# Patient Record
Sex: MALE | Race: BLACK OR AFRICAN AMERICAN | NOT HISPANIC OR LATINO | Employment: OTHER | URBAN - METROPOLITAN AREA
[De-identification: names, ages, dates, MRNs, and addresses within clinical notes are randomized per-mention and may not be internally consistent; named-entity substitution may affect disease eponyms.]

---

## 2017-01-31 ENCOUNTER — CLINICAL SUPPORT (OUTPATIENT)
Dept: REHABILITATION | Facility: HOSPITAL | Age: 37
End: 2017-01-31
Attending: PSYCHIATRY & NEUROLOGY
Payer: MEDICARE

## 2017-01-31 DIAGNOSIS — R26.0 ATAXIC GAIT: ICD-10-CM

## 2017-01-31 DIAGNOSIS — R29.898 WEAKNESS OF BOTH LOWER EXTREMITIES: Primary | ICD-10-CM

## 2017-01-31 DIAGNOSIS — R26.89 BALANCE PROBLEMS: ICD-10-CM

## 2017-01-31 PROCEDURE — G8978 MOBILITY CURRENT STATUS: HCPCS | Mod: CJ

## 2017-01-31 PROCEDURE — G8979 MOBILITY GOAL STATUS: HCPCS | Mod: CJ

## 2017-01-31 PROCEDURE — 97163 PT EVAL HIGH COMPLEX 45 MIN: CPT

## 2017-01-31 PROCEDURE — 97110 THERAPEUTIC EXERCISES: CPT

## 2017-01-31 NOTE — PLAN OF CARE
TIME RECORD    Date: 02/02/2017    Start Time:  12:08  Stop Time:  1:00    PROCEDURES:    TIMED  Procedure Min.   TE 10                     UNTIMED  Procedure Min.   PT eval          Total Timed Minutes:  10  Total Timed Units:  1  Total Untimed Units:  1  Charges Billed/# of units:  2    OUTPATIENT PHYSICAL THERAPY   PATIENT EVALUATION  Onset Date: Oct 2016  Primary Diagnosis:   1. Weakness of both lower extremities     2. Ataxic gait     3. Balance problems       Treatment Diagnosis: severe weakness, abnormality of gait, balance issues  Past Medical History   Diagnosis Date    Degenerative motor system disease     Depression     Seizure      Precautions: Standard, fall  Prior Therapy: Inpatient rehab previously, unsure if he has had PT in the past  Medications: Lisa Moreno has a current medication list which includes the following prescription(s): baclofen, cyanocobalamin (vitamin b-12), folic acid-vit b6-vit b12 2.5-25-2 mg, gabapentin, megestrol, polyethylene glycol, senna-docusate 8.6-50 mg, and sertraline.  Nutrition:  Thin  History of Present Illness: Patient and caregiver report that he progressively stopped walking around Oct of 2016  Prior Level of Function: Independent  Social History: Lives with family  Place of Residence (Steps/Adaptations): single story home, 3 steps in front of the home, denies having a trouble getting up the steps by himself  Functional Deficits Leading to Referral/Nature of Injury: difficulty walking, ascending/descending steps  Patient Therapy Goals: To be able to walk again so he can go out and meet his future wife to be    Subjective     Lisa Moreno states he has not been walking for several months and he just gradually stopped. He thinks he may have had pain and that is why he stopped walking. He reports having a RW at home but is not sure how to use it and has been relying on his wheelchair for mobility since Oct 2016. He denies any difficulty with shower  transfers, bed mobility, and bed to/from wheelchair transfers. He does not recall having had any therapy in the past and has never had a home program to follow. PT spoke to his mother via phone and she reports he recently had home health therapy and they referred him to outpatient therapy to increase his strength and ability to walk. He has been walking with the RW with his HH therapist and the patient is able to perform transfers independently. He has had inpatient rehab in the past and he gets stronger when he goes but then comes home and does nothing.     Pain:  Denies any pain at this time.    Objective     Posture: sacral sitting, B knee in slight extension in sitting, in standing requires UE support to maintain balance, feet touching, L heel off the ground  Palpation: No TTP.   Sensation: light touch intact  DTRs:1+ R LE and L ankle; 0 L knee  Range of Motion/Strength: Patient displays limited AROM of B LEs but has full PROM.     L/E MMT Right Left Pain/Dysfunction with Movement   Hip Flexion 3/5 3/5    Hip Extension NT NT    Hip Abduction 3-/5 3-/5    Knee Flexion 4/5 4/5    Knee Extension 2+/5 2+/5    Ankle DF 4-/5 3/5    Ankle PF 5/5 5/5      Flexibility: unable to assess due to spasticity  Gait: With AD.  Device Used -  Rolling walker  Analysis: CGA - narrow CJ, ataxic gait pattern, decreased heel strike L LE  Bed Mobility:Independent  Transfers: Assistance - [facility]  Special Tests: Static sitting balance - fair, requires UE leverage; Dynamic sitting balance poor, Static standing balance poor without UE support  Other: FOTO CNS dysfunction 62, G code CJ, 20%<40%  Treatment: Patient was educated on the plan of care and treatment options. He is in agreement with the plan of care. He performed therapeutic exercises 1:1 with PT x 10 minutes of seated knee ext, knee flexion, and hip flex. He was given handouts of today's exercises for HEP.    Assessment       Initial Assessment (Pertinent finding, problem list and  factors affecting outcome): Mr. Moreno is a 36 year old male, who presents to the clinic with complaints of weakness and inability to walk. He demonstrates limited AROM of B LE due to spastic diplegia, but has PROM WFL with no complaints of pain. His B LE weakness limits his ability to perform transfers without UE leverage or ambulate without an AD. He has poor to fair sitting balance and poor unsupported standing balance. When perturbations are applied with balance he falls backwards with no protective reactions. When ambulating with a RW and CGA for safety he has a narrow CJ, decreased heel strike on L LE, ataxic gait pattern. His requires SBA with transfers due to balance issues and poor safety awareness. His score on FOTO CNS dysfunction places him in the 20%<40% impaired, limited, or restricted category. He would benefit from physical therapy to improve his strength, balance, and gait to improve his functional mobility.   History  Co-morbidities and personal factors that may impact the plan of care Examination  Body Structures and Functions, activity limitations and participation restrictions that may impact the plan of care Clinical Presentation   Decision Making/ Complexity Score   Co-morbidities:   Spastic diplegia, upper motor neuron disease, spinal ataxia               Personal Factors:   Poor safety awareness, fall risk Body Regions:  B LE  Body Systems: Musculoskeletal- B LE weakness, decreased AROM B LE; Neuromuscular - ataxic gait, poor standing and sitting balance, SBA with transfers for safety; Cardiovascular - N/A; Integumentary - N/A          Activity limitations/Participation Restrictions: poor endurance, poor safety awareness, poor compliance with HEP during previous episodes of therapy         Evolving clinical presentation with changing clinical characteristics High complexity    FOTO CNS Dysfunction - 20%<40%       Rehab Potiential: fair    Short Term Goals (4 Weeks):   1. This patient and  his caregiver will be independent with a basic HEP.  2. This patient will have increase B LE strength by 1 grade in order to perform transfers independently.  3. This patient will be able to ambulate household level with RW independently with no LOB.  4. Patient will be able to achieve greater than or equal to 70 on the FOTO CNS dysfunction placing patient in 20%<40% impaired, limited, or restricted category demonstrating overall improved functional ability with lower extremity.   Long Term Goals (8 Weeks):   1. This patient will be independent with an updated HEP.  2. This patient will have B LE strength of 5/5 in order to ascend/descend 3 steps with supervision.  3. This patient will be able to ambulate community level with RW independently with no LOB.  4. Patient will be able to achieve greater than or equal to 75 on the FOTO CNS dysfunction placing patient in 20%<40% impaired, limited, or restricted category demonstrating overall improved functional ability with lower extremity.     Plan     Certification Period: 01/30/17 to 03/30/17  Recommended Treatment Plan: 2 times per week for 8 weeks: Gait Training, Group Therapy, Manual Therapy, Moist Heat/ Ice, Neuromuscular Re-ed, Patient Education, Therapeutic Exercise and Other modalities prn.  Other Recommendations: None      Therapist: Mary Marshall, PT    I CERTIFY THE NEED FOR THESE SERVICES FURNISHED UNDER THIS PLAN OF TREATMENT AND WHILE UNDER MY CARE    Physician's comments: ________________________________________________________________________________________________________________________________________________      Physician's Name: ___________________________________

## 2017-02-02 ENCOUNTER — CLINICAL SUPPORT (OUTPATIENT)
Dept: REHABILITATION | Facility: HOSPITAL | Age: 37
End: 2017-02-02
Attending: PSYCHIATRY & NEUROLOGY
Payer: MEDICARE

## 2017-02-02 DIAGNOSIS — R26.0 ATAXIC GAIT: ICD-10-CM

## 2017-02-02 DIAGNOSIS — R26.89 BALANCE PROBLEMS: ICD-10-CM

## 2017-02-02 PROCEDURE — 97110 THERAPEUTIC EXERCISES: CPT

## 2017-02-02 NOTE — PROGRESS NOTES
"TIME RECORD    Date:  02/02/2017    Start Time:  11:15  Stop Time:  12:00    PROCEDURES:    TIMED  Procedure Min.   TE 45                     UNTIMED  Procedure Min.             Total Timed Minutes:  45  Total Timed Units:  3  Total Untimed Units:  0  Charges Billed/# of units:  3 (TE-3)      Progress/Current Status    Subjective:     Patient ID: Lisa Moreno is a 36 y.o. male.  Diagnosis:   1. Ataxic gait     2. Balance problems       Pain: 0 /10  Patient reports having no pain.    Objective:     Patient arrived to therapy appointment 15 minutes late due to transportation.  Patient was educated and performed therapeutic exercises as per log below 1:1 with PTA x 45 minutes to improve ROM, flexibility, strength, gait and balance.  Gait/balance exercises consisted of ambulating with RW, CGA x 25 ft with 1 assist with WC.  Patient declined cold pack at the end of treatment.     Date  02/02/17   VISIT  INS AUTH EXP 2/20 12/31/17   G CODE VISIT 2/10   POC EXP. DATE 03/30/17   VISIT AMOUNT    MEDICARE TOTAL 95.94    203.52   FACE-TO-FACE 03/01/17       Bike  --   SEATED:    HS/GSS w/ strap 10 x 10"   Quad sets 10 x 3"   LAQs 1 x 10   Seated Hip Flex 1 x 10   HS curls --   Hip abduction 1 x 10 YTB   Hip adduction 10 x 3" w/ ball   Ankle pumps 1 x 5   STANDING:    Hip flex 1 x 5   Hip abd 1 x 5   March  1 x 5   Gait/balance See above   CP declined       Initials GWA 1/6       Assessment:     Patient ambulates with very NBOS, toe drag and very little weight shift as he puts most of his weight through his hands onto the walker.    Patient Education/Response:     Patient will be issued written copy of today's exercises for his HEP on next visit.    Plans and Goals:     Continue with Plan Of Care and progress as patient tolerates toward PT goals.    Short Term Goals (4 Weeks):   1. This patient and his caregiver will be independent with a basic HEP.  2. This patient will have increase B LE strength by 1 grade in order " to perform transfers independently.  3. This patient will be able to ambulate household level with RW independently with no LOB.  4. Patient will be able to achieve greater than or equal to 70 on the FOTO CNS dysfunction placing patient in 20%<40% impaired, limited, or restricted category demonstrating overall improved functional ability with lower extremity.     Long Term Goals (8 Weeks):   1. This patient will be independent with an updated HEP.  2. This patient will have B LE strength of 5/5 in order to ascend/descend 3 steps with supervision.  3. This patient will be able to ambulate community level with RW independently with no LOB.  4. Patient will be able to achieve greater than or equal to 75 on the FOTO CNS dysfunction placing patient in 20%<40% impaired, limited, or restricted category demonstrating overall improved functional ability with lower extremity.

## 2017-02-14 ENCOUNTER — CLINICAL SUPPORT (OUTPATIENT)
Dept: REHABILITATION | Facility: HOSPITAL | Age: 37
End: 2017-02-14
Attending: PSYCHIATRY & NEUROLOGY
Payer: MEDICARE

## 2017-02-14 DIAGNOSIS — R26.0 ATAXIC GAIT: ICD-10-CM

## 2017-02-14 DIAGNOSIS — R26.89 BALANCE PROBLEMS: ICD-10-CM

## 2017-02-14 PROCEDURE — 97110 THERAPEUTIC EXERCISES: CPT

## 2017-02-14 NOTE — PROGRESS NOTES
"TIME RECORD    Date:  02/14/2017    Start Time:  11:00  Stop Time:  12:00    PROCEDURES:    TIMED  Procedure Min.   TE 55                     UNTIMED  Procedure Min.             Total Timed Minutes:  55  Total Timed Units:  4  Total Untimed Units:  0  Charges Billed/# of units:  4 (TE-4)      Progress/Current Status     Subjective:     Patient ID: Lisa Moreno is a 36 y.o. male.  Diagnosis:   1. Ataxic gait     2. Balance problems       Pain: 0 /10  He reports having no pain today and he has been doing his exercises some.    Objective:     Patient arrived to therapy appointment in his manual wheelchair. Patient was educated and performed therapeutic exercises as per log below 1:1 with PT x 55 minutes to improve ROM, flexibility, strength, gait and balance. Gait/balance exercises consisted of ambulating with RW, CGA x 20 ft with 1 assist with WC and 28 ft with RW, CGA, and 1 assist with WC. Patient declined cold pack at the end of treatment.     Date  02/14/17 02/02/17   VISIT  INS AUTH EXP 3/20  12/31/17 2/20  12/31/17   G CODE VISIT 3/10 2/10   POC EXP. DATE 03/30/17 03/30/17   VISIT AMOUNT    MEDICARE TOTAL 127.92    331.44 95.94    203.52   FACE-TO-FACE 03/01/17 03/01/17        Bike  -- --   SEATED:     HS/GSS w/ strap 10 x 10" 10 x 10"   Quad sets 15 x 3" 10 x 3"   LAQs 1 x 15 1 x 10   Seated Hip Flex 2 x 10 1 x 10   HS curls 1 x 10 YTB --   Hip abduction 1 x 15 YTB 1 x 10 YTB   Hip adduction 15 x 3" w/ ball 10 x 3" w/ ball   Ankle pumps 1 x 10 1 x 5   STANDING:     Hip flex oot 1 x 5   Hip abd oot 1 x 5   March  1 x 5 1 x 5   Gait/balance See above See above   CP declined declined        Initials DG GWA 1/6       Assessment:     Patient required frequent cues with sit to stand to not scoot to the very edge of his wheelchair as this causes him to have a posterior lean when attempting sit to stand transfer. He also required frequent cues when ambulating to increase his CJ as he continues to have a very " narrow CJ with toe drag bilaterally. He places the majority of his weight through his UEs on the walker when performing transfers and ambulating. He did require frequent rest breaks with ambulation and standing exercises.    Patient Education/Response:     Patient was instructed to continue with HEP on a regular basis to maintain his strength. He verbalized understanding instructions.     Plans and Goals:     Continue with Plan Of Care and progress as patient tolerates toward PT goals.    Short Term Goals (4 Weeks):   1. This patient and his caregiver will be independent with a basic HEP.  2. This patient will have increase B LE strength by 1 grade in order to perform transfers independently.  3. This patient will be able to ambulate household level with RW independently with no LOB.  4. Patient will be able to achieve greater than or equal to 70 on the FOTO CNS dysfunction placing patient in 20%<40% impaired, limited, or restricted category demonstrating overall improved functional ability with lower extremity.     Long Term Goals (8 Weeks):   1. This patient will be independent with an updated HEP.  2. This patient will have B LE strength of 5/5 in order to ascend/descend 3 steps with supervision.  3. This patient will be able to ambulate community level with RW independently with no LOB.  4. Patient will be able to achieve greater than or equal to 75 on the FOTO CNS dysfunction placing patient in 20%<40% impaired, limited, or restricted category demonstrating overall improved functional ability with lower extremity.

## 2017-02-21 ENCOUNTER — CLINICAL SUPPORT (OUTPATIENT)
Dept: REHABILITATION | Facility: HOSPITAL | Age: 37
End: 2017-02-21
Attending: PSYCHIATRY & NEUROLOGY
Payer: MEDICARE

## 2017-02-21 DIAGNOSIS — R26.0 ATAXIC GAIT: ICD-10-CM

## 2017-02-21 DIAGNOSIS — R26.89 BALANCE PROBLEMS: ICD-10-CM

## 2017-02-21 PROCEDURE — 97110 THERAPEUTIC EXERCISES: CPT

## 2017-02-21 NOTE — PROGRESS NOTES
"TIME RECORD    Date:  02/21/2017    Start Time:  10:55  Stop Time:  11:50    PROCEDURES:    TIMED  Procedure Min.   TE 55                     UNTIMED  Procedure Min.             Total Timed Minutes:  55  Total Timed Units:  4  Total Untimed Units:  0  Charges Billed/# of units:  4 (TE-4)      Progress/Current Status    Subjective:     Patient ID: Lisa Moreno is a 36 y.o. male.  Diagnosis:   1. Ataxic gait     2. Balance problems       Pain: 0 /10  Patient reports having no pain.    Objective:     Patient arrived to therapy appointment in his manual wheelchair. Patient was educated and performed therapeutic exercises as per log below, along with today's progressions, 1:1 with PTA x 55 minutes to improve ROM, flexibility, strength, gait and balance. Gait/balance exercises consisted of ambulating with RW, CGA x 24 ft with 1 assist with WC and 35 ft with RW, CGA, and 1 assist with WC. Patient declined cold pack at the end of treatment.     Date  02/21/17 02/14/17 02/02/17   VISIT  INS AUTH EXP 4/20 3/20  12/31/17 2/20  12/31/17   G CODE VISIT 4/10 3/10 2/10   POC EXP. DATE 03/30/17 03/30/17 03/30/17   VISIT AMOUNT    MEDICARE TOTAL 127.92    459.36 127.92    331.44 95.94    203.52   FACE-TO-FACE 03/01/17 03/01/17 03/01/17         Bike  -- -- --   SEATED:      HS/GSS w/ strap 10 x 10" 10 x 10" 10 x 10"   Quad sets 15 x 3" 15 x 3" 10 x 3"   LAQs 1 x 20 1 x 15 1 x 10   Seated Hip Flex 2 x 10 2 x 10 1 x 10   HS curls 1 x 15 YTB 1 x 10 YTB --   Hip abduction 1 x 20 YTB 1 x 15 YTB 1 x 10 YTB   Hip adduction 20 x 3" w/ ball 15 x 3" w/ ball 10 x 3" w/ ball   Ankle pumps 1 x 10 1 x 10 1 x 5   STANDING:      Hip flex oot oot 1 x 5   Hip abd oot oot 1 x 5   March  1 x 8 1 x 5 1 x 5   Gait/balance See above See above See above   CP declined declined declined         Initials GWA 1/6 DG GWA 1/6       Assessment:     Patient continues to require cues with sit to stand to not scoot to the very edge of his wheelchair as this " causes him to have a posterior lean when attempting sit to stand transfer. He also required frequent cues when ambulating to increase his CJ as he continues to have a very narrow CJ with toe drag bilaterally. He places the majority of his weight through his UEs on the walker when performing transfers and ambulating. He did require frequent rest breaks with ambulation and standing exercises.  Patient was able to ambulate farther before becoming fatigued.      Patient Education/Response:     Patient was instructed to continue with HEP on a regular basis to maintain his strength. He verbalized understanding instructions.     Plans and Goals:     Continue with Plan Of Care and progress as patient tolerates toward PT goals.    Short Term Goals (4 Weeks):   1. This patient and his caregiver will be independent with a basic HEP.  2. This patient will have increase B LE strength by 1 grade in order to perform transfers independently.  3. This patient will be able to ambulate household level with RW independently with no LOB.  4. Patient will be able to achieve greater than or equal to 70 on the FOTO CNS dysfunction placing patient in 20%<40% impaired, limited, or restricted category demonstrating overall improved functional ability with lower extremity.     Long Term Goals (8 Weeks):   1. This patient will be independent with an updated HEP.  2. This patient will have B LE strength of 5/5 in order to ascend/descend 3 steps with supervision.  3. This patient will be able to ambulate community level with RW independently with no LOB.  4. Patient will be able to achieve greater than or equal to 75 on the FOTO CNS dysfunction placing patient in 20%<40% impaired, limited, or restricted category demonstrating overall improved functional ability with lower extremity.

## 2018-09-07 ENCOUNTER — HOSPITAL ENCOUNTER (OUTPATIENT)
Facility: HOSPITAL | Age: 38
Discharge: HOME | End: 2018-09-11
Attending: EMERGENCY MEDICINE | Admitting: HOSPITALIST
Payer: MEDICARE

## 2018-09-07 DIAGNOSIS — J22 LOWER RESPIRATORY INFECTION: ICD-10-CM

## 2018-09-07 DIAGNOSIS — R26.0 ATAXIC GAIT: ICD-10-CM

## 2018-09-07 DIAGNOSIS — Z87.898 HISTORY OF SEIZURES: ICD-10-CM

## 2018-09-07 DIAGNOSIS — N31.9 NEUROGENIC BLADDER: ICD-10-CM

## 2018-09-07 DIAGNOSIS — G11.8 SPINOCEREBELLAR ATAXIA: ICD-10-CM

## 2018-09-07 DIAGNOSIS — R53.1 WEAKNESS: Primary | ICD-10-CM

## 2018-09-07 DIAGNOSIS — J40 BRONCHITIS: ICD-10-CM

## 2018-09-07 DIAGNOSIS — R05.9 COUGH: ICD-10-CM

## 2018-09-07 LAB
ALBUMIN SERPL BCP-MCNC: 4.1 G/DL
ALP SERPL-CCNC: 50 U/L
ALT SERPL W/O P-5'-P-CCNC: 15 U/L
ANION GAP SERPL CALC-SCNC: 6 MMOL/L
AST SERPL-CCNC: 16 U/L
BASOPHILS # BLD AUTO: 0.02 K/UL
BASOPHILS NFR BLD: 0.3 %
BILIRUB SERPL-MCNC: 0.4 MG/DL
BILIRUB UR QL STRIP: NEGATIVE
BUN SERPL-MCNC: 17 MG/DL
CALCIUM SERPL-MCNC: 9.5 MG/DL
CHLORIDE SERPL-SCNC: 104 MMOL/L
CK SERPL-CCNC: 241 U/L
CLARITY UR REFRACT.AUTO: CLEAR
CO2 SERPL-SCNC: 30 MMOL/L
COLOR UR AUTO: YELLOW
CREAT SERPL-MCNC: 1.2 MG/DL
CRP SERPL-MCNC: 1.3 MG/L
DIFFERENTIAL METHOD: ABNORMAL
EOSINOPHIL # BLD AUTO: 0.1 K/UL
EOSINOPHIL NFR BLD: 0.7 %
ERYTHROCYTE [DISTWIDTH] IN BLOOD BY AUTOMATED COUNT: 13.1 %
ERYTHROCYTE [SEDIMENTATION RATE] IN BLOOD BY WESTERGREN METHOD: <2 MM/HR
EST. GFR  (AFRICAN AMERICAN): >60 ML/MIN/1.73 M^2
EST. GFR  (NON AFRICAN AMERICAN): >60 ML/MIN/1.73 M^2
GLUCOSE SERPL-MCNC: 102 MG/DL
GLUCOSE UR QL STRIP: NEGATIVE
HCT VFR BLD AUTO: 50.7 %
HGB BLD-MCNC: 15.9 G/DL
HGB UR QL STRIP: ABNORMAL
IMM GRANULOCYTES # BLD AUTO: 0.01 K/UL
IMM GRANULOCYTES NFR BLD AUTO: 0.1 %
KETONES UR QL STRIP: NEGATIVE
LEUKOCYTE ESTERASE UR QL STRIP: NEGATIVE
LYMPHOCYTES # BLD AUTO: 1.4 K/UL
LYMPHOCYTES NFR BLD: 20.5 %
MCH RBC QN AUTO: 26.8 PG
MCHC RBC AUTO-ENTMCNC: 31.4 G/DL
MCV RBC AUTO: 85 FL
MICROSCOPIC COMMENT: ABNORMAL
MONOCYTES # BLD AUTO: 0.5 K/UL
MONOCYTES NFR BLD: 7.5 %
NEUTROPHILS # BLD AUTO: 4.8 K/UL
NEUTROPHILS NFR BLD: 70.9 %
NITRITE UR QL STRIP: NEGATIVE
NRBC BLD-RTO: 0 /100 WBC
PH UR STRIP: 6 [PH] (ref 5–8)
PLATELET # BLD AUTO: 214 K/UL
PMV BLD AUTO: 11.6 FL
POTASSIUM SERPL-SCNC: 4.3 MMOL/L
PROT SERPL-MCNC: 7.8 G/DL
PROT UR QL STRIP: NEGATIVE
RBC # BLD AUTO: 5.94 M/UL
RBC #/AREA URNS AUTO: 59 /HPF (ref 0–4)
SODIUM SERPL-SCNC: 140 MMOL/L
SP GR UR STRIP: 1.03 (ref 1–1.03)
URN SPEC COLLECT METH UR: ABNORMAL
UROBILINOGEN UR STRIP-ACNC: 2 EU/DL
WBC # BLD AUTO: 6.79 K/UL
WBC #/AREA URNS AUTO: 2 /HPF (ref 0–5)

## 2018-09-07 PROCEDURE — 85025 COMPLETE CBC W/AUTO DIFF WBC: CPT

## 2018-09-07 PROCEDURE — 25000003 PHARM REV CODE 250

## 2018-09-07 PROCEDURE — 85652 RBC SED RATE AUTOMATED: CPT

## 2018-09-07 PROCEDURE — 63600175 PHARM REV CODE 636 W HCPCS

## 2018-09-07 PROCEDURE — 25500020 PHARM REV CODE 255: Performed by: EMERGENCY MEDICINE

## 2018-09-07 PROCEDURE — 99285 EMERGENCY DEPT VISIT HI MDM: CPT | Mod: ,,,

## 2018-09-07 PROCEDURE — 99220 PR INITIAL OBSERVATION CARE,LEVL III: CPT | Mod: ,,, | Performed by: PHYSICIAN ASSISTANT

## 2018-09-07 PROCEDURE — 99285 EMERGENCY DEPT VISIT HI MDM: CPT

## 2018-09-07 PROCEDURE — 96365 THER/PROPH/DIAG IV INF INIT: CPT

## 2018-09-07 PROCEDURE — G0378 HOSPITAL OBSERVATION PER HR: HCPCS

## 2018-09-07 PROCEDURE — P9612 CATHETERIZE FOR URINE SPEC: HCPCS

## 2018-09-07 PROCEDURE — 80053 COMPREHEN METABOLIC PANEL: CPT

## 2018-09-07 PROCEDURE — 96375 TX/PRO/DX INJ NEW DRUG ADDON: CPT | Mod: 59

## 2018-09-07 PROCEDURE — 81001 URINALYSIS AUTO W/SCOPE: CPT

## 2018-09-07 PROCEDURE — 86140 C-REACTIVE PROTEIN: CPT

## 2018-09-07 PROCEDURE — 82550 ASSAY OF CK (CPK): CPT

## 2018-09-07 PROCEDURE — 87040 BLOOD CULTURE FOR BACTERIA: CPT | Mod: 59

## 2018-09-07 PROCEDURE — A9585 GADOBUTROL INJECTION: HCPCS | Performed by: EMERGENCY MEDICINE

## 2018-09-07 RX ORDER — CEFEPIME HYDROCHLORIDE 2 G/1
2 INJECTION, POWDER, FOR SOLUTION INTRAVENOUS
Status: COMPLETED | OUTPATIENT
Start: 2018-09-07 | End: 2018-09-07

## 2018-09-07 RX ORDER — OXYCODONE HYDROCHLORIDE 5 MG/1
15 TABLET ORAL EVERY 4 HOURS PRN
Status: DISCONTINUED | OUTPATIENT
Start: 2018-09-07 | End: 2018-09-11 | Stop reason: HOSPADM

## 2018-09-07 RX ORDER — SODIUM CHLORIDE 0.9 % (FLUSH) 0.9 %
3 SYRINGE (ML) INJECTION
Status: DISCONTINUED | OUTPATIENT
Start: 2018-09-08 | End: 2018-09-11 | Stop reason: HOSPADM

## 2018-09-07 RX ORDER — HYDROCODONE BITARTRATE AND ACETAMINOPHEN 5; 325 MG/1; MG/1
1 TABLET ORAL EVERY 4 HOURS PRN
Status: DISCONTINUED | OUTPATIENT
Start: 2018-09-07 | End: 2018-09-11 | Stop reason: HOSPADM

## 2018-09-07 RX ORDER — VANCOMYCIN HCL IN 5 % DEXTROSE 1.5G/250ML
20 PLASTIC BAG, INJECTION (ML) INTRAVENOUS
Status: DISCONTINUED | OUTPATIENT
Start: 2018-09-07 | End: 2018-09-07

## 2018-09-07 RX ORDER — GADOBUTROL 604.72 MG/ML
8 INJECTION INTRAVENOUS
Status: COMPLETED | OUTPATIENT
Start: 2018-09-07 | End: 2018-09-07

## 2018-09-07 RX ORDER — RAMELTEON 8 MG/1
8 TABLET ORAL NIGHTLY PRN
Status: DISCONTINUED | OUTPATIENT
Start: 2018-09-08 | End: 2018-09-11 | Stop reason: HOSPADM

## 2018-09-07 RX ORDER — LORAZEPAM 1 MG/1
1 TABLET ORAL
Status: COMPLETED | OUTPATIENT
Start: 2018-09-07 | End: 2018-09-07

## 2018-09-07 RX ORDER — ONDANSETRON 2 MG/ML
4 INJECTION INTRAMUSCULAR; INTRAVENOUS EVERY 8 HOURS PRN
Status: DISCONTINUED | OUTPATIENT
Start: 2018-09-07 | End: 2018-09-11 | Stop reason: HOSPADM

## 2018-09-07 RX ADMIN — GADOBUTROL 8 ML: 604.72 INJECTION INTRAVENOUS at 09:09

## 2018-09-07 RX ADMIN — LORAZEPAM 1 MG: 1 TABLET ORAL at 07:09

## 2018-09-07 RX ADMIN — CEFEPIME 2 G: 2 INJECTION, POWDER, FOR SOLUTION INTRAVENOUS at 10:09

## 2018-09-07 RX ADMIN — AZITHROMYCIN MONOHYDRATE 500 MG: 500 INJECTION, POWDER, LYOPHILIZED, FOR SOLUTION INTRAVENOUS at 10:09

## 2018-09-07 NOTE — ED TRIAGE NOTES
Mother is bringing patient to ER due to him having a deep congested cough productive of white sputum.  Mother also reports that the patient's chin periodically quivers, however, this was not seen during assessment.  Patient's name and date of birth checked and is correct.  LOC: The patient is awake, alert and aware of environment with a flat affect, the patient is oriented to person and is not able to answer questions appropriately.  APPEARANCE: Patient resting comfortably and in no acute distress, patient is clean and well groomed, patient's clothing is properly fastened.  CARDIOVASCULAR:  Heart rate regular and even with no peripheral edema noted.  SKIN: The skin is warm and dry, patient has normal skin turgor and moist mucus membranes, skin intact, no breakdown or brusing noted. MUSKULOSKELETAL: Patient moving all extremities well, no obvious swelling or deformities noted.  RESPIRATORY: Airway is open and patent, respirations are spontaneous, patient has a normal effort and rate. Course lung sounds noted through out.

## 2018-09-07 NOTE — ED NOTES
To Xray via w/c.  Mother reports patient is incontinent of urine.  ANGELITO Bowie notified and order given for a straight cath.

## 2018-09-07 NOTE — ED PROVIDER NOTES
Encounter Date: 9/7/2018       History     Chief Complaint   Patient presents with    multiple complaints     Mother reports removed son from rehab facility today because was not being care for properly. Reports multiple complaints she wants evaluated in ED.      37 y.o. male with longstanding medical history of neurological disorder presents to the ED with mother for multiple complaints. Patient has been seen in the past by Dr. Washburn. 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 Patient has been at inpatient rehab on the  for the past 3 months. Mother concerned that he is not been taken care of appropriately. C/o productive cough and worsening urinary incontinence. He does not c/o pain. No chest pain, fever, chills, abdominal pain, headache, fall, syncope.           Review of patient's allergies indicates:   Allergen Reactions    Penicillins      Past Medical History:   Diagnosis Date    Degenerative motor system disease     Depression     Seizure      History reviewed. No pertinent surgical history.  Family History   Problem Relation Age of Onset    Thyroid cancer Mother     Lung cancer Maternal Grandfather     Multiple sclerosis Other         mother's cousin    ALS Neg Hx     Muscular dystrophy Neg Hx      Social History     Tobacco Use    Smoking status: Never Smoker    Smokeless tobacco: Never Used   Substance Use Topics    Alcohol use: Yes     Comment: social drinker, at times    Drug use: No     Review of Systems   Constitutional: Negative for activity change, appetite change, chills, diaphoresis, fatigue and fever.   HENT: Negative for congestion.    Eyes: Negative for visual disturbance.   Respiratory: Positive for cough. Negative for shortness of breath.    Cardiovascular: Negative for chest pain and leg swelling.   Gastrointestinal: Negative for abdominal pain, nausea and vomiting.   Genitourinary: Positive for frequency. Negative for dysuria and hematuria.   Musculoskeletal:  Positive for gait problem ( chronic). Negative for back pain and neck pain.   Skin: Negative for rash.   Neurological: Positive for weakness. Negative for syncope, light-headedness and headaches.   Psychiatric/Behavioral: The patient is not nervous/anxious.        Physical Exam     Initial Vitals [09/07/18 1540]   BP Pulse Resp Temp SpO2   (!) 140/83 89 18 98 °F (36.7 °C) 98 %      MAP       --         Physical Exam    Vitals reviewed.  Constitutional: He appears well-developed and well-nourished. He is not diaphoretic. No distress.   HENT:   Head: Normocephalic and atraumatic.   Nose: Nose normal.   Mouth/Throat: Oropharynx is clear and moist. No oropharyngeal exudate.   Eyes: Conjunctivae and EOM are normal. Pupils are equal, round, and reactive to light. Right eye exhibits no discharge. Left eye exhibits no discharge.   Neck: Normal range of motion. Neck supple. No thyromegaly present.   Cardiovascular: Normal rate and intact distal pulses.   Pulmonary/Chest: No respiratory distress. He has no wheezes. He exhibits no tenderness.   Abdominal: Soft. Bowel sounds are normal. He exhibits no distension. There is no tenderness. There is no rebound and no guarding.   Musculoskeletal: He exhibits no edema or tenderness.   Lymphadenopathy:     He has no cervical adenopathy.   Neurological: He is alert and oriented to person, place, and time. No sensory deficit.   Bilateral UE strength intact  Bilateral LE strength 2/5  No facial droop  Decreased rectal tone   Skin: Skin is warm and dry. Capillary refill takes less than 2 seconds. No rash noted.   Psychiatric: He has a normal mood and affect.         ED Course   Procedures  Labs Reviewed   CBC W/ AUTO DIFFERENTIAL - Abnormal; Notable for the following components:       Result Value    MCH 26.8 (*)     MCHC 31.4 (*)     All other components within normal limits    Narrative:     ONE LAVENDER SHARED   COMPREHENSIVE METABOLIC PANEL - Abnormal; Notable for the following  components:    CO2 30 (*)     Alkaline Phosphatase 50 (*)     Anion Gap 6 (*)     All other components within normal limits    Narrative:     ONE LAVENDER SHARED   URINALYSIS, REFLEX TO URINE CULTURE - Abnormal; Notable for the following components:    Occult Blood UA 2+ (*)     All other components within normal limits    Narrative:     cup sent   CK - Abnormal; Notable for the following components:     (*)     All other components within normal limits    Narrative:     ONE LAVENDER SHARED   URINALYSIS MICROSCOPIC - Abnormal; Notable for the following components:    RBC, UA 59 (*)     All other components within normal limits    Narrative:     cup sent   CULTURE, BLOOD   CULTURE, BLOOD   SEDIMENTATION RATE    Narrative:     ONE LAVENDER SHARED   C-REACTIVE PROTEIN    Narrative:     ONE LAVENDER SHARED          Imaging Results          MRI Brain W WO Contrast (Final result)  Result time 09/07/18 22:02:17    Final result by Lawrence Alfonso MD (09/07/18 22:02:17)                 Impression:      Significantly motion limited examination.  No evidence of acute infarct.  If clinical concern persists, the exam can be repeated as indicated.    Generalized cerebellar and brainstem atrophy, grossly unchanged.    Additional findings discussed in the body of the report.      Electronically signed by: Lawrence Alfonso MD  Date:    09/07/2018  Time:    22:02             Narrative:    EXAMINATION:  MRI BRAIN W WO CONTRAST    CLINICAL HISTORY:  h/o spinocerebellar atrophy. worsening urinary incontinence and strength; Weakness    TECHNIQUE:  Multiplanar multisequence MR imaging of the brain was performed before and after the administration of 8 mL Gadavist intravenous contrast.    COMPARISON:  MRI brain, 01/26/2015.    FINDINGS:  There is extensive motion which significantly limits evaluation.    There is no evidence of restricted diffusion to suggest acute infarction.    FLAIR imaging demonstrates mild periventricular  signal hyperintensity, grossly unchanged.    The ventricles are stable.  Grossly stable generalized volume loss of the brainstem and cerebellum.    No evidence of mass lesion, hemorrhage, edema or recent or remote major vascular distribution infarction.    Post contrast images are essentially nondiagnostic.  No large enhancing mass identified.    No extra-axial blood or fluid collections.    T2 skull base flow voids are preserved. Bone marrow signal intensity is unremarkable.                               MRI Thoracic Spine W WO Cont (Final result)  Result time 09/07/18 22:18:22    Final result by Torsten De La Torre MD (09/07/18 22:18:22)                 Impression:      Stable appearance of diffuse decreased caliber of the thoracic cord without evidence of a focal signal abnormality.    No infectious or inflammatory process in the thoracic spine.    Significant motion degraded examination.  Repeat of the postcontrast images may be obtained, when clinically appropriate.    Electronically signed by resident: Prabhjot Peñaloza  Date:    09/07/2018  Time:    21:45    Electronically signed by: Torsten De La Torre MD  Date:    09/07/2018  Time:    22:18             Narrative:    EXAMINATION:  MRI THORACIC SPINE W WO CONTRAST    CLINICAL HISTORY:  worsening weakness and urinary incontinence  Weakness    TECHNIQUE:  Multiplanar, multisequence images were performed through the thoracic spine.  Before and after administration of 8 cc Gadavist IV contrast.  The examination is significant degraded by motion.    COMPARISON:  Thoracic spine MRI January 26, 2015    FINDINGS:  Alignment: Normal thoracic kyphosis.    Vertebral bodies: Normal height, without evidence of marrow replacing process.    Discs: Disc spaces are well maintained, with no abnormal enhancement.    Cord: There is stable appearance of diffuse decreased caliber of the thoracic cord.  No focal signal abnormality is present.    Degenerative changes:    Evaluation of the individual  disc levels reveals no evidence of spinal canal or neural foraminal narrowing.    Surrounding soft tissues: Normal in appearance.    There is no evidence of abnormal enhancement on the sagittal T1 post sequences.  The axial post-contrast images are nondiagnostic.                               MRI Lumbar Spine W WO Cont (Final result)  Result time 09/07/18 22:23:50    Final result by Torsten De La Torre MD (09/07/18 22:23:50)                 Impression:      Heterogeneous bone marrow signal involving the L5-S1, and S2-L2 broad bodies with nonspecific patchy enhancement on the post contrast sequences. No definitive MR evidence of discitis osteomyelitis.    Marked motion degraded examination. Repeat of the examination is suggested, when the patient is better able to tolerate positioning.    Electronically signed by resident: Prabhjot Peñaloza  Date:    09/07/2018  Time:    21:57    Electronically signed by: Torsten De La Torre MD  Date:    09/07/2018  Time:    22:23             Narrative:    EXAMINATION:  MRI LUMBAR SPINE W WO CONTRAST    CLINICAL HISTORY:  worsening weakness and urinary incontinence;  Weakness    TECHNIQUE:  Multiplanar, multisequence images of the Lumbar Spine were obtained with and without the use of intravenous contrast. 8 of IV Gadavist was injected.  The examination is significantly degraded secondary to motion.    COMPARISON:  Lumbar spine MRI 01/26/2015    FINDINGS:  Alignment: Normal lumbar lordosis is preserved.    Vertebrae: Body heights are well maintained. No evidence for acute fracture.  The bone marrow signal is heterogeneous.  There is patchy bone marrow signal involving the L5, S1 and S2 vertebral bodies.    Discs:  Intervertebral disc space heights are well maintained.    Cord:  Visualized conus and lumbar spinal nerve roots demonstrate normal signal.  No enhancing intradural mass or abnormal leptomeningeal enhancement. No intramedullary cord enhancement. The conus terminates at T12-L1  level.    Degenerative changes    T12-L1:  There is no focal disc herniation.  Bilateral facet arthropathy.  No significant spinal canal stenosis.  No significant neural foraminal narrowing.    L1-2:  Mild broad-based disc bulge and bilateral facet arthropathy, resulting in mild spinal canal stenosis and mild bilateral neural foraminal narrowing.    L2-3:  Bilateral facet arthropathy and mild broad-based disc bulge, resulting in mild spinal canal stenosis.  No significant neural foraminal narrowing.    L3-4:  Broad-based disc bulge and bilateral facet arthropathy, resulting in mild spinal canal stenosis and mild left-sided neural foraminal narrowing.    L4-5:  Mild broad-based disc bulge and bilateral facet arthropathy.  No significant spinal canal stenosis or neural foraminal narrowing.    L5-S1:  There is no focal disc herniation.  No significant spinal canal stenosis.  No significant neural foraminal narrowing.    Soft tissue structures: The paraspinal soft tissues are unremarkable..                               X-Ray Chest PA And Lateral (Final result)  Result time 09/07/18 17:55:59    Final result by Torsten De La Torre MD (09/07/18 17:55:59)                 Impression:      Patchy airspace opacities in the right lower lobe.      Electronically signed by: Torsten De La Torre MD  Date:    09/07/2018  Time:    17:55             Narrative:    EXAMINATION:  XR CHEST PA AND LATERAL    CLINICAL HISTORY:  Cough    TECHNIQUE:  PA and lateral views of the chest were performed.    COMPARISON:  01/14/2015.    FINDINGS:  The trachea is unremarkable.  The cardiomediastinal silhouette is within normal limits.  The hilar structures are unremarkable.  There is mild elevation of the left hemidiaphragm.  The right hemidiaphragm is unremarkable.  There is no evidence of free air beneath the hemidiaphragms.  There are no pleural effusions.  There is no evidence of a pneumothorax.  There is no evidence of pneumomediastinum.  There are patchy  "airspace opacities in the right lower lobe.  The osseous structures are unremarkable.  The subcutaneous tissues are within normal limits.                                 Medical Decision Making:   History:   Old Medical Records: I decided to obtain old medical records.  Old Records Summarized: records from clinic visits.  Initial Assessment:   37 y.o. male with longstanding medical history of neurological disorder presents to the ED with mother for multiple complaints. Patient followed by Dr. Washburn. Patient's mother states patient has productive cough and worsening urinary incontinence. UE strength intact. LE strength decreased. Decreased rectal tone  Differential Diagnosis:   DDX includes but is not limited to worsening neurologic disease, rhabdomyolysis, cerebellar atrophy , UTI, electrolyte abnormality, pneumonia. Considered but do not suspect cauda equina.   Clinical Tests:   Lab Tests: Ordered and Reviewed  Radiological Study: Ordered and Reviewed  ED Management:  Will get labs, CXR and check post void. Will add on MRI brain, thoracic, lumbar w wo.    CPK mildly elevated at 241. All other labs are benign. Will give oral ativan 1mg prior to MRI. CXR shows patchy opacities in right lower lobe. Will add on blood cultures and give dose of abx for healthcare associated PNA. MRI head stable from previous study. MRI thoracic spine benign. MRI lumbar spine shows "Heterogeneous bone marrow signal involving the L5-S1, and S2-L2 broad bodies with nonspecific patchy enhancement on the post contrast sequences". Will admit to obs for neuro evaluation, PT/OT evaluation and social work consult.     I have discussed the treatment and management of this patient with my supervisory physician, and we agree on the plan of care.                 Attending Attestation:     Physician Attestation Statement for NP/PA:   I discussed this assessment and plan of this patient with the NP/PA, but I did not personally examine the patient. " The face to face encounter was performed by the NP/PA.                     Clinical Impression:   The primary encounter diagnosis was Weakness. Diagnoses of Cough and Lower respiratory infection were also pertinent to this visit.      Disposition:   Disposition: Placed in Observation  Condition: Shelley Page PA-C  09/08/18 0056       Rudy Melendrez MD  09/09/18 0122

## 2018-09-07 NOTE — ED NOTES
Patient catheterized using a 14fr red rubber catheter. 100ml of yellow urine obtained and patient immediately had a urinary incontinent episode.

## 2018-09-08 PROBLEM — J40 BRONCHITIS: Status: ACTIVE | Noted: 2018-09-08

## 2018-09-08 PROCEDURE — 99226 PR SUBSEQUENT OBSERVATION CARE,LEVEL III: CPT | Mod: ,,, | Performed by: PHYSICIAN ASSISTANT

## 2018-09-08 PROCEDURE — 25000003 PHARM REV CODE 250: Performed by: PHYSICIAN ASSISTANT

## 2018-09-08 PROCEDURE — G0378 HOSPITAL OBSERVATION PER HR: HCPCS

## 2018-09-08 PROCEDURE — 94640 AIRWAY INHALATION TREATMENT: CPT

## 2018-09-08 PROCEDURE — 99204 OFFICE O/P NEW MOD 45 MIN: CPT | Mod: ,,, | Performed by: PSYCHIATRY & NEUROLOGY

## 2018-09-08 PROCEDURE — 25000242 PHARM REV CODE 250 ALT 637 W/ HCPCS: Performed by: PHYSICIAN ASSISTANT

## 2018-09-08 PROCEDURE — 94761 N-INVAS EAR/PLS OXIMETRY MLT: CPT

## 2018-09-08 RX ORDER — DANTROLENE SODIUM 25 MG/1
25 CAPSULE ORAL DAILY
Status: DISCONTINUED | OUTPATIENT
Start: 2018-09-08 | End: 2018-09-11 | Stop reason: HOSPADM

## 2018-09-08 RX ORDER — IPRATROPIUM BROMIDE AND ALBUTEROL SULFATE 2.5; .5 MG/3ML; MG/3ML
3 SOLUTION RESPIRATORY (INHALATION) EVERY 4 HOURS PRN
Status: DISCONTINUED | OUTPATIENT
Start: 2018-09-08 | End: 2018-09-11 | Stop reason: HOSPADM

## 2018-09-08 RX ORDER — BACLOFEN 10 MG/1
20 TABLET ORAL 4 TIMES DAILY
Status: DISCONTINUED | OUTPATIENT
Start: 2018-09-08 | End: 2018-09-11 | Stop reason: HOSPADM

## 2018-09-08 RX ADMIN — IPRATROPIUM BROMIDE AND ALBUTEROL SULFATE 3 ML: .5; 3 SOLUTION RESPIRATORY (INHALATION) at 10:09

## 2018-09-08 RX ADMIN — IPRATROPIUM BROMIDE AND ALBUTEROL SULFATE 3 ML: .5; 3 SOLUTION RESPIRATORY (INHALATION) at 09:09

## 2018-09-08 RX ADMIN — BACLOFEN 20 MG: 10 TABLET ORAL at 01:09

## 2018-09-08 RX ADMIN — BACLOFEN 20 MG: 10 TABLET ORAL at 05:09

## 2018-09-08 RX ADMIN — BACLOFEN 20 MG: 10 TABLET ORAL at 08:09

## 2018-09-08 RX ADMIN — DANTROLENE SODIUM 25 MG: 25 CAPSULE ORAL at 09:09

## 2018-09-08 RX ADMIN — BACLOFEN 20 MG: 10 TABLET ORAL at 09:09

## 2018-09-08 NOTE — SUBJECTIVE & OBJECTIVE
Past Medical History:   Diagnosis Date    Degenerative motor system disease     Depression     Seizure        History reviewed. No pertinent surgical history.    Review of patient's allergies indicates:   Allergen Reactions    Penicillins        Current Neurological Medications:   Baclofen 20 mg q12    No current facility-administered medications on file prior to encounter.      Current Outpatient Medications on File Prior to Encounter   Medication Sig    cyanocobalamin, vitamin B-12, (VITAMIN B-12) 1,000 mcg TbSR Take 1 tablet by mouth once daily.    folic acid-vit B6-vit B12 2.5-25-2 mg (FOLBIC OR EQUIV) 2.5-25-2 mg Tab Take 1 tablet by mouth once daily.    megestrol (MEGACE) 40 MG Tab Take 1 tablet (40 mg total) by mouth 2 (two) times daily.    polyethylene glycol (GLYCOLAX) 17 gram PwPk Take 17 g by mouth once daily.    senna-docusate 8.6-50 mg (PERICOLACE) 8.6-50 mg per tablet Take 1 tablet by mouth 2 (two) times daily as needed for Constipation.    baclofen (LIORESAL) 20 MG tablet Take 1 tablet (20 mg total) by mouth 2 (two) times daily.    gabapentin (NEURONTIN) 100 MG capsule Take 1 capsule (100 mg total) by mouth 3 (three) times daily. (pain)    sertraline (ZOLOFT) 50 MG tablet Take 1 tablet (50 mg total) by mouth once daily.     Family History     Problem Relation (Age of Onset)    Lung cancer Maternal Grandfather    Multiple sclerosis Other    Thyroid cancer Mother        Tobacco Use    Smoking status: Never Smoker    Smokeless tobacco: Never Used   Substance and Sexual Activity    Alcohol use: Yes     Comment: social drinker, at times    Drug use: No    Sexual activity: No     Review of Systems   Constitutional: Negative for chills and fever.   HENT: Positive for trouble swallowing (per mother, patient denies). Negative for drooling, sinus pressure and sore throat.    Eyes: Negative for visual disturbance.   Respiratory: Negative for cough and shortness of breath.    Cardiovascular:  Negative for chest pain and palpitations.   Gastrointestinal: Negative for abdominal pain, nausea and vomiting.   Genitourinary: Negative for dysuria, frequency and hematuria.        Incontinence   Musculoskeletal: Positive for gait problem. Negative for arthralgias and joint swelling.        Stiffness in extremities   Skin: Negative for color change and rash.   Allergic/Immunologic: Negative for immunocompromised state.   Neurological: Negative for dizziness, weakness, numbness and headaches.   Psychiatric/Behavioral: Negative for agitation and confusion. The patient is not nervous/anxious.      Objective:     Vital Signs (Most Recent):  Temp: 98.4 °F (36.9 °C) (09/08/18 1715)  Pulse: 70 (09/08/18 1640)  Resp: 17 (09/08/18 1640)  BP: 117/70 (09/08/18 1640)  SpO2: 99 % (09/08/18 1640) Vital Signs (24h Range):  Temp:  [97.8 °F (36.6 °C)-99.3 °F (37.4 °C)] 98.4 °F (36.9 °C)  Pulse:  [] 70  Resp:  [10-19] 17  SpO2:  [95 %-99 %] 99 %  BP: (102-136)/(68-91) 117/70     Weight: 72.6 kg (160 lb)  Body mass index is 19.48 kg/m².    Physical Exam   Constitutional: He is oriented to person, place, and time. He appears well-developed and well-nourished. He is cooperative. He does not appear ill. No distress.   Calm young healthy-looking AAM in no apparent distress   HENT:   Head: Normocephalic and atraumatic.   Mouth/Throat: Mucous membranes are normal.   Eyes: EOM are normal. Pupils are equal, round, and reactive to light.   Neck: Normal range of motion.   Cardiovascular: Normal rate, regular rhythm and normal heart sounds. Exam reveals no gallop.   No murmur heard.  Pulses:       Radial pulses are 2+ on the right side, and 2+ on the left side.   Pulmonary/Chest: Effort normal and breath sounds normal. No tachypnea. No respiratory distress. He has no decreased breath sounds. He has no wheezes. He has no rales.   Abdominal: Soft. He exhibits no distension. There is no tenderness.   Musculoskeletal: Normal range of motion.  He exhibits no edema or tenderness.   Neurological: He is alert and oriented to person, place, and time. He displays no tremor. He has a normal Finger-Nose-Finger Test. He displays no seizure activity.   Reflex Scores:       Bicep reflexes are 3+ on the right side and 3+ on the left side.       Brachioradialis reflexes are 3+ on the right side and 3+ on the left side.       Patellar reflexes are 3+ on the right side and 3+ on the left side.       Achilles reflexes are 3+ on the right side and 3+ on the left side.  Skin: Skin is warm. No rash noted. He is not diaphoretic. No cyanosis or erythema.   Psychiatric: He has a normal mood and affect. His behavior is normal. Thought content normal.   Nursing note and vitals reviewed.      NEUROLOGICAL EXAMINATION:     MENTAL STATUS   Oriented to person, place, and time.   Follows 3 step commands.   Attention: normal. Concentration: normal.   Speech: (Slowed + dysphonia)  Level of consciousness: alert  Knowledge: good. Praxis: normal     CRANIAL NERVES     CN III, IV, VI   Pupils are equal, round, and reactive to light.  Extraocular motions are normal.   Right pupil: Consensual response: intact.   Left pupil: Consensual response: intact.   Nystagmus: none   Ophthalmoparesis: none    CN V   Facial sensation intact.     CN VII   Facial expression full, symmetric.     CN VIII   Hearing: intact    CN IX, X   Palate: symmetric    CN XI   CN XI normal.     CN XII   CN XII normal.     MOTOR EXAM   Muscle bulk: normal  Overall muscle tone: increased  Right arm tone: increased  Left arm tone: increased  Right leg tone: spastic  Left leg tone: spastic       Strength 5/5 in H&N + BL UE  4+/5 in BL LE       REFLEXES     Reflexes   Right brachioradialis: 3+  Left brachioradialis: 3+  Right biceps: 3+  Left biceps: 3+  Right patellar: 3+  Left patellar: 3+  Right achilles: 3+  Left achilles: 3+  Right plantar: equivocal  Left plantar: equivocal  Right Lindsey: present  Left Lindsey:  present  Right ankle clonus: absent  Left ankle clonus: absent    SENSORY EXAM   Light touch normal.     GAIT AND COORDINATION      Coordination   Finger to nose coordination: normal    Tremor   Resting tremor: absent  Intention tremor: absent  Action tremor: absent      Significant Labs:   Blood Culture:   Recent Labs   Lab  09/07/18 1920  09/07/18   1945   LABBLOO  No Growth to date  No Growth to date     CBC:   Recent Labs   Lab  09/07/18   1726   WBC  6.79   HGB  15.9   HCT  50.7   PLT  214     CMP:   Recent Labs   Lab  09/07/18   1726   GLU  102   NA  140   K  4.3   CL  104   CO2  30*   BUN  17   CREATININE  1.2   CALCIUM  9.5   PROT  7.8   ALBUMIN  4.1   BILITOT  0.4   ALKPHOS  50*   AST  16   ALT  15   ANIONGAP  6*   EGFRNONAA  >60.0     Inflammatory Markers:   Recent Labs   Lab  09/07/18   1726   SEDRATE  <2   CRP  1.3     Urine Studies:   Recent Labs   Lab  09/07/18   1804   COLORU  Yellow   APPEARANCEUA  Clear   PHUR  6.0   SPECGRAV  1.030   PROTEINUA  Negative   GLUCUA  Negative   KETONESU  Negative   BILIRUBINUA  Negative   OCCULTUA  2+*   NITRITE  Negative   UROBILINOGEN  2.0   LEUKOCYTESUR  Negative   RBCUA  59*   WBCUA  2     All pertinent lab results from the past 24 hours have been reviewed.    Significant Imaging:   MRI Brain on 9/7:   Poor quality study due to motion artifacts  Evidence of generalized cerebellar and brainstem atrophy         MRI Thoracic/Lumbar Spine on 9/7:  bone marrow signal change in L5-S1, and S1-S2  No bulging disc or canal stenosis along thoracolumbar spine

## 2018-09-08 NOTE — SUBJECTIVE & OBJECTIVE
Interval History: No events overnight. Patient requesting breathing treatment.    Review of Systems   Constitutional: Negative for chills and fever.   Respiratory: Positive for cough. Negative for chest tightness and shortness of breath.    Cardiovascular: Negative for chest pain and palpitations.   Gastrointestinal: Negative for abdominal pain and nausea.   Neurological: Positive for speech difficulty and weakness.   Psychiatric/Behavioral: Negative for agitation and confusion.     Objective:     Vital Signs (Most Recent):  Temp: 98.3 °F (36.8 °C) (09/08/18 0541)  Pulse: 69 (09/08/18 0541)  Resp: 10 (09/08/18 0541)  BP: 121/81 (09/08/18 0541)  SpO2: 98 % (09/08/18 0541) Vital Signs (24h Range):  Temp:  [97.2 °F (36.2 °C)-98.3 °F (36.8 °C)] 98.3 °F (36.8 °C)  Pulse:  [] 69  Resp:  [10-18] 10  SpO2:  [95 %-98 %] 98 %  BP: (102-140)/(68-83) 121/81     Weight: 72.6 kg (160 lb)  Body mass index is 19.48 kg/m².  No intake or output data in the 24 hours ending 09/08/18 0715   Physical Exam   Constitutional: He is oriented to person, place, and time. He appears well-developed and well-nourished.   Cardiovascular: Normal rate, regular rhythm, normal heart sounds, intact distal pulses and normal pulses.   Pulmonary/Chest: Effort normal and breath sounds normal. No respiratory distress. He has no wheezes.   Abdominal: Soft. Bowel sounds are normal.   Musculoskeletal: Normal range of motion. He exhibits no edema or tenderness.   Neurological: He is alert and oriented to person, place, and time. He exhibits abnormal muscle tone.   Strength 5/5 BUE  Strength 2/5 BLE   Nursing note and vitals reviewed.      Significant Labs:   CBC:   Recent Labs   Lab  09/07/18   1726   WBC  6.79   HGB  15.9   HCT  50.7   PLT  214     CMP:   Recent Labs   Lab  09/07/18   1726   NA  140   K  4.3   CL  104   CO2  30*   GLU  102   BUN  17   CREATININE  1.2   CALCIUM  9.5   PROT  7.8   ALBUMIN  4.1   BILITOT  0.4   ALKPHOS  50*   AST  16   ALT   15   ANIONGAP  6*   EGFRNONAA  >60.0     Urine Studies:   Recent Labs   Lab  09/07/18   1804   COLORU  Yellow   APPEARANCEUA  Clear   PHUR  6.0   SPECGRAV  1.030   PROTEINUA  Negative   GLUCUA  Negative   KETONESU  Negative   BILIRUBINUA  Negative   OCCULTUA  2+*   NITRITE  Negative   UROBILINOGEN  2.0   LEUKOCYTESUR  Negative   RBCUA  59*   WBCUA  2       Significant Imaging: I have reviewed all pertinent imaging results/findings within the past 24 hours.

## 2018-09-08 NOTE — H&P
[facility] Hospital Medicine  History & Physical    Patient Name: Lisa Moreno  MRN: [id number]  Admission Date: 9/7/2018  Attending Physician: Keri Archer MD   Primary Care Provider: Cyndie Barrera MD    [facility] Medicine Team: [facility] MED E John Foster PA-C     Patient information was obtained from patient, past medical records and ER records.     Subjective:     Principal Problem:Weakness    Chief Complaint:   Chief Complaint   Patient presents with    multiple complaints     Mother reports removed son from rehab facility today because was not being care for properly. Reports multiple complaints she wants evaluated in ED.         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 Patient is a patient at [facility] for appproximately 75 days. His mother is at bedside and providers majority of HPI as patient doesn't speak. The mother notes she admits her son to PMR facilities once or twice a year to get PT/OT to help with his chronic LE weakness. She reports his strength has worsened while in his current facility. She is upset that he has not seen any medical doctor and feels he needs to be evaluated by Neurology so she brought him to [facility] ED for evaluation. Of note, the patient last saw Dr. Wise in 2015. Patient's mother endorses cough and associated worsening urinary incontinence, but she attributes that to starting Flomax during his facility stay. Patient denies chest pain, SOB, N/V, and numbness.    In the ED, vitals stable. Intake labs with no acute abnormality. CXR concerning for bronchitis, no consolidation concerning for pneumonia. ED gave IV azithromycin, cefepime, and vancomycin to cover for pneumonia, but will not continue.    Past Medical History:   Diagnosis Date    Degenerative motor system disease     Depression     Seizure        History reviewed. No pertinent surgical  history.    Review of patient's allergies indicates:   Allergen Reactions    Penicillins        No current facility-administered medications on file prior to encounter.      Current Outpatient Medications on File Prior to Encounter   Medication Sig    cyanocobalamin, vitamin B-12, (VITAMIN B-12) 1,000 mcg TbSR Take 1 tablet by mouth once daily.    folic acid-vit B6-vit B12 2.5-25-2 mg (FOLBIC OR EQUIV) 2.5-25-2 mg Tab Take 1 tablet by mouth once daily.    megestrol (MEGACE) 40 MG Tab Take 1 tablet (40 mg total) by mouth 2 (two) times daily.    polyethylene glycol (GLYCOLAX) 17 gram PwPk Take 17 g by mouth once daily.    senna-docusate 8.6-50 mg (PERICOLACE) 8.6-50 mg per tablet Take 1 tablet by mouth 2 (two) times daily as needed for Constipation.    baclofen (LIORESAL) 20 MG tablet Take 1 tablet (20 mg total) by mouth 2 (two) times daily.    gabapentin (NEURONTIN) 100 MG capsule Take 1 capsule (100 mg total) by mouth 3 (three) times daily. (pain)    sertraline (ZOLOFT) 50 MG tablet Take 1 tablet (50 mg total) by mouth once daily.     Family History     Problem Relation (Age of Onset)    Lung cancer Maternal Grandfather    Multiple sclerosis Other    Thyroid cancer Mother        Tobacco Use    Smoking status: Never Smoker    Smokeless tobacco: Never Used   Substance and Sexual Activity    Alcohol use: Yes     Comment: social drinker, at times    Drug use: No    Sexual activity: No     Review of Systems   Constitutional: Negative for chills and fever.   HENT: Negative for trouble swallowing.    Eyes: Negative for photophobia and visual disturbance.   Respiratory: Positive for cough. Negative for chest tightness, shortness of breath and wheezing.    Cardiovascular: Negative for chest pain, palpitations and leg swelling.   Gastrointestinal: Negative for abdominal distention, abdominal pain, nausea and vomiting.   Genitourinary: Positive for enuresis. Negative for dysuria and hematuria.   Musculoskeletal:  Positive for gait problem. Negative for neck pain.   Skin: Negative for rash and wound.   Neurological: Positive for speech difficulty (chronic) and weakness. Negative for facial asymmetry and numbness.   Psychiatric/Behavioral: Positive for behavioral problems. Negative for agitation and confusion. The patient is not nervous/anxious.      Objective:     Vital Signs (Most Recent):  Temp: 97.8 °F (36.6 °C) (09/07/18 2210)  Pulse: 106 (09/07/18 2210)  Resp: 16 (09/07/18 2210)  BP: 129/70 (09/07/18 2210)  SpO2: 95 % (09/07/18 2210) Vital Signs (24h Range):  Temp:  [97.2 °F (36.2 °C)-98 °F (36.7 °C)] 97.8 °F (36.6 °C)  Pulse:  [] 106  Resp:  [16-18] 16  SpO2:  [95 %-98 %] 95 %  BP: (127-140)/(70-83) 129/70     Weight: 72.6 kg (160 lb)  Body mass index is 19.48 kg/m².    Physical Exam   Constitutional: He is oriented to person, place, and time. He appears well-developed and well-nourished. No distress.   HENT:   Head: Normocephalic and atraumatic.   Mouth/Throat: No oropharyngeal exudate.   Eyes: EOM are normal. Pupils are equal, round, and reactive to light. No scleral icterus.   Neck: Normal range of motion. Neck supple.   Cardiovascular: Normal heart sounds, intact distal pulses and normal pulses. Tachycardia present.   Pulmonary/Chest: Effort normal and breath sounds normal. No respiratory distress. He has no wheezes. He has no rales.   Abdominal: Soft. Bowel sounds are normal. He exhibits no distension. There is no tenderness.   Musculoskeletal: Normal range of motion. He exhibits no edema or tenderness.   Lymphadenopathy:     He has no cervical adenopathy.   Neurological: He is alert and oriented to person, place, and time. No cranial nerve deficit. He exhibits abnormal muscle tone.   Strength 5/5 BUE  Strength 2/5 BLE   Skin: Skin is warm and dry. No rash noted.   Psychiatric: He has a normal mood and affect. His behavior is normal. Thought content normal.   Nursing note and vitals reviewed.        CRANIAL  "NERVES     CN III, IV, VI   Pupils are equal, round, and reactive to light.  Extraocular motions are normal.        Significant Labs:   CBC:   Recent Labs   Lab  09/07/18   1726   WBC  6.79   HGB  15.9   HCT  50.7   PLT  214     CMP:   Recent Labs   Lab  09/07/18   1726   NA  140   K  4.3   CL  104   CO2  30*   GLU  102   BUN  17   CREATININE  1.2   CALCIUM  9.5   PROT  7.8   ALBUMIN  4.1   BILITOT  0.4   ALKPHOS  50*   AST  16   ALT  15   ANIONGAP  6*   EGFRNONAA  >60.0     Urine Studies:   Recent Labs   Lab  09/07/18   1804   COLORU  Yellow   APPEARANCEUA  Clear   PHUR  6.0   SPECGRAV  1.030   PROTEINUA  Negative   GLUCUA  Negative   KETONESU  Negative   BILIRUBINUA  Negative   OCCULTUA  2+*   NITRITE  Negative   UROBILINOGEN  2.0   LEUKOCYTESUR  Negative   RBCUA  59*   WBCUA  2       Significant Imaging: I have reviewed all pertinent imaging results/findings within the past 24 hours.    Assessment/Plan:     * Weakness    Ataxic gait  Spinocerebella ataxia    Patient's mother removed patient from [facility] due to concerns he "was not being treated correctly" and his condition had been worsening. They present for Neurology evaluation. Patient last seen by Dr. Wise in 2015. Chart review shows difficulty managing condition due to financial issues and medical expenses.  - MRI brain, spine negative for acute abnormality  - known cerebella atrophy, stable  - Neurology consulted, recommendations appreciated  - PT/OT consult  - SW for possible placement        Neurogenic bladder    History of  - discontinue flomax          VTE Risk Mitigation (From admission, onward)        Ordered     IP VTE LOW RISK PATIENT  Once      09/07/18 2255     Place DANY hose  Until discontinued      09/07/18 5713             John Foster PA-C  Department of Hospital Medicine   [facility]  "

## 2018-09-08 NOTE — SUBJECTIVE & OBJECTIVE
Past Medical History:   Diagnosis Date    Degenerative motor system disease     Depression     Seizure        History reviewed. No pertinent surgical history.    Review of patient's allergies indicates:   Allergen Reactions    Penicillins        No current facility-administered medications on file prior to encounter.      Current Outpatient Medications on File Prior to Encounter   Medication Sig    cyanocobalamin, vitamin B-12, (VITAMIN B-12) 1,000 mcg TbSR Take 1 tablet by mouth once daily.    folic acid-vit B6-vit B12 2.5-25-2 mg (FOLBIC OR EQUIV) 2.5-25-2 mg Tab Take 1 tablet by mouth once daily.    megestrol (MEGACE) 40 MG Tab Take 1 tablet (40 mg total) by mouth 2 (two) times daily.    polyethylene glycol (GLYCOLAX) 17 gram PwPk Take 17 g by mouth once daily.    senna-docusate 8.6-50 mg (PERICOLACE) 8.6-50 mg per tablet Take 1 tablet by mouth 2 (two) times daily as needed for Constipation.    baclofen (LIORESAL) 20 MG tablet Take 1 tablet (20 mg total) by mouth 2 (two) times daily.    gabapentin (NEURONTIN) 100 MG capsule Take 1 capsule (100 mg total) by mouth 3 (three) times daily. (pain)    sertraline (ZOLOFT) 50 MG tablet Take 1 tablet (50 mg total) by mouth once daily.     Family History     Problem Relation (Age of Onset)    Lung cancer Maternal Grandfather    Multiple sclerosis Other    Thyroid cancer Mother        Tobacco Use    Smoking status: Never Smoker    Smokeless tobacco: Never Used   Substance and Sexual Activity    Alcohol use: Yes     Comment: social drinker, at times    Drug use: No    Sexual activity: No     Review of Systems   Constitutional: Negative for chills and fever.   HENT: Negative for trouble swallowing.    Eyes: Negative for photophobia and visual disturbance.   Respiratory: Positive for cough. Negative for chest tightness, shortness of breath and wheezing.    Cardiovascular: Negative for chest pain, palpitations and leg swelling.   Gastrointestinal: Negative for  abdominal distention, abdominal pain, nausea and vomiting.   Genitourinary: Positive for enuresis. Negative for dysuria and hematuria.   Musculoskeletal: Positive for gait problem. Negative for neck pain.   Skin: Negative for rash and wound.   Neurological: Positive for speech difficulty (chronic) and weakness. Negative for facial asymmetry and numbness.   Psychiatric/Behavioral: Positive for behavioral problems. Negative for agitation and confusion. The patient is not nervous/anxious.      Objective:     Vital Signs (Most Recent):  Temp: 97.8 °F (36.6 °C) (09/07/18 2210)  Pulse: 106 (09/07/18 2210)  Resp: 16 (09/07/18 2210)  BP: 129/70 (09/07/18 2210)  SpO2: 95 % (09/07/18 2210) Vital Signs (24h Range):  Temp:  [97.2 °F (36.2 °C)-98 °F (36.7 °C)] 97.8 °F (36.6 °C)  Pulse:  [] 106  Resp:  [16-18] 16  SpO2:  [95 %-98 %] 95 %  BP: (127-140)/(70-83) 129/70     Weight: 72.6 kg (160 lb)  Body mass index is 19.48 kg/m².    Physical Exam   Constitutional: He is oriented to person, place, and time. He appears well-developed and well-nourished. No distress.   HENT:   Head: Normocephalic and atraumatic.   Mouth/Throat: No oropharyngeal exudate.   Eyes: EOM are normal. Pupils are equal, round, and reactive to light. No scleral icterus.   Neck: Normal range of motion. Neck supple.   Cardiovascular: Normal heart sounds, intact distal pulses and normal pulses. Tachycardia present.   Pulmonary/Chest: Effort normal and breath sounds normal. No respiratory distress. He has no wheezes. He has no rales.   Abdominal: Soft. Bowel sounds are normal. He exhibits no distension. There is no tenderness.   Musculoskeletal: Normal range of motion. He exhibits no edema or tenderness.   Lymphadenopathy:     He has no cervical adenopathy.   Neurological: He is alert and oriented to person, place, and time. No cranial nerve deficit. He exhibits abnormal muscle tone.   Strength 5/5 BUE  Strength 2/5 BLE   Skin: Skin is warm and dry. No rash  noted.   Psychiatric: He has a normal mood and affect. His behavior is normal. Thought content normal.   Nursing note and vitals reviewed.        CRANIAL NERVES     CN III, IV, VI   Pupils are equal, round, and reactive to light.  Extraocular motions are normal.        Significant Labs:   CBC:   Recent Labs   Lab  09/07/18   1726   WBC  6.79   HGB  15.9   HCT  50.7   PLT  214     CMP:   Recent Labs   Lab  09/07/18   1726   NA  140   K  4.3   CL  104   CO2  30*   GLU  102   BUN  17   CREATININE  1.2   CALCIUM  9.5   PROT  7.8   ALBUMIN  4.1   BILITOT  0.4   ALKPHOS  50*   AST  16   ALT  15   ANIONGAP  6*   EGFRNONAA  >60.0     Urine Studies:   Recent Labs   Lab  09/07/18   1804   COLORU  Yellow   APPEARANCEUA  Clear   PHUR  6.0   SPECGRAV  1.030   PROTEINUA  Negative   GLUCUA  Negative   KETONESU  Negative   BILIRUBINUA  Negative   OCCULTUA  2+*   NITRITE  Negative   UROBILINOGEN  2.0   LEUKOCYTESUR  Negative   RBCUA  59*   WBCUA  2       Significant Imaging: I have reviewed all pertinent imaging results/findings within the past 24 hours.

## 2018-09-08 NOTE — ASSESSMENT & PLAN NOTE
Per mother this is new  No evidence of infection but positive for hematuria   Abnormal bone marrow signal in lumbosacral spine w/o evidence of cord compression anywhere along lumbar spine  NSGY might have helpful input

## 2018-09-08 NOTE — ASSESSMENT & PLAN NOTE
History of cough for several weeks, no cough or rales on exam.  - CXR with concerning inflammation, but not pneumonia or consolidation  - given multiple Abx in ED, will not continue as patient afebrile without leukocytosis  - brendan PÉREZN

## 2018-09-08 NOTE — PROGRESS NOTES
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 Patient is a patient at [facility] for appproximately 75 days. His mother is at bedside and providers majority of HPI as patient doesn't speak. The mother notes she admits her son to PMR facilities once or twice a year to get PT/OT to help with his chronic LE weakness. She reports his strength has worsened while in his current facility. She is upset that he has not seen any medical doctor and feels he needs to be evaluated by Neurology so she brought him to [facility] ED for evaluation. Of note, the patient last saw Dr. Wise in 2015. Patient's mother endorses cough and associated worsening urinary incontinence, but she attributes that to starting Flomax during his facility stay. Patient denies chest pain, SOB, N/V, and numbness.    In the ED, vitals stable. Intake labs with no acute abnormality. CXR concerning for bronchitis, no consolidation concerning for pneumonia. ED gave IV azithromycin, cefepime, and vancomycin to cover for pneumonia, but will not continue.    Hospital Course:  Patient admitted to observation for chronic weakness. Neurology, PT/OT consulted; recs pending.    Interval History: No events overnight. Patient requesting breathing treatment.    Review of Systems   Constitutional: Negative for chills and fever.   Respiratory: Positive for cough. Negative for chest tightness and shortness of breath.    Cardiovascular: Negative for chest pain and palpitations.   Gastrointestinal: Negative  for abdominal pain and nausea.   Neurological: Positive for speech difficulty and weakness.   Psychiatric/Behavioral: Negative for agitation and confusion.     Objective:     Vital Signs (Most Recent):  Temp: 98.3 °F (36.8 °C) (09/08/18 0541)  Pulse: 69 (09/08/18 0541)  Resp: 10 (09/08/18 0541)  BP: 121/81 (09/08/18 0541)  SpO2: 98 % (09/08/18 0541) Vital Signs (24h Range):  Temp:  [97.2 °F (36.2 °C)-98.3 °F (36.8 °C)] 98.3 °F (36.8 °C)  Pulse:  [] 69  Resp:  [10-18] 10  SpO2:  [95 %-98 %] 98 %  BP: (102-140)/(68-83) 121/81     Weight: 72.6 kg (160 lb)  Body mass index is 19.48 kg/m².  No intake or output data in the 24 hours ending 09/08/18 0715   Physical Exam   Constitutional: He is oriented to person, place, and time. He appears well-developed and well-nourished.   Cardiovascular: Normal rate, regular rhythm, normal heart sounds, intact distal pulses and normal pulses.   Pulmonary/Chest: Effort normal and breath sounds normal. No respiratory distress. He has no wheezes.   Abdominal: Soft. Bowel sounds are normal.   Musculoskeletal: Normal range of motion. He exhibits no edema or tenderness.   Neurological: He is alert and oriented to person, place, and time. He exhibits abnormal muscle tone.   Strength 5/5 BUE  Strength 2/5 BLE   Nursing note and vitals reviewed.      Significant Labs:   CBC:   Recent Labs   Lab  09/07/18   1726   WBC  6.79   HGB  15.9   HCT  50.7   PLT  214     CMP:   Recent Labs   Lab  09/07/18   1726   NA  140   K  4.3   CL  104   CO2  30*   GLU  102   BUN  17   CREATININE  1.2   CALCIUM  9.5   PROT  7.8   ALBUMIN  4.1   BILITOT  0.4   ALKPHOS  50*   AST  16   ALT  15   ANIONGAP  6*   EGFRNONAA  >60.0     Urine Studies:   Recent Labs   Lab  09/07/18   1804   COLORU  Yellow   APPEARANCEUA  Clear   PHUR  6.0   SPECGRAV  1.030   PROTEINUA  Negative   GLUCUA  Negative   KETONESU  Negative   BILIRUBINUA  Negative   OCCULTUA  2+*   NITRITE  Negative   UROBILINOGEN  2.0   LEUKOCYTESUR   "Negative   RBCUA  59*   WBCUA  2       Significant Imaging: I have reviewed all pertinent imaging results/findings within the past 24 hours.    Assessment/Plan:      * Weakness    Ataxic gait  Spinocerebella ataxia    Patient's mother removed patient from [facility] due to concerns he "was not being treated correctly" and his condition had been worsening. They present for Neurology evaluation. Patient last seen by Dr. Wise in 2015. Chart review shows difficulty managing condition due to financial issues and medical expenses.  - MRI brain, spine negative for acute abnormality  - known cerebella atrophy, stable  - Neurology consulted, recommendations appreciated  - PT/OT consult  - SW for possible placement        Bronchitis    History of cough for several weeks, no cough or rales on exam.  - CXR with concerning inflammation, but not pneumonia or consolidation  - given multiple Abx in ED, will not continue as patient afebrile without leukocytosis  - duonebs PRN        Neurogenic bladder    History of  - discontinue flomax          VTE Risk Mitigation (From admission, onward)        Ordered     IP VTE LOW RISK PATIENT  Once      09/07/18 2256     Place DANY hose  Until discontinued      09/07/18 9602              John Foster PA-C  Department of Hospital Medicine   [facility]  "

## 2018-09-08 NOTE — ASSESSMENT & PLAN NOTE
"Ataxic gait  Spinocerebella ataxia    [facility] due to concerns he "was not being treated correctly" and his condition had been worsening. They present for Neurology evaluation. Patient last seen by Dr. Wise in 2015. Chart review shows difficulty managing condition due to financial issues and medical expenses.  - MRI brain, spine negative for acute abnormality  - known cerebella atrophy, stable  - Neurology consulted, recommendations appreciated  - PT/OT consult  - SW for possible placement  "

## 2018-09-08 NOTE — ASSESSMENT & PLAN NOTE
Vague Hx, never been on AEDs  Mother mentions shaking of the legs when walking that could be due to ataxia/spasticity

## 2018-09-08 NOTE — HPI
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 Patient is a patient at [facility] for appproximately 75 days. His mother is at bedside and providers majority of HPI as patient doesn't speak. The mother notes she admits her son to PMR facilities once or twice a year to get PT/OT to help with his chronic LE weakness. She reports his strength has worsened while in his current facility. She is upset that he has not seen any medical doctor and feels he needs to be evaluated by Neurology so she brought him to [facility] ED for evaluation. Of note, the patient last saw Dr. Wise in 2015. Patient's mother endorses cough and associated worsening urinary incontinence, but she attributes that to starting Flomax during his facility stay. Patient denies chest pain, SOB, N/V, and numbness.    In the ED, vitals stable. Intake labs with no acute abnormality. CXR concerning for bronchitis, no consolidation concerning for pneumonia. ED gave IV azithromycin, cefepime, and vancomycin to cover for pneumonia, but will not continue.

## 2018-09-08 NOTE — HOSPITAL COURSE
Patient admitted to observation for chronic weakness. Neurology, PT/OT consulted and provided recommendations. As condition is chronic, patient will require multi disciplinary following outpatient. PT recommended rehab; pt accepted and discharged [facility].

## 2018-09-08 NOTE — ASSESSMENT & PLAN NOTE
"Clinical picture and MRI findings consistent with SCA. C/o imbalance and stiffness that has progressed over years (initially started at age of 20). believe that he uses the word "weakness" due to inability to walk (which is due to spasticity and ataxia), since strength is 5/5 in UE and 4+/5 in LE. Never had genetic testing due to financial issues, however highly doubt that genetic testing would change the management.     Recommendations:  - SLP to evaluate swallowing (mother says he chokes frequently, patient denies)  - PT/OT to evaluate and treat  - continue Baclofen 20 mg BID  - we don't think his symptoms regarding SCA requires remaining in the hospital as long as his other medical issues including bronchitis is addressed   - He requires to get set up with [facility] to address multiple medical issues at the same time (including neurology)  - please contact us with any additional questions      "

## 2018-09-08 NOTE — CONSULTS
Consult received. Full note to follow.        Darcie Li MD  PGY-II, Neurology  09/08/2018  8:28 AM

## 2018-09-08 NOTE — ASSESSMENT & PLAN NOTE
"Ataxic gait  Spinocerebella ataxia    [facility] due to concerns he "was not being treated correctly" and his condition had been worsening. They present for Neurology evaluation. Patient last seen by Dr. Wise in 2015. Chart review shows difficulty managing condition due to financial issues and medical expenses.  - MRI brain, spine negative for acute abnormality  - known cerebella atrophy, stable  - Neurology consulted, recommendations appreciated  - PT/OT consult  - SW for possible placement  "

## 2018-09-08 NOTE — [facility] SECONDARY REVIEW
Physician Advisor External    Level of Care Issue    Approved Observation-   09/08/2018 @ 0940- Per Dr. Arvin Li, EHR, determination is Outpatient.

## 2018-09-09 PROCEDURE — G8989 SELF CARE D/C STATUS: HCPCS | Mod: CK

## 2018-09-09 PROCEDURE — G8997 SWALLOW GOAL STATUS: HCPCS | Mod: CH

## 2018-09-09 PROCEDURE — G8987 SELF CARE CURRENT STATUS: HCPCS | Mod: CK

## 2018-09-09 PROCEDURE — 92610 EVALUATE SWALLOWING FUNCTION: CPT

## 2018-09-09 PROCEDURE — G8998 SWALLOW D/C STATUS: HCPCS | Mod: CH

## 2018-09-09 PROCEDURE — G8996 SWALLOW CURRENT STATUS: HCPCS | Mod: CH

## 2018-09-09 PROCEDURE — 25000003 PHARM REV CODE 250: Performed by: PHYSICIAN ASSISTANT

## 2018-09-09 PROCEDURE — 97166 OT EVAL MOD COMPLEX 45 MIN: CPT

## 2018-09-09 PROCEDURE — G8988 SELF CARE GOAL STATUS: HCPCS | Mod: CI

## 2018-09-09 PROCEDURE — 99226 PR SUBSEQUENT OBSERVATION CARE,LEVEL III: CPT | Mod: ,,, | Performed by: PHYSICIAN ASSISTANT

## 2018-09-09 PROCEDURE — G0378 HOSPITAL OBSERVATION PER HR: HCPCS

## 2018-09-09 PROCEDURE — 97535 SELF CARE MNGMENT TRAINING: CPT

## 2018-09-09 RX ADMIN — BACLOFEN 20 MG: 10 TABLET ORAL at 01:09

## 2018-09-09 RX ADMIN — DANTROLENE SODIUM 25 MG: 25 CAPSULE ORAL at 11:09

## 2018-09-09 RX ADMIN — BACLOFEN 20 MG: 10 TABLET ORAL at 08:09

## 2018-09-09 RX ADMIN — BACLOFEN 20 MG: 10 TABLET ORAL at 05:09

## 2018-09-09 NOTE — PLAN OF CARE
09/09/18 1534   Discharge Assessment   Assessment Type Discharge Planning Assessment   Confirmed/corrected address and phone number on facesheet? Yes   Assessment information obtained from? Other  (His mother states that he does not understand and forgets what is said)   Expected Length of Stay (days) 3   Communicated expected length of stay with patient/caregiver yes   Prior to hospitilization cognitive status: Unable to Assess   Prior to hospitalization functional status: Completely Dependent   Current cognitive status: Unable to Assess   Current Functional Status: Completely Dependent   Facility Arrived From: pt lives at home with mom, she recently put him in [facility] and he was there for 2 1/2 months, and then she took him out    Lives With other (see comments)  (lives with his mother)   Able to Return to Prior Arrangements unable to determine at this time (comments)  (mother states that she just recently got him on a waiver program that she was approved for Friday)   Is patient able to care for self after discharge? No   Who are your caregiver(s) and their phone number(s)? mother, Giovana Moreno, [phone].  Mother states she would like MD's to call her about her son, as he does not retain anything   Patient's perception of discharge disposition home health   Readmission Within The Last 30 Days no previous admission in last 30 days  (He was in a rehab but not hospital)   Patient currently being followed by outpatient case management? No   Patient currently receives any other outside agency services? Yes  (he was just approved for waiver program)   How many hours a day does the patient receive services? (has not started yet)   Is it the patient/care giver preference to resume care with the current outside agency? (approval received Friday for waiver program, )   Equipment Currently Used at Home (mother states they have wheelchair, hospital bed, walker, BSC, shower bench, julieta lift, )   Do you have  any problems affording any of your prescribed medications? No   Is the patient taking medications as prescribed? yes   Does the patient have transportation home? Yes   Transportation Available family or friend will provide   Dialysis Name and Scheduled days no   Does the patient receive services at the [facility]mad[facility]? No   Discharge Plan A Home   Discharge Plan B Home;Home with family;Home Health   Patient/Family In Agreement With Plan yes

## 2018-09-09 NOTE — PT/OT/SLP EVAL
Occupational Therapy   Evaluation    Name: Lisa Moreno  MRN: [id number]  Admitting Diagnosis:  Weakness      Recommendations:     Discharge Recommendations: nursing facility, skilled  Discharge Equipment Recommendations:  (TBD)  Barriers to discharge:  Inaccessible home environment    History:     Occupational Profile:  Living Environment: Pt lives in a one story house c 3 MALCOM and no rails.  Has a tub/shower combo.  Previous level of function: I PTA  Roles and Routines: N/A  Equipment Used at Home:  wheelchair, walker, rolling, shower chair  Assistance upon Discharge: Mother, cousins, and aunt.    Past Medical History:   Diagnosis Date    Degenerative motor system disease     Depression     Seizure        History reviewed. No pertinent surgical history.    Subjective     Chief Complaint: Spinocerebellar atrophy  Patient/Family Comments/goals: To get better.    Pain/Comfort:  · Pain Rating 1: 0/10    Patients cultural, spiritual, Mu-ism conflicts given the current situation: None    Objective:     Communicated with: RN prior to session.  Patient found all lines intact, call button in reach and RN notified and (No lines) upon OT entry to room.    General Precautions: Standard, aspiration, fall   Orthopedic Precautions:N/A   Braces: N/A     Occupational Performance:    Bed Mobility:    · Patient completed Supine to Sit with stand by assistance  · Patient completed Sit to Supine with stand by assistance    Functional Mobility/Transfers:  · Patient completed Sit <> Stand Transfer with minimum assistance  with  no assistive device   · Functional Mobility: Pt was able to take 3-4 steps towards bathroom and has poor static/dynamic sitting and standing balance.  No chair present in room at this time.    Activities of Daily Living:  · Upper Body Dressing: minimum assistance to don hospital gown.  · Lower Body Dressing: minimum assistance to don/doff socks.    Cognitive/Visual Perceptual:  Cognitive/Psychosocial  "Skills:     -       Oriented to: Person, Place, Time and Situation   -       Follows Commands/attention:Follows multistep  commands    Physical Exam:  Upper Extremity Range of Motion:     -       Right Upper Extremity: WFL R UE  -       Left Upper Extremity: WFL L UE  Upper Extremity Strength:    -       Right Upper Extremity: WFL  -       Left Upper Extremity: WFL B UE    AMPAC 6 Click ADL:  AMPAC Total Score: 16      Education:    Patient left supine with all lines intact, call button in reach and RN notified    Assessment:     Lisa Moreno is a 37 y.o. male with a medical diagnosis of Weakness.  He presents with the following performance deficits affecting function: weakness, impaired endurance, impaired self care skills, impaired functional mobilty, decreased coordination, impaired balance.  Pt was able to perform supine/sit T/F c SBA and sit/stand and take steps c min A.  Pt has poor static/dynamic sitting and standing balance.  B UE are WFL.  Was Ox4 and follows all commands.    Rehab Prognosis: Good; patient would benefit from acute skilled OT services to address these deficits and reach maximum level of function.         Clinical Decision Makin.  OT Mod:  "Pt evaluation falls under moderate complexity for evaluation coding due to identification of 3-5 performance deficits noted as stated above. Eval required Min/Mod assistance to complete on this date and detailed assessment(s) were utilized. Moreover, an expanded review of history and occupational profile obtained with additional review of cognitive, physical and psychosocial hx."     Plan:     Patient to be seen 5 x/week to address the above listed problems via self-care/home management, therapeutic activities, therapeutic exercises  · Plan of Care Expires: 18  · Plan of Care Reviewed with: patient    This Plan of care has been discussed with the patient who was involved in its development and understands and is in agreement with the " identified goals and treatment plan    GOALS:   Multidisciplinary Problems     Occupational Therapy Goals        Problem: Occupational Therapy Goal    Goal Priority Disciplines Outcome Interventions   Occupational Therapy Goal     OT, PT/OT     Description:  Goals to be met by: 9/23/18     Patient will increase functional independence with ADLs by performing:    UE Dressing with [place].  LE Dressing with [place].  Grooming while standing at sink with [place].  Toileting from toilet with [place] for hygiene and clothing management.   Bathing from  shower chair/bench with [place].  Toilet transfer to toilet with [place].  Increased functional strength to WFL for B UE.  Upper extremity exercise program x15 reps per handout, with independence.                      Time Tracking:     OT Date of Treatment: 09/09/18  OT Start Time: 1058  OT Stop Time: 1121  OT Total Time (min): 23 min    Billable Minutes:Evaluation 10  Self Care/Home Management 13    PHILL Ireland  9/9/2018

## 2018-09-09 NOTE — PROGRESS NOTES
[facility] Hospital Medicine  Progress Note    Patient Name: Lisa Moreno  MRN: [id number]  Patient Class: OP- Observation   Admission Date: 9/7/2018  Length of Stay: 0 days  Attending Physician: Keri Archer MD  Primary Care Provider: Cyndie Barrera MD    [facility] Medicine Team: [facility] HOSP MED E John Foster PA-C    Subjective:     Principal Problem:Weakness    HPI:  Patient is a 38yo AAM with a PMHx of spinocerebellar atrophy being admitted to observation for chronic weakness. Patient is a patient at [facility] for appproximately 75 days. His mother is at bedside and providers majority of HPI as patient doesn't speak. The mother notes she admits her son to PMR facilities once or twice a year to get PT/OT to help with his chronic LE weakness. She reports his strength has worsened while in his current facility. She is upset that he has not seen any medical doctor and feels he needs to be evaluated by Neurology so she brought him to [facility] ED for evaluation. Of note, the patient last saw Dr. Wise in 2015. Patient's mother endorses cough and associated worsening urinary incontinence, but she attributes that to starting Flomax during his facility stay. Patient denies chest pain, SOB, N/V, and numbness.    In the ED, vitals stable. Intake labs with no acute abnormality. CXR concerning for bronchitis, no consolidation concerning for pneumonia. ED gave IV azithromycin, cefepime, and vancomycin to cover for pneumonia, but will not continue.    Hospital Course:  Patient admitted to observation for chronic weakness. Neurology, PT/OT consulted and provided recommendations. As condition is chronic, patient will require multi disciplinary following outpatient. PT recs pending, family requesting rehab placement.    Interval History: No events overnight. Awaiting PT/OT recs and placement.    Review of Systems   Constitutional: Negative for chills and fever.   Respiratory: Positive for  cough. Negative for chest tightness and shortness of breath.    Cardiovascular: Negative for chest pain and palpitations.   Gastrointestinal: Negative for abdominal pain and nausea.   Neurological: Positive for speech difficulty and weakness.   Psychiatric/Behavioral: Negative for agitation and confusion.     Objective:     Vital Signs (Most Recent):  Temp: 98.1 °F (36.7 °C) (09/09/18 1051)  Pulse: 72 (09/09/18 0743)  Resp: 15 (09/09/18 0743)  BP: 114/76 (09/09/18 0743)  SpO2: 99 % (09/09/18 0743) Vital Signs (24h Range):  Temp:  [97.7 °F (36.5 °C)-98.4 °F (36.9 °C)] 98.1 °F (36.7 °C)  Pulse:  [62-79] 72  Resp:  [10-17] 15  SpO2:  [96 %-99 %] 99 %  BP: ()/(60-77) 114/76     Weight: 72.6 kg (160 lb)  Body mass index is 19.48 kg/m².    Intake/Output Summary (Last 24 hours) at 9/9/2018 1345  Last data filed at 9/8/2018 1800  Gross per 24 hour   Intake 780 ml   Output --   Net 780 ml      Physical Exam   Constitutional: He is oriented to person, place, and time. He appears well-developed and well-nourished.   Cardiovascular: Normal rate, regular rhythm, normal heart sounds, intact distal pulses and normal pulses.   Pulmonary/Chest: Effort normal and breath sounds normal. No respiratory distress. He has no wheezes.   Abdominal: Soft. Bowel sounds are normal.   Musculoskeletal: Normal range of motion. He exhibits no edema or tenderness.   Neurological: He is alert and oriented to person, place, and time. He exhibits abnormal muscle tone.   Strength 5/5 BUE  Strength 4/5 BLE   Nursing note and vitals reviewed.      Significant Labs:   BMP:   Recent Labs   Lab  09/07/18   1726   GLU  102   NA  140   K  4.3   CL  104   CO2  30*   BUN  17   CREATININE  1.2   CALCIUM  9.5     CBC:   Recent Labs   Lab  09/07/18   1726   WBC  6.79   HGB  15.9   HCT  50.7   PLT  214       Significant Imaging: I have reviewed all pertinent imaging results/findings within the past 24 hours.    Assessment/Plan:      * Weakness    Ataxic  "gait  Spinocerebella ataxia    Patient[facility] due to concerns he "was not being treated correctly" and his condition had been worsening. They present for Neurology evaluation. Patient last seen by Dr. Wise in 2015. Chart review shows difficulty managing condition due to financial issues and medical expenses.  - MRI brain, spine negative for acute abnormality  - known cerebella atrophy, stable  - Neurology consulted, recommendations below   - SLP to evaluate swallowing   - continue Baclofen 20 mg QID   - his symptoms regarding SCA do not require hospitalization   - would benefit from multidisciplinary clinic at [facility] (PT/OT/SLP/Neurology)  - PT/OT, SLP consults pending  - family requesting placement        Bronchitis    History of cough for several weeks, no cough or rales on exam.  - CXR with concerning inflammation, but not pneumonia or consolidation  - given multiple Abx in ED, will not continue as patient afebrile without leukocytosis  - duonebs PRN  - improving        Neurogenic bladder    History of  - discontinue flomax          VTE Risk Mitigation (From admission, onward)        Ordered     IP VTE LOW RISK PATIENT  Once      09/07/18 2253     Place DANY hose  Until discontinued      09/07/18 4487              John Foster PA-C  Department of Hospital Medicine   [facility]  "

## 2018-09-09 NOTE — SUBJECTIVE & OBJECTIVE
Interval History: No events overnight. Awaiting PT/OT recs and placement.    Review of Systems   Constitutional: Negative for chills and fever.   Respiratory: Positive for cough. Negative for chest tightness and shortness of breath.    Cardiovascular: Negative for chest pain and palpitations.   Gastrointestinal: Negative for abdominal pain and nausea.   Neurological: Positive for speech difficulty and weakness.   Psychiatric/Behavioral: Negative for agitation and confusion.     Objective:     Vital Signs (Most Recent):  Temp: 98.1 °F (36.7 °C) (09/09/18 1051)  Pulse: 72 (09/09/18 0743)  Resp: 15 (09/09/18 0743)  BP: 114/76 (09/09/18 0743)  SpO2: 99 % (09/09/18 0743) Vital Signs (24h Range):  Temp:  [97.7 °F (36.5 °C)-98.4 °F (36.9 °C)] 98.1 °F (36.7 °C)  Pulse:  [62-79] 72  Resp:  [10-17] 15  SpO2:  [96 %-99 %] 99 %  BP: ()/(60-77) 114/76     Weight: 72.6 kg (160 lb)  Body mass index is 19.48 kg/m².    Intake/Output Summary (Last 24 hours) at 9/9/2018 1345  Last data filed at 9/8/2018 1800  Gross per 24 hour   Intake 780 ml   Output --   Net 780 ml      Physical Exam   Constitutional: He is oriented to person, place, and time. He appears well-developed and well-nourished.   Cardiovascular: Normal rate, regular rhythm, normal heart sounds, intact distal pulses and normal pulses.   Pulmonary/Chest: Effort normal and breath sounds normal. No respiratory distress. He has no wheezes.   Abdominal: Soft. Bowel sounds are normal.   Musculoskeletal: Normal range of motion. He exhibits no edema or tenderness.   Neurological: He is alert and oriented to person, place, and time. He exhibits abnormal muscle tone.   Strength 5/5 BUE  Strength 4/5 BLE   Nursing note and vitals reviewed.      Significant Labs:   BMP:   Recent Labs   Lab  09/07/18   1726   GLU  102   NA  140   K  4.3   CL  104   CO2  30*   BUN  17   CREATININE  1.2   CALCIUM  9.5     CBC:   Recent Labs   Lab  09/07/18   1726   WBC  6.79   HGB  15.9   HCT  50.7    PLT  214       Significant Imaging: I have reviewed all pertinent imaging results/findings within the past 24 hours.

## 2018-09-09 NOTE — HPI
The patient is a 36 y/o AAM with spinocerebellar atrophy brought in by mother due to overall decline in functional status and is consulted to neurology for evaluation.    Patient was recently discharged from rehab after 3 month per mother's request after she thought her son was not receiving good care and is declining.  She states that she noticed he was more wobbly and weak when they were walking him to the car, also noted decrease ability in ADLs (states that he was able to bathe and feed himself). he also has a new cough as well as urine incontinence that has worsened. Patient himself doesn't have any complaints except weakness and inability to walk (uses a walker at baseline). Patient denies bowel incontinence. Mother also mentions a vague Hx of seizures described as shaking in the legs when he walks, has never been on AEDs.     Patient had normal development and cognition in childhood, only had slowness of speech and dysphonia, otherwise normal. Finished high school and is a college dropout. Spasticity and ataxia started when he was 20. He was given multiple different Dx such as PLS and MS. He received a Baclofen pump in 2007 which was removed in 2010 since he thought it was making him weaker. He was seen in the movement clinic by Dr. Wise in 2015 and was diagnosed with SCA and after that never followed up due to transportation issues. Never had genetic testing due to financial problems.

## 2018-09-09 NOTE — ASSESSMENT & PLAN NOTE
History of cough for several weeks, no cough or rales on exam.  - CXR with concerning inflammation, but not pneumonia or consolidation  - given multiple Abx in ED, will not continue as patient afebrile without leukocytosis  - brendan PÉREZN  - improving

## 2018-09-09 NOTE — PT/OT/SLP EVAL
Speech Language Pathology Evaluation  Bedside Swallow  Discharge Summary    Patient Name:  Lisa Moreno   MRN:  [id number]  Admitting Diagnosis: Weakness    Recommendations:                 General Recommendations:  GI evaluation and Follow-up not indicated  Diet recommendations:  Regular, Thin   Aspiration Precautions: Remain upright 30 minutes post meal and Standard aspiration precautions   General Precautions: Standard, aspiration, fall  Communication strategies:  none    History:     Past Medical History:   Diagnosis Date    Degenerative motor system disease     Depression     Seizure        History reviewed. No pertinent surgical history.    Social History: unknown.    Prior Intubation HX:  None this admission    Modified Barium Swallow: none this admission    Chest X-Rays: 9/7/18:  Patchy airspace opacities in the right lower lobe.    Prior diet: regular w/ thin liquids    Occupation/hobbies/homemaking: none expressed    Subjective     Pt was awake and alert in NAD.  He was cooperative w/ evaluation  Patient goals: none expressed     Pain/Comfort:  · Pain Rating 1: 0/10  · Pain Rating Post-Intervention 1: 0/10    Objective:     Oral Musculature Evaluation  · Oral Musculature: WFL  · Dentition: present and adequate  · Secretion Management: adequate  · Mucosal Quality: adequate  · Mandibular Strength and Mobility: WFL  · Oral Labial Strength and Mobility: WFL  · Lingual Strength and Mobility: WFL  · Buccal Strength and Mobility: WFL  · Volitional Cough: adequate  · Volitional Swallow: present  · Voice Prior to PO Intake: clear    Bedside Swallow Eval:   Consistencies Assessed:  · Thin liquids tsp, cup and straw sips, self regulated  · Solids self fed bites x2     Oral Phase:   · WFL    Pharyngeal Phase:   · WFL  · no overt clinical signs/symptoms of aspiration  · no overt clinical signs/symptoms of pharyngeal dysphagia    Compensatory Strategies  · None    Treatment: Education was provided to pt and  family at bedside re: SLP role, eval results, diet recs, aspiration precautions, s/s of aspiration, risk of aspiration, reflux precautions, and SLP POC.  They indicated good understanding and agreed w/ recommendations.  Pt's whiteboard was updated.    Assessment:     Lisa Moreno is a 37 y.o. male with an SLP diagnosis of Dysphagia.  He presents with a functional oropharyngeal swallow at this time.  Further Skilled Speech services are not indicated.    Goals:   Multidisciplinary Problems     SLP Goals     Not on file          Multidisciplinary Problems (Resolved)        Problem: SLP Goal    Goal Priority Disciplines Outcome   SLP Goal   (Resolved)     SLP Outcome(s) achieved                   Plan:     · Patient to be seen:      · Plan of Care expires:     · Plan of Care reviewed with:  patient, family   · SLP Follow-Up:  No       Discharge recommendations:  home   Barriers to Discharge:  None    Time Tracking:     SLP Treatment Date:   09/09/18  Speech Start Time:  1345  Speech Stop Time:  1356     Speech Total Time (min):  11 min    Billable Minutes: Eval Swallow and Oral Function 11    Jacki Romero CCC-SLP  09/09/2018

## 2018-09-09 NOTE — CONSULTS
[facility]  Neurology  Consult Note    Patient Name: Lisa Moreno  MRN: [id number]  Admission Date: 9/7/2018  Hospital Length of Stay: 0 days  Code Status: Full Code   Attending Provider: Keri Archer MD   Consulting Provider: Darcie Li MD  Primary Care Physician: Cyndie Barrera MD  Principal Problem:Weakness    Consults SCA  Subjective:     Chief Complaint:  Decline in functional status     HPI:   The patient is a 38 y/o AAM with spinocerebellar atrophy brought in by mother due to overall decline in functional status and is consulted to neurology for evaluation.    Patient was recently discharged from rehab after 3 month per mother's request after she thought her son was not receiving good care and is declining.  She states that she noticed he was more wobbly and weak when they were walking him to the car, also noted decrease ability in ADLs (states that he was able to bathe and feed himself). he also has a new cough as well as urine incontinence that has worsened. Patient himself doesn't have any complaints except weakness and inability to walk (uses a walker at baseline). Patient denies bowel incontinence. Mother also mentions a vague Hx of seizures described as shaking in the legs when he walks, has never been on AEDs.     Patient had normal development and cognition in childhood, only had slowness of speech and dysphonia, otherwise normal. Finished high school and is a college dropout. Spasticity and ataxia started when he was 20. He was given multiple different Dx such as PLS and MS. He received a Baclofen pump in 2007 which was removed in 2010 since he thought it was making him weaker. He was seen in the movement clinic by Dr. Wise in 2015 and was diagnosed with SCA and after that never followed up due to transportation issues. Never had genetic testing due to financial problems.       Past Medical History:   Diagnosis Date    Degenerative motor system disease      Depression     Seizure        History reviewed. No pertinent surgical history.    Review of patient's allergies indicates:   Allergen Reactions    Penicillins        Current Neurological Medications:   Baclofen 20 mg q12    No current facility-administered medications on file prior to encounter.      Current Outpatient Medications on File Prior to Encounter   Medication Sig    cyanocobalamin, vitamin B-12, (VITAMIN B-12) 1,000 mcg TbSR Take 1 tablet by mouth once daily.    folic acid-vit B6-vit B12 2.5-25-2 mg (FOLBIC OR EQUIV) 2.5-25-2 mg Tab Take 1 tablet by mouth once daily.    megestrol (MEGACE) 40 MG Tab Take 1 tablet (40 mg total) by mouth 2 (two) times daily.    polyethylene glycol (GLYCOLAX) 17 gram PwPk Take 17 g by mouth once daily.    senna-docusate 8.6-50 mg (PERICOLACE) 8.6-50 mg per tablet Take 1 tablet by mouth 2 (two) times daily as needed for Constipation.    baclofen (LIORESAL) 20 MG tablet Take 1 tablet (20 mg total) by mouth 2 (two) times daily.    gabapentin (NEURONTIN) 100 MG capsule Take 1 capsule (100 mg total) by mouth 3 (three) times daily. (pain)    sertraline (ZOLOFT) 50 MG tablet Take 1 tablet (50 mg total) by mouth once daily.     Family History     Problem Relation (Age of Onset)    Lung cancer Maternal Grandfather    Multiple sclerosis Other    Thyroid cancer Mother        Tobacco Use    Smoking status: Never Smoker    Smokeless tobacco: Never Used   Substance and Sexual Activity    Alcohol use: Yes     Comment: social drinker, at times    Drug use: No    Sexual activity: No     Review of Systems   Constitutional: Negative for chills and fever.   HENT: Positive for trouble swallowing (per mother, patient denies). Negative for drooling, sinus pressure and sore throat.    Eyes: Negative for visual disturbance.   Respiratory: Negative for cough and shortness of breath.    Cardiovascular: Negative for chest pain and palpitations.   Gastrointestinal: Negative for  abdominal pain, nausea and vomiting.   Genitourinary: Negative for dysuria, frequency and hematuria.        Incontinence   Musculoskeletal: Positive for gait problem. Negative for arthralgias and joint swelling.        Stiffness in extremities   Skin: Negative for color change and rash.   Allergic/Immunologic: Negative for immunocompromised state.   Neurological: Negative for dizziness, weakness, numbness and headaches.   Psychiatric/Behavioral: Negative for agitation and confusion. The patient is not nervous/anxious.      Objective:     Vital Signs (Most Recent):  Temp: 98.4 °F (36.9 °C) (09/08/18 1715)  Pulse: 70 (09/08/18 1640)  Resp: 17 (09/08/18 1640)  BP: 117/70 (09/08/18 1640)  SpO2: 99 % (09/08/18 1640) Vital Signs (24h Range):  Temp:  [97.8 °F (36.6 °C)-99.3 °F (37.4 °C)] 98.4 °F (36.9 °C)  Pulse:  [] 70  Resp:  [10-19] 17  SpO2:  [95 %-99 %] 99 %  BP: (102-136)/(68-91) 117/70     Weight: 72.6 kg (160 lb)  Body mass index is 19.48 kg/m².    Physical Exam   Constitutional: He is oriented to person, place, and time. He appears well-developed and well-nourished. He is cooperative. He does not appear ill. No distress.   Calm young healthy-looking AAM in no apparent distress   HENT:   Head: Normocephalic and atraumatic.   Mouth/Throat: Mucous membranes are normal.   Eyes: EOM are normal. Pupils are equal, round, and reactive to light.   Neck: Normal range of motion.   Cardiovascular: Normal rate, regular rhythm and normal heart sounds. Exam reveals no gallop.   No murmur heard.  Pulses:       Radial pulses are 2+ on the right side, and 2+ on the left side.   Pulmonary/Chest: Effort normal and breath sounds normal. No tachypnea. No respiratory distress. He has no decreased breath sounds. He has no wheezes. He has no rales.   Abdominal: Soft. He exhibits no distension. There is no tenderness.   Musculoskeletal: Normal range of motion. He exhibits no edema or tenderness.   Neurological: He is alert and  oriented to person, place, and time. He displays no tremor. He has a normal Finger-Nose-Finger Test. He displays no seizure activity.   Reflex Scores:       Bicep reflexes are 3+ on the right side and 3+ on the left side.       Brachioradialis reflexes are 3+ on the right side and 3+ on the left side.       Patellar reflexes are 3+ on the right side and 3+ on the left side.       Achilles reflexes are 3+ on the right side and 3+ on the left side.  Skin: Skin is warm. No rash noted. He is not diaphoretic. No cyanosis or erythema.   Psychiatric: He has a normal mood and affect. His behavior is normal. Thought content normal.   Nursing note and vitals reviewed.      NEUROLOGICAL EXAMINATION:     MENTAL STATUS   Oriented to person, place, and time.   Follows 3 step commands.   Attention: normal. Concentration: normal.   Speech: (Slowed + dysphonia)  Level of consciousness: alert  Knowledge: good. Praxis: normal     CRANIAL NERVES     CN III, IV, VI   Pupils are equal, round, and reactive to light.  Extraocular motions are normal.   Right pupil: Consensual response: intact.   Left pupil: Consensual response: intact.   Nystagmus: none   Ophthalmoparesis: none    CN V   Facial sensation intact.     CN VII   Facial expression full, symmetric.     CN VIII   Hearing: intact    CN IX, X   Palate: symmetric    CN XI   CN XI normal.     CN XII   CN XII normal.     MOTOR EXAM   Muscle bulk: normal  Overall muscle tone: increased  Right arm tone: increased  Left arm tone: increased  Right leg tone: spastic  Left leg tone: spastic       Strength 5/5 in H&N + BL UE  4+/5 in BL LE       REFLEXES     Reflexes   Right brachioradialis: 3+  Left brachioradialis: 3+  Right biceps: 3+  Left biceps: 3+  Right patellar: 3+  Left patellar: 3+  Right achilles: 3+  Left achilles: 3+  Right plantar: equivocal  Left plantar: equivocal  Right Lindsey: present  Left Lindsey: present  Right ankle clonus: absent  Left ankle clonus: absent    SENSORY  "EXAM   Light touch normal.     GAIT AND COORDINATION      Coordination   Finger to nose coordination: normal    Tremor   Resting tremor: absent  Intention tremor: absent  Action tremor: absent      Significant Labs:   Blood Culture:   Recent Labs   Lab  09/07/18 1920 09/07/18 1945   LABBLOO  No Growth to date  No Growth to date     CBC:   Recent Labs   Lab  09/07/18   1726   WBC  6.79   HGB  15.9   HCT  50.7   PLT  214     CMP:   Recent Labs   Lab  09/07/18   1726   GLU  102   NA  140   K  4.3   CL  104   CO2  30*   BUN  17   CREATININE  1.2   CALCIUM  9.5   PROT  7.8   ALBUMIN  4.1   BILITOT  0.4   ALKPHOS  50*   AST  16   ALT  15   ANIONGAP  6*   EGFRNONAA  >60.0     Inflammatory Markers:   Recent Labs   Lab  09/07/18   1726   SEDRATE  <2   CRP  1.3     Urine Studies:   Recent Labs   Lab  09/07/18   1804   COLORU  Yellow   APPEARANCEUA  Clear   PHUR  6.0   SPECGRAV  1.030   PROTEINUA  Negative   GLUCUA  Negative   KETONESU  Negative   BILIRUBINUA  Negative   OCCULTUA  2+*   NITRITE  Negative   UROBILINOGEN  2.0   LEUKOCYTESUR  Negative   RBCUA  59*   WBCUA  2     All pertinent lab results from the past 24 hours have been reviewed.    Significant Imaging:   MRI Brain on 9/7:   Poor quality study due to motion artifacts  Evidence of generalized cerebellar and brainstem atrophy         MRI Thoracic/Lumbar Spine on 9/7:  bone marrow signal change in L5-S1, and S1-S2  No bulging disc or canal stenosis along thoracolumbar spine        Assessment and Plan:     Spinocerebellar ataxia    Seen Dr. Wise in movement disorder clinic on 2/23/2015  Diagnosis of SCA at the time        Clinical picture and MRI findings consistent with SCA. C/o imbalance and stiffness that has progressed over years (initially started at age of 20). believe that he uses the word "weakness" due to inability to walk (which is due to spasticity and ataxia), since strength is 5/5 in UE and 4+/5 in LE. Never had genetic testing, however that " would not change the management.     Recommendations:  - SLP to evaluate swallowing (mother says he chokes frequently, patient denies)  - PT/OT to evaluate and treat  - continue Baclofen 20 mg BID  - we don't think his symptoms regarding SCA requires remaining in the hospital as long as other medical issues including bronchitis are addressed   - He requires to get set up with multidisciplinary clinic at [facility] to address multiple medical issues at the same time (including neurology)  - please contact us with any additional questions        Ataxic gait    Please see SCA        History of seizures    Vague Hx, never been on AEDs  Mother mentions shaking of the legs when walking that could be due to ataxia/spasticity.  No need for AEDs        Neurogenic bladder    Per mother this is new  No evidence of infection but positive for hematuria   Abnormal bone marrow signal in lumbosacral spine w/o evidence of cord compression anywhere along lumbar spine  NSGY might have helpful input            VTE Risk Mitigation (From admission, onward)        Ordered     IP VTE LOW RISK PATIENT  Once      09/07/18 2255     Place DANY hose  Until discontinued      09/07/18 2255          Thank you for your consult. I will sign off. Please contact us if you have any additional questions.    Darcie Li MD  Neurology  [facility]

## 2018-09-09 NOTE — ASSESSMENT & PLAN NOTE
"Ataxic gait  Spinocerebella ataxia    Patient's [facility] due to concerns he "was not being treated correctly" and his condition had been worsening. They present for Neurology evaluation. Patient last seen by Dr. Wise in 2015. Chart review shows difficulty managing condition due to financial issues and medical expenses.  - MRI brain, spine negative for acute abnormality  - known cerebella atrophy, stable  - Neurology consulted, recommendations below   - SLP to evaluate swallowing   - continue Baclofen 20 mg QID   - his symptoms regarding SCA do not require hospitalization   - would benefit from multidisciplinary clinic at [facility] (PT/OT/SLP/Neurology)  - PT/OT, SLP consults pending  - family requesting placement  "

## 2018-09-09 NOTE — PLAN OF CARE
Problem: Occupational Therapy Goal  Goal: Occupational Therapy Goal  Goals to be met by: 9/23/18     Patient will increase functional independence with ADLs by performing:    UE Dressing with [place].  LE Dressing with [place].  Grooming while standing at sink with [place].  Toileting from toilet with [place] for hygiene and clothing management.   Bathing from  shower chair/bench with [place].  Toilet transfer to toilet with [place].  Increased functional strength to WFL for B UE.  Upper extremity exercise program x15 reps per handout, with independence.    POC initiated.

## 2018-09-09 NOTE — PLAN OF CARE
Problem: SLP Goal  Goal: SLP Goal  Outcome: Outcome(s) achieved Date Met: 09/09/18  Clinical Swallow Evaluation completed.  REC:  Pt cont w/ regular consistency diet w/thin liquids, oral meds whole, aspiration precautions.  Pt may benefit from GI consultation as family reported coughing and ocassional regurgitation following meals which may be related to reflux.  Further Skilled Speech services are not indicated at this time.  ST to s/o.  Recs reviewed w/ RN.  Thank you.    Jacki Romero, CCC-SLP  9/9/2018

## 2018-09-10 LAB
ANION GAP SERPL CALC-SCNC: 6 MMOL/L
BASOPHILS # BLD AUTO: 0.02 K/UL
BASOPHILS NFR BLD: 0.4 %
BUN SERPL-MCNC: 18 MG/DL
CALCIUM SERPL-MCNC: 9.4 MG/DL
CHLORIDE SERPL-SCNC: 105 MMOL/L
CO2 SERPL-SCNC: 28 MMOL/L
CREAT SERPL-MCNC: 1.1 MG/DL
DIFFERENTIAL METHOD: ABNORMAL
EOSINOPHIL # BLD AUTO: 0.1 K/UL
EOSINOPHIL NFR BLD: 1.7 %
ERYTHROCYTE [DISTWIDTH] IN BLOOD BY AUTOMATED COUNT: 13 %
EST. GFR  (AFRICAN AMERICAN): >60 ML/MIN/1.73 M^2
EST. GFR  (NON AFRICAN AMERICAN): >60 ML/MIN/1.73 M^2
GLUCOSE SERPL-MCNC: 90 MG/DL
HCT VFR BLD AUTO: 52.3 %
HGB BLD-MCNC: 16.2 G/DL
IMM GRANULOCYTES # BLD AUTO: 0.01 K/UL
IMM GRANULOCYTES NFR BLD AUTO: 0.2 %
LYMPHOCYTES # BLD AUTO: 1.7 K/UL
LYMPHOCYTES NFR BLD: 32 %
MCH RBC QN AUTO: 26.7 PG
MCHC RBC AUTO-ENTMCNC: 31 G/DL
MCV RBC AUTO: 86 FL
MONOCYTES # BLD AUTO: 0.4 K/UL
MONOCYTES NFR BLD: 7.9 %
NEUTROPHILS # BLD AUTO: 3.1 K/UL
NEUTROPHILS NFR BLD: 57.8 %
NRBC BLD-RTO: 0 /100 WBC
PLATELET # BLD AUTO: 198 K/UL
PMV BLD AUTO: 11.1 FL
POTASSIUM SERPL-SCNC: 4.6 MMOL/L
RBC # BLD AUTO: 6.06 M/UL
SODIUM SERPL-SCNC: 139 MMOL/L
WBC # BLD AUTO: 5.41 K/UL

## 2018-09-10 PROCEDURE — 94640 AIRWAY INHALATION TREATMENT: CPT

## 2018-09-10 PROCEDURE — G0378 HOSPITAL OBSERVATION PER HR: HCPCS

## 2018-09-10 PROCEDURE — 94761 N-INVAS EAR/PLS OXIMETRY MLT: CPT

## 2018-09-10 PROCEDURE — 97162 PT EVAL MOD COMPLEX 30 MIN: CPT

## 2018-09-10 PROCEDURE — 25000003 PHARM REV CODE 250: Performed by: PHYSICIAN ASSISTANT

## 2018-09-10 PROCEDURE — 80048 BASIC METABOLIC PNL TOTAL CA: CPT

## 2018-09-10 PROCEDURE — 85025 COMPLETE CBC W/AUTO DIFF WBC: CPT

## 2018-09-10 PROCEDURE — 36415 COLL VENOUS BLD VENIPUNCTURE: CPT

## 2018-09-10 PROCEDURE — 25000242 PHARM REV CODE 250 ALT 637 W/ HCPCS: Performed by: PHYSICIAN ASSISTANT

## 2018-09-10 PROCEDURE — 99226 PR SUBSEQUENT OBSERVATION CARE,LEVEL III: CPT | Mod: ,,, | Performed by: PHYSICIAN ASSISTANT

## 2018-09-10 RX ORDER — GUAIFENESIN 600 MG/1
600 TABLET, EXTENDED RELEASE ORAL 2 TIMES DAILY
Status: DISCONTINUED | OUTPATIENT
Start: 2018-09-10 | End: 2018-09-11 | Stop reason: HOSPADM

## 2018-09-10 RX ADMIN — BACLOFEN 20 MG: 10 TABLET ORAL at 01:09

## 2018-09-10 RX ADMIN — BACLOFEN 20 MG: 10 TABLET ORAL at 06:09

## 2018-09-10 RX ADMIN — BACLOFEN 20 MG: 10 TABLET ORAL at 09:09

## 2018-09-10 RX ADMIN — DANTROLENE SODIUM 25 MG: 25 CAPSULE ORAL at 09:09

## 2018-09-10 RX ADMIN — GUAIFENESIN 600 MG: 600 TABLET, EXTENDED RELEASE ORAL at 09:09

## 2018-09-10 RX ADMIN — IPRATROPIUM BROMIDE AND ALBUTEROL SULFATE 3 ML: .5; 3 SOLUTION RESPIRATORY (INHALATION) at 12:09

## 2018-09-10 NOTE — SUBJECTIVE & OBJECTIVE
Interval History: No events overnight. Aunt at bedsides. Updated on plan of care and patient in agreement.     Review of Systems   Constitutional: Negative for chills and fever.   Respiratory: Positive for cough. Negative for chest tightness and shortness of breath.    Cardiovascular: Negative for chest pain and palpitations.   Gastrointestinal: Negative for abdominal pain and nausea.   Neurological: Positive for speech difficulty (chronic) and weakness (chronic ).   Psychiatric/Behavioral: Negative for agitation and confusion.     Objective:     Vital Signs (Most Recent):  Temp: 98.3 °F (36.8 °C) (09/10/18 1042)  Pulse: 65 (09/10/18 0720)  Resp: 12 (09/10/18 0720)  BP: 126/88 (09/10/18 0720)  SpO2: 98 % (09/10/18 0720) Vital Signs (24h Range):  Temp:  [98 °F (36.7 °C)-98.3 °F (36.8 °C)] 98.3 °F (36.8 °C)  Pulse:  [58-78] 65  Resp:  [9-13] 12  SpO2:  [97 %-98 %] 98 %  BP: (106-126)/(68-88) 126/88     Weight: 72.6 kg (160 lb)  Body mass index is 19.48 kg/m².    Intake/Output Summary (Last 24 hours) at 9/10/2018 1208  Last data filed at 9/10/2018 0500  Gross per 24 hour   Intake 1200 ml   Output --   Net 1200 ml      Physical Exam   Constitutional: He is oriented to person, place, and time. He appears well-developed and well-nourished.   Cardiovascular: Normal rate, regular rhythm, normal heart sounds, intact distal pulses and normal pulses.   Pulmonary/Chest: Effort normal and breath sounds normal. No respiratory distress. He has no wheezes.   Abdominal: Soft. Bowel sounds are normal.   Musculoskeletal: Normal range of motion. He exhibits no edema or tenderness.   Neurological: He is alert and oriented to person, place, and time. He exhibits abnormal muscle tone.   Strength 5/5 BUE  Strength 4/5 BLE   Nursing note and vitals reviewed.      Significant Labs:   BMP:   Recent Labs   Lab  09/10/18   0618   GLU  90   NA  139   K  4.6   CL  105   CO2  28   BUN  18   CREATININE  1.1   CALCIUM  9.4     CBC:   Recent Labs    Lab  09/10/18   0618   WBC  5.41   HGB  16.2   HCT  52.3   PLT  198       Significant Imaging: I have reviewed all pertinent imaging results/findings within the past 24 hours.

## 2018-09-10 NOTE — PLAN OF CARE
Problem: Physical Therapy Goal  Goal: Physical Therapy Goal  Goals to be met by: 2018     Patient will increase functional independence with mobility by performin. Supine to sit with Stand-by Assistance  2. Sit to supine with Stand-by Assistance  3. Sit to stand transfer with Contact Guard Assistance  4. Bed to chair transfer with Contact Guard Assistance using Rolling Walker  5. Gait  x 50 feet with Minimal Assistance using Rolling Walker.   6. Lower extremity exercise program x15 reps per handout, with assistance as needed    Outcome: Ongoing (interventions implemented as appropriate)  Pt evaluation complete; pt goals set.    CATRINA FAULKNER, PT  9/10/2018

## 2018-09-10 NOTE — PLAN OF CARE
CARLOS ALBERTO spoke with admissions at [facility].  Pt was there for almost 90 days.  They discharged the pt and then the pt was admitted to the hospital.  They can not accept him back.  CARLOS ALBERTO spoke with the pt's mother.  She would like the SW to try [facility].  SW asked her to think about other options. CARLOS ALBERTO requested a referral be sent to [facility] to the [facility].  CARLOS ALBERTO will f/u tomorrow.     Rebeca Richards, [facility] x [phone]

## 2018-09-10 NOTE — PT/OT/SLP EVAL
Physical Therapy Evaluation    Patient Name:  Lisa Moreno   MRN:  [id number]    Recommendations:     Discharge Recommendations:  rehabilitation facility   Discharge Equipment Recommendations: none   Barriers to discharge: Inaccessible home and Decreased caregiver support    Assessment:     Lisa Moreno is a 37 y.o. male admitted with a medical diagnosis of Weakness.  He presents with the following impairments/functional limitations:  weakness, decreased lower extremity function, impaired balance, impaired endurance, gait instability, impaired coordination, decreased safety awareness, impaired functional mobilty, decreased coordination, abnormal tone, impaired self care skills. Pt performed bed mobility min A and transfers mod A with RW. Pt stood EOB for ~30sec mod A with post lean, assist with WS; spasticity in B LE noted with all mobility. Pt will benefit from skilled PT to improve deficits and increase overall functional mobility.     Rehab Prognosis:  Good; patient would benefit from acute skilled PT services to address these deficits and reach maximum level of function.      Recent Surgery: * No surgery found *      Plan:     During this hospitalization, patient to be seen 4 x/week to address the above listed problems via gait training, therapeutic activities, therapeutic exercises, neuromuscular re-education, wheelchair management/training  · Plan of Care Expires:  10/10/18   Plan of Care Reviewed with: patient, family    Subjective     Communicated with RN prior to session.  Patient found supine in bed and aunt present upon PT entry to room, agreeable to evaluation.      Chief Complaint: decline in function  Patient comments/goals: return to PLOF  Pain/Comfort:  · Pain Rating 1: 0/10  · Pain Rating Post-Intervention 1: 0/10    Patients cultural, spiritual, Restorationism conflicts given the current situation:      Living Environment:  Pt lives with mother and aunt in 1-story house with 3 MALCOM with L  handrails and tub/shower. Pt reports amb with RW with HH therapist and using w/c for majority of mobility. Pt reports recent decline in mobility. Pt aunt reports pt mother works and she is unable to provide 24hr assist.   Prior to admission, patients level of function was assist with mobility.  Patient has the following equipment: wheelchair, hospital bed, walker, rolling, shower chair.  DME owned (not currently used): none.  Upon discharge, patient will have assistance from family but 24hr not available.    Objective:     Patient found with: telemetry     General Precautions: Standard, fall, aspiration   Orthopedic Precautions:N/A   Braces: N/A     Exams:  · Cognitive Exam:  Patient is oriented to Person, Place and Time  · Gross Motor Coordination:  impaired, spasticity noted in B LE  · Postural Exam:  Patient presented with the following abnormalities:    · -       No postural abnormalities identified  · Sensation:    · -       Intact  light/touch B LE  · Skin Integrity/Edema:      · -       Skin integrity: Visible skin intact  · RLE ROM: WFL  · RLE Strength: WFL  · LLE ROM: WFL  · LLE Strength: WFL    Functional Mobility:  Bed Mobility:     · Supine to Sit: minimum assistance  · Sit to Supine: minimum assistance    Transfers:     · Sit to Stand:  moderate assistance with rolling walker; x2 trials    AM-PAC 6 CLICK MOBILITY  Total Score:15       Therapeutic Activities and Exercises:  Pt sat EOB with CGA.  Pt stood EOB for ~30sec mod A with post lean, assist with WS.   Pt and aunt educated on:  -role of PT/POC  -safety with mobility  -importance of OOB activity  Pt safe to perform transfers with RN staff.     Patient left with bed in chair position with all lines intact, call button in reach, RN notified and aunt present.    GOALS:   Multidisciplinary Problems     Physical Therapy Goals        Problem: Physical Therapy Goal    Goal Priority Disciplines Outcome Goal Variances Interventions   Physical Therapy Goal      PT, PT/OT Ongoing (interventions implemented as appropriate)     Description:  Goals to be met by: 2018     Patient will increase functional independence with mobility by performin. Supine to sit with Stand-by Assistance  2. Sit to supine with Stand-by Assistance  3. Sit to stand transfer with Contact Guard Assistance  4. Bed to chair transfer with Contact Guard Assistance using Rolling Walker  5. Gait  x 50 feet with Minimal Assistance using Rolling Walker.   6. Lower extremity exercise program x15 reps per handout, with assistance as needed                      History:     Past Medical History:   Diagnosis Date    Degenerative motor system disease     Depression     Seizure        History reviewed. No pertinent surgical history.    Clinical Decision Making:     Decision Making/ Complexity Score   On examination of body system using standardized tests and measures patient presents with 3 or more elements from any of the following: body structures and functions, activity limitations, and/or participation restrictions.  Leading to a clinical presentation that is considered evolving with changing characteristics                              Clinical Decision Making  (Eval Complexity):  Moderate - [id number]     Time Tracking:     PT Received On: 09/10/18  PT Start Time: 944     PT Stop Time: 958  PT Total Time (min): 14 min     Billable Minutes: Evaluation 14      CATRINA FAULKNER, PT  09/10/2018

## 2018-09-10 NOTE — PROGRESS NOTES
[facility] Hospital Medicine  Progress Note    Patient Name: Lisa Moreno  MRN: [id number]  Patient Class: OP- Observation   Admission Date: 9/7/2018  Length of Stay: 0 days  Attending Physician: Keri Archer MD  Primary Care Provider: Cyndie Barrera MD    [facility] Medicine Team: [facility] HOSP MED E John Foster PA-C    Subjective:     Principal Problem:Weakness    HPI:  Patient is a 38yo AAM with a PMHx of spinocerebellar atrophy being admitted to observation for chronic weakness. Patient is a patient at [facility] for appproximately 75 days. His mother is at bedside and providers majority of HPI as patient doesn't speak. The mother notes she admits her son to PMR facilities once or twice a year to get PT/OT to help with his chronic LE weakness. She reports his strength has worsened while in his current facility. She is upset that he has not seen any medical doctor and feels he needs to be evaluated by Neurology so she brought him to [facility] ED for evaluation. Of note, the patient last saw Dr. Wise in 2015. Patient's mother endorses cough and associated worsening urinary incontinence, but she attributes that to starting Flomax during his facility stay. Patient denies chest pain, SOB, N/V, and numbness.    In the ED, vitals stable. Intake labs with no acute abnormality. CXR concerning for bronchitis, no consolidation concerning for pneumonia. ED gave IV azithromycin, cefepime, and vancomycin to cover for pneumonia, but will not continue.    Hospital Course:  Patient admitted to observation for chronic weakness. Neurology, PT/OT consulted and provided recommendations. As condition is chronic, patient will require multi disciplinary following outpatient. PT recs pending, family requesting rehab placement.    Interval History: No events overnight. Aunt at bedsides. Updated on plan of care and patient in agreement.     Review of Systems   Constitutional: Negative for chills and  fever.   Respiratory: Positive for cough. Negative for chest tightness and shortness of breath.    Cardiovascular: Negative for chest pain and palpitations.   Gastrointestinal: Negative for abdominal pain and nausea.   Neurological: Positive for speech difficulty (chronic) and weakness (chronic ).   Psychiatric/Behavioral: Negative for agitation and confusion.     Objective:     Vital Signs (Most Recent):  Temp: 98.3 °F (36.8 °C) (09/10/18 1042)  Pulse: 65 (09/10/18 0720)  Resp: 12 (09/10/18 0720)  BP: 126/88 (09/10/18 0720)  SpO2: 98 % (09/10/18 0720) Vital Signs (24h Range):  Temp:  [98 °F (36.7 °C)-98.3 °F (36.8 °C)] 98.3 °F (36.8 °C)  Pulse:  [58-78] 65  Resp:  [9-13] 12  SpO2:  [97 %-98 %] 98 %  BP: (106-126)/(68-88) 126/88     Weight: 72.6 kg (160 lb)  Body mass index is 19.48 kg/m².    Intake/Output Summary (Last 24 hours) at 9/10/2018 1208  Last data filed at 9/10/2018 0500  Gross per 24 hour   Intake 1200 ml   Output --   Net 1200 ml      Physical Exam   Constitutional: He is oriented to person, place, and time. He appears well-developed and well-nourished.   Cardiovascular: Normal rate, regular rhythm, normal heart sounds, intact distal pulses and normal pulses.   Pulmonary/Chest: Effort normal and breath sounds normal. No respiratory distress. He has no wheezes.   Abdominal: Soft. Bowel sounds are normal.   Musculoskeletal: Normal range of motion. He exhibits no edema or tenderness.   Neurological: He is alert and oriented to person, place, and time. He exhibits abnormal muscle tone.   Strength 5/5 BUE  Strength 4/5 BLE   Nursing note and vitals reviewed.      Significant Labs:   BMP:   Recent Labs   Lab  09/10/18   0618   GLU  90   NA  139   K  4.6   CL  105   CO2  28   BUN  18   CREATININE  1.1   CALCIUM  9.4     CBC:   Recent Labs   Lab  09/10/18   0618   WBC  5.41   HGB  16.2   HCT  52.3   PLT  198       Significant Imaging: I have reviewed all pertinent imaging results/findings within the past 24  "hours.    Assessment/Plan:      * Weakness    Ataxic gait  Spinocerebella ataxia    Patient's mother removed patient from [facility] due to concerns he "was not being treated correctly" and his condition had been worsening. They present for Neurology evaluation. Patient last seen by Dr. iWse in 2015. Chart review shows difficulty managing condition due to financial issues and medical expenses.  - MRI brain, spine negative for acute abnormality  - known cerebella atrophy, stable  - Neurology consulted, recommendations below   - SLP to evaluate swallowing   - continue Baclofen 20 mg QID   - his symptoms regarding SCA do not require hospitalization   - would benefit from multidisciplinary clinic at [facility] (PT/OT/SLP/Neurology)  - PT/OT, SLP consults pending  - family requesting placement        Bronchitis    History of cough for several weeks, no cough or rales on exam.  - CXR with concerning inflammation, but not pneumonia or consolidation  - given multiple Abx in ED, will not continue as patient afebrile without leukocytosis  - duonebs PRN  - mucinex  - improving        Neurogenic bladder    History of  - discontinue flomax  - follow up outpatient with NSGY          VTE Risk Mitigation (From admission, onward)        Ordered     IP VTE LOW RISK PATIENT  Once      09/07/18 2255     Place DANY hose  Until discontinued      09/07/18 5465              John Foster PA-C  Department of Hospital Medicine   [facility]  "

## 2018-09-11 VITALS
BODY MASS INDEX: 19.48 KG/M2 | DIASTOLIC BLOOD PRESSURE: 76 MMHG | TEMPERATURE: 98 F | HEART RATE: 69 BPM | RESPIRATION RATE: 19 BRPM | OXYGEN SATURATION: 98 % | SYSTOLIC BLOOD PRESSURE: 134 MMHG | WEIGHT: 160 LBS

## 2018-09-11 LAB
ANION GAP SERPL CALC-SCNC: 6 MMOL/L
BASOPHILS # BLD AUTO: 0.02 K/UL
BASOPHILS NFR BLD: 0.3 %
BUN SERPL-MCNC: 16 MG/DL
CALCIUM SERPL-MCNC: 8.8 MG/DL
CHLORIDE SERPL-SCNC: 104 MMOL/L
CO2 SERPL-SCNC: 26 MMOL/L
CREAT SERPL-MCNC: 0.9 MG/DL
DIFFERENTIAL METHOD: ABNORMAL
EOSINOPHIL # BLD AUTO: 0.1 K/UL
EOSINOPHIL NFR BLD: 1.3 %
ERYTHROCYTE [DISTWIDTH] IN BLOOD BY AUTOMATED COUNT: 13.1 %
EST. GFR  (AFRICAN AMERICAN): >60 ML/MIN/1.73 M^2
EST. GFR  (NON AFRICAN AMERICAN): >60 ML/MIN/1.73 M^2
GLUCOSE SERPL-MCNC: 86 MG/DL
HCT VFR BLD AUTO: 50.4 %
HGB BLD-MCNC: 15.2 G/DL
IMM GRANULOCYTES # BLD AUTO: 0.01 K/UL
IMM GRANULOCYTES NFR BLD AUTO: 0.2 %
LYMPHOCYTES # BLD AUTO: 1.4 K/UL
LYMPHOCYTES NFR BLD: 22.2 %
MCH RBC QN AUTO: 26.1 PG
MCHC RBC AUTO-ENTMCNC: 30.2 G/DL
MCV RBC AUTO: 87 FL
MONOCYTES # BLD AUTO: 0.5 K/UL
MONOCYTES NFR BLD: 7.5 %
NEUTROPHILS # BLD AUTO: 4.2 K/UL
NEUTROPHILS NFR BLD: 68.5 %
NRBC BLD-RTO: 0 /100 WBC
PLATELET # BLD AUTO: 179 K/UL
PMV BLD AUTO: 10.6 FL
POTASSIUM SERPL-SCNC: 3.9 MMOL/L
RBC # BLD AUTO: 5.82 M/UL
SODIUM SERPL-SCNC: 136 MMOL/L
WBC # BLD AUTO: 6.16 K/UL

## 2018-09-11 PROCEDURE — G0378 HOSPITAL OBSERVATION PER HR: HCPCS

## 2018-09-11 PROCEDURE — 92523 SPEECH SOUND LANG COMPREHEN: CPT

## 2018-09-11 PROCEDURE — 80048 BASIC METABOLIC PNL TOTAL CA: CPT

## 2018-09-11 PROCEDURE — 36415 COLL VENOUS BLD VENIPUNCTURE: CPT

## 2018-09-11 PROCEDURE — 97116 GAIT TRAINING THERAPY: CPT

## 2018-09-11 PROCEDURE — 97530 THERAPEUTIC ACTIVITIES: CPT

## 2018-09-11 PROCEDURE — 99217 PR OBSERVATION CARE DISCHARGE: CPT | Mod: ,,, | Performed by: PHYSICIAN ASSISTANT

## 2018-09-11 PROCEDURE — 99222 1ST HOSP IP/OBS MODERATE 55: CPT | Mod: ,,, | Performed by: NURSE PRACTITIONER

## 2018-09-11 PROCEDURE — 85025 COMPLETE CBC W/AUTO DIFF WBC: CPT

## 2018-09-11 PROCEDURE — 25000003 PHARM REV CODE 250: Performed by: PHYSICIAN ASSISTANT

## 2018-09-11 RX ORDER — AMOXICILLIN 250 MG
1 CAPSULE ORAL 2 TIMES DAILY
Status: CANCELLED | OUTPATIENT
Start: 2018-09-11

## 2018-09-11 RX ORDER — RAMELTEON 8 MG/1
8 TABLET ORAL NIGHTLY PRN
Status: CANCELLED | OUTPATIENT
Start: 2018-09-11

## 2018-09-11 RX ORDER — HYDROCODONE BITARTRATE AND ACETAMINOPHEN 5; 325 MG/1; MG/1
1 TABLET ORAL EVERY 4 HOURS PRN
Status: CANCELLED | OUTPATIENT
Start: 2018-09-11

## 2018-09-11 RX ORDER — DANTROLENE SODIUM 25 MG/1
25 CAPSULE ORAL DAILY
Status: CANCELLED | OUTPATIENT
Start: 2018-09-12

## 2018-09-11 RX ORDER — CALCIUM CARBONATE 200(500)MG
500 TABLET,CHEWABLE ORAL 2 TIMES DAILY PRN
Status: CANCELLED | OUTPATIENT
Start: 2018-09-11

## 2018-09-11 RX ORDER — ONDANSETRON 2 MG/ML
4 INJECTION INTRAMUSCULAR; INTRAVENOUS EVERY 8 HOURS PRN
Status: CANCELLED | OUTPATIENT
Start: 2018-09-11

## 2018-09-11 RX ORDER — BACLOFEN 10 MG/1
20 TABLET ORAL 4 TIMES DAILY
Status: CANCELLED | OUTPATIENT
Start: 2018-09-11

## 2018-09-11 RX ORDER — GUAIFENESIN 600 MG/1
600 TABLET, EXTENDED RELEASE ORAL 2 TIMES DAILY
Status: CANCELLED | OUTPATIENT
Start: 2018-09-11

## 2018-09-11 RX ORDER — OXYCODONE HYDROCHLORIDE 5 MG/1
15 TABLET ORAL EVERY 4 HOURS PRN
Status: CANCELLED | OUTPATIENT
Start: 2018-09-11

## 2018-09-11 RX ORDER — ACETAMINOPHEN 325 MG/1
650 TABLET ORAL EVERY 6 HOURS PRN
Status: CANCELLED | OUTPATIENT
Start: 2018-09-11

## 2018-09-11 RX ADMIN — BACLOFEN 20 MG: 10 TABLET ORAL at 01:09

## 2018-09-11 RX ADMIN — BACLOFEN 20 MG: 10 TABLET ORAL at 08:09

## 2018-09-11 RX ADMIN — BACLOFEN 20 MG: 10 TABLET ORAL at 05:09

## 2018-09-11 RX ADMIN — GUAIFENESIN 600 MG: 600 TABLET, EXTENDED RELEASE ORAL at 08:09

## 2018-09-11 RX ADMIN — DANTROLENE SODIUM 25 MG: 25 CAPSULE ORAL at 08:09

## 2018-09-11 NOTE — CONSULTS
Reviewed patient history and current admission.  Rehab team following.  Full consult to follow.    TIMUR Billings, FNP-C  Physical Medicine & Rehabilitation   09/11/2018  Spectralink: [id number]

## 2018-09-11 NOTE — ASSESSMENT & PLAN NOTE
-h/o of known cerebellar atrophy  -MRI brain and spine negative for acute patholgy  -has been admitted to IRF many times in his life as mother usually admits him once a year to improve LE weakness/spasticity/ataxia   -Neurology consulted and rec Baclofen 20 mg QID and would benefit from multidisciplinary clinic at [facility]   -he is also on Dantrolene 25 mg daily     See hospital course for functional, cognitive/speech/language, and nutrition/swallow status.      Recommendations  -  Encourage mobility, OOB in chair at least 3 hours per day, and early ambulation as appropriate  -  PT/OT evaluate and treat  -  Pain management  -  Monitor for and prevent skin breakdown and pressure ulcers  · Early mobility, repositioning/weight shifting every 20-30 minutes when sitting, turn patient every 2 hours, proper mattress/overlay and chair cushioning, pressure relief/heel protector boots  -  Reviewed discharge options (IP rehab, SNF, HH therapy, and OP therapy)

## 2018-09-11 NOTE — ASSESSMENT & PLAN NOTE
"Ataxic gait  Spinocerebella ataxia    [facility] concerns he "was not being treated correctly" and his condition had been worsening. They present for Neurology evaluation. Patient last seen by Dr. Wise in 2015. Chart review shows difficulty managing condition due to financial issues and medical expenses.    [facility] rehabilitation facility  - MRI brain, spine negative for acute abnormality  - known cerebella atrophy, stable  - Neurology consulted, recommendations below   - SLP to evaluate swallowing   - continue Baclofen 20 mg QID   - his symptoms regarding SCA do not require hospitalization   - would benefit from multidisciplinary clinic at [facility] (PT/OT/SLP/Neurology)  - PT/OT, SLP consulted  - family requesting placement  "

## 2018-09-11 NOTE — CONSULTS
"[facility]  Physical Medicine & Rehab  Consult Note    Patient Name: Lisa Moreno  MRN: [id number]  Admission Date: 9/7/2018  Hospital Length of Stay: 0 days  Attending Physician: Keri Archer MD     Inpatient consult to Physical Medicine & Rehabilitation  Consult performed by: Petra Adame NP  Consult requested by:  Keri Archer MD    Reason for Consult:  assess rehabilitation needs  Consults  Subjective:     Principal Problem: Weakness    HPI: Lisa Moreno is a 37-year-old male with PMHx of spinocerebellar atrophy with chronic LE weakness, spasticity, and ataxic gait (intially started at age 20), neurogenic bladder, & seizures (vague hx, not an AEDs). He was last seen in [facility] Movement disorder clinic 2/2015.  Patient presented to [facility] on 9/7 for worsening LE weakness with urinary incontinence.  He was recently receiving IRF at [place] and his mother felt like his LE weakness was worsening and "was not being treated well" and brought him to [facility] ED for further evaluation.  Neurology was consulted. MRI brain, L & T spine were somewhat motion limited but did not reveal acute abnormalities. Hospital course further complicated by possible bronchitis (inflammation was seen on CXR without consoilidation, given empiric abx, but now d/c'd. BCX NGTD). He is currently on Baclofen 20 mg QID and Dantrolene 25 mg daily.  Per Neurology, "he requires set up with multidisciplinary clinic at [facility] to address multiple medical issues at the same time (including neurology)."    Functional History: Patient lives in [place] with mother and 2 cousin in a single story home with 3 steps to enter.  Prior to admission, (I) with ADLs and utilized a RW for mobility with a recent decline noting to use a WC per therapy notes . DME: RW, WC, shower chair, & hospital bed.     Hospital Course:   09/9/2018: Participated with therapy/Evaluated by OT.  Bed mobility SBA.  Sit to stand Mike. UBD and LBD Mike.  9/10/2018: " Evaluated by therapy.  Bed mobility Mike.  Sit to stand ModA & RW.  No gait.  9/11/18: SLP cog and voice eval pending.    Past Medical History:   Diagnosis Date    Degenerative motor system disease     Depression     Seizure      History reviewed. No pertinent surgical history.  Review of patient's allergies indicates:   Allergen Reactions    Penicillins        Scheduled Medications:    baclofen  20 mg Oral QID    dantrolene  25 mg Oral Daily    guaiFENesin  600 mg Oral BID       PRN Medications: albuterol-ipratropium, HYDROcodone-acetaminophen, ondansetron, oxyCODONE, ramelteon, sodium chloride 0.9%    Family History     Problem Relation (Age of Onset)    Lung cancer Maternal Grandfather    Multiple sclerosis Other    Thyroid cancer Mother        Tobacco Use    Smoking status: Never Smoker    Smokeless tobacco: Never Used   Substance and Sexual Activity    Alcohol use: Yes     Comment: social drinker, at times    Drug use: No    Sexual activity: No     Review of Systems   Reason unable to perform ROS: dysarthria      Objective:     Vital Signs (Most Recent):  Temp: 98.2 °F (36.8 °C) (09/10/18 2100)  Pulse: 73 (09/10/18 2100)  Resp: 10 (09/10/18 2100)  BP: 136/88 (09/10/18 2100)  SpO2: 97 % (09/10/18 2100)    Vital Signs (24h Range):  Temp:  [98.2 °F (36.8 °C)-98.6 °F (37 °C)] 98.2 °F (36.8 °C)  Pulse:  [73-77] 73  Resp:  [10-18] 10  SpO2:  [97 %-99 %] 97 %  BP: (123-136)/(79-88) 136/88     Body mass index is 19.48 kg/m².    Physical Exam   Constitutional: He appears well-developed and well-nourished.   HENT:   Head: Normocephalic and atraumatic.   Eyes: Conjunctivae are normal. Right eye exhibits no discharge. Left eye exhibits no discharge.   Neck: Neck supple.   Cardiovascular: Normal rate and regular rhythm.   Pulmonary/Chest: Effort normal. No respiratory distress.   Abdominal: Soft. He exhibits no distension. There is no tenderness.   Musculoskeletal: He exhibits no edema or tenderness.    Neurological: He is alert. No sensory deficit. He exhibits abnormal muscle tone (increased spasticity to BLE).   Disoriented to city, able to answer  and age correctly  RUE: .  LUE: .  RLE: .  LLE: .  Skin: Skin is warm and dry.   Psychiatric: He has a normal mood and affect. His behavior is normal. His speech is delayed and slurred.         Diagnostic Results:   Labs: Reviewed  X-Ray: Reviewed  CT: Reviewed  MRI: Reviewed    Assessment/Plan:     Bronchitis    -h/o of cough  -CXR with some inflammation per HM but no consolidation  -BCX NGTD  -received empiric abx but now d/c'd         Ataxic gait    -see spinocerebellar ataxia         Spinocerebellar ataxia    -h/o of known cerebellar atrophy  -MRI brain and spine negative for acute patholgy  -has been admitted to IRF many times in his life as mother usually admits him once a year to improve LE weakness/spasticity/ataxia   -Neurology consulted and rec Baclofen 20 mg QID and would benefit from multidisciplinary clinic at [facility]   -he is also on Dantrolene 25 mg daily     See hospital course for functional, cognitive/speech/language, and nutrition/swallow status.      Recommendations  -  Encourage mobility, OOB in chair at least 3 hours per day, and early ambulation as appropriate  -  PT/OT evaluate and treat  -  Pain management  -  Monitor for and prevent skin breakdown and pressure ulcers  · Early mobility, repositioning/weight shifting every 20-30 minutes when sitting, turn patient every 2 hours, proper mattress/overlay and chair cushioning, pressure relief/heel protector boots  -  Reviewed discharge options (IP rehab, SNF, HH therapy, and OP therapy)          Neurogenic bladder    -flomax d/c'd  -NSGY f/u outp recommend by HM         Evaluated by therapy.  Patient came from [facility] and usually received IRF ~ once a year per chart. Will follow progress and discuss with rehab team for post acute care/rehab recommendation.      Thank you for your  consult.     Petra Adame NP  Department of Physical Medicine & Rehab  [facility]

## 2018-09-11 NOTE — ASSESSMENT & PLAN NOTE
-h/o of cough  -CXR with some inflammation per HM but no consolidation  -BCX NGTD  -received empiric abx but now d/c'd

## 2018-09-11 NOTE — PLAN OF CARE
Pt has been accepted to transfer to [facility] today.  CARLOS ALBERTO informed the pt's mother and PA.  CARLOS ALBERTO gave the nurse the number to call report.  CARLOS ALBERTO called [facility]. They stated that they could not come until after 8pm.  CARLOS ALBERTO called [facility] ([phone]).  They will come in about 2 hours.      Rebeca Richards LCSW  [phone]

## 2018-09-11 NOTE — NURSING
Called facility patient is being transferred to,  stated she would give my number to the person receiving the patient and they will call back to give report.

## 2018-09-11 NOTE — HPI
"Lisa Moreno is a 37-year-old male with PMHx of spinocerebellar atrophy with chronic LE weakness, spasticity, and ataxic gait (intially started at age 20), neurogenic bladder, seizures (vague hx, not an AEDs). He was last seen in Movement disorder clinic 2/2015.  Patient presented to [facility] on 9/7 for worsening LE weakness with urinary incontinence.  He was recently receiving IRF at [place] and his mother felt like his LE weakness was worsening and brought him to [facility] ED for further evaluation.  Neurology was consulted. MRI brain, L & T spine were somewhat motion limited but did not reveal acute abnormlaities. Hospital course further complicated by possible bronchitis (inflammation was seen on CXR without consoilidation, given empiric abx, but now d/c'd. BCX NGTD). He is currently on Baclofen 20 mg QID.  Per Neurology, "he requires set up with multidisciplinary clinic at [facility] to address multiple medical issues at the same time (including neurology)."    Functional History: Patient lives in [place] with mother and 2 cousin in a single story home with 3 steps to enter.  Prior to admission, (I) with ADLs and utilized a RW for mobility with a recent decline noting to use a WC per therapy notes . DME: RW, WC, shower chair, & hospital bed.     "

## 2018-09-11 NOTE — DISCHARGE SUMMARY
[facility] Hospital Medicine  Discharge Summary      Patient Name: Lisa Moreno  MRN: [id number]  Admission Date: 9/7/2018  Hospital Length of Stay: 0 days  Discharge Date and Time: No discharge date for patient encounter.  Attending Physician: Keri Archer MD   Discharging Provider: Manan Newell PA-C  Primary Care Provider: Cyndie Barrera MD  Hospital Medicine Team: [facility] HOSP MED E Manan Newell PA-C    HPI:   Patient is a 38yo AAM with a PMHx of spinocerebellar atrophy being admitted to observation for chronic weakness. Patient is a patient at [facility] for appproximately 75 days. His mother is at bedside and providers majority of HPI as patient doesn't speak. The mother notes she admits her son to PMR facilities once or twice a year to get PT/OT to help with his chronic LE weakness. She reports his strength has worsened while in his current facility. She is upset that he has not seen any medical doctor and feels he needs to be evaluated by Neurology so she brought him to [facility] ED for evaluation. Of note, the patient last saw Dr. Wise in 2015. Patient's mother endorses cough and associated worsening urinary incontinence, but she attributes that to starting Flomax during his facility stay. Patient denies chest pain, SOB, N/V, and numbness.    In the ED, vitals stable. Intake labs with no acute abnormality. CXR concerning for bronchitis, no consolidation concerning for pneumonia. ED gave IV azithromycin, cefepime, and vancomycin to cover for pneumonia, but will not continue.    * No surgery found *      Hospital Course:   Patient admitted to observation for chronic weakness. Neurology, PT/OT consulted and provided recommendations. As condition is chronic, patient will require multi disciplinary following outpatient. PT recommended rehab; pt accepted and discharged to [facility] rehab facility.      Consults:   Consults (From admission, onward)        Status Ordering Provider  "    Inpatient consult to neurology  Once     Provider:  (Not yet assigned)    Completed DESIREE MENCHACA     Inpatient consult to Physical Medicine Rehab  Once     Provider:  (Not yet assigned)    Completed MYA MCARTHUR     Inpatient consult to Social Work  Once     Provider:  (Not yet assigned)    Acknowledged DESIREE MENCHACA          * Weakness    Ataxic gait  Spinocerebella ataxia    [facility] concerns he "was not being treated correctly" and his condition had been worsening. They present for Neurology evaluation. Patient last seen by Dr. Wise in 2015. Chart review shows difficulty managing condition due to financial issues and medical expenses.    [facility] rehabilitation facility  - MRI brain, spine negative for acute abnormality  - known cerebella atrophy, stable  - Neurology consulted, recommendations below   - SLP to evaluate swallowing   - continue Baclofen 20 mg QID   - his symptoms regarding SCA do not require hospitalization   - would benefit from multidisciplinary clinic at [facility] (PT/OT/SLP/Neurology)  - PT/OT, SLP consulted  - family requesting placement          Final Active Diagnoses:    Diagnosis Date Noted POA    PRINCIPAL PROBLEM:  Weakness [R53.1] 03/19/2015 Yes    Bronchitis [J40] 09/08/2018 Unknown    Ataxic gait [R26.0] 02/02/2017 Yes    History of seizures [Z87.898] 03/27/2015 Not Applicable    Spinocerebellar ataxia [G11.8] 03/27/2015 Yes    Neurogenic bladder [N31.9] 01/16/2015 Yes      Problems Resolved During this Admission:       Discharged Condition: stable    Disposition:     Follow Up:    Patient Instructions:   No discharge procedures on file.    Significant Diagnostic Studies: Labs: All labs within the past 24 hours have been reviewed    Pending Diagnostic Studies:     None         Medications:  Reconciled Home Medications:      Medication List      ASK your doctor about these medications    baclofen 20 " MG tablet  Commonly known as:  LIORESAL  Take 1 tablet (20 mg total) by mouth 2 (two) times daily.     cyanocobalamin (vitamin B-12) 1,000 mcg Tbsr  Commonly known as:  VITAMIN B-12  Take 1 tablet by mouth once daily.     folic acid-vit B6-vit B12 2.5-25-2 mg 2.5-25-2 mg Tab  Commonly known as:  FOLBIC or Equiv  Take 1 tablet by mouth once daily.     gabapentin 100 MG capsule  Commonly known as:  NEURONTIN  Take 1 capsule (100 mg total) by mouth 3 (three) times daily. (pain)     megestrol 40 MG Tab  Commonly known as:  MEGACE  Take 1 tablet (40 mg total) by mouth 2 (two) times daily.     polyethylene glycol 17 gram Pwpk  Commonly known as:  GLYCOLAX  Take 17 g by mouth once daily.     senna-docusate 8.6-50 mg 8.6-50 mg per tablet  Commonly known as:  PERICOLACE  Take 1 tablet by mouth 2 (two) times daily as needed for Constipation.     sertraline 50 MG tablet  Commonly known as:  ZOLOFT  Take 1 tablet (50 mg total) by mouth once daily.            Indwelling Lines/Drains at time of discharge:   Lines/Drains/Airways          None          Time spent on the discharge of patient: 30 minutes  Patient was seen and examined on the date of discharge and determined to be suitable for discharge.         Manan Newell PA-C  Department of Hospital Medicine  [facility]

## 2018-09-11 NOTE — PT/OT/SLP EVAL
Speech Language Pathology Evaluation  Cognitive Communication    Patient Name:  Lisa Moreno   MRN:  [id number]  Admitting Diagnosis: Weakness    Recommendations:     Recommendations:                General Recommendations:  Speech/language therapy and Voice therapy  Diet recommendations:  Regular, Thin   Aspiration Precautions: Standard aspiration precautions   General Precautions: Standard, fall  Communication strategies:  provide increased time to answer and go to room if call light pushed    History:     Past Medical History:   Diagnosis Date    Degenerative motor system disease     Depression     Seizure        History reviewed. No pertinent surgical history.    Social History: Patient lives with family in [place], [place].    Prior diet: regular/thin.    Subjective     Patient alert and cooperative during session  Communicated with nurse prior to session  Patient reported that he is at baseline for swallowing/speech/voice  Patient's aunt in room during session    Pain/Comfort:  · Pain Rating 1: 0/10  · Pain Rating Post-Intervention 1: 0/10    Objective:     Patient's baseline status was reported primarily by his aunt as patient insisted that he was at baseline for all of the below areas.    Cognitive Status:    Orientation Person, Situation and Time  Memory Immediate Recall WFL and Delayed1/3 items ind.'ly & 2/3 with mod assist with 4 minute interval  Simple calculation 50% with min assist for simple time/money management      Receptive Language:   Comprehension:      Commands  multistep basic commands 60% accuracy ind.'ly    Pragmatics:    WFL    Expressive Language:  Verbal:    WFL      Motor Speech:  Dysarthria moderate/severe  Intelligibility 90% with single-words; 80% with connected speech    Voice:   Quality Hoarse  Intensity mildly decreased    Visual-Spatial:  Mod assist for clock drawing task (for minute and hour hands)    Reading:   to be assessed     Written Expression:   WFL    Treatment: Patient  seen for speech/lang/cog eval. Patient sitting up in bed with aunt in room during session. Patient reported no deficits in speech/language/voice/cogntition. Aunt served as a historian for patient and reported that his speech and voice were worse than baseline. SLP educated patient and aunt regarding ST options after discharge and they verbalized understanding. No further questions. Bed alarm set with call light within reach.     Assessment:   Lisa Moreno is a 37 y.o. male with an SLP diagnosis of Dysarthria and Dysphonia.     Goals:   Multidisciplinary Problems     SLP Goals        Problem: SLP Goal    Goal Priority Disciplines Outcome   SLP Goal   (Resolved)     SLP Outcome(s) achieved          Problem: SLP Goal    Goal Priority Disciplines Outcome   SLP Goal     SLP    Description:  Speech-Language Pathology Goals  Goals to be met by 9/18/18  1. Patient will recall 3 clear speech strategies with mod assist.  2. Patient will independently recall 3/4 unrelated objects with 5 minute interval.  3. Patient will utilize clear speech strategies with mod assist in order to improve overall speech intelligibility to 95%.  4. Pt will complete moderate level problem solving tasks (including basic math/time) with 80% accuracy given min cues to improve cognitive skills.   5. Educate patient and family regarding role SLP in speech therapy.                      Plan:   · Patient to be seen:  3 x/week   · Plan of Care expires:  10/11/18  · Plan of Care reviewed with:  patient, family   · SLP Follow-Up:  Yes       Discharge recommendations:  Discharge Facility/Level Of Care Needs: rehabilitation facility   Barriers to Discharge:  Level of Skilled Assistance Needed     Time Tracking:   SLP Treatment Date:   09/11/18  Speech Start Time:  1155  Speech Stop Time:  1222     Speech Total Time (min):  27 min    Billable Minutes: Eval 27     TIMOTHY Carlton-SLP  Speech-Language Pathology  Pager: [phone]   09/11/2018

## 2018-09-11 NOTE — SUBJECTIVE & OBJECTIVE
Past Medical History:   Diagnosis Date    Degenerative motor system disease     Depression     Seizure      History reviewed. No pertinent surgical history.  Review of patient's allergies indicates:   Allergen Reactions    Penicillins        Scheduled Medications:    baclofen  20 mg Oral QID    dantrolene  25 mg Oral Daily    guaiFENesin  600 mg Oral BID       PRN Medications: albuterol-ipratropium, HYDROcodone-acetaminophen, ondansetron, oxyCODONE, ramelteon, sodium chloride 0.9%    Family History     Problem Relation (Age of Onset)    Lung cancer Maternal Grandfather    Multiple sclerosis Other    Thyroid cancer Mother        Tobacco Use    Smoking status: Never Smoker    Smokeless tobacco: Never Used   Substance and Sexual Activity    Alcohol use: Yes     Comment: social drinker, at times    Drug use: No    Sexual activity: No     Review of Systems   Reason unable to perform ROS: dysarthria      Objective:     Vital Signs (Most Recent):  Temp: 98.2 °F (36.8 °C) (09/10/18 2100)  Pulse: 73 (09/10/18 2100)  Resp: 10 (09/10/18 2100)  BP: 136/88 (09/10/18 2100)  SpO2: 97 % (09/10/18 2100)    Vital Signs (24h Range):  Temp:  [98.2 °F (36.8 °C)-98.6 °F (37 °C)] 98.2 °F (36.8 °C)  Pulse:  [73-77] 73  Resp:  [10-18] 10  SpO2:  [97 %-99 %] 97 %  BP: (123-136)/(79-88) 136/88     Body mass index is 19.48 kg/m².    Physical Exam   Constitutional: He appears well-developed and well-nourished.   HENT:   Head: Normocephalic and atraumatic.   Eyes: Conjunctivae are normal. Right eye exhibits no discharge. Left eye exhibits no discharge.   Neck: Neck supple.   Cardiovascular: Normal rate and regular rhythm.   Pulmonary/Chest: Effort normal. No respiratory distress.   Abdominal: Soft. He exhibits no distension. There is no tenderness.   Musculoskeletal: He exhibits no edema or tenderness.   Neurological: He is alert. No sensory deficit. He exhibits abnormal muscle tone (increased spasticity to BLE).   Disoriented to  city, able to answer  and age correctly  RUE: .  LUE: .  RLE: .  LLE: .     Skin: Skin is warm and dry.   Psychiatric: He has a normal mood and affect. His behavior is normal. His speech is delayed and slurred.     NEUROLOGICAL EXAMINATION:     MENTAL STATUS   Speech: slurred       Diagnostic Results:   Labs: Reviewed  X-Ray: Reviewed  CT: Reviewed  MRI: Reviewed

## 2018-09-11 NOTE — PLAN OF CARE
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 Wise in movement disorder clinic on 2/23/2015  Diagnosis of SCA at that visit        Neurogenic bladder [N31.9]  Yes      Resolved Hospital Problems   No resolved problems to display.       Allergies:  Review of patient's allergies indicates:   Allergen Reactions    Penicillins        Diet: regular diet    Activities: Activity as tolerated    Nursing: turn q2H    Labs: CBC and CMP Once     CONSULTS:    Physical Therapy to evaluate and treat. , Occupational Therapy to evaluate and treat., Speech Therapy to evaluate and treat for Language, Swallowing and Cognition. and  to evaluate for community resources/long-range planning.    MISCELLANEOUS CARE:  Routine Skin for Bedridden Patients: Apply moisture barrier cream to all skin folds and wet areas in perineal area daily and after baths and all bowel movements.    WOUND CARE ORDERS  None    Medications: Review discharge medications with patient and family and provide education.      Current Discharge Medication List      CONTINUE these medications which have NOT CHANGED    Details   cyanocobalamin, vitamin B-12, (VITAMIN B-12) 1,000 mcg TbSR Take 1 tablet by mouth once daily.  Qty: 30 each, Refills: 2    Associated Diagnoses: Vitamin B12 nutritional deficiency      folic acid-vit B6-vit B12 2.5-25-2 mg (FOLBIC OR EQUIV) 2.5-25-2 mg Tab Take 1 tablet by mouth once  daily.  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  Qty: 60 tablet, Refills: 4      polyethylene glycol (GLYCOLAX) 17 gram PwPk Take 17 g by mouth once daily.  Qty: 30 packet, Refills: 4      senna-docusate 8.6-50 mg (PERICOLACE) 8.6-50 mg per tablet Take 1 tablet by mouth 2 (two) times daily as needed for Constipation.      baclofen (LIORESAL) 20 MG tablet Take 1 tablet (20 mg total) by mouth 2 (two) times daily.  Qty: 60 tablet, Refills: 0      gabapentin (NEURONTIN) 100 MG capsule Take 1 capsule (100 mg total) by mouth 3 (three) times daily. (pain)  Qty: 90 capsule, Refills: 11      sertraline (ZOLOFT) 50 MG tablet Take 1 tablet (50 mg total) by mouth once daily.  Qty: 30 tablet, Refills: 4                  _________________________________  Manan Newell PA-C  09/11/2018

## 2018-09-11 NOTE — PLAN OF CARE
Problem: Physical Therapy Goal  Goal: Physical Therapy Goal  Goals to be met by: 2018     Patient will increase functional independence with mobility by performin. Supine to sit with Stand-by Assistance  2. Sit to supine with Stand-by Assistance  3. Sit to stand transfer with Contact Guard Assistance  4. Bed to chair transfer with Contact Guard Assistance using Rolling Walker  5. Gait  x 50 feet with Minimal Assistance using Rolling Walker.   6. Lower extremity exercise program x15 reps per handout, with assistance as needed     Pt progressing towards goals. continue with PT POC.Goals remain appropriate.  Geoffrey Solo PTA  2018

## 2018-09-11 NOTE — PT/OT/SLP PROGRESS
Physical Therapy Treatment    Patient Name:  Lisa Moreno   MRN:  [id number]    Recommendations:     Discharge Recommendations:  rehabilitation facility   Discharge Equipment Recommendations: none   Barriers to discharge: Inaccessible home and Decreased caregiver support    Assessment:     Lisa Moreno is a 37 y.o. male admitted with a medical diagnosis of Weakness.  He presents with the following impairments/functional limitations:  weakness, impaired self care skills, impaired endurance, gait instability, impaired functional mobilty, impaired balance, decreased lower extremity function, decreased upper extremity function, abnormal tone, decreased coordination, impaired coordination, decreased safety awareness . Pt Progressing with PT Intervention. Pt Progressing with improving gait distance however limited due to spasticity in B LE noted with mobility. Pt would continue to benefit from skilled PT to address overall functional mobility and goals. Goals remain appropriate.      Rehab Prognosis:  good; patient would benefit from acute skilled PT services to address these deficits and reach maximum level of function.      Recent Surgery: * No surgery found *      Plan:     During this hospitalization, patient to be seen 4 x/week to address the above listed problems via gait training, therapeutic activities, therapeutic exercises, neuromuscular re-education, wheelchair management/training  · Plan of Care Expires:  10/10/18   Plan of Care Reviewed with: patient, family    Subjective     Communicated with RN prior to session.  Patient found supine upon PT entry to room, agreeable to treatment.      Chief Complaint: fatigue    Pain/Comfort:  · Pain Rating 1: 0/10  · Pain Rating Post-Intervention 1: 0/10    Patients cultural, spiritual, Buddhism conflicts given the current situation: none noted    Objective:     Patient found with: telemetry     General Precautions: Standard, fall, aspiration   Orthopedic  Precautions:N/A   Braces: N/A     Functional Mobility:  · Bed Mobility:     · Supine to Sit: stand by assistance  · Sit to Supine: minimum assistance  · Transfers:     · Sit to Stand:  minimum assistance and moderate assistance with rolling walker  Gait: pt ambulated 38 ft x 2 with RW with min/modA for balance and wt shifting with w/c in tow. pt required vcsfor safety upright posture,widen CJ,sequencing. Demonstrated  Increased spasticity LEs,decrease shila and step  length, Forward flexed posture,downward focus,  narrow CJ,  decreased foot clearance, fair technique, antalgic gait  pattern,increase B knee flexion,decrease stride length, decreased heel strike, toe walking, increase time to perform  ·       AM-PAC 6 CLICK MOBILITY  Turning over in bed (including adjusting bedclothes, sheets and blankets)?: 3  Sitting down on and standing up from a chair with arms (e.g., wheelchair, bedside commode, etc.): 3  Moving from lying on back to sitting on the side of the bed?: 3  Moving to and from a bed to a chair (including a wheelchair)?: 3  Need to walk in hospital room?: 2  Climbing 3-5 steps with a railing?: 1  Basic Mobility Total Score: 15       Therapeutic Activities and Exercises:   Discussed/educated patient on progress, safety, importance of OOB mobility for improving outcomes, PT POC   Patient performed therex X 15 reps seated in bedside chair B IRENE YEH  LAQ, Hip Flexion  White board updated in patients room to current assistance level   Donned an extra gown   Bedside table in front of patient and area set up for function, convenience, and safety. RN aware of patient's mobility needs and status. Questions/concerns addressed within PTA scope of practice; patient with no further questions.       Patient left supine with all lines intact, call button in reach and nsg notified..    GOALS:   Multidisciplinary Problems     Physical Therapy Goals        Problem: Physical Therapy Goal    Goal Priority Disciplines  Outcome Goal Variances Interventions   Physical Therapy Goal     PT, PT/OT Ongoing (interventions implemented as appropriate)     Description:  Goals to be met by: 2018     Patient will increase functional independence with mobility by performin. Supine to sit with Stand-by Assistance  2. Sit to supine with Stand-by Assistance  3. Sit to stand transfer with Contact Guard Assistance  4. Bed to chair transfer with Contact Guard Assistance using Rolling Walker  5. Gait  x 50 feet with Minimal Assistance using Rolling Walker.   6. Lower extremity exercise program x15 reps per handout, with assistance as needed                      Time Tracking:     PT Received On: 18  PT Total Time (min):   38 min    Billable Minutes: Gait Training 30 and Therapeutic Activity 8    Treatment Type: Treatment  PT/PTA: PTA     PTA Visit Number: 1     Geoffrey Solo PTA  2018

## 2018-09-11 NOTE — PLAN OF CARE
Problem: SLP Goal  Goal: SLP Goal  Speech-Language Pathology Goals  Goals to be met by 9/18/18  1. Patient will recall 3 clear speech strategies with mod assist.  2. Patient will independently recall 3/4 unrelated objects with 5 minute interval.  3. Patient will utilize clear speech strategies with mod assist in order to improve overall speech intelligibility to 95%.  4. Pt will complete moderate level problem solving tasks (including basic math/time) with 80% accuracy given min cues to improve cognitive skills.   5. Educate patient and family regarding role SLP in speech therapy.    Patient seen for speech/language/cognitive evaluation.     Amaury Powers CF-SLP  Speech-Language Pathology  Pager: [phone]

## 2018-09-11 NOTE — HOSPITAL COURSE
09/9/2018: Participated with therapy/Evaluated by OT.  Bed mobility SBA.  Sit to stand Mike. UBD and LBD Mike.  9/10/2018: Evaluated by therapy.  Bed mobility Mike.  Sit to stand ModA & RW.  No gait.

## 2018-09-12 LAB
BACTERIA BLD CULT: NORMAL
BACTERIA BLD CULT: NORMAL

## 2018-09-12 NOTE — PLAN OF CARE
09/12/18 0559   Final Note   Assessment Type Final Discharge Note     Patient discharged to [facility] on 9/11/18.

## 2018-10-02 ENCOUNTER — HOSPITAL ENCOUNTER (OUTPATIENT)
Dept: RADIOLOGY | Facility: HOSPITAL | Age: 38
Discharge: HOME | End: 2018-10-02
Attending: PHYSICAL MEDICINE & REHABILITATION
Payer: MEDICARE

## 2018-10-02 PROCEDURE — 74018 RADEX ABDOMEN 1 VIEW: CPT | Mod: 26,,, | Performed by: RADIOLOGY

## 2018-10-05 ENCOUNTER — HOSPITAL ENCOUNTER (OUTPATIENT)
Dept: RADIOLOGY | Facility: HOSPITAL | Age: 38
Discharge: HOME | End: 2018-10-05
Attending: PHYSICAL MEDICINE & REHABILITATION
Payer: MEDICARE

## 2018-10-05 PROCEDURE — 71045 X-RAY EXAM CHEST 1 VIEW: CPT | Mod: 26,,, | Performed by: RADIOLOGY

## 2018-10-18 ENCOUNTER — TELEPHONE (OUTPATIENT)
Dept: NEUROLOGY | Facility: CLINIC | Age: 38
End: 2018-10-18

## 2018-10-18 NOTE — TELEPHONE ENCOUNTER
----- Message from Vj Morales sent at 10/18/2018  9:47 AM CDT -----  Needs Advice    Reason for call: Judith is calling to schedule an appt w/ the doctor to f/u from inpatient rehab        Communication Preference: Judith howe/ [facility] @ [phone]    Additional Information:

## 2018-10-22 DIAGNOSIS — G11.8 SPINOCEREBELLAR ATAXIA: Primary | ICD-10-CM

## 2018-10-30 ENCOUNTER — CLINICAL SUPPORT (OUTPATIENT)
Dept: REHABILITATION | Facility: HOSPITAL | Age: 38
End: 2018-10-30
Payer: MEDICARE

## 2018-10-30 DIAGNOSIS — R26.89 DECREASED FUNCTIONAL MOBILITY: ICD-10-CM

## 2018-10-30 DIAGNOSIS — R26.89 BALANCE PROBLEM: ICD-10-CM

## 2018-10-30 DIAGNOSIS — R26.2 INABILITY TO WALK: ICD-10-CM

## 2018-10-30 DIAGNOSIS — R29.898 WEAKNESS OF BOTH LOWER EXTREMITIES: Primary | ICD-10-CM

## 2018-10-30 PROBLEM — R26.0 ATAXIC GAIT: Status: RESOLVED | Noted: 2017-02-02 | Resolved: 2018-10-30

## 2018-10-30 PROCEDURE — G8978 MOBILITY CURRENT STATUS: HCPCS | Mod: CL,PO

## 2018-10-30 PROCEDURE — 97163 PT EVAL HIGH COMPLEX 45 MIN: CPT | Mod: PO

## 2018-10-30 PROCEDURE — G8979 MOBILITY GOAL STATUS: HCPCS | Mod: CK,PO

## 2018-10-30 NOTE — PLAN OF CARE
[facility]  Physical Therapy Initial Evaluation    Name: Lisa Moreno  Clinic Number: [id number]    Therapy Diagnosis:   Encounter Diagnoses   Name Primary?   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 He is not walking at this time and he has no idea when was the last time he walked. His mother wants him to come to therapy. He reports he is able to maneuver his w/c by himself, transfer to/from his w/c independently, and perform bed mobility independently. He has an aide who assists him with dressing his lower body, washing his lower body, offers SBA with transfers, and things around the house. His aide reports that she has been with him a week and has not seen him stand up during that time. He does have a RW at home but does not use it. Patient is a poor historian but his mother is coming to his next appointment and will be able to answer more about his past medical history.       Past Medical History:   Diagnosis Date    Degenerative motor system disease     Depression     Seizure      Lisa Moreno  has no past surgical history on file.    Lisa has a current medication list which includes the following prescription(s): baclofen, cyanocobalamin (vitamin b-12), folic acid-vit b6-vit b12 2.5-25-2 mg, gabapentin, megestrol, polyethylene glycol, senna-docusate 8.6-50 mg, and sertraline.    Review of patient's  allergies indicates:   Allergen Reactions    Penicillins         Imaging, MRI studies: 09/07/18  COMPARISON:  MRI brain, 01/26/2015.    FINDINGS:  There is extensive motion which significantly limits evaluation.    There is no evidence of restricted diffusion to suggest acute infarction.    FLAIR imaging demonstrates mild periventricular signal hyperintensity, grossly unchanged.    The ventricles are stable.  Grossly stable generalized volume loss of the brainstem and cerebellum.    No evidence of mass lesion, hemorrhage, edema or recent or remote major vascular distribution infarction.    Post contrast images are essentially nondiagnostic.  No large enhancing mass identified.    No extra-axial blood or fluid collections.    T2 skull base flow voids are preserved. Bone marrow signal intensity is unremarkable.      Impression       Significantly motion limited examination.  No evidence of acute infarct.  If clinical concern persists, the exam can be repeated as indicated.    Generalized cerebellar and brainstem atrophy, grossly unchanged.    Additional findings discussed in the body of the report.       Prior Therapy: PT in this facility, numerous episodes of inpatient rehab and home health therapy  Social History: single story home, three steps outside the home, ramp present, lives with their family  Occupation: disabled  Prior Level of Function: Mod A  Current Level of Function: Mod A    Pain: denies pain but aide reports he does grimace when moving his legs but won't admit to pain.     Pts goals: would like to do more for himself, don't depend on the people to help me.     Objective     Observation: Patient is a very thin 37 year old male, who presents to the clinic in no apparent distress. He has an aide present with him during the assessment.       Posture: Patient is sacral sitting in his wheelchair with knees adducted; when attempting to stand with a RW he is unable to fully extend his knees or hips. Stands  with forward flexed trunk.      Gait: unable to ambulate at this time; able to maneuver his w/c independently.     Range of Motion: AROM (PROM): AROM is limited by his weakness, PROM is WNL    Strength:  Hip Left Right   Flexion 3/5 3/5   Abduction 3/5 3-/5   Adduction 3/5 3/5   Extension NT NT     Knee Left Right   Extension 3/5 2+/5   Flexion 3/5 3/5     Ankle Left Right   Dorsiflexion 2/5 3/5   Plantarflexion 3/5 4/5   Inversion 2/5 2/5   Eversion 1/5 2/5       Special Tests: Unable to assess balance as patient is unable to stand unsupported or     Joint Mobility: B knees WFL    Palpation: No TTP at this time    Sensation: light touch intact    Flexibility: Unable to assess as patient was unable to transfer on to high plinth in exam room.       CMS Impairment/Limitation/Restriction for FOTO Paraplegic Syndrome Survey    Therapist reviewed FOTO scores for Lisa Moreno on 10/30/2018.   FOTO documents entered into [employer] - see Media section.    Limitation Score: 66%  Category: Mobility    Current : CL = least 60% but < 80% impaired, limited or restricted  Goal: CK = at least 40% but < 60% impaired, limited or restricted  Discharge: N/A at this time         TREATMENT   Treatment Time In: 11:55  Treatment Time Out: 12:00  Total Treatment time separate from Evaluation: 5minutes    Home Exercises and Patient Education Provided    Education provided:   - compliance with HEP.  - possibility of minimal progress due to progressive nature of patient's condition    Written Home Exercises Provided: Patient instructed to cont prior HEP.  Exercises were reviewed and Lisa was able to demonstrate them prior to the end of the session.  Lisa demonstrated fair  understanding of the education provided.     Assessment   Lisa is a 37 y.o. male referred to outpatient Physical Therapy with a medical diagnosis of Spinocerebellar ataxia. Pt presents with weakness of B LE, balance issues, and decreased functional mobility. Due  to his severe LE weakness and balance issues he is unable to ambulate at this time, stand unsupported, and requires assistance with transfers and ADLs. He is able to maneuver his wheelchair in the clinic independently but was unable to fully extend his knees and hips when attempting to stand. He was unable to take steps with the RW during the assessment. His current score on FOTO Paraplegic Syndromes places him in the 60%<80% impaired, limited, or restricted category.     Pt prognosis is Fair.   Pt will benefit from skilled outpatient Physical Therapy to address the deficits stated above and in the chart below, provide pt/family education, and to maximize pt's level of independence.     Plan of care discussed with patient: Yes  Pt's spiritual, cultural and educational needs considered and patient is agreeable to the plan of care and goals as stated below:     Anticipated Barriers for therapy: progressive nature of patient's condition    Medical Necessity is demonstrated by the following  History  Co-morbidities and personal factors that may impact the plan of care Co-morbidities:   level of undertstanding of current condition and neuropathic pain    Personal Factors:   no deficits     moderate   Examination  Body Structures and Functions, activity limitations and participation restrictions that may impact the plan of care Body Regions:   lower extremities    Body Systems:    strength  balance  gait  transfers    Participation Restrictions:   Difficulty with dressing, bathing, transfers, ambulation, and standing.     Activity limitations:   Learning and applying knowledge  solving problems    General Tasks and Commands  no deficits    Communication  no deficits    Mobility  lifting and carrying objects  walking  using transportation (bus, train, plane, car)    Self care  washing oneself (bathing, drying, washing hands)  toileting  dressing    Domestic Life  shopping  cooking  doing house work (cleaning house, washing  dishes, laundry)    Interactions/Relationships  no deficits    Life Areas  no deficits    Community and Social Life  community life  recreation and leisure         high   Clinical Presentation evolving clinical presentation with changing clinical characteristics high   Decision Making/ Complexity Score: high     Goals:  Short Term Goals: 4 weeks   1. This patient and his caregivers will be independent with a basic HEP.  2. This patient will increase B LE strength 1 grade in order to be able to dress his lower body with Min A.  3. This patient will be able stand for 2 minutes with RW and CGA for pressure reduction.   4. Patient will be able to achieve greater than or equal to 40 on the FOTO Paraplegic Syndromes placing patient in 60%<80% impaired, limited, or restricted category demonstrating overall improved functional ability with lower extremity.   Long Term Goals: 8 weeks   1. This patient and his caregivers will be independent with an updated HEP.  2. This patient will increase B LE strength to 4/5 grossly in order to be able to ambulate 25 feet with RW and Min A.  4. Patient will be able to achieve greater than or equal to 45 on the FOTO Paraplegic Syndromes placing patient in 40%<60% impaired, limited, or restricted category demonstrating overall improved functional ability with lower extremity.     Plan   Plan of care Certification: 10/30/2018 to 12/30/2018.    Outpatient Physical Therapy 2 times weekly for 8 weeks to include the following interventions: Gait Training, Manual Therapy, Moist Heat/ Ice, Neuromuscular Re-ed, Patient Education and Therapeutic Exercise.     Mary Marshall, PT

## 2018-11-02 ENCOUNTER — CLINICAL SUPPORT (OUTPATIENT)
Dept: REHABILITATION | Facility: HOSPITAL | Age: 38
End: 2018-11-02
Payer: MEDICARE

## 2018-11-02 DIAGNOSIS — R26.89 BALANCE PROBLEM: ICD-10-CM

## 2018-11-02 DIAGNOSIS — R26.2 INABILITY TO WALK: ICD-10-CM

## 2018-11-02 DIAGNOSIS — R29.898 WEAKNESS OF BOTH LOWER EXTREMITIES: Primary | ICD-10-CM

## 2018-11-02 DIAGNOSIS — R26.89 DECREASED FUNCTIONAL MOBILITY: ICD-10-CM

## 2018-11-02 PROCEDURE — 97110 THERAPEUTIC EXERCISES: CPT | Mod: PO

## 2018-11-02 NOTE — PROGRESS NOTES
"  Physical Therapy Daily Treatment Note     Name: Lisa Irvin [facility] Number: [id number]    Therapy Diagnosis:   Encounter Diagnoses   Name Primary?   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   TE  40                     UNTIMED  Procedure Min.             Total Billable Time: 40 minutes    Precautions: Standard and Fall    Subjective      Pt reports: he was compliant with home exercise program given last session.   Response to previous treatment:no change  Functional change: none    Pain: 0/10  Location:N/A     Objective     Lisa received therapeutic exercises to develop strength for 40 minutes including:    Date  11/02/18   VISIT 2/20   Gcode 2/10  12/30/18   FOTO 2/5   Cap Visit   Cap Total 90.96     POC exp 01/30/19   MT --   Long Sit HS --   Gastroc Str. --   Bike --   ANkle AROM  1 x 10    QS 10 x 3"   glute sets 10 x 3"   SAQ 2 x 5 Mod A   Bridge 1 x 5        SLR --   Hip Abd 1 x 10 YTB   Heel Slides 1 x 5   Tia Hip Add 10 x 3"   Hip Abd 1 x 10 RTB   LAQs --   Seated Hip Flex --       TKE --   Hip Abd --   Hip Flex --   Hip Ext --   HS Curls --       Step Ups --   Step Downs --   Gait --   CP --   Initials DG       Home Exercises Provided and Patient Education Provided     Education provided:   - compliance with HEP.  - removing arm rest from wheelchair when transferring wheelchair to/from bed to improve safety    Written Home Exercises Provided: ankle AROM, SAQ, hip abd, hip add, glute sets, quad sets, heel slides.   Exercises were reviewed and Lisa was able to " demonstrate them prior to the end of the session.  Lisa demonstrated fair  understanding of the education provided.     Pt received a written copy of exercises to perform at home.   See EMR under patient instructions for exercises given.     Lisa demonstrated fair  understanding of the education provided.     Assessment     Patient required frequent cues to take rest breaks as he fatigues, instead of trying to push himself. He did require Min A with most exercises to move through his full ROM and to avoid substitutions. He was able to perform all of today's activities with no increase in symptoms prior to leaving the clinic.   Lisa is progressing well towards his goals.   Pt prognosis is Fair.     Pt will continue to benefit from skilled outpatient physical therapy to address the deficits listed in the problem list box on initial evaluation, provide pt/family education and to maximize pt's level of independence in the home and community environment.     Pt's spiritual, cultural and educational needs considered and pt agreeable to plan of care and goals.    Anticipated barriers to physical therapy: progressive nature of patient's condition    Goals:   Short Term Goals: 4 weeks   1. This patient and his caregivers will be independent with a basic HEP.  2. This patient will increase B LE strength 1 grade in order to be able to dress his lower body with Min A.  3. This patient will be able stand for 2 minutes with RW and CGA for pressure reduction.   4. Patient will be able to achieve greater than or equal to 40 on the FOTO Paraplegic Syndromes placing patient in 60%<80% impaired, limited, or restricted category demonstrating overall improved functional ability with lower extremity.   Long Term Goals: 8 weeks   1. This patient and his caregivers will be independent with an updated HEP.  2. This patient will increase B LE strength to 4/5 grossly in order to be able to ambulate 25 feet with RW and Min A.  4.  Patient will be able to achieve greater than or equal to 45 on the FOTO Paraplegic Syndromes placing patient in 40%<60% impaired, limited, or restricted category demonstrating overall improved functional ability with lower extremity.     Plan     Continue with the plan of care and progress as the patient tolerates.     Mary Marshall, PT

## 2018-11-02 NOTE — PATIENT INSTRUCTIONS
ROM: Plantar / Dorsiflexion        With left leg relaxed, gently flex and extend ankle. Move through full range of motion. Avoid pain. Repeat with the right leg.   Repeat 10 times per set. Do 1 sets per session. Do 2 sessions per day.     [email or web address]://[employer]/34     Copyright © [employer]. All rights reserved.   ROM: Inversion / Eversion        With left leg relaxed, gently turn ankle and foot in and out. Move through full range of motion. Avoid pain. Repeat with the right leg.   Repeat 10 times per set. Do 1 sets per session. Do 2 sessions per day.     https://[email or web address]     Copyright © [employer]. All rights reserved.   Strengthening: Hip Adduction - Isometric        With ball or folded pillow between knees, squeeze knees together. Hold 3 seconds.  Repeat 10 times per set. Do 2 sets per session. Do 2 sessions per day.     https://[employer]/612     Copyright © [employer]. All rights reserved.   Strengthening: Hip Abductor - Resisted        With band looped around both legs above knees, push thighs apart.  Repeat 10 times per set. Do 2 sets per session. Do 2 sessions per day.     [email or web address]://[email or web address].LiveOps/688     Copyright © [employer]. All rights reserved.   Heel Slide        Bend left knee and pull heel toward buttocks.  Repeat 10 times. Do 2 sessions per day.     https://[employer]/300     Copyright © [employer]. All rights reserved.   Short Arc Quad        Place a large can or rolled towel under left leg. Straighten leg. Repeat with the right   Repeat 10 times. Do 2 sessions per day.     [email or web address].LiveOps/298     Copyright © [employer]. All rights reserved.

## 2018-11-14 ENCOUNTER — DOCUMENTATION ONLY (OUTPATIENT)
Dept: REHABILITATION | Facility: HOSPITAL | Age: 38
End: 2018-11-14

## 2018-11-14 DIAGNOSIS — R26.2 INABILITY TO WALK: ICD-10-CM

## 2018-11-14 DIAGNOSIS — R26.89 BALANCE PROBLEM: ICD-10-CM

## 2018-11-14 DIAGNOSIS — R26.89 DECREASED FUNCTIONAL MOBILITY: ICD-10-CM

## 2018-11-21 ENCOUNTER — TELEPHONE (OUTPATIENT)
Dept: UROLOGY | Facility: CLINIC | Age: 38
End: 2018-11-21

## 2018-11-21 ENCOUNTER — OFFICE VISIT (OUTPATIENT)
Dept: UROLOGY | Facility: CLINIC | Age: 38
End: 2018-11-21
Payer: MEDICARE

## 2018-11-21 VITALS
BODY MASS INDEX: 19.48 KG/M2 | SYSTOLIC BLOOD PRESSURE: 112 MMHG | DIASTOLIC BLOOD PRESSURE: 69 MMHG | HEART RATE: 75 BPM | HEIGHT: 76 IN | WEIGHT: 160 LBS

## 2018-11-21 DIAGNOSIS — R15.9 FULL INCONTINENCE OF FECES: ICD-10-CM

## 2018-11-21 DIAGNOSIS — N39.41 URGE INCONTINENCE: Primary | ICD-10-CM

## 2018-11-21 PROCEDURE — 99214 OFFICE O/P EST MOD 30 MIN: CPT | Mod: PBBFAC | Performed by: UROLOGY

## 2018-11-21 PROCEDURE — 99205 OFFICE O/P NEW HI 60 MIN: CPT | Mod: S$PBB,,, | Performed by: UROLOGY

## 2018-11-21 PROCEDURE — 99999 PR PBB SHADOW E&M-EST. PATIENT-LVL IV: CPT | Mod: PBBFAC,,, | Performed by: UROLOGY

## 2018-11-21 RX ORDER — DANTROLENE SODIUM 50 MG/1
50 CAPSULE ORAL 3 TIMES DAILY
Refills: 0 | COMMUNITY
Start: 2018-11-07 | End: 2018-12-08 | Stop reason: SDUPTHER

## 2018-11-21 RX ORDER — BACLOFEN 20 MG/1
20 TABLET ORAL 4 TIMES DAILY PRN
Refills: 0 | COMMUNITY
Start: 2018-11-06 | End: 2019-01-02 | Stop reason: SDUPTHER

## 2018-11-21 NOTE — PATIENT INSTRUCTIONS
Urodynamics Studies     The bladder holds urine until it leaves the body through the urethra.     Urodynamics studies are a series of tests that give your doctor a close look at the working of your bladder and urethra. The tests can help your doctor learn about any problems storing urine or voiding (eliminating) urine from your body.  Understanding the lower urinary tract  The lower part of the urinary tract has several parts.  · The bladder stores urine until youre ready to release it.  · The urethra is the tube that carries urine from the bladder out of the body.  · The sphincter is made up of muscles around the opening of the bladder. The sphincter muscles tighten to hold urine in the bladder. They relax to let urine flow. Signals from the brain tell the sphincter when to tighten and relax. These signals also tell the bladder when to contract to let urine flow out of the body.  Why you need a urodynamics study  This test may be ordered if you:  · Are incontinent (leak urine)  · Have a bladder that does not empty all the way.  · Have symptoms such as the need to urinate often or a constant strong need to urinate  · Have intermittent or weak urine stream  · Have persistent urinary tract infections  Preparing for the study  · Tell your doctor about any medicine youre taking. Ask if you should stop them before the study.  · Keep a diary of your bathroom habits. Do this for a few days before the study. This diary can be a helpful part of the evaluation.  · Ask if you need to arrive for the study with a full bladder.  Date Last Reviewed: 1/1/2017  © 5858-5095 [employer]. [address] [place] [address], [place] [address]. All rights reserved. This information is not intended as a substitute for professional medical care. Always follow your healthcare professional's instructions.        Urodynamic Studies     The equipment used for the study varies depending upon the facility and what tests are done.      Urodynamic studies may be done in your doctors office, a clinic, or a hospital. The studies may take up to an hour or more. This depends on which tests your doctor does. The tests are generally painless. You wont need sedating medicine.  Tests that may be done  Uroflowmetry. This measures the amount and speed of urine you void from your bladder. You urinate into a funnel. Its attached to a computer that records your urine flow over time. The amount of urine left in your bladder after you void may also be measured right after this test.  Cystometry. This test evaluates how much your bladder can hold. It also measures how strong your bladder muscle is and how well the signals work that tell you when your bladder is full. Your healthcare provider fills your bladder with sterile water or saline solution, through a catheter. Your doctor will instruct you to report any sensations you feel. Mention if theyre similar to symptoms youve felt at home. Your doctor may ask you to cough, stand and walk, or bear down during this test.  Electromyogram. This helps evaluate the muscle contractions that control urination, such as sphincter muscle contractions. Your healthcare provider may place electrode patches or wires near your rectum or urethra to make the recording. He or she may ask you to try to tighten or relax your sphincter muscles during this test.  Pressure flow study. This test measures your detrusor, urethral, and abdominal pressures. Detrusor is the muscle surrounding the bladder walls that relaxes to allow your bladder to fill, and and contracts to squeeze out urine. A pressure flow study is often done after cystometry. Youre asked to urinate while a probe in your urethra measures pressures.  Video cystourethrography. This takes video pictures of urine flow through your urinary tract. It can help identify blockages or other problems. The bladder is filled with an X-ray contrast fluid. Then X-ray video  pictures are taken as the fluid is urinated out. Ultrasound imaging may also be combined with routine urodynamic studies.  Ambulatory urodynamics. This test can be used to evaluate you while doing usual activities.  Getting your results  After the study, youll get dressed and return to the consultation room. Test results may be ready soon after the study is finished. Or, you may return to your doctors office in a few days for your results. Your doctor can talk with you about the study report and your options.   Date Last Reviewed: 1/1/2017  © 1998-0887 [employer]. [address] [place] [address]. All rights reserved. This information is not intended as a substitute for professional medical care. Always follow your healthcare professional's instructions.

## 2018-11-21 NOTE — H&P (VIEW-ONLY)
CHIEF COMPLAINT:    Mr. Moreno is a 37 y.o. male presenting for a consultation at the request of Dr. Fadia Ramesh. 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    PRESENTING ILLNESS:    Lisa Moreno is a 37 y.o. male who is accompanied by his mother.  Much of the history is obtained from her, although he can speak.  She states that up until 2 months ago, he was able to urinate on his own.  He has weakness and generally uses a wheel chair, however, he would get to a toilet and void.  However, he does not have a sensation to urinate and the next thing he knows, he is incontinent.  She has aids who work with him and help him to change, he goes through 5 diapers during the day and one at night.  The diaper in the am is wet but generally not through.  Two weeks ago he developed fecal incontinence as well, with large amounts of formed stools.      His symptoms started when he was much younger, with change in his voice and ataxia.      REVIEW OF SYSTEMS:    Review of Systems   Constitutional:        Weakness   HENT: Negative.    Eyes: Negative.    Respiratory: Negative.    Cardiovascular: Negative.    Gastrointestinal:        Fecal incontinence   Genitourinary:        Incontinence without sensory awareness   Musculoskeletal:        Uses a wheel chair   Skin: Negative.    Neurological: Positive for focal weakness.   Endo/Heme/Allergies: Negative.    Psychiatric/Behavioral: Positive for depression.       PATIENT HISTORY:    Past Medical History:   Diagnosis Date    Degenerative motor system disease     Depression     Seizure        No past surgical history on file.    Family History   Problem Relation Age of Onset    Thyroid cancer Mother     Lung cancer Maternal Grandfather     Multiple sclerosis Other         mother's cousin     Socioeconomic History    Marital status: Single   Tobacco Use    Smoking status: Never Smoker    Smokeless tobacco: Never  Used   Substance and Sexual Activity    Alcohol use: Yes     Comment: social drinker, at times    Drug use: No    Sexual activity: No       Allergies:  Penicillins    Medications:  Outpatient Encounter Medications as of 11/21/2018   Medication Sig Dispense Refill    baclofen (LIORESAL) 20 MG tablet Take 20 mg by mouth 4 (four) times daily as needed.  0    dantrolene (DANTRIUM) 50 MG Cap Take 50 mg by mouth 3 (three) times daily.  0    baclofen (LIORESAL) 20 MG tablet Take 1 tablet (20 mg total) by mouth 2 (two) times daily. 60 tablet 0    cyanocobalamin, vitamin B-12, (VITAMIN B-12) 1,000 mcg TbSR Take 1 tablet by mouth once daily. 30 each 2    folic acid-vit B6-vit B12 2.5-25-2 mg (FOLBIC OR EQUIV) 2.5-25-2 mg Tab Take 1 tablet by mouth once daily. 90 tablet 3    gabapentin (NEURONTIN) 100 MG capsule Take 1 capsule (100 mg total) by mouth 3 (three) times daily. (pain) 90 capsule 11    megestrol (MEGACE) 40 MG Tab Take 1 tablet (40 mg total) by mouth 2 (two) times daily. 60 tablet 4    mirabegron (MYRBETRIQ) 25 mg Tb24 ER tablet Take 1 tablet (25 mg total) by mouth once daily. 30 tablet 11    polyethylene glycol (GLYCOLAX) 17 gram PwPk Take 17 g by mouth once daily. 30 packet 4    senna-docusate 8.6-50 mg (PERICOLACE) 8.6-50 mg per tablet Take 1 tablet by mouth 2 (two) times daily as needed for Constipation.      sertraline (ZOLOFT) 50 MG tablet Take 1 tablet (50 mg total) by mouth once daily. 30 tablet 4    [DISCONTINUED] mirabegron (MYRBETRIQ) 25 mg Tb24 ER tablet Take 1 tablet (25 mg total) by mouth once daily. 30 tablet 11     No facility-administered encounter medications on file as of 11/21/2018.          PHYSICAL EXAMINATION:    The patient generally appears in good health, is appropriately interactive, and is in no apparent distress.    Skin: No lesions.    Mental: Cooperative with normal affect.    Neuro: Grossly intact.    HEENT: Normal. No evidence of lymphadenopathy.    Chest:  normal  inspiratory effort.    Abdomen: Soft, non-tender. No masses or organomegaly. Bladder is not palpable. No evidence of flank discomfort. No evidence of inguinal hernia.    Extremities: No clubbing, cyanosis, or edema.  He has muscle wasting and is thin      LABS:    Lab Results   Component Value Date    BUN 16 09/11/2018    CREATININE 0.9 09/11/2018       IMPRESSION:    Encounter Diagnoses   Name Primary?    Urge incontinence Yes    Full incontinence of feces        PLAN:    1.  FUDS/cysto  2.  Referred to colorectal surgery for the fecal incontinence.

## 2018-11-21 NOTE — LETTER
November 21, 2018      Maylin Ramesh MD  [address]  [address]  [facility] [address]           [facility] - Urology 4th Floor  [address]  [place] [address]  Phone: [phone]          Patient: Lisa Moreno   MR Number: [id number]   YOB: 1980   Date of Visit: 11/21/2018       Dear Dr. Maylin Ramesh:    Thank you for referring Lisa Moreno to me for evaluation. Attached you will find relevant portions of my assessment and plan of care.    If you have questions, please do not hesitate to call me. I look forward to following Lisa Moreno along with you.    Sincerely,    Tanja Okeefe MD    Enclosure  CC:  No Recipients    If you would like to receive this communication electronically, please contact [email or web address] or [phone] to request more information on [employer] Link access.    For providers and/or their staff who would like to refer a patient to [facility], please contact us through our one-stop-shop provider referral line, [facility], at [phone].    If you feel you have received this communication in error or would no longer like to receive these types of communications, please e-mail [email or web address]

## 2018-11-27 ENCOUNTER — HOSPITAL ENCOUNTER (OUTPATIENT)
Facility: HOSPITAL | Age: 38
Discharge: HOME | End: 2018-11-27
Attending: UROLOGY | Admitting: UROLOGY
Payer: MEDICARE

## 2018-11-27 ENCOUNTER — OFFICE VISIT (OUTPATIENT)
Dept: NEUROLOGY | Facility: CLINIC | Age: 38
End: 2018-11-27
Attending: UROLOGY
Payer: MEDICARE

## 2018-11-27 VITALS
RESPIRATION RATE: 18 BRPM | WEIGHT: 162 LBS | OXYGEN SATURATION: 100 % | BODY MASS INDEX: 19.13 KG/M2 | SYSTOLIC BLOOD PRESSURE: 125 MMHG | DIASTOLIC BLOOD PRESSURE: 78 MMHG | HEART RATE: 74 BPM | TEMPERATURE: 99 F | HEIGHT: 77 IN

## 2018-11-27 VITALS
HEART RATE: 79 BPM | HEIGHT: 77 IN | SYSTOLIC BLOOD PRESSURE: 123 MMHG | WEIGHT: 162.06 LBS | BODY MASS INDEX: 19.14 KG/M2 | DIASTOLIC BLOOD PRESSURE: 87 MMHG

## 2018-11-27 DIAGNOSIS — N39.8 VOIDING DYSFUNCTION: ICD-10-CM

## 2018-11-27 DIAGNOSIS — R27.0 ATAXIA: Primary | ICD-10-CM

## 2018-11-27 PROCEDURE — 99214 OFFICE O/P EST MOD 30 MIN: CPT | Mod: S$PBB,,, | Performed by: PSYCHIATRY & NEUROLOGY

## 2018-11-27 PROCEDURE — 51797 INTRAABDOMINAL PRESSURE TEST: CPT | Mod: 26,,, | Performed by: UROLOGY

## 2018-11-27 PROCEDURE — 51797 INTRAABDOMINAL PRESSURE TEST: CPT

## 2018-11-27 PROCEDURE — 99999 PR PBB SHADOW E&M-EST. PATIENT-LVL III: CPT | Mod: PBBFAC,,, | Performed by: PSYCHIATRY & NEUROLOGY

## 2018-11-27 PROCEDURE — 27200973 HC CYSTO SUPPLY IV (URODYNAMICS): Performed by: UROLOGY

## 2018-11-27 PROCEDURE — 76000 FLUOROSCOPY <1 HR PHYS/QHP: CPT | Mod: 26,59,, | Performed by: UROLOGY

## 2018-11-27 PROCEDURE — 51784 ANAL/URINARY MUSCLE STUDY: CPT | Mod: 26,51,, | Performed by: UROLOGY

## 2018-11-27 PROCEDURE — 99213 OFFICE O/P EST LOW 20 MIN: CPT | Mod: PBBFAC,25 | Performed by: PSYCHIATRY & NEUROLOGY

## 2018-11-27 PROCEDURE — 36000705 HC OR TIME LEV I EA ADD 15 MIN: Performed by: UROLOGY

## 2018-11-27 PROCEDURE — 36000704 HC OR TIME LEV I 1ST 15 MIN: Performed by: UROLOGY

## 2018-11-27 PROCEDURE — 51728 CYSTOMETROGRAM W/VP: CPT | Mod: 26,,, | Performed by: UROLOGY

## 2018-11-27 RX ORDER — DONEPEZIL HYDROCHLORIDE 5 MG/1
5 TABLET, FILM COATED ORAL NIGHTLY
Qty: 30 TABLET | Refills: 11 | Status: SHIPPED | OUTPATIENT
Start: 2018-11-27 | End: 2019-11-19 | Stop reason: SDUPTHER

## 2018-11-27 NOTE — PROGRESS NOTES
"Name: Lisa Moreno  MRN: [id number]   CSN: [id number]      Date: 11/27/2018    Referring physician:  Tanja Okeefe MD  [address] [place] [address]    Chief Complaint / Interval History:    - first visit in 3 years with me  - ataxia has progressed generally, more imbalance and clumsier hands  - voiuce is affected  - swallow is ok  - recent admission for generalized weakness, improved now  - more urinary incontinence, had voiding study and pretty sleepy still  - trouble with incontinence  - no constipation  - no dysautonmia  - memory a bit affected      History of Present Illness (HPI):  Met patient with Dr. LYNN two weeks ago.  "Lisa Moreno is a 34 y.o. male with ten years of abnormal gait and a lifelong history of abnormal voice. Aside from his voice, developmentally normal (motor milestones fine, intellectual milestones fine; graduated from college) despite being one month premature (went home one day post partum). Has had progressive spasticity of gait. Had baclofen pump placed in '07, only to have it removed in '10 b/c it was keeping him from walking. Mother reports that he can walk well when he is receiving appropriate PT, but if he stops it, he will decline. She his having trouble maintaining a job b/c of his care needs."    Confirmed history above.    Past Medical History: The patient  has a past medical history of Degenerative motor system disease, Depression, and Seizure.    Social History: The patient  reports that  has never smoked. he has never used smokeless tobacco. He reports that he drinks alcohol. He reports that he does not use drugs.    Family History: Their family history includes Lung cancer in his maternal grandfather; Multiple sclerosis in his other; Thyroid cancer in his mother.    Allergies: Penicillins     Meds:   Current Outpatient Medications on File Prior to Visit   Medication Sig Dispense Refill    baclofen (LIORESAL) 20 MG tablet Take 20 mg by mouth 4 (four) times " "daily as needed.  0    dantrolene (DANTRIUM) 50 MG Cap Take 50 mg by mouth 3 (three) times daily.  0    mirabegron (MYRBETRIQ) 25 mg Tb24 ER tablet Take 1 tablet (25 mg total) by mouth once daily. 30 tablet 11    gabapentin (NEURONTIN) 100 MG capsule Take 1 capsule (100 mg total) by mouth 3 (three) times daily. (pain) 90 capsule 11    sertraline (ZOLOFT) 50 MG tablet Take 1 tablet (50 mg total) by mouth once daily. 30 tablet 4    [DISCONTINUED] cyanocobalamin, vitamin B-12, (VITAMIN B-12) 1,000 mcg TbSR Take 1 tablet by mouth once daily. 30 each 2    [DISCONTINUED] folic acid-vit B6-vit B12 2.5-25-2 mg (FOLBIC OR EQUIV) 2.5-25-2 mg Tab Take 1 tablet by mouth once daily. 90 tablet 3    [DISCONTINUED] megestrol (MEGACE) 40 MG Tab Take 1 tablet (40 mg total) by mouth 2 (two) times daily. 60 tablet 4    [DISCONTINUED] polyethylene glycol (GLYCOLAX) 17 gram PwPk Take 17 g by mouth once daily. 30 packet 4    [DISCONTINUED] senna-docusate 8.6-50 mg (PERICOLACE) 8.6-50 mg per tablet Take 1 tablet by mouth 2 (two) times daily as needed for Constipation.       No current facility-administered medications on file prior to visit.        Exam:  /87   Pulse 79   Ht 6' 5" (1.956 m)   Wt 73.5 kg (162 lb 0.6 oz)   BMI 19.21 kg/m²     * Specialized movement exam  Oculomotor apraxia, head thrust to initiate, speech dyspraxia, spastic limbs, hyperreflexia, gait wide baed with attempt, is retropulsing and danger to self right now       Laboratory/Radiological:  - Results:  Admission on 09/07/2018, Discharged on 09/11/2018   Component Date Value Ref Range Status    WBC 09/07/2018 6.79  3.90 - 12.70 K/uL Final    RBC 09/07/2018 5.94  4.60 - 6.20 M/uL Final    Hemoglobin 09/07/2018 15.9  14.0 - 18.0 g/dL Final    Hematocrit 09/07/2018 50.7  40.0 - 54.0 % Final    MCV 09/07/2018 85  82 - 98 fL Final    MCH 09/07/2018 26.8* 27.0 - 31.0 pg Final    MCHC 09/07/2018 31.4* 32.0 - 36.0 g/dL Final    RDW 09/07/2018 13.1  " 11.5 - 14.5 % Final    Platelets 09/07/2018 214  150 - 350 K/uL Final    MPV 09/07/2018 11.6  9.2 - 12.9 fL Final    Immature Granulocytes 09/07/2018 0.1  0.0 - 0.5 % Final    Gran # (ANC) 09/07/2018 4.8  1.8 - 7.7 K/uL Final    Immature Grans (Abs) 09/07/2018 0.01  0.00 - 0.04 K/uL Final    Lymph # 09/07/2018 1.4  1.0 - 4.8 K/uL Final    Mono # 09/07/2018 0.5  0.3 - 1.0 K/uL Final    Eos # 09/07/2018 0.1  0.0 - 0.5 K/uL Final    Baso # 09/07/2018 0.02  0.00 - 0.20 K/uL Final    nRBC 09/07/2018 0  0 /100 WBC Final    Gran% 09/07/2018 70.9  38.0 - 73.0 % Final    Lymph% 09/07/2018 20.5  18.0 - 48.0 % Final    Mono% 09/07/2018 7.5  4.0 - 15.0 % Final    Eosinophil% 09/07/2018 0.7  0.0 - 8.0 % Final    Basophil% 09/07/2018 0.3  0.0 - 1.9 % Final    Differential Method 09/07/2018 Automated   Final    Sodium 09/07/2018 140  136 - 145 mmol/L Final    Potassium 09/07/2018 4.3  3.5 - 5.1 mmol/L Final    Chloride 09/07/2018 104  95 - 110 mmol/L Final    CO2 09/07/2018 30* 23 - 29 mmol/L Final    Glucose 09/07/2018 102  70 - 110 mg/dL Final    BUN, Bld 09/07/2018 17  6 - 20 mg/dL Final    Creatinine 09/07/2018 1.2  0.5 - 1.4 mg/dL Final    Calcium 09/07/2018 9.5  8.7 - 10.5 mg/dL Final    Total Protein 09/07/2018 7.8  6.0 - 8.4 g/dL Final    Albumin 09/07/2018 4.1  3.5 - 5.2 g/dL Final    Total Bilirubin 09/07/2018 0.4  0.1 - 1.0 mg/dL Final    Alkaline Phosphatase 09/07/2018 50* 55 - 135 U/L Final    AST 09/07/2018 16  10 - 40 U/L Final    ALT 09/07/2018 15  10 - 44 U/L Final    Anion Gap 09/07/2018 6* 8 - 16 mmol/L Final    eGFR if African American 09/07/2018 >60.0  >60 mL/min/1.73 m^2 Final    eGFR if non African American 09/07/2018 >60.0  >60 mL/min/1.73 m^2 Final    Specimen UA 09/07/2018 Urine, Catheterized   Final    Color, UA 09/07/2018 Yellow  Yellow, Straw, Cordelia Final    Appearance, UA 09/07/2018 Clear  Clear Final    pH, UA 09/07/2018 6.0  5.0 - 8.0 Final    Specific Gravity,  UA 09/07/2018 1.030  1.005 - 1.030 Final    Protein, UA 09/07/2018 Negative  Negative Final    Glucose, UA 09/07/2018 Negative  Negative Final    Ketones, UA 09/07/2018 Negative  Negative Final    Bilirubin (UA) 09/07/2018 Negative  Negative Final    Occult Blood UA 09/07/2018 2+* Negative Final    Nitrite, UA 09/07/2018 Negative  Negative Final    Urobilinogen, UA 09/07/2018 2.0  <2.0 EU/dL Final    Leukocytes, UA 09/07/2018 Negative  Negative Final    CPK 09/07/2018 241* 20 - 200 U/L Final    Sed Rate 09/07/2018 <2  0 - 23 mm/Hr Final    CRP 09/07/2018 1.3  0.0 - 8.2 mg/L Final    RBC, UA 09/07/2018 59* 0 - 4 /hpf Final    WBC, UA 09/07/2018 2  0 - 5 /hpf Final    Microscopic Comment 09/07/2018 SEE COMMENT   Final    Blood Culture, Routine 09/07/2018 No growth after 5 days.   Final    Blood Culture, Routine 09/07/2018 No growth after 5 days.   Final    WBC 09/10/2018 5.41  3.90 - 12.70 K/uL Final    RBC 09/10/2018 6.06  4.60 - 6.20 M/uL Final    Hemoglobin 09/10/2018 16.2  14.0 - 18.0 g/dL Final    Hematocrit 09/10/2018 52.3  40.0 - 54.0 % Final    MCV 09/10/2018 86  82 - 98 fL Final    MCH 09/10/2018 26.7* 27.0 - 31.0 pg Final    MCHC 09/10/2018 31.0* 32.0 - 36.0 g/dL Final    RDW 09/10/2018 13.0  11.5 - 14.5 % Final    Platelets 09/10/2018 198  150 - 350 K/uL Final    MPV 09/10/2018 11.1  9.2 - 12.9 fL Final    Immature Granulocytes 09/10/2018 0.2  0.0 - 0.5 % Final    Gran # (ANC) 09/10/2018 3.1  1.8 - 7.7 K/uL Final    Immature Grans (Abs) 09/10/2018 0.01  0.00 - 0.04 K/uL Final    Lymph # 09/10/2018 1.7  1.0 - 4.8 K/uL Final    Mono # 09/10/2018 0.4  0.3 - 1.0 K/uL Final    Eos # 09/10/2018 0.1  0.0 - 0.5 K/uL Final    Baso # 09/10/2018 0.02  0.00 - 0.20 K/uL Final    nRBC 09/10/2018 0  0 /100 WBC Final    Gran% 09/10/2018 57.8  38.0 - 73.0 % Final    Lymph% 09/10/2018 32.0  18.0 - 48.0 % Final    Mono% 09/10/2018 7.9  4.0 - 15.0 % Final    Eosinophil% 09/10/2018 1.7  0.0  - 8.0 % Final    Basophil% 09/10/2018 0.4  0.0 - 1.9 % Final    Differential Method 09/10/2018 Automated   Final    Sodium 09/10/2018 139  136 - 145 mmol/L Final    Potassium 09/10/2018 4.6  3.5 - 5.1 mmol/L Final    Chloride 09/10/2018 105  95 - 110 mmol/L Final    CO2 09/10/2018 28  23 - 29 mmol/L Final    Glucose 09/10/2018 90  70 - 110 mg/dL Final    BUN, Bld 09/10/2018 18  6 - 20 mg/dL Final    Creatinine 09/10/2018 1.1  0.5 - 1.4 mg/dL Final    Calcium 09/10/2018 9.4  8.7 - 10.5 mg/dL Final    Anion Gap 09/10/2018 6* 8 - 16 mmol/L Final    eGFR if African American 09/10/2018 >60.0  >60 mL/min/1.73 m^2 Final    eGFR if non African American 09/10/2018 >60.0  >60 mL/min/1.73 m^2 Final    WBC 09/11/2018 6.16  3.90 - 12.70 K/uL Final    RBC 09/11/2018 5.82  4.60 - 6.20 M/uL Final    Hemoglobin 09/11/2018 15.2  14.0 - 18.0 g/dL Final    Hematocrit 09/11/2018 50.4  40.0 - 54.0 % Final    MCV 09/11/2018 87  82 - 98 fL Final    MCH 09/11/2018 26.1* 27.0 - 31.0 pg Final    MCHC 09/11/2018 30.2* 32.0 - 36.0 g/dL Final    RDW 09/11/2018 13.1  11.5 - 14.5 % Final    Platelets 09/11/2018 179  150 - 350 K/uL Final    MPV 09/11/2018 10.6  9.2 - 12.9 fL Final    Immature Granulocytes 09/11/2018 0.2  0.0 - 0.5 % Final    Gran # (ANC) 09/11/2018 4.2  1.8 - 7.7 K/uL Final    Immature Grans (Abs) 09/11/2018 0.01  0.00 - 0.04 K/uL Final    Lymph # 09/11/2018 1.4  1.0 - 4.8 K/uL Final    Mono # 09/11/2018 0.5  0.3 - 1.0 K/uL Final    Eos # 09/11/2018 0.1  0.0 - 0.5 K/uL Final    Baso # 09/11/2018 0.02  0.00 - 0.20 K/uL Final    nRBC 09/11/2018 0  0 /100 WBC Final    Gran% 09/11/2018 68.5  38.0 - 73.0 % Final    Lymph% 09/11/2018 22.2  18.0 - 48.0 % Final    Mono% 09/11/2018 7.5  4.0 - 15.0 % Final    Eosinophil% 09/11/2018 1.3  0.0 - 8.0 % Final    Basophil% 09/11/2018 0.3  0.0 - 1.9 % Final    Differential Method 09/11/2018 Automated   Final    Sodium 09/11/2018 136  136 - 145 mmol/L Final     Potassium 09/11/2018 3.9  3.5 - 5.1 mmol/L Final    Chloride 09/11/2018 104  95 - 110 mmol/L Final    CO2 09/11/2018 26  23 - 29 mmol/L Final    Glucose 09/11/2018 86  70 - 110 mg/dL Final    BUN, Bld 09/11/2018 16  6 - 20 mg/dL Final    Creatinine 09/11/2018 0.9  0.5 - 1.4 mg/dL Final    Calcium 09/11/2018 8.8  8.7 - 10.5 mg/dL Final    Anion Gap 09/11/2018 6* 8 - 16 mmol/L Final    eGFR if African American 09/11/2018 >60.0  >60 mL/min/1.73 m^2 Final    eGFR if non African American 09/11/2018 >60.0  >60 mL/min/1.73 m^2 Final       - Independent review of images:  2015      2018:      Diagnoses:          Spastic-ataxia.  Reversibles negative to date.  Could be AAT or AOA2 with slightly elevated CK.  Consider gene tests - family still declines due to lack of convincing change to management.  Possible HSP/SPG6?    Medical Decision Making:  - adding Aricept now 5 --> 10 qd for memory and gait  - keep other meds the same   -    -   -     Bairon Wise MD, MPH  Division of Movement and Memory Disorders  [facility]  [phone]

## 2018-11-27 NOTE — INTERVAL H&P NOTE
The patient has been examined and the H&P has been reviewed:    I concur with the findings and no changes have occurred since H&P was written.    Procedure risks, benefits and alternative options discussed and understood by patient/family.          There are no hospital problems to display for this patient.

## 2018-11-27 NOTE — LETTER
December 4, 2018      Tanja Okeefe MD  [address]  [facility] [address]           [facility]  [address]  [facility] [address]  Phone: [phone]  Fax: [phone]          Patient: Lisa Moreno   MR Number: [id number]   YOB: 1980   Date of Visit: 11/27/2018       Dear Dr. Tanja Okeefe:    Thank you for referring Lisa Moreno to me for evaluation. Attached you will find relevant portions of my assessment and plan of care.    If you have questions, please do not hesitate to call me. I look forward to following Lisa Moreno along with you.    Sincerely,    Bairon Wise MD    Enclosure  CC:  No Recipients    If you would like to receive this communication electronically, please contact [email or web address] or [phone] to request more information on [employer] Link access.    For providers and/or their staff who would like to refer a patient to [facility], please contact us through our one-stop-shop provider referral line, [facility], at [phone].    If you feel you have received this communication in error or would no longer like to receive these types of communications, please e-mail [email or web address]

## 2018-11-28 NOTE — OP NOTE
Date of procedure 11/27/2018    Fluoro Urodynamic Report    Indication:  Urinary incontinence with no lead time.      Patient was taken to the Urodynamic Suite with a comfortably full bladder and asked to perform a free uroflow.  Next, the patient was prepped and the urinary residual was drained with a 14 Fr catheter.  A 7 Fr dual lumen catheter was placed to measure intravesical pressures.  A 10 Fr balloon manometer was placed into the rectum for abdominal pressure measurements.  Patch EMG electrodes were placed on the perineum.  The patient was connected to the [employer] Urodynamic machine, using a multichannel technique.  The bladder was filled with Cysto ConRay at room temperature at a rate of 30 then 20 ml/min.  Patient is filled to urge incontinence.  Filling is performed with the patient in the seated position.  Abdominal leak point pressures are checked at 1st desire, then serially at 50cc increments first with Valsalva then with coughing.  The patient was then asked to sit and void for a pressure flow study.    The following are the results of the study:  1.  Uroflow       Q max:  Could not void.  He voided around the catheter as it was placed.     2.  Amount in the bladder at the beginning of the case:  100 ml    3.  CMG       Sensation:         First Desire:  Did not have any sensation.           Normal Desire:         Strong Desire:         Urgency:       Capacity:       Abnormal Contractions:  At 171 ml infused, he had DO and leaked urine.  Did not fully empty the bladder.  This was repeated and he was filled to 323 mo and had DO again.  This time he was instructed to try to urinate to empty his bladder and he had a PVR of 150 ml.         Compliance:  normal    4.  p det max was 75 cm H2O but that was during the contraction.  The bladder returns to baseline after the DO is done.      5.  EMG:  Normal guarding reflex, increased during the DO.    6.  Voiding phase       Q max:  5 ml/sec       P det at Q  max:  67 cm H2O       Pattern of the curve:  Continuous, but attenuated.        Voided volume:  100.5 ml       PVR:  150 ml    7.  Fluoroscopy:  Bladder had a horizontal configuration when at rest, but during the DO, had a round configuration with trabeculation.  No VU reflux during the DO or with voiding.  No DESD    8.  Analysis:  No sensation, DO with leak, however, empties incompletely.  No DESD    9.  Recommendations:       A.  DO with incomplete bladder emptying.  There was no DESD       B.  Discussed with the patient and his mother.  Note the patient's procedure was done with him alone.  He indicated that he can communicate and asked good questions.  The most viable option for him would be Botox of the bladder if he would be willing to perform self catheterization.  Discussed with him and his mother that the concern is that Botox will likely throw him into urinary retention then he would likely have to cath 5 times a day.  She is concerned that he has cognitive dysfunction and would forget or hurt himself.  He has caregivers who might be able to help with this.         C.  Doubt that InterStim will help with this degree of DO.  This was discussed       D.  She states that they did not get a long enough trial with the Myrbetriq to see how it worked.  Encouraged him to try for several weeks and get back to me.  She stated that he did not have an appointment with colorectal surgery.  I asked that she call Valeria as I believed he has an appointment that was made at the time of his original visit.

## 2018-12-06 NOTE — PROGRESS NOTES
CRS Office Visit History and Physical    Referring Md:   Tnaja Okeefe Md  [address] [place] [address]    SUBJECTIVE:     Chief Complaint: fecal incontinence    History of Present Illness:  The patient is new patient to this practice.   Course is as follows: Mr. Lisa Moreno is a 38 yo with PMHx significant for Spinocerebellar atrophy and seizure who presents for evaluation of new onset fecal incontinence. Patient is very soft spoken (lifelong history of abnormal voice), therefore most questions are answered by mother and CNA. As per caregivers, patient was fecally continent up until Thanksgiving. At that point in time he was noted to have regular daily large formed bowel movements. However, around Thanksgiving he began having lose of control of bowel movements. He also lost the ability to propel feces outward; as per caregivers, patient is able to sense that he needs to have a bowel movement, but cannot physically push the feces outward, He therefore struggles both with fecal incontinence (such that he is incontinent of stool even with movements) and the inability to control bowel movements. Of note, his caregiver notes that when she helps him have a bowel movement his feces is soft and formed. He also has struggled with urinary incontinence since September of this year and is currently being treated by Dr. Okeefe. Treatment currently consists of Myrbetriq, family would like to hold off on catheterization for now. Is always incontinent of urine; has no control at this point in time. His caregiver notes he is not wetting himself as much since starting the medication, but she feels he has also been more constipated/bowel movements have been more difficult since starting it as well.     Last Colonoscopy: No previous history of colonoscopy.    Review of patient's allergies indicates:   Allergen Reactions    Penicillins      Hives and Itching       Past Medical History:   Diagnosis Date     "Degenerative motor system disease     Depression     Seizure     Spinocerebellar atrophy      Past Surgical History:   Procedure Laterality Date    BACLOFEN PUMP IMPLANTATION      FLUOROSCOPIC URODYNAMIC STUDY N/A 11/27/2018    Procedure: URODYNAMIC STUDY, FLUOROSCOPIC;  Surgeon: Tanja Okeefe MD;  Location: [facility] OR [address] [place] [address];  Service: Urology;  Laterality: N/A;  1 hour    URODYNAMIC STUDY, FLUOROSCOPIC N/A 11/27/2018    Performed by Tanja Okeefe MD at [facility] OR [address] [place] [address]     Family History   Problem Relation Age of Onset    Thyroid cancer Mother     Lung cancer Maternal Grandfather     Multiple sclerosis Other         mother's cousin    ALS Neg Hx     Muscular dystrophy Neg Hx      Social History     Tobacco Use    Smoking status: Never Smoker    Smokeless tobacco: Never Used   Substance Use Topics    Alcohol use: Yes     Comment: social drinker, at times    Drug use: No        Review of Systems:  Review of Systems   Constitutional: Negative for chills, fever and malaise/fatigue.   HENT: Negative for congestion.         Wet cough.   Respiratory: Negative for cough and shortness of breath.    Cardiovascular: Negative for chest pain and palpitations.   Gastrointestinal: Positive for constipation. Negative for abdominal pain, diarrhea, nausea and vomiting.        Constipation mixed with fecal incontinence.   Genitourinary:        Urinary incontinence.   Musculoskeletal: Negative for myalgias.        Spinocerebellar atrophy. In wheelchair.   Skin: Negative for itching and rash.   Neurological: Positive for seizures.   Endo/Heme/Allergies: Does not bruise/bleed easily.   Psychiatric/Behavioral: Negative for depression.       OBJECTIVE:     Vital Signs (Most Recent)  Vitals:    12/07/18 0944   BP: 121/66   BP Location: Right arm   Patient Position: Sitting   BP Method: Large (Automatic)   Pulse: 76   Height: 6' 5" (1.956 m)       Physical Exam:  General: Black or  male in no distress "   Neuro: alert and oriented x 4.  Moves all extremities. However, sitting in wheelchair. Unable to ambulate on own, requires two person assist for transfer.   HEENT: no icterus.  Trachea midline  Respiratory: respirations are even and unlabored. Wet cough present.  Cardiac: regular rate and rhythm  Abdomen: soft, thin,  non-tender to palpation,  incision from prior baclofen pump in the left mid abdomen  Extremities: Warm dry and intact  Skin: no rashes  Anorectal: Fairly good rectal muscle tone on MARK, stool noted in rectal vault.     Labs: no anemia.  Normal renal function    Imaging: none      ASSESSMENT/PLAN:     Lisa was seen today for fecal incontinence.    Diagnoses and all orders for this visit:    Full incontinence of feces  -     Ambulatory Referral to Physical/Occupational Therapy      Mr. Lisa Moreno is a 38 yo male who presents for evaluation of fecal incontinence. Patient has been incontinent of stool for the past several weeks, but also notes he has lost the ability to sense that he needs to have a bowel movement and also no longer feels he has the ability to use his rectal muscles to have a bowel movement. Is incontinent whenever he is moved by his caregiver. Has decent rectal tone on MARK, stool noted in rectal vault on exam.   We discussed this may be related to progression of his spinocerebellar atrophy.  Colostomy was discussed with the patient and family.  Benefits of colostomy including increased cleanliness and more independence of the patient were highlighted.  Family is very reluctant to consider colostomy in feels that this is giving up on the patient.  Alternatives were discussed including pelvic physical therapy with medical bowel management.  I recommended multiple days MiraLax followed by fiber for bulking the stool.  Additionally, referral pelvic physical therapy has been placed. I will plan to follow up with him in 2-3 months.      ASIF Romeo MD  Staff Surgeon  Colon &  Rectal Surgery

## 2018-12-07 ENCOUNTER — TELEPHONE (OUTPATIENT)
Dept: PHYSICAL MEDICINE AND REHAB | Facility: CLINIC | Age: 38
End: 2018-12-07

## 2018-12-07 ENCOUNTER — OFFICE VISIT (OUTPATIENT)
Dept: SURGERY | Facility: CLINIC | Age: 38
End: 2018-12-07
Payer: MEDICARE

## 2018-12-07 VITALS
HEART RATE: 76 BPM | DIASTOLIC BLOOD PRESSURE: 66 MMHG | HEIGHT: 77 IN | BODY MASS INDEX: 19.21 KG/M2 | SYSTOLIC BLOOD PRESSURE: 121 MMHG

## 2018-12-07 DIAGNOSIS — R15.9 FULL INCONTINENCE OF FECES: Primary | ICD-10-CM

## 2018-12-07 PROCEDURE — 99204 OFFICE O/P NEW MOD 45 MIN: CPT | Mod: S$PBB,,, | Performed by: COLON & RECTAL SURGERY

## 2018-12-07 PROCEDURE — 99213 OFFICE O/P EST LOW 20 MIN: CPT | Mod: PBBFAC | Performed by: COLON & RECTAL SURGERY

## 2018-12-07 PROCEDURE — 99999 PR PBB SHADOW E&M-EST. PATIENT-LVL III: CPT | Mod: PBBFAC,,, | Performed by: COLON & RECTAL SURGERY

## 2018-12-07 NOTE — TELEPHONE ENCOUNTER
Dr. Ramesh will you be filling this medication for the patient, please let me know what you will do, thanks

## 2018-12-07 NOTE — LETTER
December 7, 2018      Tanja Okeefe MD  [address]  [facility] [address]           [facility]  [address]  [facility] [address]  Phone: [phone]          Patient: Lisa Moreno   MR Number: [id number]   YOB: 1980   Date of Visit: 12/7/2018       Dear Dr. Tanja Okeefe:    Thank you for referring Lisa Moreno to me for evaluation. Attached you will find relevant portions of my assessment and plan of care.    If you have questions, please do not hesitate to call me. I look forward to following Lisa Moreno along with you.    Sincerely,    Sean Romeo MD    Enclosure  CC:  No Recipients    If you would like to receive this communication electronically, please contact [email or web address] or [phone] to request more information on [facility] access.    For providers and/or their staff who would like to refer a patient to [facility], please contact us through our one-stop-shop provider referral line, [facility] [place], at [phone].    If you feel you have received this communication in error or would no longer like to receive these types of communications, please e-mail [email or web address]

## 2018-12-07 NOTE — PATIENT INSTRUCTIONS
Start taking MiraLax daily for the next 4-5 days to encourage more bowel movements.  After he has multiple bowel movements, recommend starting fiber in the form of Metamucil or Citrucel daily.  See pelvic physical therapy.  Return to be in 2-3 months for further evaluation    ASIF Romeo MD  Staff Surgeon  Colon & Rectal Surgery

## 2018-12-07 NOTE — TELEPHONE ENCOUNTER
----- Message from Vj Morales sent at 12/7/2018 11:11 AM CST -----  Rx Refill/Request     Is this a Refill or New Rx: Refill   Rx Name and Strength: dantrolene (DANTRIUM) 50 MG Cap   Preferred Pharmacy with phone number: see below  Communication Preference: Giovana (mom) @ [phone]  Additional Information: Giovana is asking for a call back to confirm the doctor will complete the request    [facility] [address] [place] [address] AT [facility] OF [facility]  [address] [facility] [address]  Phone: [phone] Fax: [phone]

## 2018-12-08 RX ORDER — DANTROLENE SODIUM 50 MG/1
50 CAPSULE ORAL 3 TIMES DAILY
Qty: 42 CAPSULE | Refills: 0 | Status: SHIPPED | OUTPATIENT
Start: 2018-12-08 | End: 2018-12-11 | Stop reason: SDUPTHER

## 2018-12-08 NOTE — PROGRESS NOTES
Mother called saying that patient is going to be out of Dantrolene and that Dr. Wise prescribed it in the past. I gave patient a two week supply until she can follow-up.

## 2018-12-11 ENCOUNTER — TELEPHONE (OUTPATIENT)
Dept: ADMINISTRATIVE | Facility: CLINIC | Age: 38
End: 2018-12-11

## 2018-12-11 ENCOUNTER — TELEPHONE (OUTPATIENT)
Dept: PHYSICAL MEDICINE AND REHAB | Facility: CLINIC | Age: 38
End: 2018-12-11

## 2018-12-11 DIAGNOSIS — R25.2 SPASTICITY: Primary | ICD-10-CM

## 2018-12-11 RX ORDER — DANTROLENE SODIUM 50 MG/1
50 CAPSULE ORAL 3 TIMES DAILY
Qty: 42 CAPSULE | Refills: 0 | Status: SHIPPED | OUTPATIENT
Start: 2018-12-11 | End: 2019-01-02 | Stop reason: SDUPTHER

## 2018-12-12 ENCOUNTER — TELEPHONE (OUTPATIENT)
Dept: PHYSICAL MEDICINE AND REHAB | Facility: CLINIC | Age: 38
End: 2018-12-12

## 2018-12-12 NOTE — TELEPHONE ENCOUNTER
----- Message from Vicki Alvarado sent at 12/12/2018  8:33 AM CST -----  Contact: Ashlee [facility]    [phone]  Calling to request a verbal order to continue pts home health physical therapy.  Pls call.

## 2018-12-13 ENCOUNTER — TELEPHONE (OUTPATIENT)
Dept: SURGERY | Facility: CLINIC | Age: 38
End: 2018-12-13

## 2018-12-13 NOTE — TELEPHONE ENCOUNTER
----- Message from Riana Beaver sent at 12/13/2018  8:22 AM CST -----  Contact: Giovana pt mother#[phone]  Needs Advice    Reason for call:She states that pelvic physical therapy with medical bowel management not about to see him until March and she wants to know if it's something else that can be done        Communication Preference:call    Additional Information:

## 2018-12-13 NOTE — TELEPHONE ENCOUNTER
Returned Giovana's call. Told her Dr Romeo did not know of another place that does pelvic PT. Told her Dr Romeo said a colostomy will help him. She does not want her son to go through that surgery and he does not want to either. She will look for another facility.

## 2018-12-14 ENCOUNTER — HOSPITAL ENCOUNTER (EMERGENCY)
Facility: HOSPITAL | Age: 38
Discharge: HOME | End: 2018-12-14
Attending: EMERGENCY MEDICINE
Payer: MEDICARE

## 2018-12-14 ENCOUNTER — TELEPHONE (OUTPATIENT)
Dept: SURGERY | Facility: CLINIC | Age: 38
End: 2018-12-14

## 2018-12-14 VITALS
WEIGHT: 166 LBS | BODY MASS INDEX: 19.6 KG/M2 | DIASTOLIC BLOOD PRESSURE: 83 MMHG | HEART RATE: 72 BPM | TEMPERATURE: 98 F | HEIGHT: 77 IN | OXYGEN SATURATION: 99 % | RESPIRATION RATE: 16 BRPM | SYSTOLIC BLOOD PRESSURE: 138 MMHG

## 2018-12-14 DIAGNOSIS — R05.9 COUGH: ICD-10-CM

## 2018-12-14 DIAGNOSIS — J06.9 VIRAL URI WITH COUGH: Primary | ICD-10-CM

## 2018-12-14 DIAGNOSIS — K59.00 CONSTIPATION, UNSPECIFIED CONSTIPATION TYPE: ICD-10-CM

## 2018-12-14 LAB
ALBUMIN SERPL BCP-MCNC: 3.6 G/DL
ALP SERPL-CCNC: 56 U/L
ALT SERPL W/O P-5'-P-CCNC: 8 U/L
ANION GAP SERPL CALC-SCNC: 11 MMOL/L
AST SERPL-CCNC: 13 U/L
BASOPHILS # BLD AUTO: 0.01 K/UL
BASOPHILS NFR BLD: 0.1 %
BILIRUB SERPL-MCNC: 0.4 MG/DL
BUN SERPL-MCNC: 15 MG/DL
CALCIUM SERPL-MCNC: 9.4 MG/DL
CHLORIDE SERPL-SCNC: 106 MMOL/L
CO2 SERPL-SCNC: 25 MMOL/L
CREAT SERPL-MCNC: 1 MG/DL
DIFFERENTIAL METHOD: ABNORMAL
EOSINOPHIL # BLD AUTO: 0.1 K/UL
EOSINOPHIL NFR BLD: 0.8 %
ERYTHROCYTE [DISTWIDTH] IN BLOOD BY AUTOMATED COUNT: 13.8 %
EST. GFR  (AFRICAN AMERICAN): >60 ML/MIN/1.73 M^2
EST. GFR  (NON AFRICAN AMERICAN): >60 ML/MIN/1.73 M^2
GLUCOSE SERPL-MCNC: 46 MG/DL
GLUCOSE SERPL-MCNC: 87 MG/DL (ref 70–110)
HCT VFR BLD AUTO: 48.7 %
HGB BLD-MCNC: 15.4 G/DL
LIPASE SERPL-CCNC: 68 U/L
LYMPHOCYTES # BLD AUTO: 1.1 K/UL
LYMPHOCYTES NFR BLD: 13.3 %
MCH RBC QN AUTO: 26.7 PG
MCHC RBC AUTO-ENTMCNC: 31.6 G/DL
MCV RBC AUTO: 85 FL
MONOCYTES # BLD AUTO: 0.8 K/UL
MONOCYTES NFR BLD: 9.6 %
NEUTROPHILS # BLD AUTO: 6.3 K/UL
NEUTROPHILS NFR BLD: 76.1 %
PLATELET # BLD AUTO: 216 K/UL
PMV BLD AUTO: 11.5 FL
POCT GLUCOSE: 55 MG/DL (ref 70–110)
POTASSIUM SERPL-SCNC: 4.2 MMOL/L
PROT SERPL-MCNC: 7.6 G/DL
RBC # BLD AUTO: 5.76 M/UL
SODIUM SERPL-SCNC: 142 MMOL/L
WBC # BLD AUTO: 8.33 K/UL

## 2018-12-14 PROCEDURE — 25000003 PHARM REV CODE 250: Performed by: PHYSICIAN ASSISTANT

## 2018-12-14 PROCEDURE — 96361 HYDRATE IV INFUSION ADD-ON: CPT

## 2018-12-14 PROCEDURE — 96360 HYDRATION IV INFUSION INIT: CPT

## 2018-12-14 PROCEDURE — 85025 COMPLETE CBC W/AUTO DIFF WBC: CPT

## 2018-12-14 PROCEDURE — 83690 ASSAY OF LIPASE: CPT

## 2018-12-14 PROCEDURE — 80053 COMPREHEN METABOLIC PANEL: CPT

## 2018-12-14 PROCEDURE — 82962 GLUCOSE BLOOD TEST: CPT

## 2018-12-14 PROCEDURE — 99284 EMERGENCY DEPT VISIT MOD MDM: CPT | Mod: 25

## 2018-12-14 RX ORDER — FLUTICASONE PROPIONATE 50 MCG
1 SPRAY, SUSPENSION (ML) NASAL 2 TIMES DAILY PRN
Qty: 15 G | Refills: 0 | Status: SHIPPED | OUTPATIENT
Start: 2018-12-14 | End: 2020-01-07

## 2018-12-14 RX ORDER — CETIRIZINE HYDROCHLORIDE 10 MG/1
10 TABLET ORAL DAILY
Qty: 14 TABLET | Refills: 0 | Status: SHIPPED | OUTPATIENT
Start: 2018-12-14 | End: 2019-02-15

## 2018-12-14 RX ORDER — DICYCLOMINE HYDROCHLORIDE 20 MG/1
20 TABLET ORAL 2 TIMES DAILY
Qty: 20 TABLET | Refills: 0 | Status: SHIPPED | OUTPATIENT
Start: 2018-12-14 | End: 2019-01-13

## 2018-12-14 RX ORDER — LACTULOSE 10 G/15ML
15 SOLUTION ORAL 3 TIMES DAILY
Qty: 675 ML | Refills: 0 | Status: SHIPPED | OUTPATIENT
Start: 2018-12-14 | End: 2018-12-24

## 2018-12-14 RX ORDER — ONDANSETRON 4 MG/1
4 TABLET, FILM COATED ORAL EVERY 6 HOURS
Qty: 12 TABLET | Refills: 0 | Status: SHIPPED | OUTPATIENT
Start: 2018-12-14 | End: 2019-02-15

## 2018-12-14 RX ADMIN — SODIUM CHLORIDE 1000 ML: 0.9 INJECTION, SOLUTION INTRAVENOUS at 01:12

## 2018-12-14 NOTE — ED PROVIDER NOTES
"Encounter Date: 12/14/2018       History     Chief Complaint   Patient presents with    Cough     Pt reports cough and constipation x 2 weeks. Pt afebrile. Pt has muscular degenerative disease. Pt denies pain, N/V/D.    Constipation     Patient is a 37-year-old male with past medical history of degenerative motor system, depression, and seizures who presents to ED for evaluation of nonproductive cough since September 2018 and constipation x1 week.  Mother reports that patient has had increased cough, decreased appetite, and decreased PO intake the past several days.  Mother reports that they have been seen by primary care physician for cough, and they said that it was "just mucous."  Mother denies that patient is coughing up sputum reports dry cough.  Mother also reports that patient is constipated, despite trying stool softeners, suppository, and enema yesterday.  Mother reports that they were instructed to come to ED for further evaluation.  Patient denies fever, chills, neck pain/stiffness, sore throat, SOB, chest pain, nausea, vomiting, diarrhea, abdominal pain, and dysuria.  Denies any other complaints at this time.      The history is provided by a parent and the patient.     Review of patient's allergies indicates:   Allergen Reactions    Penicillins      Hives and Itching     Past Medical History:   Diagnosis Date    Degenerative motor system disease     Depression     Seizure     Spinocerebellar atrophy      Past Surgical History:   Procedure Laterality Date    BACLOFEN PUMP IMPLANTATION      FLUOROSCOPIC URODYNAMIC STUDY N/A 11/27/2018    Procedure: URODYNAMIC STUDY, FLUOROSCOPIC;  Surgeon: Tanja Okeefe MD;  Location: [facility] OR [address] [place] [address];  Service: Urology;  Laterality: N/A;  1 hour    URODYNAMIC STUDY, FLUOROSCOPIC N/A 11/27/2018    Performed by Tanja Okeefe MD at [facility] OR [address] [place] [address]     Family History   Problem Relation Age of Onset    Thyroid cancer Mother     Lung cancer Maternal " Grandfather     Multiple sclerosis Other         mother's cousin    ALS Neg Hx     Muscular dystrophy Neg Hx      Social History     Tobacco Use    Smoking status: Never Smoker    Smokeless tobacco: Never Used   Substance Use Topics    Alcohol use: Yes     Comment: social drinker, at times    Drug use: No     Review of Systems   Constitutional: Negative for chills and fever.   HENT: Positive for congestion and postnasal drip. Negative for ear discharge, ear pain, mouth sores, rhinorrhea, sinus pressure, sinus pain, sneezing, sore throat, trouble swallowing and voice change.    Respiratory: Positive for cough.    Gastrointestinal: Positive for constipation. Negative for abdominal pain, diarrhea, nausea and vomiting.   Musculoskeletal: Negative for back pain, neck pain and neck stiffness.   Skin: Negative for rash.   Hematological: Does not bruise/bleed easily.   All other systems reviewed and are negative.      Physical Exam     Initial Vitals [12/14/18 1207]   BP Pulse Resp Temp SpO2   127/71 85 16 98.3 °F (36.8 °C) 98 %      MAP       --         Physical Exam    Vitals reviewed.  Constitutional: He is not diaphoretic. He appears cachectic. He is active and cooperative.  Non-toxic appearance. He does not have a sickly appearance.   No acute distress.     HENT:   Head: Atraumatic.   Right Ear: Hearing normal.   Left Ear: Hearing normal.   Nose: Mucosal edema present.   MM moist.  Oropharynx with mild erythema.  No edema or exudate.  Uvula midline.  Tolerating secretions.   Eyes: EOM are normal.   Neck: Trachea normal, normal range of motion, full passive range of motion without pain and phonation normal. Neck supple.   No meningismus.   Cardiovascular: Regular rhythm and normal pulses.   Pulmonary/Chest: Effort normal and breath sounds normal. No respiratory distress. He has no decreased breath sounds. He has no wheezes. He has no rhonchi. He has no rales.   Abdominal:   Abdomen slightly distended.   Normal  bowel sounds.  No guarding, rigidity, or rebound.     Genitourinary:   Genitourinary Comments: Incontinent.   Neurological: He is alert and oriented to person, place, and time. He has normal strength.   Skin: Skin is warm, dry and intact. Capillary refill takes less than 2 seconds.   Psychiatric: He has a normal mood and affect.         ED Course   Procedures  Labs Reviewed   CBC W/ AUTO DIFFERENTIAL - Abnormal; Notable for the following components:       Result Value    MCH 26.7 (*)     MCHC 31.6 (*)     Gran% 76.1 (*)     Lymph% 13.3 (*)     All other components within normal limits   COMPREHENSIVE METABOLIC PANEL - Abnormal; Notable for the following components:    Glucose 46 (*)     ALT 8 (*)     All other components within normal limits    Narrative:      GLU  critical result(s) called and verbal readback obtained from   Alise Car RN, 12/14/2018 13:48   LIPASE - Abnormal; Notable for the following components:    Lipase 68 (*)     All other components within normal limits   POCT GLUCOSE - Abnormal; Notable for the following components:    POCT Glucose 55 (*)     All other components within normal limits          Imaging Results          X-Ray Abdomen Flat And Erect (Final result)  Result time 12/14/18 13:19:25    Final result by Tadeo Multani MD (12/14/18 13:19:25)                 Impression:      As above.      Electronically signed by: Tadeo Multani MD  Date:    12/14/2018  Time:    13:19             Narrative:    EXAMINATION:  XR ABDOMEN FLAT AND ERECT    CLINICAL HISTORY:  Abdominal Pain;    TECHNIQUE:  Flat and erect AP views of the abdomen were preformed.    COMPARISON:  10/02/2018    FINDINGS:  There are no suspicious calcifications.  The stomach is distended and gas-filled.  Bowel gas pattern is non-obstructive.  No evidence for pneumoperitoneum.  Regional osseous structures are unremarkable.                               X-Ray Chest 1 View (Final result)  Result time 12/14/18 13:21:41    Final  result by Tadeo Multani MD (12/14/18 13:21:41)                 Impression:      As above.      Electronically signed by: Tadeo Multani MD  Date:    12/14/2018  Time:    13:21             Narrative:    EXAMINATION:  XR CHEST 1 VIEW    CLINICAL HISTORY:  Cough    TECHNIQUE:  Single frontal view of the chest was performed.    COMPARISON:  10/05/2018    FINDINGS:  No consolidation, pleural effusion or pneumothorax.  Cardiomediastinal silhouette is unremarkable.                                 Medical Decision Making:   History:   I obtained history from: someone other than patient.       <> Summary of History: Mother   Old Medical Records: I decided to obtain old medical records.  Initial Assessment:   Patient presents to ED for evaluation of nonproductive cough since September 2018 and constipation x1 week.  Appears well, nontoxic.  Afebrile. VSS.  No meningismus.  MM moist.  Oropharynx with mild erythema.  No edema or exudate.  Uvula midline.  Tolerating secretions.  Abdomen slightly distended.  Normal bowel sounds.  No guarding, rigidity, or rebound.  Clinical Tests:   Lab Tests: Ordered and Reviewed  Radiological Study: Reviewed and Ordered  ED Management:  Labs, UA, CXR, Xray abdomen flat and erect, IV fluids       2:18 PM- LOGAN Carrero informed me that lab called and patient's glucose was 44, she did a POCT and glucose 55, fluids stopped, food tray ordered and juice given.    3:30 PM- Patient consumed 80% of meal and tolerating PO.  Other:   I discussed test(s) with the performing physician.       <> Summary of the Findings: Care of this patient discussed with Dr. Prieto who agrees with this patient's ED course and disposition.     No signs of acute surgical abdomen. POCT glucose taken before patient ate meal tray and it was 87.  Patient's signs and symptoms most likely due to constipation and viral URI with cough.  Patient is hemodynamically stable and will be DC home with prescriptions for Zofran, Bentyl,  lactulose, Zyrtec, and Flonase.  Patient instructed to follow up with PCP on Monday, and to return to ED for any concerns or worsening symptoms, such as fever, increased pain, or nonstop vomiting.  Although mother was not at the bedside at time of discharge, I did talk to her on the phone and she verbalized understanding, compliance, and agreement with treatment plan.               Attending Attestation:     Physician Attestation Statement for NP/PA:   I discussed this assessment and plan of this patient with the NP/PA, but I did not personally examine the patient. The face to face encounter was performed by the NP/PA.                  ED Course as of Dec 14 1337   Fri Dec 14, 2018   1209 Sort note: Lisa Moreno male with PMH of Degenerative motor system disease nontoxic/afebrile 37 y.o.  presented to the ED with c/o multiple complaints. Mother at bedside reports that patient has had increased cough, decreased appetite and decreased p.o. intake for the past several days.  Mother is also noted constipation despite trying stool softeners, suppository and enema yesterday.  They are instructed to go to ED by PCP for further evaluation.  Patient denies any pain at this time.  Patient denies any nausea, vomiting or fever.    Patient seen and medically screened by Physician assistant in Sort process due to ED crowding.  Appropriate tests and/or medications ordered.  Care transferred to an alternate provider when patient was placed in an Exam Room from the [facility] for physical exam, additional orders, and disposition. 12:09 PM. LC    [LC]      ED Course User Index  [LC] ANGELITO Galloway     Clinical Impression:   The primary encounter diagnosis was Viral URI with cough. Diagnoses of Cough and Constipation, unspecified constipation type were also pertinent to this visit.                             Az Sanchez NP  12/14/18 4729       Gene Prieto MD  12/15/18 7706

## 2018-12-14 NOTE — TELEPHONE ENCOUNTER
----- Message from Dougie Baez sent at 12/14/2018 10:06 AM CST -----  Contact: Cyndie ([facility]): [phone]  Needs Advice    Reason for call: Cyndie stated the pt has not made a bowel movement in 6 days, and believed he's impacted and also had feces foul smelling breath would like to know what to do         Communication Preference: Cyndie ([facility]): [phone]

## 2018-12-14 NOTE — DISCHARGE INSTRUCTIONS
Take prescribed medications as labeled.  Increase oral intake.  Follow up with PCP on Monday and return to ED for any concerns or worsening symptoms, such as fever, increased pain, or nonstop vomiting.

## 2018-12-14 NOTE — TELEPHONE ENCOUNTER
Cyndie has tried Miralax, suppositories and enemas with no results. It has been 6 days since last BM. His breath is not smelling like feces. Told her pt will need to go to the ED.

## 2018-12-17 ENCOUNTER — TELEPHONE (OUTPATIENT)
Dept: PHYSICAL MEDICINE AND REHAB | Facility: CLINIC | Age: 38
End: 2018-12-17

## 2018-12-17 NOTE — TELEPHONE ENCOUNTER
----- Message from Betty Crowe sent at 12/17/2018 11:40 AM CST -----  Contact: Cyndie with [facility]   Cyndie with [facility] is requesting a call back in regards to orders to extend her care with the pt to monitor pt blood glucose.       Cyndie with [employer] can be contacted at [phone]

## 2018-12-20 ENCOUNTER — OFFICE VISIT (OUTPATIENT)
Dept: PHYSICAL MEDICINE AND REHAB | Facility: CLINIC | Age: 38
End: 2018-12-20
Payer: MEDICARE

## 2018-12-20 VITALS
HEIGHT: 77 IN | WEIGHT: 166 LBS | SYSTOLIC BLOOD PRESSURE: 114 MMHG | DIASTOLIC BLOOD PRESSURE: 75 MMHG | HEART RATE: 70 BPM | BODY MASS INDEX: 19.6 KG/M2

## 2018-12-20 DIAGNOSIS — R25.2 SPASTICITY: Primary | ICD-10-CM

## 2018-12-20 PROCEDURE — 99213 OFFICE O/P EST LOW 20 MIN: CPT | Mod: PBBFAC,PO | Performed by: PHYSICAL MEDICINE & REHABILITATION

## 2018-12-20 PROCEDURE — 99999 PR PBB SHADOW E&M-EST. PATIENT-LVL III: CPT | Mod: PBBFAC,,, | Performed by: PHYSICAL MEDICINE & REHABILITATION

## 2018-12-20 PROCEDURE — 99214 OFFICE O/P EST MOD 30 MIN: CPT | Mod: S$PBB,,, | Performed by: PHYSICAL MEDICINE & REHABILITATION

## 2018-12-20 NOTE — PROGRESS NOTES
Initial PM&R Outpatient Evaluation    CC: Impaired mobility and ADLs    HPI: Lisa Moreno is a 37 y.o. male with PMHx of spinocerebellar atrophy with LE weakness, spasticity, and ataxic gait    DC'd home from [facility] in October. Has been doing well.     Major medical issues since discharge: managed for constipation in ED   Pain - denies  Bladder - OAB/Neurogenic Bladder, followed w Urology, started on Myrbetriq.   Bowel - Incontinent, cont johnny transfer to commode, considering whether to  plan for it after BF or Dinner   Mood - stable, Zoloft   Sleep - stable   Diet: eating well  Falls: none Spasticity - Baclofen 20mg QID, Dantrolene 50mg TID   Therapy: in , has sitter     Function - Cont to more of the transfers (per sitter he does 90%) and helps bathing. Helping w WC prop too.    Social History: Lives with mother in house,bedroom/bathroom on first floor.       Tobacco:  denies   ETOH:  denies   Other drug use: denies      PMH:   Past Medical History:   Diagnosis Date    Degenerative motor system disease     Depression     Seizure     Spinocerebellar atrophy        PSH:   Past Surgical History:   Procedure Laterality Date    BACLOFEN PUMP IMPLANTATION      FLUOROSCOPIC URODYNAMIC STUDY N/A 11/27/2018    Procedure: URODYNAMIC STUDY, FLUOROSCOPIC;  Surgeon: Tanja Okeefe MD;  Location: [facility] OR [address] [place] [address];  Service: Urology;  Laterality: N/A;  1 hour    URODYNAMIC STUDY, FLUOROSCOPIC N/A 11/27/2018    Performed by Tanja Okeefe MD at [facility] OR [address] [place] [address]     Current Outpatient Medications on File Prior to Visit   Medication Sig Dispense Refill    baclofen (LIORESAL) 20 MG tablet Take 20 mg by mouth 4 (four) times daily as needed.  0    cetirizine (ZYRTEC) 10 MG tablet Take 1 tablet (10 mg total) by mouth once daily. 14 tablet 0    dantrolene (DANTRIUM) 50 MG Cap Take 1 capsule (50 mg total) by mouth 3 (three) times daily. 42 capsule 0    dicyclomine (BENTYL) 20 mg tablet Take 1 tablet (20 mg total) by  mouth 2 (two) times daily. 20 tablet 0    donepezil (ARICEPT) 5 MG tablet Take 1 tablet (5 mg total) by mouth every evening. 30 tablet 11    fluticasone (FLONASE) 50 mcg/actuation nasal spray 1 spray (50 mcg total) by Each Nare route 2 (two) times daily as needed. 15 g 0    lactulose (CHRONULAC) 20 gram/30 mL Soln Take 22.5 mLs (15 g total) by mouth 3 (three) times daily. for 10 days 675 mL 0    mirabegron (MYRBETRIQ) 25 mg Tb24 ER tablet Take 1 tablet (25 mg total) by mouth once daily. 30 tablet 11    ondansetron (ZOFRAN) 4 MG tablet Take 1 tablet (4 mg total) by mouth every 6 (six) hours. 12 tablet 0    gabapentin (NEURONTIN) 100 MG capsule Take 1 capsule (100 mg total) by mouth 3 (three) times daily. (pain) 90 capsule 11    sertraline (ZOLOFT) 50 MG tablet Take 1 tablet (50 mg total) by mouth once daily. 30 tablet 4     No current facility-administered medications on file prior to visit.      Review of patient's allergies indicates:   Allergen Reactions    Penicillins      Hives and Itching       Family History:   Family History   Problem Relation Age of Onset    Thyroid cancer Mother     Lung cancer Maternal Grandfather     Multiple sclerosis Other         mother's cousin    ALS Neg Hx     Muscular dystrophy Neg Hx            ROS:   CONSTITUTIONAL:  (-) weight change, fever, chills, night sweats   EYES:  (-)  double vision (-) blurry vision  EARS, NOSE, THROAT: (-) dysphagia (-) hearing loss  RESPIRATORY: (-)   shortness of breath  (-) cough  CARDIOVASCULAR (-) chest pain  (-) swelling  GENITOURINARY    (-) hematuria  GASTROINTESTINAL (-) nausea/vomiting   ENDOCRINE  (-)  heat or cold intolerance  (-) hair loss  PSYCHIATRIC  (-) depression  (-) anxiety  HEMATOL/LYMPHATIC (-) easy bruising (-) enlarged lymph nodes  ALLERGIC/IMMUN  (-) seasonal allergies (-) allergies to environmental stimuli    SKIN (-) changes (-) rashes (-) wounds  The rest of the review of systems is unremarkable.     "      Pertinent Prior Work Up (Imaging/EMGs):  Study Date Pertinent findings                      Physical Examination:  Vitals: /75   Pulse 70   Ht 6' 5" (1.956 m)   Wt 75.3 kg (166 lb)   BMI 19.68 kg/m²    Gen: NAD, appearing stated age  Gait: NT,  presents in WC  Mood/affect: pleasant and appropriate    Speech: Speech intelligibility impaired with longer sentences        Cognition:  Awake, alert, oriented x 3    Immediate - intact    Short term - impaired        Cardiovascular:   (- ) edema        MMT - UE 5/5, LE - R HF 2/5, Left 3/5; KE limited by tone, DF/PF at least 3/5     Tone:  MAS 2 on R HF, 3 on L, minimal tone in adductors      Impression:  37 yo M with impaired mobility and ADLs in setting of ocerebellar atrophy.  + spasticity  + neurogenic Bladder         Plan:    Diagnostics: none   Medications: Risks/benefits reviewed. Cont w oral meds (Baclofen/Dantrolene), remains stable.   Therapy: receiving HH therapy      Consults: Consult referral to Dr. Cherry for evaluation for chemodenervation    Other: reviewed Bowel program     Follow up: 3 mo    Maylin Ramesh MD  "

## 2018-12-21 ENCOUNTER — TELEPHONE (OUTPATIENT)
Dept: PHYSICAL MEDICINE AND REHAB | Facility: CLINIC | Age: 38
End: 2018-12-21

## 2018-12-21 NOTE — TELEPHONE ENCOUNTER
Rx refill sent to Pharmacy 12/11/18             Is this a Refill or New Rx:  Yes   Rx Name and Strength:  Dantrolene 50mg     Preferred Pharmacy with phone number:     [facility] [address] [place], [place] - [address] [facility]

## 2019-01-02 ENCOUNTER — OFFICE VISIT (OUTPATIENT)
Dept: INTERNAL MEDICINE | Facility: CLINIC | Age: 39
End: 2019-01-02
Payer: MEDICARE

## 2019-01-02 ENCOUNTER — LAB VISIT (OUTPATIENT)
Dept: LAB | Facility: HOSPITAL | Age: 39
End: 2019-01-02
Payer: MEDICARE

## 2019-01-02 ENCOUNTER — TELEPHONE (OUTPATIENT)
Dept: INTERNAL MEDICINE | Facility: CLINIC | Age: 39
End: 2019-01-02

## 2019-01-02 VITALS
SYSTOLIC BLOOD PRESSURE: 116 MMHG | TEMPERATURE: 98 F | DIASTOLIC BLOOD PRESSURE: 70 MMHG | HEART RATE: 69 BPM | OXYGEN SATURATION: 99 %

## 2019-01-02 DIAGNOSIS — E16.2 HYPOGLYCEMIA: ICD-10-CM

## 2019-01-02 DIAGNOSIS — R05.3 CHRONIC COUGH: ICD-10-CM

## 2019-01-02 DIAGNOSIS — J00 ACUTE NASOPHARYNGITIS: ICD-10-CM

## 2019-01-02 DIAGNOSIS — E16.2 HYPOGLYCEMIA: Primary | ICD-10-CM

## 2019-01-02 LAB
ESTIMATED AVG GLUCOSE: 100 MG/DL
GLUCOSE SERPL-MCNC: 99 MG/DL (ref 70–110)
HBA1C MFR BLD HPLC: 5.1 %

## 2019-01-02 PROCEDURE — 82962 GLUCOSE BLOOD TEST: CPT | Mod: PBBFAC | Performed by: NURSE PRACTITIONER

## 2019-01-02 PROCEDURE — 99213 PR OFFICE/OUTPT VISIT, EST, LEVL III, 20-29 MIN: ICD-10-PCS | Mod: S$PBB,,, | Performed by: NURSE PRACTITIONER

## 2019-01-02 PROCEDURE — 83036 HEMOGLOBIN GLYCOSYLATED A1C: CPT

## 2019-01-02 PROCEDURE — 36415 COLL VENOUS BLD VENIPUNCTURE: CPT

## 2019-01-02 PROCEDURE — 99214 OFFICE O/P EST MOD 30 MIN: CPT | Mod: PBBFAC | Performed by: NURSE PRACTITIONER

## 2019-01-02 PROCEDURE — 99213 OFFICE O/P EST LOW 20 MIN: CPT | Mod: S$PBB,,, | Performed by: NURSE PRACTITIONER

## 2019-01-02 PROCEDURE — 99999 PR PBB SHADOW E&M-EST. PATIENT-LVL IV: CPT | Mod: PBBFAC,,, | Performed by: NURSE PRACTITIONER

## 2019-01-02 PROCEDURE — 99999 PR PBB SHADOW E&M-EST. PATIENT-LVL IV: ICD-10-PCS | Mod: PBBFAC,,, | Performed by: NURSE PRACTITIONER

## 2019-01-02 RX ORDER — BACLOFEN 20 MG/1
TABLET ORAL
Qty: 120 TABLET | Refills: 0 | OUTPATIENT
Start: 2019-01-02

## 2019-01-02 RX ORDER — DANTROLENE SODIUM 50 MG/1
50 CAPSULE ORAL 3 TIMES DAILY
Qty: 42 CAPSULE | Refills: 0 | Status: SHIPPED | OUTPATIENT
Start: 2019-01-02 | End: 2019-01-17 | Stop reason: SDUPTHER

## 2019-01-02 RX ORDER — BACLOFEN 20 MG/1
20 TABLET ORAL 4 TIMES DAILY PRN
Qty: 60 TABLET | Refills: 0 | Status: SHIPPED | OUTPATIENT
Start: 2019-01-02 | End: 2019-01-17 | Stop reason: SDUPTHER

## 2019-01-02 NOTE — PROGRESS NOTES
"Subjective:       Patient ID: Lisa Moreno is a 38 y.o. male.    Chief Complaint: Follow-up and Cough    HPI:  37 yo male that presents to clinic today with concerns for fluctuating blood sugar.  He is accompanied to clinic by mother and home health nurse.    History of degenerative motor system, depression, and seizures.    States that he was seen in ED back on 12/14/18 for cough and constipation.  In ED blood work was done that showed a low blood glucose at 46.  Patient was given some oral glucose and blood sugar came up to 55.  Patient has no history of diabetes or low blood sugar.  Mom states that he eats three meals a day and appetite is good.  Denies any excessive daytime sleepiness but states he was up at "2am every night."    Patient is also seen by physical medicine and rehab for spasticity and is currently on baclofen and dantrolene.  States they are scheduled to follow up with physical medicine on 1/21/19 but need refill on medications.    Patient's mom also complains of persistent cough that has "been going on since September."  Xrays from 10/5/18 and 12/14/18 were essentially normal.  Mom states that they have tried mucinex but state little relief.  Denies any fever, SOB, chest pain, n/v or dizziness.    Review of Systems   Constitutional: Positive for activity change. Negative for appetite change, fatigue and unexpected weight change.   HENT: Positive for congestion, postnasal drip and rhinorrhea. Negative for hearing loss, sinus pressure, sinus pain, sore throat and trouble swallowing.    Eyes: Negative for discharge and visual disturbance.   Respiratory: Positive for cough. Negative for apnea, chest tightness, shortness of breath and wheezing.    Cardiovascular: Negative for chest pain and palpitations.   Gastrointestinal: Positive for constipation. Negative for blood in stool, diarrhea and vomiting.   Endocrine: Positive for polyuria. Negative for polydipsia.   Genitourinary: Negative for " difficulty urinating, hematuria and urgency.   Musculoskeletal: Negative for arthralgias, joint swelling and neck pain.   Skin: Negative for color change and rash.   Neurological: Negative for weakness and headaches.   Psychiatric/Behavioral: Negative for confusion and dysphoric mood.       Objective:      Physical Exam   Constitutional: He appears well-developed and well-nourished. No distress.   HENT:   Head: Normocephalic and atraumatic.   Nose: Rhinorrhea present. Right sinus exhibits no maxillary sinus tenderness and no frontal sinus tenderness. Left sinus exhibits no maxillary sinus tenderness and no frontal sinus tenderness.   Cardiovascular: Normal rate, regular rhythm, normal heart sounds and intact distal pulses.   No murmur heard.  Pulmonary/Chest: Effort normal and breath sounds normal. No stridor. No respiratory distress. He has no wheezes. He has no rales.   Neurological: He is alert.   Skin: Skin is warm and dry. No erythema.   Psychiatric: His behavior is normal.       Assessment:       1. Hypoglycemia    2. Acute nasopharyngitis    3. Chronic cough        Plan:     1.  Hypoglycemia  -Vitals are stable in clinic.  -POCT glucose is 99 in clinic today.  -Will check a hemoglobin 1ac.  -Will give prescription for home glucose monitor to check at home 2-3 times a day.  -Encouraged to follow up with pcp and may need additional follow up with endocrinology.    2.  Acute nasopharyngitis/cough  -Appears viral.  -Chest xrays have been normal.  -Encouraged the use of daily claritin to help with symptom relief.  -Will refer to ENT for further evaluation and management given duration of cough and congestion.

## 2019-01-02 NOTE — TELEPHONE ENCOUNTER
----- Message from Jessie Chong sent at 1/2/2019 11:58 AM CST -----  Contact: Mother Giovana Cell# [phone]  Patient's mother would like a call back in regards to wanting to get the script for her sons Paty filled. She said that her son came in today to be seen by you for a glucose follow up but his medication was never filled and she would like to speak with you about wanting to know about her son's glucose situation and how to keep it down.     Patient's pharmacy: [facility] [address] [place] [address] AT [facility]  Phone# [phone],Fax# [phone]

## 2019-01-02 NOTE — TELEPHONE ENCOUNTER
Spoke with pt mother about otc medications pt stated understanding pt mother asked questions about pt blood sugar informed pt per stacy roberts to eat three meals a day and snack in between meals

## 2019-01-02 NOTE — TELEPHONE ENCOUNTER
----- Message from Taryn Lemons sent at 1/2/2019  4:50 PM CST -----  Contact: Giovana Moreno (Mother) [phone]  Giovana states she had to pay out the pocket for the test strips for the patient because the script was never called in. Please call Giovana back she is very frustrated.

## 2019-01-03 RX ORDER — LANCETS
1 EACH MISCELLANEOUS
Qty: 100 EACH | Refills: 5 | Status: ON HOLD | OUTPATIENT
Start: 2019-01-03 | End: 2020-07-18 | Stop reason: ALTCHOICE

## 2019-01-03 NOTE — TELEPHONE ENCOUNTER
----- Message from Hector Callahan NP sent at 1/3/2019  7:52 AM CST -----  Hi June,    Will you give Mr Moreno a call and let him know that his hemoglobin a1c (average blood sugar level over 3 months)  came back normal.  His average daily blood sugar is right around 100, which is in normal range.  I'm not sure why he is getting episodes of low blood sugar readings so I am going to refer him to endocrinology for further evaluation please set him up with an appointment date and time.  Thanks.    Hector

## 2019-01-03 NOTE — TELEPHONE ENCOUNTER
Spoke with pt mother, pt mother said she saw the results on the [facility] dile[facility], pt mother also stated can a prescription for the test strip be sent to the pharmacy so she wouldn't  have to pay out of pocket. No additional question ask at this time.    Leeanne GREEN

## 2019-01-17 RX ORDER — DANTROLENE SODIUM 50 MG/1
50 CAPSULE ORAL 3 TIMES DAILY
Qty: 270 CAPSULE | Refills: 0 | Status: SHIPPED | OUTPATIENT
Start: 2019-02-01 | End: 2019-01-21 | Stop reason: SDUPTHER

## 2019-01-17 RX ORDER — BACLOFEN 20 MG/1
20 TABLET ORAL 4 TIMES DAILY
Qty: 360 TABLET | Refills: 0 | Status: SHIPPED | OUTPATIENT
Start: 2019-01-17 | End: 2019-01-21 | Stop reason: SDUPTHER

## 2019-01-17 NOTE — TELEPHONE ENCOUNTER
Last visit: 12/20/2018  Next visit: 02/21/2019  Last refill Dantrium: 01/02/2019 14 day prescription  I have placed the earliest fill date at 30d from the last prescription, please modify if desired.  Please note, LR was completed by a different MD.      ----- Message from Vicki Alvarado sent at 1/17/2019  1:43 PM CST -----  Contact: Giovana (mother) @ [phone]  Pt is req a refill for dantrolene (DANTRIUM) 50 MG Cap.  Pt is completely out of medication.     [facility] [address] [place] [address] [address] [facility] AT [facility] [address]               [phone] (Phone)       [phone] (Fax)

## 2019-01-21 ENCOUNTER — OFFICE VISIT (OUTPATIENT)
Dept: PHYSICAL MEDICINE AND REHAB | Facility: CLINIC | Age: 39
End: 2019-01-21
Payer: MEDICARE

## 2019-01-21 VITALS
DIASTOLIC BLOOD PRESSURE: 72 MMHG | HEART RATE: 80 BPM | HEIGHT: 77 IN | WEIGHT: 166 LBS | SYSTOLIC BLOOD PRESSURE: 120 MMHG | BODY MASS INDEX: 19.6 KG/M2

## 2019-01-21 DIAGNOSIS — R25.2 SPASTICITY: Primary | ICD-10-CM

## 2019-01-21 DIAGNOSIS — M62.838 MUSCLE SPASM: Primary | ICD-10-CM

## 2019-01-21 DIAGNOSIS — R25.2 SPASTICITY: ICD-10-CM

## 2019-01-21 PROCEDURE — 99999 PR PBB SHADOW E&M-EST. PATIENT-LVL III: CPT | Mod: PBBFAC,,, | Performed by: PHYSICAL MEDICINE & REHABILITATION

## 2019-01-21 PROCEDURE — 99999 PR PBB SHADOW E&M-EST. PATIENT-LVL III: ICD-10-PCS | Mod: PBBFAC,,, | Performed by: PHYSICAL MEDICINE & REHABILITATION

## 2019-01-21 PROCEDURE — 99212 PR OFFICE/OUTPT VISIT, EST, LEVL II, 10-19 MIN: ICD-10-PCS | Mod: S$PBB,,, | Performed by: PHYSICAL MEDICINE & REHABILITATION

## 2019-01-21 PROCEDURE — 99213 OFFICE O/P EST LOW 20 MIN: CPT | Mod: PBBFAC,PO | Performed by: PHYSICAL MEDICINE & REHABILITATION

## 2019-01-21 PROCEDURE — 99212 OFFICE O/P EST SF 10 MIN: CPT | Mod: S$PBB,,, | Performed by: PHYSICAL MEDICINE & REHABILITATION

## 2019-01-21 RX ORDER — DANTROLENE SODIUM 50 MG/1
50 CAPSULE ORAL 3 TIMES DAILY
Qty: 270 CAPSULE | Refills: 0 | Status: SHIPPED | OUTPATIENT
Start: 2019-02-01 | End: 2019-04-24 | Stop reason: SDUPTHER

## 2019-01-21 RX ORDER — BACLOFEN 20 MG/1
20 TABLET ORAL 4 TIMES DAILY
Qty: 360 TABLET | Refills: 0 | Status: SHIPPED | OUTPATIENT
Start: 2019-01-21 | End: 2019-04-24 | Stop reason: SDUPTHER

## 2019-01-21 NOTE — LETTER
January 28, 2019      Maylin Ramesh MD  [address]  [address]  [facility] [address]           [facility]  [address]  [facility] [address]  Phone: [phone]          Patient: Lisa Moreno   MR Number: [id number]   YOB: 1980   Date of Visit: 1/21/2019       Dear Dr. Maylin Ramesh:    Thank you for referring Lisa Moreno to me for evaluation. Attached you will find relevant portions of my assessment and plan of care.    If you have questions, please do not hesitate to call me. I look forward to following Lisa Moreno along with you.    Sincerely,    Richard Cherry MD    Enclosure  CC:  No Recipients    If you would like to receive this communication electronically, please contact [email or web address] or [phone] to request more information on [facility] access.    For providers and/or their staff who would like to refer a patient to [facility], please contact us through our one-stop-shop provider referral line, [facility], at [phone].    If you feel you have received this communication in error or would no longer like to receive these types of communications, please e-mail [email or web address]

## 2019-01-24 ENCOUNTER — TELEPHONE (OUTPATIENT)
Dept: INTERNAL MEDICINE | Facility: CLINIC | Age: 39
End: 2019-01-24

## 2019-01-24 NOTE — TELEPHONE ENCOUNTER
Patient is not a diabetic.  Patient has periodic episodes of hypoglycemia.  I would like for him to have a blood glucose monitor to check blood glucose levels if he is having symptoms.

## 2019-01-24 NOTE — TELEPHONE ENCOUNTER
----- Message from Nettie Monsivais sent at 1/24/2019  4:11 PM CST -----  Contact: [phone]/[facility]  Pharmacy is requesting a call from the nurse to verify if the patient is a diabetic.      Please call and advise, thank you

## 2019-01-25 NOTE — TELEPHONE ENCOUNTER
Called [facility] medicaid pharmacy [phone] phone rings a few times and line goes dead attempted to call 3 times

## 2019-01-28 NOTE — PROGRESS NOTES
Initial PM&R Outpatient Evaluation    CC: Spasticity. Eval for Botox    HPI: Lisa Moreno is a 38 y.o. male with PMHx of spinocerebellar atrophy with LE weakness, spasticity, and ataxic gait    DC'd home from [facility] in October. Has been doing well.     Function - Cont to more of the transfers (per sitter he does 90%) and helps bathing. Helping w WC prop too.    Social History: Lives with mother in house,bedroom/bathroom on first floor.       Tobacco:  denies   ETOH:  denies   Other drug use: denies      PMH:   Past Medical History:   Diagnosis Date    Degenerative motor system disease     Depression     Seizure     Spinocerebellar atrophy        PSH:   Past Surgical History:   Procedure Laterality Date    BACLOFEN PUMP IMPLANTATION      URODYNAMIC STUDY, FLUOROSCOPIC N/A 11/27/2018    Performed by Tanja Okeefe MD at [facility] OR [address] [place] [address]     Current Outpatient Medications on File Prior to Visit   Medication Sig Dispense Refill    blood sugar diagnostic Strp 1 strip by Misc.(Non-Drug; Combo Route) route 3 (three) times daily. 100 each 5    blood-glucose meter, disc-type Kit 1 application by Misc.(Non-Drug; Combo Route) route 3 (three) times daily. 1 kit 0    cetirizine (ZYRTEC) 10 MG tablet Take 1 tablet (10 mg total) by mouth once daily. 14 tablet 0    donepezil (ARICEPT) 5 MG tablet Take 1 tablet (5 mg total) by mouth every evening. 30 tablet 11    fluticasone (FLONASE) 50 mcg/actuation nasal spray 1 spray (50 mcg total) by Each Nare route 2 (two) times daily as needed. 15 g 0    lancets (ACCU-CHEK SOFTCLIX LANCETS) Misc 1 each by Misc.(Non-Drug; Combo Route) route 3 (three) times daily with meals. 100 each 5    mirabegron (MYRBETRIQ) 25 mg Tb24 ER tablet Take 1 tablet (25 mg total) by mouth once daily. 30 tablet 11    ondansetron (ZOFRAN) 4 MG tablet Take 1 tablet (4 mg total) by mouth every 6 (six) hours. 12 tablet 0    gabapentin (NEURONTIN) 100 MG capsule Take 1 capsule (100 mg total) by  "mouth 3 (three) times daily. (pain) 90 capsule 11    sertraline (ZOLOFT) 50 MG tablet Take 1 tablet (50 mg total) by mouth once daily. 30 tablet 4     No current facility-administered medications on file prior to visit.      Review of patient's allergies indicates:   Allergen Reactions    Penicillins      Hives and Itching       Family History:   Family History   Problem Relation Age of Onset    Thyroid cancer Mother     Lung cancer Maternal Grandfather     Multiple sclerosis Other         mother's cousin    ALS Neg Hx     Muscular dystrophy Neg Hx            ROS:   CONSTITUTIONAL:  (-) weight change, fever, chills, night sweats   EYES:  (-)  double vision (-) blurry vision  EARS, NOSE, THROAT: (-) dysphagia (-) hearing loss  RESPIRATORY: (-)   shortness of breath  (-) cough  CARDIOVASCULAR (-) chest pain  (-) swelling  GENITOURINARY    (-) hematuria  GASTROINTESTINAL (-) nausea/vomiting   ENDOCRINE  (-)  heat or cold intolerance  (-) hair loss  PSYCHIATRIC  (-) depression  (-) anxiety  HEMATOL/LYMPHATIC (-) easy bruising (-) enlarged lymph nodes  ALLERGIC/IMMUN  (-) seasonal allergies (-) allergies to environmental stimuli    SKIN (-) changes (-) rashes (-) wounds  The rest of the review of systems is unremarkable.          Physical Examination:  Vitals: /72   Pulse 80   Ht 6' 5" (1.956 m)   Wt 75.3 kg (166 lb)   BMI 19.68 kg/m²    Gen: NAD, appearing stated age  Gait: NT,  presents in WC  Mood/affect: pleasant and appropriate  Speech: Speech intelligibility impaired with longer sentences   Cognition:  Awake, alert, oriented x3        MMT - UE 5/5, LE - R HF 2/5, Left 3/5; KE limited by tone, DF/PF at least 3/5     Tone:  MAS 2 on R HF, 3 on L, minimal tone in adductors      Impression:  39 yo M with impaired mobility and ADLs in setting of ocerebellar atrophy.  + spasticity  + neurogenic Bladder         Plan:   800 units of botox in 3 weeks    Richard Cherry MD  "

## 2019-01-29 NOTE — TELEPHONE ENCOUNTER
Called spoke with kalie  informed of results kalie verbally stated understanding of results and will further assist patient

## 2019-02-14 NOTE — PROGRESS NOTES
CRS Office Visit Followup    Referring Md:   No referring provider defined for this encounter.    SUBJECTIVE:     Chief Complaint: fecal incontinence    History of Present Illness:  The patient is an established patient to this practice.   Course is as follows:     Mr. Lisa Moreno has a PMHx significant for Spinocerebellar atrophy and seizure and has been seen in the past for fecal incontinence.   12/2018: Patient was fecally continent up until Thanksgiving. At that point in time he was noted to have regular daily large formed bowel movements. However, around Thanksgiving he began having lose of control of bowel movements. He also lost the ability to propel feces outward; as per caregivers, patient is able to sense that he needs to have a bowel movement, but cannot physically push the feces outward, He therefore struggles both with fecal incontinence (such that he is incontinent of stool even with movements) and the inability to control bowel movements. Of note, his caregiver notes that when she helps him have a bowel movement his feces is soft and formed. He also has struggled with urinary incontinence since September of this year and is currently being treated by Dr. Okeefe.    - We discussed this may be related to progression of his spinocerebellar atrophy.  Colostomy was discussed with the patient and family. Pelvic PT was also offered    Current status:  He is stabilized over the last several months.  He has avoided intermittent catheterization.  He has daily bowel movements but frequently struggles to fully evacuate his bowel movements.  His bowel movements are very large when they do pass.  He is currently taking fiber and lactulose.    Last Colonoscopy: No previous history of colonoscopy.      Review of Systems:  Review of Systems   Constitutional: Negative for chills, fever and malaise/fatigue.   HENT: Negative for congestion.    Respiratory: Negative for cough and shortness of breath.    Cardiovascular:  "Negative for chest pain and palpitations.   Gastrointestinal: Positive for constipation. Negative for abdominal pain, diarrhea, nausea and vomiting.        Constipation mixed with fecal incontinence.   Genitourinary:        Urinary incontinence.   Musculoskeletal: Negative for myalgias.        Spinocerebellar atrophy. In wheelchair.   Skin: Negative for itching and rash.   Neurological: Positive for seizures.   Endo/Heme/Allergies: Does not bruise/bleed easily.   Psychiatric/Behavioral: Negative for depression.       OBJECTIVE:     Vital Signs (Most Recent)  /69 (BP Location: Right arm, Patient Position: Lying, BP Method: Large (Automatic))   Pulse 83   Ht 6' 3" (1.905 m)   Wt 75.3 kg (166 lb)   BMI 20.75 kg/m²     Physical Exam:  General: Black or  male in no distress   Neuro: alert and oriented x 4.  Moves all extremities. However, sitting in wheelchair. Unable to ambulate on own, requires two person assist for transfer.   HEENT: no icterus.  Trachea midline  Respiratory: respirations are even and unlabored. Wet cough present.  Cardiac: regular rate and rhythm  Abdomen: soft, thin,  non-tender to palpation,  incision from prior baclofen pump in the left mid abdomen  Extremities: Warm dry and intact  Skin: no rashes  Anorectal:  External exam was normal. Digital exam demonstrated decreased tone.  A large amount of stool was felt within the rectum.  Disimpaction was performed.    Labs: no anemia.  Normal renal function    Imaging: none      ASSESSMENT/PLAN:     Lisa was seen today for fecal incontinence.    Diagnoses and all orders for this visit:    Constipation, unspecified constipation type    Fecal smearing    Other orders  -     linaclotide (LINZESS) 145 mcg Cap capsule; Take 1 capsule (145 mcg total) by mouth once daily.         Mr. Lisa Moreno is a 38 yo male who presents for evaluation of fecal incontinence and constipation.  He appears to have overflow fecal incontinence.  We " discussed further management of his constipation.  We can trial Linzess to see if he has any benefit.  We discussed that he probably should not use Linzess daily as this would worsen his fecal incontinence.  Disimpaction was requested by the family was performed today.  A massive amount of stool was removed.  He tolerated the procedure well.  He can follow up with me as needed.      ASIF Romeo MD  Staff Surgeon  Colon & Rectal Surgery

## 2019-02-15 ENCOUNTER — OFFICE VISIT (OUTPATIENT)
Dept: SURGERY | Facility: CLINIC | Age: 39
End: 2019-02-15
Payer: MEDICARE

## 2019-02-15 ENCOUNTER — TELEPHONE (OUTPATIENT)
Dept: PHYSICAL MEDICINE AND REHAB | Facility: CLINIC | Age: 39
End: 2019-02-15

## 2019-02-15 ENCOUNTER — PATIENT MESSAGE (OUTPATIENT)
Dept: PHYSICAL MEDICINE AND REHAB | Facility: CLINIC | Age: 39
End: 2019-02-15

## 2019-02-15 VITALS
WEIGHT: 166 LBS | HEART RATE: 83 BPM | HEIGHT: 75 IN | BODY MASS INDEX: 20.64 KG/M2 | DIASTOLIC BLOOD PRESSURE: 69 MMHG | SYSTOLIC BLOOD PRESSURE: 114 MMHG

## 2019-02-15 DIAGNOSIS — R15.1 FECAL SMEARING: ICD-10-CM

## 2019-02-15 DIAGNOSIS — K59.00 CONSTIPATION, UNSPECIFIED CONSTIPATION TYPE: Primary | ICD-10-CM

## 2019-02-15 PROCEDURE — 99999 PR PBB SHADOW E&M-EST. PATIENT-LVL III: ICD-10-PCS | Mod: PBBFAC,,, | Performed by: COLON & RECTAL SURGERY

## 2019-02-15 PROCEDURE — 45915 REMOVE RECTAL OBSTRUCTION: CPT | Mod: PBBFAC | Performed by: COLON & RECTAL SURGERY

## 2019-02-15 PROCEDURE — 99214 OFFICE O/P EST MOD 30 MIN: CPT | Mod: S$PBB,25,, | Performed by: COLON & RECTAL SURGERY

## 2019-02-15 PROCEDURE — 45915 REMOVE RECTAL OBSTRUCTION: CPT | Mod: S$PBB,,, | Performed by: COLON & RECTAL SURGERY

## 2019-02-15 PROCEDURE — 99213 OFFICE O/P EST LOW 20 MIN: CPT | Mod: PBBFAC | Performed by: COLON & RECTAL SURGERY

## 2019-02-15 PROCEDURE — 45915 PR REMV RECTAL OBSTR:FECES/F.B. W ANEST: ICD-10-PCS | Mod: S$PBB,,, | Performed by: COLON & RECTAL SURGERY

## 2019-02-15 PROCEDURE — 99214 PR OFFICE/OUTPT VISIT, EST, LEVL IV, 30-39 MIN: ICD-10-PCS | Mod: S$PBB,25,, | Performed by: COLON & RECTAL SURGERY

## 2019-02-15 PROCEDURE — 99999 PR PBB SHADOW E&M-EST. PATIENT-LVL III: CPT | Mod: PBBFAC,,, | Performed by: COLON & RECTAL SURGERY

## 2019-02-15 NOTE — TELEPHONE ENCOUNTER
----- Message from Alysa Streeter sent at 2/15/2019  7:24 AM CST -----  Contact: Pt request via [facility]   Appointment canceled for Lsia Moreno ([id number])  Visit Type: ESTABLISHED PATIENT  Date        Time      Length    Provider                  Department  2/21/2019    1:40 PM  20 mins.  Maylin Ramesh MD         [facility] PHYS[facility]IC[facility]    Reason for Cancellation: Appt Time No Longer Works    Patient Comments: Please reschedule for a Monday or Tuesday

## 2019-02-18 ENCOUNTER — OFFICE VISIT (OUTPATIENT)
Dept: PHYSICAL MEDICINE AND REHAB | Facility: CLINIC | Age: 39
End: 2019-02-18
Payer: MEDICARE

## 2019-02-18 DIAGNOSIS — G80.1 SPASTIC DIPLEGIA: Primary | ICD-10-CM

## 2019-02-18 DIAGNOSIS — R25.2 SPASTICITY: ICD-10-CM

## 2019-02-18 PROCEDURE — 64642 CHEMODENERV 1 EXTREMITY 1-4: CPT | Mod: PBBFAC,PO | Performed by: PHYSICAL MEDICINE & REHABILITATION

## 2019-02-18 PROCEDURE — 99213 OFFICE O/P EST LOW 20 MIN: CPT | Mod: 25,S$PBB,, | Performed by: PHYSICAL MEDICINE & REHABILITATION

## 2019-02-18 PROCEDURE — 99213 PR OFFICE/OUTPT VISIT, EST, LEVL III, 20-29 MIN: ICD-10-PCS | Mod: 25,S$PBB,, | Performed by: PHYSICAL MEDICINE & REHABILITATION

## 2019-02-18 PROCEDURE — 64642 PR CHEMODENERV ONE EXTREMITY; 1-4 MUSCLE(S): ICD-10-PCS | Mod: S$PBB,,, | Performed by: PHYSICAL MEDICINE & REHABILITATION

## 2019-02-18 PROCEDURE — 99999 PR PBB SHADOW E&M-EST. PATIENT-LVL III: ICD-10-PCS | Mod: PBBFAC,,, | Performed by: PHYSICAL MEDICINE & REHABILITATION

## 2019-02-18 PROCEDURE — 95874 GUIDE NERV DESTR NEEDLE EMG: CPT | Mod: 26,S$PBB,, | Performed by: PHYSICAL MEDICINE & REHABILITATION

## 2019-02-18 PROCEDURE — 95874 PR NEEDLE EMG GUIDANCE FOR CHEMODENERVATION: ICD-10-PCS | Mod: 26,S$PBB,, | Performed by: PHYSICAL MEDICINE & REHABILITATION

## 2019-02-18 PROCEDURE — 99999 PR PBB SHADOW E&M-EST. PATIENT-LVL III: CPT | Mod: PBBFAC,,, | Performed by: PHYSICAL MEDICINE & REHABILITATION

## 2019-02-18 PROCEDURE — 95874 GUIDE NERV DESTR NEEDLE EMG: CPT | Mod: PBBFAC,PO | Performed by: PHYSICAL MEDICINE & REHABILITATION

## 2019-02-18 PROCEDURE — 64642 CHEMODENERV 1 EXTREMITY 1-4: CPT | Mod: S$PBB,,, | Performed by: PHYSICAL MEDICINE & REHABILITATION

## 2019-02-18 PROCEDURE — 64643 CHEMODENERV 1 EXTREM 1-4 EA: CPT | Mod: PBBFAC,PO | Performed by: PHYSICAL MEDICINE & REHABILITATION

## 2019-02-18 PROCEDURE — 99213 OFFICE O/P EST LOW 20 MIN: CPT | Mod: PBBFAC,PO | Performed by: PHYSICAL MEDICINE & REHABILITATION

## 2019-02-25 ENCOUNTER — TELEPHONE (OUTPATIENT)
Dept: PHYSICAL MEDICINE AND REHAB | Facility: CLINIC | Age: 39
End: 2019-02-25

## 2019-02-25 DIAGNOSIS — M62.838 MUSCLE SPASM: ICD-10-CM

## 2019-02-25 DIAGNOSIS — R25.2 SPASTICITY: Primary | ICD-10-CM

## 2019-03-04 ENCOUNTER — CLINICAL SUPPORT (OUTPATIENT)
Dept: REHABILITATION | Facility: HOSPITAL | Age: 39
End: 2019-03-04
Payer: MEDICARE

## 2019-03-04 DIAGNOSIS — R29.898 WEAKNESS OF BOTH LOWER EXTREMITIES: Primary | ICD-10-CM

## 2019-03-04 DIAGNOSIS — R26.89 DECREASED FUNCTIONAL MOBILITY: ICD-10-CM

## 2019-03-04 DIAGNOSIS — M62.9 HAMSTRING TIGHTNESS OF BOTH LOWER EXTREMITIES: ICD-10-CM

## 2019-03-04 PROCEDURE — G8979 MOBILITY GOAL STATUS: HCPCS | Mod: CJ,PO

## 2019-03-04 PROCEDURE — 97163 PT EVAL HIGH COMPLEX 45 MIN: CPT | Mod: PO

## 2019-03-04 PROCEDURE — G8978 MOBILITY CURRENT STATUS: HCPCS | Mod: CK,PO

## 2019-03-04 NOTE — PLAN OF CARE
[facility]  Physical Therapy Neurological Rehabilitation Initial Evaluation    Name: Lisa Moreno  Clinic Number: [id number]    Therapy Diagnosis:   Encounter Diagnoses   Name Primary?   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 His mother reports that he was getting home health therapy and using the walker to ambulate during his sessions. He was discharged from home health therapy as they said he needed outpatient therapy to stretch him more. He has a HEP from home health but does not perform it on a regular basis. His mother reports that he has sitters every day for approximately 10 hours a day but he does nothing except for sit in his wheelchair. His mother also reports that he needs assistance with vehicle transfers, bed monility to get his legs into the bed, but can transfer wheelchair to/from bed and wheelchair to/from tub transfer bench with SBA. During this part of his evaluation the patient is very quiet and does not contribute much to the conversation.      Medical History:   Past Medical History:   Diagnosis Date    Degenerative motor system disease     Depression     Seizure     Spinocerebellar atrophy        Surgical History:   Lisa Moreno  has a past surgical history that includes Baclofen pump implantation and Fluoroscopic urodynamic  study (N/A, 11/27/2018).    Medications:   Lisa has a current medication list which includes the following prescription(s): baclofen, blood sugar diagnostic, blood-glucose meter, disc-type, dantrolene, donepezil, fluticasone, gabapentin, lancets, linaclotide, and mirabegron.    Allergies:   Review of patient's allergies indicates:   Allergen Reactions    Penicillins      Hives and Itching        Imaging, MRI studies: 09/07/18       COMPARISON:  MRI brain, 01/26/2015.    FINDINGS:  There is extensive motion which significantly limits evaluation.    There is no evidence of restricted diffusion to suggest acute infarction.    FLAIR imaging demonstrates mild periventricular signal hyperintensity, grossly unchanged.    The ventricles are stable.  Grossly stable generalized volume loss of the brainstem and cerebellum.    No evidence of mass lesion, hemorrhage, edema or recent or remote major vascular distribution infarction.    Post contrast images are essentially nondiagnostic.  No large enhancing mass identified.    No extra-axial blood or fluid collections.    T2 skull base flow voids are preserved. Bone marrow signal intensity is unremarkable.       Impression         Significantly motion limited examination.  No evidence of acute infarct.  If clinical concern persists, the exam can be repeated as indicated.    Generalized cerebellar and brainstem atrophy, grossly unchanged.    Additional findings discussed in the body of the report.       Prior Therapy: Patient previously seen in this facility for outpatient therapy, HH therapy, and inpatient rehab  Social History:  lives with their family, ramp in front of the home,   Falls: denies falls,   DME: Manual wheelchair, Walker, Hospital bed and Tub bench    Home Environment: Single story home  Exercise Routine / History: none  Family Present at time of Eval: mother   Occupation: disabled  Prior Level of Function: Min A  Current Level of Function: Min  A    Pain:  Denies pain    Pts goals: to walk with a walker    Objective     Observation: Patient is a 38 year old male, who presents to the clinic in no apparent distress.       Posture: sacral sitting in wheelchair, standing with RW knees and hips flexed, forward leaning with trunk      Gait: unable to ambulate at this time, he can slowly maneuver his wheelchair with B UEs and LEs but mainly uses his UEs.    Range of Motion: AROM (PROM):  Hip and ankle PROM WNL    Knee Left  Right   Extension -20 (0) -15 (0)   Flexion WNL WNL     Strength:  Hip Left Right   Flexion 3-/5 3-/5   Abduction 3-/5 3-/5   Adduction 3/5 3/5     Knee Left Right   Extension 3/5 3-/5   Flexion 3/5 3/5     Ankle Left Right   Dorsiflexion 2/5 3/5   Plantarflexion 3/5 3+/5   Inversion 2/5 2/5   Eversion 1/5 2/5       Special Tests: unable to assess as patient is unable to stand unsupported or ambulate    Joint Mobility: WNL    Palpation: No TTP    Sensation: light touch intact    Flexibility: SLR 40 degrees L and 45 degrees R          CMS Impairment/Limitation/Restriction for FOTO Paraplegic Syndrome Survey    Therapist reviewed FOTO scores for Lisa Moreno on 3/4/2019.   FOTO documents entered into [employer] - see Media section.    Limitation Score: 54%  Category: Mobility    Current : CK = at least 40% but < 60% impaired, limited or restricted  Goal: CJ = at least 20% but < 40% impaired, limited or restricted  Discharge: N/A at this time         TREATMENT     Home Exercises and Patient Education Provided    Education provided:   - compliance with HEP   - importance of daily activity    Written Home Exercises Provided: Patient instructed to cont prior HEP.  Exercises were reviewed and Lisa was able to demonstrate them prior to the end of the session.  Lisa demonstrated fair  understanding of the education provided.     Assessment   Lisa is a 38 y.o. male referred to outpatient Physical Therapy with a medical diagnosis of Spastic  diplegia. Pt presents with weakness of B LEs, decreased functional mobility, decreased active ROM of B knees, and B hamstring tightness. Due to his weakness, muscle tightness, and decreased ROM he has difficulty with transfers, bed mobility, ambulation and wheelchair mobility. His current score on FOTO Paraplegic Syndrome Survey places him in the 40%<60% impaired, limited, or restricted category. Several times during the assessment the patient's mother stated he needs to do more at home and she keeps telling him to move more but he won't do it. When asked why he is not performing his HEP from home health on a regular basis the patient just shrugs his shoulders.     Pt prognosis is Fair.   Pt will benefit from skilled outpatient Physical Therapy to address the deficits stated above and in the chart below, provide pt/family education, and to maximize pt's level of independence.     Plan of care discussed with patient: Yes  Pt's spiritual, cultural and educational needs considered and patient is agreeable to the plan of care and goals as stated below:     Anticipated Barriers for therapy: progressive nature of patient's condition, difficulty with transportation    Medical Necessity is demonstrated by the following  History  Co-morbidities and personal factors that may impact the plan of care Co-morbidities:   depression, level of undertstanding of current condition and transportation assistance required    Personal Factors:   attitudes     high   Examination  Body Structures and Functions, activity limitations and participation restrictions that may impact the plan of care Body Regions:   lower extremities  trunk    Body Systems:    ROM  strength  balance  gait  flexibility    Participation Restrictions:   Difficulty with bed mobility, vehicle transfers, wheelchair mobility, unable to ambulate.    Activity limitations:   Learning and applying knowledge  no deficits    General Tasks and Commands  no  deficits    Communication  no deficits    Mobility  lifting and carrying objects  walking  moving around using equipment (WC)  using transportation (bus, train, plane, car)    Self care  washing oneself (bathing, drying, washing hands)  toileting  dressing    Domestic Life  shopping  cooking  doing house work (cleaning house, washing dishes, laundry)    Interactions/Relationships  no deficits    Life Areas  employment    Community and Social Life  community life  recreation and leisure         high   Clinical Presentation evolving clinical presentation with changing clinical characteristics high   Decision Making/ Complexity Score: high     Goals:  Short Term Goals: 4 weeks   1. This patient will be independent with a basic HEP.  2. This patient will increase B knee extension to 0 degrees in order to be able to stand upright.   3. This patient will increase B LE strength by 1 grade in order to perform bed mobility independently.  4. Patient will be able to achieve greater than or equal to 55 on the FOTO Paraplegic Syndrome placing patient in 40%<60% impaired, limited, or restricted category demonstrating overall improved functional ability with lower extremity.   Long Term Goals: 8 weeks   1. This patient will be independent with an updated HEP.  2. This patient will increase B LE strength to 4/5 in order to ambulate 50 feet with RW and SBA.  4. Patient will be able to achieve greater than or equal to 65 on the FOTO Paraplegic Syndrome placing patient in 20%<40% impaired, limited, or restricted category demonstrating overall improved functional ability with lower extremity.       Plan   Plan of care Certification: 3/4/2019 to 05/04/2019.    Outpatient Physical Therapy 2 times weekly for 8 weeks to include the following interventions: Gait Training, Manual Therapy, Moist Heat/ Ice, Neuromuscular Re-ed, Patient Education, Therapeutic Exercise and IASTM.     Mary Marshall, PT

## 2019-03-07 PROBLEM — R26.89 DECREASED FUNCTIONAL MOBILITY: Status: ACTIVE | Noted: 2019-03-07

## 2019-03-07 PROBLEM — M62.9 HAMSTRING TIGHTNESS OF BOTH LOWER EXTREMITIES: Status: ACTIVE | Noted: 2019-03-07

## 2019-03-08 NOTE — PROGRESS NOTES
Pt's mother canceled OT evaluation upon arrival to clinic, stating that Pt did not need OT services.

## 2019-03-13 ENCOUNTER — CLINICAL SUPPORT (OUTPATIENT)
Dept: REHABILITATION | Facility: HOSPITAL | Age: 39
End: 2019-03-13
Payer: MEDICARE

## 2019-03-13 DIAGNOSIS — R26.89 DECREASED FUNCTIONAL MOBILITY: ICD-10-CM

## 2019-03-13 DIAGNOSIS — M62.9 HAMSTRING TIGHTNESS OF BOTH LOWER EXTREMITIES: ICD-10-CM

## 2019-03-13 PROCEDURE — 97110 THERAPEUTIC EXERCISES: CPT | Mod: PO

## 2019-03-13 NOTE — PROGRESS NOTES
"  Physical Therapy Daily Treatment Note     Name: Lisa Irvin [place]  Clinic Number: [id number]    Therapy Diagnosis:   Encounter Diagnoses   Name Primary?   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  He was not compliant with home exercise program.  Response to previous treatment: NA  Functional change: NA    Pain: 0/10  Location: not at this time.    Objective     Lisa received therapeutic exercises to develop strength, ROM and flexibility for 45 minutes 1:1 with PTA including:      Date  03/13/19   VISIT 1/20   POC EXP. DATE 05/04/19   VISIT AMOUNT  MEDICARE TOTAL 90.96  173.84   FACE-TO-FACE 04/04/19       TABLE:    GSS w/ strap 10 x 10"   Manual HSS 5 x 10"   Quad sets 5 x 5"   Supine hip abduction - min A 1 x 01   Heel slides 2 straps 1 x 10 ea   LTR w/ min A 1 x 10 ea    Ankle pumps 2 x 5 ea   Ankle INV/ EV 2 x 5 ea   SLR w/ min-mod A 1 x 10 ea       Initials GWA 1/6       Home Exercises Provided and Patient Education Provided     Education provided:   - compliance with HEP, especially stretching.     Written Home Exercises Provided: yes.  Exercises were reviewed and Lisa was able to demonstrate them prior to the end of the session.  Lisa demonstrated fair  understanding of the education provided.     See EMR under Patient Instructions for exercises provided 3/13/2019.    Assessment     Patient requires min to mod assistance and regular verbal and tactile cues with all exercises.  Patient required regular cues to avoid " assisting with his hands when extending his leg when performing heel slides.      Lisa is progressing well towards his goals.   Pt prognosis is Fair.     Pt will continue to benefit from skilled outpatient physical therapy to address the deficits listed in the problem list box on initial evaluation, provide pt/family education and to maximize pt's level of independence in the home and community environment.     Pt's spiritual, cultural and educational needs considered and pt agreeable to plan of care and goals.    Anticipated barriers to physical therapy: progressive nature of patient's condition, difficulty with transportation    Goals:  Short Term Goals: 4 weeks   1. This patient will be independent with a basic HEP.  2. This patient will increase B knee extension to 0 degrees in order to be able to stand upright.   3. This patient will increase B LE strength by 1 grade in order to perform bed mobility independently.  4. Patient will be able to achieve greater than or equal to 55 on the FOTO Paraplegic Syndrome placing patient in 40%<60% impaired, limited, or restricted category demonstrating overall improved functional ability with lower extremity.     Long Term Goals: 8 weeks   1. This patient will be independent with an updated HEP.  2. This patient will increase B LE strength to 4/5 in order to ambulate 50 feet with RW and SBA.  4. Patient will be able to achieve greater than or equal to 65 on the FOTO Paraplegic Syndrome placing patient in 20%<40% impaired, limited, or restricted category demonstrating overall improved functional ability with lower extremity.     Plan     Continue with Plan Of Care and progress as patient tolerates.    John Troy, PTA

## 2019-03-13 NOTE — PATIENT INSTRUCTIONS
Stretching: Calf - Towel        Sit with knee straight and towel looped around left foot. Gently pull on towel until stretch is felt in calf. Hold 10 seconds.  Repeat 10 times per set. Do 1 sets per session. Do 2 sessions per day.     [email or web address]         Flexion: Stretch - Hamstrings (Supine)        Position (A) [place]: Stabilize hip. Place other hand under left ankle. Motion (B) -[place] lifts leg, keeping knee straight and foot pointing in direction of movement. -Stop at point of tension in back of thigh. -Do not allow patient's pelvis to rise off bed. CAUTION: Do not allow knee to go beyond straight. Hold 10 seconds. Repeat 10 times. Repeat with other leg. Do 2 sessions per day.      Copyright © [employer]. All rights reserved.       Flexion: ROM (Supine)        Position (A) [place]: Stabilize left hip. Cradle foot and ankle with other hand. Motion (B) -Bend knee and hip, bringing foot toward buttock.  Hesitate, then have patient push leg down flat, in a controlled motion.  Repeat 10 times. Repeat with other leg. Do 2 sessions per day. Variation: Patient is passive.    Copyright © [employer]. All rights reserved.       Adduction: Isometric In Extension (Supine)        Position [place]: Support left leg near knee and ankle. Motion - Cue patient to press leg toward other leg. - Keep leg straight.   Relax. Repeat 10 times. Repeat with other leg. Do 2 sessions per day.     Copyright © [employer]. All rights reserved.       Abduction: Isometric In Extension (Supine)        Position Patient: Keep legs straight, knees and toes pointing toward ceiling. [place]: Place hand on outside of left knee. Stabilize opposite hip. Motion - Cue patient to press knee out to side.   Relax. Repeat 10 times. Repeat with other leg. Do 2 sessions per day. Variation:     Copyright © [employer]. All rights reserved.         Ankle Pump        With left leg elevated, gently flex and extend ankle. Move through full range of motion. Avoid pain.  Repeat 10 times  per set. Do 1 sets per session. Do 2 sessions per day.     [email or web address]     ROM: Inversion / Eversion        With left leg relaxed, gently turn ankle and foot in and out. Move through full range of motion. Avoid pain.  Repeat 10 times per set. Do 1 sets per session. Do 2 sessions per day.     [email or web address]     Copyright © [employer]. All rights reserved.

## 2019-03-18 ENCOUNTER — CLINICAL SUPPORT (OUTPATIENT)
Dept: REHABILITATION | Facility: HOSPITAL | Age: 39
End: 2019-03-18
Payer: MEDICARE

## 2019-03-18 DIAGNOSIS — M62.9 HAMSTRING TIGHTNESS OF BOTH LOWER EXTREMITIES: ICD-10-CM

## 2019-03-18 DIAGNOSIS — R29.898 WEAKNESS OF BOTH LOWER EXTREMITIES: Primary | ICD-10-CM

## 2019-03-18 DIAGNOSIS — R26.89 DECREASED FUNCTIONAL MOBILITY: ICD-10-CM

## 2019-03-18 PROCEDURE — 97110 THERAPEUTIC EXERCISES: CPT | Mod: PO

## 2019-03-18 NOTE — PROGRESS NOTES
"  Physical Therapy Daily Treatment Note     Name: Lisa Irvin [facility] Number: [id number]    Therapy Diagnosis:   Encounter Diagnoses   Name Primary?   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   He was compliant with home exercise program.  Response to previous treatment: NA  Functional change: NA    Pain: 0/10  Location: not at this time.    Objective     Lisa received therapeutic exercises to develop strength, ROM and flexibility for 45 minutes 1:1 with PT including:      Date  03/18/19 03/13/19   VISIT 2/20 1/20   POC EXP. DATE 05/04/19 05/04/19   VISIT AMOUNT  MEDICARE TOTAL 121.28  295.12 90.96  173.84   FACE-TO-FACE 04/04/19 04/04/19        TABLE:     GSS w/ strap 10 x 10" 10 x 10"   Manual HSS 5 x 10" 5 x 10"   Quad sets 7 x 5" 5 x 5"   Supine hip abduction - min A NT 1 x 01   Heel slides 2 straps 2 x 5 ea Min A 1 x 10 ea   LTR w/ min A 1 x 12 ea 1 x 10 ea    Ankle pumps NT 2 x 5 ea   Ankle INV/ EV NT 2 x 5 ea   SLR w/ min-mod A NT 1 x 10 ea   Glute sets 10 x 3"    Initials DG GWA 1/6       Home Exercises Provided and Patient Education Provided     Education provided:   - compliance with HEP, especially stretching.   -Patient's mother was educated on the importance of letting him do more for himself during the day to improve his strength and endurance.   - Patient's mother was also educated that " he is not ready to begin gait training in the parallel bars until he is able to fully extend his knees on his own.    Written Home Exercises Provided: yes.  Exercises were reviewed and Lisa was able to demonstrate them prior to the end of the session.  Lisa demonstrated fair  understanding of the education provided.     See EMR under Patient Instructions for exercises provided 3/13/2019.    Assessment     Patient was able to perform 2 reps of heel slides on both lower extremities independent before needing Min A to avoid substitutions and to move through his full ROM. Patient was able to maneuver his wheelchair back to the gym with occasional verbal or tactile cues to use both his LEs and his UEs. He also continues to require assistance with most exercises to move through his full ROM and avoid substitutions. Patient did require one bathroom break during the session, his mother and sitter assisted him during that break.     Lisa is progressing well towards his goals.   Pt prognosis is Fair.     Pt will continue to benefit from skilled outpatient physical therapy to address the deficits listed in the problem list box on initial evaluation, provide pt/family education and to maximize pt's level of independence in the home and community environment.     Pt's spiritual, cultural and educational needs considered and pt agreeable to plan of care and goals.    Anticipated barriers to physical therapy: progressive nature of patient's condition, difficulty with transportation    Goals:  Short Term Goals: 4 weeks   1. This patient will be independent with a basic HEP.  IN PROGRESS  2. This patient will increase B knee extension to 0 degrees in order to be able to stand upright. IN PROGRESS  3. This patient will increase B LE strength by 1 grade in order to perform bed mobility independently. IN PROGRESS  4. Patient will be able to achieve greater than or equal to 55 on the FOTO Paraplegic Syndrome placing patient in  40%<60% impaired, limited, or restricted category demonstrating overall improved functional ability with lower extremity. IN PROGRESS    Long Term Goals: 8 weeks   1. This patient will be independent with an updated HEP. IN PROGRESS  2. This patient will increase B LE strength to 4/5 in order to ambulate 50 feet with RW and SBA. IN PROGRESS  4. Patient will be able to achieve greater than or equal to 65 on the FOTO Paraplegic Syndrome placing patient in 20%<40% impaired, limited, or restricted category demonstrating overall improved functional ability with lower extremity. IN PROGRESS    Plan     Continue with Plan Of Care and progress as patient tolerates. Increase reps with all exercises next visit.     Mary Marshall, PT

## 2019-03-18 NOTE — PATIENT INSTRUCTIONS
Gluteal Squeeze        Squeeze buttocks muscles as tightly as possible while counting out loud to 3.  Repeat 10 times. Do 2 sessions per day.     [email or web address]     Copyright © [employer]. All rights reserved.

## 2019-03-20 ENCOUNTER — CLINICAL SUPPORT (OUTPATIENT)
Dept: REHABILITATION | Facility: HOSPITAL | Age: 39
End: 2019-03-20
Payer: MEDICARE

## 2019-03-20 DIAGNOSIS — R26.89 DECREASED FUNCTIONAL MOBILITY: ICD-10-CM

## 2019-03-20 DIAGNOSIS — M62.9 HAMSTRING TIGHTNESS OF BOTH LOWER EXTREMITIES: ICD-10-CM

## 2019-03-20 PROCEDURE — 97110 THERAPEUTIC EXERCISES: CPT | Mod: PO

## 2019-03-20 NOTE — PROGRESS NOTES
"  Physical Therapy Daily Treatment Note     Name: Lisa Irvin [facility] Number: [id number]    Therapy Diagnosis:   Encounter Diagnoses   Name Primary?   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   He was compliant with home exercise program.  Response to previous treatment: NA  Functional change: NA    Pain: 0/10  Location: not at this time.    Objective     Lisa received therapeutic exercises to develop strength, ROM and flexibility for 55 minutes 1:1 with PT including:      Date  03/20/19 03/18/19 03/13/19   VISIT 3/20 2/20 1/20   POC EXP. DATE 05/04/19 05/04/19 05/04/19   VISIT AMOUNT  MEDICARE TOTAL 121.28  416.40 121.28  295.12 90.96  173.84   FACE-TO-FACE 04/04/19 04/04/19 04/04/19         TABLE:      GSS w/ strap 10 x 10" 10 x 10" 10 x 10"   Manual HSS 5 x 10" 5 x 10" 5 x 10"   Quad sets 10 x 5" 7 x 5" 5 x 5"   Supine hip abduction - min A 1 x 10 NT 1 x 10   Heel slides 2 straps 2 x 5 ea Min A 2 x 5 ea Min A 1 x 10 ea   LTR w/ min A 1 x 15 ea 1 x 12 ea 1 x 10 ea    Ankle pumps 2 x 5 ea NT 2 x 5 ea   Ankle INV/ EV 2 x 5 ea NT 2 x 5 ea   SLR w/ min-mod A 2 x 5 ea NT 1 x 10 ea   Glute sets 10 x 3" 10 x 3"          Initials GWA 1/6 DG GWA 1/6       Home Exercises Provided and Patient Education Provided     Education provided:   - compliance with HEP, especially stretching.   -Patient's mother was educated on the importance of letting him do more for himself during the day to improve his strength and endurance.   - Patient's " mother was also educated that he is not ready to begin gait training in the parallel bars until he is able to fully extend his knees on his own.    Written Home Exercises Provided: yes.  Exercises were reviewed and Lisa was able to demonstrate them prior to the end of the session.  Lisa demonstrated fair  understanding of the education provided.     See EMR under Patient Instructions for exercises provided 3/13/2019.    Assessment     Patient was able to perform 4 reps of heel slides on both lower extremities independent before needing Min A to avoid substitutions and to move through his full ROM. Patient was able to maneuver his wheelchair back to the gym with occasional verbal or tactile cues to use both his LEs and his UEs. He also continues to require assistance with most exercises to move through his full ROM and avoid substitutions.     Lisa is progressing well towards his goals.   Pt prognosis is Fair.     Pt will continue to benefit from skilled outpatient physical therapy to address the deficits listed in the problem list box on initial evaluation, provide pt/family education and to maximize pt's level of independence in the home and community environment.     Pt's spiritual, cultural and educational needs considered and pt agreeable to plan of care and goals.    Anticipated barriers to physical therapy: progressive nature of patient's condition, difficulty with transportation    Goals:  Short Term Goals: 4 weeks   1. This patient will be independent with a basic HEP.  IN PROGRESS  2. This patient will increase B knee extension to 0 degrees in order to be able to stand upright. IN PROGRESS  3. This patient will increase B LE strength by 1 grade in order to perform bed mobility independently. IN PROGRESS  4. Patient will be able to achieve greater than or equal to 55 on the FOTO Paraplegic Syndrome placing patient in 40%<60% impaired, limited, or restricted category demonstrating overall improved  functional ability with lower extremity. IN PROGRESS    Long Term Goals: 8 weeks   1. This patient will be independent with an updated HEP. IN PROGRESS  2. This patient will increase B LE strength to 4/5 in order to ambulate 50 feet with RW and SBA. IN PROGRESS  4. Patient will be able to achieve greater than or equal to 65 on the FOTO Paraplegic Syndrome placing patient in 20%<40% impaired, limited, or restricted category demonstrating overall improved functional ability with lower extremity. IN PROGRESS    Plan     Continue with Plan Of Care and progress as patient tolerates. Increase reps with all exercises next visit.     John Troy, PTA

## 2019-04-01 ENCOUNTER — CLINICAL SUPPORT (OUTPATIENT)
Dept: REHABILITATION | Facility: HOSPITAL | Age: 39
End: 2019-04-01
Payer: MEDICARE

## 2019-04-01 DIAGNOSIS — M62.9 HAMSTRING TIGHTNESS OF BOTH LOWER EXTREMITIES: ICD-10-CM

## 2019-04-01 DIAGNOSIS — R26.89 DECREASED FUNCTIONAL MOBILITY: ICD-10-CM

## 2019-04-01 PROCEDURE — 97110 THERAPEUTIC EXERCISES: CPT | Mod: PO

## 2019-04-01 NOTE — PROGRESS NOTES
"  Physical Therapy Daily Treatment Note     Name: Lisa Irvin [facility] Number: [id number]    Therapy Diagnosis:   Encounter Diagnoses   Name Primary?   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   He was not compliant with home exercise program.  Response to previous treatment: NA  Functional change: NA    Pain: 0/10  Location:N/A at this time    Objective     Lisa received therapeutic exercises to develop strength, ROM and flexibility for 55 minutes 1:1 with PT including:      Date  04/01/19 03/20/19 03/18/19 03/13/19   VISIT 4/20 3/20 2/20 1/20   POC EXP. DATE 05/04/19 05/04/19 05/04/19 05/04/19   VISIT AMOUNT  MEDICARE TOTAL 121.28  537.68 121.28  416.40 121.28  295.12 90.96  173.84   FACE-TO-FACE 04/04/19 04/04/19 04/04/19 04/04/19          TABLE:       GSS w/ strap 10 x 10" 10 x 10" 10 x 10" 10 x 10"   Manual HSS 5 x 10" 5 x 10" 5 x 10" 5 x 10"   Quad sets 10 x 5" 10 x 5" 7 x 5" 5 x 5"   Supine hip abduction - min A oot 1 x 10 NT 1 x 10   Heel slides 2 straps 2 x 5 ea I 2 x 5 ea Min A 2 x 5 ea Min A 1 x 10 ea   LTR w/ min A 2 x 10 1 x 15 ea 1 x 12 ea 1 x 10 ea    Ankle pumps 2 x 5 min A 2 x 5 ea NT 2 x 5 ea   Ankle INV/ EV 2 x 5 Min A 2 x 5 ea NT 2 x 5 ea   SLR w/ min-mod A oot 2 x 5 ea NT 1 x 10 ea   Glute sets oot 10 x 3" 10 x 3"    SAQ 2 x 5      Initials DG GWA 1/6 DG GWA 1/6       Home Exercises Provided and Patient Education Provided     Education provided:   - compliance with HEP, especially stretching.   -Patient's " mother was educated on the importance of letting him do more for himself during the day to improve his strength and endurance.   - Patient's mother was also educated that he is not ready to begin gait training in the parallel bars until he is able to fully extend his knees on his own.    Written Home Exercises Provided: yes.  Exercises were reviewed and Lisa was able to demonstrate them prior to the end of the session.  Lisa demonstrated fair  understanding of the education provided.     See EMR under Patient Instructions for exercises provided 3/13/2019.    Assessment     Patient was able to demonstrate an increase in strength as noted by his ability to perform heel slides independently and begin SAQ independently. He required frequent verbal cues to avoid using his hands to move his legs and frequent rest breaks with all activities due to fatigue.    Lisa is progressing slowly towards his goals.   Pt prognosis is Fair.     Pt will continue to benefit from skilled outpatient physical therapy to address the deficits listed in the problem list box on initial evaluation, provide pt/family education and to maximize pt's level of independence in the home and community environment.     Pt's spiritual, cultural and educational needs considered and pt agreeable to plan of care and goals.    Anticipated barriers to physical therapy: progressive nature of patient's condition, difficulty with transportation    Goals:  Short Term Goals: 4 weeks   1. This patient will be independent with a basic HEP.  IN PROGRESS  2. This patient will increase B knee extension to 0 degrees in order to be able to stand upright. IN PROGRESS  3. This patient will increase B LE strength by 1 grade in order to perform bed mobility independently. IN PROGRESS  4. Patient will be able to achieve greater than or equal to 55 on the FOTO Paraplegic Syndrome placing patient in 40%<60% impaired, limited, or restricted category demonstrating overall  improved functional ability with lower extremity. IN PROGRESS    Long Term Goals: 8 weeks   1. This patient will be independent with an updated HEP. IN PROGRESS  2. This patient will increase B LE strength to 4/5 in order to ambulate 50 feet with RW and SBA. IN PROGRESS  4. Patient will be able to achieve greater than or equal to 65 on the FOTO Paraplegic Syndrome placing patient in 20%<40% impaired, limited, or restricted category demonstrating overall improved functional ability with lower extremity. IN PROGRESS    Plan     Continue with Plan Of Care and progress as patient tolerates. Increase reps with all exercises next visit.     Mary Marshall, PT

## 2019-04-11 ENCOUNTER — CLINICAL SUPPORT (OUTPATIENT)
Dept: REHABILITATION | Facility: HOSPITAL | Age: 39
End: 2019-04-11
Payer: MEDICARE

## 2019-04-11 DIAGNOSIS — R26.89 DECREASED FUNCTIONAL MOBILITY: ICD-10-CM

## 2019-04-11 DIAGNOSIS — M62.9 HAMSTRING TIGHTNESS OF BOTH LOWER EXTREMITIES: ICD-10-CM

## 2019-04-11 PROCEDURE — 97110 THERAPEUTIC EXERCISES: CPT | Mod: PO

## 2019-04-11 NOTE — PROGRESS NOTES
"  Physical Therapy Daily Treatment Note     Name: Lisa Irvin [facility] Number: [id number]    Therapy Diagnosis:   Encounter Diagnoses   Name Primary?   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   He was not compliant with home exercise program.  Response to previous treatment: NA  Functional change: NA    Pain: 0/10  Location:N/A at this time    Objective     Lisa received therapeutic exercises to develop strength, ROM and flexibility for 55 minutes 1:1 with PT including:      Date  4/11/19 04/01/19 03/20/19 03/18/19 03/13/19   VISIT 5/20 4/20 3/20 2/20 1/20   POC EXP. DATE 5/04/19 05/04/19 05/04/19 05/04/19 05/04/19   VISIT AMOUNT  MEDICARE TOTAL 121.28  658.96 121.28  537.68 121.28  416.40 121.28  295.12 90.96  173.84   FACE-TO-FACE 4/04/19 04/04/19 04/04/19 04/04/19 04/04/19           TABLE:        GSS w/ strap 10 x 10" 10 x 10" 10 x 10" 10 x 10" 10 x 10"   Manual HSS 10 x 10"  Supine & seated 5 x 10" 5 x 10" 5 x 10" 5 x 10"   Hip adductor stretch  20s BLE       Unsupported seated Forward and Back with hands clasped 1 x 10       Unsupported seated side to midline with no BUE support 1 x 10       Quad sets 1 x 10 10 x 5" 10 x 5" 7 x 5" 5 x 5"   Supine hip abduction - min A 1 x 10 oot 1 x 10 NT 1 x 10   Heel slides 2 straps 1 x 10 2 x 5 ea I 2 x 5 ea Min A 2 x 5 ea Min A 1 x 10 ea   LTR w/ min A 2 x 10 2 x 10 1 x 15 ea 1 x 12 ea 1 x 10 ea    Ankle pumps 1 x 10 w/ strap 2 x 5 min A 2 x 5 ea NT 2 x 5 ea   Ankle INV/ EV 1 x 10 2 x " "5 Min A 2 x 5 ea NT 2 x 5 ea   SLR w/ min-mod A oot oot 2 x 5 ea NT 1 x 10 ea   Glute sets 1 x 10 oot 10 x 3" 10 x 3"    SAQ oot 2 x 5      Pushups in w/c 1 x 10               Initials MG DG GWA 1/6 DG GWA 1/6       Home Exercises Provided and Patient Education Provided     Education provided:   - compliance with HEP, especially stretching.   -Patient's caretaker was educated on the importance of letting him do more for himself during the day to improve his strength and endurance.       Written Home Exercises Provided: yes.  Exercises were reviewed and Lisa was able to demonstrate them prior to the end of the session.  Lisa demonstrated fair  understanding of the education provided.     See EMR under Patient Instructions for exercises provided 3/13/2019.    Assessment     Patient was able to demonstrate an increase in strength as noted by his ability to perform heel slides, but also has restricted activation of quads with quad sets so SAQ was not given today. Dynamic seated balance challenges was received well, but still required mod to max A to recover from LOB outside CJ and with no BUE to recover. He required frequent verbal cues to avoid using his hands to move his legs and frequent rest breaks with all activities due to fatigue. Emphasized HEP compliance to develop overall flexibility, ROM, strength, and activity tolerance to improve functional outcomes.    Lisa is progressing slowly towards his goals.   Pt prognosis is Fair.     Pt will continue to benefit from skilled outpatient physical therapy to address the deficits listed in the problem list box on initial evaluation, provide pt/family education and to maximize pt's level of independence in the home and community environment.     Pt's spiritual, cultural and educational needs considered and pt agreeable to plan of care and goals.    Anticipated barriers to physical therapy: progressive nature of patient's condition, difficulty with " transportation    Goals:  Short Term Goals: 4 weeks   1. This patient will be independent with a basic HEP.  IN PROGRESS  2. This patient will increase B knee extension to 0 degrees in order to be able to stand upright. IN PROGRESS  3. This patient will increase B LE strength by 1 grade in order to perform bed mobility independently. IN PROGRESS  4. Patient will be able to achieve greater than or equal to 55 on the FOTO Paraplegic Syndrome placing patient in 40%<60% impaired, limited, or restricted category demonstrating overall improved functional ability with lower extremity. IN PROGRESS    Long Term Goals: 8 weeks   1. This patient will be independent with an updated HEP. IN PROGRESS  2. This patient will increase B LE strength to 4/5 in order to ambulate 50 feet with RW and SBA. IN PROGRESS  4. Patient will be able to achieve greater than or equal to 65 on the FOTO Paraplegic Syndrome placing patient in 20%<40% impaired, limited, or restricted category demonstrating overall improved functional ability with lower extremity. IN PROGRESS    Plan     Continue with Plan Of Care and progress as patient tolerates. Continue to use dynamic seated balance challenges.    Caterina Ivan, PT

## 2019-04-16 ENCOUNTER — CLINICAL SUPPORT (OUTPATIENT)
Dept: REHABILITATION | Facility: HOSPITAL | Age: 39
End: 2019-04-16
Payer: MEDICARE

## 2019-04-16 ENCOUNTER — DOCUMENTATION ONLY (OUTPATIENT)
Dept: REHABILITATION | Facility: HOSPITAL | Age: 39
End: 2019-04-16

## 2019-04-16 DIAGNOSIS — M62.9 HAMSTRING TIGHTNESS OF BOTH LOWER EXTREMITIES: ICD-10-CM

## 2019-04-16 DIAGNOSIS — R26.89 DECREASED FUNCTIONAL MOBILITY: ICD-10-CM

## 2019-04-16 PROCEDURE — 97110 THERAPEUTIC EXERCISES: CPT | Mod: PO

## 2019-04-16 NOTE — PATIENT INSTRUCTIONS
Butterfly, Supine        Lie on back, feet together. Lower knees toward floor. Hold _30s-2mins.  Repeat _3__ times per session. Do __3_ sessions per day.    Copyright © I. All rights reserved.

## 2019-04-16 NOTE — PROGRESS NOTES
"  Physical Therapy Daily Treatment Note     Name: Lisa Irvin [facility] Number: [id number]    Therapy Diagnosis:   Encounter Diagnoses   Name Primary?   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   He was not compliant with home exercise program.  Response to previous treatment: NA  Functional change: NA    Pain: 0/10  Location:N/A at this time    Objective     Lisa received therapeutic exercises to develop strength, ROM and flexibility for 55 minutes 1:1 with PT including:      Date  4/16/19 4/11/19 04/01/19 03/20/19 03/18/19 03/13/19   VISIT 6/20 5/20 4/20 3/20 2/20 1/20   POC EXP. DATE 5/04/19 5/04/19 05/04/19 05/04/19 05/04/19 05/04/19   VISIT AMOUNT  MEDICARE TOTAL 121.28  780.24 121.28  658.96 121.28  537.68 121.28  416.40 121.28  295.12 90.96  173.84   FACE-TO-FACE 5/16/19 4/04/19 04/04/19 04/04/19 04/04/19 04/04/19            TABLE:         GSS w/ strap 10 x 10" 10 x 10" 10 x 10" 10 x 10" 10 x 10" 10 x 10"   Manual HSS Static stretch x 5' each side 10 x 10"  Supine & seated 5 x 10" 5 x 10" 5 x 10" 5 x 10"   Hip adductor stretch 5# on knees in supine butterfly  20s BLE       Unsupported seated Forward and Back with hands clasped 2 x 15 1 x 10       Unsupported seated side to midline with no BUE support 2 x 15 1 x 10       Unsupported seated with overhead reach 1 x 15        Quad sets 1 x 10 1 x 10 10 x 5" 10 x 5" 7 x 5" 5 x 5"   Supine hip abduction - min A NT 1 x 10 oot 1 x 10 NT 1 x 10   Heel slides 2 straps NT 1 x 10 2 x " "5 ea I 2 x 5 ea Min A 2 x 5 ea Min A 1 x 10 ea   LTR w/ min A NT 2 x 10 2 x 10 1 x 15 ea 1 x 12 ea 1 x 10 ea    Ankle pumps 1 x 15 w/ strap 1 x 10 w/ strap 2 x 5 min A 2 x 5 ea NT 2 x 5 ea   Ankle INV/ EV NT 1 x 10 2 x 5 Min A 2 x 5 ea NT 2 x 5 ea   SLR w/ min-mod A NT oot oot 2 x 5 ea NT 1 x 10 ea   Glute sets 2 x 10 1 x 10 oot 10 x 3" 10 x 3"    SAQ 1 x 10 oot 2 x 5      Pushups in w/c NT 1 x 10                Initials MG MG DG GWA 1/6 DG GWA 1/6       Home Exercises Provided and Patient Education Provided     Education provided:   - compliance with HEP, especially stretching.       Written Home Exercises Provided: yes.  Exercises were reviewed and Lisa was able to demonstrate them prior to the end of the session.  Lisa demonstrated fair  understanding of the education provided.     See EMR under Patient Instructions for exercises provided 3/13/2019.    Assessment     Dynamic seated balance challenges was received well, but still required mod to max A to recover from LOB outside CJ and with no BUE to recover. He required frequent verbal cues to avoid using his hands to move his legs and frequent rest breaks with all activities due to fatigue. Emphasized HEP compliance to develop overall flexibility, ROM, strength, and activity tolerance to improve functional outcomes. Pt reported feeling fatigued after today's session. Reprinted all therex HEP and reviewed with pt and caregiver present.    Lisa is progressing slowly towards his goals.   Pt prognosis is Fair.     Pt will continue to benefit from skilled outpatient physical therapy to address the deficits listed in the problem list box on initial evaluation, provide pt/family education and to maximize pt's level of independence in the home and community environment.     Pt's spiritual, cultural and educational needs considered and pt agreeable to plan of care and goals.    Anticipated barriers to physical therapy: progressive nature of patient's condition, " difficulty with transportation    Goals:  Short Term Goals: 4 weeks   1. This patient will be independent with a basic HEP.  IN PROGRESS  2. This patient will increase B knee extension to 0 degrees in order to be able to stand upright. IN PROGRESS  3. This patient will increase B LE strength by 1 grade in order to perform bed mobility independently. IN PROGRESS  4. Patient will be able to achieve greater than or equal to 55 on the FOTO Paraplegic Syndrome placing patient in 40%<60% impaired, limited, or restricted category demonstrating overall improved functional ability with lower extremity. IN PROGRESS    Long Term Goals: 8 weeks   1. This patient will be independent with an updated HEP. IN PROGRESS  2. This patient will increase B LE strength to 4/5 in order to ambulate 50 feet with RW and SBA. IN PROGRESS  4. Patient will be able to achieve greater than or equal to 65 on the FOTO Paraplegic Syndrome placing patient in 20%<40% impaired, limited, or restricted category demonstrating overall improved functional ability with lower extremity. IN PROGRESS    Plan     Continue with Plan Of Care and progress as patient tolerates. Continue to use dynamic seated balance challenges.    Caterina Ivan, PT

## 2019-04-16 NOTE — PROGRESS NOTES
Face to Face PTA Conference performed with John Troy, PTA regarding patient's current status, overall progress, and plan of care    Face to Face PTA Conference performed with Caterina Ivan PT regarding patient's current status, overall progress, and plan of care    John Troy  4/16/2019

## 2019-04-23 ENCOUNTER — CLINICAL SUPPORT (OUTPATIENT)
Dept: REHABILITATION | Facility: HOSPITAL | Age: 39
End: 2019-04-23
Payer: MEDICARE

## 2019-04-23 DIAGNOSIS — M62.9 HAMSTRING TIGHTNESS OF BOTH LOWER EXTREMITIES: ICD-10-CM

## 2019-04-23 DIAGNOSIS — R26.89 DECREASED FUNCTIONAL MOBILITY: ICD-10-CM

## 2019-04-23 PROCEDURE — 97110 THERAPEUTIC EXERCISES: CPT | Mod: PO

## 2019-04-23 NOTE — PROGRESS NOTES
"  Physical Therapy Daily Treatment Note     Name: Lisa Irvin [facility] Number: [id number]    Therapy Diagnosis:   Encounter Diagnoses   Name Primary?   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   He was not compliant with home exercise program.  Response to previous treatment: NA  Functional change: NA    Pain: 0/10  Location:N/A at this time    Objective     Lisa received therapeutic exercises to develop strength, ROM and flexibility for 55 minutes 1:1 with PT including:      Date  4/23/19 4/16/19 4/11/19 04/01/19 03/20/19 03/18/19 03/13/19   VISIT 7/20 6/20 5/20 4/20 3/20 2/20 1/20   POC EXP. DATE 5/04/19 5/04/19 5/04/19 05/04/19 05/04/19 05/04/19 05/04/19   VISIT AMOUNT  MEDICARE TOTAL 121.28  901.52 121.28  780.24 121.28  658.96 121.28  537.68 121.28  416.40 121.28  295.12 90.96  173.84   FACE-TO-FACE 5/16/19 5/16/19 4/04/19 04/04/19 04/04/19 04/04/19 04/04/19             TABLE:          GSS w/ strap 10 x 10" 10 x 10" 10 x 10" 10 x 10" 10 x 10" 10 x 10" 10 x 10"   Manual HSS Static stretch x 5' each side Static stretch x 5' each side 10 x 10"  Supine & seated 5 x 10" 5 x 10" 5 x 10" 5 x 10"   Hip adductor stretch 5# on knees in supine butterfly 5# on knees in supine butterfly  20s BLE       Unsupported seated Forward and Back with hands clasped NT 2 x 15 1 x 10       Unsupported seated side to midline with no BUE support NT 2 x 15 1 x 10       Unsupported seated with overhead reach NT 1 x 15        Quad sets 1 x 10 1 x 10 1 x " "10 10 x 5" 10 x 5" 7 x 5" 5 x 5"   Supine hip abduction - min A 1 x 10 modA NT 1 x 10 oot 1 x 10 NT 1 x 10   Heel slides 2 straps 1 x 10 NT 1 x 10 2 x 5 ea I 2 x 5 ea Min A 2 x 5 ea Min A 1 x 10 ea   LTR w/ min A oot NT 2 x 10 2 x 10 1 x 15 ea 1 x 12 ea 1 x 10 ea    Ankle pumps 1 x 10 1 x 15 w/ strap 1 x 10 w/ strap 2 x 5 min A 2 x 5 ea NT 2 x 5 ea   Ankle INV/ EV 1 x 10 NT 1 x 10 2 x 5 Min A 2 x 5 ea NT 2 x 5 ea   SLR w/ min-mod A oot NT oot oot 2 x 5 ea NT 1 x 10 ea   Glute sets 1 x 15 2 x 10 1 x 10 oot 10 x 3" 10 x 3"    SAQ 1 x 10  Rest then  1 x 5 1 x 10 oot 2 x 5      Pushups in w/c 1 x 10 NT 1 x 10                 Initials MG MG MG DG GWA 1/6 DG GWA 1/6       Home Exercises Provided and Patient Education Provided     Education provided:   - compliance with HEP, especially stretching.       Written Home Exercises Provided: yes.  Exercises were reviewed and Lisa was able to demonstrate them prior to the end of the session.  Lisa demonstrated fair  understanding of the education provided.     See EMR under Patient Instructions for exercises provided 3/13/2019.    Assessment     He required frequent verbal cues to avoid using his hands to move his legs and frequent rest breaks with all activities due to fatigue. Pt has regressed to needing modA to maxA for supine hip abd, SAQ, ankle pumps and inv/evn,  Pt unable to complete a full knee extension with SAQ, but able to reduce knee flexion to -5 after prolonged static HS stretch throughout the PT session.. Pt requires encouragement to continue with tasks and demonstrates lack of carry over of instructions with all therex and HEP. Emphasized HEP compliance to develop overall flexibility, ROM, strength, and activity tolerance to improve functional outcomes. The pts w/c is missing his left calf rest and when asked about it, the pt was unable to understand where it was.     Lisa is progressing slowly towards his goals.   Pt prognosis is Fair.     Pt will " continue to benefit from skilled outpatient physical therapy to address the deficits listed in the problem list box on initial evaluation, provide pt/family education and to maximize pt's level of independence in the home and community environment.     Pt's spiritual, cultural and educational needs considered and pt agreeable to plan of care and goals.    Anticipated barriers to physical therapy: progressive nature of patient's condition, difficulty with transportation    Goals:  Short Term Goals: 4 weeks   1. This patient will be independent with a basic HEP.  IN PROGRESS  2. This patient will increase B knee extension to 0 degrees in order to be able to stand upright. IN PROGRESS  3. This patient will increase B LE strength by 1 grade in order to perform bed mobility independently. IN PROGRESS  4. Patient will be able to achieve greater than or equal to 55 on the FOTO Paraplegic Syndrome placing patient in 40%<60% impaired, limited, or restricted category demonstrating overall improved functional ability with lower extremity. IN PROGRESS    Long Term Goals: 8 weeks   1. This patient will be independent with an updated HEP. IN PROGRESS  2. This patient will increase B LE strength to 4/5 in order to ambulate 50 feet with RW and SBA. IN PROGRESS  4. Patient will be able to achieve greater than or equal to 65 on the FOTO Paraplegic Syndrome placing patient in 20%<40% impaired, limited, or restricted category demonstrating overall improved functional ability with lower extremity. IN PROGRESS    Plan     Continue with Plan Of Care and progress as patient tolerates.     Caterina Ivan, PT

## 2019-04-24 RX ORDER — BACLOFEN 20 MG/1
20 TABLET ORAL 4 TIMES DAILY
Qty: 360 TABLET | Refills: 0 | Status: SHIPPED | OUTPATIENT
Start: 2019-04-24 | End: 2019-07-25 | Stop reason: SDUPTHER

## 2019-04-24 RX ORDER — DANTROLENE SODIUM 50 MG/1
50 CAPSULE ORAL 3 TIMES DAILY
Qty: 270 CAPSULE | Refills: 0 | Status: SHIPPED | OUTPATIENT
Start: 2019-04-24 | End: 2019-08-02 | Stop reason: SDUPTHER

## 2019-04-26 ENCOUNTER — TELEPHONE (OUTPATIENT)
Dept: PHYSICAL MEDICINE AND REHAB | Facility: CLINIC | Age: 39
End: 2019-04-26

## 2019-04-26 NOTE — TELEPHONE ENCOUNTER
----- Message from Vj Morales sent at 4/26/2019  9:29 AM CDT -----  Needs Advice    Reason for call: Ms. Moreno is asking to speak w/ Balwinder about having pt admitted to [facility] inpatient rehab or [facility] inpatient rehab        Communication Preference: Ms. Moreno (mom) @ [phone] (pls leave VM if no answer)    Additional Information:

## 2019-04-28 NOTE — PROGRESS NOTES
Initial PM&R Outpatient Evaluation    CC: Spasticity. Eval for Botox    HPI: Lisa Moreno is a 38 y.o. male with PMHx of spinocerebellar atrophy with LE weakness, spasticity, and ataxic gait    RTC for Botox injections.       PMH:   Past Medical History:   Diagnosis Date    Degenerative motor system disease     Depression     Seizure     Spinocerebellar atrophy        PSH:   Past Surgical History:   Procedure Laterality Date    BACLOFEN PUMP IMPLANTATION      URODYNAMIC STUDY, FLUOROSCOPIC N/A 11/27/2018    Performed by Tanja Okeefe MD at [facility] OR [address] [place] [address]     Current Outpatient Medications on File Prior to Visit   Medication Sig Dispense Refill    blood sugar diagnostic Strp 1 strip by Misc.(Non-Drug; Combo Route) route 3 (three) times daily. 100 each 5    blood-glucose meter, disc-type Kit 1 application by Misc.(Non-Drug; Combo Route) route 3 (three) times daily. 1 kit 0    donepezil (ARICEPT) 5 MG tablet Take 1 tablet (5 mg total) by mouth every evening. 30 tablet 11    fluticasone (FLONASE) 50 mcg/actuation nasal spray 1 spray (50 mcg total) by Each Nare route 2 (two) times daily as needed. 15 g 0    gabapentin (NEURONTIN) 100 MG capsule Take 1 capsule (100 mg total) by mouth 3 (three) times daily. (pain) 90 capsule 11    lancets (ACCU-CHEK SOFTCLIX LANCETS) Misc 1 each by Misc.(Non-Drug; Combo Route) route 3 (three) times daily with meals. 100 each 5    linaclotide (LINZESS) 145 mcg Cap capsule Take 1 capsule (145 mcg total) by mouth once daily. 30 capsule 5    mirabegron (MYRBETRIQ) 25 mg Tb24 ER tablet Take 1 tablet (25 mg total) by mouth once daily. 30 tablet 11     No current facility-administered medications on file prior to visit.      Review of patient's allergies indicates:   Allergen Reactions    Penicillins      Hives and Itching       Family History:   Family History   Problem Relation Age of Onset    Thyroid cancer Mother     Lung cancer Maternal Grandfather     Multiple  sclerosis Other         mother's cousin    ALS Neg Hx     Muscular dystrophy Neg Hx            ROS:   CONSTITUTIONAL:  (-) weight change, fever, chills, night sweats   EYES:  (-)  double vision (-) blurry vision  EARS, NOSE, THROAT: (-) dysphagia (-) hearing loss  RESPIRATORY: (-)   shortness of breath  (-) cough  CARDIOVASCULAR (-) chest pain  (-) swelling  GENITOURINARY    (-) hematuria  GASTROINTESTINAL (-) nausea/vomiting   ENDOCRINE  (-)  heat or cold intolerance  (-) hair loss  PSYCHIATRIC  (-) depression  (-) anxiety  HEMATOL/LYMPHATIC (-) easy bruising (-) enlarged lymph nodes  ALLERGIC/IMMUN  (-) seasonal allergies (-) allergies to environmental stimuli    SKIN (-) changes (-) rashes (-) wounds  The rest of the review of systems is unremarkable.          Physical Examination:  Vitals: There were no vitals taken for this visit.   Gen: NAD, appearing stated age  Gait: NT,  presents in WC  Mood/affect: pleasant and appropriate  Speech: Speech intelligibility impaired with longer sentences   Cognition:  Awake, alert, oriented x3        MMT - UE 5/5, LE - R HF 2/5, Left 3/5; KE limited by tone, DF/PF at least 3/5     Tone:  MAS 2 on R HF, 3 on L, minimal tone in adductors      Impression:  39 yo M with impaired mobility and ADLs in setting of cerebellar atrophy.  + spasticity  + neurogenic Bladder     PROCEDURE NOTE:   The potential risks were discussed with the patient and He consented to proceed. After identifying the correct side (left upper and lower) the patient was placed in a seated position. A syringe was used with 800u Botox reconstituted into 16cc N.S. (50u/cc). A 27G EMG injection needle was utilized as well as EMG guidance to inject the following muscles:         Semimembranous Left : 150 units - EMG  Biceps femoris Left : 150 units - EMG    Semimembranous Right : 150 units - EMG  Biceps femoris Right : 150 units - EMG    Adductor Les Left: 100 units  Adductor Les right: 100 units     Lot No:  [id number]  Exp Date: 08/2021     800 units used  0 units wasted        Plan:   800 units of botox in 3 weeks    Richard Cherry MD

## 2019-04-29 ENCOUNTER — TELEPHONE (OUTPATIENT)
Dept: PHYSICAL MEDICINE AND REHAB | Facility: CLINIC | Age: 39
End: 2019-04-29

## 2019-04-29 NOTE — TELEPHONE ENCOUNTER
----- Message from Vicki Alvarado sent at 4/29/2019  1:37 PM CDT -----  Contact: Giovana (mother) @ [phone]  Pts mother says she is waiting on a return call concerning her request to have pt put in a rehab facility.  Pls call.

## 2019-04-29 NOTE — TELEPHONE ENCOUNTER
LM letting the mom the information was sent to the Dr and just waiting for a reply, but if she still need to talk to me feel free to give a call

## 2019-04-30 ENCOUNTER — CLINICAL SUPPORT (OUTPATIENT)
Dept: REHABILITATION | Facility: HOSPITAL | Age: 39
End: 2019-04-30
Payer: MEDICARE

## 2019-04-30 DIAGNOSIS — R26.89 DECREASED FUNCTIONAL MOBILITY: ICD-10-CM

## 2019-04-30 DIAGNOSIS — M62.9 HAMSTRING TIGHTNESS OF BOTH LOWER EXTREMITIES: ICD-10-CM

## 2019-04-30 PROCEDURE — 97110 THERAPEUTIC EXERCISES: CPT | Mod: PO

## 2019-04-30 NOTE — PROGRESS NOTES
"  Physical Therapy Daily Treatment Note     Name: iLsa Irvin [facility] Number: [id number]    Therapy Diagnosis:   Encounter Diagnoses   Name Primary?   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   He was not compliant with home exercise program.  Response to previous treatment: NA  Functional change: NA    Pain: 0/10  Location:N/A at this time    Objective     Lisa received therapeutic exercises to develop strength, ROM and flexibility for 55 minutes 1:1 with PT including:      Date  4/29/2019 4/23/19 4/16/19 4/11/19 04/01/19 03/20/19 03/18/19 03/13/19   VISIT 8/20 7/20 6/20 5/20 4/20 3/20 2/20 1/20   POC EXP. DATE 5/04/19 5/04/19 5/04/19 5/04/19 05/04/19 05/04/19 05/04/19 05/04/19   VISIT AMOUNT  MEDICARE TOTAL 121.28  1022.8 121.28  901.52 121.28  780.24 121.28  658.96 121.28  537.68 121.28  416.40 121.28  295.12 90.96  173.84   FACE-TO-FACE 5/16/19 5/16/19 5/16/19 4/04/19 04/04/19 04/04/19 04/04/19 04/04/19              TABLE:           GSS w/ strap 10 x 10" 10 x 10" 10 x 10" 10 x 10" 10 x 10" 10 x 10" 10 x 10" 10 x 10"   Manual HSS Static stretch x 10' each Static stretch x 5' each side Static stretch x 5' each side 10 x 10"  Supine & seated 5 x 10" 5 x 10" 5 x 10" 5 x 10"   Hip adductor stretch 5# on knees in supine butterfly 5# on knees in supine butterfly 5# on knees in supine butterfly  20s BLE       Unsupported seated Forward and Back with hands clasped 1 x 15 NT 2 x 15 1 x 10       Unsupported seated side to midline with " "no BUE support 1 x 15 NT 2 x 15 1 x 10       Unsupported seated with overhead reach 1 x 15 with R and L rot NT 1 x 15        Quad sets 1 x 10 1 x 10 1 x 10 1 x 10 10 x 5" 10 x 5" 7 x 5" 5 x 5"   Supine hip abduction - min A NT 1 x 10 modA NT 1 x 10 oot 1 x 10 NT 1 x 10   Clams 1 x 10          Heel slides 2 straps NT 1 x 10 NT 1 x 10 2 x 5 ea I 2 x 5 ea Min A 2 x 5 ea Min A 1 x 10 ea   LTR w/ min A 1 x 10 oot NT 2 x 10 2 x 10 1 x 15 ea 1 x 12 ea 1 x 10 ea    Ankle pumps 1 x 15 w/ strap 1 x 10 1 x 15 w/ strap 1 x 10 w/ strap 2 x 5 min A 2 x 5 ea NT 2 x 5 ea   Ankle INV/ EV NT 1 x 10 NT 1 x 10 2 x 5 Min A 2 x 5 ea NT 2 x 5 ea   SLR w/ min-mod A attempted oot NT oot oot 2 x 5 ea NT 1 x 10 ea   Glute sets 1 x 15 1 x 15 2 x 10 1 x 10 oot 10 x 3" 10 x 3"    SAQ 2 x 5 1 x 10  Rest then  1 x 5 1 x 10 oot 2 x 5      Pushups in w/c 1 x 5 1 x 10 NT 1 x 10                  Initials MG MG MG MG DG GWA 1/6 DG GWA 1/6       Home Exercises Provided and Patient Education Provided     Education provided:   - compliance with HEP, especially stretching.       Written Home Exercises Provided: yes.  Exercises were reviewed and Lisa was able to demonstrate them prior to the end of the session.  Lisa demonstrated fair  understanding of the education provided.     See EMR under Patient Instructions for exercises provided 3/13/2019.    Assessment     He required frequent rest breaks with all activities due to fatigue. Pt still requires maxA for supine hip abd, SAQ, ankle pumps and inv/evn,  Pt unable to intiate quad set with no muscle contraction noted with palpation f/b the pt's inability to complete a full knee extension with SAQ, and required a reduction in reps and sets to continue with therex today. Static stretch was only able to reduce nathaniel flexion to -10 today with pt reporting more pain today behind the knee.. Pt requires encouragement to continue with tasks and demonstrates lack of carry over of instructions with all therex and " "HEP. Emphasized HEP compliance to develop overall flexibility, ROM, strength, and activity tolerance to improve functional outcomes. Discussed with "coordinator" that was present in therapy today about the pt's needs for consistent HEP review at home and stretches, the coordinator notified the PT that the home caregivers have not been assisting the pt with his exercises at home. After therapy today, the pt reported feeling tired and his exercises have been getting harder to do.    Lisa is progressing slowly towards his goals. - the pt is currently regressing in strength, ROM, and endurance at this time  Pt prognosis is Fair.     Pt will continue to benefit from skilled outpatient physical therapy to address the deficits listed in the problem list box on initial evaluation, provide pt/family education and to maximize pt's level of independence in the home and community environment.     Pt's spiritual, cultural and educational needs considered and pt agreeable to plan of care and goals.    Anticipated barriers to physical therapy: progressive nature of patient's condition, difficulty with transportation, compliance with HEP    Goals:  Short Term Goals: 4 weeks   1. This patient will be independent with a basic HEP.  IN PROGRESS  2. This patient will increase B knee extension to 0 degrees in order to be able to stand upright. IN PROGRESS  3. This patient will increase B LE strength by 1 grade in order to perform bed mobility independently. IN PROGRESS  4. Patient will be able to achieve greater than or equal to 55 on the FOTO Paraplegic Syndrome placing patient in 40%<60% impaired, limited, or restricted category demonstrating overall improved functional ability with lower extremity. IN PROGRESS    Long Term Goals: 8 weeks   1. This patient will be independent with an updated HEP. IN PROGRESS  2. This patient will increase B LE strength to 4/5 in order to ambulate 50 feet with RW and SBA. IN PROGRESS  4. Patient " will be able to achieve greater than or equal to 65 on the FOTO Paraplegic Syndrome placing patient in 20%<40% impaired, limited, or restricted category demonstrating overall improved functional ability with lower extremity. IN PROGRESS    Plan     Continue with Plan Of Care and progress as patient tolerates.     Caterina Ivan, PT

## 2019-05-02 ENCOUNTER — CLINICAL SUPPORT (OUTPATIENT)
Dept: REHABILITATION | Facility: HOSPITAL | Age: 39
End: 2019-05-02
Payer: MEDICARE

## 2019-05-02 ENCOUNTER — TELEPHONE (OUTPATIENT)
Dept: PHYSICAL MEDICINE AND REHAB | Facility: CLINIC | Age: 39
End: 2019-05-02

## 2019-05-02 DIAGNOSIS — R26.89 DECREASED FUNCTIONAL MOBILITY: ICD-10-CM

## 2019-05-02 DIAGNOSIS — M62.9 HAMSTRING TIGHTNESS OF BOTH LOWER EXTREMITIES: ICD-10-CM

## 2019-05-02 PROCEDURE — 97110 THERAPEUTIC EXERCISES: CPT | Mod: PO

## 2019-05-02 NOTE — TELEPHONE ENCOUNTER
----- Message from Dorothy Loyd sent at 5/2/2019 10:31 AM CDT -----  Contact: Ana Laura ( [facility]) @ [phone]  Calling to speak with someone in Dr. Rodriguez' office regarding her getting an order to inpatient rehab. Please call.

## 2019-05-02 NOTE — PROGRESS NOTES
"  Physical Therapy Daily Treatment Note     Name: Lisa Irvin [facility] Number: [id number]    Therapy Diagnosis:   Encounter Diagnoses   Name Primary?   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 Still not doing exercises.  He was not compliant with home exercise program.  Response to previous treatment: NA  Functional change: NA    Pain: 0/10  Location:N/A at this time    Objective     Lisa received therapeutic exercises to develop strength, ROM and flexibility for 55 minutes 1:1 with PT including:      Date  5/2/2019 4/29/2019 4/23/19 4/16/19 4/11/19 04/01/19 03/20/19 03/18/19 03/13/19   VISIT 8/20 8/20 7/20 6/20 5/20 4/20 3/20 2/20 1/20   POC EXP. DATE 5/04/2019 5/04/19 5/04/19 5/04/19 5/04/19 05/04/19 05/04/19 05/04/19 05/04/19   VISIT AMOUNT  MEDICARE TOTAL 121.28  1144.08 121.28  1022.8 121.28  901.52 121.28  780.24 121.28  658.96 121.28  537.68 121.28  416.40 121.28  295.12 90.96  173.84   FACE-TO-FACE 5/16/2019 5/16/19 5/16/19 5/16/19 4/04/19 04/04/19 04/04/19 04/04/19 04/04/19               TABLE:            GSS w/ strap 10 x 10" 10 x 10" 10 x 10" 10 x 10" 10 x 10" 10 x 10" 10 x 10" 10 x 10" 10 x 10"   Manual HSS Static and manual SLR, maxA  3 x 15s each Static stretch x 10' each Static stretch x 5' each side Static stretch x 5' each side 10 x 10"  Supine & seated 5 x 10" 5 x 10" 5 x 10" 5 x 10"   Hip adductor stretch Manual x 3' 5# on knees in supine butterfly 5# on knees in supine butterfly 5# on knees in supine butterfly " " 20s BLE       Unsupported seated Forward and Back with hands clasped NT 1 x 15 NT 2 x 15 1 x 10       Unsupported seated side to midline with no BUE support NT 1 x 15 NT 2 x 15 1 x 10       Unsupported seated with overhead reach NT 1 x 15 with R and L rot NT 1 x 15        Quad sets 2 x 5 1 x 10 1 x 10 1 x 10 1 x 10 10 x 5" 10 x 5" 7 x 5" 5 x 5"   Supine hip abduction - min A NT NT 1 x 10 modA NT 1 x 10 oot 1 x 10 NT 1 x 10   Clams NT 1 x 10          Heel slides 2 straps 1 x 3 w/ Anam NT 1 x 10 NT 1 x 10 2 x 5 ea I 2 x 5 ea Min A 2 x 5 ea Min A 1 x 10 ea   LTR w/ min A 1 x 10 1 x 10 oot NT 2 x 10 2 x 10 1 x 15 ea 1 x 12 ea 1 x 10 ea    Ankle pumps 1 x 15 w/ strap 1 x 15 w/ strap 1 x 10 1 x 15 w/ strap 1 x 10 w/ strap 2 x 5 min A 2 x 5 ea NT 2 x 5 ea   Ankle INV/ EV attempted NT 1 x 10 NT 1 x 10 2 x 5 Min A 2 x 5 ea NT 2 x 5 ea   SLR w/ min-mod A NT attempted oot NT oot oot 2 x 5 ea NT 1 x 10 ea   Glute sets 1 x 15 1 x 15 1 x 15 2 x 10 1 x 10 oot 10 x 3" 10 x 3"    SAQ 2 x 5 2 x 5 1 x 10  Rest then  1 x 5 1 x 10 oot 2 x 5      marches 2 x 5  W/ Anam for 2nd set           Hip abd isometric in hooklying 1 x 10           Pushups in w/c 1 x 5 1 x 5 1 x 10 NT 1 x 10                   Initials MG MG MG MG MG DG GWA 1/6 DG GWA 1/6       Home Exercises Provided and Patient Education Provided     Education provided:   - compliance with HEP, especially stretching.  - possible need to transfer to neuro clinic in the future for PT, OT, and ST  - the pt requires a w/c assessment to promote proper posture and positioning      Written Home Exercises Provided: yes.  Exercises were reviewed and Lisa was able to demonstrate them prior to the end of the session.  Lisa demonstrated fair  understanding of the education provided.     See EMR under Patient Instructions for exercises provided 3/13/2019.    Assessment     The pt requires maxA for most exercises now and demonstrates decreased ability to complete all the reps and sets " without assistance or prolonged rest breaks in between. Discussed with pt the need for the pt and family to determine if future therapy (possibly at neuro clinic) will be completed with compliance with HEP at home. The pt discussed the importance of a personalized wheelchair for the pt to be able to have correct posture and positioning to reduce pressure sores and prevent future muscle contractions in lower extremities. The pt and coordinator verbally understood all recommendations. Pt is to be discharged today due to lack of progress with goals due to lack of HEP compliance and evolving nature of pt's neurological condition.    Lisa is not progressing towards his goals. - the pt is currently regressing in strength, ROM, and endurance at this time  Pt prognosis is Guarded.     Pt will continue to benefit from skilled outpatient physical therapy to address the deficits listed in the problem list box on initial evaluation, provide pt/family education and to maximize pt's level of independence in the home and community environment.     Pt's spiritual, cultural and educational needs considered and pt agreeable to plan of care and goals.    Anticipated barriers to physical therapy: progressive nature of patient's condition, difficulty with transportation, compliance with HEP    Goals:  Short Term Goals: 4 weeks   1. This patient will be independent with a basic HEP. NOT MET  2. This patient will increase B knee extension to 0 degrees in order to be able to stand upright. NOT MET  3. This patient will increase B LE strength by 1 grade in order to perform bed mobility independently. NOT MET  4. Patient will be able to achieve greater than or equal to 55 on the FOTO Paraplegic Syndrome placing patient in 40%<60% impaired, limited, or restricted category demonstrating overall improved functional ability with lower extremity. NOT MET    Long Term Goals: 8 weeks   1. This patient will be independent with an updated HEP. NOT  MET  2. This patient will increase B LE strength to 4/5 in order to ambulate 50 feet with RW and SBA. NOT MET  4. Patient will be able to achieve greater than or equal to 65 on the FOTO Paraplegic Syndrome placing patient in 20%<40% impaired, limited, or restricted category demonstrating overall improved functional ability with lower extremity. NOT MET    Plan     Discharged today.       Caterina Ivan, PT

## 2019-05-02 NOTE — PLAN OF CARE
Outpatient Therapy Discharge Summary     Name: Lisa Irvin [facility] Number: [id number]    Therapy Diagnosis: No diagnosis found.  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 This patient will be independent with a basic HEP.  2. This patient will increase B knee extension to 0 degrees in order to be able to stand upright.   3. This patient will increase B LE strength by 1 grade in order to perform bed mobility independently.  4. Patient will be able to achieve greater than or equal to 55 on the FOTO Paraplegic Syndrome placing patient in 40%<60% impaired, limited, or restricted category demonstrating overall improved functional ability with lower extremity.   Long Term Goals: 8 weeks NOT MET  1. This patient will be independent with an updated HEP.  2. This patient will increase B LE strength to 4/5 in order to ambulate 50 feet with RW and SBA.  4. Patient will be able to achieve greater than or equal to 65 on the FOTO Paraplegic Syndrome placing patient in 20%<40% impaired, limited, or restricted category demonstrating overall improved functional ability with lower extremity.       Range of Motion: AROM (PROM):  Hip and ankle PROM WNL     Knee Left  Right   Extension -25 (-5) -25 (-5)   Flexion 130 (140) 115 (130)      Strength:  Hip Left Right   Flexion 2/5 2/5   Abduction 3-/5 3-/5   Adduction 3/5 3/5      Knee Left Right   Extension 2/5 2/5   Flexion 3/5 3/5      Ankle Left Right   Dorsiflexion 2/5 2/5   Plantarflexion 2/5 2/5   Inversion 1/5 1/5   Eversion 1/5 1/5         Special Tests: unable to assess as patient is unable to stand unsupported or ambulate     Joint Mobility: WNL     Palpation: No TTP     Sensation: light touch  intact     Flexibility: SLR 30 degrees L and 35 degrees R       The pt requires maxA for most exercises and demonstrates decreased ability to complete all the reps and sets without assistance or prolonged rest breaks in between. Discussed with pt the need for the pt and family to determine if future therapy will be completed with compliance with HEP at home. The pt discussed the importance of a personalized wheelchair for the pt to be able to have correct posture and positioning to reduce pressure sores and prevent future muscle contractions in lower extremities. The pt and coordinator verbally understood all recommendations. Pt is to be discharged today due to lack of progress with goals due to lack of HEP compliance and evolving nature of pt's neurological condition.    Discharge reason: Patient has reached the maximum rehab potential for the present time    Plan   This patient is discharged from Physical Therapy

## 2019-05-07 ENCOUNTER — TELEPHONE (OUTPATIENT)
Dept: PHYSICAL MEDICINE AND REHAB | Facility: CLINIC | Age: 39
End: 2019-05-07

## 2019-05-07 NOTE — TELEPHONE ENCOUNTER
Spoke with the mom and told her what the Dr stated that he would like for the patient to wait for his botox appointment and to be evaluated before sending him backto in-patient therapy.  The mother stated that would be to long because he is very stiff and need to have therapy and would like orders sent to [facility].  I discussed this with the Dr as well.

## 2019-05-07 NOTE — TELEPHONE ENCOUNTER
"----- Message from Vj Morales sent at 5/7/2019  9:44 AM CDT -----  Needs Advice    Reason for call: Ms. Moreno is asking Balwinder to fax the paperwork for rehab "to that lady in [place]"; Ms. Moreno would not confirm who that is.        Communication Preference: Ms. Moreno (mom) @ [phone]    Additional Information:  "

## 2019-05-08 ENCOUNTER — TELEPHONE (OUTPATIENT)
Dept: PHYSICAL MEDICINE AND REHAB | Facility: CLINIC | Age: 39
End: 2019-05-08

## 2019-05-08 NOTE — TELEPHONE ENCOUNTER
----- Message from Vj Morales sent at 5/8/2019  8:41 AM CDT -----  Needs Advice    Reason for call: María is asking to speak w/ Balwinder regarding a referral to inpatient rehab, María said she hasn't received the faxed referral        Communication Preference: María w/ [facility] @ [phone]    Additional Information:

## 2019-05-09 DIAGNOSIS — G11.8 SPINOCEREBELLAR ATAXIA: Primary | ICD-10-CM

## 2019-05-10 ENCOUNTER — TELEPHONE (OUTPATIENT)
Dept: PHYSICAL MEDICINE AND REHAB | Facility: CLINIC | Age: 39
End: 2019-05-10

## 2019-05-10 NOTE — TELEPHONE ENCOUNTER
Spoke with María and informed her to read the comments in the referral which stated inpatient PT/OT/SLP

## 2019-05-10 NOTE — TELEPHONE ENCOUNTER
----- Message from Dorothy Loyd sent at 5/8/2019  1:03 PM CDT -----  Needs Advice     Reason for call: María is asking to speak w/ Balwinder regarding a referral to inpatient rehab, María said she hasn't received the faxed referral        Communication Preference: María w/ [facility] please fax [phone]     Additional Information:

## 2019-05-10 NOTE — TELEPHONE ENCOUNTER
----- Message from Vjfinn Morales sent at 5/10/2019 11:50 AM CDT -----  Needs Advice    Reason for call: Ms. Moreno is asking Balwinder call [facility], Ms. Moreno states the doctor put outpatient instead of inpatient        Communication Preference: Ms. Moreno (mom) @ [phone]    Additional Information:

## 2019-05-11 NOTE — TELEPHONE ENCOUNTER
Called done at 245 PM on 5/9    Mother of patient discussed issue regarding referral to inpatient rehabilitation. I told her at length that he has not been evaluated in clinic and has not undergone treatment with botox scheduled later this month. I would recommend to wait until treatment was completed until we can set him up with therapy. At this time, we could refer him for outpatient therapy so that he could get back into therapy and get botox for better effect. Mother of patient stated that he has been having transportation issues and has not been able to consistently get into therapy. She would prefer inpatient to improve level of therapy. She stated someone (who she is unable to name) promised her that the patient would get orders for inpatient rehab. Per chart review before I assumed case, it appears arrangements were started for inpatient rehabilitation services. I explained to mother that it was not brought to my attention that this process was started before I became involved in the patient's care. She expressed understanding. Provider order to facility at [facility] for IPR services.

## 2019-05-21 ENCOUNTER — OFFICE VISIT (OUTPATIENT)
Dept: PHYSICAL MEDICINE AND REHAB | Facility: CLINIC | Age: 39
End: 2019-05-21
Payer: MEDICARE

## 2019-05-21 VITALS — DIASTOLIC BLOOD PRESSURE: 76 MMHG | HEART RATE: 95 BPM | SYSTOLIC BLOOD PRESSURE: 115 MMHG

## 2019-05-21 DIAGNOSIS — R25.2 SPASTICITY: ICD-10-CM

## 2019-05-21 DIAGNOSIS — M62.9 HAMSTRING TIGHTNESS OF BOTH LOWER EXTREMITIES: ICD-10-CM

## 2019-05-21 DIAGNOSIS — G80.1 SPASTIC DIPLEGIA: Primary | ICD-10-CM

## 2019-05-21 DIAGNOSIS — G11.8 SPINOCEREBELLAR ATAXIA: ICD-10-CM

## 2019-05-21 DIAGNOSIS — G12.29 UPPER MOTOR NEURON DISEASE: ICD-10-CM

## 2019-05-21 PROCEDURE — 64642 CHEMODENERV 1 EXTREMITY 1-4: CPT | Mod: S$PBB,,, | Performed by: PHYSICAL MEDICINE & REHABILITATION

## 2019-05-21 PROCEDURE — 99499 UNLISTED E&M SERVICE: CPT | Mod: S$PBB,,, | Performed by: PHYSICAL MEDICINE & REHABILITATION

## 2019-05-21 PROCEDURE — 99499 NO LOS: ICD-10-PCS | Mod: S$PBB,,, | Performed by: PHYSICAL MEDICINE & REHABILITATION

## 2019-05-21 PROCEDURE — 64643 PR CHEMODENERV 1 EXT; EA ADD'L EXT, 1-4 MUSCLE(S): ICD-10-PCS | Mod: S$PBB,,, | Performed by: PHYSICAL MEDICINE & REHABILITATION

## 2019-05-21 PROCEDURE — 64643 CHEMODENERV 1 EXTREM 1-4 EA: CPT | Mod: PBBFAC,PO | Performed by: PHYSICAL MEDICINE & REHABILITATION

## 2019-05-21 PROCEDURE — 64642 PR CHEMODENERV ONE EXTREMITY; 1-4 MUSCLE(S): ICD-10-PCS | Mod: S$PBB,,, | Performed by: PHYSICAL MEDICINE & REHABILITATION

## 2019-05-21 PROCEDURE — 64642 CHEMODENERV 1 EXTREMITY 1-4: CPT | Mod: PBBFAC,PO | Performed by: PHYSICAL MEDICINE & REHABILITATION

## 2019-05-21 PROCEDURE — 99213 OFFICE O/P EST LOW 20 MIN: CPT | Mod: PBBFAC,PO,25 | Performed by: PHYSICAL MEDICINE & REHABILITATION

## 2019-05-21 PROCEDURE — 99999 PR PBB SHADOW E&M-EST. PATIENT-LVL III: ICD-10-PCS | Mod: PBBFAC,,, | Performed by: PHYSICAL MEDICINE & REHABILITATION

## 2019-05-21 PROCEDURE — 64643 CHEMODENERV 1 EXTREM 1-4 EA: CPT | Mod: S$PBB,,, | Performed by: PHYSICAL MEDICINE & REHABILITATION

## 2019-05-21 PROCEDURE — 99999 PR PBB SHADOW E&M-EST. PATIENT-LVL III: CPT | Mod: PBBFAC,,, | Performed by: PHYSICAL MEDICINE & REHABILITATION

## 2019-05-21 RX ADMIN — ONABOTULINUMTOXINA 800 UNITS: 100 INJECTION, POWDER, LYOPHILIZED, FOR SOLUTION INTRADERMAL; INTRAMUSCULAR at 02:05

## 2019-05-21 NOTE — PROGRESS NOTES
Initial PM&R Outpatient Evaluation    CC: Spasticity. Eval for Botox    HPI: Lisa Moreno is a 38 y.o. male with PMHx of spinocerebellar atrophy with LE weakness, spasticity, and ataxic gait    Mother present at bedside. Just completed inpatient rehabilitation stay at [facility] with some improvement in hamstring strength. Of note, she states he was in outpatient with no good benefit. Discussed that he will need further therapies for outpatient and home exercise program for continued improvement.      PMH:   Past Medical History:   Diagnosis Date    Degenerative motor system disease     Depression     Seizure     Spinocerebellar atrophy        PSH:   Past Surgical History:   Procedure Laterality Date    BACLOFEN PUMP IMPLANTATION      URODYNAMIC STUDY, FLUOROSCOPIC N/A 11/27/2018    Performed by Tanja Okeefe MD at [facility] OR [address] [place] [address]     Current Outpatient Medications on File Prior to Visit   Medication Sig Dispense Refill    baclofen (LIORESAL) 20 MG tablet Take 1 tablet (20 mg total) by mouth 4 (four) times daily. 360 tablet 0    blood sugar diagnostic Strp 1 strip by Misc.(Non-Drug; Combo Route) route 3 (three) times daily. 100 each 5    blood-glucose meter, disc-type Kit 1 application by Misc.(Non-Drug; Combo Route) route 3 (three) times daily. 1 kit 0    dantrolene (DANTRIUM) 50 MG Cap Take 1 capsule (50 mg total) by mouth 3 (three) times daily. 270 capsule 0    donepezil (ARICEPT) 5 MG tablet Take 1 tablet (5 mg total) by mouth every evening. 30 tablet 11    fluticasone (FLONASE) 50 mcg/actuation nasal spray 1 spray (50 mcg total) by Each Nare route 2 (two) times daily as needed. 15 g 0    lancets (ACCU-CHEK SOFTCLIX LANCETS) Misc 1 each by Misc.(Non-Drug; Combo Route) route 3 (three) times daily with meals. 100 each 5    linaclotide (LINZESS) 145 mcg Cap capsule Take 1 capsule (145 mcg total) by mouth once daily. 30 capsule 5    mirabegron (MYRBETRIQ) 25 mg Tb24 ER tablet Take 1 tablet (25  mg total) by mouth once daily. 30 tablet 11    gabapentin (NEURONTIN) 100 MG capsule Take 1 capsule (100 mg total) by mouth 3 (three) times daily. (pain) 90 capsule 11     No current facility-administered medications on file prior to visit.      Review of patient's allergies indicates:   Allergen Reactions    Penicillins      Hives and Itching       Family History:   Family History   Problem Relation Age of Onset    Thyroid cancer Mother     Lung cancer Maternal Grandfather     Multiple sclerosis Other         mother's cousin    ALS Neg Hx     Muscular dystrophy Neg Hx            ROS:   CONSTITUTIONAL:  (-) weight change, fever, chills, night sweats   EYES:  (-)  double vision (-) blurry vision  EARS, NOSE, THROAT: (-) dysphagia (-) hearing loss  RESPIRATORY: (-)   shortness of breath  (-) cough  CARDIOVASCULAR (-) chest pain  (-) swelling  GENITOURINARY    (-) hematuria  GASTROINTESTINAL (-) nausea/vomiting   ENDOCRINE  (-)  heat or cold intolerance  (-) hair loss  PSYCHIATRIC  (-) depression  (-) anxiety  HEMATOL/LYMPHATIC (-) easy bruising (-) enlarged lymph nodes  ALLERGIC/IMMUN  (-) seasonal allergies (-) allergies to environmental stimuli    SKIN (-) changes (-) rashes (-) wounds  The rest of the review of systems is unremarkable.          Physical Examination:  Vitals: /76   Pulse 95    Gen: NAD, appearing stated age  Gait: NT,  presents in WC  Mood/affect: pleasant and appropriate  Speech: Speech intelligibility impaired with longer sentences   Cognition:  Awake, alert, oriented x3        MMT - UE 5/5, LE - R HF 2/5, Left 3/5; KE limited by tone, DF/PF at least 3/5     Tone:  MAS 2 on R HF, 3 on L, minimal tone in adductors      Impression:  37 yo M with impaired mobility and ADLs in setting of cerebellar atrophy.  + spasticity  + neurogenic Bladder     PROCEDURE NOTE:   The potential risks were discussed with the patient and He consented to proceed. After identifying the correct side (left  upper and lower) the patient was placed in a seated position. A syringe was used with 800u Botox reconstituted into 16cc N.S. (50u/cc). A 27G EMG injection needle was utilized as well as EMG guidance to inject the following muscles:         Semitendinosus Left : 100 -EMG  Semimembranous Left : 200  units - EMG  Biceps femoris Left : 100 units - EMG    Semitendinosus Right: 200 units EMG  Semimembranous Right : 100 units - EMG  Biceps femoris Right : 100 units - EMG      Lot No: [id number]  Exp Date: 12/2021     800 units used  0 units wasted        Plan:  Completed and tolerated procedure as above.  Ordered outpatient PT at [facility] for strengthening, stretching, standing, and ambulation.    Yung Rodriguez MD

## 2019-07-25 RX ORDER — BACLOFEN 20 MG/1
20 TABLET ORAL 4 TIMES DAILY
Qty: 360 TABLET | Refills: 0 | Status: SHIPPED | OUTPATIENT
Start: 2019-07-25 | End: 2019-10-25 | Stop reason: SDUPTHER

## 2019-07-25 NOTE — TELEPHONE ENCOUNTER
[facility]  called requesting a refill of the patient's baclofen.    Last visit: 05/21/2019  Next visit: 08/20/2019  Last refill Baclofen: 04/24/2019 90d refill

## 2019-08-02 DIAGNOSIS — M62.838 MUSCLE SPASM: Primary | ICD-10-CM

## 2019-08-02 RX ORDER — DANTROLENE SODIUM 50 MG/1
50 CAPSULE ORAL 3 TIMES DAILY
Qty: 270 CAPSULE | Refills: 0 | Status: SHIPPED | OUTPATIENT
Start: 2019-08-02 | End: 2019-08-14 | Stop reason: CLARIF

## 2019-08-02 NOTE — TELEPHONE ENCOUNTER
----- Message from Margaret Quintana sent at 8/2/2019  9:08 AM CDT -----  Contact:   [facility]    Reason for call:    New prescription      Pharmacy Name:   [facility]      Prescription Name:   Dantrolene  50 mg     Phone Number:   [phone]    Additional Information:

## 2019-08-07 ENCOUNTER — HOSPITAL ENCOUNTER (EMERGENCY)
Facility: HOSPITAL | Age: 39
Discharge: HOME | End: 2019-08-07
Attending: EMERGENCY MEDICINE
Payer: MEDICARE

## 2019-08-07 VITALS
WEIGHT: 170 LBS | TEMPERATURE: 99 F | RESPIRATION RATE: 16 BRPM | HEIGHT: 76 IN | BODY MASS INDEX: 20.7 KG/M2 | HEART RATE: 82 BPM | SYSTOLIC BLOOD PRESSURE: 150 MMHG | OXYGEN SATURATION: 100 % | DIASTOLIC BLOOD PRESSURE: 83 MMHG

## 2019-08-07 DIAGNOSIS — R10.9 ABDOMINAL PAIN, UNSPECIFIED ABDOMINAL LOCATION: Primary | ICD-10-CM

## 2019-08-07 LAB
ALBUMIN SERPL BCP-MCNC: 3 G/DL (ref 3.5–5.2)
ALP SERPL-CCNC: 43 U/L (ref 55–135)
ALT SERPL W/O P-5'-P-CCNC: 15 U/L (ref 10–44)
ANION GAP SERPL CALC-SCNC: 8 MMOL/L (ref 8–16)
AST SERPL-CCNC: 12 U/L (ref 10–40)
BASOPHILS # BLD AUTO: 0.03 K/UL (ref 0–0.2)
BASOPHILS NFR BLD: 0.2 % (ref 0–1.9)
BILIRUB SERPL-MCNC: 0.4 MG/DL (ref 0.1–1)
BUN SERPL-MCNC: 13 MG/DL (ref 6–20)
CALCIUM SERPL-MCNC: 7.6 MG/DL (ref 8.7–10.5)
CHLORIDE SERPL-SCNC: 112 MMOL/L (ref 95–110)
CO2 SERPL-SCNC: 21 MMOL/L (ref 23–29)
CREAT SERPL-MCNC: 0.7 MG/DL (ref 0.5–1.4)
DIFFERENTIAL METHOD: ABNORMAL
EOSINOPHIL # BLD AUTO: 0 K/UL (ref 0–0.5)
EOSINOPHIL NFR BLD: 0.2 % (ref 0–8)
ERYTHROCYTE [DISTWIDTH] IN BLOOD BY AUTOMATED COUNT: 13.4 % (ref 11.5–14.5)
EST. GFR  (AFRICAN AMERICAN): >60 ML/MIN/1.73 M^2
EST. GFR  (NON AFRICAN AMERICAN): >60 ML/MIN/1.73 M^2
GLUCOSE SERPL-MCNC: 89 MG/DL (ref 70–110)
HCT VFR BLD AUTO: 41.5 % (ref 40–54)
HGB BLD-MCNC: 12.9 G/DL (ref 14–18)
IMM GRANULOCYTES # BLD AUTO: 0.03 K/UL (ref 0–0.04)
IMM GRANULOCYTES NFR BLD AUTO: 0.2 % (ref 0–0.5)
LIPASE SERPL-CCNC: 38 U/L (ref 4–60)
LYMPHOCYTES # BLD AUTO: 0.9 K/UL (ref 1–4.8)
LYMPHOCYTES NFR BLD: 7.7 % (ref 18–48)
MCH RBC QN AUTO: 26.9 PG (ref 27–31)
MCHC RBC AUTO-ENTMCNC: 31.1 G/DL (ref 32–36)
MCV RBC AUTO: 87 FL (ref 82–98)
MONOCYTES # BLD AUTO: 0.7 K/UL (ref 0.3–1)
MONOCYTES NFR BLD: 5.9 % (ref 4–15)
NEUTROPHILS # BLD AUTO: 10.4 K/UL (ref 1.8–7.7)
NEUTROPHILS NFR BLD: 85.8 % (ref 38–73)
NRBC BLD-RTO: 0 /100 WBC
PLATELET # BLD AUTO: 179 K/UL (ref 150–350)
PMV BLD AUTO: 10.4 FL (ref 9.2–12.9)
POTASSIUM SERPL-SCNC: 3.4 MMOL/L (ref 3.5–5.1)
PROT SERPL-MCNC: 6 G/DL (ref 6–8.4)
RBC # BLD AUTO: 4.79 M/UL (ref 4.6–6.2)
SODIUM SERPL-SCNC: 141 MMOL/L (ref 136–145)
WBC # BLD AUTO: 12.13 K/UL (ref 3.9–12.7)

## 2019-08-07 PROCEDURE — 99284 EMERGENCY DEPT VISIT MOD MDM: CPT | Mod: 25

## 2019-08-07 PROCEDURE — 80053 COMPREHEN METABOLIC PANEL: CPT

## 2019-08-07 PROCEDURE — 83690 ASSAY OF LIPASE: CPT

## 2019-08-07 PROCEDURE — 99282 PR EMERGENCY DEPT VISIT,LEVEL II: ICD-10-PCS | Mod: ,,, | Performed by: EMERGENCY MEDICINE

## 2019-08-07 PROCEDURE — 96360 HYDRATION IV INFUSION INIT: CPT

## 2019-08-07 PROCEDURE — 85025 COMPLETE CBC W/AUTO DIFF WBC: CPT

## 2019-08-07 PROCEDURE — 63600175 PHARM REV CODE 636 W HCPCS: Performed by: EMERGENCY MEDICINE

## 2019-08-07 PROCEDURE — 99282 EMERGENCY DEPT VISIT SF MDM: CPT | Mod: ,,, | Performed by: EMERGENCY MEDICINE

## 2019-08-07 RX ADMIN — SODIUM CHLORIDE 1000 ML: 0.9 INJECTION, SOLUTION INTRAVENOUS at 07:08

## 2019-08-07 NOTE — ED TRIAGE NOTES
"Patient arrives via EMS with concerns of abd pain and headache today. Patient total care, h/o ALS,  needs total transfer. No BM since Friday. Taking OTC Dulcolax. Patient shakes head "no" when asked if he has pain.  "

## 2019-08-07 NOTE — ED NOTES
Per family, patient incont of urine, wears brief. States he may be able to use urinal to obtain urine specimen, sent to [facility] with patient.

## 2019-08-07 NOTE — ED PROVIDER NOTES
Encounter Date: 8/7/2019    SCRIBE #1 NOTE: I, Marika Hathaway, am scribing for, and in the presence of,  Dr. Ojeda. I have scribed the entire note.       History     Chief Complaint   Patient presents with    Nausea     Pt was complaining of nausea but now reports he is no longer nauseated at this time.      38 y.o. Male with ALS presenting in the ED with complaints of nausea and abdominal pain. Patient is non verbal and history was provided by a family member. Patient's relative states that he has been complaining of a stomach ache since this morning. She endorses decreased appetite but states that this is sometimes normal for him. Patient denies vomiting, diarrhea, black or bloody stools. She also reports that his last normal bowel movement was Friday, and has since been giving him Bisacodyl for constipation. Patient endorses that his abdominal pain and nausea have resolved since arriving in the ED.    The history is provided by medical records and a relative.     Review of patient's allergies indicates:   Allergen Reactions    Penicillins      Hives and Itching     Past Medical History:   Diagnosis Date    Degenerative motor system disease     Depression     Seizure     Spastic quadriplegia     Spinocerebellar atrophy      Past Surgical History:   Procedure Laterality Date    BACLOFEN PUMP IMPLANTATION      URODYNAMIC STUDY, FLUOROSCOPIC N/A 11/27/2018    Performed by Tanaj Okeefe MD at [facility] OR [address] [place] [address]     Family History   Problem Relation Age of Onset    Thyroid cancer Mother     Lung cancer Maternal Grandfather     Multiple sclerosis Other         mother's cousin    ALS Neg Hx     Muscular dystrophy Neg Hx      Social History     Tobacco Use    Smoking status: Never Smoker    Smokeless tobacco: Never Used   Substance Use Topics    Alcohol use: Not Currently     Comment: social drinker, at times    Drug use: Not Currently     Review of Systems   General: No fever.  No chills.  Eyes: No  visual changes.  Head: No headache.    Integument: No rashes or lesions.  Chest: No shortness of breath.  Cardiovascular: No chest pain.  Abdomen: Nausea. Abdominal pain.  No vomiting.  Urinary: No abnormal urination.  Neurologic: No focal weakness.  No numbness.  Hematologic: No easy bruising.  Endocrine: No excessive thirst or urination.      Physical Exam     Initial Vitals [08/07/19 1802]   BP Pulse Resp Temp SpO2   129/85 77 16 98 °F (36.7 °C) 98 %      MAP       --         Physical Exam    Nursing note and vitals reviewed.      Appearance: No acute distress.  Skin: No rashes seen.  Good turgor.  No abrasions.  No ecchymoses.  Eyes: No conjunctival injection.  ENT: Oropharynx clear.    Chest: Clear to auscultation bilaterally.  Good air movement.  No wheezes.  No rhonchi.  Cardiovascular: Regular rate and rhythm.  No murmurs. No gallops. No rubs.  Abdomen: Soft.  Not distended.  Nontender.  No guarding.  No rebound.  Musculoskeletal: Good range of motion all joints.  No deformities.  Neck supple.  No meningismus.  Neurologic: Motor intact.  Sensation intact.  Cerebellar intact.  Cranial nerves intact.  Mental Status:  Alert and oriented x 3.  Appropriate, conversant.    ED Course   Procedures  Labs Reviewed   CBC W/ AUTO DIFFERENTIAL - Abnormal; Notable for the following components:       Result Value    Hemoglobin 12.9 (*)     Mean Corpuscular Hemoglobin 26.9 (*)     Mean Corpuscular Hemoglobin Conc 31.1 (*)     Gran # (ANC) 10.4 (*)     Lymph # 0.9 (*)     Gran% 85.8 (*)     Lymph% 7.7 (*)     All other components within normal limits   COMPREHENSIVE METABOLIC PANEL - Abnormal; Notable for the following components:    Potassium 3.4 (*)     Chloride 112 (*)     CO2 21 (*)     Calcium 7.6 (*)     Albumin 3.0 (*)     Alkaline Phosphatase 43 (*)     All other components within normal limits   LIPASE          Imaging Results    None          Medical Decision Making:   History:   Old Medical Records: I decided to  obtain old medical records.  Initial Assessment:   Abdominal pain earlier today that has resolved according to patient. Benign exam. Given communication issues, I will check blood work and anticipate discharge.  Clinical Tests:   Lab Tests: Ordered and Reviewed    Labs benign.  Continues to be asymptomatic.  OK to d/c.                      Clinical Impression:       ICD-10-CM ICD-9-CM   1. Abdominal pain, unspecified abdominal location R10.9 789.00                                Bairon Ojeda MD  08/07/19 9173

## 2019-08-07 NOTE — ED NOTES
Patient identifiers verified and correct for Mr Moreno  C/C: ABd pain PTA SEE NN  APPEARANCE: awake and alert in NAD.  SKIN: warm, dry and intact. No breakdown or bruising.  MUSCULOSKELETAL: Patient moving upper ext without difficulty , no obvious swelling or deformities noted. Non ambulatory,  RESPIRATORY: Denies shortness of breath.Respirations unlabored.   CARDIAC: Denies CP, 2+ distal pulses; no peripheral edema  ABDOMEN: Mid center abd pain, denies nausea or vomiting, no BM x 4 days  : voids spontaneously, denies difficulty  Neurologic: Non verbal, nods approp.; follows commands equal strength in all extremities; denies numbness/tingling. Denies dizziness, shakes head no to weakness

## 2019-08-14 ENCOUNTER — TELEPHONE (OUTPATIENT)
Dept: PHYSICAL MEDICINE AND REHAB | Facility: CLINIC | Age: 39
End: 2019-08-14

## 2019-08-14 DIAGNOSIS — R25.2 SPASTICITY: Primary | ICD-10-CM

## 2019-08-14 RX ORDER — DANTROLENE SODIUM 50 MG/1
50 CAPSULE ORAL 3 TIMES DAILY
Qty: 270 CAPSULE | Refills: 0 | Status: SHIPPED | OUTPATIENT
Start: 2019-08-14 | End: 2019-08-14 | Stop reason: CLARIF

## 2019-08-20 ENCOUNTER — OFFICE VISIT (OUTPATIENT)
Dept: PHYSICAL MEDICINE AND REHAB | Facility: CLINIC | Age: 39
End: 2019-08-20
Payer: MEDICARE

## 2019-08-20 VITALS — BODY MASS INDEX: 20.69 KG/M2 | WEIGHT: 170 LBS

## 2019-08-20 DIAGNOSIS — R26.81 GAIT INSTABILITY: Primary | ICD-10-CM

## 2019-08-20 DIAGNOSIS — R25.2 SPASTICITY: ICD-10-CM

## 2019-08-20 PROCEDURE — 64643 CHEMODENERV 1 EXTREM 1-4 EA: CPT | Mod: PBBFAC,PO | Performed by: PHYSICAL MEDICINE & REHABILITATION

## 2019-08-20 PROCEDURE — 99999 PR PBB SHADOW E&M-EST. PATIENT-LVL III: CPT | Mod: PBBFAC,,, | Performed by: PHYSICAL MEDICINE & REHABILITATION

## 2019-08-20 PROCEDURE — 64642 PR CHEMODENERV ONE EXTREMITY; 1-4 MUSCLE(S): ICD-10-PCS | Mod: S$PBB,,, | Performed by: PHYSICAL MEDICINE & REHABILITATION

## 2019-08-20 PROCEDURE — 99213 OFFICE O/P EST LOW 20 MIN: CPT | Mod: PBBFAC,PO,25 | Performed by: PHYSICAL MEDICINE & REHABILITATION

## 2019-08-20 PROCEDURE — 99999 PR PBB SHADOW E&M-EST. PATIENT-LVL III: ICD-10-PCS | Mod: PBBFAC,,, | Performed by: PHYSICAL MEDICINE & REHABILITATION

## 2019-08-20 PROCEDURE — 99499 NO LOS: ICD-10-PCS | Mod: S$PBB,,, | Performed by: PHYSICAL MEDICINE & REHABILITATION

## 2019-08-20 PROCEDURE — 64643 CHEMODENERV 1 EXTREM 1-4 EA: CPT | Mod: S$PBB,,, | Performed by: PHYSICAL MEDICINE & REHABILITATION

## 2019-08-20 PROCEDURE — 64643 PR CHEMODENERV 1 EXT; EA ADD'L EXT, 1-4 MUSCLE(S): ICD-10-PCS | Mod: S$PBB,,, | Performed by: PHYSICAL MEDICINE & REHABILITATION

## 2019-08-20 PROCEDURE — 64642 CHEMODENERV 1 EXTREMITY 1-4: CPT | Mod: S$PBB,,, | Performed by: PHYSICAL MEDICINE & REHABILITATION

## 2019-08-20 PROCEDURE — 64642 CHEMODENERV 1 EXTREMITY 1-4: CPT | Mod: PBBFAC,PO | Performed by: PHYSICAL MEDICINE & REHABILITATION

## 2019-08-20 PROCEDURE — 99499 UNLISTED E&M SERVICE: CPT | Mod: S$PBB,,, | Performed by: PHYSICAL MEDICINE & REHABILITATION

## 2019-08-20 RX ADMIN — ONABOTULINUMTOXINA 800 UNITS: 100 INJECTION, POWDER, LYOPHILIZED, FOR SOLUTION INTRADERMAL; INTRAMUSCULAR at 01:08

## 2019-08-20 NOTE — PROGRESS NOTES
PM&R Outpatient Evaluation    CC: Spasticity. Eval for Botox    HPI: Lias Moreno is a 38 y.o. male with PMHx of spinocerebellar atrophy with LE weakness, spasticity, and ataxic gait    Family at bedside. Remains in PT. Working on walking and still can take a few steps with walker in standing frame. Making improvements with PT. Has wheelchair that is outdated. He will need new custom wheelchair soon.         PMH:   Past Medical History:   Diagnosis Date    Degenerative motor system disease     Depression     Seizure     Spastic quadriplegia     Spinocerebellar atrophy        PSH:   Past Surgical History:   Procedure Laterality Date    BACLOFEN PUMP IMPLANTATION      URODYNAMIC STUDY, FLUOROSCOPIC N/A 11/27/2018    Performed by Tanja Okeefe MD at [facility] OR [address] [place] [address]     Current Outpatient Medications on File Prior to Visit   Medication Sig Dispense Refill    baclofen (LIORESAL) 20 MG tablet Take 1 tablet (20 mg total) by mouth 4 (four) times daily. 360 tablet 0    blood sugar diagnostic Strp 1 strip by Misc.(Non-Drug; Combo Route) route 3 (three) times daily. 100 each 5    blood-glucose meter, disc-type Kit 1 application by Misc.(Non-Drug; Combo Route) route 3 (three) times daily. 1 kit 0    donepezil (ARICEPT) 5 MG tablet Take 1 tablet (5 mg total) by mouth every evening. 30 tablet 11    fluticasone (FLONASE) 50 mcg/actuation nasal spray 1 spray (50 mcg total) by Each Nare route 2 (two) times daily as needed. 15 g 0    lancets (ACCU-CHEK SOFTCLIX LANCETS) Misc 1 each by Misc.(Non-Drug; Combo Route) route 3 (three) times daily with meals. 100 each 5    linaclotide (LINZESS) 145 mcg Cap capsule Take 1 capsule (145 mcg total) by mouth once daily. 30 capsule 5    mirabegron (MYRBETRIQ) 25 mg Tb24 ER tablet Take 1 tablet (25 mg total) by mouth once daily. 30 tablet 11    gabapentin (NEURONTIN) 100 MG capsule Take 1 capsule (100 mg total) by mouth 3 (three) times daily. (pain) 90 capsule 11     No  current facility-administered medications on file prior to visit.      Review of patient's allergies indicates:   Allergen Reactions    Penicillins      Hives and Itching       Family History:   Family History   Problem Relation Age of Onset    Thyroid cancer Mother     Lung cancer Maternal Grandfather     Multiple sclerosis Other         mother's cousin    ALS Neg Hx     Muscular dystrophy Neg Hx            ROS:   CONSTITUTIONAL:  (-) weight change, fever, chills, night sweats   EYES:  (-)  double vision (-) blurry vision  EARS, NOSE, THROAT: (-) dysphagia (-) hearing loss  RESPIRATORY: (-)   shortness of breath  (-) cough  CARDIOVASCULAR (-) chest pain  (-) swelling  GENITOURINARY    (-) hematuria  GASTROINTESTINAL (-) nausea/vomiting   ENDOCRINE  (-)  heat or cold intolerance  (-) hair loss  PSYCHIATRIC  (-) depression  (-) anxiety  HEMATOL/LYMPHATIC (-) easy bruising (-) enlarged lymph nodes  ALLERGIC/IMMUN  (-) seasonal allergies (-) allergies to environmental stimuli    SKIN (-) changes (-) rashes (-) wounds  The rest of the review of systems is unremarkable.          Physical Examination:  Vitals: Wt 77.1 kg (170 lb)   BMI 20.69 kg/m²    Gen: NAD, appearing stated age  Gait: NT,  presents in WC  Mood/affect: pleasant and appropriate  Speech: Speech intelligibility impaired with longer sentences   Cognition:  Awake, alert, oriented x3        MMT - UE 5/5, LE - R HF 2/5, Left 3/5; KE limited by tone, DF/PF at least 3/5     Tone:  MAS 2 on R HF, 3 on L, minimal tone in adductors      Impression:  39 yo M with impaired mobility and ADLs in setting of cerebellar atrophy with secondary deficits including spasticity and neurogenic bladder.     PROCEDURE NOTE:   The potential risks were discussed with the patient and He consented to proceed. After identifying the correct side (left upper and lower) the patient was placed in a seated position. A syringe was used with 800u Botox reconstituted into 16cc N.S.  (50u/cc). A 27G EMG injection needle was utilized as well as EMG guidance to inject the following muscles:         Semitendinosus Left : 100 -EMG  Semimembranous Left : 200  units - EMG  Biceps femoris Left : 100 units - EMG    Semitendinosus Right: 200 units EMG  Semimembranous Right : 100 units - EMG  Biceps femoris Right : 100 units - EMG      Lot No: [id number]  Exp Date: 02/2022    Lot No: [id number]  Exp Date: 01/2022       800 units used  0 units wasted        Plan:  Seen today for botox procedure. Completed as above. Tolerated procedure well.  He will need available for next follow-up for wheelchair eval.     Yung Rodriguez MD

## 2019-08-21 ENCOUNTER — TELEPHONE (OUTPATIENT)
Dept: PHYSICAL MEDICINE AND REHAB | Facility: CLINIC | Age: 39
End: 2019-08-21

## 2019-08-21 DIAGNOSIS — R25.2 SPASTICITY: ICD-10-CM

## 2019-08-21 DIAGNOSIS — M62.838 MUSCLE SPASM: Primary | ICD-10-CM

## 2019-09-08 RX ORDER — LINACLOTIDE 145 UG/1
CAPSULE, GELATIN COATED ORAL
Qty: 30 CAPSULE | Refills: 0 | Status: SHIPPED | OUTPATIENT
Start: 2019-09-08 | End: 2019-10-06 | Stop reason: SDUPTHER

## 2019-09-10 ENCOUNTER — OFFICE VISIT (OUTPATIENT)
Dept: PHYSICAL MEDICINE AND REHAB | Facility: CLINIC | Age: 39
End: 2019-09-10
Payer: MEDICARE

## 2019-09-10 VITALS — DIASTOLIC BLOOD PRESSURE: 78 MMHG | SYSTOLIC BLOOD PRESSURE: 113 MMHG | HEART RATE: 81 BPM

## 2019-09-10 DIAGNOSIS — G11.8 SPINOCEREBELLAR ATAXIA: ICD-10-CM

## 2019-09-10 DIAGNOSIS — G80.1 SPASTIC DIPLEGIA: Primary | ICD-10-CM

## 2019-09-10 PROCEDURE — 99213 OFFICE O/P EST LOW 20 MIN: CPT | Mod: PBBFAC,PO | Performed by: PHYSICAL MEDICINE & REHABILITATION

## 2019-09-10 PROCEDURE — 99999 PR PBB SHADOW E&M-EST. PATIENT-LVL III: ICD-10-PCS | Mod: PBBFAC,,, | Performed by: PHYSICAL MEDICINE & REHABILITATION

## 2019-09-10 PROCEDURE — 99999 PR PBB SHADOW E&M-EST. PATIENT-LVL III: CPT | Mod: PBBFAC,,, | Performed by: PHYSICAL MEDICINE & REHABILITATION

## 2019-09-10 PROCEDURE — 99215 OFFICE O/P EST HI 40 MIN: CPT | Mod: S$PBB,,, | Performed by: PHYSICAL MEDICINE & REHABILITATION

## 2019-09-10 PROCEDURE — 99215 PR OFFICE/OUTPT VISIT, EST, LEVL V, 40-54 MIN: ICD-10-PCS | Mod: S$PBB,,, | Performed by: PHYSICAL MEDICINE & REHABILITATION

## 2019-09-12 NOTE — PROGRESS NOTES
PM&R Outpatient Evaluation    CC: Impaired mobility and ADLs    HPI: Lisa Moreno is a 38 y.o. male with PMHx of spinocerebellar atrophy with LE weakness, spasticity, and ataxic gait    Mr. Moreno is a 38 year old male with previous diagnosis of spinocerebellar atrophy who developed progressive lower extremity spasticity of gait since 2006, problems with progressive speech difficulty as well. He underwent intrathecal baclofen pump placement in 2008 with removal in 2009 due to progressive LE weakness. He has been declining since that time with problems with gait. He has been nonambulatory since that time. He was seen last by his neurologist, Dr Wise in 2018 with noted progression of ataxia to the balance and to the hands. Still considered SCA with ataxia.    He was treated in October 2018 with inpatient rehabilitation at [facility] and in May 2019 at [facility]. He has been receiving treatment botox for severe spasticity of the lower extremities with some progression with standing. At this time, the patient has been using a manual wheelchair which he has difficulty propelling due to poor fit and poor sitting due to spasticity. No wounds currently but still had pain when sitting in the chair. He remains incontinent      Function - Cont to more of the transfers (per sitter he does 90%) and helps bathing. Helping w WC prop too.    Social History: Lives with mother in house,bedroom/bathroom on first floor.       Tobacco:  denies   ETOH:  denies   Other drug use: denies      PMH:   Past Medical History:   Diagnosis Date    Degenerative motor system disease     Depression     Seizure     Spastic quadriplegia     Spinocerebellar atrophy        PSH:   Past Surgical History:   Procedure Laterality Date    BACLOFEN PUMP IMPLANTATION      URODYNAMIC STUDY, FLUOROSCOPIC N/A 11/27/2018    Performed by Tanja Okeefe MD at [facility] OR [address] [place] [address]     Current Outpatient Medications on File Prior to Visit   Medication Sig  Dispense Refill    baclofen (LIORESAL) 20 MG tablet Take 1 tablet (20 mg total) by mouth 4 (four) times daily. 360 tablet 0    blood sugar diagnostic Strp 1 strip by Misc.(Non-Drug; Combo Route) route 3 (three) times daily. 100 each 5    blood-glucose meter, disc-type Kit 1 application by Misc.(Non-Drug; Combo Route) route 3 (three) times daily. 1 kit 0    donepezil (ARICEPT) 5 MG tablet Take 1 tablet (5 mg total) by mouth every evening. 30 tablet 11    fluticasone (FLONASE) 50 mcg/actuation nasal spray 1 spray (50 mcg total) by Each Nare route 2 (two) times daily as needed. 15 g 0    lancets (ACCU-CHEK SOFTCLIX LANCETS) Misc 1 each by Misc.(Non-Drug; Combo Route) route 3 (three) times daily with meals. 100 each 5    LINZESS 145 mcg Cap capsule TAKE 1 CAPSULE(145 MCG) BY MOUTH EVERY DAY 30 capsule 0    mirabegron (MYRBETRIQ) 25 mg Tb24 ER tablet Take 1 tablet (25 mg total) by mouth once daily. 30 tablet 11    gabapentin (NEURONTIN) 100 MG capsule Take 1 capsule (100 mg total) by mouth 3 (three) times daily. (pain) 90 capsule 11     No current facility-administered medications on file prior to visit.      Review of patient's allergies indicates:   Allergen Reactions    Penicillins      Hives and Itching       Family History:   Family History   Problem Relation Age of Onset    Thyroid cancer Mother     Lung cancer Maternal Grandfather     Multiple sclerosis Other         mother's cousin    ALS Neg Hx     Muscular dystrophy Neg Hx            ROS:   CONSTITUTIONAL:  (-) weight change, fever, chills, night sweats   EYES:  (-)  double vision (-) blurry vision  EARS, NOSE, THROAT: (-) dysphagia (-) hearing loss  RESPIRATORY: (-)   shortness of breath  (-) cough  CARDIOVASCULAR (-) chest pain  (-) swelling  GENITOURINARY    (-) hematuria  GASTROINTESTINAL (-) nausea/vomiting   ENDOCRINE  (-)  heat or cold intolerance  (-) hair loss  PSYCHIATRIC  (-) depression  (-) anxiety  HEMATOL/LYMPHATIC (-) easy bruising  (-) enlarged lymph nodes  ALLERGIC/IMMUN  (-) seasonal allergies (-) allergies to environmental stimuli    SKIN (-) changes (-) rashes (-) wounds  The rest of the review of systems is unremarkable.          Pertinent Prior Work Up (Imaging/EMGs):    MRI BRAIN (9/7/18)  Significantly motion limited examination.  No evidence of acute infarct.  If clinical concern persists, the exam can be repeated as indicated.    Generalized cerebellar and brainstem atrophy, grossly unchanged.    Additional findings discussed in the body of the report.    MRI T-SPINE (9/7/18)  Stable appearance of diffuse decreased caliber of the thoracic cord without evidence of a focal signal abnormality.    No infectious or inflammatory process in the thoracic spine.    Significant motion degraded examination.  Repeat of the postcontrast images may be obtained, when clinically appropriate.    MRI L-spine (9/7/18):  Heterogeneous bone marrow signal involving the L5-S1, and S2-L2 broad bodies with nonspecific patchy enhancement on the post contrast sequences. No definitive MR evidence of discitis osteomyelitis.    Marked motion degraded examination. Repeat of the examination is suggested, when the patient is better able to tolerate positioning.    Physical Examination:  Vitals: /78   Pulse 81    Gen: NAD, appearing stated age  Gait: NT,  presents in WC  Mood/affect: pleasant and appropriate    Speech: Speech intelligibility impaired with longer sentences        Cognition:  Awake, alert, oriented x 3    Immediate - intact    Short term - impaired        Cardiovascular:   (- ) edema        MMT - UE 5/5, LE - R HF 2/5, Left 3/5; KE limited by tone, DF/PF at least 3/5     Tone:  MAS 2 on R HF, 3 on L, minimal tone in adductors      Impression:    Mr. Moreno is a 38 year old with impaired mobility and ADLs with progressive spasticity with LE weakness, ataxia, neurogenic bladder secondary to spinocerebellar atrophy    Plan:    Impaired  mobility    - The patient was seen today for mobility evaluation for a power mobility device due to significant impairment at home.  - The patient has multifactorial gait impairment.  - The patient is not able to ambulate safely within the home.  - The patient is unable to use a walker functional distances due to BLE weakness and spasticity  - The patient is unable to use an optimally-configured manual wheelchair at home due to BUE weakness, spasticity, and poor fit.  - The patient has intact cognition and should be able to use a custom wheelchair at home.  - The patient was referred to wheelchair evaluation by licensed physical or occupational therapist.  - This will allow the patient to go safely to the kitchen, dining room or living room for feeding & socialization. It should also help with energy conservation and improving safety.  - The patient is to return the Physical Medicine/Mobility clinic for botox treatment.      Bilateral foot drop with spasticity  - Discussed with patient and review PT notes. He has significant foot drop with standing. Discussed using platform walker with therapy but has problem with clearing his feet. Will provide order for bilateral solid ankle AFO to assist with drop.    Follow up in 8 weeks for botox treatment to bilateral LEs.     Yung Rodriguez MD

## 2019-09-17 DIAGNOSIS — G11.8 SPINOCEREBELLAR ATAXIA: Primary | ICD-10-CM

## 2019-09-17 DIAGNOSIS — R13.10 DYSPHAGIA, UNSPECIFIED TYPE: ICD-10-CM

## 2019-09-19 PROBLEM — R13.12 OROPHARYNGEAL DYSPHAGIA: Status: ACTIVE | Noted: 2019-09-19

## 2019-09-19 PROBLEM — R47.1 DYSARTHRIA: Status: ACTIVE | Noted: 2019-09-19

## 2019-09-24 ENCOUNTER — IMMUNIZATION (OUTPATIENT)
Dept: PHARMACY | Facility: CLINIC | Age: 39
End: 2019-09-24
Payer: MEDICARE

## 2019-09-24 ENCOUNTER — LAB VISIT (OUTPATIENT)
Dept: LAB | Facility: HOSPITAL | Age: 39
End: 2019-09-24
Attending: NURSE PRACTITIONER
Payer: MEDICARE

## 2019-09-24 ENCOUNTER — OFFICE VISIT (OUTPATIENT)
Dept: INTERNAL MEDICINE | Facility: CLINIC | Age: 39
End: 2019-09-24
Payer: MEDICARE

## 2019-09-24 ENCOUNTER — IMMUNIZATION (OUTPATIENT)
Dept: INTERNAL MEDICINE | Facility: CLINIC | Age: 39
End: 2019-09-24
Payer: MEDICARE

## 2019-09-24 VITALS — OXYGEN SATURATION: 99 % | DIASTOLIC BLOOD PRESSURE: 82 MMHG | SYSTOLIC BLOOD PRESSURE: 110 MMHG | HEART RATE: 98 BPM

## 2019-09-24 DIAGNOSIS — N39.8 VOIDING DYSFUNCTION: ICD-10-CM

## 2019-09-24 DIAGNOSIS — R26.81 GAIT INSTABILITY: ICD-10-CM

## 2019-09-24 DIAGNOSIS — Z00.00 ANNUAL PHYSICAL EXAM: ICD-10-CM

## 2019-09-24 DIAGNOSIS — K59.00 CONSTIPATION, UNSPECIFIED CONSTIPATION TYPE: ICD-10-CM

## 2019-09-24 DIAGNOSIS — G80.1 SPASTIC DIPLEGIA: ICD-10-CM

## 2019-09-24 DIAGNOSIS — Z00.00 ANNUAL PHYSICAL EXAM: Primary | ICD-10-CM

## 2019-09-24 DIAGNOSIS — G11.8 SPINOCEREBELLAR ATAXIA: ICD-10-CM

## 2019-09-24 LAB
ALBUMIN SERPL BCP-MCNC: 3.9 G/DL (ref 3.5–5.2)
ALP SERPL-CCNC: 54 U/L (ref 55–135)
ALT SERPL W/O P-5'-P-CCNC: 36 U/L (ref 10–44)
ANION GAP SERPL CALC-SCNC: 8 MMOL/L (ref 8–16)
AST SERPL-CCNC: 22 U/L (ref 10–40)
BASOPHILS # BLD AUTO: 0.02 K/UL (ref 0–0.2)
BASOPHILS NFR BLD: 0.4 % (ref 0–1.9)
BILIRUB SERPL-MCNC: 0.2 MG/DL (ref 0.1–1)
BUN SERPL-MCNC: 19 MG/DL (ref 6–20)
CALCIUM SERPL-MCNC: 9 MG/DL (ref 8.7–10.5)
CHLORIDE SERPL-SCNC: 103 MMOL/L (ref 95–110)
CO2 SERPL-SCNC: 29 MMOL/L (ref 23–29)
CREAT SERPL-MCNC: 1 MG/DL (ref 0.5–1.4)
DIFFERENTIAL METHOD: ABNORMAL
EOSINOPHIL # BLD AUTO: 0.1 K/UL (ref 0–0.5)
EOSINOPHIL NFR BLD: 1.9 % (ref 0–8)
ERYTHROCYTE [DISTWIDTH] IN BLOOD BY AUTOMATED COUNT: 14.1 % (ref 11.5–14.5)
EST. GFR  (AFRICAN AMERICAN): >60 ML/MIN/1.73 M^2
EST. GFR  (NON AFRICAN AMERICAN): >60 ML/MIN/1.73 M^2
GLUCOSE SERPL-MCNC: 88 MG/DL (ref 70–110)
HCT VFR BLD AUTO: 48.4 % (ref 40–54)
HGB BLD-MCNC: 15.4 G/DL (ref 14–18)
LYMPHOCYTES # BLD AUTO: 1.4 K/UL (ref 1–4.8)
LYMPHOCYTES NFR BLD: 29.4 % (ref 18–48)
MCH RBC QN AUTO: 26.6 PG (ref 27–31)
MCHC RBC AUTO-ENTMCNC: 31.8 G/DL (ref 32–36)
MCV RBC AUTO: 84 FL (ref 82–98)
MONOCYTES # BLD AUTO: 0.4 K/UL (ref 0.3–1)
MONOCYTES NFR BLD: 8.3 % (ref 4–15)
NEUTROPHILS # BLD AUTO: 2.8 K/UL (ref 1.8–7.7)
NEUTROPHILS NFR BLD: 60 % (ref 38–73)
PLATELET # BLD AUTO: 181 K/UL (ref 150–350)
PMV BLD AUTO: 11.2 FL (ref 9.2–12.9)
POTASSIUM SERPL-SCNC: 4.1 MMOL/L (ref 3.5–5.1)
PROT SERPL-MCNC: 7.7 G/DL (ref 6–8.4)
RBC # BLD AUTO: 5.78 M/UL (ref 4.6–6.2)
SODIUM SERPL-SCNC: 140 MMOL/L (ref 136–145)
T4 FREE SERPL-MCNC: 1.06 NG/DL (ref 0.71–1.51)
TSH SERPL DL<=0.005 MIU/L-ACNC: 0.93 UIU/ML (ref 0.4–4)
WBC # BLD AUTO: 4.69 K/UL (ref 3.9–12.7)

## 2019-09-24 PROCEDURE — 99999 PR PBB SHADOW E&M-EST. PATIENT-LVL III: ICD-10-PCS | Mod: PBBFAC,,, | Performed by: NURSE PRACTITIONER

## 2019-09-24 PROCEDURE — 85025 COMPLETE CBC W/AUTO DIFF WBC: CPT

## 2019-09-24 PROCEDURE — 36415 COLL VENOUS BLD VENIPUNCTURE: CPT

## 2019-09-24 PROCEDURE — 99214 PR OFFICE/OUTPT VISIT, EST, LEVL IV, 30-39 MIN: ICD-10-PCS | Mod: S$PBB,,, | Performed by: NURSE PRACTITIONER

## 2019-09-24 PROCEDURE — 99213 OFFICE O/P EST LOW 20 MIN: CPT | Mod: PBBFAC | Performed by: NURSE PRACTITIONER

## 2019-09-24 PROCEDURE — 99214 OFFICE O/P EST MOD 30 MIN: CPT | Mod: S$PBB,,, | Performed by: NURSE PRACTITIONER

## 2019-09-24 PROCEDURE — 80053 COMPREHEN METABOLIC PANEL: CPT

## 2019-09-24 PROCEDURE — 84439 ASSAY OF FREE THYROXINE: CPT

## 2019-09-24 PROCEDURE — 90686 IIV4 VACC NO PRSV 0.5 ML IM: CPT | Mod: PBBFAC

## 2019-09-24 PROCEDURE — 99999 PR PBB SHADOW E&M-EST. PATIENT-LVL III: CPT | Mod: PBBFAC,,, | Performed by: NURSE PRACTITIONER

## 2019-09-24 PROCEDURE — 84443 ASSAY THYROID STIM HORMONE: CPT

## 2019-09-24 NOTE — PROGRESS NOTES
INTERNAL MEDICINE URGENT CARE NOTE    CHIEF COMPLAINT     Chief Complaint   Patient presents with    Annual Exam       HPI     Lisa Moreno is a 38 y.o. male with spinocerebellar atrophy causing progressive lower extremity spasticity, depression and seizures who presents for an urgent visit today.    Needs annual physical as well     Followed by PM&R for spasticity.Last apt 9/10/2019.  He was treated in October 2018 with inpatient rehabilitation at [facility] and in May 2019 at [place]. He has been receiving treatment botox for severe spasticity of the lower extremities with some progression with standing  -Followed by OT - once a week now     Fecal incontinence and constipation- followed by crs. Last seen 2/2019.  Started on Linzess. Today pt had BM this morning. Denies hard stools. Endorses trouble expelling stools. Denies abd pain at this time.     Urinary incontinence - followed by Dr eulalia jackson     Seen in the ED 8/7/19 for abd pain and nausea - reviewed labs at that time. No imaging     ED visit 5/28/19 for paronychia       Pertinent Prior Work Up (Imaging/EMGs):     MRI BRAIN (9/7/18)  Significantly motion limited examination.  No evidence of acute infarct.  If clinical concern persists, the exam can be repeated as indicated.    Generalized cerebellar and brainstem atrophy, grossly unchanged.    Additional findings discussed in the body of the report.    HM-   Tdap- today   Flu-  today        MRI T-SPINE (9/7/18)  Stable appearance of diffuse decreased caliber of the thoracic cord without evidence of a focal signal abnormality.    No infectious or inflammatory process in the thoracic spine.    Significant motion degraded examination.  Repeat of the postcontrast images may be obtained, when clinically appropriate.     MRI L-spine (9/7/18):  Heterogeneous bone marrow signal involving the L5-S1, and S2-L2 broad bodies with nonspecific patchy enhancement on the post contrast sequences. No  definitive MR evidence of discitis osteomyelitis.    Marked motion degraded examination. Repeat of the examination is suggested, when the patient is better able to tolerate positioning.       Past Medical History:  Past Medical History:   Diagnosis Date    Degenerative motor system disease     Depression     Seizure     Spastic quadriplegia     Spinocerebellar atrophy        Home Medications:  Prior to Admission medications    Medication Sig Start Date End Date Taking? Authorizing Provider   baclofen (LIORESAL) 20 MG tablet Take 1 tablet (20 mg total) by mouth 4 (four) times daily. 7/25/19 10/23/19  Yung Rodriguez MD   blood sugar diagnostic Strp 1 strip by Misc.(Non-Drug; Combo Route) route 3 (three) times daily. 1/3/19   Hector Callahan NP   blood-glucose meter, disc-type Kit 1 application by Misc.(Non-Drug; Combo Route) route 3 (three) times daily. 1/2/19 1/2/20  Hector Callahan NP   donepezil (ARICEPT) 5 MG tablet Take 1 tablet (5 mg total) by mouth every evening. 11/27/18 11/27/19  Bairon Wise MD   fluticasone (FLONASE) 50 mcg/actuation nasal spray 1 spray (50 mcg total) by Each Nare route 2 (two) times daily as needed. 12/14/18   Az Sanchez NP   gabapentin (NEURONTIN) 100 MG capsule Take 1 capsule (100 mg total) by mouth 3 (three) times daily. (pain) 4/7/15 2/15/19  Andrew Fitch MD   lancets (ACCU-CHEK SOFTCLIX LANCETS) Misc 1 each by Misc.(Non-Drug; Combo Route) route 3 (three) times daily with meals. 1/3/19   Hector Callahan NP   LINZESS 145 mcg Cap capsule TAKE 1 CAPSULE(145 MCG) BY MOUTH EVERY DAY 9/8/19   Sean Romeo MD   mirabegron (MYRBETRIQ) 25 mg Tb24 ER tablet Take 1 tablet (25 mg total) by mouth once daily. 11/21/18 11/21/19  Tanja Okeefe MD       Review of Systems:  Review of Systems   Constitutional: Negative for fever.   Respiratory: Negative for cough, shortness of breath and wheezing.    Cardiovascular: Negative for chest pain and  palpitations.   Gastrointestinal: Positive for constipation (resolved today with bowel movement ). Negative for abdominal distention, abdominal pain, anal bleeding, blood in stool, diarrhea, nausea and vomiting.   Genitourinary: Positive for frequency. Negative for dysuria and hematuria.   Musculoskeletal: Negative for arthralgias and myalgias.   Skin: Negative for rash.   Neurological: Negative for dizziness, light-headedness and headaches.       Health Maintainence:   Immunizations:  Health Maintenance       Date Due Completion Date    Lipid Panel 1980 ---    TETANUS VACCINE 01/05/2015 1/5/2005    Influenza Vaccine (1) 09/01/2019 2/26/2015           PHYSICAL EXAM     /82 (BP Location: Right arm, Patient Position: Sitting, BP Method: Medium (Manual))   Pulse 98   SpO2 99%     Physical Exam   Constitutional: He is oriented to person, place, and time. He appears well-developed and well-nourished.   HENT:   Head: Normocephalic.   Right Ear: External ear normal.   Left Ear: External ear normal.   Nose: Nose normal.   Mouth/Throat: Oropharynx is clear and moist. No oropharyngeal exudate.   Eyes: Pupils are equal, round, and reactive to light.   Neck: No JVD present. No tracheal deviation present. No thyromegaly present.   Cardiovascular: Normal rate, regular rhythm and intact distal pulses. Exam reveals no gallop and no friction rub.   No murmur heard.  Pulmonary/Chest: Breath sounds normal. No respiratory distress. He has no wheezes. He has no rales. He exhibits no tenderness.   Abdominal: Soft. Bowel sounds are normal. He exhibits no distension. There is no tenderness.   Musculoskeletal: Normal range of motion. He exhibits no edema or tenderness.   Lymphadenopathy:     He has no cervical adenopathy.   Neurological: He is alert and oriented to person, place, and time. He displays abnormal reflex. He exhibits abnormal muscle tone.   Skin: Skin is warm and dry. Capillary refill takes less than 2 seconds. No  rash noted.   Psychiatric: He has a normal mood and affect. His behavior is normal.   Vitals reviewed.      LABS     Lab Results   Component Value Date    HGBA1C 5.1 01/02/2019     CMP  Sodium   Date Value Ref Range Status   08/07/2019 141 136 - 145 mmol/L Final     Potassium   Date Value Ref Range Status   08/07/2019 3.4 (L) 3.5 - 5.1 mmol/L Final     Chloride   Date Value Ref Range Status   08/07/2019 112 (H) 95 - 110 mmol/L Final     CO2   Date Value Ref Range Status   08/07/2019 21 (L) 23 - 29 mmol/L Final     Glucose   Date Value Ref Range Status   08/07/2019 89 70 - 110 mg/dL Final     BUN, Bld   Date Value Ref Range Status   08/07/2019 13 6 - 20 mg/dL Final     Creatinine   Date Value Ref Range Status   08/07/2019 0.7 0.5 - 1.4 mg/dL Final     Calcium   Date Value Ref Range Status   08/07/2019 7.6 (L) 8.7 - 10.5 mg/dL Final     Total Protein   Date Value Ref Range Status   08/07/2019 6.0 6.0 - 8.4 g/dL Final     Albumin   Date Value Ref Range Status   08/07/2019 3.0 (L) 3.5 - 5.2 g/dL Final     Total Bilirubin   Date Value Ref Range Status   08/07/2019 0.4 0.1 - 1.0 mg/dL Final     Comment:     For infants and newborns, interpretation of results should be based  on gestational age, weight and in agreement with clinical  observations.  Premature Infant recommended reference ranges:  Up to 24 hours.............<8.0 mg/dL  Up to 48 hours............<12.0 mg/dL  3-5 days..................<15.0 mg/dL  6-29 days.................<15.0 mg/dL       Alkaline Phosphatase   Date Value Ref Range Status   08/07/2019 43 (L) 55 - 135 U/L Final     AST   Date Value Ref Range Status   08/07/2019 12 10 - 40 U/L Final     ALT   Date Value Ref Range Status   08/07/2019 15 10 - 44 U/L Final     Anion Gap   Date Value Ref Range Status   08/07/2019 8 8 - 16 mmol/L Final     eGFR if    Date Value Ref Range Status   08/07/2019 >60.0 >60 mL/min/1.73 m^2 Final     eGFR if non    Date Value Ref Range  Status   08/07/2019 >60.0 >60 mL/min/1.73 m^2 Final     Comment:     Calculation used to obtain the estimated glomerular filtration  rate (eGFR) is the CKD-EPI equation.        Lab Results   Component Value Date    WBC 12.13 08/07/2019    HGB 12.9 (L) 08/07/2019    HCT 41.5 08/07/2019    MCV 87 08/07/2019     08/07/2019     No results found for: CHOL  No results found for: HDL  No results found for: LDLCALC  No results found for: TRIG  No results found for: CHOLHDL  Lab Results   Component Value Date    TSH 1.282 03/18/2015       ASSESSMENT/PLAN     Lisa Moreno is a 38 y.o. male with  Past Medical History:   Diagnosis Date    Degenerative motor system disease     Depression     Seizure     Spastic quadriplegia     Spinocerebellar atrophy       Annual physical exam- all age and gender related screenings   -     CBC auto differential; Future; Expected date: 09/24/2019  -     Comprehensive metabolic panel; Future; Expected date: 09/24/2019  -     TSH; Future; Expected date: 09/24/2019  -     T4, free; Future; Expected date: 09/24/2019    Constipation, unspecified constipation type- will cont linzess. Labs today. Refer to GI. Flat and erect xray.  -     CBC auto differential; Future; Expected date: 09/24/2019  -     Comprehensive metabolic panel; Future; Expected date: 09/24/2019  -     TSH; Future; Expected date: 09/24/2019  -     T4, free; Future; Expected date: 09/24/2019    Spastic diplegia/Spinocerebellar ataxia- cont f/u with PMR. F/u with OT. Will cont baclofen and Neurontin   -     CBC auto differential; Future; Expected date: 09/24/2019  -     Comprehensive metabolic panel; Future; Expected date: 09/24/2019  -     TSH; Future; Expected date: 09/24/2019  -     T4, free; Future; Expected date: 09/24/2019    Voiding dysfunction- stable. Cont myrbetriq. May consider holding to assess for improvement in constipation   -     CBC auto differential; Future; Expected date: 09/24/2019  -      Comprehensive metabolic panel; Future; Expected date: 09/24/2019  -     TSH; Future; Expected date: 09/24/2019  -     T4, free; Future; Expected date: 09/24/2019    Gait instability    Follow up with PCP     Patient education provided from [facility]. Patient was counseled on when and how to seek emergent care.       Kassandra SHEIKH, TIMUR, FNP-c   Department of Internal Medicine - [facility]  2:35 PM      Abdominal pain  Chronicity: recurrent  Onset: more than 1 month ago  Onset quality: undetermined  Frequency: constantly  Episode duration: 2 hours  Pain quality: cramping  Radiates to: periumbilical region  anorexia: No  belching: No  flatus: Yes  hematochezia: No  melena: No  weight loss: Yes  Treatments tried: antacids  Improvement on treatment: no relief  abdominal surgery: No  colon cancer: No  Crohn's disease: No  gallstones: No  GERD: No  irritable bowel syndrome: No

## 2019-09-25 ENCOUNTER — TELEPHONE (OUTPATIENT)
Dept: INTERNAL MEDICINE | Facility: CLINIC | Age: 39
End: 2019-09-25

## 2019-09-25 NOTE — TELEPHONE ENCOUNTER
Spoke to pt's mother she stated that she told someone she did not want her son to have a flu shot and that his GI issues were not addressed. Informed Giovana that labs were done, pt takes medication for it and was referred to GI    Advised pts' mother that she and the pt should complete a involvement of care form, so there would not be any further miscommunication. Pt would like to discuss office visit with you.

## 2019-09-25 NOTE — TELEPHONE ENCOUNTER
called pts mother, she answered and was talking to someone else. tried to get her attention, no response. Call ended.

## 2019-09-25 NOTE — TELEPHONE ENCOUNTER
----- Message from Marika Erickson sent at 9/25/2019  8:41 AM CDT -----  Contact: Giovana (mother) [phone]  Patients mother is requesting a call from the office. She stated patient came in for a stomach pain and it was not addressed. She also stated not to give patient a flu shot.  Please advise, thanks

## 2019-10-03 ENCOUNTER — TELEPHONE (OUTPATIENT)
Dept: INTERNAL MEDICINE | Facility: CLINIC | Age: 39
End: 2019-10-03

## 2019-10-03 NOTE — TELEPHONE ENCOUNTER
----- Message from Ale Gaytan sent at 10/3/2019 11:20 AM CDT -----  Contact: pt mother  Pt mother missed a call and would the nurse to return their call.    Pt can be reached at [phone] after 1:45pm

## 2019-10-03 NOTE — TELEPHONE ENCOUNTER
LM for pt's mother. Xray of the abd with lots of stool throughout. On linzess. Will start dulcolax and fleets for relief. F/u with CRS.

## 2019-10-04 ENCOUNTER — TELEPHONE (OUTPATIENT)
Dept: INTERNAL MEDICINE | Facility: CLINIC | Age: 39
End: 2019-10-04

## 2019-10-04 DIAGNOSIS — K59.00 CONSTIPATION, UNSPECIFIED CONSTIPATION TYPE: ICD-10-CM

## 2019-10-04 DIAGNOSIS — K59.00 CONSTIPATION, UNSPECIFIED CONSTIPATION TYPE: Primary | ICD-10-CM

## 2019-10-04 RX ORDER — LACTULOSE 10 G/15ML
15 SOLUTION ORAL DAILY
Qty: 225 ML | Refills: 3 | Status: SHIPPED | OUTPATIENT
Start: 2019-10-04 | End: 2019-10-04 | Stop reason: SDUPTHER

## 2019-10-04 RX ORDER — LACTULOSE 10 G/15ML
SOLUTION ORAL; RECTAL
Qty: 2025 ML | Refills: 3 | Status: ON HOLD | OUTPATIENT
Start: 2019-10-04 | End: 2020-07-24 | Stop reason: CLARIF

## 2019-10-04 NOTE — TELEPHONE ENCOUNTER
----- Message from Farhad Quintana sent at 10/4/2019  7:21 AM CDT -----  Contact: Mom   Patient is returning a phone call.  Who left a message for the patient: Kassandra Lovell NP  Does patient know what this is regarding:  Mom calling for  results  Comments:

## 2019-10-06 RX ORDER — LINACLOTIDE 145 UG/1
CAPSULE, GELATIN COATED ORAL
Qty: 30 CAPSULE | Refills: 0 | Status: SHIPPED | OUTPATIENT
Start: 2019-10-06 | End: 2019-10-15

## 2019-10-15 ENCOUNTER — OFFICE VISIT (OUTPATIENT)
Dept: GASTROENTEROLOGY | Facility: CLINIC | Age: 39
End: 2019-10-15
Payer: MEDICARE

## 2019-10-15 VITALS — SYSTOLIC BLOOD PRESSURE: 119 MMHG | HEART RATE: 84 BPM | DIASTOLIC BLOOD PRESSURE: 81 MMHG

## 2019-10-15 DIAGNOSIS — K59.00 CONSTIPATION, UNSPECIFIED CONSTIPATION TYPE: Primary | ICD-10-CM

## 2019-10-15 PROCEDURE — 99999 PR PBB SHADOW E&M-EST. PATIENT-LVL III: CPT | Mod: PBBFAC,,, | Performed by: INTERNAL MEDICINE

## 2019-10-15 PROCEDURE — 99204 OFFICE O/P NEW MOD 45 MIN: CPT | Mod: S$PBB,,, | Performed by: INTERNAL MEDICINE

## 2019-10-15 PROCEDURE — 99204 PR OFFICE/OUTPT VISIT, NEW, LEVL IV, 45-59 MIN: ICD-10-PCS | Mod: S$PBB,,, | Performed by: INTERNAL MEDICINE

## 2019-10-15 PROCEDURE — 99999 PR PBB SHADOW E&M-EST. PATIENT-LVL III: ICD-10-PCS | Mod: PBBFAC,,, | Performed by: INTERNAL MEDICINE

## 2019-10-15 PROCEDURE — 99213 OFFICE O/P EST LOW 20 MIN: CPT | Mod: PBBFAC,PN | Performed by: INTERNAL MEDICINE

## 2019-10-15 RX ORDER — POLYETHYLENE GLYCOL 3350, SODIUM SULFATE ANHYDROUS, SODIUM BICARBONATE, SODIUM CHLORIDE, POTASSIUM CHLORIDE 236; 22.74; 6.74; 5.86; 2.97 G/4L; G/4L; G/4L; G/4L; G/4L
4 POWDER, FOR SOLUTION ORAL ONCE
Qty: 4000 ML | Refills: 0 | Status: SHIPPED | OUTPATIENT
Start: 2019-10-15 | End: 2019-10-15

## 2019-10-15 NOTE — LETTER
October 15, 2019      Kassandra Lovell, NP  [address]  [place] [address]           [facility]  [address] [place] [address], [address]  [facility] [place] [address]  Phone: [phone]  Fax: [phone]          Patient: Lisa Moreno   MR Number: [id number]   YOB: 1980   Date of Visit: 10/15/2019       Dear Kassandra Lovell:    Thank you for referring Lisa Moreno to me for evaluation. Attached you will find relevant portions of my assessment and plan of care.    If you have questions, please do not hesitate to call me. I look forward to following Lisa Moreno along with you.    Sincerely,    Ziyad Fitch MD    Enclosure  CC:  No Recipients    If you would like to receive this communication electronically, please contact [email or web address] or [phone] to request more information on [employer] Link access.    For providers and/or their staff who would like to refer a patient to [facility], please contact us through our one-stop-shop provider referral line, [facility], at [phone].    If you feel you have received this communication in error or would no longer like to receive these types of communications, please e-mail [email or web address]

## 2019-10-15 NOTE — PROGRESS NOTES
"Subjective:       Patient ID: Lisa Moreno is a 38 y.o. male.    Chief Complaint: Constipation (2 months)    38-year-old gentleman with past medical history significant for Spinocerebellar atrophy who presents today for evaluation of constipation. Patient was previously seen by Dr. Romeo in the colorectal surgery clinic as recently as February of this year.  He was diagnosed with overflow fecal incontinence likely secondary to underlying neurologic disorder and was started on Linzess.  Per patient's mother who was available for consultation over the phone discussion of diverting colostomy was discussed, with which patient's mother refused.  By her account he was subsequently release from clinic.    Today patient's mother reports continued constipation. He generally has a bowel movement every 4 days despite being compliant with daily Linzess.  Stools are characterized as hard" and "fist like".  She occasionally has to perform enemas to alleviate the burden.  She denies any overt blood in stool.  Denies family history of colon cancer    Concerning dysphagia mother reports patient has no issues with swallowing.  However, caregiver reports he does occasionally have issues with certain foods, particularly rice.  Liquids pose some issue but are generally tolerated.  He has no issues with meats loves pork chops    Patient is accompanied by caregiver     Past Medical History:   Diagnosis Date    Degenerative motor system disease     Depression     Seizure     Spastic quadriplegia     Spinocerebellar atrophy        Past Surgical History:   Procedure Laterality Date    BACLOFEN PUMP IMPLANTATION      FLUOROSCOPIC URODYNAMIC STUDY N/A 11/27/2018    Procedure: URODYNAMIC STUDY, FLUOROSCOPIC;  Surgeon: Tanja Okeefe MD;  Location: [facility] OR [address] [place] [address];  Service: Urology;  Laterality: N/A;  1 hour       Social History  Social History     Tobacco Use    Smoking status: Never Smoker    Smokeless tobacco: Never " Used   Substance Use Topics    Alcohol use: Not Currently     Frequency: Never     Drinks per session: Patient refused     Binge frequency: Never     Comment: social drinker, at times    Drug use: Not Currently       Family History   Problem Relation Age of Onset    Thyroid cancer Mother     Lung cancer Maternal Grandfather     Multiple sclerosis Other         mother's cousin    ALS Neg Hx     Muscular dystrophy Neg Hx        Review of Systems   Unable to perform ROS: Mental status change           Objective:      Physical Exam   Constitutional: No distress.   HENT:   Head: Normocephalic and atraumatic.   Eyes: Pupils are equal, round, and reactive to light. EOM are normal. No scleral icterus.   Neck: Normal range of motion. Neck supple.   Cardiovascular: Normal rate, regular rhythm, normal heart sounds and intact distal pulses.   Pulmonary/Chest: Effort normal and breath sounds normal. No respiratory distress.   Abdominal: Soft. Bowel sounds are normal. He exhibits no distension. There is no tenderness.   Musculoskeletal: Normal range of motion. He exhibits no edema.   Neurological: He is alert. He exhibits abnormal muscle tone. Coordination abnormal.   + dysarthria.  Responds appropriately to simple questions.  Follows commands.   Skin: Skin is warm and dry. No rash noted. No erythema.   Psychiatric: He has a normal mood and affect. His behavior is normal.   Nursing note and vitals reviewed.        Pertinent labs and imaging studies reviewed    Assessment:       1. Constipation, unspecified constipation type        Plan:       With overflow incontinence, likely secondary to underlying neurologic condition  Patient has never had colonoscopy so it is not unreasonable to perform to exclude pseudo-obstruction, despite low degree of suspicion  Given degree of constipation will need special prep:  Recommend performing enema prior to starting clear liquid diet.  Will prescribe GoLYTELY to take advantage of high  volume.  Tolerance may be an issue  In the interim will also increase Linzess given perceived tolerance    (Portions of this note were dictated using voice recognition software and may contain dictation related errors in spelling/grammar/syntax not found on text review)

## 2019-10-15 NOTE — PATIENT INSTRUCTIONS
GOLYTELY/ COLYTE/ NULYTELY Instructions    You are scheduled for a colonoscopy with Dr. Fitch on 11/21/2019 at [facility]  To ensure that your test is accurate and complete, you MUST follow these instructions listed below.  If you have any questions, please call our office at [phone].  Plan on being at the hospital for your procedure for 3-4 hours.    1.  Follow a CLEAR LIQUID DIET for the entire day before your scheduled colonoscopy.  This means no solid food the entire day starting when you wake.  You may have as much of the clear liquids as you want throughout the day.   CLEAR LIQUID DIET:   - Avoid Red, Orange, Purple, and/or Blue food coloring   - NO DAIRY   - You can have:  Coffee with sugar (no creamer), tea, water, soda, apple or white grape juice, chicken or beef broth/bouillon (no meat, noodles, or veggies), green/yellow popsicles, green/yellow Jell-O, lemonade.    2.  MIX GOLYTELY/COLYTE/NULYTELY (all names for same product) WITH ONE (1) GALLON OF WATER.  YOU MAY ADD A FLAVOR PACKET OR YELLOW/GREEN POWDER DRINK MIX TO THIS.  PUT IN REFRIGERATOR.  This is easier to drink if this solution is cold, so you can mix the solution one day ahead of time and place in the refrigerator prior to drinking.  You have to drink the solution within 24-36 hours of mixing it.  Do NOT put this solution over ice.  It IS ok to drink with a straw.    3. AT 5 PM THE DAY BEFORE YOUR COLONOSCOPY, DRINK ONE (1) 8 OUNCE GLASS OF MIXTURE EVERY 10 MINUTES UNTIL HALF OF THE GALLON IS CONSUMED.  Keep this mixture cold and in refrigerator as much as you can while drinking it.  Place the remaining half of mixture in the refrigerator when you finish the first half.    4.  The endoscopy department will call you 2 days before your colonoscopy to tell you the exact time to arrive, AND to tell you the exact time to drink the 2nd portion of your prep (which will be FIVE HOURS BEFORE YOUR ARRIVAL TIME).  At this time given to you,  DRINK ONE (1) 8 OUNCE GLASS OF MIXTURE EVERY 10 MINUTES UNTIL THE OTHER HALF IS CONSUMED. Keep the mixture cold while you are drinking it. Once this is complete, you may not have ANYTHING else by mouth!      5.  You must have someone with you to DRIVE YOU HOME since you will be receiving IV sedation for the colonoscopy.    6.  It is ok to take your heart, blood pressure, and seizure medications in the morning of your test with a SIP of water.  Hold other medications until after your procedure.  Do NOT have anything else to eat or drink the morning of your colonoscopy.  It is ok to brush your teeth.    7.  If you are on blood thinners THAT YOU HAVE BEEN INSTRUCTED TO HOLD BY YOUR DOCTOR FOR THIS PROCEDURE, then do NOT take this the morning of your colonoscopy.  Do NOT stop these medications on your own, they must be approved to be held by your doctor.  Your colonoscopy can NOT be done if you are on these medications.  Examples of blood thinners include: Coumadin, Aggrenox, Plavix, Pradaxa, Reapro, Pletal, Xarelto, Ticagrelor, Brilinta, Eliquis, and high dose aspirin (325 mg).  You do not have to stop baby aspirin 81 mg.    8.  IF YOU ARE DIABETIC:  NO INSULIN OR ORAL MEDICATIONS THE MORNING OF THE COLONOSCOPY.  TAKE ONLY HALF THE DOSE OF YOUR INSULIN THE DAY BEFORE THE COLONOSCOPY.  DO NOT TAKE ANY ORAL DIABETIC MEDICATIONS THE DAY BEFORE THE COLONOSCOPY.  IF YOU ARE AN INSULIN DEPENDENT DIABETIC WITH UNSTABLE BLOOD SUGARS, NOTIFY YOUR PRIMARY CARE PHYSICIAN FOR INSTRUCTIONS.

## 2019-10-25 RX ORDER — BACLOFEN 20 MG/1
TABLET ORAL
Qty: 360 TABLET | Refills: 0 | Status: SHIPPED | OUTPATIENT
Start: 2019-10-25 | End: 2020-01-22

## 2019-11-07 RX ORDER — LINACLOTIDE 145 UG/1
CAPSULE, GELATIN COATED ORAL
Qty: 30 CAPSULE | Refills: 0 | Status: SHIPPED | OUTPATIENT
Start: 2019-11-07 | End: 2019-12-09 | Stop reason: SDUPTHER

## 2019-11-12 ENCOUNTER — OFFICE VISIT (OUTPATIENT)
Dept: PHYSICAL MEDICINE AND REHAB | Facility: CLINIC | Age: 39
End: 2019-11-12
Payer: MEDICARE

## 2019-11-12 DIAGNOSIS — R26.81 GAIT INSTABILITY: ICD-10-CM

## 2019-11-12 DIAGNOSIS — G80.1 SPASTIC DIPLEGIA: Primary | ICD-10-CM

## 2019-11-12 PROCEDURE — 64642 CHEMODENERV 1 EXTREMITY 1-4: CPT | Performed by: PHYSICAL MEDICINE & REHABILITATION

## 2019-11-12 PROCEDURE — 99999 PR PBB SHADOW E&M-EST. PATIENT-LVL II: ICD-10-PCS | Mod: PBBFAC,,, | Performed by: PHYSICAL MEDICINE & REHABILITATION

## 2019-11-12 PROCEDURE — 99215 PR OFFICE/OUTPT VISIT, EST, LEVL V, 40-54 MIN: ICD-10-PCS | Mod: S$PBB,,, | Performed by: PHYSICAL MEDICINE & REHABILITATION

## 2019-11-12 PROCEDURE — 99212 OFFICE O/P EST SF 10 MIN: CPT | Mod: PBBFAC | Performed by: PHYSICAL MEDICINE & REHABILITATION

## 2019-11-12 PROCEDURE — 99999 PR PBB SHADOW E&M-EST. PATIENT-LVL II: CPT | Mod: PBBFAC,,, | Performed by: PHYSICAL MEDICINE & REHABILITATION

## 2019-11-12 PROCEDURE — 99215 OFFICE O/P EST HI 40 MIN: CPT | Mod: S$PBB,,, | Performed by: PHYSICAL MEDICINE & REHABILITATION

## 2019-11-12 PROCEDURE — 64643 CHEMODENERV 1 EXTREM 1-4 EA: CPT

## 2019-11-12 RX ADMIN — ONABOTULINUMTOXINA 800 UNITS: 100 INJECTION, POWDER, LYOPHILIZED, FOR SOLUTION INTRADERMAL; INTRAMUSCULAR at 02:11

## 2019-11-12 NOTE — PROGRESS NOTES
PM&R Outpatient Evaluation    CC: Spasticity. Eval for Botox    HPI: Lisa Moreno is a 38 y.o. male with PMHx of spinocerebellar atrophy with LE weakness, spasticity, and ataxic gait    He presents for the botox treatment. He has just received his specialized wheelchair, no issues over the last week. He is scheduled for colonosopy. He was seen by SLP, recommended NPO. However, patient has been eating well per family, no problems with feeding. Family has refused feeding tube. He has declined with walking, still need AFOs to assist with foot drag. Otherwise, he is doing well.         PMH:   Past Medical History:   Diagnosis Date    Degenerative motor system disease     Depression     Seizure     Spastic quadriplegia     Spinocerebellar atrophy        PSH:   Past Surgical History:   Procedure Laterality Date    BACLOFEN PUMP IMPLANTATION      FLUOROSCOPIC URODYNAMIC STUDY N/A 11/27/2018    Procedure: URODYNAMIC STUDY, FLUOROSCOPIC;  Surgeon: Tanja Okeefe MD;  Location: [facility] OR [address] [place] [address];  Service: Urology;  Laterality: N/A;  1 hour     Current Outpatient Medications on File Prior to Visit   Medication Sig Dispense Refill    baclofen (LIORESAL) 20 MG tablet TAKE 1 TABLET(20 MG) BY MOUTH FOUR TIMES DAILY 360 tablet 0    blood sugar diagnostic Strp 1 strip by Misc.(Non-Drug; Combo Route) route 3 (three) times daily. 100 each 5    blood-glucose meter, disc-type Kit 1 application by Misc.(Non-Drug; Combo Route) route 3 (three) times daily. 1 kit 0    donepezil (ARICEPT) 5 MG tablet Take 1 tablet (5 mg total) by mouth every evening. 30 tablet 11    fluticasone (FLONASE) 50 mcg/actuation nasal spray 1 spray (50 mcg total) by Each Nare route 2 (two) times daily as needed. 15 g 0    gabapentin (NEURONTIN) 100 MG capsule Take 1 capsule (100 mg total) by mouth 3 (three) times daily. (pain) 90 capsule 11    lactulose (CHRONULAC) 10 gram/15 mL solution GIVE 22.5ML BY MOUTH ONCE DAILY FOR 10 DAYS 2025 mL 3     lancets (ACCU-CHEK SOFTCLIX LANCETS) Misc 1 each by Misc.(Non-Drug; Combo Route) route 3 (three) times daily with meals. 100 each 5    linaCLOtide (LINZESS) 145 mcg Cap capsule Take 2 capsules (290 mcg total) by mouth once daily. 60 capsule 3    LINZESS 145 mcg Cap capsule TAKE 1 CAPSULE(145 MCG) BY MOUTH EVERY DAY 30 capsule 0    mirabegron (MYRBETRIQ) 25 mg Tb24 ER tablet Take 1 tablet (25 mg total) by mouth once daily. 30 tablet 11     No current facility-administered medications on file prior to visit.      Review of patient's allergies indicates:   Allergen Reactions    Penicillins      Hives and Itching       Family History:   Family History   Problem Relation Age of Onset    Thyroid cancer Mother     Lung cancer Maternal Grandfather     Multiple sclerosis Other         mother's cousin    ALS Neg Hx     Muscular dystrophy Neg Hx            ROS:   CONSTITUTIONAL:  (-) weight change, fever, chills, night sweats   EYES:  (-)  double vision (-) blurry vision  EARS, NOSE, THROAT: (-) dysphagia (-) hearing loss  RESPIRATORY: (-)   shortness of breath  (-) cough  CARDIOVASCULAR (-) chest pain  (-) swelling  GENITOURINARY    (-) hematuria  GASTROINTESTINAL (-) nausea/vomiting   ENDOCRINE  (-)  heat or cold intolerance  (-) hair loss  PSYCHIATRIC  (-) depression  (-) anxiety  HEMATOL/LYMPHATIC (-) easy bruising (-) enlarged lymph nodes  ALLERGIC/IMMUN  (-) seasonal allergies (-) allergies to environmental stimuli    SKIN (-) changes (-) rashes (-) wounds  The rest of the review of systems is unremarkable.          Physical Examination:  Vitals: There were no vitals taken for this visit.   Gen: NAD, appearing stated age  Gait: NT,  presents in WC  Mood/affect: pleasant and appropriate  Speech: Speech intelligibility impaired with longer sentences   Cognition:  Awake, alert, oriented x3        MMT - UE 5/5, LE - R HF 2/5, Left 3/5; KE limited by tone, DF/PF at least 3/5     Tone:  MAS 2 on R HF, 3 on L,  minimal tone in adductors      Impression:  39 yo M with impaired mobility and ADLs in setting of cerebellar atrophy with secondary deficits including spasticity and neurogenic bladder.     Bilateral spastic paraparesis: We will continue with botox treatment today. 800 units as outlined below.     PROCEDURE NOTE:   The potential risks were discussed with the patient and He consented to proceed. After identifying the correct side (left upper and lower) the patient was placed in a seated position. A syringe was used with 800u Botox reconstituted into 16cc N.S. (50u/cc). A 27G EMG injection needle was utilized as well as EMG guidance to inject the following muscles:         Semitendinosus Left : 100 -EMG  Semimembranous Left : 200  units - EMG  Biceps femoris Left : 100 units - EMG    Semitendinosus Right: 200 units EMG  Semimembranous Right : 100 units - EMG  Biceps femoris Right : 100 units - EMG      Lot No: [id number]  Exp Date: 04/2022    Lot No: [id number]  Exp Date: 01/2022       800 units used  0 units wasted      Impaired gait and mobility. He has received wheelchair and no problems. Still has goals to improve gait. I will asked clinic staff to follow-up on the AFO order to [facility]. Otherwise, we will send back to PT/OT today for gait training.    RTC in 3 months, 800 units botox.     Yung Rodriguez MD

## 2019-11-13 DIAGNOSIS — N39.41 URGE INCONTINENCE: ICD-10-CM

## 2019-11-13 RX ORDER — MIRABEGRON 25 MG/1
TABLET, FILM COATED, EXTENDED RELEASE ORAL
Qty: 30 TABLET | Refills: 0 | Status: SHIPPED | OUTPATIENT
Start: 2019-11-13 | End: 2019-12-11 | Stop reason: SDUPTHER

## 2019-11-18 ENCOUNTER — TELEPHONE (OUTPATIENT)
Dept: PHYSICAL MEDICINE AND REHAB | Facility: CLINIC | Age: 39
End: 2019-11-18

## 2019-11-18 DIAGNOSIS — R25.2 SPASTICITY: ICD-10-CM

## 2019-11-18 DIAGNOSIS — M62.838 MUSCLE SPASM: Primary | ICD-10-CM

## 2019-11-19 ENCOUNTER — TELEPHONE (OUTPATIENT)
Dept: ENDOSCOPY | Facility: HOSPITAL | Age: 39
End: 2019-11-19

## 2019-11-19 RX ORDER — DONEPEZIL HYDROCHLORIDE 5 MG/1
TABLET, FILM COATED ORAL
Qty: 30 TABLET | Refills: 11 | Status: SHIPPED | OUTPATIENT
Start: 2019-11-19 | End: 2020-10-29

## 2019-11-19 NOTE — TELEPHONE ENCOUNTER
Left a message to call back at   Bet 8am to 4pm  Arrival time at 1330  Colon with Dr Fitch  Portal sent a message

## 2019-11-20 ENCOUNTER — TELEPHONE (OUTPATIENT)
Dept: GASTROENTEROLOGY | Facility: CLINIC | Age: 39
End: 2019-11-20

## 2019-11-20 NOTE — TELEPHONE ENCOUNTER
----- Message from Viral White III, RN sent at 11/20/2019  8:28 AM CST -----  Patients mother called and cx procedure, has no help to drive him tomorrow. Please call and reschedule patient.

## 2019-11-20 NOTE — TELEPHONE ENCOUNTER
Spoke with pts mother and she would like to cancel the procedure because she does not have anyone to bring him. Patient has a CNA with him everyday, but they will not be working next week. She will call back to reschedule when she gets a day off of work.

## 2019-11-26 ENCOUNTER — CLINICAL SUPPORT (OUTPATIENT)
Dept: REHABILITATION | Facility: HOSPITAL | Age: 39
End: 2019-11-26
Attending: PHYSICAL MEDICINE & REHABILITATION
Payer: MEDICARE

## 2019-11-26 DIAGNOSIS — R26.89 DECREASED FUNCTIONAL MOBILITY: ICD-10-CM

## 2019-11-26 DIAGNOSIS — R29.898 WEAKNESS OF BOTH LOWER EXTREMITIES: Primary | ICD-10-CM

## 2019-11-26 DIAGNOSIS — R26.89 BALANCE PROBLEM: ICD-10-CM

## 2019-11-26 PROBLEM — M62.9 HAMSTRING TIGHTNESS OF BOTH LOWER EXTREMITIES: Status: RESOLVED | Noted: 2019-03-07 | Resolved: 2019-11-26

## 2019-11-26 PROCEDURE — 97162 PT EVAL MOD COMPLEX 30 MIN: CPT | Mod: PO

## 2019-11-26 NOTE — PLAN OF CARE
[facility]  Physical Therapy Neurological Rehabilitation Initial Evaluation    Name: Lisa [facility]vin [facility] Number: [id number]    Therapy Diagnosis:   Encounter Diagnoses   Name Primary?    Weakness of both lower extremities Yes    Balance problem     Decreased functional mobility      Physician: Yung Rodriguez MD    Physician Orders: PT Eval and Treat   Medical Diagnosis from Referral:   G80.1 (ICD-10-CM) - Spastic diplegia   R26.81 (ICD-10-CM) - Gait instability   Evaluation Date: 11/26/2019  Authorization Period Expiration: 12/31/2019  Plan of Care Expiration: 12/26/2019  Visit # / Visits authorized: 1/ 12    Time In: 11:00 am  Time Out: 11:45 am  Total Billable Time: 45 minutes    Precautions: Standard, Fall and aspiration    Subjective   Date of onset: chronic  History of current condition - Lisa and his caregiver,Shital, reports: he still does not have the AFOs, but they are expecting to get them sometime in Dec. 2019. He did receive Botox into both hamstrings on 11/12/19. Both the patient and his caregiver report no change in his functional mobility or ability to transfer since being discharged from outpatient physical therapy at [facility]. He hasn't walked since he was discharged from outpatient physical therapy as his caregiver reports he does not have a walker, but patient reports having one just doesn't know where his mother put it. He only performs his HEP from [facility] on Tuesdays,Thursdays, Saturdays, and Sundays when Shital is working as she makes him do it 2x a day. She is not sure if he is doing his exercises with anyone else, patient reports he does not do it on the other days. He got a new wheelchair about a month but he does not maneuver it by himself inside the home as his mother does not want him to jef up her walls so his caregivers assist him.      Medical History:   Past Medical History:   Diagnosis Date     Degenerative motor system disease     Depression     Seizure     Spastic quadriplegia     Spinocerebellar atrophy        Surgical History:   Lisa Moreno  has a past surgical history that includes Baclofen pump implantation and Fluoroscopic urodynamic study (N/A, 11/27/2018).    Medications:   Lisa has a current medication list which includes the following prescription(s): baclofen, blood sugar diagnostic, blood-glucose meter, disc-type, donepezil, fluticasone propionate, gabapentin, lactulose, lancets, linaclotide, linzess, and myrbetriq.    Allergies:   Review of patient's allergies indicates:   Allergen Reactions    Penicillins      Hives and Itching        Imaging, none: no recent imaging available to PT    Prior Therapy: Patient has received outpatient and inpatient rehab several times a year for past several years.  Social History: has aids during the day, lives with his mother  Falls: none  DME: Manual wheelchair, BSC, Hospital bed and Tub bench    Home Environment: single story home, ramp present  Exercise Routine / History: does HEP 4 days a week with aide  Family Present at time of Eval: none, caregiver Shital  Occupation: disabled  Prior Level of Function: Min A  Current Level of Function: Min A    Pain:  Current none today  Location: N/A  Description: N/A  Aggravating Factors: N/A  Easing Factors: N/A  Pts goals: walk and play basketball    Objective     Observation: Patient is a 38 year old male, who presents to the clinic in a manual wheelchair with aide present. When performing bed mobility and wheelchair to/from mat transfers he required Min A to maneuver his legs due to weakness and spasticity. During sit to/from stand he required Mod A to start the movement and was unable to fully extend his knees and hips. He was reliant on his UEs to maintain standing by pushing down on RW.      Posture: sacral sitting on mat, flexed knees and hips with attempts to stand, R foot placed on top of  L foot with attempts to stand, forward head and shoulders in both standing and sitting      Gait: wheelchair bound, when maneuvering wheelchair in the clinic he consistently runs into the right side of doorways. When attempting to take steps with RW he required Min A to maintain his balance, had a scissoring gait pattern placing his R foot on top of his left. He was only able to take 3 steps before having to sit due to fatigue.     Range of Motion: AROM (PROM): AROM limited by severe weakness of all lower extremity muscle groups  Hip Left Right   Flexion WFL WFL   Abduction WFL WFL     Knee Left Right   Extension -10(0) -5 (0)   Flexion WFL WFL       Ankle Left Right   Dorsiflexion -5(0) -5(0)   Plantarflexion WFL WFL   Inversion WFL WFL   Eversion WFL WFL       Strength:  Hip Left Right   Flexion 2+ 2+   Abduction 2+ 2   Adduction 1 1   Extension NT NT     Knee Left Right   Extension 2 2   Flexion 2 2     Ankle Left Right   Dorsiflexion 2 2   Plantarflexion 2 2   Inversion 2 2   Eversion 2 2       Special Tests: unable to assess balance standing due to reliance on UEs to maintain standing, sitting balance static good, static sitting with minimal challenge fair.      Joint Mobility: soft end feel to knee ext    Palpation: N/A    Sensation: light touch intact    Flexibility: Popliteal Angle: R = 20 degrees ; L = 20 degrees      CMS Impairment/Limitation/Restriction for FOTO Paraplegic Syndromes Survey    Therapist reviewed FOTO scores for Lisa Moreno on 11/26/2019.   FOTO documents entered into [employer] - see Media section.    Limitation Score: 78%  Category: Mobility    Current : CL = least 60% but < 80% impaired, limited or restricted  Goal: CK = at least 40% but < 60% impaired, limited or restricted  Discharge: N/A at this time         TREATMENT     Home Exercises and Patient Education Provided    Education provided:   - compliance with HEP  - role of PT and goals for PT     Written Home Exercises Provided:  Patient instructed to cont prior HEP on a daily basis not just 4 days a week.  Lisa demonstrated good  understanding of the education provided.       Assessment   Lisa is a 38 y.o. male referred to outpatient Physical Therapy with a medical diagnosis of Spastic diplegia; Gait instability. Pt presents with weakness of both lower extremities, bilateral hamstring tightness, poor posture, and decreased functional mobility consistent with his diagnosis of spastic diplegia. He continues to require assistance with transfers and ADLs due to his LE weakness and muscle tightness. When attempting to stand he required Mod A and was unable to fully extend his hips and knees, while being reliant on his UEs to hold him up with RW.Due to his weakness he is unable to ambulate without assistance. His current score on FOTO Paraplegic Syndromes Survey places him in the 60%<80% impaired, limited, or restricted category. Several times during the evaluation he had an unproductive cough, when asked about issues with swallowing and the possibility of evaluation by Speech Therapy he declined the service stating he did not have any difficulty with swallowing.     Pt prognosis is Fair.   Pt will benefit from skilled outpatient Physical Therapy to address the deficits stated above and in the chart below, provide pt/family education, and to maximize pt's level of independence.     Plan of care discussed with patient: Yes  Pt's spiritual, cultural and educational needs considered and patient is agreeable to the plan of care and goals as stated below:     Anticipated Barriers for therapy: in past patient has had transportation issues limiting his ability to attend therapy consistently    Medical Necessity is demonstrated by the following  History  Co-morbidities and personal factors that may impact the plan of care Co-morbidities:   depression, level of undertstanding of current condition, poor medication/medical compliance and history of  spinocerebellar ataxia    Personal Factors:   coping style     high   Examination  Body Structures and Functions, activity limitations and participation restrictions that may impact the plan of care Body Regions:   lower extremities  trunk    Body Systems:    ROM  strength  balance  gait  transfers  motor control    Participation Restrictions:   Difficulty with all transfers, inability to ambulate, difficulty with all ADLs.    Activity limitations:   Learning and applying knowledge  solving problems    General Tasks and Commands  no deficits    Communication  communicating with/receiving spoken language    Mobility  lifting and carrying objects  walking  moving around using equipment (WC)  using transportation (bus, train, plane, car)    Self care  washing oneself (bathing, drying, washing hands)  caring for body parts (brushing teeth, shaving, grooming)  toileting  dressing    Domestic Life  shopping  cooking  doing house work (cleaning house, washing dishes, laundry)    Interactions/Relationships  no deficits    Life Areas  employment    Community and Social Life  community life  recreation and leisure         high   Clinical Presentation evolving clinical presentation with changing clinical characteristics moderate   Decision Making/ Complexity Score: moderate     Goals:  Long Term Goals: 4 weeks   1. This patient and his caregivers will be independent with a HEP.  2. This patient will increase B LE strength by 1 grade in order to perform sit to stand with Min A using RW for support.   3. This patient will be able to ambulate 50 feet with Min A using RW, B AFOs, and no LOB.  4. Patient will be able to achieve greater than or equal to 40 on the FOTO Paraplegic Syndrome Survey placing patient in 40%<60% impaired, limited, or restricted category demonstrating overall improved functional ability with lower extremity.      Plan   Plan of care Certification: 11/26/2019 to 12/26/2019.    Outpatient Physical Therapy 2  times weekly for 4 weeks to include the following interventions: Gait Training, Manual Therapy, Moist Heat/ Ice, Neuromuscular Re-ed, Patient Education, Therapeutic Activites and Therapeutic Exercise.     Mary Marshall, PT

## 2019-11-27 PROBLEM — R26.89 BALANCE PROBLEM: Status: ACTIVE | Noted: 2019-11-27

## 2019-11-27 PROBLEM — R26.89 DECREASED FUNCTIONAL MOBILITY: Status: ACTIVE | Noted: 2019-11-27

## 2019-12-03 ENCOUNTER — CLINICAL SUPPORT (OUTPATIENT)
Dept: REHABILITATION | Facility: HOSPITAL | Age: 39
End: 2019-12-03
Attending: PHYSICAL MEDICINE & REHABILITATION
Payer: MEDICARE

## 2019-12-03 DIAGNOSIS — R26.89 BALANCE PROBLEM: ICD-10-CM

## 2019-12-03 DIAGNOSIS — R26.89 DECREASED FUNCTIONAL MOBILITY: ICD-10-CM

## 2019-12-03 PROCEDURE — 97110 THERAPEUTIC EXERCISES: CPT | Mod: PO

## 2019-12-03 NOTE — PROGRESS NOTES
"  Physical Therapy Daily Treatment Note     Name: Lisa Irvin [facility] Number: [id number]    Therapy Diagnosis:   Encounter Diagnoses   Name Primary?   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  He was compliant with home exercise program.  Response to previous treatment: NA  Functional change: NA    Pain: 0/10  Location:  NA     Objective     Lisa received therapeutic exercises to develop strength, ROM and flexibility for 55 minutes 1:1 with PTA including:      Date  12/03/19   VISIT 2/12   FOTO 2/5   POC EXP. DATE 12/26/19   VISIT AMOUNT  MEDICARE TOTAL 121.28  204.16   FACE-TO-FACE 12/26/19       TABLE:    HSS - manual  5 x 10" ea   GSS - manual 5 x 10" ea   Bridge  1 x 10   LTR - AA 1 x 10   Marching  1 x 5   SAQ 1 x 5   Hip abd/ add - supine AA 1 x 10   Quad sets 5 x 2"       SEATED:    LAQs - AA 1 x 5   Seated Hip Flex  1 x 5   Toe raises 1 x 5           Initials GWA 1/6         Home Exercises Provided and Patient Education Provided     Education provided:   - focus on scott his muscles and not using his hands to assist.     Written Home Exercises Provided: yes.  Exercises were reviewed and Lisa was able to demonstrate them prior to the end of the session.  Lisa demonstrated fair  understanding of the education provided.     See EMR under Patient Instructions for exercises provided 12/3/2019.    Assessment     Patient performed above exercise program " with verbal cueing for proper technique and cues to avoid using his hands to assist with LE exercises..    Lisa is progressing well towards his goals.   Pt prognosis is Fair.     Pt will continue to benefit from skilled outpatient physical therapy to address the deficits listed in the problem list box on initial evaluation, provide pt/family education and to maximize pt's level of independence in the home and community environment.     Pt's spiritual, cultural and educational needs considered and pt agreeable to plan of care and goals.    Anticipated barriers to physical therapy: in past patient has had transportation issues limiting his ability to attend therapy consistently    Goals:  Long Term Goals: 4 weeks   1. This patient and his caregivers will be independent with a HEP.  IN PROGRESS  2. This patient will increase B LE strength by 1 grade in order to perform sit to stand with Min A using RW for support.  IN PROGRESS  3. This patient will be able to ambulate 50 feet with Min A using RW, B AFOs, and no LOB. IN PROGRESS  4. Patient will be able to achieve greater than or equal to 40 on the FOTO Paraplegic Syndrome Survey placing patient in 40%<60% impaired, limited, or restricted category demonstrating overall improved functional ability with lower extremity.   IN PROGRESS    Plan     Continue with Plan Of Care and progress toward PT goals.    John Troy, PTA

## 2019-12-03 NOTE — PATIENT INSTRUCTIONS
Bridging    Flatten back against floor then slowly raise buttocks from floor, keeping stomach tight. Hold 1 seconds.  Repeat 10 times per set. Do 1 sets per session. Do 2 sessions per day.      Bent Leg Lift (Hook-Lying)    Tighten stomach and slowly raise right leg from floor. Keep trunk rigid. Slowly lower and switch sides.  Repeat 10 times per set. Do 1 sets per session. Do 2 sessions per day.      Lumbar Rotation    Feet on floor, slowly rock knees from side to side in small, pain-free range of motion. Allow lower back to rotate slightly, but keep shoulders flat on ground. Hold 2 seconds.  Repeat 10 times per set. Do 1 sets per session. Do 2 sessions per day.          Quad Set        With other leg bent, foot flat,place a towel roll under your knee and press the back of your knee into the towel and slowly tighten muscles on thigh of straight leg while counting out loud to 5. Repeat with other leg.  Repeat 10 times. Do 2 sessions per day.        Hip Abduction / Adduction: with Extended Knee (Supine)        Bring left leg out to side and return. Keep knee straight.  Repeat 10 times per set. Do 1 sets per session. Do 2 sessions per day.     [email or web address]            Short Arc Quad        Place a large can or rolled towel under left leg. Straighten leg. Hold 1 seconds.  Repeat 10 times. Do 2 sessions per day.     [email or web address]       Knee  (LAQ)        Sit up tall, tighten abdominals. Straighten one leg and then relax it. Repeat with other leg.  Repeat 10 times. Do 2 sessions per day.     [email or web address]     Seated Marching    Sit, both feet flat, tighten abdominals. Lift right knee toward ceiling. Lower and lift left knee toward the ceiling. Repeat, alternating sides.  Complete 1 sets of 10 repetitions. Perform 2 sessions per day.      Toe Raise (Sitting)        Raise toes, keeping heels on floor.  Repeat 10 times per set. Do 1 sets per session. Do 2 sessions per day.      [email or web address]

## 2019-12-05 ENCOUNTER — CLINICAL SUPPORT (OUTPATIENT)
Dept: REHABILITATION | Facility: HOSPITAL | Age: 39
End: 2019-12-05
Attending: PHYSICAL MEDICINE & REHABILITATION
Payer: MEDICARE

## 2019-12-05 DIAGNOSIS — R26.89 BALANCE PROBLEM: ICD-10-CM

## 2019-12-05 DIAGNOSIS — R26.89 DECREASED FUNCTIONAL MOBILITY: ICD-10-CM

## 2019-12-05 PROCEDURE — 97110 THERAPEUTIC EXERCISES: CPT | Mod: PO

## 2019-12-05 NOTE — PROGRESS NOTES
"  Physical Therapy Daily Treatment Note     Name: Lisa Irvin [facility] Number: [id number]    Therapy Diagnosis:   Encounter Diagnoses   Name Primary?    Balance problem     Decreased functional mobility      Physician: Yung Rodriguez MD    Visit Date: 12/5/2019     Physician Orders: PT Eval and Treat   Medical Diagnosis from Referral:   G80.1 (ICD-10-CM) - Spastic diplegia   R26.81 (ICD-10-CM) - Gait instability   Evaluation Date: 11/26/2019  Authorization Period Expiration: 12/31/2019  Plan of Care Expiration: 12/26/2019  Visit # / Visits authorized: 2/ 12    Time In: 2:00 pm  Time Out: 2:45 pm  Total Billable Time: 45 minutes    Precautions:  Standard, Fall and aspiration    Subjective     Pt reports: having no pain today..  He was compliant with home exercise program.  Response to previous treatment: NA  Functional change: NA    Pain: 0/10  Location:  NA     Objective     Lisa received therapeutic exercises to develop strength, ROM and flexibility for 45 minutes 1:1 with PTA including:      Date  12/05/19 12/03/19   VISIT 3/12 2/12   FOTO 3/5 2/5   POC EXP. DATE 12/26/19 12/26/19   VISIT AMOUNT  MEDICARE TOTAL 90.96  295.12 121.28  204.16   FACE-TO-FACE 12/26/19 12/26/19        TABLE:     HSS - manual  5 x 10" ea 5 x 10" ea   GSS - manual 5 x 10 ea 5 x 10" ea   Bridge  3 x 5 1 x 10   LTR - AA 1 x 10 1 x 10   Marching  1 x 10 1 x 5   SAQ AA 2 x 5 ea 1 x 5   Hip abd/ add - supine AA 1 x 15 1 x 10   Quad sets 10 x 2" 5 x 2"        SEATED:     LAQs - AA 2 x 5 1 x 5   Seated Hip Flex  1 x 10 1 x 5   Toe raises 1 x 10 1 x 5             Initials GWA 2/6 GWA 1/6       Home Exercises Provided and Patient Education Provided     Education provided:   - focus on scott his muscles and not using his hands to assist.     Written Home Exercises Provided: yes.  Exercises were reviewed and Lisa was able to demonstrate them prior to the end of the session.  Lisa demonstrated fair  understanding of the " education provided.     See EMR under Patient Instructions for exercises provided 12/3/2019.    Assessment     Patient performed above exercise program with verbal cueing for proper technique, cues to avoid using his hands to assist with LE exercises and had no increase in symptoms with today's progressions.  Patient was not able to independently transfer from his WC to the table and back without mod to max assistance.     Lisa is progressing well towards his goals.   Pt prognosis is Fair.     Pt will continue to benefit from skilled outpatient physical therapy to address the deficits listed in the problem list box on initial evaluation, provide pt/family education and to maximize pt's level of independence in the home and community environment.     Pt's spiritual, cultural and educational needs considered and pt agreeable to plan of care and goals.    Anticipated barriers to physical therapy: in past patient has had transportation issues limiting his ability to attend therapy consistently    Goals:  Long Term Goals: 4 weeks   1. This patient and his caregivers will be independent with a HEP.  IN PROGRESS  2. This patient will increase B LE strength by 1 grade in order to perform sit to stand with Min A using RW for support.  IN PROGRESS  3. This patient will be able to ambulate 50 feet with Min A using RW, B AFOs, and no LOB. IN PROGRESS  4. Patient will be able to achieve greater than or equal to 40 on the FOTO Paraplegic Syndrome Survey placing patient in 40%<60% impaired, limited, or restricted category demonstrating overall improved functional ability with lower extremity.   IN PROGRESS    Plan     Continue with Plan Of Care and progress toward PT goals.    John Troy, PTA

## 2019-12-09 RX ORDER — LINACLOTIDE 145 UG/1
CAPSULE, GELATIN COATED ORAL
Qty: 30 CAPSULE | Refills: 0 | Status: SHIPPED | OUTPATIENT
Start: 2019-12-09 | End: 2020-01-06

## 2019-12-10 ENCOUNTER — CLINICAL SUPPORT (OUTPATIENT)
Dept: REHABILITATION | Facility: HOSPITAL | Age: 39
End: 2019-12-10
Attending: PHYSICAL MEDICINE & REHABILITATION
Payer: MEDICARE

## 2019-12-10 DIAGNOSIS — R13.12 OROPHARYNGEAL DYSPHAGIA: Primary | ICD-10-CM

## 2019-12-10 DIAGNOSIS — R26.89 BALANCE PROBLEM: ICD-10-CM

## 2019-12-10 DIAGNOSIS — R26.89 DECREASED FUNCTIONAL MOBILITY: ICD-10-CM

## 2019-12-10 PROCEDURE — 97110 THERAPEUTIC EXERCISES: CPT | Mod: KX,PO

## 2019-12-10 NOTE — PROGRESS NOTES
"  Physical Therapy Daily Treatment Note     Name: Lisa Irvin [facility] Number: [id number]    Therapy Diagnosis:   Encounter Diagnoses   Name Primary?    Balance problem     Decreased functional mobility      Physician: Yung Rodriguez MD    Visit Date: 12/10/2019     Physician Orders: PT Eval and Treat   Medical Diagnosis from Referral:   G80.1 (ICD-10-CM) - Spastic diplegia   R26.81 (ICD-10-CM) - Gait instability   Evaluation Date: 11/26/2019  Authorization Period Expiration: 12/31/2019  Plan of Care Expiration: 12/26/2019  Visit # / Visits authorized: 4/ 12    Time In: 1:00 pm  Time Out: 1:55 pm  Total Billable Time: 55 minutes    Precautions:  Standard, Fall and aspiration    Subjective     Pt reports: he has been practicing standing at home and performing his HEP with his aide.  He was compliant with home exercise program.  Response to previous treatment: NA  Functional change: NA    Pain: 0/10  Location:  NA     Objective     Lisa received therapeutic exercises to develop strength, ROM and flexibility for 55 minutes 1:1 with PT including:      Date  12/10/19 12/05/19 12/03/19   VISIT 4/12 3/12 2/12   FOTO 4/5 3/5 2/5   POC EXP. DATE 12/26/19 12/26/19 12/26/19   VISIT AMOUNT  MEDICARE TOTAL 121.28KX  3345.36 90.96  295.12 121.28  204.16   FACE-TO-FACE 12/26/19 12/26/19 12/26/19         TABLE:      HSS - manual  5 x 10 "ea 5 x 10" ea 5 x 10" ea   GSS - manual 5 x 10 " ea 5 x 10 ea 5 x 10" ea   Bridge  3 x 5 3 x 5 1 x 10   LTR - AA 1 x 15 1 x 10 1 x 10   Marching  1 x 10 1 x 10 1 x 5   SAQ AA 1 x 10 2 x 5 ea 1 x 5   Hip abd/ add - supine AA 1 x 15 1 x 15 1 x 10   Quad sets 10 x 2" 10 x 2" 5 x 2"         SEATED:      LAQs - AA 2 x 5 2 x 5 1 x 5   Seated Hip Flex  1 x 15 1 x 10 1 x 5   Toe raises 1 x 10 1 x 10 1 x 5   Standing      Sit to stand 2x     Static standing 1 x 1 minute     Initials DG GWA 2/6 GWA 1/6       Home Exercises Provided and Patient Education Provided     Education provided:   - " focus on scott his muscles and not using his hands to assist.     Written Home Exercises Provided: yes.  Exercises were reviewed and Lisa was able to demonstrate them prior to the end of the session.  Lisa demonstrated fair  understanding of the education provided.     See EMR under Patient Instructions for exercises provided 12/3/2019.    Assessment     Lisa was able to transfer wheelchair to/from mat with CGA and verbal cues for positioning of his wheelchair for safety. When performing sit to stand from edge of mat he required Mod A with verbal and tactile cues for hip, knee, and back extension. When in a standing position he continues to have bilateral knee flexion and hip flexion with increased reliance on UEs to maintain an upright posture. He required verbal cues for placement of his wheelchair prior to transferring to the mat as he placed it at the foot of the mat instead of closer to the head of the mat. When scooting to the head of the mat in supine he required Mod A to lift his hips and to stabilize enough to push through his legs to move to the top of the mat. He continues to require frequent verbal and tactile cues to not immediately use his hands to move his lower extremities but first try to use his LE musculature. He required Min to Mod A to move through his full ROM with all exercises. He performed all of today's activities with no increase in symptoms prior to leaving the clinic.   Lsia is progressing well towards his goals.   Pt prognosis is Fair.     Pt will continue to benefit from skilled outpatient physical therapy to address the deficits listed in the problem list box on initial evaluation, provide pt/family education and to maximize pt's level of independence in the home and community environment.     Pt's spiritual, cultural and educational needs considered and pt agreeable to plan of care and goals.    Anticipated barriers to physical therapy: in past patient has had  transportation issues limiting his ability to attend therapy consistently    Goals:  Long Term Goals: 4 weeks   1. This patient and his caregivers will be independent with a HEP.  IN PROGRESS  2. This patient will increase B LE strength by 1 grade in order to perform sit to stand with Min A using RW for support.  IN PROGRESS  3. This patient will be able to ambulate 50 feet with Min A using RW, B AFOs, and no LOB. IN PROGRESS  4. Patient will be able to achieve greater than or equal to 40 on the FOTO Paraplegic Syndrome Survey placing patient in 40%<60% impaired, limited, or restricted category demonstrating overall improved functional ability with lower extremity.   IN PROGRESS    Plan     Continue with Plan Of Care and progress toward PT goals. Increase reps with sit to stand next visit.     aMry Marshall, PT

## 2019-12-11 DIAGNOSIS — N39.41 URGE INCONTINENCE: ICD-10-CM

## 2019-12-12 ENCOUNTER — CLINICAL SUPPORT (OUTPATIENT)
Dept: REHABILITATION | Facility: HOSPITAL | Age: 39
End: 2019-12-12
Attending: PHYSICAL MEDICINE & REHABILITATION
Payer: MEDICARE

## 2019-12-12 DIAGNOSIS — R26.89 DECREASED FUNCTIONAL MOBILITY: ICD-10-CM

## 2019-12-12 DIAGNOSIS — R26.89 BALANCE PROBLEM: ICD-10-CM

## 2019-12-12 PROCEDURE — 97110 THERAPEUTIC EXERCISES: CPT | Mod: KX,PO

## 2019-12-12 RX ORDER — MIRABEGRON 25 MG/1
TABLET, FILM COATED, EXTENDED RELEASE ORAL
Qty: 30 TABLET | Refills: 0 | Status: SHIPPED | OUTPATIENT
Start: 2019-12-12 | End: 2020-01-17

## 2019-12-12 NOTE — PROGRESS NOTES
"  Physical Therapy Daily Treatment Note     Name: Lisa Irvin [facility] Number: [id number]    Therapy Diagnosis:   Encounter Diagnoses   Name Primary?    Balance problem     Decreased functional mobility      Physician: Yung Rodriguez MD    Visit Date: 12/12/2019     Physician Orders: PT Eval and Treat   Medical Diagnosis from Referral:   G80.1 (ICD-10-CM) - Spastic diplegia   R26.81 (ICD-10-CM) - Gait instability   Evaluation Date: 11/26/2019  Authorization Period Expiration: 12/31/2019  Plan of Care Expiration: 12/26/2019  Visit # / Visits authorized: 5/ 12    Time In: 1:00 pm  Time Out: 1:58 pm  Total Billable Time: 58 minutes    Precautions:  Standard, Fall and aspiration    Subjective     Pt reports: he has been practicing standing at home and performing his HEP with his aide.  He was compliant with home exercise program.  Response to previous treatment: NA  Functional change: NA    Pain: 0/10  Location:  NA     Objective     Lisa received therapeutic exercises to develop strength, ROM and flexibility for 58 minutes 1:1 with PTA including:      Date  12/12/19 12/10/19 12/05/19 12/03/19   VISIT 5/12 4/12 3/12 2/12   FOTO 5/5 4/5 3/5 2/5   POC EXP. DATE 12/26/19 12/26/19 12/26/19 12/26/19   VISIT AMOUNT  MEDICARE TOTAL 121.28 KX  3466.64 121.28KX  3345.36 90.96  295.12 121.28  204.16   FACE-TO-FACE 12/26/19 12/26/19 12/26/19 12/26/19          TABLE:       HSS - manual  5 x 10" ea 5 x 10 "ea 5 x 10" ea 5 x 10" ea   GSS - manual 5 x 10" ea 5 x 10 " ea 5 x 10 ea 5 x 10" ea   Bridge  4 x 5 3 x 5 3 x 5 1 x 10   LTR - AA 1 x 15 1 x 15 1 x 10 1 x 10   Marching  1 x 10 1 x 10 1 x 10 1 x 5   SAQ AA 1 x 10 1 x 10 2 x 5 ea 1 x 5   Hip abd/ add - supine AA 2 x 10 1 x 15 1 x 15 1 x 10   Quad sets 10 x 2" 10 x 2" 10 x 2" 5 x 2"          SEATED:       LAQs - AA 2 x 5 2 x 5 2 x 5 1 x 5   Seated Hip Flex  1 x 15 1 x 15 1 x 10 1 x 5   Toe raises 1 x 10 1 x 10 1 x 10 1 x 5   Standing       Sit to stand 2x 2x   "   Static standing 1 x 1 minute 1 x 1 minute            Initials GWA 1/6 DG GWA 2/6 GWA 1/6       Home Exercises Provided and Patient Education Provided     Education provided:   - focus on scott his muscles and not using his hands to assist.     Written Home Exercises Provided: yes.  Exercises were reviewed and Lisa was able to demonstrate them prior to the end of the session.  Lisa demonstrated fair  understanding of the education provided.     See EMR under Patient Instructions for exercises provided 12/3/2019.    Assessment     Lisa was able to transfer wheelchair to/from mat with CGA and verbal cues for positioning of his wheelchair for safety. When performing sit to stand from edge of mat he required Mod A with verbal and tactile cues for hip, knee, and back extension but was only able to perform once as he was fatigued. When in a standing position he continues to have bilateral knee flexion and hip flexion with increased reliance on UEs to maintain an upright posture. He required verbal cues for placement of his wheelchair prior to transferring to the mat as he placed it at the foot of the mat instead of closer to the head of the mat. When scooting to the head of the mat in supine he required Mod A to lift his hips and to stabilize enough to push through his legs to move to the top of the mat. He continues to require frequent verbal and tactile cues to not immediately use his hands to move his lower extremities but first try to use his LE musculature. He required Min to Mod A to move through his full ROM with all exercises. He performed all of today's activities along with progressions with no increase in symptoms prior to leaving the clinic.   Lisa is progressing well towards his goals.   Pt prognosis is Fair.     Pt will continue to benefit from skilled outpatient physical therapy to address the deficits listed in the problem list box on initial evaluation, provide pt/family education and  to maximize pt's level of independence in the home and community environment.     Pt's spiritual, cultural and educational needs considered and pt agreeable to plan of care and goals.    Anticipated barriers to physical therapy: in past patient has had transportation issues limiting his ability to attend therapy consistently    Goals:  Long Term Goals: 4 weeks   1. This patient and his caregivers will be independent with a HEP.  IN PROGRESS  2. This patient will increase B LE strength by 1 grade in order to perform sit to stand with Min A using RW for support.  IN PROGRESS  3. This patient will be able to ambulate 50 feet with Min A using RW, B AFOs, and no LOB. IN PROGRESS  4. Patient will be able to achieve greater than or equal to 40 on the FOTO Paraplegic Syndrome Survey placing patient in 40%<60% impaired, limited, or restricted category demonstrating overall improved functional ability with lower extremity.   IN PROGRESS    Plan     Continue with Plan Of Care and progress toward PT goals. Increase reps with sit to stand next visit.     John Troy, PTA

## 2019-12-17 ENCOUNTER — CLINICAL SUPPORT (OUTPATIENT)
Dept: REHABILITATION | Facility: HOSPITAL | Age: 39
End: 2019-12-17
Attending: PHYSICAL MEDICINE & REHABILITATION
Payer: MEDICARE

## 2019-12-17 DIAGNOSIS — R26.89 DECREASED FUNCTIONAL MOBILITY: ICD-10-CM

## 2019-12-17 DIAGNOSIS — R26.89 BALANCE PROBLEM: ICD-10-CM

## 2019-12-17 DIAGNOSIS — R13.12 OROPHARYNGEAL DYSPHAGIA: Primary | ICD-10-CM

## 2019-12-17 PROCEDURE — 97110 THERAPEUTIC EXERCISES: CPT | Mod: KX,PO,59

## 2019-12-17 PROCEDURE — 92526 ORAL FUNCTION THERAPY: CPT | Mod: KX,PO | Performed by: SPEECH-LANGUAGE PATHOLOGIST

## 2019-12-17 PROCEDURE — 92610 EVALUATE SWALLOWING FUNCTION: CPT | Mod: KX,PO | Performed by: SPEECH-LANGUAGE PATHOLOGIST

## 2019-12-17 NOTE — PROGRESS NOTES
"  Physical Therapy Daily Treatment Note     Name: Lisa Irvin [facility] Number: [id number]    Therapy Diagnosis:   Encounter Diagnoses   Name Primary?    Balance problem     Decreased functional mobility      Physician: Yung Rodriguez MD    Visit Date: 12/17/2019     Physician Orders: PT Eval and Treat   Medical Diagnosis from Referral:   G80.1 (ICD-10-CM) - Spastic diplegia   R26.81 (ICD-10-CM) - Gait instability   Evaluation Date: 11/26/2019  Authorization Period Expiration: 12/31/2019  Plan of Care Expiration: 12/26/2019  Visit # / Visits authorized: 6/ 12    Time In: 1:00 pm  Time Out: 1:53 pm  Total Billable Time: 53 minutes    Precautions:  Standard, Fall and aspiration    Subjective     Pt reports: he has been practicing standing at home and performing his HEP with his aide.  He was compliant with home exercise program.  Response to previous treatment: NA  Functional change: NA    Pain: 0/10  Location:  NA     Objective     Lisa received therapeutic exercises to develop strength, ROM and flexibility for 53 minutes 1:1 with PT including:      Date  12/17/19 12/12/19 12/10/19 12/05/19 12/03/19   VISIT 6/12 5/12 4/12 3/12 2/12   FOTO -- 5/5 4/5 3/5 2/5   POC EXP. DATE 12/26/19 12/26/19 12/26/19 12/26/19 12/26/19   VISIT AMOUNT  MEDICARE TOTAL 121.28KX  3587.92 121.28 KX  3466.64 121.28KX  3345.36 90.96  295.12 121.28  204.16   FACE-TO-FACE 12/26/19 12/26/19 12/26/19 12/26/19 12/26/19           TABLE:        HSS - manual  5 x 10" 5 x 10" ea 5 x 10 "ea 5 x 10" ea 5 x 10" ea   GSS - manual 5 x 10 5 x 10" ea 5 x 10 " ea 5 x 10 ea 5 x 10" ea   Bridge  3 x 6 4 x 5 3 x 5 3 x 5 1 x 10   LTR - AA 1 x 15 1 x 15 1 x 15 1 x 10 1 x 10   Marching  1 x 10 1 x 10 1 x 10 1 x 10 1 x 5   SAQ AA 3 x 5 1 x 10 1 x 10 2 x 5 ea 1 x 5   Hip abd/ add - supine AA 2 x 10 2 x 10 1 x 15 1 x 15 1 x 10   Quad sets 10 x 2" 10 x 2" 10 x 2" 10 x 2" 5 x 2"           SEATED:        LAQs - AA Not today 2 x 5 2 x 5 2 x 5 1 x 5   Seated " Hip Flex  Not today 1 x 15 1 x 15 1 x 10 1 x 5   Toe raises Not today 1 x 10 1 x 10 1 x 10 1 x 5   Standing        Sit to stand Not today 2x 2x     Static standing Not today 1 x 1 minute 1 x 1 minute             Initials DG GWA 1/6 DG GWA 2/6 GWA 1/6       Home Exercises Provided and Patient Education Provided     Education provided:   - focus on scott his muscles and not using his hands to assist.     Written Home Exercises Provided: yes.  Exercises were reviewed and Lisa was able to demonstrate them prior to the end of the session.  Lisa demonstrated fair  understanding of the education provided.     See EMR under Patient Instructions for exercises provided 12/3/2019.    Assessment     Lisa attempted to ambulate from the waiting room to the gym with his RW and assistance of his aide. He was able to stand with Mod A from his aide but was unable to take more than 1 step with his L foot before he began to drag his R LE behind him with his foot plantarflexed. When he attempted to ambulate he had a forward flexed trunk and his knees were flexed. When maneuvering his wheelchair back to the gym he required two verbal cues to scan his environment to avoid running into objects on his right side. Once he transferred from his wheelchair to the exercise mat he required two verbal and tactile cues to maintain his sitting balance as he easily falls backwards. When transfering and scooting along the edge of the mat he tends to lean backwards and needed tactile cues to lean forward to make the transition easier. He was able to perform all of today's progressions with no increase in symptoms, but was unable to perform his seated exercises due to fatigue. He continues to require Min to Mod A to move through his full ROM with all exercises.   Lisa is progressing well towards his goals.   Pt prognosis is Fair.     Pt will continue to benefit from skilled outpatient physical therapy to address the deficits listed in  the problem list box on initial evaluation, provide pt/family education and to maximize pt's level of independence in the home and community environment.     Pt's spiritual, cultural and educational needs considered and pt agreeable to plan of care and goals.    Anticipated barriers to physical therapy: in past patient has had transportation issues limiting his ability to attend therapy consistently    Goals:  Long Term Goals: 4 weeks   1. This patient and his caregivers will be independent with a HEP.  IN PROGRESS  2. This patient will increase B LE strength by 1 grade in order to perform sit to stand with Min A using RW for support.  IN PROGRESS  3. This patient will be able to ambulate 50 feet with Min A using RW, B AFOs, and no LOB. IN PROGRESS  4. Patient will be able to achieve greater than or equal to 40 on the FOTO Paraplegic Syndrome Survey placing patient in 40%<60% impaired, limited, or restricted category demonstrating overall improved functional ability with lower extremity.   IN PROGRESS    Plan     Continue with Plan Of Care and progress toward PT goals. Attempt to increase reps with sit to stand next visit.     Mary Marshall, PT

## 2019-12-18 NOTE — PATIENT INSTRUCTIONS
John:  Goal: The goal of this activity is to keep the opening of your esophagus open longer by holding your larynx, or voice box, in a raised position.     Directions  · Place your finger on your larynx.  · Swallow normally, and feel your larynx rise up during the swallow.  · On your next swallow, feel your larynx rise up and hold it at its highest point for 3 seconds. Release and repeat as instructed by your therapist.  · Keep your larynx in a raised position by squeezing the muscles of your throat and tongue.  · Repeat 15 times    Explanation: During the swallowing process, your larynx rises up while your epiglottis folds down to keep food or fluid moving toward the esophagus. During this activity, the larynx rises up to keep the epiglottis closed over the entrance to your airway. When you hold your larynx in a raised position, it allows the esophagus to stay open longer to let food or fluid pass through to the stomach.    Noreen:  Goal: The goal of this activity is to increase the movement of your pharyngeal, or throat, muscles.    Directions:   · Place your tongue between your teeth and gently bite down to hold your tongue in place.  · Swallow your saliva without releasing your tongue.  · Repeat 30 times     Explanation: The muscles of your tongue and pharynx work together to help you swallow properly. In this activity, you create more open space in your throat when you hold your tongue between your teeth. When you swallow, your pharyngeal muscles then perform larger movements to make up for the extra open space.     Effortful Swallow:  Goal: The goal of this activity is to keep food or fluid from getting stuck in your pharynx, or throat, by improving the force and timing of your swallow.    Directions:  · Swallow normally, but tightly squeeze your tongue and throat muscles throughout the swallow.  · Try to swallow with as much effort as you can.  · Repeat 50 times     Explanation: The muscles of your  tongue and pharynx work together to help you swallow properly. By swallowing with as much effort as possible, you can keep food from getting stuck in your throat.    Chin Tuck Against Resistance (CTAR):  Goal: to strengthen the suprahyoid muscles  Directions:   · Place small resistance ball between chin and chest  · Press chin into ball on chest for 60 repetitions  · Press chin into ball on chest and hold for 1 minute, 3 times    COMPLETE EACH EXERCISE SET 3 TIMES PER DAY     Exercise descriptions from:   Home Exercise Program. (n.d.). Retrieved October 10, 2017, from [email or web address]

## 2019-12-19 ENCOUNTER — CLINICAL SUPPORT (OUTPATIENT)
Dept: REHABILITATION | Facility: HOSPITAL | Age: 39
End: 2019-12-19
Attending: PHYSICAL MEDICINE & REHABILITATION
Payer: MEDICARE

## 2019-12-19 DIAGNOSIS — R26.89 DECREASED FUNCTIONAL MOBILITY: ICD-10-CM

## 2019-12-19 DIAGNOSIS — R26.89 BALANCE PROBLEM: ICD-10-CM

## 2019-12-19 DIAGNOSIS — R13.12 OROPHARYNGEAL DYSPHAGIA: Primary | ICD-10-CM

## 2019-12-19 PROCEDURE — 97110 THERAPEUTIC EXERCISES: CPT | Mod: PO

## 2019-12-19 PROCEDURE — 92526 ORAL FUNCTION THERAPY: CPT | Mod: KX,PO | Performed by: SPEECH-LANGUAGE PATHOLOGIST

## 2019-12-19 NOTE — PLAN OF CARE
Outpatient Neurological Rehabilitation  Speech and Language Therapy Evaluation    Date: 12/17/2019     Name: Lisa Moreno   MRN: [id number]    Therapy Diagnosis:   Encounter Diagnosis   Name Primary?    Oropharyngeal dysphagia Yes      Physician: Yung Rodrgiuez MD  Physician Orders: [id number] - Ambulatory referral to Speech Therapy  Medical Diagnosis: R13.12 (ICD-10-CM) - Oropharyngeal dysphagia    Visit # / Visits Authorized:  1 / 1   Date of Evaluation:  12/17/2019   Insurance Authorization Period: 12/10/2019-12/09/2020  Plan of Care Certification:    12/17/2019 to 2/17/20      Time In: 1:53  Time Out: 3:19  Total Billable Time: 26 min  Procedure Min.   Swallow and Oral Function Evaluation   26     Precautions:Standard  Subjective   Date of Onset: Pt and his caregiver state that they have noticed an increase in swallowing difficulty in the last 3-4 months   History of Current Condition:   Pt reports that he coughs a lot with foods and liquids. Pt reports completion of MBSS. He states that his speech and voice are not normal but that he is okay with them and does not wish to address his speech clarity or vocal quality in speech therapy. Pt reports that he perceives problem with solids and liquids to be same. Pt poor historian and caregiver only able to provide some of the pt's medical history. Pt's aid stated that she does thicken his liquids sometimes but does not measure how much thickener she puts. Aid reported that pt eats 100% of meals which takes him 15/20 minutes. No recent weight loss as reported by pt and aid. No recent history of pneumonia as reported by pt and aid.   Past Medical History: Lisa Moreno  has a past medical history of Degenerative motor system disease, Depression, Seizure, Spastic quadriplegia, and Spinocerebellar atrophy.  Lisa Moreno  has a past surgical history that includes Baclofen pump implantation and Fluoroscopic urodynamic study (N/A, 11/27/2018).  Medical  "Hx and Allergies: Lisa has a current medication list which includes the following prescription(s): baclofen, blood sugar diagnostic, blood-glucose meter, disc-type, donepezil, fluticasone propionate, gabapentin, lactulose, lancets, linaclotide, linzess, and myrbetriq.   Review of patient's allergies indicates:   Allergen Reactions    Penicillins      Hives and Itching     Prior Therapy:  No prior ST services as reported by pt. Per chart review, pt seen 2x for outpatient speech therapy at [facility].   Social History: Pt lives with his mother. He has aids that helps him throughout the day.  Prior Level of Function: Assistance - pt requires use of wheelchair. He has been unable to drive, live alone, work, or independently complete ADLS.   Current Level of Function:  Assistance - pt requires use of wheelchair. He has been unable to drive, live alone, work, or independently complete ADLS. Experiencing more swallowing difficulty in the last few months.   Pain:   0/10  Pain Location / Description: n/a  Nutrition:  Soft solids/thin liquids   Patient's Therapy Goals: "to get better"  Imaging: MRI Brain W WO Contrast completed 9/7/18:  "Impressions:  - Significantly motion limited examination.  No evidence of acute infarct.  If clinical concern persists, the exam can be repeated as indicated.  - Generalized cerebellar and brainstem atrophy, grossly unchanged.  - Additional findings discussed in the body of the report."  Imaging: MBSS completed 10/14/19:    Oral Preparation / Oral Phase   · Mild-Moderately Impaired - Pt with adequate bolus acceptance, however, reduced labial seal and containment with nectar consistencies c/b mild anterior spillage to front of lips noted on 1/1 trial. Pt presented with mildly delayed A-P transfer with honey thick and mechanical soft trials. Poor bolus control c/b premature bolus loss prior to swallow to the valleculae with thin consistencies 2/3 trials and honey thick 1/1 " trials; Premature loss to the pyriform sinuses prior to the swallow with thin consistencies on 1/3 trials and puree on 2/2 trials. Pt presents with no presence of naso-pharyngeal reflux noted.   Pharyngeal Phase   · Pt presents with delayed triggering of the pharyngeal swallow at the level of the valleculae with thin consistencies 2/3 trials and honey thick 1/1 trials; Delayed triggering of the pharyngeal swallow to the level of the pyriform sinuses with thin consistencies on 1/3 trials and puree on 2/2 trials. Pt with reduced BOT retraction c/b mild residue in valleculae with honey thick consistency, and moderate vallecular residue with thin and puree consistencies. Pt with reduced hyolaryngeal elevation and excursion patterns across all trials. Additionally, Incomplete epiglottic inversion patterns with reduced cricopharyngeal opening noted. Pt presented with aspiration during the swallow with puree resulting in no sensory response to protect airway observed. Clinician elicited cough from pt, however cough was observed to be weak and unproductive at clearing aspirated material. Pt with aspiration following the swallow with thin, nectar, and honey consistencies. Elicited cough again weak and unproductive to eject material from laryngeal vestibule. Pt with spontaneous, strong cough following 1/1 trial of nectar consistency which was unsuccessful in removing material. There was presence of vallecular residue as noted above. Mild pyriform sinus residue present with thin 1/3 trials, and honey consistency 1/1 trials. Moderate pyriform sinus residue/stasis present with thin on 2/3 trials, nectar on 1/1 trials, puree on 1/2 trials, and 1/1 trials mechanical soft. Pt unable to clear bolus during mechanical soft trial on initial swallow, however, spontaneous secondary swallow was able to clear bolus from area of pyriform sinus.  As study progressed, pt with increased fatigue noted and increased delay in pharyngo-esophageal  "transit of material.  · Severe pyriform sinus residue present with nectar on 1/1 trials and puree 1/2 trials.   Cervical Esophageal Phase    · Significant residue at the level of the UES opening was observed due to limited UES opening. Aspiration on residue from consistencies as noted above.   Impressions: Pt presented with severe oropharyngeal dysphagia c/b aspiration with thin, nectar, honey and puree consistencies, delayed A-P transfer, poor bolus control/coordination, delayed triggering of the pharyngeal swallow, reduced BOT retraction, reduced hyolaryngeal elevation and excursion patterns, reduced epiglottic inversion, reduced cricopharyngeal opening, mild-severe vallecular/pharyngeal residue with all consistencies. Pt with absent/reduced sensory responses to protect airway during episodes of aspiration. Elicited cough reflex extremely weak and unproductive. Pt is at a very high risk for aspiration."  Objective   Formal Assessment:    Swallowing:     Clinical Swallow Exam/ Oral Mechanism Exam:  Mandibular Strength and Mobility - Trigeminal Nerve (CNV) Rest: WFL   Lateralization: WFL  Protrusion: WFL  Retraction:  WFL  Involuntary Movement: absent    Oral Labial Strength and Mobility -  Facial Nerve (CN VII)  Rest: WFL   Lateralization: WFL  Protrusion: WFL  Retraction:  WFL  Involuntary Movement: absent    Lingual Strength and Mobility- Hypoglossal Nerve (CN XII)  Rest: WFL   Lateralization: reduced strength and ROM  Protrusion: reduced strength and ROM  Elevation: reduced ROM  Involuntary Movement: absent    Velar Elevation -   Glossopharyngeal Nerve (CN IX) and Vagus (CN X) Rest: WFL   Symmetry: WFL   Elevation: reduced    Sustained elevation: reduced    Involuntary movement: absent     Buccal Strength and Mobility -   Facial Nerve (CN VII)  reduced    Facial Sensation and Movement -   Facial Nerve (CN VII) Symmetrical at rest: yes  Wrinkle forehead: yes  Able to close eyes tightly: yes     Structure " Abnormalities: no  Dentition: Present and adequate   Secretion Management: adequate   Mucosal Quality: adequate   Volitional Cough: weak   Volitional Swallow: WFL  Voice Prior to PO Intake: clear, weak     Clinical Swallow Examination:   Pt presented with:   THIN:- self regulated thin liquid via cup x3    DESCRIPTION: Oral Phase: Adequate labial seal maintained evidenced by no anterior spillage. Bolus manipulation appeared adequate. Pharyngeal phase: Laryngeal lift present upon manual palpation. No overt s/s aspiration appreciated. No coughing or throat clearing throughout or post PO trials. Pt's vocal quality remained clear following po trials. Of note, pt with history of silent aspiration.    Motor Speech/Fluency/Voice: Pt's motor speech, speech intelligibility, fluency, and voice were judged to be impaired but pt requested not to address this as he wanted to focus on swallowing.       Auditory Comprehension: No concerns reported or observed; WFL for the purpose of assessment and conversation.       Verbal Expression: No concerns reported or observed; WFL for the purpose of assessment and conversation.       Reading Comprehension: Did not assess. No concerns reported or observed.       Written Expression: Did not assess. No concerns reported or observed.        Cognition: No concerns reported or observed. Pt and alert and cooperative throughout evaluation, was oriented x 4 independently. He did not require cues to attend to evaluation tasks throughout session. Cognitive-linguistic skills WFL for the purpose of assessment and conversation.       Hearing / Vision: No concerns regarding pt's vision. No concerns observed or reported regarding pt's hearing acuity.     [facility] National Outcome Measures System (NOMS):   .NOMS Swallowing  LEVEL 5: Swallowing is safe with minimal diet restriction and/or occasionally requires minimal cueing to use compensatory strategies. The individual may occasionally self-cue. All nutrition  "and hydration needs are met by mouth at mealtime    Treatment   Treatment Time In: 3:19  Treatment Time Out: 3:34  Total Treatment Time: 15 minutes  Procedure Min.   Dysphagia therapy   15     SLP demonstrated dysphagia exercises and explained purpose of exercises. Described a typical pt's swallowing anatomy/physiology vs. the patient's swallowing physiology. Pt demonstrated ability to perform exercises given min cues following demonstration. Pt completed 5 hard effortful swallows, mendelsohn maneuver x2, CTAR hold x30 seconds, CTAR repetitions x10.     Education: Plan of Care, role of SLP in care, aspiration precautions , course of medical disease affect on therapy diagnosis , scheduling/ cancellation policy and insurance limitations / visit limit, and education regarding thickener (directions on packet and where to purchase more) were discussed with pt and his aid. Discussed MBSS results and ultimate recommendation of NPO for solid consistencies, discussed pt's high risk of aspiration which puts him at at risk for developing pneumonia. Pt verbalized understanding. Discussed risk of aspiration vs. benefit (I.e., quality of life). Educated pt on meaning of NPO and why this was recommended. Pt stated he does not want alternate means of nutrition and wishes to continue eating by mouth. Discussed importance of excellent oral care prior to and following eating/drinking. Patient and aid expressed understanding of all discussed.     Home Program: Dysphagia exercises (printed copy given to pt and electronic copy in patient instruction section of EMR)  Assessment   Lisa presents to [facility] s/p medical diagnosis of  Oropharyngeal dysphagia. Demonstrates impairments including limitations as described in the problem list. Per MBSS completed 108/19, Lisa presented with "severe oropharyngeal dysphagia c/b aspiration with thin, nectar, honey and puree consistencies, delayed A-P transfer, " "poor bolus control/coordination, delayed triggering of the pharyngeal swallow, reduced BOT retraction, reduced hyolaryngeal elevation and excursion patterns, reduced epiglottic inversion, reduced cricopharyngeal opening, mild-severe vallecular/pharyngeal residue with all consistencies. Pt with absent/reduced sensory responses to protect airway during episodes of aspiration. Elicited cough reflex extremely weak and unproductive. Pt is at a very high risk for aspiration."  Positive prognostic factors include pt's young age. Negative prognostic factors include pt motivation and complex medical history. No barriers to therapy identified. Patient will benefit from skilled, outpatient neurological rehabilitation speech therapy.    Rehab Potential: fair  Pt's spiritual, cultural and educational needs considered and pt agreeable to plan of care and goals.    Short Term Goals: (4 weeks)     1. Pt will participate in tongue base retraction exercises x 50-75 independently to improve base of tongue retraction and swallow function.      2. Pt will participate in laryngeal elevation exercises x30 independently to improve hyolaryngeal excursion.          3. Pt will participate in chin tuck against resistance (CTAR) hold x1 minute x3 independently to improve hyolaryngeal elevation / excursion.     4. Pt will participate in chin tuck against resistance (CTAR) repetitions x45-60 independently to improve hyolaryngeal elevation / excursion.     5. Pt will participate in po trials of thin liquids with no overt signs/symptoms of aspiration     6. Pt will recall and utilize aspiration precautions and safe swallow strategies independently     7. Pt will complete oral motor exercises to improve lingual and buccal musculature to improve oral prep phase / oral phase of swallow       Long Term Goals (8 weeks):   1. He will maintain adequate hydration/nutrition with optimum safety and efficiency of swallowing function on PO intake without overt " signs and symptoms of aspiration for thin liquids and pureed diet level.   2. He will utilize compensatory strategies with optimum safety and efficiency of swallowing function on PO intake without overt signs and symptoms of aspiration for thin liquids and pureed diet level.     Plan     Plan of Care Certification Period: 12/17/2019  to 2/17/20    Recommended Treatment Plan:  Patient will participate in the [facility] program for speech therapy 2 times per week to address his  Swallow deficits, to educate patient and their family, and to participate in a home exercise program.    Other Recommendations: GI consult d/t reduced cricopharyngeal opening    Therapist's Name:   RAMANA Al, CF-SLP  Speech Language Pathologist   12/17/2019

## 2019-12-19 NOTE — PROGRESS NOTES
"  Physical Therapy Daily Treatment Note     Name: Lisa Irvin [facility] Number: [id number]    Therapy Diagnosis:   Encounter Diagnoses   Name Primary?    Balance problem     Decreased functional mobility      Physician: Yung Rodriguez MD    Visit Date: 12/19/2019     Physician Orders: PT Eval and Treat   Medical Diagnosis from Referral:   G80.1 (ICD-10-CM) - Spastic diplegia   R26.81 (ICD-10-CM) - Gait instability   Evaluation Date: 11/26/2019  Authorization Period Expiration: 12/31/2019  Plan of Care Expiration: 12/26/2019  Visit # / Visits authorized: 7/ 12    Time In: 1:00 pm  Time Out: 1:50 pm  Total Billable Time: 50 minutes    Precautions:  Standard, Fall and aspiration    Subjective     Pt reports: being tired today as he worked on his standing earlier today.  He was compliant with home exercise program.  Response to previous treatment: NA  Functional change: NA    Pain: 0/10  Location:  NA     Objective     Lisa received therapeutic exercises to develop strength, ROM and flexibility for 50 minutes 1:1 with PT including:      Date  12/19/19 12/17/19 12/12/19 12/10/19 12/05/19 12/03/19   VISIT 7/12 6/12 5/12 4/12 3/12 2/12   FOTO -- -- 5/5 4/5 3/5 2/5   POC EXP. DATE 12/26/19 12/26/19 12/26/19 12/26/19 12/26/19 12/26/19   VISIT AMOUNT  MEDICARE TOTAL 121.28KX  3709.20 121.28KX  3587.92 121.28 KX  3466.64 121.28KX  3345.36 90.96  295.12 121.28  204.16   FACE-TO-FACE 12/26/19 12/26/19 12/26/19 12/26/19 12/26/19 12/26/19            TABLE:         HSS - manual  5 x 10" 5 x 10" 5 x 10" ea 5 x 10 "ea 5 x 10" ea 5 x 10" ea   GSS - manual 5 x 10" 5 x 10 5 x 10" ea 5 x 10 " ea 5 x 10 ea 5 x 10" ea   Bridge  1 x 21 3 x 6 4 x 5 3 x 5 3 x 5 1 x 10   LTR - AA 1 x 15 1 x 15 1 x 15 1 x 15 1 x 10 1 x 10   Marching  1 x 15 1 x 10 1 x 10 1 x 10 1 x 10 1 x 5   SAQ AA 3 x 6 3 x 5 1 x 10 1 x 10 2 x 5 ea 1 x 5   Hip abd/ add - supine AA 2 x 10 2 x 10 2 x 10 1 x 15 1 x 15 1 x 10   Quad sets Not today 10 x 2" 10 x 2" " "10 x 2" 10 x 2" 5 x 2"            SEATED:         LAQs - AA Not today Not today 2 x 5 2 x 5 2 x 5 1 x 5   Seated Hip Flex  Not today Not today 1 x 15 1 x 15 1 x 10 1 x 5   Toe raises Not today Not today 1 x 10 1 x 10 1 x 10 1 x 5   Standing         Sit to stand 2x  Not today 2x 2x     Static standing 2 x 20 sec Not today 1 x 1 minute 1 x 1 minute              Initials DG DG GWA 1/6 DG GWA 2/6 GWA 1/6       Home Exercises Provided and Patient Education Provided     Education provided:   - focus on scott his muscles and not using his hands to assist with transfers and bed mobility.    Written Home Exercises Provided: yes.  Exercises were reviewed and Lisa was able to demonstrate them prior to the end of the session.  Lisa demonstrated fair  understanding of the education provided.     See EMR under Patient Instructions for exercises provided 12/3/2019.    Assessment     Lisa did not perform all of his usual exercises today due to fatigue from working on standing at the start of the session. He required Mod A with sit to/from stand from edge of exercise mat due to severe weakness. Once standing he relied heavily on his UEs to support himself and required frequent verbal and tactile cues to attempt to engage his knee and hip extensors. When performing transfers today he required verbal and tactile cues to use head hips relationship to decrease reliance on assistance from therapist. While performing mat exercises he required Mod A to move through his full ROM with all exercises.   Lisa is progressing well towards his goals.   Pt prognosis is Fair.     Pt will continue to benefit from skilled outpatient physical therapy to address the deficits listed in the problem list box on initial evaluation, provide pt/family education and to maximize pt's level of independence in the home and community environment.     Pt's spiritual, cultural and educational needs considered and pt agreeable to plan of care and " goals.    Anticipated barriers to physical therapy: in past patient has had transportation issues limiting his ability to attend therapy consistently    Goals:  Long Term Goals: 4 weeks   1. This patient and his caregivers will be independent with a HEP.  IN PROGRESS  2. This patient will increase B LE strength by 1 grade in order to perform sit to stand with Min A using RW for support.  IN PROGRESS  3. This patient will be able to ambulate 50 feet with Min A using RW, B AFOs, and no LOB. IN PROGRESS  4. Patient will be able to achieve greater than or equal to 40 on the FOTO Paraplegic Syndrome Survey placing patient in 40%<60% impaired, limited, or restricted category demonstrating overall improved functional ability with lower extremity.   IN PROGRESS    Plan     Continue with Plan Of Care and progress toward PT goals. Attempt to increase reps with sit to stand next visit.     Mary Marshall, PT

## 2019-12-19 NOTE — PROGRESS NOTES
"Outpatient Neurological Rehabilitation   Speech and Language Therapy Daily Note  Date:  12/19/2019     Name: Lisa Moreno   MRN: [id number]   Therapy Diagnosis:   Encounter Diagnosis   Name Primary?    Oropharyngeal dysphagia Yes      Physician: Yung Rodriguez MD  Physician Orders: [id number] - Ambulatory referral to Speech Therapy  Medical Diagnosis: R13.12 (ICD-10-CM) - Oropharyngeal dysphagia    Visit #/ Visits Authorized: 1/1  Date of Evaluation: 12/17/19  Insurance Authorization Period: 12/10/2019-12/9/2020   Plan of Care Expiration Date:  12/17/2019 to 2/17/20   Extended POC:  n/a   Progress Note: due 1/19/20   Visits Cancelled: 0  Visits No Show: 0    Time In: 1:59  Time Out:  2:45  Total Billable Time: 46 min     Precautions: Standard  Subjective:   Pt reports: "alright" He states he completed his exercises. Pt's aid present throughout treatment session.   He was compliant to home exercise program.   Response to previous treatment: "I don't know"   Pain Scale:  0/10 on VAS currently.   Pain Location: n/a  Objective:   UNTIMED  Procedure Min.   Dysphagia Therapy  46   Total Timed Units: 0  Total Untimed Units: 1  Charges Billed/# of units: 1     Short Term Goals: (4 weeks)  Current Progress:    1. Pt will participate in tongue base retraction exercises x 50-75 independently to improve base of tongue retraction and swallow function.   Progressing/ Not Met 12/19/2019   Hard effort swallow x30 (mostly dry swallows with occasional small sips of thin liquids to moisten oral cavity) given verbal and visual cues   2. Pt will participate in laryngeal elevation exercises x30 independently to improve hyolaryngeal excursion.    Progressing/ Not Met 12/19/2019  Mendelsohn maneuver x3/10 attempts; pt benefited from tactile cues. Encouraged pt to use his own fingure for tactile feedback when attempting to complete this exercise at home.    3. Pt will participate in chin tuck against resistance (CTAR) hold x1 minute " x3 independently to improve hyolaryngeal elevation / excursion.  Progressing/ Not Met 12/19/2019   CTAR hold for 1 minute, 6 times for a total of 6 minutes given a model    4. Pt will participate in chin tuck against resistance (CTAR) repetitions x45-60 independently to improve hyolaryngeal elevation / excursion.  Progressing/ Not Met 12/19/2019    CTAR repetitions x90 given a model     5. Pt will participate in po trials of thin liquids with no overt signs/symptoms of aspiration  Progressing/ Not Met 12/19/2019   Pt observed to take small sips of water x6 throughout session; defensive airway cough x2 noted      6. Pt will recall and utilize aspiration precautions and safe swallow strategies independently  Progressing/ Not Met 12/19/2019   Reviewed aspiration precautions and safe swallow strategies:  - eat and drink slowly   - small bites/sips    7. Pt will complete oral motor exercises to improve lingual and buccal musculature to improve oral prep phase / oral phase of swallow   Progressing/ Not Met 12/19/2019   - Lingual strengthening exercises completed x30  - Buccal strengthening exercises completed x30        Patient Education/Response:   Educated pt regarding purpose of exercises and importance of consistent adherence to HEP in order to see results. Discussed importance of excellent oral care.  Pt and his aid verbalized understanding of all discussed.     Written Home Exercises Provided: yes.  Exercises were reviewed and Lisa was able to demonstrate them prior to the end of the session.  Lisa demonstrated fair  understanding of the education provided.   See EMR under Patient Instructions for exercises provided prior visit.  Assessment:   Lisa is progressing well towards his goals. Fair performance regarding dysphagia exercises. Pt required mod cues to complete exercises appropriately. Total of 6 po trials completed today, pt with defensive airway cough x2. Current goals remain appropriate. Goals to be  updated as necessary. Pt prognosis is Fair. Pt will continue to benefit from skilled outpatient speech and language therapy to address the deficits listed in the problem list on initial evaluation, provide pt/family education and to maximize pt's level of independence in the home and community environment.   Medical necessity is demonstrated by the following IMPAIRMENTS:  Pt's severe dysphagia puts him at risk for aspiration/developing aspiration pneumonia.   Barriers to Therapy: none  Pt's spiritual, cultural and educational needs considered and pt agreeable to plan of care and goals.  Plan:   Continue POC with focus on improving swallow function     RAMANA Al, CF-SLP  Speech Language Pathologist   12/19/2019

## 2019-12-23 NOTE — PROGRESS NOTES
"Outpatient Neurological Rehabilitation   Speech and Language Therapy Daily Note  Date:  12/24/2019     Name: Lisa Moreno   MRN: [id number]   Therapy Diagnosis:   Encounter Diagnosis   Name Primary?    Oropharyngeal dysphagia Yes      Physician: Yung Rodriguez MD  Physician Orders: [id number] - Ambulatory referral to Speech Therapy  Medical Diagnosis: R13.12 (ICD-10-CM) - Oropharyngeal dysphagia    Visit #/ Visits Authorized: 1/20 (plus 2)  Date of Evaluation: 12/17/19  Insurance Authorization Period: 12/10/2019-12/09/2020  Plan of Care Expiration Date:  12/17/2019 to 2/17/20   Extended POC:  n/a   Progress Note: due 1/19/20   Visits Cancelled: 0  Visits No Show: 0    Time In: 10:55  Time Out:  11:37  Total Billable Time: 42 min     Precautions: Standard  Subjective:   Pt reports: "Merguy Peng" and "[birth year] my birthday"  Pt's aid present throughout treatment session.   He was compliant to home exercise program. But he was not sure how many times per day he completed his exercises.  Response to previous treatment: positive   Pain Scale:  0/10 on VAS currently.   Pain Location: n/a  Objective:   UNTIMED  Procedure Min.   Dysphagia Therapy  42   Total Timed Units: 0  Total Untimed Units: 1  Charges Billed/# of units: 1     Short Term Goals: (4 weeks)  Current Progress:    1. Pt will participate in tongue base retraction exercises x 50-75 independently to improve base of tongue retraction and swallow function.   Progressing/ Not Met 12/24/2019   Hard effort swallow x 40 (mostly dry swallows with occasional small sips of thin liquids to moisten oral cavity) given verbal and visual cues   2. Pt will participate in laryngeal elevation exercises x30 independently to improve hyolaryngeal excursion.    Progressing/ Not Met 12/24/2019  Mendelsohn maneuver x 0/5 attempts; pt benefited from tactile cues though pt unable to volitionally "feeze" swallow. Encouraged pt to use his own fingure for tactile feedback " when attempting to complete this exercise at home.    3. Pt will participate in chin tuck against resistance (CTAR) hold x1 minute x3 independently to improve hyolaryngeal elevation / excursion.  Progressing/ Not Met 12/24/2019   CTAR hold for 1 minute, 6 times for a total of 6 minutes; pt required cues to perform exercise with increased effort   4. Pt will participate in chin tuck against resistance (CTAR) repetitions x45-60 independently to improve hyolaryngeal elevation / excursion.  Progressing/ Not Met 12/24/2019   CTAR repetitions x 90; pt required cues to perform exercise with increased effort   5. Pt will participate in po trials of thin liquids with no overt signs/symptoms of aspiration  Progressing/ Not Met 12/24/2019   Pt observed to take small sips of water x 11 throughout session; no coughing observed but pt with wet vocal quality following 4/11 sips of water. Pt cued to clear throat and perform a strong cough but this did not resolve wet vocal quality.    6. Pt will recall and utilize aspiration precautions and safe swallow strategies independently  Progressing/ Not Met 12/24/2019   Reviewed aspiration precautions and safe swallow strategies:  - eat and drink slowly   - small bites/sips   - remain upright when eating (90 degrees) and remain upright for at least 30 minutes after eating  - excellent oral care pre and post eating/drinking   7. Pt will complete oral motor exercises to improve lingual and buccal musculature to improve oral prep phase / oral phase of swallow   Progressing/ Not Met 12/24/2019   - Lingual strengthening exercises completed x 30; required cues to perform exercise with increased effort  - Buccal strengthening exercises completed x 30; required cues to perform exercise with increased effort        Patient Education/Response:   Educated pt regarding technique of exercises, purpose of exercises, and importance adherence to HEP in order to see positive results. Ongoing discussion  completed regarding importance of excellent oral care prior and post eating/drinking.  Pt and his aid verbalized understanding of all discussed.     Written Home Exercises Provided: yes.  Exercises were reviewed and Lisa was able to demonstrate them prior to the end of the session.  Lisa demonstrated fair  understanding of the education provided.   See EMR under Patient Instructions for exercises provided prior visit.  Assessment:   Lisa is progressing well towards his goals. Good performance regarding dysphagia exercises given min cues for appropriate technique. Total of 11 po trials completed today, no coughing observed but pt with wet vocal quality following 4/11 sips of water. Pt cued to clear throat and perform a strong cough but this did not resolve wet vocal quality. Pt required cues to perform exercise with increased effort this session. Current goals remain appropriate. Goals to be updated as necessary. Pt prognosis is Fair. Pt will continue to benefit from skilled outpatient speech and language therapy to address the deficits listed in the problem list on initial evaluation, provide pt/family education and to maximize pt's level of independence in the home and community environment.   Medical necessity is demonstrated by the following IMPAIRMENTS:  Pt's severe dysphagia puts him at risk for aspiration/developing aspiration pneumonia.   Barriers to Therapy: none  Pt's spiritual, cultural and educational needs considered and pt agreeable to plan of care and goals.  Plan:   Continue POC with focus on improving swallow function     RAMANA Al, CF-SLP  Speech Language Pathologist   12/24/2019

## 2019-12-24 ENCOUNTER — CLINICAL SUPPORT (OUTPATIENT)
Dept: REHABILITATION | Facility: HOSPITAL | Age: 39
End: 2019-12-24
Attending: PHYSICAL MEDICINE & REHABILITATION
Payer: MEDICARE

## 2019-12-24 DIAGNOSIS — R26.89 DECREASED FUNCTIONAL MOBILITY: ICD-10-CM

## 2019-12-24 DIAGNOSIS — R13.12 OROPHARYNGEAL DYSPHAGIA: Primary | ICD-10-CM

## 2019-12-24 DIAGNOSIS — R26.89 BALANCE PROBLEM: ICD-10-CM

## 2019-12-24 PROCEDURE — 92526 ORAL FUNCTION THERAPY: CPT | Mod: PO | Performed by: SPEECH-LANGUAGE PATHOLOGIST

## 2019-12-24 PROCEDURE — 97110 THERAPEUTIC EXERCISES: CPT | Mod: KX,PO

## 2019-12-26 ENCOUNTER — DOCUMENTATION ONLY (OUTPATIENT)
Dept: REHABILITATION | Facility: HOSPITAL | Age: 39
End: 2019-12-26

## 2019-12-26 ENCOUNTER — CLINICAL SUPPORT (OUTPATIENT)
Dept: REHABILITATION | Facility: HOSPITAL | Age: 39
End: 2019-12-26
Attending: PHYSICAL MEDICINE & REHABILITATION
Payer: MEDICARE

## 2019-12-26 DIAGNOSIS — R26.89 DECREASED FUNCTIONAL MOBILITY: ICD-10-CM

## 2019-12-26 DIAGNOSIS — R26.89 BALANCE PROBLEM: ICD-10-CM

## 2019-12-26 DIAGNOSIS — R29.898 WEAKNESS OF BOTH LOWER EXTREMITIES: ICD-10-CM

## 2019-12-26 PROCEDURE — 97110 THERAPEUTIC EXERCISES: CPT | Mod: PO

## 2019-12-26 NOTE — PROGRESS NOTES
"  Physical Therapy Daily Treatment Note     Name: Lisa Irvin [facility] Number: [id number]    Therapy Diagnosis:   Encounter Diagnoses   Name Primary?   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 His aide reports that he did not feel well yesterday.   He was compliant with home exercise program.  Response to previous treatment: NA  Functional change: NA    Pain: 0/10  Location:  NA     Objective     Lisa received therapeutic exercises to develop strength, ROM and flexibility for 53 minutes 1:1 with PT including:      Date  12/26/19 12/24/19 12/19/19 12/17/19 12/12/19 12/10/19 12/05/19 12/03/19   VISIT 9/12 8/12 7/12 6/12 5/12 4/12 3/12 2/12   FOTO -- -- -- -- 5/5 4/5 3/5 2/5   POC EXP. DATE 12/26/19 12/26/19 12/26/19 12/26/19 12/26/19 12/26/19 12/26/19 12/26/19   VISIT AMOUNT  MEDICARE TOTAL 90.96KX  3891.12 90.96KX  3800.16 121.28KX  3709.20 121.28KX  3587.92 121.28 KX  3466.64 121.28KX  3345.36 90.96  295.12 121.28  204.16   FACE-TO-FACE 1/26/20 12/26/19 12/26/19 12/26/19 12/26/19 12/26/19 12/26/19 12/26/19              TABLE:           HSS - manual  5 x 10" 5 x 10" 5 x 10" 5 x 10" 5 x 10" ea 5 x 10 "ea 5 x 10" ea 5 x 10" ea   GSS - manual 5 x 10" 5 x 10" 5 x 10" 5 x 10 5 x 10" ea 5 x 10 " ea 5 x 10 ea 5 x 10" ea   Bridge  Not today 1 x 25 1 x 21 3 x 6 4 x 5 3 x 5 3 x 5 1 x " "10   LTR - AA Not today 1 x 15 1 x 15 1 x 15 1 x 15 1 x 15 1 x 10 1 x 10   Marching  Not today Not today 1 x 15 1 x 10 1 x 10 1 x 10 1 x 10 1 x 5   SAQ AA 3 x 6 3 x 6 3 x 6 3 x 5 1 x 10 1 x 10 2 x 5 ea 1 x 5   Hip abd/ add - supine AA 2 x 12 Not today 2 x 10 2 x 10 2 x 10 1 x 15 1 x 15 1 x 10   Quad sets 10 x 2" Not today Not today 10 x 2" 10 x 2" 10 x 2" 10 x 2" 5 x 2"              SEATED:           LAQs - AA Not today Not today Not today Not today 2 x 5 2 x 5 2 x 5 1 x 5   Seated Hip Flex  Not today Not today Not today Not today 1 x 15 1 x 15 1 x 10 1 x 5   Toe raises Not today Not today Not today Not today 1 x 10 1 x 10 1 x 10 1 x 5   Standing           Sit to stand 1x 3x w/ Mod a 2x  Not today 2x 2x     Static standing unable 3 x 50" 2 x 20 sec Not today 1 x 1 minute 1 x 1 minute                Initials DG DG DG DG GWA 1/6 DG GWA 2/6 GWA 1/6     Strength:  Hip Left Right   Flexion 2+ 2+   Abduction 2+ 2   Adduction 1 1   Extension NT NT      Knee Left Right   Extension 2 2   Flexion 2 2      Ankle Left Right   Dorsiflexion 2 2   Plantarflexion 2 2   Inversion 2 2   Eversion 2 2       Home Exercises Provided and Patient Education Provided     Education provided:   - focus on scott his muscles and not using his hands to assist.     Written Home Exercises Provided: yes.  Exercises were reviewed and Lisa was able to demonstrate them prior to the end of the session.  Lisa demonstrated fair  understanding of the education provided.     See EMR under Patient Instructions for exercises provided 12/3/2019.    Assessment     Lisa continues to demonstrate severe weakness in bilateral lower extremities. His strength grades have not improved compared to his initial evaluation but this may be due to feeling fatigued this afternoon and not feeling well yesterday. Within the past few visits he has demonstrated an increase in functional mobility in that he was able to stand with RW and Mod A to start the movement " with PT several times. He has attempted to walk in the clinic but is limited by his B foot drop. When he is fatigued at the start of his session he requires Min A to transfer wheelchair to/from mat, but otherwise requires SBA for safety with occasional verbal cues to use head hips relationship. He did not perform all of his usual exercises today and was unable to stand due to complaints of fatigue.     Detrell is progressing slowly towards his goals.   Pt prognosis is Fair.     Pt will continue to benefit from skilled outpatient physical therapy to address the deficits listed in the problem list box on initial evaluation, provide pt/family education and to maximize pt's level of independence in the home and community environment.     Pt's spiritual, cultural and educational needs considered and pt agreeable to plan of care and goals.    Anticipated barriers to physical therapy: in past patient has had transportation issues limiting his ability to attend therapy consistently    Goals:  Long Term Goals: 4 weeks   1. This patient and his caregivers will be independent with a HEP.  IN PROGRESS  2. This patient will increase B LE strength by 1 grade in order to perform sit to stand with Min A using RW for support.  IN PROGRESS  3. This patient will be able to ambulate 50 feet with Min A using RW, B AFOs, and no LOB. IN PROGRESS  4. Patient will be able to achieve greater than or equal to 40 on the FOTO Paraplegic Syndrome Survey placing patient in 40%<60% impaired, limited, or restricted category demonstrating overall improved functional ability with lower extremity.   IN PROGRESS    Plan     Continue with Plan Of Care and progress toward PT goals. Attempt to resume his usual mat exercises next visit.     Mary Marshall, PT

## 2019-12-26 NOTE — PROGRESS NOTES
Face to Face PTA Conference performed with John Troy, PTA regarding patient's current status, overall progress, and plan of care    Face to Face PTA Conference performed with Mary Marshall, PT regarding patient's current status, overall progress, and plan of care    John Troy  12/26/2019

## 2019-12-30 NOTE — PLAN OF CARE
Outpatient Therapy Updated Plan of Care     Visit Date: 12/26/2019  Name: Lisa Irvin Moreno  Clinic Number: [id number]    Therapy Diagnosis:   Encounter Diagnoses   Name Primary?   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 His aide reports that he did not feel well yesterday.   He was compliant with home exercise program.  Response to previous treatment: NA  Functional change: NA     Pain: 0/10  Location:  NA     Objective     Update: Strength:  Hip Left Right   Flexion 2+ 2+   Abduction 2+ 2   Adduction 1 1   Extension NT NT      Knee Left Right   Extension 2 2   Flexion 2 2      Ankle Left Right   Dorsiflexion 2 2   Plantarflexion 2 2   Inversion 2 2   Eversion 2 2       Assessment     Update: Lisa continues to demonstrate severe weakness in bilateral lower extremities. His strength grades have not improved compared to his initial evaluation but this may be due to feeling fatigued this afternoon and not feeling well yesterday. Within the past few visits he has demonstrated an increase in functional mobility in that he was able to stand with RW and Mod A to start the movement with PT several times. He has attempted to walk in the clinic but is limited by his B foot drop. When he is fatigued at the start of his session he requires Min A to transfer wheelchair to/from mat, but  otherwise requires SBA for safety with occasional verbal cues to use head hips relationship. He did not perform all of his usual exercises today and was unable to stand due to complaints of fatigue. Lisa is progressing slowly towards his goals.     Previous Short Term Goals Status:   Long Term Goals: 4 weeks   1. This patient and his caregivers will be independent with a HEP.  IN PROGRESS  2. This patient will increase B LE strength by 1 grade in order to perform sit to stand with Min A using RW for support.  IN PROGRESS  3. This patient will be able to ambulate 50 feet with Min A using RW, B AFOs, and no LOB. IN PROGRESS  4. Patient will be able to achieve greater than or equal to 40 on the FOTO Paraplegic Syndrome Survey placing patient in 40%<60% impaired, limited, or restricted category demonstrating overall improved functional ability with lower extremity.   IN PROGRESS  New Short Term Goals Status:   Continue with goals #1-4  Long Term Goal Status:   continue per initial plan of care.  Reasons for Recertification of Therapy:   Continued weakness, decreased functional mobility     Plan     Updated Certification Period: 12/26/2019 to 01/26/2020  Recommended Treatment Plan: 2 times per week for 4 weeks: Gait Training, Manual Therapy, Moist Heat/ Ice, Neuromuscular Re-ed, Patient Education, Therapeutic Activites, Therapeutic Exercise and IASTM  Other Recommendations: None    Mary Marshall, PT  12/26/2019      I CERTIFY THE NEED FOR THESE SERVICES FURNISHED UNDER THIS PLAN OF TREATMENT AND WHILE UNDER MY CARE    Physician's comments:        Physician's Signature: ___________________________________________________

## 2019-12-31 ENCOUNTER — CLINICAL SUPPORT (OUTPATIENT)
Dept: REHABILITATION | Facility: HOSPITAL | Age: 39
End: 2019-12-31
Attending: PHYSICAL MEDICINE & REHABILITATION
Payer: MEDICARE

## 2019-12-31 DIAGNOSIS — R26.89 DECREASED FUNCTIONAL MOBILITY: ICD-10-CM

## 2019-12-31 DIAGNOSIS — R29.898 WEAKNESS OF BOTH LOWER EXTREMITIES: ICD-10-CM

## 2019-12-31 DIAGNOSIS — R26.89 BALANCE PROBLEM: ICD-10-CM

## 2019-12-31 DIAGNOSIS — R13.12 OROPHARYNGEAL DYSPHAGIA: Primary | ICD-10-CM

## 2019-12-31 PROCEDURE — 92526 ORAL FUNCTION THERAPY: CPT | Mod: KX,PO | Performed by: SPEECH-LANGUAGE PATHOLOGIST

## 2019-12-31 PROCEDURE — 97110 THERAPEUTIC EXERCISES: CPT | Mod: KX,PO

## 2019-12-31 NOTE — PROGRESS NOTES
Outpatient Neurological Rehabilitation   Speech and Language Therapy Daily Note  Date:  12/31/2019     Name: Lisa Moreno   MRN: [id number]   Therapy Diagnosis:   Encounter Diagnosis   Name Primary?    Oropharyngeal dysphagia Yes      Physician: Yung Rodriguez MD  Physician Orders: [id number] - Ambulatory referral to Speech Therapy  Medical Diagnosis: R13.12 (ICD-10-CM) - Oropharyngeal dysphagia    Visit #/ Visits Authorized: 2/20 (plus 2)  Date of Evaluation: 12/17/19  Insurance Authorization Period: 12/10/2019-12/09/2020  Plan of Care Expiration Date:  12/17/2019 to 2/17/20   Extended POC:  n/a   Progress Note: due 1/19/20   Visits Cancelled: 0  Visits No Show: 0    Time In: 9:35  Time Out:  10:20  Total Billable Time: 55 min     Precautions: Standard  Subjective:   Pt reports: that he had a good holiday and he ate a lot of food without difficulty. Pt's aid present throughout treatment session. Has been having a fever and cough since Christmas and has been sick as reported by his aid. Pt reports that he feels fine today. Discussed that if the patient's fever returns and if he becomes increasingly ill, to take him to the emergency room for further assessment. Pt and his aid verbalized understanding. Subjectively, pt's speech less intelligible today.   He was not compliant to home exercise program. He reports that his aids have not been around as much since it has been the holiday season and he cannot recall his exercises independently.   Response to previous treatment: positive   Pain Scale:  0/10 on VAS currently.   Pain Location: n/a  Objective:   UNTIMED  Procedure Min.   Dysphagia Therapy  55   Total Timed Units: 0  Total Untimed Units: 1  Charges Billed/# of units: 1     Short Term Goals: (4 weeks)  Current Progress:    1. Pt will participate in tongue base retraction exercises x 50-75 independently to improve base of tongue retraction and swallow function.   Progressing/ Not Met 12/31/2019   Hard  "effort swallow x 45 (mostly dry swallows with occasional small sips of thin liquids to moisten oral cavity) given verbal and visual cues. Pt observed to bear down during this exercise, discussed that his was not necessary but pt stated he has to "adjust himself" when he does this exercise    2. Pt will participate in laryngeal elevation exercises x30 independently to improve hyolaryngeal excursion.    Progressing/ Not Met 12/31/2019  Mendelsohn maneuver x 0/3 attempts; pt appeared to benefited from tactile cues though pt unable to volitionally "feeze" swallow, discontinued 2/2 pt frustration. Encouraged pt to use his own fingure for tactile feedback when attempting to complete this exercise at home.    3. Pt will participate in chin tuck against resistance (CTAR) hold x1 minute x3 independently to improve hyolaryngeal elevation / excursion.  Progressing/ Not Met 12/31/2019   CTAR hold for 1 minute, 5 times for a total of 5 minutes; pt required min verbal cues to perform exercise with increased effort   4. Pt will participate in chin tuck against resistance (CTAR) repetitions x45-60 independently to improve hyolaryngeal elevation / excursion.  Progressing/ Not Met 12/31/2019   CTAR repetitions x 75; pt required min verbal cues to perform exercise with increased effort   5. Pt will participate in po trials of thin liquids with no overt signs/symptoms of aspiration  Progressing/ Not Met 12/31/2019   Pt observed to take small sips of water x 6 throughout session; coughing observed pre, during, and post session as pt with consistent cough per aid report 2/2 cold. Unsure if these coughs were related to pt's swallow function, though suspect coughing was related to both swallow function    Pt observed to take cyclical sip of water x2, wet vocal quality noted following cyclical sips. SLP educated pt regarding importance of taking small, single sips of water as small sips are easier to control orally    6. Pt will recall and " utilize aspiration precautions and safe swallow strategies independently  Progressing/ Not Met 12/31/2019   Reviewed aspiration precautions and safe swallow strategies:  - eat and drink slowly   - small bites/sips   - remain upright when eating (90 degrees) and remain upright for at least 30 minutes after eating  - excellent oral care pre and post eating/drinking   7. Pt will complete oral motor exercises to improve lingual and buccal musculature to improve oral prep phase / oral phase of swallow   Progressing/ Not Met 12/31/2019   - Lingual strengthening exercises x 45; required initial cue to perform exercise with increased effort and requiried initial model         Patient Education/Response:   Ongoing education provided regarding dysphagia exercise technique, purpose of exercises, and importance adherence to HEP in order to achieve positive results. Discussion importance of excellent oral care prior and post eating/drinking and throughout the day. Discussed monitoring pt's medical status as he is presenting with s/s of a cold per pt's aid's report, discussed keeping a close watch on his temperature/fever and to seek medical attention if he becomes increasingly ill. Pt and aid verbalized understanding of all discussed.     Written Home Exercises Provided: yes.  Exercises were reviewed and Lisa was able to demonstrate them prior to the end of the session.  Lisa demonstrated fair  understanding of the education provided.   See EMR under Patient Instructions for exercises provided prior visit.  Assessment:   Lisa is progressing well towards his goals. Good performance regarding dysphagia exercises given mod cues for appropriate technique. Total of 6 po trials completed today, coughing observed throughout session with and without po trials therefore unsure if cough was related to swallow functioning. Pt continued to require additional cues to complete exercises with increased strength. Current goals remain  appropriate. Goals to be updated as necessary. Pt prognosis is Fair. Pt will continue to benefit from skilled outpatient speech and language therapy to address the deficits listed in the problem list on initial evaluation, provide pt/family education and to maximize pt's level of independence in the home and community environment.   Medical necessity is demonstrated by the following IMPAIRMENTS:  Pt's severe dysphagia puts him at risk for aspiration/developing aspiration pneumonia as well as decreased quality of life.    Barriers to Therapy: none  Pt's spiritual, cultural and educational needs considered and pt agreeable to plan of care and goals.  Plan:   Continue POC with focus on improving swallow function    RAMANA Al, CF-SLP  Speech Language Pathologist   12/31/2019

## 2019-12-31 NOTE — PROGRESS NOTES
"  Physical Therapy Daily Treatment Note     Name: Lisa Irvin [facility] Number: [id number]    Therapy Diagnosis:   Encounter Diagnoses   Name Primary?   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 He is not too tired this morning.  He still doesn't know when someone is coming to measure him for the AFOs.  He was compliant with home exercise program.  Response to previous treatment: NA  Functional change: NA    Pain: 0/10  Location:  NA     Objective     Lisa received therapeutic exercises to develop strength, ROM and flexibility for 25 minutes 1:1 with PT including:      Date  12/31/19 12/26/19 12/24/19 12/19/19 12/17/19 12/12/19 12/10/19 12/05/19 12/03/19   VISIT 10/12 9/12 8/12 7/12 6/12 5/12 4/12 3/12 2/12   FOTO -- -- -- -- -- 5/5 4/5 3/5 2/5   POC EXP. DATE 01/26/20 12/26/19 12/26/19 12/26/19 12/26/19 12/26/19 12/26/19 12/26/19 12/26/19   VISIT AMOUNT  MEDICARE TOTAL 60.64KX  3951.76 90.96KX  3891.12 90.96KX  3800.16 121.28KX  3709.20 121.28KX  3587.92 121.28 KX  3466.64 121.28KX  3345.36 90.96  295.12 121.28  204.16   FACE-TO-FACE 1/26/20 1/26/20 12/26/19 12/26/19 12/26/19 12/26/19 12/26/19 12/26/19 12/26/19               TABLE:            HSS - manual  Not today 5 x 10" 5 x 10" 5 x 10" 5 x 10" 5 x 10" ea 5 x 10 "ea 5 x 10" ea 5 x 10" ea   GSS - manual Not today 5 x " "10" 5 x 10" 5 x 10" 5 x 10 5 x 10" ea 5 x 10 " ea 5 x 10 ea 5 x 10" ea   Bridge  Not today Not today 1 x 25 1 x 21 3 x 6 4 x 5 3 x 5 3 x 5 1 x 10   LTR - AA Not today Not today 1 x 15 1 x 15 1 x 15 1 x 15 1 x 15 1 x 10 1 x 10   Marching  Not today Not today Not today 1 x 15 1 x 10 1 x 10 1 x 10 1 x 10 1 x 5   SAQ AA Not today 3 x 6 3 x 6 3 x 6 3 x 5 1 x 10 1 x 10 2 x 5 ea 1 x 5   Hip abd/ add - supine AA Not today 2 x 12 Not today 2 x 10 2 x 10 2 x 10 1 x 15 1 x 15 1 x 10   Quad sets Not today 10 x 2" Not today Not today 10 x 2" 10 x 2" 10 x 2" 10 x 2" 5 x 2"               SEATED:            LAQs - AA 2 x 7 Not today Not today Not today Not today 2 x 5 2 x 5 2 x 5 1 x 5   Seated Hip Flex  3 x 5 Not today Not today Not today Not today 1 x 15 1 x 15 1 x 10 1 x 5   Toe raises 1 x 10 Not today Not today Not today Not today 1 x 10 1 x 10 1 x 10 1 x 5   Sitting unsupported 1 x 30"           Standing            Sit to stand 3x  1x 3x w/ Mod a 2x  Not today 2x 2x     Static standing 3 x 1' unable 3 x 50" 2 x 20 sec Not today 1 x 1 minute 1 x 1 minute                 Initials DG DG DG DG DG GWA 1/6 DG GWA 2/6 GWA 1/6       Home Exercises Provided and Patient Education Provided     Education provided:   - using head hips relationship to increase independence with transfers.     Written Home Exercises Provided: yes.  Exercises were reviewed and Lisa was able to demonstrate them prior to the end of the session.  Lisa demonstrated fair  understanding of the education provided.     See EMR under Patient Instructions for exercises provided 12/3/2019.    Assessment     Lisa did not complete all of his usual exercises today due to fatigue after standing and performing seated exercises. When performing seated unsupported balance he required frequent verbal and tactile cues for posture and to avoid using his arms to support himself. While performing standing exercises he continues to require cues to try to extend his hips and " knees to assume a more upright posture. Due to his weakness he continues to need assistance to move through his full ROM with LAQs.     Lisa is progressing slowly towards his goals.   Pt prognosis is Fair.     Pt will continue to benefit from skilled outpatient physical therapy to address the deficits listed in the problem list box on initial evaluation, provide pt/family education and to maximize pt's level of independence in the home and community environment.     Pt's spiritual, cultural and educational needs considered and pt agreeable to plan of care and goals.    Anticipated barriers to physical therapy: in past patient has had transportation issues limiting his ability to attend therapy consistently    Goals:  Long Term Goals: 4 weeks   1. This patient and his caregivers will be independent with a HEP.  IN PROGRESS  2. This patient will increase B LE strength by 1 grade in order to perform sit to stand with Min A using RW for support.  IN PROGRESS  3. This patient will be able to ambulate 50 feet with Min A using RW, B AFOs, and no LOB. IN PROGRESS  4. Patient will be able to achieve greater than or equal to 40 on the FOTO Paraplegic Syndrome Survey placing patient in 40%<60% impaired, limited, or restricted category demonstrating overall improved functional ability with lower extremity.   IN PROGRESS    Plan     Continue with Plan Of Care and progress toward PT goals. Attempt to resume his usual mat exercises next visit.     Mary Marshall, PT

## 2020-01-02 ENCOUNTER — DOCUMENTATION ONLY (OUTPATIENT)
Dept: REHABILITATION | Facility: HOSPITAL | Age: 40
End: 2020-01-02

## 2020-01-02 DIAGNOSIS — R13.12 OROPHARYNGEAL DYSPHAGIA: Primary | ICD-10-CM

## 2020-01-02 NOTE — PROGRESS NOTES
No Show Note/Documentation    Patient: Lisa Moreno  Date of Session: 1/2/2020  Diagnosis:   1. Oropharyngeal dysphagia       MRN: [id number]    Lisa Moreno did not attend his scheduled therapy appointment today. He did not call to cancel nor reschedule. This is the first appointment that he has not attended. Next appointment is scheduled for 1/7/20 and will follow up with patient at that time. No charges have been posted today. It should be noted that per chart review, the patient went to the ER today and was not discharged until approximately 1 hour prior to his speech therapy appointment.     RAMANA Al, CF-SLP  Speech Language Pathologist   1/2/2020

## 2020-01-06 RX ORDER — LINACLOTIDE 145 UG/1
CAPSULE, GELATIN COATED ORAL
Qty: 30 CAPSULE | Refills: 0 | Status: SHIPPED | OUTPATIENT
Start: 2020-01-06 | End: 2020-01-07 | Stop reason: SDUPTHER

## 2020-01-07 ENCOUNTER — OFFICE VISIT (OUTPATIENT)
Dept: INTERNAL MEDICINE | Facility: CLINIC | Age: 40
End: 2020-01-07
Payer: MEDICARE

## 2020-01-07 VITALS
HEIGHT: 76 IN | HEART RATE: 76 BPM | DIASTOLIC BLOOD PRESSURE: 64 MMHG | BODY MASS INDEX: 20.08 KG/M2 | SYSTOLIC BLOOD PRESSURE: 120 MMHG | OXYGEN SATURATION: 96 %

## 2020-01-07 DIAGNOSIS — G80.1 SPASTIC DIPLEGIA: ICD-10-CM

## 2020-01-07 DIAGNOSIS — J01.90 SUBACUTE RHINOSINUSITIS: Primary | ICD-10-CM

## 2020-01-07 DIAGNOSIS — G11.8 SPINOCEREBELLAR ATAXIA: ICD-10-CM

## 2020-01-07 DIAGNOSIS — G12.29 UPPER MOTOR NEURON DISEASE: ICD-10-CM

## 2020-01-07 PROCEDURE — 99214 OFFICE O/P EST MOD 30 MIN: CPT | Mod: S$PBB,,, | Performed by: INTERNAL MEDICINE

## 2020-01-07 PROCEDURE — 99214 PR OFFICE/OUTPT VISIT, EST, LEVL IV, 30-39 MIN: ICD-10-PCS | Mod: S$PBB,,, | Performed by: INTERNAL MEDICINE

## 2020-01-07 PROCEDURE — 99999 PR PBB SHADOW E&M-EST. PATIENT-LVL III: ICD-10-PCS | Mod: PBBFAC,,, | Performed by: INTERNAL MEDICINE

## 2020-01-07 PROCEDURE — 99999 PR PBB SHADOW E&M-EST. PATIENT-LVL III: CPT | Mod: PBBFAC,,, | Performed by: INTERNAL MEDICINE

## 2020-01-07 PROCEDURE — 99213 OFFICE O/P EST LOW 20 MIN: CPT | Mod: PBBFAC | Performed by: INTERNAL MEDICINE

## 2020-01-07 NOTE — PROGRESS NOTES
Subjective:       Patient ID: Lisa Moreno is a 39 y.o. male.    Chief Complaint: Cough    HPI - two weeks of dry cough.  No fevers, chills.  No other URI symptoms.  He's here with mother and caregiver, who answer most of questions.  He needs a primary.  Not a smoker.    Pmh:  Spastic diplegia  Upper motor neuron disease, progressive  Spinocerebellar ataxia  Oropharyngeal dysphagia without feeding tube  Progressive gait disturbance, now w/c bound    Meds:  Reviewed and reconciled in EPIC with patient during visit today.    Review of Systems   Constitutional: Positive for activity change. Negative for unexpected weight change.   HENT: Positive for rhinorrhea. Negative for hearing loss and trouble swallowing.    Respiratory: Positive for cough. Negative for chest tightness.    Cardiovascular: Negative for chest pain and palpitations.   Gastrointestinal: Positive for constipation. Negative for blood in stool, diarrhea and vomiting.   Endocrine: Negative for polydipsia.   Genitourinary: Negative for difficulty urinating, hematuria and urgency.   Musculoskeletal: Negative for arthralgias, joint swelling and neck pain.   Skin: Negative for rash.   Neurological: Negative for weakness and headaches.   Psychiatric/Behavioral: Negative for confusion and dysphoric mood.       Objective:      Physical Exam   Constitutional: He is oriented to person, place, and time. He appears well-developed and well-nourished. No distress.   Tall, lean BM in wheelchair.  Answers some questions with soft voice, but mother and caregiver provide most answers   HENT:   Head: Normocephalic and atraumatic.   Right Ear: External ear normal.   Left Ear: External ear normal.   Mouth/Throat: Oropharynx is clear and moist. No oropharyngeal exudate.   PND noted   Cardiovascular: Normal rate, regular rhythm and normal heart sounds. Exam reveals no gallop and no friction rub.   No murmur heard.  Pulmonary/Chest: No respiratory distress. He has no  wheezes. He has no rales. He exhibits no tenderness.   Lymphadenopathy:     He has no cervical adenopathy.   Neurological: He is alert and oriented to person, place, and time.   Skin: Skin is warm. He is not diaphoretic. No erythema.   Psychiatric: He has a normal mood and affect. Thought content normal.   Nursing note and vitals reviewed.      Assessment:       1. Subacute rhinosinusitis    2. Spastic diplegia    3. Upper motor neuron disease    4. Spinocerebellar ataxia        Plan:       Lisa was seen today for cough.    Diagnoses and all orders for this visit:    Subacute rhinosinusitis - New problem.  Use claritin OTC for treatment.      Spastic diplegia - wants to establish care; come back in 3 months with all pill bottles for reconciliation and polypharmacy managemnt    Upper motor neuron disease    Spinocerebellar ataxia    rtc 3 months    SABRINA Nixon MD MPH  Staff Internist

## 2020-01-08 ENCOUNTER — TELEPHONE (OUTPATIENT)
Dept: INTERNAL MEDICINE | Facility: CLINIC | Age: 40
End: 2020-01-08

## 2020-01-08 NOTE — TELEPHONE ENCOUNTER
----- Message from Georgie Knight sent at 1/8/2020  3:44 PM CST -----  Contact: Kathleen/ Karen Reid/  [phone]   Patient was seen on 1/7/20 and Kathleen need his 90 L  Form filled out , she will be faxing the form to the office.

## 2020-01-09 ENCOUNTER — CLINICAL SUPPORT (OUTPATIENT)
Dept: REHABILITATION | Facility: HOSPITAL | Age: 40
End: 2020-01-09
Attending: PHYSICAL MEDICINE & REHABILITATION
Payer: MEDICARE

## 2020-01-09 DIAGNOSIS — R29.898 WEAKNESS OF BOTH LOWER EXTREMITIES: ICD-10-CM

## 2020-01-09 DIAGNOSIS — R26.89 BALANCE PROBLEM: ICD-10-CM

## 2020-01-09 DIAGNOSIS — R13.12 OROPHARYNGEAL DYSPHAGIA: Primary | ICD-10-CM

## 2020-01-09 DIAGNOSIS — R26.89 DECREASED FUNCTIONAL MOBILITY: ICD-10-CM

## 2020-01-09 DIAGNOSIS — N39.41 URGE INCONTINENCE: ICD-10-CM

## 2020-01-09 PROCEDURE — 97110 THERAPEUTIC EXERCISES: CPT | Mod: PO,CQ,59

## 2020-01-09 PROCEDURE — 92526 ORAL FUNCTION THERAPY: CPT | Mod: KX,PO | Performed by: SPEECH-LANGUAGE PATHOLOGIST

## 2020-01-09 RX ORDER — MIRABEGRON 25 MG/1
TABLET, FILM COATED, EXTENDED RELEASE ORAL
Qty: 30 TABLET | Refills: 0 | OUTPATIENT
Start: 2020-01-09

## 2020-01-09 NOTE — PROGRESS NOTES
"  Physical Therapy Daily Treatment Note     Name: Lisa Irvin [facility] Number: [id number]    Therapy Diagnosis:   Encounter Diagnoses   Name Primary?   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  Patient's caretaker said that he did not do any standing today as he is still weak after being sick last week.  He was compliant with home exercise program.  Response to previous treatment: NA  Functional change: NA    Pain: 0/10  Location:  NA     Objective     Lisa received therapeutic exercises to develop strength, ROM and flexibility for 25 minutes 1:1 with PT including:      Date  01/09/2020 12/31/19 12/26/19 12/24/19 12/19/19 12/17/19 12/12/19 12/10/19 12/05/19 12/03/19   VISIT 11/12 10/12 9/12 8/12 7/12 6/12 5/12 4/12 3/12 2/12   FOTO --- -- -- -- -- -- 5/5 4/5 3/5 2/5   POC EXP. DATE 01/26/2020 01/26/20 12/26/19 12/26/19 12/26/19 12/26/19 12/26/19 12/26/19 12/26/19 12/26/19   VISIT AMOUNT  MEDICARE TOTAL 60.64  60.64 60.64KX  3951.76 90.96KX  3891.12 90.96KX  3800.16 121.28KX  3709.20 121.28KX  3587.92 121.28 KX  3466.64 121.28KX  3345.36 90.96  295.12 121.28  204.16   FACE-TO-FACE 01/26/2020 1/26/20 1/26/20 12/26/19 12/26/19 12/26/19 12/26/19 12/26/19 12/26/19 12/26/19                TABLE:             HSS - manual  Not today Not today 5 x 10" 5 x 10" 5 x 10" 5 x 10" 5 x 10" ea 5 x 10 "ea 5 x 10" ea 5 x 10" ea   GSS " "- manual Not today Not today 5 x 10" 5 x 10" 5 x 10" 5 x 10 5 x 10" ea 5 x 10 " ea 5 x 10 ea 5 x 10" ea   Bridge  Not today Not today Not today 1 x 25 1 x 21 3 x 6 4 x 5 3 x 5 3 x 5 1 x 10   LTR - AA Not today Not today Not today 1 x 15 1 x 15 1 x 15 1 x 15 1 x 15 1 x 10 1 x 10   Marching  Not today Not today Not today Not today 1 x 15 1 x 10 1 x 10 1 x 10 1 x 10 1 x 5   SAQ AA Not today Not today 3 x 6 3 x 6 3 x 6 3 x 5 1 x 10 1 x 10 2 x 5 ea 1 x 5   Hip abd/ add - supine AA Not today Not today 2 x 12 Not today 2 x 10 2 x 10 2 x 10 1 x 15 1 x 15 1 x 10   Quad sets Not today Not today 10 x 2" Not today Not today 10 x 2" 10 x 2" 10 x 2" 10 x 2" 5 x 2"                SEATED:             LAQs - AA Not today 2 x 7 Not today Not today Not today Not today 2 x 5 2 x 5 2 x 5 1 x 5   Seated Hip Flex  Not today 3 x 5 Not today Not today Not today Not today 1 x 15 1 x 15 1 x 10 1 x 5   Toe raises Not today 1 x 10 Not today Not today Not today Not today 1 x 10 1 x 10 1 x 10 1 x 5   Sitting unsupported Not today 1 x 30"           Standing             Sit to stand 3x 3x  1x 3x w/ Mod a 2x  Not today 2x 2x     Static standing 3 x 1' 3 x 1' unable 3 x 50" 2 x 20 sec Not today 1 x 1 minute 1 x 1 minute                  Initials GWA 1/6 DG DG DG DG DG GWA 1/6 DG GWA 2/6 GWA 1/6       Home Exercises Provided and Patient Education Provided     Education provided:   - using head hips relationship to increase independence with transfers.     Written Home Exercises Provided: yes.  Exercises were reviewed and Lisa was able to demonstrate them prior to the end of the session.  Lisa demonstrated fair  understanding of the education provided.     See EMR under Patient Instructions for exercises provided 12/3/2019.    Assessment     Lisa did not complete all of his usual exercises today due to fatigue after standing.  Patient required mod to mas assist with transfers today.  When performing seated unsupported balance he required " frequent verbal and tactile cues for posture and to avoid using his arms to support himself.  While performing standing exercises he continues to require cues to try to extend his hips and knees to assume a more upright posture.      Lisa is progressing slowly towards his goals.   Pt prognosis is Fair.     Pt will continue to benefit from skilled outpatient physical therapy to address the deficits listed in the problem list box on initial evaluation, provide pt/family education and to maximize pt's level of independence in the home and community environment.     Pt's spiritual, cultural and educational needs considered and pt agreeable to plan of care and goals.    Anticipated barriers to physical therapy: in past patient has had transportation issues limiting his ability to attend therapy consistently    Goals:  Long Term Goals: 4 weeks   1. This patient and his caregivers will be independent with a HEP.  IN PROGRESS  2. This patient will increase B LE strength by 1 grade in order to perform sit to stand with Min A using RW for support.  IN PROGRESS  3. This patient will be able to ambulate 50 feet with Min A using RW, B AFOs, and no LOB. IN PROGRESS  4. Patient will be able to achieve greater than or equal to 40 on the FOTO Paraplegic Syndrome Survey placing patient in 40%<60% impaired, limited, or restricted category demonstrating overall improved functional ability with lower extremity.   IN PROGRESS    Plan     Continue with Plan Of Care and progress toward PT goals. Attempt to resume his usual mat exercises next visit.     John Troy, PTA

## 2020-01-09 NOTE — PROGRESS NOTES
Outpatient Neurological Rehabilitation   Speech and Language Therapy Daily Note  Date:  1/9/2020     Name: Lisa Moreno   MRN: [id number]   Therapy Diagnosis:   Encounter Diagnosis   Name Primary?    Oropharyngeal dysphagia Yes      Physician: Yung Rodriguez MD  Physician Orders: [id number] - Ambulatory referral to Speech Therapy  Medical Diagnosis: R13.12 (ICD-10-CM) - Oropharyngeal dysphagia    Visit #/ Visits Authorized: 3/20 (plus 2)  Date of Evaluation: 12/17/19  Insurance Authorization Period: 12/10/2019-12/09/2020  Plan of Care Expiration Date:  12/17/2019 to 2/17/20   Extended POC:  n/a   Progress Note: due 1/19/20   Visits Cancelled: 0  Visits No Show: 0    Time In: 2:02  Time Out: 2:55  Total Billable Time: 53 min     Precautions: Standard  Subjective:   Pt reports: that he has been sick.  His caregiver remained in clinic waiting area throughout treatment session but she reported that Lisa has been unable to complete dysphagia exercises due to being sick. Subjectively, pt speech more dysarthric today.  He was not compliant to home exercise program 2/2 being sick.   Response to previous treatment: positive   Pain Scale:  0/10 on VAS currently.   Pain Location: n/a  Objective:   UNTIMED  Procedure Min.   Dysphagia Therapy  53   Total Timed Units: 0  Total Untimed Units: 1  Charges Billed/# of units: 1     Short Term Goals: (4 weeks)  Current Progress:    1. Pt will participate in tongue base retraction exercises x 50-75 independently to improve base of tongue retraction and swallow function.   Progressing/ Not Met 1/9/2020   Hard effort swallow x 50 (mostly dry swallows with occasional small sips of thin liquids to moisten oral cavity) given verbal cues   2. Pt will participate in laryngeal elevation exercises x30 independently to improve hyolaryngeal excursion.    Progressing/ Not Met 1/9/2020  Mendelsohn maneuver x 0/7 attempts; pt appeared to benefited from tactile cues though pt unable to  "volitionally "feeze" swallow.     Pitch glides low pitch to high pitch x 35   3. Pt will participate in chin tuck against resistance (CTAR) hold x1 minute x3 independently to improve hyolaryngeal elevation / excursion.  Progressing/ Not Met 1/9/2020   CTAR hold for 1 minute, 6 times for a total of 6 minutes; required max cues to perform exercise with increased effort and requiried initial model    4. Pt will participate in chin tuck against resistance (CTAR) repetitions x45-60 independently to improve hyolaryngeal elevation / excursion.  Progressing/ Not Met 1/9/2020   CTAR repetitions x 90; required max cues to perform exercise with increased effort and requiried initial model    5. Pt will participate in po trials of thin liquids with no overt signs/symptoms of aspiration  Progressing/ Not Met 1/9/2020   Pt observed to take small sips of water x 9 throughout session; coughing and wet vocal quality noted on 4/9 swallows   6. Pt will recall and utilize aspiration precautions and safe swallow strategies independently  Progressing/ Not Met 1/9/2020   Reviewed/discussed the following aspiration precautions and safe swallow strategies:  - eat and drink slowly   - small bites/sips   - remain upright when eating (90 degrees) and remain upright for at least 30 minutes after eating  - excellent oral care pre and post eating/drinking    Following a delay of approximately 5 minutes, pt able to recall 0 aspiration precautions independently; able to recall 0/5 given max cues   7. Pt will complete oral motor exercises to improve lingual and buccal musculature to improve oral prep phase / oral phase of swallow   Progressing/ Not Met 1/9/2020   - Lingual strengthening exercises x 45; required max cues to perform exercise with increased effort and requiried initial model   - Buccal strengthening exercises x15      CHEST X-RAY 01/02/2020:  "FINDINGS: The lungs are clear.  There is no pneumothorax or pleural fluid.  The cardiac " "silhouette is not enlarged.  The osseous structures are unremarkable."    Patient Education/Response:   Ongoing education provided regarding dysphagia exercise technique, purpose of dysphagia exercises, and importance of active/consistent adherence to HEP in order to achieve positive results. Also discussed importance of excellent oral care prior and post eating/drinking and throughout the day. Pt verbalized understanding of all discussed. Also discussed these topics with pt's caregiver following the session who verbalized understanding of all discussed.     Written Home Exercises Provided: yes.  Exercises were reviewed and Lisa was able to demonstrate them prior to the end of the session.  Lisa demonstrated fair  understanding of the education provided.   See EMR under Patient Instructions for exercises provided prior visit.  Assessment:   Lisa is progressing well towards his goals. Fair performance regarding dysphagia exercises given an initial model for appropriate technique in addition to cues for pt to utilize for effort when completing the exercises. Total of 9 po trials completed today, coughing and wet vocal quality observed on 4/9 swallows. Pt unable to verbalize aspiration precautions/safe swallow strategies despite max cues. Recent chest X-RAY revealed no concerns related to pneumonia. Current goals remain appropriate. Goals to be updated as necessary. Pt prognosis is Fair. Pt will continue to benefit from skilled outpatient speech and language therapy to address the deficits listed in the problem list on initial evaluation, provide pt/family education and to maximize pt's level of independence in the home and community environment.   Medical necessity is demonstrated by the following IMPAIRMENTS:  Pt's severe dysphagia puts him at risk for aspiration/developing aspiration pneumonia as well as decreased quality of life.    Barriers to Therapy: none  Pt's spiritual, cultural and educational needs " considered and pt agreeable to plan of care and goals.  Plan:   Continue POC with focus on improving swallow function    RAMANA Al, CF-SLP  Speech Language Pathologist   1/9/2020

## 2020-01-10 NOTE — PROGRESS NOTES
"Outpatient Neurological Rehabilitation   Speech and Language Therapy Daily Note  Date:  1/14/2020     Name: Lisa Moreno   MRN: [id number]   Therapy Diagnosis:   Encounter Diagnosis   Name Primary?    Oropharyngeal dysphagia Yes      Physician: Yung Rodriguez MD  Physician Orders: [id number] - Ambulatory referral to Speech Therapy  Medical Diagnosis: R13.12 (ICD-10-CM) - Oropharyngeal dysphagia    Visit #/ Visits Authorized: 4/20 (plus 2)  Date of Evaluation: 12/17/19  Insurance Authorization Period: 12/10/2019-12/09/2020  Plan of Care Expiration Date:  12/17/2019 to 2/17/20   Extended POC:  n/a   Progress Note: due 1/19/20   Visits Cancelled: 0  Visits No Show: 0    Time In: 1:50  Time Out: 2:28  Total Billable Time: 38 min     Precautions: Standard  Subjective:   Pt reports: "[employer] won". He reports that he is tired from PT.  Pt requested session end early as he was too tired and uncomfortable to participate any further.   He was  not compliant to home exercise program, he states he does his exercises "every once in a while"  Response to previous treatment: "I don't know"   Pain Scale:  0/10 on VAS currently.   Pain Location: n/a  Objective:   UNTIMED  Procedure Min.   Dysphagia Therapy  38    Total Timed Units: 0  Total Untimed Units: 1  Charges Billed/# of units: 1     Short Term Goals: (4 weeks)  Current Progress:    1. Pt will participate in tongue base retraction exercises x 50-75 independently to improve base of tongue retraction and swallow function.   Progressing/ Not Met 1/14/2020   Hard effort swallow x 30 (mostly dry swallows with occasional small sips of thin liquids to moisten oral cavity) given verbal cues   2. Pt will participate in laryngeal elevation exercises x30 independently to improve hyolaryngeal excursion.    Progressing/ Not Met 1/14/2020  Mendelsohn maneuver x 0/5 attempts; pt appeared to benefited from tactile cues though pt unable to volitionally "feeze" swallow.    3. Pt will " participate in chin tuck against resistance (CTAR) hold x1 minute x3 independently to improve hyolaryngeal elevation / excursion.  Progressing/ Not Met 1/14/2020   CTAR hold for 1 minute, 4 times for a total of 4 minutes; required max cues to perform exercise with increased effort and requiried initial model    4. Pt will participate in chin tuck against resistance (CTAR) repetitions x45-60 independently to improve hyolaryngeal elevation / excursion.  Progressing/ Not Met 1/14/2020   CTAR repetitions x 60; required max cues to perform exercise with increased effort and requiried initial model    5. Pt will participate in po trials of thin liquids with no overt signs/symptoms of aspiration  Progressing/ Not Met 1/14/2020   Pt observed to take small sips of water x 8 throughout session; coughing and wet vocal quality noted on 2 swallows   6. Pt will recall and utilize aspiration precautions and safe swallow strategies independently  Progressing/ Not Met 1/14/2020   Reviewed/discussed the following aspiration precautions and safe swallow strategies:  - eat and drink slowly   - small bites/sips   - remain upright when eating (90 degrees) and remain upright for at least 30 minutes after eating  - excellent oral care pre and post eating/drinking    Following a delay of approximately 5 minutes, pt able to recall 1 aspiration precautions independently; able to recall 2/5 given max cues   7. Pt will complete oral motor exercises to improve lingual and buccal musculature to improve oral prep phase / oral phase of swallow   Progressing/ Not Met 1/14/2020   - Lingual strengthening exercises x 65; required max cues to perform exercise with increased effort and requiried initial model   - Buccal strengthening exercises x 20        Patient Education/Response:   Ongoing education provided regarding importance of completion of dysphagia each day at least  2-3 times per day in order to achieve positive results. Reiterated importance  "of aggressive oral care prior and post eating/drinking and throughout the day. Pt and pt's caregiver verbalized understanding of all discussed. Additional, discussed AAC options and explained low tech vs. High tech options. Demonstrated communication dileep via iPad. Pt states he is interested in AAC but he would like to learn more about it in the clinic and explore low tech options.     Written Home Exercises Provided: yes.  Exercises were reviewed and Lisa was able to demonstrate them prior to the end of the session.  Lisa demonstrated fair  understanding of the education provided.   See EMR under Patient Instructions for exercises provided prior visit.  Assessment:   Lisa is progressing well towards his goals. Fair performance regarding dysphagia exercises though he continues to require cues to utilize increased effort when completing exercises. Total of 8 po trials completed today, coughing and wet vocal quality observed on 2/8 swallows. Less repetition of exercises completed today as pt requested session end early 2/2 pt feeling very "uncomfrotable" and tired. Current goals remain appropriate. Goals to be updated as necessary. Pt prognosis is Fair. Pt will continue to benefit from skilled outpatient speech and language therapy to address the deficits listed in the problem list on initial evaluation, provide pt/family education and to maximize pt's level of independence in the home and community environment.   Medical necessity is demonstrated by the following IMPAIRMENTS:  Pt's severe dysphagia puts him at risk for aspiration/developing aspiration pneumonia as well as decreased quality of life.    Barriers to Therapy: none  Pt's spiritual, cultural and educational needs considered and pt agreeable to plan of care and goals.  Plan:   Continue POC with focus on improving swallow function    RAMANA Al, CF-SLP  Speech Language Pathologist   1/14/2020  "

## 2020-01-14 ENCOUNTER — CLINICAL SUPPORT (OUTPATIENT)
Dept: REHABILITATION | Facility: HOSPITAL | Age: 40
End: 2020-01-14
Attending: PHYSICAL MEDICINE & REHABILITATION
Payer: MEDICARE

## 2020-01-14 DIAGNOSIS — R26.89 BALANCE PROBLEM: ICD-10-CM

## 2020-01-14 DIAGNOSIS — R29.898 WEAKNESS OF BOTH LOWER EXTREMITIES: ICD-10-CM

## 2020-01-14 DIAGNOSIS — R13.12 OROPHARYNGEAL DYSPHAGIA: Primary | ICD-10-CM

## 2020-01-14 DIAGNOSIS — R26.89 DECREASED FUNCTIONAL MOBILITY: ICD-10-CM

## 2020-01-14 PROCEDURE — 97110 THERAPEUTIC EXERCISES: CPT | Mod: PO,CQ,59

## 2020-01-14 PROCEDURE — 92526 ORAL FUNCTION THERAPY: CPT | Mod: KX,PO | Performed by: SPEECH-LANGUAGE PATHOLOGIST

## 2020-01-14 NOTE — PROGRESS NOTES
"  Physical Therapy Daily Treatment Note     Name: Lisa Irvin [facility] Number: [id number]    Therapy Diagnosis:   Encounter Diagnoses   Name Primary?   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  Patient's caretaker said that he did not do any standing today as he is still weak after being sick last week.  He was compliant with home exercise program.  Response to previous treatment: NA  Functional change: NA    Pain: 0/10  Location:  NA     Objective     Lisa received therapeutic exercises to develop strength, ROM and flexibility for 25 minutes 1:1 with PTA including:      Date  01/14/2020 01/09/2020 12/31/19 12/26/19 12/24/19 12/19/19 12/17/19 12/12/19 12/10/19 12/05/19 12/03/19   VISIT 1/20 11/12 10/12 9/12 8/12 7/12 6/12 5/12 4/12 3/12 2/12   FOTO --- --- -- -- -- -- -- 5/5 4/5 3/5 2/5   POC EXP. DATE 01/26/2020 01/26/2020 01/26/20 12/26/19 12/26/19 12/26/19 12/26/19 12/26/19 12/26/19 12/26/19 12/26/19   VISIT AMOUNT  MEDICARE TOTAL 90.96  151.60 60.64  60.64 60.64KX  3951.76 90.96KX  3891.12 90.96KX  3800.16 121.28KX  3709.20 121.28KX  3587.92 121.28 KX  3466.64 121.28KX  3345.36 90.96  295.12 121.28  204.16   FACE-TO-FACE 01/26/2020 01/26/2020 1/26/20 1/26/20 12/26/19 12/26/19 12/26/19 12/26/19 12/26/19 12/26/19 12/26/19                 TABLE:              HSS - manual  5 x 10" Not today Not today 5 x 10" 5 x " "10" 5 x 10" 5 x 10" 5 x 10" ea 5 x 10 "ea 5 x 10" ea 5 x 10" ea   GSS - manual 5 x 10" Not today Not today 5 x 10" 5 x 10" 5 x 10" 5 x 10 5 x 10" ea 5 x 10 " ea 5 x 10 ea 5 x 10" ea   Bridge  1 x 25 Not today Not today Not today 1 x 25 1 x 21 3 x 6 4 x 5 3 x 5 3 x 5 1 x 10   LTR - AA 1 x 15 Not today Not today Not today 1 x 15 1 x 15 1 x 15 1 x 15 1 x 15 1 x 10 1 x 10   Marching  Not today  Not today Not today Not today Not today 1 x 15 1 x 10 1 x 10 1 x 10 1 x 10 1 x 5   SAQ AA 3 x 6 Not today Not today 3 x 6 3 x 6 3 x 6 3 x 5 1 x 10 1 x 10 2 x 5 ea 1 x 5   Hip abd/ add - supine AA 2 x 12 Not today Not today 2 x 12 Not today 2 x 10 2 x 10 2 x 10 1 x 15 1 x 15 1 x 10   Quad sets 10 x 2" Not today Not today 10 x 2" Not today Not today 10 x 2" 10 x 2" 10 x 2" 10 x 2" 5 x 2"                 SEATED:              LAQs - AA 2 x 7 Not today 2 x 7 Not today Not today Not today Not today 2 x 5 2 x 5 2 x 5 1 x 5   Seated Hip Flex  3 x 5 Not today 3 x 5 Not today Not today Not today Not today 1 x 15 1 x 15 1 x 10 1 x 5   Toe raises Not today Not today 1 x 10 Not today Not today Not today Not today 1 x 10 1 x 10 1 x 10 1 x 5   Sitting unsupported Not today Not today 1 x 30"           Standing              Sit to stand Not today 3x 3x  1x 3x w/ Mod a 2x  Not today 2x 2x     Static standing Not today 3 x 1' 3 x 1' unable 3 x 50" 2 x 20 sec Not today 1 x 1 minute 1 x 1 minute                   Initials GWA 2/6 GWA 1/6 DG DG DG DG DG GWA 1/6 DG GWA 2/6 GWA 1/6       Home Exercises Provided and Patient Education Provided     Education provided:   - using head hips relationship to increase independence with transfers.     Written Home Exercises Provided: yes.  Exercises were reviewed and Lisa was able to demonstrate them prior to the end of the session.  Lisa demonstrated fair  understanding of the education provided.     See EMR under Patient Instructions for exercises provided 12/3/2019.    Assessment     Lisa did not wish " to complete all of his exercises today, stated he had enough and wanted to stop.  Patient required mod to max assist with transfers today.      Lisa is progressing slowly towards his goals.   Pt prognosis is Fair.     Pt will continue to benefit from skilled outpatient physical therapy to address the deficits listed in the problem list box on initial evaluation, provide pt/family education and to maximize pt's level of independence in the home and community environment.     Pt's spiritual, cultural and educational needs considered and pt agreeable to plan of care and goals.    Anticipated barriers to physical therapy: in past patient has had transportation issues limiting his ability to attend therapy consistently    Goals:  Long Term Goals: 4 weeks   1. This patient and his caregivers will be independent with a HEP.  IN PROGRESS  2. This patient will increase B LE strength by 1 grade in order to perform sit to stand with Min A using RW for support.  IN PROGRESS  3. This patient will be able to ambulate 50 feet with Min A using RW, B AFOs, and no LOB. IN PROGRESS  4. Patient will be able to achieve greater than or equal to 40 on the FOTO Paraplegic Syndrome Survey placing patient in 40%<60% impaired, limited, or restricted category demonstrating overall improved functional ability with lower extremity.   IN PROGRESS    Plan     Continue with Plan Of Care and progress toward PT goals. Attempt to resume his usual mat exercises next visit.     John Troy, PTA

## 2020-01-16 ENCOUNTER — CLINICAL SUPPORT (OUTPATIENT)
Dept: REHABILITATION | Facility: HOSPITAL | Age: 40
End: 2020-01-16
Attending: PHYSICAL MEDICINE & REHABILITATION
Payer: MEDICARE

## 2020-01-16 DIAGNOSIS — R26.89 DECREASED FUNCTIONAL MOBILITY: ICD-10-CM

## 2020-01-16 DIAGNOSIS — N39.41 URGE INCONTINENCE: ICD-10-CM

## 2020-01-16 DIAGNOSIS — R26.89 BALANCE PROBLEM: ICD-10-CM

## 2020-01-16 DIAGNOSIS — R29.898 WEAKNESS OF BOTH LOWER EXTREMITIES: ICD-10-CM

## 2020-01-16 PROCEDURE — 97110 THERAPEUTIC EXERCISES: CPT | Mod: PO

## 2020-01-16 NOTE — PROGRESS NOTES
Physical Therapy Daily Treatment Note     Name: Lisa Irvin [facility] Number: [id number]    Therapy Diagnosis:   Encounter Diagnoses   Name Primary?   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 He still has not heard anything about his AFOs.   He was compliant with home exercise program.  Response to previous treatment: NA  Functional change: NA    Pain: 0/10  Location:  NA     Objective     Lisa received therapeutic exercises to develop strength, ROM and flexibility for 49 minutes 1:1 with PT including:      Date  01/16/20 01/14/2020 01/09/2020 12/31/19 12/26/19 12/24/19 12/19/19 12/17/19 12/12/19 12/10/19 12/05/19 12/03/19   VISIT 3/20 2/20 1/20 10/12 9/12 8/12 7/12 6/12 5/12 4/12 3/12 2/12   FOTO -- --- --- -- -- -- -- -- 5/5 4/5 3/5 2/5   POC EXP. DATE 01/26/20 01/26/2020 01/26/2020 01/26/20 12/26/19 12/26/19 12/26/19 12/26/19 12/26/19 12/26/19 12/26/19 12/26/19   VISIT AMOUNT  MEDICARE TOTAL 90.96  242.56 90.96  151.60 60.64  60.64 60.64KX  3951.76 90.96KX  3891.12 90.96KX  3800.16 121.28KX  3709.20 121.28KX  3587.92 121.28 KX  3466.64 121.28KX  3345.36 90.96  295.12 121.28  204.16   FACE-TO-FACE 01/26/20 01/26/2020 01/26/2020 1/26/20 1/26/20 12/26/19 12/26/19 12/26/19 12/26/19 12/26/19 12/26/19 12/26/19                  TABLE:               HSS - manual  5  "x 10" 5 x 10" Not today Not today 5 x 10" 5 x 10" 5 x 10" 5 x 10" 5 x 10" ea 5 x 10 "ea 5 x 10" ea 5 x 10" ea   GSS - manual 5 x 10" 5 x 10" Not today Not today 5 x 10" 5 x 10" 5 x 10" 5 x 10 5 x 10" ea 5 x 10 " ea 5 x 10 ea 5 x 10" ea   Bridge  1 x 10 1 x 25 Not today Not today Not today 1 x 25 1 x 21 3 x 6 4 x 5 3 x 5 3 x 5 1 x 10   LTR - AA 1 x 15 1 x 15 Not today Not today Not today 1 x 15 1 x 15 1 x 15 1 x 15 1 x 15 1 x 10 1 x 10   Marching  Not today Not today  Not today Not today Not today Not today 1 x 15 1 x 10 1 x 10 1 x 10 1 x 10 1 x 5   SAQ AA 3 x 6 3 x 6 Not today Not today 3 x 6 3 x 6 3 x 6 3 x 5 1 x 10 1 x 10 2 x 5 ea 1 x 5   Hip abd/ add - supine AA 2 x 12 2 x 12 Not today Not today 2 x 12 Not today 2 x 10 2 x 10 2 x 10 1 x 15 1 x 15 1 x 10   Quad sets Not today 10 x 2" Not today Not today 10 x 2" Not today Not today 10 x 2" 10 x 2" 10 x 2" 10 x 2" 5 x 2"                  SEATED:               LAQs - AA Not today 2 x 7 Not today 2 x 7 Not today Not today Not today Not today 2 x 5 2 x 5 2 x 5 1 x 5   Seated Hip Flex  Not today 3 x 5 Not today 3 x 5 Not today Not today Not today Not today 1 x 15 1 x 15 1 x 10 1 x 5   Toe raises Not today Not today Not today 1 x 10 Not today Not today Not today Not today 1 x 10 1 x 10 1 x 10 1 x 5   Sitting unsupported Not today Not today Not today 1 x 30"           Standing               Sit to stand 1 x  Not today 3x 3x  1x 3x w/ Mod a 2x  Not today 2x 2x     Static standing 10 seconds Not today 3 x 1' 3 x 1' unable 3 x 50" 2 x 20 sec Not today 1 x 1 minute 1 x 1 minute                    Initials DG GWA 2/6 GWA 1/6 DG DG DG DG DG GWA 1/6 DG GWA 2/6 GWA 1/6       Home Exercises Provided and Patient Education Provided     Education provided:   - again to use head hips relationship to increase independence with transfers.   - patient will be put on hold after his visit on 1/23/20 until his AFO's arrive    Written Home Exercises Provided: yes.  Exercises were reviewed " and Lisa was able to demonstrate them prior to the end of the session.  Lisa demonstrated fair  understanding of the education provided.     See EMR under Patient Instructions for exercises provided 12/3/2019.    Assessment     Lisa continues to require frequent verbal cues to scan his environment when maneuvering his wheelchair in the clinic otherwise he continues to run into objects on his right side. Today he was unable to stand for more then 10 seconds and required Mod A with transfers along with frequent verbal and tactile cues for hand placement with transfers. When performing his usual mat exercises he required mod A to move through his full available ROM.     Lisa is progressing slowly towards his goals.   Pt prognosis is Fair.     Pt will continue to benefit from skilled outpatient physical therapy to address the deficits listed in the problem list box on initial evaluation, provide pt/family education and to maximize pt's level of independence in the home and community environment.     Pt's spiritual, cultural and educational needs considered and pt agreeable to plan of care and goals.    Anticipated barriers to physical therapy: in past patient has had transportation issues limiting his ability to attend therapy consistently    Goals:  Long Term Goals: 4 weeks   1. This patient and his caregivers will be independent with a HEP.  IN PROGRESS  2. This patient will increase B LE strength by 1 grade in order to perform sit to stand with Min A using RW for support.  IN PROGRESS  3. This patient will be able to ambulate 50 feet with Min A using RW, B AFOs, and no LOB. IN PROGRESS  4. Patient will be able to achieve greater than or equal to 40 on the FOTO Paraplegic Syndrome Survey placing patient in 40%<60% impaired, limited, or restricted category demonstrating overall improved functional ability with lower extremity.   IN PROGRESS    Plan     Continue with Plan Of Care and progress toward PT  goals. Attempt to increase reps with standing exercises next visit.     Mary Marshall, PT

## 2020-01-17 RX ORDER — MIRABEGRON 25 MG/1
TABLET, FILM COATED, EXTENDED RELEASE ORAL
Qty: 90 TABLET | Refills: 3 | Status: SHIPPED | OUTPATIENT
Start: 2020-01-17 | End: 2020-07-17 | Stop reason: SINTOL

## 2020-01-20 NOTE — PROGRESS NOTES
"Outpatient Neurological Rehabilitation   Speech and Language Therapy Daily Note  Date:  1/21/2020     Name: Lisa Moreno   MRN: [id number]   Therapy Diagnosis:   Encounter Diagnosis   Name Primary?    Oropharyngeal dysphagia Yes      Physician: Yung Rodriguez MD  Physician Orders: [id number] - Ambulatory referral to Speech Therapy  Medical Diagnosis: R13.12 (ICD-10-CM) - Oropharyngeal dysphagia    Visit #/ Visits Authorized: 5/20 (plus 2)  Date of Evaluation: 12/17/19  Insurance Authorization Period: 12/10/2019-12/09/2020  Plan of Care Expiration Date:  12/17/2019 to 2/17/20   Extended POC:  n/a   Progress Note: due 1/19/20   Visits Cancelled: 0  Visits No Show: 0    Time In: 1:55  Time Out: 2:35  Total Billable Time: 40 min     Precautions: Standard  Subjective:   Pt reports: Pt's aid reports that Lisa completed 30 hard effortful swallow exercises and 30 morgan maneuver exercises prior to therapy today. Patient reported that he was feeling very tired. Following approximately 30 minutes of completing exercises, pt requested for session to end early. Remainder of session consisted of dysphagia education in addition to downloading Text To Speech application on pt's phone.   He was mildly compliant to home exercise program per pt's caregiver report and not completing exercises every day  Response to previous treatment: "I don't know"    Pain Scale:  0/10 on VAS currently.   Pain Location: n/a  Objective:   UNTIMED  Procedure Min.   Dysphagia Therapy 40   Total Timed Units: 0  Total Untimed Units: 1  Charges Billed/# of units: 1     Short Term Goals: (4 weeks)  Current Progress:    1. Pt will participate in tongue base retraction exercises x 50-75 independently to improve base of tongue retraction and swallow function.   Progressing/ Not Met 1/21/2020   Hard effort swallow x 30 (mostly dry swallows with occasional small sips of thin liquids to moisten oral cavity) given mod verbal cues   2. Pt will " participate in laryngeal elevation exercises x30 independently to improve hyolaryngeal excursion.    Progressing/ Not Met 1/21/2020  Mendelsohn maneuver x  0/6 attempts despite max visual/vebal/tectile cues    3. Pt will participate in chin tuck against resistance (CTAR) hold x1 minute x3 independently to improve hyolaryngeal elevation / excursion.  Progressing/ Not Met 1/21/2020   CTAR hold for 1 minute, 4 times for a total of 4 minutes; continued to require max cues to perform exercise with increased effort and requiried initial model    4. Pt will participate in chin tuck against resistance (CTAR) repetitions x45-60 independently to improve hyolaryngeal elevation / excursion.  Progressing/ Not Met 1/21/2020   CTAR repetitions x 60; continued to require max cues to perform exercise with increased effort and requiried initial model    5. Pt will participate in po trials of thin liquids with no overt signs/symptoms of aspiration  Progressing/ Not Met 1/21/2020   Pt observed to take small sips of water x 11  throughout session; coughing and wet vocal quality noted on 5/11 swallows   6. Pt will recall and utilize aspiration precautions and safe swallow strategies independently  Progressing/ Not Met 1/21/2020   Reviewed/discussed the following aspiration precautions and safe swallow strategies:  - eat and drink slowly   - small bites/sips   - remain upright when eating (90 degrees) and remain upright for at least 30 minutes after eating  - excellent oral care pre and post eating/drinking    Pt able to recall 0/5 aspiration precautions independently; able to recall 1/5 given max cues.    7. Pt will complete oral motor exercises to improve lingual and buccal musculature to improve oral prep phase / oral phase of swallow   Progressing/ Not Met 1/21/2020   Not formally addressed         Patient Education/Response:   Ongoing education provided to pt and pt's aide regarding importance of completion of dysphagia each day at  "least  2-3 times per day in order to achieve positive results. Discussed importance of oral care pre and post eating/drinking as well as throughout the day. Pt and pt's caregiver verbalized understanding of all discussed. Further discussion regarding AAC completed. Provided patient with various low tech AAC boards. Discussed "Text To Speech" application on phone and downloaded application, pt able to type "I want to go" on phone which verbalized the message intelligibly, pt was very pleased with this.     Written Home Exercises Provided: yes.  Exercises were reviewed and Lisa was able to demonstrate them prior to the end of the session.  Lisa demonstrated fair  understanding of the education provided.   See EMR under Patient Instructions for exercises provided prior visit.  Assessment:   Lisa is progressing well towards his goals. Decreased performance regarding dysphagia exercises; continues to require mod-max cues to utilize increased effort when completing exercises. Total of 11 po trials (thin liquids) completed today, coughing and wet vocal quality observed on 5/11 swallows. Pt requested to end today's session early 2/2 fatigue as he stated "I'm too tired." Current goals remain appropriate. Goals to be updated as necessary. Pt prognosis is Fair. Pt will continue to benefit from skilled outpatient speech and language therapy to address the deficits listed in the problem list on initial evaluation, provide pt/family education and to maximize pt's level of independence in the home and community environment.   Medical necessity is demonstrated by the following IMPAIRMENTS:  Pt's severe dysphagia puts him at risk for aspiration/developing aspiration pneumonia as well as decreased quality of life.    Barriers to Therapy: none  Pt's spiritual, cultural and educational needs considered and pt agreeable to plan of care and goals.  Plan:   Continue POC with focus on improving swallow function, explore AAC " options.     RAMANA Al, CF-SLP  Speech Language Pathologist   1/21/2020

## 2020-01-21 ENCOUNTER — CLINICAL SUPPORT (OUTPATIENT)
Dept: REHABILITATION | Facility: HOSPITAL | Age: 40
End: 2020-01-21
Attending: PHYSICAL MEDICINE & REHABILITATION
Payer: MEDICARE

## 2020-01-21 DIAGNOSIS — R13.12 OROPHARYNGEAL DYSPHAGIA: Primary | ICD-10-CM

## 2020-01-21 DIAGNOSIS — R29.898 WEAKNESS OF BOTH LOWER EXTREMITIES: ICD-10-CM

## 2020-01-21 PROCEDURE — 92526 ORAL FUNCTION THERAPY: CPT | Mod: KX,PO | Performed by: SPEECH-LANGUAGE PATHOLOGIST

## 2020-01-21 PROCEDURE — 97110 THERAPEUTIC EXERCISES: CPT | Mod: PO,CQ

## 2020-01-21 NOTE — PROGRESS NOTES
"  Physical Therapy Daily Treatment Note     Name: Lisa Irvin [facility] Number: [id number]    Therapy Diagnosis:   Encounter Diagnosis   Name Primary?   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 He still has not heard anything about his AFOs.   He was compliant with home exercise program.  Response to previous treatment: NA  Functional change: NA    Pain: 0/10  Location:  NA     Objective     Lisa received therapeutic exercises to develop strength, ROM and flexibility for 47 minutes 1:1 with PT including:      Date  01/21/2020 01/16/20 01/14/2020 01/09/2020 12/31/19 12/26/19 12/24/19 12/19/19 12/17/19 12/12/19 12/10/19 12/05/19 12/03/19   VISIT 4/20 3/20 2/20 1/20 10/12 9/12 8/12 7/12 6/12 5/12 4/12 3/12 2/12   FOTO --- -- --- --- -- -- -- -- -- 5/5 4/5 3/5 2/5   POC EXP. DATE 01/26/2020 01/26/20 01/26/2020 01/26/2020 01/26/20 12/26/19 12/26/19 12/26/19 12/26/19 12/26/19 12/26/19 12/26/19 12/26/19   VISIT AMOUNT  MEDICARE TOTAL 90.96  333.52 90.96  242.56 90.96  151.60 60.64  60.64 60.64KX  3951.76 90.96KX  3891.12 90.96KX  3800.16 121.28KX  3709.20 121.28KX  3587.92 121.28 KX  3466.64 121.28KX  3345.36 90.96  295.12 121.28  204.16   FACE-TO-FACE 01/26/2020 01/26/20 01/26/2020 01/26/2020 1/26/20 1/26/20 12/26/19 12/26/19 12/26/19 12/26/19 12/26/19 12/26/19 12/26/19                   TABLE:                HSS - manual  5 x 10" 5 x " "10" 5 x 10" Not today Not today 5 x 10" 5 x 10" 5 x 10" 5 x 10" 5 x 10" ea 5 x 10 "ea 5 x 10" ea 5 x 10" ea   GSS - manual 5 x 10" 5 x 10" 5 x 10" Not today Not today 5 x 10" 5 x 10" 5 x 10" 5 x 10 5 x 10" ea 5 x 10 " ea 5 x 10 ea 5 x 10" ea   Bridge  1 x 10 1 x 10 1 x 25 Not today Not today Not today 1 x 25 1 x 21 3 x 6 4 x 5 3 x 5 3 x 5 1 x 10   LTR - AA 1 x 15 1 x 15 1 x 15 Not today Not today Not today 1 x 15 1 x 15 1 x 15 1 x 15 1 x 15 1 x 10 1 x 10   Marching  Not today Not today Not today  Not today Not today Not today Not today 1 x 15 1 x 10 1 x 10 1 x 10 1 x 10 1 x 5   SAQ AA 3 x 6 3 x 6 3 x 6 Not today Not today 3 x 6 3 x 6 3 x 6 3 x 5 1 x 10 1 x 10 2 x 5 ea 1 x 5   Hip abd/ add - supine AA 2 x 10 2 x 12 2 x 12 Not today Not today 2 x 12 Not today 2 x 10 2 x 10 2 x 10 1 x 15 1 x 15 1 x 10   Quad sets 10 x 2" Not today 10 x 2" Not today Not today 10 x 2" Not today Not today 10 x 2" 10 x 2" 10 x 2" 10 x 2" 5 x 2"                   SEATED:                LAQs - AA Not today Not today 2 x 7 Not today 2 x 7 Not today Not today Not today Not today 2 x 5 2 x 5 2 x 5 1 x 5   Seated Hip Flex  Not today Not today 3 x 5 Not today 3 x 5 Not today Not today Not today Not today 1 x 15 1 x 15 1 x 10 1 x 5   Toe raises Not today Not today Not today Not today 1 x 10 Not today Not today Not today Not today 1 x 10 1 x 10 1 x 10 1 x 5   Sitting unsupported Not today Not today Not today Not today 1 x 30"           Standing                Sit to stand Not today 1 x  Not today 3x 3x  1x 3x w/ Mod a 2x  Not today 2x 2x     Static standing Not today 10 seconds Not today 3 x 1' 3 x 1' unable 3 x 50" 2 x 20 sec Not today 1 x 1 minute 1 x 1 minute                     Initials GWA 1/6 DG GWA 2/6 GWA 1/6 DG DG DG DG DG GWA 1/6 DG GWA 2/6 GWA 1/6       Home Exercises Provided and Patient Education Provided     Education provided:   - again to use head hips relationship to increase independence with transfers.   - patient will be put on " hold after his visit on 1/23/20 until his AFO's arrive    Written Home Exercises Provided: yes.  Exercises were reviewed and Lisa was able to demonstrate them prior to the end of the session.  Lisa demonstrated fair  understanding of the education provided.     See EMR under Patient Instructions for exercises provided 12/3/2019.    Assessment     Lisa continues to require frequent verbal cues to scan his environment when maneuvering his wheelchair in the clinic otherwise he continues to run into objects on his right side. Today he was unable to stand due to complaints of fatigue and required Mod A with transfers along with frequent verbal and tactile cues for hand placement with transfers. When performing his usual mat exercises he required mod A to move through his full available ROM.  Patient was noted with increased difficulty lifting his legs while in his wheel chair to propel himself forward, both at the beginning and and at the end of therapy.     Lisa is progressing slowly towards his goals.   Pt prognosis is Fair.     Pt will continue to benefit from skilled outpatient physical therapy to address the deficits listed in the problem list box on initial evaluation, provide pt/family education and to maximize pt's level of independence in the home and community environment.     Pt's spiritual, cultural and educational needs considered and pt agreeable to plan of care and goals.    Anticipated barriers to physical therapy: in past patient has had transportation issues limiting his ability to attend therapy consistently    Goals:  Long Term Goals: 4 weeks   1. This patient and his caregivers will be independent with a HEP.  IN PROGRESS  2. This patient will increase B LE strength by 1 grade in order to perform sit to stand with Min A using RW for support.  IN PROGRESS  3. This patient will be able to ambulate 50 feet with Min A using RW, B AFOs, and no LOB. IN PROGRESS  4. Patient will be able to  achieve greater than or equal to 40 on the FOTO Paraplegic Syndrome Survey placing patient in 40%<60% impaired, limited, or restricted category demonstrating overall improved functional ability with lower extremity.   IN PROGRESS    Plan     Continue with Plan Of Care and progress toward PT goals. Attempt to increase reps with standing exercises next visit.     John Troy, PTA

## 2020-01-22 RX ORDER — BACLOFEN 20 MG/1
TABLET ORAL
Qty: 360 TABLET | Refills: 0 | Status: SHIPPED | OUTPATIENT
Start: 2020-01-22 | End: 2020-04-21

## 2020-01-23 NOTE — PROGRESS NOTES
"Outpatient Neurological Rehabilitation   Speech and Language Therapy Daily Note  Date:  1/28/2020     Name: Lisa Moreno   MRN: [id number]   Therapy Diagnosis:   Encounter Diagnosis   Name Primary?    Oropharyngeal dysphagia Yes      Physician: Yung Rodriguez MD  Physician Orders: [id number] - Ambulatory referral to Speech Therapy  Medical Diagnosis: R13.12 (ICD-10-CM) - Oropharyngeal dysphagia    Visit #/ Visits Authorized: 6/20 (plus 1)  Date of Evaluation: 12/17/19   Insurance Authorization Period: 12/10/2019-12/09/2020  Plan of Care Expiration Date:  12/17/2019 to 2/17/20   Extended POC:  n/a   Progress Note: due 2/28/20   Visits Cancelled: 0  Visits No Show: 0    Time In: 1:50   Time Out:  2:41   Total Billable Time: 51  min     Precautions: Standard  Subjective:   Pt reports: that he is not too tired today.    He was mildly compliant to home exercise program though he is not completing HEP everyday. Discussed potential ways to increase performance of HEP and decided that setting alarms throughout the day to remind Lisa to complete his dysphagia exercises would be most beneficial. Pt's aid will also leave notes for the other aids to request for them to assist Lisa in completing his exercises. Will provide exercise log to the pt next session.   Response to previous treatment: "I don't know"     Pain Scale:  0/10 on VAS currently.   Pain Location: n/a  Objective:   UNTIMED  Procedure Min.   Dysphagia Therapy 51    Total Timed Units: 0  Total Untimed Units: 1    DATE 12/19/2019 12/24/2019 12/31/2019 1/9/2020 1/14/2020 1/21/2019 1/28/20    VISIT  1/1 1/20 2/20 3/20 4/20 5/20 6/20     POC EXP. DATE 2/17/2020 2/17/2020 2/17/2020 2/17/2020 2/17/2020 2/17/2020 2/17/2020    VISIT AMOUNT  MEDICARETOTAL $89.14 $89.50 $89.50 $89.50 $89.50 $89.50 $89.50    INITIALS       LD 1/28/20    Total dollar amount: $626.14        Short Term Goals: (4 weeks)  Current Progress:    1. Pt will participate in tongue base " "retraction exercises x 50-75 independently to improve base of tongue retraction and swallow function.   Progressing/ Not Met 1/28/2020   Hard effort swallow x 50 (combination of small sips of thin liquids and dry swallows) given mod verbal cues to "swallow hard"   2. Pt will participate in laryngeal elevation exercises x30 independently to improve hyolaryngeal excursion.     Mendelsohn maneuver x  0/7 attempts despite max visual/vebal/tectile cues Goal Not Met / Discontinue as pt experiences extreme difficulty completing this task    3. Pt will participate in chin tuck against resistance (CTAR) hold x1 minute x3 independently to improve hyolaryngeal elevation / excursion.  Progressing/ Not Met 1/28/2020   CTAR hold for 1 minute, 6 times for a total of 6 minutes; required mod cues to perform exercise with increased effort and requiried initial model (previous max)   4. Pt will participate in chin tuck against resistance (CTAR) repetitions x45-60 independently to improve hyolaryngeal elevation / excursion.  Progressing/ Not Met 1/28/2020   CTAR repetitions x 90; required mod cues to perform exercise with increased effort and requiried initial model (previous max)   5. Pt will participate in po trials of thin liquids with no overt signs/symptoms of aspiration  Progressing/ Not Met 1/28/2020   Pt observed to take small sips of water x 17  throughout session; coughing and wet vocal quality noted on 3/17 swallows. Pt noted that he does not cough as much when he performs a hard effortful swallow. Discussed using this technique at home to reduce coughing occurrences.    6. Pt will recall and utilize aspiration precautions and safe swallow strategies independently  Progressing/ Not Met 1/28/2020   Reviewed/discussed the following aspiration precautions and safe swallow strategies:  - eat and drink slowly   - small bites/sips   - remain upright when eating (90 degrees) and remain upright for at least 30 minutes after " eating  - excellent oral care pre and post eating/drinking    Pt able to recall 1/5 aspiration precautions independently; able to recall 3/5 given max cues.    7. Pt will complete oral motor exercises to improve lingual and buccal musculature to improve oral prep phase / oral phase of swallow   Progressing/ Not Met 1/28/2020   Not formally addressed         Patient Education/Response:   Ongoing education provided to pt and pt's aid regarding importance of completion of dysphagia each day at least  2-3 times per day in order to achieve positive results and importance of oral care pre and post eating/drinking as well as throughout the day. Also discussed associated risks of aspiration vs. quality of life, s/s aspiration, aspiration precautions, and safe swallow strategies. Discussed dysphagia therapy process and the need for consistent completion of HEP prior to objective reassessment of swallow function. Pt and pt's caregiver verbalized understanding of all discussed.    Written Home Exercises Provided: Yes, pt's HEP includes: Hard effortful swallow; chin tuck against resistance; oral motor exercises; oral care completion x 3 per day  Exercises were reviewed and Lisa was able to demonstrate them prior to the end of the session.  Lisa demonstrated fair  understanding of the education provided.   See EMR under Patient Instructions for exercises provided prior visit.  Assessment:   Lisa is progressing well towards his goals. Slight increase in performance regarding dysphagia exercises; decrease in cues required today (previous max; today's session mod). Patient able to tolerate increased repetitions of exercise sets this session. Increase in PO trials today with total of 17 po trials (thin liquids) completed, coughing and wet vocal quality observed on 3/17 swallows. Discontinuing short term goal 2 as pt has not had success with this specific exercise despite max cues. Pt consistently demonstrates difficulty  recalling aspiration precautions and safe swallow strategies, requiring max cues to verbalize safe swallow strategies. Current goals remain appropriate. Goals to be updated as necessary. Pt prognosis is Fair. Pt will continue to benefit from skilled outpatient speech and language therapy to address the deficits listed in the problem list on initial evaluation, provide pt/family education and to maximize pt's level of independence in the home and community environment.   Medical necessity is demonstrated by the following IMPAIRMENTS:  Pt's severe dysphagia puts him at risk for aspiration/developing aspiration pneumonia as well as decreased quality of life.    Barriers to Therapy: none  Pt's spiritual, cultural and educational needs considered and pt agreeable to plan of care and goals.  Plan:   Continue POC with focus on improving swallow function. Implement exercise log sheet.     RAMANA Al, CF-SLP  Speech Language Pathologist   1/28/2020

## 2020-01-27 DIAGNOSIS — M62.838 MUSCLE SPASM: Primary | ICD-10-CM

## 2020-01-27 NOTE — TELEPHONE ENCOUNTER
----- Message from Ada Tabares sent at 1/27/2020  9:24 AM CST -----  Type:  RX Refill Request    Who Called: Mom     Refill or New Rx:Refill    RX Name and Strength:baclofen (LIORESAL) 20 MG tablet    How is the patient currently taking it? (ex. 1XDay):1 day     Is this a 30 day or 90 day RX 30 day     Preferred Pharmacy with phone number:[id number]  [place] [address] [address] AT [facility] [address] [phone] (Phone)  [phone] (Fax)        Would the patient rather a call back or a response via [facility]? Call back     Best Call Back Number:[phone]    Additional Information:

## 2020-01-28 ENCOUNTER — CLINICAL SUPPORT (OUTPATIENT)
Dept: REHABILITATION | Facility: HOSPITAL | Age: 40
End: 2020-01-28
Attending: PHYSICAL MEDICINE & REHABILITATION
Payer: MEDICARE

## 2020-01-28 ENCOUNTER — DOCUMENTATION ONLY (OUTPATIENT)
Dept: REHABILITATION | Facility: HOSPITAL | Age: 40
End: 2020-01-28

## 2020-01-28 DIAGNOSIS — R29.898 WEAKNESS OF BOTH LOWER EXTREMITIES: ICD-10-CM

## 2020-01-28 DIAGNOSIS — R26.89 DECREASED FUNCTIONAL MOBILITY: ICD-10-CM

## 2020-01-28 DIAGNOSIS — R26.89 BALANCE PROBLEM: ICD-10-CM

## 2020-01-28 DIAGNOSIS — R13.12 OROPHARYNGEAL DYSPHAGIA: Primary | ICD-10-CM

## 2020-01-28 PROCEDURE — 92526 ORAL FUNCTION THERAPY: CPT | Mod: KX,PO | Performed by: SPEECH-LANGUAGE PATHOLOGIST

## 2020-01-28 PROCEDURE — 97110 THERAPEUTIC EXERCISES: CPT | Mod: PO

## 2020-01-28 NOTE — PROGRESS NOTES
Physical Therapy Daily Treatment Note     Name: Lisa Irvin [facility] Number: [id number]    Therapy Diagnosis:   Encounter Diagnoses   Name Primary?   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   He was compliant with home exercise program.  Response to previous treatment: NA  Functional change: NA    Pain: 0/10  Location:  NA     Objective     Lisa received therapeutic exercises to develop strength, ROM and flexibility, along with reassessment for 50 minutes 1:1 with PT including:      Date  01/28/2020 01/21/2020 01/16/20 01/14/2020 01/09/2020 12/31/19 12/26/19 12/24/19 12/19/19 12/17/19 12/12/19 12/10/19 12/05/19 12/03/19   VISIT 5/20 4/20 3/20 2/20 1/20 10/12 9/12 8/12 7/12 6/12 5/12 4/12 3/12 2/12   FOTO -- --- -- --- --- -- -- -- -- -- 5/5 4/5 3/5 2/5   POC EXP. DATE 01/26/2020 01/26/2020 01/26/20 01/26/2020 01/26/2020 01/26/20 12/26/19 12/26/19 12/26/19 12/26/19 12/26/19 12/26/19 12/26/19 12/26/19   VISIT AMOUNT  MEDICARE TOTAL 90.96  424.48 90.96  333.52 90.96  242.56 90.96  151.60 60.64  60.64 60.64KX  3951.76 90.96KX  3891.12 90.96KX  3800.16 121.28KX  3709.20 121.28KX  3587.92 121.28 KX  3466.64 121.28KX  3345.36 90.96  295.12 121.28  204.16   FACE-TO-FACE -- 01/26/2020 01/26/20 01/26/2020 01/26/2020 1/26/20 1/26/20 12/26/19 12/26/19 12/26/19 12/26/19 12/26/19 12/26/19 12/26/19                   "  TABLE:                 HSS - manual  5 x 10" 5 x 10" 5 x 10" 5 x 10" Not today Not today 5 x 10" 5 x 10" 5 x 10" 5 x 10" 5 x 10" ea 5 x 10 "ea 5 x 10" ea 5 x 10" ea   GSS - manual 5 x 10" 5 x 10" 5 x 10" 5 x 10" Not today Not today 5 x 10" 5 x 10" 5 x 10" 5 x 10 5 x 10" ea 5 x 10 " ea 5 x 10 ea 5 x 10" ea   Bridge  Not today 1 x 10 1 x 10 1 x 25 Not today Not today Not today 1 x 25 1 x 21 3 x 6 4 x 5 3 x 5 3 x 5 1 x 10   LTR - AA 1 x 15 1 x 15 1 x 15 1 x 15 Not today Not today Not today 1 x 15 1 x 15 1 x 15 1 x 15 1 x 15 1 x 10 1 x 10   Marching  Not today Not today Not today Not today  Not today Not today Not today Not today 1 x 15 1 x 10 1 x 10 1 x 10 1 x 10 1 x 5   SAQ AA 3 x 6 3 x 6 3 x 6 3 x 6 Not today Not today 3 x 6 3 x 6 3 x 6 3 x 5 1 x 10 1 x 10 2 x 5 ea 1 x 5   Hip abd/ add - supine AA 2 x 10 2 x 10 2 x 12 2 x 12 Not today Not today 2 x 12 Not today 2 x 10 2 x 10 2 x 10 1 x 15 1 x 15 1 x 10   Quad sets Not tofay 10 x 2" Not today 10 x 2" Not today Not today 10 x 2" Not today Not today 10 x 2" 10 x 2" 10 x 2" 10 x 2" 5 x 2"                    SEATED:                 LAQs - AA Attempted Not today Not today 2 x 7 Not today 2 x 7 Not today Not today Not today Not today 2 x 5 2 x 5 2 x 5 1 x 5   Seated Hip Flex  Attempted Not today Not today 3 x 5 Not today 3 x 5 Not today Not today Not today Not today 1 x 15 1 x 15 1 x 10 1 x 5   Toe raises  Not today Not today Not today Not today 1 x 10 Not today Not today Not today Not today 1 x 10 1 x 10 1 x 10 1 x 5   Sitting unsupported  Not today Not today Not today Not today 1 x 30"           Standing                 Sit to stand  Not today 1 x  Not today 3x 3x  1x 3x w/ Mod a 2x  Not today 2x 2x     Static standing  Not today 10 seconds Not today 3 x 1' 3 x 1' unable 3 x 50" 2 x 20 sec Not today 1 x 1 minute 1 x 1 minute                      Initials  GWA 1/6 DG GWA 2/6 GWA 1/6 DG DG DG DG DG GWA 1/6 DG GWA 2/6 GWA 1/6     Ankle Left Right   Dorsiflexion   "   Plantarflexion WFL WFL   Inversion WFL WFL   Eversion WFL WFL         Strength:  Hip Left Right   Flexion 2+/5 2+/5   Abduction 2+/5 2+/5   Adduction 2/5 2/5   Extension NT NT      Knee Left Right   Extension 2/5 2/5   Flexion 2/5 2/5      Ankle Left Right   Dorsiflexion 1/5 1/5   Plantarflexion 2/5 2/5   Inversion 2/5 2/5   Eversion 2/5 2/5     FOTO Paraplegic Syndromes Survey 84% impaired, limited, or restricted    Home Exercises Provided and Patient Education Provided     Education provided:   - again to use head hips relationship to increase independence with transfers.     Written Home Exercises Provided: yes.  Exercises were reviewed and Lisa was able to demonstrate them prior to the end of the session.  Lisa demonstrated fair  understanding of the education provided.     See EMR under Patient Instructions for exercises provided 12/3/2019.    Assessment     Lisa's strength and functional mobility is unchanged from his initial evaluation after 8 weeks of therapy. He continues to require Mod A to Max A to perform sit to stand transfers but is unable to actively extend both his hips and knees to achieve an upright posture. He has been unable to ambulate in therapy at this time. He is independent with his HEP with assistance from his caregivers. His current score on FOTO Paraplegic Syndromes indicates 84% impaired, limited, or restricted, which is a decline from his intimal evaluation. He has reached his max rehab potential at this time and is ready for discharge to his [facility].     Lisa is progressing slowly towards his goals.   Pt prognosis is Fair.     Pt will continue to benefit from skilled outpatient physical therapy to address the deficits listed in the problem list box on initial evaluation, provide pt/family education and to maximize pt's level of independence in the home and community environment.     Pt's spiritual, cultural and educational needs considered and pt agreeable to plan of care and  goals.    Anticipated barriers to physical therapy: in past patient has had transportation issues limiting his ability to attend therapy consistently    Goals:  Long Term Goals: 4 weeks   1. This patient and his caregivers will be independent with a HEP.  Met  2. This patient will increase B LE strength by 1 grade in order to perform sit to stand with Min A using RW for support.  Partially met  3. This patient will be able to ambulate 50 feet with Min A using RW, B AFOs, and no LOB. Not met  4. Patient will be able to achieve greater than or equal to 40 on the FOTO Paraplegic Syndrome Survey placing patient in 40%<60% impaired, limited, or restricted category demonstrating overall improved functional ability with lower extremity.  Not met    Plan     Discharge to his HEP.    Mary Marshall, PT

## 2020-01-28 NOTE — PROGRESS NOTES
Face to Face PTA Conference performed with oJhn Troy, PTA regarding patient's current status, overall progress, and plan of care    Face to Face PTA Conference performed with Mary Marshall, PT regarding patient's current status, overall progress, and plan of care    John Troy  1/28/2020

## 2020-01-28 NOTE — PLAN OF CARE
Outpatient Therapy Discharge Summary     Name: Lisa Irvin [facility] Number: [id number]    Therapy Diagnosis:   Encounter Diagnoses   Name Primary?   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 This patient and his caregivers will be independent with a HEP.  Met  2. This patient will increase B LE strength by 1 grade in order to perform sit to stand with Min A using RW for support.  Partially met  3. This patient will be able to ambulate 50 feet with Min A using RW, B AFOs, and no LOB. Not met  4. Patient will be able to achieve greater than or equal to 40 on the FOTO Paraplegic Syndrome Survey placing patient in 40%<60% impaired, limited, or restricted category demonstrating overall improved functional ability with lower extremity.  Not met    Discharge reason: Patient has reached the maximum rehab potential for the present time    Plan   This patient is discharged from Physical Therapy

## 2020-01-30 ENCOUNTER — CLINICAL SUPPORT (OUTPATIENT)
Dept: REHABILITATION | Facility: HOSPITAL | Age: 40
End: 2020-01-30
Attending: PHYSICAL MEDICINE & REHABILITATION
Payer: MEDICARE

## 2020-01-30 ENCOUNTER — TELEPHONE (OUTPATIENT)
Dept: REHABILITATION | Facility: HOSPITAL | Age: 40
End: 2020-01-30

## 2020-01-30 DIAGNOSIS — R13.12 OROPHARYNGEAL DYSPHAGIA: Primary | ICD-10-CM

## 2020-01-30 PROCEDURE — 92526 ORAL FUNCTION THERAPY: CPT | Mod: PO | Performed by: SPEECH-LANGUAGE PATHOLOGIST

## 2020-01-30 RX ORDER — DANTROLENE SODIUM 50 MG/1
50 CAPSULE ORAL 3 TIMES DAILY
Qty: 270 CAPSULE | Refills: 0 | Status: SHIPPED | OUTPATIENT
Start: 2020-01-30 | End: 2020-09-11 | Stop reason: SDUPTHER

## 2020-01-30 NOTE — TELEPHONE ENCOUNTER
"Spoke with pt's mother at her request. She reported that she does not see the benefit of speech therapy as Lisa is the "same." SLP discussed the benefits of dysphagia treatment and why individualized treatment plan/exercises are being used to improve his swallow function. Discussed that the pt reports inconsistent completion of HEP which could be why Lisa's swallowing function appears to not be improving. Discussed importance of Detrellcompleting HEP everyday 2-3 times per day in order to see positive results. Also discussed that pt has only been receiving skilled ST services since 12/17/19 and more time is likely needed to see improvement. Pt's mother stated that today will be Lisa's last day of speech therapy.    RAMANA Al, CF-SLP  Speech Language Pathologist   1/30/2020    "

## 2020-01-30 NOTE — PROGRESS NOTES
"Outpatient Neurological Rehabilitation   Speech and Language Therapy Daily Note  Date:  1/30/2020     Name: Lisa Moreno   MRN: [id number]   Therapy Diagnosis:   Encounter Diagnosis   Name Primary?    Oropharyngeal dysphagia Yes      Physician: Yung Rodriguez MD  Physician Orders: [id number] - Ambulatory referral to Speech Therapy  Medical Diagnosis: R13.12 (ICD-10-CM) - Oropharyngeal dysphagia    Visit #/ Visits Authorized: 7/20 (plus 1)  Date of Evaluation: 12/17/19   Insurance Authorization Period: 12/10/2019-12/09/2020  Plan of Care Expiration Date:  12/17/2019 to 2/17/20   Extended POC:  n/a   Progress Note: due 2/28/20   Visits Cancelled: 0  Visits No Show: 0    Time In: 1:40   Time Out:  2:32  Total Billable Time: 52 min     Precautions: Standard  Subjective:   Pt reports: that he would like to continue participating in speech therapy services. Lisa's aid reported that Lisa is becoming more independent with completion of exercises at home though he still requires cues/prompts.   He was not compliant to home exercise program - he states he forgot (see education session below re: completion of HEP)  Response to previous treatment: "ok"  Pain Scale:  0/10 on VAS currently.   Pain Location: n/a  Objective:   UNTIMED  Procedure Min.   Dysphagia Therapy 52   Total Timed Units: 0  Total Untimed Units: 1    DATE 12/19/2019 12/24/2019 12/31/2019 1/9/2020 1/14/2020 1/21/2019 1/28/20 1/30/20   VISIT  1/1 1/20 2/20 3/20 4/20 5/20 6/20  7/20   POC EXP. DATE 2/17/2020 2/17/2020 2/17/2020 2/17/2020 2/17/2020 2/17/2020 2/17/2020 2/17/20   VISIT AMOUNT  MEDICARETOTAL $89.14 $89.50 $89.50 $89.50 $89.50 $89.50 $89.50 $89.50   INITIALS       LD 1/28/20    Total dollar amount: $626.14 LD 1/30/20    Total dollar amount: $715.64       Short Term Goals: (4 weeks)  Current Progress:    1. Pt will participate in tongue base retraction exercises x 50-75 independently to improve base of tongue retraction and swallow " "function.   Progressing/ Not Met 1/30/2020   Hard effort swallow x 55 (combination of small sips of thin liquids and dry swallows) given verbal cues to "swallow hard" each trial    2. Pt will participate in laryngeal elevation exercises x30 independently to improve hyolaryngeal excursion.     Goal Not Met / Discontinue    3. Pt will participate in chin tuck against resistance (CTAR) hold x1 minute x3 independently to improve hyolaryngeal elevation / excursion.  Progressing/ Not Met 1/30/2020   CTAR hold for 1 minute,  6 times for a total of  6 minutes; required min cues to perform exercise with increased effort and requiried initial model   4. Pt will participate in chin tuck against resistance (CTAR) repetitions x45-60 independently to improve hyolaryngeal elevation / excursion.  Progressing/ Not Met 1/30/2020   CTAR repetitions x 90; required min cues to perform exercise with increased effort    5. Pt will participate in po trials of thin liquids with no overt signs/symptoms of aspiration  Progressing/ Not Met 1/30/2020   Pt observed to take small sips of water x 13 throughout session; coughing and wet vocal quality noted on 7/13 (53% of swallows)  Swallows; discontinued po trials of thin liquid 2/2 pt coughing/discomfort   Observed pt taking medication (pills) with water; pt observed to put two pills in his mouth at a time + SLP suggested for pt to take pills 1 at a time as he demonstrated coughing and stated "it didn't go down"   6. Pt will recall and utilize aspiration precautions and safe swallow strategies independently  Progressing/ Not Met 1/30/2020   Reviewed/discussed the following aspiration precautions and safe swallow strategies:  - eat and drink slowly   - small bites/sips   - remain upright when eating (90 degrees) and remain upright for at least 30 minutes after eating  - excellent oral care pre and post eating/drinking    Following review of aspiration precautions and safe swallow strategies, pt " able to recall 0/4 aspiration precautions independently; able to recall 4/4 given binary choice cues.    7. Pt will complete oral motor exercises to improve lingual and buccal musculature to improve oral prep phase / oral phase of swallow   Progressing/ Not Met 1/30/2020   Not formally addressed     NEW GOAL 1/30/20l:  8. Pt/pt's caregiver will record completion of 2-3 sets of dysphagia exercises per day on provided exercise log sheet and bring it to therapy each session   New goal        Patient Education/Response:   Continued, ongoing education provided to Lisa and his aid regarding importance of completion of dysphagia each day at least  2-3 times per day in order to achieve positive results. Also discussed relevance/importance of oral care pre and post eating/drinking as well as throughout the day to prevent development of penumonia. Also discussed associated risks of aspiration vs. quality of life, s/s aspiration, aspiration precautions, and safe swallow strategies. Discussed dysphagia therapy process and the need for consistent completion of HEP prior to objective reassessment of swallow function and introduced dysphagia exercise log sheet for the pt and his caregivers to keep track of completion/incompletion of HEP; discussed that Detrell must log completion of HEP and provide SLP with log each therapy session or Lisa will be discharged. Pt and pt's caregiver verbalized understanding of all discussed. Continued reinforcement will be required.   Also discussed AAC options and explained the process of an AAC evaluation, discussed insurance related to AAC devices and patient stated that he would like to think about if he would like to participate in ACC evaluation before he makes a decision.     Home Exercises Provided: Yes, pt's HEP includes: Hard effortful swallow; chin tuck against resistance; oral motor exercises; oral care completion x 3 per day  Exercises were reviewed and Lisa was able to  demonstrate them prior to the end of the session.  Lisa demonstrated fair  understanding of the education provided.   See EMR under Patient Instructions for exercises provided prior visit.  Assessment:   Lisa is progressing well towards his goals. Decrease in cues required today for correct completion of dysphagia exercises and pt was able to tolerate continued higher repetitions of each exercise. Total of 13 po trials (thin liquids) completed, coughing and wet vocal quality observed on 7/13 swallows.  Current goals remain appropriate. Goals to be updated as necessary. Pt prognosis is Fair. Pt will continue to benefit from skilled outpatient speech and language therapy to address the deficits listed in the problem list on initial evaluation, provide pt/family education and to maximize pt's level of independence in the home and community environment.   Medical necessity is demonstrated by the following IMPAIRMENTS:  Pt's severe dysphagia puts him at risk for aspiration/developing aspiration pneumonia as well as decreased quality of life.    Barriers to Therapy: none  Pt's spiritual, cultural and educational needs considered and pt agreeable to plan of care and goals.  Plan:   Continue POC with focus on improving swallow function.     RAMANA Al, CF-SLP  Speech Language Pathologist   1/30/2020

## 2020-02-04 ENCOUNTER — CLINICAL SUPPORT (OUTPATIENT)
Dept: REHABILITATION | Facility: HOSPITAL | Age: 40
End: 2020-02-04
Attending: PHYSICAL MEDICINE & REHABILITATION
Payer: MEDICARE

## 2020-02-04 DIAGNOSIS — R13.12 OROPHARYNGEAL DYSPHAGIA: Primary | ICD-10-CM

## 2020-02-04 PROCEDURE — 92526 ORAL FUNCTION THERAPY: CPT | Mod: KX,PO | Performed by: SPEECH-LANGUAGE PATHOLOGIST

## 2020-02-04 NOTE — PROGRESS NOTES
"Outpatient Neurological Rehabilitation   Speech and Language Therapy Daily Note  Date:  2/4/2020     Name: Lisa Moreno   MRN: [id number]   Therapy Diagnosis:   Encounter Diagnosis   Name Primary?    Oropharyngeal dysphagia Yes      Physician: uYng Rodriguez MD  Physician Orders: [id number] - Ambulatory referral to Speech Therapy  Medical Diagnosis: R13.12 (ICD-10-CM) - Oropharyngeal dysphagia    Visit #/ Visits Authorized: 8/20 (plus 1)  Date of Evaluation: 12/17/19   Insurance Authorization Period: 12/10/2019-12/09/2020  Plan of Care Expiration Date:  12/17/2019 to 2/17/20   Extended POC:  n/a   Progress Note: due 2/28/20   Visits Cancelled: 0  Visits No Show: 0    Time In: 1:10  Time Out:  2:00  Total Billable Time: 50 min     Precautions: Standard  Subjective:   Pt reports: that he is alright but his aid reports that Lisa was falling asleep in the car on the way to therapy.   He was compliant to home exercise program on 3/5 of the past days. Discussed leaving a note for alternate aids to be aware of swallowing exercises.   Response to previous treatment: "ok"  Pain Scale:  0/10 on VAS currently.   Pain Location: n/a  Objective:   UNTIMED  Procedure Min.   Dysphagia Therapy 50    Total Timed Units: 0  Total Untimed Units: 1    DATE 12/19/2019 12/24/2019 12/31/2019 1/9/2020 1/14/2020 1/21/2019 1/28/20 1/30/20 2/4/2020    VISIT  1/1 1/20 2/20 3/20 4/20 5/20 6/20  7/20 8/20   POC EXP. DATE 2/17/2020 2/17/2020 2/17/2020 2/17/2020 2/17/2020 2/17/2020 2/17/2020 2/17/20 2/17/20   VISIT AMOUNT  MEDICARETOTAL $89.14 $89.50 $89.50 $89.50 $89.50 $89.50 $89.50 $89.50 $89.50   INITIALS       LD 1/28/20    Total dollar amount: $626.14 LD 1/30/20      Total dollar amount: $715.64 LD 2/4/2020       Total dollar amount:  $805.14       Short Term Goals: (4 weeks)  Current Progress:    1. Pt will participate in tongue base retraction exercises x 50-75 independently to improve base of tongue retraction and swallow " "function.   Progressing/ Not Met 2/4/2020   Hard effort swallow x 55 (combination of small sips of thin liquids and dry swallows) given verbal cues to "swallow hard" each trial    2. Pt will participate in laryngeal elevation exercises x30 independently to improve hyolaryngeal excursion.     Goal Not Met / Discontinue    3. Pt will participate in chin tuck against resistance (CTAR) hold x1 minute x3 independently to improve hyolaryngeal elevation / excursion.  Progressing/ Not Met 2/4/2020   CTAR hold for  1 minute,  6 times for a total of  6 minutes; required min cues (verbal cue of "hold it tight") to perform exercise with increased effort    4. Pt will participate in chin tuck against resistance (CTAR) repetitions x45-60 independently to improve hyolaryngeal elevation / excursion.  Progressing/ Not Met 2/4/2020   CTAR repetitions x 90; pt required verbal cue x1 each trials to perform exercise with increased effort (cue = "squeeze tight"); otherwise, pt not applying pressure/force to ball    5. Pt will participate in po trials of thin liquids with no overt signs/symptoms of aspiration  Progressing/ Not Met 2/4/2020   Pt observed to take small sips of water x 20 throughout session; coughing and wet vocal quality noted on 11/20 (55%) swallows    6. Pt will recall and utilize aspiration precautions and safe swallow strategies independently  Progressing/ Not Met 2/4/2020   Reviewed/discussed the following aspiration precautions and safe swallow strategies:  - eat and drink slowly   - small bites/sips   - remain upright when eating (90 degrees) and remain upright for at least 30 minutes after eating  - excellent oral care pre and post eating/drinking    Following review of aspiration precautions and safe swallow strategies, pt able to recall 0/4 aspiration precautions independently; able to recall 4/4 given multiple choice cues.    7. Pt will complete oral motor exercises to improve lingual and buccal musculature to " improve oral prep phase / oral phase of swallow   Progressing/ Not Met 2/4/2020   Not formally addressed     8. Pt/pt's caregiver will record completion of 2-3 sets of dysphagia exercises per day on provided exercise log sheet and bring it to therapy each session    Progressing/ Not Met 2/4/2020   Pt's aid provided therapy log; pt completed HEP on Saturday (2/1/2020), Sunday (2/2/2020), and today (2/4/2020) but not Friday (1/31/20) or yesterday (2/3/20)        Patient Education/Response:   Continued, ongoing education provided regarding importance of completion of prescribed dysphagia exercises each day at least  2-3 times per day in order to achieve positive results. Also discussed relevance/importance of oral care pre and post eating/drinking as well as throughout the day to prevent development of penumonia. Also discussed associated risks of aspiration vs. quality of life, s/s aspiration, aspiration precautions, and safe swallow strategies. Pt and pt's caregiver verbalized understanding of all discussed. Continued reinforcement will be required. Ongoing discussion regarding AAC options and the process of an AAC evaluation. Showed Lisa an example of an SGD. Pt verbalized that he is interested in an SGD, will continue to discuss this with the patient and determine if he would like to participate in full AAC evaluation.      Home Exercises Provided: Yes, pt's HEP includes: Hard effortful swallow; chin tuck against resistance; oral motor exercises; oral care completion x 3 per day. Updated exercise sheet provided to pt today to give to alternate aids to promote completion of HEP.  Exercises were reviewed and Lisa was able to demonstrate them prior to the end of the session.  Lisa demonstrated fair  understanding of the education provided.   See EMR under Patient Instructions for exercises provided prior visit.  Assessment:   Lisa is progressing well towards his goals Pt was able to tolerate continued  higher repetitions of each exercise and did not require additional breaks between sets though he continues to require direct cues to complete exercises appropriately. Total of 20 po trials (thin liquids) completed, coughing and wet vocal quality observed on 11/20  swallows. Implementation of exercise log sheet appears to have increased the number of times his HEP was completed. Current goals remain appropriate. Goals to be updated as necessary. Pt prognosis is Fair. Pt will continue to benefit from skilled outpatient speech and language therapy to address the deficits listed in the problem list on initial evaluation, provide pt/family education and to maximize pt's level of independence in the home and community environment.   Medical necessity is demonstrated by the following IMPAIRMENTS:  Pt's severe dysphagia puts him at risk for aspiration/developing aspiration pneumonia as well as decreased quality of life.    Barriers to Therapy: none  Pt's spiritual, cultural and educational needs considered and pt agreeable to plan of care and goals.  Plan:   Continue POC with focus on improving swallow function.     RAMANA Al, CF-SLP  Speech Language Pathologist   2/4/2020

## 2020-02-12 RX ORDER — LINACLOTIDE 145 UG/1
CAPSULE, GELATIN COATED ORAL
Qty: 30 CAPSULE | Refills: 11 | Status: SHIPPED | OUTPATIENT
Start: 2020-02-12 | End: 2020-07-16

## 2020-02-13 ENCOUNTER — CLINICAL SUPPORT (OUTPATIENT)
Dept: REHABILITATION | Facility: HOSPITAL | Age: 40
End: 2020-02-13
Attending: PHYSICAL MEDICINE & REHABILITATION
Payer: MEDICARE

## 2020-02-13 DIAGNOSIS — R13.12 OROPHARYNGEAL DYSPHAGIA: Primary | ICD-10-CM

## 2020-02-13 DIAGNOSIS — G80.8 OTHER CEREBRAL PALSY: ICD-10-CM

## 2020-02-13 DIAGNOSIS — G11.8 SPINOCEREBELLAR ATAXIA: ICD-10-CM

## 2020-02-13 DIAGNOSIS — G80.1 SPASTIC DIPLEGIA: ICD-10-CM

## 2020-02-13 PROCEDURE — 92526 ORAL FUNCTION THERAPY: CPT | Mod: PO | Performed by: SPEECH-LANGUAGE PATHOLOGIST

## 2020-02-13 NOTE — PROGRESS NOTES
"Outpatient Neurological Rehabilitation   Speech and Language Therapy Daily Note  Date:  2/13/2020     Name: Lisa Moreno   MRN: [id number]   Therapy Diagnosis:   Encounter Diagnosis   Name Primary?    Oropharyngeal dysphagia Yes      Physician: Yung Rodriguez MD  Physician Orders: [id number] - Ambulatory referral to Speech Therapy  Medical Diagnosis: R13.12 (ICD-10-CM) - Oropharyngeal dysphagia    Visit #/ Visits Authorized: 9/20 (plus 1)  Date of Evaluation: 12/17/19   Insurance Authorization Period: 12/10/2019-12/09/2020  Plan of Care Expiration Date:  12/17/2019 to 2/17/20   Extended POC:  n/a   Progress Note: due 2/28/20   Visits Cancelled: 2  Visits No Show: 0    Time In: 1:17 (pt 17 minutes late to tx)   Time Out:  2:00   Total Billable Time: 43  min     Precautions: Standard  Subjective:   Pt reports: "Do I have Anne today?"  Pt's aid present throughout treatment session.   He was not compliant to home exercise program. He states he does not know why he didn't do the exercises.    Response to previous treatment: n/a  Pain Scale:  0/10 on VAS currently.   Pain Location: n/a  Objective:   UNTIMED  Procedure Min.   Dysphagia Therapy 43    Total Timed Units: 0  Total Untimed Units: 1    DATE 12/19/2019 12/24/2019 12/31/2019 1/9/2020 1/14/2020 1/21/2019 1/28/20 1/30/20 2/4/2020 2/13/20    VISIT  1/1 1/20 2/20 3/20 4/20 5/20 6/20  7/20 8/20 9/20    POC EXP. DATE 2/17/2020 2/17/2020 2/17/2020 2/17/2020 2/17/2020 2/17/2020 2/17/2020 2/17/20 2/17/20 2/17/20    VISIT AMOUNT  MEDICARETOTAL $89.14 $89.50 $89.50 $89.50 $89.50 $89.50 $89.50 $89.50 $89.50 $89.50    INITIALS       LD 1/28/20    Total dollar amount: $626.14 LD 1/30/20      Total dollar amount: $715.64 LD   2/4/20     Total dollar amount:  $805.14  2/13/2020       Total dollar amount:  $894.64        Short Term Goals: (4 weeks)  Current Progress:    1. Pt will participate in tongue base retraction exercises x 50-75 independently to improve base of " "tongue retraction and swallow function.   Progressing/ Not Met 2/13/2020   Hard effort swallow x 40 (combination of small sips of thin liquids and dry swallows) given verbal cues to "swallow hard" each trial    2. Pt will participate in laryngeal elevation exercises x30 independently to improve hyolaryngeal excursion.     Goal Not Met / Discontinue    3. Pt will participate in chin tuck against resistance (CTAR) hold x1 minute x3 independently to improve hyolaryngeal elevation / excursion.  Progressing/ Not Met 2/13/2020   CTAR hold for  1 minute, 4 times for a total of  4 minutes; required min cues (verbal cue of "tight") to perform exercise with effort    4. Pt will participate in chin tuck against resistance (CTAR) repetitions x45-60 independently to improve hyolaryngeal elevation / excursion.  Progressing/ Not Met 2/13/2020   CTAR repetitions x 60; pt required verbal cue x1 each trials to perform exercise with increased effort (cue of "tight"); otherwise, pt not applying pressure/ to ball    5. Pt will participate in po trials of thin liquids with no overt signs/symptoms of aspiration  Progressing/ Not Met 2/13/2020   Pt observed to take small sips of water x 14 throughout session; coughing and wet vocal quality noted on 9/14 (64%) swallows    6. Pt will recall and utilize aspiration precautions and safe swallow strategies independently  Progressing/ Not Met 2/13/2020   Reviewed/discussed the following aspiration precautions and safe swallow strategies:  - eat and drink slowly   - small bites/sips   - remain upright when eating (90 degrees) and remain upright for at least 30 minutes after eating  - excellent oral care pre and post eating/drinking    Immediately following review of aspiration precautions/safe swallow strategies, pt able to recall 2/4 aspiration precautions independently; able to recall 4/4 given  cues.    7. Pt will complete oral motor exercises to improve lingual and buccal musculature to " improve oral prep phase / oral phase of swallow   Progressing/ Not Met 2/13/2020   Lingual strengthening exercises x30  Buccal strengthening exercises x20   8. Pt/pt's caregiver will record completion of 2-3 sets of dysphagia exercises per day on provided exercise log sheet and bring it to therapy each session    Progressing/ Not Met 2/13/2020   Pt's aid reports they lost the paper. Provided pt with new log sheet       Patient Education/Response:   Education provided regarding rationale/prupose of exercise physiology and related it to importance of completion dysphagia exercises each day at least  2-3 times per day to see a functional change to his swallow mechanism. Educated pt and pt's aid regarding importance of excellent oral care pre and post eating/drinking as well as throughout the day to prevent development of penumonia. Discussed associated risks of aspiration, quality of life, s/s aspiration, aspiration precautions, and safe swallow strategies. Also discussed recommendation of repeat MBSS in order to determine if therapy is improving swallow function. Pt and pt's aid verbalized understanding to everything discussed today but continued reinforcement will be required. Ongoing discussion regarding AAC completed. Showed [place] and aid examples of SGD use. Pt verbalized that he is interested in an SGD and decided that he would like to participate in full AAC evaluation.  This therapist will facilitate the scheduling of the AAC evaluation.     Home Exercises Provided: Yes, pt's HEP includes: Hard effortful swallow; chin tuck against resistance; oral motor exercises; oral care completion x 3 per day. Updated exercise sheet provided to pt today to give to alternate aids to promote completion of HEP.  Exercises were reviewed and Lisa was able to demonstrate them prior to the end of the session.  Lisa demonstrated fair  understanding of the education provided.   See EMR under Patient Instructions for  exercises provided prior visit.  Assessment:   Lisa is progressing well towards his goals Total of 14 po trials (thin liquids) completed, coughing and wet vocal quality observed on 9/14  swallows. Pt has not been actively completing dysphagia exercises at home per pt report. Pt continued to require min-mod levels of cueing to complete dysphagia exercises appropriately. Recommend repeat MBSS to determine if dysphagia exercises are improving pt's swallow function. Current goals remain appropriate. Goals to be updated as necessary. Pt prognosis is Fair. Pt will continue to benefit from skilled outpatient speech and language therapy to address the deficits listed in the problem list on initial evaluation, provide pt/family education and to maximize pt's level of independence in the home and community environment.   Medical necessity is demonstrated by the following IMPAIRMENTS:  Pt's severe dysphagia puts him at risk for aspiration/developing aspiration pneumonia as well as decreased quality of life.    Barriers to Therapy: none  Pt's spiritual, cultural and educational needs considered and pt agreeable to plan of care and goals.  Plan:   Continue POC with focus on improving swallow function. Plan to repeat MBSS as pt has been attending speech therapy since 12/17/19.    RAMANA Al, CF-SLP  Speech Language Pathologist   2/13/2020

## 2020-02-18 ENCOUNTER — CLINICAL SUPPORT (OUTPATIENT)
Dept: REHABILITATION | Facility: HOSPITAL | Age: 40
End: 2020-02-18
Attending: PHYSICAL MEDICINE & REHABILITATION
Payer: MEDICARE

## 2020-02-18 DIAGNOSIS — R13.12 OROPHARYNGEAL DYSPHAGIA: Primary | ICD-10-CM

## 2020-02-18 PROCEDURE — 92526 ORAL FUNCTION THERAPY: CPT | Mod: KX,PO | Performed by: SPEECH-LANGUAGE PATHOLOGIST

## 2020-02-18 NOTE — PLAN OF CARE
Outpatient Neurological Rehabilitation Therapy  Updated POC     Date: 2/18/2020   Name: Lisa Moreno  Clinic Number: [id number]    Therapy Diagnosis:   Encounter Diagnosis   Name Primary?    Oropharyngeal dysphagia Yes     Physician: Yung Rodriguez MD    Physician Orders: [id number] - Ambulatory referral to Speech Therapy  Medical Diagnosis: R13.12 (ICD-10-CM) - Oropharyngeal dysphagia    Visit #/ Visits Authorized:  10/20 (plus eval)  Evaluation Date: 12/17/19  Insurance Authorization Period: 12/10/2019-12/09/2020  Plan of Care Expiration:   2/1720  New POC Certification Period:  4/17/20  Cancelled Visits: 2  No Show Visits: 0    Total Visits Received: 11    Precautions:Standard     Subjective     Update: Pt continues with overt s/s aspiration during PO trials of thin liquids c/b immediate defensive airway cough, occasional delayed cough, wet vocal quality, inability to phonate following swallowing. Pt minimally compliant to HEP per self report and pt's aid reporting's. Pt has expressed interest that he would like to participate in an AAC evaluation for a SGD.       Objective     Update: see follow up note dated 2/18/2020    Assessment   Update: Lisa Moreno presents to [facility] s/p medical diagnosis of oropharyngeal dysphagia. Demonstrates impairments including limitations as described in the problem list. Positive prognostic factors include family support. Negative prognostic factors include pt's lack of motivation to complete HEP and complex medical history. He continues to presents with oropharyngeal dysphagia c/b overt s/s aspiration (coughing/choking with foods/liquids, wet vocal quality). Recommend repeat MBSS to determine if swallow function has improved.  No barriers to therapy identified. Patient will benefit from skilled, outpatient neurological rehabilitation speech therapy.    [place] NOMS (National Outcome Measure System):  NOMS Swallowing  LEVEL 5:  Swallowing is safe with minimal diet restriction and/or occasionally requires minimal cueing to use compensatory strategies. The individual may occasionally self-cue. All nutrition and hydration needs are met by mouth at mealtime     Rehab Potential: fair     Education: Plan of Care, role of SLP in care, aspiration precautions , course of medical disease affect on therapy diagnosis , scheduling/ cancellation policy and insurance limitations / visit limit  was discussed with the pt and aid. They verbalized understanding of all discussed.       Previous Short Term Goals: (4 weeks)  Current Progress:    1. Pt will participate in tongue base retraction exercises x 50-75 independently to improve base of tongue retraction and swallow function.     Goal Not Met / Continue     2. Pt will participate in laryngeal elevation exercises x30 independently to improve hyolaryngeal excursion.     Goal Not Met / Discontinue    3. Pt will participate in chin tuck against resistance (CTAR) hold x1 minute x3 independently to improve hyolaryngeal elevation / excursion.   Goal Not Met / Continue   4. Pt will participate in chin tuck against resistance (CTAR) repetitions x45-60 independently to improve hyolaryngeal elevation / excursion.   Goal Not Met / Continue   5. Pt will participate in po trials of thin liquids with no overt signs/symptoms of aspiration   Goal Not Met / Continue   6. Pt will recall and utilize aspiration precautions and safe swallow strategies independently   Goal Not Met / Continue   7. Pt will complete oral motor exercises to improve lingual and buccal musculature to improve oral prep phase / oral phase of swallow    Goal Not Met / Continue   8. Pt/pt's caregiver will record completion of 2-3 sets of dysphagia exercises per day on provided exercise log sheet and bring it to therapy each session  Goal Not Met / Continue       New Previous Short Term Goals: (4 weeks)  Current Progress:    1. Pt will participate in  tongue base retraction exercises x 50-75 independently to improve base of tongue retraction and swallow function.     Goal Not Met / Continue     2. Pt will participate in chin tuck against resistance (CTAR) hold x1 minute x3 independently to improve hyolaryngeal elevation / excursion.   Goal Not Met / Continue   3. Pt will participate in chin tuck against resistance (CTAR) repetitions x45-60 independently to improve hyolaryngeal elevation / excursion.   Goal Not Met / Continue   4. Pt will participate in po trials of thin liquids with no overt signs/symptoms of aspiration   Goal Not Met / Continue   5. Pt will recall and utilize aspiration precautions and safe swallow strategies independently   Goal Not Met / Continue   6. Pt will complete oral motor exercises to improve lingual and buccal musculature to improve oral prep phase / oral phase of swallow    Goal Not Met / Continue   7. Pt/pt's caregiver will record completion of 2-3 sets of dysphagia exercises per day on provided exercise log sheet and bring it to therapy each session  Goal Not Met / Continue   8. Pt will participate in repeat Modified Barium Swallow Study to objectively assess his  swallow, rule out silent aspiration, and determine the safest possible diet NEW GOAL    9. Pt will participate in formal AAC evaluation to determine if the pt would benefit from use of a SGD             Long Term Goal Status:  8 weeks  1. He will maintain adequate hydration/nutrition with optimum safety and efficiency of swallowing function on PO intake without overt signs and symptoms of aspiration for thin liquids and pureed diet level. Goal Not Met / Continue  2. He will utilize compensatory strategies with optimum safety and efficiency of swallowing function on PO intake without overt signs and symptoms of aspiration for thin liquids and pureed diet level. Goal Not Met / Continue  3. Pt will utilize compensatory strategies and augmentative-alternative communication  (AAC) to express wants and needs for effective communication in the functional living environment and during medical emergencies.       Goals Previously Met:  - No short term goals have been met thus far      Reasons for Recertification of Therapy: Pt would benefit from continued ST services to address residual dysphagia and determine appropriate diet via continued swallow assessments and completion of MBSS. Additionally, pt would benefit from an AAC evaluation for potential SGD use.     Plan     Updated Certification Period: 2/18/2020 to 4/18/20    Recommended Treatment Plan: Patient will participate in the [facility] rehabilitation program for speech therapy 2 times per week to address his  Swallow deficits, to educate patient and their family, and to participate in a home exercise program.     Other recommendations: participate in Modified Barium Swallow Study      Therapist's Name:  RAMANA Al, CF-SLP  Speech Language Pathologist   2/18/2020       I CERTIFY THE NEED FOR THESE SERVICES FURNISHED UNDER THIS PLAN OF TREATMENT AND WHILE UNDER MY CARE      Physician Name: _______________________________    Physician Signature: ____________________________

## 2020-02-18 NOTE — PROGRESS NOTES
"Outpatient Neurological Rehabilitation   Speech and Language Therapy Daily Note  Date:  2/18/2020     Name: Lisa Moreno   MRN: [id number]   Therapy Diagnosis:   Encounter Diagnosis   Name Primary?    Oropharyngeal dysphagia Yes      Physician: Yung Rodriguez MD  Physician Orders: [id number] - Ambulatory referral to Speech Therapy  Medical Diagnosis: R13.12 (ICD-10-CM) - Oropharyngeal dysphagia    Visit #/ Visits Authorized: 10/20 (plus 1)  Date of Evaluation: 12/17/19   Insurance Authorization Period: 12/10/2019-12/09/2020  Plan of Care Expiration Date:  12/17/2019 to 2/17/20 (updated POC today; new expiration 4/18/20)   Extended POC:  n/a   Progress Note: due 2/28/20    Visits Cancelled: 2  Visits No Show: 0    Time In: 1:40  (pt arrived 40 minutes late however SLP able to accommodate pt)  Time Out: 2:28  Total Billable Time: 48 min     Precautions: Standard  Subjective:   Pt reports: "I didn't know I had speech today" despite pt only receiving ST services since PT d/c 1/28/20. Pt's aid remained in waiting room throughout treatment session.   He was  compliant to home exercise program per pt report.   Response to previous treatment: n/a  Pain Scale:  0/10 on VAS currently.   Pain Location: n/a  Objective:   UNTIMED  Procedure Min.   Dysphagia Therapy 48    Total Timed Units: 0  Total Untimed Units: 1    DATE 12/19/2019 12/24/2019 12/31/2019 1/9/2020 1/14/2020 1/21/2019 1/28/20 1/30/20 2/4/2020 2/13/20 2/18/20   VISIT  1/1 1/20 2/20 3/20 4/20 5/20 6/20  7/20 8/20 9/20 10/20   POC EXP. DATE 2/17/2020 2/17/2020 2/17/2020 2/17/2020 2/17/2020 2/17/2020 2/17/2020 2/17/20 2/17/20 2/17/20 2/17/20 (updated POC today)   VISIT AMOUNT  MEDICARETOTAL $89.14 $89.50 $89.50 $89.50 $89.50 $89.50 $89.50 $89.50 $89.50 $89.50 $89.50   INITIALS       LD 1/28/20    Total dollar amount: $626.14 LD 1/30/20      Total dollar amount: $715.64 LD   2/4/20     Total dollar amount:  $805.14 LD 2/13/2020       Total dollar " "amount:  $894.64  2/18/20       Total dollar amount:  $ 984.14        Short Term Goals: (4 weeks)  Current Progress:    1. Pt will participate in tongue base retraction exercises x 50-75 independently to improve base of tongue retraction and swallow function.   Progressing/ Not Met 2/18/2020   Hard effort swallow x 70 (combination of small sips of thin liquids and dry swallows) given verbal cues to "swallow hard" each trial        2. Pt will participate in laryngeal elevation exercises x30 independently to improve hyolaryngeal excursion.     Goal Not Met / Discontinue    3. Pt will participate in chin tuck against resistance (CTAR) hold x1 minute x3 independently to improve hyolaryngeal elevation / excursion.  Progressing/ Not Met 2/18/2020   CTAR hold for  1 minute, 6 times for a total of 6 minutes; required min cues (verbal cue of "tight") to perform exercise with effort    4. Pt will participate in chin tuck against resistance (CTAR) repetitions x45-60 independently to improve hyolaryngeal elevation / excursion.  Progressing/ Not Met 2/18/2020   CTAR repetitions x 90; pt required verbal cue x1 each trials to perform exercise with increased effort (cue of "tight"); otherwise, pt not applying pressure/force to ball    5. Pt will participate in po trials of thin liquids with no overt signs/symptoms of aspiration  Progressing/ Not Met 2/18/2020   Pt observed to take small sips of water x 21 throughout session; coughing and wet vocal quality noted on 9/21 (43%) swallows    6. Pt will recall and utilize aspiration precautions and safe swallow strategies independently  Progressing/ Not Met 2/18/2020   Reviewed/discussed the following aspiration precautions and safe swallow strategies:  - eat and drink slowly   - small bites/sips   - remain upright when eating (90 degrees) and remain upright for at least 30 minutes after eating  - excellent oral care pre and post eating/drinking    Immediately following review of " aspiration precautions/safe swallow strategies, pt able to recall 0/4 aspiration precautions independently; able to recall 4/4 given min cues.    7. Pt will complete oral motor exercises to improve lingual and buccal musculature to improve oral prep phase / oral phase of swallow   Progressing/ Not Met 2/18/2020   Lingual strengthening exercises x45  Buccal strengthening exercises x30   8. Pt/pt's caregiver will record completion of 2-3 sets of dysphagia exercises per day on provided exercise log sheet and bring it to therapy each session    Progressing/ Not Met 2/18/2020   Did not bring data log sheet.     Pt rated his fatigue as a 0 at various points throughout session (rated as a 0/10 5 times)    Patient Education/Response:   Discussed importance of adherence to HEP each day at least  2-3 times per day to see a functional change to his swallow mechanism. Educated pt and pt's aid regarding importance of excellent oral care pre and post eating/drinking as well as throughout the day to prevent development of penumonia. Discussed associated risks of aspiration, quality of life, s/s aspiration, aspiration precautions, and safe swallow strategies. Also scheduling MBSS procedure at [place] and informed pt's aid that central scheduling will be calling them soon to schedule the procedure. Pt verbalized understanding to everything discussed today but continued reinforcement will be required. Ongoing discussion regarding AAC completed and let pt know that SLP is facilitating the scheduling of this evaluation.     Home Exercises Provided: Yes, pt's HEP includes: Hard effortful swallow; chin tuck against resistance; oral motor exercises; oral care completion x 3 per day. Updated exercise sheet provided to pt today to give to alternate aids to promote completion of HEP.  Exercises were reviewed and Lisa was able to demonstrate them prior to the end of the session.  Lisa demonstrated fair  understanding of the education  provided.   See EMR under Patient Instructions for exercises provided prior visit.  Assessment:   Lisa is progressing well towards his goals Total of 21 po trials (thin liquids) completed, coughing and wet vocal quality observed on 9/21 (42%) swallows.Pt required min levels of cueing to complete dysphagia exercises appropriately this session. Pt tolerated increased number of hard effortful swallows completed today. Current goals remain appropriate. Goals to be updated as necessary. Pt prognosis is Fair. Pt will continue to benefit from skilled outpatient speech and language therapy to address the deficits listed in the problem list on initial evaluation, provide pt/family education and to maximize pt's level of independence in the home and community environment.   Medical necessity is demonstrated by the following IMPAIRMENTS:  Pt's severe dysphagia puts him at risk for aspiration/developing aspiration pneumonia as well as decreased quality of life.    Barriers to Therapy: none  Pt's spiritual, cultural and educational needs considered and pt agreeable to plan of care and goals.  Plan:   Updated POC today. Continue with focus on improving swallow function. Plan to repeat MBSS- order has been placed in EMR, SLP notified [place] scheduling team 2/18/20    RAMANA Al, CF-SLP  Speech Language Pathologist   2/18/2020

## 2020-02-27 ENCOUNTER — OFFICE VISIT (OUTPATIENT)
Dept: PHYSICAL MEDICINE AND REHAB | Facility: CLINIC | Age: 40
End: 2020-02-27
Payer: MEDICARE

## 2020-02-27 ENCOUNTER — TELEPHONE (OUTPATIENT)
Dept: PHYSICAL MEDICINE AND REHAB | Facility: CLINIC | Age: 40
End: 2020-02-27

## 2020-02-27 DIAGNOSIS — G80.1 SPASTIC DIPLEGIA: Primary | ICD-10-CM

## 2020-02-27 DIAGNOSIS — R13.12 OROPHARYNGEAL DYSPHAGIA: ICD-10-CM

## 2020-02-27 DIAGNOSIS — R25.2 SPASTICITY: Primary | ICD-10-CM

## 2020-02-27 PROCEDURE — 99499 UNLISTED E&M SERVICE: CPT | Mod: S$PBB,,, | Performed by: PHYSICAL MEDICINE & REHABILITATION

## 2020-02-27 PROCEDURE — 99212 OFFICE O/P EST SF 10 MIN: CPT | Mod: PBBFAC | Performed by: PHYSICAL MEDICINE & REHABILITATION

## 2020-02-27 PROCEDURE — 99999 PR PBB SHADOW E&M-EST. PATIENT-LVL II: ICD-10-PCS | Mod: PBBFAC,,, | Performed by: PHYSICAL MEDICINE & REHABILITATION

## 2020-02-27 PROCEDURE — 99999 PR PBB SHADOW E&M-EST. PATIENT-LVL II: CPT | Mod: PBBFAC,,, | Performed by: PHYSICAL MEDICINE & REHABILITATION

## 2020-02-27 PROCEDURE — 99499 NO LOS: ICD-10-PCS | Mod: S$PBB,,, | Performed by: PHYSICAL MEDICINE & REHABILITATION

## 2020-02-27 NOTE — PATIENT INSTRUCTIONS
You received injections for botox clinic. You are recommended to follow-up in 3 months for evaluation and treatment with botox injections. You are not recommended to have botox treatments during the next three months to allow full effectiveness of the medication. In most instances, botox treatments are not fully effective alone. You are recommended to continue stretching exercises and daily use of your braces, if prescribed, to get the maximum effect of your botox treatment. If you have muscles aches or pains in the next 3-4 days after procedure, you are recommended to take a tylenol or motrin to aleve the pain.

## 2020-02-29 NOTE — PROGRESS NOTES
BOTOX CLINIC ENCOUNTER  PM&R CLINIC  PROCEDURE    Chief Complaint   Patient presents with    Neurologic Problem     Yung Rodriguez MD  DATE OF ENCOUNTER:02/28/2020    History of Present Illness    Etiology:   Other  Impairment: Bilateral paraplegia/paraparesis    HPI: Lisa Moreno is a 39 y.o. male with PMHx of spinocerebellar atrophy with LE weakness, spasticity, and ataxic gait    He presents for the botox treatment. No problems with his wheelchair. Still has not received AFOs. Pending evaluation. PT/OT on hold. Still received benefits from the injections.       Medications: Baclofen and Dantrolene    Current therapy: outpatient speech, outpatient PT and outpatient OT      Interval History/Previous Treatment:    Goals: Reduce pain, improve ability to transfer and walk      Review of Systems   All other systems reviewed and are negative.      Physical Exam   Constitutional: He is oriented to person, place, and time and well-developed, well-nourished, and in no distress. No distress.   HENT:   Head: Normocephalic and atraumatic.   Right Ear: External ear normal.   Eyes: Pupils are equal, round, and reactive to light. EOM are normal. No scleral icterus.   Neck: Normal range of motion. Neck supple.   Cardiovascular: Normal rate, regular rhythm, normal heart sounds and intact distal pulses.   Pulmonary/Chest: Breath sounds normal. No respiratory distress. He has no wheezes. He has no rales.   Abdominal: Soft. Bowel sounds are normal. He exhibits no distension. There is no tenderness.   Musculoskeletal: Normal range of motion.   Neurological: He is alert and oriented to person, place, and time. He displays weakness and abnormal speech. He exhibits abnormal muscle tone. Gait abnormal.   Skin: He is not diaphoretic.   Nursing note and vitals reviewed.        LE Spasticity Exam:  Hip extensors: 2  Hip adductors: 1  Knee flexors: 1  Ankle plantar flexors: 1  Ankle invertors: 1  Toe flexors:  1          Lisa was seen today for neurologic problem.    Diagnoses and all orders for this visit:    Spastic diplegia  -     Discontinue: onabotulinumtoxina injection 100 Units  -     ANKLE FOOT ORTHOSIS FOR HOME USE  -     onabotulinumtoxina injection 100 Units    Oropharyngeal dysphagia  Comments:  MBSS pending  Continue outpatient SLP        Plan:  I have offered chemodenervation with botulinum toxin for spasticity related to Cerebral Palsy. The patient expressed understanding  would like to proceed.     PROCEDURE NOTE:   The potential risks were discussed with the patient and He consented to proceed. After identifying the correct side (left upper and lower) the patient was placed in a seated position. A syringe was used with 800u Botox reconstituted into 16cc N.S. (50u/cc). A 27G EMG injection needle was utilized as well as EMG guidance to inject the following muscles:         Semitendinosus Left : 100 -EMG  Semimembranous Left : 200  units - EMG  Biceps femoris Left : 100 units - EMG     Semitendinosus Right: 200 units EMG  Semimembranous Right : 100 units - EMG  Biceps femoris Right : 100 units - EMG     LOT #: [id number]                       EXP #: 06/2022                  Medications:   We discussed the options for medication treatments:   Baclofen, Dantrolene and No additional oral antispasmodics recommended after this encounter.    Referrals:   We discussed options for stretching/splinting/and bracing:  AFO and No additional interventions recommended after this encounter.    RTC:  3 months, 800 units

## 2020-03-03 ENCOUNTER — CLINICAL SUPPORT (OUTPATIENT)
Dept: REHABILITATION | Facility: HOSPITAL | Age: 40
End: 2020-03-03
Attending: PHYSICAL MEDICINE & REHABILITATION
Payer: MEDICARE

## 2020-03-03 DIAGNOSIS — R13.12 OROPHARYNGEAL DYSPHAGIA: Primary | ICD-10-CM

## 2020-03-03 PROCEDURE — 92526 ORAL FUNCTION THERAPY: CPT | Mod: PO | Performed by: SPEECH-LANGUAGE PATHOLOGIST

## 2020-03-03 NOTE — PROGRESS NOTES
Outpatient Neurological Rehabilitation   Speech and Language Therapy Progress Note  Date:  3/3/2020     Name: Lisa Moreno   MRN: [id number]   Therapy Diagnosis:   Encounter Diagnosis   Name Primary?    Oropharyngeal dysphagia Yes      Physician: Yung Rodriguez MD  Physician Orders: [id number] - Ambulatory referral to Speech Therapy  Medical Diagnosis: R13.12 (ICD-10-CM) - Oropharyngeal dysphagia    Visit #/ Visits Authorized: 11/20 (plus 1)  Date of Evaluation: 12/17/19   Insurance Authorization Period: 12/10/2019-12/09/2020  Plan of Care Expiration Date:  12/17/2019 to 2/17/20 (updated POC today; new expiration 4/18/20)   Extended POC:  n/a   Progress Note: due 4/5/20   Visits Cancelled: 2  Visits No Show: 0    Time In: 4:10  Time Out: 5:00  Total Billable Time: 50 min     Precautions: Standard  Subjective:   Pt reports: that he feels tired since he had to wait so long. Pt arrived to therapy, however, he did not have an appointment since he did not made new appointments after last session. Pt able to be accommodated and seen by SLP today. New appointments made.   He was  compliant to home exercise program when current aid is there per pt report and aid report  Response to previous treatment: n/a  Pain Scale:  0/10 on VAS currently.   Pain Location: n/a  Objective:   UNTIMED  Procedure Min.   Dysphagia Therapy 50   Total Timed Units: 0  Total Untimed Units: 1    DATE 12/19/2019 12/24/2019 12/31/2019 1/9/2020 1/14/2020 1/21/2019 1/28/20 1/30/20 2/4/2020  2/13/20 2/18/20 3/3/20   VISIT  1/1 1/20 2/20 3/20 4/20 5/20 6/20  7/20 8/20 9/20 10/20 11/20   POC EXP. DATE 2/17/2020 2/17/2020 2/17/2020 2/17/2020 2/17/2020 2/17/2020 2/17/2020 2/17/20 2/17/20 2/17/20 2/17/20 (updated POC today) 4/18/20   VISIT AMOUNT  MEDICARETOTAL $89.14 $89.50 $89.50 $89.50 $89.50 $89.50 $89.50 $89.50 $89.50 $89.50 $89.50 $89.50   INITIALS       LD 1/28/20    Total dollar amount: $626.14 LD 1/30/20      Total dollar amount: $715.64 LD  "  2/4/20     Total dollar amount:  $805.14  2/13/2020       Total dollar amount:  $894.64  2/18/20       Total dollar amount:  $ 984.14   3/3/20    Total dollar amount: $74126.64       Short Term Goals: (4 weeks)  Current Progress:    1. Pt will participate in tongue base retraction exercises x 50-75 independently to improve base of tongue retraction and swallow function.   Progressing/ Not Met 3/3/2020   Hard effort swallow x 70 (combination of small sips of thin liquids and dry swallows) given verbal cues to "swallow hard" each trial        2. Pt will participate in laryngeal elevation exercises x30 independently to improve hyolaryngeal excursion.     Goal Not Met / Discontinue    3. Pt will participate in chin tuck against resistance (CTAR) hold x1 minute x3 independently to improve hyolaryngeal elevation / excursion.  Progressing/ Not Met 3/3/2020   CTAR hold for  1 minute, 6 times for a total of 6 minutes; required min cues (verbal cue of " hold it tight") to perform exercise with effort    4. Pt will participate in chin tuck against resistance (CTAR) repetitions x45-60 independently to improve hyolaryngeal elevation / excursion.  Progressing/ Not Met 3/3/2020   CTAR repetitions x 90; pt required verbal cue x1 each trials to perform exercise with increased effort (cue of "squeeze tight"); otherwise, pt not applying pressure/force to ball    5. Pt will participate in po trials of thin liquids with no overt signs/symptoms of aspiration  Progressing/ Not Met 3/3/2020   Pt observed to take small sips of water x 13 throughout session; coughing and wet vocal quality noted on 7/13 (54%) swallows    6. Pt will recall and utilize aspiration precautions and safe swallow strategies independently  Progressing/ Not Met 3/3/2020   Reviewed/discussed the following aspiration precautions and safe swallow strategies:  - eat and drink slowly   - small bites/sips   - remain upright when eating (90 degrees) and remain " upright for at least 30 minutes after eating  - excellent oral care pre and post eating/drinking    Immediately following review of aspiration precautions/safe swallow strategies, pt able to recall 0/4 aspiration precautions independently; able to recall 4/4 given multiple choice cues.    7. Pt will complete oral motor exercises to improve lingual and buccal musculature to improve oral prep phase / oral phase of swallow   Progressing/ Not Met 3/3/2020   Not formally addressed     8. Pt/pt's caregiver will record completion of 2-3 sets of dysphagia exercises per day on provided exercise log sheet and bring it to therapy each session    Progressing/ Not Met 3/3/2020   Pt/caregiver did not bring data log sheet.     Pt rated his fatigue as a 0/10 at 4 various points throughout session, rated fatigue as a 9/10 at 1 point in session following effortful swallow exercises.    Patient Education/Response:   Discussed importance of adherence to HEP each day at least  2-3 times per day to see a functional change to his swallow mechanism. Discussed importance of excellent oral care pre and post eating/drinking as well as throughout the day to prevent development of penumonia. Discussed associated risks of aspiration, quality of life, s/s aspiration, aspiration precautions, and safe swallow strategies.  Pt verbalized understanding to everything discussed today but continued reinforcement will be required.     Home Exercises Provided: Yes, pt's HEP includes: Hard effortful swallow; chin tuck against resistance; oral motor exercises; oral care completion x 3 per day. Updated exercise sheet provided to pt today to give to alternate aids to promote completion of HEP.  Exercises were reviewed and Lisa was able to demonstrate them prior to the end of the session.  Lisa demonstrated fair  understanding of the education provided.   See EMR under Patient Instructions for exercises provided prior visit.  Assessment:   Lisa watson  progressing well towards his goals. Total of 13 po trials (thin liquids) completed with coughing and wet vocal quality observed on 7/13 (54%) swallows. Pt required mod levels of cueing to complete dysphagia exercises appropriately this session. Overall, Detrell continues to present with overt s/s aspiration throughout and post PO trials. Subjectively, minimal change in swallow function observed - to be confirmed/denied by MBSS. Pt to participate in MBSS as soon as procedure is scheduled. Pt to participate in AAC evaluation once date is confirmed by AAC vendors. Current goals remain appropriate. Goals to be updated as necessary. Pt prognosis is Fair. Pt will continue to benefit from skilled outpatient speech and language therapy to address the deficits listed in the problem list on initial evaluation, provide pt/family education and to maximize pt's level of independence in the home and community environment.   Medical necessity is demonstrated by the following IMPAIRMENTS:  Pt's severe dysphagia puts him at risk for aspiration/developing aspiration pneumonia as well as decreased quality of life.    Barriers to Therapy: none  Pt's spiritual, cultural and educational needs considered and pt agreeable to plan of care and goals.  Plan:   Updated POC today. Continue with focus on improving swallow function. Plan to repeat MBSS- order has been placed in EMR, SLP notified [facility] scheduling team 2/18/20 and again 3/3/20    RAMANA Al, CF-SLP  Speech Language Pathologist   3/3/2020

## 2020-03-11 NOTE — PROGRESS NOTES
"Outpatient Neurological Rehabilitation   Speech and Language Therapy Daily Note  Date:  3/12/2020     Name: Lisa Moreno   MRN: [id number]   Therapy Diagnosis:   Encounter Diagnosis   Name Primary?    Oropharyngeal dysphagia Yes      Physician: Yung Rodriguez MD  Physician Orders: FEV801 - Ambulatory referral to Speech Therapy  Medical Diagnosis: R13.12 (ICD-10-CM) - Oropharyngeal dysphagia    Visit #/ Visits Authorized: 12/20 (plus 1)  Date of Evaluation: 12/17/19   Insurance Authorization Period: 12/10/2019-12/09/2020  Plan of Care Expiration Date:  12/17/2019 to 2/17/20 (updated POC today; new expiration 4/18/20)   Extended POC:  n/a   Progress Note: due 4/5/20   Visits Cancelled: 2  Visits No Show: 0    Time In: 1:55  Time Out: 2:30  Total Billable Time: 35 min     Precautions: Standard  Subjective:   Pt reports: no changes. Pt with consistent wet cough throughout session, therefore PO trials not attempted today. Following SLP phone call with pt's mother (see telephone documentation from 3/12/20) pt stated "it doesn't matter" in response to SLP's question of if he would still like to participate in AAC eval. Let patient know that this is his choice whether or not he participates in AAC eval.   He was not compliant to home exercise program  Response to previous treatment: n/a  Pain Scale:  0/10 on VAS currently.   Pain Location: n/a  Objective:   UNTIMED  Procedure Min.   Dysphagia Therapy 37   Total Timed Units: 0  Total Untimed Units: 1    DATE 12/19/2019 12/24/2019 12/31/2019 1/9/2020 1/14/2020 1/21/2019 1/28/20 1/30/20 2/4/2020  2/13/20 2/18/20 3/3/20 3/12/20   VISIT  1/1 1/20 2/20 3/20 4/20 5/20 6/20  7/20 8/20 9/20 10/20 11/20 12/20   POC EXP. DATE 2/17/2020 2/17/2020 2/17/2020 2/17/2020 2/17/2020 2/17/2020 2/17/2020 2/17/20 2/17/20 2/17/20 2/17/20 (updated POC today) 4/18/20 4/18/20   VISIT AMOUNT  MEDICARETOTAL $89.14 $89.50 $89.50 $89.50 $89.50 $89.50 $89.50 $89.50 $89.50 $89.50 $89.50 $89.50 " "$89.50   INITIALS       LD 1/28/20    Total dollar amount: $626.14      Previous treatment sessions not signed/dated as this SLP began tracking total dollar amounts 1/28/20 LD 1/30/20      Total dollar amount: $715.64 LD   2/4/20     Total dollar amount:  $805.14 LD 2/13/2020       Total dollar amount:  $894.64 LD 2/18/20       Total dollar amount:  $ 984.14  LD 3/3/20    Total dollar amount: $46599.64     LD 3/12/20    Previous dollar amount incorrect, dollar amount on 3/3/20 should read "$1,073.64"    Total dollar amount 3/12/20: $1,163.14       Short Term Goals: (4 weeks)  Current Progress:    1. Pt will participate in tongue base retraction exercises x 50-75 independently to improve base of tongue retraction and swallow function.   Progressing/ Not Met 3/12/2020   Hard effort swallow x 60 (dry swallows)       2. Pt will participate in laryngeal elevation exercises x30 independently to improve hyolaryngeal excursion.     Goal Not Met / Discontinue    3. Pt will participate in chin tuck against resistance (CTAR) hold x1 minute x3 independently to improve hyolaryngeal elevation / excursion.  Progressing/ Not Met 3/12/2020   CTAR hold for 1 minute, 6 times for a total of 6 minutes; required min cues (verbal cue of " hold it tight") to perform exercise with increased effort    4. Pt will participate in chin tuck against resistance (CTAR) repetitions x45-60 independently to improve hyolaryngeal elevation / excursion.  Progressing/ Not Met 3/12/2020   CTAR repetitions x 75; pt required verbal cue x1 each trial to perform exercise with increased effort    5. Pt will participate in po trials of thin liquids with no overt signs/symptoms of aspiration  Progressing/ Not Met 3/12/2020   Not formally addressed     6. Pt will recall and utilize aspiration precautions and safe swallow strategies independently  Progressing/ Not Met 3/12/2020   Pt did not recall precautions/strategies despite max cues    Reviewed/discussed the " following aspiration precautions and safe swallow strategies:  - eat and drink slowly   - small bites/sips   - remain upright when eating (90 degrees) and remain upright for at least 30 minutes after eating  - excellent oral care pre and post eating/drinking.    7. Pt will complete oral motor exercises to improve lingual and buccal musculature to improve oral prep phase / oral phase of swallow   Progressing/ Not Met 3/12/2020   Not formally addressed     8. Pt/pt's caregiver will record completion of 2-3 sets of dysphagia exercises per day on provided exercise log sheet and bring it to therapy each session    Progressing/ Not Met 3/12/2020   Pt/caregiver did not bring data log sheet.       Patient Education/Response:   Discussed importance of excellent oral care pre and post eating/drinking as well as throughout the day to prevent development of penumonia. Discussed associated risks of aspiration, quality of life, s/s aspiration, aspiration precautions, and safe swallow strategies. Discussed the need for objective swallow assessment prior to completing further dysphagia tx. Also discussed pt's right to participate in AAC evaluation should be be interested, discussed AAC process again with the patient. Pt verbalized understanding to everything discussed.    Home Exercises Provided: Yes, pt's HEP includes: Hard effortful swallow; chin tuck against resistance; oral motor exercises; oral care completion x 3 per day.  Exercises were reviewed and Lisa was able to demonstrate them prior to the end of the session.  Lisa demonstrated fair  understanding of the education provided.   See EMR under Patient Instructions for exercises provided prior visit.  Assessment:   Lisa is progressing well towards his goals. No po trials completed today as pt with consistent very wet cough at rest. Pt completed dysphagia exercises given moderate cues. Pt unable to recall safe swallow strategies or aspiration precautions despite max  cues. Current goals remain appropriate. Goals to be updated as necessary. Pt prognosis is Fair. Pt will continue to benefit from skilled outpatient speech and language therapy to address the deficits listed in the problem list on initial evaluation, provide pt/family education and to maximize pt's level of independence in the home and community environment.   Medical necessity is demonstrated by the following IMPAIRMENTS:  Pt's severe dysphagia puts him at risk for aspiration/developing aspiration pneumonia as well as decreased quality of life.    Barriers to Therapy: none  Pt's spiritual, cultural and educational needs considered and pt agreeable to plan of care and goals.  Plan:   Pt to be placed on hold until he participates in Modified Barium Swallow Study to objectively assess his  swallow and determine the safest possible diet.

## 2020-03-12 ENCOUNTER — CLINICAL SUPPORT (OUTPATIENT)
Dept: REHABILITATION | Facility: HOSPITAL | Age: 40
End: 2020-03-12
Attending: PHYSICAL MEDICINE & REHABILITATION
Payer: MEDICARE

## 2020-03-12 ENCOUNTER — TELEPHONE (OUTPATIENT)
Dept: REHABILITATION | Facility: HOSPITAL | Age: 40
End: 2020-03-12

## 2020-03-12 DIAGNOSIS — R13.12 OROPHARYNGEAL DYSPHAGIA: Primary | ICD-10-CM

## 2020-03-12 PROCEDURE — 92526 ORAL FUNCTION THERAPY: CPT | Mod: PO | Performed by: SPEECH-LANGUAGE PATHOLOGIST

## 2020-03-12 NOTE — TELEPHONE ENCOUNTER
"Called pt's mother to discuss scheduling of AAC evaluation and MBSS procedure. Discussed pertinence of MBSS to determine the safest possible diet as he continues to demonstrate overt s/s aspiration despite completion of dysphagia exercises in speech therapy sessions and partial compliance to HEP. Pt's mother stated that Lisa does not have a caretaker for the next 2 weeks as today is their current care takers last day so she does not want to schedule the procedure. Let her know that this SLP will reach out to scheduling department and have them call Mrs. Moreno next week. Requested for Lisa to maintain plan for AAC evaluation (though pt not officially scheduled - appointment spots held for him) to occur 3/26 as planned, however, pt's mother stated "he will not be getting one of those" following education regarding AAC evaluation process and explanation of speech generating devices. She stated "Lisa does not know yes from no so we are not wasting money on this." Discussed that Lisa has communicated to SLP that he would like to participate in AAC evaluation as his speech is severely dysarthric. Discussed that communication is still a very important component of Lisa's life at this time and a communication device could assist him with clearly expressing his wants, needs, and thoughts. Mrs. Moreno communicated that she does not think Lisa would use a device if he were to receive one. SLP told Mrs. Moreno that that is a very valid concern but that is what the purpose of the AAC evaluation is for. Stated that if an AAC device is not recommended if it is found to not be appropriate. Pt's mother opted to cancel future ST appointments, however, SLP let her know that there are no future appointments scheduled and she can call the clinic to make additional appointments should she wish to. Reiterated that prior to continuation of dysphagia therapy, pt will have to participate in MBSS.     RAMANA Al, " CCC-SLP  Speech Language Pathologist   3/12/2020

## 2020-04-20 DIAGNOSIS — R25.2 SPASTICITY: Primary | ICD-10-CM

## 2020-04-20 RX ORDER — BACLOFEN 20 MG/1
TABLET ORAL
Qty: 360 TABLET | Refills: 0 | OUTPATIENT
Start: 2020-04-20

## 2020-04-20 RX ORDER — BACLOFEN 20 MG/1
TABLET ORAL
Qty: 360 TABLET | Refills: 0 | Status: CANCELLED | OUTPATIENT
Start: 2020-04-20

## 2020-04-20 NOTE — TELEPHONE ENCOUNTER
----- Message from Gina An sent at 4/20/2020  2:05 PM CDT -----  Contact: Giovana(mother) [phone]  Requesting an RX refill or new RX.  Is this a refill or new RX:  new  RX name and strength: baclofen (LIORESAL) 20 MG tablet  Directions (copy/paste from chart):  TAKE 1 TABLET(20 MG) BY MOUTH FOUR TIMES DAILY  Is this a 30 day or 90 day RX:  90  Local pharmacy or mail order pharmacy:  local  Pharmacy name and phone # (copy/paste from chart):   [id number] - [place], [place] - [address] AT [facility] [address] [phone] (Phone)  [phone] (Fax)  Comments:

## 2020-04-20 NOTE — TELEPHONE ENCOUNTER
"Previously addressed, 2nd refill request again denied.  Covering for Dr. Nixon.  Received refill request for Baclofen.  Patient also has dantrolene listed in Rx list.  Rx not prescribed by Dr. Nixon in the past but by Dr. Rodriguez PM&R, per last note per Dr. Rodriguez, "We discussed the options for medication treatments:  Baclofen, Dantrolene and No additional oral antispasmodics recommended after this encounter."  Refill request denied.  Can discuss with Dr. Nixon on his return but would recommend patient follow up with Dr. Rodriguez to discuss first.  Please notify caller.  "

## 2020-04-20 NOTE — TELEPHONE ENCOUNTER
----- Message from Cyndie Khan sent at 4/20/2020  4:11 PM CDT -----  Contact: [facility] pharmacy   Pharmacy is calling to check the status of a refill request for the pt medication called Baclofen. The pharmacy hasn't received a refill request the pt is waiting at the pharmacy can you please call pharmacy at [phone].    [place]

## 2020-04-21 RX ORDER — BACLOFEN 20 MG/1
20 TABLET ORAL 3 TIMES DAILY
Qty: 270 TABLET | Refills: 3 | Status: SHIPPED | OUTPATIENT
Start: 2020-04-21 | End: 2021-02-17 | Stop reason: SDUPTHER

## 2020-05-14 ENCOUNTER — TELEPHONE (OUTPATIENT)
Dept: GASTROENTEROLOGY | Facility: CLINIC | Age: 40
End: 2020-05-14

## 2020-07-10 ENCOUNTER — TELEPHONE (OUTPATIENT)
Dept: PHYSICAL MEDICINE AND REHAB | Facility: CLINIC | Age: 40
End: 2020-07-10

## 2020-07-10 NOTE — TELEPHONE ENCOUNTER
----- Message from Natalie Montoya sent at 7/10/2020 11:09 AM CDT -----  Regarding: mom Detrell  Pt mom needs a call back to schedule an appt.    Asking for a call back.      Contact info [phone]

## 2020-07-15 ENCOUNTER — TELEPHONE (OUTPATIENT)
Dept: NEUROLOGY | Facility: CLINIC | Age: 40
End: 2020-07-15

## 2020-07-15 NOTE — TELEPHONE ENCOUNTER
Vm message received 2:41pm from pt's mother Giovana [phone] requesting return call regarding establishing care for ALS services.              RENATO KOCH

## 2020-07-15 NOTE — TELEPHONE ENCOUNTER
E-mail received from Petra, with [facility] regarding new patient referral.     Good morning,  I put ? because he may have been seen by some of our providers before since according to his mom he was diagnosed in . Patients name is Lisa Moreno (39), mom is Giovana Moreno. She will be calling to inquire about getting on the schedule for clinic.  Brief hx: Dx in  at age 25, was put on baclofen pump almost immediately which she reports made him worse, he has been in a chair since , says he is stable but is starting to cough when he drinks liquids, still feeds himself, was going to a [facility] 3x a week and receiving CCW services but hasnt had any services since covid, he is depressed and has been exhibiting some negative behaviors since [facility], purposefully removing his diapers and urinating in the bed, mom is overwhelmed, doesnt want to go nursing home route but it is on the table.    Lisa Moreno Sr  1980  [address] [place] [id number]   [phone]     -  JOSE Louis, [facility] [place]   Director of Programs & Services  [employer] [facility]  Phone: [phone] EXT 3  [phone]      RN Coordinator informed Petra that will await patient's mother, Giovana, call. If do not hear from Giovana, will notify Petra to verify if okay for RN Coordinator to contact caregiver directly.

## 2020-07-16 ENCOUNTER — TELEPHONE (OUTPATIENT)
Dept: NEUROLOGY | Facility: CLINIC | Age: 40
End: 2020-07-16

## 2020-07-16 ENCOUNTER — TELEPHONE (OUTPATIENT)
Dept: PHARMACY | Facility: CLINIC | Age: 40
End: 2020-07-16

## 2020-07-16 ENCOUNTER — OFFICE VISIT (OUTPATIENT)
Dept: GASTROENTEROLOGY | Facility: CLINIC | Age: 40
End: 2020-07-16
Payer: MEDICARE

## 2020-07-16 VITALS
WEIGHT: 164 LBS | DIASTOLIC BLOOD PRESSURE: 80 MMHG | RESPIRATION RATE: 16 BRPM | SYSTOLIC BLOOD PRESSURE: 123 MMHG | HEIGHT: 75 IN | BODY MASS INDEX: 20.39 KG/M2 | HEART RATE: 68 BPM

## 2020-07-16 DIAGNOSIS — R13.10 DYSPHAGIA, UNSPECIFIED TYPE: ICD-10-CM

## 2020-07-16 DIAGNOSIS — K59.04 CHRONIC IDIOPATHIC CONSTIPATION: Primary | ICD-10-CM

## 2020-07-16 DIAGNOSIS — R10.9 ABDOMINAL PAIN, UNSPECIFIED ABDOMINAL LOCATION: ICD-10-CM

## 2020-07-16 DIAGNOSIS — Z01.812 PRE-PROCEDURE LAB EXAM: ICD-10-CM

## 2020-07-16 PROCEDURE — 99999 PR PBB SHADOW E&M-EST. PATIENT-LVL IV: ICD-10-PCS | Mod: PBBFAC,,, | Performed by: INTERNAL MEDICINE

## 2020-07-16 PROCEDURE — 99999 PR PBB SHADOW E&M-EST. PATIENT-LVL IV: CPT | Mod: PBBFAC,,, | Performed by: INTERNAL MEDICINE

## 2020-07-16 PROCEDURE — 99214 PR OFFICE/OUTPT VISIT, EST, LEVL IV, 30-39 MIN: ICD-10-PCS | Mod: S$PBB,,, | Performed by: INTERNAL MEDICINE

## 2020-07-16 PROCEDURE — 99214 OFFICE O/P EST MOD 30 MIN: CPT | Mod: S$PBB,,, | Performed by: INTERNAL MEDICINE

## 2020-07-16 PROCEDURE — 99214 OFFICE O/P EST MOD 30 MIN: CPT | Mod: PBBFAC,PO | Performed by: INTERNAL MEDICINE

## 2020-07-16 RX ORDER — POLYETHYLENE GLYCOL 3350, SODIUM SULFATE ANHYDROUS, SODIUM BICARBONATE, SODIUM CHLORIDE, POTASSIUM CHLORIDE 236; 22.74; 6.74; 5.86; 2.97 G/4L; G/4L; G/4L; G/4L; G/4L
4 POWDER, FOR SOLUTION ORAL ONCE
Qty: 4000 ML | Refills: 0 | Status: ON HOLD | OUTPATIENT
Start: 2020-07-16 | End: 2020-07-24 | Stop reason: HOSPADM

## 2020-07-16 RX ORDER — LUBIPROSTONE 24 UG/1
24 CAPSULE ORAL 2 TIMES DAILY WITH MEALS
Qty: 60 CAPSULE | Refills: 0 | OUTPATIENT
Start: 2020-07-16 | End: 2020-08-15

## 2020-07-16 RX ORDER — PLECANATIDE 3 MG/1
3 TABLET ORAL DAILY
Qty: 30 TABLET | Refills: 5 | Status: SHIPPED | OUTPATIENT
Start: 2020-07-16 | End: 2020-07-16

## 2020-07-16 NOTE — TELEPHONE ENCOUNTER
GI  7/16th Appt with GI Doctor c/o constipation  -Stated that he does not want to live anymore bc mother upset him  -Colonoscopy on Wednesday, 7/22 at 2:45PM  -COVID Testing - Sunday 9AM    2/5/2015 Dr. Angeles   Dx: Ataxia   Plan: Lisa Moreno is a 34 y.o. 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 Screening labs for reversible causes today. EMG to assess PN. Will refer to Dr Wise for ongoing care.     11/27/2018 Last visit with Dr. Wise  Plan: Diagnoses: Spastic-ataxia.  Reversibles negative to date.  Could be AAT or AOA2 with slightly elevated CK.  Consider gene tests - family still declines due to lack of convincing change to management.  Possible HSP/SPG6?       ALS  2006 Diagnosed at [facility]. Patient presented with slurred speech and walked with a limp.   After placement of baclofen pump (2007) patient c/o back pain and difficulty ambulating. Baclofen pump removed in 2010. Receives Botox to help with Spasticity    E-mail Release of Information Form to Giovana [[email or web address]] to request documents from [place].       PT/OT  Home Medical Equipment   - Hospital in 2006  - Transport Chair  - Shower Chair  - Bedside Commode    Urinary Incontinence   - begin when patient was admitted to Nursing Home for one year bc Asbestosis removed from home.     Patient will use bedside commode for bowel movement.    Stands for mother to change attends. Transfers self from bed to chair. (no julieta lift)    Can ambulate with a walker. Uses w/c in home.    BUE strength okay. Patient still able to feed himself and bath upper body.     Speech/Dietary  -no AAC Device  Cough noted when drinking liquids; therefore uses thick-it.  Regular Diet: Pork Chop and Rice Dressing for dinner today.     CCW  Mother recently had surgery on her dominant hand and does not have PCA in home to assist with care.     [facility] has [facility] Dem[facility] ($7.75/hr)  -[facility] but does not have staff to send to patient's home.      [facility]   -will not accept d/t staffing    [facility]  - will not accept d/t staffing    [facility]   -sent PCA once but will not staff PCA. [facility] worker informed Mrs. Akhtar that company can not d/c patient until another provider located. However, the company chooses not to send a PCA. Provider      Home Health   To assist with Hygiene Care, OT home assessment, ADLs, PT transfer training    Respite Care  - E-mail sent to Petra howe/ [facility]       Patient has a son Lisa Ramos that is 12 y/o. He resides with his mother.     Patient, Giovana, her sister, Sara, and nephew, Manuela, reside together in family home. Giovana believes that Lisa is becoming depressed due to progression of disease and the mother admittedly yelling at patient bc she is overwhelmed.     Lisa's day begins at 6AM every morning/ 7 days per week. Giovana completes ADLs and feeds patient.   Nephew in Medical School that helps occasionally. About 2 weeks ago her nephew offered to sit with [facility] Giovana was becoming increasingly upset and seem to need a break. Giovana stated she rode around until dark and just cried.     Giovana has not been evaluated/treated by professional therapist; however, she speaks with her cousin (a ) often for spiritual guidance and support.    Mother was homeless when relocated to [place]. She became close friends with 4 gentlemen and consider them her brother. One the guys' son is graduating next weekend. Mother has been trying to arrange Respite Care for one month but unsuccessful.     [facility]   - unable to arrange respite care  [facility]  -unable to help with Respite             Tomeka Conte RN, BSN, BS  ALS Clinical Care Coordinator  [phone]

## 2020-07-16 NOTE — PATIENT INSTRUCTIONS
COVID SCREENING: [facility]  [address] [address], [address], [place]    JULY19, 2020 AT 9AM    GOLYTELY/ COLYTE/ NULYTELY Instructions    [facility] [address]  Clinic Office [phone]  Endoscopy Lab [phone]    You are scheduled for a Colonoscopy with Dr. Fitch on July 22, 2020  at [facility].  You will check in at the Information desk on the first floor of the hospital. Please contact the office to reschedule if needed.     A responsible adult (family member or friend) must come with you and transport you home.  You are not allowed to drive, take a taxi/ride share or bus or leave the Endoscopy Center alone.  If you do not have a responsible adult with you to take you home, your exam will be cancelled.      Please follow instructions of  if you are taking anticoagulant/blood thinning medications such as Aggrenox, Brilinta, Effient, Eliquis, Lovenox, Plavix, Pletal, Pradaxa, Ticilid, Xarelto or Coumadin.      Please skip your morning dose of insulin or other oral medications for diabetes the morning of the procedure unless instructed otherwise by your doctor. You should take your blood pressure, heart, anti-rejection and or seizure medication the morning of your procedure.     To ensure that your test is accurate and complete, you must follow these instructions - please do not use the instructions provided from the pharmacy.      The Day Before The Procedure:     Follow a CLEAR LIQUID DIET for the entire day before your scheduled colonoscopy.  This means no solid food the entire day.   A clear liquid diet includes the following:  o Water, Coffee or decaffeinated coffee (no milk or cream)  o Tea, Herbal tea  o Carbonated beverages (soft drinks), regular and sugar free  o Gelatin  o Apple Juice, grape juice, white cranberry juice  o Gatorade, Power Aid, Crystal Light, Marlon Aid  o Lemonade and Limeade  o Bouillon, clear  consomme'  o Snowball, popsicles    DO NOT DRINK ANY LIQUIDS CONTAINING RED DYE  DO NOT DRINK ANY LIQUIDS NOT SPECIFICALLY LISTED  DO NOT DRINK ALCOHOL     At 5 pm the evening before your colonoscopy:  o Add 1 gallon of water to the marked line on the container of GOLYTELY/COLYTE/NULYTELY (all names for same product).  You may add a flavor packet or yellow/green powder drink mix to the container.  - This is easier to drink if this solution is cold, so you can mix the solution one day ahead of time and place in the refrigerator prior to drinking.  You have to drink the solution within 24 hours of mixing it.  Do NOT put this solution over ice.  It IS ok to drink with a straw.  o Drink 1 glass (8 ounces) of mixture every 10 minutes until 1/2 of the container is finished. (Keep this mixture cold and in refrigerator as much as you can while drinking it).  Place the remaining half of mixture in the refrigerator when you finish the first half.    The Day of the Procedure - The Endoscopy department will call you 2 days prior to your procedure to give you the exact time     5 hours prior to your ARRIVAL TIME (this may be in the middle of the night)  o Drink 1 glass (8 ounces) of mixture every 10 minutes until the container is finished. (Keep this mixture cold and in refrigerator as much as you can while drinking it).    o AFTER YOU HAVE COMPLETED YOUR PREP, YOU MAY HAVE NOTHING ELSE BY MOUTH    o Please leave all valuables and jewelry at home.    o At 600 am, you may take the last dose of any medications you are allowed to take with a small sip of water (blood pressure, heart, anti-rejection and or seizure medication).  If you use inhalers bring them with you.    o You may call the Endoscopy department at [phone] with any questions regarding your procedure.

## 2020-07-16 NOTE — PROGRESS NOTES
Subjective:       Patient ID: Lisa Moreno is a 39 y.o. male.    Chief Complaint: Constipation (LIQUID MEDICATION NOT WORKING) and Hemorrhoids    Patient here today to follow-up with aforementioned complaints.  Patient was previously seen by me last October with similar complaints.  A colonoscopy was recommend however it was canceled due to transportation issues.  He was also started Linzess.    Today patient is accompanied by his mother who is his caregiver and generally speaks for him.  She reports his constipation has persisted.  She does not believe it is improved at all with Linzess.  She has also tried giving him lactulose without significant improvement.  She often times has to manually extract stool from rectum.  She has noted a large hemorrhoid at bothers him.  She has previously seen Colorectal surgery and offered diverting colostomy, which she continues to refuse.    Otherwise mother reports patient complaints of abdominal pain and has a poor appetite.  She believes that he is having some dysphagia to solid foods particularly.  She denies history of prior endoscopy.    Review of Systems   Unable to perform ROS: Patient nonverbal       The following portions of the patient's history were reviewed and updated as appropriate: allergies, current medications, past family history, past medical history, past social history, past surgical history and problem list.    Objective:      Physical Exam  Vitals signs and nursing note reviewed.   Constitutional:       General: He is not in acute distress.     Appearance: He is well-developed. He is not ill-appearing.      Comments: Wheelchair-bound   HENT:      Head: Normocephalic and atraumatic.   Eyes:      General: No scleral icterus.     Pupils: Pupils are equal, round, and reactive to light.   Cardiovascular:      Rate and Rhythm: Normal rate and regular rhythm.      Heart sounds: Normal heart sounds.   Pulmonary:      Effort: Pulmonary effort is normal. No  respiratory distress.      Breath sounds: Normal breath sounds.   Abdominal:      General: Bowel sounds are normal. There is no distension.      Palpations: Abdomen is soft.      Tenderness: There is no abdominal tenderness.   Neurological:      Mental Status: He is alert and oriented to person, place, and time.      Comments: No asterixis   Psychiatric:         Behavior: Behavior normal.           Pertinent labs and imaging studies reviewed    Assessment:       1. Chronic idiopathic constipation    2. Abdominal pain, unspecified abdominal location    3. Dysphagia, unspecified type    4. Pre-procedure lab exam        Plan:       Offered increasing Linzess however patient's mother would prefer trying him on a different medication.  Will start Trulance  Will reschedule colonoscopy as previously planned.  Given new onset dysphagia will perform EGD on the same day    25 minutes were spent in coordination of patient's care, record review and counseling.  More than 50% of the time was face-to-face.    (Portions of this note were dictated using voice recognition software and may contain dictation related errors in spelling/grammar/syntax not found on text review)

## 2020-07-17 ENCOUNTER — TELEPHONE (OUTPATIENT)
Dept: UROLOGY | Facility: CLINIC | Age: 40
End: 2020-07-17

## 2020-07-17 ENCOUNTER — TELEPHONE (OUTPATIENT)
Dept: GASTROENTEROLOGY | Facility: CLINIC | Age: 40
End: 2020-07-17

## 2020-07-17 ENCOUNTER — TELEPHONE (OUTPATIENT)
Dept: NEUROLOGY | Facility: CLINIC | Age: 40
End: 2020-07-17

## 2020-07-17 ENCOUNTER — OFFICE VISIT (OUTPATIENT)
Dept: UROLOGY | Facility: CLINIC | Age: 40
End: 2020-07-17
Payer: MEDICARE

## 2020-07-17 DIAGNOSIS — R82.90 ABNORMAL URINE ODOR: Primary | ICD-10-CM

## 2020-07-17 DIAGNOSIS — R39.9 UTI SYMPTOMS: ICD-10-CM

## 2020-07-17 DIAGNOSIS — R33.9 INCOMPLETE EMPTYING OF BLADDER: ICD-10-CM

## 2020-07-17 DIAGNOSIS — K59.09 OTHER CONSTIPATION: ICD-10-CM

## 2020-07-17 DIAGNOSIS — Z71.89 COMPLEX CARE COORDINATION: ICD-10-CM

## 2020-07-17 LAB
BACTERIA #/AREA URNS AUTO: ABNORMAL /HPF
BILIRUB UR QL STRIP: NEGATIVE
CLARITY UR REFRACT.AUTO: ABNORMAL
COLOR UR AUTO: ABNORMAL
GLUCOSE UR QL STRIP: NEGATIVE
HGB UR QL STRIP: ABNORMAL
KETONES UR QL STRIP: NEGATIVE
LEUKOCYTE ESTERASE UR QL STRIP: ABNORMAL
MICROSCOPIC COMMENT: ABNORMAL
NITRITE UR QL STRIP: NEGATIVE
PH UR STRIP: 6 [PH] (ref 5–8)
PROT UR QL STRIP: NEGATIVE
RBC #/AREA URNS AUTO: 89 /HPF (ref 0–4)
SP GR UR STRIP: 1.02 (ref 1–1.03)
SQUAMOUS #/AREA URNS AUTO: 1 /HPF
URN SPEC COLLECT METH UR: ABNORMAL
WBC #/AREA URNS AUTO: 5 /HPF (ref 0–5)

## 2020-07-17 PROCEDURE — 99213 OFFICE O/P EST LOW 20 MIN: CPT | Mod: PBBFAC,PO,25 | Performed by: NURSE PRACTITIONER

## 2020-07-17 PROCEDURE — 99999 PR PBB SHADOW E&M-EST. PATIENT-LVL III: ICD-10-PCS | Mod: PBBFAC,,, | Performed by: NURSE PRACTITIONER

## 2020-07-17 PROCEDURE — 99214 PR OFFICE/OUTPT VISIT, EST, LEVL IV, 30-39 MIN: ICD-10-PCS | Mod: S$PBB,25,, | Performed by: NURSE PRACTITIONER

## 2020-07-17 PROCEDURE — 51701 INSERT BLADDER CATHETER: CPT | Mod: PBBFAC,PO | Performed by: NURSE PRACTITIONER

## 2020-07-17 PROCEDURE — 87086 URINE CULTURE/COLONY COUNT: CPT

## 2020-07-17 PROCEDURE — 87088 URINE BACTERIA CULTURE: CPT

## 2020-07-17 PROCEDURE — 87077 CULTURE AEROBIC IDENTIFY: CPT

## 2020-07-17 PROCEDURE — 99214 OFFICE O/P EST MOD 30 MIN: CPT | Mod: S$PBB,25,, | Performed by: NURSE PRACTITIONER

## 2020-07-17 PROCEDURE — 51701 INSERT BLADDER CATHETER: CPT | Mod: S$PBB,,, | Performed by: NURSE PRACTITIONER

## 2020-07-17 PROCEDURE — 81001 URINALYSIS AUTO W/SCOPE: CPT

## 2020-07-17 PROCEDURE — 87186 SC STD MICRODIL/AGAR DIL: CPT

## 2020-07-17 PROCEDURE — 51798 US URINE CAPACITY MEASURE: CPT | Mod: PBBFAC,PO | Performed by: NURSE PRACTITIONER

## 2020-07-17 PROCEDURE — 51701 PR INSERTION OF NON-INDWELLING BLADDER CATHETERIZATION FOR RESIDUAL UR: ICD-10-PCS | Mod: S$PBB,,, | Performed by: NURSE PRACTITIONER

## 2020-07-17 PROCEDURE — 99999 PR PBB SHADOW E&M-EST. PATIENT-LVL III: CPT | Mod: PBBFAC,,, | Performed by: NURSE PRACTITIONER

## 2020-07-17 PROCEDURE — 81002 URINALYSIS NONAUTO W/O SCOPE: CPT | Mod: PBBFAC,PO | Performed by: NURSE PRACTITIONER

## 2020-07-17 RX ORDER — CEPHALEXIN 500 MG/1
500 CAPSULE ORAL EVERY 6 HOURS
Qty: 28 CAPSULE | Refills: 0 | Status: ON HOLD | OUTPATIENT
Start: 2020-07-17 | End: 2020-07-24 | Stop reason: HOSPADM

## 2020-07-17 RX ORDER — SODIUM, POTASSIUM,MAG SULFATES 17.5-3.13G
1 SOLUTION, RECONSTITUTED, ORAL ORAL DAILY
Qty: 1 KIT | Refills: 0 | Status: ON HOLD | OUTPATIENT
Start: 2020-07-17 | End: 2020-07-24 | Stop reason: HOSPADM

## 2020-07-17 NOTE — TELEPHONE ENCOUNTER
----- Message from Brandon Anne sent at 7/17/2020 11:41 AM CDT -----  Contact: [phone]/patient  Patient returning your call  Please call back to assist at [phone]

## 2020-07-17 NOTE — PROGRESS NOTES
Subjective:       Patient ID: Lisa Moreno is a 39 y.o. male.    Chief Complaint: Abnormal urine odor     This is a 39 y.o.  male patient that is new to me but not new to the system.  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 He has seen Dr. Okeefe for UUI. He underwent urodynamics with Dr. Okeefe on 11/27/2020, which demonstrated DO with incomplete bladder emptying. She thought he would most likely benefit from botox but that patient would also have to do CIC 5x/daily to avoid risk of urinary retention. Patient's mother did not think this would be a a good option for patient given his cognitive dysfunction. They agreed on a trial of Mrybetriq.     7/17/2020  Patient here today with mother for possible UTI. History is provided by patient's mother. Patient last UTI in Jan. 2020, urine culture with E. Coli > 100,000 cfu/ml and he was treated with Keflex. He has been taking Myrbetriq 25mg  For UUI. Mother reports that he wears diapers, as he does not ambulate often due to LE weakness. She changes his diaper about 3-4x during the day and once a night. She is not sure if the Myrbetriq is working. He is dealing with extreme constipation issues, might have a BM weekly. He is followed by GI, colonoscopy scheduled for next week. Denies fevers or chills. Patient unable to give urine sample. Bladder scan: 234ml.         LAST PSA  No results found for: PSA, PSADIAG, PSATOTAL, PSAFREE    Lab Results   Component Value Date    CREATININE 0.75 01/02/2020       ---  Past Medical History:   Diagnosis Date    Degenerative motor system disease     Depression     Seizure     Spastic quadriplegia     Spinocerebellar atrophy        Past Surgical History:   Procedure Laterality Date    BACLOFEN PUMP IMPLANTATION      FLUOROSCOPIC URODYNAMIC STUDY N/A 11/27/2018    Procedure: URODYNAMIC STUDY, FLUOROSCOPIC;  Surgeon: Tanja Okeefe MD;  Location: [facility] OR [address] [place] [address];   Service: Urology;  Laterality: N/A;  1 hour       Family History   Problem Relation Age of Onset    Thyroid cancer Mother     Lung cancer Maternal Grandfather     Multiple sclerosis Other         mother's cousin    ALS Neg Hx     Muscular dystrophy Neg Hx        Social History     Tobacco Use    Smoking status: Never Smoker    Smokeless tobacco: Never Used   Substance Use Topics    Alcohol use: Not Currently     Frequency: Never     Drinks per session: Patient refused     Binge frequency: Never     Comment: social drinker, at times    Drug use: Not Currently       Current Outpatient Medications on File Prior to Visit   Medication Sig Dispense Refill    baclofen (LIORESAL) 20 MG tablet Take 1 tablet (20 mg total) by mouth 3 (three) times daily. 270 tablet 3    donepezil (ARICEPT) 5 MG tablet TAKE 1 TABLET(5 MG) BY MOUTH EVERY EVENING 30 tablet 11    lactulose (CHRONULAC) 10 gram/15 mL solution GIVE 22.5ML BY MOUTH ONCE DAILY FOR 10 DAYS 2025 mL 3    lubiprostone (AMITIZA) 24 MCG Cap Take 1 capsule (24 mcg total) by mouth 2 (two) times daily with meals. 60 capsule 0    polyethylene glycol (GOLYTELY) 236-22.74-6.74 -5.86 gram suspension Take 4,000 mLs (4 L total) by mouth once. for 1 dose 4000 mL 0    [DISCONTINUED] MYRBETRIQ 25 mg Tb24 ER tablet TAKE 1 TABLET BY MOUTH EVERY DAY 90 tablet 3    blood sugar diagnostic Strp 1 strip by Misc.(Non-Drug; Combo Route) route 3 (three) times daily. 100 each 5    blood-glucose meter, disc-type Kit 1 application by Misc.(Non-Drug; Combo Route) route 3 (three) times daily. 1 kit 0    dantrolene (DANTRIUM) 50 MG Cap Take 1 capsule (50 mg total) by mouth 3 (three) times daily. 270 capsule 0    lancets (ACCU-CHEK SOFTCLIX LANCETS) Misc 1 each by Misc.(Non-Drug; Combo Route) route 3 (three) times daily with meals. 100 each 5    sodium,potassium,mag sulfates (SUPREP BOWEL PREP KIT) 17.5-3.13-1.6 gram SolR Take 177 mLs by mouth once daily for 2 doses, as directed 1  kit 0     Current Facility-Administered Medications on File Prior to Visit   Medication Dose Route Frequency Provider Last Rate Last Dose    onabotulinumtoxina injection 100 Units  100 Units Intramuscular Once Yung Rodriguez MD           Review of patient's allergies indicates:   Allergen Reactions    Penicillins      Hives and Itching       Review of Systems   Constitutional: Negative for activity change and fever.   Eyes: Negative for visual disturbance.   Respiratory: Negative for shortness of breath.    Cardiovascular: Negative for chest pain.   Gastrointestinal: Negative for abdominal distention.   Genitourinary: Negative for difficulty urinating.   Skin: Negative for color change.   Neurological: Negative for facial asymmetry.   Psychiatric/Behavioral: Negative for agitation.       Objective:      Physical Exam  Constitutional:       Appearance: He is well-developed.   HENT:      Head: Normocephalic and atraumatic.      Nose: Nose normal.   Neck:      Musculoskeletal: Normal range of motion and neck supple.   Pulmonary:      Effort: Pulmonary effort is normal.   Abdominal:      General: There is no distension.   Genitourinary:     Comments: I&O catheterization performed in sterile fashion, patient tolerated well. ~50ml obtained for sample  Skin:     General: Skin is warm and dry.   Neurological:      Mental Status: He is alert.         Assessment:       1. Abnormal urine odor    2. UTI symptoms    3. Incomplete emptying of bladder    4. Other constipation        Plan:         - Will send urine culture and start keflex based on previous culture. Will call mother with results and if antibiotics need to be changed.   - Discussed that patient is retaining some urine. He is dealing with extreme constipation. Recommend stopping Myrbetriq until after colonoscopy and constipation has resolved. They will let me know when constipation has resolved and will arrange follow-up to see how UUI is. Patient will  follow-up sooner if any issues. They are in agreement with plan.       Abnormal urine odor  -     POCT Bladder Scan    UTI symptoms  -     Urinalysis Microscopic  -     Urine culture  -     Urinalysis  -     cephALEXin (KEFLEX) 500 MG capsule; Take 1 capsule (500 mg total) by mouth every 6 (six) hours. for 7 days  Dispense: 28 capsule; Refill: 0    Incomplete emptying of bladder    Other constipation

## 2020-07-17 NOTE — TELEPHONE ENCOUNTER
Sally,     Please follow up with patient social service needs. At this time patient is still considered to be followed by Movement Dept. In order to make sure that patient needs are being met, I would like to you follow patient until ALS Diagnosis confirmed. Since you work with both Movement and Neuromuscular we can have a continuity of care for Lisa.     Dr. Humphrey's office is in the process of scheduling patient for ALS Query.       Thanks in advance,  Tomeka Conte RN, BSN, BS  ALS Clinical Care Coordinator  [facility]  [phone]

## 2020-07-17 NOTE — TELEPHONE ENCOUNTER
Patient here, offered appointment with NP.  They accepted.  Mother (Ms Akhtar) informed me she kept calling back, but only one message reached staff.  Apologized for any inconvenience.  To be evaluated by NP since MD is in surgery.

## 2020-07-17 NOTE — PATIENT INSTRUCTIONS
- Will send urine culture and start antibiotic based on his last urine culture results. Will call you and let you know if antibiotics need to be changed.  - Stop Myrbetriq. Please call the office once constipation has resolved and we can schedule follow-up and consider starting back the Myrbetriq if you find that it made a difference

## 2020-07-18 ENCOUNTER — HOSPITAL ENCOUNTER (INPATIENT)
Facility: HOSPITAL | Age: 40
LOS: 5 days | Discharge: NURSING, REHAB OR LONG-TERM CARE | DRG: 689 | End: 2020-07-24
Attending: EMERGENCY MEDICINE | Admitting: FAMILY MEDICINE
Payer: MEDICARE

## 2020-07-18 DIAGNOSIS — R56.9 NEW ONSET SEIZURE: ICD-10-CM

## 2020-07-18 DIAGNOSIS — R56.9 SEIZURE: ICD-10-CM

## 2020-07-18 PROBLEM — K59.09 CHRONIC CONSTIPATION: Status: ACTIVE | Noted: 2020-07-18

## 2020-07-18 PROBLEM — N39.0 UTI (URINARY TRACT INFECTION): Status: ACTIVE | Noted: 2020-07-18

## 2020-07-18 LAB
ALBUMIN SERPL BCP-MCNC: 3.8 G/DL (ref 3.5–5.2)
ALP SERPL-CCNC: 61 U/L (ref 55–135)
ALT SERPL W/O P-5'-P-CCNC: 17 U/L (ref 10–44)
AMPHET+METHAMPHET UR QL: NEGATIVE
ANION GAP SERPL CALC-SCNC: 10 MMOL/L (ref 8–16)
AST SERPL-CCNC: 18 U/L (ref 10–40)
BACTERIA #/AREA URNS HPF: ABNORMAL /HPF
BARBITURATES UR QL SCN>200 NG/ML: NEGATIVE
BASOPHILS # BLD AUTO: 0.02 K/UL (ref 0–0.2)
BASOPHILS NFR BLD: 0.3 % (ref 0–1.9)
BENZODIAZ UR QL SCN>200 NG/ML: NEGATIVE
BILIRUB SERPL-MCNC: 0.4 MG/DL (ref 0.1–1)
BILIRUB UR QL STRIP: NEGATIVE
BUN SERPL-MCNC: 19 MG/DL (ref 6–20)
BZE UR QL SCN: NEGATIVE
CALCIUM SERPL-MCNC: 8.7 MG/DL (ref 8.7–10.5)
CANNABINOIDS UR QL SCN: NEGATIVE
CHLORIDE SERPL-SCNC: 107 MMOL/L (ref 95–110)
CLARITY UR: ABNORMAL
CO2 SERPL-SCNC: 22 MMOL/L (ref 23–29)
COLOR UR: YELLOW
CREAT SERPL-MCNC: 1 MG/DL (ref 0.5–1.4)
CREAT UR-MCNC: 340.1 MG/DL (ref 23–375)
DIFFERENTIAL METHOD: ABNORMAL
EOSINOPHIL # BLD AUTO: 0.1 K/UL (ref 0–0.5)
EOSINOPHIL NFR BLD: 0.6 % (ref 0–8)
ERYTHROCYTE [DISTWIDTH] IN BLOOD BY AUTOMATED COUNT: 13.2 % (ref 11.5–14.5)
EST. GFR  (AFRICAN AMERICAN): >60 ML/MIN/1.73 M^2
EST. GFR  (NON AFRICAN AMERICAN): >60 ML/MIN/1.73 M^2
GLUCOSE SERPL-MCNC: 107 MG/DL (ref 70–110)
GLUCOSE UR QL STRIP: NEGATIVE
HCT VFR BLD AUTO: 46.3 % (ref 40–54)
HGB BLD-MCNC: 14.8 G/DL (ref 14–18)
HGB UR QL STRIP: ABNORMAL
HYALINE CASTS #/AREA URNS LPF: 0 /LPF
IMM GRANULOCYTES # BLD AUTO: 0.01 K/UL (ref 0–0.04)
IMM GRANULOCYTES NFR BLD AUTO: 0.1 % (ref 0–0.5)
KETONES UR QL STRIP: NEGATIVE
LACTATE SERPL-SCNC: 0.8 MMOL/L (ref 0.5–2.2)
LEUKOCYTE ESTERASE UR QL STRIP: NEGATIVE
LYMPHOCYTES # BLD AUTO: 0.4 K/UL (ref 1–4.8)
LYMPHOCYTES NFR BLD: 4.7 % (ref 18–48)
MCH RBC QN AUTO: 27.6 PG (ref 27–31)
MCHC RBC AUTO-ENTMCNC: 32 G/DL (ref 32–36)
MCV RBC AUTO: 86 FL (ref 82–98)
METHADONE UR QL SCN>300 NG/ML: NEGATIVE
MICROSCOPIC COMMENT: ABNORMAL
MONOCYTES # BLD AUTO: 0.7 K/UL (ref 0.3–1)
MONOCYTES NFR BLD: 9.2 % (ref 4–15)
NEUTROPHILS # BLD AUTO: 6.7 K/UL (ref 1.8–7.7)
NEUTROPHILS NFR BLD: 85.1 % (ref 38–73)
NITRITE UR QL STRIP: POSITIVE
NRBC BLD-RTO: 0 /100 WBC
OPIATES UR QL SCN: NEGATIVE
PCP UR QL SCN>25 NG/ML: NEGATIVE
PH UR STRIP: 6 [PH] (ref 5–8)
PLATELET # BLD AUTO: 151 K/UL (ref 150–350)
PMV BLD AUTO: 11.1 FL (ref 9.2–12.9)
POCT GLUCOSE: 110 MG/DL (ref 70–110)
POCT GLUCOSE: 203 MG/DL (ref 70–110)
POTASSIUM SERPL-SCNC: 4 MMOL/L (ref 3.5–5.1)
PROT SERPL-MCNC: 7.5 G/DL (ref 6–8.4)
PROT UR QL STRIP: ABNORMAL
RBC # BLD AUTO: 5.37 M/UL (ref 4.6–6.2)
RBC #/AREA URNS HPF: 20 /HPF (ref 0–4)
SARS-COV-2 RDRP RESP QL NAA+PROBE: NEGATIVE
SODIUM SERPL-SCNC: 139 MMOL/L (ref 136–145)
SP GR UR STRIP: >=1.03 (ref 1–1.03)
TOXICOLOGY INFORMATION: NORMAL
URN SPEC COLLECT METH UR: ABNORMAL
UROBILINOGEN UR STRIP-ACNC: 1 EU/DL
WBC # BLD AUTO: 7.82 K/UL (ref 3.9–12.7)
WBC #/AREA URNS HPF: 5 /HPF (ref 0–5)

## 2020-07-18 PROCEDURE — 99285 EMERGENCY DEPT VISIT HI MDM: CPT | Mod: 25

## 2020-07-18 PROCEDURE — 96372 THER/PROPH/DIAG INJ SC/IM: CPT | Mod: 59

## 2020-07-18 PROCEDURE — 93010 EKG 12-LEAD: ICD-10-PCS | Mod: ,,, | Performed by: INTERNAL MEDICINE

## 2020-07-18 PROCEDURE — 85025 COMPLETE CBC W/AUTO DIFF WBC: CPT

## 2020-07-18 PROCEDURE — 87040 BLOOD CULTURE FOR BACTERIA: CPT

## 2020-07-18 PROCEDURE — 96361 HYDRATE IV INFUSION ADD-ON: CPT

## 2020-07-18 PROCEDURE — 96365 THER/PROPH/DIAG IV INF INIT: CPT

## 2020-07-18 PROCEDURE — 80307 DRUG TEST PRSMV CHEM ANLYZR: CPT

## 2020-07-18 PROCEDURE — 81000 URINALYSIS NONAUTO W/SCOPE: CPT

## 2020-07-18 PROCEDURE — 83605 ASSAY OF LACTIC ACID: CPT

## 2020-07-18 PROCEDURE — P9612 CATHETERIZE FOR URINE SPEC: HCPCS

## 2020-07-18 PROCEDURE — G0378 HOSPITAL OBSERVATION PER HR: HCPCS

## 2020-07-18 PROCEDURE — 96375 TX/PRO/DX INJ NEW DRUG ADDON: CPT

## 2020-07-18 PROCEDURE — 93005 ELECTROCARDIOGRAM TRACING: CPT

## 2020-07-18 PROCEDURE — 80053 COMPREHEN METABOLIC PANEL: CPT

## 2020-07-18 PROCEDURE — 93010 ELECTROCARDIOGRAM REPORT: CPT | Mod: ,,, | Performed by: INTERNAL MEDICINE

## 2020-07-18 PROCEDURE — U0002 COVID-19 LAB TEST NON-CDC: HCPCS

## 2020-07-18 PROCEDURE — 63600175 PHARM REV CODE 636 W HCPCS: Performed by: NURSE PRACTITIONER

## 2020-07-18 PROCEDURE — 63600175 PHARM REV CODE 636 W HCPCS: Performed by: EMERGENCY MEDICINE

## 2020-07-18 PROCEDURE — 82962 GLUCOSE BLOOD TEST: CPT

## 2020-07-18 PROCEDURE — 25000003 PHARM REV CODE 250: Performed by: EMERGENCY MEDICINE

## 2020-07-18 RX ORDER — DONEPEZIL HYDROCHLORIDE 5 MG/1
5 TABLET, FILM COATED ORAL NIGHTLY
Status: DISCONTINUED | OUTPATIENT
Start: 2020-07-18 | End: 2020-07-24 | Stop reason: HOSPADM

## 2020-07-18 RX ORDER — LACTULOSE 10 G/15ML
20 SOLUTION ORAL EVERY 6 HOURS PRN
Status: DISCONTINUED | OUTPATIENT
Start: 2020-07-18 | End: 2020-07-24 | Stop reason: HOSPADM

## 2020-07-18 RX ORDER — BACLOFEN 10 MG/1
20 TABLET ORAL 3 TIMES DAILY
Status: DISCONTINUED | OUTPATIENT
Start: 2020-07-18 | End: 2020-07-18

## 2020-07-18 RX ORDER — SODIUM CHLORIDE 0.9 % (FLUSH) 0.9 %
10 SYRINGE (ML) INJECTION
Status: DISCONTINUED | OUTPATIENT
Start: 2020-07-18 | End: 2020-07-24 | Stop reason: HOSPADM

## 2020-07-18 RX ORDER — DONEPEZIL HYDROCHLORIDE 5 MG/1
5 TABLET, FILM COATED ORAL NIGHTLY
Status: DISCONTINUED | OUTPATIENT
Start: 2020-07-19 | End: 2020-07-18

## 2020-07-18 RX ORDER — ACETAMINOPHEN 325 MG/1
650 TABLET ORAL EVERY 6 HOURS PRN
Status: DISCONTINUED | OUTPATIENT
Start: 2020-07-18 | End: 2020-07-24 | Stop reason: HOSPADM

## 2020-07-18 RX ORDER — ONDANSETRON 2 MG/ML
4 INJECTION INTRAMUSCULAR; INTRAVENOUS EVERY 6 HOURS PRN
Status: DISCONTINUED | OUTPATIENT
Start: 2020-07-18 | End: 2020-07-24 | Stop reason: HOSPADM

## 2020-07-18 RX ORDER — BACLOFEN 5 MG/1
20 TABLET ORAL ONCE
Status: COMPLETED | OUTPATIENT
Start: 2020-07-18 | End: 2020-07-18

## 2020-07-18 RX ORDER — DANTROLENE SODIUM 25 MG/1
50 CAPSULE ORAL 3 TIMES DAILY
Status: DISCONTINUED | OUTPATIENT
Start: 2020-07-18 | End: 2020-07-24 | Stop reason: HOSPADM

## 2020-07-18 RX ORDER — IBUPROFEN 200 MG
16 TABLET ORAL
Status: DISCONTINUED | OUTPATIENT
Start: 2020-07-18 | End: 2020-07-18

## 2020-07-18 RX ORDER — INSULIN ASPART 100 [IU]/ML
0-5 INJECTION, SOLUTION INTRAVENOUS; SUBCUTANEOUS
Status: DISCONTINUED | OUTPATIENT
Start: 2020-07-18 | End: 2020-07-18

## 2020-07-18 RX ORDER — GLUCAGON 1 MG
1 KIT INJECTION
Status: DISCONTINUED | OUTPATIENT
Start: 2020-07-18 | End: 2020-07-18

## 2020-07-18 RX ORDER — BACLOFEN 5 MG/1
20 TABLET ORAL 3 TIMES DAILY
Status: DISCONTINUED | OUTPATIENT
Start: 2020-07-19 | End: 2020-07-19

## 2020-07-18 RX ORDER — LEVETIRACETAM 500 MG/1
1000 TABLET ORAL 2 TIMES DAILY
Status: DISCONTINUED | OUTPATIENT
Start: 2020-07-19 | End: 2020-07-22

## 2020-07-18 RX ORDER — IBUPROFEN 200 MG
24 TABLET ORAL
Status: DISCONTINUED | OUTPATIENT
Start: 2020-07-18 | End: 2020-07-18

## 2020-07-18 RX ORDER — DONEPEZIL HYDROCHLORIDE 5 MG/1
5 TABLET, FILM COATED ORAL NIGHTLY
Status: DISCONTINUED | OUTPATIENT
Start: 2020-07-18 | End: 2020-07-18

## 2020-07-18 RX ADMIN — DEXTROSE MONOHYDRATE 3000 MG: 50 INJECTION, SOLUTION INTRAVENOUS at 08:07

## 2020-07-18 RX ADMIN — INSULIN ASPART 1 UNITS: 100 INJECTION, SOLUTION INTRAVENOUS; SUBCUTANEOUS at 09:07

## 2020-07-18 RX ADMIN — CEFTRIAXONE 1 G: 1 INJECTION, SOLUTION INTRAVENOUS at 06:07

## 2020-07-18 RX ADMIN — BACLOFEN 20 MG: 5 TABLET ORAL at 08:07

## 2020-07-18 RX ADMIN — DONEPEZIL HYDROCHLORIDE 5 MG: 5 TABLET, FILM COATED ORAL at 06:07

## 2020-07-18 RX ADMIN — SODIUM CHLORIDE, SODIUM LACTATE, POTASSIUM CHLORIDE, AND CALCIUM CHLORIDE 1000 ML: .6; .31; .03; .02 INJECTION, SOLUTION INTRAVENOUS at 05:07

## 2020-07-18 NOTE — ED PROVIDER NOTES
Encounter Date: 7/18/2020    SCRIBE #1 NOTE: Savannah ANNE , azeb scribing for, and in the presence of, Dr. Marcelo Ruelas.       History     Chief Complaint   Patient presents with    shaking     mother reports pt.had an episode of shaking an unresponsiveness that lasted approx. 30 minutes today.        Time seen by provider: 4:19 PM on 07/18/2020    The patient is a 39 y.o. male who presents to the ED via EMS with complaint of seizure activity. As per mom, he started to shake upper and lower extremities at 2:15 today. He dropped his head to the right and began to drool. This lasted for 25 minutes. Upon EMS arrival, he appeared out of it as compared to baseline. Pt wasn't able to speak. His mom denies any recent illness or history of seizure. His mediciations were recently changed. Pt is now answering simple questions and denies any complaints.      Pt has a past medical history of Degenerative motor system disease, Depression, Seizure, Spastic quadriplegia, and Spinocerebellar atrophy.  Pt has a past surgical history that includes Baclofen pump implantation and Fluoroscopic urodynamic study (N/A, 11/27/2018).      The history is provided by the EMS personnel.     Review of patient's allergies indicates:   Allergen Reactions    Penicillins      Hives and Itching     Past Medical History:   Diagnosis Date    Degenerative motor system disease     Depression     Seizure     Spastic quadriplegia     Spinocerebellar atrophy      Past Surgical History:   Procedure Laterality Date    BACLOFEN PUMP IMPLANTATION      FLUOROSCOPIC URODYNAMIC STUDY N/A 11/27/2018    Procedure: URODYNAMIC STUDY, FLUOROSCOPIC;  Surgeon: Tanja Okeefe MD;  Location: [facility] OR [address] [place] [address];  Service: Urology;  Laterality: N/A;  1 hour     Family History   Problem Relation Age of Onset    Thyroid cancer Mother     Lung cancer Maternal Grandfather     Multiple sclerosis Other         mother's cousin    ALS Neg Hx     Muscular dystrophy Neg  Hx      Social History     Tobacco Use    Smoking status: Never Smoker    Smokeless tobacco: Never Used   Substance Use Topics    Alcohol use: Not Currently     Frequency: Never     Drinks per session: Patient refused     Binge frequency: Never     Comment: social drinker, at times    Drug use: Not Currently     Review of Systems   Constitutional: Negative for chills and fever.   HENT: Negative for congestion, ear pain, rhinorrhea and sore throat.    Respiratory: Negative for cough, shortness of breath and wheezing.    Cardiovascular: Negative for chest pain and palpitations.   Gastrointestinal: Negative for abdominal pain, diarrhea, nausea and vomiting.   Genitourinary: Negative for dysuria and hematuria.   Musculoskeletal: Negative for back pain, myalgias and neck pain.   Skin: Negative for rash.   Neurological: Positive for seizures. Negative for dizziness, weakness, light-headedness and headaches.   Psychiatric/Behavioral: Negative for confusion.       Physical Exam     Initial Vitals [07/18/20 1545]   BP Pulse Resp Temp SpO2   119/82 103 20 98.3 °F (36.8 °C) 98 %      MAP       --         Physical Exam    Nursing note and vitals reviewed.  Constitutional: He appears well-developed and well-nourished. He is not diaphoretic. No distress.   HENT:   Head: Normocephalic and atraumatic.   Mouth/Throat: Oropharynx is clear and moist.   Eyes: Conjunctivae and EOM are normal.   Neck: Normal range of motion. Neck supple.   Cardiovascular: Normal rate, regular rhythm and normal heart sounds. Exam reveals no gallop and no friction rub.    No murmur heard.  Pulmonary/Chest: Breath sounds normal. He has no wheezes. He has no rhonchi. He has no rales.   Abdominal: Soft. He exhibits no mass. There is no abdominal tenderness. There is no rebound and no guarding.   Musculoskeletal: Normal range of motion. No tenderness or edema.   Lymphadenopathy:     He has no cervical adenopathy.   Neurological: He is alert and oriented to  person, place, and time. He has normal strength.   Moving all extremities. Sensations intact.   Skin: Skin is warm and dry. No rash and no abscess noted.       ED Course   Procedures  Labs Reviewed   CBC W/ AUTO DIFFERENTIAL - Abnormal; Notable for the following components:       Result Value    Lymph # 0.4 (*)     Gran% 85.1 (*)     Lymph% 4.7 (*)     All other components within normal limits   COMPREHENSIVE METABOLIC PANEL - Abnormal; Notable for the following components:    CO2 22 (*)     All other components within normal limits   URINALYSIS, REFLEX TO URINE CULTURE - Abnormal; Notable for the following components:    Appearance, UA Hazy (*)     Specific Gravity, UA >=1.030 (*)     Protein, UA 1+ (*)     Occult Blood UA 3+ (*)     Nitrite, UA Positive (*)     All other components within normal limits    Narrative:     Specimen Source->Urine   URINALYSIS MICROSCOPIC - Abnormal; Notable for the following components:    RBC, UA 20 (*)     Bacteria Many (*)     All other components within normal limits    Narrative:     Specimen Source->Urine   CULTURE, BLOOD   CULTURE, BLOOD   LACTIC ACID, PLASMA   DRUG SCREEN PANEL, URINE EMERGENCY    Narrative:     Specimen Source->Urine   SARS-COV-2 RNA AMPLIFICATION, QUAL   POCT GLUCOSE   POCT GLUCOSE MONITORING CONTINUOUS          Imaging Results          CT Head Without Contrast (Final result)  Result time 07/18/20 18:30:06    Final result by Julius Christopher MD (07/18/20 18:30:06)                 Impression:      No acute intracranial abnormality.  Specifically, no intracranial hemorrhage.      Electronically signed by: Julius Christopher MD  Date:    07/18/2020  Time:    18:30             Narrative:    EXAMINATION:  CT HEAD WITHOUT CONTRAST    CLINICAL HISTORY:  Seizure, nontraumatic (Age 18-40y);    TECHNIQUE:  Low dose axial CT images obtained throughout the head without intravenous contrast. Sagittal and coronal reconstructions were performed.    COMPARISON:  MRI brain  09/07/2018 and head CT 02/22/2015    FINDINGS:  Intracranial compartment:    Ventricles and sulci are normal in size for age without evidence of hydrocephalus. No extra-axial blood or fluid collections.    The brain parenchyma appears normal. No parenchymal mass, hemorrhage, edema or major vascular distribution infarct.    Skull/extracranial contents (limited evaluation): No fracture. Mastoid air cells and paranasal sinuses are essentially clear.  Imaged portions of the orbits are within normal limits.                               X-Ray Chest AP Portable (Final result)  Result time 07/18/20 17:41:17    Final result by Ashly Oliveira MD (07/18/20 17:41:17)                 Impression:      No acute cardiopulmonary process identified.      Electronically signed by: Ashly Oliveira MD  Date:    07/18/2020  Time:    17:41             Narrative:    EXAMINATION:  XR CHEST AP PORTABLE    CLINICAL HISTORY:  seizure;    TECHNIQUE:  Single frontal view of the chest was performed.    COMPARISON:  01/02/2020.    FINDINGS:  Cardiac silhouette is normal in size.  Lungs are symmetrically expanded.  No evidence of focal consolidative process, pneumothorax, or significant pleural effusion.  No acute osseous abnormality identified.  There is prominent gaseous distention of the stomach and splenic flexure of the colon beneath the left hemidiaphragm, similar to previous exams.                                 Medical Decision Making:   Differential Diagnosis:   Differential Diagnosis includes, but is not limited to:  CVA/TIA, seizure, status epilepticus, post-ictal state, meningitis/encephalitis, sepsis, MI/ACS, arrhythmia, syncope, intracranial mass/hemorrhage, head trauma, anaphylaxis, substance abuse, alcohol intoxication/withdrawal, medication reaction, intentional medication overdose, neuroleptic malignant syndrome, serotonin syndrome, CO poisoning, hypoxia/hypercapnea, hepatic encephalopathy, metabolic disturbance, thyroid  disease, hypoglycemia.    Clinical Tests:   Lab Tests: Ordered and Reviewed  Radiological Study: Ordered and Reviewed  Medical Tests: Ordered and Reviewed  ED Management:  Upon re-evaluation, the patient's status has remained stable.  At this time, I believe the patient should be admitted to the hospital for further evaluation and management of new onset seizure.    MARGIE Hollins with [facility] medicine service was contacted and the case was discussed. The consulting physician agrees with plan and will admit under their service. Additional recommendations at this time: none    The patient and family were updated with test results, overall impression, and further plan of care. All questions were answered. The patient expressed understanding and agreed with the current plan.                          ED Course as of Jul 18 2023   Sat Jul 18, 2020   1619 Patient with new onset seizure.  History of spastic quadriplegia and spinal cerebellar ataxia..  He is currently neuro intact at this time.  Will obtain labs CT imaging and reassess.  Possible admission.    [RN]   1744 CBC auto differential(!) [RN]   1744 POCT glucose [RN]   1816 Urinalysis, Reflex to Urine Culture Urine, Clean Catch(!) [RN]   1816 CBC auto differential(!) [RN]   1856 Labs without significant abnormalities.  Patient is given Rocephin for UTI.  CT head negative.  Given patient's comorbidities will consult neuro critical care for continuous EEG monitoring given length of seizure     [RN]   1922 Discussed with Dr. Corona. Length of seizure approaching status epilepticus however given that patient is back to baseline, he does not require transfer for continuous EEG. Recommends loading with Keppra (40mg/kg) and beginning Keppra 500mg bid after 8 hours.     [RN]      ED Course User Index  [RN] Marcelo Ruelas Jr., MD                Clinical Impression:     1. Seizure    2. New onset seizure      Scribe Attestation I, Marcelo Ruelas,  personally performed the  services described in this documentation. All medical record entries made by the scribe were at my direction and in my presence.  I have reviewed the chart and agree that the record reflects my personal performance and is accurate and complete. Marcelo Ruelas M.D. 8:24 PM07/18/2020    Portions of this note were dictated using voice recognition software and may contain dictation related errors in spelling/grammar/syntax not found on text review                             Marcelo Ruelas Jr., MD  07/18/20 2024

## 2020-07-19 PROBLEM — R73.9 HYPERGLYCEMIA: Status: ACTIVE | Noted: 2020-07-19

## 2020-07-19 PROBLEM — R56.9 SEIZURE: Status: ACTIVE | Noted: 2020-07-19

## 2020-07-19 LAB
ALBUMIN SERPL BCP-MCNC: 3.1 G/DL (ref 3.5–5.2)
ALP SERPL-CCNC: 47 U/L (ref 55–135)
ALT SERPL W/O P-5'-P-CCNC: 18 U/L (ref 10–44)
ANION GAP SERPL CALC-SCNC: 5 MMOL/L (ref 8–16)
AST SERPL-CCNC: 31 U/L (ref 10–40)
BASOPHILS # BLD AUTO: 0.02 K/UL (ref 0–0.2)
BASOPHILS NFR BLD: 0.3 % (ref 0–1.9)
BILIRUB SERPL-MCNC: 0.4 MG/DL (ref 0.1–1)
BUN SERPL-MCNC: 15 MG/DL (ref 6–20)
CALCIUM SERPL-MCNC: 8.4 MG/DL (ref 8.7–10.5)
CHLORIDE SERPL-SCNC: 106 MMOL/L (ref 95–110)
CO2 SERPL-SCNC: 27 MMOL/L (ref 23–29)
CREAT SERPL-MCNC: 1 MG/DL (ref 0.5–1.4)
DIFFERENTIAL METHOD: ABNORMAL
EOSINOPHIL # BLD AUTO: 0.2 K/UL (ref 0–0.5)
EOSINOPHIL NFR BLD: 2.4 % (ref 0–8)
ERYTHROCYTE [DISTWIDTH] IN BLOOD BY AUTOMATED COUNT: 13.3 % (ref 11.5–14.5)
EST. GFR  (AFRICAN AMERICAN): >60 ML/MIN/1.73 M^2
EST. GFR  (NON AFRICAN AMERICAN): >60 ML/MIN/1.73 M^2
GLUCOSE SERPL-MCNC: 77 MG/DL (ref 70–110)
HCT VFR BLD AUTO: 43.7 % (ref 40–54)
HGB BLD-MCNC: 13.4 G/DL (ref 14–18)
IMM GRANULOCYTES # BLD AUTO: 0.01 K/UL (ref 0–0.04)
IMM GRANULOCYTES NFR BLD AUTO: 0.2 % (ref 0–0.5)
LYMPHOCYTES # BLD AUTO: 1 K/UL (ref 1–4.8)
LYMPHOCYTES NFR BLD: 15.2 % (ref 18–48)
MAGNESIUM SERPL-MCNC: 1.8 MG/DL (ref 1.6–2.6)
MCH RBC QN AUTO: 26.6 PG (ref 27–31)
MCHC RBC AUTO-ENTMCNC: 30.7 G/DL (ref 32–36)
MCV RBC AUTO: 87 FL (ref 82–98)
MONOCYTES # BLD AUTO: 1.1 K/UL (ref 0.3–1)
MONOCYTES NFR BLD: 16.1 % (ref 4–15)
NEUTROPHILS # BLD AUTO: 4.3 K/UL (ref 1.8–7.7)
NEUTROPHILS NFR BLD: 65.8 % (ref 38–73)
NRBC BLD-RTO: 0 /100 WBC
PHOSPHATE SERPL-MCNC: 3.4 MG/DL (ref 2.7–4.5)
PLATELET # BLD AUTO: 153 K/UL (ref 150–350)
PMV BLD AUTO: 11 FL (ref 9.2–12.9)
POCT GLUCOSE: 97 MG/DL (ref 70–110)
POTASSIUM SERPL-SCNC: 4 MMOL/L (ref 3.5–5.1)
PROT SERPL-MCNC: 6.3 G/DL (ref 6–8.4)
RBC # BLD AUTO: 5.03 M/UL (ref 4.6–6.2)
SODIUM SERPL-SCNC: 138 MMOL/L (ref 136–145)
WBC # BLD AUTO: 6.59 K/UL (ref 3.9–12.7)

## 2020-07-19 PROCEDURE — 80053 COMPREHEN METABOLIC PANEL: CPT

## 2020-07-19 PROCEDURE — 83735 ASSAY OF MAGNESIUM: CPT

## 2020-07-19 PROCEDURE — 84100 ASSAY OF PHOSPHORUS: CPT

## 2020-07-19 PROCEDURE — 94761 N-INVAS EAR/PLS OXIMETRY MLT: CPT

## 2020-07-19 PROCEDURE — 25000003 PHARM REV CODE 250: Performed by: NURSE PRACTITIONER

## 2020-07-19 PROCEDURE — 36415 COLL VENOUS BLD VENIPUNCTURE: CPT

## 2020-07-19 PROCEDURE — 25000003 PHARM REV CODE 250: Performed by: EMERGENCY MEDICINE

## 2020-07-19 PROCEDURE — 11000001 HC ACUTE MED/SURG PRIVATE ROOM

## 2020-07-19 PROCEDURE — 85025 COMPLETE CBC W/AUTO DIFF WBC: CPT

## 2020-07-19 PROCEDURE — 63600175 PHARM REV CODE 636 W HCPCS: Performed by: NURSE PRACTITIONER

## 2020-07-19 RX ORDER — BACLOFEN 10 MG/1
20 TABLET ORAL 4 TIMES DAILY
Status: DISCONTINUED | OUTPATIENT
Start: 2020-07-19 | End: 2020-07-24 | Stop reason: HOSPADM

## 2020-07-19 RX ORDER — LUBIPROSTONE 8 UG/1
24 CAPSULE ORAL 2 TIMES DAILY WITH MEALS
Status: DISCONTINUED | OUTPATIENT
Start: 2020-07-19 | End: 2020-07-24 | Stop reason: HOSPADM

## 2020-07-19 RX ADMIN — DONEPEZIL HYDROCHLORIDE 5 MG: 5 TABLET, FILM COATED ORAL at 08:07

## 2020-07-19 RX ADMIN — BACLOFEN 20 MG: 5 TABLET ORAL at 05:07

## 2020-07-19 RX ADMIN — LEVETIRACETAM 1000 MG: 500 TABLET ORAL at 08:07

## 2020-07-19 RX ADMIN — LUBIPROSTONE 24 MCG: 8 CAPSULE, GELATIN COATED ORAL at 05:07

## 2020-07-19 RX ADMIN — BACLOFEN 20 MG: 5 TABLET ORAL at 08:07

## 2020-07-19 RX ADMIN — LUBIPROSTONE 24 MCG: 8 CAPSULE, GELATIN COATED ORAL at 09:07

## 2020-07-19 RX ADMIN — LEVETIRACETAM 1000 MG: 500 TABLET ORAL at 09:07

## 2020-07-19 RX ADMIN — BACLOFEN 20 MG: 5 TABLET ORAL at 09:07

## 2020-07-19 RX ADMIN — BACLOFEN 20 MG: 5 TABLET ORAL at 02:07

## 2020-07-19 RX ADMIN — DANTROLENE SODIUM 50 MG: 25 CAPSULE ORAL at 09:07

## 2020-07-19 RX ADMIN — DANTROLENE SODIUM 50 MG: 25 CAPSULE ORAL at 08:07

## 2020-07-19 RX ADMIN — DANTROLENE SODIUM 50 MG: 25 CAPSULE ORAL at 02:07

## 2020-07-19 RX ADMIN — CEFTRIAXONE 1 G: 1 INJECTION, SOLUTION INTRAVENOUS at 08:07

## 2020-07-19 NOTE — ASSESSMENT & PLAN NOTE
CT of head showed no acute findings  U/A concerning for UTI, continue ceftriaxone, pending sensitivities   Consult Neurology  Received 3000mg Keppra loading dose in ED, continue Keppra 1000mg BID  Ativan prn  Neuro checks q4 hours  Seizure precautions

## 2020-07-19 NOTE — PROGRESS NOTES
[facility]  Progress Note    Patient Name: Lisa Moreno  MRN: [id number]  Patient Class: OP- Observation   Admission Date: 7/18/2020  Length of Stay: 0 days  Attending Physician: Virginia Chavira*  Primary Care Provider: John Nixon Ii, MD        Subjective:     Principal Problem:New onset seizure        HPI:  39 y.o. male with past medical history of depression, chronic constipation, degenerative motor system disease, spastic diplegia,  quadriplegia, baclofen pump implantation and fluoroscopic urodynamic study ( 11/27/2018), and spinocerebellar atrophy who presents to the ED via EMS with complaint of seizure activity. As per mom, he started to shake upper and lower extremities at 2:15 today. He dropped his head to the right and began to drool. This lasted for 25 minutes. Upon arrival to ED, patient nonverbal.  His mom denies any recent illness or history of seizure. His medications were recently changed. Patient is now verbal but with very low tone, almost mumble.  Patient mother states that is not baseline.  Denies fever, chills, SOB, chest pain, headaches, syncope, palpitations, nausea, vomiting, change in bladder habits or change in bowel habits.  Findings: Drug screen negative, covid negative, u/a concerning for UTI, urine cultures from 7/17/20 grew E.coli, pending sensitives, blood cultures pending, CT of head negative for acute findings.  Of note, blood glucose elevated at 203, patient's mother states that last time he had UTI blood sugars elevated also, will monitor.  Patient admitted to hospital medicine observation service for further medical management.    Overview/Hospital Course:  The patient was admitted to the [facility] service for further care. Keppra was given for seizures. IV ceftriaxone was given for UTI. Neuro was consulted. EEG was ordered.     Interval history: No distress noted and no pain noted. He is non-verbal at the  time. The patients mom states he has been non-verbal since she noticed the seizures. Cont seizure precautions.      Review of Systems   Constitutional: Negative for chills and fever.   HENT: Negative for congestion, postnasal drip and rhinorrhea.    Eyes: Negative for visual disturbance.   Respiratory: Negative for chest tightness and shortness of breath.    Cardiovascular: Negative for chest pain and palpitations.   Gastrointestinal: Negative for abdominal distention, abdominal pain, nausea and vomiting.   Genitourinary: Negative for difficulty urinating.   Musculoskeletal: Negative for arthralgias and myalgias.   Skin: Negative for color change and wound.   Neurological: Positive for seizures. Negative for weakness and headaches.   Hematological: Does not bruise/bleed easily.   Psychiatric/Behavioral: Negative for agitation.     Objective:     Vital Signs (Most Recent):  Temp: 96.8 °F (36 °C) (07/19/20 0743)  Pulse: 65 (07/19/20 0743)  Resp: 17 (07/19/20 0743)  BP: 106/68 (07/19/20 0743)  SpO2: 100 % (07/19/20 0743) Vital Signs (24h Range):  Temp:  [96.5 °F (35.8 °C)-98.9 °F (37.2 °C)] 96.8 °F (36 °C)  Pulse:  [] 65  Resp:  [16-20] 17  SpO2:  [95 %-100 %] 100 %  BP: (106-126)/(65-82) 106/68     Weight: 75.8 kg (167 lb)  Body mass index is 20.87 kg/m².    Physical Exam  Vitals signs and nursing note reviewed.   Constitutional:       Appearance: Normal appearance.   HENT:      Head: Normocephalic and atraumatic.      Nose: Nose normal.      Mouth/Throat:      Mouth: Mucous membranes are moist.   Eyes:      Extraocular Movements: Extraocular movements intact.      Pupils: Pupils are equal, round, and reactive to light.   Neck:      Musculoskeletal: Normal range of motion and neck supple.   Cardiovascular:      Heart sounds: No murmur.   Pulmonary:      Effort: Pulmonary effort is normal.   Abdominal:      General: Bowel sounds are normal.   Musculoskeletal:         General: No swelling or tenderness.   Skin:      General: Skin is warm and dry.   Neurological:      General: No focal deficit present.      Mental Status: He is alert.   Psychiatric:         Mood and Affect: Mood normal.         Behavior: Behavior normal.           CRANIAL NERVES     CN III, IV, VI   Pupils are equal, round, and reactive to light.       Significant Labs:   Bilirubin:   Recent Labs   Lab 07/18/20  1726 07/19/20  0543   BILITOT 0.4 0.4     BMP:   Recent Labs   Lab 07/19/20  0543   GLU 77      K 4.0      CO2 27   BUN 15   CREATININE 1.0   CALCIUM 8.4*   MG 1.8     CBC:   Recent Labs   Lab 07/18/20 1726 07/19/20  0542   WBC 7.82 6.59   HGB 14.8 13.4*   HCT 46.3 43.7    153     CMP:   Recent Labs   Lab 07/18/20  1726 07/19/20  0543    138   K 4.0 4.0    106   CO2 22* 27    77   BUN 19 15   CREATININE 1.0 1.0   CALCIUM 8.7 8.4*   PROT 7.5 6.3   ALBUMIN 3.8 3.1*   BILITOT 0.4 0.4   ALKPHOS 61 47*   AST 18 31   ALT 17 18   ANIONGAP 10 5*   EGFRNONAA >60 >60     Lactic Acid:   Recent Labs   Lab 07/18/20  1726   LACTATE 0.8     Urine Studies:   Recent Labs   Lab 07/17/20  1514 07/18/20  1726   COLORU Cordelia Yellow   APPEARANCEUA Cloudy* Hazy*   PHUR 6.0 6.0   SPECGRAV 1.020 >=1.030*   PROTEINUA Negative 1+*   GLUCUA Negative Negative   KETONESU Negative Negative   BILIRUBINUA Negative Negative   OCCULTUA 2+* 3+*   NITRITE Negative Positive*   UROBILINOGEN  --  1.0   LEUKOCYTESUR Trace* Negative   RBCUA 89* 20*   WBCUA 5 5   BACTERIA Many* Many*   SQUAMEPITHEL 1  --    HYALINECASTS  --  0       Significant Imaging: I have reviewed all pertinent imaging results/findings within the past 24 hours.      Assessment/Plan:      * New onset seizure  CT of head showed no acute findings  U/A concerning for UTI, continue ceftriaxone, E. Coli noted   Consult Neurology  Received 3000mg Keppra loading dose in ED, continue Keppra 1000mg BID  Ativan prn  Neuro checks q4 hours  Seizure precautions      Hyperglycemia  Monitor  glucose      Chronic constipation  Continue amitiza  Continue lactulose prn    UTI (urinary tract infection)  Continue ceftriaxone   Urine cultures growing E. Coli  Blood cultures pending       Decreased functional mobility  Spinocerebellar ataxia  Spastic diplegia    Continue Baclofen, Dantrolene and Donepezil       VTE Risk Mitigation (From admission, onward)         Ordered     IP VTE HIGH RISK PATIENT  Once      07/18/20 2200     Place sequential compression device  Until discontinued      07/18/20 2200                      Fran Dior NP  Department of Hospital Medicine   [facility]

## 2020-07-19 NOTE — SUBJECTIVE & OBJECTIVE
Interval history: No distress noted and no pain noted. He is non-verbal at the time. The patients mom states he has been non-verbal since she noticed the seizures. Cont seizure precautions.      Review of Systems   Constitutional: Negative for chills and fever.   HENT: Negative for congestion, postnasal drip and rhinorrhea.    Eyes: Negative for visual disturbance.   Respiratory: Negative for chest tightness and shortness of breath.    Cardiovascular: Negative for chest pain and palpitations.   Gastrointestinal: Negative for abdominal distention, abdominal pain, nausea and vomiting.   Genitourinary: Negative for difficulty urinating.   Musculoskeletal: Negative for arthralgias and myalgias.   Skin: Negative for color change and wound.   Neurological: Positive for seizures. Negative for weakness and headaches.   Hematological: Does not bruise/bleed easily.   Psychiatric/Behavioral: Negative for agitation.     Objective:     Vital Signs (Most Recent):  Temp: 96.8 °F (36 °C) (07/19/20 0743)  Pulse: 65 (07/19/20 0743)  Resp: 17 (07/19/20 0743)  BP: 106/68 (07/19/20 0743)  SpO2: 100 % (07/19/20 0743) Vital Signs (24h Range):  Temp:  [96.5 °F (35.8 °C)-98.9 °F (37.2 °C)] 96.8 °F (36 °C)  Pulse:  [] 65  Resp:  [16-20] 17  SpO2:  [95 %-100 %] 100 %  BP: (106-126)/(65-82) 106/68     Weight: 75.8 kg (167 lb)  Body mass index is 20.87 kg/m².    Physical Exam  Vitals signs and nursing note reviewed.   Constitutional:       Appearance: Normal appearance.   HENT:      Head: Normocephalic and atraumatic.      Nose: Nose normal.      Mouth/Throat:      Mouth: Mucous membranes are moist.   Eyes:      Extraocular Movements: Extraocular movements intact.      Pupils: Pupils are equal, round, and reactive to light.   Neck:      Musculoskeletal: Normal range of motion and neck supple.   Cardiovascular:      Heart sounds: No murmur.   Pulmonary:      Effort: Pulmonary effort is normal.   Abdominal:      General: Bowel sounds are  normal.   Musculoskeletal:         General: No swelling or tenderness.   Skin:     General: Skin is warm and dry.   Neurological:      General: No focal deficit present.      Mental Status: He is alert.   Psychiatric:         Mood and Affect: Mood normal.         Behavior: Behavior normal.           CRANIAL NERVES     CN III, IV, VI   Pupils are equal, round, and reactive to light.       Significant Labs:   Bilirubin:   Recent Labs   Lab 07/18/20  1726 07/19/20  0543   BILITOT 0.4 0.4     BMP:   Recent Labs   Lab 07/19/20  0543   GLU 77      K 4.0      CO2 27   BUN 15   CREATININE 1.0   CALCIUM 8.4*   MG 1.8     CBC:   Recent Labs   Lab 07/18/20 1726 07/19/20  0542   WBC 7.82 6.59   HGB 14.8 13.4*   HCT 46.3 43.7    153     CMP:   Recent Labs   Lab 07/18/20  1726 07/19/20  0543    138   K 4.0 4.0    106   CO2 22* 27    77   BUN 19 15   CREATININE 1.0 1.0   CALCIUM 8.7 8.4*   PROT 7.5 6.3   ALBUMIN 3.8 3.1*   BILITOT 0.4 0.4   ALKPHOS 61 47*   AST 18 31   ALT 17 18   ANIONGAP 10 5*   EGFRNONAA >60 >60     Lactic Acid:   Recent Labs   Lab 07/18/20  1726   LACTATE 0.8     Urine Studies:   Recent Labs   Lab 07/17/20  1514 07/18/20  1726   COLORU Cordelia Yellow   APPEARANCEUA Cloudy* Hazy*   PHUR 6.0 6.0   SPECGRAV 1.020 >=1.030*   PROTEINUA Negative 1+*   GLUCUA Negative Negative   KETONESU Negative Negative   BILIRUBINUA Negative Negative   OCCULTUA 2+* 3+*   NITRITE Negative Positive*   UROBILINOGEN  --  1.0   LEUKOCYTESUR Trace* Negative   RBCUA 89* 20*   WBCUA 5 5   BACTERIA Many* Many*   SQUAMEPITHEL 1  --    HYALINECASTS  --  0       Significant Imaging: I have reviewed all pertinent imaging results/findings within the past 24 hours.

## 2020-07-19 NOTE — SUBJECTIVE & OBJECTIVE
Interval history:Speaking better today. No distress noted. Will follow.     Review of Systems   Constitutional: Negative for chills and fever.   HENT: Negative for congestion, postnasal drip and rhinorrhea.    Eyes: Negative for visual disturbance.   Respiratory: Negative for chest tightness and shortness of breath.    Cardiovascular: Negative for chest pain and palpitations.   Gastrointestinal: Negative for abdominal distention, abdominal pain, nausea and vomiting.   Genitourinary: Negative for difficulty urinating.   Musculoskeletal: Negative for arthralgias and myalgias.   Skin: Negative for color change and wound.   Neurological: Positive for seizures. Negative for weakness and headaches.   Hematological: Does not bruise/bleed easily.   Psychiatric/Behavioral: Negative for agitation.     Objective:     Vital Signs (Most Recent):  Temp: 96.3 °F (35.7 °C) (07/20/20 0727)  Pulse: 63 (07/20/20 0742)  Resp: 18 (07/20/20 0727)  BP: 106/62 (07/20/20 0727)  SpO2: 100 % (07/20/20 0826) Vital Signs (24h Range):  Temp:  [96.3 °F (35.7 °C)-97 °F (36.1 °C)] 96.3 °F (35.7 °C)  Pulse:  [56-74] 63  Resp:  [18-20] 18  SpO2:  [98 %-100 %] 100 %  BP: (101-115)/(60-76) 106/62     Weight: 59.4 kg (130 lb 15.3 oz)  Body mass index is 16.37 kg/m².    Physical Exam  Vitals signs and nursing note reviewed.   Constitutional:       Appearance: Normal appearance.   HENT:      Head: Normocephalic and atraumatic.      Nose: Nose normal.      Mouth/Throat:      Mouth: Mucous membranes are moist.   Eyes:      Extraocular Movements: Extraocular movements intact.      Pupils: Pupils are equal, round, and reactive to light.   Neck:      Musculoskeletal: Normal range of motion and neck supple.   Cardiovascular:      Heart sounds: No murmur.   Pulmonary:      Effort: Pulmonary effort is normal.   Abdominal:      General: Bowel sounds are normal.   Musculoskeletal:         General: No swelling or tenderness.   Skin:     General: Skin is warm and  dry.   Neurological:      General: No focal deficit present.      Mental Status: He is alert.   Psychiatric:         Mood and Affect: Mood normal.         Behavior: Behavior normal.           CRANIAL NERVES     CN III, IV, VI   Pupils are equal, round, and reactive to light.       Significant Labs:   Bilirubin:   Recent Labs   Lab 07/18/20  1726 07/19/20  0543   BILITOT 0.4 0.4     BMP:   Recent Labs   Lab 07/19/20  0543   GLU 77      K 4.0      CO2 27   BUN 15   CREATININE 1.0   CALCIUM 8.4*   MG 1.8     CBC:   Recent Labs   Lab 07/18/20  1726 07/19/20  0542   WBC 7.82 6.59   HGB 14.8 13.4*   HCT 46.3 43.7    153     CMP:   Recent Labs   Lab 07/18/20  1726 07/19/20  0543    138   K 4.0 4.0    106   CO2 22* 27    77   BUN 19 15   CREATININE 1.0 1.0   CALCIUM 8.7 8.4*   PROT 7.5 6.3   ALBUMIN 3.8 3.1*   BILITOT 0.4 0.4   ALKPHOS 61 47*   AST 18 31   ALT 17 18   ANIONGAP 10 5*   EGFRNONAA >60 >60     Lactic Acid:   Recent Labs   Lab 07/18/20  1726   LACTATE 0.8     Urine Studies:   Recent Labs   Lab 07/18/20  1726   COLORU Yellow   APPEARANCEUA Hazy*   PHUR 6.0   SPECGRAV >=1.030*   PROTEINUA 1+*   GLUCUA Negative   KETONESU Negative   BILIRUBINUA Negative   OCCULTUA 3+*   NITRITE Positive*   UROBILINOGEN 1.0   LEUKOCYTESUR Negative   RBCUA 20*   WBCUA 5   BACTERIA Many*   HYALINECASTS 0       Significant Imaging: I have reviewed all pertinent imaging results/findings within the past 24 hours.Interval History:     Review of Systems   Constitutional: Negative for chills and fever.   Respiratory: Negative for chest tightness and shortness of breath.    Cardiovascular: Negative for chest pain and palpitations.   Gastrointestinal: Negative for abdominal distention, abdominal pain, nausea and vomiting.   Genitourinary: Negative for difficulty urinating.   Musculoskeletal: Negative for arthralgias and myalgias.   Skin: Negative for color change and wound.   Neurological: Negative for  seizures and weakness.   Psychiatric/Behavioral: Negative for agitation.     Objective:     Vital Signs (Most Recent):  Temp: 96.6 °F (35.9 °C) (07/19/20 1112)  Pulse: (!) 56 (07/19/20 1144)  Resp: 18 (07/19/20 1112)  BP: 101/67 (07/19/20 1112)  SpO2: 99 % (07/19/20 1143) Vital Signs (24h Range):  Temp:  [96.5 °F (35.8 °C)-98.9 °F (37.2 °C)] 96.6 °F (35.9 °C)  Pulse:  [] 56  Resp:  [16-20] 18  SpO2:  [95 %-100 %] 99 %  BP: (101-126)/(65-82) 101/67     Weight: 75.8 kg (167 lb)  Body mass index is 20.87 kg/m².    Intake/Output Summary (Last 24 hours) at 7/19/2020 1333  Last data filed at 7/18/2020 2200  Gross per 24 hour   Intake 1050 ml   Output 110 ml   Net 940 ml      Physical Exam  Vitals signs and nursing note reviewed.   Constitutional:       Appearance: Normal appearance.   HENT:      Head: Normocephalic and atraumatic.   Neck:      Musculoskeletal: Normal range of motion and neck supple.   Cardiovascular:      Rate and Rhythm: Bradycardia present.      Heart sounds: No murmur.   Pulmonary:      Effort: Pulmonary effort is normal.   Abdominal:      General: Bowel sounds are normal.   Musculoskeletal:         General: No swelling or tenderness.   Skin:     General: Skin is warm and dry.   Neurological:      General: No focal deficit present.      Mental Status: He is alert.         Significant Labs:   A1C: No results for input(s): HGBA1C in the last 4320 hours.  Blood Culture:   Recent Labs   Lab 07/18/20  1727   LABBLOO No Growth to date     CBC:   Recent Labs   Lab 07/18/20 1726 07/19/20  0542   WBC 7.82 6.59   HGB 14.8 13.4*   HCT 46.3 43.7    153     CMP:   Recent Labs   Lab 07/18/20  1726 07/19/20  0543    138   K 4.0 4.0    106   CO2 22* 27    77   BUN 19 15   CREATININE 1.0 1.0   CALCIUM 8.7 8.4*   PROT 7.5 6.3   ALBUMIN 3.8 3.1*   BILITOT 0.4 0.4   ALKPHOS 61 47*   AST 18 31   ALT 17 18   ANIONGAP 10 5*   EGFRNONAA >60 >60     Coagulation: No results for input(s): PT,  INR, APTT in the last 48 hours.  Lactic Acid:   Recent Labs   Lab 07/18/20  1726   LACTATE 0.8     Magnesium:   Recent Labs   Lab 07/19/20  0543   MG 1.8     TSH: No results for input(s): TSH in the last 4320 hours.  Urine Culture:   Recent Labs   Lab 07/17/20  1511   LABURIN PRESUMPTIVE E COLI  >100,000 cfu/ml  Identification and susceptibility pending  *     Urine Studies:   Recent Labs   Lab 07/17/20  1514 07/18/20  1726   COLORU Cordelia Yellow   APPEARANCEUA Cloudy* Hazy*   PHUR 6.0 6.0   SPECGRAV 1.020 >=1.030*   PROTEINUA Negative 1+*   GLUCUA Negative Negative   KETONESU Negative Negative   BILIRUBINUA Negative Negative   OCCULTUA 2+* 3+*   NITRITE Negative Positive*   UROBILINOGEN  --  1.0   LEUKOCYTESUR Trace* Negative   RBCUA 89* 20*   WBCUA 5 5   BACTERIA Many* Many*   SQUAMEPITHEL 1  --    HYALINECASTS  --  0       Significant Imaging: I have reviewed all pertinent imaging results/findings within the past 24 hours.

## 2020-07-19 NOTE — PLAN OF CARE
Problem: Adult Inpatient Plan of Care  Goal: Chart Review  Outcome: Ongoing, Progressing   VN completed admission questions with patient and mother in room.   Plan of care was discussed with mother, Igovana, at bedside including home medications.  All questions answered.  Education given on safety measures and using call light. Patient asleep but mother staying in room verbalized understanding. Bed locked and in lowest position. Alarm on.

## 2020-07-19 NOTE — ASSESSMENT & PLAN NOTE
Continue ceftriaxone   Urine cultures growing E. Coli, pending sensitivities  Blood cultures pending

## 2020-07-19 NOTE — ASSESSMENT & PLAN NOTE
CT of head showed no acute findings  U/A concerning for UTI, continue ceftriaxone, E. Coli noted   Consult Neurology  Received 3000mg Keppra loading dose in ED, continue Keppra 1000mg BID  Ativan prn  Neuro checks q4 hours  Seizure precautions

## 2020-07-19 NOTE — HPI
39 y.o. male with past medical history of depression, chronic constipation, degenerative motor system disease, spastic diplegia,  quadriplegia, baclofen pump implantation and fluoroscopic urodynamic study ( 11/27/2018), and spinocerebellar atrophy who presents to the ED via EMS with complaint of seizure activity. As per mom, he started to shake upper and lower extremities at 2:15 today. He dropped his head to the right and began to drool. This lasted for 25 minutes. Upon arrival to ED, patient nonverbal.  His mom denies any recent illness or history of seizure. His medications were recently changed. Patient is now verbal but with very low tone, almost mumble.  Patient mother states that is not baseline.  Denies fever, chills, SOB, chest pain, headaches, syncope, palpitations, nausea, vomiting, change in bladder habits or change in bowel habits.  Findings: Drug screen negative, covid negative, u/a concerning for UTI, urine cultures from 7/17/20 grew E.coli, pending sensitives, blood cultures pending, CT of head negative for acute findings.  Of note, blood glucose elevated at 203, patient's mother states that last time he had UTI blood sugars elevated also, will monitor.  Patient admitted to hospital medicine observation service for further medical management.

## 2020-07-19 NOTE — SUBJECTIVE & OBJECTIVE
Past Medical History:   Diagnosis Date    Degenerative motor system disease     Depression     Seizure     Spastic quadriplegia     Spinocerebellar atrophy        Past Surgical History:   Procedure Laterality Date    BACLOFEN PUMP IMPLANTATION      FLUOROSCOPIC URODYNAMIC STUDY N/A 11/27/2018    Procedure: URODYNAMIC STUDY, FLUOROSCOPIC;  Surgeon: Tanja Okeefe MD;  Location: [facility] OR [address] [place] [address];  Service: Urology;  Laterality: N/A;  1 hour       Review of patient's allergies indicates:   Allergen Reactions    Penicillins      Hives and Itching       No current facility-administered medications on file prior to encounter.      Current Outpatient Medications on File Prior to Encounter   Medication Sig    baclofen (LIORESAL) 20 MG tablet Take 1 tablet (20 mg total) by mouth 3 (three) times daily.    cephALEXin (KEFLEX) 500 MG capsule Take 1 capsule (500 mg total) by mouth every 6 (six) hours. for 7 days    dantrolene (DANTRIUM) 50 MG Cap Take 1 capsule (50 mg total) by mouth 3 (three) times daily.    donepezil (ARICEPT) 5 MG tablet TAKE 1 TABLET(5 MG) BY MOUTH EVERY EVENING    blood-glucose meter, disc-type Kit 1 application by Misc.(Non-Drug; Combo Route) route 3 (three) times daily.    lactulose (CHRONULAC) 10 gram/15 mL solution GIVE 22.5ML BY MOUTH ONCE DAILY FOR 10 DAYS    lubiprostone (AMITIZA) 24 MCG Cap Take 1 capsule (24 mcg total) by mouth 2 (two) times daily with meals.    sodium,potassium,mag sulfates (SUPREP BOWEL PREP KIT) 17.5-3.13-1.6 gram SolR Take 177 mLs by mouth once daily for 2 doses, as directed     Family History     Problem Relation (Age of Onset)    Lung cancer Maternal Grandfather    Multiple sclerosis Other    Thyroid cancer Mother        Tobacco Use    Smoking status: Never Smoker    Smokeless tobacco: Never Used   Substance and Sexual Activity    Alcohol use: Not Currently     Frequency: Never     Drinks per session: Patient refused     Binge frequency: Never      Comment: social drinker, at times    Drug use: Not Currently    Sexual activity: Never     Review of Systems   Constitutional: Negative for chills and fever.   HENT: Negative for congestion, postnasal drip and rhinorrhea.    Eyes: Negative for visual disturbance.   Respiratory: Negative for chest tightness and shortness of breath.    Cardiovascular: Negative for chest pain and palpitations.   Gastrointestinal: Negative for abdominal distention, abdominal pain, nausea and vomiting.   Genitourinary: Negative for difficulty urinating.   Musculoskeletal: Negative for arthralgias and myalgias.   Skin: Negative for color change and wound.   Neurological: Positive for seizures. Negative for weakness and headaches.   Hematological: Does not bruise/bleed easily.   Psychiatric/Behavioral: Negative for agitation.     Objective:     Vital Signs (Most Recent):  Temp: 98.3 °F (36.8 °C) (07/18/20 2125)  Pulse: (!) 56 (07/18/20 2344)  Resp: 16 (07/18/20 2125)  BP: 109/72 (07/18/20 2125)  SpO2: 99 % (07/18/20 2125) Vital Signs (24h Range):  Temp:  [98.3 °F (36.8 °C)-98.9 °F (37.2 °C)] 98.3 °F (36.8 °C)  Pulse:  [] 56  Resp:  [16-20] 16  SpO2:  [98 %-99 %] 99 %  BP: (109-126)/(72-82) 109/72     Weight: 75.8 kg (167 lb)  Body mass index is 20.87 kg/m².    Physical Exam  Constitutional:       Appearance: Normal appearance.   HENT:      Head: Normocephalic and atraumatic.      Nose: Nose normal.      Mouth/Throat:      Mouth: Mucous membranes are moist.   Eyes:      Extraocular Movements: Extraocular movements intact.      Pupils: Pupils are equal, round, and reactive to light.   Neck:      Musculoskeletal: Normal range of motion and neck supple.   Cardiovascular:      Rate and Rhythm: Bradycardia present.      Heart sounds: No murmur.   Pulmonary:      Effort: Pulmonary effort is normal.   Abdominal:      General: Bowel sounds are normal.   Musculoskeletal:         General: No swelling or tenderness.   Skin:     General: Skin  is warm and dry.   Neurological:      General: No focal deficit present.      Mental Status: He is alert.           CRANIAL NERVES     CN III, IV, VI   Pupils are equal, round, and reactive to light.       Significant Labs:   Bilirubin:   Recent Labs   Lab 07/18/20  1726   BILITOT 0.4     BMP:   Recent Labs   Lab 07/18/20  1726         K 4.0      CO2 22*   BUN 19   CREATININE 1.0   CALCIUM 8.7     CBC:   Recent Labs   Lab 07/18/20  1726   WBC 7.82   HGB 14.8   HCT 46.3        CMP:   Recent Labs   Lab 07/18/20  1726      K 4.0      CO2 22*      BUN 19   CREATININE 1.0   CALCIUM 8.7   PROT 7.5   ALBUMIN 3.8   BILITOT 0.4   ALKPHOS 61   AST 18   ALT 17   ANIONGAP 10   EGFRNONAA >60     Lactic Acid:   Recent Labs   Lab 07/18/20  1726   LACTATE 0.8     Urine Studies:   Recent Labs   Lab 07/17/20  1514 07/18/20  1726   COLORU Cordelia Yellow   APPEARANCEUA Cloudy* Hazy*   PHUR 6.0 6.0   SPECGRAV 1.020 >=1.030*   PROTEINUA Negative 1+*   GLUCUA Negative Negative   KETONESU Negative Negative   BILIRUBINUA Negative Negative   OCCULTUA 2+* 3+*   NITRITE Negative Positive*   UROBILINOGEN  --  1.0   LEUKOCYTESUR Trace* Negative   RBCUA 89* 20*   WBCUA 5 5   BACTERIA Many* Many*   SQUAMEPITHEL 1  --    HYALINECASTS  --  0       Significant Imaging: I have reviewed all pertinent imaging results/findings within the past 24 hours.

## 2020-07-19 NOTE — ED NOTES
Pt arrives to ED via EMS after complaints of a witnessed seizure observed by his mother. Pt is dysarthric and unable to answer in full sentences. His mother states that his current responses are not his baseline. EMS reports that report seizure like activity was resolved upon their arrival at 1450.

## 2020-07-19 NOTE — H&P
[facility] Hospital Medicine  History & Physical    Patient Name: Lisa Moreno  MRN: [id number]  Admission Date: 7/18/2020  Attending Physician: Virginia Chavira*   Primary Care Provider: John Nixon Ii, MD         Patient information was obtained from patient, caregiver / friend, past medical records and ER records.     Subjective:     Principal Problem:New onset seizure    Chief Complaint:   Chief Complaint   Patient presents with    shaking     mother reports pt.had an episode of shaking an unresponsiveness that lasted approx. 30 minutes today.         HPI: 39 y.o. male with past medical history of depression, chronic constipation, degenerative motor system disease, spastic diplegia,  quadriplegia, baclofen pump implantation and fluoroscopic urodynamic study ( 11/27/2018), and spinocerebellar atrophy who presents to the ED via EMS with complaint of seizure activity. As per mom, he started to shake upper and lower extremities at 2:15 today. He dropped his head to the right and began to drool. This lasted for 25 minutes. Upon arrival to ED, patient nonverbal.  His mom denies any recent illness or history of seizure. His medications were recently changed. Patient is now verbal but with very low tone, almost mumble.  Patient mother states that is not baseline.  Denies fever, chills, SOB, chest pain, headaches, syncope, palpitations, nausea, vomiting, change in bladder habits or change in bowel habits.  Findings: Drug screen negative, covid negative, u/a concerning for UTI, urine cultures from 7/17/20 grew E.coli, pending sensitives, blood cultures pending, CT of head negative for acute findings.  Of note, blood glucose elevated at 203, patient's mother states that last time he had UTI blood sugars elevated also, will monitor.  Patient admitted to hospital medicine observation service for further medical management.    Past Medical History:   Diagnosis Date    Degenerative motor  system disease     Depression     Seizure     Spastic quadriplegia     Spinocerebellar atrophy        Past Surgical History:   Procedure Laterality Date    BACLOFEN PUMP IMPLANTATION      FLUOROSCOPIC URODYNAMIC STUDY N/A 11/27/2018    Procedure: URODYNAMIC STUDY, FLUOROSCOPIC;  Surgeon: Tanja Okeefe MD;  Location: [facility] OR [address] [place] [address];  Service: Urology;  Laterality: N/A;  1 hour       Review of patient's allergies indicates:   Allergen Reactions    Penicillins      Hives and Itching       No current facility-administered medications on file prior to encounter.      Current Outpatient Medications on File Prior to Encounter   Medication Sig    baclofen (LIORESAL) 20 MG tablet Take 1 tablet (20 mg total) by mouth 3 (three) times daily.    cephALEXin (KEFLEX) 500 MG capsule Take 1 capsule (500 mg total) by mouth every 6 (six) hours. for 7 days    dantrolene (DANTRIUM) 50 MG Cap Take 1 capsule (50 mg total) by mouth 3 (three) times daily.    donepezil (ARICEPT) 5 MG tablet TAKE 1 TABLET(5 MG) BY MOUTH EVERY EVENING    blood-glucose meter, disc-type Kit 1 application by Misc.(Non-Drug; Combo Route) route 3 (three) times daily.    lactulose (CHRONULAC) 10 gram/15 mL solution GIVE 22.5ML BY MOUTH ONCE DAILY FOR 10 DAYS    lubiprostone (AMITIZA) 24 MCG Cap Take 1 capsule (24 mcg total) by mouth 2 (two) times daily with meals.    sodium,potassium,mag sulfates (SUPREP BOWEL PREP KIT) 17.5-3.13-1.6 gram SolR Take 177 mLs by mouth once daily for 2 doses, as directed     Family History     Problem Relation (Age of Onset)    Lung cancer Maternal Grandfather    Multiple sclerosis Other    Thyroid cancer Mother        Tobacco Use    Smoking status: Never Smoker    Smokeless tobacco: Never Used   Substance and Sexual Activity    Alcohol use: Not Currently     Frequency: Never     Drinks per session: Patient refused     Binge frequency: Never     Comment: social drinker, at times    Drug use: Not Currently     Sexual activity: Never     Review of Systems   Constitutional: Negative for chills and fever.   HENT: Negative for congestion, postnasal drip and rhinorrhea.    Eyes: Negative for visual disturbance.   Respiratory: Negative for chest tightness and shortness of breath.    Cardiovascular: Negative for chest pain and palpitations.   Gastrointestinal: Negative for abdominal distention, abdominal pain, nausea and vomiting.   Genitourinary: Negative for difficulty urinating.   Musculoskeletal: Negative for arthralgias and myalgias.   Skin: Negative for color change and wound.   Neurological: Positive for seizures. Negative for weakness and headaches.   Hematological: Does not bruise/bleed easily.   Psychiatric/Behavioral: Negative for agitation.     Objective:     Vital Signs (Most Recent):  Temp: 98.3 °F (36.8 °C) (07/18/20 2125)  Pulse: (!) 56 (07/18/20 2344)  Resp: 16 (07/18/20 2125)  BP: 109/72 (07/18/20 2125)  SpO2: 99 % (07/18/20 2125) Vital Signs (24h Range):  Temp:  [98.3 °F (36.8 °C)-98.9 °F (37.2 °C)] 98.3 °F (36.8 °C)  Pulse:  [] 56  Resp:  [16-20] 16  SpO2:  [98 %-99 %] 99 %  BP: (109-126)/(72-82) 109/72     Weight: 75.8 kg (167 lb)  Body mass index is 20.87 kg/m².    Physical Exam  Constitutional:       Appearance: Normal appearance.   HENT:      Head: Normocephalic and atraumatic.      Nose: Nose normal.      Mouth/Throat:      Mouth: Mucous membranes are moist.   Eyes:      Extraocular Movements: Extraocular movements intact.      Pupils: Pupils are equal, round, and reactive to light.   Neck:      Musculoskeletal: Normal range of motion and neck supple.   Cardiovascular:      Rate and Rhythm: Bradycardia present.      Heart sounds: No murmur.   Pulmonary:      Effort: Pulmonary effort is normal.   Abdominal:      General: Bowel sounds are normal.   Musculoskeletal:         General: No swelling or tenderness.   Skin:     General: Skin is warm and dry.   Neurological:      General: No focal deficit  present.      Mental Status: He is alert.           CRANIAL NERVES     CN III, IV, VI   Pupils are equal, round, and reactive to light.       Significant Labs:   Bilirubin:   Recent Labs   Lab 07/18/20  1726   BILITOT 0.4     BMP:   Recent Labs   Lab 07/18/20  1726         K 4.0      CO2 22*   BUN 19   CREATININE 1.0   CALCIUM 8.7     CBC:   Recent Labs   Lab 07/18/20  1726   WBC 7.82   HGB 14.8   HCT 46.3        CMP:   Recent Labs   Lab 07/18/20  1726      K 4.0      CO2 22*      BUN 19   CREATININE 1.0   CALCIUM 8.7   PROT 7.5   ALBUMIN 3.8   BILITOT 0.4   ALKPHOS 61   AST 18   ALT 17   ANIONGAP 10   EGFRNONAA >60     Lactic Acid:   Recent Labs   Lab 07/18/20  1726   LACTATE 0.8     Urine Studies:   Recent Labs   Lab 07/17/20  1514 07/18/20  1726   COLORU Cordelia Yellow   APPEARANCEUA Cloudy* Hazy*   PHUR 6.0 6.0   SPECGRAV 1.020 >=1.030*   PROTEINUA Negative 1+*   GLUCUA Negative Negative   KETONESU Negative Negative   BILIRUBINUA Negative Negative   OCCULTUA 2+* 3+*   NITRITE Negative Positive*   UROBILINOGEN  --  1.0   LEUKOCYTESUR Trace* Negative   RBCUA 89* 20*   WBCUA 5 5   BACTERIA Many* Many*   SQUAMEPITHEL 1  --    HYALINECASTS  --  0       Significant Imaging: I have reviewed all pertinent imaging results/findings within the past 24 hours.    Assessment/Plan:     * New onset seizure  CT of head showed no acute findings  U/A concerning for UTI, continue ceftriaxone, pending sensitivities   Consult Neurology  Received 3000mg Keppra loading dose in ED, continue Keppra 1000mg BID  Ativan prn  Neuro checks q4 hours  Seizure precautions      Hyperglycemia  Monitor glucose      Chronic constipation  Continue amitiza  Continue lactulose prn    UTI (urinary tract infection)  Continue ceftriaxone   Urine cultures growing E. Coli, pending sensitivities  Blood cultures pending       Decreased functional mobility  Spinocerebellar ataxia  Spastic diplegia    Continue  Baclofen, Dantrolene and Donepezil       VTE Risk Mitigation (From admission, onward)         Ordered     IP VTE HIGH RISK PATIENT  Once      07/18/20 2200     Place sequential compression device  Until discontinued      07/18/20 2200                   Sandra Hollins NP  Department of Hospital Medicine   [facility]

## 2020-07-19 NOTE — HOSPITAL COURSE
The patient was admitted to the [facility] Medicine service for further care. Keppra was given for seizures. IV ceftriaxone and then cipro PO was given for UTI. Neuro was consulted. EEG was completed and was found to be normal. The patient was evaluated and treated by PT/OT/ST. Neurology recommended MRI brain with epilepsy protocol to be done as outpatient. The patient was treated for chronic constipation. An EGD/Colonoscopy was done on 7/23. He is accepted for inpatient rehab after discharge from hospital. The patient remained stable and was discharged to [place] inpatient rehab facility.

## 2020-07-20 ENCOUNTER — TELEPHONE (OUTPATIENT)
Dept: NEUROLOGY | Facility: CLINIC | Age: 40
End: 2020-07-20

## 2020-07-20 LAB
ALBUMIN SERPL BCP-MCNC: 3.5 G/DL (ref 3.5–5.2)
ALP SERPL-CCNC: 51 U/L (ref 55–135)
ALT SERPL W/O P-5'-P-CCNC: 18 U/L (ref 10–44)
ANION GAP SERPL CALC-SCNC: 7 MMOL/L (ref 8–16)
AST SERPL-CCNC: 22 U/L (ref 10–40)
BACTERIA UR CULT: ABNORMAL
BASOPHILS # BLD AUTO: 0.02 K/UL (ref 0–0.2)
BASOPHILS NFR BLD: 0.5 % (ref 0–1.9)
BILIRUB SERPL-MCNC: 0.4 MG/DL (ref 0.1–1)
BUN SERPL-MCNC: 11 MG/DL (ref 6–20)
CALCIUM SERPL-MCNC: 8.7 MG/DL (ref 8.7–10.5)
CHLORIDE SERPL-SCNC: 103 MMOL/L (ref 95–110)
CO2 SERPL-SCNC: 26 MMOL/L (ref 23–29)
CREAT SERPL-MCNC: 0.7 MG/DL (ref 0.5–1.4)
DIFFERENTIAL METHOD: ABNORMAL
EOSINOPHIL # BLD AUTO: 0.2 K/UL (ref 0–0.5)
EOSINOPHIL NFR BLD: 5.1 % (ref 0–8)
ERYTHROCYTE [DISTWIDTH] IN BLOOD BY AUTOMATED COUNT: 13.2 % (ref 11.5–14.5)
EST. GFR  (AFRICAN AMERICAN): >60 ML/MIN/1.73 M^2
EST. GFR  (NON AFRICAN AMERICAN): >60 ML/MIN/1.73 M^2
GLUCOSE SERPL-MCNC: 94 MG/DL (ref 70–110)
HCT VFR BLD AUTO: 47 % (ref 40–54)
HGB BLD-MCNC: 14.7 G/DL (ref 14–18)
IMM GRANULOCYTES # BLD AUTO: 0.01 K/UL (ref 0–0.04)
IMM GRANULOCYTES NFR BLD AUTO: 0.3 % (ref 0–0.5)
LYMPHOCYTES # BLD AUTO: 0.8 K/UL (ref 1–4.8)
LYMPHOCYTES NFR BLD: 21.1 % (ref 18–48)
MAGNESIUM SERPL-MCNC: 1.8 MG/DL (ref 1.6–2.6)
MCH RBC QN AUTO: 26.5 PG (ref 27–31)
MCHC RBC AUTO-ENTMCNC: 31.3 G/DL (ref 32–36)
MCV RBC AUTO: 85 FL (ref 82–98)
MONOCYTES # BLD AUTO: 0.4 K/UL (ref 0.3–1)
MONOCYTES NFR BLD: 11.2 % (ref 4–15)
NEUTROPHILS # BLD AUTO: 2.4 K/UL (ref 1.8–7.7)
NEUTROPHILS NFR BLD: 61.8 % (ref 38–73)
NRBC BLD-RTO: 0 /100 WBC
PHOSPHATE SERPL-MCNC: 3.3 MG/DL (ref 2.7–4.5)
PLATELET # BLD AUTO: 178 K/UL (ref 150–350)
PMV BLD AUTO: 11.1 FL (ref 9.2–12.9)
POCT GLUCOSE: 83 MG/DL (ref 70–110)
POTASSIUM SERPL-SCNC: 3.7 MMOL/L (ref 3.5–5.1)
PROT SERPL-MCNC: 7.1 G/DL (ref 6–8.4)
RBC # BLD AUTO: 5.55 M/UL (ref 4.6–6.2)
SODIUM SERPL-SCNC: 136 MMOL/L (ref 136–145)
WBC # BLD AUTO: 3.93 K/UL (ref 3.9–12.7)

## 2020-07-20 PROCEDURE — 25000003 PHARM REV CODE 250: Performed by: NURSE PRACTITIONER

## 2020-07-20 PROCEDURE — 83735 ASSAY OF MAGNESIUM: CPT

## 2020-07-20 PROCEDURE — 36415 COLL VENOUS BLD VENIPUNCTURE: CPT

## 2020-07-20 PROCEDURE — 94761 N-INVAS EAR/PLS OXIMETRY MLT: CPT

## 2020-07-20 PROCEDURE — 97162 PT EVAL MOD COMPLEX 30 MIN: CPT

## 2020-07-20 PROCEDURE — 97112 NEUROMUSCULAR REEDUCATION: CPT

## 2020-07-20 PROCEDURE — 63600175 PHARM REV CODE 636 W HCPCS: Performed by: NURSE PRACTITIONER

## 2020-07-20 PROCEDURE — 11000001 HC ACUTE MED/SURG PRIVATE ROOM

## 2020-07-20 PROCEDURE — 80053 COMPREHEN METABOLIC PANEL: CPT

## 2020-07-20 PROCEDURE — 97165 OT EVAL LOW COMPLEX 30 MIN: CPT

## 2020-07-20 PROCEDURE — 92610 EVALUATE SWALLOWING FUNCTION: CPT

## 2020-07-20 PROCEDURE — 84100 ASSAY OF PHOSPHORUS: CPT

## 2020-07-20 PROCEDURE — 85025 COMPLETE CBC W/AUTO DIFF WBC: CPT

## 2020-07-20 PROCEDURE — 97530 THERAPEUTIC ACTIVITIES: CPT

## 2020-07-20 PROCEDURE — 25000003 PHARM REV CODE 250: Performed by: EMERGENCY MEDICINE

## 2020-07-20 RX ORDER — CIPROFLOXACIN 500 MG/1
500 TABLET ORAL EVERY 12 HOURS
Status: DISCONTINUED | OUTPATIENT
Start: 2020-07-20 | End: 2020-07-24 | Stop reason: HOSPADM

## 2020-07-20 RX ADMIN — LUBIPROSTONE 24 MCG: 8 CAPSULE, GELATIN COATED ORAL at 08:07

## 2020-07-20 RX ADMIN — LORAZEPAM 2 MG: 2 INJECTION INTRAMUSCULAR; INTRAVENOUS at 12:07

## 2020-07-20 RX ADMIN — LEVETIRACETAM 1000 MG: 500 TABLET ORAL at 08:07

## 2020-07-20 RX ADMIN — CIPROFLOXACIN HYDROCHLORIDE 500 MG: 500 TABLET, FILM COATED ORAL at 08:07

## 2020-07-20 RX ADMIN — DONEPEZIL HYDROCHLORIDE 5 MG: 5 TABLET, FILM COATED ORAL at 08:07

## 2020-07-20 RX ADMIN — DANTROLENE SODIUM 50 MG: 25 CAPSULE ORAL at 08:07

## 2020-07-20 RX ADMIN — LUBIPROSTONE 24 MCG: 8 CAPSULE, GELATIN COATED ORAL at 04:07

## 2020-07-20 RX ADMIN — DANTROLENE SODIUM 50 MG: 25 CAPSULE ORAL at 04:07

## 2020-07-20 RX ADMIN — CIPROFLOXACIN HYDROCHLORIDE 500 MG: 500 TABLET, FILM COATED ORAL at 10:07

## 2020-07-20 RX ADMIN — BACLOFEN 20 MG: 5 TABLET ORAL at 08:07

## 2020-07-20 RX ADMIN — BACLOFEN 20 MG: 5 TABLET ORAL at 04:07

## 2020-07-20 NOTE — PLAN OF CARE
"VN cued into room.  Pt resting comfortably in bed with call light and personal belongings within reach.  NADN.  Mother at bedside.  No reports of pain, but it was stated that "his legs are jumping".   No other needs or complaints at this time.  Reminded pt to use call light as needed.  VN will continue to follow and be available as needed.    "

## 2020-07-20 NOTE — TELEPHONE ENCOUNTER
Incoming message from patient's mother requesting RN Coordinator send GUILLERMINA form to [facility] 4th floor so that she may sign.     Outgoing call to [facility] and spoke with Talia at 4th Floor Nurses Station. She stated that will notify nurse to place GUILLERMINA form in patient's room for mother to sign.     Message sent to mother to inform of the above.

## 2020-07-20 NOTE — PLAN OF CARE
PT eval with OT.  Pt very cooperative and participating with therapy.  New seizure at home.  Pt has not had OP therapy in 6 months.  Requires assist for functional mobility- 2 to stand at EOB with RW.    REC:  IPR  DME:  defer to IPR-per OP note in  pt was waiting for AFOs        Problem: Physical Therapy Goal  Goal: Physical Therapy Goal  Description: Goals to be met by: 8/10/2020     Patient will increase functional independence with mobility by performin. Supine to sit with S  2. Sit to supine with MInimal Assistance  3. Sit to stand transfer with Moderate Assistance  4.  Assess wc transfers  5.  Assess wc mobility     Outcome: Ongoing, Progressing

## 2020-07-20 NOTE — CONSULTS
"NEUROLOGY FLOOR CONSULT    Reason for consult:  New onset seizure    Informant:  Patient, chart review    CC:  seizure    HPI:   Per chart review, pt is a 39 y.o. male with past medical history of depression, chronic constipation, degenerative motor system disease, spastic diplegia,  quadriplegia, baclofen pump implantation and fluoroscopic urodynamic study ( 11/27/2018), and spinocerebellar atrophy who presents to the ED via EMS with complaint of seizure activity. As per mom, he started to shake in his B/L UE, then head turning towards R, w/ upward gaze deviation, then fell to the floor from his wheelchair towards the R & had 4x ext tonic-clonic activity. Reportedly lasted 20min per mother. Upon arrival to ED, patient nonverbal.  His mom denies any recent illness or history of seizure. Over course of ED stay, pt became slowly verbal per report.  Notably, CTH negative. U/A concerning for UTI w/ recent UTI (7/17 E.coli)    Today patient is accompanied w/ mother who states patient is near baseline although still feels not quite at baseline. Mother and patient both report that he feels generalized weakness. Patient able to mouth words & respond appropriately but reports he requires increased effort compared to normal to produce speech. Otherwise patient mouths "I feel fine". Mother had some concerns of low lymphocytes on CBC diff, which we discussed; otherwise also without acute complaints.     ROS: As per HPI    Histories:     Allergies:  Penicillins    Current Medications:    Current Facility-Administered Medications   Medication Dose Route Frequency Provider Last Rate Last Dose    acetaminophen tablet 650 mg  650 mg Oral Q6H PRN Sandra Hollins NP        baclofen tablet 20 mg  20 mg Oral QID Fran Dior NP   20 mg at 07/19/20 1744    cefTRIAXone (ROCEPHIN) 1 g/50 mL D5W IVPB  1 g Intravenous Q24H Sandra Hollins NP        dantrolene capsule 50 mg  50 mg Oral TID Sandra Hollins NP   50 mg at " 07/19/20 1414    donepeziL tablet 5 mg  5 mg Oral QHS Marcelo Ruelas Jr., MD   5 mg at 07/18/20 1843    lactulose 20 gram/30 mL solution Soln 20 g  20 g Oral Q6H PRN Sandra Hollins NP        levETIRAcetam tablet 1,000 mg  1,000 mg Oral BID Sandra Hollins, NP   1,000 mg at 07/19/20 0933    lorazepam (ATIVAN) injection 2 mg  2 mg Intravenous Q3H PRN Sandra Hollins NP        lubiprostone capsule 24 mcg  24 mcg Oral BID WM Sandra Hollins NP   24 mcg at 07/19/20 1744    ondansetron injection 4 mg  4 mg Intravenous Q6H PRN Sandra Hollins NP        sodium chloride 0.9% flush 10 mL  10 mL Intravenous PRN Marcelo Ruelas Jr., MD           Past Medical/Surgical/Family History:  Medical:   Past Medical History:   Diagnosis Date    Degenerative motor system disease     Depression     Seizure     Spastic quadriplegia     Spinocerebellar atrophy       Surgeries:   Past Surgical History:   Procedure Laterality Date    BACLOFEN PUMP IMPLANTATION      FLUOROSCOPIC URODYNAMIC STUDY N/A 11/27/2018    Procedure: URODYNAMIC STUDY, FLUOROSCOPIC;  Surgeon: Tanja Okeefe MD;  Location: [facility] OR [address] [place] [address];  Service: Urology;  Laterality: N/A;  1 hour      Family:   Family History   Problem Relation Age of Onset    Thyroid cancer Mother     Lung cancer Maternal Grandfather     Multiple sclerosis Other         mother's cousin    ALS Neg Hx     Muscular dystrophy Neg Hx      Social History:    Substance Abuse/Dependence History:  Tobacco: denies  EtOH: none  Ilicits: denies    Occupational/Employment History:  Occupation: unemployed      Current Evaluation:     Vital Signs:   Vitals:    07/19/20 1715   BP: 108/74   Pulse: 64   Resp: 18   Temp: 96.4 °F (35.8 °C)        Neurological Examination   Orientation  Alert, awake, oriented to self, place, time, and situation.  Memory  Recent and remote memory intact.  Language  Hypophonic/weak dysarthria, No aphasia.   Cranial Nerves  PERRL, VF intact, EOMI, V1-V3 intact,  symmetric facial expression, hearing grossly intact, SCM & TPZ 5/5, tongue midline.  Motor  Normal bulk in B/L UE, reduced in B/L LE  Increased tone B/L LE  5/5 strength in B/L UE  2/5 strength B/L LE  Sensory  Normal to light touch throughout  DTR   1+ R patellar, 2+ L patellar  1+ B/L achilles  +2 B/L symmetric bicep/BR  Cerebellar/Gait  Dysmetria B/L UE (R>L)  COREY HtS, gait    LABORATORY STUDIES:  Recent Results (from the past 24 hour(s))   POCT glucose    Collection Time: 07/18/20  9:42 PM   Result Value Ref Range    POCT Glucose 203 (H) 70 - 110 mg/dL   POCT glucose    Collection Time: 07/19/20  4:18 AM   Result Value Ref Range    POCT Glucose 97 70 - 110 mg/dL   CBC auto differential    Collection Time: 07/19/20  5:42 AM   Result Value Ref Range    WBC 6.59 3.90 - 12.70 K/uL    RBC 5.03 4.60 - 6.20 M/uL    Hemoglobin 13.4 (L) 14.0 - 18.0 g/dL    Hematocrit 43.7 40.0 - 54.0 %    Mean Corpuscular Volume 87 82 - 98 fL    Mean Corpuscular Hemoglobin 26.6 (L) 27.0 - 31.0 pg    Mean Corpuscular Hemoglobin Conc 30.7 (L) 32.0 - 36.0 g/dL    RDW 13.3 11.5 - 14.5 %    Platelets 153 150 - 350 K/uL    MPV 11.0 9.2 - 12.9 fL    Immature Granulocytes 0.2 0.0 - 0.5 %    Gran # (ANC) 4.3 1.8 - 7.7 K/uL    Immature Grans (Abs) 0.01 0.00 - 0.04 K/uL    Lymph # 1.0 1.0 - 4.8 K/uL    Mono # 1.1 (H) 0.3 - 1.0 K/uL    Eos # 0.2 0.0 - 0.5 K/uL    Baso # 0.02 0.00 - 0.20 K/uL    nRBC 0 0 /100 WBC    Gran% 65.8 38.0 - 73.0 %    Lymph% 15.2 (L) 18.0 - 48.0 %    Mono% 16.1 (H) 4.0 - 15.0 %    Eosinophil% 2.4 0.0 - 8.0 %    Basophil% 0.3 0.0 - 1.9 %    Differential Method Automated    Comprehensive metabolic panel    Collection Time: 07/19/20  5:43 AM   Result Value Ref Range    Sodium 138 136 - 145 mmol/L    Potassium 4.0 3.5 - 5.1 mmol/L    Chloride 106 95 - 110 mmol/L    CO2 27 23 - 29 mmol/L    Glucose 77 70 - 110 mg/dL    BUN, Bld 15 6 - 20 mg/dL    Creatinine 1.0 0.5 - 1.4 mg/dL    Calcium 8.4 (L) 8.7 - 10.5 mg/dL    Total Protein  6.3 6.0 - 8.4 g/dL    Albumin 3.1 (L) 3.5 - 5.2 g/dL    Total Bilirubin 0.4 0.1 - 1.0 mg/dL    Alkaline Phosphatase 47 (L) 55 - 135 U/L    AST 31 10 - 40 U/L    ALT 18 10 - 44 U/L    Anion Gap 5 (L) 8 - 16 mmol/L    eGFR if African American >60 >60 mL/min/1.73 m^2    eGFR if non African American >60 >60 mL/min/1.73 m^2   Magnesium    Collection Time: 07/19/20  5:43 AM   Result Value Ref Range    Magnesium 1.8 1.6 - 2.6 mg/dL   Phosphorus    Collection Time: 07/19/20  5:43 AM   Result Value Ref Range    Phosphorus 3.4 2.7 - 4.5 mg/dL       RADIOLOGY STUDIES:  I have personally reviewed the images performed.     HEAD CT: No acute intracranial abnormality.  Specifically, no intracranial hemorrhage.    Assessment:  ALEXANDRA Moreno is a 39 y.o. w/ hx of spinocerebellar atrophy w/ uncertain neurological diagnosis s/p baclofen pump placement, frequent UTIs, constipation who presented to the ED following first time seizure in the setting of possible UTI. Mental status returned to baseline.     New onset seizure  - Per descriptors, possible focal seizure that generalized  - Likely provoked in the setting of UTI   - Notably, pt had urodynamic study in Nov. 2018 c/w incomplete bladder emptying then never followed by urology. Also mother reports UTI ~1 year prior.   - U/A w/ 0 squams: Many bacteria & hazy appearance  - CTH w/o acute intracranial abnormality  - s/p keppra load at 3g IV x1   - Ok to discontinue AEDs at this time; can initiate long-term AED therapy if future events occur.   - Recommend f/u w/ urology as OP in setting of prior urodynamic study & recurrent UTIs to avoid recurrent infections & possible provocation of seizures.     Spastic-ataxia  - Symptoms beginning 2006, per mother  - Last seen by neurology 11/27/18 at [facility] w/ Dr. Wise  - Unclear etiology/dx: possible HSP  - Continue aricept 10mg qd  - Please ensure pt has follow up with Neurology as OP.   - Recommend re-establishment of care w/   [facility]; however, if unable, please notify [facility] on call - can be seen at [facility].    Case discussed with Dr. Gonzales    Will sign off for now. Thanks for allowing us to take part in this patient's care. Please notify us if any further questions/concerns in regards to this patient's care.     Appreciate the consult.      Ziyad Crowe MD   [facility] PGY III

## 2020-07-20 NOTE — TELEPHONE ENCOUNTER
----- Message from Crystal Crowe sent at 7/20/2020  2:35 PM CDT -----  Type:  Patient Returning Call    Who Called: JUAN WONG [[id number]]    Who Left Message for Patient: unknown     Does the patient know what this is regarding?:Rosibel Lopez Call Back Number: [phone] (home)      Additional Information:

## 2020-07-20 NOTE — PT/OT/SLP EVAL
Occupational Therapy   Evaluation/tx    Name: Lisa Moreno  MRN: [id number]  Admitting Diagnosis:  New onset seizure      Recommendations:     Discharge Recommendations: rehabilitation facility  Discharge Equipment Recommendations:  (defer to IPR)  Barriers to discharge:  None    Assessment:    Pt presents w/ decreased overall endurance/conditioning, balance/mobility & coordination w/ subsequent decline in (I)/safety w/ BADLs, fxnl mobility & fxnl t/f's.  OT 5x/wk to increase phys/fxnl status & maximize potential to achieve established goals for d/c-->IPR.   There is expectation of returning to prior level of function to maintain independence avoiding readmission.      Pt's clinical condition meets full inpatient rehab criteria. Inpatient rehab will provide to total interdisciplinary treatment approach needed. Pt is at high risk of unplanned readmission due to fall risk and lack of 24 hour caregiver in prior setting.       Pt is able to tolerate 3 hours of daily therapy. Pt is pleasant and motivated to return to prior level of function.       Lisa Moreno is a 39 y.o. male with a medical diagnosis of New onset seizure.  He presents with . Performance deficits affecting function: weakness, impaired endurance, impaired self care skills, impaired functional mobilty, gait instability, decreased lower extremity function, decreased coordination, impaired balance, decreased safety awareness, abnormal tone, decreased ROM, impaired coordination, decreased upper extremity function, impaired fine motor, impaired joint extensibility, impaired muscle length.      Rehab Prognosis: Good; patient would benefit from acute skilled OT services to address these deficits and reach maximum level of function.       Plan:     Patient to be seen 5 x/week to address the above listed problems via self-care/home management, therapeutic activities, therapeutic exercises, neuromuscular re-education  · Plan of Care Expires:  08/20/20  · Plan of Care Reviewed with: patient, mother    Subjective     Chief Complaint: Sz  Patient/Family Comments/goals: to go to rehab    Occupational Profile:  Living Environment: w/ mother in [facility] w/ ramp; t/s  Previous level of function: set up w/ G/H & self feed; varying degrees of asst w/ all other fxnl mobility/tasks  Roles and Routines: baths on TTB; occasional incontinence  Equipment Used at Home:  hospital bed, bedside commode, bath bench, walker, rolling, wheelchair  Assistance upon Discharge: fly    Pain/Comfort:  · Pain Rating 1: 0/10  · Pain Rating Post-Intervention 1: 0/10    Patients cultural, spiritual, Restorationism conflicts given the current situation:      Objective:     Communicated with: nsg prior to session.  Patient found HOB elevated with telemetry, bed alarm upon OT entry to room.    General Precautions: Standard, aspiration, fall, nectar thick, seizure   Orthopedic Precautions:N/A   Braces: N/A     Occupational Performance:    Bed Mobility:    · Patient completed Supine to Sit with stand by assistance  · Patient completed Sit to Supine with moderate assistance and maximal assistance    Functional Mobility/Transfers:  · Patient completed Sit <> Stand Transfer with maximal assistance and of 2 persons  with  rolling walker   · Functional Mobility: sidestep R via RW w/ Max A x 2    Activities of Daily Living:  · Feeding:  minimum assistance for cutting    Cognitive/Visual Perceptual:  At least AO to self  Able to follow most commands    Physical Exam:  ADELIAEs WFL 4+/5 prox on L; 4/5 prox on R  Interosseous atrophy    BLE atrophy throughout w/ signif hypertonicity/spasticity    Sit balance: F- to F  Stand balance: P    AMPAC 6 Click ADL:  AMPAC Total Score: 14    Treatment & Education:   Pt found in SF & agreeable to OT/PT co-eval/tx this AM. Pt lives w/ mom in [facility] w/ ramp; t/s. PLOF: set up w/ G/H & self feed; requires varying degrees of asst w/ all other fxnl mobility & t/f's. Currently, pt  w/o c/o pain & perf the following: sup-->EOB w/ HOB fully elevated, increased time for completion & GB use w/ SBA; tolerated sitting EOB x ~15min w/ Sup-SBA; standing via RW x 2 attempts w/ Max A x 2 & tolerated x at least 1min per attempt w/ varying degrees of asst for achieving full B knee ext, tucking in buttocks & extending elbows fully on RW; sidestep R via RW w/ Max A x 2 for DME mgmt & facilitating lateral steps LLE moreso than RLE. Pt required Mod A x 2 for returning to sitting EOB & Mod A to recover balance 2/2 uncontrolled, but slow/progressive posterior lean upon initial sitting. Pt returned to supine w/ Mod-Max A x 1. Provided pt w/ socks w/ instruct on resistive digital HEP. Edu/tx re: general safety techs, positioning, HEP. Pt/mother verbalized understanding.     There is expectation of returning to prior level of function to maintain independence avoiding readmission.      Pt's clinical condition meets full inpatient rehab criteria. Inpatient rehab will provide to total interdisciplinary treatment approach needed. Pt is at high risk of unplanned readmission due to fall risk and lack of 24 hour caregiver in prior setting.       Pt is able to tolerate 3 hours of daily therapy. Pt is pleasant and motivated to return to prior level of function.       Education:    Patient left HOB elevated with all lines intact, call button in reach, bed alarm on, nsg notified and mother present    GOALS:   Multidisciplinary Problems     Occupational Therapy Goals        Problem: Occupational Therapy Goal    Goal Priority Disciplines Outcome Interventions   Occupational Therapy Goal     OT, PT/OT Ongoing, Progressing    Description: Goals to be met by: 08/20     Patient will increase functional independence with ADLs by performing:    LE Dressing with Moderate Assistance.  Grooming while seated with Stand-by Assistance.  Squat pivot transfers with Minimal Assistance.                     History:     Past Medical History:    Diagnosis Date    Degenerative motor system disease     Depression     Seizure     Spastic quadriplegia     Spinocerebellar atrophy        Past Surgical History:   Procedure Laterality Date    BACLOFEN PUMP IMPLANTATION      FLUOROSCOPIC URODYNAMIC STUDY N/A 11/27/2018    Procedure: URODYNAMIC STUDY, FLUOROSCOPIC;  Surgeon: Tanja Okeefe MD;  Location: [address];  Service: Urology;  Laterality: N/A;  1 hour       Time Tracking:     OT Date of Treatment: 07/20/20  OT Start Time: 1123  OT Stop Time: 1226  OT Total Time (min): 63 min    Billable Minutes:Evaluation 10  Therapeutic Activity 23  Total Time 63--co-tx w/ PT    María Armendariz, LOTR  7/20/2020

## 2020-07-20 NOTE — PLAN OF CARE
Problem: Occupational Therapy Goal  Goal: Occupational Therapy Goal  Description: Goals to be met by: 08/20     Patient will increase functional independence with ADLs by performing:    LE Dressing with Moderate Assistance.  Grooming while seated with Stand-by Assistance.  Squat pivot transfers with Minimal Assistance.    Outcome: Ongoing, Progressing   Pt found in SF & agreeable to OT/PT co-eval/tx this AM. Pt lives w/ mom in [place] w/ ramp; t/s. PLOF: set up w/ G/H & self feed; requires varying degrees of asst w/ all other fxnl mobility & t/f's. Currently, pt w/o c/o pain & perf the following: sup-->EOB w/ HOB fully elevated, increased time for completion & GB use w/ SBA; tolerated sitting EOB x ~15min w/ Sup-SBA; standing via RW x 2 attempts w/ Max A x 2 & tolerated x at least 1min per attempt w/ varying degrees of asst for achieving full B knee ext, tucking in buttocks & extending elbows fully on RW; sidestep R via RW w/ Max A x 2 for DME mgmt & facilitating lateral steps LLE moreso than RLE. Pt required Mod A x 2 for returning to sitting EOB & Mod A to recover balance 2/2 uncontrolled, but slow/progressive posterior lean upon initial sitting. Pt returned to supine w/ Mod-Max A x 1. Provided pt w/ socks w/ instruct on resistive digital HEP. Edu/tx re: general safety techs, positioning, HEP. Pt/mother verbalized understanding.    Pt presents w/ decreased overall endurance/conditioning, balance/mobility & coordination w/ subsequent decline in (I)/safety w/ BADLs, fxnl mobility & fxnl t/f's.  OT 5x/wk to increase phys/fxnl status & maximize potential to achieve established goals for d/c-->IPR.   There is expectation of returning to prior level of function to maintain independence avoiding readmission.      Pt's clinical condition meets full inpatient rehab criteria. Inpatient rehab will provide to total interdisciplinary treatment approach needed. Pt is at high risk of unplanned readmission due to fall risk and lack  of 24 hour caregiver in prior setting.       Pt is able to tolerate 3 hours of daily therapy. Pt is pleasant and motivated to return to prior level of function.

## 2020-07-20 NOTE — ASSESSMENT & PLAN NOTE
We started him on ceftriaxone   Urine cultures growing E. Coli  Blood cultures with E. Coli noted. Urine culture is pan sensitive.   Will start him on Cipro. Per the patients mom who is his caretaker he has been having frequent UTIs--will likely need abx prophylactic.

## 2020-07-20 NOTE — PT/OT/SLP EVAL
Speech Language Pathology Evaluation  Bedside Swallow    Patient Name:  Lisa Moreno   MRN:  [id number]  Admitting Diagnosis: New onset seizure    Recommendations:                 General Recommendations:  Dysphagia therapy and dysarthria remediation  Diet recommendations:  Regular, Nectar Thick   Aspiration Precautions: 1 bite/sip at a time, Alternating bites/sips, Assistance with thickening liquids, Assistance with meals and Assistance with thickening liquids, Double swallow with each bite/sip, Eliminate distractions, Feed only when awake/alert, HOB to 90 degrees, Meds whole 1 at a time, Remain upright 30 minutes post meal, Small bites/sips and Standard aspiration precautions   General Precautions: Standard, aspiration, fall, nectar thick, seizure  Communication strategies:  none, mom states he can communicate and does not need an AAC at this time     History:   Principal Problem:New onset seizure     Chief Complaint:        Chief Complaint   Patient presents with    shaking       mother reports pt.had an episode of shaking an unresponsiveness that lasted approx. 30 minutes today.          HPI: 39 y.o. male with past medical history of depression, chronic constipation, degenerative motor system disease, spastic diplegia,  quadriplegia, baclofen pump implantation and fluoroscopic urodynamic study ( 11/27/2018), and spinocerebellar atrophy who presents to the ED via EMS with complaint of seizure activity. As per mom, he started to shake upper and lower extremities at 2:15 today. He dropped his head to the right and began to drool. This lasted for 25 minutes. Upon arrival to ED, patient nonverbal.  His mom denies any recent illness or history of seizure. His medications were recently changed. Patient is now verbal but with very low tone, almost mumble.  Patient mother states that is not baseline.  Denies fever, chills, SOB, chest pain, headaches, syncope, palpitations, nausea, vomiting, change in bladder habits  "or change in bowel habits.  Findings: Drug screen negative, covid negative, u/a concerning for UTI, urine cultures from 7/17/20 grew E.coli, pending sensitives, blood cultures pending, CT of head negative for acute findings.  Of note, blood glucose elevated at 203, patient's mother states that last time he had UTI blood sugars elevated also, will monitor.  Patient admitted to hospital medicine observation service for further medical management.    Past Medical History:   Diagnosis Date    Degenerative motor system disease     Depression     Seizure     Spastic quadriplegia     Spinocerebellar atrophy        Past Surgical History:   Procedure Laterality Date    BACLOFEN PUMP IMPLANTATION      FLUOROSCOPIC URODYNAMIC STUDY N/A 11/27/2018    Procedure: URODYNAMIC STUDY, FLUOROSCOPIC;  Surgeon: Tanja Okeefe MD;  Location: [facility] OR [address] [place] [address];  Service: Urology;  Laterality: N/A;  1 hour     Social History: Patient lives with mom, primary caregiver and caretaker for patient    Prior Intubation HX:  N/a     Modified Barium Swallow: mother reports MBS at [facility] and pt cleared for nectar thick and regular diet.     Chest X-Rays: No evidence of focal consolidative process, pneumothorax, or significant pleural effusion.     CT:   No acute intracranial abnormality.  Specifically, no intracranial hemorrhage.     Prior diet: currently on regular diet with NECTAR Thick liquids     Subjective     Consult received for clinical swallow eval/speech/lang  eval this date, SLP did communicate with RN prior to eval/treat.    Patient goals: low quality noted, mom reports "he does not need a device."    Pain/Comfort:  · Pain Rating 1: 0/10    Objective:   Pt seen in room, mom present.   RN did clear for swallow eval.   Pt alert, oriented to self, mom.   Pt with slow execution when following commands, delayed responses noted, low vocal quality present.   Pt takes time to formulate thoughts.   Pt with mumbled speech when " communicating.     Oral Musculature Evaluation  · Oral Musculature: general weakness  · Dentition: present and adequate  · Secretion Management: problems swallowing secretions  · Mucosal Quality: adequate  · Mandibular Strength and Mobility: (fair)  · Oral Labial Strength and Mobility: impaired coordination(overall weak)  · Lingual Strength and Mobility: impaired strength  · Buccal Strength and Mobility: decreased tone  · Volitional Cough: elicited  · Volitional Swallow: delayed swallow palpated  · Voice Prior to PO Intake: low quality, imprecise articulation  · Oral Musculature Comments: generalized weakness    Bedside Swallow Eval:   Consistencies Assessed:  · Nectar thick liquids nectar thick juice  · Puree pudding by tsp  · Soft solids diced peaches, cracker     Oral Phase:   · Oral residue  · Slow oral transit time   · Slow mastication  · Mom requesting no diet change for consistencies.   · Cues to clear lingual residue    Pharyngeal Phase:   · decreased hyolaryngeal excursion to palpation  · delayed swallow initation  · no overt clinical signs/symptoms of aspiration  · Soft signs of pharyngeal dysphagia present  · multiple spontaneous swallows  · Pt noted to be impulsive with eating and taking large bites despite recommendations     Treatment: Educated pt/mother on swallow precautions, rate of intake, and strategies when communicating. Pt will  Need ongoing education and reinforcement of goals.     Assessment:     Lisa Moreno is a 39 y.o. male admitted with New onset seizure who presents with an SLP diagnosis of dysphagia and dysarthria.      Goals:   Multidisciplinary Problems     SLP Goals        Problem: SLP Goal    Goal Priority Disciplines Outcome   SLP Goal     SLP Ongoing, Progressing   Description: Short Term Goals:  1. Pt will participate in swallow eval to determine safest diet level.  2. Pt will tolerate nectar thick liquids/regular diet with no outward s/s of dysphagia.  3. Pt will utilize  swallow strategies when consuming PO with min-mod cues for safe PO consumption.                    Plan:     · Patient to be seen:  3 x/week   · Plan of Care expires:  08/18/20  · Plan of Care reviewed with:  patient, caregiver, mother   · SLP Follow-Up:  Yes       Discharge recommendations:  rehabilitation facility       Time Tracking:     SLP Treatment Date:   07/20/20  Speech Start Time:  1104  Speech Stop Time:  1121     Speech Total Time (min):  17 min    Billable Minutes: Eval Swallow and Oral Function 17    RAMANA Swann, CCC-SLP  07/20/2020

## 2020-07-20 NOTE — TELEPHONE ENCOUNTER
Message received from KENTON Carrero, regarding Dx: Query ALS. Informed provider that patient is being schedule for consult with Dr. Humphrey at [facility]. Also, awaiting patient's mother, Giovana, to follow up on Release of Information for RN Coordinator to submit to [place].     Dr. Humphrey recommends referral to Dr. Srinivasan since patient was being [facility]. Last appt with Dr. Wise 2018.             Tomeka Conte RN, BSN, BS  ALS Clinical Care Coordinator  [facility]  [phone]

## 2020-07-20 NOTE — PROGRESS NOTES
[facility] Medicine  Progress Note    Patient Name: Lisa Moreno  MRN: [id number]  Patient Class: IP- Inpatient   Admission Date: 7/18/2020  Length of Stay: 1 days  Attending Physician: Virginia Chavira*  Primary Care Provider: John Nixon Ii, MD        Subjective:     Principal Problem:New onset seizure        HPI:  39 y.o. male with past medical history of depression, chronic constipation, degenerative motor system disease, spastic diplegia,  quadriplegia, baclofen pump implantation and fluoroscopic urodynamic study ( 11/27/2018), and spinocerebellar atrophy who presents to the ED via EMS with complaint of seizure activity. As per mom, he started to shake upper and lower extremities at 2:15 today. He dropped his head to the right and began to drool. This lasted for 25 minutes. Upon arrival to ED, patient nonverbal.  His mom denies any recent illness or history of seizure. His medications were recently changed. Patient is now verbal but with very low tone, almost mumble.  Patient mother states that is not baseline.  Denies fever, chills, SOB, chest pain, headaches, syncope, palpitations, nausea, vomiting, change in bladder habits or change in bowel habits.  Findings: Drug screen negative, covid negative, u/a concerning for UTI, urine cultures from 7/17/20 grew E.coli, pending sensitives, blood cultures pending, CT of head negative for acute findings.  Of note, blood glucose elevated at 203, patient's mother states that last time he had UTI blood sugars elevated also, will monitor.  Patient admitted to hospital medicine observation service for further medical management.    Overview/Hospital Course:  The patient was admitted to the [facility] Medicine service for further care. Keppra was given for seizures. IV ceftriaxone was given for UTI. Neuro was consulted. EEG was ordered. Will ask PT/OT/SLP to see.     Interval history:Speaking better today. No distress  noted. Will follow.     Review of Systems   Constitutional: Negative for chills and fever.   HENT: Negative for congestion, postnasal drip and rhinorrhea.    Eyes: Negative for visual disturbance.   Respiratory: Negative for chest tightness and shortness of breath.    Cardiovascular: Negative for chest pain and palpitations.   Gastrointestinal: Negative for abdominal distention, abdominal pain, nausea and vomiting.   Genitourinary: Negative for difficulty urinating.   Musculoskeletal: Negative for arthralgias and myalgias.   Skin: Negative for color change and wound.   Neurological: Positive for seizures. Negative for weakness and headaches.   Hematological: Does not bruise/bleed easily.   Psychiatric/Behavioral: Negative for agitation.     Objective:     Vital Signs (Most Recent):  Temp: 96.3 °F (35.7 °C) (07/20/20 0727)  Pulse: 63 (07/20/20 0742)  Resp: 18 (07/20/20 0727)  BP: 106/62 (07/20/20 0727)  SpO2: 100 % (07/20/20 0826) Vital Signs (24h Range):  Temp:  [96.3 °F (35.7 °C)-97 °F (36.1 °C)] 96.3 °F (35.7 °C)  Pulse:  [56-74] 63  Resp:  [18-20] 18  SpO2:  [98 %-100 %] 100 %  BP: (101-115)/(60-76) 106/62     Weight: 59.4 kg (130 lb 15.3 oz)  Body mass index is 16.37 kg/m².    Physical Exam  Vitals signs and nursing note reviewed.   Constitutional:       Appearance: Normal appearance.   HENT:      Head: Normocephalic and atraumatic.      Nose: Nose normal.      Mouth/Throat:      Mouth: Mucous membranes are moist.   Eyes:      Extraocular Movements: Extraocular movements intact.      Pupils: Pupils are equal, round, and reactive to light.   Neck:      Musculoskeletal: Normal range of motion and neck supple.   Cardiovascular:      Heart sounds: No murmur.   Pulmonary:      Effort: Pulmonary effort is normal.   Abdominal:      General: Bowel sounds are normal.   Musculoskeletal:         General: No swelling or tenderness.   Skin:     General: Skin is warm and dry.   Neurological:      General: No focal deficit  present.      Mental Status: He is alert.   Psychiatric:         Mood and Affect: Mood normal.         Behavior: Behavior normal.           CRANIAL NERVES     CN III, IV, VI   Pupils are equal, round, and reactive to light.       Significant Labs:   Bilirubin:   Recent Labs   Lab 07/18/20  1726 07/19/20  0543   BILITOT 0.4 0.4     BMP:   Recent Labs   Lab 07/19/20  0543   GLU 77      K 4.0      CO2 27   BUN 15   CREATININE 1.0   CALCIUM 8.4*   MG 1.8     CBC:   Recent Labs   Lab 07/18/20 1726 07/19/20  0542   WBC 7.82 6.59   HGB 14.8 13.4*   HCT 46.3 43.7    153     CMP:   Recent Labs   Lab 07/18/20  1726 07/19/20  0543    138   K 4.0 4.0    106   CO2 22* 27    77   BUN 19 15   CREATININE 1.0 1.0   CALCIUM 8.7 8.4*   PROT 7.5 6.3   ALBUMIN 3.8 3.1*   BILITOT 0.4 0.4   ALKPHOS 61 47*   AST 18 31   ALT 17 18   ANIONGAP 10 5*   EGFRNONAA >60 >60     Lactic Acid:   Recent Labs   Lab 07/18/20  1726   LACTATE 0.8     Urine Studies:   Recent Labs   Lab 07/18/20  1726   COLORU Yellow   APPEARANCEUA Hazy*   PHUR 6.0   SPECGRAV >=1.030*   PROTEINUA 1+*   GLUCUA Negative   KETONESU Negative   BILIRUBINUA Negative   OCCULTUA 3+*   NITRITE Positive*   UROBILINOGEN 1.0   LEUKOCYTESUR Negative   RBCUA 20*   WBCUA 5   BACTERIA Many*   HYALINECASTS 0       Significant Imaging: I have reviewed all pertinent imaging results/findings within the past 24 hours.Interval History:     Review of Systems   Constitutional: Negative for chills and fever.   Respiratory: Negative for chest tightness and shortness of breath.    Cardiovascular: Negative for chest pain and palpitations.   Gastrointestinal: Negative for abdominal distention, abdominal pain, nausea and vomiting.   Genitourinary: Negative for difficulty urinating.   Musculoskeletal: Negative for arthralgias and myalgias.   Skin: Negative for color change and wound.   Neurological: Negative for seizures and weakness.   Psychiatric/Behavioral:  Negative for agitation.     Objective:     Vital Signs (Most Recent):  Temp: 96.6 °F (35.9 °C) (07/19/20 1112)  Pulse: (!) 56 (07/19/20 1144)  Resp: 18 (07/19/20 1112)  BP: 101/67 (07/19/20 1112)  SpO2: 99 % (07/19/20 1143) Vital Signs (24h Range):  Temp:  [96.5 °F (35.8 °C)-98.9 °F (37.2 °C)] 96.6 °F (35.9 °C)  Pulse:  [] 56  Resp:  [16-20] 18  SpO2:  [95 %-100 %] 99 %  BP: (101-126)/(65-82) 101/67     Weight: 75.8 kg (167 lb)  Body mass index is 20.87 kg/m².    Intake/Output Summary (Last 24 hours) at 7/19/2020 1333  Last data filed at 7/18/2020 2200  Gross per 24 hour   Intake 1050 ml   Output 110 ml   Net 940 ml      Physical Exam  Vitals signs and nursing note reviewed.   Constitutional:       Appearance: Normal appearance.   HENT:      Head: Normocephalic and atraumatic.   Neck:      Musculoskeletal: Normal range of motion and neck supple.   Cardiovascular:      Rate and Rhythm: Bradycardia present.      Heart sounds: No murmur.   Pulmonary:      Effort: Pulmonary effort is normal.   Abdominal:      General: Bowel sounds are normal.   Musculoskeletal:         General: No swelling or tenderness.   Skin:     General: Skin is warm and dry.   Neurological:      General: No focal deficit present.      Mental Status: He is alert.         Significant Labs:   A1C: No results for input(s): HGBA1C in the last 4320 hours.  Blood Culture:   Recent Labs   Lab 07/18/20  1727   LABBLOO No Growth to date     CBC:   Recent Labs   Lab 07/18/20  1726 07/19/20  0542   WBC 7.82 6.59   HGB 14.8 13.4*   HCT 46.3 43.7    153     CMP:   Recent Labs   Lab 07/18/20  1726 07/19/20  0543    138   K 4.0 4.0    106   CO2 22* 27    77   BUN 19 15   CREATININE 1.0 1.0   CALCIUM 8.7 8.4*   PROT 7.5 6.3   ALBUMIN 3.8 3.1*   BILITOT 0.4 0.4   ALKPHOS 61 47*   AST 18 31   ALT 17 18   ANIONGAP 10 5*   EGFRNONAA >60 >60     Coagulation: No results for input(s): PT, INR, APTT in the last 48 hours.  Lactic Acid:    Recent Labs   Lab 07/18/20  1726   LACTATE 0.8     Magnesium:   Recent Labs   Lab 07/19/20  0543   MG 1.8     TSH: No results for input(s): TSH in the last 4320 hours.  Urine Culture:   Recent Labs   Lab 07/17/20  1511   LABURIN PRESUMPTIVE E COLI  >100,000 cfu/ml  Identification and susceptibility pending  *     Urine Studies:   Recent Labs   Lab 07/17/20  1514 07/18/20  1726   COLORU Cordelia Yellow   APPEARANCEUA Cloudy* Hazy*   PHUR 6.0 6.0   SPECGRAV 1.020 >=1.030*   PROTEINUA Negative 1+*   GLUCUA Negative Negative   KETONESU Negative Negative   BILIRUBINUA Negative Negative   OCCULTUA 2+* 3+*   NITRITE Negative Positive*   UROBILINOGEN  --  1.0   LEUKOCYTESUR Trace* Negative   RBCUA 89* 20*   WBCUA 5 5   BACTERIA Many* Many*   SQUAMEPITHEL 1  --    HYALINECASTS  --  0       Significant Imaging: I have reviewed all pertinent imaging results/findings within the past 24 hours.      Assessment/Plan:      * New onset seizure  CT of head showed no acute findings  U/A concerning for UTI, continue ceftriaxone, E. Coli noted   We appreciate Neurology  Received 3000mg Keppra loading dose in ED, continue Keppra 1000mg BID  Ativan prn  Neuro checks q4 hours  Seizure precautions  Will need PT/OT/SLP    Hyperglycemia  Monitor glucose      Chronic constipation  Continue amitiza  Continue lactulose prn    UTI (urinary tract infection)  We started him on ceftriaxone   Urine cultures growing E. Coli  Blood cultures with E. Coli noted. Urine culture is pan sensitive.   Will start him on Cipro. Per the patients mom who is his caretaker he has been having frequent UTIs--will likely need abx prophylactic.       Decreased functional mobility  Spinocerebellar ataxia  Spastic diplegia    Continue Baclofen, Dantrolene and Donepezil       VTE Risk Mitigation (From admission, onward)         Ordered     IP VTE HIGH RISK PATIENT  Once      07/18/20 2200     Place sequential compression device  Until discontinued      07/18/20 2200                       Fran Dior NP  Department of Hospital Medicine   [facility]

## 2020-07-20 NOTE — ASSESSMENT & PLAN NOTE
CT of head showed no acute findings  U/A concerning for UTI, continue ceftriaxone, E. Coli noted   We appreciate Neurology  Received 3000mg Keppra loading dose in ED, continue Keppra 1000mg BID  Ativan prn  Neuro checks q4 hours  Seizure precautions  Will need PT/OT/SLP

## 2020-07-20 NOTE — PLAN OF CARE
IP rehab referrals sent to [facility] and [facility]       07/20/20 1403   Post-Acute Status   Post-Acute Authorization Placement   Post-Acute Placement Status Referrals Sent     Pamela Broussard, RN, CCM, CMSRN  RN Transition Navigator  [phone]

## 2020-07-20 NOTE — PLAN OF CARE
Patient sleeping at this time.  Mother at bedside and information obtained  Lives at home with mother  Suppose to have 73 hours/week of medicaid waiver PCA but hasn't had sitters since March.    PT/OT recommending IP rehab- mother and patient agreeable. [facility] and also [facility].    IP rehab preferences  1st choice- [facility]  2nd choice- [facility]    This  put name on white board and explained blue discharge folder to patient. Discharge planning brochure and/or business card given to patient.  Patient verbalized understanding.       07/20/20 1318   Discharge Assessment   Assessment Type Discharge Planning Assessment   Confirmed/corrected address and phone number on facesheet? Yes   Assessment information obtained from? Caregiver   Prior to hospitilization cognitive status: Alert/Oriented   Prior to hospitalization functional status: Needs Assistance;Assistive Equipment   Current cognitive status: Alert/Oriented   Current Functional Status: Assistive Equipment;Needs Assistance   Lives With parent(s)   Able to Return to Prior Arrangements no   Is patient able to care for self after discharge? No   Readmission Within the Last 30 Days no previous admission in last 30 days   Patient currently being followed by outpatient case management? No   Equipment Currently Used at Home hospital bed;bedside commode;shower chair;wheelchair;other (see comments)  (ramp at house)   Do you have any problems affording any of your prescribed medications? No   Is the patient taking medications as prescribed? yes   Does the patient have transportation home? Yes   Transportation Anticipated family or friend will provide   Discharge Plan A Rehab   Discharge Plan B Home with family   Patient/Family in Agreement with Plan yes     Pamela Broussard RN, CCM, CMSRN  RN Transition Navigator  [phone]

## 2020-07-20 NOTE — PLAN OF CARE
Problem: SLP Goal  Goal: SLP Goal  Description: Short Term Goals:  1. Pt will participate in swallow eval to determine safest diet level.  2. Pt will tolerate nectar thick liquids/regular diet with no outward s/s of dysphagia.  3. Pt will utilize swallow strategies when consuming PO with min-mod cues for safe PO consumption.   Outcome: Ongoing, Progressing  Continue nectar thick liquids and regular texture for now, SLP will continue to educate family/pt on swallow precautions and dysphagia ex. Pt will benefit from continued ST at next level of care (consider inpatient rehab facility).

## 2020-07-20 NOTE — PLAN OF CARE
Problem: Adult Inpatient Plan of Care  Goal: Chart Review  Outcome: Ongoing, Progressing   Patient sleeping when VN qued into room.  Mother, Giovana, at bedside denies any complaints or questions at this time.  All safety measures maintained including bed locked, in lowest position with alarm on.  Call light within reach.

## 2020-07-21 ENCOUNTER — ANESTHESIA EVENT (OUTPATIENT)
Dept: ENDOSCOPY | Facility: HOSPITAL | Age: 40
DRG: 689 | End: 2020-07-21
Payer: MEDICARE

## 2020-07-21 ENCOUNTER — TELEPHONE (OUTPATIENT)
Dept: NEUROLOGY | Facility: CLINIC | Age: 40
End: 2020-07-21

## 2020-07-21 DIAGNOSIS — R53.1 WEAKNESS: Primary | ICD-10-CM

## 2020-07-21 LAB
ALBUMIN SERPL BCP-MCNC: 2.9 G/DL (ref 3.5–5.2)
ALP SERPL-CCNC: 43 U/L (ref 55–135)
ALT SERPL W/O P-5'-P-CCNC: 14 U/L (ref 10–44)
ANION GAP SERPL CALC-SCNC: 4 MMOL/L (ref 8–16)
AST SERPL-CCNC: 18 U/L (ref 10–40)
BASOPHILS # BLD AUTO: 0.01 K/UL (ref 0–0.2)
BASOPHILS NFR BLD: 0.2 % (ref 0–1.9)
BILIRUB SERPL-MCNC: 0.3 MG/DL (ref 0.1–1)
BUN SERPL-MCNC: 9 MG/DL (ref 6–20)
CALCIUM SERPL-MCNC: 8.2 MG/DL (ref 8.7–10.5)
CHLORIDE SERPL-SCNC: 104 MMOL/L (ref 95–110)
CO2 SERPL-SCNC: 28 MMOL/L (ref 23–29)
CREAT SERPL-MCNC: 0.8 MG/DL (ref 0.5–1.4)
DIFFERENTIAL METHOD: ABNORMAL
EOSINOPHIL # BLD AUTO: 0.3 K/UL (ref 0–0.5)
EOSINOPHIL NFR BLD: 5.9 % (ref 0–8)
ERYTHROCYTE [DISTWIDTH] IN BLOOD BY AUTOMATED COUNT: 13.2 % (ref 11.5–14.5)
EST. GFR  (AFRICAN AMERICAN): >60 ML/MIN/1.73 M^2
EST. GFR  (NON AFRICAN AMERICAN): >60 ML/MIN/1.73 M^2
GLUCOSE SERPL-MCNC: 78 MG/DL (ref 70–110)
HCT VFR BLD AUTO: 42.2 % (ref 40–54)
HGB BLD-MCNC: 13.3 G/DL (ref 14–18)
IMM GRANULOCYTES # BLD AUTO: 0.01 K/UL (ref 0–0.04)
IMM GRANULOCYTES NFR BLD AUTO: 0.2 % (ref 0–0.5)
LYMPHOCYTES # BLD AUTO: 1.5 K/UL (ref 1–4.8)
LYMPHOCYTES NFR BLD: 35.8 % (ref 18–48)
MAGNESIUM SERPL-MCNC: 1.8 MG/DL (ref 1.6–2.6)
MCH RBC QN AUTO: 26.9 PG (ref 27–31)
MCHC RBC AUTO-ENTMCNC: 31.5 G/DL (ref 32–36)
MCV RBC AUTO: 85 FL (ref 82–98)
MONOCYTES # BLD AUTO: 0.5 K/UL (ref 0.3–1)
MONOCYTES NFR BLD: 11.8 % (ref 4–15)
NEUTROPHILS # BLD AUTO: 2 K/UL (ref 1.8–7.7)
NEUTROPHILS NFR BLD: 46.1 % (ref 38–73)
NRBC BLD-RTO: 0 /100 WBC
PHOSPHATE SERPL-MCNC: 4.1 MG/DL (ref 2.7–4.5)
PLATELET # BLD AUTO: 168 K/UL (ref 150–350)
PMV BLD AUTO: 10.7 FL (ref 9.2–12.9)
POTASSIUM SERPL-SCNC: 3.9 MMOL/L (ref 3.5–5.1)
PROT SERPL-MCNC: 6.1 G/DL (ref 6–8.4)
RBC # BLD AUTO: 4.94 M/UL (ref 4.6–6.2)
SODIUM SERPL-SCNC: 136 MMOL/L (ref 136–145)
WBC # BLD AUTO: 4.24 K/UL (ref 3.9–12.7)

## 2020-07-21 PROCEDURE — 97112 NEUROMUSCULAR REEDUCATION: CPT

## 2020-07-21 PROCEDURE — 83735 ASSAY OF MAGNESIUM: CPT

## 2020-07-21 PROCEDURE — 95816 EEG AWAKE AND DROWSY: CPT | Mod: 26,,, | Performed by: PSYCHIATRY & NEUROLOGY

## 2020-07-21 PROCEDURE — 92526 ORAL FUNCTION THERAPY: CPT

## 2020-07-21 PROCEDURE — 95816 PR EEG,W/AWAKE & DROWSY RECORD: ICD-10-PCS | Mod: 26,,, | Performed by: PSYCHIATRY & NEUROLOGY

## 2020-07-21 PROCEDURE — 97535 SELF CARE MNGMENT TRAINING: CPT

## 2020-07-21 PROCEDURE — 25000003 PHARM REV CODE 250: Performed by: NURSE PRACTITIONER

## 2020-07-21 PROCEDURE — 84100 ASSAY OF PHOSPHORUS: CPT

## 2020-07-21 PROCEDURE — 94761 N-INVAS EAR/PLS OXIMETRY MLT: CPT

## 2020-07-21 PROCEDURE — 25000003 PHARM REV CODE 250: Performed by: EMERGENCY MEDICINE

## 2020-07-21 PROCEDURE — 85025 COMPLETE CBC W/AUTO DIFF WBC: CPT

## 2020-07-21 PROCEDURE — 97530 THERAPEUTIC ACTIVITIES: CPT

## 2020-07-21 PROCEDURE — 11000001 HC ACUTE MED/SURG PRIVATE ROOM

## 2020-07-21 PROCEDURE — 95816 EEG AWAKE AND DROWSY: CPT

## 2020-07-21 PROCEDURE — 97110 THERAPEUTIC EXERCISES: CPT

## 2020-07-21 PROCEDURE — 80053 COMPREHEN METABOLIC PANEL: CPT

## 2020-07-21 RX ADMIN — DANTROLENE SODIUM 50 MG: 25 CAPSULE ORAL at 09:07

## 2020-07-21 RX ADMIN — DANTROLENE SODIUM 50 MG: 25 CAPSULE ORAL at 02:07

## 2020-07-21 RX ADMIN — BACLOFEN 20 MG: 5 TABLET ORAL at 06:07

## 2020-07-21 RX ADMIN — BACLOFEN 20 MG: 5 TABLET ORAL at 03:07

## 2020-07-21 RX ADMIN — BACLOFEN 20 MG: 5 TABLET ORAL at 08:07

## 2020-07-21 RX ADMIN — DONEPEZIL HYDROCHLORIDE 5 MG: 5 TABLET, FILM COATED ORAL at 09:07

## 2020-07-21 RX ADMIN — LUBIPROSTONE 24 MCG: 8 CAPSULE, GELATIN COATED ORAL at 08:07

## 2020-07-21 RX ADMIN — LEVETIRACETAM 1000 MG: 500 TABLET ORAL at 08:07

## 2020-07-21 RX ADMIN — CIPROFLOXACIN HYDROCHLORIDE 500 MG: 500 TABLET, FILM COATED ORAL at 09:07

## 2020-07-21 RX ADMIN — CIPROFLOXACIN HYDROCHLORIDE 500 MG: 500 TABLET, FILM COATED ORAL at 08:07

## 2020-07-21 RX ADMIN — LEVETIRACETAM 1000 MG: 500 TABLET ORAL at 09:07

## 2020-07-21 RX ADMIN — BACLOFEN 20 MG: 5 TABLET ORAL at 09:07

## 2020-07-21 RX ADMIN — DANTROLENE SODIUM 50 MG: 25 CAPSULE ORAL at 08:07

## 2020-07-21 RX ADMIN — LUBIPROSTONE 24 MCG: 8 CAPSULE, GELATIN COATED ORAL at 06:07

## 2020-07-21 NOTE — PROGRESS NOTES
[facility] Neurology Progress Note    Reason for consult: new onset seizure    39 y M with medical hx of depression, spastic diplegia, baclofen pump, spinocerebellar atrophy who presented to the ED with first time seizure, with provoking factor as recent UTI (7/17/ E.coli).  CT head neg.  On arrival to ED, patient non-verbal. Currently verbal but with slower speech (close to baseline).     ROS: 12 point ROS negative except as above.    Past medical, surgical, family, social and medication hx reviewed and as above.      Current Facility-Administered Medications:     acetaminophen tablet 650 mg, 650 mg, Oral, Q6H PRN, Sandra Hollins NP    baclofen tablet 20 mg, 20 mg, Oral, QID, Fran Dior NP, 20 mg at 07/21/20 0859    ciprofloxacin HCl tablet 500 mg, 500 mg, Oral, Q12H, Fran Dior NP, 500 mg at 07/21/20 0859    dantrolene capsule 50 mg, 50 mg, Oral, TID, Sandra Hollins NP, 50 mg at 07/21/20 0859    donepeziL tablet 5 mg, 5 mg, Oral, QHS, Marcelo Ruelas Jr., MD, 5 mg at 07/20/20 2053    lactulose 20 gram/30 mL solution Soln 20 g, 20 g, Oral, Q6H PRN, Sandra Hollins NP    levETIRAcetam tablet 1,000 mg, 1,000 mg, Oral, BID, Sandra Hollins NP, 1,000 mg at 07/21/20 0859    lorazepam (ATIVAN) injection 2 mg, 2 mg, Intravenous, Q3H PRN, Sandra Hollins NP, 2 mg at 07/20/20 1228    lubiprostone capsule 24 mcg, 24 mcg, Oral, BID WM, Sandra Hollins NP, 24 mcg at 07/21/20 0859    ondansetron injection 4 mg, 4 mg, Intravenous, Q6H PRN, Sandra Hollins NP    sodium chloride 0.9% flush 10 mL, 10 mL, Intravenous, PRN, Marcelo Ruelas Jr., MD    Vitals:    07/21/20 1145   BP: 105/70   Pulse: 70   Resp: 17   Temp: 97.6 °F (36.4 °C)     Exam:  Alert, oriented x 3.  Speech: slow, no aphasia.    Cranial Nerves: II-XII intact    Motor: Tone normal in BL UE, increased in BL LE  BL UE 5/5  BL LE 2/5    Sensory: intact to LT throughout    DTR   1+ R patellar, 2+ L patellar  1+ B/L achilles  +2  B/L symmetric bicep/BR    Cerebellar/Gait  Dysmetria B/L UE (R>L)  Cannot test gait as BL LE paresis.    Imaging and Laboratory:  CT head without contrast: no acute findings    EEG: pending     UTI+    Assessment and Recommendations:  39 y.o. w/ hx of spinocerebellar atrophy w/ uncertain neurological diagnosis s/p baclofen pump placement, frequent UTIs, constipation who presented to the ED following first time seizure in the setting of possible UTI. Mental status returned to baseline; some slowing of speech still present.     New onset seizure  - Per descriptors, possible focal seizure that generalized  - Likely provoked in the setting of UTI  - CTH w/o acute intracranial abnormality  - s/p keppra load at 3g IV x1 in ED  - Recommend f/u w/ urology as OP in setting of prior urodynamic study & recurrent UTIs to avoid recurrent infections & possible provocation of seizures.   Update to recommendation: continue keppra 1000 mg po BID                                                    keppra level monitoring outpatient                                                    EEG pending                                                    Also recommend MRI brain with epilepsy protocol.     Spastic-ataxia and spinocerebellar disease  MRI brain 2018 with atrophy of brain stem and cerebellum .  MRI spinal cord with narrow caliber cord  Genetic testing 2005 unyielding  - Last seen by neurology 11/27/18 at [facility] w/ Dr. Wise  - Continue aricept 10mg qd; patient also on baclofen and baclofen pump (D/w mother that it can lower seizure threshold but medically necessary therapy)  - Please ensure pt has follow up with Neurology as OP.              - Recommend re-establishment of care w/ Dr. Wise; however, if unable, can also follow up with Dr. Vicki Miller MD (clinic appointment can be set up using phone number [phone])    Please call for questions.

## 2020-07-21 NOTE — PT/OT/SLP PROGRESS
Occupational Therapy   Treatment    Name: Lisa Moreno  MRN: [id number]  Admitting Diagnosis:  New onset seizure       Recommendations:     Discharge Recommendations: rehabilitation facility  Discharge Equipment Recommendations:  other (see comments)(defer to IPR)  Barriers to discharge:  None    Assessment:   Pt w/ great motivation/participation, but cont w/ BLE adduction rigidity & hamstring shortening. Cont w/ OT per POC.    Lisa Moreno is a 39 y.o. male with a medical diagnosis of New onset seizure.  He presents with . Performance deficits affecting function are weakness, impaired endurance, impaired self care skills, impaired functional mobilty, gait instability, decreased lower extremity function, decreased upper extremity function, decreased coordination, impaired cognition, impaired balance, decreased safety awareness, abnormal tone, decreased ROM, impaired coordination, impaired fine motor, impaired muscle length, impaired joint extensibility.     Rehab Prognosis:  Good; patient would benefit from acute skilled OT services to address these deficits and reach maximum level of function.       Plan:     Patient to be seen 5 x/week to address the above listed problems via self-care/home management, therapeutic activities, therapeutic exercises  · Plan of Care Expires: 08/20/20  · Plan of Care Reviewed with: patient, mother    Subjective     Pain/Comfort:  · Pain Rating 1: 0/10  · Pain Rating Post-Intervention 1: 0/10    Objective:     Communicated with: nsg prior to session.  Patient found sitting EOB w/ PT with bed alarm, telemetry upon OT entry to room.    General Precautions: Standard, fall, seizure   Orthopedic Precautions:N/A   Braces: N/A     Occupational Performance:     Bed Mobility:    · Patient completed Sit to Supine with moderate assistance and 2 persons     Functional Mobility/Transfers:  · Patient completed Sit <> Stand Transfer with maximal assistance  with  rolling walker    · Functional Mobility: sidestep R via RW w/ max A x 2    Activities of Daily Living:  · Lower Body Dressing: moderate assistance and maximal assistance don shorts      [facility] 6 Click ADL: 14    Treatment & Education:  Pt found sitting EOB w/ PT & agreeable to co-tx this PM. Pt perf the following: don shorts at EOB w/ Mod-max A to thread LEs & inconsistent SB-Mod A to maint sitting balance & mod A for maintaining WS'ing & hip hike to don over buttocks at EOB; standing via RW x 2 attempts w/ Max A to achieve & Mod-Max x 2 to maintain x ~1-2min per attempt; sidestepping R via RW w/ Max A x 2 for DME mgmt, WSing & lateral advancement of feet. Pt returned to supine w/ Mod A x 2. Instruct/demo w/ return demo of BUE assisted trunk flex in supine w/ HOB partially elevated. Edu/tx re: HEP, general safety techs & POC. Pt/mother verbalized understanding.      Patient left HOB elevated with all lines intact, call button in reach, bed alarm on, nsg notified and mother presentEducation:      GOALS:   Multidisciplinary Problems     Occupational Therapy Goals        Problem: Occupational Therapy Goal    Goal Priority Disciplines Outcome Interventions   Occupational Therapy Goal     OT, PT/OT Ongoing, Progressing    Description: Goals to be met by: 08/20     Patient will increase functional independence with ADLs by performing:    LE Dressing with Moderate Assistance.  Grooming while seated with Stand-by Assistance.  Squat pivot transfers with Minimal Assistance.                     Time Tracking:     OT Date of Treatment: 07/21/20  OT Start Time: 1400  OT Stop Time: 1453  OT Total Time (min): 53 min    Billable Minutes:Self Care/Home Management 15  Therapeutic Exercise 8  Total Time 53--co-tx w/ PT    PHILL Mtz  7/21/2020

## 2020-07-21 NOTE — ASSESSMENT & PLAN NOTE
CT of head showed no acute findings  Seizure likely 2/2 UTI-cont cipro  We appreciate Neurology-recommending MRI brain with epilepsy protocol  continue Keppra 1000mg BID  Ativan prn  Neuro checks q4 hours  Cont Seizure precautions  Evaluated by PT/OT/SLP-recommending IPR on discharge  EEG pending

## 2020-07-21 NOTE — SUBJECTIVE & OBJECTIVE
Interval History: patient seen in bed today, mother at bedside, awaiting EEG, denies seizure activity    Review of Systems   Constitutional: Negative for chills and fever.   Respiratory: Positive for cough (chronic). Negative for shortness of breath.    Cardiovascular: Negative for chest pain.   Gastrointestinal: Positive for constipation (chronic). Negative for abdominal distention, abdominal pain, diarrhea, nausea and vomiting.   Genitourinary: Negative for difficulty urinating.   Neurological: Negative for seizures, speech difficulty and headaches.     Objective:     Vital Signs (Most Recent):  Temp: 97.4 °F (36.3 °C) (07/21/20 1602)  Pulse: 70 (07/21/20 1602)  Resp: 17 (07/21/20 1602)  BP: 106/64 (07/21/20 1602)  SpO2: 100 % (07/21/20 1602) Vital Signs (24h Range):  Temp:  [96 °F (35.6 °C)-97.6 °F (36.4 °C)] 97.4 °F (36.3 °C)  Pulse:  [60-76] 70  Resp:  [17-20] 17  SpO2:  [95 %-100 %] 100 %  BP: ()/(52-70) 106/64     Weight: 59.4 kg (130 lb 15.3 oz)  Body mass index is 16.37 kg/m².    Intake/Output Summary (Last 24 hours) at 7/21/2020 1730  Last data filed at 7/20/2020 2200  Gross per 24 hour   Intake 80 ml   Output 188 ml   Net -108 ml      Physical Exam  Vitals signs and nursing note reviewed.   Constitutional:       Appearance: Normal appearance.   Cardiovascular:      Rate and Rhythm: Normal rate and regular rhythm.      Pulses: Normal pulses.      Heart sounds: Normal heart sounds.   Pulmonary:      Effort: Pulmonary effort is normal.      Breath sounds: Decreased breath sounds present.      Comments: Coughing occasionally with loose rattle noted to upper airway  Abdominal:      General: Bowel sounds are increased.      Tenderness: There is no abdominal tenderness.   Neurological:      Mental Status: He is alert and oriented to person, place, and time. Mental status is at baseline.      Comments: Answers appropriately, delayed speech at times         Significant Labs:   CBC:   Recent Labs   Lab  07/20/20  1306 07/21/20  0434   WBC 3.93 4.24   HGB 14.7 13.3*   HCT 47.0 42.2    168     CMP:   Recent Labs   Lab 07/20/20  1306 07/21/20  0434    136   K 3.7 3.9    104   CO2 26 28   GLU 94 78   BUN 11 9   CREATININE 0.7 0.8   CALCIUM 8.7 8.2*   PROT 7.1 6.1   ALBUMIN 3.5 2.9*   BILITOT 0.4 0.3   ALKPHOS 51* 43*   AST 22 18   ALT 18 14   ANIONGAP 7* 4*   EGFRNONAA >60 >60     Magnesium:   Recent Labs   Lab 07/20/20  1307 07/21/20  0434   MG 1.8 1.8       Significant Imaging: I have reviewed all pertinent imaging results/findings within the past 24 hours.

## 2020-07-21 NOTE — PLAN OF CARE
Worked on wt shift and bearing bearing, rocking forward and back in preparation to stand.  2 trials of standing with RW.  Co tx with OT.  Slight decrease in tone with wt bearing .  Worked with OT on dressing   REC:  IPR

## 2020-07-21 NOTE — PT/OT/SLP PROGRESS
Physical Therapy Treatment    Patient Name:  Lisa Moreno   MRN:  [id number]    Recommendations:     Discharge Recommendations:  rehabilitation facility   Discharge Equipment Recommendations: other (see comments)(TBD)   Barriers to discharge: None    Assessment:     Lisa Moreno is a 39 y.o. male admitted with a medical diagnosis of New onset seizure.  He presents with the following impairments/functional limitations:  weakness, gait instability, decreased lower extremity function, decreased upper extremity function, decreased ROM, impaired fine motor, impaired sensation, impaired balance, impaired endurance, impaired self care skills, impaired functional mobilty, abnormal tone, decreased safety awareness, impaired muscle length, impaired coordination .  Pt cont to be motivated and participating with therapy.  Very tight hams and abnormal tone in BLE.  Slight improvement with wt bearing activities     Rehab Prognosis: Good; patient would benefit from acute skilled PT services to address these deficits and reach maximum level of function.    Recent Surgery: * No surgery found *      Plan:     During this hospitalization, patient to be seen 5 x/week to address the identified rehab impairments via therapeutic activities, therapeutic exercises, neuromuscular re-education, wheelchair management/training and progress toward the following goals:    · Plan of Care Expires:  08/19/20    Subjective     Chief Complaint: doesn't like wearing gown  Patient/Family Comments/goals: wants to put on his shorts .  Mom stated patient has been approved for IPR  Pain/Comfort:  · Pain Rating 1: 0/10  · Pain Rating Post-Intervention 1: 0/10      Objective:     Communicated with nurse prior to session.  Patient found HOB elevated with bed alarm, telemetry upon PT entry to room.     General Precautions: Standard, fall, seizure   Orthopedic Precautions:N/A   Braces: N/A     Functional Mobility:  · Bed Mobility:     · Supine to  Sit: stand by assistance and use of side rail and HOB elevated along with increased time  · Sit to Supine: moderate assistance and of 2 persons  · Transfers:     · Sit to Stand:  maximal assistance and of 2 persons with rolling walker and cues for hand placeement, coming forward.  tends to try to push straight up with arms and will lean back.   · Gait: 3 side steps with RW and max A x 2 persons.  facilitation of wt shift, movement  of R leg, blocking B knees . promoting hip and knee ext with proper CJ   · Balance: fair in sitting, poor in standing      AM-PAC 6 CLICK MOBILITY  Turning over in bed (including adjusting bedclothes, sheets and blankets)?: 3  Sitting down on and standing up from a chair with arms (e.g., wheelchair, bedside commode, etc.): 2  Moving from lying on back to sitting on the side of the bed?: 3  Moving to and from a bed to a chair (including a wheelchair)?: 3  Need to walk in hospital room?: 1  Climbing 3-5 steps with a railing?: 1  Basic Mobility Total Score: 13       Therapeutic Activities and Exercises:   worked on preparation on coming to stand/coming fwd with pt at EOB with hands on thighs cueing for technique and return to  upright posture instead of post lean.  With OT worked on LBD with PT working on sitting balance.  Then  2 trials of standing with RW.  Facilitation of wt bearing on BLE-required blocking of B knees, facilitation of trunk and hip/ knee ext.  Stood each time between 1-2 min . Side steps as above on 2nd trial. Facilitation of Bridging in bed to doff shorts and straighten pads/ sheets.    Patient left HOB elevated with all lines intact, call button in reach, bed alarm on, nurse notified and mother present..    GOALS:   Multidisciplinary Problems     Physical Therapy Goals        Problem: Physical Therapy Goal    Goal Priority Disciplines Outcome Goal Variances Interventions   Physical Therapy Goal     PT, PT/OT Ongoing, Progressing     Description: Goals to be met by:  8/10/2020     Patient will increase functional independence with mobility by performin. Supine to sit with S  2. Sit to supine with MInimal Assistance  3. Sit to stand transfer with Moderate Assistance  4.  Assess wc transfers  5.  Assess wc mobility                      Time Tracking:     PT Received On: 20  PT Start Time: 1350     PT Stop Time: 1445  PT Total Time (min): 55 min     Billable Minutes: Therapeutic Activity 15 and Neuromuscular Re-education 23    Treatment Type: Treatment  PT/PTA: PT     PTA Visit Number: 0     Nancy Cisneros, PT  2020

## 2020-07-21 NOTE — PLAN OF CARE
Problem: Occupational Therapy Goal  Goal: Occupational Therapy Goal  Description: Goals to be met by: 08/20     Patient will increase functional independence with ADLs by performing:    LE Dressing with Moderate Assistance.  Grooming while seated with Stand-by Assistance.  Squat pivot transfers with Minimal Assistance.    Outcome: Ongoing, Progressing   Pt found sitting EOB w/ PT & agreeable to co-tx this PM. Pt perf the following: don shorts at EOB w/ Mod-max A to thread LEs & inconsistent SB-Mod A to maint sitting balance & mod A for maintaining WS'ing & hip hike to don over buttocks at EOB; standing via RW x 2 attempts w/ Max A to achieve & Mod-Max x 2 to maintain x ~1-2min per attempt; sidestepping R via RW w/ Max A x 2 for DME mgmt, WSing & lateral advancement of feet. Pt returned to supine w/ Mod A x 2. Instruct/demo w/ return demo of BUE assisted trunk flex in supine w/ HOB partially elevated. Edu/tx re: HEP, general safety techs & POC. Pt/mother verbalized understanding.    Pt w/ great motivation/participation, but cont w/ BLE adduction rigidity & hamstring shortening. Cont w/ OT per POC.

## 2020-07-21 NOTE — PLAN OF CARE
Pt stable. NO distress noted. POC reviewed with pt and mother. Pt verbalized understanding. Pt remains SR on the monitor. NO true red alarms noted. Pt denied pain. No seizure like activity noted during the night. Mepilex placed on bony prominences. Pt repositioned during the night. Fall precautions maintained. Bed in lowest position, call light in reach and bed alarm on.

## 2020-07-21 NOTE — ANESTHESIA PREPROCEDURE EVALUATION
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 Patient undergoing EGD/Colonoscopy under MAC/Gen.    Anesthesia Evaluation    I have reviewed the Patient Summary Reports.    I have reviewed the Nursing Notes. I have reviewed the NPO Status.   I have reviewed the Medications.     Review of Systems  Anesthesia Hx:  Denies Hx of Anesthetic complications  Neg history of prior surgery.  Denies Personal Hx of Anesthesia complications.   Social:  Non-Smoker, No Alcohol Use    Hematology/Oncology:  Hematology Normal   Oncology Normal     EENT/Dental:EENT/Dental Normal   Cardiovascular:  Cardiovascular Normal Exercise tolerance: poor     Pulmonary:  Pulmonary Normal    Renal/:   Recurent EColi UTIs   Hepatic/GI:  Hepatic/GI Normal No Bowel Prep.    Musculoskeletal:  Spine Disorders: Degenerative disease    Neurological:   Neuromuscular Disease, Seizures Spinocerebellar atrophy w spastic diplegia & spasms on baclofen pump.  New onset seizure witnessed witj recurrent UTI as likely provoking factor   Endocrine:  Endocrine Normal    Psych:   Denies Psychiatric History. depression          Physical Exam  General:  Cachexia    Airway/Jaw/Neck:  Airway Findings: Mouth Opening: Small, but > 3cm Tongue: Normal  General Airway Assessment: Adult  Mallampati: IV  TM Distance: 4 - 6 cm  Jaw/Neck Findings:  Micrognathia  Neck ROM: Extension Decreased, Mod.     Eyes/Ears/Nose:  EYES/EARS/NOSE FINDINGS: Normal    Chest/Lungs:  Chest/Lungs Findings: Clear to auscultation, Normal Respiratory Rate     Heart/Vascular:  Heart Findings: Rate: Normal  Rhythm: Regular Rhythm      Musculoskeletal:  Musculoskeletal Findings:  Scoliosis Significant atrophy s/p spinocerebellar atrophy w spastic diplegia      Mental Status:  Mental Status Findings:  Cooperative         Anesthesia Plan  Type of Anesthesia, risks & benefits discussed:  Anesthesia Type:  MAC  Patient's Preference:   Intra-op Monitoring Plan: standard ASA monitors  Intra-op Monitoring Plan Comments:   Post Op Pain Control Plan: per primary service following discharge from PACU and multimodal analgesia  Post Op Pain Control Plan Comments:   Induction:    Beta Blocker:         Informed Consent: Patient understands risks and agrees with Anesthesia plan.  Questions answered. Anesthesia consent signed with patient representative.  ASA Score: 3     Day of Surgery Review of History & Physical:            Ready For Surgery From Anesthesia Perspective.

## 2020-07-21 NOTE — PLAN OF CARE
Patient remains in hospital  EEG pending  Neuro following    [facility] accepted patient - however, Mother's first choice is [facility].    Barrier to discharge: not medically ready       07/21/20 1436   Discharge Reassessment   Assessment Type Discharge Planning Reassessment   Provided patient/caregiver education on the expected discharge date and the discharge plan No   Discharge Plan A Rehab     Pamela Broussard, RN, CCM, CMSRN  RN Transition Navigator  [phone]

## 2020-07-21 NOTE — PLAN OF CARE
Problem: SLP Goal  Goal: SLP Goal  Description: Short Term Goals:  1. Pt will participate in swallow eval to determine safest diet level.  2. Pt will tolerate nectar thick liquids/regular diet with no outward s/s of dysphagia.  3. Pt will utilize swallow strategies when consuming PO with min-mod cues for safe PO consumption.   Outcome: Ongoing, Progressing   Pt seen for direct dysphagia tx and education provided on swallow precautions.  Cont to encourage use of thickener in liquids.

## 2020-07-21 NOTE — PLAN OF CARE
Neurology service evaluated patient today.  Exam back to baseline except for speech with some persistent slowness.  Reviewed chart, labs, imaging.    Plan:  -EEG pending. Will follow up  -can continue keppra 1000 mg po BID.  Based on EEG may wean or adjust dose  -seizure precautions  -will follow up on patient tomorrow a.m. and check exam.  - Ativan 1-2 mg prn for seizure activity >3 - 5 mins.     Please call for questions

## 2020-07-21 NOTE — PROGRESS NOTES
[facility] Medicine  Progress Note    Patient Name: Lisa Moreno  MRN: [id number]  Patient Class: IP- Inpatient   Admission Date: 7/18/2020  Length of Stay: 2 days  Attending Physician: Bobby Hatch DO  Primary Care Provider: John Nixon Ii, MD        Subjective:     Principal Problem:New onset seizure        HPI:  39 y.o. male with past medical history of depression, chronic constipation, degenerative motor system disease, spastic diplegia,  quadriplegia, baclofen pump implantation and fluoroscopic urodynamic study ( 11/27/2018), and spinocerebellar atrophy who presents to the ED via EMS with complaint of seizure activity. As per mom, he started to shake upper and lower extremities at 2:15 today. He dropped his head to the right and began to drool. This lasted for 25 minutes. Upon arrival to ED, patient nonverbal.  His mom denies any recent illness or history of seizure. His medications were recently changed. Patient is now verbal but with very low tone, almost mumble.  Patient mother states that is not baseline.  Denies fever, chills, SOB, chest pain, headaches, syncope, palpitations, nausea, vomiting, change in bladder habits or change in bowel habits.  Findings: Drug screen negative, covid negative, u/a concerning for UTI, urine cultures from 7/17/20 grew E.coli, pending sensitives, blood cultures pending, CT of head negative for acute findings.  Of note, blood glucose elevated at 203, patient's mother states that last time he had UTI blood sugars elevated also, will monitor.  Patient admitted to hospital medicine observation service for further medical management.    Overview/Hospital Course:  The patient was admitted to the [facility] Medicine service for further care. Keppra was given for seizures. IV ceftriaxone was given for UTI. Neuro was consulted. EEG was ordered, pending. The patient was evaluated by PT/OT/ST. Neurology recommended MRI brain with epilepsy  protocol. An EGD/colonscopy was scheduled for tomorrow. The patient was treated for chronic constipation.     Interval History: patient seen in bed today, mother at bedside, awaiting EEG, denies seizure activity    Review of Systems   Constitutional: Negative for chills and fever.   Respiratory: Positive for cough (chronic). Negative for shortness of breath.    Cardiovascular: Negative for chest pain.   Gastrointestinal: Positive for constipation (chronic). Negative for abdominal distention, abdominal pain, diarrhea, nausea and vomiting.   Genitourinary: Negative for difficulty urinating.   Neurological: Negative for seizures, speech difficulty and headaches.     Objective:     Vital Signs (Most Recent):  Temp: 97.4 °F (36.3 °C) (07/21/20 1602)  Pulse: 70 (07/21/20 1602)  Resp: 17 (07/21/20 1602)  BP: 106/64 (07/21/20 1602)  SpO2: 100 % (07/21/20 1602) Vital Signs (24h Range):  Temp:  [96 °F (35.6 °C)-97.6 °F (36.4 °C)] 97.4 °F (36.3 °C)  Pulse:  [60-76] 70  Resp:  [17-20] 17  SpO2:  [95 %-100 %] 100 %  BP: ()/(52-70) 106/64     Weight: 59.4 kg (130 lb 15.3 oz)  Body mass index is 16.37 kg/m².    Intake/Output Summary (Last 24 hours) at 7/21/2020 1730  Last data filed at 7/20/2020 2200  Gross per 24 hour   Intake 80 ml   Output 188 ml   Net -108 ml      Physical Exam  Vitals signs and nursing note reviewed.   Constitutional:       Appearance: Normal appearance.   Cardiovascular:      Rate and Rhythm: Normal rate and regular rhythm.      Pulses: Normal pulses.      Heart sounds: Normal heart sounds.   Pulmonary:      Effort: Pulmonary effort is normal.      Breath sounds: Decreased breath sounds present.      Comments: Coughing occasionally with loose rattle noted to upper airway  Abdominal:      General: Bowel sounds are increased.      Tenderness: There is no abdominal tenderness.   Neurological:      Mental Status: He is alert and oriented to person, place, and time. Mental status is at baseline.       Comments: Answers appropriately, delayed speech at times         Significant Labs:   CBC:   Recent Labs   Lab 07/20/20  1306 07/21/20  0434   WBC 3.93 4.24   HGB 14.7 13.3*   HCT 47.0 42.2    168     CMP:   Recent Labs   Lab 07/20/20  1306 07/21/20  0434    136   K 3.7 3.9    104   CO2 26 28   GLU 94 78   BUN 11 9   CREATININE 0.7 0.8   CALCIUM 8.7 8.2*   PROT 7.1 6.1   ALBUMIN 3.5 2.9*   BILITOT 0.4 0.3   ALKPHOS 51* 43*   AST 22 18   ALT 18 14   ANIONGAP 7* 4*   EGFRNONAA >60 >60     Magnesium:   Recent Labs   Lab 07/20/20  1307 07/21/20  0434   MG 1.8 1.8       Significant Imaging: I have reviewed all pertinent imaging results/findings within the past 24 hours.      Assessment/Plan:      * New onset seizure  CT of head showed no acute findings  Seizure likely 2/2 UTI-cont cipro  We appreciate Neurology-recommending MRI brain with epilepsy protocol  continue Keppra 1000mg BID  Ativan prn  Neuro checks q4 hours  Cont Seizure precautions  Evaluated by PT/OT/SLP-recommending IPR on discharge  EEG pending    Hyperglycemia  Monitor glucose      Chronic constipation  Continue amitiza  Continue lactulose prn  Scheduled for EGD/Colonoscopy tomorrow?    UTI (urinary tract infection)  Frequent UTI's  E. Coli on urine cx  Cont Cipro        Decreased functional mobility  Spinocerebellar ataxia  Spastic diplegia    Continue Baclofen, Dantrolene and Donepezil       VTE Risk Mitigation (From admission, onward)         Ordered     IP VTE HIGH RISK PATIENT  Once      07/18/20 2200     Place sequential compression device  Until discontinued      07/18/20 2200                      Carito Grijalva NP  Department of Hospital Medicine   [facility]

## 2020-07-21 NOTE — PLAN OF CARE
Plan of care reviewed with patient. Patient voiced understanding. NSR on monitor. No acute distress noted. Anesthesia came to receive consent for EGD and colonoscopy. Pt has no orders for NPO at midnight or bowel prep. Holden CASTILLO notified. Side rails x 3, bed in lowest position, call bell within reach, pt advised to call for assistance. Maintain bed alarm for patient safety.

## 2020-07-21 NOTE — PLAN OF CARE
VIRTUAL NURSE:  Cued into patient's room.  Patient not responding to introduction and permission inquiry.  Patient unable to be visualized at this time do to light being off. Mother at bedside, states patient is sleeping now. No needs expressed, informed to use call light for asstance.     Labs, notes, orders, and careplan reviewed.

## 2020-07-21 NOTE — PLAN OF CARE
Plan of care reviewed with patient. Patient voiced understanding. NSR on monitor. No acute distress noted. Side rails x 2, bed in lowest position, call bell within reach, pt advised to call for assistance. Maintain bed alarm for patient safety. Mother at bedside.

## 2020-07-22 ENCOUNTER — ANESTHESIA (OUTPATIENT)
Dept: ENDOSCOPY | Facility: HOSPITAL | Age: 40
DRG: 689 | End: 2020-07-22
Payer: MEDICARE

## 2020-07-22 LAB
ALBUMIN SERPL BCP-MCNC: 3.1 G/DL (ref 3.5–5.2)
ALP SERPL-CCNC: 45 U/L (ref 55–135)
ALT SERPL W/O P-5'-P-CCNC: 17 U/L (ref 10–44)
ANION GAP SERPL CALC-SCNC: 7 MMOL/L (ref 8–16)
AST SERPL-CCNC: 17 U/L (ref 10–40)
BASOPHILS # BLD AUTO: 0.02 K/UL (ref 0–0.2)
BASOPHILS NFR BLD: 0.4 % (ref 0–1.9)
BILIRUB SERPL-MCNC: 0.3 MG/DL (ref 0.1–1)
BUN SERPL-MCNC: 12 MG/DL (ref 6–20)
CALCIUM SERPL-MCNC: 8.1 MG/DL (ref 8.7–10.5)
CHLORIDE SERPL-SCNC: 106 MMOL/L (ref 95–110)
CO2 SERPL-SCNC: 23 MMOL/L (ref 23–29)
CREAT SERPL-MCNC: 0.9 MG/DL (ref 0.5–1.4)
DIFFERENTIAL METHOD: ABNORMAL
EOSINOPHIL # BLD AUTO: 0.2 K/UL (ref 0–0.5)
EOSINOPHIL NFR BLD: 3.8 % (ref 0–8)
ERYTHROCYTE [DISTWIDTH] IN BLOOD BY AUTOMATED COUNT: 12.9 % (ref 11.5–14.5)
EST. GFR  (AFRICAN AMERICAN): >60 ML/MIN/1.73 M^2
EST. GFR  (NON AFRICAN AMERICAN): >60 ML/MIN/1.73 M^2
GLUCOSE SERPL-MCNC: 78 MG/DL (ref 70–110)
HCT VFR BLD AUTO: 43 % (ref 40–54)
HGB BLD-MCNC: 13.6 G/DL (ref 14–18)
IMM GRANULOCYTES # BLD AUTO: 0.02 K/UL (ref 0–0.04)
IMM GRANULOCYTES NFR BLD AUTO: 0.4 % (ref 0–0.5)
LYMPHOCYTES # BLD AUTO: 1.1 K/UL (ref 1–4.8)
LYMPHOCYTES NFR BLD: 23.1 % (ref 18–48)
MAGNESIUM SERPL-MCNC: 1.8 MG/DL (ref 1.6–2.6)
MCH RBC QN AUTO: 26.8 PG (ref 27–31)
MCHC RBC AUTO-ENTMCNC: 31.6 G/DL (ref 32–36)
MCV RBC AUTO: 85 FL (ref 82–98)
MONOCYTES # BLD AUTO: 0.6 K/UL (ref 0.3–1)
MONOCYTES NFR BLD: 12.6 % (ref 4–15)
NEUTROPHILS # BLD AUTO: 3 K/UL (ref 1.8–7.7)
NEUTROPHILS NFR BLD: 59.7 % (ref 38–73)
NRBC BLD-RTO: 0 /100 WBC
PHOSPHATE SERPL-MCNC: 3.7 MG/DL (ref 2.7–4.5)
PLATELET # BLD AUTO: 188 K/UL (ref 150–350)
PMV BLD AUTO: 11.2 FL (ref 9.2–12.9)
POTASSIUM SERPL-SCNC: 4.2 MMOL/L (ref 3.5–5.1)
PROT SERPL-MCNC: 6.4 G/DL (ref 6–8.4)
RBC # BLD AUTO: 5.07 M/UL (ref 4.6–6.2)
SODIUM SERPL-SCNC: 136 MMOL/L (ref 136–145)
WBC # BLD AUTO: 4.94 K/UL (ref 3.9–12.7)

## 2020-07-22 PROCEDURE — 25000003 PHARM REV CODE 250: Performed by: NURSE PRACTITIONER

## 2020-07-22 PROCEDURE — 80053 COMPREHEN METABOLIC PANEL: CPT

## 2020-07-22 PROCEDURE — 11000001 HC ACUTE MED/SURG PRIVATE ROOM

## 2020-07-22 PROCEDURE — 84100 ASSAY OF PHOSPHORUS: CPT

## 2020-07-22 PROCEDURE — 25000003 PHARM REV CODE 250: Performed by: INTERNAL MEDICINE

## 2020-07-22 PROCEDURE — 83735 ASSAY OF MAGNESIUM: CPT

## 2020-07-22 PROCEDURE — 25000003 PHARM REV CODE 250: Performed by: EMERGENCY MEDICINE

## 2020-07-22 PROCEDURE — 94761 N-INVAS EAR/PLS OXIMETRY MLT: CPT

## 2020-07-22 PROCEDURE — 97112 NEUROMUSCULAR REEDUCATION: CPT

## 2020-07-22 PROCEDURE — 97530 THERAPEUTIC ACTIVITIES: CPT

## 2020-07-22 PROCEDURE — 36415 COLL VENOUS BLD VENIPUNCTURE: CPT

## 2020-07-22 PROCEDURE — 85025 COMPLETE CBC W/AUTO DIFF WBC: CPT

## 2020-07-22 RX ORDER — POLYETHYLENE GLYCOL 3350, SODIUM SULFATE ANHYDROUS, SODIUM BICARBONATE, SODIUM CHLORIDE, POTASSIUM CHLORIDE 236; 22.74; 6.74; 5.86; 2.97 G/4L; G/4L; G/4L; G/4L; G/4L
4000 POWDER, FOR SOLUTION ORAL ONCE
Status: DISCONTINUED | OUTPATIENT
Start: 2020-07-22 | End: 2020-07-22

## 2020-07-22 RX ORDER — LEVETIRACETAM 500 MG/1
1000 TABLET ORAL 2 TIMES DAILY
Status: DISCONTINUED | OUTPATIENT
Start: 2020-07-22 | End: 2020-07-24 | Stop reason: HOSPADM

## 2020-07-22 RX ORDER — POLYETHYLENE GLYCOL 3350, SODIUM SULFATE ANHYDROUS, SODIUM BICARBONATE, SODIUM CHLORIDE, POTASSIUM CHLORIDE 236; 22.74; 6.74; 5.86; 2.97 G/4L; G/4L; G/4L; G/4L; G/4L
4000 POWDER, FOR SOLUTION ORAL ONCE
Status: COMPLETED | OUTPATIENT
Start: 2020-07-22 | End: 2020-07-22

## 2020-07-22 RX ADMIN — LEVETIRACETAM 1000 MG: 500 TABLET ORAL at 08:07

## 2020-07-22 RX ADMIN — BACLOFEN 20 MG: 5 TABLET ORAL at 04:07

## 2020-07-22 RX ADMIN — LUBIPROSTONE 24 MCG: 8 CAPSULE, GELATIN COATED ORAL at 08:07

## 2020-07-22 RX ADMIN — BACLOFEN 20 MG: 5 TABLET ORAL at 08:07

## 2020-07-22 RX ADMIN — POLYETHYLENE GLYCOL 3350, SODIUM SULFATE ANHYDROUS, SODIUM BICARBONATE, SODIUM CHLORIDE, POTASSIUM CHLORIDE 4000 ML: 236; 22.74; 6.74; 5.86; 2.97 POWDER, FOR SOLUTION ORAL at 05:07

## 2020-07-22 RX ADMIN — DANTROLENE SODIUM 50 MG: 25 CAPSULE ORAL at 08:07

## 2020-07-22 RX ADMIN — CIPROFLOXACIN HYDROCHLORIDE 500 MG: 500 TABLET, FILM COATED ORAL at 08:07

## 2020-07-22 RX ADMIN — LEVETIRACETAM 1000 MG: 500 TABLET, FILM COATED ORAL at 08:07

## 2020-07-22 RX ADMIN — LUBIPROSTONE 24 MCG: 8 CAPSULE, GELATIN COATED ORAL at 04:07

## 2020-07-22 RX ADMIN — DONEPEZIL HYDROCHLORIDE 5 MG: 5 TABLET, FILM COATED ORAL at 08:07

## 2020-07-22 RX ADMIN — BACLOFEN 20 MG: 5 TABLET ORAL at 02:07

## 2020-07-22 RX ADMIN — DANTROLENE SODIUM 50 MG: 25 CAPSULE ORAL at 02:07

## 2020-07-22 RX ADMIN — SODIUM PHOSPHATE, DIBASIC AND SODIUM PHOSPHATE, MONOBASIC 1 ENEMA: 7; 19 ENEMA RECTAL at 02:07

## 2020-07-22 NOTE — PLAN OF CARE
VN note: VN cued into patient's room for introduction. Patient's mother at bedside. VN informed patient and mother that VN would be working closely along side bedside nurse, PCT, and the rest of the care team and making rounds throughout the shift. Mother verbalized understanding. Allowed time for questions. Patient was alert but did not speak with VN. VN will continue to be available to patient and intervene prn.

## 2020-07-22 NOTE — PROCEDURES
ELECTROENCEPHALOGRAM REPORT    DATE OF SERVICE:  07/21/2020  EEG NUMBER: OK   REQUESTED BY:  Dr Chavira  LOCATION OF SERVICE:  [id number]    METHODOLOGY   Electroencephalographic (EEG) recording is with electrodes placed according to the International 10-20 placement system.  Thirty two (32) channels of digital signal (sampling rate of 512/sec) including T1 and T2 was simultaneously recorded from the scalp and may include  EKG, EMG, and/or eye monitors.  Recording band pass was 0.1 to 512 hz.  Digital video recording of the patient is simultaneously recorded with the EEG.  The patient is instructed report clinical symptoms which may occur during the recording session.  EEG and video recording is stored and archived in digital format. Activation procedures which include photic stimulation, hyperventilation and instructing patients to perform simple task are done in selected patients.    The EEG is displayed on a monitor screen and can be reviewed using different montages.  Computer assisted analysis is employed to detect spike and electrographic seizure activity.   The entire record is submitted for computer analysis.  The entire recording is visually reviewed and the times identified by computer analysis as being spikes or seizures are reviewed again.  Compresses spectral analysis (CSA) is also performed on the activity recorded from each individual channel.  This is displayed as a power display of frequencies from 0 to 30 Hz over time.   The CSA is reviewed looking for asymmetries in power between homologous areas of the scalp and then compared with the original EEG recording.     [employer] software was also utilized in the review of this study.  This software suite analyzes the EEG recording in multiple domains.  Coherence and rhythmicity is computed to identify EEG sections which may contain organized seizures.  Each channel undergoes analysis to detect presence of spike and sharp waves which have special and  morphological characteristic of epileptic activity.  The routine EEG recording is converted from spacial into frequency domain.  This is then displayed comparing homologous areas to identify areas of significant asymmetry.  Algorithm to identify non-cortically generated artifact is used to separate eye movement, EMG and other artifact from the EEG    EEG FINGINGS  Waking state -   The patient was awake at the onset of the recording.  Background was all low-amplitude mixture of mid and upper range beta activity noted diffusely.  Intermittent low let amplitude somewhat irregular 10 hertz posterior dominant rhythm was seen in the occipital leads bilaterally.   A posterior dominant rhythm occasionally attenuated but sleep was not recorded.  No lateralized or focal changes were noted no spike or sharp wave activity was seen.  Sleep state -     not recorded  Activation procedures:   Photic Stimulation - Not performed  Hyperventilation - Not performed    Cognitive testing:   The patient was ask questions the and correctly responded with their name, location, date,     Cardiac Monitor:   Single lead EKG was obtained and showed a regular cardiac rhythm, with a  rate of approximately 70    IMPRESSION:  Normal EEG -

## 2020-07-22 NOTE — PLAN OF CARE
Problem: Physical Therapy Goal  Goal: Physical Therapy Goal  Description: Goals to be met by: 8/10/2020     Patient will increase functional independence with mobility by performin. Supine to sit with S  2. Sit to supine with MInimal Assistance  3. Sit to stand transfer with Moderate Assistance  4.  Assess wc transfers  5.  Assess wc mobility     Outcome: Ongoing, Progressing     Pt is motivated to participate in therapy. Pt sat EOB ~20 minutes with assist level varying between SBA/CGA when B hands propping on bed and up to mod A with UEs resting on knees. Working on core strengthening, ROM, and LE ROM to improve pt's midline positioning as well as standing/gait. Recommending IPR upon d/c to continue to address pt's limitations and barriers to d/c.

## 2020-07-22 NOTE — PLAN OF CARE
Patient resting without complaints at this time. Will continue to be available as needed to assist with patient care

## 2020-07-22 NOTE — SUBJECTIVE & OBJECTIVE
Interval History: patient seen in bed today, mother at bedside, no seizure activity, discussed plan of care    Review of Systems   Constitutional: Negative for chills and fever.   Respiratory: Positive for cough (chronic). Negative for shortness of breath.    Cardiovascular: Negative for chest pain.   Gastrointestinal: Positive for constipation (chronic). Negative for abdominal distention, abdominal pain, diarrhea, nausea and vomiting.   Genitourinary: Negative for difficulty urinating.   Neurological: Negative for seizures, speech difficulty and headaches.     Objective:     Vital Signs (Most Recent):  Temp: 97.6 °F (36.4 °C) (07/22/20 1136)  Pulse: 65 (07/22/20 1540)  Resp: 17 (07/22/20 1136)  BP: 109/74 (07/22/20 1136)  SpO2: 98 % (07/22/20 1525) Vital Signs (24h Range):  Temp:  [97.5 °F (36.4 °C)-98.8 °F (37.1 °C)] 97.6 °F (36.4 °C)  Pulse:  [60-73] 65  Resp:  [17-20] 17  SpO2:  [97 %-100 %] 98 %  BP: (100-109)/(57-74) 109/74     Weight: 59.4 kg (130 lb 15.3 oz)  Body mass index is 16.37 kg/m².    Intake/Output Summary (Last 24 hours) at 7/22/2020 1612  Last data filed at 7/22/2020 1200  Gross per 24 hour   Intake 625 ml   Output 710 ml   Net -85 ml      Physical Exam  Vitals signs and nursing note reviewed.   Constitutional:       Appearance: Normal appearance.   Cardiovascular:      Rate and Rhythm: Normal rate and regular rhythm.      Pulses: Normal pulses.      Heart sounds: Normal heart sounds.   Pulmonary:      Effort: Pulmonary effort is normal.      Breath sounds: Decreased breath sounds present.      Comments: Coughing occasionally with loose rattle noted to upper airway  Abdominal:      General: Bowel sounds are normal. There is no distension.      Tenderness: There is no abdominal tenderness.   Neurological:      Mental Status: He is alert and oriented to person, place, and time. Mental status is at baseline.      Comments: Answers appropriately, delayed speech at times         Significant Labs:    CBC:   Recent Labs   Lab 07/21/20  0434 07/22/20  0419   WBC 4.24 4.94   HGB 13.3* 13.6*   HCT 42.2 43.0    188     CMP:   Recent Labs   Lab 07/21/20 0434 07/22/20  0418    136   K 3.9 4.2    106   CO2 28 23   GLU 78 78   BUN 9 12   CREATININE 0.8 0.9   CALCIUM 8.2* 8.1*   PROT 6.1 6.4   ALBUMIN 2.9* 3.1*   BILITOT 0.3 0.3   ALKPHOS 43* 45*   AST 18 17   ALT 14 17   ANIONGAP 4* 7*   EGFRNONAA >60 >60     Magnesium:   Recent Labs   Lab 07/21/20 0434 07/22/20  0418   MG 1.8 1.8       Significant Imaging: I have reviewed all pertinent imaging results/findings within the past 24 hours.

## 2020-07-22 NOTE — PT/OT/SLP PROGRESS
Physical Therapy Treatment    Patient Name:  Lisa Moreno   MRN:  [id number]    Recommendations:     Discharge Recommendations:  rehabilitation facility   Discharge Equipment Recommendations: (defer to IPR - pending pt's progress)   Barriers to discharge: Decreased caregiver support    Assessment:     Lisa Moreno is a 39 y.o. male admitted with a medical diagnosis of New onset seizure.  He presents with the following impairments/functional limitations:  weakness, impaired endurance, impaired self care skills, impaired functional mobilty, gait instability, impaired balance, decreased coordination, decreased upper extremity function, decreased lower extremity function, abnormal tone, decreased ROM, impaired coordination, impaired fine motor, impaired joint extensibility, impaired muscle length. Pt is motivated to participate in therapy. Pt sat EOB ~20 minutes with assist level varying between SBA/CGA when B hands propping on bed and up to mod A with UEs resting on knees. Working on core strengthening, ROM, and LE ROM to improve pt's midline positioning as well as standing/gait. Recommending IPR upon d/c to continue to address pt's limitations and barriers to d/c.    Rehab Prognosis: Good; patient would benefit from acute skilled PT services to address these deficits and reach maximum level of function.    Recent Surgery: Procedure(s) (LRB):  EGD (ESOPHAGOGASTRODUODENOSCOPY) (N/A)  COLONOSCOPY (N/A)      Plan:     During this hospitalization, patient to be seen 5 x/week to address the identified rehab impairments via gait training, therapeutic activities, therapeutic exercises, neuromuscular re-education, wheelchair management/training and progress toward the following goals:    · Plan of Care Expires:  08/19/20    Subjective     Chief Complaint: none  Patient/Family Comments/goals: pt agreeable to participate in therapy  Pain/Comfort:  · Pain Rating 1: 0/10  · Pain Rating Post-Intervention 1:  0/10      Objective:     Communicated with nurse Agee prior to session.  Patient found HOB elevated with bed alarm, telemetry upon PT entry to room.     General Precautions: Standard, fall, seizure   Orthopedic Precautions:N/A   Braces: N/A     Functional Mobility:  · Bed Mobility:     · Scooting: stand by assistance and use of head board  · Supine to Sit: moderate assistance  · Sit to Supine: moderate assistance      AM-PAC 6 CLICK MOBILITY  Turning over in bed (including adjusting bedclothes, sheets and blankets)?: 3  Sitting down on and standing up from a chair with arms (e.g., wheelchair, bedside commode, etc.): 2  Moving from lying on back to sitting on the side of the bed?: 3  Moving to and from a bed to a chair (including a wheelchair)?: 1  Need to walk in hospital room?: 1  Climbing 3-5 steps with a railing?: 1  Basic Mobility Total Score: 11       Therapeutic Activities and Exercises:  Pt required mod A to transition to sit EOB - min A at LEs and min boost to raise trunk.  Sat EOB ~20 mins with assist level varying from SBA/CGA when pt's BUEs propped on bed up to mod A when B hands resting on knees.  Pt with L lateral trunk lean in sitting and attempts to correct with R lateral trunk flexion therefore R shoulder lower than L shoulder in sitting and neck in R lateral flexion.  Instructed in single UE reaching activities as well as lateral trunk flexion with each forearm resting on bed 30 sec each.   Completed BLE hamstring stretch in sitting 2 x 45 sec each.  Returned to supine, HOB lowered and pt able to scoot self towards HOB with use of head board.     Patient left HOB elevated with all lines intact, call button in reach, bed alarm on, nurse notified and pt's mother present..    GOALS:   Multidisciplinary Problems     Physical Therapy Goals        Problem: Physical Therapy Goal    Goal Priority Disciplines Outcome Goal Variances Interventions   Physical Therapy Goal     PT, PT/OT Ongoing, Progressing      Description: Goals to be met by: 8/10/2020     Patient will increase functional independence with mobility by performin. Supine to sit with S  2. Sit to supine with MInimal Assistance  3. Sit to stand transfer with Moderate Assistance  4.  Assess wc transfers  5.  Assess wc mobility                      Time Tracking:     PT Received On: 20  PT Start Time: 1225     PT Stop Time: 1256  PT Total Time (min): 31 min     Billable Minutes: Therapeutic Activity 11 and Neuromuscular Re-education 20    Treatment Type: Treatment  PT/PTA: PT     PTA Visit Number: 0     Radha Abdi, PT  2020

## 2020-07-22 NOTE — PLAN OF CARE
VIRTUAL NURSE:  Cued into patient's room.  Permission received per family to turn camera to view patient.  Introduced as VN for night shift that will be working with floor nurse and nursing assistant.  Educated family on VN's role in patient care and  VIP model.  Plan of care reviewed with patient.  Education per flowsheet.   Informed patient and family that staff will round on them every 2 hours but to use call light for any other needs they may have; informed of fall risk and fall precautions.  Family verbalized understanding.  Call light within reach; bed siderails up x3.  Opportunity given for questions and questions. NO complaints or any needs at this time.  Will cont to monitor and intervene as needed.    Labs, notes, orders, and careplan reviewed.

## 2020-07-22 NOTE — PROGRESS NOTES
[facility] Progress Note    Reason for consult: new onset seizure    39 y M with medical hx of depression, spastic diplegia, baclofen pump, spinocerebellar atrophy who presented to the ED with first time seizure, with provoking factor as recent UTI (7/17/ E.coli).  CT head neg.  On arrival to ED, patient non-verbal. Currently verbal but with slower speech (close to baseline).     Interval Changes:  No acute events overnight. Per patient and family at bedside, his speech has improved since admission, but is still mildly diminished compared to his baseline. Patient had EEG yesterday, which was NORMAL. Per nurse, patient to receive Colonoscopy tomorrow.     ROS: 12 point ROS negative except as above.    Past medical, surgical, family, social and medication hx reviewed and as above.      Current Facility-Administered Medications:     acetaminophen tablet 650 mg, 650 mg, Oral, Q6H PRN, Sandra Hollins NP    baclofen tablet 20 mg, 20 mg, Oral, QID, Fran Dior NP, 20 mg at 07/22/20 0857    ciprofloxacin HCl tablet 500 mg, 500 mg, Oral, Q12H, Fran Dior NP, 500 mg at 07/22/20 0857    dantrolene capsule 50 mg, 50 mg, Oral, TID, Sandra Hollins NP, 50 mg at 07/22/20 0857    donepeziL tablet 5 mg, 5 mg, Oral, QHS, Marcelo Ruelas Jr., MD, 5 mg at 07/21/20 2100    lactulose 20 gram/30 mL solution Soln 20 g, 20 g, Oral, Q6H PRN, Sandra Hollins NP    levETIRAcetam tablet 1,000 mg, 1,000 mg, Oral, BID, Sandra Hollins NP, 1,000 mg at 07/22/20 0857    lorazepam (ATIVAN) injection 2 mg, 2 mg, Intravenous, Q3H PRN, Sandra Hollins NP, 2 mg at 07/20/20 1228    lubiprostone capsule 24 mcg, 24 mcg, Oral, BID WM, Sandra Hollins NP, 24 mcg at 07/22/20 0858    ondansetron injection 4 mg, 4 mg, Intravenous, Q6H PRN, Sandra Hollins NP    polyethylene glycol (GoLYTELY) solution, 4,000 mL, Oral, Once, Ziyad Fitch MD    sodium chloride 0.9% flush 10 mL, 10 mL, Intravenous, PRN,  Marcelo Ruelas Jr., MD    sodium phosphates 19-7 gram/118 mL enema 1 enema, 1 enema, Rectal, Once, Ziyad Fitch MD    Vitals:    07/22/20 0747   BP: (!) 103/57   Pulse: 64   Resp: 17   Temp: 97.5 °F (36.4 °C)     Exam:  Alert, oriented x 3.  Speech: slow, quiet, no aphasia.    Cranial Nerves: II-XII intact    Motor:   Tone normal in BL UE, increased in BL LE  BL UE 5/5  BL LE 2/5    Sensory:   intact to LT throughout    DTR  1+ R patellar, 2+ L patellar  1+ B/L achilles  +2 B/L symmetric bicep/BR    Cerebellar/Gait  Dysmetria B/L UE (R>L)  Cannot test gait as BL LE paresis.    Imaging and Laboratory:  CT head without contrast: no acute findings    EEG: Impression: Normal EEG    UTI+    Assessment and Recommendations:  39 y.o. w/ hx of spinocerebellar atrophy w/ uncertain neurological diagnosis s/p baclofen pump placement, frequent UTIs, constipation who presented to the ED following first time seizure in the setting of possible UTI. Mental status returned to baseline; some slowing of speech still present.     New onset seizure  - Per descriptors, possible focal seizure that generalized  - Likely provoked in the setting of UTI  - CTH w/o acute intracranial abnormality   - Recommend f/u w/ urology as OP in setting of prior urodynamic study & recurrent UTIs to avoid recurrent infections & possible provocation of seizures.   - Continue keppra 1000 mg po   - keppra level monitoring outpatient  - Also recommend MRI brain with epilepsy protocol.     Spastic-ataxia and spinocerebellar disease  - MRI brain 2018 with atrophy of brain stem and cerebellum .  - MRI spinal cord with narrow caliber cord  - Genetic testing 2005 unyielding  - Last seen by neurology 11/27/18 at [facility] w/ Dr. Wise  - Continue aricept 10mg qd; patient also on baclofen and baclofen pump (D/w mother that it can lower seizure threshold but medically necessary therapy)  - Please ensure pt has follow up with Neurology as OP.  - Recommend  re-establishment of care w/ Dr. Wise; however, if unable, can also follow up with Dr. Vicki Miller MD (clinic appointment can be set up using phone number [phone])    Please call for questions.    Kennedy Munoz DO  [facility], PGY-1  [facility]

## 2020-07-22 NOTE — PLAN OF CARE
GI update  Patient is scheduled to have outpatient EGD colonoscopy performed today however he seems to have been admitted out of concern for seizure.  Workup has been negative for acute neurologic event.  Will plan for procedure tomorrow.

## 2020-07-22 NOTE — ASSESSMENT & PLAN NOTE
CT of head showed no acute findings  Seizure likely 2/2 UTI-cont cipro  We appreciate Neurology-recommending MRI brain with epilepsy protocol-to be done outpatient  continue Keppra 1000mg BID  Ativan prn  Neuro checks q4 hours  Cont Seizure precautions- no seizures since admit  Evaluated by PT/OT/SLP-recommending IPR on discharge  EEG normal  Plan to discharge to IPR tomorrow after EGD/colonoscopy

## 2020-07-22 NOTE — ASSESSMENT & PLAN NOTE
Continue amitiza  Continue lactulose prn  Scheduled for EGD/Colonoscopy tomorrow  Bowel prep tonight  Enema today-large BM

## 2020-07-22 NOTE — ASSESSMENT & PLAN NOTE
Spinocerebellar ataxia  Spastic diplegia    Continue Baclofen, Dantrolene and Donepezil   Cont PT/OT  Dc to IPR tomorrow after c-scope

## 2020-07-22 NOTE — PROGRESS NOTES
[facility] Medicine  Progress Note    Patient Name: Lisa Moreno  MRN: [id number]  Patient Class: IP- Inpatient   Admission Date: 7/18/2020  Length of Stay: 3 days  Attending Physician: Bobby Hatch DO  Primary Care Provider: John Nixon Ii, MD        Subjective:     Principal Problem:New onset seizure        HPI:  39 y.o. male with past medical history of depression, chronic constipation, degenerative motor system disease, spastic diplegia,  quadriplegia, baclofen pump implantation and fluoroscopic urodynamic study ( 11/27/2018), and spinocerebellar atrophy who presents to the ED via EMS with complaint of seizure activity. As per mom, he started to shake upper and lower extremities at 2:15 today. He dropped his head to the right and began to drool. This lasted for 25 minutes. Upon arrival to ED, patient nonverbal.  His mom denies any recent illness or history of seizure. His medications were recently changed. Patient is now verbal but with very low tone, almost mumble.  Patient mother states that is not baseline.  Denies fever, chills, SOB, chest pain, headaches, syncope, palpitations, nausea, vomiting, change in bladder habits or change in bowel habits.  Findings: Drug screen negative, covid negative, u/a concerning for UTI, urine cultures from 7/17/20 grew E.coli, pending sensitives, blood cultures pending, CT of head negative for acute findings.  Of note, blood glucose elevated at 203, patient's mother states that last time he had UTI blood sugars elevated also, will monitor.  Patient admitted to hospital medicine observation service for further medical management.    Overview/Hospital Course:  The patient was admitted to the [facility] Medicine service for further care. Keppra was given for seizures. IV ceftriaxone and then cipro PO was given for UTI. Neuro was consulted. EEG was completed and was found to be normal. The patient was evaluated and treated by  PT/OT/ST. Neurology recommended MRI brain with epilepsy protocol to be done as outpatient. An EGD/colonscopy was scheduled for tomorrow. The patient was treated for chronic constipation.     Interval History: patient seen in bed today, mother at bedside, no seizure activity, discussed plan of care    Review of Systems   Constitutional: Negative for chills and fever.   Respiratory: Positive for cough (chronic). Negative for shortness of breath.    Cardiovascular: Negative for chest pain.   Gastrointestinal: Positive for constipation (chronic). Negative for abdominal distention, abdominal pain, diarrhea, nausea and vomiting.   Genitourinary: Negative for difficulty urinating.   Neurological: Negative for seizures, speech difficulty and headaches.     Objective:     Vital Signs (Most Recent):  Temp: 97.6 °F (36.4 °C) (07/22/20 1136)  Pulse: 65 (07/22/20 1540)  Resp: 17 (07/22/20 1136)  BP: 109/74 (07/22/20 1136)  SpO2: 98 % (07/22/20 1525) Vital Signs (24h Range):  Temp:  [97.5 °F (36.4 °C)-98.8 °F (37.1 °C)] 97.6 °F (36.4 °C)  Pulse:  [60-73] 65  Resp:  [17-20] 17  SpO2:  [97 %-100 %] 98 %  BP: (100-109)/(57-74) 109/74     Weight: 59.4 kg (130 lb 15.3 oz)  Body mass index is 16.37 kg/m².    Intake/Output Summary (Last 24 hours) at 7/22/2020 1612  Last data filed at 7/22/2020 1200  Gross per 24 hour   Intake 625 ml   Output 710 ml   Net -85 ml      Physical Exam  Vitals signs and nursing note reviewed.   Constitutional:       Appearance: Normal appearance.   Cardiovascular:      Rate and Rhythm: Normal rate and regular rhythm.      Pulses: Normal pulses.      Heart sounds: Normal heart sounds.   Pulmonary:      Effort: Pulmonary effort is normal.      Breath sounds: Decreased breath sounds present.      Comments: Coughing occasionally with loose rattle noted to upper airway  Abdominal:      General: Bowel sounds are normal. There is no distension.      Tenderness: There is no abdominal tenderness.   Neurological:       Mental Status: He is alert and oriented to person, place, and time. Mental status is at baseline.      Comments: Answers appropriately, delayed speech at times         Significant Labs:   CBC:   Recent Labs   Lab 07/21/20 0434 07/22/20  0419   WBC 4.24 4.94   HGB 13.3* 13.6*   HCT 42.2 43.0    188     CMP:   Recent Labs   Lab 07/21/20 0434 07/22/20  0418    136   K 3.9 4.2    106   CO2 28 23   GLU 78 78   BUN 9 12   CREATININE 0.8 0.9   CALCIUM 8.2* 8.1*   PROT 6.1 6.4   ALBUMIN 2.9* 3.1*   BILITOT 0.3 0.3   ALKPHOS 43* 45*   AST 18 17   ALT 14 17   ANIONGAP 4* 7*   EGFRNONAA >60 >60     Magnesium:   Recent Labs   Lab 07/21/20 0434 07/22/20 0418   MG 1.8 1.8       Significant Imaging: I have reviewed all pertinent imaging results/findings within the past 24 hours.      Assessment/Plan:      * New onset seizure  CT of head showed no acute findings  Seizure likely 2/2 UTI-cont cipro  We appreciate Neurology-recommending MRI brain with epilepsy protocol-to be done outpatient  continue Keppra 1000mg BID  Ativan prn  Neuro checks q4 hours  Cont Seizure precautions- no seizures since admit  Evaluated by PT/OT/SLP-recommending IPR on discharge  EEG normal  Plan to discharge to IPR tomorrow after EGD/colonoscopy    Hyperglycemia  Monitor glucose      Chronic constipation  Continue amitiza  Continue lactulose prn  Scheduled for EGD/Colonoscopy tomorrow  Bowel prep tonight  Enema today-large BM     UTI (urinary tract infection)  Frequent UTI's  E. Coli on urine cx  Cont Cipro        Decreased functional mobility  Spinocerebellar ataxia  Spastic diplegia    Continue Baclofen, Dantrolene and Donepezil   Cont PT/OT  Dc to IPR tomorrow after c-scope      VTE Risk Mitigation (From admission, onward)         Ordered     IP VTE HIGH RISK PATIENT  Once      07/18/20 2200     Place sequential compression device  Until discontinued      07/18/20 2200                      Carito Grijalva NP  Department of  Hospital Medicine [facility]

## 2020-07-22 NOTE — PLAN OF CARE
Patient remains in hospital  EEG done yesterday  Neuro following- per neuro notes:  Please ensure pt has follow up with Neurology as OP.  - Recommend re-establishment of care w/ Dr. Wise; however, if unable, can also follow up with Dr. Vicki Miller MD (clinic appointment can be set up using phone number [phone])    PT/OT - continuing to work with patient    [facility] following patient and [facility] following patient.    Barrier to discharge: EGD and C-scope scheduled today, prep was not ordered. Prep ordered for tonight and EGD and c-scope rescheduled for tomorrow       07/22/20 1106   Discharge Reassessment   Assessment Type Discharge Planning Reassessment   Provided patient/caregiver education on the expected discharge date and the discharge plan No   Do you have any problems affording any of your prescribed medications? Yes   Discharge Plan A Rehab   Post-Acute Status   Discharge Delays (!) Procedure Scheduling (IR, OR, Labs, Echo, Cath, Echo, EEG)     Pamela Broussard RN, CCM, CMSRN  RN Transition Navigator  [phone]

## 2020-07-23 ENCOUNTER — TELEPHONE (OUTPATIENT)
Dept: NEUROLOGY | Facility: CLINIC | Age: 40
End: 2020-07-23

## 2020-07-23 LAB
ALBUMIN SERPL BCP-MCNC: 3.2 G/DL (ref 3.5–5.2)
ALP SERPL-CCNC: 47 U/L (ref 55–135)
ALT SERPL W/O P-5'-P-CCNC: 37 U/L (ref 10–44)
ANION GAP SERPL CALC-SCNC: 7 MMOL/L (ref 8–16)
AST SERPL-CCNC: 39 U/L (ref 10–40)
BASOPHILS # BLD AUTO: 0.02 K/UL (ref 0–0.2)
BASOPHILS NFR BLD: 0.3 % (ref 0–1.9)
BILIRUB SERPL-MCNC: 0.4 MG/DL (ref 0.1–1)
BUN SERPL-MCNC: 9 MG/DL (ref 6–20)
CALCIUM SERPL-MCNC: 8.2 MG/DL (ref 8.7–10.5)
CHLORIDE SERPL-SCNC: 103 MMOL/L (ref 95–110)
CO2 SERPL-SCNC: 26 MMOL/L (ref 23–29)
CREAT SERPL-MCNC: 0.8 MG/DL (ref 0.5–1.4)
DIFFERENTIAL METHOD: ABNORMAL
EOSINOPHIL # BLD AUTO: 0.1 K/UL (ref 0–0.5)
EOSINOPHIL NFR BLD: 2 % (ref 0–8)
ERYTHROCYTE [DISTWIDTH] IN BLOOD BY AUTOMATED COUNT: 12.8 % (ref 11.5–14.5)
EST. GFR  (AFRICAN AMERICAN): >60 ML/MIN/1.73 M^2
EST. GFR  (NON AFRICAN AMERICAN): >60 ML/MIN/1.73 M^2
GLUCOSE SERPL-MCNC: 80 MG/DL (ref 70–110)
HCT VFR BLD AUTO: 43.7 % (ref 40–54)
HGB BLD-MCNC: 13.9 G/DL (ref 14–18)
IMM GRANULOCYTES # BLD AUTO: 0.01 K/UL (ref 0–0.04)
IMM GRANULOCYTES NFR BLD AUTO: 0.2 % (ref 0–0.5)
LYMPHOCYTES # BLD AUTO: 1.6 K/UL (ref 1–4.8)
LYMPHOCYTES NFR BLD: 26.8 % (ref 18–48)
MAGNESIUM SERPL-MCNC: 1.8 MG/DL (ref 1.6–2.6)
MCH RBC QN AUTO: 27 PG (ref 27–31)
MCHC RBC AUTO-ENTMCNC: 31.8 G/DL (ref 32–36)
MCV RBC AUTO: 85 FL (ref 82–98)
MONOCYTES # BLD AUTO: 0.6 K/UL (ref 0.3–1)
MONOCYTES NFR BLD: 10.4 % (ref 4–15)
NEUTROPHILS # BLD AUTO: 3.6 K/UL (ref 1.8–7.7)
NEUTROPHILS NFR BLD: 60.3 % (ref 38–73)
NRBC BLD-RTO: 0 /100 WBC
PHOSPHATE SERPL-MCNC: 3.5 MG/DL (ref 2.7–4.5)
PLATELET # BLD AUTO: 210 K/UL (ref 150–350)
PMV BLD AUTO: 10.8 FL (ref 9.2–12.9)
POTASSIUM SERPL-SCNC: 4 MMOL/L (ref 3.5–5.1)
PROT SERPL-MCNC: 6.5 G/DL (ref 6–8.4)
RBC # BLD AUTO: 5.15 M/UL (ref 4.6–6.2)
SARS-COV-2 RDRP RESP QL NAA+PROBE: NEGATIVE
SODIUM SERPL-SCNC: 136 MMOL/L (ref 136–145)
WBC # BLD AUTO: 5.97 K/UL (ref 3.9–12.7)

## 2020-07-23 PROCEDURE — 88305 TISSUE EXAM BY PATHOLOGIST: ICD-10-PCS | Mod: 26,,, | Performed by: PATHOLOGY

## 2020-07-23 PROCEDURE — 45378 DIAGNOSTIC COLONOSCOPY: CPT | Mod: ,,, | Performed by: INTERNAL MEDICINE

## 2020-07-23 PROCEDURE — 25000003 PHARM REV CODE 250: Performed by: NURSE PRACTITIONER

## 2020-07-23 PROCEDURE — 45378 DIAGNOSTIC COLONOSCOPY: CPT | Performed by: INTERNAL MEDICINE

## 2020-07-23 PROCEDURE — 25000003 PHARM REV CODE 250: Performed by: NURSE ANESTHETIST, CERTIFIED REGISTERED

## 2020-07-23 PROCEDURE — U0002 COVID-19 LAB TEST NON-CDC: HCPCS

## 2020-07-23 PROCEDURE — 88305 TISSUE EXAM BY PATHOLOGIST: CPT | Mod: 26,,, | Performed by: PATHOLOGY

## 2020-07-23 PROCEDURE — 43239 EGD BIOPSY SINGLE/MULTIPLE: CPT | Mod: 51,,, | Performed by: INTERNAL MEDICINE

## 2020-07-23 PROCEDURE — 80053 COMPREHEN METABOLIC PANEL: CPT

## 2020-07-23 PROCEDURE — 25000003 PHARM REV CODE 250: Performed by: EMERGENCY MEDICINE

## 2020-07-23 PROCEDURE — 85025 COMPLETE CBC W/AUTO DIFF WBC: CPT

## 2020-07-23 PROCEDURE — 94761 N-INVAS EAR/PLS OXIMETRY MLT: CPT

## 2020-07-23 PROCEDURE — 11000001 HC ACUTE MED/SURG PRIVATE ROOM

## 2020-07-23 PROCEDURE — 36415 COLL VENOUS BLD VENIPUNCTURE: CPT

## 2020-07-23 PROCEDURE — 45378 PR COLONOSCOPY,DIAGNOSTIC: ICD-10-PCS | Mod: ,,, | Performed by: INTERNAL MEDICINE

## 2020-07-23 PROCEDURE — 43239 PR EGD, FLEX, W/BIOPSY, SGL/MULTI: ICD-10-PCS | Mod: 51,,, | Performed by: INTERNAL MEDICINE

## 2020-07-23 PROCEDURE — 83735 ASSAY OF MAGNESIUM: CPT

## 2020-07-23 PROCEDURE — 88342 CHG IMMUNOCYTOCHEMISTRY: ICD-10-PCS | Mod: 26,,, | Performed by: PATHOLOGY

## 2020-07-23 PROCEDURE — 37000009 HC ANESTHESIA EA ADD 15 MINS: Performed by: INTERNAL MEDICINE

## 2020-07-23 PROCEDURE — 63600175 PHARM REV CODE 636 W HCPCS: Performed by: NURSE ANESTHETIST, CERTIFIED REGISTERED

## 2020-07-23 PROCEDURE — 43239 EGD BIOPSY SINGLE/MULTIPLE: CPT | Performed by: INTERNAL MEDICINE

## 2020-07-23 PROCEDURE — 84100 ASSAY OF PHOSPHORUS: CPT

## 2020-07-23 PROCEDURE — 27201012 HC FORCEPS, HOT/COLD, DISP: Performed by: INTERNAL MEDICINE

## 2020-07-23 PROCEDURE — 88342 IMHCHEM/IMCYTCHM 1ST ANTB: CPT | Performed by: PATHOLOGY

## 2020-07-23 PROCEDURE — 88305 TISSUE EXAM BY PATHOLOGIST: CPT | Performed by: PATHOLOGY

## 2020-07-23 PROCEDURE — 37000008 HC ANESTHESIA 1ST 15 MINUTES: Performed by: INTERNAL MEDICINE

## 2020-07-23 PROCEDURE — 88342 IMHCHEM/IMCYTCHM 1ST ANTB: CPT | Mod: 26,,, | Performed by: PATHOLOGY

## 2020-07-23 RX ORDER — PROPOFOL 10 MG/ML
VIAL (ML) INTRAVENOUS
Status: DISCONTINUED | OUTPATIENT
Start: 2020-07-23 | End: 2020-07-23

## 2020-07-23 RX ORDER — SODIUM CHLORIDE 9 MG/ML
INJECTION, SOLUTION INTRAVENOUS CONTINUOUS PRN
Status: DISCONTINUED | OUTPATIENT
Start: 2020-07-23 | End: 2020-07-23

## 2020-07-23 RX ORDER — PROPOFOL 10 MG/ML
VIAL (ML) INTRAVENOUS CONTINUOUS PRN
Status: DISCONTINUED | OUTPATIENT
Start: 2020-07-23 | End: 2020-07-23

## 2020-07-23 RX ORDER — PHENYLEPHRINE HYDROCHLORIDE 10 MG/ML
INJECTION INTRAVENOUS
Status: DISCONTINUED | OUTPATIENT
Start: 2020-07-23 | End: 2020-07-23

## 2020-07-23 RX ORDER — LIDOCAINE HCL/PF 100 MG/5ML
SYRINGE (ML) INTRAVENOUS
Status: DISCONTINUED | OUTPATIENT
Start: 2020-07-23 | End: 2020-07-23

## 2020-07-23 RX ADMIN — CIPROFLOXACIN HYDROCHLORIDE 500 MG: 500 TABLET, FILM COATED ORAL at 08:07

## 2020-07-23 RX ADMIN — LIDOCAINE HYDROCHLORIDE 100 MG: 20 INJECTION, SOLUTION INTRAVENOUS at 03:07

## 2020-07-23 RX ADMIN — LUBIPROSTONE 24 MCG: 8 CAPSULE, GELATIN COATED ORAL at 05:07

## 2020-07-23 RX ADMIN — SODIUM CHLORIDE: 0.9 INJECTION, SOLUTION INTRAVENOUS at 03:07

## 2020-07-23 RX ADMIN — DANTROLENE SODIUM 50 MG: 25 CAPSULE ORAL at 08:07

## 2020-07-23 RX ADMIN — BACLOFEN 20 MG: 5 TABLET ORAL at 05:07

## 2020-07-23 RX ADMIN — DONEPEZIL HYDROCHLORIDE 5 MG: 5 TABLET, FILM COATED ORAL at 08:07

## 2020-07-23 RX ADMIN — LUBIPROSTONE 24 MCG: 8 CAPSULE, GELATIN COATED ORAL at 09:07

## 2020-07-23 RX ADMIN — PROPOFOL 70 MG: 10 INJECTION, EMULSION INTRAVENOUS at 03:07

## 2020-07-23 RX ADMIN — DANTROLENE SODIUM 50 MG: 25 CAPSULE ORAL at 09:07

## 2020-07-23 RX ADMIN — PHENYLEPHRINE HYDROCHLORIDE 100 MCG: 10 INJECTION INTRAVENOUS at 04:07

## 2020-07-23 RX ADMIN — PROPOFOL 150 MCG/KG/MIN: 10 INJECTION, EMULSION INTRAVENOUS at 03:07

## 2020-07-23 RX ADMIN — LEVETIRACETAM 1000 MG: 500 TABLET, FILM COATED ORAL at 09:07

## 2020-07-23 RX ADMIN — BACLOFEN 20 MG: 5 TABLET ORAL at 09:07

## 2020-07-23 RX ADMIN — PHENYLEPHRINE HYDROCHLORIDE 100 MCG: 10 INJECTION INTRAVENOUS at 03:07

## 2020-07-23 RX ADMIN — BACLOFEN 20 MG: 5 TABLET ORAL at 08:07

## 2020-07-23 RX ADMIN — LEVETIRACETAM 1000 MG: 500 TABLET, FILM COATED ORAL at 08:07

## 2020-07-23 RX ADMIN — CIPROFLOXACIN HYDROCHLORIDE 500 MG: 500 TABLET, FILM COATED ORAL at 09:07

## 2020-07-23 NOTE — TRANSFER OF CARE
"Anesthesia Transfer of Care Note    Patient: Lisa Moreno    Procedure(s) Performed: Procedure(s) (LRB):  EGD (ESOPHAGOGASTRODUODENOSCOPY) (N/A)  COLONOSCOPY (N/A)    Patient location: GI    Anesthesia Type: MAC    Transport from OR: Transported from OR on room air with adequate spontaneous ventilation    Post pain: adequate analgesia    Post assessment: no apparent anesthetic complications and tolerated procedure well    Post vital signs: stable    Level of consciousness: responds to stimulation    Nausea/Vomiting: no nausea/vomiting    Complications: none    Transfer of care protocol was followed      Last vitals:   Visit Vitals  BP 97/62 (BP Location: Left arm, Patient Position: Lying)   Pulse 61   Temp 36.9 °C (98.4 °F) (Skin)   Resp 18   Ht 6' 3" (1.905 m)   Wt 59.4 kg (130 lb 15.3 oz)   SpO2 97%   BMI 16.37 kg/m²     "

## 2020-07-23 NOTE — TELEPHONE ENCOUNTER
Message received from patient's mother, Giovana, regarding C-PWC.    Good Afternoon I just spoke with Cruzito Coleman about that wheel chair be said its normally done when he can gave a occupational therapist to physical evaluate him after a schedule appointment I told him neuro not until Sept. He said maybe you can help or tell me a company that can let him use a chair until he get a fitted one.   -Giovana      RN Coordinator informed Ms. Akhtar to contact [facility], with Dr. Humphrey's office regarding appt with Neuromuscular specialist. Dr. Humphrey is willing to complete appt on 7/28 at 8AM.

## 2020-07-23 NOTE — ANESTHESIA POSTPROCEDURE EVALUATION
Anesthesia Post Evaluation    Patient: Lisa Moreno    Procedure(s) Performed: Procedure(s) (LRB):  EGD (ESOPHAGOGASTRODUODENOSCOPY) (N/A)  COLONOSCOPY (N/A)    Final Anesthesia Type: MAC    Patient location during evaluation: GI PACU  Patient participation: Yes- Able to Participate  Level of consciousness: awake and alert  Post-procedure vital signs: reviewed and stable  Pain management: adequate  Airway patency: patent    PONV status at discharge: No PONV  Anesthetic complications: no      Cardiovascular status: blood pressure returned to baseline  Respiratory status: unassisted  Hydration status: euvolemic  Follow-up not needed.          Vitals Value Taken Time   /79 07/23/20 1713   Temp 36.4 °C (97.6 °F) 07/23/20 1713   Pulse 66 07/23/20 1713   Resp 18 07/23/20 1713   SpO2 97 % 07/23/20 1638         Event Time   Out of Recovery 07/23/2020 16:39:37         Pain/Aleida Score: Aleida Score: 10 (7/23/2020  4:38 PM)

## 2020-07-23 NOTE — ASSESSMENT & PLAN NOTE
Continue amitiza  Continue lactulose prn  Scheduled for EGD/Colonoscopy today  Bowel prep last night

## 2020-07-23 NOTE — TELEPHONE ENCOUNTER
"Message received from RN Clinical Coordinator Rosibel Zavala,    Please follow up with patient social service needs. At this time patient is still considered to be followed by Movement Dept. In order to make sure that patient needs are being met, I would like to you follow patient until ALS Diagnosis confirmed. Since you work with both Movement and Neuromuscular we can have a continuity of care for Lisa.   Dr. Humphrey's office is in the process of scheduling patient for ALS Query.     CARLOS ALBERTO  spoke with pt's mother Giovana on today regarding social service needs.  Discussed home environment-lives with aunt (recently had a stroke and is unable to assist w/ pt care)  Cousin-currently in college and helps when he can.    Community Choices Waiver   is Karen Medina.  CM is Kathleen Moncada [phone].    Sitter agency providing sitter service is supposed to be   [facility] has relocated [facility] however there are no sitters available during COVID Pandemic. Pt's mother indicated the owner is unable to get workers to return to d/t the monies received through Care's Act.    Pt's mother indicated there were no other/limited number of other agencies to service the area.    Pt's mother indicated she has communicated with Karen Guevara several times and Mayi's supervisor however there has been no resolution.  They are still without services.       SW encouraged reaching out to the state to report.     Currently pt is inpt at [facility]. Dcp is [facility] rehab ([facility]) at either [facility] vs [facility].  Once pt is ready for dc from [facility] he will return home with his family.    CARLOS ALBERTO encouraged pt's mother to "try" to rest while pt is ip. She indicated she can't rest b/c she is concerned about pt's care.  SW encouraged pt's mother to have Ginny that he will have excellent care and rest before he returns home. But of course much easier said than done.     Pt was placed in two nursing [facility].  Both were bad experiences and the pt's mother was less than pleased with both facilities.  Pt's mother said she will never place pt again.    SW explained to pt's mother CCW is a large source of support to many of our families as options without CCW are very limited or nonexistent, other than NHP.          SW offered Respite through ALS, if funding avilable.  Reach out to [facility] howe/ [place] to f/u.  Explore [place] stance on [place] not having a sitter to see if anything can be done to urge the sitter agency to provide services.        RENATO KOCH

## 2020-07-23 NOTE — PT/OT/SLP PROGRESS
Occupational Therapy  Missed Visit    Patient Name:  Lisa Moreno   MRN:  [id number]    Patient not seen today secondary to Unavailable (Comment)(FREDRICK for colonoscopy). Will follow-up as able.    PHILL Mtz  7/23/2020

## 2020-07-23 NOTE — PLAN OF CARE
Problem: Adult Inpatient Plan of Care  Goal: Chart Review  Outcome: Ongoing, Progressing   Patient sleeping when VN qued into room. Eyes closed. Respirations even and unlabored.  Mother , Giovana, at bedside denies any questions or complaints.  All safety measures maintained including bed locked, in lowest position with alarm on.  Call light within reach.

## 2020-07-23 NOTE — PT/OT/SLP PROGRESS
Physical Therapy  Visit Attempt    Patient Name:  Lisa Moreno   MRN:  [id number]    Patient not seen today secondary to (FREDRICK in endoscopy). Will follow-up.    Radha Abdi, PT   7/23/2020

## 2020-07-23 NOTE — TELEPHONE ENCOUNTER
----- Message from Rhonda Adam sent at 7/23/2020 10:53 AM CDT -----  Regarding: requested to call office  Type: Patient Call Back    Who called:Mrs. Moreno    What is the request in detail:Requested to call office and speak to Marisol    Can the clinic reply by [facility]? NO    Would the patient rather a call back or a response via [facility]? Callback    Best call back number:  [phone]

## 2020-07-23 NOTE — PROGRESS NOTES
[facility] Medicine  Progress Note    Patient Name: Lisa Moreno  MRN: [id number]  Patient Class: IP- Inpatient   Admission Date: 7/18/2020  Length of Stay: 4 days  Attending Physician: Bobby Hatch DO  Primary Care Provider: John Nixon Ii, MD        Subjective:     Principal Problem:New onset seizure        HPI:  39 y.o. male with past medical history of depression, chronic constipation, degenerative motor system disease, spastic diplegia,  quadriplegia, baclofen pump implantation and fluoroscopic urodynamic study ( 11/27/2018), and spinocerebellar atrophy who presents to the ED via EMS with complaint of seizure activity. As per mom, he started to shake upper and lower extremities at 2:15 today. He dropped his head to the right and began to drool. This lasted for 25 minutes. Upon arrival to ED, patient nonverbal.  His mom denies any recent illness or history of seizure. His medications were recently changed. Patient is now verbal but with very low tone, almost mumble.  Patient mother states that is not baseline.  Denies fever, chills, SOB, chest pain, headaches, syncope, palpitations, nausea, vomiting, change in bladder habits or change in bowel habits.  Findings: Drug screen negative, covid negative, u/a concerning for UTI, urine cultures from 7/17/20 grew E.coli, pending sensitives, blood cultures pending, CT of head negative for acute findings.  Of note, blood glucose elevated at 203, patient's mother states that last time he had UTI blood sugars elevated also, will monitor.  Patient admitted to hospital medicine observation service for further medical management.    Overview/Hospital Course:  The patient was admitted to the [facility] service for further care. Keppra was given for seizures. IV ceftriaxone and then cipro PO was given for UTI. Neuro was consulted. EEG was completed and was found to be normal. The patient was evaluated and treated by  PT/OT/ST. Neurology recommended MRI brain with epilepsy protocol to be done as outpatient. The patient was treated for chronic constipation. An EGD/Colonoscopy was done on 7/23. He is accepted for inpatient rehab after discharge from hospital.     Interval History: patient seen in bed today, mother at bedside, no seizure activity, discussed plan of care, having bowel movements after bowel prep, awaiting scope today    Review of Systems   Constitutional: Negative for chills and fever.   Respiratory: Positive for cough (chronic). Negative for shortness of breath.    Cardiovascular: Negative for chest pain.   Gastrointestinal: Positive for constipation (chronic). Negative for abdominal distention, abdominal pain, diarrhea, nausea and vomiting.   Genitourinary: Negative for difficulty urinating.   Neurological: Negative for seizures.     Objective:     Vital Signs (Most Recent):  Temp: 97.9 °F (36.6 °C) (07/23/20 0804)  Pulse: 62 (07/23/20 0804)  Resp: 20 (07/23/20 0804)  BP: 108/66 (07/23/20 0804)  SpO2: 99 % (07/23/20 0347) Vital Signs (24h Range):  Temp:  [97.6 °F (36.4 °C)-98.8 °F (37.1 °C)] 97.9 °F (36.6 °C)  Pulse:  [62-73] 62  Resp:  [16-20] 20  SpO2:  [98 %-100 %] 99 %  BP: (103-113)/(66-83) 108/66     Weight: 59.4 kg (130 lb 15.3 oz)  Body mass index is 16.37 kg/m².    Intake/Output Summary (Last 24 hours) at 7/23/2020 0903  Last data filed at 7/23/2020 0108  Gross per 24 hour   Intake 390 ml   Output 670 ml   Net -280 ml      Physical Exam  Vitals signs and nursing note reviewed.   Constitutional:       Appearance: Normal appearance.   Cardiovascular:      Rate and Rhythm: Normal rate and regular rhythm.      Pulses: Normal pulses.      Heart sounds: Normal heart sounds.   Pulmonary:      Effort: Pulmonary effort is normal.      Breath sounds: Decreased breath sounds present.      Comments: Coughing occasionally with loose rattle noted to upper airway  Abdominal:      General: Bowel sounds are normal. There is  no distension.      Tenderness: There is no abdominal tenderness.   Neurological:      Mental Status: He is alert. Mental status is at baseline.      Comments: Answers is low voice, mother speaks for him most times.         Significant Labs:   CBC:   Recent Labs   Lab 07/22/20 0419 07/23/20  0335   WBC 4.94 5.97   HGB 13.6* 13.9*   HCT 43.0 43.7    210     CMP:   Recent Labs   Lab 07/22/20 0418 07/23/20  0335    136   K 4.2 4.0    103   CO2 23 26   GLU 78 80   BUN 12 9   CREATININE 0.9 0.8   CALCIUM 8.1* 8.2*   PROT 6.4 6.5   ALBUMIN 3.1* 3.2*   BILITOT 0.3 0.4   ALKPHOS 45* 47*   AST 17 39   ALT 17 37   ANIONGAP 7* 7*   EGFRNONAA >60 >60     Magnesium:   Recent Labs   Lab 07/22/20 0418 07/23/20  0335   MG 1.8 1.8       Significant Imaging: I have reviewed all pertinent imaging results/findings within the past 24 hours.      Assessment/Plan:      * New onset seizure  CT of head showed no acute findings  Seizure likely 2/2 UTI-cont cipro  We appreciate Neurology-recommending MRI brain with epilepsy protocol-to be done outpatient  continue Keppra 1000mg BID  Ativan prn  Neuro checks q4 hours  Cont Seizure precautions- no seizures since admit  Evaluated by PT/OT/SLP-recommending IPR on discharge  EEG normal  Plan to discharge to IPR after EGD/colonoscopy    Hyperglycemia  Monitor glucose      Chronic constipation  Continue amitiza  Continue lactulose prn  Scheduled for EGD/Colonoscopy today  Bowel prep last night        UTI (urinary tract infection)  Frequent UTI's  E. Coli on urine cx  Cont Cipro        Decreased functional mobility  Spinocerebellar ataxia  Spastic diplegia    Continue Baclofen, Dantrolene and Donepezil   Cont PT/OT  Dc to IPR after c-scope      VTE Risk Mitigation (From admission, onward)         Ordered     IP VTE HIGH RISK PATIENT  Once      07/18/20 2200     Place sequential compression device  Until discontinued      07/18/20 2200                      Carito Grijalva,  NP  Department of Hospital Medicine   [facility]

## 2020-07-23 NOTE — PLAN OF CARE
Report received from Cyndie    Npo status verified  Patient awake and alert  vss   Patient drank entire prep   Still having liquid brown bms

## 2020-07-23 NOTE — [employer] PATIENT INSTRUCTIONS
Discharge Summary/Instructions after an Endoscopic Procedure  Patient Name: Lisa Moreno  Patient MRN: [id number]  Patient YOB: 1980 Thursday, July 23, 2020  Ziyad Fitch MD  Your health is very important to us during the Covid Crisis. Following your   procedure today, you will receive a daily text for 2 weeks asking about   signs or symptoms of Covid 19.  Please respond to this text when you   receive it so we can follow up and keep you as safe as possible.   RESTRICTIONS:  During your procedure today, you received medications for sedation.  These   medications may affect your judgment, balance and coordination.  Therefore,   for 24 hours, you have the following restrictions:   - DO NOT drive a car, operate machinery, make legal/financial decisions,   sign important papers or drink alcohol.    ACTIVITY:  Today: no heavy lifting, straining or running due to procedural   sedation/anesthesia.  The following day: return to full activity including work.  DIET:  Eat and drink normally unless instructed otherwise.     TREATMENT FOR COMMON SIDE EFFECTS:  - Mild abdominal pain, nausea, belching, bloating or excessive gas:  rest,   eat lightly and use a heating pad.  - Sore Throat: treat with throat lozenges and/or gargle with warm salt   water.  - Because air was used during the procedure, expelling large amounts of air   from your rectum or belching is normal.  - If a bowel prep was taken, you may not have a bowel movement for 1-3 days.    This is normal.  SYMPTOMS TO WATCH FOR AND REPORT TO YOUR PHYSICIAN:  1. Abdominal pain or bloating, other than gas cramps.  2. Chest pain.  3. Back pain.  4. Signs of infection such as: chills or fever occurring within 24 hours   after the procedure.  5. Rectal bleeding, which would show as bright red, maroon, or black stools.   (A tablespoon of blood from the rectum is not serious, especially if   hemorrhoids are present.)  6. Vomiting.  7. Weakness or  dizziness.  GO DIRECTLY TO THE NEAREST EMERGENCY ROOM IF YOU HAVE ANY OF THE FOLLOWING:      Difficulty breathing              Chills and/or fever over 101 F   Persistent vomiting and/or vomiting blood   Severe abdominal pain   Severe chest pain   Black, tarry stools   Bleeding- more than one tablespoon   Any other symptom or condition that you feel may need urgent attention  Your doctor recommends these additional instructions:  If any biopsies were taken, your doctors clinic will contact you in 1 to 2   weeks with any results.  - Discharge patient to home.   - Resume previous diet.   - Continue present medications.   - Repeat colonoscopy at age 49 for screening purposes.   - Patient has a contact number available for emergencies.  The signs and   symptoms of potential delayed complications were discussed with the   patient.  Return to normal activities tomorrow.  Written discharge   instructions were provided to the patient.  For questions, problems or results please call your physician - Ziyad Fitch MD.  EMERGENCY PHONE NUMBER: [phone],  LAB RESULTS: [phone]  IF A COMPLICATION OR EMERGENCY SITUATION ARISES AND YOU ARE UNABLE TO REACH   YOUR PHYSICIAN - GO DIRECTLY TO THE EMERGENCY ROOM.  Ziyad Fitch MD  7/23/2020 4:25:46 PM  This report has been verified and signed electronically.  [employer]OVATION

## 2020-07-23 NOTE — NURSING
Pt. Completed full 2L bottle of Golytley as ordered.No Bm's during the night as of yet.notified on call team to notify them.Pt. did have 2 large formed stools and one moderate soft stool after enema on the day shift.no new orders given at this time.will continue to monitor.

## 2020-07-23 NOTE — TELEPHONE ENCOUNTER
Pending Message      Phone Number called [phone]    Spoke With Mrs Bowen(mother)  Patient Request Patient will be discharge to [facility] and would like to know if patient will have transportation to [facility] on 7/28    Pending Action Spoke with David she is looking into transportation for patient ans will get back to me as soon as she can      Expected Patient Call Back before 7/28

## 2020-07-23 NOTE — NURSING
Pt. Assessment completed with no complications.VSS. mother at the bed side.Pt. awake and alert.shakes head no when asked if he is in pain or uncomfortable.Rolo started as ordered. Pt. Maintained on fall precautions.Bed alarm set and call light as well as personal items within mothers reach.no distress is noted at this time.Will continue to monitor.

## 2020-07-23 NOTE — SUBJECTIVE & OBJECTIVE
Interval History: patient seen in bed today, mother at bedside, no seizure activity, discussed plan of care    Review of Systems   Constitutional: Negative for chills and fever.   Respiratory: Positive for cough (chronic). Negative for shortness of breath.    Cardiovascular: Negative for chest pain.   Gastrointestinal: Positive for constipation (chronic). Negative for abdominal distention, abdominal pain, diarrhea, nausea and vomiting.   Genitourinary: Negative for difficulty urinating.   Neurological: Negative for seizures.     Objective:     Vital Signs (Most Recent):  Temp: 97.9 °F (36.6 °C) (07/23/20 0804)  Pulse: 62 (07/23/20 0804)  Resp: 20 (07/23/20 0804)  BP: 108/66 (07/23/20 0804)  SpO2: 99 % (07/23/20 0347) Vital Signs (24h Range):  Temp:  [97.6 °F (36.4 °C)-98.8 °F (37.1 °C)] 97.9 °F (36.6 °C)  Pulse:  [62-73] 62  Resp:  [16-20] 20  SpO2:  [98 %-100 %] 99 %  BP: (103-113)/(66-83) 108/66     Weight: 59.4 kg (130 lb 15.3 oz)  Body mass index is 16.37 kg/m².    Intake/Output Summary (Last 24 hours) at 7/23/2020 0927  Last data filed at 7/23/2020 0108  Gross per 24 hour   Intake 390 ml   Output 670 ml   Net -280 ml      Physical Exam  Vitals signs and nursing note reviewed.   Constitutional:       Appearance: Normal appearance.   Cardiovascular:      Rate and Rhythm: Normal rate and regular rhythm.      Pulses: Normal pulses.      Heart sounds: Normal heart sounds.   Pulmonary:      Effort: Pulmonary effort is normal.      Breath sounds: Decreased breath sounds present.      Comments: Coughing occasionally with loose rattle noted to upper airway  Abdominal:      General: Bowel sounds are normal. There is no distension.      Tenderness: There is no abdominal tenderness.   Neurological:      Mental Status: He is alert. Mental status is at baseline.      Comments: Answers is low voice, mother speaks for him most times.         Significant Labs:   CBC:   Recent Labs   Lab 07/22/20  0419 07/23/20  0335   WBC 4.94  5.97   HGB 13.6* 13.9*   HCT 43.0 43.7    210     CMP:   Recent Labs   Lab 07/22/20  0418 07/23/20  0335    136   K 4.2 4.0    103   CO2 23 26   GLU 78 80   BUN 12 9   CREATININE 0.9 0.8   CALCIUM 8.1* 8.2*   PROT 6.4 6.5   ALBUMIN 3.1* 3.2*   BILITOT 0.3 0.4   ALKPHOS 45* 47*   AST 17 39   ALT 17 37   ANIONGAP 7* 7*   EGFRNONAA >60 >60     Magnesium:   Recent Labs   Lab 07/22/20  0418 07/23/20  0335   MG 1.8 1.8       Significant Imaging: I have reviewed all pertinent imaging results/findings within the past 24 hours.

## 2020-07-23 NOTE — [employer]ATION PATIENT INSTRUCTIONS
Discharge Summary/Instructions after an Endoscopic Procedure  Patient Name: Lisa Moreno  Patient MRN: [id number]  Patient YOB: 1980 Thursday, July 23, 2020  Ziyad Fitch MD  Your health is very important to us during the Covid Crisis. Following your   procedure today, you will receive a daily text for 2 weeks asking about   signs or symptoms of Covid 19.  Please respond to this text when you   receive it so we can follow up and keep you as safe as possible.   RESTRICTIONS:  During your procedure today, you received medications for sedation.  These   medications may affect your judgment, balance and coordination.  Therefore,   for 24 hours, you have the following restrictions:   - DO NOT drive a car, operate machinery, make legal/financial decisions,   sign important papers or drink alcohol.    ACTIVITY:  Today: no heavy lifting, straining or running due to procedural   sedation/anesthesia.  The following day: return to full activity including work.  DIET:  Eat and drink normally unless instructed otherwise.     TREATMENT FOR COMMON SIDE EFFECTS:  - Mild abdominal pain, nausea, belching, bloating or excessive gas:  rest,   eat lightly and use a heating pad.  - Sore Throat: treat with throat lozenges and/or gargle with warm salt   water.  - Because air was used during the procedure, expelling large amounts of air   from your rectum or belching is normal.  - If a bowel prep was taken, you may not have a bowel movement for 1-3 days.    This is normal.  SYMPTOMS TO WATCH FOR AND REPORT TO YOUR PHYSICIAN:  1. Abdominal pain or bloating, other than gas cramps.  2. Chest pain.  3. Back pain.  4. Signs of infection such as: chills or fever occurring within 24 hours   after the procedure.  5. Rectal bleeding, which would show as bright red, maroon, or black stools.   (A tablespoon of blood from the rectum is not serious, especially if   hemorrhoids are present.)  6. Vomiting.  7. Weakness or  dizziness.  GO DIRECTLY TO THE NEAREST EMERGENCY ROOM IF YOU HAVE ANY OF THE FOLLOWING:      Difficulty breathing              Chills and/or fever over 101 F   Persistent vomiting and/or vomiting blood   Severe abdominal pain   Severe chest pain   Black, tarry stools   Bleeding- more than one tablespoon   Any other symptom or condition that you feel may need urgent attention  Your doctor recommends these additional instructions:  If any biopsies were taken, your doctors clinic will contact you in 1 to 2   weeks with any results.  - Discharge patient to home.   - Resume previous diet.   - Continue present medications.   - Await pathology results.  For questions, problems or results please call your physician - Ziyad Fitch MD.  EMERGENCY PHONE NUMBER: [phone],  LAB RESULTS: [phone]  IF A COMPLICATION OR EMERGENCY SITUATION ARISES AND YOU ARE UNABLE TO REACH   YOUR PHYSICIAN - GO DIRECTLY TO THE EMERGENCY ROOM.  Ziyad Fitch MD  7/23/2020 4:22:42 PM  This report has been verified and signed electronically.  [employer]

## 2020-07-23 NOTE — PLAN OF CARE
Rounded on patient  Mother at bedside    Patient scheduled for EGD and colonoscopy today.    Referrals had been sent to [facility] and [facility]  [facility] accepted patient but mother's first choice is [facility] (awaiting answer for acceptance)    Barrier to discharge: GI tests to be done today and [facility] acceptance (if no answer today- then will dc to [facility])       07/23/20 1006   Discharge Reassessment   Assessment Type Discharge Planning Reassessment   Provided patient/caregiver education on the expected discharge date and the discharge plan Yes   Do you have any problems affording any of your prescribed medications? No   Discharge Plan A Rehab   DME Needed Upon Discharge  none   Anticipated Discharge Disposition Rehab   Can the patient/caregiver answer the patient profile reliably? Yes, cognitively intact     Pamela Broussard, RN, CCM, CMSRN  RN Transition Navigator  [phone]

## 2020-07-23 NOTE — ASSESSMENT & PLAN NOTE
CT of head showed no acute findings  Seizure likely 2/2 UTI-cont cipro  We appreciate Neurology-recommending MRI brain with epilepsy protocol-to be done outpatient  continue Keppra 1000mg BID  Ativan prn  Neuro checks q4 hours  Cont Seizure precautions- no seizures since admit  Evaluated by PT/OT/SLP-recommending IPR on discharge  EEG normal  Plan to discharge to IPR after EGD/colonoscopy

## 2020-07-23 NOTE — TELEPHONE ENCOUNTER
Lazaro Chavarria,    I requested that Ms. Akhtar contacts you to discuss ALS Query Appt with Dr. Humphrey. She stated that she left a message. Thanks in advance.     Stay Safe,  Tomeka Conte RN, BSN, BS  ALS Clinical Care Coordinator  [facility]  [phone]

## 2020-07-23 NOTE — ASSESSMENT & PLAN NOTE
Spinocerebellar ataxia  Spastic diplegia    Continue Baclofen, Dantrolene and Donepezil   Cont PT/OT  Dc to IPR after c-scope

## 2020-07-24 ENCOUNTER — TELEPHONE (OUTPATIENT)
Dept: NEUROLOGY | Facility: CLINIC | Age: 40
End: 2020-07-24

## 2020-07-24 VITALS
DIASTOLIC BLOOD PRESSURE: 78 MMHG | WEIGHT: 130.94 LBS | HEIGHT: 75 IN | SYSTOLIC BLOOD PRESSURE: 123 MMHG | HEART RATE: 73 BPM | RESPIRATION RATE: 18 BRPM | OXYGEN SATURATION: 98 % | BODY MASS INDEX: 16.28 KG/M2 | TEMPERATURE: 98 F

## 2020-07-24 VITALS — SYSTOLIC BLOOD PRESSURE: 135 MMHG | HEART RATE: 72 BPM | TEMPERATURE: 99 F | DIASTOLIC BLOOD PRESSURE: 90 MMHG

## 2020-07-24 PROBLEM — N39.0 UTI (URINARY TRACT INFECTION): Status: RESOLVED | Noted: 2020-07-18 | Resolved: 2020-07-24

## 2020-07-24 LAB
ALBUMIN SERPL BCP-MCNC: 3 G/DL (ref 3.5–5.2)
ALP SERPL-CCNC: 46 U/L (ref 55–135)
ALT SERPL W/O P-5'-P-CCNC: 48 U/L (ref 10–44)
ANION GAP SERPL CALC-SCNC: 5 MMOL/L (ref 8–16)
AST SERPL-CCNC: 44 U/L (ref 10–40)
BACTERIA BLD CULT: NORMAL
BASOPHILS # BLD AUTO: 0.03 K/UL (ref 0–0.2)
BASOPHILS NFR BLD: 0.5 % (ref 0–1.9)
BILIRUB SERPL-MCNC: 0.3 MG/DL (ref 0.1–1)
BILIRUB SERPL-MCNC: ABNORMAL MG/DL
BLOOD URINE, POC: ABNORMAL
BUN SERPL-MCNC: 8 MG/DL (ref 6–20)
CALCIUM SERPL-MCNC: 8.2 MG/DL (ref 8.7–10.5)
CHLORIDE SERPL-SCNC: 106 MMOL/L (ref 95–110)
CLARITY, POC UA: ABNORMAL
CO2 SERPL-SCNC: 26 MMOL/L (ref 23–29)
COLOR, POC UA: YELLOW
CREAT SERPL-MCNC: 0.8 MG/DL (ref 0.5–1.4)
DIFFERENTIAL METHOD: ABNORMAL
EOSINOPHIL # BLD AUTO: 0.2 K/UL (ref 0–0.5)
EOSINOPHIL NFR BLD: 2.6 % (ref 0–8)
ERYTHROCYTE [DISTWIDTH] IN BLOOD BY AUTOMATED COUNT: 13.2 % (ref 11.5–14.5)
EST. GFR  (AFRICAN AMERICAN): >60 ML/MIN/1.73 M^2
EST. GFR  (NON AFRICAN AMERICAN): >60 ML/MIN/1.73 M^2
GLUCOSE SERPL-MCNC: 87 MG/DL (ref 70–110)
GLUCOSE UR QL STRIP: ABNORMAL
HCT VFR BLD AUTO: 42.4 % (ref 40–54)
HGB BLD-MCNC: 13.3 G/DL (ref 14–18)
IMM GRANULOCYTES # BLD AUTO: 0.01 K/UL (ref 0–0.04)
IMM GRANULOCYTES NFR BLD AUTO: 0.2 % (ref 0–0.5)
KETONES UR QL STRIP: ABNORMAL
LEUKOCYTE ESTERASE URINE, POC: ABNORMAL
LYMPHOCYTES # BLD AUTO: 1.7 K/UL (ref 1–4.8)
LYMPHOCYTES NFR BLD: 25.5 % (ref 18–48)
MAGNESIUM SERPL-MCNC: 1.8 MG/DL (ref 1.6–2.6)
MCH RBC QN AUTO: 27.1 PG (ref 27–31)
MCHC RBC AUTO-ENTMCNC: 31.4 G/DL (ref 32–36)
MCV RBC AUTO: 86 FL (ref 82–98)
MONOCYTES # BLD AUTO: 0.6 K/UL (ref 0.3–1)
MONOCYTES NFR BLD: 8.5 % (ref 4–15)
NEUTROPHILS # BLD AUTO: 4.2 K/UL (ref 1.8–7.7)
NEUTROPHILS NFR BLD: 62.7 % (ref 38–73)
NITRITE, POC UA: ABNORMAL
NRBC BLD-RTO: 0 /100 WBC
PH, POC UA: 5
PHOSPHATE SERPL-MCNC: 3.7 MG/DL (ref 2.7–4.5)
PLATELET # BLD AUTO: 204 K/UL (ref 150–350)
PMV BLD AUTO: 10.4 FL (ref 9.2–12.9)
POC RESIDUAL URINE VOLUME: 234 ML (ref 0–100)
POTASSIUM SERPL-SCNC: 4.2 MMOL/L (ref 3.5–5.1)
PROT SERPL-MCNC: 6.1 G/DL (ref 6–8.4)
PROTEIN, POC: ABNORMAL
RBC # BLD AUTO: 4.91 M/UL (ref 4.6–6.2)
SODIUM SERPL-SCNC: 137 MMOL/L (ref 136–145)
SPECIFIC GRAVITY, POC UA: 1.02
UROBILINOGEN, POC UA: ABNORMAL
WBC # BLD AUTO: 6.6 K/UL (ref 3.9–12.7)

## 2020-07-24 PROCEDURE — 36415 COLL VENOUS BLD VENIPUNCTURE: CPT

## 2020-07-24 PROCEDURE — 25000003 PHARM REV CODE 250: Performed by: NURSE PRACTITIONER

## 2020-07-24 PROCEDURE — 94761 N-INVAS EAR/PLS OXIMETRY MLT: CPT

## 2020-07-24 PROCEDURE — 83735 ASSAY OF MAGNESIUM: CPT

## 2020-07-24 PROCEDURE — 84100 ASSAY OF PHOSPHORUS: CPT

## 2020-07-24 PROCEDURE — 80053 COMPREHEN METABOLIC PANEL: CPT

## 2020-07-24 PROCEDURE — 85025 COMPLETE CBC W/AUTO DIFF WBC: CPT

## 2020-07-24 RX ORDER — LEVETIRACETAM 1000 MG/1
1000 TABLET ORAL 2 TIMES DAILY
Qty: 60 TABLET | Refills: 11
Start: 2020-07-24 | End: 2020-10-29

## 2020-07-24 RX ORDER — LORAZEPAM 2 MG/1
2 TABLET ORAL EVERY 4 HOURS PRN
Qty: 18 TABLET | Refills: 0
Start: 2020-07-24 | End: 2020-09-11

## 2020-07-24 RX ORDER — POLYETHYLENE GLYCOL 3350 17 G/17G
17 POWDER, FOR SOLUTION ORAL EVERY OTHER DAY
Qty: 507 G | Refills: 2
Start: 2020-07-24 | End: 2020-11-13 | Stop reason: SDUPTHER

## 2020-07-24 RX ADMIN — DANTROLENE SODIUM 50 MG: 25 CAPSULE ORAL at 09:07

## 2020-07-24 RX ADMIN — LEVETIRACETAM 1000 MG: 500 TABLET, FILM COATED ORAL at 09:07

## 2020-07-24 RX ADMIN — BACLOFEN 20 MG: 5 TABLET ORAL at 09:07

## 2020-07-24 RX ADMIN — LUBIPROSTONE 24 MCG: 8 CAPSULE, GELATIN COATED ORAL at 09:07

## 2020-07-24 RX ADMIN — CIPROFLOXACIN HYDROCHLORIDE 500 MG: 500 TABLET, FILM COATED ORAL at 09:07

## 2020-07-24 NOTE — PROGRESS NOTES
Pharmacy New Medication Education    Patient and/or Caregiver ACCEPTED medication education.    Pharmacy has provided education on the name, indication, and possible side effects of the medication(s) prescribed, using teach-back method.     Learners of pharmacy medication education includes:  patient    The following medications have also been discussed, during this admission.     Current Facility-Administered Medications   Medication Frequency    acetaminophen tablet 650 mg Q6H PRN    baclofen tablet 20 mg QID    ciprofloxacin HCl tablet 500 mg Q12H    dantrolene capsule 50 mg TID    donepeziL tablet 5 mg QHS    lactulose 20 gram/30 mL solution Soln 20 g Q6H PRN    levETIRAcetam tablet 1,000 mg BID    lorazepam (ATIVAN) injection 2 mg Q3H PRN    lubiprostone capsule 24 mcg BID WM    ondansetron injection 4 mg Q6H PRN    sodium chloride 0.9% flush 10 mL PRN        Thank you  Eleazar France, PharmD

## 2020-07-24 NOTE — PLAN OF CARE
Spoke to patient's mother and she spoke to Kristyn with [facility] yesterday evening, [facility] cannot give a difinitive answer on whether they can accept patient. Patient's mother agreeable to [facility]. Spoke to Krista at [facility] and they can accept patient today. Called NP on team and aware of acceptance. Awaiting dc orders to [facility].       07/24/20 0903   Post-Acute Status   Post-Acute Authorization Placement   Post-Acute Placement Status Awaiting Orders for Rehab     Pamela Broussard RN, [facility], CMSRN  RN Transition Navigator  [phone]

## 2020-07-24 NOTE — PLAN OF CARE
[facility] accepted patient       07/24/20 1210   Post-Acute Status   Post-Acute Authorization Placement   Post-Acute Placement Status Set-up Complete     Pamela Broussard, RN, CCM, CMSRN  RN Transition Navigator  [phone]

## 2020-07-24 NOTE — PLAN OF CARE
Patient accepted to [facility], mother at bedside and in agreement with discharge plan. Packet given to LOGAN Menendez to call report    Patient flow center requested for wheelchair van foe 1:30pm pickup- confirmed that patient's mother can ride with patient.     Left message for Dr. Wise's office for followup- informed patient's mother.    Discharge rounds on patient. Discussed followup appointments, blue discharge folder, discharge nurse will go over home medications and reasons for medications and final discharge instructions. All patient/caregiver questions answered. Patient verbalized understanding.       07/24/20 1211   Final Note   Assessment Type Final Discharge Note   Anticipated Discharge Disposition Rehab   Hospital Follow Up  Appt(s) scheduled? Yes   Discharge plans and expectations educations in teach back method with documentation complete? Yes   Post-Acute Status   Post-Acute Authorization Placement   Post-Acute Placement Status Set-up Complete     Pamela Broussard, RN, CCM, CMSRN  RN Transition Navigator  [phone]

## 2020-07-24 NOTE — PLAN OF CARE
[facility]    FACILITY TRANSFER ORDERS      Patient Name: Lisa Moreno  YOB: 1980    PCP: John Nixon Ii, MD   PCP Address: [address] / [place] [address]  PCP Phone Number: [phone]  PCP Fax: [phone]    Encounter Date: 07/24/2020    Admit to: [facility]    Vital Signs:  Routine    Diagnoses:   Active Hospital Problems    Diagnosis  POA    *New onset seizure [R56.9]  Yes    Hyperglycemia [R73.9]  Yes    Seizure [R56.9]  Yes    Chronic constipation [K59.09]  Yes    Decreased functional mobility [R26.89]  Yes    Spinocerebellar ataxia [G11.8]  Yes     Seen Dr. Wise in movement disorder clinic on 2/23/2015  Diagnosis of SCA at that visit        Spastic diplegia [G80.1]  Yes      Resolved Hospital Problems    Diagnosis Date Resolved POA    UTI (urinary tract infection) [N39.0] 07/24/2020 Yes       Allergies:  Review of patient's allergies indicates:   Allergen Reactions    Penicillins      Hives and Itching       Diet: regular diet and fluid consistency nectar thick    Activities: Activity as tolerated    Nursing: per facility protocol     Labs: n/a n/a     CONSULTS:    Physical Therapy to evaluate and treat. , Occupational Therapy to evaluate and treat. and Speech Therapy to evaluate and treat for Swallowing.    MISCELLANEOUS CARE:  Routine Skin for Bedridden Patients: Apply moisture barrier cream to all skin folds and wet areas in perineal area daily and after baths and all bowel movements.    WOUND CARE ORDERS  None    Medications: Review discharge medications with patient and family and provide education.      Current Discharge Medication List      START taking these medications    Details   levETIRAcetam (KEPPRA) 1000 MG tablet Take 1 tablet (1,000 mg total) by mouth 2 (two) times daily.  Qty: 60 tablet, Refills: 11      LORazepam (ATIVAN) 2 MG Tab Take 1 tablet (2 mg total) by mouth every 4 (four) hours as needed (for seizure  activity).  Qty: 18 tablet, Refills: 0      polyethylene glycol (GLYCOLAX) 17 gram/dose powder Take 17 g by mouth every other day.  Qty: 507 g, Refills: 2         CONTINUE these medications which have NOT CHANGED    Details   baclofen (LIORESAL) 20 MG tablet Take 1 tablet (20 mg total) by mouth 3 (three) times daily.  Qty: 270 tablet, Refills: 3      dantrolene (DANTRIUM) 50 MG Cap Take 1 capsule (50 mg total) by mouth 3 (three) times daily.  Qty: 270 capsule, Refills: 0    Associated Diagnoses: Muscle spasm      donepezil (ARICEPT) 5 MG tablet TAKE 1 TABLET(5 MG) BY MOUTH EVERY EVENING  Qty: 30 tablet, Refills: 11      blood-glucose meter, disc-type Kit 1 application by Misc.(Non-Drug; Combo Route) route 3 (three) times daily.  Qty: 1 kit, Refills: 0      lubiprostone (AMITIZA) 24 MCG Cap Take 1 capsule (24 mcg total) by mouth 2 (two) times daily with meals.  Qty: 60 capsule, Refills: 0    Associated Diagnoses: Chronic idiopathic constipation         STOP taking these medications       cephALEXin (KEFLEX) 500 MG capsule Comments:   Reason for Stopping:         polyethylene glycol (GOLYTELY) 236-22.74-6.74 -5.86 gram suspension Comments:   Reason for Stopping:         sodium,potassium,mag sulfates (SUPREP BOWEL PREP KIT) 17.5-3.13-1.6 gram SolR Comments:   Reason for Stopping:                    _________________________________  Carito Grijalva NP  07/24/2020

## 2020-07-24 NOTE — DISCHARGE SUMMARY
[facility] Hospital Medicine  Discharge Summary      Patient Name: Lisa Moreno  MRN: [id number]  Admission Date: 7/18/2020  Hospital Length of Stay: 5 days  Discharge Date and Time: 7/24/2020 12:56 PM  Attending Physician: Dariana att. providers found   Discharging Provider: Carito Grijalva NP  Primary Care Provider: John Nixon Ii, MD      HPI:   39 y.o. male with past medical history of depression, chronic constipation, degenerative motor system disease, spastic diplegia,  quadriplegia, baclofen pump implantation and fluoroscopic urodynamic study ( 11/27/2018), and spinocerebellar atrophy who presents to the ED via EMS with complaint of seizure activity. As per mom, he started to shake upper and lower extremities at 2:15 today. He dropped his head to the right and began to drool. This lasted for 25 minutes. Upon arrival to ED, patient nonverbal.  His mom denies any recent illness or history of seizure. His medications were recently changed. Patient is now verbal but with very low tone, almost mumble.  Patient mother states that is not baseline.  Denies fever, chills, SOB, chest pain, headaches, syncope, palpitations, nausea, vomiting, change in bladder habits or change in bowel habits.  Findings: Drug screen negative, covid negative, u/a concerning for UTI, urine cultures from 7/17/20 grew E.coli, pending sensitives, blood cultures pending, CT of head negative for acute findings.  Of note, blood glucose elevated at 203, patient's mother states that last time he had UTI blood sugars elevated also, will monitor.  Patient admitted to [facility] medicine observation service for further medical management.    Procedure(s) (LRB):  EGD (ESOPHAGOGASTRODUODENOSCOPY) (N/A)  COLONOSCOPY (N/A)      Hospital Course:   The patient was admitted to the [facility] Medicine service for further care. Keppra was given for seizures. IV ceftriaxone and then cipro PO was given for UTI. Neuro was  consulted. EEG was completed and was found to be normal. The patient was evaluated and treated by PT/OT/ST. Neurology recommended MRI brain with epilepsy protocol to be done as outpatient. The patient was treated for chronic constipation. An EGD/Colonoscopy was done on 7/23. He is accepted for inpatient rehab after discharge from hospital. The patient remained stable and was discharged to [facility] facility.      Consults:   Consults (From admission, onward)        Status Ordering Provider     Inpatient consult to neurology  Once     Provider:  (Not yet assigned)    Completed MARIFER FAULKNER JR     Inpatient consult to Social Work  Once     Provider:  (Not yet assigned)    Acknowledged ARGENIS HDEZ     Inpatient consult to Social Work/Case Management  Once     Provider:  (Not yet assigned)    Acknowledged INNOCENT-NETO PATEL          * New onset seizure  CT of head showed no acute findings  Seizure likely 2/2 UTI-cont cipro  We appreciate Neurology-recommending MRI brain with epilepsy protocol-to be done outpatient  continue Keppra 1000mg BID  Ativan prn  Neuro checks q4 hours  Cont Seizure precautions- no seizures since admit  Evaluated by PT/OT/SLP-recommending IPR on discharge  EEG normal  discharge to IPR after EGD/colonoscopy    Decreased functional mobility  Spinocerebellar ataxia  Spastic diplegia    Continue Baclofen, Dantrolene and Donepezil   Cont PT/OT  Dc to IPR after c-scope      Final Active Diagnoses:    Diagnosis Date Noted POA    PRINCIPAL PROBLEM:  New onset seizure [R56.9] 07/18/2020 Yes    Hyperglycemia [R73.9] 07/19/2020 Yes    Seizure [R56.9] 07/19/2020 Yes    Chronic constipation [K59.09] 07/18/2020 Yes    Decreased functional mobility [R26.89] 11/27/2019 Yes    Spinocerebellar ataxia [G11.8] 03/27/2015 Yes    Spastic diplegia [G80.1] 01/16/2015 Yes      Problems Resolved During this Admission:    Diagnosis Date Noted Date Resolved POA    UTI  (urinary tract infection) [N39.0] 07/18/2020 07/24/2020 Yes       Discharged Condition: stable    Disposition: Rehab Facility    Follow Up:  Follow-up Information     [facility].    Contact information:  [address] [facility] [address]  [phone]           Bairon Wise MD.    Specialty: Neurology  Why: they will call with followup appointment  Contact information:  [address]  [facility] [address]  [phone]                 Patient Instructions:      Ambulatory referral/consult to Neurology   Standing Status: Future   Referral Priority: Routine Referral Type: Consultation   Referral Reason: Specialty Services Required   Requested Specialty: Neurology   Number of Visits Requested: 1     Notify your health care provider if you experience any of the following:  temperature >100.4     Notify your health care provider if you experience any of the following:  persistent nausea and vomiting or diarrhea     Notify your health care provider if you experience any of the following:  severe uncontrolled pain     Notify your health care provider if you experience any of the following:  difficulty breathing or increased cough     Notify your health care provider if you experience any of the following:  severe persistent headache     Notify your health care provider if you experience any of the following:  worsening rash     Notify your health care provider if you experience any of the following:  persistent dizziness, light-headedness, or visual disturbances     Notify your health care provider if you experience any of the following:  increased confusion or weakness     Notify your health care provider if you experience any of the following:     Activity as tolerated       Significant Diagnostic Studies:     Pending Diagnostic Studies:     Procedure Component Value Units Date/Time    Specimen to Pathology, Surgery Gastrointestinal tract [[id number]] Collected:  07/23/20 1604    Order Status: Sent Lab Status: In process Updated: 07/24/20 1009         Medications:  Reconciled Home Medications:      Medication List      START taking these medications    levETIRAcetam 1000 MG tablet  Commonly known as: KEPPRA  Take 1 tablet (1,000 mg total) by mouth 2 (two) times daily.     LORazepam 2 MG Tab  Commonly known as: ATIVAN  Take 1 tablet (2 mg total) by mouth every 4 (four) hours as needed (for seizure activity).     polyethylene glycol 17 gram/dose powder  Commonly known as: GLYCOLAX  Take 17 g by mouth every other day.        CONTINUE taking these medications    baclofen 20 MG tablet  Commonly known as: LIORESAL  Take 1 tablet (20 mg total) by mouth 3 (three) times daily.     blood-glucose meter, disc-type Kit  1 application by Misc.(Non-Drug; Combo Route) route 3 (three) times daily.     dantrolene 50 MG Cap  Commonly known as: DANTRIUM  Take 1 capsule (50 mg total) by mouth 3 (three) times daily.     donepeziL 5 MG tablet  Commonly known as: ARICEPT  TAKE 1 TABLET(5 MG) BY MOUTH EVERY EVENING     lubiprostone 24 MCG Cap  Commonly known as: AMITIZA  Take 1 capsule (24 mcg total) by mouth 2 (two) times daily with meals.        STOP taking these medications    cephALEXin 500 MG capsule  Commonly known as: KEFLEX     polyethylene glycol 236-22.74-6.74 -5.86 gram suspension  Commonly known as: GOLYTELY     SUPREP BOWEL PREP KIT 17.5-3.13-1.6 gram Solr  Generic drug: sodium,potassium,mag sulfates            Indwelling Lines/Drains at time of discharge:   Lines/Drains/Airways     None                 Time spent on the discharge of patient: 35 minutes  Patient was seen and examined on the date of discharge and determined to be suitable for discharge.         Carito Grijalva NP  Department of Hospital Medicine  [facility]

## 2020-07-24 NOTE — ASSESSMENT & PLAN NOTE
CT of head showed no acute findings  Seizure likely 2/2 UTI-cont cipro  We appreciate Neurology-recommending MRI brain with epilepsy protocol-to be done outpatient  continue Keppra 1000mg BID  Ativan prn  Neuro checks q4 hours  Cont Seizure precautions- no seizures since admit  Evaluated by PT/OT/SLP-recommending IPR on discharge  EEG normal  discharge to IPR after EGD/colonoscopy

## 2020-07-24 NOTE — TELEPHONE ENCOUNTER
----- Message from Alex Frias sent at 7/24/2020 10:38 AM CDT -----  Contact: Giovana ( mom ) @ [phone]  Caller ( [facility] Case [facility] ) calling to schedule the hospital f/u according to discharge note, pls contact pt's Mother ( caller states patient is discharged to [facility] today)

## 2020-07-24 NOTE — PLAN OF CARE
POC reviewed with the pt, verbalized understanding.  Mother at the bedside.  VSS.  Disoriented to time/place/situation, reoriented pt during shift.  Slow response, slurred speech noted.  Appeared to have no complaint of pain or any other distress noted throughout night.  PIV remained intact.  On continuous telemetry monitor, SR.  No ectopy noted.  Neuro check done.  BM x2 at night.  Turned pt with wedge/pillow frequently.  Safety maintained, free of falls throughout shift.  Instructed to call for any assistance.  Will continue to monitor.

## 2020-07-27 ENCOUNTER — TELEPHONE (OUTPATIENT)
Dept: NEUROLOGY | Facility: CLINIC | Age: 40
End: 2020-07-27

## 2020-07-27 NOTE — TELEPHONE ENCOUNTER
Message received from patient's mother to inform that colonoscopy went well and no abnormalities noted.     Also, patient admitted to [facility] and they agency will transport patient to [facility] with Dr. Humphrey on Tuesday, 7/28 @ 8:15. Ms. Akhtar plans to meet patient at [facility].

## 2020-07-27 NOTE — TELEPHONE ENCOUNTER
Message received from Ms. Akhtar to inform that [facility] will not transport patient to appt scheduled tomorrow with Dr. Humphrey. Advised mother to follow up with NORMA Chavarria post d/c from Rehab.     Aura and Dr. Humphrey made aware of the above.

## 2020-07-28 LAB
FINAL PATHOLOGIC DIAGNOSIS: NORMAL
GROSS: NORMAL
MICROSCOPIC EXAM: NORMAL

## 2020-07-28 NOTE — TELEPHONE ENCOUNTER
----- Message from Araceli Jack sent at 7/27/2020  3:28 PM CDT -----   Name of Who is Calling:     What is the request in detail: request call back in reference to  patient appointment  questions /concerns   /// what type of visit and why is patient scheduled Please contact to further discuss and advise      Can the clinic reply by [facility]: no     What Number [facility] Back [facility]  az[facility] / [facility] / [phone]

## 2020-07-30 ENCOUNTER — TELEPHONE (OUTPATIENT)
Dept: NEUROLOGY | Facility: CLINIC | Age: 40
End: 2020-07-30

## 2020-07-30 NOTE — TELEPHONE ENCOUNTER
Community Choices Waiver  SW left vm message for Kathleen.   is Karen Medina.  CM is Kathleen Coral [phone].     Requested return call to discuss sitter services w/ CCW.    Emailed Petra howe/ [employer] regarding funding for Respite.        RENATO KOCH

## 2020-07-31 ENCOUNTER — TELEPHONE (OUTPATIENT)
Dept: NEUROLOGY | Facility: CLINIC | Age: 40
End: 2020-07-31

## 2020-07-31 NOTE — TELEPHONE ENCOUNTER
"12:59PM   offered Respite through [facility], if funding avilable.  Reach out to Mayi howe/ [place] to f/u.  Explore [place] stance [place] not having a sitter to see if anything can be done to urge the sitter agency to provide services    Waiting to hear from [employer] rep regarding available funding for Respite services.    Return call received from Jenny regarding getting sitters in the home.  Jenny indicated staffing for Waiver services has been a consistent concern/issue prior to COVID but now even more.    Currently pt has [facility], per Jenny, [facility]-([place] [phone])  has been trying to get staff for the home.    Reportedly, of the staff that had visited the home, pt's mother was not happy with services.  Limited number of staff to start and then not being happy with their services cuts options for available staff.     Another issue in regards to switching sitter agencies.  The accepting agency will need to conduct an in person interview to assess pt needs and anticipate number of hours needed.  Agencies are not going out d/t COVID.  Also, agencies are reluctant to do the assessments because they may not have the staff to meet pt needs.  Additionally, Karen Medina is unable to "make" an agency accept a client.    There have been multiple attempts to secure a new agency however pt was declined.      [place] partial resolution to assist for lack of available Waiver staff during COVID is to make the primary caregiver the paid caregiver.  Jenny indicated, some families are more interested in social isolation and have agreed to become the paid caregiver at leaset until COVID cases decrease and it becomes a little more safe to return to some level of normalcy.    Reportedly pt's mother was given this option.  Mother and [facility] was to workout the process.  Jenny was unaware of the outcome.      Exploring states stance on no sitters available through CCW program.  Vm message left " for Sandra howe/ [employer] [phone] regarding options to secure consistent care in the home.      Return call received,  Sandra indicated the problem is systemic.  Historically, it has been difficult to secure good reliable sitters which is now compounded by COVID.  Staffing shortage---the actual sitters social isolating d/t their own health needs and the needs of their families and the pt/families receiving services wanting to limit individual in/out of the home.    State offered for the primary caregiver to be the paid caregiver.  Other than that option, no other options were given to aid the support in the home.        CARLOS ALBERTO left pt's mother a vm message highlighting the above.              10:20AM  Vm message received from Jenny howe/ Karen [facility]ina [phone].    11:54AM   CARLOS ALBERTO left Jenny a vm.          10:18AM  Case f/u regarding lack of community choices waiver sitters for assistance in the home.    CARLOS ALBERTO left verbal message with [employer] -[phone]  (after today Kathleen Moncada will no longer be with the company).  Anticipate a return call from Kathleen Barreto's supervisor.        RENATO KOCH

## 2020-08-02 ENCOUNTER — TELEPHONE (OUTPATIENT)
Dept: GASTROENTEROLOGY | Facility: CLINIC | Age: 40
End: 2020-08-02

## 2020-08-02 RX ORDER — OMEPRAZOLE 40 MG/1
40 CAPSULE, DELAYED RELEASE ORAL
Qty: 28 CAPSULE | Refills: 0 | Status: SHIPPED | OUTPATIENT
Start: 2020-08-02 | End: 2020-08-17 | Stop reason: SDUPTHER

## 2020-08-02 RX ORDER — TETRACYCLINE HYDROCHLORIDE 500 MG/1
500 CAPSULE ORAL EVERY 6 HOURS
Qty: 56 CAPSULE | Refills: 0 | Status: SHIPPED | OUTPATIENT
Start: 2020-08-02 | End: 2020-08-16

## 2020-08-03 ENCOUNTER — TELEPHONE (OUTPATIENT)
Dept: NEUROLOGY | Facility: CLINIC | Age: 40
End: 2020-08-03

## 2020-08-03 NOTE — TELEPHONE ENCOUNTER
Pt's mother Giovana wanted inform SW she received SW message and she didn't think that there was much that can be done d/t to extenuating circumstances (COVID, minimal numbers of workers available etc).    SW verbalized understanding.    Response received from Petra HOLM,-- funding is available for Respite services.  Petra will work with pt's mother and sitter agency.        Pt currently at [facility].  Giovana reports pt is doing very well.  Anticipated dcd 8/5.        RENATO KOCH

## 2020-08-03 NOTE — TELEPHONE ENCOUNTER
Spoke to patients mother and informed her of positive H Pylori infection and antibiotics called in to her pharmacy.  Verbalized understanding.

## 2020-08-11 ENCOUNTER — TELEPHONE (OUTPATIENT)
Dept: NEUROLOGY | Facility: CLINIC | Age: 40
End: 2020-08-11

## 2020-08-11 NOTE — TELEPHONE ENCOUNTER
Message received from patient's mother requesting sooner appt with Dr. Humphrey.       Good morning Detrell is back home and I was wondering if you can help me get an earlier appointment for Dr. Humphrey he have him schedule for 11/4/20   Thank you  -Giovana

## 2020-08-12 ENCOUNTER — TELEPHONE (OUTPATIENT)
Dept: INTERNAL MEDICINE | Facility: CLINIC | Age: 40
End: 2020-08-12

## 2020-08-12 DIAGNOSIS — G80.1 SPASTIC DIPLEGIA: ICD-10-CM

## 2020-08-12 DIAGNOSIS — R13.10 DYSPHAGIA, UNSPECIFIED TYPE: Primary | ICD-10-CM

## 2020-08-12 DIAGNOSIS — G71.09 OTHER SPECIFIED MUSCULAR DYSTROPHIES: ICD-10-CM

## 2020-08-12 PROBLEM — Z74.09 IMPAIRED MOBILITY AND ADLS: Status: ACTIVE | Noted: 2020-08-12

## 2020-08-12 PROBLEM — R53.1 WEAKNESS GENERALIZED: Status: ACTIVE | Noted: 2020-08-12

## 2020-08-12 PROBLEM — Z74.09 DECREASED FUNCTIONAL MOBILITY AND ENDURANCE: Status: ACTIVE | Noted: 2020-08-12

## 2020-08-12 PROBLEM — Z78.9 IMPAIRED MOBILITY AND ADLS: Status: ACTIVE | Noted: 2020-08-12

## 2020-08-12 NOTE — TELEPHONE ENCOUNTER
No problem.  This order was placed by another provider in Feb 2020, but I reordered just now.    Dr. MADISON

## 2020-08-12 NOTE — TELEPHONE ENCOUNTER
----- Message from ST Chantel sent at 8/12/2020  3:24 PM CDT -----  Regarding: Modified Barium Swallow Study order  Hi Dr. Nixon,    I evaluated your patient, Lisa Moreno , for dysphagia today. I believe he would benefit from a Modified Barium Swallow Study to objectively assess his swallow, rule out silent aspiration, determine the safest possible diet and determine the functional impairments of the swallow to directly target in therapy. Please place the order for AMB Modified Barium Swallow Study, order number [id number] if you agree.     Thank You,  SARKIS Crane., CCC-SLP  Speech-Language Pathologist  08/12/2020

## 2020-08-17 DIAGNOSIS — R10.9 ABDOMINAL PAIN, UNSPECIFIED ABDOMINAL LOCATION: Primary | ICD-10-CM

## 2020-08-17 RX ORDER — OMEPRAZOLE 40 MG/1
40 CAPSULE, DELAYED RELEASE ORAL
Qty: 28 CAPSULE | Refills: 0 | Status: SHIPPED | OUTPATIENT
Start: 2020-08-17 | End: 2020-09-03 | Stop reason: SDUPTHER

## 2020-08-21 ENCOUNTER — HOSPITAL ENCOUNTER (OUTPATIENT)
Dept: RADIOLOGY | Facility: HOSPITAL | Age: 40
Discharge: HOME | End: 2020-08-21
Attending: INTERNAL MEDICINE
Payer: MEDICARE

## 2020-08-21 ENCOUNTER — OFFICE VISIT (OUTPATIENT)
Dept: INTERNAL MEDICINE | Facility: CLINIC | Age: 40
End: 2020-08-21
Payer: MEDICARE

## 2020-08-21 VITALS
OXYGEN SATURATION: 99 % | HEART RATE: 88 BPM | SYSTOLIC BLOOD PRESSURE: 110 MMHG | BODY MASS INDEX: 16.37 KG/M2 | HEIGHT: 75 IN | DIASTOLIC BLOOD PRESSURE: 70 MMHG

## 2020-08-21 DIAGNOSIS — M79.89 RIGHT LEG SWELLING: Primary | ICD-10-CM

## 2020-08-21 DIAGNOSIS — R05.9 COUGH: ICD-10-CM

## 2020-08-21 DIAGNOSIS — R60.0 LOCALIZED EDEMA: ICD-10-CM

## 2020-08-21 DIAGNOSIS — M79.89 RIGHT LEG SWELLING: ICD-10-CM

## 2020-08-21 PROCEDURE — 99999 PR PBB SHADOW E&M-EST. PATIENT-LVL IV: CPT | Mod: PBBFAC,,, | Performed by: INTERNAL MEDICINE

## 2020-08-21 PROCEDURE — 99214 OFFICE O/P EST MOD 30 MIN: CPT | Mod: PBBFAC,25 | Performed by: INTERNAL MEDICINE

## 2020-08-21 PROCEDURE — 99214 PR OFFICE/OUTPT VISIT, EST, LEVL IV, 30-39 MIN: ICD-10-PCS | Mod: S$PBB,,, | Performed by: INTERNAL MEDICINE

## 2020-08-21 PROCEDURE — 73630 X-RAY EXAM OF FOOT: CPT | Mod: 26,RT,, | Performed by: RADIOLOGY

## 2020-08-21 PROCEDURE — 73630 X-RAY EXAM OF FOOT: CPT | Mod: TC,RT

## 2020-08-21 PROCEDURE — 99999 PR PBB SHADOW E&M-EST. PATIENT-LVL IV: ICD-10-PCS | Mod: PBBFAC,,, | Performed by: INTERNAL MEDICINE

## 2020-08-21 PROCEDURE — 73630 XR FOOT COMPLETE 3 VIEW RIGHT: ICD-10-PCS | Mod: 26,RT,, | Performed by: RADIOLOGY

## 2020-08-21 PROCEDURE — 99214 OFFICE O/P EST MOD 30 MIN: CPT | Mod: S$PBB,,, | Performed by: INTERNAL MEDICINE

## 2020-08-21 RX ORDER — PROMETHAZINE HYDROCHLORIDE 6.25 MG/5ML
25 SYRUP ORAL EVERY 6 HOURS PRN
Qty: 118 ML | Refills: 0 | Status: SHIPPED | OUTPATIENT
Start: 2020-08-21 | End: 2020-09-11
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
## 2020-08-24 ENCOUNTER — PATIENT MESSAGE (OUTPATIENT)
Dept: INTERNAL MEDICINE | Facility: CLINIC | Age: 40
End: 2020-08-24

## 2020-08-24 DIAGNOSIS — R05.9 COUGH: Primary | ICD-10-CM

## 2020-08-24 DIAGNOSIS — R53.1 WEAKNESS: Primary | ICD-10-CM

## 2020-08-24 NOTE — TELEPHONE ENCOUNTER
He needed to do that ultrasound on Friday.  Can you see about getting it rescheduled for today, please?    Thanks.  D

## 2020-08-24 NOTE — TELEPHONE ENCOUNTER
Called and spoke with family member, pt's mother,  They had the US scheduled for today and someone called and rescheduled it to tomorrow due to uncertainty of the weather.    Nothing is available today.  Pt requesting a cough syrup. Pt's mom stated that pt is coughing a lot    Evelia

## 2020-08-25 ENCOUNTER — PATIENT MESSAGE (OUTPATIENT)
Dept: INTERNAL MEDICINE | Facility: CLINIC | Age: 40
End: 2020-08-25

## 2020-08-25 ENCOUNTER — OFFICE VISIT (OUTPATIENT)
Dept: NEUROLOGY | Facility: CLINIC | Age: 40
End: 2020-08-25
Payer: MEDICARE

## 2020-08-25 ENCOUNTER — HOSPITAL ENCOUNTER (OUTPATIENT)
Dept: RADIOLOGY | Facility: OTHER | Age: 40
Discharge: HOME | End: 2020-08-25
Attending: INTERNAL MEDICINE
Payer: MEDICARE

## 2020-08-25 VITALS
BODY MASS INDEX: 16.37 KG/M2 | HEART RATE: 84 BPM | SYSTOLIC BLOOD PRESSURE: 123 MMHG | DIASTOLIC BLOOD PRESSURE: 80 MMHG | HEIGHT: 75 IN

## 2020-08-25 DIAGNOSIS — M79.89 RIGHT LEG SWELLING: ICD-10-CM

## 2020-08-25 DIAGNOSIS — G80.1 SPASTIC DIPLEGIA: ICD-10-CM

## 2020-08-25 DIAGNOSIS — G40.209 PARTIAL SYMPTOMATIC EPILEPSY WITH COMPLEX PARTIAL SEIZURES, NOT INTRACTABLE, WITHOUT STATUS EPILEPTICUS: ICD-10-CM

## 2020-08-25 DIAGNOSIS — R60.0 LOCALIZED EDEMA: ICD-10-CM

## 2020-08-25 DIAGNOSIS — R26.0 ATAXIC GAIT: ICD-10-CM

## 2020-08-25 DIAGNOSIS — R73.03 PREDIABETES: ICD-10-CM

## 2020-08-25 DIAGNOSIS — G12.29 UPPER MOTOR NEURON DISEASE: Primary | ICD-10-CM

## 2020-08-25 DIAGNOSIS — R06.09 DOE (DYSPNEA ON EXERTION): ICD-10-CM

## 2020-08-25 DIAGNOSIS — G95.9 MYELOPATHY: Primary | ICD-10-CM

## 2020-08-25 PROCEDURE — 99215 PR OFFICE/OUTPT VISIT, EST, LEVL V, 40-54 MIN: ICD-10-PCS | Mod: S$PBB,,, | Performed by: PSYCHIATRY & NEUROLOGY

## 2020-08-25 PROCEDURE — 99999 PR PBB SHADOW E&M-EST. PATIENT-LVL III: ICD-10-PCS | Mod: PBBFAC,,, | Performed by: PSYCHIATRY & NEUROLOGY

## 2020-08-25 PROCEDURE — 93970 US LOWER EXTREMITY VEINS BILATERAL: ICD-10-PCS | Mod: 26,,, | Performed by: RADIOLOGY

## 2020-08-25 PROCEDURE — 93970 EXTREMITY STUDY: CPT | Mod: 26,,, | Performed by: RADIOLOGY

## 2020-08-25 PROCEDURE — 99213 OFFICE O/P EST LOW 20 MIN: CPT | Mod: PBBFAC,25 | Performed by: PSYCHIATRY & NEUROLOGY

## 2020-08-25 PROCEDURE — 93970 EXTREMITY STUDY: CPT | Mod: TC

## 2020-08-25 PROCEDURE — 99999 PR PBB SHADOW E&M-EST. PATIENT-LVL III: CPT | Mod: PBBFAC,,, | Performed by: PSYCHIATRY & NEUROLOGY

## 2020-08-25 PROCEDURE — 99215 OFFICE O/P EST HI 40 MIN: CPT | Mod: S$PBB,,, | Performed by: PSYCHIATRY & NEUROLOGY

## 2020-08-25 RX ORDER — GLYCOPYRROLATE 1 MG/1
1 TABLET ORAL 3 TIMES DAILY
Qty: 90 TABLET | Refills: 0 | Status: SHIPPED | OUTPATIENT
Start: 2020-08-25 | End: 2020-09-11

## 2020-08-25 RX ORDER — CODEINE PHOSPHATE AND GUAIFENESIN 10; 100 MG/5ML; MG/5ML
5 SOLUTION ORAL 3 TIMES DAILY PRN
Qty: 118 ML | Refills: 0 | Status: SHIPPED | OUTPATIENT
Start: 2020-08-25 | End: 2020-09-04

## 2020-08-25 RX ORDER — GLYCOPYRROLATE 1 MG/1
1 TABLET ORAL 3 TIMES DAILY
Qty: 90 TABLET | Refills: 0 | Status: SHIPPED | OUTPATIENT
Start: 2020-08-25 | End: 2020-08-25

## 2020-08-25 NOTE — PATIENT INSTRUCTIONS
Thank you for coming.    I recommend the following:  -Glycopyrrolate 1 mg TID- take half pill three times a day   - Cough suction device  - I will look into genetic testing  -Blood work today   -Botox and follow up with Dr. Srinivasan   -EMG/NCS   - May need feeding tube in the future  -Power wheel chair   -Follow up in 3 months 3

## 2020-08-25 NOTE — PROGRESS NOTES
NEUROLOGY  Outpatient Initial Clinic visit note    [address] [address]  [phone] (office) / [phone] ( (fax)    Patient Name:  Lisa Moreno  :  1980  MR #:  [id number]  Acct #:  [id number]    Date of Neurology Visit: 2020  Name of Neurologist: Margarita Humphrey MD    Other Physicians:  John Nixon Ii, MD (Primary Care Physician); Self, Aaareferral (Referring)      Chief Complaint: Difficulty walking, change in speech x several years     History of Present Illness (HPI):  Lisa Moreno is a 39 y.o. male who presents for follow up. This is first visit with me. He was previously seen by Dr. Wise.     He is accompanied by his mother. The patient is able to speak but has spastic dysarthria, most of the history is provided by his mother.    In  he was slurring his speech and stumbling. 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 In  he had baclofen pump and this stopped him from walking.  He is not walking a little with a walker but needs additional assistance when he walks.He now Is incontinent of urine and has using a diaper on for 3 years. He does not say when he needs to poop but usually is placed on the toilet and is able to pass stools.    No family h/o SCA. Complete Ataxia Panel on 2006 : wnl    He is able to swallow okay. He is given water, jello, thickened foods. Food is chopped up and able to swallow. MBS with evidence of tracheal aspiration. He is not interested in getting a feeding tube yert     He is unsure of his weight but thinks he has lost weight.     Mom reports some minor issues with memory such has taking care of himself. He forgets things he had for breakfast or that he needs to take his medications. Remote memory intact. Has completed 2 years of college. Currently disabled.       Development history: born 1 month early, NVD and no developmental delay.     Duration: 14 years  Location: lower  extremities > upper extremities and speech  Intensity: moderate   Aggravating factors: none   Relieving factors: none   Frequency: one time   Timing: daily  Associated symptoms: difficulty walking, excess salivation, difficulty speaking     Additionally patient was diagnosed with new onset seizure recently in the setting of UTI. He was evaluated at [facility]. EEG was noted to be normal ( unable to review report) noted on discharge. He is on Keppra since discharge     Treatment to date:    Baclofen pump in 2007- subsequently removed  Baclofen and oral Dantrolene   Aricept- started by Dr. Wise       Review of Systems:    General: Weight gain: No, Weight Loss: Yes, Fatigue: No,   Fever: No, Chills: No, Night Sweats: No, Insomnia: No, Excessive sleeping: No   Respiratory:  Cough: Yes, Shortness of Breath: No,   Wheezing: No, Excessive Snoring: No, Coughing up blood: No  Endocrine: Heat Intolerance: No, Cold Intolerance: No,   Excessive Thirst: No, Excessive Hunger: No,   Eyes:  Blurred Vision: No, Double Vision: No,   Light Sensitivity: No, Eye pain: No  Musculoskeletal: Muscle Aches/Pain: No, Joint Pain/Swelling: No, Muscle Cramps: No, Muscle Weakness: No, Neck Pain: No, Back Pain: No   Neurological: Difficulty Walking/Falls: Yes, Headache Migraine: No, Dizziness/Vertigo: No, Fainting: No, Difficulty with Speech: Yes, Weakness: No, Tingling/Numbness: No, Tremors: No, Memory Problems: Yes, Seizures: Yes, Difficulty Swallowing: Yes, Altered Taste: No.  Cardiovascular: Chest Pain: No, Shortness of Breath: No,   Palpitations: No,  Gastrointestinal: Nausea/Vomiting: No, Constipation: Yes, Diarrhea: No, Bloody Stools: No   Psych/Cog:  Depression: Yes, Anxiety: No, Hallucinations: No, Problems Concentrating: No  : Frequent Urination: Yes, Incontinence: Yes, Blood of Urine: No, Urinary Infections: No,   ENT:Hearing Loss: No, Earache: No, Ringing in Ears: No,   Facial Pain: No, Chronic Congestion: No   Immune: Seasonal  "Allergies: No, Hives and/or Rashes: No  The remainder of the review of twelve body systems was reviewed and normal.    Past Medical, Surgical, Family & Social History:   Past Medical History:   Diagnosis Date    Degenerative motor system disease     Depression     Seizure     Spastic quadriplegia     Spinocerebellar atrophy        Home Medications:     Current Outpatient Medications:     baclofen (LIORESAL) 20 MG tablet, Take 1 tablet (20 mg total) by mouth 3 (three) times daily., Disp: 270 tablet, Rfl: 3    donepezil (ARICEPT) 5 MG tablet, TAKE 1 TABLET(5 MG) BY MOUTH EVERY EVENING, Disp: 30 tablet, Rfl: 11    levETIRAcetam (KEPPRA) 1000 MG tablet, Take 1 tablet (1,000 mg total) by mouth 2 (two) times daily., Disp: 60 tablet, Rfl: 11    polyethylene glycol (GLYCOLAX) 17 gram/dose powder, Take 17 g by mouth every other day., Disp: 507 g, Rfl: 2    promethazine (PHENERGAN) 6.25 mg/5 mL syrup, Take 20 mLs (25 mg total) by mouth every 6 (six) hours as needed for Nausea., Disp: 118 mL, Rfl: 0    dantrolene (DANTRIUM) 50 MG Cap, Take 1 capsule (50 mg total) by mouth 3 (three) times daily., Disp: 270 capsule, Rfl: 0    LORazepam (ATIVAN) 2 MG Tab, Take 1 tablet (2 mg total) by mouth every 4 (four) hours as needed (for seizure activity). (Patient not taking: Reported on 8/21/2020), Disp: 18 tablet, Rfl: 0    omeprazole (PRILOSEC) 40 MG capsule, Take 1 capsule (40 mg total) by mouth 2 (two) times daily before meals. for 14 days (Patient not taking: Reported on 8/21/2020), Disp: 28 capsule, Rfl: 0    Current Facility-Administered Medications:     onabotulinumtoxina injection 100 Units, 100 Units, Intramuscular, Once, Yung Rodriguez MD    Physical Examination:  /80   Pulse 84   Ht 6' 3" (1.905 m)   BMI 16.37 kg/m²     GENERAL:  General appearance: Well, non-toxic appearing.  No apparent distress.    Neck: supple.  Carotid auscultation: normal.  Heart auscultation: normal.  Peripheral pulses: " normal.  Extremities: normal.    MENTAL STATUS:  Alertness, attention span & concentration: normal.  Language: normal.  Orientation to self, place & time:  normal.  Memory, recent & remote: normal. 2/3 recall at 5 minutes    Fund of knowledge: normal.      SPEECH:  Dysarthria ++    CRANIAL NERVES:  Cranial Nerves II-XII were examined.  II - Visual fields: normal.  III, IV, VI: PERRL, EOMI, No ptosis, Bilateral horizontal nystagmus noted .  V - Facial sensation: normal.  VII - Face symmetry & mobility: normal.  VIII - Hearing: normal.  IX, X - Palate: mobile & midline.  XI - Shoulder shrug: normal.  XII - Tongue protrusion: normal. Very increased secretions     GROSS MOTOR:  Gait & station: did not walk the patient given high fall risk  Tone: Spasticity involving bilateral lower extremities ++  Abnormal movements: none.  Finger-nose & Heel-knee-shin: normal on the right. On the left has past pointing       MUSCLE STRENGTH:     Fascics Atrophy RIGHT    LEFT Atrophy Fascics     5 Neck Ext. 5       5 Neck Flex 5       5 Deltoids 5       5 Sh.Ext.Rot. 5       5 Sh.Int.Rot. 5       5 Biceps 5       5 Triceps 5       5 Forearm.Pr. 5       5 Wrist Ext. 5       5 Wrist Flex 5       5 Finger Ext. 5       5 Finger Flex 5       4 FPL 5      ++ 3 Inteross. 4 ++      2 EPL 4 ++              ++ 3 Iliopsoas 3 ++      0 Hip Abduct 0 ++      0 Hip Adduct 0 ++     ++ 2 Quads 2 ++     ++ 2 Hams 2 ++     ++ 0 Dorsiflex 0 ++      2 Plantar Flex 2       0 Ankle Sammy 0       0 Ankle Invert 0       3 Toe Ext. 3       0 Toe Flex 0                         REFLEXES:    RIGHT Reflex   LEFT   2+ Biceps 2+   2+ Brachiorad. 2+   2+ Triceps 2+        3+ Patellar 3+   0 Ankle 0        Up PLANTAR Up     Lindesy's sign- negative      SENSORY:  Light touch: Normal throughout.  Sharp touch: Normal throughout.  Vibration: 6 seconds at the toes  Temperature: Normal throughout.  Joint Position: Normal throughout.    Diagnostic Data Reviewed:  7/31/2006  Complete Ataxia Panel [facility]:    This individual does not possess a repeat expansion mutation or   sequence alteration associated with the Complete Ataxia Evaluation.   This evaluation includes (1) repeat expansion analysis for SCA 1, 2, 3, 6, 7, 8, 10, 17, DRPLA, and FRDA1, (2) complete sequence analysis of APTX (AOA1), SETX (AOA2), and PRKCG (SCA14), and (3) select exon analysis of POLG1 (for two MIRAS mutations) and SPBN2   (for three known SCA5 mutations). Therefore, this individual is  unlikely to be affected with or predisposed to developing ataxia due to these causes. Please refer to the Comments section of this report for further information.    7/13/2006:  VLCFA: wnl     2/5/2015:  Celiac disease panel: wnl  Anti BECK antibody- 0.03  CK - 607      2/18/2015:  AFP: wnl -2.4    3/21/2015:  Long chain fatty acids: wnl  Plasma Amino Acids: wnl    9/7/2018:  CK -241    9/7/2018 MRI brain with and without contrast:  IMPRESSION:      Significantly motion limited examination.  No evidence of acute infarct.  If clinical concern persists, the exam can be repeated as indicated.     Generalized cerebellar and brainstem atrophy, grossly unchanged.     Additional findings discussed in the body of the report.    MRI Thoracic spine with and without contrast:  IMPRESSION:      Stable appearance of diffuse decreased caliber of the thoracic cord without evidence of a focal signal abnormality.     No infectious or inflammatory process in the thoracic spine.     Significant motion degraded examination.  Repeat of the postcontrast images may be obtained, when clinically appropriate.     MRI L spine with and without contrast:    IMPRESSION:      Heterogeneous bone marrow signal involving the L5-S1, and S2-L2 broad bodies with nonspecific patchy enhancement on the post contrast sequences. No definitive MR evidence of discitis osteomyelitis.     Marked motion degraded examination. Repeat of the examination is suggested,  when the patient is better able to tolerate positioning.     1/14/2015 MRI C spine with and without contrast:    Findings     Cervical spine alignment is normal.  There is multilevel degenerative disk disease most pronounced at C3-C4 through C5-C6 noting significant disk desiccation and mild narrowing.  Vertebral body heights alignment and the bone marrow signal is within   normal limits without findings to suggest an infiltrative process.  No fractures or dislocations.     The visualized cervical spinal cord is normal in signal and contour.  Post contrast images demonstrate normal enhancement.     C2-C3: There is mild intervertebral disk desiccation and narrowing.  No spinal canal stenosis or neural foraminal narrowing.     C3-C4: There is a small right paracentral disk protrusion that partially effaces the anterior thecal sac.  No spinal canal stenosis or neural foraminal narrowing.     C4-C5: There is a moderate sized central and right paracentral disk protrusion that effaces the anterior thecal sac and abuts the ventral aspect of the spinal cord.  No spinal canal stenosis or neural foraminal narrowing.     C5-C6: There is a moderate size central disk protrusion that effaces the anterior thecal sac and abuts the ventral aspect of the spinal cord resulting in mild spinal canal stenosis.  No neural foraminal narrowing.     C6-C7: There is a small right paracentral disk bulge.  No spinal canal stenosis or neural foraminal narrowing.     Vertebral artery flow voids are present.  Remaining soft tissues of the neck are unremarkable.  IMPRESSION:         Unremarkable post contrast MRI evaluation of the cervical spine.  No significant detrimental change when compared to the study dated 7-25-06.      EMG/NCS 3/17/2015:    Summary   Nerve conduction studies of the left lower and upper extremity revealed mildly prolonged ulnar and median antidromic sensory distal peak latencies.   Concentric needle examination of selected  left upper and lower extremity muscles demonstrated absence of motor unit potentials in the left tibialis anterior, but activation was poor. Poor activation can be due to effort, pain, or central process.   Impression   This study is mildly abnormal. There is electrophysiologic evidence for distal upper extremity demyelination.     Assessment and Plan:     Lisa Moreno is a 39 y.o. male who presents for follow up. This is first visit with me. He was previously seen by Dr. Wise.     He is accompanied by his mother. The patient is able to speak but has spastic dysarthria, Most of the history is provided by his mother. His symptoms began in 2006 with stumbling gait and change in speech. Since then he has been gradually getting worse, He is wheelchair bound. He is able to walk with a lot of support and a walker. On examination he has bilateral lower extremity weakness and distal upper extremity weakness with evidence of atrophy. Additionally has absent ankle reflexes but 3+ knee reflexes and upgoing toes.    MRI brain whole spine performed in the past with cerebeallar, brain stem and T spine cord atrophy. Upon review MRI T spine with ? T2 flow voids - will d/w Radiology    He has had negative Ataxia gene panel several years ago, HSP/ Aleyda's ataxia and SCA continue to remain on the differential. ALS is less likely given the slow progression. Will get EMG/NCS to further evaluate the same.    Assessment:  1. Spastic paraparesis  2. Idiopathic progressive myeloneuropathy  3. Memory loss- subjective   4. Spastic dysarthria   5. Sialorrhea  6. New onset seizure       Plan:  -Glycopyrrolate 1 mg TID- take half pill three times a day   - Cough suction device  Patient requires Cough Assist due to weak, ineffective cough and inability to mobilize secretions.  - I will look into additional genetic testing, will d/w Dr. Srinivasan regarding testing   -Blood work today - check copper, B1, B12, vitamin E, HTLV , (VLCFA  has been tested twice in the past ane negative  -Botox and follow up with Dr. Srinivasan   -EMG/NCS - bilateral upper and lower extremities   - May need feeding tube in the future given aspiration noted on MBS. Patient would like to hold off for now. Check accurate weight during next visit  -Power wheel chair   The patient was seen today for mobility evaluation for a power mobility device due to significant impairment at home. Hehas gait impairment and is unable to use a walker consistently more than 20-30 feet due to BLE weakness and fatigue secondary to spastic paraparesis. He  also has impaired balance due to weakness related to the same. He is not able to ambulate safely to the kitchen or living room and is unable to use an optimally-configured manual wheelchair in the home in order to perform Mobility Related Activities of Daily Living due to BUE weakness & fatigue. He  also has dyspnea due to respiratory muscle fatigue. His cognition is intact and he should be able to use a power mobility device well at home. He was given a prescription for a power wheelchair. A scooter would not be appropriate due the patient's difficulty controlling the scooter tiller due to hand weakness & fatigue and to maneuverability restrictions at home. A power wheelchair will allow the patient to go safely to the kitchen, dining room or living room for feeding & socialization.  -Obtain baseline 2Decho and HbA1c  -MMSE during next visit continue Aricept for now     - Patient had first provoked seizure of lifetime, on Keppra. Could consider tapering off in future, EEG : wnl   -Follow up in 3 months     The patient will return to clinic in 3 months.           Margarita Humphrey MD  Medicine-Neurology, Clinical Neurophysiology    Time Spent:  60  minutes spent face-to-face, >50% spent advising about: counseling and/or coordination of care    This note was created with voice recognition software.  Grammatical, syntax and spelling errors may be  inevitable.

## 2020-09-01 ENCOUNTER — TELEPHONE (OUTPATIENT)
Dept: INTERNAL MEDICINE | Facility: CLINIC | Age: 40
End: 2020-09-01

## 2020-09-01 DIAGNOSIS — F34.1 DYSTHYMIA: Primary | ICD-10-CM

## 2020-09-01 NOTE — TELEPHONE ENCOUNTER
We sent them a message that the ultrasound did not show a clot.  I'm not sure what she means about a behavioral program - perhaps they need an appointment to talk through this in more detail.

## 2020-09-01 NOTE — TELEPHONE ENCOUNTER
----- Message from Marely Granger sent at 9/1/2020 10:27 AM CDT -----  Contact: patient/ mother [phone]  Evelia,    Patient's  mother Giovana, called about ultrasound results and also needs information about a behavioral program.

## 2020-09-01 NOTE — TELEPHONE ENCOUNTER
Spoke with Ms Giovana pt mother, she states that she is noticing that pt is depressed and wants to know if you can refer  pt to behavior health program or meds for depression.    Pt feet is still swollen even with elevating them any advise on what next?      Evelia

## 2020-09-03 DIAGNOSIS — R10.9 ABDOMINAL PAIN, UNSPECIFIED ABDOMINAL LOCATION: ICD-10-CM

## 2020-09-03 PROBLEM — R13.12 OROPHARYNGEAL DYSPHAGIA: Status: RESOLVED | Noted: 2019-09-19 | Resolved: 2020-09-03

## 2020-09-03 NOTE — TELEPHONE ENCOUNTER
Called pt mother and left a msg that behavior health referral has been placed in [facility] and someone jade reach out to schedule.    Evelia

## 2020-09-03 NOTE — TELEPHONE ENCOUNTER
I put in a referral for counseling, though I don't know how helpful that will be b/c he doesn't communicate well.    For the leg swelling, elevate the legs, use compression stockings.  Walk as much as he can.    Thanks.  D

## 2020-09-04 RX ORDER — OMEPRAZOLE 40 MG/1
40 CAPSULE, DELAYED RELEASE ORAL
Qty: 28 CAPSULE | Refills: 0 | Status: SHIPPED | OUTPATIENT
Start: 2020-09-04 | End: 2020-09-11

## 2020-09-08 ENCOUNTER — TELEPHONE (OUTPATIENT)
Dept: INTERNAL MEDICINE | Facility: CLINIC | Age: 40
End: 2020-09-08

## 2020-09-08 ENCOUNTER — TELEPHONE (OUTPATIENT)
Dept: NEUROLOGY | Facility: CLINIC | Age: 40
End: 2020-09-08

## 2020-09-08 NOTE — TELEPHONE ENCOUNTER
----- Message from Aura Carl MA sent at 9/4/2020  9:27 AM CDT -----  Contact: Giovana Moreno (Mother)  Please review and advise   ----- Message -----  From: Margarita Humphrey MD  Sent: 9/3/2020   5:25 PM CDT  To: Aura Carl MA    Rosibel, could you please help with PWC. Can he come to ALS clinic one time for needs?    ----- Message -----  From: Aura Carl MA  Sent: 9/3/2020   4:52 PM CDT  To: MD Kai Orellana Dr. patient's mother is requesting orders for a power chair. Cruzito from the Als clinic advise Giovana to reach out.  ----- Message -----  From: Kathleen Dean  Sent: 9/3/2020   1:47 PM CDT  To: Kam Avila Staff    Name of Who is Calling: Giovana Moreno (Mother)      What is the request in detail: pt mother would like to speak with staff regarding orders for a power chair. Please call to discuss      Can the clinic reply by [facility]: no      What Number to Call Back if not in [facility]: [phone] (home)

## 2020-09-08 NOTE — TELEPHONE ENCOUNTER
Left Voicemail message for Ms. Akhtar to return call to discuss ALS Clinic appt in more detail to complete orders, face to face, etc for C-PWC.

## 2020-09-08 NOTE — TELEPHONE ENCOUNTER
----- Message from Dalila Goldstein sent at 9/8/2020  3:20 PM CDT -----  Contact: Giovana (mother)  [phone]  Medicaid patient requesting an appointment to be seen this Thursday or Friday 09/10 ort 09/11 to check a boil on his buttock.

## 2020-09-10 ENCOUNTER — TELEPHONE (OUTPATIENT)
Dept: NEUROLOGY | Facility: CLINIC | Age: 40
End: 2020-09-10

## 2020-09-10 NOTE — TELEPHONE ENCOUNTER
Voicemail received from patient's mother, Giovana.    Returned call to discuss ALS Clinic appt for C-PWC. Provided mother with tentative date of 10/28/2020.     Will follow up to confirm appt.     Cell: [phone]

## 2020-09-10 NOTE — PROGRESS NOTES
Outpatient Therapy Discharge Summary     Name: Lisa Irvin [facility] Number: [id number]    Therapy Diagnosis:   Encounter Diagnoses   Name Primary?   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 This patient and his caregivers will be independent with a basic HEP.  2. This patient will increase B LE strength 1 grade in order to be able to dress his lower body with Min A.  3. This patient will be able stand for 2 minutes with RW and CGA for pressure reduction.   4. Patient will be able to achieve greater than or equal to 40 on the FOTO Paraplegic Syndromes placing patient in 60%<80% impaired, limited, or restricted category demonstrating overall improved functional ability with lower extremity.   Long Term Goals: 8 weeks   1. This patient and his caregivers will be independent with an updated HEP.  2. This patient will increase B LE strength to 4/5 grossly in order to be able to ambulate 25 feet with RW and Min A.  4. Patient will be able to achieve greater than or equal to 45 on the FOTO Paraplegic Syndromes placing patient in 40%<60% impaired, limited, or restricted category demonstrating overall improved functional ability with lower extremity.     Discharge reason: Patient requested discharge and Other:  Patient's mother would prefer the patient receive home health therapy as she is having difficulty getting him to outpatient therapy.     Plan   This patient is discharged from Physical Therapy    Mary Marshall, PT  11/14/2018         [FreeTextEntry1] : 60 year man with a history as listed presents for an initial cardiac evaluation. \par Rosendo is doing well overall. Her edema is gravity dependent and related to venous insufficiency. I would advise elevating her extremities when resting. She will wear compression stocking. She does not need any diuretics at this time. \par He will undergo a treadmill exercise stress test to define exercise tolerance, rule out exertional hypertensive responses, assess for exercise induced arrhythmias and rule out ischemia from obstructive CAD. He will get a 2d echo to assess for any  new structural heart disease, changes in valvular and ventricular function. \par Her lipids are control. Her blood pressure was initially elevated but normalized at the end of the visit. Will continue monitor for now. \par Exercise and diet counseling was performed in order to reduce her future cardiovascular risk. \par She will followup with me in 1 year or sooner if necessary.

## 2020-09-11 ENCOUNTER — OFFICE VISIT (OUTPATIENT)
Dept: INTERNAL MEDICINE | Facility: CLINIC | Age: 40
End: 2020-09-11
Payer: MEDICARE

## 2020-09-11 VITALS
BODY MASS INDEX: 16.25 KG/M2 | HEART RATE: 72 BPM | OXYGEN SATURATION: 99 % | DIASTOLIC BLOOD PRESSURE: 70 MMHG | SYSTOLIC BLOOD PRESSURE: 100 MMHG | HEIGHT: 75 IN

## 2020-09-11 DIAGNOSIS — G80.1 SPASTIC DIPLEGIA: ICD-10-CM

## 2020-09-11 DIAGNOSIS — L89.301 PRESSURE INJURY OF BUTTOCK, STAGE 1, UNSPECIFIED LATERALITY: Primary | ICD-10-CM

## 2020-09-11 DIAGNOSIS — R25.2 SPASTICITY: ICD-10-CM

## 2020-09-11 DIAGNOSIS — F32.A DEPRESSION, UNSPECIFIED DEPRESSION TYPE: ICD-10-CM

## 2020-09-11 DIAGNOSIS — M62.838 MUSCLE SPASM: ICD-10-CM

## 2020-09-11 PROBLEM — R73.9 HYPERGLYCEMIA: Status: RESOLVED | Noted: 2020-07-19 | Resolved: 2020-09-11

## 2020-09-11 PROBLEM — J40 BRONCHITIS: Status: RESOLVED | Noted: 2018-09-08 | Resolved: 2020-09-11

## 2020-09-11 PROCEDURE — 99999 PR PBB SHADOW E&M-EST. PATIENT-LVL IV: CPT | Mod: PBBFAC,,, | Performed by: INTERNAL MEDICINE

## 2020-09-11 PROCEDURE — 99999 PR PBB SHADOW E&M-EST. PATIENT-LVL IV: ICD-10-PCS | Mod: PBBFAC,,, | Performed by: INTERNAL MEDICINE

## 2020-09-11 PROCEDURE — 99214 OFFICE O/P EST MOD 30 MIN: CPT | Mod: S$PBB,,, | Performed by: INTERNAL MEDICINE

## 2020-09-11 PROCEDURE — 99214 PR OFFICE/OUTPT VISIT, EST, LEVL IV, 30-39 MIN: ICD-10-PCS | Mod: S$PBB,,, | Performed by: INTERNAL MEDICINE

## 2020-09-11 PROCEDURE — 99214 OFFICE O/P EST MOD 30 MIN: CPT | Mod: PBBFAC | Performed by: INTERNAL MEDICINE

## 2020-09-11 RX ORDER — DANTROLENE SODIUM 50 MG/1
50 CAPSULE ORAL 3 TIMES DAILY
Qty: 270 CAPSULE | Refills: 0 | Status: SHIPPED | OUTPATIENT
Start: 2020-09-11 | End: 2020-11-23

## 2020-09-11 RX ORDER — CITALOPRAM 10 MG/1
10 TABLET ORAL DAILY
Qty: 90 TABLET | Refills: 3 | Status: SHIPPED | OUTPATIENT
Start: 2020-09-11 | End: 2020-12-09

## 2020-09-11 NOTE — PROGRESS NOTES
Subjective:       Patient ID: Lisa Moreno is a 39 y.o. male.    Chief Complaint:   Recurrent Skin Infections (boil buttocks)    HPI - Mr. Moreno was brought in by his mother to f/u ER visit for sores on his buttocks.  No f/c.  Two of them - one on each buttock.  Drain sometimes, but not all the time.  Mother also asked for medication for depression.  Mr. Moreno is not communicative verbally, though he can nod and sometimes grunts.  We spent a good bit of time reconciling medications and reducing his list today in Epic.    Pmh:  Spastic diplegia  Upper motor neuron disease, progressive  Spinocerebellar ataxia  Oropharyngeal dysphagia without feeding tube  Progressive gait disturbance, now w/c bound     Meds:  Reviewed and reconciled in EPIC with patient during visit today.     Review of Systems   Unable to perform ROS: Patient nonverbal       Objective:      Physical Exam  Vitals signs reviewed.   Constitutional:       Appearance: Normal appearance. He is not ill-appearing.      Comments: Very thin, tall man seated in wheelchair   Skin:     General: Skin is warm and dry.      Findings: Erythema and rash present.   Neurological:      Mental Status: He is alert.         Assessment:       1. Pressure injury of buttock, stage 1, unspecified laterality    2. Muscle spasm    3. Spastic diplegia    4. Spasticity    5. Depression, unspecified depression type        Plan:       Lisa was seen today for recurrent skin infections.    Diagnoses and all orders for this visit:    Pressure injury of buttock, stage 1, unspecified laterality - two small lesions; one on each buttock.  Discussed etiology with mother; will use zinc oxide for now and ask home health to assist.   -     Ambulatory referral/consult to Home Health; Future  -     zinc oxide 10 % Oint; Apply to affected areas twice daily    Muscle spasm - refilling dantrolene  -     dantrolene (DANTRIUM) 50 MG Cap; Take 1 capsule (50 mg total) by mouth 3 (three)  times daily.    Spastic diplegia - I'm somewhat alarmed at his decline - not verbal at all now.  Seeing neurology  -     Ambulatory referral/consult to Home Health; Future    Spasticity    Depression, unspecified depression type - I hope treatment for depression will help with his overall status.    -     citalopram (CELEXA) 10 MG tablet; Take 1 tablet (10 mg total) by mouth once daily.    Come back in 6 weeks    SABRINA Nixon MD MPH  Staff Internist

## 2020-09-18 ENCOUNTER — TELEPHONE (OUTPATIENT)
Dept: NEUROLOGY | Facility: CLINIC | Age: 40
End: 2020-09-18

## 2020-09-18 ENCOUNTER — DOCUMENT SCAN (OUTPATIENT)
Dept: HOME HEALTH SERVICES | Facility: HOSPITAL | Age: 40
End: 2020-09-18
Payer: MEDICARE

## 2020-09-18 NOTE — TELEPHONE ENCOUNTER
pts sister called in to reschedule new pt appt with Dr. Srinivasan. Needs to reschedule due to  Death in the family.  Will reschedule to 09/28/20 with JS at 1:20 NP virtual.  Consoled his Mom who was crying on the phone.   She is very concerned that pt needs botox and has been waiting for it for awhile.

## 2020-09-21 ENCOUNTER — TELEPHONE (OUTPATIENT)
Dept: GASTROENTEROLOGY | Facility: CLINIC | Age: 40
End: 2020-09-21

## 2020-09-21 RX ORDER — OMEPRAZOLE 40 MG/1
40 CAPSULE, DELAYED RELEASE ORAL DAILY
Qty: 30 CAPSULE | Refills: 11 | Status: SHIPPED | OUTPATIENT
Start: 2020-09-21 | End: 2020-09-24 | Stop reason: SDUPTHER

## 2020-09-21 NOTE — TELEPHONE ENCOUNTER
Patient requesting another prescription of omeprazole 40mg cap. No longer in current medications.

## 2020-09-24 DIAGNOSIS — R13.10 DYSPHAGIA, UNSPECIFIED TYPE: Primary | ICD-10-CM

## 2020-09-24 RX ORDER — OMEPRAZOLE 40 MG/1
40 CAPSULE, DELAYED RELEASE ORAL DAILY
Qty: 90 CAPSULE | Refills: 3 | Status: SHIPPED | OUTPATIENT
Start: 2020-09-24 | End: 2020-10-15 | Stop reason: SDUPTHER

## 2020-09-27 ENCOUNTER — PATIENT OUTREACH (OUTPATIENT)
Dept: ADMINISTRATIVE | Facility: OTHER | Age: 40
End: 2020-09-27

## 2020-09-28 ENCOUNTER — TELEPHONE (OUTPATIENT)
Dept: BEHAVIORAL HEALTH | Facility: CLINIC | Age: 40
End: 2020-09-28

## 2020-09-28 NOTE — PROGRESS NOTES
Behavioral Health Community Health Worker  Initial Assessment  Completed by:  Shima Bray    Date:  9/28/2020    Patient Enrollment in Behavioral Health Program:  · Patient verbalized understanding of Behavioral Health Integration services to include:  · Patient understands that CHW, LCSW, PharmD and consulting Psychiatrist are members of the care team working collaboratively with his/her primary care provider: Yes  · Patient understands that activation of their [facility] patient portal account is required for accessing the full scope of team services: Yes  · Patient understands that some counseling sessions may occur via video: Yes  · Clinic visits with the psychiatrist may be subject to a co-pay based on your insurance: Yes  · Patient consents to enroll in BHI program: Yes    Assessments     Single Item Health Literacy Scale:  · How often do you need to have someone help you read instructions, pamphlets or other written material from your doctor or pharmacy?: Often    Promis 10:  · Promis 10 Responses  · In general, would you say your health is: Good  · In general, would you say your quality of life is: Good  · In general, how would you rate your physical health?: Good  · In general, how would you rate your mental health, including your mood and your ability to think?: Fair  · In general, how would you rate your satisfaction with your social activities and relationships?: Fair  · In general, please rate how well you carry out your usual social activities and roles. (This includes activities at home, at work and in your community, and responsibilities as a parent, child, spouse, employee, friend, etc.): Fair  · To what extent are you able to carry out your everyday physical activities such as walking, climbing stairs, carrying groceries, or moving a chair? : Not at all  · In the past 7 days, how often have you been bothered by emotional problems such as feeling anxious, depressed or irritable?: Often  · In  the past 7 days, how would you rate your fatigue on average?: None  · In the past 7 days, on a scale of 0 to 10 (where 0 is no pain and 10 is the worst pain imaginable) how would you rate your pain on average?: 0  · Global Physical Health: 5  · Global Mental health Score: 11    Depression PHQ:  PHQ9 9/28/2020   Total Score 11         Generalized Anxiety Disorder 7-Item Scale:  GAD7 9/28/2020   1. Feeling nervous, anxious, or on edge? 2   2. Not being able to stop or control worrying? 1   3. Worrying too much about different things? 1   4. Trouble relaxing? 0   5. Being so restless that it is hard to sit still? 0   6. Becoming easily annoyed or irritable? 3   7. Feeling afraid as if something awful might happen? 0   8. If you checked off any problems, how difficult have these problems made it for you to do your work, take care of things at home, or get along with other people? 0   BECK-7 Score 7       History     Social History     Socioeconomic History    Marital status: Single     Spouse name: Not on file    Number of children: Not on file    Years of education: Not on file    Highest education level: Not on file   Occupational History    Not on file   Social Needs    Financial resource strain: Not hard at all    Food insecurity     Worry: Never true     Inability: Never true    Transportation needs     Medical: No     Non-medical: Yes   Tobacco Use    Smoking status: Never Smoker    Smokeless tobacco: Never Used   Substance and Sexual Activity    Alcohol use: Not Currently     Frequency: Never     Drinks per session: Patient refused     Binge frequency: Never     Comment: social drinker, at times    Drug use: Not Currently    Sexual activity: Never   Lifestyle    Physical activity     Days per week: Not on file     Minutes per session: Not on file    Stress: Only a little   Relationships    Social connections     Talks on phone: Never     Gets together: Never     Attends Congregational service: Not on  "file     Active member of club or organization: No     Attends meetings of clubs or organizations: Never     Relationship status: Never    Other Topics Concern    Not on file   Social History Narrative    Not on file       Call Summary     Patient was referred to the BHI (Non-opioid) program by Primary Care Provider, Dr. Hussain OROZCOW contacted Lisa Moreno who reports depression  that limits [his] activities of daily living (ADLs).   Patient scored "11" on the PHQ9 and "7" on the BECK 7. Based on these scores patient is eligible for the Behavioral health Integration (Non-opioid) Program. DESHAUN completed the intake and scheduled an appointment for patient with Anshul De Santiago LCSW, on Monday October 19,2020 12pm.           "

## 2020-09-29 ENCOUNTER — TELEPHONE (OUTPATIENT)
Dept: GENETICS | Facility: CLINIC | Age: 40
End: 2020-09-29

## 2020-09-29 NOTE — PROGRESS NOTES
[facility]  [address] [place] [address]    DATE OF CONSULTATION: 09/29/2020    REFERRING PHYSICIAN: Margarita Humphrey MD    REASON FOR CONSULTATION: We are requested by Dr. Margarita Humphrey to consult on Lisa Moreno regarding the diagnosis, management, and genetic counseling for the findings of progressive ataxia, urinary incontinence, recent behavioral changes, and dysarthria. Lisa is accompanied to clinic today by his mother.      HISTORY OF PRESENT ILLNESS:  Mr. Moreno is a 39-year-old male with ataxia, difficulty walking, and change in speech. His mom notes that he has always walked with a limp and had slurred speech. This started to worsen in 2001 at age 20. In 2006 he started slurring his speech and was stumbling. He was initially diagnosed with ALS in [place], but this has since been ruled out due to his slow progression. He was started on a baclofen pump which prevented him from walking. He is no longer on the pump but has been wheelchair bound since 2008. He has had urine incontinence for the last 3 years.      Mom notes that his speech is worsening. His strength, particularly his lower body strength, is also worsening. Recently he had been having increased mood swings and behavioral issues. Mom reports that he holds his neck back against his therapists recommendations because it upsets his mother. She also reports that he will remove his diaper to urinate in the bed. He has depression, which is managed by his PCP, but mom does not think the medication is working. She is interested in a behavioral health evaluation. This referral has been placed but the family has not been contacted.      Mr. Moreno has a lot of mucus secretions. Mom reports that this is not a new problem and lasts a week or two at a time. He has trouble clearing his mucus.      He presented to the ER for a seizure in July 2020. This was his first known seizure and mother reports that he  had a UTI at that time. He was started on Keppra. EEG was normal.      There is no family history of related symptoms, but paternal family history is largely unknown.     Comprehensive ataxia panel to [facility] [facility] in 2006 was normal. VLCFA and AFP levels were normal. CK from August was normal. Alk phos in the 40s.     Mother reports that he has had some weight loss but she cannot quantify the amount of time over which this has occurred.     MEDICAL HISTORY:    GESTATIONAL/BIRTH HISTORY: Mr. Moreno was born at 36 weeks. Pregnancy was reportedly uncomplicated, and he did not require a NICU stay.     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    Neuropathic pain 03/27/2015    Weakness 03/19/2015    Spasticity 03/19/2015    Weakness of both lower extremities 03/19/2015    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    Gait instability 01/16/2015    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    Neurogenic bladder 01/16/2015    Hypophonia 01/16/2015       Past Surgical History:   Procedure Laterality Date    BACLOFEN PUMP IMPLANTATION      COLONOSCOPY N/A 7/23/2020    Procedure: COLONOSCOPY;  Surgeon: Ziyad Fitch MD;  Location: [facility];  Service: Endoscopy;  Laterality: N/A;    ESOPHAGOGASTRODUODENOSCOPY N/A 7/23/2020    Procedure: EGD (ESOPHAGOGASTRODUODENOSCOPY);  Surgeon: Ziyad Fitch MD;  Location: [facility];  Service: Endoscopy;  Laterality: N/A;    FLUOROSCOPIC URODYNAMIC STUDY N/A 11/27/2018    Procedure: URODYNAMIC STUDY, FLUOROSCOPIC;  Surgeon: Tanja DOLL  MD Maldonado;  Location: [address];  Service: Urology;  Laterality: N/A;  1 hour       Medications:    Current Outpatient Medications on File Prior to Visit   Medication Sig Dispense Refill    baclofen (LIORESAL) 20 MG tablet Take 1 tablet (20 mg total) by mouth 3 (three) times daily. 270 tablet 3    citalopram (CELEXA) 10 MG tablet Take 1 tablet (10 mg total) by mouth once daily. 90 tablet 3    dantrolene (DANTRIUM) 50 MG Cap Take 1 capsule (50 mg total) by mouth 3 (three) times daily. 270 capsule 0    donepezil (ARICEPT) 5 MG tablet TAKE 1 TABLET(5 MG) BY MOUTH EVERY EVENING 30 tablet 11    levETIRAcetam (KEPPRA) 1000 MG tablet Take 1 tablet (1,000 mg total) by mouth 2 (two) times daily. 60 tablet 11    omeprazole (PRILOSEC) 40 MG capsule Take 1 capsule (40 mg total) by mouth once daily. 90 capsule 3    polyethylene glycol (GLYCOLAX) 17 gram/dose powder Take 17 g by mouth every other day. 507 g 2    zinc oxide 10 % Oint Apply to affected areas twice daily 57 g 0     Current Facility-Administered Medications on File Prior to Visit   Medication Dose Route Frequency Provider Last Rate Last Dose    onabotulinumtoxina injection 100 Units  100 Units Intramuscular Once Yung Rodriguez MD             Allergies:  Review of patient's allergies indicates:   Allergen Reactions    Penicillins      Hives and Itching       Immunization History:  Immunization History   Administered Date(s) Administered    DTP 03/04/1980, 05/20/1980, 07/22/1980, 07/28/1981, 04/24/1984    Influenza - Quadrivalent - PF *Preferred* (6 months and older) 02/26/2015, 09/24/2019    Influenza - Trivalent - PF (ADULT) 02/26/2015    MMR 03/31/1981, 01/05/2005    OPV 03/04/1980, 05/20/1980, 07/22/1980, 07/28/1981, 04/24/1984    PPD Test 03/04/2015, 10/05/2018    Td (ADULT) 04/05/1994, 01/05/2005    Tdap 09/24/2019       Pertinent Laboratory Studies:   Comprehensive ataxia evaluation to [facility]:  INTERPRETATION:   This  individual does not possess a repeat expansion mutation or   sequence alteration associated with the Complete Ataxia Evaluation.   This evaluation includes (1) repeat expansion analysis for SCA 1,   2, 3, 6, 7, 8, 10, 17, DRPLA, and FRDA1, (2) complete sequence   analysis of APTX (AOA1), SETX (AOA2), and PRKCG (SCA14), and (3)   select exon analysis of POLG1 (for two MIRAS mutations) and SPBN2   (for three known SCA5 mutations). Therefore, this individual is   unlikely to be affected with or predisposed to developing ataxia due   to these causes. Please refer to the Comments section of this report   for further information.     Lieberman Long chain fatty acids:  Values obtained for Very Long Chain Fatty Acids, pristanic   acid and phytanic acid are within normal limits in this   sample.     I have reviewed the patient's labs.    Pertinent Radiology & Imaging Studies:   MRI brain 2015:  FINDINGS:  There is extensive motion which significantly limits evaluation.     There is no evidence of restricted diffusion to suggest acute infarction.     FLAIR imaging demonstrates mild periventricular signal hyperintensity, grossly unchanged.     The ventricles are stable.  Grossly stable generalized volume loss of the brainstem and cerebellum.     No evidence of mass lesion, hemorrhage, edema or recent or remote major vascular distribution infarction.     Post contrast images are essentially nondiagnostic.  No large enhancing mass identified.     No extra-axial blood or fluid collections.     T2 skull base flow voids are preserved. Bone marrow signal intensity is unremarkable.     IMPRESSION:      Significantly motion limited examination.  No evidence of acute infarct.  If clinical concern persists, the exam can be repeated as indicated.     Generalized cerebellar and brainstem atrophy, grossly unchanged.     Additional findings discussed in the body of the report.     DEVELOPMENT:  Mr. Moreno has had no history of developmental  delay. He attended 2 years of college.       REVIEW OF SYSTEMS: A complete review of systems was negative other than as stated above.    FAMILY HISTORY: Mr. Moreno has a 12-year-old son in good health. He has a 35-year-old maternal half-brother who is healthy. His mom is 58 and healthy. She had papillary thyroid cancer in her 40s. His maternal uncle had cancer of unknown type and  at age 63. No information is known about his father or his paternal side of the family. There is no known family history of ataxia or related symptoms. A three-generation pedigree was obtained and is scanned into the media tab.     SOCIAL HISTORY:   Social History     Socioeconomic History    Marital status: Single     Spouse name: Not on file    Number of children: Not on file    Years of education: Not on file    Highest education level: Not on file   Occupational History    Not on file   Social Needs    Financial resource strain: Not hard at all    Food insecurity     Worry: Never true     Inability: Never true    Transportation needs     Medical: No     Non-medical: Yes   Tobacco Use    Smoking status: Never Smoker    Smokeless tobacco: Never Used   Substance and Sexual Activity    Alcohol use: Not Currently     Frequency: Never     Drinks per session: Patient refused     Binge frequency: Never     Comment: social drinker, at times    Drug use: Not Currently    Sexual activity: Never   Lifestyle    Physical activity     Days per week: Not on file     Minutes per session: Not on file    Stress: Only a little   Relationships    Social connections     Talks on phone: Never     Gets together: Never     Attends Adventism service: Not on file     Active member of club or organization: No     Attends meetings of clubs or organizations: Never     Relationship status: Never    Other Topics Concern    Not on file   Social History Narrative    Not on file        MEASUREMENTS:  Wt Readings from Last 3 Encounters:  "  09/01/20 59 kg (130 lb)   07/20/20 59.4 kg (130 lb 15.3 oz)   07/16/20 74.4 kg (164 lb)     Ht Readings from Last 3 Encounters:   09/11/20 6' 3" (1.905 m)   09/01/20 6' 3" (1.905 m)   08/25/20 6' 3" (1.905 m)       HC Readings from Last 3 Encounters:   HC 56cm                       EXAM:  General: Size: Thin habitus, tall  Head: Size, shape, symmetry: Normal  Face: Symmetric, nondysmorphic  Eyes: Size, position, spacing, shape and orientation of palpebral fissures: Bilateral ptosis  Ears: size, configuration, position, rotation: normal  Nose: size, configuration, position, rotation: normal  Mouth/Jaw: size, shape, configuration, position: normal  Neck: Configuration: Normal  Thorax: Nipples, pectus: Normal  Abdomen: No hepatosplenomegaly, non-distended, non-tender  Arms/Hands: Size, symmetry, proportion, digits, palmar creases: Proximally-placed thumbs with limited adduction  Legs/Feet: Size, symmetry, proportion, digits: Normal. No pes cavus.  Back: Possibly mild, right-sided curvature.  Skin: Texture: soft, thin atrophic scarring on elbows and knees. Negative wrist and thumb signs. Unable to obtain armspan to height ratio.  Neurologic: Significant muscle atrophy in lower extremities. No myotonia elicited.   Musculoskeletal: Bilateral contractures of knees. Hyperextensible pinkies, limited extension of elbows. Hyperextensible skin.   Gait: N/A. Patient is bound to wheelchair      ASSESSMENT/DISCUSSION:  Lisa is a 39 y.o. male with slowly progressive ataxia, oropharyngeal dysphagia, dysarthria, bilateral ptosis, recent behavioral changes, cerebral and cerebellar atrophy on MRI, depression, weight loss over unclear amount of time, and seizure in the setting of a UTI. His genetic workup thus far has consisted of an ataxia panel including several spinocerebellar ataxias, Aleyda's ataxia, and several other genes from the report although all of the genes covered are not listed. He has also had normal VLCFA making " a disorder like ALD or adrenomyeloneuropathy less likely. Hexosaminidase A activity has been ordered by Neurology. Agree with this testing as Gaucher is a treatable disease. Recommended that family draw this today.    His previous testing tested for some, but not all spinocerebellar ataxias. The exact genes tested on this panel are also not clear from the report available to us. A de yesi spinocerebellar ataxia could explain his progressive ataxia and dysarthria. Specifically, SCA24 and SCA28 could explain the childhood onset and insidious nature of his symptoms.     I did not note myotonia on my physical examination although his clinical course is not very specific for myotonic dystrophies.      Beighton score is very limited by contractures of the thumbs and knees as well as the patient's inability to stand independently. His skin and distal joints have hyperextensibility. He has never had an echo although one has been ordered by his neurologist Dr. Humphrey. Agree with getting this study done to evaluate for aortic abnormalities as can be seen in connective tissue disorders and cardiomyopathy as can be seen with disorders like Aleyda's ataxia.    His ptosis, dysarthria, and ataxia as well as the insidious nature of his symptoms could be explained by a mitochondrial disease.     Mother cites recent behavioral changes. I am concerned that Mr. Moreno does not fully understand our conversations today and that these behavioral changes may be indicative of an overall decline in his cognitive functioning. He struggled significantly to unzip and remove his sweater today and could not use the zipper when reminded that this was the mechanism by which he could remove the sweater. His mother feels that his desire to tilt his head back is only to upset her. However, as he has ptosis, I am concerned that he may be tilting his head back to see. Will refer to Ophthalmology for evaluation for this and for myopia and lens  dislocation as can be seen in Marfan syndrome and other connective tissue disorders.    As indicated above, Mr. Moreno' phenotype has significant clinical overlap with a number of genetic syndromes and even groups of genetic disorders. Therefore, whole exome sequencing with mtDNA is most appropriate as he would likely require testing by several panels to reach a diagnosis.     Spoke to patient's mother on 10/1 to discuss plan for genetic testing.     Without a specific diagnosis, I am unable to provide recurrence risk information to the family at this time. Should the etiology of Lisa's features be genetic, the risk for recurrence in a future pregnancy could be significant.     It was a pleasure to see Lisa today.  We would like to see Lisa back in Genetics clinic pending results of testing or sooner as needed. Should any questions or concerns arise following today's visit, we encourage the family to contact the Genetics Office.    RECOMMENDATIONS/PLAN:   Agree with hexosaminidase A enzyme activity as ordered by neurologist   Will draw blood for genetic testing today. Will send whole exome sequencing   Agree with echo as ordered by Neurology   Return to clinic pending results of testing or sooner as needed.      The approximate physician face-to-face time was 40 minutes. The majority of the time (>50%) was spent on counseling of the patient or coordination of care.    Cyndie Sullivan, MPH, MS                 Genetic Counselor  [facility]    Grace Jose MD  Board-Certified Medical Geneticist  [facility]      EXTERNAL CC:    MD Kam De La Rosa Ii, Pooja M., MD

## 2020-09-29 NOTE — TELEPHONE ENCOUNTER
LM for pt to confirm appt tomorrow at 11am. Advised pt of visitor policy.  Asked pt to call back with any questions

## 2020-09-30 ENCOUNTER — OFFICE VISIT (OUTPATIENT)
Dept: GENETICS | Facility: CLINIC | Age: 40
End: 2020-09-30
Payer: MEDICARE

## 2020-09-30 ENCOUNTER — LAB VISIT (OUTPATIENT)
Dept: LAB | Facility: HOSPITAL | Age: 40
End: 2020-09-30
Attending: PSYCHIATRY & NEUROLOGY
Payer: MEDICARE

## 2020-09-30 DIAGNOSIS — R26.0 ATAXIC GAIT: ICD-10-CM

## 2020-09-30 DIAGNOSIS — R73.03 PREDIABETES: ICD-10-CM

## 2020-09-30 DIAGNOSIS — R46.89 CHANGE IN BEHAVIOR: Primary | ICD-10-CM

## 2020-09-30 DIAGNOSIS — H02.409 PTOSIS OF EYELID, UNSPECIFIED LATERALITY: ICD-10-CM

## 2020-09-30 LAB
ESTIMATED AVG GLUCOSE: 108 MG/DL (ref 68–131)
HBA1C MFR BLD HPLC: 5.4 % (ref 4–5.6)

## 2020-09-30 PROCEDURE — 99204 PR OFFICE/OUTPT VISIT, NEW, LEVL IV, 45-59 MIN: ICD-10-PCS | Mod: S$PBB,,, | Performed by: MEDICAL GENETICS

## 2020-09-30 PROCEDURE — 96040 PR GENETIC COUNSELING, EACH 30 MIN: CPT | Mod: ,,, | Performed by: MEDICAL GENETICS

## 2020-09-30 PROCEDURE — 99999 PR PBB SHADOW E&M-EST. PATIENT-LVL IV: CPT | Mod: PBBFAC,,, | Performed by: MEDICAL GENETICS

## 2020-09-30 PROCEDURE — 99214 OFFICE O/P EST MOD 30 MIN: CPT | Mod: PBBFAC | Performed by: MEDICAL GENETICS

## 2020-09-30 PROCEDURE — 99204 OFFICE O/P NEW MOD 45 MIN: CPT | Mod: S$PBB,,, | Performed by: MEDICAL GENETICS

## 2020-09-30 PROCEDURE — 83080 ASSAY OF B HEXOSAMINIDASE EA: CPT

## 2020-09-30 PROCEDURE — 96040 PR GENETIC COUNSELING, EACH 30 MIN: ICD-10-PCS | Mod: ,,, | Performed by: MEDICAL GENETICS

## 2020-09-30 PROCEDURE — 99999 PR PBB SHADOW E&M-EST. PATIENT-LVL IV: ICD-10-PCS | Mod: PBBFAC,,, | Performed by: MEDICAL GENETICS

## 2020-09-30 PROCEDURE — 30000890 MAYO MISCELLANEOUS TEST (REFLEX)

## 2020-09-30 PROCEDURE — 83036 HEMOGLOBIN GLYCOSYLATED A1C: CPT

## 2020-09-30 NOTE — PROGRESS NOTES
Lisa Moreno   DOS: 2020  : 1980  MRN: [id number]    REFERRING PROVIDER: Margarita Humphrey MD     REASON FOR CONSULTATION: Our Medical Genetic Service was asked to evaluate this 39-year-old male with ataxia, difficulty walking, and change in speech. Mr. Moreno was accompanied by his mother who provided most of the history for todays visit.     HISTORY OF PRESENT ILLNESS:  Mr. Moreno is a 39-year-old male with ataxia, difficulty walking, and change in speech. His mom notes that he has always walked with a limp and had slurred speech. This started to worsen in  at age 20. In  he started slurring his speech and was stumbling. He was initially diagnosed with ALS in [place], but this has since been ruled out due to his slow progression. He was started on a baclofen pump which prevented him from walking. He is no longer on the pump but has been wheelchair bound since . He has had urine incontinence for the last 3 years.     Mom notes that his speech is worsening. His strength, particularly his lower body strength, is also worsening. Recently he had been having increased mood swings and behavioral issues. Mom reports that he holds his neck back against his therapists recommendations because it upsets his mother. She also reports that he will remove his diaper to urinate in the bed. He has depression, which is managed by his PCP, but mom does not think the medication is working. She is interested in a behavioral health evaluation.    Mr. Moreno has a lot of mucus secretion. Mom reports that this is not a new problem and lasts a week or two at a time. He has trouble clearing his mucus.     He presented to the ER for a seizure in 2020. This was his first known seizure. He was started on Keppra. EEG was normal.     Mr. Moreno had a complete ataxia panel in  from [place] that was normal.     There is no family history of related symptoms, but paternal family history is largely unknown.     MEDICAL  HISTORY:  Impaired mobility and ADLs  Decreased functional mobility and endurance  Weakness generalized  Seizure  New onset seizure  Chronic constipation  Balance problem  Decreased functional mobility  Dysarthria  Voiding dysfunction  Spinal ataxia  Spinocerebellar ataxia  Neuropathic pain  Weakness  Spasticity  Weakness of both lower extremities  Spastic diplegia  Gait instability  Upper motor neuron disease  Neurogenic bladder  Hypophonia     GESTATIONAL/BIRTH HISTORY: Mr. Moreno was born at 36 weeks. Pregnancy was reportedly uncomplicated, and he did not require a NICU stay.     DEVELOPMENTAL HISTORY: Mr. Moreno has had no history of developmental delay. He attended 2 years of college.     FAMILY HISTORY: Mr. Moreno has a 12-year-old son in good health. He has a 35-year-old maternal half-brother who is healthy. His mom is 58 and healthy. She had papillary thyroid cancer in her 40s. His maternal uncle had cancer of unknown type and  at age 63. No information is known about his father or his paternal side of the family. There is no known family history of ataxia or related symptoms. A three-generation pedigree was obtained and is scanned into the media tab.     IMPRESSION: Mr. Moreno is a 39-year-old male with ataxia, gait instability, new onset seizure, and change in speech. We discussed that Mr. Montejo phenotype may be indicative of an underlying genetic condition. He had a complete ataxia panel in  from [place] that was normal. We discussed that improvements in technology and discovery of new genes may warrant additional genetic testing.     I also provided psychosocial support surrounding Mr. Montejo health course and the strains on the family. Because of the recent changes in behavior and mood, we will refer to behavioral health for an evaluation.     Please see Dr. Lim note for physical exam, medical management, and additional counseling.     RECOMMENDATIONS:  1. Please see Dr. Lim note  for recommendations     TIME SPENT: 20 minutes with over 50% spent counseling.     Cyndie Sullivan, MPH, MS, [facility]  Genetic Counselor   [facility]    Grace Jose M.D.                                                                                   Medical Geneticist                                                                                                               [facility]

## 2020-09-30 NOTE — LETTER
October 1, 2020      Margarita Humphrey MD  [address]  [address]  [facility] [address]           [facility]  [address] [facility] [address]  Phone: [phone]          Patient: Lisa Moreno   MR Number: [id number]   YOB: 1980   Date of Visit: 9/30/2020       Dear Dr. Margarita Humphrey:    Thank you for referring Lisa Moreno to me for evaluation. Attached you will find relevant portions of my assessment and plan of care.    If you have questions, please do not hesitate to call me. I look forward to following Lisa Moreno along with you.    Sincerely,    Grace Jose MD    Enclosure  CC:  No Recipients    If you would like to receive this communication electronically, please contact [email or web address] or [phone] to request more information on [employer] Link access.    For providers and/or their staff who would like to refer a patient to [facility], please contact us through our one-stop-shop provider referral line, [facility], at [phone].    If you feel you have received this communication in error or would no longer like to receive these types of communications, please e-mail [email or web address]

## 2020-10-01 ENCOUNTER — TELEPHONE (OUTPATIENT)
Dept: GENETICS | Facility: CLINIC | Age: 40
End: 2020-10-01

## 2020-10-01 NOTE — TELEPHONE ENCOUNTER
----- Message from Grace Jose MD sent at 10/1/2020  1:34 PM CDT -----  Hi,    Please contact this patient's mother to set up virtual whole exome consenting with Lauren. Please also help get his echo scheduled (order from his neurologist).    Thanks,    M

## 2020-10-06 ENCOUNTER — TELEPHONE (OUTPATIENT)
Dept: GENETICS | Facility: CLINIC | Age: 40
End: 2020-10-06

## 2020-10-06 NOTE — TELEPHONE ENCOUNTER
Spoke with pt mom to schedule fu with GC. Also provided mom with cardiology number to schedule Echo. Mom gave verbal understanding.

## 2020-10-08 LAB — MAYO MISCELLANEOUS RESULT (REF): NORMAL

## 2020-10-13 ENCOUNTER — TELEPHONE (OUTPATIENT)
Dept: INTERNAL MEDICINE | Facility: CLINIC | Age: 40
End: 2020-10-13

## 2020-10-13 DIAGNOSIS — R05.3 CHRONIC COUGH: Primary | ICD-10-CM

## 2020-10-13 NOTE — TELEPHONE ENCOUNTER
----- Message from Marely Matos sent at 10/13/2020  2:34 PM CDT -----  Contact: ivon/lokesh/[phone]  Pt mother called in regards to the pt still have a cough. Pt mother would like a call back,     Please advise

## 2020-10-13 NOTE — TELEPHONE ENCOUNTER
Called and spoke with pt mother who stated that pt is still experience a dry cough. Cough is every 5 intervals.  Pt finished the last cough syrup.    Please advice.    Evelia

## 2020-10-14 NOTE — TELEPHONE ENCOUNTER
Called pt mother next available appt on 11/12/2020. Pt placed on the waiting list . Pt given phone # [phone] to follow up.    Evelia

## 2020-10-14 NOTE — TELEPHONE ENCOUNTER
Please call mom.  I haven't been able to solve this for them, so I sent in a referral to pulmonary.  Help them get an appointment.    Thanks.  D

## 2020-10-15 DIAGNOSIS — R13.10 DYSPHAGIA, UNSPECIFIED TYPE: ICD-10-CM

## 2020-10-16 RX ORDER — OMEPRAZOLE 40 MG/1
40 CAPSULE, DELAYED RELEASE ORAL DAILY
Qty: 90 CAPSULE | Refills: 3 | Status: SHIPPED | OUTPATIENT
Start: 2020-10-16 | End: 2021-04-30

## 2020-10-20 ENCOUNTER — TELEPHONE (OUTPATIENT)
Dept: GENETICS | Facility: CLINIC | Age: 40
End: 2020-10-20

## 2020-10-20 ENCOUNTER — TELEPHONE (OUTPATIENT)
Dept: BEHAVIORAL HEALTH | Facility: CLINIC | Age: 40
End: 2020-10-20

## 2020-10-21 ENCOUNTER — OFFICE VISIT (OUTPATIENT)
Dept: GENETICS | Facility: CLINIC | Age: 40
End: 2020-10-21
Payer: MEDICARE

## 2020-10-21 DIAGNOSIS — N39.8 VOIDING DYSFUNCTION: ICD-10-CM

## 2020-10-21 DIAGNOSIS — G11.8 SPINOCEREBELLAR ATAXIA: ICD-10-CM

## 2020-10-21 DIAGNOSIS — R26.89 DECREASED FUNCTIONAL MOBILITY: ICD-10-CM

## 2020-10-21 DIAGNOSIS — R47.1 DYSARTHRIA: ICD-10-CM

## 2020-10-21 PROCEDURE — 99499 NO LOS: ICD-10-PCS | Mod: 95,,, | Performed by: MEDICAL GENETICS

## 2020-10-21 PROCEDURE — 96040 PR GENETIC COUNSELING, EACH 30 MIN: ICD-10-PCS | Mod: 95,,, | Performed by: GENETIC COUNSELOR, MS

## 2020-10-21 PROCEDURE — 99499 UNLISTED E&M SERVICE: CPT | Mod: 95,,, | Performed by: MEDICAL GENETICS

## 2020-10-21 PROCEDURE — 96040 PR GENETIC COUNSELING, EACH 30 MIN: CPT | Mod: 95,,, | Performed by: GENETIC COUNSELOR, MS

## 2020-10-21 NOTE — PROGRESS NOTES
Office Visit - Genetic Counseling Evaluation   Lisa Moreno  : 1980  MRN: [id number]    DATE OF SERVICE: 10/21/20  TIME SPENT: 10:18-10:40, 22min    REFERRING PROVIDER: No ref. provider found    Audio Only Telehealth Visit     The patient location is: home  The chief complaint leading to consultation is: whole exome sequencing consent   Visit type: Virtual visit with audio only (telephone)     The reason for the audio only service rather than synchronous audio and video virtual visit was related to technical difficulties or patient preference/necessity.     Each patient to whom I provide medical services by telemedicine is:  (1) informed of the relationship between the physician and patient and the respective role of any other health care provider with respect to management of the patient; and (2) notified that they may decline to receive medical services by telemedicine and may withdraw from such care at any time. Patient verbally consented to receive this service via voice-only telephone call.      REASON FOR CONSULT:  Genetic counseling was requested for, Mr. Lisa Moreno, a 39 y.o. male with findings of progressive ataxia, urinary incontinence, recent behavioral changes, and dysarthria. His mother completed the visit for today as Mr. Moreno is not able to communicate. Today's visit is to discuss and consent for whole exome sequencing (MYRIAM). He was initially seen as a new patient in our clinic on 2020 by Dr. Grace Jose and Cyndie Sullivan, [facility].      MEDICAL HISTORY:    Patient Active Problem List   Diagnosis    Spastic diplegia    Gait instability    Upper motor neuron disease    Neurogenic bladder    Hypophonia    Weakness    Spasticity    Weakness of both lower extremities    Spinal ataxia    Spinocerebellar ataxia    Neuropathic pain    Voiding dysfunction    Dysarthria    Oropharyngeal dysphagia    Balance problem    Decreased functional mobility    New onset seizure     Chronic constipation    Seizure    Impaired mobility and ADLs    Decreased functional mobility and endurance    Weakness generalized         DISCUSSION & IMPRESSION:  Spoke with MOP to discuss the option of whole exome sequencing (MYRIAM) counseling and consent.     MYRIAM is one of the most comprehensive tests available clinically and is used to look for mutations or likely pathogenic variants the body's exome, which includes over 20,000 genes. We discussed that the genetic testing company will use Lisa's clinical information when analyzing results and that preforming the test with samples from mom and dad allows the genetic lab to delineate the significance of any variants identified. Since FOP is not available, this will be a duo (Lisa as the proband and MOP as family member sample)      We discussed the limitations and possible results involved in MYRIAM. A diagnosis is found in about 20-25% of those who have had MYRIAM testing. A positive result may explain Lisa's symptoms and can be informative for the family. A negative MYRIAM result does not rule out that the underlying condition is genetic in nature and reanalysis or additional genetic testing may be possible in the future. We also discussed that variants of uncertain significance (VUS), or changes in a gene with unknown clinical significance are possible. MYRIAM cannot detect certain genetic changes such as deletions, duplications, trinucleotide repeats, or methylation defects.      Discussed that the family can opt out of receiving secondary findings from the MYRIAM. These are a list of genes recommended by ACMG that are clinically actionable. About 4% of patients who undergo MYRIAM receive an secondary finding. These genes are involved in various conditions mainly hereditary cancer syndromes, heart, and kidney diseases. Unexpected results such as nonpaternity, consanguinity, and/or incidental findings (genetic changes that are of medical importance but not directly  relevant to the initial reason for testing) may be revealed. ROMEL was discussed. Turn around time for results is about 3-4 months.      MOP did consent for MYRIAM and did elect to receive secondary findings for Lisa and herself.       RECOMMENDATIONS:  1. Whole Exome Sequencing to [facility]. Blood drawn at initial genetics visit on 9/30/2020. TAT 3-4mo  2. Buccal kit to be sent to MOP    Caterina Taveras MS  Genetic Counselor  [facility]     Attending attestation: I have reviewed the documentation above. Mr. Moreno himself will undergo the consenting process in a separate appointment arranged by the Genetics department as by my assessment, neurology appointment 8/25/20, and lack of contesting documentation in his chart, he is cognitively intact and able to consent for this testing. The testing will not be initiated until this has been completed. Both Mr. Moreno and his mother must be consented for this testing.    Grace Jose MD  Medical Geneticist   [facility]

## 2020-10-23 ENCOUNTER — TELEPHONE (OUTPATIENT)
Dept: GENETICS | Facility: CLINIC | Age: 40
End: 2020-10-23

## 2020-10-23 NOTE — TELEPHONE ENCOUNTER
Spoke to MOP about another consent process given technical difficulties from the appointment on the 21st resulted in an audio only visit and I was only able to speak to Lisa's mom at that time. She stated she does not believe he would understand what is being said to him and therefore would give a genuine consent. I agree that those consenting should understand what they are consenting for. But at this time to proceed with testing Lisa would need to be consented or have documentation in his chart that mom has full medical decision making power. She was understanding of this and stated she is in the process to get full medical decision making power and will contact our clinic with the results of that process and wants to hold off on testing until then.

## 2020-10-26 ENCOUNTER — TELEPHONE (OUTPATIENT)
Dept: INTERNAL MEDICINE | Facility: CLINIC | Age: 40
End: 2020-10-26

## 2020-10-26 DIAGNOSIS — R82.90 MALODOROUS URINE: Primary | ICD-10-CM

## 2020-10-26 NOTE — TELEPHONE ENCOUNTER
----- Message from Gina An sent at 10/26/2020  8:51 AM CDT -----  Contact: Warren(mother) [phone]  Would like to get medical advice.  Symptoms (please be specific):  still coughing and UTI symptoms  How long has patient had these symptoms:  UTI symptoms started 10/21  Pharmacy name and phone # (copy from chart):  [id number] [address] [facility] [address] [phone] (Phone)  [phone] (Fax)  Comments:  Pt mother states the pt does not see a pulmonary doctor for another 2 weeks

## 2020-10-26 NOTE — TELEPHONE ENCOUNTER
Ordered a urine sample.  Help him get a urine sample when they come for an appointment Thursday.    D

## 2020-10-27 ENCOUNTER — TELEPHONE (OUTPATIENT)
Dept: PULMONOLOGY | Facility: CLINIC | Age: 40
End: 2020-10-27

## 2020-10-27 DIAGNOSIS — R05.9 COUGH: Primary | ICD-10-CM

## 2020-10-27 NOTE — TELEPHONE ENCOUNTER
----- Message from Francheska Hough sent at 10/27/2020  9:57 AM CDT -----  Regarding: returning call  Contact: pt's mother  Pt's mother would like to be called back regarding  a call back message left on phone   Pt 's mother   Garima   can be reached at    [phone] (h)

## 2020-10-27 NOTE — TELEPHONE ENCOUNTER
Left message on patient voicemail, informing him that I'm contacting him in regards to scheduling him some Pulmonary Function Tests and a Covid Test prior to his appointment with MOLLY Foster on 10/29/20. I advised patient that if he has any questions or concerns, he may contact the office. Office number has been provided.

## 2020-10-28 ENCOUNTER — PATIENT OUTREACH (OUTPATIENT)
Dept: ADMINISTRATIVE | Facility: OTHER | Age: 40
End: 2020-10-28

## 2020-10-29 ENCOUNTER — OFFICE VISIT (OUTPATIENT)
Dept: PULMONOLOGY | Facility: CLINIC | Age: 40
End: 2020-10-29
Attending: INTERNAL MEDICINE
Payer: MEDICARE

## 2020-10-29 ENCOUNTER — HOSPITAL ENCOUNTER (OUTPATIENT)
Dept: RADIOLOGY | Facility: HOSPITAL | Age: 40
Discharge: HOME | End: 2020-10-29
Attending: NURSE PRACTITIONER
Payer: MEDICARE

## 2020-10-29 VITALS
SYSTOLIC BLOOD PRESSURE: 120 MMHG | WEIGHT: 161 LBS | HEART RATE: 88 BPM | OXYGEN SATURATION: 100 % | BODY MASS INDEX: 20.02 KG/M2 | HEIGHT: 75 IN | DIASTOLIC BLOOD PRESSURE: 82 MMHG

## 2020-10-29 DIAGNOSIS — J40 BRONCHITIS WITH WHEEZING: Primary | ICD-10-CM

## 2020-10-29 DIAGNOSIS — R05.3 CHRONIC COUGH: ICD-10-CM

## 2020-10-29 DIAGNOSIS — R13.12 OROPHARYNGEAL DYSPHAGIA: ICD-10-CM

## 2020-10-29 PROCEDURE — 71046 XR CHEST PA AND LATERAL: ICD-10-PCS | Mod: 26,,, | Performed by: RADIOLOGY

## 2020-10-29 PROCEDURE — 99214 OFFICE O/P EST MOD 30 MIN: CPT | Mod: S$PBB,ICN,, | Performed by: NURSE PRACTITIONER

## 2020-10-29 PROCEDURE — 99214 OFFICE O/P EST MOD 30 MIN: CPT | Mod: PBBFAC,25 | Performed by: NURSE PRACTITIONER

## 2020-10-29 PROCEDURE — 99214 PR OFFICE/OUTPT VISIT, EST, LEVL IV, 30-39 MIN: ICD-10-PCS | Mod: S$PBB,ICN,, | Performed by: NURSE PRACTITIONER

## 2020-10-29 PROCEDURE — 99999 PR PBB SHADOW E&M-EST. PATIENT-LVL IV: ICD-10-PCS | Mod: PBBFAC,,, | Performed by: NURSE PRACTITIONER

## 2020-10-29 PROCEDURE — 71046 X-RAY EXAM CHEST 2 VIEWS: CPT | Mod: 26,,, | Performed by: RADIOLOGY

## 2020-10-29 PROCEDURE — 99999 PR PBB SHADOW E&M-EST. PATIENT-LVL IV: CPT | Mod: PBBFAC,,, | Performed by: NURSE PRACTITIONER

## 2020-10-29 PROCEDURE — 71046 X-RAY EXAM CHEST 2 VIEWS: CPT | Mod: TC,FY

## 2020-10-29 RX ORDER — ALBUTEROL SULFATE 0.63 MG/3ML
0.63 SOLUTION RESPIRATORY (INHALATION) EVERY 6 HOURS PRN
Qty: 225 ML | Refills: 6 | Status: SHIPPED | OUTPATIENT
Start: 2020-10-29 | End: 2020-11-03

## 2020-10-29 RX ORDER — LINACLOTIDE 145 UG/1
CAPSULE, GELATIN COATED ORAL
COMMUNITY
Start: 2020-10-10 | End: 2021-04-30

## 2020-10-29 RX ORDER — MIRABEGRON 25 MG/1
TABLET, FILM COATED, EXTENDED RELEASE ORAL
COMMUNITY
Start: 2020-10-05 | End: 2021-04-30

## 2020-10-29 NOTE — LETTER
November 4, 2020      John Nixon II, MD  [address]  [facility] [address]           [facility] 9th Fl  [address]  [facility] [address]  Phone: [phone]          Patient: Lisa Moreno   MR Number: [id number]   YOB: 1980   Date of Visit: 10/29/2020       Dear Dr. John Nixon II:    Thank you for referring Lisa Moreno to me for evaluation. Attached you will find relevant portions of my assessment and plan of care.    If you have questions, please do not hesitate to call me. I look forward to following Lisa Moreno along with you.    Sincerely,    Kristin Clayton, DNP    Enclosure  CC:  No Recipients    If you would like to receive this communication electronically, please contact [email or web address] or [phone] to request more information on [employer] Link access.    For providers and/or their staff who would like to refer a patient to [facility], please contact us through our one-stop-shop provider referral line, [facility], at [phone].    If you feel you have received this communication in error or would no longer like to receive these types of communications, please e-mail [email or web address]

## 2020-10-29 NOTE — PROGRESS NOTES
"Subjective:       Patient ID: Lisa Moreno is a 39 y.o. male.    Chief Complaint: Cough with wheeze  HPI:   Lisa Moreno is a 39 y.o. male who presents for evaluation of a cough that began a few weeks ago.    He had a flu shot that his mother didn't want him to have and she is concerned that this precipitated the cough. The rattling started in September and has never stopped.  He had a prescription sent over by his PCP  But no one knows what it was "we are still waiting on it" per mom  Patient is followed by SLP for dysphagia. Chart review revealed that patient is supposed to be using thickener in liquids and following a specific diet to help prevent episodes of dysphagia. Patient's mother tries very hard to adapt to the specific requirements but at times it can be difficult per her report.    Review of Systems   Constitutional: Negative for activity change, fatigue and night sweats.   HENT: Negative for postnasal drip, rhinorrhea, trouble swallowing and congestion.    Eyes: Negative for itching.   Respiratory: Positive for cough, chest tightness and wheezing. Negative for hemoptysis, sputum production, choking, shortness of breath, dyspnea on extertion and use of rescue inhaler.    Cardiovascular: Negative for chest pain and palpitations.   Genitourinary: Negative for difficulty urinating.   Endocrine: Negative for cold intolerance and heat intolerance.    Musculoskeletal: Negative for arthralgias.   Skin: Negative for rash.   Gastrointestinal: Negative for nausea, vomiting and acid reflux.   Neurological: Negative for dizziness and light-headedness.   Hematological: Negative for adenopathy.   Psychiatric/Behavioral: Positive for sleep disturbance.         Social History     Tobacco Use    Smoking status: Never Smoker    Smokeless tobacco: Never Used   Substance Use Topics    Alcohol use: Not Currently     Frequency: Never     Drinks per session: Patient refused     Binge frequency: Never     " Comment: social drinker, at times       Review of patient's allergies indicates:   Allergen Reactions    Penicillins      Hives and Itching     Past Medical History:   Diagnosis Date    Degenerative motor system disease     Depression     Seizure     Spastic quadriplegia     Spinocerebellar atrophy      Past Surgical History:   Procedure Laterality Date    BACLOFEN PUMP IMPLANTATION      COLONOSCOPY N/A 7/23/2020    Procedure: COLONOSCOPY;  Surgeon: Ziyad Fitch MD;  Location: [facility] ENDO;  Service: Endoscopy;  Laterality: N/A;    ESOPHAGOGASTRODUODENOSCOPY N/A 7/23/2020    Procedure: EGD (ESOPHAGOGASTRODUODENOSCOPY);  Surgeon: Ziyad Fitch MD;  Location: [facility] ENDO;  Service: Endoscopy;  Laterality: N/A;    FLUOROSCOPIC URODYNAMIC STUDY N/A 11/27/2018    Procedure: URODYNAMIC STUDY, FLUOROSCOPIC;  Surgeon: Tanja Okeefe MD;  Location: [address];  Service: Urology;  Laterality: N/A;  1 hour     Current Outpatient Medications on File Prior to Visit   Medication Sig    baclofen (LIORESAL) 20 MG tablet Take 1 tablet (20 mg total) by mouth 3 (three) times daily.    citalopram (CELEXA) 10 MG tablet Take 1 tablet (10 mg total) by mouth once daily.    dantrolene (DANTRIUM) 50 MG Cap Take 1 capsule (50 mg total) by mouth 3 (three) times daily.    donepezil (ARICEPT) 5 MG tablet TAKE 1 TABLET(5 MG) BY MOUTH EVERY EVENING    levETIRAcetam (KEPPRA) 1000 MG tablet Take 1 tablet (1,000 mg total) by mouth 2 (two) times daily.    LINZESS 145 mcg Cap capsule     MYRBETRIQ 25 mg Tb24 ER tablet     omeprazole (PRILOSEC) 40 MG capsule Take 1 capsule (40 mg total) by mouth once daily.    polyethylene glycol (GLYCOLAX) 17 gram/dose powder Take 17 g by mouth every other day.    zinc oxide 10 % Oint Apply to affected areas twice daily     Current Facility-Administered Medications on File Prior to Visit   Medication    onabotulinumtoxina injection 100 Units       Objective:      Vitals:     10/29/20 1350   BP: 120/82   Pulse: 88     Physical Exam   Constitutional: He is oriented to person, place, and time. He appears well-developed and well-nourished. No distress.   HENT:   Head: Normocephalic.   Gurgling noted with frequent coughing.    Neck: Normal range of motion. Neck supple.   Cardiovascular: Normal rate and regular rhythm.   No murmur heard.  Pulmonary/Chest: Normal expansion, symmetric chest wall expansion, effort normal and breath sounds normal. No respiratory distress. He has no decreased breath sounds. He has no wheezes. He has no rhonchi. He has no rales.   Abdominal: Soft. He exhibits no distension. There is no hepatosplenomegaly. There is no abdominal tenderness.   Musculoskeletal: Normal range of motion.         General: No edema.   Lymphadenopathy:     He has no cervical adenopathy.   Neurological: He is alert and oriented to person, place, and time. Gait normal.   Skin: Skin is warm and dry. No cyanosis. Nails show no clubbing.   Psychiatric:   Withdrawn, nonverbal   Vitals reviewed.    Personal Diagnostic Review    Imaging personally reviewed with patient         Assessment:     Problem List Items Addressed This Visit        GI    Oropharyngeal dysphagia    Overview     Likely contributory factor.  Making patient NPO and placing gastric tube may be last resort.  Spoke with mother at length about dysphagia likely being a cause, along with weakness and inability to clear secretions in upper airway.          Current Assessment & Plan     Monitor            Other    Bronchitis with wheezing - Primary    Overview     Unable to complete PFTs, increasing cough with inability to clear secretions since September  Has known oropharyngeal dysphagia  Suspect aspiration is contributory  Trial MARIA ELENA via nebulizer, patient unable to use an inhaler  Recommend strict adherence to SLP recommendations         Current Assessment & Plan     CXR clear, do suspect bronchitis is contributory          Relevant Medications    albuterol (ACCUNEB) 1.25 mg/3 mL Nebu    Other Relevant Orders    NEBULIZER KIT (SUPPLIES) FOR HOME USE    NEBULIZER FOR HOME USE      Other Visit Diagnoses     Chronic cough        Relevant Orders    X-Ray Chest PA And Lateral (Completed)          Case discussed with Dr. Webb, contact made to ALS clinic coordinator.

## 2020-10-29 NOTE — PATIENT INSTRUCTIONS
We will get a chest xray on you    I am ordering a nebulizer, use it every 4-6 hours for coughing, wheezing or shortness of breath

## 2020-10-30 ENCOUNTER — TELEPHONE (OUTPATIENT)
Dept: INTERNAL MEDICINE | Facility: CLINIC | Age: 40
End: 2020-10-30

## 2020-10-30 DIAGNOSIS — R82.90 MALODOROUS URINE: Primary | ICD-10-CM

## 2020-10-30 RX ORDER — SULFAMETHOXAZOLE AND TRIMETHOPRIM 800; 160 MG/1; MG/1
1 TABLET ORAL 2 TIMES DAILY
Qty: 10 TABLET | Refills: 0 | Status: SHIPPED | OUTPATIENT
Start: 2020-10-30 | End: 2020-11-04

## 2020-10-30 NOTE — TELEPHONE ENCOUNTER
Returned call according to pt mother pt was unable to give a urine sample yesterday because he was unable to void. Lab suggested collecting sample using a catheter.  Pt mother stated that pt still has UTI no burning. Orders were cancelled.    Evelia

## 2020-10-30 NOTE — TELEPHONE ENCOUNTER
----- Message from Jade Portillo sent at 10/30/2020  8:45 AM CDT -----  Contact: [phone]  Patient called to inform that it was unable to do the test, and I will give out more info when calling. Thank you

## 2020-10-30 NOTE — TELEPHONE ENCOUNTER
Spoke to mom.  Foul-smelling urine; no frequency.  Pt is not verbal, so not complaining of dysuria.  Can't get to clinic without calling for transportation.  Would need catheterization to collect a sample.    Will just treat and see how he does with that.  She agreed and understands.

## 2020-10-31 ENCOUNTER — EXTERNAL CHRONIC CARE MANAGEMENT (OUTPATIENT)
Dept: PRIMARY CARE CLINIC | Facility: CLINIC | Age: 40
End: 2020-10-31
Payer: MEDICARE

## 2020-10-31 PROCEDURE — 99490 CHRNC CARE MGMT STAFF 1ST 20: CPT | Mod: S$PBB,,, | Performed by: INTERNAL MEDICINE

## 2020-10-31 PROCEDURE — 99490 CHRNC CARE MGMT STAFF 1ST 20: CPT | Mod: PBBFAC | Performed by: INTERNAL MEDICINE

## 2020-10-31 PROCEDURE — 99490 PR CHRONIC CARE MGMT, 1ST 20 MIN: ICD-10-PCS | Mod: S$PBB,,, | Performed by: INTERNAL MEDICINE

## 2020-11-02 ENCOUNTER — TELEPHONE (OUTPATIENT)
Dept: PULMONOLOGY | Facility: CLINIC | Age: 40
End: 2020-11-02

## 2020-11-02 NOTE — TELEPHONE ENCOUNTER
----- Message from Yin Marcie sent at 11/2/2020 12:32 PM CST -----  Contact: [phone]  Giovana  Mom of pt. Says she has been to [facility] and now she is at  [facility].     Says pt. Needs his medication.   Asking for Viridiana to call her asap.    There is a problem with the medication.

## 2020-11-02 NOTE — TELEPHONE ENCOUNTER
Spoke with patient mother, informed her that I have received her message. Patient mother states that she arrived at [facility] and staff advised her that patient medication (nebulizer solution) needs authorization and that they have faxed patient prescription to [facility]. Patient mother states that she arrived at [facility] to  patient medication but patient mother states that [facility] staff advised her that patient medication needs authorization. I verbalized to patient mother that I understand and advised patient mother that I will contact patient medical insurance company to get advised of what's needed for patient to receive his medication. Patient mother verbalized that she understand.

## 2020-11-03 ENCOUNTER — TELEPHONE (OUTPATIENT)
Dept: BEHAVIORAL HEALTH | Facility: CLINIC | Age: 40
End: 2020-11-03

## 2020-11-03 ENCOUNTER — TELEPHONE (OUTPATIENT)
Dept: PHYSICAL MEDICINE AND REHAB | Facility: CLINIC | Age: 40
End: 2020-11-03

## 2020-11-03 ENCOUNTER — TELEPHONE (OUTPATIENT)
Dept: PULMONOLOGY | Facility: CLINIC | Age: 40
End: 2020-11-03

## 2020-11-03 DIAGNOSIS — R25.2 SPASTICITY: Primary | ICD-10-CM

## 2020-11-03 RX ORDER — ALBUTEROL SULFATE 1.25 MG/3ML
1.25 SOLUTION RESPIRATORY (INHALATION) EVERY 6 HOURS PRN
Qty: 3 BOX | Refills: 3 | Status: SHIPPED | OUTPATIENT
Start: 2020-11-03 | End: 2021-04-30

## 2020-11-03 NOTE — TELEPHONE ENCOUNTER
Spoke with patient mother, informed her that I have received her message. Patient mother states that she spoke with patient [employer] medical equipment staff and they advised her that patient did not qualify for oxygen because patient diagnosed has chronic illness. Patient mother is requesting a new oxygen order for patient with the right diagnosis so that patient can receive his oxygen. Patient mother also states that patient medication (Nebulizer solution) is at cost of $200. Patient mother wants to know if there is anything at a cheaper price for patient nebulizer solution. I verbalized to patient mother that I understand and advised her that I will forward her message to MOLLY Foster to review/advise. Patient mother verbalized that she understand and states that she can not wait for 72 hours for a answer because patient needs his medication now. I verbalized to patient mother that I understand.

## 2020-11-03 NOTE — TELEPHONE ENCOUNTER
"----- Message from Genia Palencia MA sent at 11/3/2020  1:41 PM CST -----  Contact: Giovana (mother) [phone]  Returning a call to Viridiana ojeda: her son's "equipment", she is at [facility] now     Giovana (mother) [phone]     "

## 2020-11-04 ENCOUNTER — TELEPHONE (OUTPATIENT)
Dept: PULMONOLOGY | Facility: CLINIC | Age: 40
End: 2020-11-04

## 2020-11-04 NOTE — TELEPHONE ENCOUNTER
Spoke with patient mother, informed her that I have received her message. I advised patient mother that I have spoken with [facility] and they advised me that they will contact patient in regards to patient Nebulizer. Patient mother verbalized that she understand.

## 2020-11-04 NOTE — TELEPHONE ENCOUNTER
"----- Message from Cherry Narayan sent at 11/4/2020 10:49 AM CST -----  pt mom Giovana (mother) [phone] call in about her son's "equipment", she is at [facility] now    "

## 2020-11-11 ENCOUNTER — OFFICE VISIT (OUTPATIENT)
Dept: PHYSICAL MEDICINE AND REHAB | Facility: CLINIC | Age: 40
End: 2020-11-11
Payer: MEDICARE

## 2020-11-11 DIAGNOSIS — G80.1 SPASTIC DIPLEGIA: Primary | ICD-10-CM

## 2020-11-11 PROCEDURE — 95874 GUIDE NERV DESTR NEEDLE EMG: CPT | Mod: 26,S$PBB,59, | Performed by: PHYSICAL MEDICINE & REHABILITATION

## 2020-11-11 PROCEDURE — 64643 CHEMODENERV 1 EXTREM 1-4 EA: CPT | Mod: PBBFAC | Performed by: PHYSICAL MEDICINE & REHABILITATION

## 2020-11-11 PROCEDURE — 64643 CHEMODENERV 1 EXTREM 1-4 EA: CPT | Mod: S$PBB,,, | Performed by: PHYSICAL MEDICINE & REHABILITATION

## 2020-11-11 PROCEDURE — 99212 OFFICE O/P EST SF 10 MIN: CPT | Mod: PBBFAC | Performed by: PHYSICAL MEDICINE & REHABILITATION

## 2020-11-11 PROCEDURE — 99499 UNLISTED E&M SERVICE: CPT | Mod: S$PBB,,, | Performed by: PHYSICAL MEDICINE & REHABILITATION

## 2020-11-11 PROCEDURE — 99999 PR PBB SHADOW E&M-EST. PATIENT-LVL II: ICD-10-PCS | Mod: PBBFAC,,, | Performed by: PHYSICAL MEDICINE & REHABILITATION

## 2020-11-11 PROCEDURE — 64642 CHEMODENERV 1 EXTREMITY 1-4: CPT | Mod: S$PBB,,, | Performed by: PHYSICAL MEDICINE & REHABILITATION

## 2020-11-11 PROCEDURE — 99999 PR PBB SHADOW E&M-EST. PATIENT-LVL II: CPT | Mod: PBBFAC,,, | Performed by: PHYSICAL MEDICINE & REHABILITATION

## 2020-11-11 PROCEDURE — 64642 CHEMODENERV 1 EXTREMITY 1-4: CPT | Mod: PBBFAC | Performed by: PHYSICAL MEDICINE & REHABILITATION

## 2020-11-11 PROCEDURE — 99499 NO LOS: ICD-10-PCS | Mod: S$PBB,,, | Performed by: PHYSICAL MEDICINE & REHABILITATION

## 2020-11-11 PROCEDURE — 64642 PR CHEMODENERV ONE EXTREMITY; 1-4 MUSCLE(S): ICD-10-PCS | Mod: S$PBB,,, | Performed by: PHYSICAL MEDICINE & REHABILITATION

## 2020-11-11 PROCEDURE — 95874 PR NEEDLE EMG GUIDANCE FOR CHEMODENERVATION: ICD-10-PCS | Mod: 26,S$PBB,59, | Performed by: PHYSICAL MEDICINE & REHABILITATION

## 2020-11-11 PROCEDURE — 64643 PR CHEMODENERV 1 EXT; EA ADD'L EXT, 1-4 MUSCLE(S): ICD-10-PCS | Mod: S$PBB,,, | Performed by: PHYSICAL MEDICINE & REHABILITATION

## 2020-11-11 PROCEDURE — 95874 GUIDE NERV DESTR NEEDLE EMG: CPT | Mod: PBBFAC | Performed by: PHYSICAL MEDICINE & REHABILITATION

## 2020-11-11 RX ADMIN — ONABOTULINUMTOXINA 100 UNITS: 100 INJECTION, POWDER, LYOPHILIZED, FOR SOLUTION INTRADERMAL; INTRAMUSCULAR at 03:11
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
## 2020-11-13 ENCOUNTER — OFFICE VISIT (OUTPATIENT)
Dept: UROLOGY | Facility: CLINIC | Age: 40
End: 2020-11-13
Payer: MEDICARE

## 2020-11-13 ENCOUNTER — OFFICE VISIT (OUTPATIENT)
Dept: GASTROENTEROLOGY | Facility: CLINIC | Age: 40
End: 2020-11-13
Payer: MEDICARE

## 2020-11-13 ENCOUNTER — TELEPHONE (OUTPATIENT)
Dept: BEHAVIORAL HEALTH | Facility: CLINIC | Age: 40
End: 2020-11-13

## 2020-11-13 VITALS
SYSTOLIC BLOOD PRESSURE: 122 MMHG | HEART RATE: 74 BPM | OXYGEN SATURATION: 99 % | DIASTOLIC BLOOD PRESSURE: 85 MMHG | TEMPERATURE: 99 F

## 2020-11-13 VITALS
BODY MASS INDEX: 20.01 KG/M2 | DIASTOLIC BLOOD PRESSURE: 71 MMHG | HEART RATE: 74 BPM | HEIGHT: 75 IN | SYSTOLIC BLOOD PRESSURE: 108 MMHG | WEIGHT: 160.94 LBS

## 2020-11-13 DIAGNOSIS — R33.9 INCOMPLETE EMPTYING OF BLADDER: ICD-10-CM

## 2020-11-13 DIAGNOSIS — K59.09 CHRONIC CONSTIPATION: Primary | ICD-10-CM

## 2020-11-13 DIAGNOSIS — R82.90 MALODOROUS URINE: Primary | ICD-10-CM

## 2020-11-13 DIAGNOSIS — Z87.440 HISTORY OF UTI: ICD-10-CM

## 2020-11-13 LAB
BILIRUB UR QL STRIP: NEGATIVE
CLARITY UR REFRACT.AUTO: CLEAR
COLOR UR AUTO: YELLOW
GLUCOSE UR QL STRIP: NEGATIVE
HGB UR QL STRIP: ABNORMAL
KETONES UR QL STRIP: NEGATIVE
LEUKOCYTE ESTERASE UR QL STRIP: NEGATIVE
MICROSCOPIC COMMENT: ABNORMAL
NITRITE UR QL STRIP: NEGATIVE
PH UR STRIP: 6 [PH] (ref 5–8)
PROT UR QL STRIP: NEGATIVE
RBC #/AREA URNS AUTO: >100 /HPF (ref 0–4)
SP GR UR STRIP: 1.02 (ref 1–1.03)
SQUAMOUS #/AREA URNS AUTO: 0 /HPF
URN SPEC COLLECT METH UR: ABNORMAL
WBC #/AREA URNS AUTO: 0 /HPF (ref 0–5)

## 2020-11-13 PROCEDURE — 99999 PR PBB SHADOW E&M-EST. PATIENT-LVL III: CPT | Mod: PBBFAC,,, | Performed by: NURSE PRACTITIONER

## 2020-11-13 PROCEDURE — 99213 PR OFFICE/OUTPT VISIT, EST, LEVL III, 20-29 MIN: ICD-10-PCS | Mod: S$PBB,25,, | Performed by: NURSE PRACTITIONER

## 2020-11-13 PROCEDURE — 99213 OFFICE O/P EST LOW 20 MIN: CPT | Mod: PBBFAC,27,PO | Performed by: NURSE PRACTITIONER

## 2020-11-13 PROCEDURE — 99999 PR PBB SHADOW E&M-EST. PATIENT-LVL III: ICD-10-PCS | Mod: PBBFAC,,, | Performed by: INTERNAL MEDICINE

## 2020-11-13 PROCEDURE — 99999 PR PBB SHADOW E&M-EST. PATIENT-LVL III: ICD-10-PCS | Mod: PBBFAC,,, | Performed by: NURSE PRACTITIONER

## 2020-11-13 PROCEDURE — 99213 OFFICE O/P EST LOW 20 MIN: CPT | Mod: PBBFAC,PO,25 | Performed by: INTERNAL MEDICINE

## 2020-11-13 PROCEDURE — 51701 INSERT BLADDER CATHETER: CPT | Mod: S$PBB,,, | Performed by: NURSE PRACTITIONER

## 2020-11-13 PROCEDURE — 99999 PR PBB SHADOW E&M-EST. PATIENT-LVL III: CPT | Mod: PBBFAC,,, | Performed by: INTERNAL MEDICINE

## 2020-11-13 PROCEDURE — 51701 INSERT BLADDER CATHETER: CPT | Mod: PBBFAC,PO | Performed by: NURSE PRACTITIONER

## 2020-11-13 PROCEDURE — 99214 OFFICE O/P EST MOD 30 MIN: CPT | Mod: S$PBB,,, | Performed by: INTERNAL MEDICINE

## 2020-11-13 PROCEDURE — 81001 URINALYSIS AUTO W/SCOPE: CPT

## 2020-11-13 PROCEDURE — 51701 PR INSERTION OF NON-INDWELLING BLADDER CATHETERIZATION FOR RESIDUAL UR: ICD-10-PCS | Mod: S$PBB,,, | Performed by: NURSE PRACTITIONER

## 2020-11-13 PROCEDURE — 87086 URINE CULTURE/COLONY COUNT: CPT

## 2020-11-13 PROCEDURE — 99214 PR OFFICE/OUTPT VISIT, EST, LEVL IV, 30-39 MIN: ICD-10-PCS | Mod: S$PBB,,, | Performed by: INTERNAL MEDICINE

## 2020-11-13 PROCEDURE — 99213 OFFICE O/P EST LOW 20 MIN: CPT | Mod: S$PBB,25,, | Performed by: NURSE PRACTITIONER

## 2020-11-13 RX ORDER — SULFAMETHOXAZOLE AND TRIMETHOPRIM 800; 160 MG/1; MG/1
1 TABLET ORAL 2 TIMES DAILY
Qty: 14 TABLET | Refills: 0 | Status: SHIPPED | OUTPATIENT
Start: 2020-11-13 | End: 2020-11-20

## 2020-11-13 RX ORDER — POLYETHYLENE GLYCOL 3350 17 G/17G
17 POWDER, FOR SOLUTION ORAL EVERY OTHER DAY
Qty: 507 G | Refills: 5
Start: 2020-11-13 | End: 2022-03-03

## 2020-11-13 RX ORDER — ALBUTEROL SULFATE 0.63 MG/3ML
SOLUTION RESPIRATORY (INHALATION)
COMMUNITY
Start: 2020-11-04 | End: 2022-03-03

## 2020-11-13 NOTE — PROGRESS NOTES
Subjective:       Patient ID: Lisa Moreno is a 39 y.o. male.    Chief Complaint: Urine odor    This is a 39 y.o.  male patient that is new to me but not new to the system.  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 He has seen Dr. Okeefe for UUI. He underwent urodynamics with Dr. Okeefe on 11/27/2020, which demonstrated DO with incomplete bladder emptying. She thought he would most likely benefit from botox but that patient would also have to do CIC 5x/daily to avoid risk of urinary retention. Patient's mother did not think this would be a a good option for patient given his cognitive dysfunction. They agreed on a trial of Mrybetriq.      7/17/2020  Patient here today with mother for possible UTI. History is provided by patient's mother. Patient last UTI in Jan. 2020, urine culture with E. Coli > 100,000 cfu/ml and he was treated with Keflex. He has been taking Myrbetriq 25mg  For UUI. Mother reports that he wears diapers, as he does not ambulate often due to LE weakness. She changes his diaper about 3-4x during the day and once a night. She is not sure if the Myrbetriq is working. He is dealing with extreme constipation issues, might have a BM weekly. He is followed by GI, colonoscopy scheduled for next week. Denies fevers or chills. Patient unable to give urine sample. Bladder scan: 234ml.    11/13/20  Here with mother today for possible UTI. Patient's mother reports malodorous urine over the past 2 weeks. Still changing patient's diapers PRN, no change in urine output. Still working on constipation, slightly better. Denies fevers or chills. Bladder scan 165ml.        Lab Results   Component Value Date    CREATININE 0.8 07/24/2020         ---  Past Medical History:   Diagnosis Date    Degenerative motor system disease     Depression     Seizure     Spastic quadriplegia     Spinocerebellar atrophy        Past Surgical History:   Procedure  Laterality Date    BACLOFEN PUMP IMPLANTATION      COLONOSCOPY N/A 7/23/2020    Procedure: COLONOSCOPY;  Surgeon: Ziyad Fitch MD;  Location: [facility] ENDO;  Service: Endoscopy;  Laterality: N/A;    ESOPHAGOGASTRODUODENOSCOPY N/A 7/23/2020    Procedure: EGD (ESOPHAGOGASTRODUODENOSCOPY);  Surgeon: Ziyad Fitch MD;  Location: [facility] ENDO;  Service: Endoscopy;  Laterality: N/A;    FLUOROSCOPIC URODYNAMIC STUDY N/A 11/27/2018    Procedure: URODYNAMIC STUDY, FLUOROSCOPIC;  Surgeon: Tanja Okeefe MD;  Location: [address];  Service: Urology;  Laterality: N/A;  1 hour    UPPER GASTROINTESTINAL ENDOSCOPY         Family History   Problem Relation Age of Onset    Thyroid cancer Mother     Lung cancer Maternal Grandfather     Multiple sclerosis Other         mother's cousin    ALS Neg Hx     Muscular dystrophy Neg Hx        Social History     Tobacco Use    Smoking status: Never Smoker    Smokeless tobacco: Never Used   Substance Use Topics    Alcohol use: Not Currently     Frequency: Never     Drinks per session: Patient refused     Binge frequency: Never     Comment: social drinker, at times    Drug use: Not Currently       Current Outpatient Medications on File Prior to Visit   Medication Sig Dispense Refill    albuterol (ACCUNEB) 1.25 mg/3 mL Nebu Take 3 mLs (1.25 mg total) by nebulization every 6 (six) hours as needed. Rescue 3 Box 3    baclofen (LIORESAL) 20 MG tablet Take 1 tablet (20 mg total) by mouth 3 (three) times daily. 270 tablet 3    citalopram (CELEXA) 10 MG tablet Take 1 tablet (10 mg total) by mouth once daily. 90 tablet 3    dantrolene (DANTRIUM) 50 MG Cap Take 1 capsule (50 mg total) by mouth 3 (three) times daily. 270 capsule 0    LINZESS 145 mcg Cap capsule       MYRBETRIQ 25 mg Tb24 ER tablet       omeprazole (PRILOSEC) 40 MG capsule Take 1 capsule (40 mg total) by mouth once daily. 90 capsule 3    albuterol (ACCUNEB) 0.63 mg/3 mL Nebu NEBULIZE 3 MLS EVERY  6 HOURS AS NEEDED      zinc oxide 10 % Oint Apply to affected areas twice daily (Patient not taking: Reported on 11/13/2020) 57 g 0    [DISCONTINUED] polyethylene glycol (GLYCOLAX) 17 gram/dose powder Take 17 g by mouth every other day. 507 g 2     Current Facility-Administered Medications on File Prior to Visit   Medication Dose Route Frequency Provider Last Rate Last Dose    onabotulinumtoxina injection 100 Units  100 Units Intramuscular Once Yung Rodriguez MD           Review of patient's allergies indicates:   Allergen Reactions    Penicillins      Hives and Itching       Review of Systems   Unable to perform ROS: Patient nonverbal       Objective:      Physical Exam  Constitutional:       Appearance: He is well-developed.   HENT:      Head: Normocephalic and atraumatic.   Neck:      Musculoskeletal: Normal range of motion and neck supple.   Pulmonary:      Effort: Pulmonary effort is normal.   Abdominal:      General: There is no distension.   Genitourinary:     Comments: I&O catheterization performed in sterile fashion, patient tolerated well. ~100ml of light yellow urine collected  Musculoskeletal:      Comments: In wheel chair   Skin:     General: Skin is warm and dry.   Neurological:      Mental Status: He is alert.         Assessment:       1. Malodorous urine    2. History of UTI    3. Incomplete emptying of bladder        Plan:         - Urine for culture. Will start empirically on bactrim based on previous culture  - Discussed incomplete emptying can contribute towards UTIs, still not interested in CIC. Will continue to work on other prevention.   - Increase hydration. Avoiding constipation        Malodorous urine  -     Urinalysis Microscopic  -     Urine culture  -     Urinalysis    History of UTI  -     Urinalysis Microscopic  -     Urine culture  -     Urinalysis    Incomplete emptying of bladder    Other orders  -     sulfamethoxazole-trimethoprim 800-160mg (BACTRIM DS) 800-160 mg Tab;  Take 1 tablet by mouth 2 (two) times daily. for 7 days  Dispense: 14 tablet; Refill: 0

## 2020-11-13 NOTE — PROGRESS NOTES
Subjective:       Patient ID: Lisa Moreno is a 39 y.o. male.    Chief Complaint: Follow-up, Constipation, and Rectal Bleeding    Patient here today to follow-up aforementioned complaints.  Patient was previously seen me in July with similar complaints, at which time he was started on Trulance and sent for EGD/colonoscopy.  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    Today patient is again accompanied by his mother who provides most of his history.  She reports continued constipation despite Trulance, which he is no longer taking.  He is currently taking MiraLax every other day.  This does help somewhat however she does frequently have to manually disimpact him.  She does sometimes see blood in the stool.    Review of Systems   Unable to perform ROS: Patient nonverbal         Patient is accompanied by his mother who corroborates above history.    The following portions of the patient's history were reviewed and updated as appropriate: allergies, current medications, past family history, past medical history, past social history, past surgical history and problem list.    Objective:      Physical Exam  Vitals signs and nursing note reviewed.   Constitutional:       Appearance: He is well-developed.      Comments: Wheelchair dependent   HENT:      Head: Normocephalic and atraumatic.   Eyes:      General: No scleral icterus.     Pupils: Pupils are equal, round, and reactive to light.   Cardiovascular:      Rate and Rhythm: Normal rate and regular rhythm.      Heart sounds: Normal heart sounds.   Pulmonary:      Effort: Pulmonary effort is normal. No respiratory distress.      Breath sounds: Normal breath sounds.   Abdominal:      General: Bowel sounds are normal. There is no distension.      Palpations: Abdomen is soft.      Tenderness: There is no abdominal tenderness.   Musculoskeletal: Normal range of motion.   Neurological:      Mental Status: He is  alert. Mental status is at baseline.   Psychiatric:         Behavior: Behavior normal.           Pertinent labs and imaging studies reviewed    Assessment:       1. Chronic constipation        Plan:       Recent colonoscopy inadequate however watery brown stool noted.  Apparently mother was able to get get the patient to tolerate the prep without issue  Increase MiraLax to at least daily but can do b.i.d.  Increase water intake    28 minutes were spent in coordination of patient's care, record review and counseling.  More than 50% of the time was face-to-face.    (Portions of this note were dictated using voice recognition software and may contain dictation related errors in spelling/grammar/syntax not found on text review)

## 2020-11-15 LAB — BACTERIA UR CULT: NO GROWTH

## 2020-11-16 ENCOUNTER — TELEPHONE (OUTPATIENT)
Dept: NEUROLOGY | Facility: CLINIC | Age: 40
End: 2020-11-16

## 2020-11-16 DIAGNOSIS — G12.29 UPPER MOTOR NEURON DISEASE: Primary | ICD-10-CM

## 2020-11-16 NOTE — TELEPHONE ENCOUNTER
Message received from Dr. Humphrey regarding referral to ALS Clinic.     Contacted patient's mother, Giovana, and scheduled appt for 12/9.     Ms. Akhtar indicated that patient usually utilizes transportation service. She will arrange transportation or ask a family/friend.

## 2020-11-17 ENCOUNTER — TELEPHONE (OUTPATIENT)
Dept: PULMONOLOGY | Facility: CLINIC | Age: 40
End: 2020-11-17

## 2020-11-17 ENCOUNTER — TELEPHONE (OUTPATIENT)
Dept: UROLOGY | Facility: CLINIC | Age: 40
End: 2020-11-17

## 2020-11-17 DIAGNOSIS — R31.29 MICROSCOPIC HEMATURIA: Primary | ICD-10-CM

## 2020-11-17 NOTE — TELEPHONE ENCOUNTER
I called back Giovana, mother and caregiver for Mr Moreno about DME equipment. She is requesting a mask for his nebulizer instead of a mouthpiece. She feels Mr Moreno will get more of the nebulized medicine with a mask . Patient has seen Kristin Clayton NP before for bronchitis and he has a appointment with Dr Lechuga on 11-23-20. He has a future appointment with Dr [facility]. I will try to get this order to [facility]. Pamela Wright LPN

## 2020-11-17 NOTE — TELEPHONE ENCOUNTER
----- Message from Alma Rosa Desai NP sent at 11/17/2020  9:24 AM CST -----  Can you please contact pt's mother to schedule bmp and ct. Thank you

## 2020-11-17 NOTE — TELEPHONE ENCOUNTER
----- Message from Liane Abdi sent at 11/17/2020  9:45 AM CST -----    Type:  Patient Returning Call    Who Called: Patient's mom  Who Left Message for Patient: Arleen  Does the patient know what this is regarding?: unknown  Would the patient rather a call back or a response via [facility]? Call back  Best Call Back Number:   Additional Information: n/a

## 2020-11-17 NOTE — TELEPHONE ENCOUNTER
Informed patient's mother about negative urine culture. Microscopic hematuria present. Will order BMP, CT urogram and cysto. Patient's mother requesting for cysto to be done under anesthesia, otherwise might not be able to tolerate cysto.

## 2020-11-17 NOTE — TELEPHONE ENCOUNTER
----- Message from Yin Marcie sent at 11/17/2020 11:21 AM CST -----  Contact: Giovana   tel:  [phone] caregiver  Caller says pt. Needs a Mask , pls send an order in for this, and the mouth piece is a inhaler, this  is broken on the nebulizer.    Needs a new Nebulizer and a Mask ordered in stead of an inhaler.   Needs you to send an order for the mask instead to the DME.   Needs this ordered and sent asap.  Pls call to discuss.

## 2020-11-18 ENCOUNTER — TELEPHONE (OUTPATIENT)
Dept: REHABILITATION | Facility: HOSPITAL | Age: 40
End: 2020-11-18

## 2020-11-18 ENCOUNTER — CLINICAL SUPPORT (OUTPATIENT)
Dept: REHABILITATION | Facility: HOSPITAL | Age: 40
End: 2020-11-18
Payer: MEDICARE

## 2020-11-18 DIAGNOSIS — Z78.9 IMPAIRED MOBILITY AND ADLS: Primary | ICD-10-CM

## 2020-11-18 DIAGNOSIS — G80.1 SPASTIC DIPLEGIA: ICD-10-CM

## 2020-11-18 DIAGNOSIS — Z74.09 IMPAIRED MOBILITY AND ADLS: Primary | ICD-10-CM

## 2020-11-18 DIAGNOSIS — R29.898 UPPER EXTREMITY WEAKNESS: ICD-10-CM

## 2020-11-18 DIAGNOSIS — R27.8 DECREASED COORDINATION: ICD-10-CM

## 2020-11-18 PROCEDURE — 97166 OT EVAL MOD COMPLEX 45 MIN: CPT | Mod: PN

## 2020-11-18 NOTE — PLAN OF CARE
[facility] Occupational Therapy  Initial Neurological Evaluation     Date: 11/18/2020  Patient: Lisa Moreno  Chart Number: [id number]    Therapy Diagnosis:   Encounter Diagnoses   Name Primary?    Spastic diplegia     Impaired mobility and ADLs Yes    Upper extremity weakness     Decreased coordination      Physician: Yung Rodriguez MD    Physician Orders: Eval and treat  Medical Diagnosis: G80.1 (ICD-10-CM) - Spastic diplegia  Onset Date: 2001  Evaluation Date: 11/18/2020  Plan of Care Expiration Period: 1/8/2020  Insurance Authorization period Expiration: 12/31/2020  Date of Return to MD: 11/23/2020 pulmonary  Visit # / Visits Authortized: 1 / 50  FOTO: Degenerative CNS/66% limitation    Time In: 11:03 AM  Time Out: 11:46 AM  Total Billable (one on one) Time: 43 minutes    Precautions: Standard and Fall    Subjective     History of Current Condition: Symptoms began in 2001. Pt has been in and out of therapy for the past several years. He has good support at home, and has enough arm strength to perform self care activities. He, however, requires a lot of help at home. He was recently discharged from another outpatient clinic for OT/PT/ST, but received a botox shot in the BLE this past week so physician put in new orders for therapy.    Involved Side: B/L   Dominant Side: Right  Date of Onset: 2001  Surgical Procedure: None  Imaging: CT scan films, bone scan films under imaging  Previous Therapy: OP OT/PT/ST    Patient's Goals for Therapy: Walking and being more independent     Pain:  Pain Related Behaviors Observed: no pain usually, although minimal pain observed in lower abdominal region occassionally    Occupation:    Working presently: disability  Duties: None    Functional Limitations/Social History:    Prior Level of Function: Max A  Current Level of Function:Max A    Home/Living environment : lives with their family  Home Access: One story  DME: transfer tub bench,  wheelchair and hospital bed     Leisure: Watch television, play cards    Past Medical History/Physical Systems Review:   Lisa Moreno  has a past medical history of Degenerative motor system disease, Depression, Seizure, Spastic quadriplegia, and Spinocerebellar atrophy.    Lisa Moreno  has a past surgical history that includes Baclofen pump implantation; Fluoroscopic urodynamic study (N/A, 11/27/2018); Esophagogastroduodenoscopy (N/A, 7/23/2020); Colonoscopy (N/A, 7/23/2020); and Upper gastrointestinal endoscopy.    Lisa has a current medication list which includes the following prescription(s): albuterol, albuterol, baclofen, citalopram, dantrolene, linzess, myrbetriq, omeprazole, polyethylene glycol, sulfamethoxazole-trimethoprim 800-160mg, and zinc oxide, and the following Facility-Administered Medications: onabotulinumtoxina.    Review of patient's allergies indicates:   Allergen Reactions    Penicillins      Hives and Itching        Other:      Objective     Cognitive Exam:  Oriented: Person, Place, Time and Situation  Behaviors: normal, cooperative  Follows Commands/attention: Follows multistep  commands  Communication: dysarthria  Memory: No Deficits noted  Safety awareness/insight to disability: aware of diagnosis, treatment, and prognosis  Coping skills/emotional control: Appropriate to situation    Visual/Perceptual:  Tracking: intact  Saccades: intact  Acuity: intact  R/L discrimination: intact  Visual field: intact  Motor Planning Praxis: intact  Comments:     Physical Exam:  Postural examination/scapula alignment: Rounded shoulder and Slouched posture  Joint integrity: WNL  Skin integrity: WNL  Edema: None   Palpation: None      Joint Evaluation  AROM  11/18/2020 PROM   11/18/2020 AROM  11/18/2020 PROM   11/18/2020    Right Right Left Left   Shoulder flex 0-180 All WFL      Shoulder Abd 0-180       Shoulder ER 0-90       Shoulder IR 0-90       Shoulder Extension 0-80       Shoulder  Horizontal adduction 0-90       Elbow flex/ext 0-150       Wrist flex 0-80       Wrist ext 0-70       Supination 0-80       Pronation 0-80       UD       RD         Fist: normal      Strength 11/18/2020 11/18/2020   **within available ROM** Right Left   Shoulder flex 4/5 4/5   Shoulder abd 4/5 4/5   Shoulder ER 4/5 4/5   Shoulder IR 4/5 4/5   Shoulder Extension 4/5 4/5   Shoulder Horizontal adduction 4/5 4/5   Elbow flex 5/5 5/5   Elbow ext 4/5 4/5   Wrist flex 4/5 4/5   Wrist ext 4/5 4/5   Supination 4/5 4/5   Pronation 4/5 4/5   UD 4/5 4/5   RD 4/5 4/5      Strength: (MIAH Dynamometer in lbs.) Average 3 trials, Position II:     11/18/2020 11/18/2020    Right Left   Rung II 40# 35#     Pinch Strength (Measured in psi)     11/18/2020 11/18/2020    Right Left   Key Pinch 13 psi 16 psi   3pt Pinch 8 psi 7 psi   2pt Pinch 10 psi 9 psi     Barthel Index: 60/100    Gross motor coordination:   - BHARTI: impaired  - Finger to Nose: intact  - Finger Flicks: impaired    Tone:  Modified Gianna Scale:   0 - No increase in muscle tone    Comments:     Sensation:  Detrell  reports no changes in sensation  Light touch: bilateral intact  Sharp/Dull: bilateral intact  Kinesthesia: bilateralintact  Proprioception: bilateral intact  Temperature: bilateral intact    Balance:   Static Sit - GOOD: Takes MODERATE challenges from all directions  Dynamic sit- GOOD-: Takes MODERATE challenges from all directions but inconsistently  Static Stand - 0: Needs MAXIMAL assist to maintain sitting without back support  Dynamic stand - 0: Needs MAXIMAL assist to maintain sitting without back support    Endurance Deficit: severe                    Functional Status      Functional Mobility:  Bed mobility: Mod A  Roll to left: Min A  Roll to right: Min A  Supine to sit: Min A  Sit to supine: Min A  Transfers to bed: Min A  Transfers to toilet: Min A  Car transfers: Max A  Wheelchair mobility: Mod I    ADL's:  Feeding: I  Grooming: I  Hygiene:  I  UB Dressing: I  LB Dressing: Mod A  Toileting: I  Bathing: Max A    IADL's:  Homecare: D  Cooking: Min A  Laundry: Max A  Yard work: D  Use of telephone: Mod I  Money management: Mod I  Medication management: Min A  Handwriting:Min A  Technology Use:Mod I    Comments:      CMS Impairment/Limitation/Restriction for FOTO Degenerative CNS Survey    Therapist reviewed FOTO scores for Lisa Moreno on 11/18/2020.   FOTO documents entered into [employer] - see Media section.    Limitation Score: 66%  Category: Self Care    Current : CL = least 60% but < 80% impaired, limited or restricted  Goal: CK = at least 40% but < 60% impaired, limited or restricted  Discharge: CK = at least 40% but < 60% impaired, limited or restricted         Treatment     Home Exercises and Patient Education Provided    Education provided:   -role of OT, goals for OT, scheduling/cancellations, insurance limitations with patient.  -Additional Education provided: continue HEP from previous OT session    Written Home Exercises Provided: Patient instructed to cont prior HEP.  Exercises were reviewed and Lisa was able to demonstrate them prior to the end of the session.    Lisa demonstrated good  understanding of the education provided.     See EMR under Patient Instructions for exercises provided prior visit.    Assessment     Lisa Moreno is a 39 y.o. male referred to outpatient occupational therapy and presents with a medical diagnosis of spastic diplegia, resulting in decreased flexibility, decreased muscle strength, impaired function and decreased work ability and demonstrates limitations as described in the chart below. Following medical record review it is determined that pt will benefit from occupational therapy services in order to maximize pain free and/or functional use of bilateral UE.    Pt prognosis is Fair due to nature of diagnosis, but pt has good family support at home  Pt will benefit from skilled outpatient  Occupational Therapy to address the deficits stated above and in the chart below, provide pt/family education, and to maximize pt's level of independence.     Plan of care discussed with patient: Yes  Pt's spiritual, cultural and educational needs considered and patient is agreeable to the plan of care and goals as stated below:     Anticipated Barriers for therapy: transportation    Medical Necessity is demonstrated by the following  Profile and History Assessment of Occupational Performance Level of Clinical Decision Making Complexity Score   Occupational Profile:   Lisa Moreno is a 39 y.o. male who lives with their family and is currently on disability. Lisa Moreno has difficulty with  bathing and dressing  finance management, driving/transportation management, shopping, phone/computer use, housework/household chores and medication management  affecting his/her daily functional abilities. His/her main goal for therapy is to be independent.     Comorbidities:   ataxia, dysarthria    Medical and Therapy History Review:   Expanded               Performance Deficits    Physical:  Joint Stability  Muscle Power/Strength  Muscle Endurance  Control of Voluntary Movement   Strength  Pinch Strength  Gross Motor Coordination  Fine Motor Coordination  Muscle Tone    Cognitive:  Communication    Psychosocial:    Social Interaction  Habits  Routines  Rituals     Clinical Decision Making:  moderate    Assessment Process:  Problem-Focused Assessments    Modification/Need for Assistance:  Minimal-Moderate Modifications/Assistance    Intervention Selection:  Several Treatment Options       moderate  Based on PMHX, co morbidities , data from assessments and functional level of assistance required with task and clinical presentation directly impacting function.       The following goals were discussed with the patient and patient is in agreement with them as to be addressed in the treatment plan.      Goals:  Short Term Goals:  1) Initiate Hep   2) Pt will be able to participate in simple cooking activity with mod A per patient report by 4 weeks.  3) Pt to increase Barthel Index Score by 5 points increasing self care IND by 4 weeks.   4) Patient will be able to achieve less than or equal to 60% on the FOTO, demonstrating overall improved functional ability with upper extremity. (self-care category)    Long Term Goals:  1) Pt to be IND with HEP in order to maintain ROM and strength needed for self care IND by d/c.  2) Pt will be able to participate in simple cooking activity with min A per patient report by d/c.  3) Pt to increase Barthel Index Score by 10 points increasing self care IND at home by d/c.  4) Patient will be able to achieve less than or equal to 55% on the FOTO, demonstrating overall improved functional ability with upper extremity. (self-care category)  5) Pt will be min A with bed mobility by d/c.      Plan   Certification Period/Plan of care expiration: 11/18/2020 to 1/8/2020.    Outpatient Occupational Therapy 2 times weekly for 8 weeks to include the following interventions: Electrical Stimulation PRN, Manual Therapy, Moist Heat/ Ice, Neuromuscular Re-ed, Patient Education, Self Care, Therapeutic Activites and Therapeutic Exercise.    JAZMIN KING, OT

## 2020-11-19 ENCOUNTER — CLINICAL SUPPORT (OUTPATIENT)
Dept: REHABILITATION | Facility: HOSPITAL | Age: 40
End: 2020-11-19
Payer: MEDICARE

## 2020-11-19 DIAGNOSIS — G80.1 SPASTIC DIPLEGIA: ICD-10-CM

## 2020-11-19 DIAGNOSIS — Z78.9 IMPAIRED MOBILITY AND ADLS: ICD-10-CM

## 2020-11-19 DIAGNOSIS — Z74.09 IMPAIRED MOBILITY AND ADLS: ICD-10-CM

## 2020-11-19 DIAGNOSIS — R25.2 SPASTICITY: Primary | ICD-10-CM

## 2020-11-19 DIAGNOSIS — Z74.09 IMPAIRED TRANSFERS: ICD-10-CM

## 2020-11-19 PROCEDURE — 97162 PT EVAL MOD COMPLEX 30 MIN: CPT | Mod: PN

## 2020-11-19 NOTE — PLAN OF CARE
[facility]  Physical Therapy Neurological Rehabilitation Initial Evaluation    Name: Lisa Moreno  Clinic Number: [id number]    Therapy Diagnosis:   Encounter Diagnosis   Name Primary?    Spastic diplegia      Physician: Yung Rodriguez MD    Physician Orders: PT Eval and Treat   Medical Diagnosis from Referral: G80.1 (ICD-10-CM) - Spastic diplegia  Evaluation Date: 11/19/2020  Authorization Period Expiration: 12/31/2020  Plan of Care Expiration: 12/31/2020  Visit # / Visits authorized: 1/ 50    Time In: 1000  Time Out: 1048  Total Billable Time: 48 minutes    Precautions: no thin liquids, swallow precautions, incontinent    Subjective   Date of onset: 2006  History of current condition - Lisa reports: pt has been w/c bound for 3 years, recent botox injections last Friday.     Medical History:   Past Medical History:   Diagnosis Date    Degenerative motor system disease     Depression     Seizure     Spastic quadriplegia     Spinocerebellar atrophy        Surgical History:   Lisa Moreno  has a past surgical history that includes Baclofen pump implantation; Fluoroscopic urodynamic study (N/A, 11/27/2018); Esophagogastroduodenoscopy (N/A, 7/23/2020); Colonoscopy (N/A, 7/23/2020); and Upper gastrointestinal endoscopy.    Medications:   Lisa has a current medication list which includes the following prescription(s): albuterol, albuterol, baclofen, citalopram, dantrolene, linzess, myrbetriq, omeprazole, polyethylene glycol, sulfamethoxazole-trimethoprim 800-160mg, and zinc oxide, and the following Facility-Administered Medications: onabotulinumtoxina.    Allergies:   Review of patient's allergies indicates:   Allergen Reactions    Penicillins      Hives and Itching        Imaging, CT scan films:   Impression:  No acute intracranial abnormality.  Specifically, no intracranial hemorrhage.    Prior Therapy: [facility] therapy 7 visit with recent d/c   Social  History:  Lives with mother  Falls: 0   DME: BSC, Shower chair and nebulizer, lightweight w/c, hospital bed, ramp   Home Environment: [facility]   Exercise Routine / History: PT and HEP prior in    Family Present at time of Eval:  mother  Occupation: none  Prior Level of Function: non-ambulatory for 3 years, but w/c   Current Level of Function: impaired transfer, bed mobility and ADLs    Pain:  Current 0/10    Patient's goals: mother wants pt' to be as active as possible and to receive therapy after botox.    Objective     - Command followin% simple commands   - Speech: speech impaired    Dominant hand:  See OT note    Posture Alignment :slouched posture    Sensation:  Light Touch: Intact           Proprioception:   Intact    Tone: 3 Considerable increase in muscle tone, passive movement difficult  Limbs/muscles affected: B LE     Coordination:     - LE coordination:  Moderately impaired B LE    ROM:   UPPER EXTREMITY--AROM/PROM  See OT note         RANGE OF MOTION--LOWER EXTREMITIES  (R) LE Hip: flexion 92 PROM, pt rest at 10 degrees Add, hip PROM abd 5 degrees.     Knee: Knee extension -3 degrees, 121 degrees knee flexion    Ankle: DF PROM lacking 8 degrees, AROM lacking 15 degrees   Eversion AROM to lacking 2, PROM to 4 degrees    Inversion rest at 15 degrees inverion, AROM to 30, PROM to 35 degrees    (L) LE: Hip: flexion PROM90 degrees, hip adb 15 degrees   Knee: Knee extension -14 decrease, 124 knee flexion    Ankle: DF PROM to neutral, AROM lacking 6 degrees.    Eversion AROM to neutral, PROM to 14 degrees    Inversion AROM to 14 degrees, PROM to 30 degrees    Lower Extremity Strength   RLE LLE   Hip Flexion: 2+/5 2+/5   Hip Extension:  2-/5 2-/5   Hip Abduction: 2-/5 2-/5   Hip Adduction: 2/5 2/5   Knee Extension: 2-/5 2-/5   Knee Flexion: 2/5 2/5   Ankle Dorsiflexion: 2-/5 2-/5   Ankle Plantarflexion: 3-/5 3-/5   Ankle Inversion: 2-/5 2/5   Ankle Eversion: 2-/5 2-/5       Gait Assessment:   Pt  non-ambulatory    TINETTI BALANCE ASSESSMENT TOOL  BALANCE SECTION  1.Sitting Balance:   Steady; safe = 1  2.Rises from chair :  Unable without help = 0  3.Attempts to arise :  Unable without help = 0  4.Immediate standing Balance (first 5 seconds) : Unsteady (swaggers, moves feet, trunk sway) = 0  5.Standing balance: Unsteady = 0  6.Nudged : Begins to fall = 0  7.Eyes closed: Unsteady = 0  8.Turning 360 degrees:  Discontinuous steps = 0 and Unsteady (grabs, staggers) = 0  9.Sitting Down : Uses arms or not a smooth motion = 1  Balance Score:  2/16    GAIT SECTION  10.Initiation of Gait (Immediately after told to go.): Any hesitancy or multiple attempts to start = 0  11.Step length and height :  Step to=0  12.Foot Clearance :  L foot clears floor=1   13.Step symmetry: Right & Left step length not equal (estimate) = 0  14.Step continuity : Stooping or discontinuity between steps = 0  15.Path: Marked deviation = 0  16.Trunk : Marked sway or uses walking aid = 0  17.Walking Time: Heels apart = 0    Gait Score: 2/12  Balance score:1/16  Total Score=Balance + Gait Score: 3/28      Risk Indicators:    Tinetti Tool Score   Risk of Falls   ?18    High   19-23   Moderate   ?24   Low    Balance Assessment:    Sitting balance (static,dynamic):see tinetti (static balance good),   Standing balance (static, dynamic): see tinetti    Functional Mobility (Bed mobility, transfers)  Bed mobility: Min A  Supine to sit: Min A  Sit to supine: Min A  Rolling: Min A  Transfers to bed: Min A  Sit to stand:  Max A  Stand pivot:  Max A  Wheelchair mobility: Max A    CMS Impairment/Limitation/Restriction for FOTO ADL Survey    Therapist reviewed FOTO scores for Lisa Moreno on 11/19/2020.   FOTO documents entered into [employer] - see Media section.    Limitation Score: 64%       TREATMENT   Therapeutic exercise to be initiated at follow-up visit:  PROM B LE all planes all joints  Sit to stand from elevated mat  Standing frame ~15  minutes    Home Exercises and Patient Education Provided    Education provided:   - PROM 2x/day  - standing 3-5 minutes 2-3x/day    Written Home Exercises Provided: yes.  Exercises were reviewed and Lisa's family and caregiver was able to demonstrate them prior to the end of the session.  Lisa' caregiver and mother demonstrated fair  understanding of the education provided.     See EMR under Patient Instructions for exercises provided 11/19/2020.    Assessment   Lisa is a 39 y.o. male referred to outpatient Physical Therapy with a medical diagnosis of spino- cerebellar atrophy and spastic diplegia. Patient presents with decrease LE ROM, B LE contractures, B ankle wounds, impaired transfer and mobility.    Patient prognosis is Good.   Patient will benefit from skilled outpatient Physical Therapy to address the deficits stated above and in the chart below, provide patient/family education, and to maximize patient's level of independence.     Plan of care discussed with patient: Yes  Patient's spiritual, cultural and educational needs considered and patient is agreeable to the plan of care and goals as stated below:     Anticipated Barriers for therapy: none    Medical Necessity is demonstrated by the following  History  Co-morbidities and personal factors that may impact the plan of care Co-morbidities:   Degenerative motor system disease   Depression   Seizure   Spastic quadriplegia   Spinocerebellar atrophy     Personal Factors:   lifestyle     high   Examination  Body Structures and Functions, activity limitations and participation restrictions that may impact the plan of care Body Regions:   lower extremities  trunk    Body Systems:    gross symmetry  ROM  strength  gross coordinated movement  balance  gait  transfers  transitions  motor control  motor learning  heart rate  respiratory rate  skin  Participation Restrictions:   Basic ADLs, transfer, w/c mobility in the community, requires 24/7  care    Activity limitations:   Learning and applying knowledge  no deficits    General Tasks and Commands  no deficits    Communication  no deficits    Mobility  moving around using equipment (WC)    Self care  washing oneself (bathing, drying, washing hands)  caring for body parts (brushing teeth, shaving, grooming)  toileting  dressing  eating  drinking  looking after one's health    Domestic Life  shopping  cooking  doing house work (cleaning house, washing dishes, laundry)    Interactions/Relationships  basic interpersonal interactions    Life Areas  basic economic transactions    Community and Social Life  community life  recreation and leisure         high   Clinical Presentation evolving clinical presentation with changing clinical characteristics moderate   Decision Making/ Complexity Score: moderate     Short Term Goals (4 Weeks):   1.  Independent with initial HEP.  2.  Pt will perform nu-step at level 2 x 6 minutes without rests breaks going at least 40 step/min or greater.    Long Term Goals (8 Weeks):   1. Pt will be able to perform TUG in 120 secs with use of AD placingdemonstrating overall improved functional mobility.   2. Pt will performed sit to stand x 30 secs for a total of 3 times with B UE support without LOB backward or posterior LE hooking for functional and safe transfers.   3.  Pt will score greater than or equal to 10/28 on the Tinetti test/assessment with use of AD demonstrating overall improved functional mobility and balance and reduce fall risk.   4. Pt will have 0 falls from start of PT sessions.  5. Pt will be able to propel 100 ft in w/c on level surface to improve household mobility.  6. Pt will be able to performed full w/c to bed transfer and back with verbal cues only and supervision to reduce burden of care on caregiver.    Plan   Plan of care Certification: 11/19/2020 to 12/31/2020.    Outpatient Physical Therapy 3 times weekly for 8 weeks to include the following  interventions: Gait Training, Manual Therapy, Moist Heat/ Ice, Neuromuscular Re-ed, Patient Education, Self Care, Therapeutic Activites and Therapeutic Exercise.     Chikis Gracia, PT

## 2020-11-23 ENCOUNTER — OFFICE VISIT (OUTPATIENT)
Dept: PULMONOLOGY | Facility: CLINIC | Age: 40
End: 2020-11-23
Payer: MEDICARE

## 2020-11-23 ENCOUNTER — LAB VISIT (OUTPATIENT)
Dept: LAB | Facility: HOSPITAL | Age: 40
End: 2020-11-23
Attending: INTERNAL MEDICINE
Payer: MEDICARE

## 2020-11-23 VITALS
WEIGHT: 160 LBS | HEIGHT: 75 IN | BODY MASS INDEX: 19.89 KG/M2 | OXYGEN SATURATION: 99 % | HEART RATE: 92 BPM | SYSTOLIC BLOOD PRESSURE: 120 MMHG | DIASTOLIC BLOOD PRESSURE: 78 MMHG

## 2020-11-23 DIAGNOSIS — G31.9 NEURODEGENERATIVE DISORDER: Primary | ICD-10-CM

## 2020-11-23 DIAGNOSIS — R31.29 MICROSCOPIC HEMATURIA: ICD-10-CM

## 2020-11-23 LAB
ANION GAP SERPL CALC-SCNC: 10 MMOL/L (ref 8–16)
BUN SERPL-MCNC: 18 MG/DL (ref 6–20)
CALCIUM SERPL-MCNC: 9.1 MG/DL (ref 8.7–10.5)
CHLORIDE SERPL-SCNC: 104 MMOL/L (ref 95–110)
CO2 SERPL-SCNC: 26 MMOL/L (ref 23–29)
CREAT SERPL-MCNC: 0.9 MG/DL (ref 0.5–1.4)
EST. GFR  (AFRICAN AMERICAN): >60 ML/MIN/1.73 M^2
EST. GFR  (NON AFRICAN AMERICAN): >60 ML/MIN/1.73 M^2
GLUCOSE SERPL-MCNC: 54 MG/DL (ref 70–110)
POTASSIUM SERPL-SCNC: 4.4 MMOL/L (ref 3.5–5.1)
SODIUM SERPL-SCNC: 140 MMOL/L (ref 136–145)

## 2020-11-23 PROCEDURE — 80048 BASIC METABOLIC PNL TOTAL CA: CPT

## 2020-11-23 PROCEDURE — 99499 UNLISTED E&M SERVICE: CPT | Mod: S$PBB,GC,, | Performed by: INTERNAL MEDICINE

## 2020-11-23 PROCEDURE — 99999 PR PBB SHADOW E&M-EST. PATIENT-LVL III: ICD-10-PCS | Mod: PBBFAC,,, | Performed by: INTERNAL MEDICINE

## 2020-11-23 PROCEDURE — 99999 PR PBB SHADOW E&M-EST. PATIENT-LVL III: CPT | Mod: PBBFAC,,, | Performed by: INTERNAL MEDICINE

## 2020-11-23 PROCEDURE — 99499 NO LOS: ICD-10-PCS | Mod: S$PBB,GC,, | Performed by: INTERNAL MEDICINE

## 2020-11-23 PROCEDURE — 36415 COLL VENOUS BLD VENIPUNCTURE: CPT

## 2020-11-23 PROCEDURE — 99213 OFFICE O/P EST LOW 20 MIN: CPT | Mod: PBBFAC | Performed by: INTERNAL MEDICINE

## 2020-11-23 NOTE — PROGRESS NOTES
Mr. Lisa Moreno is a 40 yo AAM with spinocerebellar atrophy who presents to pulmonary clinic for follow up. Patient evaluated here 1-2 months ago for cough. Symptoms at the time thought secondary to chronic aspiration, and recommendation for thickening patient's liquids (previously recommended by speech therapy) was re-enforced. In addition, a prescription for albuterol nebulizer was provided. Today, patient's mother reports improvement in patient's cough since using the liquid thickener. However, he continues to have intermittent cough at various times. Hears phlegm in patient's chest that he is unable to clear. Patient still does not have his nebulizer due to difficulties with insurance PA's, but is now waiting for it to be delivered. Though patient relies on a wheelchair for mobility, he works with PT regularly and is able to stand on his own and feed/clothe himself. Patient's mother has no specific concerns today, is happy with Mr. Moreno progress, and has an appointment with Dr. Webb in the ALS clinic on December 9th.    Patient discussed with Dr. Lechuga. RTC PRN.    Massimo Suárez MD  [employer] [facility], PGY-4

## 2020-11-24 ENCOUNTER — HOSPITAL ENCOUNTER (OUTPATIENT)
Dept: RADIOLOGY | Facility: HOSPITAL | Age: 40
Discharge: HOME | End: 2020-11-24
Attending: NURSE PRACTITIONER
Payer: MEDICARE

## 2020-11-24 DIAGNOSIS — R31.29 MICROSCOPIC HEMATURIA: ICD-10-CM

## 2020-11-24 PROCEDURE — 74178 CT ABD&PLV WO CNTR FLWD CNTR: CPT | Mod: TC

## 2020-11-24 PROCEDURE — 25500020 PHARM REV CODE 255: Performed by: NURSE PRACTITIONER

## 2020-11-24 PROCEDURE — 74178 CT UROGRAM ABD PELVIS W WO: ICD-10-PCS | Mod: 26,,, | Performed by: RADIOLOGY

## 2020-11-24 PROCEDURE — 74178 CT ABD&PLV WO CNTR FLWD CNTR: CPT | Mod: 26,,, | Performed by: RADIOLOGY

## 2020-11-24 RX ADMIN — IOHEXOL 125 ML: 350 INJECTION, SOLUTION INTRAVENOUS at 12:11

## 2020-11-24 NOTE — PROGRESS NOTES
"   Occupational Therapy Treatment Note     Date: 11/25/2020  Name: Lisa Moreno  Clinic Number: [id number]    Therapy Diagnosis:   Encounter Diagnoses   Name Primary?    Upper extremity weakness     Decreased coordination     Impaired mobility and ADLs      Physician: Yung Rodriguez MD     Physician Orders: Eval and treat  Medical Diagnosis: G80.1 (ICD-10-CM) - Spastic diplegia  Onset Date: 2001  Evaluation Date: 11/18/2020  Plan of Care Expiration Period: 1/8/2020  Insurance Authorization period Expiration: 12/31/2020  Date of Return to MD: 11/23/2020 pulmonary  Visit # / Visits Authortized: 2 / 50  FOTO: Degenerative CNS/66% limitation     Time In:  11:45 AM  Time Out:  12:32 PM  Total Billable (one on one) Time: 47 minutes     Precautions: Standard and Fall    Subjective     Pt reports: "I (transfer) by myself"  he was compliant with home exercise program given last session.   Response to previous treatment:Good  Functional change: None noted yet    Pain: 0/10    Objective     Lisa received therapeutic exercises for 37 minutes including:  Pt completed the following exercises below in order to increase ROM, strength and tolerance of affected UE in order to increase IND and functional use:    Exercises Date 11/25/2020    Visit # 2   SciFit ArmBike Level 4.5 8 min 4 min forwards 4 backwards   Black theraband Rows 2/15  Pull downs  B/L ER   3# db  Bicep curls 3/10   Red theraband Horizontal abduction  tricep extensions   Isospheres 1:30 min each hand   Red CP Small pom poms         Lisa participated in dynamic functional therapeutic activities to improve functional performance for 10  minutes, including:    Bean bag toss    Ball toss        Home Exercises and Education Provided     Education provided:   - Continue with HEP  - Progress towards goals     Written Home Exercises Provided: Patient instructed to cont prior HEP.  Exercises were reviewed and Lisa was able to demonstrate them prior to " the end of the session.  Lisa demonstrated good  understanding of the HEP provided.   .   See EMR under Patient Instructions for exercises provided prior visit.        Assessment     Pt participated in session well today. He demonstrates good ROM and strength and reports good independence at home with proper support. Pt's limitations are mainly with FM coordination. Ball toss was a challenge with L side and clothespin activity required extra time. Isosphere activity also graded down with L hand to perform prone on table. Overall, he participated in all exercises well. Next session to continue to progress with following exercises in order to increase independence with ADLs and IADLs.    Lisa is progressing well towards his goals and there are no updates to goals at this time. Pt prognosis is Fair.     Pt will continue to benefit from skilled outpatient occupational therapy to address the deficits listed in the problem list on initial evaluation provide pt/family education and to maximize pt's level of independence in the home and community environment.     Anticipated barriers to occupational therapy: transportation    Pt's spiritual, cultural and educational needs considered and pt agreeable to plan of care and goals.    Goals:  Short Term Goals:  1) Initiate Hep Met 11/18/2020  2) Pt will be able to participate in simple cooking activity with mod A per patient report by 4 weeks. Progressing  3) Pt to increase Barthel Index Score by 5 points increasing self care IND by 4 weeks.  Progressing  4) Patient will be able to achieve less than or equal to 60% on the FOTO, demonstrating overall improved functional ability with upper extremity. (self-care category) Progressing     Long Term Goals:  1) Pt to be IND with HEP in order to maintain ROM and strength needed for self care IND by d/c.Progressing  2) Pt will be able to participate in simple cooking activity with min A per patient report by d/c.  Progressing  3)  Pt to increase Barthel Index Score by 10 points increasing self care IND at home by d/c. Progressing  4) Patient will be able to achieve less than or equal to 55% on the FOTO, demonstrating overall improved functional ability with upper extremity. (self-care category) Progressing  5) Pt will be min A with bed mobility by d/c. Progressing    Plan   Continue per initial plan of care  Updates/Grading for next session: Progress as tolerated    JAZMIN KING, OT

## 2020-11-25 ENCOUNTER — TELEPHONE (OUTPATIENT)
Dept: UROLOGY | Facility: CLINIC | Age: 40
End: 2020-11-25

## 2020-11-25 ENCOUNTER — CLINICAL SUPPORT (OUTPATIENT)
Dept: REHABILITATION | Facility: HOSPITAL | Age: 40
End: 2020-11-25
Payer: MEDICARE

## 2020-11-25 DIAGNOSIS — Z74.09 IMPAIRED MOBILITY AND ADLS: ICD-10-CM

## 2020-11-25 DIAGNOSIS — R29.898 UPPER EXTREMITY WEAKNESS: ICD-10-CM

## 2020-11-25 DIAGNOSIS — R27.8 DECREASED COORDINATION: ICD-10-CM

## 2020-11-25 DIAGNOSIS — Z78.9 IMPAIRED MOBILITY AND ADLS: ICD-10-CM

## 2020-11-25 PROCEDURE — 97110 THERAPEUTIC EXERCISES: CPT | Mod: KX,PN

## 2020-11-25 PROCEDURE — 97530 THERAPEUTIC ACTIVITIES: CPT | Mod: KX,PN

## 2020-11-25 NOTE — TELEPHONE ENCOUNTER
Spoke with Ms. Akhtar regarding results. Slight hypoglycemia on labs, patient hadn't eaten prior. CT with benign urological findings. Incidental GI and pulmonary findings. Patient just had recent visit with GI and pulmonary, message sent to providers to update on CT findings. Continue with scheduled follow-up or sooner if any issues

## 2020-11-27 ENCOUNTER — CLINICAL SUPPORT (OUTPATIENT)
Dept: REHABILITATION | Facility: HOSPITAL | Age: 40
End: 2020-11-27
Payer: MEDICARE

## 2020-11-27 DIAGNOSIS — Z78.9 IMPAIRED MOBILITY AND ADLS: ICD-10-CM

## 2020-11-27 DIAGNOSIS — Z74.09 IMPAIRED MOBILITY AND ADLS: ICD-10-CM

## 2020-11-27 DIAGNOSIS — R29.898 UPPER EXTREMITY WEAKNESS: ICD-10-CM

## 2020-11-27 DIAGNOSIS — R27.8 DECREASED COORDINATION: ICD-10-CM

## 2020-11-27 PROCEDURE — 97110 THERAPEUTIC EXERCISES: CPT | Mod: PN

## 2020-11-27 PROCEDURE — 97530 THERAPEUTIC ACTIVITIES: CPT | Mod: PN

## 2020-11-27 NOTE — PROGRESS NOTES
Occupational Therapy Treatment Note     Date: 11/27/2020  Name: Lisa Irvin [place]no  Clinic Number: [id number]    Therapy Diagnosis:   Encounter Diagnoses   Name Primary?    Upper extremity weakness     Decreased coordination     Impaired mobility and ADLs      Physician: Yung Rodriguez MD     Physician Orders: Eval and treat  Medical Diagnosis: G80.1 (ICD-10-CM) - Spastic diplegia  Onset Date: 2001  Evaluation Date: 11/18/2020  Plan of Care Expiration Period: 1/8/2020  Insurance Authorization period Expiration: 12/31/2020  Date of Return to MD: 11/23/2020 pulmonary  Visit # / Visits Authortized: 3 / 50  FOTO: Degenerative CNS/66% limitation     Time In: 8:45 AM  Time Out:  9:38 AM  Total Billable (one on one) Time:  53 minutes     Precautions: Standard and Fall    Subjective     Pt reports:    he was compliant with home exercise program given last session.   Response to previous treatment:Good  Functional change: None noted yet    Pain: 0/10    Objective     Lisa received therapeutic exercises for 45 minutes including:  Pt completed the following exercises below in order to increase ROM, strength and tolerance of affected UE in order to increase IND and functional use:    Exercises Date 11/27/2020    Visit # 3   SciFit ArmBike Level 4.0 8 min 4 min forwards 4 backwards     Blue theraband Rows 2/15  Pull downs  B/L ER   3# db  Bicep curls 3/10   Red theraband Horizontal abduction  tricep extensions   Isospheres 1:30 min each hand   Red CP Small pom poms         Lisa participated in dynamic functional therapeutic activities to improve functional performance for 8 minutes, including:    Bean bag toss    Ball toss    Connect 4 L hand       Home Exercises and Education Provided     Education provided:   - Continue with HEP  - Progress towards goals     Written Home Exercises Provided: Patient instructed to cont prior HEP.  Exercises were reviewed and Lisa was able to demonstrate them prior to the end  of the session.  Lisa demonstrated good  understanding of the HEP provided.   .   See EMR under Patient Instructions for exercises provided prior visit.        Assessment     Pt participated in session well today. Continued focus on increasing UB strength in order to participate in ADLs and functional transfers. Pt transferred from w/c to mat and back with min A. He required A for chair placement next to mat, moving leg rests, and placing feet back on leg rests. GM and FM coordination were also addressed with good tolerance. Alternating bicep curls and ball toss while seated were good challenges. FM coordination, specifically in hand manipulation, still requires min A and extra time, but he demonstrated good progress today. Next session to continue to progress with following exercises in order to increase independence with ADLs and IADLs.    Lisa is progressing well towards his goals and there are no updates to goals at this time. Pt prognosis is Fair.     Pt will continue to benefit from skilled outpatient occupational therapy to address the deficits listed in the problem list on initial evaluation provide pt/family education and to maximize pt's level of independence in the home and community environment.     Anticipated barriers to occupational therapy: transportation    Pt's spiritual, cultural and educational needs considered and pt agreeable to plan of care and goals.    Goals:  Short Term Goals:  1) Initiate Hep Met 11/18/2020  2) Pt will be able to participate in simple cooking activity with mod A per patient report by 4 weeks. Progressing  3) Pt to increase Barthel Index Score by 5 points increasing self care IND by 4 weeks.  Progressing  4) Patient will be able to achieve less than or equal to 60% on the FOTO, demonstrating overall improved functional ability with upper extremity. (self-care category) Progressing     Long Term Goals:  1) Pt to be IND with HEP in order to maintain ROM and strength  needed for self care IND by d/c.Progressing  2) Pt will be able to participate in simple cooking activity with min A per patient report by d/c.  Progressing  3) Pt to increase Barthel Index Score by 10 points increasing self care IND at home by d/c. Progressing  4) Patient will be able to achieve less than or equal to 55% on the FOTO, demonstrating overall improved functional ability with upper extremity. (self-care category) Progressing  5) Pt will be min A with bed mobility by d/c. Met 11/27/2020    Plan   Continue per initial plan of care  Updates/Grading for next session: Progress as tolerated    JAZMIN KING, OT

## 2020-11-30 ENCOUNTER — CLINICAL SUPPORT (OUTPATIENT)
Dept: REHABILITATION | Facility: HOSPITAL | Age: 40
End: 2020-11-30
Payer: MEDICARE

## 2020-11-30 ENCOUNTER — EXTERNAL CHRONIC CARE MANAGEMENT (OUTPATIENT)
Dept: PRIMARY CARE CLINIC | Facility: CLINIC | Age: 40
End: 2020-11-30
Payer: MEDICARE

## 2020-11-30 DIAGNOSIS — Z78.9 IMPAIRED MOBILITY AND ADLS: ICD-10-CM

## 2020-11-30 DIAGNOSIS — R27.8 DECREASED COORDINATION: ICD-10-CM

## 2020-11-30 DIAGNOSIS — Z74.09 IMPAIRED MOBILITY AND ADLS: ICD-10-CM

## 2020-11-30 DIAGNOSIS — R25.2 SPASTICITY: ICD-10-CM

## 2020-11-30 DIAGNOSIS — R29.898 UPPER EXTREMITY WEAKNESS: ICD-10-CM

## 2020-11-30 DIAGNOSIS — Z74.09 IMPAIRED TRANSFERS: Primary | ICD-10-CM

## 2020-11-30 DIAGNOSIS — G80.1 SPASTIC DIPLEGIA: ICD-10-CM

## 2020-11-30 PROBLEM — R13.12 OROPHARYNGEAL DYSPHAGIA: Status: RESOLVED | Noted: 2019-09-19 | Resolved: 2020-11-30

## 2020-11-30 PROBLEM — R26.89 BALANCE PROBLEM: Status: RESOLVED | Noted: 2019-11-27 | Resolved: 2020-11-30

## 2020-11-30 PROBLEM — R53.1 WEAKNESS GENERALIZED: Status: RESOLVED | Noted: 2020-08-12 | Resolved: 2020-11-30

## 2020-11-30 PROBLEM — R26.89 DECREASED FUNCTIONAL MOBILITY: Status: RESOLVED | Noted: 2019-11-27 | Resolved: 2020-11-30

## 2020-11-30 PROCEDURE — 97112 NEUROMUSCULAR REEDUCATION: CPT | Mod: KX,PN

## 2020-11-30 PROCEDURE — 99490 CHRNC CARE MGMT STAFF 1ST 20: CPT | Mod: S$PBB,,, | Performed by: INTERNAL MEDICINE

## 2020-11-30 PROCEDURE — 97110 THERAPEUTIC EXERCISES: CPT | Mod: PN

## 2020-11-30 PROCEDURE — 99490 PR CHRONIC CARE MGMT, 1ST 20 MIN: ICD-10-PCS | Mod: S$PBB,,, | Performed by: INTERNAL MEDICINE

## 2020-11-30 PROCEDURE — 97140 MANUAL THERAPY 1/> REGIONS: CPT | Mod: KX,PN

## 2020-11-30 PROCEDURE — 97530 THERAPEUTIC ACTIVITIES: CPT | Mod: PN

## 2020-11-30 PROCEDURE — 99490 CHRNC CARE MGMT STAFF 1ST 20: CPT | Mod: PBBFAC | Performed by: INTERNAL MEDICINE

## 2020-11-30 NOTE — PROGRESS NOTES
Occupational Therapy Treatment Note     Date: 11/30/2020  Name: Lisa Irvin [place]  Clinic Number: [id number]    Therapy Diagnosis:   Encounter Diagnoses   Name Primary?   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   Response to previous treatment:Good  Functional change: None noted yet    Pain: 0/10    Objective     Lisa received therapeutic exercises for 35 minutes including:  Pt completed the following exercises below in order to increase ROM, strength and tolerance of affected UE in order to increase IND and functional use:    Exercises Date 11/30/2020    Visit # 4   SciFit ArmBike Level 4.5 8 min 4 min forwards 4 backwards     Blue theraband Rows 2/15  Pull downs  B/L ER   3# db  Bicep curls 3/10   Blue and green theraband Horizontal abduction  tricep extensions        Red CP Small pom poms         Lisa participated in dynamic functional therapeutic activities to improve functional performance for 10 minutes, including:    Connect 4 L hand   Rip, ball, flick paper        Home Exercises and Education Provided     Education provided:   - Continue with HEP  - Progress towards goals     Written Home Exercises Provided: Patient instructed to cont prior HEP.  Exercises were reviewed and Lisa was able to demonstrate them prior to the end of the session.   Lisa demonstrated good  understanding of the HEP provided.   .   See EMR under Patient Instructions for exercises provided prior visit.        Assessment     Pt participated in session well today. Good progress with increasing UB strength and pt transferred from w/c to mat with min A. GM and FM improving, although increased time required for CP activity and paper tearing. He demonstrated good bilateral engagement in paper activity. Alternating bicep curls were still a challenge today due to decreased coordination. Next session to continue to progress with exercises in order to increase participation in ADLs and IADLs.    Lisa is progressing well towards his goals and there are no updates to goals at this time. Pt prognosis is Fair.     Pt will continue to benefit from skilled outpatient occupational therapy to address the deficits listed in the problem list on initial evaluation provide pt/family education and to maximize pt's level of independence in the home and community environment.     Anticipated barriers to occupational therapy: transportation    Pt's spiritual, cultural and educational needs considered and pt agreeable to plan of care and goals.    Goals:  Short Term Goals:  1) Initiate Hep Met 11/18/2020  2) Pt will be able to participate in simple cooking activity with mod A per patient report by 4 weeks. Progressing  3) Pt to increase Barthel Index Score by 5 points increasing self care IND by 4 weeks.  Progressing  4) Patient will be able to achieve less than or equal to 60% on the FOTO, demonstrating overall improved functional ability with upper extremity. (self-care category) Progressing     Long Term Goals:  1) Pt to be IND with HEP in order to maintain ROM and strength needed for self care IND by d/c.Progressing  2) Pt will be able to participate in simple cooking activity with min A per patient report by d/c.  Progressing  3) Pt to increase Barthel Index Score by 10 points increasing self  care IND at home by d/c. Progressing  4) Patient will be able to achieve less than or equal to 55% on the FOTO, demonstrating overall improved functional ability with upper extremity. (self-care category) Progressing  5) Pt will be min A with bed mobility by d/c. Met 11/27/2020    Plan   Continue per initial plan of care  Updates/Grading for next session: Progress as tolerated    JAZMIN AN, OT

## 2020-11-30 NOTE — PROGRESS NOTES
Physical Therapy Treatment Note     Name: Lisa Irvin [place]  Clinic Number: [id number]    Therapy Diagnosis:   Encounter Diagnoses   Name Primary?    Impaired transfers Yes    Spasticity     Spastic diplegia     Impaired mobility and ADLs      Physician: Yung Rodriguez MD    Visit Date: 11/30/2020    Physician Orders: PT Eval and Treat   Medical Diagnosis from Referral: G80.1 (ICD-10-CM) - Spastic diplegia  Evaluation Date: 11/19/2020  Authorization Period Expiration: 12/31/2020  Plan of Care Expiration: 12/31/2020  Visit # / Visits authorized: 2/ 50     Time In: 1000  Time Out: 1048  Total Billable Time: 45 (2 MT, 1NMR) (KX all follow ups)     Precautions: no thin liquids, swallow precautions, incontinent       Subjective     Pt reports: no new complaints, pt presented from OT willing to begin PT.  He was compliant with home exercise program per patient he wore dynasplints and per caregivers PROM HEP is being performed  Response to previous treatment: no adverse affects  Functional change: none     Pain: 0/10 FACES and HURT scale  Location: N/A    Objective     Pt assisted to low mat Anam LPT and assist to supine with modA for B LE management.    Lisa received the following manual therapy techniques: passive ROM and manual stretches: for 30 minutes, including:  PT performed the following stretches to increase AROM and PROM in pt's B side: B hip rotation stretches, hip adductor stretches, ankle DF and eversion and knee extension mobs grade II.      Pt transferred to standing frame modA and positioned safely with straps.    Lisa participated in neuromuscular re-education activities to improve: Posture for 15 minutes. The following activities were included:  Gradual progression toward 3/4 standing with 3/4 stand maintained x 4' prior to pt becoming symptomatic and hypotensive.  Pt gradually lowered and transferred modA back manual w/c.     Patient Education Provided     Education provided:   -  continue dynasplint wear    Written Home Exercises Provided: yes.  Exercises were reviewed and Lisa's family and caregiver was able to demonstrate them prior to the end of the session.  Lisa' caregiver and mother demonstrated fair  understanding of the education provided.      See EMR under Patient Instructions for exercises provided 11/19/2020.    Assessment     Pt tolerated PROM and stretching well but his hip adductors and hip rotators were very tight.  His Right side was tighter than the left.  Hip and LE flexibility will improve bed mobility and hygiene and dressing changes.  He was able to tolerate standing frame to 3/4 stand for 4 minutes.      Lisa is progressing well towards his goals.   Pt prognosis is Fair.     Pt will continue to benefit from skilled outpatient physical therapy to address the deficits listed in the problem list box on initial evaluation, provide pt/family education and to maximize pt's level of independence in the home and community environment.     Pt's spiritual, cultural and educational needs considered and pt agreeable to plan of care and goals.     Anticipated barriers to physical therapy: transportation, daily HEP compliance    Short Term Goals (4 Weeks):   1.  Independent with initial HEP.  (PROGRESSING, NOT MET)  2.  Pt will perform nu-step at level 2 x 6 minutes without rests breaks going at least 40 step/min or greater. (PROGRESSING, NOT MET)     Long Term Goals (8 Weeks):   1. Pt will be able to perform TUG in 120 secs with use of AD placingdemonstrating overall improved functional mobility.  (PROGRESSING, NOT MET)  2. Pt will performed sit to stand x 30 secs for a total of 3 times with B UE support without LOB backward or posterior LE hooking for functional and safe transfers. (PROGRESSING, NOT MET)   3.  Pt will score greater than or equal to 10/28 on the Tinetti test/assessment with use of AD demonstrating overall improved functional mobility and balance and reduce  fall risk.  (PROGRESSING, NOT MET)  4. Pt will have 0 falls from start of PT sessions. (PROGRESSING, NOT MET)  5. Pt will be able to propel 100 ft in w/c on level surface to improve household mobility. (PROGRESSING, NOT MET)  6. Pt will be able to performed full w/c to bed transfer and back with verbal cues only and supervision to reduce burden of care on caregiver. (PROGRESSING, NOT MET)    Plan     Progress toward goals, continue PROM, standing frame and add stepper    Chikis Gracia, PT

## 2020-12-01 NOTE — PROGRESS NOTES
"   Occupational Therapy Treatment Note     Date: 12/2/2020  Name: Lisa Irvin [place]  Clinic Number: [id number]    Therapy Diagnosis:   Encounter Diagnoses   Name Primary?   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   Response to previous treatment:Good  Functional change: None noted yet    Pain: 0/10    Objective     Lisa received therapeutic exercises for 47 minutes including:  Pt completed the following exercises below in order to increase ROM, strength and tolerance of affected UE in order to increase IND and functional use:    Exercises Date 12/2/2020    Visit # 6   SciFit ArmBike Level 5.0 8 min 4 min forwards 4 backwards     Red theraband ER  tricep extension   5# supine dowel CP/FF 2/10   4# scapular protractions supine 2/10       Home Exercises and Education Provided     Education provided:   - Continue with HEP  - Progress towards goals     Written Home Exercises Provided: Patient instructed to cont prior HEP.  Exercises were reviewed and Lisa was able to demonstrate them prior to the end of the session.  Lisa demonstrated good  understanding of the HEP provided.   .   See EMR under Patient Instructions for exercises provided prior visit.        Assessment     Pt participated in session well today. Pt required min A to " t/f from w/c to mat and back. Decreased sitting balance noted today and required min A - CGA to maintain sitting position today. Pt educated on importance of proper form during exercises, and he nodded in understanding. UB strengthening exercises continues to progress with improved form. Supine exercises also tolerated well. Pt demonstrates functional UB strength and is able to engage in functional mobility and BADLs with proper support at home. Some scapular winging and limited posture alignment noted today, which could affect proximal stability required to progress with distal stability. Next session to continue to progress with exercises in order to increase participation in ADLs and IADLs.    Lisa is progressing well towards his goals and there are no updates to goals at this time. Pt prognosis is Fair.     Pt will continue to benefit from skilled outpatient occupational therapy to address the deficits listed in the problem list on initial evaluation provide pt/family education and to maximize pt's level of independence in the home and community environment.     Anticipated barriers to occupational therapy: transportation    Pt's spiritual, cultural and educational needs considered and pt agreeable to plan of care and goals.    Goals:  Short Term Goals:  1) Initiate Hep Met 11/18/2020  2) Pt will be able to participate in simple cooking activity with mod A per patient report by 4 weeks. Progressing  3) Pt to increase Barthel Index Score by 5 points increasing self care IND by 4 weeks.  Progressing  4) Patient will be able to achieve less than or equal to 60% on the FOTO, demonstrating overall improved functional ability with upper extremity. (self-care category) Progressing     Long Term Goals:  1) Pt to be IND with HEP in order to maintain ROM and strength needed for self care IND by d/c.Progressing  2) Pt will be able to participate in simple cooking activity with min A per patient report by d/c.   Progressing  3) Pt to increase Barthel Index Score by 10 points increasing self care IND at home by d/c. Progressing  4) Patient will be able to achieve less than or equal to 55% on the FOTO, demonstrating overall improved functional ability with upper extremity. (self-care category) Progressing  5) Pt will be min A with bed mobility by d/c. Met 11/27/2020    Plan   Continue per initial plan of care  Updates/Grading for next session: Progress as tolerated    JAZMIN KING, OT

## 2020-12-02 ENCOUNTER — CLINICAL SUPPORT (OUTPATIENT)
Dept: REHABILITATION | Facility: HOSPITAL | Age: 40
End: 2020-12-02
Payer: MEDICARE

## 2020-12-02 DIAGNOSIS — Z74.09 IMPAIRED MOBILITY AND ADLS: ICD-10-CM

## 2020-12-02 DIAGNOSIS — R27.8 DECREASED COORDINATION: ICD-10-CM

## 2020-12-02 DIAGNOSIS — Z78.9 IMPAIRED MOBILITY AND ADLS: ICD-10-CM

## 2020-12-02 DIAGNOSIS — R29.898 UPPER EXTREMITY WEAKNESS: ICD-10-CM

## 2020-12-02 PROCEDURE — 97110 THERAPEUTIC EXERCISES: CPT | Mod: PN

## 2020-12-04 ENCOUNTER — CLINICAL SUPPORT (OUTPATIENT)
Dept: REHABILITATION | Facility: HOSPITAL | Age: 40
End: 2020-12-04
Payer: MEDICARE

## 2020-12-04 DIAGNOSIS — Z74.09 IMPAIRED TRANSFERS: ICD-10-CM

## 2020-12-04 PROCEDURE — 97112 NEUROMUSCULAR REEDUCATION: CPT | Mod: PN,CQ

## 2020-12-04 PROCEDURE — 97140 MANUAL THERAPY 1/> REGIONS: CPT | Mod: PN,CQ

## 2020-12-04 NOTE — PROGRESS NOTES
Physical Therapy Treatment Note     Name: Lisa Irvin [facility] Number: [id number]    Therapy Diagnosis:   Encounter Diagnosis   Name Primary?    Impaired transfers      Physician: Yung Rodriguez MD    Visit Date: 12/4/2020    Physician Orders: PT Eval and Treat   Medical Diagnosis from Referral: G80.1 (ICD-10-CM) - Spastic diplegia  Evaluation Date: 11/19/2020  Authorization Period Expiration: 12/31/2020  Plan of Care Expiration: 12/31/2020  Visit # / Visits authorized: 3/ 50     Time In: 3:45 PM   Time Out: 4:30 PM   Total Billable Time: 45 (2 MT, 1NMR) (KX all follow ups)     Precautions: no thin liquids, swallow precautions, incontinent       Subjective     Pt reports: no new complaints, he is agreeable to PT session.  He was compliant with home exercise program per patient he wore dynasplints and per caregivers PROM HEP is being performed  Response to previous treatment: no adverse affects  Functional change: none     Pain: 0/10 FACES and HURT scale  Location: N/A    Objective   Transferred Squat pivot (mod assistance) from wheelchair to hi/low mat. Sit to supine with mod assist mainly for B LE management.     Lisa received the following manual therapy techniques: passive ROM and manual stretches: for 30 minutes, including:  PTA performed the following stretches to increase AROM and PROM in pt's B side: B hip rotation stretches, hip adductor stretches, ankle DF and eversion and knee extension mobs grade II.      Pt transferred to standing frame modA and positioned safely with straps.    Lisa participated in neuromuscular re-education activities to improve: Posture for 15 minutes. The following activities were included:  Gradual progression toward 3/4 standing with 3/4 stand maintained x 4' total before reports of fatigue.      Patient Education Provided     Education provided:   - continue dynasplint wear    Written Home Exercises Provided: yes.  Exercises were reviewed and Lisa's family  and caregiver was able to demonstrate them prior to the end of the session.  Lisa' caregiver and mother demonstrated fair  understanding of the education provided.      See EMR under Patient Instructions for exercises provided 11/19/2020.    Assessment     Pt completed session with no reports of pain. Pt presents with B LE (hip/ knee) general muscular tightness. He did not experience any adverse reactions to manual therapy and standing frame use today.     Lisa is progressing well towards his goals.   Pt prognosis is Fair.     Pt will continue to benefit from skilled outpatient physical therapy to address the deficits listed in the problem list box on initial evaluation, provide pt/family education and to maximize pt's level of independence in the home and community environment.     Pt's spiritual, cultural and educational needs considered and pt agreeable to plan of care and goals.     Anticipated barriers to physical therapy: transportation, daily HEP compliance    Short Term Goals (4 Weeks):   1.  Independent with initial HEP.  (PROGRESSING, NOT MET)  2.  Pt will perform nu-step at level 2 x 6 minutes without rests breaks going at least 40 step/min or greater. (PROGRESSING, NOT MET)     Long Term Goals (8 Weeks):   1. Pt will be able to perform TUG in 120 secs with use of AD placingdemonstrating overall improved functional mobility.  (PROGRESSING, NOT MET)  2. Pt will performed sit to stand x 30 secs for a total of 3 times with B UE support without LOB backward or posterior LE hooking for functional and safe transfers. (PROGRESSING, NOT MET)   3.  Pt will score greater than or equal to 10/28 on the Tinetti test/assessment with use of AD demonstrating overall improved functional mobility and balance and reduce fall risk.  (PROGRESSING, NOT MET)  4. Pt will have 0 falls from start of PT sessions. (PROGRESSING, NOT MET)  5. Pt will be able to propel 100 ft in w/c on level surface to improve household mobility.  (PROGRESSING, NOT MET)  6. Pt will be able to performed full w/c to bed transfer and back with verbal cues only and supervision to reduce burden of care on caregiver. (PROGRESSING, NOT MET)    Plan     Progress toward goals, continue PROM, standing frame and add stepper    Rosalio Anaya, PTA

## 2020-12-07 ENCOUNTER — CLINICAL SUPPORT (OUTPATIENT)
Dept: REHABILITATION | Facility: HOSPITAL | Age: 40
End: 2020-12-07
Payer: MEDICARE

## 2020-12-07 DIAGNOSIS — Z74.09 IMPAIRED MOBILITY AND ADLS: ICD-10-CM

## 2020-12-07 DIAGNOSIS — Z78.9 IMPAIRED MOBILITY AND ADLS: ICD-10-CM

## 2020-12-07 DIAGNOSIS — Z74.09 IMPAIRED TRANSFERS: ICD-10-CM

## 2020-12-07 DIAGNOSIS — R29.898 UPPER EXTREMITY WEAKNESS: ICD-10-CM

## 2020-12-07 DIAGNOSIS — R27.8 DECREASED COORDINATION: ICD-10-CM

## 2020-12-07 PROCEDURE — 97112 NEUROMUSCULAR REEDUCATION: CPT | Mod: KX,PN

## 2020-12-07 PROCEDURE — 97530 THERAPEUTIC ACTIVITIES: CPT | Mod: PN

## 2020-12-07 PROCEDURE — 97140 MANUAL THERAPY 1/> REGIONS: CPT | Mod: KX,PN

## 2020-12-07 PROCEDURE — 97110 THERAPEUTIC EXERCISES: CPT | Mod: PN

## 2020-12-07 NOTE — PROGRESS NOTES
Occupational Therapy Treatment Note     Date: 12/7/2020  Name: Lisa Moreno  Clinic Number: [id number]    Therapy Diagnosis:   Encounter Diagnoses   Name Primary?   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   Response to previous treatment:Good  Functional change: None noted yet    Pain: 0/10    Objective     Lisa received therapeutic exercises for 35 minutes including:  Pt completed the following exercises below in order to increase ROM, strength and tolerance of affected UE in order to increase IND and functional use:    Exercises Date 12/7/2020    Visit # 6   SciFit ArmBike Level 9.0 8 min 4 min forwards 4 backwards     Blue theraband Rows  Pull downs  Horizontal abduction  tricep extension   3# db Shoulder press   4# db Alternating bicep curls   Red theraputty   Flatten  Extension rings     Lisa received therapeutic activites for 10 minutes including:  Bean bag and ball toss        Home Exercises and Education Provided     Education provided:   - Continue with HEP  - Progress towards goals     Written Home Exercises Provided: Patient instructed to cont prior HEP.  Exercises were reviewed and Lisa was able to demonstrate them prior to the end of the session.  Lisa demonstrated  good  understanding of the HEP provided.   .   See EMR under Patient Instructions for exercises provided prior visit.        Assessment     Pt participated in session well today. Focused on UB strengthening to increase safety and independence with functional transfers. Main limitations noted are with GM and FM coordination. He demonstrates functional strength, however. Left coordination is more affected than right as demonstrated with ball tossing coordination activities. Next session to continue to progress with exercises in order to increase participation in ADLs and IADLs.    Lisa is progressing well towards his goals and there are no updates to goals at this time. Pt prognosis is Fair.     Pt will continue to benefit from skilled outpatient occupational therapy to address the deficits listed in the problem list on initial evaluation provide pt/family education and to maximize pt's level of independence in the home and community environment.     Anticipated barriers to occupational therapy: transportation    Pt's spiritual, cultural and educational needs considered and pt agreeable to plan of care and goals.    Goals:  Short Term Goals:  1) Initiate Hep Met 11/18/2020  2) Pt will be able to participate in simple cooking activity with mod A per patient report by 4 weeks. Progressing  3) Pt to increase Barthel Index Score by 5 points increasing self care IND by 4 weeks.  Progressing  4) Patient will be able to achieve less than or equal to 60% on the FOTO, demonstrating overall improved functional ability with upper extremity. (self-care category) Progressing     Long Term Goals:  1) Pt to be IND with HEP in order to maintain ROM and strength needed for self care IND by d/c.Progressing  2) Pt will be able to participate in simple cooking activity with min A per patient report by d/c.  Progressing  3) Pt to increase Barthel Index Score by 10 points increasing self care IND at home by d/c. Progressing  4) Patient  will be able to achieve less than or equal to 55% on the FOTO, demonstrating overall improved functional ability with upper extremity. (self-care category) Progressing  5) Pt will be min A with bed mobility by d/c. Met 11/27/2020    Plan   Continue per initial plan of care  Updates/Grading for next session: Progress as tolerated    JAZMIN KING, OT

## 2020-12-07 NOTE — PROGRESS NOTES
Physical Therapy Treatment Note     Name: Lisa Irvin [facility] Number: [id number]    Therapy Diagnosis:   Encounter Diagnosis   Name Primary?   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  He was compliant with home exercise program per patient he wore dynasplints and per caregivers PROM HEP is being performed  Response to previous treatment: no adverse affects  Functional change: none     Pain: 0/10 FACES and HURT scale  Location: N/A    Objective   Transferred Squat pivot (mod assistance) from wheelchair to hi/low mat. Sit to supine with mod assist mainly for B LE management.     Lisa received the following manual therapy techniques: passive ROM and manual stretches: for 20 minutes, including:  PTA performed the following stretches to increase AROM and PROM in pt's B side: B hip rotation stretches, hip adductor stretches, ankle DF and eversion and knee extension mobs grade II.      Pt transferred to standing frame modA and positioned safely with straps.    Lisa participated in neuromuscular re-education activities to improve: Posture for 25 minutes. The following activities were included:  Gradual progression toward 3/4 standing x 2 with 3/4 stand maintained x 2-4' total before reports of fatigue and orthostatis.      Patient Education Provided     Education provided:   - continue dynasplint wear    Written Home Exercises Provided: yes.  Exercises were reviewed and  Lisa's family and caregiver was able to demonstrate them prior to the end of the session.  Lisa' caregiver and mother demonstrated fair  understanding of the education provided.      See EMR under Patient Instructions for exercises provided 11/19/2020.    Assessment     Pt able to perform standing in standing frame and his adductor tone was mildly decreased today.     Lisa is progressing well towards his goals.   Pt prognosis is Fair.     Pt will continue to benefit from skilled outpatient physical therapy to address the deficits listed in the problem list box on initial evaluation, provide pt/family education and to maximize pt's level of independence in the home and community environment.     Pt's spiritual, cultural and educational needs considered and pt agreeable to plan of care and goals.     Anticipated barriers to physical therapy: transportation, daily HEP compliance    Short Term Goals (4 Weeks):   1.  Independent with initial HEP.  MET  2.  Pt will perform nu-step at level 2 x 6 minutes without rests breaks going at least 40 step/min or greater. (PROGRESSING, NOT MET)     Long Term Goals (8 Weeks):   1. Pt will be able to perform TUG in 120 secs with use of AD placingdemonstrating overall improved functional mobility.  (PROGRESSING, NOT MET)  2. Pt will performed sit to stand x 30 secs for a total of 3 times with B UE support without LOB backward or posterior LE hooking for functional and safe transfers. (PROGRESSING, NOT MET)   3.  Pt will score greater than or equal to 10/28 on the Tinetti test/assessment with use of AD demonstrating overall improved functional mobility and balance and reduce fall risk.  (PROGRESSING, NOT MET)  4. Pt will have 0 falls from start of PT sessions. (PROGRESSING, NOT MET)  5. Pt will be able to propel 100 ft in w/c on level surface to improve household mobility. (PROGRESSING, NOT MET)  6. Pt will be able to performed full w/c to bed transfer and back with verbal  cues only and supervision to reduce burden of care on caregiver. (PROGRESSING, NOT MET)    Plan     Progress toward goals, continue PROM, standing frame and add stepper    Chikis Gracia, PT

## 2020-12-08 ENCOUNTER — CLINICAL SUPPORT (OUTPATIENT)
Dept: REHABILITATION | Facility: HOSPITAL | Age: 40
End: 2020-12-08
Payer: MEDICARE

## 2020-12-08 DIAGNOSIS — Z74.09 IMPAIRED TRANSFERS: ICD-10-CM

## 2020-12-08 PROCEDURE — 97110 THERAPEUTIC EXERCISES: CPT | Mod: KX,PN,CQ

## 2020-12-08 PROCEDURE — 97112 NEUROMUSCULAR REEDUCATION: CPT | Mod: KX,PN,CQ

## 2020-12-08 NOTE — PROGRESS NOTES
Physical Therapy Treatment Note     Name: Lisa Irvin [facility] Number: [id number]    Therapy Diagnosis:   Encounter Diagnosis   Name Primary?   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  He was compliant with home exercise program per patient he wore dynasplints and per caregivers PROM HEP is being performed  Response to previous treatment: no adverse affects  Functional change: none     Pain: 0/10 FACES and HURT scale  Location: N/A    Objective   Transferred Squat pivot (mod assistance) from wheelchair to hi/low mat. Sit to supine with mod assist mainly for B LE management.     Lisa received the following manual therapy techniques: passive ROM and manual stretches: for 15 minutes, including:  PTA performed the following stretches to increase AROM and PROM in pt's B side: B hip rotation stretches, hip adductor stretches, ankle DF and eversion and knee extension mobs grade II.      Pt transferred to standing frame modA and positioned safely with straps.    Lisa participated in neuromuscular re-education activities to improve: Posture for 25 minutes. The following activities were included:  Gradual progression toward 3/4 standing x 2 with 3/4 stand maintained x 2-4' total before reports of fatigue and orthostatis.   Nustep x 7 in level 2 for B UE/LE reciprocal AROM     Patient Education Provided     Education provided:   - continue dynasplint wear    Written Home  Exercises Provided: yes.  Exercises were reviewed and Lisa's family and caregiver was able to demonstrate them prior to the end of the session.  Lisa' caregiver and mother demonstrated fair  understanding of the education provided.      See EMR under Patient Instructions for exercises provided 11/19/2020.    Assessment     Pt exhibited no adverse reactions to today's session. He was able to perform nustep today for B UE/LE reciprocal AROM with no reports of pain.     Lisa is progressing well towards his goals.   Pt prognosis is Fair.     Pt will continue to benefit from skilled outpatient physical therapy to address the deficits listed in the problem list box on initial evaluation, provide pt/family education and to maximize pt's level of independence in the home and community environment.     Pt's spiritual, cultural and educational needs considered and pt agreeable to plan of care and goals.     Anticipated barriers to physical therapy: transportation, daily HEP compliance    Short Term Goals (4 Weeks):   1.  Independent with initial HEP.  MET  2.  Pt will perform nu-step at level 2 x 6 minutes without rests breaks going at least 40 step/min or greater. (PROGRESSING, NOT MET)     Long Term Goals (8 Weeks):   1. Pt will be able to perform TUG in 120 secs with use of AD placingdemonstrating overall improved functional mobility.  (PROGRESSING, NOT MET)  2. Pt will performed sit to stand x 30 secs for a total of 3 times with B UE support without LOB backward or posterior LE hooking for functional and safe transfers. (PROGRESSING, NOT MET)   3.  Pt will score greater than or equal to 10/28 on the Tinetti test/assessment with use of AD demonstrating overall improved functional mobility and balance and reduce fall risk.  (PROGRESSING, NOT MET)  4. Pt will have 0 falls from start of PT sessions. (PROGRESSING, NOT MET)  5. Pt will be able to propel 100 ft in w/c on level surface to improve household mobility.  (PROGRESSING, NOT MET)  6. Pt will be able to performed full w/c to bed transfer and back with verbal cues only and supervision to reduce burden of care on caregiver. (PROGRESSING, NOT MET)    Plan     Advance PT as per POC.    Rosalio Anaya, TANIA

## 2020-12-09 ENCOUNTER — OFFICE VISIT (OUTPATIENT)
Dept: NEUROLOGY | Facility: CLINIC | Age: 40
End: 2020-12-09
Payer: MEDICARE

## 2020-12-09 VITALS
HEIGHT: 75 IN | HEART RATE: 84 BPM | DIASTOLIC BLOOD PRESSURE: 67 MMHG | BODY MASS INDEX: 19.52 KG/M2 | OXYGEN SATURATION: 99 % | SYSTOLIC BLOOD PRESSURE: 106 MMHG | WEIGHT: 157 LBS

## 2020-12-09 DIAGNOSIS — R26.81 GAIT INSTABILITY: ICD-10-CM

## 2020-12-09 DIAGNOSIS — G80.1 SPASTIC DIPLEGIA: ICD-10-CM

## 2020-12-09 DIAGNOSIS — N31.9 NEUROGENIC BLADDER: ICD-10-CM

## 2020-12-09 DIAGNOSIS — K59.09 CHRONIC CONSTIPATION: ICD-10-CM

## 2020-12-09 DIAGNOSIS — F41.9 ANXIETY: ICD-10-CM

## 2020-12-09 DIAGNOSIS — R25.2 SPASTICITY: ICD-10-CM

## 2020-12-09 DIAGNOSIS — R47.1 DYSARTHRIA: ICD-10-CM

## 2020-12-09 DIAGNOSIS — Z00.8 NUTRITIONAL ASSESSMENT: ICD-10-CM

## 2020-12-09 DIAGNOSIS — Z74.09 IMPAIRED MOBILITY AND ADLS: ICD-10-CM

## 2020-12-09 DIAGNOSIS — G12.29 UPPER MOTOR NEURON DISEASE: Primary | ICD-10-CM

## 2020-12-09 DIAGNOSIS — F32.A DEPRESSION, UNSPECIFIED DEPRESSION TYPE: ICD-10-CM

## 2020-12-09 DIAGNOSIS — R41.89 COGNITIVE AND BEHAVIORAL CHANGES: ICD-10-CM

## 2020-12-09 DIAGNOSIS — Z78.9 IMPAIRED MOBILITY AND ADLS: ICD-10-CM

## 2020-12-09 DIAGNOSIS — G11.8 SPINOCEREBELLAR ATAXIA: ICD-10-CM

## 2020-12-09 DIAGNOSIS — R05.8 WEAK COUGH: Primary | ICD-10-CM

## 2020-12-09 DIAGNOSIS — J30.9 ALLERGIC RHINITIS, UNSPECIFIED SEASONALITY, UNSPECIFIED TRIGGER: ICD-10-CM

## 2020-12-09 DIAGNOSIS — R27.8 DECREASED COORDINATION: ICD-10-CM

## 2020-12-09 DIAGNOSIS — R13.12 OROPHARYNGEAL DYSPHAGIA: ICD-10-CM

## 2020-12-09 DIAGNOSIS — R29.898 UPPER EXTREMITY WEAKNESS: ICD-10-CM

## 2020-12-09 DIAGNOSIS — R46.89 COGNITIVE AND BEHAVIORAL CHANGES: ICD-10-CM

## 2020-12-09 PROCEDURE — 97168 OT RE-EVAL EST PLAN CARE: CPT | Mod: PO,59

## 2020-12-09 PROCEDURE — 99215 OFFICE O/P EST HI 40 MIN: CPT | Mod: PBBFAC

## 2020-12-09 PROCEDURE — 99204 PR OFFICE/OUTPT VISIT, NEW, LEVL IV, 45-59 MIN: ICD-10-PCS | Mod: S$PBB,,, | Performed by: INTERNAL MEDICINE

## 2020-12-09 PROCEDURE — 92610 EVALUATE SWALLOWING FUNCTION: CPT | Mod: PO

## 2020-12-09 PROCEDURE — 99999 PR PBB SHADOW E&M-EST. PATIENT-LVL V: ICD-10-PCS | Mod: PBBFAC,,,

## 2020-12-09 PROCEDURE — 99204 OFFICE O/P NEW MOD 45 MIN: CPT | Mod: S$PBB,,, | Performed by: INTERNAL MEDICINE

## 2020-12-09 PROCEDURE — 99215 OFFICE O/P EST HI 40 MIN: CPT | Mod: S$PBB,,, | Performed by: PSYCHIATRY & NEUROLOGY

## 2020-12-09 PROCEDURE — 92522 EVALUATE SPEECH PRODUCTION: CPT | Mod: PO

## 2020-12-09 PROCEDURE — 99999 PR PBB SHADOW E&M-EST. PATIENT-LVL V: CPT | Mod: PBBFAC,,,

## 2020-12-09 PROCEDURE — 99213 OFFICE O/P EST LOW 20 MIN: CPT | Mod: S$PBB,,, | Performed by: PHYSICAL MEDICINE & REHABILITATION

## 2020-12-09 PROCEDURE — 99213 PR OFFICE/OUTPT VISIT, EST, LEVL III, 20-29 MIN: ICD-10-PCS | Mod: S$PBB,,, | Performed by: PHYSICAL MEDICINE & REHABILITATION

## 2020-12-09 PROCEDURE — 99215 PR OFFICE/OUTPT VISIT, EST, LEVL V, 40-54 MIN: ICD-10-PCS | Mod: S$PBB,,, | Performed by: PSYCHIATRY & NEUROLOGY

## 2020-12-09 PROCEDURE — 97163 PT EVAL HIGH COMPLEX 45 MIN: CPT | Mod: PO

## 2020-12-09 RX ORDER — CITALOPRAM 20 MG/1
20 TABLET, FILM COATED ORAL DAILY
Qty: 90 TABLET | Refills: 3 | Status: SHIPPED | OUTPATIENT
Start: 2020-12-09 | End: 2022-02-11 | Stop reason: SDUPTHER

## 2020-12-09 RX ORDER — CETIRIZINE HYDROCHLORIDE 10 MG/1
10 TABLET ORAL DAILY
Qty: 30 TABLET | Refills: 11 | Status: SHIPPED | OUTPATIENT
Start: 2020-12-09 | End: 2021-11-30

## 2020-12-09 RX ORDER — FLUTICASONE PROPIONATE 50 MCG
1 SPRAY, SUSPENSION (ML) NASAL DAILY
Qty: 16 G | Refills: 11 | Status: SHIPPED | OUTPATIENT
Start: 2020-12-09 | End: 2022-03-03

## 2020-12-09 NOTE — PROGRESS NOTES
NEUROLOGY  [facility]  [address] [place]  [address]  [phone] (office) / [phone] (fax)    Patient Name:  Lisa Morneo  :  1980  MR #:  [id number]  [facility]  [id number]    Date of Clinic Follow Up: 2020  Name of Neurologist: Shelly Bray D.O, ABPN, AOBNP, ABEM    Other Physicians:  John Nixon Ii, MD (Primary Care Physician); No ref. provider found (Referring)    Subjective:       Patient ID: Lisa Moreno is a 39 y.o. male.    Chief Complaint:  SLP Initial Evaluation      History of Present Illness  Lisa Moreno is a 39 y.o. male here today for follow up of possible motor neuron disease.    Here with: his mother, Giovana    Symptoms began in  with slurred speech.  This started to become more consistent with time.  He would stumble when walking.  He was having spasticity, so a baclofen pump was placed, but he declined with the pump so it was removed.  His mother doesn't know if it was mental or if it was due to weakness, but he was constantly complaining about the pump.  He is now on oral baclofen.  He has withdrawal if he stops it.      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  He has not been given a definitive diagnosis despite testing.    His mother isn't sure when memory became and issue.  He has difficulty with short term memory but seems to remember the past well.  He is on aricept.  This does not seem to be getting worse.     There was no family history of anything like this cognitively or physically.    Strength:  He does not feel he has weakness in his arms or legs.    Dysarthria:  He has difficulty with speech output.    Dysphagia: He occasionally has trouble swallowing.  He has coughed and choked with liquids before.  He has to have thickener in his liquids.    Sialorrhea:  Denies issues.    Constipation:  He has significant constipation.  He has had impaction.  He has to have manual disimpaction.  He is getting more water.   He has tried a few prescription medications including Linzess.  He has been following with GI and had a recent colonoscopy.    Sleep:  He sleeps well     Gait:  He is using a wheelchair.  He can stand for a couple steps with assistance.    Falls:  He has not fallen.    Breathing:  He has no difficulty breathing.    Bladder:  He is incontinent.  He follows with urology.  They are talking about putting in a suprapubic catheter.  He has tried myrbetriq.    Pain:  He denies any pain.    Mood:  He is on medication for depression and anxiety.  His mother states he is fidgety, she is not sure the anxiety pill is helping.    PBA:  Denies any issues.    Memory:  He has difficulty with short term memory.  Possibly also some behavioral issues.  Mom states some sexual disinhibition.  These include frequent masturbation and not paying attention.  Mother frequently corrects him, telling him to hold his head in a certain position and to stop certain mannerisms.      Treatment to date:    ITB - discontinued  Baclofen  Celexa  Myrbetriq - discontinued, no benefit  Linzess - discontinued, made worse  Dantrolene  Glycolax      Past Medical, Surgical, Family & Social History:   Reviewed and updated.    Home Medications:     Current Outpatient Medications:     albuterol (ACCUNEB) 0.63 mg/3 mL Nebu, NEBULIZE 3 MLS EVERY 6 HOURS AS NEEDED, Disp: , Rfl:     albuterol (ACCUNEB) 1.25 mg/3 mL Nebu, Take 3 mLs (1.25 mg total) by nebulization every 6 (six) hours as needed. Rescue, Disp: 3 Box, Rfl: 3    baclofen (LIORESAL) 20 MG tablet, Take 1 tablet (20 mg total) by mouth 3 (three) times daily., Disp: 270 tablet, Rfl: 3    citalopram (CELEXA) 20 MG tablet, Take 1 tablet (20 mg total) by mouth once daily., Disp: 90 tablet, Rfl: 3    dantrolene (DANTRIUM) 50 MG Cap, TAKE 1 CAPSULE(50 MG) BY MOUTH THREE TIMES DAILY, Disp: 270 capsule, Rfl: 0    omeprazole (PRILOSEC) 40 MG capsule, Take 1 capsule (40 mg total) by mouth once daily., Disp: 90  capsule, Rfl: 3    polyethylene glycol (GLYCOLAX) 17 gram/dose powder, Take 17 g by mouth every other day., Disp: 507 g, Rfl: 5    cetirizine (ZYRTEC) 10 MG tablet, Take 1 tablet (10 mg total) by mouth once daily., Disp: 30 tablet, Rfl: 11    fluticasone propionate (FLONASE) 50 mcg/actuation nasal spray, 1 spray (50 mcg total) by Each Nostril route once daily., Disp: 16 g, Rfl: 11    LINZESS 145 mcg Cap capsule, , Disp: , Rfl:     MYRBETRIQ 25 mg Tb24 ER tablet, , Disp: , Rfl:     zinc oxide 10 % Oint, Apply to affected areas twice daily (Patient not taking: Reported on 11/13/2020), Disp: 57 g, Rfl: 0    Current Facility-Administered Medications:     onabotulinumtoxina injection 100 Units, 100 Units, Intramuscular, Once, Yung Rodriguez MD      Review of Systems  See HPI    Objective:     Vitals:    12/09/20 0809   BP: 106/67   Pulse: 84      FVC 19 (difficulty performing test)    ALS Physical Exam PBA Speech Facial Strength Tongue Strength Tongue Appear Limb Fasciculations Neck Flex HHD   12/9/2020 No moderate normal normal normal Absent 9.2      ALS Physical Exam (Continued) Neck Flex MMT Neck Ext HHD Neck Ext MMT Shd Abd R HHD Shd Abd R MMT Shd Abd L HHD Shd Abd L MMT   12/9/2020 5 10.3 5 7.6 4+ 11.8 5      ALS Physical Exam (Continued) Wrist Ext R HHD Wrist Ext R MMT Wrist Ext L HHD Hand  R (kg) Hand  L (kg) Hip Flex R HHD Hip Flex R MMT   12/9/2020 6.3 4 8.7 18 20 4.3 2      ALS Physical Exam (Continued) Hip Flex L HHD Hip Flex L MMT Knee Ext R HHD Knee Ext R MMT Knee Ext L HHD Knee Ext L MMT Foot DF R HHD   12/9/2020 0 1;2 6.1 2;3 0 1;2 0      ALS Physical Exam (Continued) Foot DF L HHD Foot DF L MMT UMN Signs Legs UMN Signs Legs Type   12/9/2020 0 0 Yes contracture, spasticity       ALSFRS Score: 29  FRS-6 Score: 14         GENERAL:  General appearance: No apparent distress. Tends to look off and not participate in conversation.  Frequent extensor posturing involving the back and neck with  "head rotation to the left.  He appears to become "settled" into this position.  He requires attention to move into a more traditional seated position.  Respiratory:  Normal.  Extremities: Diffuse muscle atrophy, greater in legs.    MENTAL STATUS:  Alertness, attention span & concentration: Does not participate in conversation even when directly about him.  Minimal speech.  Most of exam he exhibited very little affect.  Language: paucity of speech.  Orientation to self, place & time:  Oct 2020, hospital, almost 40yo.  Memory, recent & remote: fair.  Fund of knowledge: fair.    SPEECH:  Garbled, dysarthric, no overt spasticity to speech, limited fluency.  A little difficulty with complex commands.    CRANIAL NERVES:  Cranial Nerves II-XII were examined.  II - Visual fields: normal.  III, IV, VI: PERRL, EOMI, No ptosis, No nystagmus.  V - Facial sensation: normal.  VII - Face symmetry & mobility: normal.  VIII - Hearing: normal.  IX, X - Palate: mobile & midline.  XI - Shoulder shrug: normal.  XII - Tongue protrusion: normal.    GROSS MOTOR:  Gait & station: in wheelchair, needs assist.  Tone: increased in BLE.  Abnormal movements: none.  Coord - no issues with F-N    MUSCLE STRENGTH:   See table    REFLEXES:    RIGHT Reflex   LEFT   1 Biceps 1   1 Brachiorad. 1   1 Triceps 1   - Pectoralis -   - Jaw Jerk -   + Lindsey's +        2+ Patellar 2+   1 Ankle 1    Suprapatellar              Down PLANTAR Down     SENSORY:  Light touch: Normal throughout.      Diagnostic Data Reviewed:   7/31/2006 Complete Ataxia Panel [facility]:  This individual does not possess a repeat expansion mutation or   sequence alteration associated with the Complete Ataxia Evaluation.   This evaluation includes (1) repeat expansion analysis for SCA 1, 2, 3, 6, 7, 8, 10, 17, DRPLA, and FRDA1, (2) complete sequence analysis of APTX (AOA1), SETX (AOA2), and PRKCG (SCA14), and (3) select exon analysis of POLG1 (for two MIRAS mutations) and " SPBN2   (for three known SCA5 mutations). Therefore, this individual is  unlikely to be affected with or predisposed to developing ataxia due to these causes. Please refer to the Comments section of this report for further information.     7/13/2006:  VLCFA: wnl     2/5/2015:  Celiac disease panel: wnl  Anti BECK antibody- 0.03  CK - 607     2/18/2015:  AFP: wnl -2.4    3/21/2015:  Long chain fatty acids: wnl  Plasma Amino Acids: wnl    9/7/2018:  CK -241     9/7/2018 MRI brain with and without contrast:  IMPRESSION:   Significantly motion limited examination.  No evidence of acute infarct.  If clinical concern persists, the exam can be repeated as indicated.  Generalized cerebellar and brainstem atrophy, grossly unchanged.  Additional findings discussed in the body of the report.     MRI Thoracic spine with and without contrast:  IMPRESSION:   Stable appearance of diffuse decreased caliber of the thoracic cord without evidence of a focal signal abnormality.  No infectious or inflammatory process in the thoracic spine.  Significant motion degraded examination.  Repeat of the postcontrast images may be obtained, when clinically appropriate.  MRI L spine with and without contrast:  IMPRESSION:   Heterogeneous bone marrow signal involving the L5-S1, and S2-L2 broad bodies with nonspecific patchy enhancement on the post contrast sequences. No definitive MR evidence of discitis osteomyelitis.  Marked motion degraded examination. Repeat of the examination is suggested, when the patient is better able to tolerate positioning.  1/14/2015 MRI C spine with and without contrast:  Findings  Cervical spine alignment is normal.  There is multilevel degenerative disk disease most pronounced at C3-C4 through C5-C6 noting significant disk desiccation and mild narrowing.  Vertebral body heights alignment and the bone marrow signal is within   normal limits without findings to suggest an infiltrative process.  No fractures or  dislocations.  The visualized cervical spinal cord is normal in signal and contour.  Post contrast images demonstrate normal enhancement.  C2-C3: There is mild intervertebral disk desiccation and narrowing.  No spinal canal stenosis or neural foraminal narrowing.  C3-C4: There is a small right paracentral disk protrusion that partially effaces the anterior thecal sac.  No spinal canal stenosis or neural foraminal narrowing.  C4-C5: There is a moderate sized central and right paracentral disk protrusion that effaces the anterior thecal sac and abuts the ventral aspect of the spinal cord.  No spinal canal stenosis or neural foraminal narrowing.  C5-C6: There is a moderate size central disk protrusion that effaces the anterior thecal sac and abuts the ventral aspect of the spinal cord resulting in mild spinal canal stenosis.  No neural foraminal narrowing.  C6-C7: There is a small right paracentral disk bulge.  No spinal canal stenosis or neural foraminal narrowing.  Vertebral artery flow voids are present.  Remaining soft tissues of the neck are unremarkable.  IMPRESSION:      Unremarkable post contrast MRI evaluation of the cervical spine.  No significant detrimental change when compared to the study dated 7-25-06.     EMG/NCS 3/17/2015:  Summary   Nerve conduction studies of the left lower and upper extremity revealed mildly prolonged ulnar and median antidromic sensory distal peak latencies.   Concentric needle examination of selected left upper and lower extremity muscles demonstrated absence of motor unit potentials in the left tibialis anterior, but activation was poor. Poor activation can be due to effort, pain, or central process.   Impression   This study is mildly abnormal. There is electrophysiologic evidence for distal upper extremity demyelination.       Impression and Plan:     Lisa Moreno is a 39 y.o.male here for follow up of a possible neuromuscular disease.  I and the members of the  multidisciplinary team have assessed Lisa Moreno and have made the following recommendations:    Problem List Items Addressed This Visit        Neuro    Spinocerebellar ataxia    Overview     Seen Dr. Wise in movement disorder clinic on 2/23/2015  Diagnosis of SCA at that visit           Current Assessment & Plan     This is definitely a difficult case. While he does exhibit some hyper-reflexia, the overall history and physical are not exemplary of motor neuron disease such as PLS or ALS.  During his assessment, dystonia seemed to be the most outstanding feature.  His mother is misinterpreting this as a behavior, but it is clearly involuntary muscle movement.  His BECK antibodies area borderline elevated, but stiff person syndrome would be in this differential.  Dystonia syndromes would definitely be high on the list.  His hypophonia is also suggestive of a movement disorder.  The cognitive changes are unexpected and the etiology is unclear.  The severe constipation is also suggestive of a movement disorder, though it can be seen in other syndromes.    Agree with getting an EMG to see if the peripheral nervous system is playing any role here.    Due to a weak and ineffective cough he will benefit from a cough assist.    Will refer to back to the movement disorders dept for further assistance in evaluating his case     Will refer to neuropsychology for further assessment of attention and cognition.           Spastic diplegia    Current Assessment & Plan     He has abnormal tone in the lower limbs, however this may not be pure spasticity.  Consider additional rigidity and dystonia.  OK to continue baclofen         Relevant Orders    Comprehensive metabolic panel    Dysarthria    Relevant Orders    Ambulatory referral/consult to Speech Therapy    Decreased coordination       Psychiatric    Depression    Current Assessment & Plan     Increase Celexa to 20mg         Relevant Medications    citalopram (CELEXA)  20 MG tablet    Anxiety    Current Assessment & Plan     Increase Celexa to 20mg         Relevant Medications    citalopram (CELEXA) 20 MG tablet       Renal/    Neurogenic bladder    Current Assessment & Plan     Follows with urology.            GI    Oropharyngeal dysphagia    Current Assessment & Plan     Will plan for a FEES to assess swallowing.         Relevant Orders    Ambulatory referral/consult to Speech Therapy    Chronic constipation    Current Assessment & Plan     Follows with GI  No new suggestions from this neurology team, he is more advanced that what we typically see.  He was given our typical constipation handout, though he has tried most of these already              Orthopedic    Upper extremity weakness       Other    Gait instability    Current Assessment & Plan     Patient is having difficulty safely ambulating.  He is at increased risk for falls and injury.    The patient was seen today for mobility evaluation for a power mobility device due to significant impairment at home.  - The patient has gait impairment and is unable to use a walker consistently more than 20-30 ft , due to BLE weakness and fatigue secondary to progressive neuromuscular disease. He also has impaired balance due to lower limb weakness and abnormal tone.  - The patient is not able to ambulate safely to the kitchen or living room.  - The patient is unable to use an optimally-configured manual wheelchair in the home in order to perform Mobility Related Activities of Daily Living, due to BUE weakness & fatigue.   - The patient's brief cognitive assessment shows he should be able to use a power mobility device well at home.  - The patient was given a prescription for a power wheelchair.  - A scooter would not be appropriate due the patient's difficulty controlling the scooter tiller due to hand weakness & fatigue and to maneuverability restrictions at home.   - This will allow the patient to go safely to the kitchen,  dining room or living room for feeding & socialization.  - The patient is to return the neurology clinic in 3 months.         Spasticity    Impaired mobility and ADLs      Other Visit Diagnoses     Weak cough    -  Primary    Relevant Orders    HME - OTHER    Allergic rhinitis, unspecified seasonality, unspecified trigger        Relevant Medications    cetirizine (ZYRTEC) 10 MG tablet    fluticasone propionate (FLONASE) 50 mcg/actuation nasal spray    Nutritional assessment              This note was created with voice recognition software.  Grammatical, syntax and spelling errors may be inevitable.        Shelly Bray D.O, ABPN, AOBNP, ABEM

## 2020-12-09 NOTE — PROGRESS NOTES
[facility]      SPEECH THERAPY NEUROLOGICAL REHABILITATION EVALUATION    ALS MULTIDISCIPLINARY CLINIC     Date: 12/9/2020    Name: Lisa Moreno   MRN: [id number]    Therapy Diagnosis:   Encounter Diagnoses   Name Primary?    Upper extremity weakness     Decreased coordination     Impaired mobility and ADLs       Physician: Dr. Ziyad Landaverde, Dr. Shelly Bray and Dr. 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   Evaluation of Speech Sound Production  33   Swallow and Oral Function Evaluation   10     Precautions: progressive degenerative disease, Standard, Fall and aspiration    Subjective   Date of Onset:  2006   History of Current Condition: Lisa Moreno  presents to the [facility] secondary to the diagnosis of ALS Disease. Patient has a complex medical history with various diagnoses and continued medical testing. His mother was present and presented all pertinent information. He received a Baclofen pump in 2008 but due to complications was removed. He has difficulty with communicating due to imprecise articulation, gurgly voice, swallowing difficulty,  spastic upper and lower extremitries, cognitive deficits, and inappropriate behavior. His mother answered all questions and when patient was asked to speak, he spoke very little.  Chief Complaint: speech difficulty and swallowing  Past Medical History:  has a past medical history of Degenerative motor system disease, Depression, Seizure, Spastic quadriplegia, and Spinocerebellar atrophy. Lisa Moreno  has a past surgical history that includes Baclofen pump implantation; Fluoroscopic urodynamic study (N/A, 11/27/2018);  "Esophagogastroduodenoscopy (N/A, 7/23/2020); Colonoscopy (N/A, 7/23/2020); and Upper gastrointestinal endoscopy.  Medical Hx and Allergies:  Lisa has a current medication list which includes the following prescription(s): albuterol, albuterol, baclofen, citalopram, dantrolene, omeprazole, polyethylene glycol, linzess, myrbetriq, and zinc oxide, and the following Facility-Administered Medications: onabotulinumtoxina.   Review of patient's allergies indicates:   Allergen Reactions    Penicillins      Hives and Itching     Prior Therapy:  Speech therapy at [facility]  From 8/12/20 to 11/3/20: "SLP educated patient and his mother mother previous session regarding limited progress in therapy with non-compliance with diet modification from last MBSS as reasoning for discharge at this time. "                       Home Health Therapy at present time: No  Social History: lives with mother, aunt, and cousin. He has a brother who lives out of state. Patient also has a 12 year old son. 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   Prior Level of Function:  Independent   Current Level of Function: assistance needed for most ADL's  Nutrition: regular with thin liquids  Recent MBSS:  8/18/20 "Pt presents with moderate-severe oropharyngeal dysphagia c/b silent aspiration with mechanical soft and nectar thick liquids liquids."  Pain: 0/10  Pain Location/Description: n/a   Respiratory Status: adequate in room air  Vision: appeared to be within functional limits   Hearing: Appeared to be within functional limits in conversation. Will monitor and refer as necessary.   Objective     Pt's motor speech, fluency, and voice were informally assessed. OME performed within Cranial Nerve Assessment detailed below. Motor speech skills were assessed and were not functional for daily communication with a variety of communication partners (i.e. Family, friends, medical " personnel).   Respiration / Phonation:   Average Norms  /WFL Maximum Phon. Time (ah) Words Per Minute:   P^ 4 5.0-7.1 .8  Trial 1: 5 29 words per minute   T^ 4  4.8-7.1 .8  Trial 2:  4 >125 = WFL    K^ 3  4.4-7.4 .6   <125 = refer for AAC eval   P^T^K^ 3  3.6-7.5 .6  Av.5 Norm: 160-170  (paragraph reading)       Norm (F 10-26, M 13-34.6) Norm: 150 to 250 (norm conversation)        Phonation Oral Reading Conversation   Stimulus Sustained ah:   The [place]age History and Conversation   Quality Strained, wet/gurgly, muffled Strained, wet/gurgly, muffled Strained, wet/gurgly, muffled   Duration  4.5 seconds  1 minute  N/a   Loudness  reduced and aphonic at times reduced and aphonic at times reduced and aphonic at times   Steadiness low pitch low pitch low pitch     Overall Speech Intelligibility: poor, imprecise articulation, muffled, strained-strangled vocal quality, Wet vocal quality due to excessive saliva and pooling saliva.   Awareness / Strategy Use:   Pt demonstrates  limited awareness of motor speech impairment. Pt was not able to use strategies to improve intelligibility with maximal cues. Strategies introduced included: clear your throat, speak louder, speak clearer    Impact of Motor Speech Impairment on Functioning:   Decreased efficiency and effectiveness to communicate in an emergency situation.     Communicative Effectiveness Survey: is a questionnaire given to the patient to  the effectiveness of his communicative function.       Communication Situation  Rating on a scale of 1 - 4  1= not at all effective   4= very effective   Having a conversation with a family member or friends at home  4    Participating in conversation with strangers in a quiet place 4    Conversing with a familiar person over the telephone  3   Conversing with a stranger over the telephone  3   Being part of a conversation in a noisy environment (social gathering) 3   Speaking to a friend when you are emotionally  "upset of you are angry 3   Having a conversation while traveling in a car 3   Having a conversation with someone at a distance (across a room) 3      Augmentative/Alternative Communication (AAC): Patient is not currently using an augmentative/ alternative communication device. He  would benefit from a full AAC evaluation given current motor speech and voice skills and progressive nature of ALS. However his mother reported that he will not use any technical device including his cell phone. When asked if he uses text messages, the patient said "no because he did not have anyone to text."  Type of AAC Device:  Clinical Swallow Exam/ Joaquin Mechanism Exam:  Mandibular Strength and Mobility - Trigeminal Nerve (CN V) Rest WFL    Lateralization WFL    Protrusion WFL    Retraction WFL    Involuntary Movement absent    Oral Labial Strength and Mobility - Facial Nerve (CN VII) Rest WFL    Lateralization WFL but slow    Protrusion WFL but slow    Retraction WFL but slow    Involuntary Movement absent    Lingual Strength and Mobility - Hypoglossal Nerve (CN XII) Rest atrophied    Lateralization reduced    Protrusion reduced    Elevation reduced    Involuntary Movement present fasiculations   Velar Elevation - Glossopharyngeal Nerve (CN IX) and Vagus Nerve (CN X) Rest Reduced tone    Symmetry reduced    Elevation No movement    Sustained elevation No movement    Involuntary Movement absent    Buccal Strength and Mobility - Facial Nerve (CN VII) Rest reduced    Action reduced     Structure Abnormalities: no  Dentition: adequate  Secretion Management: excessive saliva and pooling   Mucosal Quality: adequate  Volitional Cough: weak and congested  Volitional Swallow: weak  Voice Prior to PO Intake: gurgly/wet    Kegley Swallow Protocol:  FAILED  Nasra Swallow Protocol dictates pt remain NPO if fail screener; (Caryn et al. 2014) however, as objective swallow assessment is not available for greater than a week, pt will remain on current " diet until objective assessment is completed unless otherwise indicated.     Clinical Swallow Examination:   Pt presented with:   THIN:- 3 oz water test and CUP thin liquids    PUREE:- did not test    SOLID: -bite of sahil cracker x1     DESCRIPTION: Oral Phase: slow mastication and minimum residue. Pharyngeal: laryngeal elevation palpated with multiple swallows. Immediate, congested cough present.     Eating Assessment Tool (EAT-10): is a questionnaire given to the patient to  hisswallowing function. Lisa ranked his swallowing function as a 17/40 (If the score is greater than 3 there may be swallowing problems)    Recommend MBSS: yes  Education   Patient and his family were educated on the recommendations, motor speech strategies, AAC, speech generating devices (SGD) and swallowing precautions. They verbalized understanding of all discussed.     Assessment   Impressions: Lisa Moreno exhibited mixed spastic-flaccid dysarthria c/b imprecise consonants, slow rate of speech, short phrases, distorted vowels, reduced stress  and inadequate breath- speech coordination  and strain-strangled vocal quality, hypernasality, low intensity, low pitch and aphonic periods and suspected oropharyngeal dysphagia c/b congested coughing, wet vocal quality, and watery eyes. Silent aspiration was reported on the last MBSS.  2/2 ALS. Demonstrates impairments including limitations as described in the problem list. Positive prognostic factors include support system. Negative prognostic factors include progressive nature of disease and complex medical history. Barriers to progress include complex medical history and progressive nature of disease and complex medical history.     Pt's spiritual, cultural and educational needs considered and pt agreeable to plan of care and goals.    ALS Severity Scale:  Stage 4: Use of Alternative/Augmentative Communication (AAC): Intelligibility problems need to be resolved by writing or a  spokesperson. The speaker may initiate communication nonverbally. ALS Severity Scale: 3 or 4    Rehab Potential: fair to guarded     Plan    Recommended Treatment Plan:   1.  Follow up with [facility] in 3 months   2. Complete MBSS or FEES (fibro endoscopic swallowing evaluation)   3. Cognitive evaluation by neuropsychology  4. AAC needed for functional communication    Plan of care expiration: Eval Only     Other Recommendations: none at this time    RAMANA Fuentes, CCC-SLP   12/9/2020

## 2020-12-09 NOTE — PROGRESS NOTES
CHIEF COMPLAINT:  Cough, Spinocerebellar atrophy  HISTORY OF PRESENT ILLNESS: Lisa Moreno is a 39 y.o. male with undefined dystonic-neuromuscular disease presenting for multidisciplinary eval and care.  He has had intermittent cough, congestion with difficulty clearing secretions.  Treated with nebulizer and thickener added to water.  Quiet for past 2 weeks, now returned today.  +hoarseness intermittently.  ?sinus congestion.  +dysarthria - difficult to understand.  +dysphagia - modified diet without cough or choking typically, cut up to small pieces, V8 splash, thickened water (not over past 2 weeks).  No weight loss, fever, chest infection.  No orthopnea.  Sleeps 430 pm until early am, no headaches, feels rested.  Not ambulatory, but feeds self and dresses with assistance.  Constipation is major problem on miralax, lactulose with disimpaction q 3-4 days, worsened with brief linzess trial.  Not adding fiber or senna currently.  No NIV, no cough assist.      PAST MEDICAL HISTORY:    Past Medical History:   Diagnosis Date    Degenerative motor system disease     Depression     Seizure     Spastic quadriplegia     Spinocerebellar atrophy        PAST SURGICAL HISTORY:    Past Surgical History:   Procedure Laterality Date    BACLOFEN PUMP IMPLANTATION      COLONOSCOPY N/A 7/23/2020    Procedure: COLONOSCOPY;  Surgeon: Ziyad Fitch MD;  Location: [facility];  Service: Endoscopy;  Laterality: N/A;    ESOPHAGOGASTRODUODENOSCOPY N/A 7/23/2020    Procedure: EGD (ESOPHAGOGASTRODUODENOSCOPY);  Surgeon: Ziyad Fitch MD;  Location: [facility];  Service: Endoscopy;  Laterality: N/A;    FLUOROSCOPIC URODYNAMIC STUDY N/A 11/27/2018    Procedure: URODYNAMIC STUDY, FLUOROSCOPIC;  Surgeon: Tanja Okeefe MD;  Location: [address];  Service: Urology;  Laterality: N/A;  1 hour    UPPER GASTROINTESTINAL ENDOSCOPY         FAMILY HISTORY:                Family History   Problem Relation Age  of Onset    Thyroid cancer Mother     Lung cancer Maternal Grandfather     Multiple sclerosis Other         mother's cousin    ALS Neg Hx     Muscular dystrophy Neg Hx        SOCIAL HISTORY:          Social support: lives in [place] with mother, aunt (s/p CVA), nephew (medical student), PCA during day  Social History     Tobacco Use   Smoking Status Never Smoker   Smokeless Tobacco Never Used       ALLERGIES:    Review of patient's allergies indicates:   Allergen Reactions    Penicillins      Hives and Itching       CURRENT MEDICATIONS:    Current Outpatient Medications   Medication Sig Dispense Refill    albuterol (ACCUNEB) 0.63 mg/3 mL Nebu NEBULIZE 3 MLS EVERY 6 HOURS AS NEEDED      albuterol (ACCUNEB) 1.25 mg/3 mL Nebu Take 3 mLs (1.25 mg total) by nebulization every 6 (six) hours as needed. Rescue 3 Box 3    baclofen (LIORESAL) 20 MG tablet Take 1 tablet (20 mg total) by mouth 3 (three) times daily. 270 tablet 3    citalopram (CELEXA) 10 MG tablet Take 1 tablet (10 mg total) by mouth once daily. 90 tablet 3    dantrolene (DANTRIUM) 50 MG Cap TAKE 1 CAPSULE(50 MG) BY MOUTH THREE TIMES DAILY 270 capsule 0    omeprazole (PRILOSEC) 40 MG capsule Take 1 capsule (40 mg total) by mouth once daily. 90 capsule 3    polyethylene glycol (GLYCOLAX) 17 gram/dose powder Take 17 g by mouth every other day. 507 g 5    LINZESS 145 mcg Cap capsule       MYRBETRIQ 25 mg Tb24 ER tablet       zinc oxide 10 % Oint Apply to affected areas twice daily (Patient not taking: Reported on 11/13/2020) 57 g 0     Current Facility-Administered Medications   Medication Dose Route Frequency Provider Last Rate Last Dose    onabotulinumtoxina injection 100 Units  100 Units Intramuscular Once Yung Rodriguez MD                      REVIEW OF SYSTEMS:   Pulmonary related symptoms as per HPI.  Gen:  no weight loss, no fever, no night sweats  HEENT:  + sinus congestion, + dysphagia, + voice changes, no sialorrhea  CV:  no chest  "pain, no orthopnea, no paroxysmal nocturnal dyspnea  GI:  no melena, no hematochezia, no diarrhea, +++ constipation.  :  no dysuria, no hematuria, ++ incontinence - in daiper  Neuro:  Not ambulatory, decreased arm, hand strength and coordination  Sleep:  Sleeps for long period, + rested         PHYSICAL EXAM:   Respiratory Rate: 10 NIV during clinic visit?  Vitals:    12/09/20 0809   BP: 106/67   Pulse: 84   Weight: 71.2 kg (157 lb)   Height: 6' 3" (1.905 m)   PainSc: 0-No pain     GENERAL:  Alert, calm, in no  distress  HEENT:  Normal pupils, normal conjunctiva, normal EOM, nasal and oral mucosa normal, tongue normal  NECK:  supple; no palpable lymphadenopathy or masses,no jugular venous distention.  CVS: regular rate and rhythm, no murmers, gallops or rubs  PULM: Grossly markedly decreased inspiratory capacity, normal to percussion and palpation, clear to auscultation bilaterally with no wheezes, crackles or rhonchi, ++ accessory muscle use with inspiratory effort  ABDOMEN:  soft nontender/nondistended, active rise with inspiratory effort, contraction with cough (weak)  EXTREMITIES no cyanosis, clubbing or edema - LE weak and atrophic  NEURO:  Focal weakness, not ambulatory    CONTRIBUTORY STUDIES:     PULMONARY FUNCTION TESTS: FVC 19%, PCF 70 L/m  O2 sat 99%    ACTIVE PULMONARY PROBLEMS:    ICD-10-CM ICD-9-CM    1. Upper extremity weakness  R29.898 729.89    2. Decreased coordination  R27.8 781.3    3. Impaired mobility and ADLs  Z74.09 V49.89     Z78.9         ASSESSMENT&PLAN:  1.  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 Decreased cough efficacy.  Will benefit from mechanical cough assist device daily and as needed for emergent clearance of aspirated secretions or food.  Preliminary settings should be +40 cm H2O and -40 cm H2O, with low inspiratory flow, titrate to enhance compliance.  Manual cough " assist is proven less effective than mechanical cough assist for patients with neuromuscular dz, and is not indicated for cough support in this population.  CPT and flutter valve are not effective or indicated.  3.  Allergic rhinitis, recurrent cough - begin cetirizine and fluticasone  4.  Severe constipation - add fiber to miralax, lactulose      No follow-ups on file.      [Greater than 50% of this XX minute visit spent counseling patient and family]

## 2020-12-09 NOTE — PROGRESS NOTES
"Referring Physician:  Shelly Bray DO     Reason for visit:  Chief Complaint   Patient presents with    SLP Initial Evaluation    Nutrition Counseling     initial ALS clinic visit      Initial Visit    :1980     Allergies Reviewed  Meds Reviewed    Anthropometrics  Weight:71.2 kg (157 lb)-stated weight from mom; she doesn't remember how long ago pt was actually weighed  Height:6' 3" (1.905 m)  BMI:Body mass index is 19.62 kg/m².   IBW:   89.0 kg  +/-10%    Meds:  Outpatient Medications Prior to Visit   Medication Sig Dispense Refill    albuterol (ACCUNEB) 0.63 mg/3 mL Nebu NEBULIZE 3 MLS EVERY 6 HOURS AS NEEDED      albuterol (ACCUNEB) 1.25 mg/3 mL Nebu Take 3 mLs (1.25 mg total) by nebulization every 6 (six) hours as needed. Rescue 3 Box 3    baclofen (LIORESAL) 20 MG tablet Take 1 tablet (20 mg total) by mouth 3 (three) times daily. 270 tablet 3    dantrolene (DANTRIUM) 50 MG Cap TAKE 1 CAPSULE(50 MG) BY MOUTH THREE TIMES DAILY 270 capsule 0    omeprazole (PRILOSEC) 40 MG capsule Take 1 capsule (40 mg total) by mouth once daily. 90 capsule 3    polyethylene glycol (GLYCOLAX) 17 gram/dose powder Take 17 g by mouth every other day. 507 g 5    citalopram (CELEXA) 10 MG tablet Take 1 tablet (10 mg total) by mouth once daily. 90 tablet 3    LINZESS 145 mcg Cap capsule       MYRBETRIQ 25 mg Tb24 ER tablet       zinc oxide 10 % Oint Apply to affected areas twice daily (Patient not taking: Reported on 2020) 57 g 0     Facility-Administered Medications Prior to Visit   Medication Dose Route Frequency Provider Last Rate Last Dose    onabotulinumtoxina injection 100 Units  100 Units Intramuscular Once Yung Rodriguez MD           Food/Drug Interactions Noted:   none    Vitamins/Supplements/Herbs:  none    Labs: reviewed     Nutrition Prescription:   [id number] Kcals/day( 25-30 kcal/kg IBW),  71 g protein( 0.8 g/kg IBW)     Support System:    Pt and his mother, who does the grocery " shopping and cooking, present for today's encounter    Diet Hx:   Mom answered all of my questions today, and provided all of the information.  Pt on regular diet with thickened liquids.  Pt is able to feed himself; eats 3 meals/day with between meal snacks ie brownies, apples, oatmeal pies.    Pt with severe constipation; provided high fiber handout from ALS clinic.     Breakfast:   Pancakes and sausage or a ham sandwich.  Lunch:   varies  Dinner: varies    Current activity level and/or physical limitations:    In WC    Motivation to make changes/anticipated barriers and/or expected adherence:    No change in current diet regimen expected    Nutrition-Focus Physical Findings:   Pt wearing face covering per COVID19 protocol; pt appears thin.  Pt is nonverbal.    Assessment: pt's mom states pt has always had a good appetite, and has always been thin.  She did not voice any nutrition-related questions/concerns today.    Nutrition Diagnosis:   None at this time    Recommendations:   Continue diet as tolerated and follow therapies recommendations.    Strategies Implemented:    Continue diet as tolerated    Consultation Time:5 minutes.  Communicated with referring healthcare provider:  Consult note available in pt's Epic chart per MD discretion  Follow Up:  Pt's mother provided with dietitian contact number and advised to call with questions or make future appointment if further intervention needed.

## 2020-12-09 NOTE — PATIENT INSTRUCTIONS
ALS Clinic Notes  Neurology:  Will place a referral to neuropsychology for evaluation of memory.  Will place a referral to the movement disorders department. There are a lot of features to Detrell's presentation that are not consistent with a neuromuscular disorder.  Agree with getting an EMG  Will communicate with GI regarding constipation issues.    Respiratory:      PMR:      Physical Therapy:  Continue with outpatient PT services.     Occupational Therapy:  Recommend OP OT help you obtain B opposition splints with CMC and MP thumb support.  Also initiated power wheelchair with [employer].    Speech Language Pathology: 1. Modified Barium Swallow Study (MBSS) or Fibro endoscopic swallowing evaluation (FEES) to further assess swallowing and determine the safest diet   2. Augmentative-Alternative Communication (AAC) to supplement his unintelligible speech   3. Cognitive evaluation by neuropsychology      Nutrition:      :

## 2020-12-09 NOTE — PROGRESS NOTES
"Pt is 40y/o single male dx with "possible neuro muscular disease".  Pt is alert and oriented to self.  He is able to speak however his speech can be difficult to understand at times.  He provided minimal participation in conversation.  He did better with Y/N questions.  Pt lives at home with his mother Giovana, her sister, Sara, and nephew, Manuela, they reside together in family home. Pt does have a 13 y/o son Lisa Ramos.  Pt's mother is the primary caregiver.  Pt is dependent for all care needs and transfers.  Pt now has CCW sitter Nereyda Gibbs from [facility].  Earlier this year pt was without a sitter d/t COVID.    Pt also has a brother Burt Moreno who lives in [place], [place].  He will be coming to visit pt/mother soon to giver mother a break.  Pt is not followed by HH does not have OP services.  Pt has walker, manual WC and hospital bed standard.    Pt's mother requested handicapped van.  SW encouraged her to keep in touch with [facility] and if she had acces to [facility] Facebook page look on facebook.  Sometimes PALS will will let the ALS community know they are looking to donate or sell a van.      Pt's mother shared she either was dx'd with Breast Cancer or she is going to have a biopsy.  She also shared her brother recently  of Cancer, a brother who she was close with.    She is furloughed d/t COVID has not worked since 2020, she had surgery on her right hand which she injured again and now needs another surgery.  Pt's mother insists her carlos manuel and belief things will get better giver her hope.  She also enjoys when Lisa Joycecomes to visit.   SW offered emotional support.          Advanced Care Planning  Discussed who would be healthcare proxy for pt should something she no longer be able to oversee his care?  Will email five wishes.          RENATO KOCH  "

## 2020-12-09 NOTE — PROGRESS NOTES
[facility]   Reassessment ALS Clinic      Date: 12/9/2020  Patient: Lisa Moreno  Chart Number: [id number]     Therapy Diagnosis:        Encounter Diagnoses   Name Primary?    Spastic diplegia      Impaired mobility and ADLs Yes    Upper extremity weakness      Decreased coordination        Physician: Yung Rodriguez MD  ALS MD: Shelly Bray  Physician Orders: Eval and treat  Medical Diagnosis: G80.1 (ICD-10-CM) - Spastic diplegia  Onset Date: 2006  Evaluation Date: 11/18/2020  Plan of Care Expiration Period: 1/8/2020  Insurance Authorization period Expiration: 12/31/2020  Date of Return to MD: 11/23/2020 pulmonary  Visit # / Visits Authortized: 7/ 50  This is his first ALS clinic visit.   FOTO: Degenerative CNS/66% limitation     Time In: 1121  Time Out: 1150   Total Billable (one on one) Time: 29 minutes     Precautions: Standard and Fall     Subjective      History of Current Condition: Symptoms began in 2006.Lisa was normal functioning with slight lisp prior according to mother.  Pt has been in and out of therapy for the past several years.  He was recently discharged from another outpatient clinic for OT/PT/ST and receives Botox. He is being seen in ALS clinic for assist with diagnosis and help with obtaining care services.    Involved Side: B/L   Dominant Side: Right  Date of Onset: 2006  Surgical Procedure: None  Imaging: CT scan films, bone scan films under imaging  Previous Therapy: OP OT/PT/ST     Patient's Goals for Therapy: Walking and being more independent      Pain:  Pain Related Behaviors Observed: no pain observed or reported.  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   Functional Limitations/Social History:     Prior Level of Function: Max A  Current Level of Function:Max A     Home/Living environment : lives with their family  Home  Access: One story  DME: transfer tub bench,3-1 commode, custom lightweight,  Wheelchair,walker, BLE dynapslints,  and hospital bed                Leisure: Watch television, play cards     Past Medical History/Physical Systems Review:   Lisa Moreno  has a past medical history of Degenerative motor system disease, Depression, Seizure, Spastic quadriplegia, and Spinocerebellar atrophy.     Lisa Moreno  has a past surgical history that includes Baclofen pump implantation; Fluoroscopic urodynamic study (N/A, 11/27/2018); Esophagogastroduodenoscopy (N/A, 7/23/2020); Colonoscopy (N/A, 7/23/2020); and Upper gastrointestinal endoscopy.     Lisa has a current medication list which includes the following prescription(s): albuterol, albuterol, baclofen, citalopram, dantrolene, linzess, myrbetriq, omeprazole, polyethylene glycol, sulfamethoxazole-trimethoprim 800-160mg, and zinc oxide, and the following Facility-Administered Medications: onabotulinumtoxina.           Review of patient's allergies indicates:   Allergen Reactions    Penicillins         Hives and Itching             Objective      Cognitive Exam:  Oriented: Person, Place, Time and Situation  Behaviors: normal, cooperative  Follows Commands/attention: Follows multistep  commands but is slow to command  Communication: dysarthria  Memory: No Deficits noted  Safety awareness/insight to disability: aware of diagnosis, treatment, and prognosis  Coping skills/emotional control: Appropriate to situation     Visual/Perceptual:  Tracking: intact  Saccades: intact  Acuity: intact  R/L discrimination: intact  Visual field: intact  Motor Planning Praxis: intact     Physical Exam:  Postural examination/scapula alignment: Rounded shoulder and Slouched posture  Joint integrity: WNL  Skin integrity: WNL  Edema: None   Palpation: None      Joint ROM BUE: AROM WFL in shoulders and elbows. His B thumbs are abducted and subluxed at MP. He has hyperextension of IP  joints. His R hand is more involved than L hand.      Fist: normal        Strength 11/18/2020 11/18/2020   **within available ROM** Right Left   Shoulder flex 4/5 4/5   Shoulder abd 4/5 4/5   Shoulder ER 4/5 4/5   Shoulder IR 4/5 4/5   Shoulder Extension 4/5 4/5   Shoulder Horizontal adduction 4/5 4/5   Elbow flex 5/5 5/5   Elbow ext 4/5 4/5   Wrist flex 4/5 4/5   Wrist ext 4/5 4/5   Supination 4/5 4/5   Pronation 4/5 4/5   UD 4/5 4/5   RD 4/5 4/5       Strength: (MIAH Dynamometer in lbs.) Average 3 trials, Position II:       11/18/2020 11/18/2020     Right Left   Rung II 40# 35#     strength measures in MD ALS clinic note of 12/9/2020.  Pinch Strength (Measured in psi)       11/18/2020 11/18/2020     Right Left   Key Pinch 13 psi 16 psi   3pt Pinch 8 psi 7 psi   2pt Pinch 10 psi 9 psi    Pinch not assessed on 12/9/2020  Barthel Index: 60/100 at [facility].      Gross motor coordination:   · BHARTI: impaired slow  · Finger to Nose: intact  · Finger Flicks: impaired slow     Tone:  Modified Gianna Scale:   0 - No increase in muscle tone in BUE. Pt. Is low tone in hands.      Sensation:  Detrell  reports no changes in sensation  Light touch: bilateral intact  Sharp/Dull: bilateral intact  Kinesthesia: bilateralintact  Proprioception: bilateral intact  Temperature: bilateral intact     Balance:   Static Sit - GOOD: Takes MODERATE challenges from all directions. Pt. Sits in posterior tilt with obliquity and rotation. He has difficulty adjusting self in chair which can lead to shearing and skinbreak down. He is also very thin.   Dynamic sit- GOOD-: Takes MODERATE challenges from all directions but inconsistently  Static Stand - 0: Needs MAXIMAL assist to maintain sitting without back support  Dynamic stand - 0: Needs MAXIMAL assist to maintain sitting without back support   Detrell presents with extensor tone of trunk.   Endurance Deficit: severe                    Functional Status      Functional Mobility: uses  walker with assist for transfers, non ambulatory  Bed mobility: Mod A  Roll to left: Min A  Roll to right: Min A  Supine to sit: Min A  Sit to supine: Min A  Transfers to bed: Min A  Transfers to toilet: Min A  Car transfers: Max A  Wheelchair mobility: Mod I with manual wheelchair but not efficient, Rims removed due to destroying doorways of home. He is in wheelchair most of the day or in recliner.      ADL's:  Feeding: Mod I  Grooming: Mod I, Garvey shaves him.   Hygiene: Mod I  UB Dressing:Mod I  LB Dressing: Mod A  Toileting:Mod I  Bathing: Max A     IADL's:  Homecare: D  Cooking: D  Laundry: Max A  Yard work: D  Use of telephone: Mod I  Money management: Mod I  Medication management: Min A  Handwriting:Min A  Technology Use:Mod I          CMS Impairment/Limitation/Restriction for FOTO Degenerative CNS Survey     Therapist reviewed FOTO scores for Lisa Moreno on 11/18/2020.   FOTO documents entered into [employer] - see Media section.     Limitation Score: 66%  Category: Self Care     Current : CL = least 60% but < 80% impaired, limited or restricted  Goal: CK = at least 40% but < 60% impaired, limited or restricted  Discharge: CK = at least 40% but < 60% impaired, limited or restricted       No FOTO score at ALS clinic.      Treatment      Home Exercises and Patient Education Provided     Education provided:   -role of OT, goals for OT, scheduling/cancellations, insurance limitations with patient.  -Additional Education provided: continue HEP from previous OT session     Written Home Exercises Provided: Patient instructed to cont prior HEP.  See EMR under Patient Instructions for exercises provided prior visit.     Assessment      Lisa Moreno is a 39 y.o. male referred to outpatient occupational therapy and presents with a medical diagnosis of spastic diplegia, resulting in decreased flexibility, decreased muscle strength, depression, and impaired function. Following medical record review it is  determined that pt will benefit from continued OP occupational therapy services in order to maximize pain free and/or functional use of bilateral UE during self care and home care tasks. He would benefit from splinting to support thumb MP joint and to position in opposition to help with function. He would also benefit from Power mobility to increase his independence in home and help normalize tone. Although his current wheelchair is only 3 years old, the progression of his medical condition warrants power mobility. Lisa would benefit from a power tilt and recline wheelchair with elevating legs to manage spasticity and encourage independence in home. Chair should additionally have alternative drive potential, headrest, protective seat cushion, it should have supports to help manage LE tone.    Patient has mobility limitation that significantly impairs safe, timely, consistent participation in one or more MRADL. yes  A mobility assistive device will effectively improve ability to participate in or aid participation in MRADL's.  yes   A cane or walker will provide patient the ability to safely and consistently perform MRADLs in a timely manner no   The patient's home environment will support use of recommended mobility device. yes  The patient has sufficient UE strength to effectively self propel a manual wheelchair for al MRADL's. no  The patient has sufficient upper extremity strength, , transfer ability and trunk stability to safely operate a POV(scooter). no  The additional benefits of a power wheelchair will more effectively enable MRADL's in home. yes   The patient demonstrates ability/potential ability to use recommended equipment. yes  The patient is willing and motivated to use the recommended equipment. yes      Pt prognosis is Fair due to nature of diagnosis.  Pt will benefit from skilled outpatient Occupational Therapy to address the deficits stated above and in the chart below, provide pt/family  education, and to maximize pt's level of independence.      Plan of care discussed with patient: Yes  Pt's spiritual, cultural and educational needs considered and patient is agreeable to the plan of care and goals as stated below:      Anticipated Barriers for therapy: transportation     The following goals were discussed with the patient and patient is in agreement with them as to be addressed in the treatment plan.      Goals:  Short Term Goals:  1) Initiate Hep Met 11/18/2020  2) Pt will be able to participate in simple cooking activity with mod A per patient report by 4 weeks. Progressing  3) Pt to increase Barthel Index Score by 5 points increasing self care IND by 4 weeks.  Progressing  4) Patient will be able to achieve less than or equal to 60% on the FOTO, demonstrating overall improved functional ability with upper extremity. (self-care category) Progressing     Long Term Goals:  1) Pt to be IND with HEP in order to maintain ROM and strength needed for self care IND by d/c.Progressing  2) Pt will be able to participate in simple cooking activity with min A per patient report by d/c.  Progressing  3) Pt to increase Barthel Index Score by 10 points increasing self care IND at home by d/c. Progressing  4) Patient will be able to achieve less than or equal to 55% on the FOTO, demonstrating overall improved functional ability with upper extremity. (self-care category) Progressing  5) Pt will be min A with bed mobility by d/c. Met 11/27/2020      Plan   Certification Period/Plan of care expiration: 11/18/2020 to 1/8/2020.     Outpatient Occupational Therapy 2 times weekly for 8 weeks to include the following interventions: Electrical Stimulation PRN, splinting as needed,  Manual Therapy, Moist Heat/ Ice, Neuromuscular Re-ed, Patient Education, Self Care, Therapeutic Activites and Therapeutic Exercise. Work with LICHA Fleming from  Wheelchair to obtain power mobility needs.

## 2020-12-09 NOTE — PROGRESS NOTES
[facility]   Physical Therapy Evaluation     at [facility]IPLIN[facility] CLINIC     Date: 12/9/2020    Name: Lisa Moreno   MRN: [id number]    Therapy Diagnosis:   Encounter Diagnoses   Name Primary?    Upper extremity weakness     Decreased coordination     Impaired mobility and ADLs       Physician: Dr. Shelly Bray  Physician Orders: Ambulatory Referral for Physical Therapy at ALS Clinic  Plan of Care Expiration: Evaluation Only  Medical Diagnosis: neuromuscular disorder    Initial Clinic Date: 12/9/2020  ALS Clinic Number: 1    Time In: 1121  Time Out: 1150  Total Billable Time: 14.5 minutes     Precautions: Standard and Fall    Subjective   History of Present Illness: Lisa is a 39 y.o. male that presents to [facility] clinic secondary to dx of neuromuscular disorder. He is wheelchair bound for the last 3 years and requires 24 hour assistance at this time. Majority of information given to PT from pt's mother due to pt's difficulty with communication.       Current Functional Deficits/Chief Complaint:   1. Difficulty transferring  2. B LE increased tone  3. Poor head control     Medical History:   Past Medical History:   Diagnosis Date    Degenerative motor system disease     Depression     Seizure     Spastic quadriplegia     Spinocerebellar atrophy        Surgical History:   Lisa Moreno  has a past surgical history that includes Baclofen pump implantation; Fluoroscopic urodynamic study (N/A, 11/27/2018); Esophagogastroduodenoscopy (N/A, 7/23/2020); Colonoscopy (N/A, 7/23/2020); and Upper gastrointestinal endoscopy.    Medications:   Lisa has a current medication list which includes the following prescription(s): albuterol, albuterol, baclofen, citalopram, dantrolene, omeprazole, polyethylene glycol, linzess, myrbetriq, and zinc oxide, and the following Facility-Administered Medications: onabotulinumtoxina.    Allergies:   Review of  "patient's allergies indicates:   Allergen Reactions    Penicillins      Hives and Itching      Imaging, MRI studies: "Generalized cerebellar and brainstem atrophy, grossly unchanged." (9/7/2018)    Prior Therapy: currently in PT/OT at [facility]  Family Present at time of Eval: pt's mother   Social History: lives with their family (mother)  Home Environment: single level home   DME: walker, manual WC and hospital bed standard      Prior Level of Function: non ambulatory for 3 years, but mod I for wc mobility  Occupation: disabled  Exercise Routine / History: none; currently stretching with PT and with CRNA   Current Level of Function: requiring assistance for transfers, mod I for wc mobility    Falls: none     Pain:  Current 0/10, worst 0/10, best 0/10   Location: n/a    Pts goals: To assess for PT needs.     Objective     Mental status: confused, inappropriate safety awareness  Appearance: Casually dressed and Well groomed  Behavior:  cooperative  Attention Span and Concentration:  Decreased and Easily distracted    Dominant hand:  right     Posture Alignment : slouched posture, decreased lumbar lordosis, rounded shoulders, and windswept LE (mobile)    Sensation: Light Touch: Intact         Tone: increased; hypertonicity in B LE  Limbs/muscles affected: B LE increased tone    Edema: none noted     Coordination:   - fine motor:  impaired - moderate   - UE coordination:  impaired - moderate   - LE coordination:  impaired - severe     Lower Extremity ROM   PROM WFL though B knees require     Lower Extremity Strength  Right LE  Left LE    Hip flexion: 2/5 Hip flexion: 2/5   Hip extension:  2/5 Hip extension: 2/5   Hip abduction: 2/5 Hip abduction: 3-/5   Hip adduction: 3+/5 Hip adduction 3+/5   Knee extension: 2/5 Knee extension: 2-/5   Knee flexion: trace Knee flexion: trace   Ankle dorsiflexion: 0/5 Ankle dorsiflexion: 0/5   Ankle plantarflexion: 0/5 Ankle plantarflexion: 0/5       Sitting balance static: " fair  Sitting balance dynamic: poor  Standing balance static: N/A  Standing balance dynamic:  N/A    Gait Assessment:   - Pt is non ambulatory at this time.     Endurance Deficit: severe    Functional Mobility (Bed mobility, transfers)  Bed mobility: moderate assistance  Supine to sit: moderate assistance  Sit to supine: moderate assistance  Transfers to bed: moderate assistance  Transfers to toilet: moderate assistance  Tub/shower transfers: moderate assistance  Sit to stand:  dependence  Stand pivot: dependence  Car transfers: maximal assistance  Wheelchair mobility: modified independence      TREATMENT   No Treatment Provided today.       EDUCATION PROVIDED     Education provided re: POC, Educated pt on purpose of PT services   Lisa verbalized good understanding of education provided.   Pt has no cultural, educational or language barriers to learning provided.    Written Home Exercises Provided: Patient instructed to cont prior HEP. Given to her by OPPT      Assessment   Lisa is a 39 y.o. male referred to outpatient Physical Therapy with a medical diagnosis of neuromuscular disorder. Pt presents with severe B LE increased tone, severe speech difficulty, and dependence on caregiver for all mobility. He is currently being seen by outpatient PT for stretching, use of standing frame, and transfer training. He wears dynasplints on B LE at night though B knees appear to be in the beginning stages of contracture. He and his mother live alone though they do have the help of a CRNA daily. PT does recommend that pt be evaluated for power wheelchair to better position patient and decrease the liklihood of pressure sores. PWC evaluation completed by OT on this date. PT recommends continuation of outpatient PT and return to clinic as needed.     Pt prognosis is Fair.     Plan of care discussed with patient: Yes  Pt's spiritual, cultural and educational needs considered and patient is agreeable to the plan of care and  goals as stated below:     Anticipated Barriers for therapy: unclear diagnosis    Medical Necessity is demonstrated by the following  History  Co-morbidities and personal factors that may impact the plan of care Co-morbidities:   depression   Seizures    Personal Factors:   coping style  lifestyle     high   Examination  Body Structures and Functions, activity limitations and participation restrictions that may impact the plan of care Body Regions:   head  neck  lower extremities  upper extremities  trunk    Body Systems:    gross symmetry  ROM  strength  gross coordinated movement  balance  gait  transfers  transitions  motor control  motor learning    Participation Restrictions:   Unable to ambulate    Activity limitations:   Learning and applying knowledge  learning to read  learning to write  learning to calculate  solving problems    General Tasks and Commands  undertaking multiple tasks    Communication  communicating with/receiving spoken language    Mobility  lifting and carrying objects  fine hand use (grasping/picking up)  walking  moving around using equipment (WC)  using transportation (bus, train, plane, car)  driving (bike, car, motorcycle)    Self care  washing oneself (bathing, drying, washing hands)  caring for body parts (brushing teeth, shaving, grooming)  toileting  dressing  eating  drinking  looking after one's health    Domestic Life  shopping  cooking  doing house work (cleaning house, washing dishes, laundry)  assisting others    Interactions/Relationships  basic interpersonal interactions  complex interpersonal interactions  relating with strangers  intimate relationships    Life Areas  informal education  school education  higher education  employment    Community and Social Life  community life  recreation and leisure  Sikh and spirituality  human rights  political life and citizenship         high   Clinical Presentation unstable clinical presentation with unpredictable  characteristics high   Decision Making/ Complexity Score: high          Plan   Plan of care Certification: 12/9/2020. Evaluation Only    Pt to continue to follow up with referring provider and ALS clinic. Recommend to continue with outpatient PT at [facility].    Shubham Castano, PT

## 2020-12-09 NOTE — PROGRESS NOTES
Subjective:       Patient ID: Lisa Moreno is a 39 y.o. male.    Chief Complaint: SLP Initial Evaluation    HPI     Mr. Moreno is a 39-year-old black male with past medical history of spinal still a better ataxia and spastic diplegia.  He is presenting to the ALS Clinic for suspected motor neuron disease.  He is being seen by the physical medicine service for pain management.  The patient is already followed up by Dr. Rodriguez from Physical Medicine and Rehabilitation at [facility].   He gets periodic shots of Botox to help with spasticity. He is maintained on baclofen at 20 mg p.o. t.i.d. and dantrolene a 50 mg p.o. t.i.d..  His last blood test in 07/2020 showed mild elevation of AST and ALT.      Past Medical History:   Diagnosis Date    Degenerative motor system disease     Depression     Seizure     Spastic quadriplegia     Spinocerebellar atrophy          Review of patient's allergies indicates:   Allergen Reactions    Penicillins      Hives and Itching       Review of Systems   Constitutional: Negative for chills and fever.   HENT: Positive for trouble swallowing.    Eyes: Negative for visual disturbance.   Respiratory: Positive for cough and shortness of breath.    Gastrointestinal: Positive for constipation. Negative for nausea and vomiting.   Genitourinary: Positive for difficulty urinating.   Musculoskeletal: Negative for arthralgias, back pain and neck pain.   Neurological: Positive for speech difficulty and weakness. Negative for dizziness and headaches.   Psychiatric/Behavioral: Negative for behavioral problems.         Objective:      Physical Exam  Vitals signs reviewed.   Constitutional:       General: He is not in acute distress.     Appearance: He is well-developed.   HENT:      Head: Normocephalic and atraumatic.   Neck:      Musculoskeletal: Normal range of motion.   Musculoskeletal:      Comments: BUE:  ROM:full.  Strength:    RUE: 4/5 at shoulder abduction, 5 elbow flexion, 4  elbow extension, 4 hand .   LUE: 4/5 at shoulder abduction, 5 elbow flexion, 4 elbow extension, 4 hand .  Sensation to pinprick:   RUE: intact.   LUE: intact.      BLE:  Severe increase in tone at hips, knees, ankles.  Strength:    RLE: 3/5 at hip flexion, 2 knee extension, 2 ankle DF, 3 PF.   LLE: 1/5 at hip flexion, 2 knee extension, 2 ankle DF, 3 PF.  Sensation to pinprick:     RLE: intact.      LLE: intact.        Skin:     Findings: No rash.   Neurological:      Mental Status: He is alert.   Psychiatric:         Behavior: Behavior normal.         Assessment:       1. Weak cough    2. Upper extremity weakness    3. Decreased coordination    4. Impaired mobility and ADLs    5. Allergic rhinitis, unspecified seasonality, unspecified trigger    6. Depression, unspecified depression type    7. Anxiety    8. Spastic diplegia        Plan:       - Continue baclofen 20 mg p.o. t.i.d..  - Continue dantrolene 50 mg p.o. t.i.d..  - Follow-up with Dr. Rodriguez at the spasticity Clinic for Botox injections and adjustment of his antispasticity medications (baclofen and dantrolene) as necessary.  - CMP was ordered to follow up on liver function tests due to mild elevation in 07/2020.    Regarding his mobility:    - The patient was seen today for mobility evaluation for a power mobility device due to significant impairment at home.  - The patient is non-ambulatory with or without assistive devices due to spastic diplegia  - The patient is unable to use an optimally-configured manual wheelchair in the home in order to perform Mobility Related Activities of Daily Living, due to BUE weaknesses and fatigue..  - The patient has intact cognition and should be able to use a power mobility device well at home.  - A scooter would not be appropriate due the patient's trouble clearing the ledge,  maneuverability restrictions at home and need for customization.  - This will allow the patient to go safely to the kitchen, dining room  or living room for feeding & socialization.    - Follow up in ALS clinic.      This note was partly generated with [employer] voice recognition software. I apologize for any possible typographical errors.

## 2020-12-10 ENCOUNTER — TELEPHONE (OUTPATIENT)
Dept: NEUROLOGY | Facility: CLINIC | Age: 40
End: 2020-12-10

## 2020-12-10 DIAGNOSIS — G12.29 UPPER MOTOR NEURON DISEASE: Primary | ICD-10-CM

## 2020-12-10 DIAGNOSIS — R26.81 GAIT INSTABILITY: ICD-10-CM

## 2020-12-10 DIAGNOSIS — G11.8 SPINOCEREBELLAR ATAXIA: ICD-10-CM

## 2020-12-10 NOTE — TELEPHONE ENCOUNTER
Message received from [facility] with [facility].     I have all the ppw for GRICELDA Moreno and have sent it to the RT to call and set up delivery.     [facility]     Direct Line Phone [phone]   Fax Phone [phone]  [facility]

## 2020-12-10 NOTE — TELEPHONE ENCOUNTER
Emailed CA and Sx orders to [facility]. She indicated that orders were not received. Cough Assist and Portable Suction Order along with progress notes faxed to [facility]. Attn: Radha    Fax: [phone]

## 2020-12-13 PROBLEM — R46.89 COGNITIVE AND BEHAVIORAL CHANGES: Status: ACTIVE | Noted: 2020-12-13

## 2020-12-13 PROBLEM — R41.89 COGNITIVE AND BEHAVIORAL CHANGES: Status: ACTIVE | Noted: 2020-12-13

## 2020-12-13 NOTE — ASSESSMENT & PLAN NOTE
He has abnormal tone in the lower limbs, however this may not be pure spasticity.  Consider additional rigidity and dystonia.  OK to continue baclofen

## 2020-12-13 NOTE — ASSESSMENT & PLAN NOTE
Patient is having difficulty safely ambulating.  He is at increased risk for falls and injury.    The patient was seen today for mobility evaluation for a power mobility device due to significant impairment at home.  - The patient has gait impairment and is unable to use a walker consistently more than 20-30 ft , due to BLE weakness and fatigue secondary to progressive neuromuscular disease. He also has impaired balance due to lower limb weakness and abnormal tone.  - The patient is not able to ambulate safely to the kitchen or living room.  - The patient is unable to use an optimally-configured manual wheelchair in the home in order to perform Mobility Related Activities of Daily Living, due to BUE weakness & fatigue.   - The patient's brief cognitive assessment shows he should be able to use a power mobility device well at home.  - The patient was given a prescription for a power wheelchair.  - A scooter would not be appropriate due the patient's difficulty controlling the scooter tiller due to hand weakness & fatigue and to maneuverability restrictions at home.   - This will allow the patient to go safely to the kitchen, dining room or living room for feeding & socialization.  - The patient is to return the neurology clinic in 3 months.

## 2020-12-13 NOTE — ASSESSMENT & PLAN NOTE
Follows with GI  No new suggestions from this neurology team, he is more advanced that what we typically see.  He was given our typical constipation handout, though he has tried most of these already

## 2020-12-13 NOTE — ASSESSMENT & PLAN NOTE
This is definitely a difficult case. While he does exhibit some hyper-reflexia, the overall history and physical are not exemplary of motor neuron disease such as PLS or ALS.  During his assessment, dystonia seemed to be the most outstanding feature.  His mother is misinterpreting this as a behavior, but it is clearly involuntary muscle movement.  His BECK antibodies area borderline elevated, but stiff person syndrome would be in this differential.  Dystonia syndromes would definitely be high on the list.  His hypophonia is also suggestive of a movement disorder.  The cognitive changes are unexpected and the etiology is unclear.  The severe constipation is also suggestive of a movement disorder, though it can be seen in other syndromes.    Agree with getting an EMG to see if the peripheral nervous system is playing any role here.    Due to a weak and ineffective cough he will benefit from a cough assist.    Will refer to back to the movement disorders dept for further assistance in evaluating his case     Will refer to neuropsychology for further assessment of attention and cognition.

## 2020-12-13 NOTE — ASSESSMENT & PLAN NOTE
Neuropsychology referral.  Etiology for symptoms unclear but patient had a traumatic event while in college, problems began shortly after this

## 2020-12-14 ENCOUNTER — TELEPHONE (OUTPATIENT)
Dept: NEUROLOGY | Facility: CLINIC | Age: 40
End: 2020-12-14

## 2020-12-14 ENCOUNTER — CLINICAL SUPPORT (OUTPATIENT)
Dept: REHABILITATION | Facility: HOSPITAL | Age: 40
End: 2020-12-14
Payer: MEDICARE

## 2020-12-14 DIAGNOSIS — R27.8 DECREASED COORDINATION: ICD-10-CM

## 2020-12-14 DIAGNOSIS — R29.898 UPPER EXTREMITY WEAKNESS: ICD-10-CM

## 2020-12-14 DIAGNOSIS — Z74.09 IMPAIRED MOBILITY AND ADLS: ICD-10-CM

## 2020-12-14 DIAGNOSIS — Z78.9 IMPAIRED MOBILITY AND ADLS: ICD-10-CM

## 2020-12-14 PROCEDURE — 97530 THERAPEUTIC ACTIVITIES: CPT | Mod: PN

## 2020-12-14 PROCEDURE — 97110 THERAPEUTIC EXERCISES: CPT | Mod: PN

## 2020-12-14 NOTE — PROGRESS NOTES
"   Occupational Therapy Treatment Note     Date: 12/14/2020  Name: Lisa Moreno  Clinic Number: [id number]    Therapy Diagnosis:   Encounter Diagnoses   Name Primary?   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   Response to previous treatment:Good  Functional change: None noted yet    Pain: 0/10    Objective     Lisa received therapeutic exercises for 35 minutes including:  Pt completed the following exercises below in order to increase ROM, strength and tolerance of affected UE in order to increase IND and functional use:    Exercises Date 12/14/2020    Visit # 7   SciFit ArmBike Level 6.0 10 min 5 min forwards 5 backwards     Green theraband    Blue theraband Rows 3/10  Pull downs 3/10  Horizontal abduction 3/10  tricep extension 3/10  B/L ER 3/10     5# db Shoulder press   6# db Alternating bicep curls   Red theraputty Extension rings       Lisa received therapeutic activites for 20 minutes including:  Ball toss With R hand and overhead   Scissor Cutting paper    Handwriting    Knife use Cutting putty       Home Exercises and Education Provided     Education provided:   - Continue with HEP  - Progress towards goals     Written Home Exercises Provided: Patient " instructed to cont prior HEP.  Exercises were reviewed and Lisa was able to demonstrate them prior to the end of the session.  Lisa demonstrated good  understanding of the HEP provided.   .   See EMR under Patient Instructions for exercises provided prior visit.        Assessment     Pt participated in session well today. Continued focus on functional UB strengthening with good progress. Some GM deficits noted during ball tossing activity initially, but after some practice pt was able to toss with good rhythm. Pt also demonstrates non-smooth movements during UB exercises, but performed well with minimal verbal and tactile cueing. He participated well with bilateral activities. Scissors required extra time due to some difficulty with coordinating opening and closing movements. Handwriting also took extra time, but words were legible. Knife use was also good and pt reported that he performed better than he thought he would. Pt also recommended to purchase opponens brace to help stabilize thumb. Print out was provided with different options, and he verbalized good understanding.. Next session to continue to progress with exercises in order to increase participation in ADLs and IADLs.    Lisa is progressing well towards his goals and there are no updates to goals at this time. Pt prognosis is Fair.     Pt will continue to benefit from skilled outpatient occupational therapy to address the deficits listed in the problem list on initial evaluation provide pt/family education and to maximize pt's level of independence in the home and community environment.     Anticipated barriers to occupational therapy: transportation    Pt's spiritual, cultural and educational needs considered and pt agreeable to plan of care and goals.    Goals:  Short Term Goals:  1) Initiate Hep Met 11/18/2020  2) Pt will be able to participate in simple cooking activity with mod A per patient report by 4 weeks. Progressing  3) Pt to  increase Barthel Index Score by 5 points increasing self care IND by 4 weeks.  Progressing  4) Patient will be able to achieve less than or equal to 60% on the FOTO, demonstrating overall improved functional ability with upper extremity. (self-care category) Progressing     Long Term Goals:  1) Pt to be IND with HEP in order to maintain ROM and strength needed for self care IND by d/c.Progressing  2) Pt will be able to participate in simple cooking activity with min A per patient report by d/c.  Progressing  3) Pt to increase Barthel Index Score by 10 points increasing self care IND at home by d/c. Progressing  4) Patient will be able to achieve less than or equal to 55% on the FOTO, demonstrating overall improved functional ability with upper extremity. (self-care category) Progressing  5) Pt will be min A with bed mobility by d/c. Met 11/27/2020    Plan   Continue per initial plan of care  Updates/Grading for next session: Progress as tolerated    JAZMIN AN, OT

## 2020-12-14 NOTE — TELEPHONE ENCOUNTER
Called and spoke with wife. She declined vv visit. Will call back to get appt scheduled for May.   This is a 45y woman who is s/p: total abdominal hysterectomy     She is now postoperative day: #2    S:   The patient feels well, has no complaints   She is ambulating and tolerating a diet  +flatus/-BM    O:   Patient examined at bedside.   She generally looks and feels well    Vital Signs Last 24 Hrs  T(C): 36.9 (24 Aug 2017 04:34), Max: 37.3 (24 Aug 2017 00:16)  T(F): 98.5 (24 Aug 2017 04:34), Max: 99.2 (24 Aug 2017 00:16)  HR: 84 (24 Aug 2017 04:34) (71 - 84)  BP: 113/67 (24 Aug 2017 04:34) (113/67 - 130/82)  RR: 18 (24 Aug 2017 04:34) (18 - 20)      08-22-17 @ 07:01  -  08-23-17 @ 07:00  --------------------------------------------------------  IN: 625 mL / OUT: 3300 mL / NET: -2675 mL    08-23-17 @ 07:01  -  08-24-17 @ 05:59  --------------------------------------------------------  IN: 0 mL / OUT: 450 mL / NET: -450 mL      Lungs: Normal  Heart: Regular rate and rhythm  Abdomen: Soft, nontender, no distension  Incision is clean, dry, and intact  Pelvic: Normal vaginal spotting noted  Ext: No calf tenderness    LABS:    Allergies    No Known Allergies        A/P:   Day:   -She is stable, tolerates a diet and has normal flatus and bowel movements  -Continue the current pain medication  -Encourage ambulation and regular diet  -DVT ppx: SCDs when not ambulating    She will be discharged on Day:  according to the normal criteria.

## 2020-12-15 ENCOUNTER — TELEPHONE (OUTPATIENT)
Dept: NEUROLOGY | Facility: CLINIC | Age: 40
End: 2020-12-15

## 2020-12-15 DIAGNOSIS — Z74.09 IMPAIRED MOBILITY AND ADLS: ICD-10-CM

## 2020-12-15 DIAGNOSIS — R13.11 DYSPHAGIA, ORAL PHASE: ICD-10-CM

## 2020-12-15 DIAGNOSIS — G12.29 UPPER MOTOR NEURON DISEASE: Primary | ICD-10-CM

## 2020-12-15 DIAGNOSIS — Z78.9 IMPAIRED MOBILITY AND ADLS: ICD-10-CM

## 2020-12-15 NOTE — TELEPHONE ENCOUNTER
12/09/2020 ALS Clinic Orders :    Custom Power Wheelchair  Vendor: [employer]   Via: Emailed order, face sheet, OT hortencia and MD SWAN to Dante.  Outcome: Awaiting Authorization    Outpatient Therapy  Agency: [facility]  Via: Internal Referral for Occupational Therapy.   (Tel:[phone])   Outcome: Spoke with Stephanie to inform order placed. She verified order in que and will follow up with Therapist.    Movement Disorder Referral  Provider: Dr. Srinivasan  Via: Internal Referral to [facility]  Outcome: Message sent to Tania Wheatley RN, regarding scheduling appt. Awaiting reply/clarification    Neuropsychology Evaluation  Provider: Dr. Ramirez  Via: Internal Referral to [facility] for evaluation of memory  Outcome: Appt pending    MBSS  Agency: [facility]  Via: Internal Order  Outcome: Awaiting scheduling    EMG  Provider: Dr. Margarita Humphrey  Via: Internal Order to [facility]  Outcome: Appt initially scheduled for 12/15 r/s to 1/26/2021.        Patient's mother, Giovana, notified of above

## 2020-12-16 ENCOUNTER — CLINICAL SUPPORT (OUTPATIENT)
Dept: REHABILITATION | Facility: HOSPITAL | Age: 40
End: 2020-12-16
Payer: MEDICARE

## 2020-12-16 ENCOUNTER — TELEPHONE (OUTPATIENT)
Dept: NEUROLOGY | Facility: CLINIC | Age: 40
End: 2020-12-16

## 2020-12-16 DIAGNOSIS — Z74.09 IMPAIRED TRANSFERS: ICD-10-CM

## 2020-12-16 DIAGNOSIS — G12.29 UPPER MOTOR NEURON DISEASE: Primary | ICD-10-CM

## 2020-12-16 DIAGNOSIS — R13.12 OROPHARYNGEAL DYSPHAGIA: ICD-10-CM

## 2020-12-16 PROCEDURE — 97140 MANUAL THERAPY 1/> REGIONS: CPT | Mod: KX,PN,CQ

## 2020-12-16 PROCEDURE — 97112 NEUROMUSCULAR REEDUCATION: CPT | Mod: KX,PN,CQ

## 2020-12-16 NOTE — TELEPHONE ENCOUNTER
Message received from KENTON Carrero, to inform RN Coordinator that order needed for Fiberoptic Endoscopic Evaluation of Swallowing ( FEES). Referral placed as requested and Maribell España selected as provider.     Alise indicated that KENTON Reyna, has appt available on Friday, 12/18, at [facility].       Plan to cancel Modified Barium Swallow Study order that was placed.

## 2020-12-17 NOTE — PROGRESS NOTES
Physical Therapy Treatment Note     Name: Lisa Irvin [facility] Number: [id number]    Therapy Diagnosis:   Encounter Diagnosis   Name Primary?   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  He was compliant with home exercise program per patient he wore dynasplints and per caregivers PROM HEP is being performed  Response to previous treatment: no adverse affects  Functional change: none     Pain: 0/10 FACES and HURT scale  Location: N/A    Objective   Transferred Squat pivot (mod assistance) from wheelchair to hi/low mat. Sit to supine with mod assist mainly for B LE management.     Lisa received the following manual therapy techniques: passive ROM and manual stretches: for 15 minutes, including:  PTA performed the following stretches to increase AROM and PROM in pt's B side: B hip rotation stretches, hip adductor stretches, ankle DF and eversion and knee extension mobs grade II.      Pt transferred to standing frame modA and positioned safely with straps.    Lisa participated in neuromuscular re-education activities to improve: Posture for 25 minutes. The following activities were included:  Gradual progression toward 3/4 standing x 2 with 3/4 stand maintained x 2-4' total   BP: 113/73, HR: 75 bpm 5 min in frame  BP: 112/72, HR: 75 bpm 10 min in frame  Nustep x 7 in level 2 for B UE/LE reciprocal AROM     Patient Education Provided     Education provided:   -  continue dynasplint wear    Written Home Exercises Provided: yes.  Exercises were reviewed and Lisa's family and caregiver was able to demonstrate them prior to the end of the session.  Lisa' caregiver and mother demonstrated fair  understanding of the education provided.      See EMR under Patient Instructions for exercises provided 11/19/2020.    Assessment     BP monitored throughout standing trial in standing frame. Pt tolerated therex with no adverse reactions. Will continue to enforce/ encouraged proper hand placement and sequencing with wheelchair transfers.     Lisa is progressing well towards his goals.   Pt prognosis is Fair.     Pt will continue to benefit from skilled outpatient physical therapy to address the deficits listed in the problem list box on initial evaluation, provide pt/family education and to maximize pt's level of independence in the home and community environment.     Pt's spiritual, cultural and educational needs considered and pt agreeable to plan of care and goals.     Anticipated barriers to physical therapy: transportation, daily HEP compliance    Short Term Goals (4 Weeks):   1.  Independent with initial HEP.  MET  2.  Pt will perform nu-step at level 2 x 6 minutes without rests breaks going at least 40 step/min or greater. (PROGRESSING, NOT MET)     Long Term Goals (8 Weeks):   1. Pt will be able to perform TUG in 120 secs with use of AD placingdemonstrating overall improved functional mobility.  (PROGRESSING, NOT MET)  2. Pt will performed sit to stand x 30 secs for a total of 3 times with B UE support without LOB backward or posterior LE hooking for functional and safe transfers. (PROGRESSING, NOT MET)   3.  Pt will score greater than or equal to 10/28 on the Tinetti test/assessment with use of AD demonstrating overall improved functional mobility and balance and reduce fall risk.  (PROGRESSING, NOT MET)  4. Pt will have 0 falls from start of PT sessions. (PROGRESSING,  NOT MET)  5. Pt will be able to propel 100 ft in w/c on level surface to improve household mobility. (PROGRESSING, NOT MET)  6. Pt will be able to performed full w/c to bed transfer and back with verbal cues only and supervision to reduce burden of care on caregiver. (PROGRESSING, NOT MET)    Plan   Cont PT   Advance PT as per POC.    Rosalio Anaya, PTA

## 2020-12-18 ENCOUNTER — CLINICAL SUPPORT (OUTPATIENT)
Dept: REHABILITATION | Facility: HOSPITAL | Age: 40
End: 2020-12-18
Payer: MEDICARE

## 2020-12-18 DIAGNOSIS — Z78.9 IMPAIRED MOBILITY AND ADLS: ICD-10-CM

## 2020-12-18 DIAGNOSIS — G12.29 UPPER MOTOR NEURON DISEASE: ICD-10-CM

## 2020-12-18 DIAGNOSIS — R27.8 DECREASED COORDINATION: ICD-10-CM

## 2020-12-18 DIAGNOSIS — R29.898 UPPER EXTREMITY WEAKNESS: ICD-10-CM

## 2020-12-18 DIAGNOSIS — Z74.09 IMPAIRED MOBILITY AND ADLS: ICD-10-CM

## 2020-12-18 DIAGNOSIS — Z74.09 IMPAIRED TRANSFERS: ICD-10-CM

## 2020-12-18 PROCEDURE — 97530 THERAPEUTIC ACTIVITIES: CPT | Mod: PN

## 2020-12-18 PROCEDURE — 97140 MANUAL THERAPY 1/> REGIONS: CPT | Mod: KX,PN,CQ

## 2020-12-18 PROCEDURE — 97110 THERAPEUTIC EXERCISES: CPT | Mod: PN

## 2020-12-18 PROCEDURE — 97112 NEUROMUSCULAR REEDUCATION: CPT | Mod: KX,PN,CQ

## 2020-12-18 NOTE — PROGRESS NOTES
Physical Therapy Treatment Note     Name: Lisa Irvin [facility] Number: [id number]    Therapy Diagnosis:   Encounter Diagnosis   Name Primary?   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  He was compliant with home exercise program per patient he wore dynasplints and per caregivers PROM HEP is being performed  Response to previous treatment: no adverse affects  Functional change: none     Pain: 0/10 FACES and HURT scale  Location: N/A    Objective   Transferred Squat pivot (mod assistance) from wheelchair to hi/low mat. Sit to supine with mod assist mainly for B LE management.     Lisa received the following manual therapy techniques: passive ROM and manual stretches: for 15 minutes, including:  PTA performed the following stretches to increase AROM and PROM in pt's B side: B hip rotation stretches, hip adductor stretches, ankle DF and eversion and knee extension mobs grade II.      Pt transferred to standing frame modA and positioned safely with straps.    Lisa participated in neuromuscular re-education activities to improve: Posture for 25 minutes. The following activities were included:  Gradual progression toward 3/4 standing x 2 with 3/4 stand maintained x 2-4' total   BP: 115/74, HR: 78 bpm 5 min in frame  BP: 113/72, HR: 75 bpm 10 min in frame  Nustep x 7 in level 2 for B UE/LE reciprocal AROM     Patient Education Provided     Education provided:   -  continue dynasplint wear    Written Home Exercises Provided: yes.  Exercises were reviewed and Lisa's family and caregiver was able to demonstrate them prior to the end of the session.  Lisa' caregiver and mother demonstrated fair  understanding of the education provided.      See EMR under Patient Instructions for exercises provided 11/19/2020.    Assessment     BP monitored throughout standing trial in standing frame. Pt tolerated therex with no adverse reactions. Pt with strong posterior lean in unsupported sitting (vc's provided on corrections)    Lisa is progressing well towards his goals.   Pt prognosis is Fair.     Pt will continue to benefit from skilled outpatient physical therapy to address the deficits listed in the problem list box on initial evaluation, provide pt/family education and to maximize pt's level of independence in the home and community environment.     Pt's spiritual, cultural and educational needs considered and pt agreeable to plan of care and goals.     Anticipated barriers to physical therapy: transportation, daily HEP compliance    Short Term Goals (4 Weeks):   1.  Independent with initial HEP.  MET  2.  Pt will perform nu-step at level 2 x 6 minutes without rests breaks going at least 40 step/min or greater. (PROGRESSING, NOT MET)     Long Term Goals (8 Weeks):   1. Pt will be able to perform TUG in 120 secs with use of AD placingdemonstrating overall improved functional mobility.  (PROGRESSING, NOT MET)  2. Pt will performed sit to stand x 30 secs for a total of 3 times with B UE support without LOB backward or posterior LE hooking for functional and safe transfers. (PROGRESSING, NOT MET)   3.  Pt will score greater than or equal to 10/28 on the Tinetti test/assessment with use of AD demonstrating overall improved functional mobility and balance and reduce fall risk.  (PROGRESSING, NOT MET)  4. Pt will have 0 falls from start of PT sessions. (PROGRESSING, NOT MET)  5. Pt  will be able to propel 100 ft in w/c on level surface to improve household mobility. (PROGRESSING, NOT MET)  6. Pt will be able to performed full w/c to bed transfer and back with verbal cues only and supervision to reduce burden of care on caregiver. (PROGRESSING, NOT MET)    Plan   Cont PT   Advance PT as per POC.    Rosalio Anaya, PTA

## 2020-12-18 NOTE — PROGRESS NOTES
Occupational Therapy Treatment Note     Date: 12/18/2020  Name: Lisa Moreno  Clinic Number: [id number]    Therapy Diagnosis:   Encounter Diagnoses   Name Primary?   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   Response to previous treatment:Good  Functional change: None noted yet    Pain: 0/10    Objective     Lisa received therapeutic exercises for 30 minutes including:  Pt completed the following exercises below in order to increase ROM, strength and tolerance of affected UE in order to increase IND and functional use:    Exercises Date 12/18/2020    Visit # 8   SciFit ArmBike Level 6.0 10 min 5 min forwards 5 backwards     Green theraband    Blue theraband Rows 3/10  Pull downs 3/10  0# Horizontal abduction 3/10  0# shoulder horizontal flexion/abduction        Pink foam Finger abduction L&R 2/15       Red theraputty Extension rings  Pinch and pull       Lisa received therapeutic activites for 10 minutes including:       Scissor Cutting shapes       Home Exercises and Education Provided     Education provided:   - Continue with HEP  - Progress towards goals     Written Home Exercises Provided: Patient instructed to cont prior HEP.  Exercises were reviewed and  Lisa was able to demonstrate them prior to the end of the session.  Lisa demonstrated good  understanding of the HEP provided.   .   See EMR under Patient Instructions for exercises provided prior visit.        Assessment     Pt participated in session well today. Good progress with UB strengthening. Focused on improving proper posture and form today with no weights in order to increase proximal stability. GM coordination also addressed through this with marked progress throughout the session. FM activities were performed well. Scissor activity was increased in challenge to cut shapes, and he was able to perform although with extra time due to decreased coordination with opening and closing scissors and bilateral engagement. Next session to continue to progress with exercises in order to increase participation in ADLs and IADLs.    Lisa is progressing well towards his goals and there are no updates to goals at this time. Pt prognosis is Fair.     Pt will continue to benefit from skilled outpatient occupational therapy to address the deficits listed in the problem list on initial evaluation provide pt/family education and to maximize pt's level of independence in the home and community environment.     Anticipated barriers to occupational therapy: transportation    Pt's spiritual, cultural and educational needs considered and pt agreeable to plan of care and goals.    Goals:  Short Term Goals:  1) Initiate Hep Met 11/18/2020  2) Pt will be able to participate in simple cooking activity with mod A per patient report by 4 weeks. Progressing  3) Pt to increase Barthel Index Score by 5 points increasing self care IND by 4 weeks.  Progressing  4) Patient will be able to achieve less than or equal to 60% on the FOTO, demonstrating overall improved functional ability with upper extremity. (self-care category) Progressing     Long Term Goals:  1) Pt to be IND with HEP in order to maintain ROM and strength needed for  self care IND by d/c.Progressing  2) Pt will be able to participate in simple cooking activity with min A per patient report by d/c.  Progressing  3) Pt to increase Barthel Index Score by 10 points increasing self care IND at home by d/c. Progressing  4) Patient will be able to achieve less than or equal to 55% on the FOTO, demonstrating overall improved functional ability with upper extremity. (self-care category) Progressing  5) Pt will be min A with bed mobility by d/c. Met 11/27/2020    Plan   Continue per initial plan of care  Updates/Grading for next session: Progress as tolerated    JAZMIN KING, OT

## 2020-12-21 ENCOUNTER — TELEPHONE (OUTPATIENT)
Dept: NEUROLOGY | Facility: CLINIC | Age: 40
End: 2020-12-21

## 2020-12-21 ENCOUNTER — CLINICAL SUPPORT (OUTPATIENT)
Dept: REHABILITATION | Facility: HOSPITAL | Age: 40
End: 2020-12-21
Payer: MEDICARE

## 2020-12-21 DIAGNOSIS — R27.8 DECREASED COORDINATION: ICD-10-CM

## 2020-12-21 DIAGNOSIS — R29.898 UPPER EXTREMITY WEAKNESS: ICD-10-CM

## 2020-12-21 DIAGNOSIS — Z74.09 IMPAIRED TRANSFERS: ICD-10-CM

## 2020-12-21 DIAGNOSIS — Z74.09 IMPAIRED MOBILITY AND ADLS: ICD-10-CM

## 2020-12-21 DIAGNOSIS — Z78.9 IMPAIRED MOBILITY AND ADLS: ICD-10-CM

## 2020-12-21 PROCEDURE — 97110 THERAPEUTIC EXERCISES: CPT | Mod: PN

## 2020-12-21 PROCEDURE — 97530 THERAPEUTIC ACTIVITIES: CPT | Mod: PN

## 2020-12-21 PROCEDURE — 97112 NEUROMUSCULAR REEDUCATION: CPT | Mod: PN

## 2020-12-21 NOTE — PROGRESS NOTES
Physical Therapy Treatment Note     Name: Lisa Irvin [facility] Number: [id number]    Therapy Diagnosis:   Encounter Diagnosis   Name Primary?   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  He was compliant with home exercise program per patient he wore dynasplints and per caregivers PROM HEP is being performed  Response to previous treatment: no adverse affects  Functional change: none     Pain: 0/10 FACES and HURT scale  Location: N/A    Objective   Transferred Squat pivot (mod assistance) from wheelchair to hi/low mat. Sit to supine with mod assist mainly for B LE management.     Lisa participated in neuromuscular re-education activities to improve: Posture for 25 minutes. The following activities were included:  Nustep x 9 in level 2 for B UE/LE reciprocal AROM    RANGE OF MOTION--LOWER EXTREMITIES  (R) LE Hip: flexion 100 PROM, pt rest at 10 degrees Add, hip PROM abd 7 (+2 from eval) degrees.                Knee: Knee extension -3 degrees, 123 degrees knee flexion               Ankle: DF PROM lacking 8 degrees, AROM lacking 15 degrees              Eversion AROM to lacking 2, PROM to 10 degrees              Inversion rest at 15 degrees inverion, AROM to 30, PROM to 35 degrees     (L) LE: Hip: flexion PROM 110 degrees, hip adb 20 degrees              Knee: Knee extension -10 decrease, 125 knee flexion               Ankle: DF PROM to neutral, AROM lacking 6  degrees.               Eversion AROM to neutral, PROM to 10 degrees              Inversion AROM to 14 degrees, PROM to 30 degrees     Lower Extremity Strength    RLE LLE   Hip Flexion: 2+/5 2+/5   Hip Extension:  2-/5 2-/5   Hip Abduction: 2-/5 2-/5   Hip Adduction: 2/5 2/5   Knee Extension: 2-/5 2-/5   Knee Flexion: 2/5 2/5   Ankle Dorsiflexion: 2-/5 2-/5   Ankle Plantarflexion: 3-/5 3-/5   Ankle Inversion: 2-/5 2/5   Ankle Eversion: 2-/5 2-/5      Functional Mobility (Bed mobility, transfers)  Bed mobility: Min A  Supine to sit: Min A  Sit to supine: Min A  Rolling: Min A  Transfers to bed: Min A  Sit to stand:  Max A  Stand pivot:  Max A  Wheelchair mobility: Supervision     Patient Education Provided     Education provided:   - continue dynasplint wear    Written Home Exercises Provided: yes.  Exercises were reviewed and Lisa's family and caregiver was able to demonstrate them prior to the end of the session.  Lisa' caregiver and mother demonstrated fair  understanding of the education provided.      See EMR under Patient Instructions for exercises provided 11/19/2020.    Assessment     Pt has improve ~10% in PROM since PT was initiated and he still can tolerate full standing in standing frame.  He can tolerate 3/4 standing for ~5 minutes on average.  He will likely not stand, transfer or ambulate without at least Anam because of his severe LE spasticity. If caregiver and mother continue PROM daily and daily splint donning this will prevent LE contractures in the function.  Pt's primary mode of mobility will remain manual w/c.     Lisa is progressing well towards his goals.   Pt prognosis is Fair.     Pt will continue to benefit from skilled outpatient physical therapy to address the deficits listed in the problem list box on initial evaluation, provide pt/family education and to maximize pt's level of independence in the home and community environment.     Pt's spiritual, cultural and educational needs  considered and pt agreeable to plan of care and goals.     Anticipated barriers to physical therapy: transportation, daily HEP compliance    Short Term Goals (4 Weeks):   1.  Independent with initial HEP.  MET  2.  Pt will perform nu-step at level 2 x 6 minutes without rests breaks going at least 40 step/min or greater. (PROGRESSING, NOT MET)     Long Term Goals (8 Weeks):   1. Pt will be able to perform TUG in 120 secs with use of AD placingdemonstrating overall improved functional mobility.  (PROGRESSING, NOT MET)  2. Pt will performed sit to stand x 30 secs for a total of 3 times with B UE support without LOB backward or posterior LE hooking for functional and safe transfers. (PROGRESSING, NOT MET)   3.  Pt will score greater than or equal to 10/28 on the Tinetti test/assessment with use of AD demonstrating overall improved functional mobility and balance and reduce fall risk.  (PROGRESSING, NOT MET)  4. Pt will have 0 falls from start of PT sessions. (PROGRESSING, NOT MET)  5. Pt will be able to propel 100 ft in w/c on level surface to improve household mobility.  MET  6. Pt will be able to performed full w/c to bed transfer and back with verbal cues only and supervision to reduce burden of care on caregiver. (PROGRESSING, NOT MET)    Plan   D/c 12/30, educate family and caregiver on importance of home PROM daily  Advance PT as per POC.    Chikis Gracia, PT

## 2020-12-21 NOTE — PROGRESS NOTES
Occupational Therapy Treatment Note     Date: 12/21/2020  Name: Lisa Irvin [place]  Clinic Number: [id number]    Therapy Diagnosis:   Encounter Diagnoses   Name Primary?   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   Response to previous treatment:Good  Functional change: None noted yet    Pain: 0/10    Objective     Lisa received therapeutic exercises for 33 minutes including:  Pt completed the following exercises below in order to increase ROM, strength and tolerance of affected UE in order to increase IND and functional use:    Exercises Date 12/21/2020    Visit # 9   SciFit ArmBike Level 6.0 10 min 5 min forwards 5 backwards     Blue theraband      Rows 3/10  Pull downs 3/10  0# Horizontal abduction 3/10  0# shoulder horizontal flexion/abduction   Pink foam Finger adduction L&R 2/15   Red clothespin Small pom poms L&R    Red theraputty Extension rings  Pinch and pull       Lisa received therapeutic activites for 15 minutes including:  Scissor Cutting shapes   Ball toss Beach ball and basketball         Home Exercises and Education Provided     Education provided:   - Continue with HEP  - Progress towards goals     Written Home Exercises Provided: Patient instructed to cont prior HEP.  Exercises were reviewed and Lisa was able to  demonstrate them prior to the end of the session.  Lisa demonstrated good  understanding of the HEP provided.   .   See EMR under Patient Instructions for exercises provided prior visit.        Assessment     Pt participated in session well today. He continues to progress with GM coordination with ball toss and coordinated arm movements. Strengthening exercises also tolerated well. FM coordination focused on today with extra time required to complete activities. Scissor activity still challenging due to decreased coordination with opening and closing scissors. Lumbrical strengthening also addressed with foam and putty with to increase finger stability and strength to increase FM participation. Next session to continue to progress with exercises in order to increase participation in ADLs and IADLs.    Lisa is progressing well towards his goals and there are no updates to goals at this time. Pt prognosis is Fair.     Pt will continue to benefit from skilled outpatient occupational therapy to address the deficits listed in the problem list on initial evaluation provide pt/family education and to maximize pt's level of independence in the home and community environment.     Anticipated barriers to occupational therapy: transportation    Pt's spiritual, cultural and educational needs considered and pt agreeable to plan of care and goals.    Goals:  Short Term Goals:  1) Initiate Hep Met 11/18/2020  2) Pt will be able to participate in simple cooking activity with mod A per patient report by 4 weeks. Progressing  3) Pt to increase Barthel Index Score by 5 points increasing self care IND by 4 weeks.  Progressing  4) Patient will be able to achieve less than or equal to 60% on the FOTO, demonstrating overall improved functional ability with upper extremity. (self-care category) Progressing     Long Term Goals:  1) Pt to be IND with HEP in order to maintain ROM and strength needed for self care IND by  d/c.Progressing  2) Pt will be able to participate in simple cooking activity with min A per patient report by d/c.  Progressing  3) Pt to increase Barthel Index Score by 10 points increasing self care IND at home by d/c. Progressing  4) Patient will be able to achieve less than or equal to 55% on the FOTO, demonstrating overall improved functional ability with upper extremity. (self-care category) Progressing  5) Pt will be min A with bed mobility by d/c. Met 11/27/2020    Plan   Continue per initial plan of care  Updates/Grading for next session: Progress as tolerated    JAZMIN KING, OT

## 2020-12-21 NOTE — TELEPHONE ENCOUNTER
----- Message from Christina Srinivasan MD sent at 12/21/2020  4:24 PM CST -----  Regarding: new pt  Next available new 40mins inperson  ----- Message -----  From: Margarita Humphrey MD  Sent: 12/11/2020   6:11 PM CST  To: Christina Srinivasan MD, Zarina Vasquez Staff    Hi Christina,    Could you please see this patient and help me out. He has progressive bulbar weakness and dystonic movements. He has spasticity and weakness in legs> arms with atrophy and hyperreflexia. [facility] saw him but panel for SCA was negative. This is non-urgent, just wanted to know your thoughts on genetic testing.    Thanks much    Margarita

## 2020-12-22 ENCOUNTER — DOCUMENTATION ONLY (OUTPATIENT)
Dept: REHABILITATION | Facility: HOSPITAL | Age: 40
End: 2020-12-22

## 2020-12-22 NOTE — PROGRESS NOTES
Face to Face PTA Conference performed with Rosalio Anaya PTA regarding patient's current status, overall progress, and plan of care.    Chikis Gracia, PT    DPT, PT, NCS  OTW SHARDA Romero, PTA

## 2020-12-23 ENCOUNTER — CLINICAL SUPPORT (OUTPATIENT)
Dept: REHABILITATION | Facility: HOSPITAL | Age: 40
End: 2020-12-23
Payer: MEDICARE

## 2020-12-23 DIAGNOSIS — Z74.09 IMPAIRED TRANSFERS: ICD-10-CM

## 2020-12-23 PROCEDURE — 97140 MANUAL THERAPY 1/> REGIONS: CPT | Mod: PN

## 2020-12-23 PROCEDURE — 97112 NEUROMUSCULAR REEDUCATION: CPT | Mod: KX,PN

## 2020-12-24 ENCOUNTER — CLINICAL SUPPORT (OUTPATIENT)
Dept: REHABILITATION | Facility: HOSPITAL | Age: 40
End: 2020-12-24
Payer: MEDICARE

## 2020-12-24 DIAGNOSIS — Z74.09 IMPAIRED TRANSFERS: ICD-10-CM

## 2020-12-24 PROCEDURE — 97112 NEUROMUSCULAR REEDUCATION: CPT | Mod: KX,PN

## 2020-12-24 PROCEDURE — 97140 MANUAL THERAPY 1/> REGIONS: CPT | Mod: KX,PN

## 2020-12-28 ENCOUNTER — CLINICAL SUPPORT (OUTPATIENT)
Dept: REHABILITATION | Facility: HOSPITAL | Age: 40
End: 2020-12-28
Payer: MEDICARE

## 2020-12-28 ENCOUNTER — OFFICE VISIT (OUTPATIENT)
Dept: NEUROLOGY | Facility: CLINIC | Age: 40
End: 2020-12-28
Payer: MEDICARE

## 2020-12-28 DIAGNOSIS — F02.818 MAJOR NEUROCOGNITIVE DISORDER DUE TO ANOTHER MEDICAL CONDITION WITH BEHAVIORAL DISTURBANCE: Primary | ICD-10-CM

## 2020-12-28 DIAGNOSIS — Z78.9 IMPAIRED MOBILITY AND ADLS: ICD-10-CM

## 2020-12-28 DIAGNOSIS — Z74.09 IMPAIRED TRANSFERS: ICD-10-CM

## 2020-12-28 DIAGNOSIS — R27.8 DECREASED COORDINATION: ICD-10-CM

## 2020-12-28 DIAGNOSIS — R29.898 UPPER EXTREMITY WEAKNESS: ICD-10-CM

## 2020-12-28 DIAGNOSIS — Z74.09 IMPAIRED MOBILITY AND ADLS: ICD-10-CM

## 2020-12-28 PROCEDURE — 99499 NO LOS: ICD-10-PCS | Mod: 95,,, | Performed by: PSYCHIATRY & NEUROLOGY

## 2020-12-28 PROCEDURE — 97140 MANUAL THERAPY 1/> REGIONS: CPT | Mod: PN

## 2020-12-28 PROCEDURE — 97530 THERAPEUTIC ACTIVITIES: CPT | Mod: PN

## 2020-12-28 PROCEDURE — 99499 UNLISTED E&M SERVICE: CPT | Mod: 95,,, | Performed by: PSYCHIATRY & NEUROLOGY

## 2020-12-28 PROCEDURE — 96116 NUBHVL XM PHYS/QHP 1ST HR: CPT | Mod: 95,,, | Performed by: PSYCHIATRY & NEUROLOGY

## 2020-12-28 PROCEDURE — 97112 NEUROMUSCULAR REEDUCATION: CPT | Mod: PN

## 2020-12-28 PROCEDURE — 97110 THERAPEUTIC EXERCISES: CPT | Mod: PN

## 2020-12-28 PROCEDURE — 96116 PR NEUROBEHAVIORAL STATUS EXAM BY PSYCH/PHYS: ICD-10-PCS | Mod: 95,,, | Performed by: PSYCHIATRY & NEUROLOGY

## 2020-12-28 NOTE — PROGRESS NOTES
Physical Therapy Treatment Note     Name: Lisa Irvin [facility] Number: [id number]    Therapy Diagnosis:   Encounter Diagnosis   Name Primary?   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  He was compliant with home exercise program per patient he wore dynasplints and per caregivers PROM HEP is being performed  Response to previous treatment: no adverse affects  Functional change: none     Pain: 0/10 FACES and HURT scale  Location: N/A    Objective   Transferred Squat pivot (mod assistance) from wheelchair to hi/low mat. Sit to supine with min assist mainly for B LE management.     Lisa received the following manual therapy techniques: passive ROM and manual stretches: for 25 minutes, including:  PTA performed the following stretches to increase AROM and PROM in pt's B side: B hip rotation stretches, hip adductor stretches, ankle DF and eversion and knee extension mobs grade II.      Pt performed supine to sit transfer with ModA to transfer  Pt transferred to standing frame modA and positioned safely     Lisa participated in neuromuscular re-education activities to improve: Posture for 15 minutes. The following activities were included:    Nustep x 12 in level 2 for B UE/LE reciprocal AROM       Patient Education Provided     Education provided:   - continue dynasplint wear    Written Home Exercises Provided: yes.  Exercises were reviewed and Lisa's family  and caregiver was able to demonstrate them prior to the end of the session.  Lisa' caregiver and mother demonstrated fair  understanding of the education provided.      See EMR under Patient Instructions for exercises provided 11/19/2020.    Assessment     Pt's hamstrings and adductors are still very tight and he is unable to actively extend his his LEs in sitting or standing. He has reached his highest level of function at this time and will be d/c'd next week at the end of his POC.    Lisa is progressing well towards his goals.   Pt prognosis is Fair.     Pt will continue to benefit from skilled outpatient physical therapy to address the deficits listed in the problem list box on initial evaluation, provide pt/family education and to maximize pt's level of independence in the home and community environment.     Pt's spiritual, cultural and educational needs considered and pt agreeable to plan of care and goals.     Anticipated barriers to physical therapy: transportation, daily HEP compliance    Short Term Goals (4 Weeks):   1.  Independent with initial HEP.  MET  2.  Pt will perform nu-step at level 2 x 6 minutes without rests breaks going at least 40 step/min or greater. (PROGRESSING, NOT MET)     Long Term Goals (8 Weeks):   1. Pt will be able to perform TUG in 120 secs with use of AD placingdemonstrating overall improved functional mobility.  (PROGRESSING, NOT MET)  2. Pt will performed sit to stand x 30 secs for a total of 3 times with B UE support without LOB backward or posterior LE hooking for functional and safe transfers. (PROGRESSING, NOT MET)   3.  Pt will score greater than or equal to 10/28 on the Tinetti test/assessment with use of AD demonstrating overall improved functional mobility and balance and reduce fall risk.  (PROGRESSING, NOT MET)  4. Pt will have 0 falls from start of PT sessions. (PROGRESSING, NOT MET)  5. Pt will be able to propel 100 ft in w/c on level surface to improve  household mobility.  MET  6. Pt will be able to performed full w/c to bed transfer and back with verbal cues only and supervision to reduce burden of care on caregiver. (PROGRESSING, NOT MET)    Plan   D/c 12/30, educate family and caregiver on importance of home PROM daily  Advance PT as per POC.    Chikis Gracia, PT

## 2020-12-28 NOTE — PROGRESS NOTES
Physical Therapy Treatment Note     Name: Lisa Irvin [facility] Number: [id number]    Therapy Diagnosis:   Encounter Diagnosis   Name Primary?   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  He was compliant with home exercise program per patient he wore dynasplints and per caregivers PROM HEP is being performed  Response to previous treatment: no adverse affects  Functional change: none     Pain: 0/10 FACES and HURT scale  Location: N/A    Objective   Transferred Squat pivot (mod assistance) from wheelchair to hi/low mat. Sit to supine with mod assist mainly for B LE management.     Lisa received the following manual therapy techniques: passive ROM and manual stretches: for 25 minutes, including:  PTA performed the following stretches to increase AROM and PROM in pt's B side: B hip rotation stretches, hip adductor stretches, ankle DF and eversion and knee extension mobs grade II.      Pt performed supine to sit transfer with ModA to transfer  Pt transferred to standing frame modA and positioned safely     Lisa participated in neuromuscular re-education activities to improve: Posture for 15 minutes. The following activities were included:    Nustep x 10 in level 2 for B UE/LE reciprocal AROM       Patient Education Provided     Education provided:   - continue dynasplint wear    Written Home Exercises Provided: yes.  Exercises were reviewed and Lisa's family  and caregiver was able to demonstrate them prior to the end of the session.  Lisa' caregiver and mother demonstrated fair  understanding of the education provided.      See EMR under Patient Instructions for exercises provided 11/19/2020.    Assessment     Pt's hamstrings and adductors are still very tight and he is unable to actively extend his his LEs in sitting or standing. He has reached his highest level of function at this time and will be d/c'd next week at the end of his POC.    Lisa is progressing well towards his goals.   Pt prognosis is Fair.     Pt will continue to benefit from skilled outpatient physical therapy to address the deficits listed in the problem list box on initial evaluation, provide pt/family education and to maximize pt's level of independence in the home and community environment.     Pt's spiritual, cultural and educational needs considered and pt agreeable to plan of care and goals.     Anticipated barriers to physical therapy: transportation, daily HEP compliance    Short Term Goals (4 Weeks):   1.  Independent with initial HEP.  MET  2.  Pt will perform nu-step at level 2 x 6 minutes without rests breaks going at least 40 step/min or greater. (PROGRESSING, NOT MET)     Long Term Goals (8 Weeks):   1. Pt will be able to perform TUG in 120 secs with use of AD placingdemonstrating overall improved functional mobility.  (PROGRESSING, NOT MET)  2. Pt will performed sit to stand x 30 secs for a total of 3 times with B UE support without LOB backward or posterior LE hooking for functional and safe transfers. (PROGRESSING, NOT MET)   3.  Pt will score greater than or equal to 10/28 on the Tinetti test/assessment with use of AD demonstrating overall improved functional mobility and balance and reduce fall risk.  (PROGRESSING, NOT MET)  4. Pt will have 0 falls from start of PT sessions. (PROGRESSING, NOT MET)  5. Pt will be able to propel 100 ft in w/c on level surface to improve  household mobility.  MET  6. Pt will be able to performed full w/c to bed transfer and back with verbal cues only and supervision to reduce burden of care on caregiver. (PROGRESSING, NOT MET)    Plan   D/c 12/30, educate family and caregiver on importance of home PROM daily  Advance PT as per POC.    Chikis Gracia, PT

## 2020-12-28 NOTE — PROGRESS NOTES
Occupational Therapy Treatment Note     Date: 12/28/2020  Name: Lisa Moreno  Clinic Number: [id number]    Therapy Diagnosis:   Encounter Diagnoses   Name Primary?   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   Response to previous treatment:Good  Functional change: None noted yet    Pain: 0/10    Objective     Lisa received therapeutic exercises for 30 minutes including:  Pt completed the following exercises below in order to increase ROM, strength and tolerance of affected UE in order to increase IND and functional use:    Exercises Date 12/28/2020    Visit # 10   SciFit ArmBike Level 8.0 10 min 5 min forwards 5 backwards     UB GM Coordination   0# Horizontal abduction 3/10  0# shoulder horizontal flexion/abduction   Pink foam Finger adduction L&R 2/15   Red clothespin Small pom poms L&R    Red theraputty [place] retreival x6       Lisa received therapeutic activites for 17 minutes including:   Connect 4    Ball toss basketball         Home Exercises and Education Provided     Education provided:   - Continue with HEP  - Progress towards goals     Written Home Exercises Provided: Patient instructed to cont prior HEP.  Exercises were reviewed and Lisa was able to demonstrate them prior  to the end of the session.  Lisa demonstrated good  understanding of the HEP provided.   .   See EMR under Patient Instructions for exercises provided prior visit.        Assessment     Pt participated in session well today. GM coordination and coordinated arm movements are improving each session. FM coordination still requires extra time to complete activities, but pt able to participate in functional activities at home. Lumbrical strengthening addressed with foam and putty with to increase finger stability and strength to increase FM participation. [place] manipulation with Connect 4 pieces was performed well. Next session to continue to progress with exercises in order to increase participation in ADLs and IADLs.    Lisa is progressing well towards his goals and there are no updates to goals at this time. Pt prognosis is Fair.     Pt will continue to benefit from skilled outpatient occupational therapy to address the deficits listed in the problem list on initial evaluation provide pt/family education and to maximize pt's level of independence in the home and community environment.     Anticipated barriers to occupational therapy: transportation    Pt's spiritual, cultural and educational needs considered and pt agreeable to plan of care and goals.    Goals:  Short Term Goals:  1) Initiate Hep Met 11/18/2020  2) Pt will be able to participate in simple cooking activity with mod A per patient report by 4 weeks. Progressing  3) Pt to increase Barthel Index Score by 5 points increasing self care IND by 4 weeks.  Progressing  4) Patient will be able to achieve less than or equal to 60% on the FOTO, demonstrating overall improved functional ability with upper extremity. (self-care category) Progressing     Long Term Goals:  1) Pt to be IND with HEP in order to maintain ROM and strength needed for self care IND by d/c.Progressing  2) Pt will be able to participate in simple cooking activity with min A per patient  report by d/c.  Progressing  3) Pt to increase Barthel Index Score by 10 points increasing self care IND at home by d/c. Progressing  4) Patient will be able to achieve less than or equal to 55% on the FOTO, demonstrating overall improved functional ability with upper extremity. (self-care category) Progressing  5) Pt will be min A with bed mobility by d/c. Met 11/27/2020    Plan   Continue per initial plan of care  Updates/Grading for next session: Progress as tolerated    JAZMIN KING OT

## 2020-12-28 NOTE — PROGRESS NOTES
Physical Therapy Treatment Note     Name: Lisa Irvin Smithers  Clinic Number: [id number]    Therapy Diagnosis:   Encounter Diagnosis   Name Primary?   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  He was compliant with home exercise program per patient he wore dynasplints and per caregivers PROM HEP is being performed  Response to previous treatment: no adverse affects  Functional change: none     Pain: 0/10 FACES and HURT scale  Location: N/A    Objective   Transferred Squat pivot (mod assistance) from wheelchair to hi/low mat. Sit to supine with min assist mainly for B LE management.     Lisa received the following manual therapy techniques: passive ROM and manual stretches: for 25 minutes, including:  PTA performed the following stretches to increase AROM and PROM in pt's B side: B hip rotation stretches, hip adductor stretches, ankle DF and eversion and knee extension mobs grade II.      Pt performed supine to sit transfer with ModA to transfer  Pt transferred to standing frame modA and positioned safely     Lisa participated in neuromuscular re-education activities to improve: Posture for 15 minutes. The following activities were included:    Nustep x 14 in level 2 for B UE/LE reciprocal AROM       Patient Education Provided     Education provided:   - continue dynasplint wear    Written Home Exercises Provided: yes.  Exercises were reviewed and Lisa's family  and caregiver was able to demonstrate them prior to the end of the session.  Lisa' caregiver and mother demonstrated fair  understanding of the education provided.      See EMR under Patient Instructions for exercises provided 11/19/2020.    Assessment     Pt's LE were not as tight or spastic as last session.  He will be d/c'd next session and benefit from caregiver training.    Lisa is progressing well towards his goals.   Pt prognosis is Fair.     Pt will continue to benefit from skilled outpatient physical therapy to address the deficits listed in the problem list box on initial evaluation, provide pt/family education and to maximize pt's level of independence in the home and community environment.     Pt's spiritual, cultural and educational needs considered and pt agreeable to plan of care and goals.     Anticipated barriers to physical therapy: transportation, daily HEP compliance    Short Term Goals (4 Weeks):   1.  Independent with initial HEP.  MET  2.  Pt will perform nu-step at level 2 x 6 minutes without rests breaks going at least 40 step/min or greater. (PROGRESSING, NOT MET)     Long Term Goals (8 Weeks):   1. Pt will be able to perform TUG in 120 secs with use of AD placingdemonstrating overall improved functional mobility.  (PROGRESSING, NOT MET)  2. Pt will performed sit to stand x 30 secs for a total of 3 times with B UE support without LOB backward or posterior LE hooking for functional and safe transfers. (PROGRESSING, NOT MET)   3.  Pt will score greater than or equal to 10/28 on the Tinetti test/assessment with use of AD demonstrating overall improved functional mobility and balance and reduce fall risk.  (PROGRESSING, NOT MET)  4. Pt will have 0 falls from start of PT sessions. (PROGRESSING, NOT MET)  5. Pt will be able to propel 100 ft in w/c on level surface to improve household mobility.  MET  6. Pt will be able to performed full w/c to bed transfer and back with verbal cues only and  supervision to reduce burden of care on caregiver. (PROGRESSING, NOT MET)    Plan   D/c 12/30, educate family and caregiver on importance of home PROM daily  Advance PT as per POC.    Chikis Gracia, PT

## 2020-12-30 ENCOUNTER — CLINICAL SUPPORT (OUTPATIENT)
Dept: REHABILITATION | Facility: HOSPITAL | Age: 40
End: 2020-12-30
Payer: MEDICARE

## 2020-12-30 DIAGNOSIS — Z78.9 IMPAIRED MOBILITY AND ADLS: ICD-10-CM

## 2020-12-30 DIAGNOSIS — Z74.09 IMPAIRED MOBILITY AND ADLS: ICD-10-CM

## 2020-12-30 DIAGNOSIS — R29.898 UPPER EXTREMITY WEAKNESS: ICD-10-CM

## 2020-12-30 DIAGNOSIS — Z74.09 IMPAIRED TRANSFERS: ICD-10-CM

## 2020-12-30 DIAGNOSIS — R27.8 DECREASED COORDINATION: ICD-10-CM

## 2020-12-30 PROCEDURE — 97110 THERAPEUTIC EXERCISES: CPT | Mod: KX,PN

## 2020-12-30 PROCEDURE — 97140 MANUAL THERAPY 1/> REGIONS: CPT | Mod: KX,PN

## 2020-12-30 PROCEDURE — 97530 THERAPEUTIC ACTIVITIES: CPT | Mod: PN

## 2020-12-30 PROCEDURE — 97110 THERAPEUTIC EXERCISES: CPT | Mod: PN

## 2020-12-30 NOTE — PROGRESS NOTES
Occupational Therapy Treatment Note     Date: 12/30/2020  Name: Lisa Moreno  Clinic Number: [id number]    Therapy Diagnosis:   Encounter Diagnoses   Name Primary?   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   Response to previous treatment:Good  Functional change: None noted yet    Pain: 0/10    Objective     Lisa received therapeutic exercises for 34 minutes including:  Pt completed the following exercises below in order to increase ROM, strength and tolerance of affected UE in order to increase IND and functional use:    Exercises Date 12/30/2020    Visit # 11   SciFit ArmBike Level 9.0 10 min 5 min forwards 5 backwards     UB GM Coordination   0# Horizontal abduction 3/10  0# shoulder horizontal flexion/abduction   Red theraputty  L & R  Flatten with L hand   Green therabar [place], frowns, twists 15x   Brown gripper 30x L and R                   Strength: (MIAH Dynamometer in lbs.) Average 3 trials, Position II:       11/18/2020 11/18/2020 12/30/2020 12/30/2020     Right Left Right Left   Rung II 40# 35# 50$ 33#      Pinch Strength (Measured in psi)       11/18/2020 11/18/2020 12/30/2020 .12/30/2020     Right Left Right Left   Key Pinch 13 psi 16 psi 13 psi 11 psi   3pt Pinch 8  psi 7 psi 10 psi 10 psi   2pt Pinch 10 psi 9 psi 10 psi 9 psi      Barthel Index:   Eval: 60/100   12/30/2020: 65/100      Lisa received therapeutic activites for 8 minutes including:  ADL shirt buttons       Home Exercises and Education Provided     Education provided:   - Continue with HEP  - Progress towards goals     Written Home Exercises Provided: Patient instructed to cont prior HEP.  Exercises were reviewed and Lisa was able to demonstrate them prior to the end of the session.  Lisa demonstrated good  understanding of the HEP provided.   .   See EMR under Patient Instructions for exercises provided prior visit.        Assessment     Pt participated in session well today. Re-measurements of  strength taken with slight improvements in gross  strength. Pinch strength remains similar to evaluation. FM strength and coordination focused on today with good response. He only requires extra time to perform buttoning and zippers, but is able to complete independently. Strengthening exercises performed well today and were a good challenge. Next session to continue to progress with exercises in order to increase participation in ADLs and IADLs.    Lisa is progressing well towards his goals and there are no updates to goals at this time. Pt prognosis is Fair.     Pt will continue to benefit from skilled outpatient occupational therapy to address the deficits listed in the problem list on initial evaluation provide pt/family education and to maximize pt's level of independence in the home and community environment.     Anticipated barriers to occupational therapy: transportation    Pt's spiritual, cultural and educational needs considered and pt agreeable to plan of care and goals.    Goals:  Short Term Goals:  1) Initiate Hep Met 11/18/2020  2) Pt will be able to participate in simple cooking activity with mod A per patient report by 4 weeks. Progressing  3) Pt to increase Barthel Index Score by 5  points increasing self care IND by 4 weeks.  Met 12/30/2020  4) Patient will be able to achieve less than or equal to 60% on the FOTO, demonstrating overall improved functional ability with upper extremity. (self-care category) Met 12/30/2020     Long Term Goals:  1) Pt to be IND with HEP in order to maintain ROM and strength needed for self care IND by d/c.Progressing  2) Pt will be able to participate in simple cooking activity with min A per patient report by d/c.  Progressing  3) Pt to increase Barthel Index Score by 10 points increasing self care IND at home by d/c. Progressing  4) Patient will be able to achieve less than or equal to 55% on the FOTO, demonstrating overall improved functional ability with upper extremity. (self-care category) Progressing  5) Pt will be min A with bed mobility by d/c. Met 11/27/2020    Plan   Continue per initial plan of care  Updates/Grading for next session: Progress as tolerated    JAZMIN AN, OT

## 2020-12-30 NOTE — PROGRESS NOTES
Physical Therapy Treatment Note/Discharge Summary     Name: Lisa Irvin [facility] Number: [id number]    Therapy Diagnosis:   Encounter Diagnosis   Name Primary?   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  He was compliant with home exercise program per patient he wore dynasplints and per caregivers PROM HEP is being performed  Response to previous treatment: no adverse affects  Functional change: none     Pain: 0/10 FACES and HURT scale  Location: N/A    Objective   Pt caregiver was present during session for training and took some picture so PROM techniques for HEP and observed all transfers and bed mobility.     Transferred Squat pivot (mod assistance) from wheelchair to hi/low mat. Sit to supine with min assist mainly for B LE management.     Lisa received the following manual therapy techniques: passive ROM and manual stretches: for 25 minutes, including:  PTA performed the following stretches to increase AROM and PROM in pt's B side: B hip rotation stretches, hip adductor stretches, ankle DF and eversion and knee extension mobs grade II.      Pt performed supine to sit transfer with ModA to transfer  Pt transferred to standing frame modA and positioned safely     FOTO completed by PT and caregiver.     Patient Education Provided     Education provided:   - continue dynasplint wear  - perform PROM daily  - standing at sink daily  - pt will not likely walk  and the focus needs to be on functional transfers and preventing contractures.     Written Home Exercises Provided: yes.  Exercises were reviewed and Lisa's family and caregiver was able to demonstrate them prior to the end of the session.  Lisa' caregiver and mother demonstrated fair  understanding of the education provided.      See EMR under Patient Instructions for exercises provided 11/19/2020.    Assessment     Pt has not met many goals but PROM has improved and caregiver has been trained and education on HEP that they can perform at home to help reduce tightness in B LEs for transfer and ADLs.  Pt has not walked in 15 years and even though he and his mom would like for him to stand indep or walk with assistance that is not likely.      Lisa is progressing well towards his goals.   Pt prognosis is Fair.     Pt will continue to benefit from skilled outpatient physical therapy to address the deficits listed in the problem list box on initial evaluation, provide pt/family education and to maximize pt's level of independence in the home and community environment.     Pt's spiritual, cultural and educational needs considered and pt agreeable to plan of care and goals.     Anticipated barriers to physical therapy: transportation, daily HEP compliance    Short Term Goals (4 Weeks):   1.  Independent with initial HEP.  MET  2.  Pt will perform nu-step at level 2 x 6 minutes without rests breaks going at least 40 step/min or greater. (PROGRESSING, NOT MET)     Long Term Goals (8 Weeks):   1. Pt will be able to perform TUG in 120 secs with use of AD placingdemonstrating overall improved functional mobility.  (PROGRESSING, NOT MET)  2. Pt will performed sit to stand x 30 secs for a total of 3 times with B UE support without LOB backward or posterior LE hooking for functional and safe transfers. (PROGRESSING, NOT MET)   3.  Pt will score greater than or equal to 10/28 on the Tinetti test/assessment with use of AD  demonstrating overall improved functional mobility and balance and reduce fall risk.  (PROGRESSING, NOT MET)  4. Pt will have 0 falls from start of PT sessions. (PROGRESSING, NOT MET)  5. Pt will be able to propel 100 ft in w/c on level surface to improve household mobility.  MET  6. Pt will be able to performed full w/c to bed transfer and back with verbal cues only and supervision to reduce burden of care on caregiver. (PROGRESSING, NOT MET)    Plan   D/c today  Chikis Gracia, PT

## 2020-12-31 ENCOUNTER — EXTERNAL CHRONIC CARE MANAGEMENT (OUTPATIENT)
Dept: PRIMARY CARE CLINIC | Facility: CLINIC | Age: 40
End: 2020-12-31
Payer: MEDICARE

## 2020-12-31 PROCEDURE — 99490 CHRNC CARE MGMT STAFF 1ST 20: CPT | Mod: S$PBB,,, | Performed by: INTERNAL MEDICINE

## 2020-12-31 PROCEDURE — 99490 PR CHRONIC CARE MGMT, 1ST 20 MIN: ICD-10-PCS | Mod: S$PBB,,, | Performed by: INTERNAL MEDICINE

## 2020-12-31 PROCEDURE — 99490 CHRNC CARE MGMT STAFF 1ST 20: CPT | Mod: PBBFAC | Performed by: INTERNAL MEDICINE

## 2021-01-05 PROBLEM — F02.818 MAJOR NEUROCOGNITIVE DISORDER DUE TO ANOTHER MEDICAL CONDITION WITH BEHAVIORAL DISTURBANCE: Status: ACTIVE | Noted: 2020-12-13

## 2021-01-08 ENCOUNTER — CLINICAL SUPPORT (OUTPATIENT)
Dept: REHABILITATION | Facility: HOSPITAL | Age: 41
End: 2021-01-08
Payer: MEDICARE

## 2021-01-08 ENCOUNTER — TELEPHONE (OUTPATIENT)
Dept: REHABILITATION | Facility: HOSPITAL | Age: 41
End: 2021-01-08

## 2021-01-08 DIAGNOSIS — G12.29 UPPER MOTOR NEURON DISEASE: ICD-10-CM

## 2021-01-08 DIAGNOSIS — R13.12 OROPHARYNGEAL DYSPHAGIA: Primary | ICD-10-CM

## 2021-01-08 PROCEDURE — 92610 EVALUATE SWALLOWING FUNCTION: CPT | Mod: PN,59

## 2021-01-08 PROCEDURE — 92612 ENDOSCOPY SWALLOW (FEES) VID: CPT | Mod: PN

## 2021-01-12 ENCOUNTER — TELEPHONE (OUTPATIENT)
Dept: REHABILITATION | Facility: HOSPITAL | Age: 41
End: 2021-01-12

## 2021-01-12 ENCOUNTER — OFFICE VISIT (OUTPATIENT)
Dept: NEUROLOGY | Facility: CLINIC | Age: 41
End: 2021-01-12
Payer: MEDICARE

## 2021-01-12 DIAGNOSIS — F02.818 MAJOR NEUROCOGNITIVE DISORDER DUE TO ANOTHER MEDICAL CONDITION WITH BEHAVIORAL DISTURBANCE: Primary | ICD-10-CM

## 2021-01-12 PROCEDURE — 99499 NO LOS: ICD-10-PCS | Mod: 95,,, | Performed by: PSYCHIATRY & NEUROLOGY

## 2021-01-12 PROCEDURE — 99499 UNLISTED E&M SERVICE: CPT | Mod: 95,,, | Performed by: PSYCHIATRY & NEUROLOGY

## 2021-01-26 ENCOUNTER — PROCEDURE VISIT (OUTPATIENT)
Dept: NEUROLOGY | Facility: CLINIC | Age: 41
End: 2021-01-26
Payer: MEDICARE

## 2021-01-26 DIAGNOSIS — R53.1 WEAKNESS: ICD-10-CM

## 2021-01-27 ENCOUNTER — TELEPHONE (OUTPATIENT)
Dept: NEUROLOGY | Facility: CLINIC | Age: 41
End: 2021-01-27

## 2021-01-31 ENCOUNTER — EXTERNAL CHRONIC CARE MANAGEMENT (OUTPATIENT)
Dept: PRIMARY CARE CLINIC | Facility: CLINIC | Age: 41
End: 2021-01-31
Payer: MEDICARE

## 2021-01-31 PROCEDURE — 99490 PR CHRONIC CARE MGMT, 1ST 20 MIN: ICD-10-PCS | Mod: S$GLB,,, | Performed by: INTERNAL MEDICINE

## 2021-01-31 PROCEDURE — 99490 CHRNC CARE MGMT STAFF 1ST 20: CPT | Mod: S$GLB,,, | Performed by: INTERNAL MEDICINE

## 2021-01-31 PROCEDURE — 99490 CHRNC CARE MGMT STAFF 1ST 20: CPT | Mod: PBBFAC | Performed by: INTERNAL MEDICINE

## 2021-02-17 ENCOUNTER — TELEPHONE (OUTPATIENT)
Dept: PHYSICAL MEDICINE AND REHAB | Facility: CLINIC | Age: 41
End: 2021-02-17

## 2021-02-17 ENCOUNTER — OFFICE VISIT (OUTPATIENT)
Dept: PHYSICAL MEDICINE AND REHAB | Facility: CLINIC | Age: 41
End: 2021-02-17
Payer: MEDICARE

## 2021-02-17 VITALS — HEIGHT: 75 IN | WEIGHT: 157 LBS | BODY MASS INDEX: 19.52 KG/M2

## 2021-02-17 DIAGNOSIS — G80.1 SPASTIC DIPLEGIA: Primary | ICD-10-CM

## 2021-02-17 DIAGNOSIS — R26.81 GAIT INSTABILITY: ICD-10-CM

## 2021-02-17 DIAGNOSIS — R25.2 SPASTICITY: Primary | ICD-10-CM

## 2021-02-17 DIAGNOSIS — M62.838 MUSCLE SPASM: ICD-10-CM

## 2021-02-17 PROCEDURE — 95874 GUIDE NERV DESTR NEEDLE EMG: CPT | Mod: S$GLB,,, | Performed by: PHYSICAL MEDICINE & REHABILITATION

## 2021-02-17 PROCEDURE — 99999 PR PBB SHADOW E&M-EST. PATIENT-LVL III: ICD-10-PCS | Mod: PBBFAC,,, | Performed by: PHYSICAL MEDICINE & REHABILITATION

## 2021-02-17 PROCEDURE — 64642 PR CHEMODENERV ONE EXTREMITY; 1-4 MUSCLE(S): ICD-10-PCS | Mod: S$GLB,,, | Performed by: PHYSICAL MEDICINE & REHABILITATION

## 2021-02-17 PROCEDURE — 1126F PR PAIN SEVERITY QUANTIFIED, NO PAIN PRESENT: ICD-10-PCS | Mod: S$GLB,,, | Performed by: PHYSICAL MEDICINE & REHABILITATION

## 2021-02-17 PROCEDURE — 64643 PR CHEMODENERV 1 EXT; EA ADD'L EXT, 1-4 MUSCLE(S): ICD-10-PCS | Mod: S$GLB,,, | Performed by: PHYSICAL MEDICINE & REHABILITATION

## 2021-02-17 PROCEDURE — 99499 UNLISTED E&M SERVICE: CPT | Mod: S$GLB,,, | Performed by: PHYSICAL MEDICINE & REHABILITATION

## 2021-02-17 PROCEDURE — 95874 PR NEEDLE EMG GUIDANCE FOR CHEMODENERVATION: ICD-10-PCS | Mod: S$GLB,,, | Performed by: PHYSICAL MEDICINE & REHABILITATION

## 2021-02-17 PROCEDURE — 1126F AMNT PAIN NOTED NONE PRSNT: CPT | Mod: S$GLB,,, | Performed by: PHYSICAL MEDICINE & REHABILITATION

## 2021-02-17 PROCEDURE — 64643 CHEMODENERV 1 EXTREM 1-4 EA: CPT | Mod: S$GLB,,, | Performed by: PHYSICAL MEDICINE & REHABILITATION

## 2021-02-17 PROCEDURE — 3008F BODY MASS INDEX DOCD: CPT | Mod: CPTII,S$GLB,, | Performed by: PHYSICAL MEDICINE & REHABILITATION

## 2021-02-17 PROCEDURE — 64642 CHEMODENERV 1 EXTREMITY 1-4: CPT | Mod: S$GLB,,, | Performed by: PHYSICAL MEDICINE & REHABILITATION

## 2021-02-17 PROCEDURE — 99499 NO LOS: ICD-10-PCS | Mod: S$GLB,,, | Performed by: PHYSICAL MEDICINE & REHABILITATION

## 2021-02-17 PROCEDURE — 3008F PR BODY MASS INDEX (BMI) DOCUMENTED: ICD-10-PCS | Mod: CPTII,S$GLB,, | Performed by: PHYSICAL MEDICINE & REHABILITATION

## 2021-02-17 PROCEDURE — 99999 PR PBB SHADOW E&M-EST. PATIENT-LVL III: CPT | Mod: PBBFAC,,, | Performed by: PHYSICAL MEDICINE & REHABILITATION

## 2021-02-17 RX ORDER — BACLOFEN 20 MG/1
20 TABLET ORAL 3 TIMES DAILY
Qty: 270 TABLET | Refills: 3 | Status: SHIPPED | OUTPATIENT
Start: 2021-02-17 | End: 2022-01-04 | Stop reason: SDUPTHER

## 2021-02-17 RX ORDER — DANTROLENE SODIUM 50 MG/1
CAPSULE ORAL
Qty: 270 CAPSULE | Refills: 3 | Status: SHIPPED | OUTPATIENT
Start: 2021-02-17 | End: 2022-01-04 | Stop reason: SDUPTHER

## 2021-02-28 ENCOUNTER — EXTERNAL CHRONIC CARE MANAGEMENT (OUTPATIENT)
Dept: PRIMARY CARE CLINIC | Facility: CLINIC | Age: 41
End: 2021-02-28
Payer: MEDICARE

## 2021-02-28 PROCEDURE — 99490 CHRNC CARE MGMT STAFF 1ST 20: CPT | Mod: S$GLB,,, | Performed by: INTERNAL MEDICINE

## 2021-02-28 PROCEDURE — 99490 PR CHRONIC CARE MGMT, 1ST 20 MIN: ICD-10-PCS | Mod: S$GLB,,, | Performed by: INTERNAL MEDICINE

## 2021-03-02 ENCOUNTER — CLINICAL SUPPORT (OUTPATIENT)
Dept: REHABILITATION | Facility: HOSPITAL | Age: 41
End: 2021-03-02
Payer: MEDICARE

## 2021-03-02 DIAGNOSIS — R68.89 DECREASED STRENGTH, ENDURANCE, AND MOBILITY: ICD-10-CM

## 2021-03-02 DIAGNOSIS — Z74.09 DECREASED STRENGTH, ENDURANCE, AND MOBILITY: ICD-10-CM

## 2021-03-02 DIAGNOSIS — Z74.09 IMPAIRED MOBILITY AND ADLS: Primary | ICD-10-CM

## 2021-03-02 DIAGNOSIS — R53.1 DECREASED STRENGTH, ENDURANCE, AND MOBILITY: ICD-10-CM

## 2021-03-02 DIAGNOSIS — R26.81 GAIT INSTABILITY: ICD-10-CM

## 2021-03-02 DIAGNOSIS — G80.1 SPASTIC DIPLEGIA: ICD-10-CM

## 2021-03-02 DIAGNOSIS — Z78.9 IMPAIRED MOBILITY AND ADLS: Primary | ICD-10-CM

## 2021-03-02 PROCEDURE — 97162 PT EVAL MOD COMPLEX 30 MIN: CPT | Mod: PN

## 2021-03-04 ENCOUNTER — CLINICAL SUPPORT (OUTPATIENT)
Dept: REHABILITATION | Facility: HOSPITAL | Age: 41
End: 2021-03-04
Payer: MEDICARE

## 2021-03-04 DIAGNOSIS — G80.1 SPASTIC DIPLEGIA: ICD-10-CM

## 2021-03-04 DIAGNOSIS — R26.81 GAIT INSTABILITY: ICD-10-CM

## 2021-03-04 DIAGNOSIS — Z78.9 IMPAIRED INSTRUMENTAL ACTIVITIES OF DAILY LIVING (IADL): ICD-10-CM

## 2021-03-04 DIAGNOSIS — Z74.1 REQUIRES DAILY ASSISTANCE FOR ACTIVITIES OF DAILY LIVING (ADL) AND COMFORT NEEDS: ICD-10-CM

## 2021-03-04 DIAGNOSIS — R29.898 UPPER EXTREMITY WEAKNESS: Primary | ICD-10-CM

## 2021-03-04 DIAGNOSIS — Z74.1 SELF-CARE DEFICIT FOR FEEDING, BATHING, AND TOILETING: ICD-10-CM

## 2021-03-04 PROCEDURE — 97166 OT EVAL MOD COMPLEX 45 MIN: CPT | Mod: PN

## 2021-03-05 PROBLEM — R53.1 DECREASED STRENGTH, ENDURANCE, AND MOBILITY: Status: ACTIVE | Noted: 2021-03-05

## 2021-03-05 PROBLEM — Z74.09 DECREASED STRENGTH, ENDURANCE, AND MOBILITY: Status: ACTIVE | Noted: 2021-03-05

## 2021-03-05 PROBLEM — R68.89 DECREASED STRENGTH, ENDURANCE, AND MOBILITY: Status: ACTIVE | Noted: 2021-03-05

## 2021-03-07 PROBLEM — Z74.1 SELF-CARE DEFICIT FOR FEEDING, BATHING, AND TOILETING: Status: ACTIVE | Noted: 2021-03-07

## 2021-03-07 PROBLEM — Z78.9 IMPAIRED INSTRUMENTAL ACTIVITIES OF DAILY LIVING (IADL): Status: ACTIVE | Noted: 2021-03-07

## 2021-03-07 PROBLEM — Z74.1 REQUIRES DAILY ASSISTANCE FOR ACTIVITIES OF DAILY LIVING (ADL) AND COMFORT NEEDS: Status: ACTIVE | Noted: 2021-03-07

## 2021-03-08 ENCOUNTER — TELEPHONE (OUTPATIENT)
Dept: NEUROLOGY | Facility: CLINIC | Age: 41
End: 2021-03-08

## 2021-03-08 ENCOUNTER — PATIENT MESSAGE (OUTPATIENT)
Dept: NEUROLOGY | Facility: CLINIC | Age: 41
End: 2021-03-08

## 2021-03-11 ENCOUNTER — CLINICAL SUPPORT (OUTPATIENT)
Dept: REHABILITATION | Facility: HOSPITAL | Age: 41
End: 2021-03-11
Payer: MEDICARE

## 2021-03-11 ENCOUNTER — TELEPHONE (OUTPATIENT)
Dept: NEUROLOGY | Facility: CLINIC | Age: 41
End: 2021-03-11

## 2021-03-11 DIAGNOSIS — Z74.1 REQUIRES DAILY ASSISTANCE FOR ACTIVITIES OF DAILY LIVING (ADL) AND COMFORT NEEDS: Primary | ICD-10-CM

## 2021-03-11 DIAGNOSIS — Z74.1 SELF-CARE DEFICIT FOR FEEDING, BATHING, AND TOILETING: ICD-10-CM

## 2021-03-11 DIAGNOSIS — Z78.9 IMPAIRED INSTRUMENTAL ACTIVITIES OF DAILY LIVING (IADL): ICD-10-CM

## 2021-03-11 PROCEDURE — 97530 THERAPEUTIC ACTIVITIES: CPT | Mod: PN

## 2021-03-18 ENCOUNTER — CLINICAL SUPPORT (OUTPATIENT)
Dept: REHABILITATION | Facility: HOSPITAL | Age: 41
End: 2021-03-18
Payer: MEDICARE

## 2021-03-18 ENCOUNTER — TELEPHONE (OUTPATIENT)
Dept: NEUROLOGY | Facility: CLINIC | Age: 41
End: 2021-03-18

## 2021-03-18 DIAGNOSIS — Z74.1 REQUIRES DAILY ASSISTANCE FOR ACTIVITIES OF DAILY LIVING (ADL) AND COMFORT NEEDS: Primary | ICD-10-CM

## 2021-03-18 DIAGNOSIS — Z78.9 IMPAIRED INSTRUMENTAL ACTIVITIES OF DAILY LIVING (IADL): ICD-10-CM

## 2021-03-18 DIAGNOSIS — Z74.1 SELF-CARE DEFICIT FOR FEEDING, BATHING, AND TOILETING: ICD-10-CM

## 2021-03-18 DIAGNOSIS — Z74.09 DECREASED STRENGTH, ENDURANCE, AND MOBILITY: ICD-10-CM

## 2021-03-18 DIAGNOSIS — R53.1 DECREASED STRENGTH, ENDURANCE, AND MOBILITY: ICD-10-CM

## 2021-03-18 DIAGNOSIS — R68.89 DECREASED STRENGTH, ENDURANCE, AND MOBILITY: ICD-10-CM

## 2021-03-18 PROCEDURE — 97110 THERAPEUTIC EXERCISES: CPT | Mod: PN

## 2021-03-18 PROCEDURE — 97112 NEUROMUSCULAR REEDUCATION: CPT | Mod: PN

## 2021-03-22 ENCOUNTER — TELEPHONE (OUTPATIENT)
Dept: NEUROLOGY | Facility: CLINIC | Age: 41
End: 2021-03-22

## 2021-03-23 ENCOUNTER — CLINICAL SUPPORT (OUTPATIENT)
Dept: REHABILITATION | Facility: HOSPITAL | Age: 41
End: 2021-03-23
Payer: MEDICARE

## 2021-03-23 DIAGNOSIS — Z74.09 DECREASED STRENGTH, ENDURANCE, AND MOBILITY: ICD-10-CM

## 2021-03-23 DIAGNOSIS — Z74.1 REQUIRES DAILY ASSISTANCE FOR ACTIVITIES OF DAILY LIVING (ADL) AND COMFORT NEEDS: Primary | ICD-10-CM

## 2021-03-23 DIAGNOSIS — Z78.9 IMPAIRED INSTRUMENTAL ACTIVITIES OF DAILY LIVING (IADL): ICD-10-CM

## 2021-03-23 DIAGNOSIS — R53.1 DECREASED STRENGTH, ENDURANCE, AND MOBILITY: ICD-10-CM

## 2021-03-23 DIAGNOSIS — Z74.1 SELF-CARE DEFICIT FOR FEEDING, BATHING, AND TOILETING: ICD-10-CM

## 2021-03-23 DIAGNOSIS — R68.89 DECREASED STRENGTH, ENDURANCE, AND MOBILITY: ICD-10-CM

## 2021-03-23 PROCEDURE — 97110 THERAPEUTIC EXERCISES: CPT | Mod: PN

## 2021-03-23 PROCEDURE — 97112 NEUROMUSCULAR REEDUCATION: CPT | Mod: PN

## 2021-03-24 ENCOUNTER — OFFICE VISIT (OUTPATIENT)
Dept: NEUROLOGY | Facility: CLINIC | Age: 41
End: 2021-03-24
Payer: MEDICARE

## 2021-03-24 ENCOUNTER — LAB VISIT (OUTPATIENT)
Dept: LAB | Facility: HOSPITAL | Age: 41
End: 2021-03-24
Attending: PHYSICAL MEDICINE & REHABILITATION
Payer: MEDICARE

## 2021-03-24 VITALS
HEART RATE: 68 BPM | BODY MASS INDEX: 19.62 KG/M2 | HEIGHT: 75 IN | SYSTOLIC BLOOD PRESSURE: 109 MMHG | DIASTOLIC BLOOD PRESSURE: 69 MMHG | OXYGEN SATURATION: 99 %

## 2021-03-24 DIAGNOSIS — G80.1 SPASTIC DIPLEGIA: ICD-10-CM

## 2021-03-24 DIAGNOSIS — R13.11 DYSPHAGIA, ORAL PHASE: ICD-10-CM

## 2021-03-24 DIAGNOSIS — Z74.09 IMPAIRED MOBILITY AND ADLS: ICD-10-CM

## 2021-03-24 DIAGNOSIS — Z78.9 IMPAIRED MOBILITY AND ADLS: ICD-10-CM

## 2021-03-24 DIAGNOSIS — R47.1 DYSARTHRIA: ICD-10-CM

## 2021-03-24 DIAGNOSIS — R25.2 SPASTICITY: ICD-10-CM

## 2021-03-24 DIAGNOSIS — R68.89 DECREASED STRENGTH, ENDURANCE, AND MOBILITY: ICD-10-CM

## 2021-03-24 DIAGNOSIS — K59.09 CHRONIC CONSTIPATION: ICD-10-CM

## 2021-03-24 DIAGNOSIS — R53.1 DECREASED STRENGTH, ENDURANCE, AND MOBILITY: ICD-10-CM

## 2021-03-24 DIAGNOSIS — R26.81 GAIT INSTABILITY: ICD-10-CM

## 2021-03-24 DIAGNOSIS — R13.12 OROPHARYNGEAL DYSPHAGIA: ICD-10-CM

## 2021-03-24 DIAGNOSIS — G12.29 UPPER MOTOR NEURON DISEASE: Primary | ICD-10-CM

## 2021-03-24 DIAGNOSIS — R49.8 HYPOPHONIA: ICD-10-CM

## 2021-03-24 DIAGNOSIS — Z74.09 DECREASED STRENGTH, ENDURANCE, AND MOBILITY: ICD-10-CM

## 2021-03-24 DIAGNOSIS — Z00.8 NUTRITIONAL ASSESSMENT: ICD-10-CM

## 2021-03-24 DIAGNOSIS — G11.8 SPINOCEREBELLAR ATAXIA: ICD-10-CM

## 2021-03-24 LAB
ALBUMIN SERPL BCP-MCNC: 3.5 G/DL (ref 3.5–5.2)
ALP SERPL-CCNC: 63 U/L (ref 55–135)
ALT SERPL W/O P-5'-P-CCNC: 13 U/L (ref 10–44)
ANION GAP SERPL CALC-SCNC: 9 MMOL/L (ref 8–16)
AST SERPL-CCNC: 15 U/L (ref 10–40)
BILIRUB SERPL-MCNC: 0.4 MG/DL (ref 0.1–1)
BUN SERPL-MCNC: 18 MG/DL (ref 6–20)
CALCIUM SERPL-MCNC: 8.2 MG/DL (ref 8.7–10.5)
CHLORIDE SERPL-SCNC: 103 MMOL/L (ref 95–110)
CO2 SERPL-SCNC: 28 MMOL/L (ref 23–29)
CREAT SERPL-MCNC: 0.9 MG/DL (ref 0.5–1.4)
EST. GFR  (AFRICAN AMERICAN): >60 ML/MIN/1.73 M^2
EST. GFR  (NON AFRICAN AMERICAN): >60 ML/MIN/1.73 M^2
GLUCOSE SERPL-MCNC: 100 MG/DL (ref 70–110)
POTASSIUM SERPL-SCNC: 3.8 MMOL/L (ref 3.5–5.1)
PROT SERPL-MCNC: 7.6 G/DL (ref 6–8.4)
SODIUM SERPL-SCNC: 140 MMOL/L (ref 136–145)

## 2021-03-24 PROCEDURE — 97168 OT RE-EVAL EST PLAN CARE: CPT | Mod: PO

## 2021-03-24 PROCEDURE — 99499 UNLISTED E&M SERVICE: CPT | Mod: S$PBB,,, | Performed by: PSYCHIATRY & NEUROLOGY

## 2021-03-24 PROCEDURE — 99417 PROLNG OP E/M EACH 15 MIN: CPT | Mod: S$PBB,,, | Performed by: PSYCHIATRY & NEUROLOGY

## 2021-03-24 PROCEDURE — 99499 UNLISTED E&M SERVICE: CPT | Mod: S$PBB,,, | Performed by: INTERNAL MEDICINE

## 2021-03-24 PROCEDURE — 36415 COLL VENOUS BLD VENIPUNCTURE: CPT | Performed by: PHYSICAL MEDICINE & REHABILITATION

## 2021-03-24 PROCEDURE — 99215 OFFICE O/P EST HI 40 MIN: CPT | Mod: S$PBB,,, | Performed by: PSYCHIATRY & NEUROLOGY

## 2021-03-24 PROCEDURE — 99215 PR OFFICE/OUTPT VISIT, EST, LEVL V, 40-54 MIN: ICD-10-PCS | Mod: S$PBB,,, | Performed by: PSYCHIATRY & NEUROLOGY

## 2021-03-24 PROCEDURE — 80053 COMPREHEN METABOLIC PANEL: CPT | Performed by: PHYSICAL MEDICINE & REHABILITATION

## 2021-03-24 PROCEDURE — 99213 PR OFFICE/OUTPT VISIT, EST, LEVL III, 20-29 MIN: ICD-10-PCS | Mod: S$PBB,,, | Performed by: PHYSICAL MEDICINE & REHABILITATION

## 2021-03-24 PROCEDURE — 99417 PR PROLONGED SVC, OUTPT, W/WO DIRECT PT CONTACT,  EA ADDTL 15 MIN: ICD-10-PCS | Mod: S$PBB,,, | Performed by: PSYCHIATRY & NEUROLOGY

## 2021-03-24 PROCEDURE — 99499 UNLISTED E&M SERVICE: CPT | Mod: S$PBB,,, | Performed by: PHYSICAL MEDICINE & REHABILITATION

## 2021-03-24 PROCEDURE — 97164 PT RE-EVAL EST PLAN CARE: CPT | Mod: PO

## 2021-03-24 PROCEDURE — 99213 OFFICE O/P EST LOW 20 MIN: CPT | Mod: S$PBB,,, | Performed by: PHYSICAL MEDICINE & REHABILITATION

## 2021-03-24 PROCEDURE — 99214 PR OFFICE/OUTPT VISIT, EST, LEVL IV, 30-39 MIN: ICD-10-PCS | Mod: S$PBB,,, | Performed by: INTERNAL MEDICINE

## 2021-03-24 PROCEDURE — 99499 RISK ADDL DX/OHS AUDIT: ICD-10-PCS | Mod: S$PBB,,, | Performed by: INTERNAL MEDICINE

## 2021-03-24 PROCEDURE — 92522 EVALUATE SPEECH PRODUCTION: CPT | Mod: PO

## 2021-03-24 PROCEDURE — 99999 PR PBB SHADOW E&M-EST. PATIENT-LVL III: CPT | Mod: PBBFAC,,,

## 2021-03-24 PROCEDURE — 99499 RISK ADDL DX/OHS AUDIT: ICD-10-PCS | Mod: S$PBB,,, | Performed by: PHYSICAL MEDICINE & REHABILITATION

## 2021-03-24 PROCEDURE — 99999 PR PBB SHADOW E&M-EST. PATIENT-LVL III: ICD-10-PCS | Mod: PBBFAC,,,

## 2021-03-24 PROCEDURE — 99499 RISK ADDL DX/OHS AUDIT: ICD-10-PCS | Mod: S$PBB,,, | Performed by: PSYCHIATRY & NEUROLOGY

## 2021-03-24 PROCEDURE — 92610 EVALUATE SWALLOWING FUNCTION: CPT | Mod: PO

## 2021-03-24 PROCEDURE — 99214 OFFICE O/P EST MOD 30 MIN: CPT | Mod: S$PBB,,, | Performed by: INTERNAL MEDICINE

## 2021-03-25 ENCOUNTER — PES CALL (OUTPATIENT)
Dept: ADMINISTRATIVE | Facility: CLINIC | Age: 41
End: 2021-03-25

## 2021-03-26 ENCOUNTER — TELEPHONE (OUTPATIENT)
Dept: REHABILITATION | Facility: HOSPITAL | Age: 41
End: 2021-03-26

## 2021-03-29 ENCOUNTER — CLINICAL SUPPORT (OUTPATIENT)
Dept: REHABILITATION | Facility: HOSPITAL | Age: 41
End: 2021-03-29
Payer: MEDICARE

## 2021-03-29 ENCOUNTER — TELEPHONE (OUTPATIENT)
Dept: NEUROLOGY | Facility: CLINIC | Age: 41
End: 2021-03-29

## 2021-03-29 DIAGNOSIS — Z74.1 REQUIRES DAILY ASSISTANCE FOR ACTIVITIES OF DAILY LIVING (ADL) AND COMFORT NEEDS: Primary | ICD-10-CM

## 2021-03-29 DIAGNOSIS — R53.1 DECREASED STRENGTH, ENDURANCE, AND MOBILITY: ICD-10-CM

## 2021-03-29 DIAGNOSIS — Z74.1 SELF-CARE DEFICIT FOR FEEDING, BATHING, AND TOILETING: ICD-10-CM

## 2021-03-29 DIAGNOSIS — Z78.9 IMPAIRED INSTRUMENTAL ACTIVITIES OF DAILY LIVING (IADL): ICD-10-CM

## 2021-03-29 DIAGNOSIS — Z74.09 DECREASED STRENGTH, ENDURANCE, AND MOBILITY: ICD-10-CM

## 2021-03-29 DIAGNOSIS — R68.89 DECREASED STRENGTH, ENDURANCE, AND MOBILITY: ICD-10-CM

## 2021-03-29 PROCEDURE — 97110 THERAPEUTIC EXERCISES: CPT | Mod: PN

## 2021-03-29 PROCEDURE — 97530 THERAPEUTIC ACTIVITIES: CPT | Mod: PN

## 2021-03-29 PROCEDURE — 97112 NEUROMUSCULAR REEDUCATION: CPT | Mod: PN

## 2021-03-31 ENCOUNTER — CLINICAL SUPPORT (OUTPATIENT)
Dept: REHABILITATION | Facility: HOSPITAL | Age: 41
End: 2021-03-31
Payer: MEDICARE

## 2021-03-31 ENCOUNTER — EXTERNAL CHRONIC CARE MANAGEMENT (OUTPATIENT)
Dept: PRIMARY CARE CLINIC | Facility: CLINIC | Age: 41
End: 2021-03-31
Payer: MEDICARE

## 2021-03-31 DIAGNOSIS — R53.1 DECREASED STRENGTH, ENDURANCE, AND MOBILITY: ICD-10-CM

## 2021-03-31 DIAGNOSIS — Z78.9 IMPAIRED INSTRUMENTAL ACTIVITIES OF DAILY LIVING (IADL): ICD-10-CM

## 2021-03-31 DIAGNOSIS — Z74.09 DECREASED STRENGTH, ENDURANCE, AND MOBILITY: ICD-10-CM

## 2021-03-31 DIAGNOSIS — Z74.1 REQUIRES DAILY ASSISTANCE FOR ACTIVITIES OF DAILY LIVING (ADL) AND COMFORT NEEDS: Primary | ICD-10-CM

## 2021-03-31 DIAGNOSIS — R68.89 DECREASED STRENGTH, ENDURANCE, AND MOBILITY: ICD-10-CM

## 2021-03-31 DIAGNOSIS — Z74.1 SELF-CARE DEFICIT FOR FEEDING, BATHING, AND TOILETING: ICD-10-CM

## 2021-03-31 PROCEDURE — 97112 NEUROMUSCULAR REEDUCATION: CPT | Mod: PN

## 2021-03-31 PROCEDURE — 97110 THERAPEUTIC EXERCISES: CPT | Mod: PN

## 2021-03-31 PROCEDURE — 99490 PR CHRONIC CARE MGMT, 1ST 20 MIN: ICD-10-PCS | Mod: S$PBB,,, | Performed by: INTERNAL MEDICINE

## 2021-03-31 PROCEDURE — 99490 CHRNC CARE MGMT STAFF 1ST 20: CPT | Mod: S$PBB,,, | Performed by: INTERNAL MEDICINE

## 2021-03-31 PROCEDURE — 97530 THERAPEUTIC ACTIVITIES: CPT | Mod: PN

## 2021-04-05 ENCOUNTER — CLINICAL SUPPORT (OUTPATIENT)
Dept: REHABILITATION | Facility: HOSPITAL | Age: 41
End: 2021-04-05
Payer: MEDICARE

## 2021-04-05 DIAGNOSIS — Z74.1 SELF-CARE DEFICIT FOR FEEDING, BATHING, AND TOILETING: ICD-10-CM

## 2021-04-05 DIAGNOSIS — R53.1 DECREASED STRENGTH, ENDURANCE, AND MOBILITY: ICD-10-CM

## 2021-04-05 DIAGNOSIS — Z78.9 IMPAIRED INSTRUMENTAL ACTIVITIES OF DAILY LIVING (IADL): ICD-10-CM

## 2021-04-05 DIAGNOSIS — Z74.09 DECREASED STRENGTH, ENDURANCE, AND MOBILITY: ICD-10-CM

## 2021-04-05 DIAGNOSIS — Z74.1 REQUIRES DAILY ASSISTANCE FOR ACTIVITIES OF DAILY LIVING (ADL) AND COMFORT NEEDS: Primary | ICD-10-CM

## 2021-04-05 DIAGNOSIS — R68.89 DECREASED STRENGTH, ENDURANCE, AND MOBILITY: ICD-10-CM

## 2021-04-05 PROCEDURE — 97110 THERAPEUTIC EXERCISES: CPT | Mod: PN

## 2021-04-05 PROCEDURE — 97112 NEUROMUSCULAR REEDUCATION: CPT | Mod: PN

## 2021-04-05 PROCEDURE — 97530 THERAPEUTIC ACTIVITIES: CPT | Mod: PN

## 2021-04-07 ENCOUNTER — CLINICAL SUPPORT (OUTPATIENT)
Dept: REHABILITATION | Facility: HOSPITAL | Age: 41
End: 2021-04-07
Payer: MEDICARE

## 2021-04-07 DIAGNOSIS — Z74.1 SELF-CARE DEFICIT FOR FEEDING, BATHING, AND TOILETING: ICD-10-CM

## 2021-04-07 DIAGNOSIS — R68.89 DECREASED STRENGTH, ENDURANCE, AND MOBILITY: ICD-10-CM

## 2021-04-07 DIAGNOSIS — Z74.1 REQUIRES DAILY ASSISTANCE FOR ACTIVITIES OF DAILY LIVING (ADL) AND COMFORT NEEDS: Primary | ICD-10-CM

## 2021-04-07 DIAGNOSIS — Z74.09 DECREASED STRENGTH, ENDURANCE, AND MOBILITY: ICD-10-CM

## 2021-04-07 DIAGNOSIS — R53.1 DECREASED STRENGTH, ENDURANCE, AND MOBILITY: ICD-10-CM

## 2021-04-07 DIAGNOSIS — Z78.9 IMPAIRED INSTRUMENTAL ACTIVITIES OF DAILY LIVING (IADL): ICD-10-CM

## 2021-04-07 PROCEDURE — 97110 THERAPEUTIC EXERCISES: CPT | Mod: PN

## 2021-04-07 PROCEDURE — 97530 THERAPEUTIC ACTIVITIES: CPT | Mod: PN

## 2021-04-07 PROCEDURE — 97112 NEUROMUSCULAR REEDUCATION: CPT | Mod: PN

## 2021-04-08 ENCOUNTER — TELEPHONE (OUTPATIENT)
Dept: NEUROLOGY | Facility: CLINIC | Age: 41
End: 2021-04-08

## 2021-04-13 ENCOUNTER — CLINICAL SUPPORT (OUTPATIENT)
Dept: REHABILITATION | Facility: HOSPITAL | Age: 41
End: 2021-04-13
Payer: MEDICARE

## 2021-04-13 DIAGNOSIS — Z74.1 SELF-CARE DEFICIT FOR FEEDING, BATHING, AND TOILETING: ICD-10-CM

## 2021-04-13 DIAGNOSIS — Z74.1 REQUIRES DAILY ASSISTANCE FOR ACTIVITIES OF DAILY LIVING (ADL) AND COMFORT NEEDS: Primary | ICD-10-CM

## 2021-04-13 DIAGNOSIS — Z78.9 IMPAIRED INSTRUMENTAL ACTIVITIES OF DAILY LIVING (IADL): ICD-10-CM

## 2021-04-13 DIAGNOSIS — R68.89 DECREASED STRENGTH, ENDURANCE, AND MOBILITY: ICD-10-CM

## 2021-04-13 DIAGNOSIS — R53.1 DECREASED STRENGTH, ENDURANCE, AND MOBILITY: ICD-10-CM

## 2021-04-13 DIAGNOSIS — Z74.09 DECREASED STRENGTH, ENDURANCE, AND MOBILITY: ICD-10-CM

## 2021-04-13 PROCEDURE — 97530 THERAPEUTIC ACTIVITIES: CPT | Mod: PN

## 2021-04-13 PROCEDURE — 97112 NEUROMUSCULAR REEDUCATION: CPT | Mod: PN

## 2021-04-13 PROCEDURE — 97110 THERAPEUTIC EXERCISES: CPT | Mod: PN

## 2021-04-15 ENCOUNTER — TELEPHONE (OUTPATIENT)
Dept: NEUROLOGY | Facility: CLINIC | Age: 41
End: 2021-04-15

## 2021-04-15 ENCOUNTER — TELEPHONE (OUTPATIENT)
Dept: INTERNAL MEDICINE | Facility: CLINIC | Age: 41
End: 2021-04-15

## 2021-04-15 DIAGNOSIS — G80.1 SPASTIC DIPLEGIA: Primary | ICD-10-CM

## 2021-04-16 ENCOUNTER — TELEPHONE (OUTPATIENT)
Dept: INTERNAL MEDICINE | Facility: CLINIC | Age: 41
End: 2021-04-16

## 2021-04-23 ENCOUNTER — TELEPHONE (OUTPATIENT)
Dept: NEUROLOGY | Facility: CLINIC | Age: 41
End: 2021-04-23

## 2021-04-28 ENCOUNTER — TELEPHONE (OUTPATIENT)
Dept: INTERNAL MEDICINE | Facility: CLINIC | Age: 41
End: 2021-04-28

## 2021-04-30 ENCOUNTER — EXTERNAL CHRONIC CARE MANAGEMENT (OUTPATIENT)
Dept: PRIMARY CARE CLINIC | Facility: CLINIC | Age: 41
End: 2021-04-30
Payer: MEDICARE

## 2021-04-30 ENCOUNTER — HOSPITAL ENCOUNTER (OUTPATIENT)
Dept: RADIOLOGY | Facility: HOSPITAL | Age: 41
Discharge: HOME | End: 2021-04-30
Attending: INTERNAL MEDICINE
Payer: MEDICARE

## 2021-04-30 ENCOUNTER — OFFICE VISIT (OUTPATIENT)
Dept: INTERNAL MEDICINE | Facility: CLINIC | Age: 41
End: 2021-04-30
Payer: MEDICARE

## 2021-04-30 VITALS
BODY MASS INDEX: 19.62 KG/M2 | SYSTOLIC BLOOD PRESSURE: 116 MMHG | OXYGEN SATURATION: 99 % | HEART RATE: 68 BPM | DIASTOLIC BLOOD PRESSURE: 60 MMHG | HEIGHT: 75 IN

## 2021-04-30 DIAGNOSIS — G82.20 PARAPLEGIA, UNSPECIFIED: ICD-10-CM

## 2021-04-30 DIAGNOSIS — M20.001 DEFORMITY OF RIGHT THUMB JOINT: Primary | ICD-10-CM

## 2021-04-30 DIAGNOSIS — G12.29 UPPER MOTOR NEURON DISEASE: ICD-10-CM

## 2021-04-30 DIAGNOSIS — M20.001 DEFORMITY OF RIGHT THUMB JOINT: ICD-10-CM

## 2021-04-30 DIAGNOSIS — G71.00 MUSCULAR DYSTROPHY: ICD-10-CM

## 2021-04-30 DIAGNOSIS — G80.1 SPASTIC DIPLEGIA: ICD-10-CM

## 2021-04-30 PROBLEM — R56.9 NEW ONSET SEIZURE: Status: RESOLVED | Noted: 2020-07-18 | Resolved: 2021-04-30

## 2021-04-30 PROBLEM — R56.9 SEIZURE: Status: RESOLVED | Noted: 2020-07-19 | Resolved: 2021-04-30

## 2021-04-30 PROCEDURE — 99499 UNLISTED E&M SERVICE: CPT | Mod: S$PBB,,, | Performed by: INTERNAL MEDICINE

## 2021-04-30 PROCEDURE — 99213 OFFICE O/P EST LOW 20 MIN: CPT | Mod: S$PBB,,, | Performed by: INTERNAL MEDICINE

## 2021-04-30 PROCEDURE — 99489 CPLX CHRNC CARE EA ADDL 30: CPT | Mod: S$PBB,,, | Performed by: INTERNAL MEDICINE

## 2021-04-30 PROCEDURE — 99214 OFFICE O/P EST MOD 30 MIN: CPT | Mod: PBBFAC,25 | Performed by: INTERNAL MEDICINE

## 2021-04-30 PROCEDURE — 99999 PR PBB SHADOW E&M-EST. PATIENT-LVL IV: ICD-10-PCS | Mod: PBBFAC,,, | Performed by: INTERNAL MEDICINE

## 2021-04-30 PROCEDURE — 99489 PR COMPLX CHRON CARE MGMT, EA ADDTL 30 MIN, PER MONTH: ICD-10-PCS | Mod: S$PBB,,, | Performed by: INTERNAL MEDICINE

## 2021-04-30 PROCEDURE — 99487 CPLX CHRNC CARE 1ST 60 MIN: CPT | Mod: S$PBB,,, | Performed by: INTERNAL MEDICINE

## 2021-04-30 PROCEDURE — 99999 PR PBB SHADOW E&M-EST. PATIENT-LVL IV: CPT | Mod: PBBFAC,,, | Performed by: INTERNAL MEDICINE

## 2021-04-30 PROCEDURE — 99489 CPLX CHRNC CARE EA ADDL 30: CPT | Mod: PBBFAC,27 | Performed by: INTERNAL MEDICINE

## 2021-04-30 PROCEDURE — 99213 PR OFFICE/OUTPT VISIT, EST, LEVL III, 20-29 MIN: ICD-10-PCS | Mod: S$PBB,,, | Performed by: INTERNAL MEDICINE

## 2021-04-30 PROCEDURE — 73130 XR HAND COMPLETE 3 VIEW RIGHT: ICD-10-PCS | Mod: 26,RT,, | Performed by: RADIOLOGY

## 2021-04-30 PROCEDURE — 99499 RISK ADDL DX/OHS AUDIT: ICD-10-PCS | Mod: S$PBB,,, | Performed by: INTERNAL MEDICINE

## 2021-04-30 PROCEDURE — 73130 X-RAY EXAM OF HAND: CPT | Mod: TC,RT

## 2021-04-30 PROCEDURE — 99487 PR COMPLX CHRON CARE MGMT, 1ST HR, PER MONTH: ICD-10-PCS | Mod: S$PBB,,, | Performed by: INTERNAL MEDICINE

## 2021-04-30 PROCEDURE — 99487 CPLX CHRNC CARE 1ST 60 MIN: CPT | Mod: PBBFAC,27,25 | Performed by: INTERNAL MEDICINE

## 2021-04-30 PROCEDURE — 73130 X-RAY EXAM OF HAND: CPT | Mod: 26,RT,, | Performed by: RADIOLOGY

## 2021-05-03 ENCOUNTER — TELEPHONE (OUTPATIENT)
Dept: INTERNAL MEDICINE | Facility: CLINIC | Age: 41
End: 2021-05-03

## 2021-05-04 ENCOUNTER — PATIENT MESSAGE (OUTPATIENT)
Dept: INTERNAL MEDICINE | Facility: CLINIC | Age: 41
End: 2021-05-04

## 2021-05-11 ENCOUNTER — CLINICAL SUPPORT (OUTPATIENT)
Dept: REHABILITATION | Facility: HOSPITAL | Age: 41
End: 2021-05-11
Payer: MEDICARE

## 2021-05-11 DIAGNOSIS — R68.89 DECREASED STRENGTH, ENDURANCE, AND MOBILITY: ICD-10-CM

## 2021-05-11 DIAGNOSIS — Z74.09 DECREASED STRENGTH, ENDURANCE, AND MOBILITY: ICD-10-CM

## 2021-05-11 DIAGNOSIS — R53.1 DECREASED STRENGTH, ENDURANCE, AND MOBILITY: ICD-10-CM

## 2021-05-11 PROCEDURE — 97542 WHEELCHAIR MNGMENT TRAINING: CPT | Mod: PN

## 2021-05-11 PROCEDURE — 97110 THERAPEUTIC EXERCISES: CPT | Mod: PN

## 2021-05-11 PROCEDURE — 97112 NEUROMUSCULAR REEDUCATION: CPT | Mod: PN

## 2021-05-12 ENCOUNTER — PES CALL (OUTPATIENT)
Dept: ADMINISTRATIVE | Facility: CLINIC | Age: 41
End: 2021-05-12

## 2021-05-14 ENCOUNTER — CLINICAL SUPPORT (OUTPATIENT)
Dept: REHABILITATION | Facility: HOSPITAL | Age: 41
End: 2021-05-14
Payer: MEDICARE

## 2021-05-14 DIAGNOSIS — R68.89 DECREASED STRENGTH, ENDURANCE, AND MOBILITY: ICD-10-CM

## 2021-05-14 DIAGNOSIS — R53.1 DECREASED STRENGTH, ENDURANCE, AND MOBILITY: ICD-10-CM

## 2021-05-14 DIAGNOSIS — Z74.1 SELF-CARE DEFICIT FOR FEEDING, BATHING, AND TOILETING: ICD-10-CM

## 2021-05-14 DIAGNOSIS — Z74.09 DECREASED STRENGTH, ENDURANCE, AND MOBILITY: ICD-10-CM

## 2021-05-14 DIAGNOSIS — Z74.1 REQUIRES DAILY ASSISTANCE FOR ACTIVITIES OF DAILY LIVING (ADL) AND COMFORT NEEDS: Primary | ICD-10-CM

## 2021-05-14 DIAGNOSIS — Z78.9 IMPAIRED INSTRUMENTAL ACTIVITIES OF DAILY LIVING (IADL): ICD-10-CM

## 2021-05-14 PROCEDURE — 97110 THERAPEUTIC EXERCISES: CPT | Mod: PN

## 2021-05-14 PROCEDURE — 97112 NEUROMUSCULAR REEDUCATION: CPT | Mod: PN

## 2021-05-14 PROCEDURE — 97530 THERAPEUTIC ACTIVITIES: CPT | Mod: PN

## 2021-05-17 ENCOUNTER — OFFICE VISIT (OUTPATIENT)
Dept: NEUROLOGY | Facility: CLINIC | Age: 41
End: 2021-05-17
Payer: MEDICARE

## 2021-05-17 DIAGNOSIS — G11.8 SPINOCEREBELLAR ATAXIA: ICD-10-CM

## 2021-05-17 DIAGNOSIS — F02.818 MAJOR NEUROCOGNITIVE DISORDER DUE TO ANOTHER MEDICAL CONDITION WITH BEHAVIORAL DISTURBANCE: Primary | ICD-10-CM

## 2021-05-17 PROCEDURE — 99499 NO LOS: ICD-10-PCS | Mod: S$PBB,,, | Performed by: PSYCHIATRY & NEUROLOGY

## 2021-05-17 PROCEDURE — 96138 PR PSYCH/NEUROPSYCH TEST ADMIN/SCORING, BY TECH, 2+ TESTS, 1ST 30 MIN: ICD-10-PCS | Mod: S$PBB,,, | Performed by: PSYCHIATRY & NEUROLOGY

## 2021-05-17 PROCEDURE — 99499 UNLISTED E&M SERVICE: CPT | Mod: S$PBB,,, | Performed by: PSYCHIATRY & NEUROLOGY

## 2021-05-17 PROCEDURE — 96138 PSYCL/NRPSYC TECH 1ST: CPT | Mod: S$PBB,,, | Performed by: PSYCHIATRY & NEUROLOGY

## 2021-05-17 PROCEDURE — 96139 PSYCL/NRPSYC TST TECH EA: CPT | Mod: S$PBB,,, | Performed by: PSYCHIATRY & NEUROLOGY

## 2021-05-17 PROCEDURE — 96139 PR PSYCH/NEUROPSYCH TEST ADMIN/SCORING, BY TECH, 2+ TESTS, EA ADDTL 30 MIN: ICD-10-PCS | Mod: S$PBB,,, | Performed by: PSYCHIATRY & NEUROLOGY

## 2021-05-17 PROCEDURE — 96132 PR NEUROPSYCHOLOGIC TEST EVAL SVCS, 1ST HR: ICD-10-PCS | Mod: S$PBB,,, | Performed by: PSYCHIATRY & NEUROLOGY

## 2021-05-17 PROCEDURE — 96132 NRPSYC TST EVAL PHYS/QHP 1ST: CPT | Mod: S$PBB,,, | Performed by: PSYCHIATRY & NEUROLOGY

## 2021-05-18 ENCOUNTER — OFFICE VISIT (OUTPATIENT)
Dept: PHYSICAL MEDICINE AND REHAB | Facility: CLINIC | Age: 41
End: 2021-05-18
Payer: MEDICARE

## 2021-05-18 VITALS
DIASTOLIC BLOOD PRESSURE: 79 MMHG | HEART RATE: 59 BPM | HEIGHT: 75 IN | SYSTOLIC BLOOD PRESSURE: 122 MMHG | BODY MASS INDEX: 19.52 KG/M2 | WEIGHT: 157 LBS

## 2021-05-18 DIAGNOSIS — G80.1 SPASTIC DIPLEGIA: Primary | ICD-10-CM

## 2021-05-18 PROCEDURE — 99999 PR PBB SHADOW E&M-EST. PATIENT-LVL III: CPT | Mod: PBBFAC,,, | Performed by: PHYSICAL MEDICINE & REHABILITATION

## 2021-05-18 PROCEDURE — 99213 OFFICE O/P EST LOW 20 MIN: CPT | Mod: PBBFAC,25 | Performed by: PHYSICAL MEDICINE & REHABILITATION

## 2021-05-18 PROCEDURE — 99499 UNLISTED E&M SERVICE: CPT | Mod: S$PBB,,, | Performed by: PHYSICAL MEDICINE & REHABILITATION

## 2021-05-18 PROCEDURE — 64642 CHEMODENERV 1 EXTREMITY 1-4: CPT | Mod: PBBFAC | Performed by: PHYSICAL MEDICINE & REHABILITATION

## 2021-05-18 PROCEDURE — 64642 CHEMODENERV 1 EXTREMITY 1-4: CPT | Mod: S$PBB,,, | Performed by: PHYSICAL MEDICINE & REHABILITATION

## 2021-05-18 PROCEDURE — 64643 PR CHEMODENERV 1 EXT; EA ADD'L EXT, 1-4 MUSCLE(S): ICD-10-PCS | Mod: S$PBB,,, | Performed by: PHYSICAL MEDICINE & REHABILITATION

## 2021-05-18 PROCEDURE — 99499 NO LOS: ICD-10-PCS | Mod: S$PBB,,, | Performed by: PHYSICAL MEDICINE & REHABILITATION

## 2021-05-18 PROCEDURE — 99999 PR PBB SHADOW E&M-EST. PATIENT-LVL III: ICD-10-PCS | Mod: PBBFAC,,, | Performed by: PHYSICAL MEDICINE & REHABILITATION

## 2021-05-18 PROCEDURE — 64643 CHEMODENERV 1 EXTREM 1-4 EA: CPT | Mod: PBBFAC | Performed by: PHYSICAL MEDICINE & REHABILITATION

## 2021-05-18 PROCEDURE — 64642 PR CHEMODENERV ONE EXTREMITY; 1-4 MUSCLE(S): ICD-10-PCS | Mod: S$PBB,,, | Performed by: PHYSICAL MEDICINE & REHABILITATION

## 2021-05-18 PROCEDURE — 64643 CHEMODENERV 1 EXTREM 1-4 EA: CPT | Mod: S$PBB,,, | Performed by: PHYSICAL MEDICINE & REHABILITATION

## 2021-05-18 RX ADMIN — ONABOTULINUMTOXINA 800 UNITS: 100 INJECTION, POWDER, LYOPHILIZED, FOR SOLUTION INTRADERMAL; INTRAMUSCULAR at 02:05

## 2021-05-19 ENCOUNTER — CLINICAL SUPPORT (OUTPATIENT)
Dept: REHABILITATION | Facility: HOSPITAL | Age: 41
End: 2021-05-19
Payer: MEDICARE

## 2021-05-19 DIAGNOSIS — R53.1 DECREASED STRENGTH, ENDURANCE, AND MOBILITY: ICD-10-CM

## 2021-05-19 DIAGNOSIS — R68.89 DECREASED STRENGTH, ENDURANCE, AND MOBILITY: ICD-10-CM

## 2021-05-19 DIAGNOSIS — Z74.09 DECREASED STRENGTH, ENDURANCE, AND MOBILITY: ICD-10-CM

## 2021-05-19 PROCEDURE — 97112 NEUROMUSCULAR REEDUCATION: CPT | Mod: PN

## 2021-05-19 PROCEDURE — 97110 THERAPEUTIC EXERCISES: CPT | Mod: PN

## 2021-05-20 ENCOUNTER — TELEPHONE (OUTPATIENT)
Dept: NEUROLOGY | Facility: CLINIC | Age: 41
End: 2021-05-20

## 2021-05-21 DIAGNOSIS — G80.1 SPASTIC DIPLEGIA: Primary | ICD-10-CM

## 2021-05-24 ENCOUNTER — CLINICAL SUPPORT (OUTPATIENT)
Dept: REHABILITATION | Facility: HOSPITAL | Age: 41
End: 2021-05-24
Payer: MEDICARE

## 2021-05-24 DIAGNOSIS — Z74.09 DECREASED STRENGTH, ENDURANCE, AND MOBILITY: ICD-10-CM

## 2021-05-24 DIAGNOSIS — Z78.9 IMPAIRED INSTRUMENTAL ACTIVITIES OF DAILY LIVING (IADL): ICD-10-CM

## 2021-05-24 DIAGNOSIS — R68.89 DECREASED STRENGTH, ENDURANCE, AND MOBILITY: ICD-10-CM

## 2021-05-24 DIAGNOSIS — Z74.1 SELF-CARE DEFICIT FOR FEEDING, BATHING, AND TOILETING: ICD-10-CM

## 2021-05-24 DIAGNOSIS — Z74.1 REQUIRES DAILY ASSISTANCE FOR ACTIVITIES OF DAILY LIVING (ADL) AND COMFORT NEEDS: Primary | ICD-10-CM

## 2021-05-24 DIAGNOSIS — R53.1 DECREASED STRENGTH, ENDURANCE, AND MOBILITY: ICD-10-CM

## 2021-05-24 PROCEDURE — 97110 THERAPEUTIC EXERCISES: CPT | Mod: PN

## 2021-05-24 PROCEDURE — 97112 NEUROMUSCULAR REEDUCATION: CPT | Mod: PN

## 2021-05-24 PROCEDURE — 97530 THERAPEUTIC ACTIVITIES: CPT | Mod: PN

## 2021-05-31 ENCOUNTER — CLINICAL SUPPORT (OUTPATIENT)
Dept: REHABILITATION | Facility: HOSPITAL | Age: 41
End: 2021-05-31
Payer: MEDICARE

## 2021-05-31 ENCOUNTER — EXTERNAL CHRONIC CARE MANAGEMENT (OUTPATIENT)
Dept: PRIMARY CARE CLINIC | Facility: CLINIC | Age: 41
End: 2021-05-31
Payer: MEDICARE

## 2021-05-31 DIAGNOSIS — Z74.1 SELF-CARE DEFICIT FOR FEEDING, BATHING, AND TOILETING: ICD-10-CM

## 2021-05-31 DIAGNOSIS — Z74.1 REQUIRES DAILY ASSISTANCE FOR ACTIVITIES OF DAILY LIVING (ADL) AND COMFORT NEEDS: Primary | ICD-10-CM

## 2021-05-31 DIAGNOSIS — R53.1 DECREASED STRENGTH, ENDURANCE, AND MOBILITY: ICD-10-CM

## 2021-05-31 DIAGNOSIS — Z74.09 DECREASED STRENGTH, ENDURANCE, AND MOBILITY: ICD-10-CM

## 2021-05-31 DIAGNOSIS — R68.89 DECREASED STRENGTH, ENDURANCE, AND MOBILITY: ICD-10-CM

## 2021-05-31 DIAGNOSIS — Z78.9 IMPAIRED INSTRUMENTAL ACTIVITIES OF DAILY LIVING (IADL): ICD-10-CM

## 2021-05-31 PROCEDURE — 97110 THERAPEUTIC EXERCISES: CPT | Mod: PN

## 2021-05-31 PROCEDURE — 97530 THERAPEUTIC ACTIVITIES: CPT | Mod: PN

## 2021-05-31 PROCEDURE — 99490 CHRNC CARE MGMT STAFF 1ST 20: CPT | Mod: PBBFAC | Performed by: INTERNAL MEDICINE

## 2021-05-31 PROCEDURE — 99490 PR CHRONIC CARE MGMT, 1ST 20 MIN: ICD-10-PCS | Mod: S$PBB,,, | Performed by: INTERNAL MEDICINE

## 2021-05-31 PROCEDURE — 99490 CHRNC CARE MGMT STAFF 1ST 20: CPT | Mod: S$PBB,,, | Performed by: INTERNAL MEDICINE

## 2021-06-10 ENCOUNTER — CLINICAL SUPPORT (OUTPATIENT)
Dept: REHABILITATION | Facility: HOSPITAL | Age: 41
End: 2021-06-10
Payer: MEDICARE

## 2021-06-10 DIAGNOSIS — R68.89 DECREASED STRENGTH, ENDURANCE, AND MOBILITY: ICD-10-CM

## 2021-06-10 DIAGNOSIS — Z74.09 DECREASED STRENGTH, ENDURANCE, AND MOBILITY: ICD-10-CM

## 2021-06-10 DIAGNOSIS — R53.1 DECREASED STRENGTH, ENDURANCE, AND MOBILITY: ICD-10-CM

## 2021-06-10 PROCEDURE — 97112 NEUROMUSCULAR REEDUCATION: CPT | Mod: PN

## 2021-06-10 PROCEDURE — 97110 THERAPEUTIC EXERCISES: CPT | Mod: PN

## 2021-06-15 ENCOUNTER — CLINICAL SUPPORT (OUTPATIENT)
Dept: REHABILITATION | Facility: HOSPITAL | Age: 41
End: 2021-06-15
Payer: MEDICARE

## 2021-06-15 DIAGNOSIS — R68.89 DECREASED STRENGTH, ENDURANCE, AND MOBILITY: ICD-10-CM

## 2021-06-15 DIAGNOSIS — R53.1 DECREASED STRENGTH, ENDURANCE, AND MOBILITY: ICD-10-CM

## 2021-06-15 DIAGNOSIS — Z74.09 DECREASED STRENGTH, ENDURANCE, AND MOBILITY: ICD-10-CM

## 2021-06-15 PROCEDURE — 97530 THERAPEUTIC ACTIVITIES: CPT | Mod: PN

## 2021-06-15 PROCEDURE — 97110 THERAPEUTIC EXERCISES: CPT | Mod: PN

## 2021-06-17 ENCOUNTER — CLINICAL SUPPORT (OUTPATIENT)
Dept: REHABILITATION | Facility: HOSPITAL | Age: 41
End: 2021-06-17
Payer: MEDICARE

## 2021-06-17 DIAGNOSIS — Z74.09 DECREASED STRENGTH, ENDURANCE, AND MOBILITY: ICD-10-CM

## 2021-06-17 DIAGNOSIS — R68.89 DECREASED STRENGTH, ENDURANCE, AND MOBILITY: ICD-10-CM

## 2021-06-17 DIAGNOSIS — R53.1 DECREASED STRENGTH, ENDURANCE, AND MOBILITY: ICD-10-CM

## 2021-06-17 PROCEDURE — 97112 NEUROMUSCULAR REEDUCATION: CPT | Mod: PN

## 2021-06-17 PROCEDURE — 97110 THERAPEUTIC EXERCISES: CPT | Mod: PN

## 2021-06-29 ENCOUNTER — CLINICAL SUPPORT (OUTPATIENT)
Dept: REHABILITATION | Facility: HOSPITAL | Age: 41
End: 2021-06-29
Payer: MEDICARE

## 2021-06-29 DIAGNOSIS — Z74.09 DECREASED STRENGTH, ENDURANCE, AND MOBILITY: ICD-10-CM

## 2021-06-29 DIAGNOSIS — R53.1 DECREASED STRENGTH, ENDURANCE, AND MOBILITY: ICD-10-CM

## 2021-06-29 DIAGNOSIS — R68.89 DECREASED STRENGTH, ENDURANCE, AND MOBILITY: ICD-10-CM

## 2021-06-29 PROCEDURE — 97110 THERAPEUTIC EXERCISES: CPT | Mod: PN

## 2021-06-29 PROCEDURE — 97530 THERAPEUTIC ACTIVITIES: CPT | Mod: PN

## 2021-07-01 ENCOUNTER — CLINICAL SUPPORT (OUTPATIENT)
Dept: OPHTHALMOLOGY | Facility: CLINIC | Age: 41
End: 2021-07-01
Payer: MEDICARE

## 2021-07-01 ENCOUNTER — OFFICE VISIT (OUTPATIENT)
Dept: OPHTHALMOLOGY | Facility: CLINIC | Age: 41
End: 2021-07-01
Payer: MEDICARE

## 2021-07-01 DIAGNOSIS — G80.1 SPASTIC DIPLEGIA: ICD-10-CM

## 2021-07-01 DIAGNOSIS — H02.403 PTOSIS OF BOTH EYELIDS: Primary | ICD-10-CM

## 2021-07-01 DIAGNOSIS — R26.81 GAIT INSTABILITY: ICD-10-CM

## 2021-07-01 DIAGNOSIS — H02.403 PTOSIS OF BOTH EYELIDS: ICD-10-CM

## 2021-07-01 DIAGNOSIS — R13.12 OROPHARYNGEAL DYSPHAGIA: ICD-10-CM

## 2021-07-01 DIAGNOSIS — H02.423 ACQUIRED MYOGENIC PTOSIS OF EYELID, BILATERAL: Primary | ICD-10-CM

## 2021-07-01 DIAGNOSIS — G11.8 SPINOCEREBELLAR ATAXIA: ICD-10-CM

## 2021-07-01 PROCEDURE — 92285 EXTERNAL OCULAR PHOTOGRAPHY: CPT | Mod: S$GLB,,, | Performed by: OPHTHALMOLOGY

## 2021-07-01 PROCEDURE — 99999 PR PBB SHADOW E&M-EST. PATIENT-LVL II: ICD-10-PCS | Mod: PBBFAC,,, | Performed by: OPHTHALMOLOGY

## 2021-07-01 PROCEDURE — 92285 EXTERNAL PHOTOGRAPHY - OU - BOTH EYES: ICD-10-PCS | Mod: S$GLB,,, | Performed by: OPHTHALMOLOGY

## 2021-07-01 PROCEDURE — 92082 GOLDMANN PERIMETRY - OU - BOTH EYES: ICD-10-PCS | Mod: S$GLB,,, | Performed by: OPHTHALMOLOGY

## 2021-07-01 PROCEDURE — 99203 PR OFFICE/OUTPT VISIT, NEW, LEVL III, 30-44 MIN: ICD-10-PCS | Mod: S$GLB,,, | Performed by: OPHTHALMOLOGY

## 2021-07-01 PROCEDURE — 99999 PR PBB SHADOW E&M-EST. PATIENT-LVL II: CPT | Mod: PBBFAC,,, | Performed by: OPHTHALMOLOGY

## 2021-07-01 PROCEDURE — 92082 INTERMEDIATE VISUAL FIELD XM: CPT | Mod: S$GLB,,, | Performed by: OPHTHALMOLOGY

## 2021-07-01 PROCEDURE — 99203 OFFICE O/P NEW LOW 30 MIN: CPT | Mod: S$GLB,,, | Performed by: OPHTHALMOLOGY

## 2021-07-02 ENCOUNTER — TELEPHONE (OUTPATIENT)
Dept: PHYSICAL MEDICINE AND REHAB | Facility: CLINIC | Age: 41
End: 2021-07-02

## 2021-07-02 ENCOUNTER — PES CALL (OUTPATIENT)
Dept: ADMINISTRATIVE | Facility: CLINIC | Age: 41
End: 2021-07-02

## 2021-07-02 DIAGNOSIS — G80.1 SPASTIC DIPLEGIA: Primary | ICD-10-CM

## 2021-07-02 PROBLEM — G40.89 OTHER SEIZURES: Status: ACTIVE | Noted: 2020-07-19

## 2021-07-02 PROBLEM — F33.41 RECURRENT MAJOR DEPRESSIVE DISORDER, IN PARTIAL REMISSION: Status: ACTIVE | Noted: 2020-12-09

## 2021-07-06 ENCOUNTER — OFFICE VISIT (OUTPATIENT)
Dept: FAMILY MEDICINE | Facility: CLINIC | Age: 41
End: 2021-07-06
Payer: MEDICARE

## 2021-07-06 VITALS
TEMPERATURE: 98 F | DIASTOLIC BLOOD PRESSURE: 88 MMHG | WEIGHT: 157 LBS | HEART RATE: 80 BPM | OXYGEN SATURATION: 99 % | SYSTOLIC BLOOD PRESSURE: 112 MMHG | BODY MASS INDEX: 19.62 KG/M2

## 2021-07-06 DIAGNOSIS — G82.20 PARAPLEGIA, UNSPECIFIED: ICD-10-CM

## 2021-07-06 DIAGNOSIS — K59.09 CHRONIC CONSTIPATION: ICD-10-CM

## 2021-07-06 DIAGNOSIS — Z78.9 IMPAIRED INSTRUMENTAL ACTIVITIES OF DAILY LIVING (IADL): ICD-10-CM

## 2021-07-06 DIAGNOSIS — M79.2 NEUROPATHIC PAIN: ICD-10-CM

## 2021-07-06 DIAGNOSIS — Z74.09 DECREASED STRENGTH, ENDURANCE, AND MOBILITY: ICD-10-CM

## 2021-07-06 DIAGNOSIS — Z11.59 NEED FOR HEPATITIS C SCREENING TEST: ICD-10-CM

## 2021-07-06 DIAGNOSIS — R26.81 GAIT INSTABILITY: ICD-10-CM

## 2021-07-06 DIAGNOSIS — G80.1 SPASTIC DIPLEGIA: ICD-10-CM

## 2021-07-06 DIAGNOSIS — Z00.00 ENCOUNTER FOR PREVENTIVE HEALTH EXAMINATION: Primary | ICD-10-CM

## 2021-07-06 DIAGNOSIS — G12.29 UPPER MOTOR NEURON DISEASE: ICD-10-CM

## 2021-07-06 DIAGNOSIS — R53.1 DECREASED STRENGTH, ENDURANCE, AND MOBILITY: ICD-10-CM

## 2021-07-06 DIAGNOSIS — R13.12 OROPHARYNGEAL DYSPHAGIA: ICD-10-CM

## 2021-07-06 DIAGNOSIS — Z74.1 SELF-CARE DEFICIT FOR FEEDING, BATHING, AND TOILETING: ICD-10-CM

## 2021-07-06 DIAGNOSIS — Z99.3 DEPENDENCE ON WHEELCHAIR: ICD-10-CM

## 2021-07-06 DIAGNOSIS — Z74.1 REQUIRES DAILY ASSISTANCE FOR ACTIVITIES OF DAILY LIVING (ADL) AND COMFORT NEEDS: ICD-10-CM

## 2021-07-06 DIAGNOSIS — R29.898 UPPER EXTREMITY WEAKNESS: ICD-10-CM

## 2021-07-06 DIAGNOSIS — N31.9 NEUROGENIC BLADDER: ICD-10-CM

## 2021-07-06 DIAGNOSIS — F33.41 RECURRENT MAJOR DEPRESSIVE DISORDER, IN PARTIAL REMISSION: ICD-10-CM

## 2021-07-06 DIAGNOSIS — R68.89 DECREASED STRENGTH, ENDURANCE, AND MOBILITY: ICD-10-CM

## 2021-07-06 DIAGNOSIS — G40.89 OTHER SEIZURES: ICD-10-CM

## 2021-07-06 DIAGNOSIS — G71.00 MUSCULAR DYSTROPHY: ICD-10-CM

## 2021-07-06 DIAGNOSIS — F02.818 MAJOR NEUROCOGNITIVE DISORDER DUE TO ANOTHER MEDICAL CONDITION WITH BEHAVIORAL DISTURBANCE: ICD-10-CM

## 2021-07-06 DIAGNOSIS — G11.8 SPINOCEREBELLAR ATAXIA: ICD-10-CM

## 2021-07-06 DIAGNOSIS — R47.1 DYSARTHRIA: ICD-10-CM

## 2021-07-06 PROCEDURE — 99999 PR PBB SHADOW E&M-EST. PATIENT-LVL IV: CPT | Mod: PBBFAC,,, | Performed by: NURSE PRACTITIONER

## 2021-07-06 PROCEDURE — 3008F BODY MASS INDEX DOCD: CPT | Mod: CPTII,S$GLB,, | Performed by: NURSE PRACTITIONER

## 2021-07-06 PROCEDURE — G0438 PR WELCOME MEDICARE ANNUAL WELLNESS INITIAL VISIT: ICD-10-PCS | Mod: S$GLB,,, | Performed by: NURSE PRACTITIONER

## 2021-07-06 PROCEDURE — G0438 PPPS, INITIAL VISIT: HCPCS | Mod: S$GLB,,, | Performed by: NURSE PRACTITIONER

## 2021-07-06 PROCEDURE — 3008F PR BODY MASS INDEX (BMI) DOCUMENTED: ICD-10-PCS | Mod: CPTII,S$GLB,, | Performed by: NURSE PRACTITIONER

## 2021-07-06 PROCEDURE — 1126F PR PAIN SEVERITY QUANTIFIED, NO PAIN PRESENT: ICD-10-PCS | Mod: S$GLB,,, | Performed by: NURSE PRACTITIONER

## 2021-07-06 PROCEDURE — 1126F AMNT PAIN NOTED NONE PRSNT: CPT | Mod: S$GLB,,, | Performed by: NURSE PRACTITIONER

## 2021-07-06 PROCEDURE — 99999 PR PBB SHADOW E&M-EST. PATIENT-LVL IV: ICD-10-PCS | Mod: PBBFAC,,, | Performed by: NURSE PRACTITIONER

## 2021-07-15 ENCOUNTER — CLINICAL SUPPORT (OUTPATIENT)
Dept: REHABILITATION | Facility: HOSPITAL | Age: 41
End: 2021-07-15
Payer: MEDICARE

## 2021-07-15 DIAGNOSIS — R53.1 DECREASED STRENGTH, ENDURANCE, AND MOBILITY: ICD-10-CM

## 2021-07-15 DIAGNOSIS — Z74.09 DECREASED STRENGTH, ENDURANCE, AND MOBILITY: ICD-10-CM

## 2021-07-15 DIAGNOSIS — R68.89 DECREASED STRENGTH, ENDURANCE, AND MOBILITY: ICD-10-CM

## 2021-07-15 PROCEDURE — 97112 NEUROMUSCULAR REEDUCATION: CPT | Mod: PN

## 2021-07-15 PROCEDURE — 97110 THERAPEUTIC EXERCISES: CPT | Mod: PN

## 2021-07-20 ENCOUNTER — CLINICAL SUPPORT (OUTPATIENT)
Dept: REHABILITATION | Facility: HOSPITAL | Age: 41
End: 2021-07-20
Payer: MEDICARE

## 2021-07-20 DIAGNOSIS — R53.1 DECREASED STRENGTH, ENDURANCE, AND MOBILITY: ICD-10-CM

## 2021-07-20 DIAGNOSIS — Z74.09 DECREASED STRENGTH, ENDURANCE, AND MOBILITY: ICD-10-CM

## 2021-07-20 DIAGNOSIS — R68.89 DECREASED STRENGTH, ENDURANCE, AND MOBILITY: ICD-10-CM

## 2021-07-20 PROCEDURE — 97112 NEUROMUSCULAR REEDUCATION: CPT | Mod: PN

## 2021-07-20 PROCEDURE — 97110 THERAPEUTIC EXERCISES: CPT | Mod: PN

## 2021-07-22 ENCOUNTER — CLINICAL SUPPORT (OUTPATIENT)
Dept: REHABILITATION | Facility: HOSPITAL | Age: 41
End: 2021-07-22
Payer: MEDICARE

## 2021-07-22 DIAGNOSIS — R68.89 DECREASED STRENGTH, ENDURANCE, AND MOBILITY: ICD-10-CM

## 2021-07-22 DIAGNOSIS — Z74.09 DECREASED STRENGTH, ENDURANCE, AND MOBILITY: ICD-10-CM

## 2021-07-22 DIAGNOSIS — R53.1 DECREASED STRENGTH, ENDURANCE, AND MOBILITY: ICD-10-CM

## 2021-07-22 PROCEDURE — 97112 NEUROMUSCULAR REEDUCATION: CPT | Mod: PN

## 2021-07-22 PROCEDURE — 97110 THERAPEUTIC EXERCISES: CPT | Mod: PN

## 2021-08-02 ENCOUNTER — DOCUMENTATION ONLY (OUTPATIENT)
Dept: REHABILITATION | Facility: HOSPITAL | Age: 41
End: 2021-08-02

## 2021-08-10 ENCOUNTER — CLINICAL SUPPORT (OUTPATIENT)
Dept: REHABILITATION | Facility: HOSPITAL | Age: 41
End: 2021-08-10
Payer: MEDICARE

## 2021-08-10 ENCOUNTER — TELEPHONE (OUTPATIENT)
Dept: INTERNAL MEDICINE | Facility: CLINIC | Age: 41
End: 2021-08-10

## 2021-08-10 DIAGNOSIS — R53.1 DECREASED STRENGTH, ENDURANCE, AND MOBILITY: ICD-10-CM

## 2021-08-10 DIAGNOSIS — R68.89 DECREASED STRENGTH, ENDURANCE, AND MOBILITY: ICD-10-CM

## 2021-08-10 DIAGNOSIS — Z74.09 DECREASED STRENGTH, ENDURANCE, AND MOBILITY: ICD-10-CM

## 2021-08-10 PROCEDURE — 97110 THERAPEUTIC EXERCISES: CPT | Mod: PN

## 2021-08-10 PROCEDURE — 97112 NEUROMUSCULAR REEDUCATION: CPT | Mod: PN

## 2021-08-16 ENCOUNTER — TELEPHONE (OUTPATIENT)
Dept: INTERNAL MEDICINE | Facility: CLINIC | Age: 41
End: 2021-08-16

## 2021-08-17 ENCOUNTER — OFFICE VISIT (OUTPATIENT)
Dept: OPHTHALMOLOGY | Facility: CLINIC | Age: 41
End: 2021-08-17
Payer: MEDICARE

## 2021-08-17 DIAGNOSIS — R26.81 GAIT INSTABILITY: ICD-10-CM

## 2021-08-17 DIAGNOSIS — H02.423 ACQUIRED MYOGENIC PTOSIS OF EYELID, BILATERAL: Primary | ICD-10-CM

## 2021-08-17 DIAGNOSIS — G11.8 SPINOCEREBELLAR ATAXIA: ICD-10-CM

## 2021-08-17 PROCEDURE — 1126F AMNT PAIN NOTED NONE PRSNT: CPT | Mod: CPTII,S$GLB,, | Performed by: OPHTHALMOLOGY

## 2021-08-17 PROCEDURE — 92285 EXTERNAL OCULAR PHOTOGRAPHY: CPT | Mod: S$GLB,,, | Performed by: OPHTHALMOLOGY

## 2021-08-17 PROCEDURE — 1160F PR REVIEW ALL MEDS BY PRESCRIBER/CLIN PHARMACIST DOCUMENTED: ICD-10-PCS | Mod: CPTII,S$GLB,, | Performed by: OPHTHALMOLOGY

## 2021-08-17 PROCEDURE — 92082 GOLDMANN PERIMETRY - OU - BOTH EYES: ICD-10-PCS | Mod: S$GLB,,, | Performed by: OPHTHALMOLOGY

## 2021-08-17 PROCEDURE — 99999 PR PBB SHADOW E&M-EST. PATIENT-LVL II: CPT | Mod: PBBFAC,,, | Performed by: OPHTHALMOLOGY

## 2021-08-17 PROCEDURE — 1126F PR PAIN SEVERITY QUANTIFIED, NO PAIN PRESENT: ICD-10-PCS | Mod: CPTII,S$GLB,, | Performed by: OPHTHALMOLOGY

## 2021-08-17 PROCEDURE — 92285 EXTERNAL PHOTOGRAPHY - OU - BOTH EYES: ICD-10-PCS | Mod: S$GLB,,, | Performed by: OPHTHALMOLOGY

## 2021-08-17 PROCEDURE — 99214 PR OFFICE/OUTPT VISIT, EST, LEVL IV, 30-39 MIN: ICD-10-PCS | Mod: S$GLB,,, | Performed by: OPHTHALMOLOGY

## 2021-08-17 PROCEDURE — 1159F MED LIST DOCD IN RCRD: CPT | Mod: CPTII,S$GLB,, | Performed by: OPHTHALMOLOGY

## 2021-08-17 PROCEDURE — 92082 INTERMEDIATE VISUAL FIELD XM: CPT | Mod: S$GLB,,, | Performed by: OPHTHALMOLOGY

## 2021-08-17 PROCEDURE — 1160F RVW MEDS BY RX/DR IN RCRD: CPT | Mod: CPTII,S$GLB,, | Performed by: OPHTHALMOLOGY

## 2021-08-17 PROCEDURE — 99999 PR PBB SHADOW E&M-EST. PATIENT-LVL II: ICD-10-PCS | Mod: PBBFAC,,, | Performed by: OPHTHALMOLOGY

## 2021-08-17 PROCEDURE — 1159F PR MEDICATION LIST DOCUMENTED IN MEDICAL RECORD: ICD-10-PCS | Mod: CPTII,S$GLB,, | Performed by: OPHTHALMOLOGY

## 2021-08-17 PROCEDURE — 99214 OFFICE O/P EST MOD 30 MIN: CPT | Mod: S$GLB,,, | Performed by: OPHTHALMOLOGY

## 2021-08-18 ENCOUNTER — OFFICE VISIT (OUTPATIENT)
Dept: PHYSICAL MEDICINE AND REHAB | Facility: CLINIC | Age: 41
End: 2021-08-18
Payer: MEDICARE

## 2021-08-18 DIAGNOSIS — G80.1 SPASTIC DIPLEGIA: Primary | ICD-10-CM

## 2021-08-18 PROCEDURE — 95874 PR NEEDLE EMG GUIDANCE FOR CHEMODENERVATION: ICD-10-PCS | Mod: S$GLB,,, | Performed by: PHYSICAL MEDICINE & REHABILITATION

## 2021-08-18 PROCEDURE — 99499 UNLISTED E&M SERVICE: CPT | Mod: S$GLB,,, | Performed by: PHYSICAL MEDICINE & REHABILITATION

## 2021-08-18 PROCEDURE — 64644 PR CHEMODENERV 1 EXT; 5 OR MORE MUSCLES: ICD-10-PCS | Mod: S$GLB,,, | Performed by: PHYSICAL MEDICINE & REHABILITATION

## 2021-08-18 PROCEDURE — 64645 PR CHEMODENERV 1 EXT; EA ADD'L EXT, 5/> MUSCLES: ICD-10-PCS | Mod: S$GLB,,, | Performed by: PHYSICAL MEDICINE & REHABILITATION

## 2021-08-18 PROCEDURE — 95874 GUIDE NERV DESTR NEEDLE EMG: CPT | Mod: S$GLB,,, | Performed by: PHYSICAL MEDICINE & REHABILITATION

## 2021-08-18 PROCEDURE — 99999 PR PBB SHADOW E&M-EST. PATIENT-LVL I: CPT | Mod: PBBFAC,,, | Performed by: PHYSICAL MEDICINE & REHABILITATION

## 2021-08-18 PROCEDURE — 64645 CHEMODENERV 1 EXTREM 5/> EA: CPT | Mod: S$GLB,,, | Performed by: PHYSICAL MEDICINE & REHABILITATION

## 2021-08-18 PROCEDURE — 64644 CHEMODENERV 1 EXTREM 5/> MUS: CPT | Mod: S$GLB,,, | Performed by: PHYSICAL MEDICINE & REHABILITATION

## 2021-08-18 PROCEDURE — 99499 NO LOS: ICD-10-PCS | Mod: S$GLB,,, | Performed by: PHYSICAL MEDICINE & REHABILITATION

## 2021-08-18 PROCEDURE — 99999 PR PBB SHADOW E&M-EST. PATIENT-LVL I: ICD-10-PCS | Mod: PBBFAC,,, | Performed by: PHYSICAL MEDICINE & REHABILITATION

## 2021-08-19 ENCOUNTER — TELEPHONE (OUTPATIENT)
Dept: OPHTHALMOLOGY | Facility: CLINIC | Age: 41
End: 2021-08-19

## 2021-08-19 DIAGNOSIS — H02.403 PTOSIS OF BOTH EYELIDS: ICD-10-CM

## 2021-08-19 DIAGNOSIS — H02.423 ACQUIRED MYOGENIC PTOSIS OF EYELID, BILATERAL: Primary | ICD-10-CM

## 2021-08-20 ENCOUNTER — TELEPHONE (OUTPATIENT)
Dept: OPHTHALMOLOGY | Facility: CLINIC | Age: 41
End: 2021-08-20

## 2021-08-20 ENCOUNTER — PATIENT MESSAGE (OUTPATIENT)
Dept: INTERNAL MEDICINE | Facility: CLINIC | Age: 41
End: 2021-08-20

## 2021-08-20 ENCOUNTER — TELEPHONE (OUTPATIENT)
Dept: INTERNAL MEDICINE | Facility: CLINIC | Age: 41
End: 2021-08-20

## 2021-08-20 DIAGNOSIS — Z13.9 SCREENING FOR UNSPECIFIED CONDITION: Primary | ICD-10-CM

## 2021-08-23 ENCOUNTER — TELEPHONE (OUTPATIENT)
Dept: INTERNAL MEDICINE | Facility: CLINIC | Age: 41
End: 2021-08-23

## 2021-08-23 DIAGNOSIS — R29.818 DISABILITY DUE TO NEUROLOGICAL DISORDER: Primary | ICD-10-CM

## 2021-08-24 ENCOUNTER — CLINICAL SUPPORT (OUTPATIENT)
Dept: REHABILITATION | Facility: HOSPITAL | Age: 41
End: 2021-08-24
Payer: MEDICARE

## 2021-08-24 ENCOUNTER — PATIENT MESSAGE (OUTPATIENT)
Dept: INTERNAL MEDICINE | Facility: CLINIC | Age: 41
End: 2021-08-24

## 2021-08-24 DIAGNOSIS — Z74.09 DECREASED STRENGTH, ENDURANCE, AND MOBILITY: ICD-10-CM

## 2021-08-24 DIAGNOSIS — R68.89 DECREASED STRENGTH, ENDURANCE, AND MOBILITY: ICD-10-CM

## 2021-08-24 DIAGNOSIS — R53.1 DECREASED STRENGTH, ENDURANCE, AND MOBILITY: ICD-10-CM

## 2021-08-24 PROCEDURE — 97530 THERAPEUTIC ACTIVITIES: CPT | Mod: PN

## 2021-08-24 PROCEDURE — 97110 THERAPEUTIC EXERCISES: CPT | Mod: PN

## 2021-08-25 ENCOUNTER — PATIENT MESSAGE (OUTPATIENT)
Dept: INTERNAL MEDICINE | Facility: CLINIC | Age: 41
End: 2021-08-25

## 2021-08-26 ENCOUNTER — CLINICAL SUPPORT (OUTPATIENT)
Dept: REHABILITATION | Facility: HOSPITAL | Age: 41
End: 2021-08-26
Payer: MEDICARE

## 2021-08-26 DIAGNOSIS — Z74.09 DECREASED STRENGTH, ENDURANCE, AND MOBILITY: ICD-10-CM

## 2021-08-26 DIAGNOSIS — R68.89 DECREASED STRENGTH, ENDURANCE, AND MOBILITY: ICD-10-CM

## 2021-08-26 DIAGNOSIS — R53.1 DECREASED STRENGTH, ENDURANCE, AND MOBILITY: ICD-10-CM

## 2021-08-26 PROCEDURE — 97112 NEUROMUSCULAR REEDUCATION: CPT | Mod: PN

## 2021-08-26 PROCEDURE — 97110 THERAPEUTIC EXERCISES: CPT | Mod: PN

## 2021-09-30 ENCOUNTER — TELEPHONE (OUTPATIENT)
Dept: PHYSICAL MEDICINE AND REHAB | Facility: CLINIC | Age: 41
End: 2021-09-30

## 2021-09-30 DIAGNOSIS — G80.1 SPASTIC DIPLEGIA: Primary | ICD-10-CM

## 2021-10-20 ENCOUNTER — TELEPHONE (OUTPATIENT)
Dept: OPHTHALMOLOGY | Facility: CLINIC | Age: 41
End: 2021-10-20

## 2021-10-21 ENCOUNTER — CLINICAL SUPPORT (OUTPATIENT)
Dept: REHABILITATION | Facility: HOSPITAL | Age: 41
End: 2021-10-21
Payer: MEDICARE

## 2021-10-21 DIAGNOSIS — Z74.09 IMPAIRED MOBILITY AND ADLS: Primary | ICD-10-CM

## 2021-10-21 DIAGNOSIS — R29.898 UPPER EXTREMITY WEAKNESS: ICD-10-CM

## 2021-10-21 DIAGNOSIS — Z78.9 IMPAIRED MOBILITY AND ADLS: Primary | ICD-10-CM

## 2021-10-21 DIAGNOSIS — R53.1 WEAKNESS: ICD-10-CM

## 2021-10-21 DIAGNOSIS — Z74.09 DECREASED STRENGTH, ENDURANCE, AND MOBILITY: ICD-10-CM

## 2021-10-21 DIAGNOSIS — R68.89 DECREASED STRENGTH, ENDURANCE, AND MOBILITY: ICD-10-CM

## 2021-10-21 DIAGNOSIS — Z74.09 IMPAIRED TRANSFERS: ICD-10-CM

## 2021-10-21 DIAGNOSIS — R25.2 SPASTICITY: ICD-10-CM

## 2021-10-21 DIAGNOSIS — R53.1 DECREASED STRENGTH, ENDURANCE, AND MOBILITY: ICD-10-CM

## 2021-10-21 PROCEDURE — 97110 THERAPEUTIC EXERCISES: CPT | Mod: KX,PN

## 2021-10-21 PROCEDURE — 97164 PT RE-EVAL EST PLAN CARE: CPT | Mod: KX,PN

## 2021-11-09 ENCOUNTER — CLINICAL SUPPORT (OUTPATIENT)
Dept: REHABILITATION | Facility: HOSPITAL | Age: 41
End: 2021-11-09
Payer: MEDICARE

## 2021-11-09 DIAGNOSIS — Z74.09 DECREASED STRENGTH, ENDURANCE, AND MOBILITY: ICD-10-CM

## 2021-11-09 DIAGNOSIS — R53.1 DECREASED STRENGTH, ENDURANCE, AND MOBILITY: ICD-10-CM

## 2021-11-09 DIAGNOSIS — R68.89 DECREASED STRENGTH, ENDURANCE, AND MOBILITY: ICD-10-CM

## 2021-11-09 DIAGNOSIS — G80.1 SPASTIC DIPLEGIA: ICD-10-CM

## 2021-11-09 PROCEDURE — 97530 THERAPEUTIC ACTIVITIES: CPT | Mod: KX,PN

## 2021-11-09 PROCEDURE — 97140 MANUAL THERAPY 1/> REGIONS: CPT | Mod: KX,PN

## 2021-11-23 ENCOUNTER — CLINICAL SUPPORT (OUTPATIENT)
Dept: REHABILITATION | Facility: HOSPITAL | Age: 41
End: 2021-11-23
Payer: MEDICARE

## 2021-11-23 DIAGNOSIS — Z74.09 DECREASED STRENGTH, ENDURANCE, AND MOBILITY: ICD-10-CM

## 2021-11-23 DIAGNOSIS — R53.1 DECREASED STRENGTH, ENDURANCE, AND MOBILITY: ICD-10-CM

## 2021-11-23 DIAGNOSIS — R68.89 DECREASED STRENGTH, ENDURANCE, AND MOBILITY: ICD-10-CM

## 2021-11-23 PROCEDURE — 97530 THERAPEUTIC ACTIVITIES: CPT | Mod: PN

## 2021-11-23 PROCEDURE — 97110 THERAPEUTIC EXERCISES: CPT | Mod: PN

## 2021-11-30 ENCOUNTER — CLINICAL SUPPORT (OUTPATIENT)
Dept: REHABILITATION | Facility: HOSPITAL | Age: 41
End: 2021-11-30
Payer: MEDICARE

## 2021-11-30 DIAGNOSIS — R68.89 DECREASED STRENGTH, ENDURANCE, AND MOBILITY: ICD-10-CM

## 2021-11-30 DIAGNOSIS — Z74.09 DECREASED STRENGTH, ENDURANCE, AND MOBILITY: ICD-10-CM

## 2021-11-30 DIAGNOSIS — R53.1 DECREASED STRENGTH, ENDURANCE, AND MOBILITY: ICD-10-CM

## 2021-11-30 PROCEDURE — 97530 THERAPEUTIC ACTIVITIES: CPT | Mod: KX,PN

## 2021-11-30 PROCEDURE — 97110 THERAPEUTIC EXERCISES: CPT | Mod: KX,PN

## 2021-12-07 ENCOUNTER — CLINICAL SUPPORT (OUTPATIENT)
Dept: REHABILITATION | Facility: HOSPITAL | Age: 41
End: 2021-12-07
Payer: MEDICARE

## 2021-12-07 DIAGNOSIS — R68.89 DECREASED STRENGTH, ENDURANCE, AND MOBILITY: ICD-10-CM

## 2021-12-07 DIAGNOSIS — Z74.09 DECREASED STRENGTH, ENDURANCE, AND MOBILITY: ICD-10-CM

## 2021-12-07 DIAGNOSIS — R53.1 DECREASED STRENGTH, ENDURANCE, AND MOBILITY: ICD-10-CM

## 2021-12-07 PROCEDURE — 97110 THERAPEUTIC EXERCISES: CPT | Mod: PN

## 2021-12-07 PROCEDURE — 97530 THERAPEUTIC ACTIVITIES: CPT | Mod: PN

## 2021-12-16 NOTE — PT/OT/SLP PROGRESS
"Speech Language Pathology Treatment    Patient Name:  Lisa Moreno   MRN:  [id number]  Admitting Diagnosis: New onset seizure    Recommendations:                General Recommendations:  Dysphagia therapy and dysarthria remediation  Diet recommendations:  Regular, Nectar Thick   Aspiration Precautions: 1 bite/sip at a time, Alternating bites/sips, Assistance with thickening liquids, Assistance with meals and Assistance with thickening liquids, Double swallow with each bite/sip, Eliminate distractions, Feed only when awake/alert, HOB to 90 degrees, Meds whole 1 at a time, Remain upright 30 minutes post meal, Small bites/sips and Standard aspiration precautions   General Precautions: Standard, aspiration, fall, nectar thick, seizure  Communication strategies: speech strategies     Subjective     Pt seen in room for direct dysphagia tx.   Patient goals: no comment, mom stated "he does not understand the thickener, I handle all that."    Pain/Comfort:  Pain Rating 1: 0/10    Objective:     Has the patient been evaluated by SLP for swallowing?   Yes  Keep patient NPO? No   Current Respiratory Status: room air      Swallowing: Pt consumed over 85% of breakfast this am, mom reported thickener on tray. Discussed using pre thickened juices for now if he prefers. Mom able to demonstrate proper use of thckener in liiquids. She is aware of difference between thin and nectar thick when comparing.   SLP educate mom on using swallow precautions including controlling bite size and rate of intake.  Nsg and therapy has reported pt is impulsive with eating taking large bites and not alternating liquid swallows in between. SLP discussed aspiration risk with large amts at a time and why pacing is important during meal consumption. She verbalized understanding and is helping him when he is feeding himself during meals.   SLP provided visual demonstration of bite sip and how to alternate sips and bites during meals.   Pt did not " Detail Level: Zone provide understanding of education, pt slow to respond and speech is dysarthric. Pt with imprecise articulate at the single word level.   Introduced oral motor exercises to assist pt with articulation of speech.     Assessment:     Lisa Moreno is a 39 y.o. male with an SLP diagnosis of dysphagia and dysarthria.      Goals:   Multidisciplinary Problems     SLP Goals        Problem: SLP Goal    Goal Priority Disciplines Outcome   SLP Goal     SLP Ongoing, Progressing   Description: Short Term Goals:  1. Pt will participate in swallow eval to determine safest diet level.  2. Pt will tolerate nectar thick liquids/regular diet with no outward s/s of dysphagia.  3. Pt will utilize swallow strategies when consuming PO with min-mod cues for safe PO consumption.                    Plan:     · Patient to be seen:  3 x/week   · Plan of Care expires:  08/18/20  · Plan of Care reviewed with:  patient, caregiver, mother   · SLP Follow-Up:  Yes       Discharge recommendations:  rehabilitation facility     Time Tracking:     SLP Treatment Date:   07/21/20  Speech Start Time:  1135  Speech Stop Time:  1155     Speech Total Time (min):  20 min      Billable Minutes: Treatment Swallowing Dysfunction 10 and Seld Care/Home Management Training 10      RAMANA Swann, CCC-SLP  07/21/2020   Other Procedure: cosmetic removal Reason To Defer Override: pt has another appointment already scheduled Introduction Text (Please End With A Colon): The following procedure was deferred: Procedure To Be Performed At Next Visit: Liquid nitrogen Reason To Defer Override: pt already has appointment scheduled

## 2021-12-17 NOTE — TELEPHONE ENCOUNTER
Home Health SOC 11/16/2018 - 01/14/2019 with [facility] ([place] - Dr. Maylin Ramesh. Patient received SN, PT and ST services.   Specimens verified per policy.

## 2021-12-30 ENCOUNTER — TELEPHONE (OUTPATIENT)
Dept: ADMINISTRATIVE | Facility: OTHER | Age: 41
End: 2021-12-30
Payer: MEDICARE

## 2021-12-30 ENCOUNTER — TELEPHONE (OUTPATIENT)
Dept: INTERNAL MEDICINE | Facility: CLINIC | Age: 41
End: 2021-12-30
Payer: MEDICARE

## 2021-12-30 ENCOUNTER — HOSPITAL ENCOUNTER (EMERGENCY)
Facility: HOSPITAL | Age: 41
Discharge: HOME | End: 2021-12-30
Attending: EMERGENCY MEDICINE
Payer: MEDICARE

## 2021-12-30 VITALS
HEART RATE: 101 BPM | TEMPERATURE: 101 F | DIASTOLIC BLOOD PRESSURE: 76 MMHG | SYSTOLIC BLOOD PRESSURE: 124 MMHG | OXYGEN SATURATION: 99 % | RESPIRATION RATE: 18 BRPM

## 2021-12-30 DIAGNOSIS — U07.1 COVID: Primary | ICD-10-CM

## 2021-12-30 DIAGNOSIS — U07.1 COVID-19: Primary | ICD-10-CM

## 2021-12-30 LAB
CTP QC/QA: YES
SARS-COV-2 RDRP RESP QL NAA+PROBE: POSITIVE

## 2021-12-30 PROCEDURE — 99284 PR EMERGENCY DEPT VISIT,LEVEL IV: ICD-10-PCS | Mod: CR,CS,, | Performed by: PHYSICIAN ASSISTANT

## 2021-12-30 PROCEDURE — U0002 COVID-19 LAB TEST NON-CDC: HCPCS | Performed by: PHYSICIAN ASSISTANT

## 2021-12-30 PROCEDURE — 99284 EMERGENCY DEPT VISIT MOD MDM: CPT | Mod: CR,CS,, | Performed by: PHYSICIAN ASSISTANT

## 2021-12-30 PROCEDURE — 99283 EMERGENCY DEPT VISIT LOW MDM: CPT | Mod: 25

## 2021-12-30 PROCEDURE — 25000003 PHARM REV CODE 250: Performed by: PHYSICIAN ASSISTANT

## 2021-12-30 RX ORDER — IBUPROFEN 600 MG/1
600 TABLET ORAL
Status: COMPLETED | OUTPATIENT
Start: 2021-12-30 | End: 2021-12-30

## 2021-12-30 RX ADMIN — IBUPROFEN 600 MG: 600 TABLET, FILM COATED ORAL at 05:12

## 2021-12-30 NOTE — TELEPHONE ENCOUNTER
Can you call the mom, please?  He is disabled.  I ordered the nurse monitoring system, and I ordered a pulse ox for him.    D

## 2021-12-30 NOTE — ED PROVIDER NOTES
Encounter Date: 12/30/2021       History     Chief Complaint   Patient presents with    COVID-19 Concerns     Pts mother has covid and pt wants to be tested, no symptoms     This is a 41 y.o. year old male with a PMH of spinocerebellar atrophy, spastic quadriplegia who presents to the ED with a chief complaint of covid 19 exposure. Patient's mom tested positive for Covid 19. He is asymptomatic. He is vaccinated x 2 doses. He denies headache, chills, nasal congestion, cough, chest pain, shortness of breath, nausea, vomiting, diarrhea, abdominal pain, joint swelling or rashes. He is a nonsmoker.          Review of patient's allergies indicates:   Allergen Reactions    Penicillins      Hives and Itching     Past Medical History:   Diagnosis Date    Anxiety     Degenerative motor system disease     Depression     Seizure     Spastic quadriplegia     Spinocerebellar atrophy      Past Surgical History:   Procedure Laterality Date    BACLOFEN PUMP IMPLANTATION      COLONOSCOPY N/A 7/23/2020    Procedure: COLONOSCOPY;  Surgeon: Ziyad Fitch MD;  Location: [facility];  Service: Endoscopy;  Laterality: N/A;    ESOPHAGOGASTRODUODENOSCOPY N/A 7/23/2020    Procedure: EGD (ESOPHAGOGASTRODUODENOSCOPY);  Surgeon: Ziyad Fitch MD;  Location: [facility];  Service: Endoscopy;  Laterality: N/A;    FLUOROSCOPIC URODYNAMIC STUDY N/A 11/27/2018    Procedure: URODYNAMIC STUDY, FLUOROSCOPIC;  Surgeon: Tanja Okeefe MD;  Location: [address];  Service: Urology;  Laterality: N/A;  1 hour    UPPER GASTROINTESTINAL ENDOSCOPY       Family History   Problem Relation Age of Onset    Thyroid cancer Mother     Hypertension Mother     Depression Mother     Cancer Mother         thyroid cancer    Multiple sclerosis Other         mother's cousin    No Known Problems Brother     No Known Problems Son     ALS Neg Hx     Muscular dystrophy Neg Hx      Social History     Tobacco Use    Smoking status:  Never Smoker    Smokeless tobacco: Never Used   Substance Use Topics    Alcohol use: Not Currently     Comment: social drinker, at times    Drug use: Not Currently     Review of Systems   Constitutional: Positive for fever.   HENT: Negative for sore throat.    Respiratory: Negative for shortness of breath.    Cardiovascular: Negative for chest pain.   Gastrointestinal: Negative for diarrhea, nausea and vomiting.   Musculoskeletal: Negative for myalgias.   Neurological: Negative for headaches.       Physical Exam     Initial Vitals [12/30/21 1617]   BP Pulse Resp Temp SpO2   124/76 101 18 (!) 100.5 °F (38.1 °C) 99 %      MAP       --         Physical Exam    Constitutional: He appears well-developed and well-nourished. No distress.   HENT:   Head: Atraumatic.   Eyes: Conjunctivae and EOM are normal. Pupils are equal, round, and reactive to light.   Cardiovascular: Normal rate, regular rhythm and normal heart sounds.   Pulmonary/Chest: Breath sounds normal. No respiratory distress. He has no wheezes. He has no rhonchi. He has no rales.     Neurological: He is alert and oriented to person, place, and time.   Wheelchair bound   Skin: Skin is warm and dry. No rash noted.         ED Course   Procedures  Labs Reviewed   SARS-COV-2 RDRP GENE - Abnormal; Notable for the following components:       Result Value    POC Rapid COVID Positive (*)     All other components within normal limits    Narrative:     This test utilizes isothermal nucleic acid amplification   technology to detect the SARS-CoV-2 RdRp nucleic acid segment.   The analytical sensitivity (limit of detection) is 125 genome   equivalents/mL.   A POSITIVE result implies infection with the SARS-CoV-2 virus;   the patient is presumed to be contagious.     A NEGATIVE result means that SARS-CoV-2 nucleic acids are not   present above the limit of detection. A NEGATIVE result should be   treated as presumptive. It does not rule out the possibility of   COVID-19 and  should not be the sole basis for treatment decisions.   If COVID-19 is strongly suspected based on clinical and exposure   history, re-testing using an alternate molecular assay should be   considered.   This test is only for use under the Food and Drug   Administration s Emergency Use Authorization (EUA).   Commercial kits are provided by [employer].   Performance characteristics of the EUA have been independently   verified by [facility] Department of   Pathology and Laboratory Medicine.   _________________________________________________________________   The authorized Fact Sheet for Healthcare Providers and the authorized Fact   Sheet for Patients of the ID NOW COVID-19 are available on the FDA   website:     https://www.fda.gov/media/799541/download  https://www.fda.gov/media/594357/download              Imaging Results    None          Medications   ibuprofen tablet 600 mg (600 mg Oral Given 12/30/21 1716)     Medical Decision Making:   History:   Old Medical Records: I decided to obtain old medical records.  Clinical Tests:   Lab Tests: Ordered and Reviewed       APC / Resident Notes:   Covid 19 positive.     Patient seen and evaluated in the setting of global Covid 19 pandemic. Clinically they are well appearing with normal oxygen saturation, clear lungs and normal respiratory effort and do not meet current criteria for hospitalization. He is febrile, this was treated in the ED. Reviewed current isolation recommendations per CDC guidelines. Discussed supportive care measures. Referral for monocolonal antibody infusion and covid surveillance program placed. Detailed return to ED precautions discussed. All questions were answered. They are stable for discharge.                  Clinical Impression:   Final diagnoses:  [U07.1] COVID (Primary)          ED Disposition Condition    Discharge Stable        ED Prescriptions     None        Follow-up Information    None          Nancy Farrell,  ESTEE  12/30/21 2026

## 2021-12-30 NOTE — DISCHARGE INSTRUCTIONS
Your test was POSITIVE for COVID-19 (coronavirus).       Please isolate yourself at home.  You may leave home and/or return to work once the following conditions are met:    If you were not hospitalized and are not moderately to severely immunocompromised:   More than 5 days since symptoms first appeared AND  More than 24 hours fever free without medications AND  Symptoms are improving  Continue to wear a mask around others for 5 additional days.    If you were hospitalized OR are moderately to severely immunocompromised:  More than 20 days since symptoms first appeared  More than 24 hours fever free without medications  Symptoms have improved    If you had no symptoms but tested positive:  More than 5 days since the date of the first positive test (20 days if moderately to severely immunocompromised). If you develop symptoms, then use the guidelines above.  Continue to wear a mask around others for 5 additional days.      Contact Tracing    As one of the next steps, you will receive a call or text from the [place] Department of Health ([facility]) COVID-19 Tracing Team. See the contact information below so you know not to ignore the health departments call. It is important that you contact them back immediately so they can help.      Contact Tracer Number:  [phone]  Caller ID for most carriers: [facility]     What is contact tracing?  Contact tracing is a process that helps identify everyone who has been in close contact with an infected person. Contact tracers let those people know they may have been exposed and guide them on next steps. Confidentiality is important for everyone; no one will be told who may have exposed them to the virus.  Your involvement is important. The more we know about where and how this virus is spreading, the better chance we have at stopping it from spreading further.  What does exposure mean?  Exposure means you have been within 6 feet for more than 15 minutes with a person who  has or had COVID-19.  What kind of questions do the contact tracers ask?  A contact tracer will confirm your basic contact information including name, address, phone number, and next of kin, as well as asking about any symptoms you may have had. Theyll also ask you how you think you may have gotten sick, such as places where you may have been exposed to the virus, and people you were with. Those names will never be shared with anyone outside of that call, and will only be used to help trace and stop the spread of the virus.   I have privacy concerns. How will the state use my information?  Your privacy about your health is important. All calls are completed using call centers that use the appropriate health privacy protection measures (HIPAA compliance), meaning that your patient information is safe. No one will ever ask you any questions related to immigration status. Your health comes first.   Do I have to participate?  You do not have to participate, but we strongly encourage you to. Contact tracing can help us catch and control new outbreaks as theyre developing to keep your friends and family safe.   What if I dont hear from anyone?  If you dont receive a call within 24 hours, you can call the number above right away to inquire about your status. That line is open from 8:00 am - 8:00 p.m., 7 days a week.  Contact tracing saves lives! Together, we have the power to beat this virus and keep our loved ones and neighbors safe.    For more information see CDC link below.      https://www.cdc.gov/coronavirus/2019-ncov/hcp/guidance-prevent-spread.html#precautions        Sources:  [facility], [place] Department of [facility] and [facility]           Sincerely,     Nancy Farrell PA-C

## 2021-12-30 NOTE — ED NOTES
Patient being [place] back [place] the [place] with patient escort

## 2021-12-30 NOTE — TELEPHONE ENCOUNTER
----- Message from Catrachita Ruelas sent at 12/30/2021 12:49 PM CST -----  Contact: Mom @[phone]  Patient would like to get medical advice.  Symptoms   Coughing   Watery eyes   Stomach pains     How long have you had these symptoms:   12/29/2021    Would you like a call back, or a response through your [facility] portal?:   call back     Pharmacy name and phone # (copy from chart):    Comments:       Mom states that the pt is disable and she was tested positive for Covid.She states that she is at the [facility] staying right now and would like to know if she could have someone come by to test the pt. Please call back to advise.

## 2021-12-30 NOTE — Clinical Note
"Lisa "Lolita Moreno was seen and treated in our emergency department on 12/30/2021.     COVID-19 is present in our communities across the state. There is limited testing for COVID at this time, so not all patients can be tested. In this situation, your employee meets the following criteria:    Lisa Moreno has met the criteria for COVID-19 testing and has a POSITIVE result. He can return to work once they are asymptomatic for 72 hours without the use of fever reducing medications AND at least ten days from the first positive result.     If you have any questions or concerns, or if I can be of further assistance, please do not hesitate to contact me.    Sincerely,             Nancy Farrell PA-C"

## 2022-01-03 ENCOUNTER — TELEPHONE (OUTPATIENT)
Dept: ADMINISTRATIVE | Facility: CLINIC | Age: 42
End: 2022-01-03
Payer: MEDICARE

## 2022-01-04 DIAGNOSIS — G80.1 SPASTIC DIPLEGIA: ICD-10-CM

## 2022-01-04 RX ORDER — BACLOFEN 20 MG/1
20 TABLET ORAL 3 TIMES DAILY
Qty: 270 TABLET | Refills: 3 | Status: SHIPPED | OUTPATIENT
Start: 2022-01-04 | End: 2023-05-02 | Stop reason: SDUPTHER

## 2022-01-04 RX ORDER — DANTROLENE SODIUM 50 MG/1
CAPSULE ORAL
Qty: 270 CAPSULE | Refills: 3 | Status: SHIPPED | OUTPATIENT
Start: 2022-01-04 | End: 2023-06-03 | Stop reason: SDUPTHER

## 2022-01-04 NOTE — TELEPHONE ENCOUNTER
----- Message from Rose Mary Guerrier sent at 1/4/2022 11:39 AM CST -----  Regarding: Prescription Request  Pt mother called that wrong prescription was sent to pharmacy she requested baclofen (LIORESAL) 20 MG tablet.    [id number] [address] AT [facility] [address]    [phone] (home)

## 2022-01-04 NOTE — TELEPHONE ENCOUNTER
----- Message from Ayleen Carl sent at 1/3/2022 10:35 AM CST -----  Regarding: call back  Contact: pt mom  Pt mom requesting a call back in regards to pt medication.        Pt mom Giovana @[phone]

## 2022-02-04 ENCOUNTER — TELEPHONE (OUTPATIENT)
Dept: PHYSICAL MEDICINE AND REHAB | Facility: CLINIC | Age: 42
End: 2022-02-04
Payer: MEDICARE

## 2022-02-04 NOTE — TELEPHONE ENCOUNTER
----- Message from Phi Velasquez sent at 2/4/2022  9:59 AM CST -----  Regarding: call bk      The Pt's mother would like a call back to get the Pt into a rehab.     # [phone]

## 2022-02-10 ENCOUNTER — TELEPHONE (OUTPATIENT)
Dept: OPHTHALMOLOGY | Facility: CLINIC | Age: 42
End: 2022-02-10
Payer: MEDICARE

## 2022-02-10 NOTE — TELEPHONE ENCOUNTER
----- Message from Donna Dai sent at 2/10/2022  9:13 AM CST -----  Regarding: FW: Covid test  Contact: PT mom    ----- Message -----  From: Jake De Anda  Sent: 2/10/2022   9:11 AM CST  To: Blanca Gentile Staff  Subject: Covid test                                       Pt mom would like covid test to be closer to [facility] instead of [place]. Pt mom also scheduled appt at [facility] for covid test and booster. Requesting call back.    Pt mom @ [phone]       Spoke to mom she will be tested here, and notify them it is pre op testing    Sarah Qureshi

## 2022-02-11 DIAGNOSIS — F41.9 ANXIETY: ICD-10-CM

## 2022-02-11 DIAGNOSIS — F32.A DEPRESSION, UNSPECIFIED DEPRESSION TYPE: ICD-10-CM

## 2022-02-11 RX ORDER — CITALOPRAM 20 MG/1
20 TABLET, FILM COATED ORAL DAILY
Qty: 90 TABLET | Refills: 3 | Status: SHIPPED | OUTPATIENT
Start: 2022-02-11 | End: 2024-03-02

## 2022-02-16 ENCOUNTER — LAB VISIT (OUTPATIENT)
Dept: PRIMARY CARE CLINIC | Facility: CLINIC | Age: 42
End: 2022-02-16
Payer: MEDICARE

## 2022-02-16 DIAGNOSIS — Z13.9 SCREENING FOR UNSPECIFIED CONDITION: ICD-10-CM

## 2022-02-16 LAB — SARS-COV-2 RNA RESP QL NAA+PROBE: NOT DETECTED

## 2022-02-16 PROCEDURE — U0003 INFECTIOUS AGENT DETECTION BY NUCLEIC ACID (DNA OR RNA); SEVERE ACUTE RESPIRATORY SYNDROME CORONAVIRUS 2 (SARS-COV-2) (CORONAVIRUS DISEASE [COVID-19]), AMPLIFIED PROBE TECHNIQUE, MAKING USE OF HIGH THROUGHPUT TECHNOLOGIES AS DESCRIBED BY CMS-2020-01-R: HCPCS | Performed by: OPHTHALMOLOGY

## 2022-02-16 PROCEDURE — U0005 INFEC AGEN DETEC AMPLI PROBE: HCPCS | Performed by: OPHTHALMOLOGY

## 2022-02-17 ENCOUNTER — ANESTHESIA EVENT (OUTPATIENT)
Dept: SURGERY | Facility: HOSPITAL | Age: 42
End: 2022-02-17
Payer: MEDICARE

## 2022-02-17 ENCOUNTER — TELEPHONE (OUTPATIENT)
Dept: OPHTHALMOLOGY | Facility: CLINIC | Age: 42
End: 2022-02-17
Payer: MEDICARE

## 2022-02-17 NOTE — PRE-PROCEDURE INSTRUCTIONS
sw pt's Mother/Caregiver   Preop instructions(bathing/wear loose fitting clothing/fasting/directions/location of surgery/ and preop medication instructions reviewed with patient). Clear liquids are allowed up to 2 hours before procedure.Clear liquids are:water,apple juice, jello, gatorade & powerade. Patient instructed to hold/stop all blood thinning medications, prior to surgery, following the pre-surgery recommended guidelines. Instructed to follow the surgeon's instructions if they differ from these.    Patient verbalized understanding.    Denies any  history of side effects or issues with anesthesia or sedation.    Patient advised of the updated visitor policy.  Patient aware of the need to have someone drive them home following same -day surgery.      Patient's Mother given 1200 arrival to [facility]   Family saying at [facility] since losing their home in [place]    Reviewed NPO guidelines w/Mother   No solid food, milk or milk products, or anything cloudy 8 hours prior to SX start time - 0500  Pt may only have clear liquids, as listed above, up to 2 hours prior to SX start time - 1100  Mother - r/b/c and v/c/u

## 2022-02-17 NOTE — H&P
Pre-Operative History & Physical  Ophthalmology      SUBJECTIVE:     History of Present Illness:  Patient is a 41 y.o. male presents with visually significant bilateral acquired myogenic ptosis wishing to proceed with surgical correction as documented below     MEDICATIONS:   No medications prior to admission.       ALLERGIES:   Review of patient's allergies indicates:   Allergen Reactions    Penicillins      Hives and Itching       PAST MEDICAL HISTORY:   Past Medical History:   Diagnosis Date    Anxiety     Degenerative motor system disease     Depression     Seizure     Spastic quadriplegia     Spinocerebellar atrophy      PAST SURGICAL HISTORY:   Past Surgical History:   Procedure Laterality Date    BACLOFEN PUMP IMPLANTATION      COLONOSCOPY N/A 7/23/2020    Procedure: COLONOSCOPY;  Surgeon: Ziyad Fitch MD;  Location: [facility];  Service: Endoscopy;  Laterality: N/A;    ESOPHAGOGASTRODUODENOSCOPY N/A 7/23/2020    Procedure: EGD (ESOPHAGOGASTRODUODENOSCOPY);  Surgeon: Ziyad Fitch MD;  Location: [facility];  Service: Endoscopy;  Laterality: N/A;    FLUOROSCOPIC URODYNAMIC STUDY N/A 11/27/2018    Procedure: URODYNAMIC STUDY, FLUOROSCOPIC;  Surgeon: Tanja Okeefe MD;  Location: [address];  Service: Urology;  Laterality: N/A;  1 hour    UPPER GASTROINTESTINAL ENDOSCOPY       PAST FAMILY HISTORY:   Family History   Problem Relation Age of Onset    Thyroid cancer Mother     Hypertension Mother     Depression Mother     Cancer Mother         thyroid cancer    Multiple sclerosis Other         mother's cousin    No Known Problems Brother     No Known Problems Son     ALS Neg Hx     Muscular dystrophy Neg Hx      SOCIAL HISTORY:   Social History     Tobacco Use    Smoking status: Never Smoker    Smokeless tobacco: Never Used   Substance Use Topics    Alcohol use: Not Currently     Comment: social drinker, at times    Drug use: Not Currently        MENTAL STATUS:  Alert    REVIEW OF SYSTEMS: Negative    OBJECTIVE:     Vital Signs (Most Recent)       Physical Exam:  General: NAD  HEENT: see clinic note for full details  Lungs: Adequate respirations  Heart: + pulses  Abdomen: Soft    ASSESSMENT/PLAN:     Patient is a 41 y.o. male with Acquired myogenic ptosis of eyelid, bilateral [H02.423]  Ptosis of both eyelids [H02.403].     - Plan for surgical correction of bilateral ptosis with bilateral MMCR 5/9 mm under general anesthesia    - Risks/benefits/alternatives of the procedure including, but not limited to scarring, bleeding, infection, loss or decreased vision, and/or need for possible repeat surgery discussed with the patient and family.  - Has not taken blood thinners (includes ASA, NSAIDS, BC Powder, Goody's Powder, Excedrine, Eliquis, Xarelto, Pradaxa, etc.) for over 5 days.    - Informed consent obtained prior to surgery and the patient/family voiced good understanding.    Chetan De Anda MD PGY-3  Ophthalmology Resident

## 2022-02-17 NOTE — OP NOTE
Ophthalmology Service  Operative Note    Date: 2/18/22     Attending: Krupa Rios MD     Resident: Chetan De Anda MD     Pre-Operative Diagnosis: Bilateral upper eyelid myogenic ptosis     Post-Operative Diagnosis: Same     Procedure:   1) Bilateral Diaz Muscle Conjunctival Resection (52508)    Anesthesia: General with local (mixture of 2% lidocaine with 1:200,000 epinephrine, 0.5% bupivacaine, Vitrase)     Estimated Blood Lost: < 5 cc     Complications: None     Specimens: None     Implants: None     Indications for surgery:   The patient is a pleasant 41 y.o.-year-old male with a history of bilateral upper eyelid ptosis interfering with activities of daily living. The patient was recommended to undergo ptosis repair via bilateral Diaz's muscle conjunctival resection and the risks, benefits, and alternatives of the procedure were discussed. The risks including but not limited to pain, bleeding, infection, ocular injury, loss of the eye, asymmetry, overcorrection, undercorrection, need for revision in future, scarring were discussed with the patient. After the opportunity to ask questions, the patient elected to proceed. A consent document was signed, witnessed, and placed in the patient's chart.      Procedure in detail:     The patient was brought to the operating room. Prior to induction of anesthesia, with patient sitting in an upright position, both upper eyelids were marked at the medial pupil border. 3 cc of 2% lidocaine with epinephrine, 0.5% bupivacaine, and Vitrase was injected subcutaneously into both upper eyelids and also at supraorbital notch for frontal nerve block. The full face was then prepped using topical Betadine, and the patient was draped in sterile fashion.       Attention was turned to the right eye. A traction suture was placed at the lid margin directly overlying the pupil using 4-0 silk on a G-3 needle. The eyelid was everted using a Desmarres retractor. The palpebral conjunctiva  was dried using a cotton swab. A sterile marking pen and a caliper were used to jef points 2.5 mm from the tarsus centrally, medially, and laterally superiorly in order to resect a total of 5.0  mm of Diaz's muscle. A 4-0 silk suture on a G-3 needle was passed through conjunctiva and Diaz's muscle at each of these demarcation lines. The medial tails and one tail of the central suture were tied in a single bundle, and the lateral tails and the remaining tail of the central suture were tied in a second bundle. Each was distracted from the site with equal tension. The ptosis clamp was used to grasp the Diaz's muscle and conjunctiva firmly. With the clamp in place, a 6-0 Prolene suture on a P-3 needle was inserted through the eyelid skin directly over the superior aspect of the lateral demarcation line and pass through to the opposing palpebral conjunctiva, exiting beneath the ptosis clamp. A running horizontal mattress suture was continued laterally and directed out through the eyelid skin directly over the superior aspect of the medial demarcation line. The suture tails were held in tension while the remaining Camilo's muscle and conjunctiva were resected from behind the ptosis clamp using a #15 scalpel. The eyelid was once again everted using a Desmarres retractor. The conjunctiva was inspected and a remaining silk suture was found and removed with 0.5 forceps. The Prolene suture was tied loosely over the eyelid.      The procedure was repeated for the left eye with markings placed at 4.5 mm from the tarsal edge in order to resect a total of 9.0 mm of Diaz's muscle. Once again, no remaining silk suture was found following resection of Diaz's muscle and conjunctiva.     The patient tolerated the procedure well without any complications. The patient was awoken and taken to the recovery room in stable condition.  The patient will use Tobradex eye drops to both eyes four times daily as well as Tobradex  ointment to the skin of the eyelids at night in both eyes.  They are to use cool compresses for 48 hours followed by warm compresses for 48 hours.  They will follow up in 1 week for their follow up appointment.

## 2022-02-17 NOTE — BRIEF OP NOTE
Brief Operative Note  Ophthalmology Service      Date of Procedure: 2/18/22    Attending Physician: Krupa Rios MD     Assistant vs Surgeon Chetan De Anda MD    Pre-Operative Diagnosis: Acquired myogenic ptosis of eyelid, bilateral [H02.423]  Ptosis of both eyelids [H02.403]     Post-Operative Diagnosis: Same as pre-operative diagnosis    Treatments/Procedures:  Procedure(s) (LRB):  REATTACHMENT, MUSCLE (Bilateral) - MMCR bilateral     Intraoperative Findings: Same as pre-operative diagnosis    Anesthesia: General, Local (2% lidocaine with epi, 0.5% bupivicaine, vitrase)    Complications: None    Estimated Blood Loss: < 5 cc    Specimens: None    Discharge: Home    -------------------------------------------------------------  Full Operative Report to follow.

## 2022-02-17 NOTE — DISCHARGE SUMMARY
Discharge Summary  Ophthalmology Service    Admit Date: 2/18/22    Discharge Date: 2/18/2022     Attending Physician: Krupa Rios MD     Discharge Physician: Chetan De Anda MD    Discharged Condition: Good    Reason for Admission: Acquired myogenic ptosis of eyelid, bilateral [H02.423]  Ptosis of both eyelids [H02.403]  Acquired myogenic ptosis of both eyelids [H02.423]     Treatments/Procedures: Procedure(s) (LRB):  PTOSIS REPAIR OF BOTH EYES (Bilateral) MMCR- (see dictated report for details).    Hospital Course: Stable    Consults: None    Significant Diagnostic Studies: None    Disposition: Home    Patient Instructions:   - Resume same diet as prior to surgery  - Limit activity, no heavy lifting  - Call MD for severe uncontrolled pain   - Follow instructions on Dr. Rios's postoperative instruction sheet  - Cold compresses 10 min of every hour for first 48 hours, then hot compresses next 48 hours   - Follow-up with ophthalmology as instructed    Patient Instructions:   Current Discharge Medication List      START taking these medications    Details   HYDROcodone-acetaminophen (NORCO) 5-325 mg per tablet Take 1 tablet by mouth every 6 (six) hours as needed for Pain.  Qty: 16 tablet, Refills: 0    Comments: Quantity prescribed more than 7 day supply? No      tobramycin-dexamethasone 0.3-0.1% (TOBRADEX) 0.3-0.1 % Oint Place into both eyes every evening. Place a 1/2 inch ribbon of ointment into the lower eyelids once at night for 10 days  Qty: 3.5 g, Refills: 1         CONTINUE these medications which have NOT CHANGED    Details   baclofen (LIORESAL) 20 MG tablet Take 1 tablet (20 mg total) by mouth 3 (three) times daily.  Qty: 270 tablet, Refills: 3    Associated Diagnoses: Spastic diplegia      cetirizine (ZYRTEC) 10 MG tablet TAKE 1 TABLET BY MOUTH DAILY  Qty: 30 tablet, Refills: 11    Associated Diagnoses: Allergic rhinitis, unspecified seasonality, unspecified trigger      citalopram (CELEXA) 20 MG  tablet Take 1 tablet (20 mg total) by mouth once daily.  Qty: 90 tablet, Refills: 3    Associated Diagnoses: Depression, unspecified depression type; Anxiety      dantrolene (DANTRIUM) 50 MG Cap TAKE 1 CAPSULE(50 MG) BY MOUTH THREE TIMES DAILY  Qty: 270 capsule, Refills: 3    Associated Diagnoses: Spastic diplegia      donepeziL (ARICEPT) 5 MG tablet TAKE 1 TABLET(5 MG) BY MOUTH EVERY EVENING  Qty: 30 tablet, Refills: 11      albuterol (ACCUNEB) 0.63 mg/3 mL Nebu NEBULIZE 3 MLS EVERY 6 HOURS AS NEEDED      fluticasone propionate (FLONASE) 50 mcg/actuation nasal spray 1 spray (50 mcg total) by Each Nostril route once daily.  Qty: 16 g, Refills: 11    Associated Diagnoses: Allergic rhinitis, unspecified seasonality, unspecified trigger      polyethylene glycol (GLYCOLAX) 17 gram/dose powder Take 17 g by mouth every other day.  Qty: 507 g, Refills: 5      pulse oximeter (PULSE OXIMETER) device by Apply Externally route 2 (two) times a day. Use twice daily at 8 AM and 3 PM and record the value in iTwint as directed.  Qty: 1 each, Refills: 0    Comments: This is a NO CHARGE item.  Please override price to zero.  DO NOT PRINT.  NORMAL MODE e-PRESCRIBE ONLY.  Associated Diagnoses: COVID-19      sars-cov-2, covid-19, (MODERNA COVID-19) 50 mcg/0.25 ml injection (BOOSTER) Inject 0.25 mLs into the muscle once. for 1 dose  Qty: 0.25 mL, Refills: 0             Discharge Procedure Orders   Notify your health care provider if you experience any of the following:  temperature >100.4     Notify your health care provider if you experience any of the following:  severe uncontrolled pain     Notify your health care provider if you experience any of the following:  redness, tenderness, or signs of infection (pain, swelling, redness, odor or green/yellow discharge around incision site)     Activity as tolerated   Order Comments: See Dr Rios's post op instruction sheet

## 2022-02-18 ENCOUNTER — HOSPITAL ENCOUNTER (OUTPATIENT)
Facility: HOSPITAL | Age: 42
Discharge: HOME | End: 2022-02-18
Attending: OPHTHALMOLOGY | Admitting: OPHTHALMOLOGY
Payer: MEDICARE

## 2022-02-18 ENCOUNTER — ANESTHESIA (OUTPATIENT)
Dept: SURGERY | Facility: HOSPITAL | Age: 42
End: 2022-02-18
Payer: MEDICARE

## 2022-02-18 VITALS
HEART RATE: 78 BPM | OXYGEN SATURATION: 100 % | DIASTOLIC BLOOD PRESSURE: 70 MMHG | SYSTOLIC BLOOD PRESSURE: 115 MMHG | HEIGHT: 75 IN | RESPIRATION RATE: 11 BRPM | BODY MASS INDEX: 19.62 KG/M2 | TEMPERATURE: 98 F

## 2022-02-18 DIAGNOSIS — H02.423 ACQUIRED MYOGENIC PTOSIS OF BOTH EYELIDS: Primary | ICD-10-CM

## 2022-02-18 PROCEDURE — 25000003 PHARM REV CODE 250

## 2022-02-18 PROCEDURE — 25000003 PHARM REV CODE 250: Performed by: OPHTHALMOLOGY

## 2022-02-18 PROCEDURE — 63600175 PHARM REV CODE 636 W HCPCS: Performed by: OPHTHALMOLOGY

## 2022-02-18 PROCEDURE — 63600175 PHARM REV CODE 636 W HCPCS: Performed by: ANESTHESIOLOGY

## 2022-02-18 PROCEDURE — 63600175 PHARM REV CODE 636 W HCPCS: Performed by: NURSE ANESTHETIST, CERTIFIED REGISTERED

## 2022-02-18 PROCEDURE — 71000015 HC POSTOP RECOV 1ST HR: Performed by: OPHTHALMOLOGY

## 2022-02-18 PROCEDURE — 67903 REPAIR EYELID DEFECT: CPT | Mod: 50,,, | Performed by: OPHTHALMOLOGY

## 2022-02-18 PROCEDURE — 36000706: Performed by: OPHTHALMOLOGY

## 2022-02-18 PROCEDURE — D9220A PRA ANESTHESIA: ICD-10-PCS | Mod: ,,, | Performed by: ANESTHESIOLOGY

## 2022-02-18 PROCEDURE — 37000008 HC ANESTHESIA 1ST 15 MINUTES: Performed by: OPHTHALMOLOGY

## 2022-02-18 PROCEDURE — D9220A PRA ANESTHESIA: Mod: ,,, | Performed by: ANESTHESIOLOGY

## 2022-02-18 PROCEDURE — 71000033 HC RECOVERY, INTIAL HOUR: Performed by: OPHTHALMOLOGY

## 2022-02-18 PROCEDURE — 67903 PR FIX LID PTOSIS,LEVATR RESEC,INTERN: ICD-10-PCS | Mod: 50,,, | Performed by: OPHTHALMOLOGY

## 2022-02-18 PROCEDURE — 36000707: Performed by: OPHTHALMOLOGY

## 2022-02-18 PROCEDURE — 25000003 PHARM REV CODE 250: Performed by: NURSE ANESTHETIST, CERTIFIED REGISTERED

## 2022-02-18 PROCEDURE — 37000009 HC ANESTHESIA EA ADD 15 MINS: Performed by: OPHTHALMOLOGY

## 2022-02-18 RX ORDER — HYDROCODONE BITARTRATE AND ACETAMINOPHEN 5; 325 MG/1; MG/1
1 TABLET ORAL EVERY 6 HOURS PRN
Qty: 16 TABLET | Refills: 0 | Status: SHIPPED | OUTPATIENT
Start: 2022-02-18 | End: 2022-03-03

## 2022-02-18 RX ORDER — ONDANSETRON 2 MG/ML
4 INJECTION INTRAMUSCULAR; INTRAVENOUS DAILY PRN
Status: DISCONTINUED | OUTPATIENT
Start: 2022-02-18 | End: 2022-02-19 | Stop reason: HOSPADM

## 2022-02-18 RX ORDER — VASOPRESSIN 20 [USP'U]/ML
INJECTION, SOLUTION INTRAMUSCULAR; SUBCUTANEOUS
Status: DISCONTINUED | OUTPATIENT
Start: 2022-02-18 | End: 2022-02-18

## 2022-02-18 RX ORDER — BUPIVACAINE HYDROCHLORIDE 5 MG/ML
INJECTION, SOLUTION EPIDURAL; INTRACAUDAL
Status: DISCONTINUED | OUTPATIENT
Start: 2022-02-18 | End: 2022-02-18 | Stop reason: HOSPADM

## 2022-02-18 RX ORDER — FENTANYL CITRATE 50 UG/ML
25 INJECTION, SOLUTION INTRAMUSCULAR; INTRAVENOUS EVERY 5 MIN PRN
Status: DISCONTINUED | OUTPATIENT
Start: 2022-02-18 | End: 2022-02-19 | Stop reason: HOSPADM

## 2022-02-18 RX ORDER — LIDOCAINE HCL/EPINEPHRINE/PF 2%-1:200K
VIAL (ML) INJECTION
Status: DISCONTINUED
Start: 2022-02-18 | End: 2022-02-18 | Stop reason: WASHOUT

## 2022-02-18 RX ORDER — BUPIVACAINE HYDROCHLORIDE 5 MG/ML
INJECTION, SOLUTION EPIDURAL; INTRACAUDAL
Status: DISCONTINUED
Start: 2022-02-18 | End: 2022-02-19 | Stop reason: HOSPADM

## 2022-02-18 RX ORDER — LIDOCAINE HCL/EPINEPHRINE/PF 2%-1:200K
VIAL (ML) INJECTION
Status: DISCONTINUED
Start: 2022-02-18 | End: 2022-02-19 | Stop reason: HOSPADM

## 2022-02-18 RX ORDER — LIDOCAINE HYDROCHLORIDE 20 MG/ML
INJECTION INTRAVENOUS
Status: DISCONTINUED | OUTPATIENT
Start: 2022-02-18 | End: 2022-02-18

## 2022-02-18 RX ORDER — BUPIVACAINE HYDROCHLORIDE 5 MG/ML
INJECTION, SOLUTION EPIDURAL; INTRACAUDAL
Status: DISCONTINUED
Start: 2022-02-18 | End: 2022-02-18 | Stop reason: WASHOUT

## 2022-02-18 RX ORDER — LIDOCAINE HCL/EPINEPHRINE/PF 2%-1:200K
VIAL (ML) INJECTION
Status: DISCONTINUED | OUTPATIENT
Start: 2022-02-18 | End: 2022-02-18 | Stop reason: HOSPADM

## 2022-02-18 RX ORDER — TETRACAINE HYDROCHLORIDE 5 MG/ML
SOLUTION OPHTHALMIC
Status: DISCONTINUED
Start: 2022-02-18 | End: 2022-02-19 | Stop reason: HOSPADM

## 2022-02-18 RX ORDER — PHENYLEPHRINE HYDROCHLORIDE 10 MG/ML
INJECTION INTRAVENOUS
Status: DISCONTINUED | OUTPATIENT
Start: 2022-02-18 | End: 2022-02-18

## 2022-02-18 RX ORDER — TOBRAMYCIN AND DEXAMETHASONE 3; 1 MG/ML; MG/ML
SUSPENSION/ DROPS OPHTHALMIC
Status: DISCONTINUED
Start: 2022-02-18 | End: 2022-02-19 | Stop reason: HOSPADM

## 2022-02-18 RX ORDER — ROCURONIUM BROMIDE 10 MG/ML
INJECTION, SOLUTION INTRAVENOUS
Status: DISCONTINUED | OUTPATIENT
Start: 2022-02-18 | End: 2022-02-18

## 2022-02-18 RX ORDER — PROPOFOL 10 MG/ML
VIAL (ML) INTRAVENOUS
Status: DISCONTINUED | OUTPATIENT
Start: 2022-02-18 | End: 2022-02-18

## 2022-02-18 RX ORDER — TETRACAINE HYDROCHLORIDE 5 MG/ML
2 SOLUTION OPHTHALMIC ONCE
Status: DISCONTINUED | OUTPATIENT
Start: 2022-02-18 | End: 2024-03-02

## 2022-02-18 RX ORDER — LIDOCAINE HYDROCHLORIDE 20 MG/ML
INJECTION, SOLUTION EPIDURAL; INFILTRATION; INTRACAUDAL; PERINEURAL
Status: DISCONTINUED | OUTPATIENT
Start: 2022-02-18 | End: 2022-02-18

## 2022-02-18 RX ORDER — MIDAZOLAM HYDROCHLORIDE 1 MG/ML
INJECTION, SOLUTION INTRAMUSCULAR; INTRAVENOUS
Status: DISCONTINUED | OUTPATIENT
Start: 2022-02-18 | End: 2022-02-18

## 2022-02-18 RX ORDER — DEXAMETHASONE SODIUM PHOSPHATE 4 MG/ML
INJECTION, SOLUTION INTRA-ARTICULAR; INTRALESIONAL; INTRAMUSCULAR; INTRAVENOUS; SOFT TISSUE
Status: DISCONTINUED | OUTPATIENT
Start: 2022-02-18 | End: 2022-02-18

## 2022-02-18 RX ADMIN — LIDOCAINE HYDROCHLORIDE 80 MG: 20 INJECTION, SOLUTION EPIDURAL; INFILTRATION; INTRACAUDAL at 07:02

## 2022-02-18 RX ADMIN — PROPOFOL 150 MG: 10 INJECTION, EMULSION INTRAVENOUS at 07:02

## 2022-02-18 RX ADMIN — VASOPRESSIN 3 UNITS: 20 INJECTION INTRAVENOUS at 08:02

## 2022-02-18 RX ADMIN — PHENYLEPHRINE HYDROCHLORIDE 200 MCG: 10 INJECTION INTRAVENOUS at 08:02

## 2022-02-18 RX ADMIN — DEXAMETHASONE SODIUM PHOSPHATE 8 MG: 4 INJECTION INTRA-ARTICULAR; INTRALESIONAL; INTRAMUSCULAR; INTRAVENOUS; SOFT TISSUE at 08:02

## 2022-02-18 RX ADMIN — SODIUM CHLORIDE, SODIUM LACTATE, POTASSIUM CHLORIDE, AND CALCIUM CHLORIDE: .6; .31; .03; .02 INJECTION, SOLUTION INTRAVENOUS at 08:02

## 2022-02-18 RX ADMIN — PHENYLEPHRINE HYDROCHLORIDE 100 MCG: 10 INJECTION INTRAVENOUS at 07:02

## 2022-02-18 RX ADMIN — SODIUM CHLORIDE: 0.9 INJECTION, SOLUTION INTRAVENOUS at 07:02

## 2022-02-18 RX ADMIN — VASOPRESSIN 2 UNITS: 20 INJECTION INTRAVENOUS at 07:02

## 2022-02-18 RX ADMIN — LIDOCAINE HYDROCHLORIDE 100 MG: 20 INJECTION, SOLUTION INTRAVENOUS at 07:02

## 2022-02-18 RX ADMIN — MIDAZOLAM 2 MG: 1 INJECTION INTRAMUSCULAR; INTRAVENOUS at 07:02

## 2022-02-18 RX ADMIN — SUGAMMADEX 200 MG: 100 INJECTION, SOLUTION INTRAVENOUS at 08:02

## 2022-02-18 RX ADMIN — PHENYLEPHRINE HYDROCHLORIDE 150 MCG: 10 INJECTION INTRAVENOUS at 07:02

## 2022-02-18 RX ADMIN — FENTANYL CITRATE 25 MCG: 50 INJECTION INTRAMUSCULAR; INTRAVENOUS at 08:02

## 2022-02-18 RX ADMIN — ONDANSETRON 4 MG: 2 INJECTION INTRAMUSCULAR; INTRAVENOUS at 08:02

## 2022-02-18 RX ADMIN — FENTANYL CITRATE 75 MCG: 50 INJECTION INTRAMUSCULAR; INTRAVENOUS at 07:02

## 2022-02-18 RX ADMIN — ROCURONIUM BROMIDE 50 MG: 10 INJECTION, SOLUTION INTRAVENOUS at 07:02

## 2022-02-19 NOTE — OR NURSING
Discharged w/ mother/transport via  to [facility]. RX, eye drops and ointment given to mother w/ discharge summary.

## 2022-02-19 NOTE — TRANSFER OF CARE
"Anesthesia Transfer of Care Note    Patient: Lisa Moreno    Procedure(s) Performed: Procedure(s) (LRB):  PTOSIS REPAIR OF BOTH EYES (Bilateral)    Patient location: PACU    Anesthesia Type: general    Transport from OR: Transported from OR on 6-10 L/min O2 by face mask with adequate spontaneous ventilation    Post pain: adequate analgesia    Post assessment: no apparent anesthetic complications and tolerated procedure well    Post vital signs: stable    Level of consciousness: awake and alert    Nausea/Vomiting: no nausea/vomiting    Complications: none    Transfer of care protocol was followed      Last vitals:   Visit Vitals  /82 (BP Location: Left arm)   Pulse 83   Temp 36.7 °C (98 °F) (Oral)   Resp 20   Ht 6' 3" (1.905 m)   SpO2 98%   BMI 19.62 kg/m²     "

## 2022-02-19 NOTE — ANESTHESIA PREPROCEDURE EVALUATION
02/18/2022  Lisa Moreno is a 41 y.o., male.  Pre-operative evaluation for Procedure(s) (LRB):  REATTACHMENT, MUSCLE (Bilateral)    Lisa Moreno is a 41 y.o. male   Lisa Moreno is a 41 y.o. male with PMHx of spinocerebellar atrophy with LE weakness, spasticity, and ataxic gait, now with significant bilateral acquired myogenic ptosis wishing to proceed with surgical correction.    Pt's mother at bedside and provided history and consent.     Patient Active Problem List   Diagnosis    Spastic diplegia    Gait instability    Upper motor neuron disease    Neurogenic bladder    Hypophonia    Weakness    Spasticity    Spinal ataxia    Spinocerebellar ataxia    Neuropathic pain    Bronchitis with wheezing    Voiding dysfunction    Dysarthria    Chronic constipation    Other seizures    Impaired mobility and ADLs    Upper extremity weakness    Decreased coordination    Impaired transfers    Recurrent major depressive disorder, in partial remission    Anxiety    Oropharyngeal dysphagia    Major neurocognitive disorder due to another medical condition with behavioral disturbance    Decreased strength, endurance, and mobility    Requires daily assistance for activities of daily living (ADL) and comfort needs    Impaired instrumental activities of daily living (IADL)    Self-care deficit for feeding, bathing, and toileting    Paraplegia, unspecified    Muscular dystrophy       Past Surgical History:   Procedure Laterality Date    BACLOFEN PUMP IMPLANTATION      COLONOSCOPY N/A 7/23/2020    Procedure: COLONOSCOPY;  Surgeon: Ziyad Fitch MD;  Location: [facility];  Service: Endoscopy;  Laterality: N/A;    ESOPHAGOGASTRODUODENOSCOPY N/A 7/23/2020    Procedure: EGD (ESOPHAGOGASTRODUODENOSCOPY);  Surgeon: Ziyad Fitch MD;  Location: [facility];   Service: Endoscopy;  Laterality: N/A;    FLUOROSCOPIC URODYNAMIC STUDY N/A 11/27/2018    Procedure: URODYNAMIC STUDY, FLUOROSCOPIC;  Surgeon: Tanja Okeefe MD;  Location: [address];  Service: Urology;  Laterality: N/A;  1 hour    UPPER GASTROINTESTINAL ENDOSCOPY         Social History     Socioeconomic History    Marital status: Single   Tobacco Use    Smoking status: Never Smoker    Smokeless tobacco: Never Used   Substance and Sexual Activity    Alcohol use: Not Currently     Comment: social drinker, at times    Drug use: Not Currently    Sexual activity: Never     Social Determinants of Health     Financial Resource Strain: Low Risk     Difficulty of Paying Living Expenses: Not hard at all   Food Insecurity: No Food Insecurity    Worried About Running Out of Food in the Last Year: Never true    Ran Out of Food in the Last Year: Never true   Transportation Needs: No Transportation Needs    Lack of Transportation (Medical): No    Lack of Transportation (Non-Medical): No   Physical Activity: Inactive    Days of Exercise per Week: 0 days    Minutes of Exercise per Session: 0 min   Stress: No Stress Concern Present    Feeling of Stress : Not at all   Social Connections: Moderately Integrated    Frequency of Communication with Friends and Family: More than three times a week    Frequency of Social Gatherings with Friends and Family: More than three times a week    Attends Rastafarian Services: 1 to 4 times per year    Active Member of Clubs or Organizations: No    Attends Club or Organization Meetings: 1 to 4 times per year    Marital Status: Never    Housing Stability: Low Risk     Unable to Pay for Housing in the Last Year: No    Number of Places Lived in the Last Year: 1    Unstable Housing in the Last Year: No       No current facility-administered medications on file prior to encounter.     Current Outpatient Medications on File Prior to Encounter   Medication Sig Dispense  Refill    albuterol (ACCUNEB) 0.63 mg/3 mL Nebu NEBULIZE 3 MLS EVERY 6 HOURS AS NEEDED      fluticasone propionate (FLONASE) 50 mcg/actuation nasal spray 1 spray (50 mcg total) by Each Nostril route once daily. 16 g 11    polyethylene glycol (GLYCOLAX) 17 gram/dose powder Take 17 g by mouth every other day. 507 g 5       Review of patient's allergies indicates:   Allergen Reactions    Penicillins      Hives and Itching         CBC: No results for input(s): WBC, RBC, HGB, HCT, PLT, MCV, MCH, MCHC in the last 72 hours.    CMP: No results for input(s): NA, K, CL, CO2, BUN, CREATININE, GLU, MG, PHOS, CALCIUM, ALBUMIN, PROT, ALKPHOS, ALT, AST, BILITOT in the last 72 hours.    INR  No results for input(s): PT, INR, PROTIME, APTT in the last 72 hours.      Diagnostic Studies:    EKG:   Results for orders placed or performed during the hospital encounter of 07/18/20   EKG 12-lead    Collection Time: 07/18/20  5:29 PM    Narrative    Test Reason : R56.9,    Vent. Rate : 086 BPM     Atrial Rate : 086 BPM     P-R Int : 110 ms          QRS Dur : 080 ms      QT Int : 328 ms       P-R-T Axes : 075 054 082 degrees     QTc Int : 392 ms    Sinus rhythm with short VT  Otherwise normal ECG  When compared with ECG of 21-MAR-2015 21:06,  No significant change was found  Confirmed by Jan CONTRERAS MD, James MAY ([id number]) on 7/20/2020 12:35:11 PM    Referred By: [email or web address]   SELF           Confirmed By:James Montoya III, MD       TTE:  No results found for this or any previous visit.  No results found for: EF   No results found for this or any previous visit.    CARINA:  No results found for this or any previous visit.    Stress Test:  No results found for this or any previous visit.       LHC:  No results found for this or any previous visit.       PFT:  No results found for: FEV1, FVC, ITN4MCN, TLC, DLCO     ALLERGIES:     Review of patient's allergies indicates:   Allergen Reactions    Penicillins      Hives and Itching     LDA:       Lines/Drains/Airways     Peripheral Intravenous Line                 Peripheral IV - Single Lumen 02/18/22 0449 20 G Anterior;Left;Proximal Forearm <1 day               Anesthesia Evaluation      Airway   Mallampati: II  TM distance: Normal, at least 6 cm  Neck ROM: Normal ROM  Dental      Pulmonary    Cardiovascular     Rate: Normal    Neuro/Psych    (+) neuromuscular disease,     GI/Hepatic/Renal      Endo/Other    Abdominal                       Anesthesia Evaluation    I have reviewed the Patient Summary Reports.    I have reviewed the Nursing Notes. I have reviewed the NPO Status.   I have reviewed the Medications.     Review of Systems  Anesthesia Hx:  No problems with previous Anesthesia  Denies Family Hx of Anesthesia complications.   Denies Personal Hx of Anesthesia complications.   Cardiovascular:  Cardiovascular Normal     Pulmonary:  Pulmonary Normal    Renal/:  Renal/ Normal     Hepatic/GI:  Hepatic/GI Normal    Neurological:   Neuromuscular Disease,    Endocrine:  Endocrine Normal        Physical Exam  General:  Well nourished    Airway/Jaw/Neck:  Airway Findings: Mouth Opening: Normal Tongue: Normal  General Airway Assessment: Adult, Good  Mallampati: II  TM Distance: Normal, at least 6 cm  Jaw/Neck Findings:  Neck ROM: Normal ROM       Chest/Lungs:  Chest/Lungs Findings: Normal Respiratory Rate     Heart/Vascular:  Heart Findings: Rate: Normal  Rhythm: Regular Rhythm        Mental Status:  Mental Status Findings:  Cooperative         Anesthesia Plan  Type of Anesthesia, risks & benefits discussed:  Anesthesia Type:  general    Patient's Preference:   Plan Factors:          Intra-op Monitoring Plan: standard ASA monitors  Intra-op Monitoring Plan Comments:   Post Op Pain Control Plan: per primary service following discharge from PACU  Post Op Pain Control Plan Comments:     Induction:   IV  Beta Blocker:  Patient is not currently on a Beta-Blocker (No further documentation required).        Informed Consent: Patient representative understands risks and agrees with Anesthesia plan.  Questions answered. Anesthesia consent signed with patient representative.  ASA Score: 3     Day of Surgery Review of History & Physical:    H&P update referred to the surgeon.         Ready For Surgery From Anesthesia Perspective.

## 2022-02-19 NOTE — ANESTHESIA POSTPROCEDURE EVALUATION
Anesthesia Post Evaluation    Patient: Lisa Moreno    Procedure(s) Performed: Procedure(s) (LRB):  PTOSIS REPAIR OF BOTH EYES (Bilateral)    Final Anesthesia Type: general      Patient location during evaluation: PACU  Patient participation: Yes- Able to Participate  Level of consciousness: awake  Post-procedure vital signs: reviewed and stable  Pain management: adequate  Airway patency: patent    PONV status at discharge: No PONV  Anesthetic complications: no      Cardiovascular status: hemodynamically stable  Respiratory status: spontaneous ventilation  Follow-up not needed.          Vitals Value Taken Time   /78 02/18/22 2111   Temp 36.3 °C (97.3 °F) 02/18/22 2110   Pulse 73 02/18/22 2124   Resp 20 02/18/22 2124   SpO2 100 % 02/18/22 2124   Vitals shown include unvalidated device data.      No case tracking events are documented in the log.      Pain/Aleida Score: No data recorded

## 2022-02-25 ENCOUNTER — TELEPHONE (OUTPATIENT)
Dept: INTERNAL MEDICINE | Facility: CLINIC | Age: 42
End: 2022-02-25
Payer: MEDICARE

## 2022-02-25 ENCOUNTER — OFFICE VISIT (OUTPATIENT)
Dept: OPHTHALMOLOGY | Facility: CLINIC | Age: 42
End: 2022-02-25
Payer: MEDICARE

## 2022-02-25 DIAGNOSIS — Z98.890 POST-OPERATIVE STATE: Primary | ICD-10-CM

## 2022-02-25 PROCEDURE — 1160F RVW MEDS BY RX/DR IN RCRD: CPT | Mod: CPTII,S$GLB,, | Performed by: OPHTHALMOLOGY

## 2022-02-25 PROCEDURE — 1159F MED LIST DOCD IN RCRD: CPT | Mod: CPTII,S$GLB,, | Performed by: OPHTHALMOLOGY

## 2022-02-25 PROCEDURE — 99999 PR PBB SHADOW E&M-EST. PATIENT-LVL I: CPT | Mod: PBBFAC,,, | Performed by: OPHTHALMOLOGY

## 2022-02-25 PROCEDURE — 1160F PR REVIEW ALL MEDS BY PRESCRIBER/CLIN PHARMACIST DOCUMENTED: ICD-10-PCS | Mod: CPTII,S$GLB,, | Performed by: OPHTHALMOLOGY

## 2022-02-25 PROCEDURE — 99024 PR POST-OP FOLLOW-UP VISIT: ICD-10-PCS | Mod: S$GLB,,, | Performed by: OPHTHALMOLOGY

## 2022-02-25 PROCEDURE — 1159F PR MEDICATION LIST DOCUMENTED IN MEDICAL RECORD: ICD-10-PCS | Mod: CPTII,S$GLB,, | Performed by: OPHTHALMOLOGY

## 2022-02-25 PROCEDURE — 99999 PR PBB SHADOW E&M-EST. PATIENT-LVL I: ICD-10-PCS | Mod: PBBFAC,,, | Performed by: OPHTHALMOLOGY

## 2022-02-25 PROCEDURE — 99024 POSTOP FOLLOW-UP VISIT: CPT | Mod: S$GLB,,, | Performed by: OPHTHALMOLOGY

## 2022-02-25 NOTE — TELEPHONE ENCOUNTER
Called pt mom, she wants something called in for anxiety.She wants an appt for thurs of next week virtual or in clinic.

## 2022-02-25 NOTE — PROGRESS NOTES
HPI     Post-op one week for suture removal from ptosis repair.    Last edited by Benny Adame MD on 2/25/2022  4:03 PM. (History)            Assessment /Plan     For exam results, see Encounter Report.    Post-operative state      Sutures removed bilaterally. Return as scheduled.

## 2022-02-25 NOTE — TELEPHONE ENCOUNTER
----- Message from Vicki Awan sent at 2/25/2022  2:29 PM CST -----  Regarding: advice  Contact: mother [phone]  Patient would like to get medical advice.  Symptoms (please be specific):  Anxiety and anxious  How long have you had these symptoms: 4 days  Would you like a call back, or a response through your [facility] portal?:   #please call  Pharmacy name and phone #   [id number] [place] [address] [facility] [address] & [place] [address]  [facility] [address]  Phone: [phone] Fax: [phone]          Comments:

## 2022-03-03 ENCOUNTER — OFFICE VISIT (OUTPATIENT)
Dept: INTERNAL MEDICINE | Facility: CLINIC | Age: 42
End: 2022-03-03
Payer: MEDICARE

## 2022-03-03 DIAGNOSIS — Z74.09 IMPAIRED TRANSFERS: ICD-10-CM

## 2022-03-03 DIAGNOSIS — F02.818 MAJOR NEUROCOGNITIVE DISORDER DUE TO ANOTHER MEDICAL CONDITION WITH BEHAVIORAL DISTURBANCE: ICD-10-CM

## 2022-03-03 DIAGNOSIS — Z74.1 REQUIRES DAILY ASSISTANCE FOR ACTIVITIES OF DAILY LIVING (ADL) AND COMFORT NEEDS: ICD-10-CM

## 2022-03-03 DIAGNOSIS — G47.00 INSOMNIA, UNSPECIFIED TYPE: Primary | ICD-10-CM

## 2022-03-03 DIAGNOSIS — G11.8 SPINOCEREBELLAR ATAXIA: ICD-10-CM

## 2022-03-03 PROCEDURE — 1159F PR MEDICATION LIST DOCUMENTED IN MEDICAL RECORD: ICD-10-PCS | Mod: CPTII,95,, | Performed by: INTERNAL MEDICINE

## 2022-03-03 PROCEDURE — 1159F MED LIST DOCD IN RCRD: CPT | Mod: CPTII,95,, | Performed by: INTERNAL MEDICINE

## 2022-03-03 PROCEDURE — 1160F PR REVIEW ALL MEDS BY PRESCRIBER/CLIN PHARMACIST DOCUMENTED: ICD-10-PCS | Mod: CPTII,95,, | Performed by: INTERNAL MEDICINE

## 2022-03-03 PROCEDURE — 1160F RVW MEDS BY RX/DR IN RCRD: CPT | Mod: CPTII,95,, | Performed by: INTERNAL MEDICINE

## 2022-03-03 PROCEDURE — 99213 OFFICE O/P EST LOW 20 MIN: CPT | Mod: 95,,, | Performed by: INTERNAL MEDICINE

## 2022-03-03 PROCEDURE — 99213 PR OFFICE/OUTPT VISIT, EST, LEVL III, 20-29 MIN: ICD-10-PCS | Mod: 95,,, | Performed by: INTERNAL MEDICINE

## 2022-03-03 RX ORDER — HYDROXYZINE PAMOATE 25 MG/1
25 CAPSULE ORAL 3 TIMES DAILY PRN
Qty: 60 CAPSULE | Refills: 3 | Status: SHIPPED | OUTPATIENT
Start: 2022-03-03 | End: 2024-03-02
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
## 2022-03-04 ENCOUNTER — TELEPHONE (OUTPATIENT)
Dept: INTERNAL MEDICINE | Facility: CLINIC | Age: 42
End: 2022-03-04
Payer: MEDICARE

## 2022-03-04 ENCOUNTER — DOCUMENTATION ONLY (OUTPATIENT)
Dept: INTERNAL MEDICINE | Facility: CLINIC | Age: 42
End: 2022-03-04
Payer: MEDICARE

## 2022-03-09 ENCOUNTER — PES CALL (OUTPATIENT)
Dept: ADMINISTRATIVE | Facility: CLINIC | Age: 42
End: 2022-03-09
Payer: MEDICARE

## 2022-03-10 PROCEDURE — G0180 MD CERTIFICATION HHA PATIENT: HCPCS | Mod: ,,, | Performed by: INTERNAL MEDICINE

## 2022-03-10 PROCEDURE — G0180 PR HOME HEALTH MD CERTIFICATION: ICD-10-PCS | Mod: ,,, | Performed by: INTERNAL MEDICINE

## 2022-03-15 ENCOUNTER — TELEPHONE (OUTPATIENT)
Dept: INTERNAL MEDICINE | Facility: CLINIC | Age: 42
End: 2022-03-15
Payer: MEDICARE

## 2022-03-15 NOTE — TELEPHONE ENCOUNTER
----- Message from Abigail Yanez sent at 3/15/2022  3:22 PM CDT -----  Contact: [facility] ([facility]) [phone]  Pt is on hydrOXYzine pamoate (VISTARIL) 25 MG Cap  to sleep. Pt is still restless and is getting no sleep. Mother is currently giving him two tablets. Please f/u

## 2022-03-15 NOTE — TELEPHONE ENCOUNTER
----- Message from Antonella Julio sent at 3/15/2022  3:57 PM CDT -----  Contact: [phone]  Patient is returning a phone call.  Who left a message for the patient: Evelia David MA   Does patient know what this is regarding:  yes  Would you like a call back, or a response through your [facility] portal?:   call back  Comments:

## 2022-03-16 ENCOUNTER — TELEPHONE (OUTPATIENT)
Dept: PHYSICAL MEDICINE AND REHAB | Facility: CLINIC | Age: 42
End: 2022-03-16
Payer: MEDICARE

## 2022-03-16 NOTE — TELEPHONE ENCOUNTER
----- Message from Carolina Fleming sent at 3/15/2022  3:22 PM CDT -----  Regarding: Plan of care  Contact: Giovana/mom [phone]  Calling to speak with nurse regarding plan of care. Please jerrica

## 2022-03-16 NOTE — TELEPHONE ENCOUNTER
I called his mother.  Please increase vistaril to three times daily.  Asked her to reach out to the ALS team for advice, as they are experts in his neurologic disorder.    D

## 2022-03-17 ENCOUNTER — CARE AT HOME (OUTPATIENT)
Dept: HOME HEALTH SERVICES | Facility: CLINIC | Age: 42
End: 2022-03-17
Payer: MEDICARE

## 2022-03-17 VITALS
HEART RATE: 72 BPM | RESPIRATION RATE: 18 BRPM | OXYGEN SATURATION: 98 % | DIASTOLIC BLOOD PRESSURE: 62 MMHG | SYSTOLIC BLOOD PRESSURE: 110 MMHG

## 2022-03-17 DIAGNOSIS — R53.1 DECREASED STRENGTH, ENDURANCE, AND MOBILITY: ICD-10-CM

## 2022-03-17 DIAGNOSIS — F02.818 MAJOR NEUROCOGNITIVE DISORDER DUE TO ANOTHER MEDICAL CONDITION WITH BEHAVIORAL DISTURBANCE: ICD-10-CM

## 2022-03-17 DIAGNOSIS — Z74.09 DECREASED STRENGTH, ENDURANCE, AND MOBILITY: ICD-10-CM

## 2022-03-17 DIAGNOSIS — Z78.9 IMPAIRED INSTRUMENTAL ACTIVITIES OF DAILY LIVING (IADL): ICD-10-CM

## 2022-03-17 DIAGNOSIS — R68.89 DECREASED STRENGTH, ENDURANCE, AND MOBILITY: ICD-10-CM

## 2022-03-17 DIAGNOSIS — G11.8 SPINOCEREBELLAR ATAXIA: Primary | ICD-10-CM

## 2022-03-17 DIAGNOSIS — G40.89 OTHER SEIZURES: ICD-10-CM

## 2022-03-17 PROCEDURE — 99350 HOME/RES VST EST HIGH MDM 60: CPT | Mod: S$GLB,,, | Performed by: NURSE PRACTITIONER

## 2022-03-17 PROCEDURE — 99350 PR HOME VISIT,ESTAB PATIENT,LEVEL IV: ICD-10-PCS | Mod: S$GLB,,, | Performed by: NURSE PRACTITIONER

## 2022-03-17 RX ORDER — CETIRIZINE HYDROCHLORIDE 10 MG/1
10 TABLET ORAL DAILY
COMMUNITY
Start: 2022-03-03 | End: 2022-12-12

## 2022-03-17 NOTE — PATIENT INSTRUCTIONS
- Continue all medications, treatments and therapies as ordered.   - Follow all instructions, recommendations as discussed.  - Maintain Safety Precautions at all times.  - Attend all medical appointments as scheduled.  - For worsening symptoms: call Primary Care Physician or Nurse Practitioner.  - For emergencies, call 911 or immediately report to the nearest emergency room.  - Limit Risks of environmental exposure to coronavirus/COVID-19 as discussed including: social distancing, hand hygiene, and limiting departures from the home for necessities only.
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
## 2022-03-18 ENCOUNTER — TELEPHONE (OUTPATIENT)
Dept: PRIMARY CARE CLINIC | Facility: CLINIC | Age: 42
End: 2022-03-18
Payer: MEDICARE

## 2022-03-18 DIAGNOSIS — Z74.09 DECREASED STRENGTH, ENDURANCE, AND MOBILITY: ICD-10-CM

## 2022-03-18 DIAGNOSIS — R68.89 DECREASED STRENGTH, ENDURANCE, AND MOBILITY: ICD-10-CM

## 2022-03-18 DIAGNOSIS — R53.1 DECREASED STRENGTH, ENDURANCE, AND MOBILITY: ICD-10-CM

## 2022-03-18 DIAGNOSIS — G11.8 SPINOCEREBELLAR ATAXIA: Primary | ICD-10-CM

## 2022-03-18 DIAGNOSIS — F02.818 MAJOR NEUROCOGNITIVE DISORDER DUE TO ANOTHER MEDICAL CONDITION WITH BEHAVIORAL DISTURBANCE: ICD-10-CM

## 2022-03-23 ENCOUNTER — DOCUMENT SCAN (OUTPATIENT)
Dept: HOME HEALTH SERVICES | Facility: HOSPITAL | Age: 42
End: 2022-03-23
Payer: MEDICARE

## 2022-03-25 ENCOUNTER — DOCUMENT SCAN (OUTPATIENT)
Dept: HOME HEALTH SERVICES | Facility: HOSPITAL | Age: 42
End: 2022-03-25
Payer: MEDICARE

## 2022-03-28 ENCOUNTER — EXTERNAL HOME HEALTH (OUTPATIENT)
Dept: HOME HEALTH SERVICES | Facility: HOSPITAL | Age: 42
End: 2022-03-28
Payer: MEDICARE

## 2022-04-14 DIAGNOSIS — G11.8 SPINOCEREBELLAR ATAXIA: Primary | ICD-10-CM

## 2022-04-14 DIAGNOSIS — F02.818 MAJOR NEUROCOGNITIVE DISORDER DUE TO ANOTHER MEDICAL CONDITION WITH BEHAVIORAL DISTURBANCE: ICD-10-CM

## 2022-04-26 ENCOUNTER — TELEPHONE (OUTPATIENT)
Dept: INTERNAL MEDICINE | Facility: CLINIC | Age: 42
End: 2022-04-26
Payer: MEDICARE

## 2022-04-27 ENCOUNTER — TELEPHONE (OUTPATIENT)
Dept: PHYSICAL MEDICINE AND REHAB | Facility: CLINIC | Age: 42
End: 2022-04-27
Payer: MEDICARE

## 2022-04-27 NOTE — TELEPHONE ENCOUNTER
I spoke with Ms. Akhtar, and she informed me that she is currently in the hospital and she needs Dr. Rodriguez to please call her back regarding rehab for Lisa as she will not be able to care for him in her current condition.  I informed her that Lisa would have to come in for his botox before Dr. Rodriguez can send an order for inpatient rehab.  She wants a return call from Dr. Rodriguez asap!

## 2022-04-27 NOTE — TELEPHONE ENCOUNTER
----- Message from Ephraim De Anda sent at 4/27/2022  1:55 PM CDT -----  Contact: Giovana Moreno (Mother)  Type: Patient Call Back    Who called:Giovana Moreno (Mother)    What is the request in detail: The patient mother did not go into detail, she states that it is a emergency     Can the clinic reply by [facility]?    Would the patient rather a call back or a response via [facility]?     Best call back number: [phone]    Additional Information: patient mother 3rd attempt

## 2022-04-29 ENCOUNTER — DOCUMENT SCAN (OUTPATIENT)
Dept: HOME HEALTH SERVICES | Facility: HOSPITAL | Age: 42
End: 2022-04-29
Payer: MEDICARE

## 2022-05-26 ENCOUNTER — DOCUMENT SCAN (OUTPATIENT)
Dept: HOME HEALTH SERVICES | Facility: HOSPITAL | Age: 42
End: 2022-05-26
Payer: MEDICARE

## 2022-07-22 ENCOUNTER — TELEPHONE (OUTPATIENT)
Dept: INTERNAL MEDICINE | Facility: CLINIC | Age: 42
End: 2022-07-22
Payer: MEDICARE

## 2022-07-22 NOTE — TELEPHONE ENCOUNTER
----- Message from [facility] sent at 7/22/2022  9:27 AM CDT -----  Type:  Needs Medical Advice    Who Called: PT home health nurse  Would the patient rather a call back or a response via [facility]? Callback   Best Call Back Number: [phone]  Additional Information: Pt requesting callback from office to discuss getting plan of care signed.

## 2022-07-22 NOTE — TELEPHONE ENCOUNTER
I spoke to [place], at ([phone]), regarding message to discuss getiting a plan of care signed. Pt is leaving the facility that he has been at, on Monday 07/25/2022, and will need Home Health orders signed off on. Pt has an appointment with Dr. Stack on 08/01/2022, but will need Home Health before then. Jamaica is asking if Dr. Nixon or Dr. Stack sign off on the orders?        ----- Message from [facility] sent at 7/22/2022  9:27 AM CDT -----  Type:  Needs Medical Advice     Who Called: PT home health nurse  Would the patient rather a call back or a response via [facility]? Callback   Best Call Back Number: [phone]  Additional Information: Pt requesting callback from office to discuss getting plan of care signed.

## 2022-08-12 ENCOUNTER — TELEPHONE (OUTPATIENT)
Dept: INTERNAL MEDICINE | Facility: CLINIC | Age: 42
End: 2022-08-12
Payer: MEDICARE

## 2022-08-12 NOTE — TELEPHONE ENCOUNTER
----- Message from Antonella Julio sent at 8/12/2022 11:30 AM CDT -----  Contact: [phone]  [place] with [facility] is calling she states she spoke with Latashadarya and she states she is needing to speak to you

## 2022-08-19 ENCOUNTER — TELEPHONE (OUTPATIENT)
Dept: INTERNAL MEDICINE | Facility: CLINIC | Age: 42
End: 2022-08-19
Payer: MEDICARE

## 2022-08-19 NOTE — TELEPHONE ENCOUNTER
----- Message from Jessie Chong sent at 8/19/2022 10:52 AM CDT -----  Contact: Ms. Reid @ [employer]# [phone] ext# [phone] fax# [phone]  Ms. Reid from [employer] is calling in regards to her saying that she will fax over a corrected repair order form over to you on today for the patient.

## 2022-11-30 ENCOUNTER — TELEPHONE (OUTPATIENT)
Dept: PHYSICAL MEDICINE AND REHAB | Facility: CLINIC | Age: 42
End: 2022-11-30
Payer: MEDICARE

## 2022-11-30 NOTE — TELEPHONE ENCOUNTER
Said she will try another doctor because she did want him to wait until Jan. Told her I would place him on the wait list. Told her if she find someone she can call and cancel this appt.

## 2022-11-30 NOTE — TELEPHONE ENCOUNTER
----- Message from Shubham Bray sent at 11/30/2022 10:42 AM CST -----  Contact: [phone]  Pt  is calling to get scheduled for a appt.  Pt needs [facility] for BOTOX   States hes been trying for 2 weeks to get johnny   Pt mother would like a call back. [phone]

## 2022-12-02 ENCOUNTER — TELEPHONE (OUTPATIENT)
Dept: NEUROLOGY | Facility: CLINIC | Age: 42
End: 2022-12-02
Payer: MEDICARE

## 2022-12-02 NOTE — TELEPHONE ENCOUNTER
----- Message from Cherry Narayan sent at 12/2/2022 11:05 AM CST -----  JUAN WONG mother Giovana calling regarding a sooner appt.  She is trying to have pt seen sooner due to his body is very tight.  It is hard for the  to deal with him. Can you see if you can get him in sometimes this month.  call back [phone]

## 2022-12-15 ENCOUNTER — TELEPHONE (OUTPATIENT)
Dept: FAMILY MEDICINE | Facility: CLINIC | Age: 42
End: 2022-12-15
Payer: MEDICARE

## 2022-12-20 ENCOUNTER — OFFICE VISIT (OUTPATIENT)
Dept: PHYSICAL MEDICINE AND REHAB | Facility: CLINIC | Age: 42
End: 2022-12-20
Payer: MEDICARE

## 2022-12-20 VITALS
SYSTOLIC BLOOD PRESSURE: 110 MMHG | WEIGHT: 157 LBS | HEART RATE: 78 BPM | DIASTOLIC BLOOD PRESSURE: 77 MMHG | BODY MASS INDEX: 19.52 KG/M2 | HEIGHT: 75 IN

## 2022-12-20 DIAGNOSIS — G80.1 SPASTIC DIPLEGIA: Primary | ICD-10-CM

## 2022-12-20 DIAGNOSIS — R25.2 SPASTICITY: Primary | ICD-10-CM

## 2022-12-20 DIAGNOSIS — G12.29 UPPER MOTOR NEURON DISEASE: ICD-10-CM

## 2022-12-20 DIAGNOSIS — G11.8 SPINOCEREBELLAR ATAXIA: ICD-10-CM

## 2022-12-20 DIAGNOSIS — G80.1 SPASTIC DIPLEGIA: ICD-10-CM

## 2022-12-20 PROCEDURE — 95874 GUIDE NERV DESTR NEEDLE EMG: CPT | Mod: PBBFAC | Performed by: PHYSICAL MEDICINE & REHABILITATION

## 2022-12-20 PROCEDURE — 64644 CHEMODENERV 1 EXTREM 5/> MUS: CPT | Mod: S$PBB,,, | Performed by: PHYSICAL MEDICINE & REHABILITATION

## 2022-12-20 PROCEDURE — 64647 PR CHEMODENERV TRUNK MUSCLE(S); 6/> MUSCLES: ICD-10-PCS | Mod: 51,S$PBB,, | Performed by: PHYSICAL MEDICINE & REHABILITATION

## 2022-12-20 PROCEDURE — 64647 CHEMODENERV TRUNK MUSC 6/>: CPT | Mod: 51,S$PBB,, | Performed by: PHYSICAL MEDICINE & REHABILITATION

## 2022-12-20 PROCEDURE — 64644 CHEMODENERV 1 EXTREM 5/> MUS: CPT | Mod: PBBFAC | Performed by: PHYSICAL MEDICINE & REHABILITATION

## 2022-12-20 PROCEDURE — 99999 PR PBB SHADOW E&M-EST. PATIENT-LVL III: ICD-10-PCS | Mod: PBBFAC,,, | Performed by: PHYSICAL MEDICINE & REHABILITATION

## 2022-12-20 PROCEDURE — 64645 CHEMODENERV 1 EXTREM 5/> EA: CPT | Mod: PBBFAC | Performed by: PHYSICAL MEDICINE & REHABILITATION

## 2022-12-20 PROCEDURE — 64647 CHEMODENERV TRUNK MUSC 6/>: CPT | Mod: PBBFAC | Performed by: PHYSICAL MEDICINE & REHABILITATION

## 2022-12-20 PROCEDURE — 64645 CHEMODENERV 1 EXTREM 5/> EA: CPT | Mod: S$PBB,,, | Performed by: PHYSICAL MEDICINE & REHABILITATION

## 2022-12-20 PROCEDURE — 64644 PR CHEMODENERV 1 EXT; 5 OR MORE MUSCLES: ICD-10-PCS | Mod: S$PBB,,, | Performed by: PHYSICAL MEDICINE & REHABILITATION

## 2022-12-20 PROCEDURE — 64645 PR CHEMODENERV 1 EXT; EA ADD'L EXT, 5/> MUSCLES: ICD-10-PCS | Mod: S$PBB,,, | Performed by: PHYSICAL MEDICINE & REHABILITATION

## 2022-12-20 PROCEDURE — 99999 PR PBB SHADOW E&M-EST. PATIENT-LVL III: CPT | Mod: PBBFAC,,, | Performed by: PHYSICAL MEDICINE & REHABILITATION

## 2022-12-20 PROCEDURE — 99499 NO LOS: ICD-10-PCS | Mod: S$PBB,,, | Performed by: PHYSICAL MEDICINE & REHABILITATION

## 2022-12-20 PROCEDURE — 95874 GUIDE NERV DESTR NEEDLE EMG: CPT | Mod: 26,S$PBB,, | Performed by: PHYSICAL MEDICINE & REHABILITATION

## 2022-12-20 PROCEDURE — 99499 UNLISTED E&M SERVICE: CPT | Mod: S$PBB,,, | Performed by: PHYSICAL MEDICINE & REHABILITATION

## 2022-12-20 PROCEDURE — 99213 OFFICE O/P EST LOW 20 MIN: CPT | Mod: PBBFAC,25 | Performed by: PHYSICAL MEDICINE & REHABILITATION

## 2022-12-20 PROCEDURE — 95874 PR NEEDLE EMG GUIDANCE FOR CHEMODENERVATION: ICD-10-PCS | Mod: 26,S$PBB,, | Performed by: PHYSICAL MEDICINE & REHABILITATION
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
## 2022-12-25 RX ADMIN — ONABOTULINUMTOXINA 800 UNITS: 100 INJECTION, POWDER, LYOPHILIZED, FOR SOLUTION INTRADERMAL; INTRAMUSCULAR at 10:12

## 2022-12-28 ENCOUNTER — TELEPHONE (OUTPATIENT)
Dept: PHYSICAL MEDICINE AND REHAB | Facility: CLINIC | Age: 42
End: 2022-12-28
Payer: MEDICARE

## 2022-12-28 NOTE — TELEPHONE ENCOUNTER
The patient is going to adult care at [facility] daily and is not home bound and may not be eligible for home health.

## 2022-12-29 ENCOUNTER — TELEPHONE (OUTPATIENT)
Dept: PHYSICAL MEDICINE AND REHAB | Facility: CLINIC | Age: 42
End: 2022-12-29
Payer: MEDICARE

## 2022-12-29 NOTE — TELEPHONE ENCOUNTER
----- Message from Yung Rodriguez MD sent at 12/29/2022  3:12 PM CST -----  Regarding: Rehab Referral  Aranza,    Can you do me a favor? Can you fax the notes from the last clinic appt  last week and the orders with a facesheet to the [facility]? I am trying to get him into inpatient rehab.    Thank you so much.

## 2023-01-06 DIAGNOSIS — G11.8 SPINOCEREBELLAR ATAXIA: Primary | ICD-10-CM

## 2023-01-06 DIAGNOSIS — G80.1 SPASTIC DIPLEGIA: ICD-10-CM

## 2023-01-06 RX ORDER — DONEPEZIL HYDROCHLORIDE 5 MG/1
5 TABLET, FILM COATED ORAL NIGHTLY
Qty: 90 TABLET | Refills: 0 | Status: SHIPPED | OUTPATIENT
Start: 2023-01-06 | End: 2023-05-02 | Stop reason: SDUPTHER

## 2023-03-22 ENCOUNTER — OFFICE VISIT (OUTPATIENT)
Dept: PHYSICAL MEDICINE AND REHAB | Facility: CLINIC | Age: 43
End: 2023-03-22
Payer: MEDICARE

## 2023-03-22 VITALS
SYSTOLIC BLOOD PRESSURE: 118 MMHG | HEIGHT: 75 IN | WEIGHT: 152 LBS | BODY MASS INDEX: 18.9 KG/M2 | HEART RATE: 81 BPM | DIASTOLIC BLOOD PRESSURE: 74 MMHG

## 2023-03-22 DIAGNOSIS — G11.8 SPINOCEREBELLAR ATAXIA: Primary | ICD-10-CM

## 2023-03-22 DIAGNOSIS — R25.2 SPASTICITY: ICD-10-CM

## 2023-03-22 PROCEDURE — 95874 PR NEEDLE EMG GUIDANCE FOR CHEMODENERVATION: ICD-10-PCS | Mod: 26,S$PBB,, | Performed by: PHYSICAL MEDICINE & REHABILITATION

## 2023-03-22 PROCEDURE — 99212 OFFICE O/P EST SF 10 MIN: CPT | Mod: PBBFAC,25 | Performed by: PHYSICAL MEDICINE & REHABILITATION

## 2023-03-22 PROCEDURE — 64643 PR CHEMODENERV 1 EXT; EA ADD'L EXT, 1-4 MUSCLE(S): ICD-10-PCS | Mod: S$PBB,,, | Performed by: PHYSICAL MEDICINE & REHABILITATION

## 2023-03-22 PROCEDURE — 99999 PR PBB SHADOW E&M-EST. PATIENT-LVL II: ICD-10-PCS | Mod: PBBFAC,,, | Performed by: PHYSICAL MEDICINE & REHABILITATION

## 2023-03-22 PROCEDURE — 64646 CHEMODENERV TRUNK MUSC 1-5: CPT | Mod: PBBFAC | Performed by: PHYSICAL MEDICINE & REHABILITATION

## 2023-03-22 PROCEDURE — 95874 GUIDE NERV DESTR NEEDLE EMG: CPT | Mod: 26,S$PBB,, | Performed by: PHYSICAL MEDICINE & REHABILITATION

## 2023-03-22 PROCEDURE — 95874 GUIDE NERV DESTR NEEDLE EMG: CPT | Mod: PBBFAC | Performed by: PHYSICAL MEDICINE & REHABILITATION

## 2023-03-22 PROCEDURE — 99999 PR PBB SHADOW E&M-EST. PATIENT-LVL II: CPT | Mod: PBBFAC,,, | Performed by: PHYSICAL MEDICINE & REHABILITATION

## 2023-03-22 PROCEDURE — 99499 UNLISTED E&M SERVICE: CPT | Mod: S$PBB,,, | Performed by: PHYSICAL MEDICINE & REHABILITATION

## 2023-03-22 PROCEDURE — 64642 CHEMODENERV 1 EXTREMITY 1-4: CPT | Mod: PBBFAC | Performed by: PHYSICAL MEDICINE & REHABILITATION

## 2023-03-22 PROCEDURE — 64643 CHEMODENERV 1 EXTREM 1-4 EA: CPT | Mod: S$PBB,,, | Performed by: PHYSICAL MEDICINE & REHABILITATION

## 2023-03-22 PROCEDURE — 99499 NO LOS: ICD-10-PCS | Mod: S$PBB,,, | Performed by: PHYSICAL MEDICINE & REHABILITATION

## 2023-03-22 PROCEDURE — 64646 PR CHEMODENERV TRUNK MUSCLE(S); 1-5 MUSCLE(S): ICD-10-PCS | Mod: S$PBB,,, | Performed by: PHYSICAL MEDICINE & REHABILITATION

## 2023-03-22 PROCEDURE — 64643 CHEMODENERV 1 EXTREM 1-4 EA: CPT | Mod: PBBFAC | Performed by: PHYSICAL MEDICINE & REHABILITATION

## 2023-03-22 PROCEDURE — 64642 PR CHEMODENERV ONE EXTREMITY; 1-4 MUSCLE(S): ICD-10-PCS | Mod: S$PBB,,, | Performed by: PHYSICAL MEDICINE & REHABILITATION

## 2023-03-22 PROCEDURE — 64642 CHEMODENERV 1 EXTREMITY 1-4: CPT | Mod: S$PBB,,, | Performed by: PHYSICAL MEDICINE & REHABILITATION

## 2023-03-22 PROCEDURE — 64646 CHEMODENERV TRUNK MUSC 1-5: CPT | Mod: S$PBB,,, | Performed by: PHYSICAL MEDICINE & REHABILITATION

## 2023-03-23 ENCOUNTER — PATIENT MESSAGE (OUTPATIENT)
Dept: PHYSICAL MEDICINE AND REHAB | Facility: CLINIC | Age: 43
End: 2023-03-23
Payer: MEDICARE

## 2023-03-23 RX ADMIN — ONABOTULINUMTOXINA 900 UNITS: 100 INJECTION, POWDER, LYOPHILIZED, FOR SOLUTION INTRADERMAL; INTRAMUSCULAR at 03:03
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
## 2023-03-28 DIAGNOSIS — G11.8 SPINOCEREBELLAR ATAXIA: Primary | ICD-10-CM

## 2023-03-28 DIAGNOSIS — R25.2 SPASTICITY: ICD-10-CM

## 2023-04-17 ENCOUNTER — PATIENT MESSAGE (OUTPATIENT)
Dept: PHYSICAL MEDICINE AND REHAB | Facility: CLINIC | Age: 43
End: 2023-04-17
Payer: MEDICARE

## 2023-05-01 ENCOUNTER — PATIENT MESSAGE (OUTPATIENT)
Dept: INTERNAL MEDICINE | Facility: CLINIC | Age: 43
End: 2023-05-01
Payer: MEDICARE

## 2023-05-01 ENCOUNTER — PATIENT MESSAGE (OUTPATIENT)
Dept: PHYSICAL MEDICINE AND REHAB | Facility: CLINIC | Age: 43
End: 2023-05-01
Payer: MEDICARE

## 2023-05-02 DIAGNOSIS — G11.8 SPINOCEREBELLAR ATAXIA: ICD-10-CM

## 2023-05-02 DIAGNOSIS — G80.1 SPASTIC DIPLEGIA: ICD-10-CM

## 2023-05-02 RX ORDER — BACLOFEN 20 MG/1
20 TABLET ORAL 3 TIMES DAILY
Qty: 270 TABLET | Refills: 3 | Status: SHIPPED | OUTPATIENT
Start: 2023-05-02 | End: 2024-05-01

## 2023-05-02 RX ORDER — DONEPEZIL HYDROCHLORIDE 5 MG/1
5 TABLET, FILM COATED ORAL NIGHTLY
Qty: 90 TABLET | Refills: 0 | Status: SHIPPED | OUTPATIENT
Start: 2023-05-02 | End: 2024-03-02 | Stop reason: SDUPTHER

## 2023-05-02 NOTE — TELEPHONE ENCOUNTER
Last visit 3/22/23  Next visit 7/12/23  Last refill 1/4/22  90 day/3r 1/4/22  Aracept 90 day 1/6/23

## 2023-05-03 NOTE — TELEPHONE ENCOUNTER
Spoke with pt's mom and she stated that pt is experiencing Right testicular pain. Redness and swelling. Offered appt tomorrow at 9 with you but pt is unable to make due to transportation. Pt's mom has stated that she will take pt to the ED for Evaluation.

## 2023-05-05 ENCOUNTER — OFFICE VISIT (OUTPATIENT)
Dept: UROLOGY | Facility: CLINIC | Age: 43
End: 2023-05-05
Payer: MEDICARE

## 2023-05-05 VITALS — HEART RATE: 86 BPM | SYSTOLIC BLOOD PRESSURE: 115 MMHG | DIASTOLIC BLOOD PRESSURE: 75 MMHG

## 2023-05-05 DIAGNOSIS — N45.3 EPIDIDYMO-ORCHITIS: ICD-10-CM

## 2023-05-05 PROCEDURE — 99213 PR OFFICE/OUTPT VISIT, EST, LEVL III, 20-29 MIN: ICD-10-PCS | Mod: S$PBB,,, | Performed by: UROLOGY

## 2023-05-05 PROCEDURE — 99213 OFFICE O/P EST LOW 20 MIN: CPT | Mod: PBBFAC,PO | Performed by: UROLOGY

## 2023-05-05 PROCEDURE — 99213 OFFICE O/P EST LOW 20 MIN: CPT | Mod: S$PBB,,, | Performed by: UROLOGY

## 2023-05-05 PROCEDURE — 99999 PR PBB SHADOW E&M-EST. PATIENT-LVL III: ICD-10-PCS | Mod: PBBFAC,,, | Performed by: UROLOGY

## 2023-05-05 PROCEDURE — 99999 PR PBB SHADOW E&M-EST. PATIENT-LVL III: CPT | Mod: PBBFAC,,, | Performed by: UROLOGY

## 2023-05-05 NOTE — PROGRESS NOTES
Subjective:       Patient ID: Lisa Moreno is a 42 y.o. male.    Chief Complaint: Other (Epididymo-orchitis )     This is a 42 y.o.  male patient that is new to me.  The patient was referred to me by ANGELITO Koo for epididymo-orchitis.  Seen in ED 5/3/23 dx epididymo-orchitis given septra oral liquid  Continued pain and swelling to right testicle, no fever or chills.  Incontinence to diaper chronically.      Past urologic history:  Saw Dr. Okeefe on 11/27/2020, UDS done which demonstrated DO with incomplete bladder emptying. She thought he would most likely benefit from botox but that patient would also have to do CIC 5x/daily to avoid risk of urinary retention. Patient's mother did not think this would be a a good option for patient given his cognitive dysfunction.      Lab Results   Component Value Date    CREATININE 0.9 03/24/2021       ---  PMH/PSH/Medications/Allergies/Social history reviewed and as in chart.    Review of Systems   Constitutional:  Negative for activity change, chills and fever.   HENT:  Negative for congestion.    Respiratory:  Negative for cough, chest tightness and shortness of breath.    Cardiovascular:  Negative for chest pain and palpitations.   Gastrointestinal:  Negative for abdominal distention, abdominal pain, nausea and vomiting.   Genitourinary:  Negative for difficulty urinating, flank pain, hematuria, penile pain, scrotal swelling and testicular pain.   Musculoskeletal:  Negative for gait problem.     Objective:      Physical Exam  HENT:      Head: Atraumatic.   Pulmonary:      Effort: Pulmonary effort is normal.   Genitourinary:     Comments: Right firm and ttp testicle; no abscess or crepitus  Neurological:      General: No focal deficit present.      Mental Status: He is alert and oriented to person, place, and time.       Assessment:     Problem Noted   Epididymo-Orchitis 5/5/2023       Plan:     Continue antibiotic if worsening to ED  Follow up in 3 weeks      Chetan GRANT  MD Yg    The above referenced imaging and interpretations were personally reviewed.  Disclaimer: This note has been generated using voice-recognition software. There may be typographical errors that have been missed during proof-reading.

## 2023-05-08 ENCOUNTER — PATIENT MESSAGE (OUTPATIENT)
Dept: UROLOGY | Facility: CLINIC | Age: 43
End: 2023-05-08
Payer: MEDICARE

## 2023-06-03 ENCOUNTER — PATIENT MESSAGE (OUTPATIENT)
Dept: PHYSICAL MEDICINE AND REHAB | Facility: CLINIC | Age: 43
End: 2023-06-03
Payer: MEDICARE

## 2023-06-03 DIAGNOSIS — G80.1 SPASTIC DIPLEGIA: ICD-10-CM

## 2023-06-05 ENCOUNTER — TELEPHONE (OUTPATIENT)
Dept: PHYSICAL MEDICINE AND REHAB | Facility: CLINIC | Age: 43
End: 2023-06-05
Payer: MEDICARE

## 2023-06-05 NOTE — TELEPHONE ENCOUNTER
----- Message from Ivette Moore sent at 6/5/2023 10:36 AM CDT -----  Regarding: refill  Contact: @ [phone]  Pt mother  is calling to get a refill on the following  dantrolene (DANTRIUM) 50 MG Cap...Please call and adv @ [phone]

## 2023-06-06 RX ORDER — DANTROLENE SODIUM 50 MG/1
CAPSULE ORAL
Qty: 270 CAPSULE | Refills: 3 | Status: SHIPPED | OUTPATIENT
Start: 2023-06-06

## 2023-07-11 ENCOUNTER — PATIENT MESSAGE (OUTPATIENT)
Dept: PHYSICAL MEDICINE AND REHAB | Facility: CLINIC | Age: 43
End: 2023-07-11
Payer: MEDICARE

## 2023-08-22 ENCOUNTER — PATIENT MESSAGE (OUTPATIENT)
Dept: PHYSICAL MEDICINE AND REHAB | Facility: CLINIC | Age: 43
End: 2023-08-22
Payer: MEDICARE

## 2023-09-11 ENCOUNTER — TELEPHONE (OUTPATIENT)
Dept: NEUROLOGY | Facility: CLINIC | Age: 43
End: 2023-09-11
Payer: MEDICARE

## 2023-09-11 NOTE — TELEPHONE ENCOUNTER
Message received from patient's mother, Giovana, that she would like to schedule appt in [facility] for [facility].    Scheduled for 10/4th.       Dank Conte RN, BSN, BS  ALS Clinical Care Coordinator  [phone]

## 2023-09-13 ENCOUNTER — HOSPITAL ENCOUNTER (INPATIENT)
Facility: HOSPITAL | Age: 43
LOS: 3 days | Discharge: HOME | DRG: 177 | End: 2023-09-16
Attending: EMERGENCY MEDICINE | Admitting: EMERGENCY MEDICINE
Payer: MEDICARE

## 2023-09-13 DIAGNOSIS — G11.8 SPINOCEREBELLAR ATAXIA: ICD-10-CM

## 2023-09-13 DIAGNOSIS — G80.1 SPASTIC DIPLEGIA: ICD-10-CM

## 2023-09-13 DIAGNOSIS — N39.0 URINARY TRACT INFECTION WITHOUT HEMATURIA, SITE UNSPECIFIED: Primary | ICD-10-CM

## 2023-09-13 DIAGNOSIS — U07.1 COVID-19: ICD-10-CM

## 2023-09-13 DIAGNOSIS — G12.21 ALS (AMYOTROPHIC LATERAL SCLEROSIS): ICD-10-CM

## 2023-09-13 DIAGNOSIS — R50.9 FEVER: ICD-10-CM

## 2023-09-13 DIAGNOSIS — R07.9 CHEST PAIN: ICD-10-CM

## 2023-09-13 LAB
ALBUMIN SERPL BCP-MCNC: 4.2 G/DL (ref 3.5–5.2)
ALLENS TEST: NORMAL
ALP SERPL-CCNC: 52 U/L (ref 55–135)
ALT SERPL W/O P-5'-P-CCNC: 17 U/L (ref 10–44)
ANION GAP SERPL CALC-SCNC: 10 MMOL/L (ref 8–16)
AST SERPL-CCNC: 23 U/L (ref 10–40)
BACTERIA #/AREA URNS AUTO: ABNORMAL /HPF
BASOPHILS # BLD AUTO: 0.02 K/UL (ref 0–0.2)
BASOPHILS NFR BLD: 0.4 % (ref 0–1.9)
BILIRUB SERPL-MCNC: 0.2 MG/DL (ref 0.1–1)
BILIRUB UR QL STRIP: NEGATIVE
BUN SERPL-MCNC: 19 MG/DL (ref 6–20)
CALCIUM SERPL-MCNC: 9.3 MG/DL (ref 8.7–10.5)
CHLORIDE SERPL-SCNC: 109 MMOL/L (ref 95–110)
CLARITY UR REFRACT.AUTO: CLEAR
CO2 SERPL-SCNC: 26 MMOL/L (ref 23–29)
COLOR UR AUTO: YELLOW
CREAT SERPL-MCNC: 1 MG/DL (ref 0.5–1.4)
DIFFERENTIAL METHOD: ABNORMAL
EOSINOPHIL # BLD AUTO: 0 K/UL (ref 0–0.5)
EOSINOPHIL NFR BLD: 0.2 % (ref 0–8)
ERYTHROCYTE [DISTWIDTH] IN BLOOD BY AUTOMATED COUNT: 13.2 % (ref 11.5–14.5)
EST. GFR  (NO RACE VARIABLE): >60 ML/MIN/1.73 M^2
GLUCOSE SERPL-MCNC: 95 MG/DL (ref 70–110)
GLUCOSE UR QL STRIP: NEGATIVE
GROUP A STREP, MOLECULAR: NEGATIVE
HCT VFR BLD AUTO: 47.3 % (ref 40–54)
HCV AB SERPL QL IA: NORMAL
HGB BLD-MCNC: 15.1 G/DL (ref 14–18)
HGB UR QL STRIP: NEGATIVE
HIV 1+2 AB+HIV1 P24 AG SERPL QL IA: NORMAL
HYALINE CASTS UR QL AUTO: 0 /LPF
IMM GRANULOCYTES # BLD AUTO: 0.01 K/UL (ref 0–0.04)
IMM GRANULOCYTES NFR BLD AUTO: 0.2 % (ref 0–0.5)
INFLUENZA A, MOLECULAR: NEGATIVE
INFLUENZA B, MOLECULAR: NEGATIVE
KETONES UR QL STRIP: ABNORMAL
LDH SERPL L TO P-CCNC: 0.68 MMOL/L (ref 0.5–2.2)
LEUKOCYTE ESTERASE UR QL STRIP: NEGATIVE
LYMPHOCYTES # BLD AUTO: 0.3 K/UL (ref 1–4.8)
LYMPHOCYTES NFR BLD: 5.1 % (ref 18–48)
MCH RBC QN AUTO: 27.3 PG (ref 27–31)
MCHC RBC AUTO-ENTMCNC: 31.9 G/DL (ref 32–36)
MCV RBC AUTO: 85 FL (ref 82–98)
MICROSCOPIC COMMENT: ABNORMAL
MONOCYTES # BLD AUTO: 0.6 K/UL (ref 0.3–1)
MONOCYTES NFR BLD: 10.5 % (ref 4–15)
NEUTROPHILS # BLD AUTO: 4.6 K/UL (ref 1.8–7.7)
NEUTROPHILS NFR BLD: 83.6 % (ref 38–73)
NITRITE UR QL STRIP: NEGATIVE
NRBC BLD-RTO: 0 /100 WBC
PH UR STRIP: 6 [PH] (ref 5–8)
PLATELET # BLD AUTO: 149 K/UL (ref 150–450)
PMV BLD AUTO: 11.4 FL (ref 9.2–12.9)
POTASSIUM SERPL-SCNC: 4.1 MMOL/L (ref 3.5–5.1)
PROT SERPL-MCNC: 8.1 G/DL (ref 6–8.4)
PROT UR QL STRIP: ABNORMAL
RBC # BLD AUTO: 5.54 M/UL (ref 4.6–6.2)
RBC #/AREA URNS AUTO: 8 /HPF (ref 0–4)
SAMPLE: NORMAL
SARS-COV-2 RDRP RESP QL NAA+PROBE: POSITIVE
SITE: NORMAL
SODIUM SERPL-SCNC: 145 MMOL/L (ref 136–145)
SP GR UR STRIP: >1.03 (ref 1–1.03)
SPECIMEN SOURCE: NORMAL
SQUAMOUS #/AREA URNS AUTO: 1 /HPF
URN SPEC COLLECT METH UR: ABNORMAL
WBC # BLD AUTO: 5.53 K/UL (ref 3.9–12.7)
WBC #/AREA URNS AUTO: 10 /HPF (ref 0–5)

## 2023-09-13 PROCEDURE — 87502 INFLUENZA DNA AMP PROBE: CPT | Performed by: EMERGENCY MEDICINE

## 2023-09-13 PROCEDURE — 12000002 HC ACUTE/MED SURGE SEMI-PRIVATE ROOM

## 2023-09-13 PROCEDURE — 82728 ASSAY OF FERRITIN: CPT | Performed by: FAMILY MEDICINE

## 2023-09-13 PROCEDURE — 87389 HIV-1 AG W/HIV-1&-2 AB AG IA: CPT | Performed by: PHYSICIAN ASSISTANT

## 2023-09-13 PROCEDURE — 96361 HYDRATE IV INFUSION ADD-ON: CPT

## 2023-09-13 PROCEDURE — 25000003 PHARM REV CODE 250: Performed by: EMERGENCY MEDICINE

## 2023-09-13 PROCEDURE — 83605 ASSAY OF LACTIC ACID: CPT

## 2023-09-13 PROCEDURE — 93010 ELECTROCARDIOGRAM REPORT: CPT | Mod: ,,, | Performed by: INTERNAL MEDICINE

## 2023-09-13 PROCEDURE — 86803 HEPATITIS C AB TEST: CPT | Performed by: PHYSICIAN ASSISTANT

## 2023-09-13 PROCEDURE — 93005 ELECTROCARDIOGRAM TRACING: CPT

## 2023-09-13 PROCEDURE — 81001 URINALYSIS AUTO W/SCOPE: CPT | Performed by: EMERGENCY MEDICINE

## 2023-09-13 PROCEDURE — 80053 COMPREHEN METABOLIC PANEL: CPT | Performed by: EMERGENCY MEDICINE

## 2023-09-13 PROCEDURE — 99223 PR INITIAL HOSPITAL CARE,LEVL III: ICD-10-PCS | Mod: ,,, | Performed by: FAMILY MEDICINE

## 2023-09-13 PROCEDURE — 27000207 HC ISOLATION

## 2023-09-13 PROCEDURE — 87502 INFLUENZA DNA AMP PROBE: CPT

## 2023-09-13 PROCEDURE — U0002 COVID-19 LAB TEST NON-CDC: HCPCS | Performed by: EMERGENCY MEDICINE

## 2023-09-13 PROCEDURE — 86140 C-REACTIVE PROTEIN: CPT | Performed by: FAMILY MEDICINE

## 2023-09-13 PROCEDURE — 99223 1ST HOSP IP/OBS HIGH 75: CPT | Mod: ,,, | Performed by: FAMILY MEDICINE

## 2023-09-13 PROCEDURE — 87186 SC STD MICRODIL/AGAR DIL: CPT | Performed by: EMERGENCY MEDICINE

## 2023-09-13 PROCEDURE — 84145 PROCALCITONIN (PCT): CPT | Performed by: FAMILY MEDICINE

## 2023-09-13 PROCEDURE — 83615 LACTATE (LD) (LDH) ENZYME: CPT | Performed by: FAMILY MEDICINE

## 2023-09-13 PROCEDURE — 93010 EKG 12-LEAD: ICD-10-PCS | Mod: ,,, | Performed by: INTERNAL MEDICINE

## 2023-09-13 PROCEDURE — 87086 URINE CULTURE/COLONY COUNT: CPT | Performed by: EMERGENCY MEDICINE

## 2023-09-13 PROCEDURE — 96365 THER/PROPH/DIAG IV INF INIT: CPT

## 2023-09-13 PROCEDURE — 99900035 HC TECH TIME PER 15 MIN (STAT)

## 2023-09-13 PROCEDURE — 87651 STREP A DNA AMP PROBE: CPT | Performed by: EMERGENCY MEDICINE

## 2023-09-13 PROCEDURE — 87077 CULTURE AEROBIC IDENTIFY: CPT | Performed by: EMERGENCY MEDICINE

## 2023-09-13 PROCEDURE — 87088 URINE BACTERIA CULTURE: CPT | Performed by: EMERGENCY MEDICINE

## 2023-09-13 PROCEDURE — 96367 TX/PROPH/DG ADDL SEQ IV INF: CPT

## 2023-09-13 PROCEDURE — 87040 BLOOD CULTURE FOR BACTERIA: CPT | Performed by: EMERGENCY MEDICINE

## 2023-09-13 PROCEDURE — 85025 COMPLETE CBC W/AUTO DIFF WBC: CPT | Performed by: EMERGENCY MEDICINE

## 2023-09-13 PROCEDURE — 63600175 PHARM REV CODE 636 W HCPCS: Performed by: EMERGENCY MEDICINE

## 2023-09-13 RX ORDER — PROCHLORPERAZINE EDISYLATE 5 MG/ML
5 INJECTION INTRAMUSCULAR; INTRAVENOUS EVERY 6 HOURS PRN
Status: DISCONTINUED | OUTPATIENT
Start: 2023-09-13 | End: 2023-09-16 | Stop reason: HOSPADM

## 2023-09-13 RX ORDER — SODIUM CHLORIDE 0.9 % (FLUSH) 0.9 %
5 SYRINGE (ML) INJECTION
Status: DISCONTINUED | OUTPATIENT
Start: 2023-09-13 | End: 2023-09-16 | Stop reason: HOSPADM

## 2023-09-13 RX ORDER — DONEPEZIL HYDROCHLORIDE 5 MG/1
5 TABLET, FILM COATED ORAL NIGHTLY
Status: DISCONTINUED | OUTPATIENT
Start: 2023-09-14 | End: 2023-09-16 | Stop reason: HOSPADM

## 2023-09-13 RX ORDER — MAG HYDROX/ALUMINUM HYD/SIMETH 200-200-20
30 SUSPENSION, ORAL (FINAL DOSE FORM) ORAL 4 TIMES DAILY PRN
Status: DISCONTINUED | OUTPATIENT
Start: 2023-09-13 | End: 2023-09-16 | Stop reason: HOSPADM

## 2023-09-13 RX ORDER — TALC
6 POWDER (GRAM) TOPICAL NIGHTLY PRN
Status: DISCONTINUED | OUTPATIENT
Start: 2023-09-13 | End: 2023-09-16 | Stop reason: HOSPADM

## 2023-09-13 RX ORDER — GLUCAGON 1 MG
1 KIT INJECTION
Status: DISCONTINUED | OUTPATIENT
Start: 2023-09-13 | End: 2023-09-16 | Stop reason: HOSPADM

## 2023-09-13 RX ORDER — HYDROXYZINE PAMOATE 25 MG/1
25 CAPSULE ORAL 3 TIMES DAILY PRN
Status: DISCONTINUED | OUTPATIENT
Start: 2023-09-13 | End: 2023-09-16 | Stop reason: HOSPADM

## 2023-09-13 RX ORDER — CITALOPRAM 20 MG/1
20 TABLET, FILM COATED ORAL DAILY
Status: DISCONTINUED | OUTPATIENT
Start: 2023-09-14 | End: 2023-09-16 | Stop reason: HOSPADM

## 2023-09-13 RX ORDER — POLYETHYLENE GLYCOL 3350 17 G/17G
17 POWDER, FOR SOLUTION ORAL DAILY PRN
Status: DISCONTINUED | OUTPATIENT
Start: 2023-09-13 | End: 2023-09-16 | Stop reason: HOSPADM

## 2023-09-13 RX ORDER — CETIRIZINE HYDROCHLORIDE 10 MG/1
10 TABLET ORAL DAILY
Status: DISCONTINUED | OUTPATIENT
Start: 2023-09-14 | End: 2023-09-16 | Stop reason: HOSPADM

## 2023-09-13 RX ORDER — IBUPROFEN 200 MG
16 TABLET ORAL
Status: DISCONTINUED | OUTPATIENT
Start: 2023-09-13 | End: 2023-09-16 | Stop reason: HOSPADM

## 2023-09-13 RX ORDER — AZITHROMYCIN 250 MG/1
500 TABLET, FILM COATED ORAL DAILY
Status: COMPLETED | OUTPATIENT
Start: 2023-09-14 | End: 2023-09-16

## 2023-09-13 RX ORDER — NALOXONE HCL 0.4 MG/ML
0.02 VIAL (ML) INJECTION
Status: DISCONTINUED | OUTPATIENT
Start: 2023-09-13 | End: 2023-09-16 | Stop reason: HOSPADM

## 2023-09-13 RX ORDER — ACETAMINOPHEN 325 MG/1
650 TABLET ORAL EVERY 4 HOURS PRN
Status: DISCONTINUED | OUTPATIENT
Start: 2023-09-13 | End: 2023-09-16 | Stop reason: HOSPADM

## 2023-09-13 RX ORDER — BACLOFEN 10 MG/1
20 TABLET ORAL 3 TIMES DAILY
Status: DISCONTINUED | OUTPATIENT
Start: 2023-09-14 | End: 2023-09-16 | Stop reason: HOSPADM

## 2023-09-13 RX ORDER — SIMETHICONE 80 MG
1 TABLET,CHEWABLE ORAL 4 TIMES DAILY PRN
Status: DISCONTINUED | OUTPATIENT
Start: 2023-09-13 | End: 2023-09-16 | Stop reason: HOSPADM

## 2023-09-13 RX ORDER — IBUPROFEN 200 MG
24 TABLET ORAL
Status: DISCONTINUED | OUTPATIENT
Start: 2023-09-13 | End: 2023-09-16 | Stop reason: HOSPADM

## 2023-09-13 RX ORDER — ONDANSETRON 4 MG/1
4 TABLET, ORALLY DISINTEGRATING ORAL EVERY 6 HOURS PRN
Status: DISCONTINUED | OUTPATIENT
Start: 2023-09-13 | End: 2023-09-16 | Stop reason: HOSPADM

## 2023-09-13 RX ORDER — SODIUM CHLORIDE, SODIUM LACTATE, POTASSIUM CHLORIDE, CALCIUM CHLORIDE 600; 310; 30; 20 MG/100ML; MG/100ML; MG/100ML; MG/100ML
INJECTION, SOLUTION INTRAVENOUS CONTINUOUS
Status: ACTIVE | OUTPATIENT
Start: 2023-09-14 | End: 2023-09-15

## 2023-09-13 RX ORDER — DANTROLENE SODIUM 25 MG/1
50 CAPSULE ORAL 3 TIMES DAILY
Status: DISCONTINUED | OUTPATIENT
Start: 2023-09-14 | End: 2023-09-16 | Stop reason: HOSPADM

## 2023-09-13 RX ORDER — ENOXAPARIN SODIUM 100 MG/ML
40 INJECTION SUBCUTANEOUS EVERY 24 HOURS
Status: DISCONTINUED | OUTPATIENT
Start: 2023-09-14 | End: 2023-09-16 | Stop reason: HOSPADM

## 2023-09-13 RX ORDER — BISACODYL 10 MG
10 SUPPOSITORY, RECTAL RECTAL DAILY PRN
Status: DISCONTINUED | OUTPATIENT
Start: 2023-09-13 | End: 2023-09-16 | Stop reason: HOSPADM

## 2023-09-13 RX ADMIN — VANCOMYCIN HYDROCHLORIDE 1500 MG: 1.5 INJECTION, POWDER, LYOPHILIZED, FOR SOLUTION INTRAVENOUS at 07:09

## 2023-09-13 RX ADMIN — REMDESIVIR 200 MG: 100 INJECTION, POWDER, LYOPHILIZED, FOR SOLUTION INTRAVENOUS at 11:09

## 2023-09-13 RX ADMIN — SODIUM CHLORIDE, POTASSIUM CHLORIDE, SODIUM LACTATE AND CALCIUM CHLORIDE 2067 ML: 600; 310; 30; 20 INJECTION, SOLUTION INTRAVENOUS at 06:09

## 2023-09-13 RX ADMIN — CEFTRIAXONE 1 G: 1 INJECTION, POWDER, FOR SOLUTION INTRAMUSCULAR; INTRAVENOUS at 07:09

## 2023-09-13 NOTE — ED PROVIDER NOTES
Encounter Date: 9/13/2023       History     Chief Complaint   Patient presents with    Fever     Pt with hx of ALS family states fever x1 day also headache     42-year-old male with history of ALS presents with a fever today.  He developed a sore throat and some dull headaches.  He was doing fine yesterday.  He does get occasional urinary tract infections that makes him sick.  Has not had aspiration pneumonia in the past.  Does not have any wounds or sores.  His wife based on this morning and did not see any rashes.  He is acting his normal self.  He was anxious earlier today but now is smiling like he typically does.  Denies any abdominal pain.  He went to the [employer] [facility] three days ago and was honored by the owner Edel Tomas along with other people with ALS.  This is his 1st [employer] [facility] to go to.      Review of patient's allergies indicates:   Allergen Reactions    Penicillins      Hives and Itching    Penicillin      Past Medical History:   Diagnosis Date    Anxiety     Degenerative motor system disease     Depression     Seizure     Spastic quadriplegia     Spinocerebellar atrophy      Past Surgical History:   Procedure Laterality Date    BACLOFEN PUMP IMPLANTATION      COLONOSCOPY N/A 7/23/2020    Procedure: COLONOSCOPY;  Surgeon: Ziyad Fitch MD;  Location: [facility];  Service: Endoscopy;  Laterality: N/A;    ESOPHAGOGASTRODUODENOSCOPY N/A 7/23/2020    Procedure: EGD (ESOPHAGOGASTRODUODENOSCOPY);  Surgeon: Ziyad Fitch MD;  Location: [facility];  Service: Endoscopy;  Laterality: N/A;    FLUOROSCOPIC URODYNAMIC STUDY N/A 11/27/2018    Procedure: URODYNAMIC STUDY, FLUOROSCOPIC;  Surgeon: Tanja Okeefe MD;  Location: [address];  Service: Urology;  Laterality: N/A;  1 hour    REATTACHMENT OF MUSCLE Bilateral 2/18/2022    Procedure: PTOSIS REPAIR OF BOTH EYES;  Surgeon: Krupa Rios MD;  Location: [facility] OR [address] [place] [address];  Service: Ophthalmology;  Laterality: Bilateral;    UPPER  GASTROINTESTINAL ENDOSCOPY       Family History   Problem Relation Age of Onset    Thyroid cancer Mother     Hypertension Mother     Depression Mother     Cancer Mother         thyroid cancer    Multiple sclerosis Other         mother's cousin    No Known Problems Brother     No Known Problems Son     ALS Neg Hx     Muscular dystrophy Neg Hx      Social History     Tobacco Use    Smoking status: Never    Smokeless tobacco: Never   Substance Use Topics    Alcohol use: Not Currently     Comment: social drinker, at times    Drug use: Not Currently     Review of Systems    Physical Exam     Initial Vitals [09/13/23 1730]   BP Pulse Resp Temp SpO2   130/86 93 18 (!) 102.1 °F (38.9 °C) 100 %      MAP       --         Physical Exam    Constitutional: He appears well-nourished. No distress.   HENT:   Head: Normocephalic.   Mouth/Throat: Oropharynx is clear and moist.   Eyes: Conjunctivae are normal.   Neck: Neck supple. No JVD present.   Able to flex neck well with assistance   Cardiovascular:  Normal rate, regular rhythm and normal heart sounds.           Pulmonary/Chest: Breath sounds normal. No respiratory distress. He has no wheezes. He has no rales.   Abdominal: Abdomen is soft. Bowel sounds are normal. There is no abdominal tenderness.   Musculoskeletal:         General: No edema.      Cervical back: Neck supple.     Neurological: He is alert.   Skin: Skin is warm. No rash noted.         ED Course   Procedures  Labs Reviewed   CBC W/ AUTO DIFFERENTIAL - Abnormal; Notable for the following components:       Result Value    MCHC 31.9 (*)     Platelets 149 (*)     Lymph # 0.3 (*)     Gran % 83.6 (*)     Lymph % 5.1 (*)     All other components within normal limits   COMPREHENSIVE METABOLIC PANEL - Abnormal; Notable for the following components:    Alkaline Phosphatase 52 (*)     All other components within normal limits   URINALYSIS, REFLEX TO URINE CULTURE - Abnormal; Notable for the following components:    Specific  Gravity, UA >1.030 (*)     Protein, UA 1+ (*)     Ketones, UA Trace (*)     All other components within normal limits    Narrative:     Specimen Source->Urine   SARS-COV-2 RNA AMPLIFICATION, QUAL - Abnormal; Notable for the following components:    SARS-CoV-2 RNA, Amplification, Qual Positive (*)     All other components within normal limits   URINALYSIS MICROSCOPIC - Abnormal; Notable for the following components:    RBC, UA 8 (*)     WBC, UA 10 (*)     All other components within normal limits    Narrative:     Specimen Source->Urine   INFLUENZA A & B BY MOLECULAR   GROUP A STREP, MOLECULAR   CULTURE, BLOOD   CULTURE, BLOOD   HIV 1 / 2 ANTIBODY    Narrative:     Release to patient->Immediate   HEPATITIS C ANTIBODY    Narrative:     Release to patient->Immediate   ISTAT LACTATE          Imaging Results              X-Ray Chest AP Portable (Final result)  Result time 09/13/23 19:50:19      Final result by Torsten De La Torre MD (09/13/23 19:50:19)                   Impression:      Bibasilar ground-glass airspace opacities.      Electronically signed by: Torsten De La Torre MD  Date:    09/13/2023  Time:    19:50               Narrative:    EXAMINATION:  XR CHEST AP PORTABLE    CLINICAL HISTORY:  Sepsis;    TECHNIQUE:  Single frontal view of the chest was performed.    COMPARISON:  10/29/2020.    FINDINGS:  Monitoring EKG leads are present.  The trachea is unremarkable.  The cardiomediastinal silhouette is within normal limits.  There is no evidence of free air beneath the hemidiaphragms.  There are no pleural effusions.  There is no evidence of a pneumothorax.  There is no evidence of pneumomediastinum.  There are bibasilar ground-glass airspace opacities.  The osseous structures are unremarkable.                                    X-Rays:   Independently Interpreted Readings:   Other Readings:  Bibasilar atelectasis    Medications   vancomycin 1,500 mg in dextrose 5 % (D5W) 250 mL IVPB (Vial-Mate) (1,500 mg Intravenous New Bag  9/13/23 1953)   lactated ringers bolus 2,067 mL (2,067 mLs Intravenous New Bag 9/13/23 1840)   cefTRIAXone (ROCEPHIN) 1 g in dextrose 5 % in water (D5W) 100 mL IVPB (MB+) (0 g Intravenous Stopped 9/13/23 1949)     Medical Decision Making  Patient with ALS presents with a high fever.  He has a headache and sore throat.  I considered highly doubt meningitis.  He has a supple neck and does not appear toxic.  Will do a COVID screen influenza screen as well as strep throat.  I see no signs of a peritonsillar abscess.  He has at risk for aspiration pneumonia but is not having shortness breath cough and has normal lung sounds.  His abdomen is benign.  There is no skin findings and no history of wounds.  Will obtain a urinalysis through straight cath.  Will start IV fluids as well as IV antibiotics    8:27 PM  Reviewed studies.  He has a mild UTI.  He also has positive COVID.  He has bibasilar atelectasis on his chest x-ray.  He is not hypoxic.  I discussed finding with wife.  She states she is going to have a lot of difficulty taking care of him and he did not do well the last time he had COVID.  I discussed case with hospital medicine.  We discussed that he is at high risk for pulmonary decompensation as well as immunocompromise.  Would like to admit to the medicine service.    Amount and/or Complexity of Data Reviewed  Labs: ordered.  Radiology: ordered.                               Clinical Impression:   Final diagnoses:  [R50.9] Fever  [N39.0] Urinary tract infection without hematuria, site unspecified (Primary)  [U07.1] COVID-19               Duncan Roberts MD  09/13/23 2029

## 2023-09-13 NOTE — Clinical Note
Diagnosis: Urinary tract infection without hematuria, site unspecified [[id number]]   Admitting Provider:: VIVI CAMARILLO [[id number]]   Future Attending Provider: MONIKA GRIDER [[id number]]   Reason for IP Medical Treatment  (Clinical interventions that can only be accomplished in the IP setting? ) :: iv   I certify that Inpatient services for greater than or equal to 2 midnights are medically necessary:: Yes   Plans for Post-Acute care--if anticipated (pick the single best option):: A. No post acute care anticipated at this time

## 2023-09-14 PROBLEM — U07.1 COVID-19: Status: ACTIVE | Noted: 2023-09-14

## 2023-09-14 PROBLEM — G12.21 ALS (AMYOTROPHIC LATERAL SCLEROSIS): Status: ACTIVE | Noted: 2023-09-14

## 2023-09-14 PROBLEM — R82.90 ABNORMAL URINE: Status: ACTIVE | Noted: 2023-09-14

## 2023-09-14 PROBLEM — Z66 DNR (DO NOT RESUSCITATE): Status: ACTIVE | Noted: 2023-09-14

## 2023-09-14 LAB
ALBUMIN SERPL BCP-MCNC: 3.2 G/DL (ref 3.5–5.2)
ALP SERPL-CCNC: 39 U/L (ref 55–135)
ALT SERPL W/O P-5'-P-CCNC: 17 U/L (ref 10–44)
ANION GAP SERPL CALC-SCNC: 9 MMOL/L (ref 8–16)
AST SERPL-CCNC: 23 U/L (ref 10–40)
BASOPHILS # BLD AUTO: 0.01 K/UL (ref 0–0.2)
BASOPHILS NFR BLD: 0.3 % (ref 0–1.9)
BILIRUB SERPL-MCNC: 0.2 MG/DL (ref 0.1–1)
BUN SERPL-MCNC: 17 MG/DL (ref 6–20)
CALCIUM SERPL-MCNC: 8.1 MG/DL (ref 8.7–10.5)
CHLORIDE SERPL-SCNC: 111 MMOL/L (ref 95–110)
CO2 SERPL-SCNC: 21 MMOL/L (ref 23–29)
CREAT SERPL-MCNC: 0.7 MG/DL (ref 0.5–1.4)
CRP SERPL-MCNC: 29.8 MG/L (ref 0–8.2)
D DIMER PPP IA.FEU-MCNC: 0.39 MG/L FEU
DIFFERENTIAL METHOD: ABNORMAL
EOSINOPHIL # BLD AUTO: 0 K/UL (ref 0–0.5)
EOSINOPHIL NFR BLD: 0 % (ref 0–8)
ERYTHROCYTE [DISTWIDTH] IN BLOOD BY AUTOMATED COUNT: 13.2 % (ref 11.5–14.5)
EST. GFR  (NO RACE VARIABLE): >60 ML/MIN/1.73 M^2
FERRITIN SERPL-MCNC: 50 NG/ML (ref 20–300)
GLUCOSE SERPL-MCNC: 76 MG/DL (ref 70–110)
HCT VFR BLD AUTO: 40.6 % (ref 40–54)
HGB BLD-MCNC: 12.6 G/DL (ref 14–18)
IMM GRANULOCYTES # BLD AUTO: 0 K/UL (ref 0–0.04)
IMM GRANULOCYTES NFR BLD AUTO: 0 % (ref 0–0.5)
LDH SERPL L TO P-CCNC: 257 U/L (ref 110–260)
LYMPHOCYTES # BLD AUTO: 0.7 K/UL (ref 1–4.8)
LYMPHOCYTES NFR BLD: 21.5 % (ref 18–48)
MAGNESIUM SERPL-MCNC: 1.6 MG/DL (ref 1.6–2.6)
MCH RBC QN AUTO: 27.5 PG (ref 27–31)
MCHC RBC AUTO-ENTMCNC: 31 G/DL (ref 32–36)
MCV RBC AUTO: 89 FL (ref 82–98)
MONOCYTES # BLD AUTO: 0.6 K/UL (ref 0.3–1)
MONOCYTES NFR BLD: 18.1 % (ref 4–15)
NEUTROPHILS # BLD AUTO: 2 K/UL (ref 1.8–7.7)
NEUTROPHILS NFR BLD: 60.1 % (ref 38–73)
NRBC BLD-RTO: 0 /100 WBC
PHOSPHATE SERPL-MCNC: 2.6 MG/DL (ref 2.7–4.5)
PLATELET # BLD AUTO: 120 K/UL (ref 150–450)
PMV BLD AUTO: 11.9 FL (ref 9.2–12.9)
POTASSIUM SERPL-SCNC: 3.7 MMOL/L (ref 3.5–5.1)
PROCALCITONIN SERPL IA-MCNC: 0.07 NG/ML
PROCALCITONIN SERPL IA-MCNC: 0.08 NG/ML
PROT SERPL-MCNC: 6.2 G/DL (ref 6–8.4)
RBC # BLD AUTO: 4.58 M/UL (ref 4.6–6.2)
SODIUM SERPL-SCNC: 141 MMOL/L (ref 136–145)
WBC # BLD AUTO: 3.31 K/UL (ref 3.9–12.7)

## 2023-09-14 PROCEDURE — 80053 COMPREHEN METABOLIC PANEL: CPT | Performed by: FAMILY MEDICINE

## 2023-09-14 PROCEDURE — 25000003 PHARM REV CODE 250: Performed by: FAMILY MEDICINE

## 2023-09-14 PROCEDURE — 63700000 PHARM REV CODE 250 ALT 637 W/O HCPCS: Performed by: FAMILY MEDICINE

## 2023-09-14 PROCEDURE — 85025 COMPLETE CBC W/AUTO DIFF WBC: CPT | Performed by: FAMILY MEDICINE

## 2023-09-14 PROCEDURE — 99232 PR SUBSEQUENT HOSPITAL CARE,LEVL II: ICD-10-PCS | Mod: ,,, | Performed by: STUDENT IN AN ORGANIZED HEALTH CARE EDUCATION/TRAINING PROGRAM

## 2023-09-14 PROCEDURE — 63600175 PHARM REV CODE 636 W HCPCS: Performed by: FAMILY MEDICINE

## 2023-09-14 PROCEDURE — 85379 FIBRIN DEGRADATION QUANT: CPT | Performed by: FAMILY MEDICINE

## 2023-09-14 PROCEDURE — 83735 ASSAY OF MAGNESIUM: CPT | Performed by: FAMILY MEDICINE

## 2023-09-14 PROCEDURE — 21400001 HC TELEMETRY ROOM

## 2023-09-14 PROCEDURE — 84145 PROCALCITONIN (PCT): CPT | Performed by: FAMILY MEDICINE

## 2023-09-14 PROCEDURE — 63600175 PHARM REV CODE 636 W HCPCS: Mod: JZ,TB | Performed by: FAMILY MEDICINE

## 2023-09-14 PROCEDURE — 99285 EMERGENCY DEPT VISIT HI MDM: CPT

## 2023-09-14 PROCEDURE — 84100 ASSAY OF PHOSPHORUS: CPT | Performed by: FAMILY MEDICINE

## 2023-09-14 PROCEDURE — 99232 SBSQ HOSP IP/OBS MODERATE 35: CPT | Mod: ,,, | Performed by: STUDENT IN AN ORGANIZED HEALTH CARE EDUCATION/TRAINING PROGRAM

## 2023-09-14 PROCEDURE — 27000207 HC ISOLATION

## 2023-09-14 RX ADMIN — DANTROLENE SODIUM 50 MG: 25 CAPSULE ORAL at 10:09

## 2023-09-14 RX ADMIN — DONEPEZIL HYDROCHLORIDE 5 MG: 5 TABLET, FILM COATED ORAL at 09:09

## 2023-09-14 RX ADMIN — CITALOPRAM HYDROBROMIDE 20 MG: 20 TABLET ORAL at 08:09

## 2023-09-14 RX ADMIN — AZITHROMYCIN 500 MG: 250 TABLET, FILM COATED ORAL at 08:09

## 2023-09-14 RX ADMIN — CETIRIZINE HYDROCHLORIDE 10 MG: 10 TABLET, FILM COATED ORAL at 08:09

## 2023-09-14 RX ADMIN — ENOXAPARIN SODIUM 40 MG: 40 INJECTION SUBCUTANEOUS at 05:09

## 2023-09-14 RX ADMIN — BACLOFEN 20 MG: 10 TABLET ORAL at 03:09

## 2023-09-14 RX ADMIN — CEFTRIAXONE 1 G: 1 INJECTION, POWDER, FOR SOLUTION INTRAMUSCULAR; INTRAVENOUS at 07:09

## 2023-09-14 RX ADMIN — SODIUM CHLORIDE, POTASSIUM CHLORIDE, SODIUM LACTATE AND CALCIUM CHLORIDE: 600; 310; 30; 20 INJECTION, SOLUTION INTRAVENOUS at 12:09

## 2023-09-14 RX ADMIN — BACLOFEN 20 MG: 10 TABLET ORAL at 09:09

## 2023-09-14 RX ADMIN — BACLOFEN 20 MG: 10 TABLET ORAL at 08:09

## 2023-09-14 RX ADMIN — ACETAMINOPHEN 650 MG: 325 TABLET ORAL at 05:09

## 2023-09-14 RX ADMIN — REMDESIVIR 100 MG: 100 INJECTION, POWDER, LYOPHILIZED, FOR SOLUTION INTRAVENOUS at 11:09

## 2023-09-14 NOTE — SUBJECTIVE & OBJECTIVE
Past Medical History:   Diagnosis Date    Anxiety     Degenerative motor system disease     Depression     Seizure     Spastic quadriplegia     Spinocerebellar atrophy        Past Surgical History:   Procedure Laterality Date    BACLOFEN PUMP IMPLANTATION      COLONOSCOPY N/A 7/23/2020    Procedure: COLONOSCOPY;  Surgeon: Ziyad Fitch MD;  Location: [facility];  Service: Endoscopy;  Laterality: N/A;    ESOPHAGOGASTRODUODENOSCOPY N/A 7/23/2020    Procedure: EGD (ESOPHAGOGASTRODUODENOSCOPY);  Surgeon: Ziyad Fitch MD;  Location: [facility];  Service: Endoscopy;  Laterality: N/A;    FLUOROSCOPIC URODYNAMIC STUDY N/A 11/27/2018    Procedure: URODYNAMIC STUDY, FLUOROSCOPIC;  Surgeon: Tanja Okeefe MD;  Location: [facility] OR [address] [place] [address];  Service: Urology;  Laterality: N/A;  1 hour    REATTACHMENT OF MUSCLE Bilateral 2/18/2022    Procedure: PTOSIS REPAIR OF BOTH EYES;  Surgeon: Krupa Rios MD;  Location: [address];  Service: Ophthalmology;  Laterality: Bilateral;    UPPER GASTROINTESTINAL ENDOSCOPY         Review of patient's allergies indicates:   Allergen Reactions    Penicillins      Hives and Itching    Penicillin        Current Facility-Administered Medications on File Prior to Encounter   Medication    onabotulinumtoxina injection 100 Units    TETRAcaine HCL 0.5% ophthalmic solution 2 drop     Current Outpatient Medications on File Prior to Encounter   Medication Sig    baclofen (LIORESAL) 20 MG tablet Take 1 tablet (20 mg total) by mouth 3 (three) times daily.    cetirizine (ZYRTEC) 10 MG tablet TAKE 1 TABLET BY MOUTH DAILY    citalopram (CELEXA) 20 MG tablet Take 1 tablet (20 mg total) by mouth once daily.    dantrolene (DANTRIUM) 50 MG Cap TAKE 1 CAPSULE(50 MG) BY MOUTH THREE TIMES DAILY    donepeziL (ARICEPT) 5 MG tablet Take 1 tablet (5 mg total) by mouth every evening.    hydrOXYzine pamoate (VISTARIL) 25 MG Cap Take 1 capsule (25 mg total) by mouth 3 (three) times daily as needed  (sleep or anxiety).     Family History       Problem Relation (Age of Onset)    Cancer Mother    Depression Mother    Hypertension Mother    Multiple sclerosis Other    No Known Problems Brother, Son    Thyroid cancer Mother          Tobacco Use    Smoking status: Never    Smokeless tobacco: Never   Substance and Sexual Activity    Alcohol use: Not Currently     Comment: social drinker, at times    Drug use: Not Currently    Sexual activity: Never     Review of Systems:  Unable to obtain  Objective:     Vital Signs (Most Recent):  Temp: 98 °F (36.7 °C) (09/14/23 0050)  Pulse: 71 (09/14/23 0050)  Resp: 16 (09/14/23 0050)  BP: 121/79 (09/14/23 0050)  SpO2: 96 % (09/14/23 0050) Vital Signs (24h Range):  Temp:  [98 °F (36.7 °C)-102.1 °F (38.9 °C)] 98 °F (36.7 °C)  Pulse:  [71-98] 71  Resp:  [12-18] 16  SpO2:  [96 %-100 %] 96 %  BP: (121-136)/(60-86) 121/79     Weight: 68.9 kg (152 lb)  Body mass index is 19 kg/m².     Physical Exam  Vitals and nursing note reviewed.   Constitutional:       Appearance: He is well-developed.   Eyes:      Conjunctiva/sclera: Conjunctivae normal.   Cardiovascular:      Rate and Rhythm: Normal rate and regular rhythm.   Pulmonary:      Effort: Pulmonary effort is normal.      Breath sounds: Normal breath sounds.   Abdominal:      Palpations: Abdomen is soft.      Tenderness: There is no abdominal tenderness.   Skin:     General: Skin is warm and dry.   Neurological:      Mental Status: He is alert and oriented to person, place, and time.      Comments: Nonverbal but moves all extremities on command   Psychiatric:         Behavior: Behavior normal.                Significant Labs: All pertinent labs within the past 24 hours have been reviewed.  CBC:   Recent Labs   Lab 09/13/23  1836   WBC 5.53   HGB 15.1   HCT 47.3   *     CMP:   Recent Labs   Lab 09/13/23  1836      K 4.1      CO2 26   GLU 95   BUN 19   CREATININE 1.0   CALCIUM 9.3   PROT 8.1   ALBUMIN 4.2   BILITOT 0.2    ALKPHOS 52*   AST 23   ALT 17   ANIONGAP 10       Significant Imaging: I have reviewed all pertinent imaging results/findings within the past 24 hours.    Chest x-ray personally reviewed:  Bibasilar opacities

## 2023-09-14 NOTE — ASSESSMENT & PLAN NOTE
· UA with pyuria and rare bacteria.    · Unable to assess if symptomatic  · Urine culture pending, if negative can likely discontinue Rocephin.

## 2023-09-14 NOTE — HOSPITAL COURSE
Patient admitted for fever found to be COVID+ started on remdesivir, completed 3 day course and remained stable on RA. UA +WBC, culture with Enterococcus, pansensitive. ID consulted recommend linezolid 7 days.

## 2023-09-14 NOTE — ED NOTES
Pt wife expressing concerns for long wait time for room assignment and asking about a timeline. Wife notified that bed coordinator is working on room assignments and there are a lot of admits at this time. Wife states that her family member is a nurse leader on the 5th floor and says she will call her to see if the patient can have a bed on her floor. Wife informed that the 5th floor is a specialty floor and not typically used for medsurg covid positive patients. RN notified wife that she will escalate to my supervisor to see about timeline for room assignment. Wife verbalized understanding. CN notified.

## 2023-09-14 NOTE — PROGRESS NOTES
[facility] Medicine  Progress Note    Patient Name: Lisa Moreno  MRN: [id number]  Patient Class: IP- Inpatient   Admission Date: 9/13/2023  Length of Stay: 1 days  Attending Physician: Julius Coats MD  Primary Care Provider: John Nixon II, MD        Subjective:     Principal Problem:COVID-19        HPI:  42-year-old male with past medical history of ALS admitted for fever and found to be COVID positive.  Patient is mostly nonverbal but mother is able to understand some things that he says.  Mother says patient reported a sore throat.  On Sunday, patient attended a football game and mother thinks that maybe where patient contracted COVID mother says patient is usually happy and smiling all of the time.  Since Sunday, patient has been fatigued, anxious, and intermittent episodes of crying with decreased appetite.  Mother says patient felt warm so temperature was taken which was 103° F. EMS called for transport to [facility] at that time.      Overview/Hospital Course:  Patient admitted for fever found to be COVID+ started on remdesivir. Stable on RA. UA +WBC, culture in process, cont ceftriaxone pending culture.       Interval History: Stable on RA, elevated temp to 100.1. Denies dysuria, but mother reports foul smelling urine.     Review of Systems   Constitutional:  Negative for fever.   Respiratory:  Negative for shortness of breath.    Genitourinary:  Negative for dysuria.     Objective:     Vital Signs (Most Recent):  Temp: 100.1 °F (37.8 °C) (09/14/23 1547)  Pulse: 86 (09/14/23 1547)  Resp: 16 (09/14/23 1547)  BP: (!) 140/85 (09/14/23 1547)  SpO2: 98 % (09/14/23 1547) Vital Signs (24h Range):  Temp:  [98 °F (36.7 °C)-100.1 °F (37.8 °C)] 100.1 °F (37.8 °C)  Pulse:  [67-98] 86  Resp:  [12-18] 16  SpO2:  [96 %-99 %] 98 %  BP: (117-140)/(54-86) 140/85     Weight: 68.9 kg (152 lb)  Body mass index is 19 kg/m².    Intake/Output Summary (Last 24 hours) at 9/14/2023 8236  Last data filed  at 9/14/2023 0035  Gross per 24 hour   Intake 500 ml   Output 300 ml   Net 200 ml         Physical Exam  Vitals and nursing note reviewed.   Constitutional:       Appearance: He is well-developed.   Eyes:      Conjunctiva/sclera: Conjunctivae normal.   Cardiovascular:      Rate and Rhythm: Normal rate and regular rhythm.   Pulmonary:      Effort: Pulmonary effort is normal.      Breath sounds: Normal breath sounds.   Abdominal:      Palpations: Abdomen is soft.      Tenderness: There is no abdominal tenderness.   Skin:     General: Skin is warm and dry.   Neurological:      Mental Status: He is alert and oriented to person, place, and time.      Comments: Nonverbal but moves all extremities on command   Psychiatric:         Behavior: Behavior normal.             Significant Labs: All pertinent labs within the past 24 hours have been reviewed.    Significant Imaging: I have reviewed all pertinent imaging results/findings within the past 24 hours.      Assessment/Plan:      * COVID-19  · Currently not requiring oxygen, no steroids indicated and therapeutic dose Lovenox not indicated.  Continue DVT prophylaxis dose.  · Cont Remdesivir   · Inflammatory markers : elevated CRP, normal procal   · Blood cultures ordered in the ED: NGTD   · Started IV antibiotics with bibasilar opacities on CXR.  Procalcitonin neg and respiratory status as baseline, stable on RA.    DNR (do not resuscitate)  · Discussed with mother who confirms DNR status      ALS (amyotrophic lateral sclerosis)  · Continue home medication      Abnormal urine  · UA with pyuria and rare bacteria.    · Unable to assess if symptomatic  · Urine culture pending, if negative can likely discontinue Rocephin.  · Ucx in process, cont abx until results        VTE Risk Mitigation (From admission, onward)         Ordered     enoxaparin injection 40 mg  Daily         09/13/23 2255     IP VTE HIGH RISK PATIENT  Once         09/13/23 2255                Discharge Planning    MARCELLE: 9/16/2023     Code Status: DNR   Is the patient medically ready for discharge?:     Reason for patient still in hospital (select all that apply): Patient trending condition and Treatment                     Paty Crowe MD  Department of Hospital Medicine   [facility] Surg

## 2023-09-14 NOTE — ASSESSMENT & PLAN NOTE
· Currently not requiring oxygen, no steroids indicated and therapeutic dose Lovenox not indicated.  Continue DVT prophylaxis dose.  · Remdesivir ordered  · Inflammatory markers pending  · Blood cultures ordered in the ED  · Started IV antibiotics with bibasilar opacities on CXR.  Procalcitonin pending, if negative can likely discontinue antibiotics.

## 2023-09-14 NOTE — SUBJECTIVE & OBJECTIVE
Interval History: Stable on RA, elevated temp to 100.1. Denies dysuria, but mother reports foul smelling urine.     Review of Systems   Constitutional:  Negative for fever.   Respiratory:  Negative for shortness of breath.    Genitourinary:  Negative for dysuria.     Objective:     Vital Signs (Most Recent):  Temp: 100.1 °F (37.8 °C) (09/14/23 1547)  Pulse: 86 (09/14/23 1547)  Resp: 16 (09/14/23 1547)  BP: (!) 140/85 (09/14/23 1547)  SpO2: 98 % (09/14/23 1547) Vital Signs (24h Range):  Temp:  [98 °F (36.7 °C)-100.1 °F (37.8 °C)] 100.1 °F (37.8 °C)  Pulse:  [67-98] 86  Resp:  [12-18] 16  SpO2:  [96 %-99 %] 98 %  BP: (117-140)/(54-86) 140/85     Weight: 68.9 kg (152 lb)  Body mass index is 19 kg/m².    Intake/Output Summary (Last 24 hours) at 9/14/2023 1754  Last data filed at 9/14/2023 0035  Gross per 24 hour   Intake 500 ml   Output 300 ml   Net 200 ml         Physical Exam  Vitals and nursing note reviewed.   Constitutional:       Appearance: He is well-developed.   Eyes:      Conjunctiva/sclera: Conjunctivae normal.   Cardiovascular:      Rate and Rhythm: Normal rate and regular rhythm.   Pulmonary:      Effort: Pulmonary effort is normal.      Breath sounds: Normal breath sounds.   Abdominal:      Palpations: Abdomen is soft.      Tenderness: There is no abdominal tenderness.   Skin:     General: Skin is warm and dry.   Neurological:      Mental Status: He is alert and oriented to person, place, and time.      Comments: Nonverbal but moves all extremities on command   Psychiatric:         Behavior: Behavior normal.             Significant Labs: All pertinent labs within the past 24 hours have been reviewed.    Significant Imaging: I have reviewed all pertinent imaging results/findings within the past 24 hours.

## 2023-09-14 NOTE — ASSESSMENT & PLAN NOTE
· UA with pyuria and rare bacteria.    · Unable to assess if symptomatic  · Urine culture pending, if negative can likely discontinue Rocephin.  · Ucx in process, cont abx until results

## 2023-09-14 NOTE — HPI
42-year-old male with past medical history of ALS admitted for fever and found to be COVID positive.  Patient is mostly nonverbal but mother is able to understand some things that he says.  Mother says patient reported a sore throat.  On Sunday, patient attended a football game and mother thinks that maybe where patient contracted COVID mother says patient is usually happy and smiling all of the time.  Since Sunday, patient has been fatigued, anxious, and intermittent episodes of crying with decreased appetite.  Mother says patient felt warm so temperature was taken which was 103° F. EMS [place] [facility] [place].

## 2023-09-14 NOTE — NURSING
Nurses Note -- 4 Eyes      9/14/2023   6:54 PM      Skin assessed during: Admit      [x] No Altered Skin Integrity Present    []Prevention Measures Documented      [] Yes- Altered Skin Integrity Present or Discovered   [] LDA Added if Not in Epic (Describe Wound)   [] New Altered Skin Integrity was Present on Admit and Documented in LDA   [] Wound Image Taken    Wound Care Consulted? No    Attending Nurse:  Marisol Shirley RN/Staff Member:  Elizabeth

## 2023-09-14 NOTE — H&P
[facility] - Emergency Dept  [facility] Medicine  History & Physical    Patient Name: Lisa Moreno  MRN: [id number]  Patient Class: IP- Inpatient  Admission Date: 9/13/2023  Attending Physician: Julius Coats MD   Primary Care Provider: John Nixon II, MD         Patient information was obtained from relative(s) and ER records.     Subjective:     Principal Problem:COVID-19    Chief Complaint:   Chief Complaint   Patient presents with    Fever     Pt with hx of ALS family states fever x1 day also headache        HPI: 42-year-old male with past medical history of ALS admitted for fever and found to be COVID positive.  Patient is mostly nonverbal but mother is able to understand some things that he says.  Mother says patient reported a sore throat.  On Sunday, patient attended a football game and mother thinks that maybe where patient contracted COVID mother says patient is usually happy and smiling all of the time.  Since Sunday, patient has been fatigued, anxious, and intermittent episodes of crying with decreased appetite.  Mother says patient felt warm so temperature was taken which was 103° F. EMS called for transport to [facility] at that time.      Past Medical History:   Diagnosis Date    Anxiety     Degenerative motor system disease     Depression     Seizure     Spastic quadriplegia     Spinocerebellar atrophy        Past Surgical History:   Procedure Laterality Date    BACLOFEN PUMP IMPLANTATION      COLONOSCOPY N/A 7/23/2020    Procedure: COLONOSCOPY;  Surgeon: Ziyad Fitch MD;  Location: [facility];  Service: Endoscopy;  Laterality: N/A;    ESOPHAGOGASTRODUODENOSCOPY N/A 7/23/2020    Procedure: EGD (ESOPHAGOGASTRODUODENOSCOPY);  Surgeon: Ziyad Fitch MD;  Location: [facility];  Service: Endoscopy;  Laterality: N/A;    FLUOROSCOPIC URODYNAMIC STUDY N/A 11/27/2018    Procedure: URODYNAMIC STUDY, FLUOROSCOPIC;  Surgeon: Tanja Okeefe MD;  Location: [facility] OR [address] [place] [address];   Service: Urology;  Laterality: N/A;  1 hour    REATTACHMENT OF MUSCLE Bilateral 2/18/2022    Procedure: PTOSIS REPAIR OF BOTH EYES;  Surgeon: Krupa Rios MD;  Location: [facility] OR [address] [place] [address];  Service: Ophthalmology;  Laterality: Bilateral;    UPPER GASTROINTESTINAL ENDOSCOPY         Review of patient's allergies indicates:   Allergen Reactions    Penicillins      Hives and Itching    Penicillin        Current Facility-Administered Medications on File Prior to Encounter   Medication    onabotulinumtoxina injection 100 Units    TETRAcaine HCL 0.5% ophthalmic solution 2 drop     Current Outpatient Medications on File Prior to Encounter   Medication Sig    baclofen (LIORESAL) 20 MG tablet Take 1 tablet (20 mg total) by mouth 3 (three) times daily.    cetirizine (ZYRTEC) 10 MG tablet TAKE 1 TABLET BY MOUTH DAILY    citalopram (CELEXA) 20 MG tablet Take 1 tablet (20 mg total) by mouth once daily.    dantrolene (DANTRIUM) 50 MG Cap TAKE 1 CAPSULE(50 MG) BY MOUTH THREE TIMES DAILY    donepeziL (ARICEPT) 5 MG tablet Take 1 tablet (5 mg total) by mouth every evening.    hydrOXYzine pamoate (VISTARIL) 25 MG Cap Take 1 capsule (25 mg total) by mouth 3 (three) times daily as needed (sleep or anxiety).     Family History       Problem Relation (Age of Onset)    Cancer Mother    Depression Mother    Hypertension Mother    Multiple sclerosis Other    No Known Problems Brother, Son    Thyroid cancer Mother          Tobacco Use    Smoking status: Never    Smokeless tobacco: Never   Substance and Sexual Activity    Alcohol use: Not Currently     Comment: social drinker, at times    Drug use: Not Currently    Sexual activity: Never     Review of Systems:  Unable to obtain  Objective:     Vital Signs (Most Recent):  Temp: 98 °F (36.7 °C) (09/14/23 0050)  Pulse: 71 (09/14/23 0050)  Resp: 16 (09/14/23 0050)  BP: 121/79 (09/14/23 0050)  SpO2: 96 % (09/14/23 0050) Vital Signs (24h Range):  Temp:  [98 °F (36.7 °C)-102.1 °F  (38.9 °C)] 98 °F (36.7 °C)  Pulse:  [71-98] 71  Resp:  [12-18] 16  SpO2:  [96 %-100 %] 96 %  BP: (121-136)/(60-86) 121/79     Weight: 68.9 kg (152 lb)  Body mass index is 19 kg/m².     Physical Exam  Vitals and nursing note reviewed.   Constitutional:       Appearance: He is well-developed.   Eyes:      Conjunctiva/sclera: Conjunctivae normal.   Cardiovascular:      Rate and Rhythm: Normal rate and regular rhythm.   Pulmonary:      Effort: Pulmonary effort is normal.      Breath sounds: Normal breath sounds.   Abdominal:      Palpations: Abdomen is soft.      Tenderness: There is no abdominal tenderness.   Skin:     General: Skin is warm and dry.   Neurological:      Mental Status: He is alert and oriented to person, place, and time.      Comments: Nonverbal but moves all extremities on command   Psychiatric:         Behavior: Behavior normal.                Significant Labs: All pertinent labs within the past 24 hours have been reviewed.  CBC:   Recent Labs   Lab 09/13/23  1836   WBC 5.53   HGB 15.1   HCT 47.3   *     CMP:   Recent Labs   Lab 09/13/23  1836      K 4.1      CO2 26   GLU 95   BUN 19   CREATININE 1.0   CALCIUM 9.3   PROT 8.1   ALBUMIN 4.2   BILITOT 0.2   ALKPHOS 52*   AST 23   ALT 17   ANIONGAP 10       Significant Imaging: I have reviewed all pertinent imaging results/findings within the past 24 hours.    Chest x-ray personally reviewed:  Bibasilar opacities    Assessment/Plan:     * COVID-19  · Currently not requiring oxygen, no steroids indicated and therapeutic dose Lovenox not indicated.  Continue DVT prophylaxis dose.  · Remdesivir ordered  · Inflammatory markers pending  · Blood cultures ordered in the ED  · Started IV antibiotics with bibasilar opacities on CXR.  Procalcitonin pending, if negative can likely discontinue antibiotics.    Abnormal urine  · UA with pyuria and rare bacteria.    · Unable to assess if symptomatic  · Urine culture pending, if negative can likely  discontinue Rocephin.      ALS (amyotrophic lateral sclerosis)  · Continue home medication      DNR (do not resuscitate)  · Discussed with mother who confirms DNR status      VTE Risk Mitigation (From admission, onward)         Ordered     enoxaparin injection 40 mg  Daily         09/13/23 2255     IP VTE HIGH RISK PATIENT  Once         09/13/23 2255                           Christo Chan III, MD  Department of Hospital Medicine  [facility] - Emergency Dept

## 2023-09-14 NOTE — ASSESSMENT & PLAN NOTE
· Currently not requiring oxygen, no steroids indicated and therapeutic dose Lovenox not indicated.  Continue DVT prophylaxis dose.  · Cont Remdesivir   · Inflammatory markers : elevated CRP, normal procal   · Blood cultures ordered in the ED: NGTD   · Started IV antibiotics with bibasilar opacities on CXR.  Procalcitonin neg and respiratory status as baseline, stable on RA.

## 2023-09-14 NOTE — ED NOTES
Patient identifiers verified and correct for [facility]  LOC: The patient is awake, alert and aware of environment with an appropriate affect, the patient is oriented x 3 and able to nod but not verbalize much  APPEARANCE: Patient appears comfortable and in no acute distress, patient is clean and well groomed.  SKIN: The skin is warm and dry, color consistent with ethnicity, patient has normal skin turgor and moist mucus membranes, skin intact, no breakdown or bruising noted. 20 gauge to right upper arm. No redness, swelling or pain noted. 100 ml of LR infusing at 100 ml/hr. Dressing clean, dry and intact.  MUSCULOSKELETAL: Patient has some ROM in extremities consistent with disease process, no swelling noted.  RESPIRATORY: Airway is open and patent, respirations are spontaneous, patient has a normal effort and rate, no accessory muscle use noted, O2 Sat 97% on room air.  CARDIAC: Patient has a normal rate and regular rhythm, no edema noted, capillary refill < 3 seconds.   GASTRO: Soft and non tender to palpation, no distention noted, Pt states bowel movements have been regular.  : Pt denies any pain or frequency with urination.  NEURO: Pt opens eyes spontaneously, behavior appropriate to situation, follows commands, facial expression symmetrical

## 2023-09-15 LAB
ALBUMIN SERPL BCP-MCNC: 3.4 G/DL (ref 3.5–5.2)
ALP SERPL-CCNC: 38 U/L (ref 55–135)
ALT SERPL W/O P-5'-P-CCNC: 21 U/L (ref 10–44)
ANION GAP SERPL CALC-SCNC: 10 MMOL/L (ref 8–16)
AST SERPL-CCNC: 31 U/L (ref 10–40)
BASOPHILS # BLD AUTO: 0.01 K/UL (ref 0–0.2)
BASOPHILS NFR BLD: 0.3 % (ref 0–1.9)
BILIRUB SERPL-MCNC: 0.2 MG/DL (ref 0.1–1)
BUN SERPL-MCNC: 17 MG/DL (ref 6–20)
CALCIUM SERPL-MCNC: 8.3 MG/DL (ref 8.7–10.5)
CHLORIDE SERPL-SCNC: 105 MMOL/L (ref 95–110)
CO2 SERPL-SCNC: 23 MMOL/L (ref 23–29)
CREAT SERPL-MCNC: 0.8 MG/DL (ref 0.5–1.4)
DIFFERENTIAL METHOD: ABNORMAL
EOSINOPHIL # BLD AUTO: 0.1 K/UL (ref 0–0.5)
EOSINOPHIL NFR BLD: 1.7 % (ref 0–8)
ERYTHROCYTE [DISTWIDTH] IN BLOOD BY AUTOMATED COUNT: 13.2 % (ref 11.5–14.5)
EST. GFR  (NO RACE VARIABLE): >60 ML/MIN/1.73 M^2
GLUCOSE SERPL-MCNC: 70 MG/DL (ref 70–110)
HCT VFR BLD AUTO: 40.3 % (ref 40–54)
HGB BLD-MCNC: 13.1 G/DL (ref 14–18)
IMM GRANULOCYTES # BLD AUTO: 0.01 K/UL (ref 0–0.04)
IMM GRANULOCYTES NFR BLD AUTO: 0.3 % (ref 0–0.5)
LYMPHOCYTES # BLD AUTO: 1.1 K/UL (ref 1–4.8)
LYMPHOCYTES NFR BLD: 31.7 % (ref 18–48)
MAGNESIUM SERPL-MCNC: 1.6 MG/DL (ref 1.6–2.6)
MCH RBC QN AUTO: 27 PG (ref 27–31)
MCHC RBC AUTO-ENTMCNC: 32.5 G/DL (ref 32–36)
MCV RBC AUTO: 83 FL (ref 82–98)
MONOCYTES # BLD AUTO: 0.6 K/UL (ref 0.3–1)
MONOCYTES NFR BLD: 17.7 % (ref 4–15)
NEUTROPHILS # BLD AUTO: 1.7 K/UL (ref 1.8–7.7)
NEUTROPHILS NFR BLD: 48.3 % (ref 38–73)
NRBC BLD-RTO: 0 /100 WBC
PHOSPHATE SERPL-MCNC: 3.3 MG/DL (ref 2.7–4.5)
PLATELET # BLD AUTO: 120 K/UL (ref 150–450)
PMV BLD AUTO: 11.1 FL (ref 9.2–12.9)
POTASSIUM SERPL-SCNC: 3.9 MMOL/L (ref 3.5–5.1)
PROT SERPL-MCNC: 6.4 G/DL (ref 6–8.4)
RBC # BLD AUTO: 4.86 M/UL (ref 4.6–6.2)
SODIUM SERPL-SCNC: 138 MMOL/L (ref 136–145)
WBC # BLD AUTO: 3.56 K/UL (ref 3.9–12.7)

## 2023-09-15 PROCEDURE — 25000003 PHARM REV CODE 250: Performed by: FAMILY MEDICINE

## 2023-09-15 PROCEDURE — 99223 PR INITIAL HOSPITAL CARE,LEVL III: ICD-10-PCS | Mod: ,,, | Performed by: PHYSICIAN ASSISTANT

## 2023-09-15 PROCEDURE — 99232 PR SUBSEQUENT HOSPITAL CARE,LEVL II: ICD-10-PCS | Mod: ,,, | Performed by: STUDENT IN AN ORGANIZED HEALTH CARE EDUCATION/TRAINING PROGRAM

## 2023-09-15 PROCEDURE — 27000207 HC ISOLATION

## 2023-09-15 PROCEDURE — 99232 SBSQ HOSP IP/OBS MODERATE 35: CPT | Mod: ,,, | Performed by: STUDENT IN AN ORGANIZED HEALTH CARE EDUCATION/TRAINING PROGRAM

## 2023-09-15 PROCEDURE — 99223 1ST HOSP IP/OBS HIGH 75: CPT | Mod: ,,, | Performed by: PHYSICIAN ASSISTANT

## 2023-09-15 PROCEDURE — 80053 COMPREHEN METABOLIC PANEL: CPT | Performed by: FAMILY MEDICINE

## 2023-09-15 PROCEDURE — 63700000 PHARM REV CODE 250 ALT 637 W/O HCPCS: Performed by: FAMILY MEDICINE

## 2023-09-15 PROCEDURE — 99499 UNLISTED E&M SERVICE: CPT | Mod: ,,, | Performed by: PHYSICIAN ASSISTANT

## 2023-09-15 PROCEDURE — 84100 ASSAY OF PHOSPHORUS: CPT | Performed by: FAMILY MEDICINE

## 2023-09-15 PROCEDURE — 36415 COLL VENOUS BLD VENIPUNCTURE: CPT | Performed by: FAMILY MEDICINE

## 2023-09-15 PROCEDURE — 63600175 PHARM REV CODE 636 W HCPCS: Performed by: STUDENT IN AN ORGANIZED HEALTH CARE EDUCATION/TRAINING PROGRAM

## 2023-09-15 PROCEDURE — 63600175 PHARM REV CODE 636 W HCPCS: Mod: JZ,TB | Performed by: FAMILY MEDICINE

## 2023-09-15 PROCEDURE — 85025 COMPLETE CBC W/AUTO DIFF WBC: CPT | Performed by: FAMILY MEDICINE

## 2023-09-15 PROCEDURE — 83735 ASSAY OF MAGNESIUM: CPT | Performed by: FAMILY MEDICINE

## 2023-09-15 PROCEDURE — 25000003 PHARM REV CODE 250: Performed by: STUDENT IN AN ORGANIZED HEALTH CARE EDUCATION/TRAINING PROGRAM

## 2023-09-15 PROCEDURE — 21400001 HC TELEMETRY ROOM

## 2023-09-15 PROCEDURE — 99499 NO LOS: ICD-10-PCS | Mod: ,,, | Performed by: PHYSICIAN ASSISTANT

## 2023-09-15 PROCEDURE — 25000003 PHARM REV CODE 250: Performed by: PHYSICIAN ASSISTANT

## 2023-09-15 RX ORDER — LINEZOLID 600 MG/1
600 TABLET, FILM COATED ORAL EVERY 12 HOURS
Status: DISCONTINUED | OUTPATIENT
Start: 2023-09-15 | End: 2023-09-16 | Stop reason: HOSPADM

## 2023-09-15 RX ADMIN — BACLOFEN 20 MG: 10 TABLET ORAL at 08:09

## 2023-09-15 RX ADMIN — LINEZOLID 600 MG: 600 TABLET, FILM COATED ORAL at 08:09

## 2023-09-15 RX ADMIN — DANTROLENE SODIUM 50 MG: 25 CAPSULE ORAL at 08:09

## 2023-09-15 RX ADMIN — BACLOFEN 20 MG: 10 TABLET ORAL at 04:09

## 2023-09-15 RX ADMIN — AZITHROMYCIN 500 MG: 250 TABLET, FILM COATED ORAL at 09:09

## 2023-09-15 RX ADMIN — ENOXAPARIN SODIUM 40 MG: 40 INJECTION SUBCUTANEOUS at 04:09

## 2023-09-15 RX ADMIN — REMDESIVIR 100 MG: 100 INJECTION, POWDER, LYOPHILIZED, FOR SOLUTION INTRAVENOUS at 09:09

## 2023-09-15 RX ADMIN — CITALOPRAM HYDROBROMIDE 20 MG: 20 TABLET ORAL at 09:09

## 2023-09-15 RX ADMIN — HYDROXYZINE PAMOATE 25 MG: 25 CAPSULE ORAL at 08:09

## 2023-09-15 RX ADMIN — BACLOFEN 20 MG: 10 TABLET ORAL at 11:09

## 2023-09-15 RX ADMIN — CETIRIZINE HYDROCHLORIDE 10 MG: 10 TABLET, FILM COATED ORAL at 09:09

## 2023-09-15 RX ADMIN — DONEPEZIL HYDROCHLORIDE 5 MG: 5 TABLET, FILM COATED ORAL at 08:09

## 2023-09-15 RX ADMIN — DANTROLENE SODIUM 50 MG: 25 CAPSULE ORAL at 04:09

## 2023-09-15 RX ADMIN — VANCOMYCIN HYDROCHLORIDE 1500 MG: 1.5 INJECTION, POWDER, LYOPHILIZED, FOR SOLUTION INTRAVENOUS at 11:09

## 2023-09-15 RX ADMIN — DANTROLENE SODIUM 50 MG: 25 CAPSULE ORAL at 11:09

## 2023-09-15 NOTE — ASSESSMENT & PLAN NOTE
· UA with pyuria and rare bacteria.    · Unable to assess if symptomatic  Urine culture Enterococcus, see UTI. Start linezolid per ID recs

## 2023-09-15 NOTE — ASSESSMENT & PLAN NOTE
ID consult received for enterococcal UTI in setting of PCN allergy. Chart reviewed. Start Vancomycin for now.  Full consult note with recommendations to follow.      In the interim, please secure chat with any questions.    Thank you,  Emely Acevedo PA-C

## 2023-09-15 NOTE — PLAN OF CARE
[facility] - Med Surg  Initial Discharge Assessment       Primary Care Provider: John Nixon II, MD    Admission Diagnosis: Fever [R50.9]  Chest pain [R07.9]  Urinary tract infection without hematuria, site unspecified [N39.0]  COVID-19 [U07.1]    Admission Date: 9/13/2023  Expected Discharge Date: 9/16/2023    Transition of Care Barriers: None    Payor: MEDICARE / Plan: MEDICARE PART A & B / Product Type: Government /     Extended Emergency Contact Information  Primary Emergency Contact: Giovana Moreno  Address: [address] [place] [address] [facility]  Home Phone: [phone]  Mobile Phone: [phone]  Relation: Mother    Discharge Plan A: Home with family  Discharge Plan B: Home      [id number] - [place], [place] - [address] AT [facility] [address]  [address]  [place] [address]  Phone: [phone] Fax: [phone]    [id number] - [place], [place] - [address] AT [facility] [address]  [address]  [facility] [address]  Phone: [phone] Fax: [phone]      Initial Assessment (most recent)       Adult Discharge Assessment - 09/15/23 0952          Discharge Assessment    Assessment Type Discharge Planning Assessment     Confirmed/corrected address, phone number and insurance Yes     Confirmed Demographics Correct on Facesheet     Source of Information family     Does patient/caregiver understand observation status Yes     Communicated MARCELLE with patient/caregiver Yes     Reason For Admission Covid     People in Home parent(s)     Facility Arrived From: Home     Do you expect to return to your current living situation? Yes     Do you have help at home or someone to help you manage your care at home? Yes     Who are your caregiver(s) and their phone number(s)? Giovana Moreno     Prior to hospitilization cognitive status: Unable to Assess     Current cognitive status: Unable to Assess     Walking  or Climbing Stairs ambulation difficulty, requires equipment     Dressing/Bathing bathing difficulty, requires equipment     Do you have any problems with: Needs other help     Home Accessibility wheelchair accessible     Home Layout Able to live on 1st floor     Equipment Currently Used at Home lift device;wheelchair;other (see comments)     Readmission within 30 days? No     Patient currently being followed by outpatient case management? No     Do you currently have service(s) that help you manage your care at home? No     Do you take prescription medications? Yes     Do you have prescription coverage? Yes     Do you have any problems affording any of your prescribed medications? No     Is the patient taking medications as prescribed? yes     Who is going to help you get home at discharge? Family/hospital     How do you get to doctors appointments? family or friend will provide     Are you on dialysis? No     Do you take coumadin? No     DME Needed Upon Discharge  none     Discharge Plan discussed with: Parent(s)     Transition of Care Barriers None     Discharge Plan A Home with family     Discharge Plan B Home        Physical Activity    On average, how many days per week do you engage in moderate to strenuous exercise (like a brisk walk)? 1 day     On average, how many minutes do you engage in exercise at this level? 30 min        Financial Resource Strain    How hard is it for you to pay for the very basics like food, housing, medical care, and heating? Not hard at all        Housing Stability    In the last 12 months, was there a time when you were not able to pay the mortgage or rent on time? No     In the last 12 months, how many places have you lived? 1     In the last 12 months, was there a time when you did not have a steady place to sleep or slept in a shelter (including now)? No        Transportation Needs    In the past 12 months, has lack of transportation kept you from medical appointments or from  getting medications? No     In the past 12 months, has lack of transportation kept you from meetings, work, or from getting things needed for daily living? No        Food Insecurity    Within the past 12 months, you worried that your food would run out before you got the money to buy more. Never true     Within the past 12 months, the food you bought just didn't last and you didn't have money to get more. Never true        Stress    Do you feel stress - tense, restless, nervous, or anxious, or unable to sleep at night because your mind is troubled all the time - these days? Only a little        Social Connections    In a typical week, how many times do you talk on the phone with family, friends, or neighbors? Once a week     How often do you get together with friends or relatives? Once a week     How often do you attend Yazidi or Uatsdin services? 1 to 4 times per year     Do you belong to any clubs or organizations such as Yazidi groups, unions, fraternal or athletic groups, or school groups? No     How often do you attend meetings of the clubs or organizations you belong to? Never     Are you , , , , never , or living with a partner? Never         Alcohol Use    Q1: How often do you have a drink containing alcohol? Never     Q2: How many drinks containing alcohol do you have on a typical day when you are drinking? Patient does not drink     Q3: How often do you have six or more drinks on one occasion? Never

## 2023-09-15 NOTE — NURSING
Nurses Note -- 4 Eyes      9/15/2023   2:57 PM      Skin assessed during: Q Shift Change      [x] No Altered Skin Integrity Present    [x]Prevention Measures Documented      [] Yes- Altered Skin Integrity Present or Discovered   [] LDA Added if Not in Epic (Describe Wound)   [] New Altered Skin Integrity was Present on Admit and Documented in LDA   [] Wound Image Taken    Wound Care Consulted? No    Attending Nurse:  Marisol Shirley RN/Staff Member:   Ana Leroy

## 2023-09-15 NOTE — ASSESSMENT & PLAN NOTE
· Currently not requiring oxygen, no steroids indicated and therapeutic dose Lovenox not indicated.  Continue DVT prophylaxis dose.  · Cont Remdesivir 3 day course total   · Inflammatory markers : elevated CRP, normal procal   · Blood cultures ordered in the ED: NGTD   · Started IV antibiotics with bibasilar opacities on CXR likely 2/2 COVID rather than superimposed bacterial infection as procal neg and no signs or concerns for aspiration.  Procalcitonin neg and respiratory status as baseline, stable on RA- deescalate abx to linezolid, no signs of aspiration with meals

## 2023-09-15 NOTE — NURSING
Nurses Note -- 4 Eyes      9/15/2023   1:39 AM      Skin assessed during: Admit      [x] No Altered Skin Integrity Present    []Prevention Measures Documented      [] Yes- Altered Skin Integrity Present or Discovered   [] LDA Added if Not in Epic (Describe Wound)   [] New Altered Skin Integrity was Present on Admit and Documented in LDA   [] Wound Image Taken    Wound Care Consulted? No    Attending Nurse:  Ana Shirley RN/Staff Member:   Marisol

## 2023-09-15 NOTE — PROGRESS NOTES
[facility] Medicine  Progress Note    Patient Name: Lisa Moreno  MRN: [id number]  Patient Class: IP- Inpatient   Admission Date: 9/13/2023  Length of Stay: 2 days  Attending Physician: Paty Crowe MD  Primary Care Provider: John Nixon II, MD        Subjective:     Principal Problem:COVID-19        HPI:  42-year-old male with past medical history of ALS admitted for fever and found to be COVID positive.  Patient is mostly nonverbal but mother is able to understand some things that he says.  Mother says patient reported a sore throat.  On Sunday, patient attended a football game and mother thinks that maybe where patient contracted COVID mother says patient is usually happy and smiling all of the time.  Since Sunday, patient has been fatigued, anxious, and intermittent episodes of crying with decreased appetite.  Mother says patient felt warm so temperature was taken which was 103° F. EMS called for transport to [facility] at that time.      Overview/Hospital Course:  Patient admitted for fever found to be COVID+ started on remdesivir. Stable on RA. UA +WBC, culture with Enterococcus, susceptibilities pending. ID consulted recommend linezolid.       Interval History: Afebrile and stable on RA. Smiling.     Ucx enterococcus, susceptibilities pending. ID recommending linezolid.   Also recommended flagyl if concern for superimposed asp PNA, however patient without signs of aspiration with meals, procal neg and afebrile.     Cont remdesivir for 3 day course.     Review of Systems   Constitutional:  Negative for fever.   Respiratory:  Negative for shortness of breath.    Genitourinary:  Negative for dysuria.     Objective:     Vital Signs (Most Recent):  Temp: 98 °F (36.7 °C) (09/15/23 1548)  Pulse: 63 (09/15/23 1548)  Resp: 16 (09/15/23 1548)  BP: 122/77 (09/15/23 1548)  SpO2: 100 % (09/15/23 1548) Vital Signs (24h Range):  Temp:  [97 °F (36.1 °C)-98.2 °F (36.8 °C)] 98 °F (36.7 °C)  Pulse:   [18-90] 63  Resp:  [16-18] 16  SpO2:  [97 %-100 %] 100 %  BP: (112-139)/(66-88) 122/77     Weight: 68.9 kg (151 lb 14.4 oz)  Body mass index is 18.99 kg/m².    Intake/Output Summary (Last 24 hours) at 9/15/2023 1753  Last data filed at 9/15/2023 1553  Gross per 24 hour   Intake --   Output 2100 ml   Net -2100 ml           Physical Exam  Vitals and nursing note reviewed.   Constitutional:       Appearance: He is well-developed.   Eyes:      Conjunctiva/sclera: Conjunctivae normal.   Cardiovascular:      Rate and Rhythm: Normal rate and regular rhythm.   Pulmonary:      Effort: Pulmonary effort is normal.      Breath sounds: Normal breath sounds.   Abdominal:      Palpations: Abdomen is soft.      Tenderness: There is no abdominal tenderness.   Skin:     General: Skin is warm and dry.   Neurological:      Mental Status: He is alert. Mental status is at baseline.      Comments: Nonverbal but moves all extremities on command  Smiles and nods to questioning , mouths responses    Psychiatric:         Behavior: Behavior normal.             Significant Labs: All pertinent labs within the past 24 hours have been reviewed.    Significant Imaging: I have reviewed all pertinent imaging results/findings within the past 24 hours.      Assessment/Plan:      * COVID-19  · Currently not requiring oxygen, no steroids indicated and therapeutic dose Lovenox not indicated.  Continue DVT prophylaxis dose.  · Cont Remdesivir 3 day course total   · Inflammatory markers : elevated CRP, normal procal   · Blood cultures ordered in the ED: NGTD   · Started IV antibiotics with bibasilar opacities on CXR likely 2/2 COVID rather than superimposed bacterial infection as procal neg and no signs or concerns for aspiration.  Procalcitonin neg and respiratory status as baseline, stable on RA- deescalate abx to linezolid, no signs of aspiration with meals     DNR (do not resuscitate)  · Discussed with mother who confirms DNR status      ALS  (amyotrophic lateral sclerosis)  · Continue home medication      Abnormal urine  · UA with pyuria and rare bacteria.    · Unable to assess if symptomatic  Urine culture Enterococcus, see UTI. Start linezolid per ID recs    UTI (urinary tract infection)  Ucx with Enterococcus, ID recommend linezolid 7 day course        VTE Risk Mitigation (From admission, onward)         Ordered     enoxaparin injection 40 mg  Daily         09/13/23 2255     IP VTE HIGH RISK PATIENT  Once         09/13/23 2255                Discharge Planning   MARCELLE: 9/16/2023     Code Status: DNR   Is the patient medically ready for discharge?:     Reason for patient still in hospital (select all that apply): Patient trending condition and Treatment  Discharge Plan A: Home with family                  Paty Crowe MD  Department of Hospital Medicine   [facility]

## 2023-09-15 NOTE — CONSULTS
[facility]  Infectious Disease  Consult Note    Patient Name: Lisa Moreno  MRN: [id number]  Admission Date: 9/13/2023  Hospital Length of Stay: 2 days  Attending Physician: Paty Crowe MD  Primary Care Provider: John Nixon II, MD     Isolation Status: Airborne and Contact and Droplet    Patient information was obtained from past medical records and ER records.      Consults  Assessment/Plan:     Renal/  UTI (urinary tract infection)  · Urine culture is positive for Enterococcus species, susceptibilities pending. Patient has a documented PCN allergy (hives). Recommend linezolid 600 mg PO BID x 7 days. If there are concerns for superimposed bacterial pneumonia from aspiration, can add flagyl to his antibiotic regimen.   · Discussed antibiotic plan with ID staff, Dr. Newman. ID will sign off.              Thank you for your consult. I will sign off. Please contact us if you have any additional questions.    Emely Acevedo PA-C  Infectious Disease  [facility]    Subjective:     Principal Problem: COVID-19    HPI: 42-year-old male with past medical history of ALS presented to the ED for fever and found to be COVID positive.  Patient is mostly nonverbal but mother is able to understand some things that he says.  On Sunday, patient attended a football game and mother thinks that maybe where patient contracted COVID. Mother says patient is usually happy and smiling all of the time.  Since Sunday, patient has been fatigued, anxious with decreased appetite.  She took his temperature at home which was 103 which prompted him to come to the ER. Patient started on Remdesivir for COVID-19. Admission blood cultures negative. UA with 10 WBC, urine culture positive for Enterococcus. Patient has a documented PCN allergy (hives). ID consulted for antibiotic recommendations.         Past Medical History:   Diagnosis Date    Anxiety     Degenerative motor system disease     Depression      Spastic quadriplegia     Spinocerebellar atrophy        Past Surgical History:   Procedure Laterality Date    BACLOFEN PUMP IMPLANTATION      COLONOSCOPY N/A 7/23/2020    Procedure: COLONOSCOPY;  Surgeon: Ziyad Fitch MD;  Location: [facility];  Service: Endoscopy;  Laterality: N/A;    ESOPHAGOGASTRODUODENOSCOPY N/A 7/23/2020    Procedure: EGD (ESOPHAGOGASTRODUODENOSCOPY);  Surgeon: Ziyad Fitch MD;  Location: [facility];  Service: Endoscopy;  Laterality: N/A;    FLUOROSCOPIC URODYNAMIC STUDY N/A 11/27/2018    Procedure: URODYNAMIC STUDY, FLUOROSCOPIC;  Surgeon: Tanja Okeefe MD;  Location: [facility] OR [address] [place] [address];  Service: Urology;  Laterality: N/A;  1 hour    REATTACHMENT OF MUSCLE Bilateral 2/18/2022    Procedure: PTOSIS REPAIR OF BOTH EYES;  Surgeon: Krupa Rios MD;  Location: [facility] OR [address] [place] [address];  Service: Ophthalmology;  Laterality: Bilateral;    UPPER GASTROINTESTINAL ENDOSCOPY         Review of patient's allergies indicates:   Allergen Reactions    Penicillins      Hives and Itching    Penicillin        Medications:  Facility-Administered Medications Prior to Admission   Medication    onabotulinumtoxina injection 100 Units     Medications Prior to Admission   Medication Sig    baclofen (LIORESAL) 20 MG tablet Take 1 tablet (20 mg total) by mouth 3 (three) times daily.    cetirizine (ZYRTEC) 10 MG tablet TAKE 1 TABLET BY MOUTH DAILY    citalopram (CELEXA) 20 MG tablet Take 1 tablet (20 mg total) by mouth once daily.    dantrolene (DANTRIUM) 50 MG Cap TAKE 1 CAPSULE(50 MG) BY MOUTH THREE TIMES DAILY    donepeziL (ARICEPT) 5 MG tablet Take 1 tablet (5 mg total) by mouth every evening.    hydrOXYzine pamoate (VISTARIL) 25 MG Cap Take 1 capsule (25 mg total) by mouth 3 (three) times daily as needed (sleep or anxiety).     Antibiotics (From admission, onward)      Start     Stop Route Frequency Ordered    09/15/23 2300  vancomycin 1,250 mg in dextrose 5 % (D5W) 250 mL IVPB  (Vial-Mate)         -- IV Every 12 hours (non-standard times) 09/15/23 1011    09/15/23 1059  vancomycin - pharmacy to dose  (vancomycin IVPB (PEDS and ADULTS))        See Hyperspace for full Linked Orders Report.    -- IV pharmacy to manage frequency 09/15/23 1000    09/14/23 1900  cefTRIAXone (ROCEPHIN) 1 g in dextrose 5 % in water (D5W) 100 mL IVPB (MB+)         09/18/23 1859 IV Every 24 hours (non-standard times) 09/13/23 2255    09/14/23 0900  azithromycin tablet 500 mg         09/17/23 0859 Oral Daily 09/13/23 2255          Antifungals (From admission, onward)      None          Antivirals (From admission, onward)      None             Immunization History   Administered Date(s) Administered    COVID-19 MRNA, LN-S PF (MODERNA HALF 0.25 ML DOSE) 02/17/2022    COVID-19, MRNA, LN-S, PF (MODERNA FULL 0.5 ML DOSE) 02/09/2021, 03/09/2021    DTP 03/04/1980, 05/20/1980, 07/22/1980, 07/28/1981, 04/24/1984    Influenza - Quadrivalent - PF *Preferred* (6 months and older) 02/26/2015, 09/24/2019    Influenza - Trivalent - PF (ADULT) 02/26/2015    MMR 03/31/1981, 01/05/2005    OPV 03/04/1980, 05/20/1980, 07/22/1980, 07/28/1981, 04/24/1984    PPD Test 03/04/2015, 10/05/2018    Td (ADULT) 04/05/1994, 01/05/2005    Tdap 09/24/2019       Family History       Problem Relation (Age of Onset)    Cancer Mother    Depression Mother    Hypertension Mother    Multiple sclerosis Other    No Known Problems Brother, Son    Thyroid cancer Mother          Social History     Socioeconomic History    Marital status: Single   Tobacco Use    Smoking status: Never    Smokeless tobacco: Never   Substance and Sexual Activity    Alcohol use: Not Currently     Comment: social drinker, at times    Drug use: Not Currently    Sexual activity: Never     Social Determinants of Health     Financial Resource Strain: Low Risk  (9/15/2023)    Overall Financial Resource Strain (CARDIA)     Difficulty of Paying Living Expenses: Not hard at  all   Food Insecurity: No Food Insecurity (9/15/2023)    Hunger Vital Sign     Worried About Running Out of Food in the Last Year: Never true     Ran Out of Food in the Last Year: Never true   Transportation Needs: No Transportation Needs (9/15/2023)    PRAPARE - Transportation     Lack of Transportation (Medical): No     Lack of Transportation (Non-Medical): No   Physical Activity: Insufficiently Active (9/15/2023)    Exercise Vital Sign     Days of Exercise per Week: 1 day     Minutes of Exercise per Session: 30 min   Stress: No Stress Concern Present (9/15/2023)    [place] [employer] - Occupational Stress Questionnaire     Feeling of Stress : Only a little   Social Connections: Socially Isolated (9/15/2023)    Social Connection and Isolation Panel [NHANES]     Frequency of Communication with Friends and Family: Once a week     Frequency of Social Gatherings with Friends and Family: Once a week     Attends Shinto Services: 1 to 4 times per year     Active Member of Clubs or Organizations: No     Attends Club or Organization Meetings: Never     Marital Status: Never    Housing Stability: Low Risk  (9/15/2023)    Housing Stability Vital Sign     Unable to Pay for Housing in the Last Year: No     Number of Places Lived in the Last Year: 1     Unstable Housing in the Last Year: No     Review of Systems   Unable to perform ROS: Patient nonverbal     Objective:     Vital Signs (Most Recent):  Temp: 97 °F (36.1 °C) (09/15/23 1108)  Pulse: 63 (09/15/23 1438)  Resp: 16 (09/15/23 1108)  BP: 137/87 (09/15/23 1108)  SpO2: 100 % (09/15/23 1108) Vital Signs (24h Range):  Temp:  [97 °F (36.1 °C)-100.1 °F (37.8 °C)] 97 °F (36.1 °C)  Pulse:  [18-97] 63  Resp:  [16-18] 16  SpO2:  [96 %-100 %] 100 %  BP: (112-140)/(66-88) 137/87     Weight: 68.9 kg (151 lb 14.4 oz)  Body mass index is 18.99 kg/m².    Estimated Creatinine Clearance: 116 mL/min (based on SCr of 0.8 mg/dL).     Physical  Exam  Vitals and nursing note reviewed.   Constitutional:       General: He is not in acute distress.     Appearance: He is well-developed. He is not diaphoretic.   HENT:      Head: Normocephalic.   Eyes:      Conjunctiva/sclera: Conjunctivae normal.   Cardiovascular:      Rate and Rhythm: Normal rate and regular rhythm.   Pulmonary:      Effort: Pulmonary effort is normal. No respiratory distress.      Breath sounds: Normal breath sounds.   Abdominal:      General: There is no distension.      Palpations: Abdomen is soft.      Tenderness: There is no abdominal tenderness.   Musculoskeletal:      Right lower leg: No edema.      Left lower leg: No edema.   Skin:     General: Skin is warm and dry.      Findings: No rash.   Neurological:      Mental Status: He is alert.          Significant Labs: Blood Culture:   Recent Labs   Lab 09/13/23 1836   LABBLOO No Growth to date  No Growth to date  No Growth to date  No Growth to date     CBC:   Recent Labs   Lab 09/13/23  1836 09/14/23  0429 09/15/23  0519   WBC 5.53 3.31* 3.56*   HGB 15.1 12.6* 13.1*   HCT 47.3 40.6 40.3   * 120* 120*     CMP:   Recent Labs   Lab 09/13/23 1836 09/14/23 0429 09/15/23  0519    141 138   K 4.1 3.7 3.9    111* 105   CO2 26 21* 23   GLU 95 76 70   BUN 19 17 17   CREATININE 1.0 0.7 0.8   CALCIUM 9.3 8.1* 8.3*   PROT 8.1 6.2 6.4   ALBUMIN 4.2 3.2* 3.4*   BILITOT 0.2 0.2 0.2   ALKPHOS 52* 39* 38*   AST 23 23 31   ALT 17 17 21   ANIONGAP 10 9 10     Microbiology Results (last 7 days)       Procedure Component Value Units Date/Time    Urine culture [[id number]]  (Abnormal) Collected: 09/13/23 1824    Order Status: Completed Specimen: Urine from No method given Updated: 09/14/23 2136     Urine Culture, Routine ENTEROCOCCUS SPECIES  >100,000 cfu/ml  Identification and susceptibility pending      Narrative:      Specimen Source->Urine  [facility] #[id number] PER BRANDY MOORE F.III, MD 09/13/2023    23:48     Blood culture x  two cultures. Draw prior to antibiotics. [[id number]] Collected: 09/13/23 1836    Order Status: Completed Specimen: Blood from Peripheral, Upper Arm, Right Updated: 09/14/23 2012     Blood Culture, Routine No Growth to date      No Growth to date    Narrative:      Aerobic and anaerobic    Blood culture x two cultures. Draw prior to antibiotics. [[id number]] Collected: 09/13/23 1836    Order Status: Completed Specimen: Blood from Peripheral, Upper Arm, Right Updated: 09/14/23 2012     Blood Culture, Routine No Growth to date      No Growth to date    Narrative:      Aerobic and anaerobic    Urine culture [[id number]]     Order Status: Completed Specimen: Urine     Influenza A & B by Molecular [[id number]] Collected: 09/13/23 1848    Order Status: Completed Specimen: Nasopharyngeal Swab Updated: 09/13/23 2010     Influenza A, Molecular Negative     Influenza B, Molecular Negative     Flu A & B Source Nasal swab    Group A Strep, Molecular [[id number]] Collected: 09/13/23 1848    Order Status: Completed Specimen: Throat Updated: 09/13/23 1950     Group A Strep, Molecular Negative     Comment: Arcanobacterium haemolyticum and Beta Streptococcus group C   and G will not be detected by this test method.  Please order   Throat Culture ([id number]) if suspected.               Recent Lab Results         09/15/23  0519        Albumin 3.4       Alkaline Phosphatase 38       ALT 21       Anion Gap 10       AST 31       Baso # 0.01       Basophil % 0.3       BILIRUBIN TOTAL 0.2  Comment: For infants and newborns, interpretation of results should be based  on gestational age, weight and in agreement with clinical  observations.    Premature Infant recommended reference ranges:  Up to 24 hours.............<8.0 mg/dL  Up to 48 hours............<12.0 mg/dL  3-5 days..................<15.0 mg/dL  6-29 days.................<15.0 mg/dL         BUN 17       Calcium 8.3       Chloride 105       CO2 23       Creatinine 0.8       Differential  Method Automated       eGFR >60.0       Eos # 0.1       Eosinophil % 1.7       Glucose 70       Gran # (ANC) 1.7       Gran % 48.3       Hematocrit 40.3       Hemoglobin 13.1       Immature Grans (Abs) 0.01  Comment: Mild elevation in immature granulocytes is non specific and   can be seen in a variety of conditions including stress response,   acute inflammation, trauma and pregnancy. Correlation with other   laboratory and clinical findings is essential.         Immature Granulocytes 0.3       Lymph # 1.1       Lymph % 31.7       Magnesium  1.6       MCH 27.0       MCHC 32.5       MCV 83       Mono # 0.6       Mono % 17.7       MPV 11.1       nRBC 0       Phosphorus 3.3       Platelets 120       Potassium 3.9       PROTEIN TOTAL 6.4       RBC 4.86       RDW 13.2       Sodium 138       WBC 3.56               Significant Imaging:     Imaging Results              X-Ray Chest AP Portable (Final result)  Result time 09/13/23 19:50:19      Final result by Torsten De La Torre MD (09/13/23 19:50:19)                   Impression:      Bibasilar ground-glass airspace opacities.      Electronically signed by: Torsten De La Torre MD  Date:    09/13/2023  Time:    19:50               Narrative:    EXAMINATION:  XR CHEST AP PORTABLE    CLINICAL HISTORY:  Sepsis;    TECHNIQUE:  Single frontal view of the chest was performed.    COMPARISON:  10/29/2020.    FINDINGS:  Monitoring EKG leads are present.  The trachea is unremarkable.  The cardiomediastinal silhouette is within normal limits.  There is no evidence of free air beneath the hemidiaphragms.  There are no pleural effusions.  There is no evidence of a pneumothorax.  There is no evidence of pneumomediastinum.  There are bibasilar ground-glass airspace opacities.  The osseous structures are unremarkable.

## 2023-09-15 NOTE — SUBJECTIVE & OBJECTIVE
Past Medical History:   Diagnosis Date    Anxiety     Degenerative motor system disease     Depression     Spastic quadriplegia     Spinocerebellar atrophy        Past Surgical History:   Procedure Laterality Date    BACLOFEN PUMP IMPLANTATION      COLONOSCOPY N/A 7/23/2020    Procedure: COLONOSCOPY;  Surgeon: Ziyad Fitch MD;  Location: [facility];  Service: Endoscopy;  Laterality: N/A;    ESOPHAGOGASTRODUODENOSCOPY N/A 7/23/2020    Procedure: EGD (ESOPHAGOGASTRODUODENOSCOPY);  Surgeon: Ziyad Fitch MD;  Location: [facility];  Service: Endoscopy;  Laterality: N/A;    FLUOROSCOPIC URODYNAMIC STUDY N/A 11/27/2018    Procedure: URODYNAMIC STUDY, FLUOROSCOPIC;  Surgeon: Tnaja Okeefe MD;  Location: [facility] OR [address] [place] [address];  Service: Urology;  Laterality: N/A;  1 hour    REATTACHMENT OF MUSCLE Bilateral 2/18/2022    Procedure: PTOSIS REPAIR OF BOTH EYES;  Surgeon: Krupa Rios MD;  Location: [address];  Service: Ophthalmology;  Laterality: Bilateral;    UPPER GASTROINTESTINAL ENDOSCOPY         Review of patient's allergies indicates:   Allergen Reactions    Penicillins      Hives and Itching    Penicillin        Medications:  Facility-Administered Medications Prior to Admission   Medication    onabotulinumtoxina injection 100 Units     Medications Prior to Admission   Medication Sig    baclofen (LIORESAL) 20 MG tablet Take 1 tablet (20 mg total) by mouth 3 (three) times daily.    cetirizine (ZYRTEC) 10 MG tablet TAKE 1 TABLET BY MOUTH DAILY    citalopram (CELEXA) 20 MG tablet Take 1 tablet (20 mg total) by mouth once daily.    dantrolene (DANTRIUM) 50 MG Cap TAKE 1 CAPSULE(50 MG) BY MOUTH THREE TIMES DAILY    donepeziL (ARICEPT) 5 MG tablet Take 1 tablet (5 mg total) by mouth every evening.    hydrOXYzine pamoate (VISTARIL) 25 MG Cap Take 1 capsule (25 mg total) by mouth 3 (three) times daily as needed (sleep or anxiety).     Antibiotics (From admission, onward)      Start     Stop Route  Frequency Ordered    09/15/23 2300  vancomycin 1,250 mg in dextrose 5 % (D5W) 250 mL IVPB (Vial-Mate)         -- IV Every 12 hours (non-standard times) 09/15/23 1011    09/15/23 1059  vancomycin - pharmacy to dose  (vancomycin IVPB (PEDS and ADULTS))        See Shanellece for full Linked Orders Report.    -- IV pharmacy to manage frequency 09/15/23 1000    09/14/23 1900  cefTRIAXone (ROCEPHIN) 1 g in dextrose 5 % in water (D5W) 100 mL IVPB (MB+)         09/18/23 1859 IV Every 24 hours (non-standard times) 09/13/23 2255    09/14/23 0900  azithromycin tablet 500 mg         09/17/23 0859 Oral Daily 09/13/23 2255          Antifungals (From admission, onward)      None          Antivirals (From admission, onward)      None             Immunization History   Administered Date(s) Administered    COVID-19 MRNA, LN-S PF (MODERNA HALF 0.25 ML DOSE) 02/17/2022    COVID-19, MRNA, LN-S, PF (MODERNA FULL 0.5 ML DOSE) 02/09/2021, 03/09/2021    DTP 03/04/1980, 05/20/1980, 07/22/1980, 07/28/1981, 04/24/1984    Influenza - Quadrivalent - PF *Preferred* (6 months and older) 02/26/2015, 09/24/2019    Influenza - Trivalent - PF (ADULT) 02/26/2015    MMR 03/31/1981, 01/05/2005    OPV 03/04/1980, 05/20/1980, 07/22/1980, 07/28/1981, 04/24/1984    PPD Test 03/04/2015, 10/05/2018    Td (ADULT) 04/05/1994, 01/05/2005    Tdap 09/24/2019       Family History       Problem Relation (Age of Onset)    Cancer Mother    Depression Mother    Hypertension Mother    Multiple sclerosis Other    No Known Problems Brother, Son    Thyroid cancer Mother          Social History     Socioeconomic History    Marital status: Single   Tobacco Use    Smoking status: Never    Smokeless tobacco: Never   Substance and Sexual Activity    Alcohol use: Not Currently     Comment: social drinker, at times    Drug use: Not Currently    Sexual activity: Never     Social Determinants of Health     Financial Resource Strain: Low Risk  (9/15/2023)    Overall Financial  Resource Strain (CARDIA)     Difficulty of Paying Living Expenses: Not hard at all   Food Insecurity: No Food Insecurity (9/15/2023)    Hunger Vital Sign     Worried About Running Out of Food in the Last Year: Never true     Ran Out of Food in the Last Year: Never true   Transportation Needs: No Transportation Needs (9/15/2023)    PRAPARE - Transportation     Lack of Transportation (Medical): No     Lack of Transportation (Non-Medical): No   Physical Activity: Insufficiently Active (9/15/2023)    Exercise Vital Sign     Days of Exercise per Week: 1 day     Minutes of Exercise per Session: 30 min   Stress: No Stress Concern Present (9/15/2023)    [employer] - Occupational Stress Questionnaire     Feeling of Stress : Only a little   Social Connections: Socially Isolated (9/15/2023)    Social Connection and Isolation Panel [NHANES]     Frequency of Communication with Friends and Family: Once a week     Frequency of Social Gatherings with Friends and Family: Once a week     Attends Qu[employer] Services: 1 to 4 times per year     Active Member of Clubs or Organizations: No     Attends Club or Organization Meetings: Never     Marital Status: Never    Housing Stability: Low Risk  (9/15/2023)    Housing Stability Vital Sign     Unable to Pay for Housing in the Last Year: No     Number of Places Lived in the Last Year: 1     Unstable Housing in the Last Year: No     Review of Systems   Unable to perform ROS: Patient nonverbal     Objective:     Vital Signs (Most Recent):  Temp: 97 °F (36.1 °C) (09/15/23 1108)  Pulse: 63 (09/15/23 1438)  Resp: 16 (09/15/23 1108)  BP: 137/87 (09/15/23 1108)  SpO2: 100 % (09/15/23 1108) Vital Signs (24h Range):  Temp:  [97 °F (36.1 °C)-100.1 °F (37.8 °C)] 97 °F (36.1 °C)  Pulse:  [18-97] 63  Resp:  [16-18] 16  SpO2:  [96 %-100 %] 100 %  BP: (112-140)/(66-88) 137/87     Weight: 68.9 kg (151 lb 14.4 oz)  Body mass index is 18.99 kg/m².    Estimated Creatinine  Clearance: 116 mL/min (based on SCr of 0.8 mg/dL).     Physical Exam  Vitals and nursing note reviewed.   Constitutional:       General: He is not in acute distress.     Appearance: He is well-developed. He is not diaphoretic.   HENT:      Head: Normocephalic.   Eyes:      Conjunctiva/sclera: Conjunctivae normal.   Cardiovascular:      Rate and Rhythm: Normal rate and regular rhythm.   Pulmonary:      Effort: Pulmonary effort is normal. No respiratory distress.      Breath sounds: Normal breath sounds.   Abdominal:      General: There is no distension.      Palpations: Abdomen is soft.      Tenderness: There is no abdominal tenderness.   Musculoskeletal:      Right lower leg: No edema.      Left lower leg: No edema.   Skin:     General: Skin is warm and dry.      Findings: No rash.   Neurological:      Mental Status: He is alert.          Significant Labs: Blood Culture:   Recent Labs   Lab 09/13/23 1836   LABBLOO No Growth to date  No Growth to date  No Growth to date  No Growth to date     CBC:   Recent Labs   Lab 09/13/23  1836 09/14/23  0429 09/15/23  0519   WBC 5.53 3.31* 3.56*   HGB 15.1 12.6* 13.1*   HCT 47.3 40.6 40.3   * 120* 120*     CMP:   Recent Labs   Lab 09/13/23  1836 09/14/23  0429 09/15/23  0519    141 138   K 4.1 3.7 3.9    111* 105   CO2 26 21* 23   GLU 95 76 70   BUN 19 17 17   CREATININE 1.0 0.7 0.8   CALCIUM 9.3 8.1* 8.3*   PROT 8.1 6.2 6.4   ALBUMIN 4.2 3.2* 3.4*   BILITOT 0.2 0.2 0.2   ALKPHOS 52* 39* 38*   AST 23 23 31   ALT 17 17 21   ANIONGAP 10 9 10     Microbiology Results (last 7 days)       Procedure Component Value Units Date/Time    Urine culture [[id number]]  (Abnormal) Collected: 09/13/23 1824    Order Status: Completed Specimen: Urine from No method given Updated: 09/14/23 2136     Urine Culture, Routine ENTEROCOCCUS SPECIES  >100,000 cfu/ml  Identification and susceptibility pending      Narrative:      Specimen Source->Urine  [facility] #[id number] PER  BRANDY MOORE F.III, MD 09/13/2023    23:48     Blood culture x two cultures. Draw prior to antibiotics. [[id number]] Collected: 09/13/23 1836    Order Status: Completed Specimen: Blood from Peripheral, Upper Arm, Right Updated: 09/14/23 2012     Blood Culture, Routine No Growth to date      No Growth to date    Narrative:      Aerobic and anaerobic    Blood culture x two cultures. Draw prior to antibiotics. [[id number]] Collected: 09/13/23 1836    Order Status: Completed Specimen: Blood from Peripheral, Upper Arm, Right Updated: 09/14/23 2012     Blood Culture, Routine No Growth to date      No Growth to date    Narrative:      Aerobic and anaerobic    Urine culture [[id number]]     Order Status: Completed Specimen: Urine     Influenza A & B by Molecular [[id number]] Collected: 09/13/23 1848    Order Status: Completed Specimen: Nasopharyngeal Swab Updated: 09/13/23 2010     Influenza A, Molecular Negative     Influenza B, Molecular Negative     Flu A & B Source Nasal swab    Group A Strep, Molecular [[id number]] Collected: 09/13/23 1848    Order Status: Completed Specimen: Throat Updated: 09/13/23 1950     Group A Strep, Molecular Negative     Comment: Arcanobacterium haemolyticum and Beta Streptococcus group C   and G will not be detected by this test method.  Please order   Throat Culture ([id number]) if suspected.               Recent Lab Results         09/15/23  0519        Albumin 3.4       Alkaline Phosphatase 38       ALT 21       Anion Gap 10       AST 31       Baso # 0.01       Basophil % 0.3       BILIRUBIN TOTAL 0.2  Comment: For infants and newborns, interpretation of results should be based  on gestational age, weight and in agreement with clinical  observations.    Premature Infant recommended reference ranges:  Up to 24 hours.............<8.0 mg/dL  Up to 48 hours............<12.0 mg/dL  3-5 days..................<15.0 mg/dL  6-29 days.................<15.0 mg/dL         BUN 17       Calcium 8.3        Chloride 105       CO2 23       Creatinine 0.8       Differential Method Automated       eGFR >60.0       Eos # 0.1       Eosinophil % 1.7       Glucose 70       Gran # (ANC) 1.7       Gran % 48.3       Hematocrit 40.3       Hemoglobin 13.1       Immature Grans (Abs) 0.01  Comment: Mild elevation in immature granulocytes is non specific and   can be seen in a variety of conditions including stress response,   acute inflammation, trauma and pregnancy. Correlation with other   laboratory and clinical findings is essential.         Immature Granulocytes 0.3       Lymph # 1.1       Lymph % 31.7       Magnesium  1.6       MCH 27.0       MCHC 32.5       MCV 83       Mono # 0.6       Mono % 17.7       MPV 11.1       nRBC 0       Phosphorus 3.3       Platelets 120       Potassium 3.9       PROTEIN TOTAL 6.4       RBC 4.86       RDW 13.2       Sodium 138       WBC 3.56               Significant Imaging:     Imaging Results              X-Ray Chest AP Portable (Final result)  Result time 09/13/23 19:50:19      Final result by Torsten De La Torre MD (09/13/23 19:50:19)                   Impression:      Bibasilar ground-glass airspace opacities.      Electronically signed by: Torsten De La Torre MD  Date:    09/13/2023  Time:    19:50               Narrative:    EXAMINATION:  XR CHEST AP PORTABLE    CLINICAL HISTORY:  Sepsis;    TECHNIQUE:  Single frontal view of the chest was performed.    COMPARISON:  10/29/2020.    FINDINGS:  Monitoring EKG leads are present.  The trachea is unremarkable.  The cardiomediastinal silhouette is within normal limits.  There is no evidence of free air beneath the hemidiaphragms.  There are no pleural effusions.  There is no evidence of a pneumothorax.  There is no evidence of pneumomediastinum.  There are bibasilar ground-glass airspace opacities.  The osseous structures are unremarkable.

## 2023-09-15 NOTE — PROGRESS NOTES
Therapy with vancomycin complete and/or consult discontinued by provider.  Pharmacy will sign off, please re-consult as needed.    Td Hull, PharmD  Ext: [phone]

## 2023-09-15 NOTE — PROGRESS NOTES
Pharmacokinetic Initial Assessment: IV Vancomycin    Assessment/Plan:    Initiate intravenous vancomycin with loading dose of 1500 mg once followed by a maintenance dose of vancomycin 1250 mg IV every 12 hours.  This regimen is estimated to provide a trough of 10.6 mcg/mL with an AUC/JUDSON of 486 (goal 400-600). Will opt for higher dose at less frequent interval for now, but patient may require Q8H dosing.   Desired empiric serum trough concentration is 10 to 20 mcg/mL  Draw vancomycin trough level 60 min prior to fourth dose on 9/17 at approximately 1000  Pharmacy will continue to follow and monitor vancomycin.      Please contact pharmacy at extension [phone] with any questions regarding this assessment.     Thank you for the consult,   Td Hull       Patient brief summary:  Lisa Moreno is a 42 y.o. male initiated on antimicrobial therapy with IV Vancomycin for treatment of suspected urinary tract infection    Drug Allergies:   Review of patient's allergies indicates:   Allergen Reactions    Penicillins      Hives and Itching    Penicillin        Actual Body Weight:   68.9 kg    Renal Function:   Estimated Creatinine Clearance: 116 mL/min (based on SCr of 0.8 mg/dL).,     Dialysis Method (if applicable):  N/A

## 2023-09-15 NOTE — HPI
42-year-old male with past medical history of ALS presented to the ED for fever and found to be COVID positive.  Patient is mostly nonverbal but mother is able to understand some things that he says.  On Sunday, patient attended a football game and mother thinks that maybe where patient contracted COVID. Mother says patient is usually happy and smiling all of the time.  Since Sunday, patient has been fatigued, anxious with decreased appetite.  She took his temperature at home which was 103 which prompted him to come to the ER. Patient started on Remdesivir for COVID-19. Admission blood cultures negative. UA with 10 WBC, urine culture positive for Enterococcus. Patient has a documented PCN allergy (hives). ID consulted for antibiotic recommendations.

## 2023-09-15 NOTE — ASSESSMENT & PLAN NOTE
· Urine culture is positive for Enterococcus species, susceptibilities pending. Patient has a documented PCN allergy (hives). Recommend linezolid 600 mg PO BID x 7 days. If there are concerns for superimposed bacterial pneumonia from aspiration, can add flagyl to his antibiotic regimen.   · Discussed antibiotic plan with ID staff, Dr. Newman. ID will sign off.

## 2023-09-15 NOTE — SUBJECTIVE & OBJECTIVE
Interval History: Afebrile and stable on RA. Smiling.     Ucx enterococcus, susceptibilities pending. ID recommending linezolid.   Also recommended flagyl if concern for superimposed asp PNA, however patient without signs of aspiration with meals, procal neg and afebrile.     Cont remdesivir for 3 day course.     Review of Systems   Constitutional:  Negative for fever.   Respiratory:  Negative for shortness of breath.    Genitourinary:  Negative for dysuria.     Objective:     Vital Signs (Most Recent):  Temp: 98 °F (36.7 °C) (09/15/23 1548)  Pulse: 63 (09/15/23 1548)  Resp: 16 (09/15/23 1548)  BP: 122/77 (09/15/23 1548)  SpO2: 100 % (09/15/23 1548) Vital Signs (24h Range):  Temp:  [97 °F (36.1 °C)-98.2 °F (36.8 °C)] 98 °F (36.7 °C)  Pulse:  [18-90] 63  Resp:  [16-18] 16  SpO2:  [97 %-100 %] 100 %  BP: (112-139)/(66-88) 122/77     Weight: 68.9 kg (151 lb 14.4 oz)  Body mass index is 18.99 kg/m².    Intake/Output Summary (Last 24 hours) at 9/15/2023 1753  Last data filed at 9/15/2023 1553  Gross per 24 hour   Intake --   Output 2100 ml   Net -2100 ml           Physical Exam  Vitals and nursing note reviewed.   Constitutional:       Appearance: He is well-developed.   Eyes:      Conjunctiva/sclera: Conjunctivae normal.   Cardiovascular:      Rate and Rhythm: Normal rate and regular rhythm.   Pulmonary:      Effort: Pulmonary effort is normal.      Breath sounds: Normal breath sounds.   Abdominal:      Palpations: Abdomen is soft.      Tenderness: There is no abdominal tenderness.   Skin:     General: Skin is warm and dry.   Neurological:      Mental Status: He is alert. Mental status is at baseline.      Comments: Nonverbal but moves all extremities on command  Smiles and nods to questioning , mouths responses    Psychiatric:         Behavior: Behavior normal.             Significant Labs: All pertinent labs within the past 24 hours have been reviewed.    Significant Imaging: I have reviewed all pertinent imaging  results/findings within the past 24 hours.

## 2023-09-15 NOTE — CONSULTS
[facility] Surg  Infectious Disease  Consult Note    Patient Name: Lisa Moreno  MRN: [id number]  Admission Date: 9/13/2023  Hospital Length of Stay: 2 days  Attending Physician: Paty Crowe MD  Primary Care Provider: John Nixon II, MD     Inpatient consult to Infectious Diseases  Consult performed by: Emely Acevedo PA-C  Consult ordered by: Paty Crowe MD          Renal/  UTI (urinary tract infection)  ID consult received for enterococcal UTI in setting of PCN allergy. Chart reviewed. Start Vancomycin for now.  Full consult note with recommendations to follow.      In the interim, please secure chat with any questions.    Thank you,  Emely Acevedo PA-C

## 2023-09-16 ENCOUNTER — NURSE TRIAGE (OUTPATIENT)
Dept: ADMINISTRATIVE | Facility: CLINIC | Age: 43
End: 2023-09-16
Payer: MEDICARE

## 2023-09-16 VITALS
SYSTOLIC BLOOD PRESSURE: 106 MMHG | DIASTOLIC BLOOD PRESSURE: 71 MMHG | RESPIRATION RATE: 18 BRPM | OXYGEN SATURATION: 100 % | WEIGHT: 151.88 LBS | HEART RATE: 71 BPM | TEMPERATURE: 98 F | HEIGHT: 75 IN | BODY MASS INDEX: 18.88 KG/M2

## 2023-09-16 DIAGNOSIS — B95.2 UTI (URINARY TRACT INFECTION) DUE TO ENTEROCOCCUS: Primary | ICD-10-CM

## 2023-09-16 DIAGNOSIS — J69.0 ASPIRATION PNEUMONIA OF BOTH LUNGS, UNSPECIFIED ASPIRATION PNEUMONIA TYPE, UNSPECIFIED PART OF LUNG: Primary | ICD-10-CM

## 2023-09-16 DIAGNOSIS — N39.0 UTI (URINARY TRACT INFECTION) DUE TO ENTEROCOCCUS: Primary | ICD-10-CM

## 2023-09-16 DIAGNOSIS — N30.00 ACUTE CYSTITIS WITHOUT HEMATURIA: ICD-10-CM

## 2023-09-16 LAB — BACTERIA UR CULT: ABNORMAL

## 2023-09-16 PROCEDURE — 99239 PR HOSPITAL DISCHARGE DAY,>30 MIN: ICD-10-PCS | Mod: ,,, | Performed by: STUDENT IN AN ORGANIZED HEALTH CARE EDUCATION/TRAINING PROGRAM

## 2023-09-16 PROCEDURE — 25000003 PHARM REV CODE 250: Performed by: PHYSICIAN ASSISTANT

## 2023-09-16 PROCEDURE — 99239 HOSP IP/OBS DSCHRG MGMT >30: CPT | Mod: ,,, | Performed by: STUDENT IN AN ORGANIZED HEALTH CARE EDUCATION/TRAINING PROGRAM

## 2023-09-16 PROCEDURE — 25000003 PHARM REV CODE 250: Performed by: FAMILY MEDICINE

## 2023-09-16 PROCEDURE — 63700000 PHARM REV CODE 250 ALT 637 W/O HCPCS: Performed by: FAMILY MEDICINE

## 2023-09-16 RX ORDER — DOXYCYCLINE 100 MG/1
100 CAPSULE ORAL 2 TIMES DAILY
Qty: 14 CAPSULE | Refills: 0 | Status: SHIPPED | OUTPATIENT
Start: 2023-09-16 | End: 2024-03-02

## 2023-09-16 RX ORDER — TALC
6 POWDER (GRAM) TOPICAL NIGHTLY PRN
Qty: 30 TABLET | Refills: 11 | Status: SHIPPED | OUTPATIENT
Start: 2023-09-16 | End: 2024-09-15

## 2023-09-16 RX ORDER — LINEZOLID 600 MG/1
600 TABLET, FILM COATED ORAL EVERY 12 HOURS
Qty: 14 TABLET | Refills: 0 | Status: SHIPPED | OUTPATIENT
Start: 2023-09-16 | End: 2023-09-23

## 2023-09-16 RX ORDER — NITROFURANTOIN 25; 75 MG/1; MG/1
100 CAPSULE ORAL 2 TIMES DAILY
Qty: 14 CAPSULE | Refills: 0 | Status: SHIPPED | OUTPATIENT
Start: 2023-09-16 | End: 2023-09-23

## 2023-09-16 RX ORDER — LINEZOLID 600 MG/1
600 TABLET, FILM COATED ORAL EVERY 12 HOURS
Qty: 14 TABLET | Refills: 0 | Status: SHIPPED | OUTPATIENT
Start: 2023-09-16 | End: 2023-09-16

## 2023-09-16 RX ADMIN — BACLOFEN 20 MG: 10 TABLET ORAL at 08:09

## 2023-09-16 RX ADMIN — AZITHROMYCIN 500 MG: 250 TABLET, FILM COATED ORAL at 08:09

## 2023-09-16 RX ADMIN — CITALOPRAM HYDROBROMIDE 20 MG: 20 TABLET ORAL at 08:09

## 2023-09-16 RX ADMIN — DANTROLENE SODIUM 50 MG: 25 CAPSULE ORAL at 08:09

## 2023-09-16 RX ADMIN — LINEZOLID 600 MG: 600 TABLET, FILM COATED ORAL at 08:09

## 2023-09-16 RX ADMIN — CETIRIZINE HYDROCHLORIDE 10 MG: 10 TABLET, FILM COATED ORAL at 08:09

## 2023-09-16 NOTE — NURSING
Notified MD regarding of family not wanting to get lab work. Expressed how dayshift MD told patient/family they would discontinue future lab orders. Made on-call MD aware.

## 2023-09-16 NOTE — PLAN OF CARE
"  Problem: Adult Inpatient Plan of Care  Goal: Plan of Care Review  Outcome: Ongoing, Progressing  Goal: Patient-Specific Goal (Individualized)  Outcome: Ongoing, Progressing  Goal: Absence of Hospital-Acquired Illness or Injury  Outcome: Ongoing, Progressing  Goal: Optimal Comfort and Wellbeing  Outcome: Ongoing, Progressing  Goal: Readiness for Transition of Care  Outcome: Ongoing, Progressing     Patient awake during shift. Family stated, "how anxiety medication was effective, but patient did not sleep." No new events during shift.   "

## 2023-09-16 NOTE — PLAN OF CARE
[facility] Med [facility]  Discharge Final Note    Primary Care Provider: John Nixon II, MD    Expected Discharge Date: 9/16/2023    Final Discharge Note (most recent)       Final Note - 09/16/23 1142          Final Note    Assessment Type Final Discharge Note     Anticipated Discharge Disposition Home or Self Care     Hospital Resources/Appts/Education Provided Provided patient/caregiver with written discharge plan information        Post-Acute Status    Post-Acute Authorization Other     Other Status No Post-Acute Service Needs     Discharge Delays None known at this time                                  Contact Info       John Nixon II, MD   Specialty: Internal Medicine   Relationship: PCP - General    [address]  [facility] [address]   Phone: [phone]       Next Steps: Schedule an appointment as soon as possible for a visit in 1 week(s)          Home with parents. [facility] [address]40. Bedside RN notified via secure chat.    Kathleen Merlos, MSW, LCSW  [facility]  [facility] [phone]

## 2023-09-16 NOTE — PLAN OF CARE
Pt discharged to home via stretcher with belongings and mother with acadian. IV removed. Discharge packet discussed with pt and pts mother. All questions answered. New meds delivered to be picked up from pharmacy.

## 2023-09-16 NOTE — DISCHARGE SUMMARY
[facility] Medicine  Discharge Summary      Patient Name: Lisa Moreno  MRN: [id number]  JOSELITO: [id number]  Patient Class: IP- Inpatient  Admission Date: 9/13/2023  Hospital Length of Stay: 3 days  Discharge Date and Time: 9/16/23 744am  Attending Physician: Paty Crowe MD   Discharging Provider: Paty Crowe MD  Primary Care Provider: John Nixon II, MD  [facility] Medicine Team: [facility] MED D Paty Crowe MD  Primary Care Team: [facility] MED D    HPI:   42-year-old male with past medical history of ALS admitted for fever and found to be COVID positive.  Patient is mostly nonverbal but mother is able to understand some things that he says.  Mother says patient reported a sore throat.  On Sunday, patient attended a football game and mother thinks that maybe where patient contracted COVID mother says patient is usually happy and smiling all of the time.  Since Sunday, patient has been fatigued, anxious, and intermittent episodes of crying with decreased appetite.  Mother says patient felt warm so temperature was taken which was 103° F. EMS called for [facility] at that [facility].      * No surgery found *      Hospital Course:   Patient admitted for fever found to be COVID+ started on remdesivir, completed 3 day course and remained stable on RA. UA +WBC, culture with Enterococcus, pansensitive. ID consulted recommend linezolid 7 days.        Goals of Care Treatment Preferences:  Code Status: DNR      Consults:   Consults (From admission, onward)        Status Ordering Provider     Inpatient consult to Infectious Diseases  Once        Provider:  (Not yet assigned)    Completed PATY CROWE          Neuro  ALS (amyotrophic lateral sclerosis)  · Continue home medication      Renal/  Abnormal urine  · UA with pyuria and rare bacteria.    · Unable to assess if symptomatic  Urine culture Enterococcus, see UTI. Start linezolid per ID recs    UTI (urinary tract infection)  Ucx with  Enterococcus, ID recommend linezolid 7 day course      ID  * COVID-19  · Currently not requiring oxygen, no steroids indicated and therapeutic dose Lovenox not indicated.  Continue DVT prophylaxis dose.  · Cont Remdesivir 3 day course total   · Inflammatory markers : elevated CRP, normal procal   · Blood cultures ordered in the ED: NGTD   · Started IV antibiotics with bibasilar opacities on CXR likely 2/2 COVID rather than superimposed bacterial infection as procal neg and no signs or concerns for aspiration.  Procalcitonin neg and respiratory status as baseline, stable on RA- deescalate abx to linezolid, no signs of aspiration with meals     Palliative Care  DNR (do not resuscitate)  · Discussed with mother who confirms DNR status        Final Active Diagnoses:    Diagnosis Date Noted POA    PRINCIPAL PROBLEM:  COVID-19 [U07.1] 09/14/2023 Yes    Abnormal urine [R82.90] 09/14/2023 Yes    ALS (amyotrophic lateral sclerosis) [G12.21] 09/14/2023 Yes    DNR (do not resuscitate) [Z66] 09/14/2023 Yes    UTI (urinary tract infection) [N39.0] 07/18/2020 Yes      Problems Resolved During this Admission:       Discharged Condition: stable    Disposition: Home or Self Care    Follow Up:   Follow-up Information     John Nixon II, MD. Schedule an appointment as soon as possible for a visit in 1 week(s).    Specialty: Internal Medicine  Contact information:  [address]  [place] [address]  [phone]                       Patient Instructions:      Diet Adult Regular     Notify your health care provider if you experience any of the following:  temperature >100.4     Notify your health care provider if you experience any of the following:  difficulty breathing or increased cough     Activity as tolerated       Significant Diagnostic Studies: Labs: All labs within the past 24 hours have been reviewed    Pending Diagnostic Studies:     Procedure Component Value Units Date/Time    CBC with Automated  Differential [[id number]]     Order Status: Sent Lab Status: No result     Specimen: Blood     Comprehensive Metabolic Panel (CMP) [[id number]]     Order Status: Sent Lab Status: No result     Specimen: Blood     Magnesium [[id number]]     Order Status: Sent Lab Status: No result     Specimen: Blood     Phosphorus [[id number]]     Order Status: Sent Lab Status: No result     Specimen: Blood          Medications:  Reconciled Home Medications:      Medication List      START taking these medications    linezolid 600 mg Tab  Commonly known as: ZYVOX  Take 1 tablet (600 mg total) by mouth every 12 (twelve) hours. for 7 days     melatonin 3 mg tablet  Commonly known as: MELATIN  Take 2 tablets (6 mg total) by mouth nightly as needed for Insomnia.     pulse oximeter device  Commonly known as: pulse oximeter  by Apply Externally route 2 (two) times a day. Use twice daily at 8 AM and 3 PM and [facility] the [facility] in [facility] as directed.        CONTINUE taking these medications    baclofen 20 MG tablet  Commonly known as: LIORESAL  Take 1 tablet (20 mg total) by mouth 3 (three) times daily.     cetirizine 10 MG tablet  Commonly known as: ZYRTEC  TAKE 1 TABLET BY MOUTH DAILY     citalopram 20 MG tablet  Commonly known as: CeleXA  Take 1 tablet (20 mg total) by mouth once daily.     dantrolene 50 MG Cap  Commonly known as: DANTRIUM  TAKE 1 CAPSULE(50 MG) BY MOUTH THREE TIMES DAILY     donepeziL 5 MG tablet  Commonly known as: ARICEPT  Take 1 tablet (5 mg total) by mouth every evening.     hydrOXYzine pamoate 25 MG Cap  Commonly known as: VistariL  Take 1 capsule (25 mg total) by mouth 3 (three) times daily as needed (sleep or anxiety).            Indwelling Lines/Drains at time of discharge:   Lines/Drains/Airways     Drain  Duration           Male External Urinary Catheter 09/14/23 1900 1 day                Time spent on the discharge of patient: 40 minutes         Paty Crowe MD  Department of Hospital Medicine  [facility]  Med Surg

## 2023-09-16 NOTE — TELEPHONE ENCOUNTER
Patient's mother states patient was discharged from the hospital on today and prescribed Zyvox 600 mg tabs. Mother states prescription is very costly and she is unable to afford to purchase. Mother states cost of Zyvox 600 mg tabs, 14 tablets is $2000 and with the use of [facility] the prescription is $1755. Mother is requesting a new prescription for an antibiotic that is covered by patient's Insurer, Medicare    Triage RN contacted patient's discharging Hospitalist, Dr. Paty Crowe via Secure Chat for assistance with patient's mother's request.    Dr. Paty Crowe advised that a new prescription for Doxycycline 100 mg has been submitted per Infectious Disease Staff and patient notified by Staff Member, Hector. Triage RN also notified patient's mother that a new prescription has been submitted for patient. Mother states she was advised that new prescription has been submitted and Pharmacy has now closed. Mother advised to  prescription in the AM. Mother states understanding.       Reason for Disposition   [1] Caller has URGENT medicine question about med that PCP or specialist prescribed AND [2] triager unable to answer question    Additional Information   Negative: [1] Intentional drug overdose AND [2] suicidal thoughts or ideas   Negative: Drug overdose and triager unable to answer question   Negative: Caller requesting a renewal or refill of a medicine patient is currently taking   Negative: Caller requesting information unrelated to medicine   Negative: Caller requesting information about COVID-19 Vaccine   Negative: Caller requesting information about Emergency Contraception   Negative: Caller requesting information about Combined Birth Control Pills   Negative: Caller requesting information about Progestin Birth Control Pills   Negative: Caller requesting information about Post-Op pain or medicines   Negative: Caller requesting a prescription antibiotic (such as Penicillin) for Strep throat and has a  positive culture result   Negative: Caller requesting a prescription anti-viral med (such as Tamiflu) and has influenza (flu) symptoms   Negative: Immunization reaction suspected   Negative: Rash while taking a medicine or within 3 days of stopping it   Negative: [1] Asthma and [2] having symptoms of asthma (cough, wheezing, etc.)   Negative: [1] Symptom of illness (e.g., headache, abdominal pain, earache, vomiting) AND [2] more than mild   Negative: Breastfeeding questions about mother's medicines and diet   Negative: MORE THAN A DOUBLE DOSE of a prescription or over-the-counter (OTC) drug   Negative: [1] DOUBLE DOSE (an extra dose or lesser amount) of prescription drug AND [2] any symptoms (e.g., dizziness, nausea, pain, sleepiness)   Negative: [1] DOUBLE DOSE (an extra dose or lesser amount) of over-the-counter (OTC) drug AND [2] any symptoms (e.g., dizziness, nausea, pain, sleepiness)   Negative: Took another person's prescription drug   Negative: [1] DOUBLE DOSE (an extra dose or lesser amount) of prescription drug AND [2] NO symptoms  (Exception: A double dose of antibiotics.)   Negative: Diabetes drug error or overdose (e.g., took wrong type of insulin or took extra dose)   Negative: [1] Prescription not at pharmacy AND [2] was prescribed by PCP recently (Exception: Triager has access to EMR and prescription is recorded there. Go to Home Care and confirm for pharmacy.)   Negative: [1] Pharmacy calling with prescription question AND [2] triager unable to answer question    Protocols used: Medication Question Call-A-

## 2023-09-17 NOTE — PROGRESS NOTES
MOther called and zyvox prohibitive expensive.  Chart reviewed and poss concern for aspiration pna and enterococcus uti.  Doxycycline 100 mg po bid x 7 d for lung and macrobid 100mg po bid x7d sent in for uti.  Discussed with mother who will obtain RXs from pharmacy.    Hector Stevens PA-C

## 2023-09-18 LAB
BACTERIA BLD CULT: NORMAL
BACTERIA BLD CULT: NORMAL

## 2023-09-20 ENCOUNTER — PATIENT OUTREACH (OUTPATIENT)
Dept: ADMINISTRATIVE | Facility: CLINIC | Age: 43
End: 2023-09-20
Payer: MEDICARE

## 2023-09-20 NOTE — PROGRESS NOTES
C3 nurse spoke with Lisa Moreno' mother Giovana for a TCC post hospital discharge follow up call. The patient has a scheduled appointment with John Nixon II, MD on 10/04/23 @ 0800 at ALS clinic.

## 2023-09-23 ENCOUNTER — TELEPHONE (OUTPATIENT)
Dept: ADMINISTRATIVE | Facility: CLINIC | Age: 43
End: 2023-09-23
Payer: MEDICARE

## 2023-09-23 ENCOUNTER — PATIENT OUTREACH (OUTPATIENT)
Dept: ADMINISTRATIVE | Facility: OTHER | Age: 43
End: 2023-09-23
Payer: MEDICARE

## 2023-09-23 NOTE — PROGRESS NOTES
CHW - Initial Contact    This Community Health Worker updated and verified the Social Determinant of Health questionnaire with caregiver via telephone today.    Pt identified barriers of most importance are: food insecurities.   Referrals to community agencies completed with patient/caregiver consent outside of [facility] include: yes: [employer].  Referrals were put through [facility] - yes: [employer] [facility] [employer].  Support and Services: has support with mother/caregiver.  Other information discussed the patient needs / wants help with: Updated and verified SDOH with pt's mother/caregiver via telephone today, pt's mother/caregiver states pt has food insecurities, will follow up in one week to check status of referrals and pt's future needs.   Follow up required: yes.  Follow-up Outreach - Due: 9/29/2023

## 2023-09-23 NOTE — PROGRESS NOTES
CHW - Initial Contact    This Community Health Worker updated and verified the Social Determinant of Health questionnaire with caregiver via telephone today.    Pt identified barriers of most importance are: food insecurities.   Referrals to community agencies completed with patient/caregiver consent outside of [facility] include: yes: [employer].  Referrals were put through [facility] - yes: [employer] [facility] [employer].  Support and Services: No support & services have been documented.  Other information discussed the patient needs / wants help with: Updated and verified SDOH with pt's mother/caregiver via telephone today, pt's mother/caregiver states pt has food insecurities, will follow up in one week to check status of referrals and pt's future needs on CHW platform.   Follow up required: yes.  No future outreach task assigned

## 2023-09-25 DIAGNOSIS — G12.21 AMYOTROPHIC LATERAL SCLEROSIS (ALS): Primary | ICD-10-CM

## 2023-09-27 ENCOUNTER — PATIENT MESSAGE (OUTPATIENT)
Dept: INTERNAL MEDICINE | Facility: CLINIC | Age: 43
End: 2023-09-27
Payer: MEDICARE

## 2023-09-27 ENCOUNTER — TELEPHONE (OUTPATIENT)
Dept: INTERNAL MEDICINE | Facility: CLINIC | Age: 43
End: 2023-09-27
Payer: MEDICARE

## 2023-09-27 NOTE — TELEPHONE ENCOUNTER
----- Message from Farhad Quintana sent at 9/27/2023  7:21 AM CDT -----  Contact: Tanya [phone]  Tanya called regards to getting 90L form filled out she states it needs to be done today and she would like a call back please advise.

## 2023-09-27 NOTE — TELEPHONE ENCOUNTER
Pt needs 90L formed filled out by [employer] to be enrolled in school. Informed MsAilyn Giovana that Dr. Nixon wasn't in clinic today but she still wanted me to send a message to Dr. Nixon.    Steven PARKER

## 2023-09-27 NOTE — TELEPHONE ENCOUNTER
----- Message from Adore Hackett sent at 9/27/2023  9:24 AM CDT -----  Contact: [phone]  Patient is returning a phone call.  Who left a message for the patient: dr soto   Does patient know what this is regarding:  fax   Would you like a call back, or a response through your [facility] portal?: call back     Comments:

## 2023-09-27 NOTE — TELEPHONE ENCOUNTER
----- Message from Nayeli Gregory sent at 9/27/2023  3:24 PM CDT -----  Contact: [phone]  Ms Akhtar called to inquire if the paper work can be faxed back to her and she will fill out the pt portion. Please Advise

## 2023-10-04 ENCOUNTER — CLINICAL SUPPORT (OUTPATIENT)
Dept: REHABILITATION | Facility: HOSPITAL | Age: 43
End: 2023-10-04
Attending: PSYCHIATRY & NEUROLOGY
Payer: MEDICARE

## 2023-10-04 ENCOUNTER — OFFICE VISIT (OUTPATIENT)
Dept: NEUROLOGY | Facility: CLINIC | Age: 43
End: 2023-10-04
Payer: MEDICARE

## 2023-10-04 VITALS
WEIGHT: 149.94 LBS | HEART RATE: 70 BPM | DIASTOLIC BLOOD PRESSURE: 64 MMHG | OXYGEN SATURATION: 98 % | BODY MASS INDEX: 18.64 KG/M2 | SYSTOLIC BLOOD PRESSURE: 87 MMHG | HEIGHT: 75 IN

## 2023-10-04 DIAGNOSIS — G12.29 UPPER MOTOR NEURON DISEASE: ICD-10-CM

## 2023-10-04 DIAGNOSIS — R47.1 DYSARTHRIA: ICD-10-CM

## 2023-10-04 DIAGNOSIS — F02.818 MAJOR NEUROCOGNITIVE DISORDER DUE TO ANOTHER MEDICAL CONDITION WITH BEHAVIORAL DISTURBANCE: ICD-10-CM

## 2023-10-04 DIAGNOSIS — G11.8 SPINOCEREBELLAR ATAXIA: Primary | ICD-10-CM

## 2023-10-04 DIAGNOSIS — R13.12 OROPHARYNGEAL DYSPHAGIA: ICD-10-CM

## 2023-10-04 DIAGNOSIS — Z74.3 REQUIRES CONTINUOUS SUPERVISION FOR ACTIVITIES OF DAILY LIVING (ADL): ICD-10-CM

## 2023-10-04 DIAGNOSIS — G12.29 UPPER MOTOR NEURON DISEASE: Primary | ICD-10-CM

## 2023-10-04 DIAGNOSIS — Z78.9 IMPAIRED MOBILITY AND ADLS: ICD-10-CM

## 2023-10-04 DIAGNOSIS — G12.21 AMYOTROPHIC LATERAL SCLEROSIS (ALS): ICD-10-CM

## 2023-10-04 DIAGNOSIS — G80.1 SPASTIC DIPLEGIA: ICD-10-CM

## 2023-10-04 DIAGNOSIS — G11.8 SPINOCEREBELLAR ATAXIA: ICD-10-CM

## 2023-10-04 DIAGNOSIS — Z74.09 IMPAIRED MOBILITY AND ADLS: ICD-10-CM

## 2023-10-04 DIAGNOSIS — R25.2 SPASTICITY: ICD-10-CM

## 2023-10-04 DIAGNOSIS — Z00.8 NUTRITIONAL ASSESSMENT: ICD-10-CM

## 2023-10-04 DIAGNOSIS — R49.8 HYPOPHONIA: Primary | ICD-10-CM

## 2023-10-04 DIAGNOSIS — G12.21 ALS (AMYOTROPHIC LATERAL SCLEROSIS): ICD-10-CM

## 2023-10-04 PROCEDURE — 97164 PT RE-EVAL EST PLAN CARE: CPT

## 2023-10-04 PROCEDURE — 99215 PR OFFICE/OUTPT VISIT, EST, LEVL V, 40-54 MIN: ICD-10-PCS | Mod: S$PBB,95,, | Performed by: INTERNAL MEDICINE

## 2023-10-04 PROCEDURE — 99999 PR PBB SHADOW E&M-EST. PATIENT-LVL III: ICD-10-PCS | Mod: PBBFAC,,,

## 2023-10-04 PROCEDURE — 99999 PR PBB SHADOW E&M-EST. PATIENT-LVL III: CPT | Mod: PBBFAC,,,

## 2023-10-04 PROCEDURE — 99417 PR PROLONGED SVC, OUTPT, W/WO DIRECT PT CONTACT,  EA ADDTL 15 MIN: ICD-10-PCS | Mod: S$PBB,,, | Performed by: PSYCHIATRY & NEUROLOGY

## 2023-10-04 PROCEDURE — 99215 OFFICE O/P EST HI 40 MIN: CPT | Mod: S$PBB,,, | Performed by: PSYCHIATRY & NEUROLOGY

## 2023-10-04 PROCEDURE — 99417 PROLNG OP E/M EACH 15 MIN: CPT | Mod: S$PBB,,, | Performed by: PSYCHIATRY & NEUROLOGY

## 2023-10-04 PROCEDURE — 99213 OFFICE O/P EST LOW 20 MIN: CPT | Mod: S$PBB,,, | Performed by: PHYSICAL MEDICINE & REHABILITATION

## 2023-10-04 PROCEDURE — 99213 PR OFFICE/OUTPT VISIT, EST, LEVL III, 20-29 MIN: ICD-10-PCS | Mod: S$PBB,,, | Performed by: PHYSICAL MEDICINE & REHABILITATION

## 2023-10-04 PROCEDURE — 99215 OFFICE O/P EST HI 40 MIN: CPT | Mod: S$PBB,95,, | Performed by: INTERNAL MEDICINE

## 2023-10-04 PROCEDURE — 97165 OT EVAL LOW COMPLEX 30 MIN: CPT

## 2023-10-04 PROCEDURE — 99213 OFFICE O/P EST LOW 20 MIN: CPT | Mod: PBBFAC

## 2023-10-04 PROCEDURE — 99215 PR OFFICE/OUTPT VISIT, EST, LEVL V, 40-54 MIN: ICD-10-PCS | Mod: S$PBB,,, | Performed by: PSYCHIATRY & NEUROLOGY

## 2023-10-04 PROCEDURE — 92522 EVALUATE SPEECH PRODUCTION: CPT

## 2023-10-04 NOTE — PLAN OF CARE
[facility]   Occupational Therapy Initial Neurological Evaluation     Name: Lisa Moreno  Clinic Number: [id number]    Therapy Diagnosis:   Encounter Diagnoses   Name Primary?    Amyotrophic lateral sclerosis (ALS)     Requires continuous supervision for activities of daily living (ADL)      Physician: Shelly Bray,*    Physician Orders: Eval in ALS Clinic  Medical Diagnosis: PLS (new formal diagnosis)   Evaluation Date: 10/4/2023  Insurance Authorization Period Expiration: 9/24/23  Plan of Care Certification Period: eval only  Visit # / Visits authorized: 1 / 1      Precautions:  Standard and Fall    Time In: 10:24am  Time Out: 10:45am  Total Billable Time: 10.5 minutes    Subjective     Date of Onset: 2006    History of Current Condition: Symptoms began in 2006.Lisa was normal functioning with slight lisp prior according to mother.  Pt has been in and out of therapy for the past several years. Comes to ALS clinic for education and support.     Involved Side: bilateral  Dominant Side: Right    Surgical Procedure: see chart  Imaging:  see Epic imaging    Previous Therapy: OP OT/PT    Pain:  Pain Related Behaviors Observed: No   No pain reported    Occupation:  Disability   Duties: passive    Functional Limitations/Social History:    Prior Level of Function: D  Current Level of Function: D    Current Functional Status   Home/Living environment : lives with their family (mother)  Home Access: SSH, ramp to enter, ADA bathroom  DME: shower chair, wheelchair and BSC (uses it over toilet)      Limitation of Functional Status as follows:   ADLs/IADLs:     - Feeding: Mod I after set up    - Bathing: D    - Dressing/Grooming: D    - Home Management: D    - Driving: D     Leisure: TV and [employer] games    Patient's Goals for Therapy: education/equipment needs    Past Medical History/Physical Systems Review:     Past Medical History:  Lisa Moreno  has a past medical history  of Anxiety, Degenerative motor system disease, Depression, Spastic quadriplegia, and Spinocerebellar atrophy.    Past Surgical History:  Lisa Moreno  has a past surgical history that includes Baclofen pump implantation; Fluoroscopic urodynamic study (N/A, 11/27/2018); Esophagogastroduodenoscopy (N/A, 7/23/2020); Colonoscopy (N/A, 7/23/2020); Upper gastrointestinal endoscopy; and Reattachment of muscle (Bilateral, 2/18/2022).    Current Medications:  Lisa has a current medication list which includes the following prescription(s): baclofen, cetirizine, citalopram, dantrolene, donepezil, doxycycline, hydroxyzine pamoate, melatonin, and pulse oximeter, and the following Facility-Administered Medications: tetracaine hcl 0.5%.    Allergies:  Review of patient's allergies indicates:   Allergen Reactions    Penicillins      Hives and Itching    Penicillin         Other: n/a    Objective     Cognitive Exam:  Oriented: Person and Place  Behaviors: cooperative  Follows Commands/attention: Follows two-step commands  Communication: dysarthria        Physical Exam:  Postural examination/scapula alignment: high tone  Joint integrity: Firm end feeling  Skin integrity: warm/dry  Edema: none observed   Palpation: not TTP      B/L UE AROm WFL all planes with high tone, strength also grossly WFL  Fist: tight          Gross motor coordination:   BHARTI (Rapid Alternating Movements): impaired  Finger to Nose (5 times): impaired  Finger Flicks (coordination moving from digit flexion to digit extension): impaired    Tone:  Modified Gianna Scale:   2 More marked increase in muscle tone through most of the ROM, but affected part(s) easily moved    Comments: n/a    Sensation:  Lisa  reports normal sensation    Balance:   Static Sit - POOR: Needs MODERATE assist to maintain  Dynamic sit- POOR: Needs MODERATE assist to maintain  Static Stand - POOR: Needs MODERATE assist to maintain  Dynamic stand - POOR: Needs MODERATE assist to  maintain    Endurance Deficit: mild                    Home Exercises and Patient Education Provided     Education provided:   -role of OT, goals for OT, scheduling/cancellations, insurance limitations with patient.  -Additional Education provided: n/a      Assessment     Lisa Moreno is a 42 y.o. male referred to outpatient occupational therapy and presents with a medical diagnosis of spastic diplegia.  Follow up in clinic as needed. Recommending  OT for PROM program and PWC training. He needs attendant controls for improved PWC performance and use.     Anticipated barriers to occupational therapy: diagnosis     Plan of care discussed with patient: Yes  Patient's spiritual, cultural and educational needs considered and patient is agreeable to the plan of care and goals as stated below:     Medical Necessity is demonstrated by the following  Occupational Profile/History  Co-morbidities and personal factors that may impact the plan of care [x] LOW: Brief chart review  [] MODERATE: Expanded chart review   [] HIGH: Extensive chart review    Moderate / High Support Documentation:      Examination  Performance deficits relating to physical, cognitive or psychosocial skills that result in activity limitations and/or participation restrictions  [] LOW: addressing 1-3 Performance deficits  [x] MODERATE: 3-5 Performance deficits  [] HIGH: 5+ Performance deficits (please support below)    Moderate / High Support Documentation:    Physical:  Joint Mobility  Muscle Power/Strength  Muscle Tone    Cognitive:  No Deficits    Psychosocial:    No Deficits     Treatment Options [x] LOW: Limited options  [] MODERATE: Several options  [] HIGH: Multiple options      Decision Making/ Complexity Score: low         Plan   Certification Period/Plan of care expiration: 10/4/2023 .    Follow up in ALS clinic as needed.  OT for education and PROM program.     PHILL Becker        Physician's Signature:  _________________________________________ Date: ________________

## 2023-10-04 NOTE — PROGRESS NOTES
"MNT follow-up:    Pt and his mother present for today's ALS clinic visit.  Pt last seen in clinic 3/24/2021.  Pt is in WC.  He and his mother answered questions today.  Pt continues on regular diet with good appetite, "eating whatever he wants".    Pt and his mom were in [place] following [place].  Pt was in a nursing home in August because his mom was in treatment for cancer.  Mom states she was told his weight was 148 lbs.    No nutrition-related concerns were voiced today by pt or his mother.  Will continue to follow to monitor nutritional status per ALS clinic protocol.    "

## 2023-10-04 NOTE — PROGRESS NOTES
Patient and Family Members Demographics:  Patient is a 42 year old male present today and accompanied by his mother.   Patient reports she lives with his mother.         SUPPORTS:  Mother is his only support.      TEAM ROBERT or [employer]  Patient confirmed they are not linked to team Robert.   Patient and family spoke to ALSA representative at [facility] clinic.     INSURANCE COVERAGE:  Patient confirmed the following insurance coverage:  Patient and  confirm she has Medicare  and Medicaid.     [place] benefits:  Patient confirmed they are not a .       W[place] Services:  [place]: SW provided handout out [employer] waiver for [place]  No assistance in over a year and they do have a company called [employer].  LCSW provided handout.   Told mom she can be the paid caregiver.      ADVANCE CARE PLANNING:   LCSW to follow up with medicaid contact.         SW TO FOLLOW UP: n/a

## 2023-10-04 NOTE — PROGRESS NOTES
Subjective:       Patient ID: Lisa Moreno is a 43 y.o. male.    Chief Complaint:  Follow-up (nutrition)      History of Present Illness  Symptoms began in 2006 with slurred speech.  This started to become more consistent with time.  He would stumble when walking.  He was having spasticity, so a baclofen pump was placed, but he declined with the pump so it was removed.  He was walking, but he was so bothered by the pump location he wasn't doing well.  He was always reaching back to mess with it and this would cause him to fall.  His mother doesn't know if it was mental or if it was due to weakness, but he was constantly complaining about the pump.  He is now on oral baclofen.  He has withdrawal if he stops it.       He received ALS diagnosis at [facility]. Prior to then he was never definitively diagnosed. Last visit here was in March 2021. He has recently had Covid and is now recovered. MRI Brain has also been done and showed cerebellar and brainstem atrophy.          Review of Systems  Review of Systems      Here with:  Giovana (mother)     Strength:  Declining in the LEs since last visit. Reports preserved UEs strength     Dysarthria:  Severe     Dysphagia: Mild     Sialorrhea:  Severe. Using GP 1mg TID     Constipation:  Yes     Sleep:  Poor, taking daytime naps     Gait:  non-ambulatory and using cPWC     Falls:  Denies     Breathing:  Reports good breathing despite poor objective data     Bladder:  Wears diapers     Pain:  Denies     Mood:  Good     PBA:  Denies     Memory:  Declining per family           Treatment to date:  Dantrolene 50 mg TID, Baclofen 20 TID    Objective:      Neurologic Exam     Mental Status   Oriented to person, place, and time.   Speech: (Severe dysarthria)  Level of consciousness: alert    Cranial Nerves     CN III, IV, VI   Pupils are equal, round, and reactive to light.  Extraocular motions are normal.     CN XI   Right sternocleidomastoid strength: normal  Left  sternocleidomastoid strength: normal  Right trapezius strength: normal  Left trapezius strength: normal    CN XII   Tongue: atrophic  Fasciculations: present  Weak lateral tongue strength both sides     Gait, Coordination, and Reflexes     Gait  Gait: (non-ambulatory, requires PWC)      Physical Exam  Vitals reviewed.   Constitutional:       Appearance: He is well-developed.   HENT:      Head: Normocephalic and atraumatic.   Eyes:      Extraocular Movements: Extraocular movements intact and EOM normal.      Pupils: Pupils are equal, round, and reactive to light.   Neck:      Thyroid: No thyromegaly.   Cardiovascular:      Rate and Rhythm: Normal rate.   Pulmonary:      Comments: FVC = 23% (10/4/23)  Abdominal:      Palpations: Abdomen is soft.   Musculoskeletal:      Cervical back: Normal range of motion and neck supple.   Lymphadenopathy:      Cervical: No cervical adenopathy.   Skin:     General: Skin is warm and dry.   Neurological:      Mental Status: He is alert and oriented to person, place, and time.      Cranial Nerves: Cranial nerve deficit present.      Motor: Weakness present.      Gait: Gait abnormal.   Psychiatric:         Behavior: Behavior normal.         Thought Content: Thought content normal.         ALS Physical Exam PBA Speech Facial Strength Tongue Strength Tongue Appear Limb Fasciculations Neck Flex MMT Neck Ext MMT   10/4/2023  11:37 AM No severe mild mild atrophied Present 5 5     ALS Physical Exam Shd Abd R MMT Shd Abd L MMT Wrist Ext R MMT Wrist Ext L MMT Hip Flex R MMT Hip Flex L MMT Knee Ext R MMT Knee Ext L MMT   10/4/2023  11:37 AM 5 5 5 5 1 1 1 2     ALS Physical Exam Foot DF R MMT Foot DF L MMT UMN Signs Arms Type UMN Signs Legs UMN Signs Legs Type   10/4/2023  11:37 AM 4- 4- spastic tone, 4+ Yes spastic tone, 4+       ALSFRS Score: 29  FRS-6 Score: 12                  Impression:        1. Upper motor neuron disease    2. Oropharyngeal dysphagia    3. Impaired mobility and ADLs    4.  Spasticity    5. Spastic diplegia    6. Spinocerebellar ataxia    7. Nutritional assessment    8. ALS (amyotrophic lateral sclerosis)    9. Dysarthria           Recommendations:           Problem List Items Addressed This Visit       ALS (amyotrophic lateral sclerosis)    Dysarthria    Current Assessment & Plan     He needs AAC device   - Home Health orders placed for ST to evaluate for AAC, education, and eye gaze control.         Oropharyngeal dysphagia    Relevant Orders    Fl Modified Barium Swallow Speech    SLP video swallow    Impaired mobility and ADLs    Current Assessment & Plan     Needs PWC modificatoins, training   - Home Health orders placed for PT/OT to do Viet lift training, attending control device modifications. Based upon Mr. Moreno upper extremity weaknesses and poor dexterity he will require attending control device for his custom PWC so that his caretakers can safely move Mr. Moreno by power wheelchair through the house and environs. Without attending controls Mr. Moreno will be unable to move safely throughout the home and outdoors.         Spastic diplegia    Upper motor neuron disease - Primary    Spasticity    Spinocerebellar ataxia    Overview     Seen Dr. Wise in movement disorder clinic on 2/23/2015  Diagnosis of SCA at that visit            Other Visit Diagnoses       Nutritional assessment              A total of 75 minutes was spent on this encounter and included charts and records review from other medical providers, imaging and diagnostic testing results review, patient interview and examination and discussion, documentation, and discussion of planning with the ALS Multidisciplinary Team and ALS Clinic coordinator.      Naresh Landaverde MD  [facility]

## 2023-10-04 NOTE — PROGRESS NOTES
CHIEF COMPLAINT:  Cough, Spinocerebellar atrophy  HISTORY OF PRESENT ILLNESS: Lisa Moreno is a 42 y.o. male with undefined dystonic-neuromuscular disease onset 2006 with slurred speech, progressed to loss of ambulation, presenting for multidisciplinary eval and care.  Thickener being added to all thin liquids.  Occ choking, but doing well.  No orthopnea - sleeps with HOB elevated to watch TV.   Able to speak clearly for communication when motivated.  Constipation improved with routine miralax, then was too much, now having to disimpact. Cough assist sent back 1.5 years ago due to cost, no difficulty with secretions since that time. Not on active therapy for allergies. Family observes strong cough when needed. Recurrent hospitalization for COVID - good recovery.      PAST MEDICAL HISTORY:    Past Medical History:   Diagnosis Date    Anxiety     Degenerative motor system disease     Depression     Spastic quadriplegia     Spinocerebellar atrophy        PAST SURGICAL HISTORY:    Past Surgical History:   Procedure Laterality Date    BACLOFEN PUMP IMPLANTATION      COLONOSCOPY N/A 7/23/2020    Procedure: COLONOSCOPY;  Surgeon: Ziyad Fitch MD;  Location: [facility];  Service: Endoscopy;  Laterality: N/A;    ESOPHAGOGASTRODUODENOSCOPY N/A 7/23/2020    Procedure: EGD (ESOPHAGOGASTRODUODENOSCOPY);  Surgeon: Ziyad Fitch MD;  Location: [facility];  Service: Endoscopy;  Laterality: N/A;    FLUOROSCOPIC URODYNAMIC STUDY N/A 11/27/2018    Procedure: URODYNAMIC STUDY, FLUOROSCOPIC;  Surgeon: Tanja Okeefe MD;  Location: [address];  Service: Urology;  Laterality: N/A;  1 hour    REATTACHMENT OF MUSCLE Bilateral 2/18/2022    Procedure: PTOSIS REPAIR OF BOTH EYES;  Surgeon: Krupa Rios MD;  Location: [facility] OR [address] [place] [address];  Service: Ophthalmology;  Laterality: Bilateral;    UPPER GASTROINTESTINAL ENDOSCOPY         FAMILY HISTORY:                Family History   Problem Relation Age of Onset     Thyroid cancer Mother     Hypertension Mother     Depression Mother     Cancer Mother         thyroid cancer    Multiple sclerosis Other         mother's cousin    No Known Problems Brother     No Known Problems Son     ALS Neg Hx     Muscular dystrophy Neg Hx        SOCIAL HISTORY:          Social support: lives in [place] with mother Giovana, aunt (s/p CVA), nephew (student)  Social History     Tobacco Use   Smoking Status Never   Smokeless Tobacco Never       ALLERGIES:    Review of patient's allergies indicates:   Allergen Reactions    Penicillins      Hives and Itching    Penicillin        CURRENT MEDICATIONS:    Current Outpatient Medications   Medication Sig Dispense Refill    baclofen (LIORESAL) 20 MG tablet Take 1 tablet (20 mg total) by mouth 3 (three) times daily. 270 tablet 3    cetirizine (ZYRTEC) 10 MG tablet TAKE 1 TABLET BY MOUTH DAILY 30 tablet 11    citalopram (CELEXA) 20 MG tablet Take 1 tablet (20 mg total) by mouth once daily. 90 tablet 3    dantrolene (DANTRIUM) 50 MG Cap TAKE 1 CAPSULE(50 MG) BY MOUTH THREE TIMES DAILY 270 capsule 3    donepeziL (ARICEPT) 5 MG tablet Take 1 tablet (5 mg total) by mouth every evening. 90 tablet 0    doxycycline (VIBRAMYCIN) 100 MG Cap Take 1 capsule (100 mg total) by mouth 2 (two) times daily. 14 capsule 0    hydrOXYzine pamoate (VISTARIL) 25 MG Cap Take 1 capsule (25 mg total) by mouth 3 (three) times daily as needed (sleep or anxiety). 60 capsule 3    melatonin (MELATIN) 3 mg tablet Take 2 tablets (6 mg total) by mouth nightly as needed for Insomnia. 30 tablet 11    pulse oximeter (PULSE OXIMETER) device by Apply Externally route 2 (two) times a day. Use twice daily at 8 AM and 3 PM and [facility]. (Patient not taking: Reported on 9/20/2023) 1 each 0     No current facility-administered medications for this visit.     Facility-Administered Medications Ordered in Other Visits   Medication Dose Route Frequency Provider Last Rate Last  "Admin    TETRAcaine HCL 0.5% ophthalmic solution 2 drop  2 drop Both Eyes Once Chetan De Anda MD                      REVIEW OF SYSTEMS:   Pulmonary related symptoms as per HPI.  Gen:  no weight loss, no fever, no night sweats  HEENT:  tr sinus congestion, + dysphagia, + voice changes, no sialorrhea  CV:  no chest pain, no orthopnea, no paroxysmal nocturnal dyspnea  GI:  no melena, no hematochezia, no diarrhea, + constipation.  :  no dysuria, no hematuria, ++ incontinence - in daiper  Neuro:  Not ambulatory, decreased arm, hand strength and coordination  Sleep: + rested         PHYSICAL EXAM:   Respiratory Rate: 12   Vitals:    10/04/23 0830   BP: (!) 87/64   Pulse: 70   Weight: 68 kg (149 lb 14.6 oz)   Height: 6' 3" (1.905 m)   PainSc: 0-No pain     GENERAL:  Alert, calm, in no  distress  HEENT:  Normal conjunctiva, normal EOM, nasal and oral mucosa normal, tongue normal  NECK:  supple; no palpable lymphadenopathy or masses,no jugular venous distention.  CVS: regular rate and rhythm, no murmers, gallops or rubs  PULM: Grossly decreased inspiratory capacity, normal to percussion and palpation, clear to auscultation bilaterally with no wheezes, crackles or rhonchi, + accessory muscle use with inspiratory effort  ABDOMEN:  soft nontender/nondistended,  rise with inspiratory effort, contraction with cough  EXTREMITIES no cyanosis, clubbing or edema - LE weak and atrophic  NEURO:  Focal weakness, not ambulatory    CONTRIBUTORY STUDIES:     PULMONARY FUNCTION TESTS: FVC 23% (1.43L), PCF 98 L/m  O2 sat 98%    ACTIVE PULMONARY PROBLEMS:    ICD-10-CM ICD-9-CM    1. Upper motor neuron disease  G12.29 335.29       2. Oropharyngeal dysphagia  R13.12 787.22       3. Impaired mobility and ADLs  Z74.09 V49.89     Z78.9        4. Spasticity  R25.2 781.0       5. Spastic diplegia  G80.1 343.0       6. Spinocerebellar ataxia  G11.8 334.8       7. Nutritional assessment  Z00.8 V72.85           ASSESSMENT&PLAN:  1.  " Chronic neuromuscular respiratory failure FVC, however no orthopnea, normal RR at rest, no elevation in serum HCO3, well rested - observe  2. Decreased cough efficacy on test, per family intact  3.  Allergic rhinitis - no active treatment needed at this time      No follow-ups on file.      [Greater than 50% of this 40 minute visit spent counseling patient and family]

## 2023-10-04 NOTE — PLAN OF CARE
[facility]   Physical Therapy Re-Evaluation     at [facility]     Date: 10/4/2023    Name: Lisa Moreno   MRN: [id number]    Therapy Diagnosis:   Encounter Diagnoses   Name Primary?    Amyotrophic lateral sclerosis (ALS)     Spinocerebellar ataxia Yes    Upper motor neuron disease     Major neurocognitive disorder due to another medical condition with behavioral disturbance       Physician: Dr. Ziyad Landaverde  Physician Orders: Ambulatory Referral for Physical Therapy at ALS Clinic  Plan of Care Expiration: Re-evaluation only  Medical Diagnosis: Possible PLS vs. SCA (no formal dx)     Initial Clinic Date: 3/24/2021  ALS Clinic Number: #[id number]     Time In: 10:24  Time Out: 10:45  Total Billable Time: 21 minutes     Precautions: Standard and Fall, Progressive neurological diease   SUBJECTIVE   Patient reports (per his mother): He is still eating well. However, he is now very stiff which makes it hard for transfers and mobility. She would love it if he got PT/OT. He attends [facility] [facility] during the day M-F where he gets supervision and nursing assist, but no formal therapy. She also mentioned that she needed the attendant control added to the back of PWC since it is unsafe for him to drive it on his own.    History of Present Illness: Lisa is a 42 y.o. male that presents to [facility] secondary to dx of ALS. Denies new medical changes since last clinic visit He has a dx of neuromuscular disorder, though has been given several different specific diagnoses at this time. He is wheelchair bound and requires 24 hour assistance at this time.        Medical History:   Past Medical History:   Diagnosis Date    Anxiety     Degenerative motor system disease     Depression     Spastic quadriplegia     Spinocerebellar atrophy      Surgical History:   Lisa Moreno  has a past surgical history that includes Baclofen pump implantation;  Fluoroscopic urodynamic study (N/A, 11/27/2018); Esophagogastroduodenoscopy (N/A, 7/23/2020); Colonoscopy (N/A, 7/23/2020); Upper gastrointestinal endoscopy; and Reattachment of muscle (Bilateral, 2/18/2022).  Medications:   Lisa has a current medication list which includes the following prescription(s): baclofen, cetirizine, citalopram, dantrolene, donepezil, doxycycline, hydroxyzine pamoate, melatonin, and pulse oximeter, and the following Facility-Administered Medications: tetracaine hcl 0.5%.  Allergies:   Review of patient's allergies indicates:   Allergen Reactions    Penicillins      Hives and Itching    Penicillin         Prior or current therapy: previously at [facility]  [x] denies current home health services or hospice care    Family Present at time of Eval: mother     Social History: lives with their family (mother)  Home Environment: single level home, ramped entrance   DME: walker, manual WC (from [facility]), hospital bed standard, PWC from Mr. Wheelchair     Prior Level of Function: non ambulatory for 3 years, but mod I for wc mobility  Occupation: disabled  Exercise Routine / History: none at this time  Current Level of Function: dependence      Falls: none   Near falls: none     Pain: none reported    Pt's goals: Assess for PT needs related to diagnosis    OBJECTIVE   Patient presents to clinic: manual wheelchair    Mental status: inappropriate safety awareness  Appearance: Casually dressed  Behavior:  cooperative and under reasonable behaviorable control  Attention Span and Concentration:  Impaired but improving    Posture Alignment: [] forward head  [] forward/rounded shoulders  [] head drop   [] increased thoracic kyphosis  [] sacral sitting  [] unremarkable  [x] significant extension: no trunk or hip flexion with poor postioning of pelvis, legs extended and crossed at knees    Lower Extremity ROM [x] AROM Deficits: unable to achieve flexion  [x] PROM Deficits: bilateral lower extremities  in extension with difficulty achieving flexion due to significant extensor tone      Unable to accurately assess strength due to significant extensor tone throughout bilateral lower extremities. Patient is unable to meaningfully move bilateral lower extremities throughout limited range.     Tone:  [] hypotonic [x] hypertonic/spastic       Limbs/muscles affected: [] neck musculature  [x] trunk [x] right arm  [x] left arm  [x] right leg  [x] left leg      Edema: [] dependent edema of bilateral lower extremities   [] pitting [] non-pitting  [x] none observed today     Coordination:   - fine motor: see OT note  - UE coordination: see OT note  - LE coordination: impaired    Balance:  Sitting balance static: poor  Sitting balance dynamic: poor  Standing balance static: N/A  Standing balance dynamic:  N/A    Gait:   [x] patient is no longer ambulatory     Endurance Deficit: moderate    Functional Mobility (Bed mobility, transfers)  Bed mobility: total assistance  Supine to sit: total assistance  Sit to supine: total assistance  Transfers to bed: dependence  Transfers to toilet: dependence  Tub/shower transfers: dependence  Sit to stand:   N/A  Stand pivot:  N/A  Car transfers:  uses transportation  Wheelchair mobility: dependence in tilt-in-space; not using PWC as he is unable to safely drive it       Treatment    No Treatment Provided today.     Education Provided     Education provided re: POC, Educated pt on purpose of PT services now and in the future to assist with mobility training and adaptation education as the ALS disease process progresses. Lisa verbalized poor understanding of education provided. His mother verbalized fair understanding, though would benefit from reinforcement of education in a home health setting. Pt has no cultural, educational or language barriers to learning provided.    Written Home Exercises Provided: none provided today    ASSESSMENT    Lisa is a 42 y.o. male referred to outpatient  Physical Therapy with a medical diagnosis of ALS. Pt presents with decreased strength in bilateral lower extremities with significant extensor tone throughout. Due to this tone, he requires total A for transfers and mobility. In addition, he has poor range of motion and is unable to meaningfully flex his legs or trunk for safe and comfortable positioning in his wheelchair. He presented to clinic today in a tilt-in-space, despite owning a PWC, which currently does not meet his seating and positioning needs. Reinforcement of education provided to his mother to address use of PWC for both improved mobility and positioning. He would benefit from a bout of home health therapy to address joint integrity and range of motion, as well as assist with transfer training and PWC training. In addition, PT recommends addition of an attendant control to his PWC to maximize safety with PWC mobility as he demonstrates cognitive deficits which limits his ability to safely and appropriately use the joystick to drive his PWC. Addition of an attendant control would allow for safe control and mobility (from his mother) and improve his ability to use the PWC in the community.      Equipment recommendations:    Addition of attendant control to PWC -  Wheelchair        Order/referral recommendations:    home health physical therapy and home health occupational therapy   Home health aide if possible         Pt prognosis is Guarded.     Plan of care discussed with patient: Yes  Pt's spiritual, cultural and educational needs considered and patient is agreeable to the plan of care.    Anticipated Barriers for therapy: Progressive nature of ALS     Plan   Plan of care Certification: 10/4/2023 ReEvaluation Only    Pt to continue to follow up with referring provider and ALS clinic at physician recommended intervals.       Leyla Thorne, PT

## 2023-10-04 NOTE — PROGRESS NOTES
Subjective:       Patient ID: Lisa Moreno is a 42 y.o. male.    Chief Complaint: No chief complaint on file.    HPI     Mr. Moreno is a 42-year-old black male with past medical history of spinocerebellar ataxia and spastic diplegia.  He is followed up at the ALS Clinic for suspected motor neuron disease.  He is being seen by the physical medicine service for pain management.  The patient is already followed up by Dr. Rodriguez from [facility] at [facility].   He gets injections Botox injections regularly to help with spasticity.   The patient is coming to the ALS Clinic for multidisciplinary care.  He is accompanied by his mother who is helping with the history.  His last Botox injections (right lumbar paravertebral muscles and bilateral adductor alejandro) were on 3/22/2023.  She reports that they helped him but his tone is getting worse again he has an appointment with Dr. Rodriguez for follow-up in 12/2023.      The patient denies any significant spine pain or arthralgias.     He is currently on:  baclofen  20 mg p.o. t.i.d.   dantrolene  50 mg p.o. t.i.d..    Past Medical History:   Diagnosis Date    Anxiety     Degenerative motor system disease     Depression     Spastic quadriplegia     Spinocerebellar atrophy          Review of patient's allergies indicates:   Allergen Reactions    Penicillins      Hives and Itching    Penicillin        Review of Systems   Constitutional:  Negative for chills and fever.   Respiratory:  Positive for cough. Negative for shortness of breath.    Cardiovascular:  Negative for chest pain.   Gastrointestinal:  Positive for constipation.   Musculoskeletal:  Negative for back pain and neck pain.   Neurological:  Positive for speech change and weakness.       Objective:         Physical Exam  Constitutional:       Appearance: Normal appearance.      Comments: Coming to the clinic in a manual wheelchair propelled by mother     HENT:      Head:  Normocephalic and atraumatic.   Musculoskeletal:      Comments:  BUE:  ROM:full.  Hand muscle atrophy.  Strength:    RUE: 5-/5 at shoulder abduction, 5 elbow flexion, 5- elbow extension, 5 hand .   LUE: 5/-5 at shoulder abduction, 5 elbow flexion, 5- elbow extension, 5- hand .  Sensation to pinprick:   RUE: intact.   LUE: intact.      BLE:  Severe decrease ROM and increase in tone at hips, knees, ankles (Modified Gianna spasticity scale: 4)  Strength (within range, limited by increased tone):    RLE: 1/5 at hip flexion, 2 knee extension, 2 ankle DF, 2 PF.   LLE: 1/5 at hip flexion, 2 knee extension, 2 ankle DF, 2 PF.  Sensation to pinprick:     RLE: intact.      LLE: intact.    Neurological:      Mental Status: He is alert.   Psychiatric:         Mood and Affect: Mood normal.         Behavior: Behavior normal.       Assessment:       1. Upper motor neuron disease    2. Oropharyngeal dysphagia    3. Impaired mobility and ADLs    4. Spasticity    5. Spastic diplegia    6. Spinocerebellar ataxia        Plan:       - Continue baclofen 20 mg p.o. t.i.d..  - Continue dantrolene 50 mg p.o. t.i.d..  - Follow-up with Dr. Rodriguez at the spasticity Clinic (next appointment in 12/20/2023).

## 2023-10-04 NOTE — PROGRESS NOTES
[facility]   Occupational Therapy Initial Neurological Evaluation     Name: Lisa Moreno  Clinic Number: [id number]    Therapy Diagnosis:   Encounter Diagnoses   Name Primary?    Amyotrophic lateral sclerosis (ALS)     Requires continuous supervision for activities of daily living (ADL)      Physician: Shelly Bray,*    Physician Orders: Eval in ALS Clinic  Medical Diagnosis: PLS (new formal diagnosis)   Evaluation Date: 10/4/2023  Insurance Authorization Period Expiration: 9/24/23  Plan of Care Certification Period: eval only  Visit # / Visits authorized: 1 / 1      Precautions:  Standard and Fall    Time In: 10:24am  Time Out: 10:45am  Total Billable Time: 10.5 minutes    Subjective     Date of Onset: 2006    History of Current Condition: Symptoms began in 2006.Lisa was normal functioning with slight lisp prior according to mother.  Pt has been in and out of therapy for the past several years. Comes to ALS clinic for education and support.     Involved Side: bilateral  Dominant Side: Right    Surgical Procedure: see chart  Imaging:  see Epic imaging    Previous Therapy: OP OT/PT    Pain:  Pain Related Behaviors Observed: No   No pain reported    Occupation:  Disability   Duties: passive    Functional Limitations/Social History:    Prior Level of Function: D  Current Level of Function: D    Current Functional Status   Home/Living environment : lives with their family (mother)  Home Access: SSH, ramp to enter, ADA bathroom  DME: shower chair, wheelchair and BSC (uses it over toilet)      Limitation of Functional Status as follows:   ADLs/IADLs:     - Feeding: Mod I after set up    - Bathing: D    - Dressing/Grooming: D    - Home Management: D    - Driving: D     Leisure: TV and [employer] games    Patient's Goals for Therapy: education/equipment needs    Past Medical History/Physical Systems Review:     Past Medical History:  Lisa Moreno  has a past medical history  of Anxiety, Degenerative motor system disease, Depression, Spastic quadriplegia, and Spinocerebellar atrophy.    Past Surgical History:  Lisa Moreno  has a past surgical history that includes Baclofen pump implantation; Fluoroscopic urodynamic study (N/A, 11/27/2018); Esophagogastroduodenoscopy (N/A, 7/23/2020); Colonoscopy (N/A, 7/23/2020); Upper gastrointestinal endoscopy; and Reattachment of muscle (Bilateral, 2/18/2022).    Current Medications:  Lisa has a current medication list which includes the following prescription(s): baclofen, cetirizine, citalopram, dantrolene, donepezil, doxycycline, hydroxyzine pamoate, melatonin, and pulse oximeter, and the following Facility-Administered Medications: tetracaine hcl 0.5%.    Allergies:  Review of patient's allergies indicates:   Allergen Reactions    Penicillins      Hives and Itching    Penicillin         Other: n/a    Objective     Cognitive Exam:  Oriented: Person and Place  Behaviors: cooperative  Follows Commands/attention: Follows two-step commands  Communication: dysarthria        Physical Exam:  Postural examination/scapula alignment: high tone  Joint integrity: Firm end feeling  Skin integrity: warm/dry  Edema: none observed   Palpation: not TTP      B/L UE AROm WFL all planes with high tone, strength also grossly WFL  Fist: tight          Gross motor coordination:   BHARTI (Rapid Alternating Movements): impaired  Finger to Nose (5 times): impaired  Finger Flicks (coordination moving from digit flexion to digit extension): impaired    Tone:  Modified Gianna Scale:   2 More marked increase in muscle tone through most of the ROM, but affected part(s) easily moved    Comments: n/a    Sensation:  Lisa  reports normal sensation    Balance:   Static Sit - POOR: Needs MODERATE assist to maintain  Dynamic sit- POOR: Needs MODERATE assist to maintain  Static Stand - POOR: Needs MODERATE assist to maintain  Dynamic stand - POOR: Needs MODERATE assist to  maintain    Endurance Deficit: mild                    Home Exercises and Patient Education Provided     Education provided:   -role of OT, goals for OT, scheduling/cancellations, insurance limitations with patient.  -Additional Education provided: n/a      Assessment     Lisa Moreno is a 42 y.o. male referred to outpatient occupational therapy and presents with a medical diagnosis of spastic diplegia.  Follow up in clinic as needed. Recommending  OT for PROM program and PWC training. He no longer has ability to manage his PWC with standard joystick. He needs attendant controls for improved PWC performance and use.     Anticipated barriers to occupational therapy: diagnosis     Plan of care discussed with patient: Yes  Patient's spiritual, cultural and educational needs considered and patient is agreeable to the plan of care and goals as stated below:     Medical Necessity is demonstrated by the following  Occupational Profile/History  Co-morbidities and personal factors that may impact the plan of care [x] LOW: Brief chart review  [] MODERATE: Expanded chart review   [] HIGH: Extensive chart review    Moderate / High Support Documentation:      Examination  Performance deficits relating to physical, cognitive or psychosocial skills that result in activity limitations and/or participation restrictions  [] LOW: addressing 1-3 Performance deficits  [x] MODERATE: 3-5 Performance deficits  [] HIGH: 5+ Performance deficits (please support below)    Moderate / High Support Documentation:    Physical:  Joint Mobility  Muscle Power/Strength  Muscle Tone    Cognitive:  No Deficits    Psychosocial:    No Deficits     Treatment Options [x] LOW: Limited options  [] MODERATE: Several options  [] HIGH: Multiple options      Decision Making/ Complexity Score: low         Plan   Certification Period/Plan of care expiration: 10/4/2023 .    Follow up in ALS clinic as needed.  OT for education and PROM program.     Geoffrey FERNANDEZ  [place], LOTR        Physician's Signature: _________________________________________ Date: ________________
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
## 2023-10-04 NOTE — PATIENT INSTRUCTIONS
ALS Clinic After Visit Notes:  Please reach out to the clinic coordinator if you do not receive equipment / follow up calls for services that were ordered today within 1 week.      SPEECH THERAPY RECOMMENDATIONS :  Home health speech therapy for Augmentative-Alternative Communication evaluation   2. Modified Barium Swallow Study or Fiberoptic Endoscopic Evaluation of Swallowing to assess current swallow function.   3. Eye exam to replace lost/stolen eyeglasses

## 2023-10-10 ENCOUNTER — TELEPHONE (OUTPATIENT)
Dept: NEUROLOGY | Facility: CLINIC | Age: 43
End: 2023-10-10
Payer: MEDICARE

## 2023-10-10 ENCOUNTER — PATIENT MESSAGE (OUTPATIENT)
Dept: NEUROLOGY | Facility: CLINIC | Age: 43
End: 2023-10-10
Payer: MEDICARE

## 2023-10-10 ENCOUNTER — SOCIAL WORK (OUTPATIENT)
Dept: NEUROLOGY | Facility: CLINIC | Age: 43
End: 2023-10-10
Payer: MEDICARE

## 2023-10-10 NOTE — PROGRESS NOTES
Marcin,      I would recommend you speak with Gini Monroy and/or Naomi Ramesh at [facility] ([facility]). They are Waiver specialists at that [facility] who should be familiar with the services in place for Lisa Moreno (MARLEN). It looks like  has Karen Medina as his Support Coordination Agency with maybe Ada Gracia as the SC assigned to his case.      [facility]: [phone]    Gini Monroy: [email or web address]      They may need to get a release signed before giving any PHI to you about MARLEN Parsons       From: Kassandra Ortega (LDH) <[email or web address]>  Sent: Tuesday, October 10, 2023 11:23 AM  To: Marcin Mojica <[email or web address]>  Cc: Ileana Parsons <[email or web address]  Subject: [secure] CCW or other waiver    Kai Burch,         It looks like hes certified into the ROW waiver. Ileana Parsons may be able to assist you or point you in the right direction with [employer].        [facility] to Kassandra Avila with [place] medicaid.     Has this person / family started the CCW process or any other waiver?  We did encourage them to apply and have mom as the paid caregiver.     They reported they had something in place prior to the pandemic.     Thank you!!

## 2023-10-10 NOTE — TELEPHONE ENCOUNTER
LCSW placed call to patient's mother Giovana at [phone] and verbally spoke to her.  Patient is non verbal.     LCSW confirmed if they have services via [employer] and They provide for him to go to the .     LCSW asked about additional hours and they don't use the hours since they cannot find people to provide assistance due to low pay. They use [employer].     They do have his younger brother age 37 to help him currently.  LCSW asked for her to reach out to the contacts listed in the portal and LCSW would also follow.  She provided verbal agreement for LCSW to reach out to contact.

## 2023-10-16 ENCOUNTER — TELEPHONE (OUTPATIENT)
Dept: NEUROLOGY | Facility: CLINIC | Age: 43
End: 2023-10-16
Payer: MEDICARE

## 2023-10-16 DIAGNOSIS — G12.29 UPPER MOTOR NEURON DISEASE: Primary | ICD-10-CM

## 2023-10-17 NOTE — TELEPHONE ENCOUNTER
Incoming request from Mr. Wheelchair for Repair and Modification to C-PWC order.     Order completed and sent via e-mail.       Dank Conte RN, BSN, BS  ALS Clinical Care Coordinator  [phone]

## 2023-10-19 ENCOUNTER — PATIENT OUTREACH (OUTPATIENT)
Dept: ADMINISTRATIVE | Facility: OTHER | Age: 43
End: 2023-10-19
Payer: MEDICARE

## 2023-10-19 NOTE — PROGRESS NOTES
CHW - Follow Up    This Community Health Worker completed a follow up visit with caregiver via telephone today.  Pt/Caregiver reported: Spoke to patient's mother/caregiver, mother/caregiver states pt has food insecurities.  Community Health Worker provided: referral through [email or web address], will follow up in one week to check status of referral and pt's future needs.  Follow up required: yes.  Follow-up Outreach - Due: 10/25/2023

## 2023-11-10 ENCOUNTER — PATIENT OUTREACH (OUTPATIENT)
Dept: ADMINISTRATIVE | Facility: OTHER | Age: 43
End: 2023-11-10
Payer: MEDICARE

## 2023-11-10 NOTE — PROGRESS NOTES
CHW - Case Closure    This Community Health Worker spoke to caregiver via telephone today.   Pt/Caregiver reported: Patient's mother/caregiver states pt has no needs, nor request assistance. Pt's mother/caregiver agrees to case closure. Pt's mother/caregiver states she applied for [employer]. Patient has graduated.  Pt/Caregiver denied any additional needs at this time and agrees with episode closure at this time.  Provided caregiver with Community Health Worker's contact information and encouraged her to contact this Community Health Worker if additional needs arise.

## 2023-12-18 PROBLEM — N39.0 UTI (URINARY TRACT INFECTION): Status: RESOLVED | Noted: 2020-07-18 | Resolved: 2023-12-18

## 2023-12-20 ENCOUNTER — OFFICE VISIT (OUTPATIENT)
Dept: PHYSICAL MEDICINE AND REHAB | Facility: CLINIC | Age: 43
End: 2023-12-20
Payer: MEDICARE

## 2023-12-20 DIAGNOSIS — G80.1 SPASTIC DIPLEGIA: ICD-10-CM

## 2023-12-20 DIAGNOSIS — G12.29 UPPER MOTOR NEURON DISEASE: ICD-10-CM

## 2023-12-20 PROCEDURE — 64643 CHEMODENERV 1 EXTREM 1-4 EA: CPT | Mod: S$PBB,,, | Performed by: PHYSICAL MEDICINE & REHABILITATION

## 2023-12-20 PROCEDURE — 95874 BOTULINUM INJECTION: ICD-10-PCS | Mod: 26,S$PBB,, | Performed by: PHYSICAL MEDICINE & REHABILITATION

## 2023-12-20 PROCEDURE — 64642 CHEMODENERV 1 EXTREMITY 1-4: CPT | Mod: S$PBB,,, | Performed by: PHYSICAL MEDICINE & REHABILITATION

## 2023-12-20 PROCEDURE — 99999PBSHW PR PBB SHADOW TECHNICAL ONLY FILED TO HB: Mod: JZ,PBBFAC,,

## 2023-12-20 PROCEDURE — 99499 UNLISTED E&M SERVICE: CPT | Mod: S$PBB,,, | Performed by: PHYSICAL MEDICINE & REHABILITATION

## 2023-12-20 PROCEDURE — 95874 GUIDE NERV DESTR NEEDLE EMG: CPT | Mod: PBBFAC | Performed by: PHYSICAL MEDICINE & REHABILITATION

## 2023-12-20 PROCEDURE — 99999PBSHW PR PBB SHADOW TECHNICAL ONLY FILED TO HB: ICD-10-PCS | Mod: JZ,PBBFAC,,

## 2023-12-20 PROCEDURE — 64642 BOTULINUM INJECTION: ICD-10-PCS | Mod: S$PBB,,, | Performed by: PHYSICAL MEDICINE & REHABILITATION

## 2023-12-20 PROCEDURE — 99499 NO LOS: ICD-10-PCS | Mod: S$PBB,,, | Performed by: PHYSICAL MEDICINE & REHABILITATION

## 2023-12-20 PROCEDURE — 64643 BOTULINUM INJECTION: ICD-10-PCS | Mod: S$PBB,,, | Performed by: PHYSICAL MEDICINE & REHABILITATION

## 2023-12-20 RX ADMIN — ONABOTULINUMTOXINA 900 UNITS: 100 INJECTION, POWDER, LYOPHILIZED, FOR SOLUTION INTRADERMAL; INTRAMUSCULAR at 05:12

## 2023-12-20 RX ADMIN — ONABOTULINUMTOXINA 900 UNITS: 100 INJECTION, POWDER, LYOPHILIZED, FOR SOLUTION INTRADERMAL; INTRAMUSCULAR at 03:12
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
## 2023-12-20 NOTE — PROCEDURES
Botulinum Injection  Location: Limbs/Trunk    Date/Time: 12/20/2023 3:40 PM    Performed by: Yung Rodriguez MD  Authorized by: Yung Rodriguez MD      Consent:      Consent obtained:  Verbal     Consent given by:  Parent     Risks discussed:  Bleeding, infection, pain and weakness     Alternatives discussed:  Alternative treatment and observation    Universal protocol:      Relevant documents present and verified:  Yes       Site/side verified:  Yes       Immediately prior to procedure a time out was called:  Yes       Patient identity confirmed:  Verbally with patient and provided demographic data    Procedure details:      EMG used?:  Yes     Diluted by:  Preservative free saline     Toxin (Brand):  OnaBoNT-A (Botox)     Comments about dilution:  1:1     Concentration (u/mL):  100     Total number of units available:  900     Muscle area injected: upper leg and leg    Lower extremity - upper leg:      Right biceps femoris:  100 units divided amongst 2 site(s)     Left biceps femoris:  100 units divided amongst 2 site(s)     Right semimembranosus:  100 units divided amongst 2 site(s)     Left semimembranosus:  100 units divided amongst 2 site(s)     Right semitendinosus:  100 units divided amongst 2 site(s)     Left semitendinosus:  100 units divided amongst 2 site(s)    Lower extremity - leg:      Right lateral head gastrocnemius:  25 units divided amongst 1 site(s)     Left lateral head gastrocnemius:  25 units divided amongst 1 site(s)     Right medial head gastrocnemius:  25 units divided amongst 1 site(s)     Left medial head gastrocnemius:  25 units divided amongst 1 site(s)     Right soleus:  100 units divided amongst 2 site(s)     Left soleus:  100 units divided amongst 2 site(s)       Total units injected:  900     Total units wasted:  0    Medications: 900 Units onabotulinumtoxina (BOTOX) injection    Post-procedure details:      Patient tolerance of procedure:  Tolerated well, no immediate  complications

## 2023-12-24 NOTE — ASSESSMENT & PLAN NOTE
He needs AAC device   - Home Health orders placed for ST to evaluate for AAC, education, and eye gaze control.

## 2023-12-24 NOTE — ASSESSMENT & PLAN NOTE
Needs PWC modificatoins, training   - Home Health orders placed for PT/OT to do Viet lift training, attending control device modifications. Based upon Mr. Moreno upper extremity weaknesses and poor dexterity he will require attending control device for his custom PWC so that his caretakers can safely move Mr. Moreno by power wheelchair through the house and environs. Without attending controls Mr. Moreno will be unable to move safely throughout the home and outdoors.

## 2023-12-28 ENCOUNTER — PATIENT MESSAGE (OUTPATIENT)
Dept: PHYSICAL MEDICINE AND REHAB | Facility: CLINIC | Age: 43
End: 2023-12-28
Payer: MEDICARE

## 2024-01-01 ENCOUNTER — PATIENT MESSAGE (OUTPATIENT)
Dept: PHYSICAL MEDICINE AND REHAB | Facility: CLINIC | Age: 44
End: 2024-01-01
Payer: MEDICARE

## 2024-01-02 DIAGNOSIS — G12.29 UPPER MOTOR NEURON DISEASE: ICD-10-CM

## 2024-01-02 DIAGNOSIS — G80.1 SPASTIC DIPLEGIA: Primary | ICD-10-CM

## 2024-01-05 ENCOUNTER — TELEPHONE (OUTPATIENT)
Dept: INTERNAL MEDICINE | Facility: CLINIC | Age: 44
End: 2024-01-05
Payer: MEDICARE

## 2024-01-05 NOTE — TELEPHONE ENCOUNTER
----- Message from Jade Portillo sent at 1/5/2024  9:10 AM CST -----  Contact: Simi/coordinator/[phone]  Simi called, requested  a courtesy call from Dr Nixon's nurse in regards patient 90 L form. Thank you

## 2024-01-07 ENCOUNTER — PATIENT MESSAGE (OUTPATIENT)
Dept: INTERNAL MEDICINE | Facility: CLINIC | Age: 44
End: 2024-01-07
Payer: MEDICARE

## 2024-01-11 ENCOUNTER — PATIENT MESSAGE (OUTPATIENT)
Dept: PHYSICAL MEDICINE AND REHAB | Facility: CLINIC | Age: 44
End: 2024-01-11
Payer: MEDICARE

## 2024-01-15 NOTE — ASSESSMENT & PLAN NOTE
Monitor glucose     The patient was signed out to me by Dr. Loja on 01/15/24 at 6:27 AM    Currently, the patient is in no apparent distress and vital signs are stable. Observation continues. No additional studies are required at this time and the patient will continue to be monitored until a bed is ready and the patient is able to be transferred.     0829hrs: Patient becoming increasingly agitated.  Shaking the bed violently and throwing objects in the room.  Unable to be redirected verbally.  IM Geodon ordered.    Critical Care    Performed by: Italo Bello MD  Authorized by: Italo Bello MD    Critical care provider statement:     Critical care time (minutes): 30 to 35 minutes.    Critical care time was exclusive of:  Separately billable procedures and treating other patients    Critical care was necessary to treat or prevent imminent or life-threatening deterioration of the following conditions: Psychiatric crisis.    Critical care was time spent personally by me on the following activities:  Evaluation of patient's response to treatment, ordering and performing treatments and interventions, re-evaluation of patient's condition and review of old charts  Comments:      Patient with history of autism and agitation.  Frequent visits to the emergency department for agitation and self injury.  During my shift patient becoming increasingly agitated and unable to be redirected verbally.  Required intramuscular Geodon for symptom management.  Patient required continued close monitoring for stability.    2:30 PM  Patient calm and in no distress.  Father came to bedside and shaved patient.  Father is asking to take patient home.  Mother apparently discussed with the bebeto worker and they are not optimistic about being able to find any placement.    Father is requesting to take patient home.  They will try to follow-up with the psychiatry team at [facility].  They will return to the emergency department if symptoms worsen or new problems  develop.    The patient was originally placed in observation to establish medical clearance and obtain a mental health consultation to determine the patient's ultimate disposition. During the observation period the patient was re-evaluated numerous times.     It has been determined that patient can now be discharged home and will follow-up with the psychiatry team as an outpatient.    ED observation ended on 01/15/24 at 2:31 PM    Disposition:  Pt will be discharged.      Impression:  1. Autism    2. Mental and behavioral problem          Follow up:  Dieter Griggs MD  [address] [facility] [id number]    In 1 week      Your psychiatry team at [facility]    In 3 days          New Medications:  New Prescriptions    No medications on file                  Italo Bello MD  01/15/24 [id number]

## 2024-02-08 ENCOUNTER — PATIENT MESSAGE (OUTPATIENT)
Dept: NEUROLOGY | Facility: CLINIC | Age: 44
End: 2024-02-08
Payer: MEDICARE

## 2024-03-01 ENCOUNTER — PATIENT MESSAGE (OUTPATIENT)
Dept: NEUROLOGY | Facility: CLINIC | Age: 44
End: 2024-03-01
Payer: MEDICARE

## 2024-03-01 ENCOUNTER — TELEPHONE (OUTPATIENT)
Dept: NEUROLOGY | Facility: CLINIC | Age: 44
End: 2024-03-01
Payer: MEDICARE

## 2024-03-01 DIAGNOSIS — G12.21 ALS (AMYOTROPHIC LATERAL SCLEROSIS): Primary | ICD-10-CM

## 2024-03-01 DIAGNOSIS — Z78.9 IMPAIRED MOBILITY AND ADLS: ICD-10-CM

## 2024-03-01 DIAGNOSIS — Z74.09 IMPAIRED MOBILITY AND ADLS: ICD-10-CM

## 2024-03-01 NOTE — TELEPHONE ENCOUNTER
Incoming message from LICHA Curtis regarding CPWC Renewal.    Kai Hernandez and Chase Burch with [facility] reached out about Lisa Moreno [phone]. Sounds like he is due for a new w/c. We saw him back in October, but he was a screen. Maybe HH orders for a wheelchair order may be a bit faster than him coming to clinic? Hopeful you all can help with this. Let me know if I can be of any help.  Geoffrey Zabala OTR/L      RN Coordinator contacted patient's mother, Giovana, and offered Virtual Visit Appt with Dr. Humphrey. Ms. Akhtar accepted appt.     HH orders for PT CPWC Evaluation placed.      Dank Conte RN, BSN, BS  ALS Clinical Care Coordinator  [phone]

## 2024-03-02 ENCOUNTER — OFFICE VISIT (OUTPATIENT)
Dept: NEUROLOGY | Facility: CLINIC | Age: 44
End: 2024-03-02
Payer: MEDICARE

## 2024-03-02 DIAGNOSIS — G11.8 SPINOCEREBELLAR ATAXIA: ICD-10-CM

## 2024-03-02 DIAGNOSIS — G80.1 SPASTIC DIPLEGIA: ICD-10-CM

## 2024-03-02 PROCEDURE — 99215 OFFICE O/P EST HI 40 MIN: CPT | Mod: 95,,, | Performed by: PSYCHIATRY & NEUROLOGY

## 2024-03-02 PROCEDURE — G2211 COMPLEX E/M VISIT ADD ON: HCPCS | Mod: 95,,, | Performed by: PSYCHIATRY & NEUROLOGY

## 2024-03-02 NOTE — PROGRESS NOTES
NEUROLOGY  Outpatient Follow Up    [address] [address]  [phone] (office) / [phone] ( (fax)    Patient Name:  Lisa Moreno  :  1980  MR #:  [id number]  Acct #:  [id number]    Date of Neurology Visit: 2024  Name of Neurologist: Margarita Humphrey MD    Other Physicians:  John Nixon II, MD (Primary Care Physician); Self, Aaareferral (Referring)      Chief Complaint: Lower extremity spasticity and weakness     History of Present Illness (HPI):      Lisa Moreno is a 43 y.o. 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 He was last seen in ALS clinic on 10/4/2023 at that time his ALS-FRS score was 29. Hus FVC was 23, his mother states she was unaware of his low FVC. He is not on NIV and has no issues with his breathing.     Here with: mom.       Strength: Arms are very strong. Legs - very weak, he can stand with support but cannot walk    Dysarthria:  Is an issue     Dysphagia: Eats very well, no choking on food. If he drinks water too fast he tends to choke at times.     Sialorrhea:  None    Constipation:  He gets constipated. Family has to use to a rectal suppository. With miralax he has a huge amount of diarrhea.    Sleep: Sleeps well    Gait:  Unable to walk    Falls:  None    Breathing:  No issues. No bipap or device at home    Bladder: Has pullups unable to feel sensation of urine/ bowel movements    Pain: None     Mood: Okay and happy     PBA:  None    Memory:  Remembers things well      ALS Functional Rating Scale Revised        Item 1: SPEECH   4 ? Normal speech process   3 X Detectable speech disturbance   2 ? Intelligible with repeating   1 ? Speech combined with non-vocal communication   0 ? Loss of useful speech   Item 2: SALIVATION   4 X Normal   3 ? Slight but definite excess of saliva in mouth; may have nighttime drooling   2 ? Moderately  excessive saliva; may have minimal drooling (during the day)   1 ? Marked excess of saliva with some drooling   0 ? Marked drooling; requires constant tissue or handkerchief   Item 3: SWALLOWING   4 X Normal eating habits   3 ? Early eating problems - occasional choking   2 ? Dietary consistency changes   1 ? Needs supplement tube feeding   0 ? NPO (exclusively parenteral or enteral feeding)   Item 4: HANDWRITING   4 ? Normal   3 X Slow or sloppy: all words are legible   2 ? Not all words are legible   1 ? Able to  pen, but unable to write   0 ? Unable to  pen   Item 5a: CUTTING FOOD AND HANDLING UTENSILS   Patients without gastrostomy ? Use 5b if >50% is through g-tube   4 ? Normal   3 ? Somewhat slow and clumsy, but no help needed   2 ? Can cut most foods (>50%), although slow and clumsy; some help needed   1 X Food must be cut by someone, but can still feed slowly   0 ? Needs to be fed   Item 5b: CUTTING FOOD AND HANDLING UTENSILS   Patients with gastrostomy ? 5b option is used if the patient has a gastrostomy and only if it is the primary method (more than 50%) of eating .   4 ? Normal   3 ? Clumsy, but able to perform all manipulations independently   2 ? Some help needed with closures and fasteners   1 ? Provides minimal assistance to caregiver   0 ? Unable to perform any aspect of task     Item 6: DRESSING AND HYGIENE   4 ? Normal function   3 ? Independent and complete self-care with effort or decreased efficiency   2 ? Intermittent assistance or substitute methods   1 ? Needs attendant for self-care   0 X Total dependence   Item 7: TURNING IN BED AND ADJUSTING BED CLOTHES   4 ? Normal function   3 ? Somewhat slow and clumsy, but no help needed   2 ? Can turn alone, or adjust sheets, but with great difficulty   1 ? Can initiate, but not turn or adjust sheets alone   0 X Helpless   Item 8: WALKING   4 ? Normal   3 ? Early ambulation difficulties   2 ? Walks with assistance   1 X Non-ambulatory  functional movement   0 ? No purposeful leg movement   Item 9: CLIMBING STAIRS   4 ? Normal   3 ? Slow   2 ? Mild unsteadiness or fatigue   1 ? Needs assistance   0 X Cannot do   Item 10: DYSPNEA   4 X None   3 ? Occurs when walking   2 ? Occurs with one or more of the following: eating, bathing, dressing (ADL)   1 ? Occurs at rest: difficulty breathing when either sitting or lying   0 ? Significant difficulty: considering using mechanical respiratory support   Item 11: ORTHOPNEA   4 X None   3 ? Some difficulty sleeping at night due to shortness of breath, does not routinely use more than two pillows   2 ? Needs extra pillows in order to sleep (more than two)   1 ? Can only sleep sitting up   0 ? Unable to sleep without mechanical assistance   Item 12: RESPIRATORY INSUFFICIENCY   4 X None   3 ? Intermittent use of BiPAP   2 ? Continuous use of BiPAP during the night   1 ? Continuous use of BiPAP during day & night   0 ? Invasive mechanical ventilation by intubation or tracheostomy       Total: 28        Treatment to date:  On botox for spasticity  Not on any FDA approved medications for ALS      Review of Systems:      14-point review of systems as follows:   No check jef indicates NEGATIVE response   Constitutional: [] weight loss, [] change to appetite   Eyes: [] change in vision, [] double vision   Ears, nose, mouth, throat: [] frequent nose bleeds, [] ringing in the ears   Respiratory: [] cough, [] wheezing   Cardiovascular: [] chest pain, [] palpitations   Gastrointestinal: [] jaundice, [] nausea/vomiting   Genitourinary: [] incontinence, [] burning with urination   Hematologic/lymphatic: [] easy bruising/bleeding, [] night sweats   Neurological: [] numbness, [] weakness   Endocrine: [] fatigue, [] heat/cold intolerance   Allergy/Immunologic: [] fevers, [] chills   Musculoskeletal: [] muscle pain, [] joint pain   Psychiatric: [] thoughts of harming self/others, [] depression   Integumentary: [] rashes, []  sores that do not heal     Past Medical, Surgical, Family & Social History:   Past Medical History:   Diagnosis Date    Anxiety     Degenerative motor system disease     Depression     Spastic quadriplegia     Spinocerebellar atrophy        Home Medications:     Current Outpatient Medications:     baclofen (LIORESAL) 20 MG tablet, Take 1 tablet (20 mg total) by mouth 3 (three) times daily., Disp: 270 tablet, Rfl: 3    cetirizine (ZYRTEC) 10 MG tablet, TAKE 1 TABLET BY MOUTH DAILY, Disp: 30 tablet, Rfl: 11    citalopram (CELEXA) 20 MG tablet, Take 1 tablet (20 mg total) by mouth once daily., Disp: 90 tablet, Rfl: 3    dantrolene (DANTRIUM) 50 MG Cap, TAKE 1 CAPSULE(50 MG) BY MOUTH THREE TIMES DAILY, Disp: 270 capsule, Rfl: 3    donepeziL (ARICEPT) 5 MG tablet, Take 1 tablet (5 mg total) by mouth every evening., Disp: 90 tablet, Rfl: 0    doxycycline (VIBRAMYCIN) 100 MG Cap, Take 1 capsule (100 mg total) by mouth 2 (two) times daily., Disp: 14 capsule, Rfl: 0    hydrOXYzine pamoate (VISTARIL) 25 MG Cap, Take 1 capsule (25 mg total) by mouth 3 (three) times daily as needed (sleep or anxiety)., Disp: 60 capsule, Rfl: 3    melatonin (MELATIN) 3 mg tablet, Take 2 tablets (6 mg total) by mouth nightly as needed for Insomnia., Disp: 30 tablet, Rfl: 11    pulse oximeter (PULSE OXIMETER) device, by Apply Externally route 2 (two) times a day. Use twice daily at 8 AM and 3 PM and record the value in MyChart as directed. (Patient not taking: Reported on 9/20/2023), Disp: 1 each, Rfl: 0  No current facility-administered medications for this visit.    Facility-Administered Medications Ordered in Other Visits:     TETRAcaine HCL 0.5% ophthalmic solution 2 drop, 2 drop, Both Eyes, Once, Chetan De Anda MD    Physical Examination:  There were no vitals taken for this visit.        GENERAL:  General appearance: Well, non-toxic appearing.  No apparent distress.  Fundi exam: normal.  Neck: supple.  Carotid auscultation:  normal.  Heart auscultation: normal.  Peripheral pulses: normal.  Extremities: normal.    MENTAL STATUS:  Alertness, attention span & concentration: normal.  Language: normal.  Orientation to self, place & time:  normal.  Memory, recent & remote: did not assess today ( to be checked at next visit)  Fund of knowledge: normal.      SPEECH:  Spastic slow dysarthria     CRANIAL NERVES:  Cranial Nerves II-XII were examined.  II - Visual fields: normal.  III, IV, VI: PERRL, EOMI, No ptosis, No nystagmus.  V - Facial sensation: normal.  VII - Face symmetry & mobility: normal.  VIII - Hearing: normal.  IX, X - Palate: mobile & midline.  XI - Shoulder shrug: normal.  XII - Tongue protrusion: normal, slow movements    GROSS MOTOR:  Gait & station: does not walk  Tone: unable to assess during virtual   Abnormal movements: none.  Finger-nose & Heel-knee-shin: Has significant ataxia and past pointing/ difficulty with finger to nose testing bilaterally. He is unable to perform heel to shin testing due to weakness        Patient moves bilateral upper extremities strongly and antigravity  Unable to perform graded exam during video visit. Minimal knee extension movement noted on tele video evaluation bilaterally     REFLEXES:    Unable to perform reflex testing during video visit       SENSORY:  Light touch: Normal throughout.      Diagnostic Data Reviewed:     Admission on 09/13/2023, Discharged on 09/16/2023   Component Date Value Ref Range Status    HIV 1/2 Ag/Ab 09/13/2023 Non-reactive  Non-reactive Final    Hepatitis C Ab 09/13/2023 Non-reactive  Non-reactive Final    Blood Culture, Routine 09/13/2023 No growth after 5 days.   Final    Blood Culture, Routine 09/13/2023 No growth after 5 days.   Final    WBC 09/13/2023 5.53  3.90 - 12.70 K/uL Final    RBC 09/13/2023 5.54  4.60 - 6.20 M/uL Final    Hemoglobin 09/13/2023 15.1  14.0 - 18.0 g/dL Final    Hematocrit 09/13/2023 47.3  40.0 - 54.0 % Final    MCV 09/13/2023 85  82 - 98  fL Final    MCH 09/13/2023 27.3  27.0 - 31.0 pg Final    MCHC 09/13/2023 31.9 (L)  32.0 - 36.0 g/dL Final    RDW 09/13/2023 13.2  11.5 - 14.5 % Final    Platelets 09/13/2023 149 (L)  150 - 450 K/uL Final    MPV 09/13/2023 11.4  9.2 - 12.9 fL Final    Immature Granulocytes 09/13/2023 0.2  0.0 - 0.5 % Final    Gran # (ANC) 09/13/2023 4.6  1.8 - 7.7 K/uL Final    Immature Grans (Abs) 09/13/2023 0.01  0.00 - 0.04 K/uL Final    Comment: Mild elevation in immature granulocytes is non specific and   can be seen in a variety of conditions including stress response,   acute inflammation, trauma and pregnancy. Correlation with other   laboratory and clinical findings is essential.      Lymph # 09/13/2023 0.3 (L)  1.0 - 4.8 K/uL Final    Mono # 09/13/2023 0.6  0.3 - 1.0 K/uL Final    Eos # 09/13/2023 0.0  0.0 - 0.5 K/uL Final    Baso # 09/13/2023 0.02  0.00 - 0.20 K/uL Final    nRBC 09/13/2023 0  0 /100 WBC Final    Gran % 09/13/2023 83.6 (H)  38.0 - 73.0 % Final    Lymph % 09/13/2023 5.1 (L)  18.0 - 48.0 % Final    Mono % 09/13/2023 10.5  4.0 - 15.0 % Final    Eosinophil % 09/13/2023 0.2  0.0 - 8.0 % Final    Basophil % 09/13/2023 0.4  0.0 - 1.9 % Final    Differential Method 09/13/2023 Automated   Final    Sodium 09/13/2023 145  136 - 145 mmol/L Final    Potassium 09/13/2023 4.1  3.5 - 5.1 mmol/L Final    Chloride 09/13/2023 109  95 - 110 mmol/L Final    CO2 09/13/2023 26  23 - 29 mmol/L Final    Glucose 09/13/2023 95  70 - 110 mg/dL Final    BUN 09/13/2023 19  6 - 20 mg/dL Final    Creatinine 09/13/2023 1.0  0.5 - 1.4 mg/dL Final    Calcium 09/13/2023 9.3  8.7 - 10.5 mg/dL Final    Total Protein 09/13/2023 8.1  6.0 - 8.4 g/dL Final    Albumin 09/13/2023 4.2  3.5 - 5.2 g/dL Final    Total Bilirubin 09/13/2023 0.2  0.1 - 1.0 mg/dL Final    Comment: For infants and newborns, interpretation of results should be based  on gestational age, weight and in agreement with clinical  observations.    Premature Infant recommended  reference ranges:  Up to 24 hours.............<8.0 mg/dL  Up to 48 hours............<12.0 mg/dL  3-5 days..................<15.0 mg/dL  6-29 days.................<15.0 mg/dL      Alkaline Phosphatase 09/13/2023 52 (L)  55 - 135 U/L Final    AST 09/13/2023 23  10 - 40 U/L Final    ALT 09/13/2023 17  10 - 44 U/L Final    eGFR 09/13/2023 >60.0  >60 mL/min/1.73 m^2 Final    Anion Gap 09/13/2023 10  8 - 16 mmol/L Final    Specimen UA 09/13/2023 Urine, Catheterized   Final    Color, UA 09/13/2023 Yellow  Yellow, Straw, Cordelia Final    Appearance, UA 09/13/2023 Clear  Clear Final    pH, UA 09/13/2023 6.0  5.0 - 8.0 Final    Specific Gravity, UA 09/13/2023 >1.030 (A)  1.005 - 1.030 Final    Protein, UA 09/13/2023 1+ (A)  Negative Final    Comment: Recommend a 24 hour urine protein or a urine   protein/creatinine ratio if globulin induced proteinuria is  clinically suspected.      Glucose, UA 09/13/2023 Negative  Negative Final    Ketones, UA 09/13/2023 Trace (A)  Negative Final    Bilirubin (UA) 09/13/2023 Negative  Negative Final    Occult Blood UA 09/13/2023 Negative  Negative Final    Nitrite, UA 09/13/2023 Negative  Negative Final    Leukocytes, UA 09/13/2023 Negative  Negative Final    SARS-CoV-2 RNA, Amplification, Qual 09/13/2023 Positive (A)  Negative Final    Comment: This test utilizes isothermal nucleic acid amplification technology   to   detect the SARS-CoV-2 RdRp nucleic acid segment. The analytical   sensitivity   (limit of detection) is 500 copies/swab.     A POSITIVE result is indicative of the presence of SARS-CoV-2 RNA;   clinical   correlation with patient history and other diagnostic information is   necessary to determine patient infection status.    A NEGATIVE result means that SARS-CoV-2 nucleic acids are not present   above   the limit of detection. A NEGATIVE result should be treated as   presumptive.   It does not rule out the possibility of COVID-19 and should not be   the sole   basis for  treatment decisions. If COVID-19 is strongly suspected   based on   clinical and exposure history, re-testing using an alternate   molecular assay   should be considered.     This test is only for use under the Food and Drug Administration s   Emergency   Use Authorization (EUA).     Commercial kits are provided by [employer]. [employer]                           e   characteristics of the EUA have been independently verified by   [facility] Department of Pathology and Laboratory Medicine.   _________________________________________________________________   The authorized Fact Sheet for Healthcare Providers and the authorized   Fact   Sheet for Patients of the ID NOW COVID-19 are available on the FDA   website:   https://www.fda.gov/media/891290/download   https://www.fda.gov/media/722268/download      Influenza A, Molecular 09/13/2023 Negative  Negative Final    Influenza B, Molecular 09/13/2023 Negative  Negative Final    Flu A & B Source 09/13/2023 Nasal swab   Final    Group A Strep, Molecular 09/13/2023 Negative  Negative Final    Comment: Arcanobacterium haemolyticum and Beta Streptococcus group C   and G will not be detected by this test method.  Please order   Throat Culture ([id number]) if suspected.      RBC, UA 09/13/2023 8 (H)  0 - 4 /hpf Final    WBC, UA 09/13/2023 10 (H)  0 - 5 /hpf Final    Bacteria 09/13/2023 Rare  None-Occ /hpf Final    Squam Epithel, UA 09/13/2023 1  /hpf Final    Hyaline Casts, UA 09/13/2023 0  0-1/lpf /lpf Final    Microscopic Comment 09/13/2023 SEE COMMENT   Final    Comment: Other formed elements not mentioned in the report are not   present in the microscopic examination.       POC Lactate 09/13/2023 0.68  0.5 - 2.2 mmol/L Final    Sample 09/13/2023 VENOUS   Final    Site 09/13/2023 Other   Final    Allens Test 09/13/2023 N/A   Final    LD 09/13/2023 257  110 - 260 U/L Final    Comment: Results are increased in hemolyzed samples.  *Result may be interfered  by visible hemolysis      Ferritin 09/13/2023 50  20.0 - 300.0 ng/mL Final    CRP 09/13/2023 29.8 (H)  0.0 - 8.2 mg/L Final    Procalcitonin 09/13/2023 0.08  <0.25 ng/mL Final    Comment: A concentration < 0.25 ng/mL represents a low risk of bacterial   infection.  Procalcitonin may not be accurate among patients with localized   infection, recent trauma or major surgery, immunosuppressed state,   invasive fungal infection, renal dysfunction. Decisions regarding   initiation or continuation of antibiotic therapy should not be based   solely on procalcitonin levels.      Urine Culture, Routine 09/13/2023  (A)   Final                    Value:ENTEROCOCCUS FAECALIS  >100,000 cfu/ml      Sodium 09/14/2023 141  136 - 145 mmol/L Final    Potassium 09/14/2023 3.7  3.5 - 5.1 mmol/L Final    Chloride 09/14/2023 111 (H)  95 - 110 mmol/L Final    CO2 09/14/2023 21 (L)  23 - 29 mmol/L Final    Glucose 09/14/2023 76  70 - 110 mg/dL Final    BUN 09/14/2023 17  6 - 20 mg/dL Final    Creatinine 09/14/2023 0.7  0.5 - 1.4 mg/dL Final    Calcium 09/14/2023 8.1 (L)  8.7 - 10.5 mg/dL Final    Total Protein 09/14/2023 6.2  6.0 - 8.4 g/dL Final    Albumin 09/14/2023 3.2 (L)  3.5 - 5.2 g/dL Final    Total Bilirubin 09/14/2023 0.2  0.1 - 1.0 mg/dL Final    Comment: For infants and newborns, interpretation of results should be based  on gestational age, weight and in agreement with clinical  observations.    Premature Infant recommended reference ranges:  Up to 24 hours.............<8.0 mg/dL  Up to 48 hours............<12.0 mg/dL  3-5 days..................<15.0 mg/dL  6-29 days.................<15.0 mg/dL      Alkaline Phosphatase 09/14/2023 39 (L)  55 - 135 U/L Final    AST 09/14/2023 23  10 - 40 U/L Final    ALT 09/14/2023 17  10 - 44 U/L Final    eGFR 09/14/2023 >60.0  >60 mL/min/1.73 m^2 Final    Anion Gap 09/14/2023 9  8 - 16 mmol/L Final    Magnesium 09/14/2023 1.6  1.6 - 2.6 mg/dL Final    Phosphorus 09/14/2023 2.6 (L)  2.7 - 4.5  mg/dL Final    Procalcitonin 09/14/2023 0.07  <0.25 ng/mL Final    Comment: A concentration < 0.25 ng/mL represents a low risk of bacterial   infection.  Procalcitonin may not be accurate among patients with localized   infection, recent trauma or major surgery, immunosuppressed state,   invasive fungal infection, renal dysfunction. Decisions regarding   initiation or continuation of antibiotic therapy should not be based   solely on procalcitonin levels.      WBC 09/14/2023 3.31 (L)  3.90 - 12.70 K/uL Final    RBC 09/14/2023 4.58 (L)  4.60 - 6.20 M/uL Final    Hemoglobin 09/14/2023 12.6 (L)  14.0 - 18.0 g/dL Final    Hematocrit 09/14/2023 40.6  40.0 - 54.0 % Final    MCV 09/14/2023 89  82 - 98 fL Final    MCH 09/14/2023 27.5  27.0 - 31.0 pg Final    MCHC 09/14/2023 31.0 (L)  32.0 - 36.0 g/dL Final    RDW 09/14/2023 13.2  11.5 - 14.5 % Final    Platelets 09/14/2023 120 (L)  150 - 450 K/uL Final    MPV 09/14/2023 11.9  9.2 - 12.9 fL Final    Immature Granulocytes 09/14/2023 0.0  0.0 - 0.5 % Final    Gran # (ANC) 09/14/2023 2.0  1.8 - 7.7 K/uL Final    Immature Grans (Abs) 09/14/2023 0.00  0.00 - 0.04 K/uL Final    Comment: Mild elevation in immature granulocytes is non specific and   can be seen in a variety of conditions including stress response,   acute inflammation, trauma and pregnancy. Correlation with other   laboratory and clinical findings is essential.      Lymph # 09/14/2023 0.7 (L)  1.0 - 4.8 K/uL Final    Mono # 09/14/2023 0.6  0.3 - 1.0 K/uL Final    Eos # 09/14/2023 0.0  0.0 - 0.5 K/uL Final    Baso # 09/14/2023 0.01  0.00 - 0.20 K/uL Final    nRBC 09/14/2023 0  0 /100 WBC Final    Gran % 09/14/2023 60.1  38.0 - 73.0 % Final    Lymph % 09/14/2023 21.5  18.0 - 48.0 % Final    Mono % 09/14/2023 18.1 (H)  4.0 - 15.0 % Final    Eosinophil % 09/14/2023 0.0  0.0 - 8.0 % Final    Basophil % 09/14/2023 0.3  0.0 - 1.9 % Final    Differential Method 09/14/2023 Automated   Final    D-Dimer 09/14/2023 0.39  <0.50  mg/L FEU Final    Comment: The quantitative D-dimer assay should be used as an aid in   the diagnosis of deep vein thrombosis and pulmonary embolism  in patients with the appropriate presentation and clinical  history. The upper limit of the reference interval and the clinical   cut off   point are identical. Causes of a positive (>0.50 mg/L FEU) D-Dimer   test  include, but are not limited to: DVT, PE, DIC, thrombolytic   therapy, anticoagulant therapy, recent surgery, trauma, or   pregnancy, disseminated malignancy, aortic aneurysm, cirrhosis,  and severe infection. False negative results may occur in   patients with distal DVT.      Sodium 09/15/2023 138  136 - 145 mmol/L Final    Potassium 09/15/2023 3.9  3.5 - 5.1 mmol/L Final    Chloride 09/15/2023 105  95 - 110 mmol/L Final    CO2 09/15/2023 23  23 - 29 mmol/L Final    Glucose 09/15/2023 70  70 - 110 mg/dL Final    BUN 09/15/2023 17  6 - 20 mg/dL Final    Creatinine 09/15/2023 0.8  0.5 - 1.4 mg/dL Final    Calcium 09/15/2023 8.3 (L)  8.7 - 10.5 mg/dL Final    Total Protein 09/15/2023 6.4  6.0 - 8.4 g/dL Final    Albumin 09/15/2023 3.4 (L)  3.5 - 5.2 g/dL Final    Total Bilirubin 09/15/2023 0.2  0.1 - 1.0 mg/dL Final    Comment: For infants and newborns, interpretation of results should be based  on gestational age, weight and in agreement with clinical  observations.    Premature Infant recommended reference ranges:  Up to 24 hours.............<8.0 mg/dL  Up to 48 hours............<12.0 mg/dL  3-5 days..................<15.0 mg/dL  6-29 days.................<15.0 mg/dL      Alkaline Phosphatase 09/15/2023 38 (L)  55 - 135 U/L Final    AST 09/15/2023 31  10 - 40 U/L Final    ALT 09/15/2023 21  10 - 44 U/L Final    eGFR 09/15/2023 >60.0  >60 mL/min/1.73 m^2 Final    Anion Gap 09/15/2023 10  8 - 16 mmol/L Final    Magnesium 09/15/2023 1.6  1.6 - 2.6 mg/dL Final    Phosphorus 09/15/2023 3.3  2.7 - 4.5 mg/dL Final    WBC 09/15/2023 3.56 (L)  3.90 - 12.70 K/uL  Final    RBC 09/15/2023 4.86  4.60 - 6.20 M/uL Final    Hemoglobin 09/15/2023 13.1 (L)  14.0 - 18.0 g/dL Final    Hematocrit 09/15/2023 40.3  40.0 - 54.0 % Final    MCV 09/15/2023 83  82 - 98 fL Final    MCH 09/15/2023 27.0  27.0 - 31.0 pg Final    MCHC 09/15/2023 32.5  32.0 - 36.0 g/dL Final    RDW 09/15/2023 13.2  11.5 - 14.5 % Final    Platelets 09/15/2023 120 (L)  150 - 450 K/uL Final    MPV 09/15/2023 11.1  9.2 - 12.9 fL Final    Immature Granulocytes 09/15/2023 0.3  0.0 - 0.5 % Final    Gran # (ANC) 09/15/2023 1.7 (L)  1.8 - 7.7 K/uL Final    Immature Grans (Abs) 09/15/2023 0.01  0.00 - 0.04 K/uL Final    Comment: Mild elevation in immature granulocytes is non specific and   can be seen in a variety of conditions including stress response,   acute inflammation, trauma and pregnancy. Correlation with other   laboratory and clinical findings is essential.      Lymph # 09/15/2023 1.1  1.0 - 4.8 K/uL Final    Mono # 09/15/2023 0.6  0.3 - 1.0 K/uL Final    Eos # 09/15/2023 0.1  0.0 - 0.5 K/uL Final    Baso # 09/15/2023 0.01  0.00 - 0.20 K/uL Final    nRBC 09/15/2023 0  0 /100 WBC Final    Gran % 09/15/2023 48.3  38.0 - 73.0 % Final    Lymph % 09/15/2023 31.7  18.0 - 48.0 % Final    Mono % 09/15/2023 17.7 (H)  4.0 - 15.0 % Final    Eosinophil % 09/15/2023 1.7  0.0 - 8.0 % Final    Basophil % 09/15/2023 0.3  0.0 - 1.9 % Final    Differential Method 09/15/2023 Automated   Final            Results for orders placed or performed during the hospital encounter of 07/18/20   CT Head Without Contrast    Narrative    EXAMINATION:  CT HEAD WITHOUT CONTRAST    CLINICAL HISTORY:  Seizure, nontraumatic (Age 18-40y);    TECHNIQUE:  Low dose axial CT images obtained throughout the head without intravenous contrast. Sagittal and coronal reconstructions were performed.    COMPARISON:  MRI brain 09/07/2018 and head CT 02/22/2015    FINDINGS:  Intracranial compartment:    Ventricles and sulci are normal in size for age without  evidence of hydrocephalus. No extra-axial blood or fluid collections.    The brain parenchyma appears normal. No parenchymal mass, hemorrhage, edema or major vascular distribution infarct.    Skull/extracranial contents (limited evaluation): No fracture. Mastoid air cells and paranasal sinuses are essentially clear.  Imaged portions of the orbits are within normal limits.      Impression    No acute intracranial abnormality.  Specifically, no intracranial hemorrhage.      Electronically signed by: Julius Christopher MD  Date:    07/18/2020  Time:    18:30   Results for orders placed or performed during the hospital encounter of 09/07/18   MRI Brain W WO Contrast    Narrative    EXAMINATION:  MRI BRAIN W WO CONTRAST    CLINICAL HISTORY:  h/o spinocerebellar atrophy. worsening urinary incontinence and strength; Weakness    TECHNIQUE:  Multiplanar multisequence MR imaging of the brain was performed before and after the administration of 8 mL Gadavist intravenous contrast.    COMPARISON:  MRI brain, 01/26/2015.    FINDINGS:  There is extensive motion which significantly limits evaluation.    There is no evidence of restricted diffusion to suggest acute infarction.    FLAIR imaging demonstrates mild periventricular signal hyperintensity, grossly unchanged.    The ventricles are stable.  Grossly stable generalized volume loss of the brainstem and cerebellum.    No evidence of mass lesion, hemorrhage, edema or recent or remote major vascular distribution infarction.    Post contrast images are essentially nondiagnostic.  No large enhancing mass identified.    No extra-axial blood or fluid collections.    T2 skull base flow voids are preserved. Bone marrow signal intensity is unremarkable.      Impression    Significantly motion limited examination.  No evidence of acute infarct.  If clinical concern persists, the exam can be repeated as indicated.    Generalized cerebellar and brainstem atrophy, grossly unchanged.    Additional  findings discussed in the body of the report.      Electronically signed by: Lawrence Alfonso MD  Date:    09/07/2018  Time:    22:02             Assessment and Plan:      Lisa Moreno is a 43 y.o. 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 He was last seen in ALS clinic on 10/4/2023 at that time his ALS-FRS score was 29. Hus FVC was 23, his mother states she was unaware of his low FVC. He is not on NIV and has no issues with his breathing.     His EMG in the past was suggestive of a central process due to reduced muscle activation, SCA panel via [facility] testing- negative. Mom cannot afford genetic testing via [facility] at this time but is interested in any complementary testing. He underwent cognitive testing in the past and was started on donepezil. He is unable to stand. He needs a power wheel chair. Per MBS review had aspiration in the past, mom was unaware of the results and also did not know that an updated MBS was ordered by Dr. Landaverde, I encouraged her to get this done.     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 He  has gait impairment and is unable to use a walker consistently more than 20-30 feet due to BLE weakness and fatigue secondary to ALS. He also has impaired balance due to weakness related to ALS. He is not able to ambulate safely to the kitchen or living room and is unable to use an optimally-configured manual wheelchair in the home in order to perform Mobility Related Activities of Daily Living due to BUE weakness & fatigue. He also has dyspnea due to respiratory muscle fatigue. His cognition is intact and he should be able to  use a power mobility device well at home. He was given a prescription for a power wheelchair. A scooter would not be appropriate due the patient's difficulty controlling the scooter tiller due to hand weakness & fatigue and to maneuverability restrictions at home. A power wheelchair will allow the patient to go safely to the kitchen, dining room or living room for feeding & socialization.   I reordered the donepezil which was previously prescribed for cognitive decline in the past, was evaluated by [facility] . I will refer him to Dr. Goins  Needs inperson ALS clinic evaluation for updated FVC testing and possibly start Bipap , unclear why Bipap was not sent to his home after the low FVC noted at his last visit   Needs updated MBS, had aspiration on previous MBS in 2020. Patient/ mom deny any issues with swallowing except for rare choking when he drinks water quickly. Will benefit from speech pathology assessment  [facility] ALS gene panel can be ordered, additionally probably genetic can test for cognitive decline related genes. Both these tests would be complimentary. If comprehensive ataxia panel is being considered then would need to go through [facility] but patient cannot afford to pay the co-pay at this time   Hold off on ALS medicaions pending genetic testing and confirmation of diagnosis   Continue botox for lower extremity spasticity              Patient Active Problem List   Diagnosis    Spastic diplegia    Gait instability    Upper motor neuron disease    Neurogenic bladder    Hypophonia    Weakness    Spasticity    Spinal ataxia    Spinocerebellar ataxia    Neuropathic pain    Bronchitis with wheezing    Voiding dysfunction    Dysarthria    Chronic constipation    Other seizures    Impaired mobility and ADLs    Upper extremity weakness    Decreased coordination    Impaired transfers    Recurrent major depressive disorder, in partial remission    Anxiety    Oropharyngeal dysphagia    Major neurocognitive disorder  due to another medical condition with behavioral disturbance    Decreased strength, endurance, and mobility    Requires daily assistance for activities of daily living (ADL) and comfort needs    Impaired instrumental activities of daily living (IADL)    Self-care deficit for feeding, bathing, and toileting    Paraplegia, unspecified    Muscular dystrophy    Epididymo-orchitis    COVID-19    Abnormal urine    ALS (amyotrophic lateral sclerosis)    DNR (do not resuscitate)    Requires continuous supervision for activities of daily living (ADL)          The patient will return to clinic in 2-3 months.    Important to note, also  has a past medical history of Anxiety, Degenerative motor system disease, Depression, Spastic quadriplegia, and Spinocerebellar atrophy.    Time Spent:  A total of 45 minutes were spent onn the day of the patient's visit, before during and after the visit which included reviewing records, educating patient/ family about next steps, symptoms, disease progression, prescriptions. Visit today is associated with current or anticipated ongoing medical care related to the patient's complex neurologic condition.     This note was created with voice recognition software.  Grammatical, syntax and spelling errors may be inevitable.           Margarita Humphrey MD  Medicine- Neurology, Clinical Neurophysiology    Problem List Items Addressed This Visit          Neuro    Spastic diplegia    Spinocerebellar ataxia    Overview     Seen Dr. Wise in movement disorder clinic on 2/23/2015  Diagnosis of SCA at that visit

## 2024-03-03 RX ORDER — DONEPEZIL HYDROCHLORIDE 5 MG/1
5 TABLET, FILM COATED ORAL NIGHTLY
Qty: 90 TABLET | Refills: 5 | Status: SHIPPED | OUTPATIENT
Start: 2024-03-03 | End: 2025-08-25

## 2024-03-04 ENCOUNTER — SOCIAL WORK (OUTPATIENT)
Dept: NEUROLOGY | Facility: CLINIC | Age: 44
End: 2024-03-04
Payer: MEDICARE

## 2024-03-04 DIAGNOSIS — J96.10 CHRONIC NEUROMUSCULAR RESPIRATORY FAILURE: Primary | ICD-10-CM

## 2024-03-04 NOTE — PROGRESS NOTES
LCSW sent referral, insurance info. And dr. Humphrey neurologist note via [facility] fax to following provider:     [place] [address], [place], [place]   [phone]   fax number is [phone]

## 2024-03-21 RX ORDER — CETIRIZINE HYDROCHLORIDE 10 MG/1
TABLET ORAL
Qty: 30 TABLET | Refills: 11 | Status: SHIPPED | OUTPATIENT
Start: 2024-03-21

## 2024-03-23 NOTE — TELEPHONE ENCOUNTER
----- Message from Farhad Quintana sent at 4/26/2022  8:13 AM CDT -----  Contact: Ana with [facility]   Ana called in the patient will be discharging on 04/28/22 he will need a follow up home visit please advise     
Appt made on 05/10/2022  
92

## 2024-03-25 ENCOUNTER — TELEPHONE (OUTPATIENT)
Dept: NEUROLOGY | Facility: CLINIC | Age: 44
End: 2024-03-25
Payer: MEDICARE

## 2024-03-25 DIAGNOSIS — G80.1 SPASTIC DIPLEGIA: Primary | ICD-10-CM

## 2024-03-25 DIAGNOSIS — Z74.09 IMPAIRED MOBILITY AND ADLS: ICD-10-CM

## 2024-03-25 DIAGNOSIS — G12.29 UPPER MOTOR NEURON DISEASE: Primary | ICD-10-CM

## 2024-03-25 DIAGNOSIS — G12.21 ALS (AMYOTROPHIC LATERAL SCLEROSIS): ICD-10-CM

## 2024-03-25 DIAGNOSIS — G80.1 SPASTIC DIPLEGIA: ICD-10-CM

## 2024-03-25 DIAGNOSIS — Z78.9 IMPAIRED MOBILITY AND ADLS: ICD-10-CM

## 2024-03-25 NOTE — TELEPHONE ENCOUNTER
CPW    Patient is due for 5 year chair renewal. Orders and face to face forwarded to [facility] Attn: Yecenia.    Communication sent to Ms. Akhtar to inquire of Home Health services for PT/OT evaluation with [facility].     Awaiting response.       Dank Conte RN, BSN, BS  ALS Clinical Care Coordinator  [phone]

## 2024-03-26 ENCOUNTER — PATIENT MESSAGE (OUTPATIENT)
Dept: NEUROLOGY | Facility: CLINIC | Age: 44
End: 2024-03-26
Payer: MEDICARE

## 2024-03-26 ENCOUNTER — SOCIAL WORK (OUTPATIENT)
Dept: NEUROLOGY | Facility: CLINIC | Age: 44
End: 2024-03-26
Payer: MEDICARE

## 2024-03-26 NOTE — PROGRESS NOTES
LCSW sent updated referral for HH via "[employer] fax to following provider:      [facility]   [address], [place], [place]   [phone]   fax number is [phone]    Also notified nurse coordinator that previous order from 3/2 was sent to this provider on 3/4 and was communicated via Teams.

## 2024-04-02 ENCOUNTER — OFFICE VISIT (OUTPATIENT)
Dept: PHYSICAL MEDICINE AND REHAB | Facility: CLINIC | Age: 44
End: 2024-04-02
Payer: MEDICARE

## 2024-04-02 VITALS
HEIGHT: 75 IN | SYSTOLIC BLOOD PRESSURE: 112 MMHG | BODY MASS INDEX: 18.74 KG/M2 | DIASTOLIC BLOOD PRESSURE: 72 MMHG | HEART RATE: 79 BPM

## 2024-04-02 DIAGNOSIS — G11.8 SPINOCEREBELLAR ATAXIA: ICD-10-CM

## 2024-04-02 DIAGNOSIS — G12.21 ALS (AMYOTROPHIC LATERAL SCLEROSIS): ICD-10-CM

## 2024-04-02 DIAGNOSIS — G12.29 UPPER MOTOR NEURON DISEASE: ICD-10-CM

## 2024-04-02 DIAGNOSIS — G80.1 SPASTIC DIPLEGIA: Primary | ICD-10-CM

## 2024-04-02 PROCEDURE — 99213 OFFICE O/P EST LOW 20 MIN: CPT | Mod: PBBFAC | Performed by: PHYSICAL MEDICINE & REHABILITATION

## 2024-04-02 PROCEDURE — 64645 CHEMODENERV 1 EXTREM 5/> EA: CPT | Mod: S$PBB,,, | Performed by: PHYSICAL MEDICINE & REHABILITATION

## 2024-04-02 PROCEDURE — 95874 GUIDE NERV DESTR NEEDLE EMG: CPT | Mod: 26,S$PBB,, | Performed by: PHYSICAL MEDICINE & REHABILITATION

## 2024-04-02 PROCEDURE — 99499 UNLISTED E&M SERVICE: CPT | Mod: S$PBB,,, | Performed by: PHYSICAL MEDICINE & REHABILITATION

## 2024-04-02 PROCEDURE — 99999 PR PBB SHADOW E&M-EST. PATIENT-LVL III: CPT | Mod: PBBFAC,,, | Performed by: PHYSICAL MEDICINE & REHABILITATION

## 2024-04-02 PROCEDURE — 99999PBSHW PR PBB SHADOW TECHNICAL ONLY FILED TO HB: Mod: JZ,PBBFAC,,

## 2024-04-02 PROCEDURE — 64644 CHEMODENERV 1 EXTREM 5/> MUS: CPT | Mod: PBBFAC | Performed by: PHYSICAL MEDICINE & REHABILITATION

## 2024-04-02 RX ADMIN — ONABOTULINUMTOXINA 900 UNITS: 100 INJECTION, POWDER, LYOPHILIZED, FOR SOLUTION INTRADERMAL; INTRAMUSCULAR at 08:04

## 2024-04-02 RX ADMIN — ONABOTULINUMTOXINA 900 UNITS: 100 INJECTION, POWDER, LYOPHILIZED, FOR SOLUTION INTRADERMAL; INTRAMUSCULAR at 02:04
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
## 2024-04-03 NOTE — PROCEDURES
Botulinum Injection  Location: Limbs/Trunk    Date/Time: 4/2/2024 2:20 PM    Performed by: Yung Rodriguez MD  Authorized by: Yung Rodriguez MD      Consent:      Consent obtained:  Verbal     Consent given by:  Parent     Risks discussed:  Bleeding, pain, infection and weakness     Alternatives discussed:  Observation and referral    Universal protocol:      Relevant documents present and verified:  Yes       Site/side verified:  Yes       Immediately prior to procedure a time out was called:  Yes       Patient identity confirmed:  Verbally with patient    Procedure details:      EMG used?:  Yes     Diluted by:  Preservative free saline     Toxin (Brand):  OnaBoNT-A (Botox)     Concentration (u/mL):  100     Total number of units available:  900     Muscle area injected: upper leg, leg and pelvis and hip joint    Lower extremity - upper leg:      Right adductor longus:  50 units divided amongst 1 site(s)     Left adductor longus:  50 units divided amongst 1 site(s)     Right adductor alejandro:  50 units divided amongst 1 site(s)     Left adductor alejandro:  50 units divided amongst 1 site(s)     Right biceps femoris:  50 units divided amongst 1 site(s)     Left biceps femoris:  50 units divided amongst 1 site(s)     Right rectus femoris:  50 units divided amongst 1 site(s)     Left rectus femoris:  50 units divided amongst 1 site(s)     Right semimembranosus:  50 units divided amongst 1 site(s)     Left semimembranosus:  50 units divided amongst 1 site(s)    Lower extremity - leg:      Right lateral head gastrocnemius:  50 units divided amongst 1 site(s)     Left lateral head gastrocnemius:  50 units divided amongst 1 site(s)     Right medial head gastrocnemius:  50 units divided amongst 1 site(s)     Left medial head gastrocnemius:  50 units divided amongst 1 site(s)     Right soleus:  50 units divided amongst 1 site(s)     Left soleus:  50 units divided amongst 1 site(s)     Right tibialis anterior:  50  units divided amongst 1 site(s)     Right tibialis posterior:  50 units divided amongst 1 site(s)       Total units injected:  900     Total units wasted:  0    Medications: 900 Units onabotulinumtoxina (BOTOX) injection    Post-procedure details:      Patient tolerance of procedure:  Tolerated well, no immediate complications

## 2024-04-16 ENCOUNTER — PATIENT MESSAGE (OUTPATIENT)
Dept: PHYSICAL MEDICINE AND REHAB | Facility: CLINIC | Age: 44
End: 2024-04-16
Payer: MEDICARE

## 2024-04-25 ENCOUNTER — PATIENT MESSAGE (OUTPATIENT)
Dept: PHYSICAL MEDICINE AND REHAB | Facility: CLINIC | Age: 44
End: 2024-04-25
Payer: MEDICARE

## 2024-04-29 DIAGNOSIS — G80.1 SPASTIC DIPLEGIA: ICD-10-CM

## 2024-04-30 RX ORDER — DANTROLENE SODIUM 50 MG/1
CAPSULE ORAL
Qty: 270 CAPSULE | Refills: 3 | Status: SHIPPED | OUTPATIENT
Start: 2024-04-30

## 2024-05-02 DIAGNOSIS — G80.1 SPASTIC DIPLEGIA: Primary | ICD-10-CM

## 2024-05-07 ENCOUNTER — TELEPHONE (OUTPATIENT)
Dept: PHYSICAL MEDICINE AND REHAB | Facility: CLINIC | Age: 44
End: 2024-05-07
Payer: MEDICARE

## 2024-05-07 NOTE — TELEPHONE ENCOUNTER
----- Message from Kaylene Yarbrough sent at 5/7/2024 12:07 PM CDT -----  Regarding: call back  Contact: [phone]  Caller Ayad calling in requesting call back for therapeutic stretching please call to discuss   Further

## 2024-05-07 NOTE — TELEPHONE ENCOUNTER
Spoke to the therapist on behalf of the patient's mother. I told orders were already in the system for therapy for the patient. He wanted to know if we could help get approval from his insurance company. I explained to him we have no control over what the insurance will or will not cover.

## 2024-06-17 ENCOUNTER — PATIENT MESSAGE (OUTPATIENT)
Dept: INTERNAL MEDICINE | Facility: CLINIC | Age: 44
End: 2024-06-17
Payer: MEDICARE

## 2024-06-17 DIAGNOSIS — G80.1 SPASTIC DIPLEGIA: ICD-10-CM

## 2024-06-17 RX ORDER — BACLOFEN 20 MG/1
20 TABLET ORAL 3 TIMES DAILY
Qty: 270 TABLET | Refills: 3 | Status: SHIPPED | OUTPATIENT
Start: 2024-06-17 | End: 2025-06-17

## 2024-06-17 NOTE — TELEPHONE ENCOUNTER
Mr. Moreno is out of his Baclofen. Mom has tried to get the medication from the neurologist since last week. Mom is scheduled for a procedure tomorrow. Mom has been at [facility] since 9:30 am trying to get someone to fill this medication

## 2024-06-17 NOTE — TELEPHONE ENCOUNTER
Received a call from patient mom who stated that she has been trying to get Patient Baclofen refilled for the last two weeks to no avail. Mom has been call the neurologist office all day today and has not gotten a response. She has been sitting at [facility] since 9:30 am. Mom is desperately trying to get the medication refill, she is scheduled to have surgery tomorrow.Can you please send a refill for Baclofen to [facility] in [place]. Please advise.

## 2024-07-17 ENCOUNTER — TELEPHONE (OUTPATIENT)
Dept: PHYSICAL MEDICINE AND REHAB | Facility: CLINIC | Age: 44
End: 2024-07-17
Payer: MEDICARE

## 2024-07-17 DIAGNOSIS — G12.21 ALS (AMYOTROPHIC LATERAL SCLEROSIS): ICD-10-CM

## 2024-07-17 DIAGNOSIS — G80.1 SPASTIC DIPLEGIA: ICD-10-CM

## 2024-07-17 DIAGNOSIS — R25.2 SPASTICITY: Primary | ICD-10-CM

## 2024-08-07 ENCOUNTER — OFFICE VISIT (OUTPATIENT)
Dept: PHYSICAL MEDICINE AND REHAB | Facility: CLINIC | Age: 44
End: 2024-08-07
Payer: MEDICARE

## 2024-08-07 DIAGNOSIS — G47.00 INSOMNIA, UNSPECIFIED TYPE: ICD-10-CM

## 2024-08-07 DIAGNOSIS — R25.2 SPASTICITY: ICD-10-CM

## 2024-08-07 DIAGNOSIS — G11.8 SPINOCEREBELLAR ATAXIA: Primary | ICD-10-CM

## 2024-08-07 PROCEDURE — 64616 CHEMODENERV MUSC NECK DYSTON: CPT | Mod: 50,PBBFAC | Performed by: PHYSICAL MEDICINE & REHABILITATION

## 2024-08-07 PROCEDURE — 99999PBSHW PR PBB SHADOW TECHNICAL ONLY FILED TO HB: Mod: JZ,PBBFAC,,

## 2024-08-07 PROCEDURE — 99999 PR PBB SHADOW E&M-EST. PATIENT-LVL I: CPT | Mod: PBBFAC,,, | Performed by: PHYSICAL MEDICINE & REHABILITATION

## 2024-08-07 PROCEDURE — 64644 CHEMODENERV 1 EXTREM 5/> MUS: CPT | Mod: PBBFAC | Performed by: PHYSICAL MEDICINE & REHABILITATION

## 2024-08-07 PROCEDURE — 95874 GUIDE NERV DESTR NEEDLE EMG: CPT | Mod: PBBFAC | Performed by: PHYSICAL MEDICINE & REHABILITATION

## 2024-08-07 PROCEDURE — 99211 OFF/OP EST MAY X REQ PHY/QHP: CPT | Mod: PBBFAC | Performed by: PHYSICAL MEDICINE & REHABILITATION

## 2024-08-07 RX ORDER — TRAZODONE HYDROCHLORIDE 50 MG/1
50 TABLET ORAL NIGHTLY
Qty: 30 TABLET | Refills: 11 | Status: SHIPPED | OUTPATIENT
Start: 2024-08-07 | End: 2025-08-07

## 2024-08-09 ENCOUNTER — PATIENT MESSAGE (OUTPATIENT)
Dept: PHYSICAL MEDICINE AND REHAB | Facility: CLINIC | Age: 44
End: 2024-08-09
Payer: MEDICARE

## 2024-08-09 ENCOUNTER — TELEPHONE (OUTPATIENT)
Dept: NEUROLOGY | Facility: CLINIC | Age: 44
End: 2024-08-09
Payer: MEDICARE

## 2024-08-12 ENCOUNTER — PATIENT MESSAGE (OUTPATIENT)
Dept: PHYSICAL MEDICINE AND REHAB | Facility: CLINIC | Age: 44
End: 2024-08-12
Payer: MEDICARE

## 2024-08-26 ENCOUNTER — PATIENT MESSAGE (OUTPATIENT)
Dept: PHYSICAL MEDICINE AND REHAB | Facility: CLINIC | Age: 44
End: 2024-08-26
Payer: MEDICARE

## 2024-08-27 ENCOUNTER — PATIENT MESSAGE (OUTPATIENT)
Dept: PHYSICAL MEDICINE AND REHAB | Facility: CLINIC | Age: 44
End: 2024-08-27
Payer: MEDICARE

## 2024-08-31 PROCEDURE — G0180 MD CERTIFICATION HHA PATIENT: HCPCS | Mod: ,,, | Performed by: PHYSICAL MEDICINE & REHABILITATION

## 2024-09-13 ENCOUNTER — TELEPHONE (OUTPATIENT)
Dept: NEUROLOGY | Facility: CLINIC | Age: 44
End: 2024-09-13
Payer: MEDICARE

## 2024-09-13 ENCOUNTER — TELEPHONE (OUTPATIENT)
Dept: INTERNAL MEDICINE | Facility: CLINIC | Age: 44
End: 2024-09-13
Payer: MEDICARE

## 2024-09-13 DIAGNOSIS — Z74.09 IMPAIRED TRANSFERS: ICD-10-CM

## 2024-09-13 DIAGNOSIS — G80.1 SPASTIC DIPLEGIA: Primary | ICD-10-CM

## 2024-09-13 DIAGNOSIS — G11.8 SPINOCEREBELLAR ATAXIA: ICD-10-CM

## 2024-09-13 NOTE — TELEPHONE ENCOUNTER
----- Message from Reta Peraza sent at 9/13/2024 12:25 PM CDT -----  Contact: [facility]  [phone]  Type: Home Health (orders, updates, clarifications, etc.)    Home Health Agency/Nurse: [facility]    Phone number: [phone]    Reason for call: asking for an order for a julieta lift to go to a [employer] company please. Thank you    Comments:

## 2024-09-13 NOTE — TELEPHONE ENCOUNTER
Message reply received from Ms. Akhtar that she would like to schedule 10.30 appt for Detrell.     Mother indicated that she can bring patient to clinic; however, unable to transport his chair and will need assistance getting Detrell in and out the car.     Sending message to [employer] to inquire if able to cover ride share or van rental for patient's transportation to clinic.       Dank Conte RN, BSN, BS  ALS Clinical Care Coordinator  [phone]

## 2024-09-13 NOTE — TELEPHONE ENCOUNTER
Viet Lift Request      Hey hun any way we can get doctor up send an order for a viet lift to a [employer] company   Please,  Giovana      Advised patient's mother that updated face to face is needed. Ed was last seen by Dr. Humphrey in March. Will refer to [employer] for possible loaner.       Dank Conte RN, BSN, BS  ALS Clinical Care Coordinator  [phone]

## 2024-09-18 DIAGNOSIS — G12.21 ALS (AMYOTROPHIC LATERAL SCLEROSIS): Primary | ICD-10-CM

## 2024-09-27 ENCOUNTER — TELEPHONE (OUTPATIENT)
Dept: INTERNAL MEDICINE | Facility: CLINIC | Age: 44
End: 2024-09-27
Payer: MEDICARE

## 2024-09-27 NOTE — TELEPHONE ENCOUNTER
----- Message from VALENTEShivam Torrez sent at 9/26/2024  1:49 PM CDT -----  Contact: yamileth @ nurse [phone]  Would like to receive medical advice.    Would they like a call back or a response via [facility]:  call back    Additional information: Calling to speak with the office about the pts anxiety th nurse states that he pt is very shaky and that the traZODone (DESYREL) 50 MG tablet aren't helping him sleep. The mother is states that she is looking for something that can help. Please call to advise.  
LVM for nurse to have patient schedule annual exam hasn't been seen In over a year.   
Female

## 2024-10-01 ENCOUNTER — DOCUMENT SCAN (OUTPATIENT)
Dept: HOME HEALTH SERVICES | Facility: HOSPITAL | Age: 44
End: 2024-10-01
Payer: MEDICARE

## 2024-10-03 ENCOUNTER — TELEPHONE (OUTPATIENT)
Dept: NEUROLOGY | Facility: CLINIC | Age: 44
End: 2024-10-03
Payer: MEDICARE

## 2024-10-03 NOTE — TELEPHONE ENCOUNTER
LCSW placed call to JoshGiovana (Mother)  [phone] (Mobile) . No answer and left VM requesting call back for any needs.

## 2024-10-07 ENCOUNTER — PATIENT MESSAGE (OUTPATIENT)
Dept: INTERNAL MEDICINE | Facility: CLINIC | Age: 44
End: 2024-10-07
Payer: MEDICARE

## 2024-10-08 ENCOUNTER — DOCUMENT SCAN (OUTPATIENT)
Dept: HOME HEALTH SERVICES | Facility: HOSPITAL | Age: 44
End: 2024-10-08
Payer: MEDICARE

## 2024-10-12 ENCOUNTER — EXTERNAL HOME HEALTH (OUTPATIENT)
Dept: HOME HEALTH SERVICES | Facility: HOSPITAL | Age: 44
End: 2024-10-12
Payer: MEDICARE

## 2024-10-15 ENCOUNTER — DOCUMENT SCAN (OUTPATIENT)
Dept: HOME HEALTH SERVICES | Facility: HOSPITAL | Age: 44
End: 2024-10-15
Payer: MEDICARE

## 2024-10-30 ENCOUNTER — CLINICAL SUPPORT (OUTPATIENT)
Dept: REHABILITATION | Facility: HOSPITAL | Age: 44
End: 2024-10-30
Attending: PSYCHIATRY & NEUROLOGY
Payer: MEDICARE

## 2024-10-30 ENCOUNTER — TELEPHONE (OUTPATIENT)
Dept: NEUROLOGY | Facility: CLINIC | Age: 44
End: 2024-10-30
Payer: MEDICARE

## 2024-10-30 ENCOUNTER — OFFICE VISIT (OUTPATIENT)
Dept: NEUROLOGY | Facility: CLINIC | Age: 44
End: 2024-10-30
Payer: MEDICARE

## 2024-10-30 VITALS
OXYGEN SATURATION: 98 % | SYSTOLIC BLOOD PRESSURE: 137 MMHG | HEART RATE: 86 BPM | DIASTOLIC BLOOD PRESSURE: 75 MMHG | BODY MASS INDEX: 16.53 KG/M2 | HEIGHT: 75 IN | WEIGHT: 132.94 LBS

## 2024-10-30 DIAGNOSIS — R25.2 SPASTICITY: ICD-10-CM

## 2024-10-30 DIAGNOSIS — G11.8 SPINOCEREBELLAR ATAXIA: ICD-10-CM

## 2024-10-30 DIAGNOSIS — G12.21 ALS (AMYOTROPHIC LATERAL SCLEROSIS): ICD-10-CM

## 2024-10-30 DIAGNOSIS — Z00.8 NUTRITIONAL ASSESSMENT: ICD-10-CM

## 2024-10-30 DIAGNOSIS — F41.8 INSOMNIA SECONDARY TO DEPRESSION WITH ANXIETY: ICD-10-CM

## 2024-10-30 DIAGNOSIS — R68.89 DECREASED STRENGTH, ENDURANCE, AND MOBILITY: ICD-10-CM

## 2024-10-30 DIAGNOSIS — Z74.09 DECREASED STRENGTH, ENDURANCE, AND MOBILITY: ICD-10-CM

## 2024-10-30 DIAGNOSIS — G12.21 ALS (AMYOTROPHIC LATERAL SCLEROSIS): Primary | ICD-10-CM

## 2024-10-30 DIAGNOSIS — F51.05 INSOMNIA SECONDARY TO DEPRESSION WITH ANXIETY: ICD-10-CM

## 2024-10-30 DIAGNOSIS — Z78.9 IMPAIRED MOBILITY AND ADLS: ICD-10-CM

## 2024-10-30 DIAGNOSIS — R47.1 DYSARTHRIA: ICD-10-CM

## 2024-10-30 DIAGNOSIS — R53.1 DECREASED STRENGTH, ENDURANCE, AND MOBILITY: ICD-10-CM

## 2024-10-30 DIAGNOSIS — Z74.09 IMPAIRED MOBILITY AND ADLS: ICD-10-CM

## 2024-10-30 DIAGNOSIS — Z74.3 REQUIRES CONTINUOUS SUPERVISION FOR ACTIVITIES OF DAILY LIVING (ADL): Primary | ICD-10-CM

## 2024-10-30 DIAGNOSIS — R13.12 OROPHARYNGEAL DYSPHAGIA: ICD-10-CM

## 2024-10-30 PROCEDURE — 99213 OFFICE O/P EST LOW 20 MIN: CPT | Mod: PBBFAC,25 | Performed by: PSYCHIATRY & NEUROLOGY

## 2024-10-30 PROCEDURE — 97164 PT RE-EVAL EST PLAN CARE: CPT

## 2024-10-30 PROCEDURE — 97165 OT EVAL LOW COMPLEX 30 MIN: CPT

## 2024-10-30 PROCEDURE — 99497 ADVNCD CARE PLAN 30 MIN: CPT | Mod: PBBFAC | Performed by: STUDENT IN AN ORGANIZED HEALTH CARE EDUCATION/TRAINING PROGRAM

## 2024-10-30 PROCEDURE — 99999 PR PBB SHADOW E&M-EST. PATIENT-LVL III: CPT | Mod: PBBFAC,,, | Performed by: PSYCHIATRY & NEUROLOGY

## 2024-10-30 RX ORDER — MIRTAZAPINE 7.5 MG/1
7.5 TABLET, FILM COATED ORAL NIGHTLY
Qty: 30 TABLET | Refills: 11 | Status: SHIPPED | OUTPATIENT
Start: 2024-10-30 | End: 2025-10-30

## 2024-10-30 NOTE — PROGRESS NOTES
[facility]      SPEECH THERAPY NEUROLOGICAL REHABILITATION SCREEN    ALS MULTIDISCIPLINARY CLINIC     Date: 10/30/2024      Name: Lisa Moreno   MRN: [id number]             Therapy Diagnosis:        Encounter Diagnoses   Name Primary?    Amyotrophic lateral sclerosis (ALS)      Hypophonia Yes    Spinocerebellar ataxia      Dysarthria      Oropharyngeal dysphagia      Major neurocognitive disorder due to another medical condition with behavioral disturbance        Physician: Dr. Ziyad Landaverde, Dr. Shelly Bray, and ANGELITO Stewart  Physician Orders: Ambulatory Referral for Speech  Plan of Care Expiration: Evaluation Only  Medical Diagnosis: Upper Motor Neuron Disorder possibly ALS or PLS (diagnosis not confirmed)     Initial Clinic Evaluation Date: 12/9/2021  ALS Clinic Number: [id number]     Time In: 9:15  Time Out: 9:21     Precautions: progressive degenerative disease, Standard, Fall, Cognition, Communication, Dysphagia, and Aspiration    Subjective   Date of Onset:  gradual decline since 2006  History of Current Condition: Lisa Moreno  presents to the [facility] secondary to the diagnosis of ALS Disease. No new medical changes since last clinic.  reported by mother, who spoke for patient 99% of the session. Mother is primary caregiver.   Chief Complaint: mother had no speech or swallowing complaints.   Past Medical History:  has a past medical history of Anxiety, Degenerative motor system disease, Depression, Spastic quadriplegia, and Spinocerebellar atrophy. Lisa Moreno  has a past surgical history that includes Baclofen pump implantation; Fluoroscopic urodynamic study (N/A, 11/27/2018); Esophagogastroduodenoscopy (N/A, 7/23/2020); Colonoscopy (N/A, 7/23/2020); Upper gastrointestinal endoscopy; and Reattachment of muscle (Bilateral, 2/18/2022).  Medical Hx and Allergies:  Lisa has a current medication list  "which includes the following prescription(s): baclofen, cetirizine, citalopram, dantrolene, donepezil, doxycycline, hydroxyzine pamoate, melatonin, and pulse oximeter, and the following Facility-Administered Medications: tetracaine hcl 0.5%.         Review of patient's allergies indicates:   Allergen Reactions    Penicillins         Hives and Itching    Penicillin        Prior Therapy:   Several trials of outpatient speech therapy at various locations of [facility], [place], [place], and [place] for dysphagia therapy and speech therapy for a few years with limited progress made. AAC evaluation was refused and he/family were noncompliant with suggestions and recommendations  per chart review.  Patient's mother reported that he was admitted to [facility] with therapy while she was recovering from surgery.   Home Health Therapy at present time: No  Social History: lives with his mother. He has a 15 y/o son. 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  Prior Level of Function:  independent  Current Level of Function: assistance needed for all ADL's  Nutrition: regular with thin liquids, he has a good appetite per mother's report.   Recent MBSS/FEES:  1/8/21: "Results of this FEES revealed that the patient presents with severe oropharyngeal dysphagia c/b delayed initiation of the swallow which required cueing, significantly reduced tongue base retraction, partial epiglottic movement, poor pharyngeal contraction, impaired laryngeal vestibule closure, and decreased pharyngoesophageal segment opening. Pharyngoscopic and laryngoscopic findings revealed overt aspiration of secretions prior to po trials. Recommend Npo with alternative means of nutrition and hydration. Pt appears to be at an increased risk for aspiration related PNA 2/2 to decreased mobility status and aspiration on all given consistencies."  Pain: none " "indicated  Pain Location/Description: n/a   Respiratory Status: adequate for room air   Hearing: Appeared to be within functional limits in conversation. Will monitor and refer as necessary.   Objective       Overall Speech Intelligibility: Patient vocalized x 3 during the evaluation today with numerous verbal cues. Patient stated his name and answered yes/no questions. His mother spoke for him.  Mother reported that he speaks when he wants to and effectively when interacting with her and other family members. She said he "whispers" around other people when she is not present but he can be loud/audible. Therapist did witness a soft phonation and a louder phonation today.     Awareness / Strategy Use:   Patient demonstrates  limited awareness of motor speech impairment. Pt was not able to using strategies to improve intelligibility.      Impact of Motor Speech Impairment on Functioning:   Patient presented with decreased speech intelligibility and speech intensity which negatively impacts functional communication in most contexts, decreased QOL, and increased frustration.       Safety Risks: Decreased efficiency and effectiveness to communicate in an emergency situation.       Augmentative/Alternative Communication (AAC): Patient is not currently using an augmentative/ alternative communication device. This clinician previously and again today educated mother and patient on the benefits of an Augmentative-Alternative Communication to supplement his speech and give him a voice to express himself. Mother not interested  Type of AAC Device:  n/a   Recommend AAC Eval: no not interested      Clinical Swallow Examination: based on previous clinical swallowing assessment and objective swallowing assessment (Fiberoptic Endoscopic Evaluation of Swallowing)  aspiration on all consistencies and secretions. Therefore swallowing was not assessed today and mother reported no concerns or swallowing difficulty. Mother reports no " coughing/throat clearing during meals. Denies history of aspiration pneumonia.  Reports good appetite.      Recommend Modified Barium Swallow Study (MBSS) or Fiberoptic Endoscopic Evaluation of Swallowing (FEES): No     Education   Patient and his family were educated on the recommendations, AAC, speech generating devices (SGD), and swallowing precautions. They verbalized understanding of all discussed.      Assessment   Impressions: Lisa Moreno exhibited severe mixed spastic-flaccid dysarthria c/b minimal vocalizations and based on observations today and suspected oropharyngeal dysphagia c/b aspiration of all consistencies and secretions (based on chart review)  due to upper motor neuron disease. Mother reported no difficulty with swallowing and communicating which is different from what is reported in medical chart and review from past therapy notes. Demonstrates impairments including limitations as described in the problem list. Positive prognostic factors include caregiver support. Negative prognostic factors include progressive nature of disease. Barriers to progress include complex medical history and progressive nature of disease.      Patient's spiritual, cultural and educational needs considered and pt agreeable to plan of care and goals.     ALS Severity Scale:  Stage 4: Use of Alternative/Augmentative Communication (AAC): Intelligibility problems need to be resolved by writing or a spokesperson. The speaker may initiate communication nonverbally. ALS Severity Scale: 3 or 4 .     Rehab Potential: fair to guarded      Plan    Recommended Treatment Plan:   1.  Follow up with [facility] in 3 months     Beatrice Galo M.S., L-SLP,CCC-SLP   Speech Language Pathologist

## 2024-10-31 ENCOUNTER — SOCIAL WORK (OUTPATIENT)
Dept: NEUROLOGY | Facility: CLINIC | Age: 44
End: 2024-10-31
Payer: MEDICARE

## 2024-11-20 ENCOUNTER — TELEPHONE (OUTPATIENT)
Dept: NEUROLOGY | Facility: CLINIC | Age: 44
End: 2024-11-20
Payer: MEDICARE

## 2024-11-21 NOTE — TELEPHONE ENCOUNTER
[facility] Cert    Completed and sent to MD to review and sign. Will return to Tiara Alvarado upon completion.         Dank Conte RN, BSN, BS  ALS Clinical Care Coordinator  [phone]

## 2024-11-26 ENCOUNTER — PATIENT MESSAGE (OUTPATIENT)
Dept: INTERNAL MEDICINE | Facility: CLINIC | Age: 44
End: 2024-11-26
Payer: MEDICARE

## 2024-12-18 ENCOUNTER — OFFICE VISIT (OUTPATIENT)
Dept: PHYSICAL MEDICINE AND REHAB | Facility: CLINIC | Age: 44
End: 2024-12-18
Payer: MEDICARE

## 2024-12-18 ENCOUNTER — PATIENT MESSAGE (OUTPATIENT)
Dept: INTERNAL MEDICINE | Facility: CLINIC | Age: 44
End: 2024-12-18
Payer: MEDICARE

## 2024-12-18 VITALS
HEIGHT: 75 IN | BODY MASS INDEX: 16.44 KG/M2 | DIASTOLIC BLOOD PRESSURE: 89 MMHG | HEART RATE: 90 BPM | SYSTOLIC BLOOD PRESSURE: 126 MMHG | WEIGHT: 132.25 LBS

## 2024-12-18 DIAGNOSIS — Z53.21 PATIENT LEFT WITHOUT BEING SEEN: Primary | ICD-10-CM

## 2024-12-18 PROCEDURE — 99499 UNLISTED E&M SERVICE: CPT | Mod: S$PBB,,, | Performed by: PHYSICAL MEDICINE & REHABILITATION

## 2024-12-27 ENCOUNTER — TELEPHONE (OUTPATIENT)
Dept: INTERNAL MEDICINE | Facility: CLINIC | Age: 44
End: 2024-12-27
Payer: MEDICARE

## 2024-12-27 NOTE — TELEPHONE ENCOUNTER
----- Message from Vanessa sent at 12/27/2024 12:25 PM CST -----  Contact: PT mom Giovana @[phone]  PT mom calling to speak with the nurse regarding something he needs. ( No other information was giving) Please call to advise.

## 2024-12-27 NOTE — TELEPHONE ENCOUNTER
Spoke to pt mom, she stated that she cannot connect to virtual visit. I informed her that she would need to contact the [employer] help desk so they can guide her through fixing the issue. She verbalized understanding. She also asked about the pt L90 form, correct fax number given so that it can be faxed over.    Steven PARKER

## 2025-01-02 ENCOUNTER — PATIENT MESSAGE (OUTPATIENT)
Dept: INTERNAL MEDICINE | Facility: CLINIC | Age: 45
End: 2025-01-02

## 2025-01-08 ENCOUNTER — TELEPHONE (OUTPATIENT)
Dept: PHYSICAL MEDICINE AND REHAB | Facility: CLINIC | Age: 45
End: 2025-01-08
Payer: MEDICARE

## 2025-01-08 DIAGNOSIS — R25.2 SPASTICITY: Primary | ICD-10-CM

## 2025-01-08 DIAGNOSIS — G11.8 SPINOCEREBELLAR ATAXIA: ICD-10-CM

## 2025-01-28 ENCOUNTER — OFFICE VISIT (OUTPATIENT)
Dept: PHYSICAL MEDICINE AND REHAB | Facility: CLINIC | Age: 45
End: 2025-01-28
Payer: MEDICARE

## 2025-01-28 VITALS
SYSTOLIC BLOOD PRESSURE: 116 MMHG | WEIGHT: 132.25 LBS | DIASTOLIC BLOOD PRESSURE: 80 MMHG | BODY MASS INDEX: 16.44 KG/M2 | HEIGHT: 75 IN | TEMPERATURE: 101 F

## 2025-01-28 DIAGNOSIS — R25.2 SPASTICITY: Primary | ICD-10-CM

## 2025-01-28 PROCEDURE — 99213 OFFICE O/P EST LOW 20 MIN: CPT | Mod: PBBFAC,25 | Performed by: PHYSICAL MEDICINE & REHABILITATION

## 2025-01-28 PROCEDURE — 99999 PR PBB SHADOW E&M-EST. PATIENT-LVL III: CPT | Mod: PBBFAC,,, | Performed by: PHYSICAL MEDICINE & REHABILITATION

## 2025-01-28 PROCEDURE — 64642 CHEMODENERV 1 EXTREMITY 1-4: CPT | Mod: PBBFAC | Performed by: PHYSICAL MEDICINE & REHABILITATION

## 2025-01-28 PROCEDURE — 99999PBSHW PR PBB SHADOW TECHNICAL ONLY FILED TO HB: Mod: JZ,PBBFAC,,

## 2025-01-28 RX ADMIN — ONABOTULINUMTOXINA 100 UNITS: 100 INJECTION, POWDER, LYOPHILIZED, FOR SOLUTION INTRADERMAL; INTRAMUSCULAR at 02:01

## 2025-01-28 RX ADMIN — ONABOTULINUMTOXINA 800 UNITS: 100 INJECTION, POWDER, LYOPHILIZED, FOR SOLUTION INTRADERMAL; INTRAMUSCULAR at 02:01
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
## 2025-01-29 NOTE — PROCEDURES
Botulinum Injection  Location: Limbs/Trunk    Date/Time: 1/28/2025 2:20 PM    Performed by: Yung Rodriguez MD  Authorized by: Yung Rodriguez MD      Consent:      Consent obtained:  Verbal     Risks discussed:  Bleeding, infection, pain and weakness     Alternatives discussed:  Observation, alternative treatment and referral    Universal protocol:      Relevant documents present and verified:  Yes       Site/side verified:  Yes       Patient identity confirmed:  Verbally with patient and provided demographic data    Procedure details:      EMG used?:  Yes     Diluted by:  Preservative free saline     Toxin (Brand):  OnaBoNT-A (Botox)     Comments about dilution:  1:1     Concentration (u/mL):  100     Total number of units available:  800     Muscle area injected: paraspinal region, upper leg and leg    Trunk - paraspinal region:      Right thoracic paraspinal:  75 units divided amongst 5 site(s)     Left thoracic paraspinal:  75 units divided amongst 5 site(s)     Right lumbar paraspinal:  75 units divided amongst 5 site(s)     Left lumbar paraspinal:  75 units divided amongst 5 site(s)    Lower extremity - upper leg:      Right semimembranosus:  100 units divided amongst 4 site(s)     Left semimembranosus:  100 units divided amongst 4 site(s)     Right semitendinosus:  100 units divided amongst 4 site(s)     Left semitendinosus:  100 units divided amongst 4 site(s)    Lower extremity - leg:      Right soleus:  50 units divided amongst 1 site(s)     Left soleus:  50 units divided amongst 1 site(s)       Total units injected:  800     Total units wasted:  0    Medications: 800 Units onabotulinumtoxina 100 unit    Post-procedure details:      Patient tolerance of procedure:  Tolerated well, no immediate complications

## 2025-01-29 NOTE — PROCEDURES
Botulinum Injection  Location: Neck    Date/Time: 1/28/2025 2:20 PM    Performed by: Yung Rodriguez MD  Authorized by: Yung Rodriguez MD      Consent:      Consent given by:  Parent     Risks discussed:  Dysphagia, weakness, infection and bleeding     Alternatives discussed:  Observation, alternative treatment and referral    Universal protocol:      Relevant documents present and verified:  Yes       Site/side verified:  Yes       Patient identity confirmed:  Verbally with patient and provided demographic data  Procedure details:      EMG used?:  Yes     Diluted by:  Preservative free saline     Laterality: Bilateral     Toxin (Brand):  OnaBoNT-A (Botox)     Comments about dilution:  1:1     Concentration (u/mL):  100     Total number of units available:  100     Right upper trapezius:  25 units divided amongst site(s)     Left upper trapezius:  25 units divided amongst site(s)     Right levator scapulae:  25 units divided amongst site(s)     Left levator scapulae:  25 units divided amongst site(s)       Total units injected:  100     Total units wasted:  0    Medications: 100 Units onabotulinumtoxina 100 unit

## 2025-01-31 ENCOUNTER — TELEPHONE (OUTPATIENT)
Dept: NEUROSURGERY | Facility: CLINIC | Age: 45
End: 2025-01-31
Payer: MEDICARE

## 2025-01-31 NOTE — TELEPHONE ENCOUNTER
Called mom 2x, lvm to schedule appt with Dr Nelson     ---------------------------------------------------------------------  Nora Robins Staff  Good Morning. Patient has a referral to see Dr. Nelson for ITB Trial. Please call patient to assist with schedule. Dr. Nelson's name did not populate in the decision tree to schedule; I was unsure where to proceed with scheduling or not. Thanks.

## 2025-02-03 ENCOUNTER — TELEPHONE (OUTPATIENT)
Dept: NEUROSURGERY | Facility: CLINIC | Age: 45
End: 2025-02-03
Payer: MEDICARE

## 2025-02-11 ENCOUNTER — PATIENT MESSAGE (OUTPATIENT)
Dept: PHYSICAL MEDICINE AND REHAB | Facility: CLINIC | Age: 45
End: 2025-02-11
Payer: MEDICARE

## 2025-02-21 ENCOUNTER — PATIENT MESSAGE (OUTPATIENT)
Dept: NEUROSURGERY | Facility: CLINIC | Age: 45
End: 2025-02-21
Payer: MEDICARE

## 2025-02-21 ENCOUNTER — TELEPHONE (OUTPATIENT)
Dept: NEUROSURGERY | Facility: CLINIC | Age: 45
End: 2025-02-21
Payer: MEDICARE

## 2025-02-21 DIAGNOSIS — Z00.00 ENCOUNTER FOR MEDICARE ANNUAL WELLNESS EXAM: ICD-10-CM

## 2025-02-25 ENCOUNTER — PATIENT MESSAGE (OUTPATIENT)
Dept: NEUROLOGY | Facility: CLINIC | Age: 45
End: 2025-02-25
Payer: MEDICARE

## 2025-02-25 ENCOUNTER — PATIENT MESSAGE (OUTPATIENT)
Facility: CLINIC | Age: 45
End: 2025-02-25
Payer: MEDICARE

## 2025-02-25 ENCOUNTER — PATIENT MESSAGE (OUTPATIENT)
Dept: URGENT CARE | Facility: CLINIC | Age: 45
End: 2025-02-25
Payer: MEDICARE

## 2025-03-24 ENCOUNTER — TELEPHONE (OUTPATIENT)
Facility: CLINIC | Age: 45
End: 2025-03-24
Payer: MEDICARE

## 2025-03-24 NOTE — TELEPHONE ENCOUNTER
C/O Insomnia & Decrease Appetite    Patient's mother, Giovana, sending following up email regarding sleep meds and appetite stimulant. Initial message was sent via [facility] while you were out of the office on vacation on 3/6/25. Please advise. Kwadwo Conte RN, BSN, BS  ALS Clinical Care Coordinator  [phone]

## 2025-03-25 NOTE — TELEPHONE ENCOUNTER
Physical Therapy Visit    Visit Type: Daily Treatment Note  Visit: 3  Referring Provider: Shani Judge CNP  Medical Diagnosis (from order): M72.2 - Plantar fasciitis     SUBJECTIVE                                                                                                               Patient reports soreness today. She has been walking around a more than usual this week.    Pain / Symptoms  - Pain rating (out of 10): Current: 6       OBJECTIVE                                                                                                                    Posture:  Bilateral foot pronation                          Treatment     Therapeutic Exercise  Education on evaluation findings, treatment plan and goals   Seated heel lifts   Seated toe yoga  Seated arch lift    Manual Therapy   Ankle eversion stretch  Subtalar joint mobilization    Skilled input: verbal instruction/cues, tactile instruction/cues, facilitation and inhibition    Writer verbally educated and received verbal consent for hand placement, positioning of patient, and techniques to be performed today from patient for clothing adjustments for techniques, therapist position for techniques and hand placement and palpation for techniques as described above and how they are pertinent to the patient's plan of care.  Home Exercise Program  Access Code: [email or web address]  URL: [email or web address]  Date: 03/13/2025  Prepared by: Ale Walker    Exercises  - Toe Yoga - Alternating Great Toe and Lesser Toe Extension  - 2 x daily - 7 x weekly - 10 reps  - Big toe mobilization  - 2 x daily - 7 x weekly - 10 reps  - Seated Self Great Toe Mobilization  - 1-3 x daily - 7 x weekly  - Ankle and Toe Plantarflexion with Resistance  - 1 x daily - 7 x weekly - 1-3 sets - 20 reps  - Single Leg Balance in March Position  - 1-2 x daily - 7 x weekly - 2-4 reps - 10-30 seconds hold      ASSESSMENT                                                      Spoke with patient mother, informed her that I have received her message. Patient mother states that patient can not come in to complete a Covid Screening Test because of transportation issues. Patient mother states that patient will like to keep appointment with MOLLY Foster on 10/29/20 at 1:30 pm. I verbalized to patient mother that I understand.                                                          Regressed exercises today due to recent flare up of pain. Education provided on importance of taking breaks and icing over foot to aid in overuse irritation. Encouraged patient to perform stretch in AM before taking first step and discussed potential benefits of night splint. Will benefit from continued skilled care to aid in decreased pain with ambulation.    Education:   - Present and ready to learn: patient  - Results of above outlined education: Verbalizes understanding, Demonstrates understanding and Needs reinforcement    PLAN                                                                                                                           Suggestions for next session as indicated: Progress intrinsic foot strengthening       Therapy procedure time and total treatment time can be found documented on the Time Entry flowsheet

## 2025-03-27 ENCOUNTER — OFFICE VISIT (OUTPATIENT)
Dept: NEUROSURGERY | Facility: CLINIC | Age: 45
End: 2025-03-27
Payer: MEDICARE

## 2025-03-27 DIAGNOSIS — R25.2 SPASTICITY: ICD-10-CM

## 2025-03-27 PROCEDURE — 98001 SYNCH AUDIO-VIDEO NEW LOW 30: CPT | Mod: 95,,, | Performed by: NEUROLOGICAL SURGERY

## 2025-03-27 NOTE — PROGRESS NOTES
Neurosurgery  History & Physical    SUBJECTIVE:     Chief Complaint: spasticity     History of Present Illness:  Lisa Moreno is a 44 y.o. 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 Rodriguez for consideration of intrathecal baclofen therapy. He is accompanied by his mother, Ms. Akhtar, who helps to provide the history as the patient himself is unable to speak at the time of our consultation.     Currently the most bothersome symptoms with which they're dealing are weight loss and poor sleep.    In 2008 or 9, he had a baclofen pump placed at [facility]. It was removed because it caused him pain, and Ms. Akhtar did not feel that there was significant benefit (in fact, she thinks it might have made him lose function).     He has been getting Botox, but his mom is frustrated that there is no home health PT being offered.    She states very clearly that she is NOT interested in repeating a baclofen pump at this time.     Review of patient's allergies indicates:   Allergen Reactions    Penicillins      Hives and Itching    Penicillin        Current Medications[1]    Past Medical History:   Diagnosis Date    Anxiety     Degenerative motor system disease     Depression     Insomnia secondary to depression with anxiety 10/30/2024    Spastic quadriplegia     Spinocerebellar atrophy      Past Surgical History:   Procedure Laterality Date    BACLOFEN PUMP IMPLANTATION      COLONOSCOPY N/A 7/23/2020    Procedure: COLONOSCOPY;  Surgeon: Ziyad Fitch MD;  Location: [facility];  Service: Endoscopy;  Laterality: N/A;    ESOPHAGOGASTRODUODENOSCOPY N/A 7/23/2020    Procedure: EGD (ESOPHAGOGASTRODUODENOSCOPY);  Surgeon: Ziyad Fitch MD;  Location: [facility];  Service: Endoscopy;  Laterality: N/A;    FLUOROSCOPIC URODYNAMIC STUDY N/A 11/27/2018    Procedure: URODYNAMIC STUDY, FLUOROSCOPIC;  Surgeon: Tanja Okeefe MD;   [place] [facility] [place] [address] [place] [address];  Service: Urology;  Laterality: N/A;  1 hour    REATTACHMENT OF MUSCLE Bilateral 2/18/2022    Procedure: PTOSIS REPAIR OF BOTH EYES;  Surgeon: Krupa Rios MD;  [facility] [facility] [place] [address] [place] [address];  Service: Ophthalmology;  Laterality: Bilateral;    UPPER GASTROINTESTINAL ENDOSCOPY       Family History       Problem Relation (Age of Onset)    Cancer Mother    Depression Mother    Hypertension Mother    Multiple sclerosis Other    No Known Problems Brother, Son    Thyroid cancer Mother          Social History     Socioeconomic History    Marital status: Single   Tobacco Use    Smoking status: Never    Smokeless tobacco: Never   Substance and Sexual Activity    Alcohol use: Not Currently     Comment: social drinker, at times    Drug use: Not Currently    Sexual activity: Never     Social Drivers of Health     Financial Resource Strain: Medium Risk (3/1/2024)    Overall Financial Resource Strain (CARDIA)     Difficulty of Paying Living Expenses: Somewhat hard   Food Insecurity: Food Insecurity Present (3/1/2024)    Hunger Vital Sign     Worried About Running Out of Food in the Last Year: Sometimes true     Ran Out of Food in the Last Year: Sometimes true   Transportation Needs: No Transportation Needs (8/29/2024)    Received from [facility] Source     Has lack of transportation kept you from medical appointments or from getting medications?: No     Has lack of transportation kept you from meetings, work, or from getting things needed for daily living?: No   Physical Activity: Unknown (3/1/2024)    Exercise Vital Sign     Days of Exercise per Week: 5 days   Stress: No Stress Concern Present (8/30/2024)    Received from [facility] Occupational [facility] - Occupational Stress Questionnaire     Feeling of Stress : Only a little   Housing Stability: Low Risk  (3/1/2024)    Housing Stability Vital Sign     Unable to Pay for Housing in the Last Year: No      Number of Places Lived in the Last Year: 1     Unstable Housing in the Last Year: No       Review of Systems    OBJECTIVE:     Vital Signs     There is no height or weight on file to calculate BMI.      Physical Exam:    Constitutional: No distress.     Skin: Skin displays no rash on extremities.     Psych/Behavior: He is alert.     Musculoskeletal:        Neck: Range of motion is limited.        Back: Range of motion is limited.        Right Upper Extremities: Range of motion is limited.        Left Upper Extremities: Range of motion is limited.       Right Lower Extremities: Range of motion is limited.        Left Lower Extremities: Range of motion is limited.      Comments: In medical chair, unable to move on video visit      Neurological:   Nonverbal during our visit today     Pulmonary: nonlabored respirations     Hematologic: no bruising noted       Diagnostic Results:  MRI 2018 personally reviewed     ASSESSMENT/PLAN:     Lisa Moreno is a 44 y.o. male who presents as a referral from Dr. Rodriguez for consideration of intrathecal baclofen trial.     Unfortunately, the patient's mother does not think that he would benefit from therapy at this time. We had a pleasant conversation about the potential benefits and also the surgical approach at [facility], but she is not interested at this time.     I would be happy to see him back in the future if their needs change.     The patient location is: at home   The chief complaint leading to consultation is: spasticity     Visit type: audiovisual    Face to Face time with patient: 15  32 minutes of total time spent on the encounter, which includes face to face time and non-face to face time preparing to see the patient (eg, review of tests), Obtaining and/or reviewing separately obtained history, Documenting clinical information in the electronic or other health record, Independently interpreting results (not separately reported) and communicating results to the  patient/family/caregiver, or Care coordination (not separately reported).         Each patient to whom he or she provides medical services by telemedicine is:  (1) informed of the relationship between the physician and patient and the respective role of any other health care provider with respect to management of the patient; and (2) notified that he or she may decline to receive medical services by telemedicine and may withdraw from such care at any time.    Notes: Note generated with voice recognition software; please excuse any typographical errors.               [1]   Current Outpatient Medications   Medication Sig Dispense Refill    baclofen (LIORESAL) 20 MG tablet Take 1 tablet (20 mg total) by mouth 3 (three) times daily. 270 tablet 3    baclofen (LIORESAL) 20 MG tablet Take 1 tablet (20 mg total) by mouth 3 (three) times daily. 270 tablet 3    cetirizine (ZYRTEC) 10 MG tablet TAKE 1 TABLET BY MOUTH DAILY 30 tablet 11    dantrolene (DANTRIUM) 50 MG Cap TAKE 1 CAPSULE(50 MG) BY MOUTH THREE TIMES DAILY 270 capsule 3    donepeziL (ARICEPT) 5 MG tablet Take 1 tablet (5 mg total) by mouth every evening. 90 tablet 5    mirtazapine (REMERON) 7.5 MG Tab Take 1 tablet (7.5 mg total) by mouth every evening. 30 tablet 11    pulse oximeter (PULSE OXIMETER) device by Apply Externally route 2 (two) times a day. Use twice daily at 8 AM and 3 PM and record the value in MyChart as directed. (Patient not taking: Reported on 9/20/2023) 1 each 0     No current facility-administered medications for this visit.

## 2025-04-03 RX ORDER — CETIRIZINE HYDROCHLORIDE 10 MG/1
10 TABLET ORAL
Qty: 30 TABLET | Refills: 11 | Status: SHIPPED | OUTPATIENT
Start: 2025-04-03

## 2025-04-10 ENCOUNTER — TELEPHONE (OUTPATIENT)
Dept: INTERNAL MEDICINE | Facility: CLINIC | Age: 45
End: 2025-04-10
Payer: MEDICARE

## 2025-04-10 ENCOUNTER — TELEPHONE (OUTPATIENT)
Dept: NEUROLOGY | Facility: CLINIC | Age: 45
End: 2025-04-10
Payer: MEDICARE

## 2025-04-10 ENCOUNTER — PATIENT MESSAGE (OUTPATIENT)
Facility: CLINIC | Age: 45
End: 2025-04-10
Payer: MEDICARE

## 2025-04-10 NOTE — TELEPHONE ENCOUNTER
Spoke to patients mother who states patient has lost his transportation after 10 years and now patient is not eating good and just down. Virtual appt scheduled

## 2025-04-10 NOTE — TELEPHONE ENCOUNTER
----- Message from Claribel sent at 4/10/2025  8:42 AM CDT -----  Regarding: VV Appt  Pt called in to schedule an appt; no available appts in Epic. Pt is asking for a call back soon to schedule. Thanks. Reason appt not schedule: no appt avail; Dr Akhil pompa pt  Patient requesting call back or [facility] msg:  Call back

## 2025-04-10 NOTE — TELEPHONE ENCOUNTER
----- Message from Med Assistant Mosley sent at 4/10/2025  1:40 PM CDT -----  Contact: [phone]  Type:  Patient Returning CallWho Called:Giovana (pt Mom)Who Left Message for Patient:Ms Madisyn Mays the patient know what this is regarding?:yesWould the patient rather a call back or a response via [facility]? Call [facility] Call Back Number:[phone] Information: n/a

## 2025-04-10 NOTE — TELEPHONE ENCOUNTER
----- Message from Claribel sent at 4/10/2025  8:44 AM CDT -----  Regarding: VV appt  Pt called in to schedule an appt; no available appts in Epic. Pt is asking for a call back soon to schedule. Thanks.  Reason appt not schedule: no appt avail; (VV) Patient requesting call back or [facility] msg:  Call back

## 2025-04-14 DIAGNOSIS — G12.21 ALS (AMYOTROPHIC LATERAL SCLEROSIS): Primary | ICD-10-CM

## 2025-04-17 ENCOUNTER — OFFICE VISIT (OUTPATIENT)
Dept: INTERNAL MEDICINE | Facility: CLINIC | Age: 45
End: 2025-04-17
Payer: MEDICARE

## 2025-04-17 DIAGNOSIS — G12.21 ALS (AMYOTROPHIC LATERAL SCLEROSIS): Primary | ICD-10-CM

## 2025-04-17 DIAGNOSIS — F51.05 INSOMNIA SECONDARY TO DEPRESSION WITH ANXIETY: ICD-10-CM

## 2025-04-17 DIAGNOSIS — Z78.9 IMPAIRED MOBILITY AND ADLS: ICD-10-CM

## 2025-04-17 DIAGNOSIS — Z74.09 DECREASED STRENGTH, ENDURANCE, AND MOBILITY: ICD-10-CM

## 2025-04-17 DIAGNOSIS — R68.89 DECREASED STRENGTH, ENDURANCE, AND MOBILITY: ICD-10-CM

## 2025-04-17 DIAGNOSIS — R53.1 DECREASED STRENGTH, ENDURANCE, AND MOBILITY: ICD-10-CM

## 2025-04-17 DIAGNOSIS — G11.8 SPINOCEREBELLAR ATAXIA: ICD-10-CM

## 2025-04-17 DIAGNOSIS — F41.8 INSOMNIA SECONDARY TO DEPRESSION WITH ANXIETY: ICD-10-CM

## 2025-04-17 DIAGNOSIS — R41.841 COGNITIVE COMMUNICATION DEFICIT: ICD-10-CM

## 2025-04-17 DIAGNOSIS — Z74.09 IMPAIRED MOBILITY AND ADLS: ICD-10-CM

## 2025-04-17 DIAGNOSIS — F33.41 RECURRENT MAJOR DEPRESSIVE DISORDER, IN PARTIAL REMISSION: ICD-10-CM

## 2025-04-17 DIAGNOSIS — G71.00 MUSCULAR DYSTROPHY: ICD-10-CM

## 2025-04-17 DIAGNOSIS — G80.1 SPASTIC DIPLEGIA: ICD-10-CM

## 2025-04-17 DIAGNOSIS — Z74.1 REQUIRES DAILY ASSISTANCE FOR ACTIVITIES OF DAILY LIVING (ADL) AND COMFORT NEEDS: ICD-10-CM

## 2025-04-17 DIAGNOSIS — G82.20 PARAPLEGIA, UNSPECIFIED: ICD-10-CM

## 2025-04-17 PROBLEM — F02.818 MAJOR NEUROCOGNITIVE DISORDER DUE TO ANOTHER MEDICAL CONDITION WITH BEHAVIORAL DISTURBANCE: Status: RESOLVED | Noted: 2020-12-13 | Resolved: 2025-04-17

## 2025-04-17 PROBLEM — G40.89 OTHER SEIZURES: Status: RESOLVED | Noted: 2020-07-19 | Resolved: 2025-04-17

## 2025-04-17 RX ORDER — MIRTAZAPINE 15 MG/1
15 TABLET, FILM COATED ORAL NIGHTLY
Qty: 90 TABLET | Refills: 3 | Status: SHIPPED | OUTPATIENT
Start: 2025-04-17 | End: 2026-04-17

## 2025-04-17 NOTE — PROGRESS NOTES
Subjective:       Patient ID: Lisa Moreno is a 44 y.o. male.    Chief Complaint:   No chief complaint on file.    HPI - The patient location is:  Home in [place], [place]  The chief complaint leading to consultation is:  Annual visit for ALS    Visit type: audiovisual    Face to Face time with patient: 16:35   25:35 minutes of total time spent on the encounter, which includes face to face time and non-face to face time preparing to see the patient (eg, review of tests), Obtaining and/or reviewing separately obtained history, Documenting clinical information in the electronic or other health record, Independently interpreting results (not separately reported) and communicating results to the patient/family/caregiver, or Care coordination (not separately reported). Each patient to whom he or she provides medical services by telemedicine is:  (1) informed of the relationship between the physician and patient and the respective role of any other health care provider with respect to management of the patient; and (2) notified that he or she may decline to receive medical services by telemedicine and may withdraw from such care at any time.    Notes:  Lisa and his mother were on the video visit together.  He was able to smile, wave, and verbalize answers; however, I could not understand his speech.  Mother says that he has had trouble sleeping and has not been eating well.  She thinks he is showing out because he can no longer get to adult .  He has been going to adult  for over 10 years, but he lost insurance covers for transportation in September.  He benefitted from this because of social interactions and therapy there.  Family benefitted by having a break from continuous caregiving responsibilities.  The trial is wheelchair-bound, and he needs total assistance with ADLs and all care.  He did not get his influenza vaccine this year because of transportation issues.  He follows with the ALS  Clinic, though his provider has left.  He is not a smoker    Pmh:    ALS-like progressive dystonic neuromuscular disease  Spinocerebellar ataxia with progressive gait disturbance  Oropharyngeal dysphagia without feeding tube  Speech abnormality  Cognitive communication deficit  Dependence on assistance for ADL/iADL     Meds:  Reviewed and reconciled in EPIC with patient during visit today.     Review of Systems   Constitutional:  Positive for activity change and unexpected weight change.   HENT:  Positive for trouble swallowing. Negative for hearing loss and rhinorrhea.    Eyes:  Negative for discharge.   Respiratory:  Negative for chest tightness and wheezing.    Cardiovascular:  Negative for chest pain and palpitations.   Gastrointestinal:  Positive for constipation. Negative for blood in stool, diarrhea and vomiting.   Endocrine: Negative for polydipsia and polyuria.   Genitourinary:  Negative for difficulty urinating, hematuria and urgency.   Musculoskeletal:  Negative for arthralgias, joint swelling and neck pain.   Neurological:  Negative for weakness and headaches.   Psychiatric/Behavioral:  Positive for confusion and dysphoric mood.      Objective:      Physical Exam    Assessment:       1. ALS (amyotrophic lateral sclerosis)    2. Insomnia secondary to depression with anxiety    3. Decreased strength, endurance, and mobility    4. Impaired mobility and ADLs    5. Requires daily assistance for activities of daily living (ADL) and comfort needs    6. Spastic diplegia    7. Muscular dystrophy    8. Spinocerebellar ataxia    9. Recurrent major depressive disorder, in partial remission    10. Paraplegia, unspecified    11. Cognitive communication deficit        Plan:       Diagnoses and all orders for this visit:    ALS (amyotrophic lateral sclerosis) - slowly progressive.  This makes him wheelchair-bound and dependent on others for care.  He would greatly benefit from adult  if transportation can be  arranged.  I will ask social work to assist.  -     Ambulatory referral/consult to Social Work; Future    Insomnia secondary to depression with anxiety - seems to be worsening, according to mother.  We will double the dose of mirtazapine to see if that helps with both sleep and appetite  -     mirtazapine (REMERON) 15 MG tablet; Take 1 tablet (15 mg total) by mouth every evening.    Decreased strength, endurance, and mobility - associated with ALS.  Monitor    Impaired mobility and ADLs - as above, monitor.  Continue follow-up with ALS Clinic    Requires daily assistance for activities of daily living (ADL) and comfort needs  -     Ambulatory referral/consult to Social Work; Future    Spastic diplegia - as above, monitor    Muscular dystrophy - worsening, monitor    Spinocerebellar ataxia - seems stable, monitor    Recurrent major depressive disorder, in partial remission - stable.  Monitor    Paraplegia, unspecified    Cognitive communication deficit  -     Ambulatory referral/consult to Social Work; Future    RTC p.r.n., or in a year for annual.    SABRINA Nixon MD MPH  Staff Internist

## 2025-04-21 ENCOUNTER — PATIENT MESSAGE (OUTPATIENT)
Dept: INTERNAL MEDICINE | Facility: CLINIC | Age: 45
End: 2025-04-21
Payer: MEDICARE

## 2025-04-21 DIAGNOSIS — L89.90 PRESSURE INJURY OF SKIN, UNSPECIFIED INJURY STAGE, UNSPECIFIED LOCATION: Primary | ICD-10-CM

## 2025-04-25 ENCOUNTER — TELEPHONE (OUTPATIENT)
Dept: PHYSICAL MEDICINE AND REHAB | Facility: CLINIC | Age: 45
End: 2025-04-25
Payer: MEDICARE

## 2025-04-25 NOTE — TELEPHONE ENCOUNTER
----- Message from Yung Rodriguez MD sent at 4/24/2025  4:41 PM CDT -----  Hello,I did not send in any prescription for that. She will need to contact the provider who ordered it.  ----- Message -----  From: Lluvia Schuster LPN  Sent: 4/24/2025   1:40 PM CDT  To: Yung Rodriguez MD    Please advise.  ----- Message -----  From: Gris Quintana MA  Sent: 4/24/2025  11:27 AM CDT  To: Michael Barragan Staff    Type: General Call Back  Name of Caller:pt mother Would the patient rather a call back or a response via [facility]? Call [facility] Call Back Number: [phone] Information: Pt mother states recent precription of nitrofurantoin (macrocrystal-monohydrate) 100 MG capsule 100 mg got wet and she is trying to see if another fill can be sent over please give pts mother a call back to discuss further. If you can get through please send to pharmacy.  ----- Message -----  From: Gris Quintana MA  Sent: 4/24/2025  11:26 AM CDT  To: Michael Barragan Staff    Type: General Call Back  Name of Caller:pt mother Would the patient rather a call back or a response via [facility]? Call [facility] Call Back Number: [phone] Information: Pt mother states recent precription of nitrofurantoin (macrocrystal-monohydrate) 100 MG capsule 100 mg got wet and she is trying to see if another fill can be sent over please give pts mother a call back to discuss further.

## 2025-04-25 NOTE — TELEPHONE ENCOUNTER
Returned call to pt mother, no answer. Left voice message informing that she will need to contact the ordering provider for that medication since Dr. Rodriguez did not send in that prescription.

## 2025-04-26 ENCOUNTER — ON-DEMAND VIRTUAL (OUTPATIENT)
Dept: URGENT CARE | Facility: CLINIC | Age: 45
End: 2025-04-26
Payer: MEDICARE

## 2025-04-26 ENCOUNTER — NURSE TRIAGE (OUTPATIENT)
Dept: ADMINISTRATIVE | Facility: CLINIC | Age: 45
End: 2025-04-26
Payer: MEDICARE

## 2025-04-26 DIAGNOSIS — H10.9 CONJUNCTIVITIS OF LEFT EYE, UNSPECIFIED CONJUNCTIVITIS TYPE: Primary | ICD-10-CM

## 2025-04-26 PROCEDURE — 98005 SYNCH AUDIO-VIDEO EST LOW 20: CPT | Mod: 95,,, | Performed by: NURSE PRACTITIONER

## 2025-04-26 RX ORDER — TOBRAMYCIN 3 MG/ML
1 SOLUTION/ DROPS OPHTHALMIC EVERY 6 HOURS
Qty: 5 ML | Refills: 0 | Status: SHIPPED | OUTPATIENT
Start: 2025-04-26 | End: 2025-05-03

## 2025-04-26 NOTE — PROGRESS NOTES
Subjective:      Patient ID: Lisa Moreno is a 44 y.o. male.    Vitals:  vitals were not taken for this visit.     Chief Complaint: Eye Problem      Visit Type: TELE AUDIOVISUAL    Patient Location: [place]     Present with the patient at the time of consultation: TELEMED PRESENT WITH PATIENT: family member    Past Medical History:   Diagnosis Date    Anxiety     Degenerative motor system disease     Depression     Insomnia secondary to depression with anxiety 10/30/2024    Spastic quadriplegia     Spinocerebellar atrophy      Past Surgical History:   Procedure Laterality Date    BACLOFEN PUMP IMPLANTATION      COLONOSCOPY N/A 7/23/2020    Procedure: COLONOSCOPY;  Surgeon: Ziyad Fitch MD;  Location: [facility];  Service: Endoscopy;  Laterality: N/A;    ESOPHAGOGASTRODUODENOSCOPY N/A 7/23/2020    Procedure: EGD (ESOPHAGOGASTRODUODENOSCOPY);  Surgeon: Ziyad Fitch MD;  Location: [facility];  Service: Endoscopy;  Laterality: N/A;    FLUOROSCOPIC URODYNAMIC STUDY N/A 11/27/2018    Procedure: URODYNAMIC STUDY, FLUOROSCOPIC;  Surgeon: Tanja Okeefe MD;  Location: [facility] OR [address] [place] [address];  Service: Urology;  Laterality: N/A;  1 hour    REATTACHMENT OF MUSCLE Bilateral 2/18/2022    Procedure: PTOSIS REPAIR OF BOTH EYES;  Surgeon: Krupa Rios MD;  Location: [facility] OR [address] [place] [address];  Service: Ophthalmology;  Laterality: Bilateral;    UPPER GASTROINTESTINAL ENDOSCOPY       Review of patient's allergies indicates:   Allergen Reactions    Penicillins      Hives and Itching    Allergen ext-venom-honey bee      Medications Ordered Prior to Encounter[1]  Family History   Problem Relation Name Age of Onset    Thyroid cancer Mother      Hypertension Mother      Depression Mother      Cancer Mother          thyroid cancer    Multiple sclerosis Other          mother's cousin    No Known Problems Brother      No Known Problems Son      ALS Neg Hx      Muscular dystrophy Neg Hx         Medications Ordered                 [id number] [place] - [address] AT [facility] [address]   [id number], [place] [id number]    Telephone: [phone]   Fax: [phone]   Hours: Not open 24 hours                         E-Prescribed (1 of 1)              tobramycin sulfate 0.3% (TOBREX) 0.3 % ophthalmic solution    Sig: Place 1 drop into the left eye every 6 (six) hours. for 7 days       Start: 4/26/25     Quantity: 5 mL Refills: 0                           Ohs Peq Odvv Intake    4/26/2025 10:37 AM CDT - Filed by Patient   What is your current physical address in the event of a medical emergency? [address] [facility] [address]   Are you able to take your vital signs? No   Please attach any relevant images or files    Is your employer contracted with [facility]? No         Mother presents with pt with c/o left eye redness x 2 days.   No cough, fever, runny nose or congestion.   Pt has ALS     Eye Problem   The left eye is affected. The current episode started in the past 7 days. The problem has been gradually worsening. Associated symptoms include an eye discharge and eye redness. Pertinent negatives include no fever or itching.       Constitution: Negative for fever.   HENT:  Negative for congestion.    Eyes:  Positive for eye discharge and eye redness. Negative for eye itching.   Respiratory:  Negative for cough, sputum production and shortness of breath.    Neurological:  Negative for dizziness.        Objective:   The physical exam was conducted virtually.  Physical Exam   Constitutional: He is oriented to person, place, and time. No distress.   HENT:   Head: Normocephalic.   Ears:   Right Ear: External ear normal.   Left Ear: External ear normal.   Nose: No rhinorrhea or congestion.   Eyes: Left conjunctiva is injected.      Comments: See picture    Neck: Neck supple.   Pulmonary/Chest: Effort normal. No respiratory distress.   Neurological: He is alert and oriented to  person, place, and time.   Skin: Skin is no rash.         Assessment:     1. Conjunctivitis of left eye, unspecified conjunctivitis type        Plan:   Use medications as directed  If symptoms worsening or not improved f/u in Urgent Care or with Eye doctor.  Discussed use of medication      Conjunctivitis of left eye, unspecified conjunctivitis type  -     tobramycin sulfate 0.3% (TOBREX) 0.3 % ophthalmic solution; Place 1 drop into the left eye every 6 (six) hours. for 7 days  Dispense: 5 mL; Refill: 0    We appreciate you trusting us with your medical care. We hope you feel better soon. We will be happy to take care of you for all of your future medical needs.     You must understand that you've received Virtual treatment only and that you may be released before all your medical problems are known or treated. You, the patient, will arrange for follow up care as instructed.     Follow up with your PCP or specialty clinic as directed in the next 1-2 weeks if not improved or as needed. You can call [phone] to schedule an appointment with the appropriate provider.     If your condition worsens we recommend that you receive another evaluation in person, with your primary care provider, urgent care or at the emergency room immediately or contact your primary medical clinics after hours call service to discuss your concerns.                     [1]   Current Outpatient Medications on File Prior to Visit   Medication Sig Dispense Refill    baclofen (LIORESAL) 20 MG tablet Take 1 tablet (20 mg total) by mouth 3 (three) times daily. 270 tablet 3    baclofen (LIORESAL) 20 MG tablet Take 1 tablet (20 mg total) by mouth 3 (three) times daily. 270 tablet 3    cetirizine (ZYRTEC) 10 MG tablet TAKE 1 TABLET BY MOUTH DAILY 30 tablet 11    dantrolene (DANTRIUM) 50 MG Cap TAKE 1 CAPSULE(50 MG) BY MOUTH THREE TIMES DAILY 270 capsule 3    dimethicone-zinc oxide 1-40 % Oint Apply to affected area twice daily 566 g 3    donepeziL  (ARICEPT) 5 MG tablet Take 1 tablet (5 mg total) by mouth every evening. 90 tablet 5    mirtazapine (REMERON) 15 MG tablet Take 1 tablet (15 mg total) by mouth every evening. 90 tablet 3    nitrofurantoin, macrocrystal-monohydrate, (MACROBID) 100 MG capsule Take 1 capsule (100 mg total) by mouth 2 (two) times daily. for 7 days 14 capsule 0     No current facility-administered medications on file prior to visit.

## 2025-04-26 NOTE — TELEPHONE ENCOUNTER
"Reason for Disposition   [1] Mild eyelid irritation and eye redness are a recurrent problem AND [2] red and crusty eyelids    Additional Information   Negative: Chemical got in the eye   Negative: Piece of something got in the eye   Negative: Eye redness followed an eye injury   Negative: White, yellow, or green pus in the eyes   Negative: SEVERE eye pain (e.g., excruciating pain and patient unable to do normal activities)   Negative: [1] Eyelid is very swollen (shut or almost) AND [2] fever   Negative: [1] Eyelid (outer) is very red (or tender to touch) AND [2] fever   Negative: [1] Foreign body sensation ("feels like something is in there") AND [2] no improvement after irrigation (regardless of duration of flushing)   Negative: Vomiting   Negative: [1] Recent eye surgery AND [2] increasing eye pain   Negative: Patient sounds very sick or weak to the triager   Negative: MODERATE eye pain or discomfort (e.g., interferes with normal activities or awakens from sleep; more than mild)   Negative: New or worsening blurred vision   Negative: Looking at light causes MODERATE to SEVERE eye pain (i.e., photophobia)   Negative: Cloudy spot or sore seen on the cornea (clear part of the eye)   Negative: Eyelid is very swollen (shut or almost)   Negative: Eyelid is red and painful (or tender to touch)   Negative: Eye pain present > 24 hours   Negative: [1] Bleeding on white of the eye AND [2] taking Coumadin (warfarin) or other strong blood thinner, or known bleeding disorder (e.g., thrombocytopenia)   Negative: Bleeding on white of the eye   Negative: [1] Only 1 eye is red AND [2] present > 48 hours   Negative: Red eye present more than 7 days    Protocols used: Eye - Redness-A-  Pt's mother states pt's eye is red with crust. States pt does not communicate due to ALS. Caller accepted an OD Virtual visit. Instructed to call OOC back if pt becomes worse. Caller verbalized understanding.  "

## 2025-04-26 NOTE — PATIENT INSTRUCTIONS
We appreciate you trusting us with your medical care. We hope you feel better soon. We will be happy to take care of you for all of your future medical needs.     You must understand that you've received Virtual treatment only and that you may be released before all your medical problems are known or treated. You, the patient, will arrange for follow up care as instructed.     Follow up with your PCP or specialty clinic as directed in the next 1-2 weeks if not improved or as needed. You can call [phone] to schedule an appointment with the appropriate provider.     If your condition worsens we recommend that you receive another evaluation in person, with your primary care provider, urgent care or at the emergency room immediately or contact your primary medical clinics after hours call service to discuss your concerns.

## 2025-04-26 NOTE — TELEPHONE ENCOUNTER
Pt has hx of ALS. Seen in ED on 4/23. Diagnosed with cystitis, prescribed macrobid. Pt on spok board for callback. Attempted twice to reach out but not successful. Two messages left on unidentified Vm with callback instructions. Phone number verified.  Reason for Disposition   Second attempt to contact caller AND no contact made. Phone number verified.    Protocols used: No Contact or Duplicate Contact Call-A-

## 2025-05-09 ENCOUNTER — HOSPITAL ENCOUNTER (INPATIENT)
Facility: HOSPITAL | Age: 45
LOS: 4 days | Discharge: HOME HEALTH | DRG: 640 | End: 2025-05-14
Attending: EMERGENCY MEDICINE | Admitting: EMERGENCY MEDICINE
Payer: MEDICARE

## 2025-05-09 DIAGNOSIS — J18.9 PNEUMONIA OF RIGHT LOWER LOBE DUE TO INFECTIOUS ORGANISM: ICD-10-CM

## 2025-05-09 DIAGNOSIS — Z13.6 SCREENING FOR CARDIOVASCULAR CONDITION: ICD-10-CM

## 2025-05-09 DIAGNOSIS — G93.40 ENCEPHALOPATHY, UNSPECIFIED TYPE: ICD-10-CM

## 2025-05-09 DIAGNOSIS — E87.0 HYPERNATREMIA: Primary | ICD-10-CM

## 2025-05-09 LAB
ALBUMIN SERPL BCP-MCNC: 2.5 G/DL (ref 3.5–5.2)
ALLENS TEST: ABNORMAL
ALLENS TEST: ABNORMAL
ALP SERPL-CCNC: 65 UNIT/L (ref 40–150)
ALT SERPL W/O P-5'-P-CCNC: 13 UNIT/L (ref 10–44)
ANION GAP (OHS): 14 MMOL/L (ref 8–16)
AST SERPL-CCNC: 26 UNIT/L (ref 11–45)
BILIRUB SERPL-MCNC: 0.3 MG/DL (ref 0.1–1)
BUN SERPL-MCNC: 39 MG/DL (ref 6–20)
BUN SERPL-MCNC: 41 MG/DL (ref 6–30)
CALCIUM SERPL-MCNC: 9.5 MG/DL (ref 8.7–10.5)
CHLORIDE SERPL-SCNC: 117 MMOL/L (ref 95–110)
CHLORIDE SERPL-SCNC: 121 MMOL/L (ref 95–110)
CO2 SERPL-SCNC: 27 MMOL/L (ref 23–29)
CREAT SERPL-MCNC: 1.2 MG/DL (ref 0.5–1.4)
CREAT SERPL-MCNC: 1.3 MG/DL (ref 0.5–1.4)
GFR SERPLBLD CREATININE-BSD FMLA CKD-EPI: >60 ML/MIN/1.73/M2
GLUCOSE SERPL-MCNC: 125 MG/DL (ref 70–110)
GLUCOSE SERPL-MCNC: 125 MG/DL (ref 70–110)
HCO3 UR-SCNC: 30.1 MMOL/L (ref 24–28)
HCT VFR BLD CALC: 38 %PCV (ref 36–54)
HCV AB SERPL QL IA: REACTIVE
HIV 1+2 AB+HIV1 P24 AG SERPL QL IA: NORMAL
LDH SERPL L TO P-CCNC: 3.52 MMOL/L (ref 0.5–2.2)
MAGNESIUM SERPL-MCNC: 2.5 MG/DL (ref 1.6–2.6)
PCO2 BLDA: 35.2 MMHG (ref 35–45)
PH SMN: 7.54 [PH] (ref 7.35–7.45)
PO2 BLDA: 64 MMHG (ref 40–60)
POC BE: 8 MMOL/L (ref -2–2)
POC IONIZED CALCIUM: 1.15 MMOL/L (ref 1.06–1.42)
POC SATURATED O2: 95 % (ref 95–100)
POC TCO2 (MEASURED): 33 MMOL/L (ref 23–29)
POC TCO2: 31 MMOL/L (ref 24–29)
POTASSIUM BLD-SCNC: 4.1 MMOL/L (ref 3.5–5.1)
POTASSIUM SERPL-SCNC: 4.8 MMOL/L (ref 3.5–5.1)
PROT SERPL-MCNC: 8.7 GM/DL (ref 6–8.4)
SAMPLE: ABNORMAL
SITE: ABNORMAL
SITE: ABNORMAL
SODIUM BLD-SCNC: 162 MMOL/L (ref 136–145)
SODIUM SERPL-SCNC: 162 MMOL/L (ref 136–145)

## 2025-05-09 PROCEDURE — 82962 GLUCOSE BLOOD TEST: CPT

## 2025-05-09 PROCEDURE — 82803 BLOOD GASES ANY COMBINATION: CPT

## 2025-05-09 PROCEDURE — 99285 EMERGENCY DEPT VISIT HI MDM: CPT | Mod: 25

## 2025-05-09 PROCEDURE — 96361 HYDRATE IV INFUSION ADD-ON: CPT

## 2025-05-09 PROCEDURE — 96365 THER/PROPH/DIAG IV INF INIT: CPT

## 2025-05-09 PROCEDURE — 87522 HEPATITIS C REVRS TRNSCRPJ: CPT | Performed by: PHYSICIAN ASSISTANT

## 2025-05-09 PROCEDURE — 93005 ELECTROCARDIOGRAM TRACING: CPT

## 2025-05-09 PROCEDURE — 85025 COMPLETE CBC W/AUTO DIFF WBC: CPT | Performed by: STUDENT IN AN ORGANIZED HEALTH CARE EDUCATION/TRAINING PROGRAM

## 2025-05-09 PROCEDURE — 80053 COMPREHEN METABOLIC PANEL: CPT | Performed by: STUDENT IN AN ORGANIZED HEALTH CARE EDUCATION/TRAINING PROGRAM

## 2025-05-09 PROCEDURE — 25000003 PHARM REV CODE 250: Performed by: STUDENT IN AN ORGANIZED HEALTH CARE EDUCATION/TRAINING PROGRAM

## 2025-05-09 PROCEDURE — 83605 ASSAY OF LACTIC ACID: CPT

## 2025-05-09 PROCEDURE — 86803 HEPATITIS C AB TEST: CPT | Performed by: PHYSICIAN ASSISTANT

## 2025-05-09 PROCEDURE — 99900035 HC TECH TIME PER 15 MIN (STAT)

## 2025-05-09 PROCEDURE — 87389 HIV-1 AG W/HIV-1&-2 AB AG IA: CPT | Performed by: PHYSICIAN ASSISTANT

## 2025-05-09 PROCEDURE — 87040 BLOOD CULTURE FOR BACTERIA: CPT | Performed by: STUDENT IN AN ORGANIZED HEALTH CARE EDUCATION/TRAINING PROGRAM

## 2025-05-09 PROCEDURE — 63600175 PHARM REV CODE 636 W HCPCS: Performed by: STUDENT IN AN ORGANIZED HEALTH CARE EDUCATION/TRAINING PROGRAM

## 2025-05-09 PROCEDURE — 96375 TX/PRO/DX INJ NEW DRUG ADDON: CPT

## 2025-05-09 PROCEDURE — 80047 BASIC METABLC PNL IONIZED CA: CPT

## 2025-05-09 PROCEDURE — 83735 ASSAY OF MAGNESIUM: CPT | Performed by: STUDENT IN AN ORGANIZED HEALTH CARE EDUCATION/TRAINING PROGRAM

## 2025-05-09 PROCEDURE — 93010 ELECTROCARDIOGRAM REPORT: CPT | Mod: ,,, | Performed by: INTERNAL MEDICINE

## 2025-05-09 RX ORDER — CEFEPIME HYDROCHLORIDE 1 G/1
1 INJECTION, POWDER, FOR SOLUTION INTRAMUSCULAR; INTRAVENOUS
Status: COMPLETED | OUTPATIENT
Start: 2025-05-09 | End: 2025-05-09

## 2025-05-09 RX ADMIN — CEFEPIME 1 G: 1 INJECTION, POWDER, FOR SOLUTION INTRAMUSCULAR; INTRAVENOUS at 11:05

## 2025-05-09 RX ADMIN — SODIUM CHLORIDE, POTASSIUM CHLORIDE, SODIUM LACTATE AND CALCIUM CHLORIDE 1000 ML: 600; 310; 30; 20 INJECTION, SOLUTION INTRAVENOUS at 11:05

## 2025-05-09 RX ADMIN — AZITHROMYCIN MONOHYDRATE 500 MG: 500 INJECTION, POWDER, LYOPHILIZED, FOR SOLUTION INTRAVENOUS at 11:05

## 2025-05-10 PROBLEM — J18.9 PNEUMONIA: Status: ACTIVE | Noted: 2025-05-10

## 2025-05-10 PROBLEM — E87.0 HYPERNATREMIA: Status: ACTIVE | Noted: 2025-05-10

## 2025-05-10 LAB
ABSOLUTE EOSINOPHIL (OHS): 0 K/UL
ABSOLUTE EOSINOPHIL (OHS): 0 K/UL
ABSOLUTE MONOCYTE (OHS): 0.41 K/UL (ref 0.3–1)
ABSOLUTE MONOCYTE (OHS): 0.55 K/UL (ref 0.3–1)
ABSOLUTE NEUTROPHIL COUNT (OHS): 10.21 K/UL (ref 1.8–7.7)
ABSOLUTE NEUTROPHIL COUNT (OHS): 9.71 K/UL (ref 1.8–7.7)
ANION GAP (OHS): 11 MMOL/L (ref 8–16)
ANION GAP (OHS): 6 MMOL/L (ref 8–16)
ANION GAP (OHS): 8 MMOL/L (ref 8–16)
ANION GAP (OHS): 9 MMOL/L (ref 8–16)
BACTERIA #/AREA URNS AUTO: ABNORMAL /HPF
BASOPHILS # BLD AUTO: 0.02 K/UL
BASOPHILS # BLD AUTO: 0.05 K/UL
BASOPHILS NFR BLD AUTO: 0.2 %
BASOPHILS NFR BLD AUTO: 0.4 %
BILIRUB UR QL STRIP.AUTO: NEGATIVE
BUN SERPL-MCNC: 27 MG/DL (ref 6–20)
BUN SERPL-MCNC: 27 MG/DL (ref 6–20)
BUN SERPL-MCNC: 28 MG/DL (ref 6–20)
BUN SERPL-MCNC: 28 MG/DL (ref 6–20)
CALCIUM SERPL-MCNC: 7.3 MG/DL (ref 8.7–10.5)
CALCIUM SERPL-MCNC: 8.6 MG/DL (ref 8.7–10.5)
CHLORIDE SERPL-SCNC: 125 MMOL/L (ref 95–110)
CHLORIDE SERPL-SCNC: 126 MMOL/L (ref 95–110)
CLARITY UR: CLEAR
CO2 SERPL-SCNC: 22 MMOL/L (ref 23–29)
CO2 SERPL-SCNC: 23 MMOL/L (ref 23–29)
CO2 SERPL-SCNC: 23 MMOL/L (ref 23–29)
CO2 SERPL-SCNC: 25 MMOL/L (ref 23–29)
COLOR UR AUTO: YELLOW
CREAT SERPL-MCNC: 0.7 MG/DL (ref 0.5–1.4)
ERYTHROCYTE [DISTWIDTH] IN BLOOD BY AUTOMATED COUNT: 15.5 % (ref 11.5–14.5)
ERYTHROCYTE [DISTWIDTH] IN BLOOD BY AUTOMATED COUNT: 15.6 % (ref 11.5–14.5)
FLUAV AG UPPER RESP QL IA.RAPID: NEGATIVE
FLUBV AG UPPER RESP QL IA.RAPID: NEGATIVE
GFR SERPLBLD CREATININE-BSD FMLA CKD-EPI: >60 ML/MIN/1.73/M2
GLUCOSE SERPL-MCNC: 214 MG/DL (ref 70–110)
GLUCOSE SERPL-MCNC: 50 MG/DL (ref 70–110)
GLUCOSE SERPL-MCNC: 58 MG/DL (ref 70–110)
GLUCOSE SERPL-MCNC: 73 MG/DL (ref 70–110)
GLUCOSE UR QL STRIP: NEGATIVE
HCT VFR BLD AUTO: 33.6 % (ref 40–54)
HCT VFR BLD AUTO: 45.4 % (ref 40–54)
HGB BLD-MCNC: 10.2 GM/DL (ref 14–18)
HGB BLD-MCNC: 13.5 GM/DL (ref 14–18)
HGB UR QL STRIP: NEGATIVE
HOLD SPECIMEN: NORMAL
HYALINE CASTS UR QL AUTO: 18 /LPF (ref 0–1)
IMM GRANULOCYTES # BLD AUTO: 0.08 K/UL (ref 0–0.04)
IMM GRANULOCYTES # BLD AUTO: 0.12 K/UL (ref 0–0.04)
IMM GRANULOCYTES NFR BLD AUTO: 0.7 % (ref 0–0.5)
IMM GRANULOCYTES NFR BLD AUTO: 1.1 % (ref 0–0.5)
KETONES UR QL STRIP: NEGATIVE
LACTATE SERPL-SCNC: 1.8 MMOL/L (ref 0.5–2.2)
LACTATE SERPL-SCNC: 2.3 MMOL/L (ref 0.5–2.2)
LEUKOCYTE ESTERASE UR QL STRIP: ABNORMAL
LYMPHOCYTES # BLD AUTO: 0.44 K/UL (ref 1–4.8)
LYMPHOCYTES # BLD AUTO: 1.01 K/UL (ref 1–4.8)
MAGNESIUM SERPL-MCNC: 2 MG/DL (ref 1.6–2.6)
MCH RBC QN AUTO: 26.8 PG (ref 27–31)
MCH RBC QN AUTO: 27 PG (ref 27–31)
MCHC RBC AUTO-ENTMCNC: 29.7 G/DL (ref 32–36)
MCHC RBC AUTO-ENTMCNC: 30.4 G/DL (ref 32–36)
MCV RBC AUTO: 89 FL (ref 82–98)
MCV RBC AUTO: 90 FL (ref 82–98)
MICROSCOPIC COMMENT: ABNORMAL
NITRITE UR QL STRIP: NEGATIVE
NUCLEATED RBC (/100WBC) (OHS): 0 /100 WBC
NUCLEATED RBC (/100WBC) (OHS): 0 /100 WBC
OHS QRS DURATION: 68 MS
OHS QTC CALCULATION: 432 MS
PH UR STRIP: 7 [PH]
PHOSPHATE SERPL-MCNC: 2.7 MG/DL (ref 2.7–4.5)
PLATELET # BLD AUTO: 246 K/UL (ref 150–450)
PLATELET # BLD AUTO: 305 K/UL (ref 150–450)
PMV BLD AUTO: 11.5 FL (ref 9.2–12.9)
PMV BLD AUTO: 11.9 FL (ref 9.2–12.9)
POCT GLUCOSE: 103 MG/DL (ref 70–110)
POCT GLUCOSE: 62 MG/DL (ref 70–110)
POCT GLUCOSE: 64 MG/DL (ref 70–110)
POCT GLUCOSE: 70 MG/DL (ref 70–110)
POCT GLUCOSE: 91 MG/DL (ref 70–110)
POCT GLUCOSE: 99 MG/DL (ref 70–110)
POTASSIUM SERPL-SCNC: 3.6 MMOL/L (ref 3.5–5.1)
POTASSIUM SERPL-SCNC: 3.8 MMOL/L (ref 3.5–5.1)
POTASSIUM SERPL-SCNC: 4.1 MMOL/L (ref 3.5–5.1)
POTASSIUM SERPL-SCNC: 4.3 MMOL/L (ref 3.5–5.1)
PROT UR QL STRIP: ABNORMAL
RBC # BLD AUTO: 3.78 M/UL (ref 4.6–6.2)
RBC # BLD AUTO: 5.04 M/UL (ref 4.6–6.2)
RBC #/AREA URNS AUTO: 2 /HPF (ref 0–4)
RELATIVE EOSINOPHIL (OHS): 0 %
RELATIVE EOSINOPHIL (OHS): 0 %
RELATIVE LYMPHOCYTE (OHS): 3.9 % (ref 18–48)
RELATIVE LYMPHOCYTE (OHS): 8.9 % (ref 18–48)
RELATIVE MONOCYTE (OHS): 3.7 % (ref 4–15)
RELATIVE MONOCYTE (OHS): 4.8 % (ref 4–15)
RELATIVE NEUTROPHIL (OHS): 85.2 % (ref 38–73)
RELATIVE NEUTROPHIL (OHS): 91.1 % (ref 38–73)
RSV A 5' UTR RNA NPH QL NAA+PROBE: NEGATIVE
SARS-COV-2 RNA RESP QL NAA+PROBE: POSITIVE
SODIUM SERPL-SCNC: 154 MMOL/L (ref 136–145)
SODIUM SERPL-SCNC: 156 MMOL/L (ref 136–145)
SODIUM SERPL-SCNC: 159 MMOL/L (ref 136–145)
SODIUM SERPL-SCNC: 159 MMOL/L (ref 136–145)
SP GR UR STRIP: 1.03
SQUAMOUS #/AREA URNS AUTO: 1 /HPF
UROBILINOGEN UR STRIP-ACNC: ABNORMAL EU/DL
WBC # BLD AUTO: 11.2 K/UL (ref 3.9–12.7)
WBC # BLD AUTO: 11.4 K/UL (ref 3.9–12.7)
WBC #/AREA URNS AUTO: 8 /HPF (ref 0–5)

## 2025-05-10 PROCEDURE — 63600175 PHARM REV CODE 636 W HCPCS

## 2025-05-10 PROCEDURE — 25000003 PHARM REV CODE 250

## 2025-05-10 PROCEDURE — 25000003 PHARM REV CODE 250: Performed by: STUDENT IN AN ORGANIZED HEALTH CARE EDUCATION/TRAINING PROGRAM

## 2025-05-10 PROCEDURE — 83605 ASSAY OF LACTIC ACID: CPT

## 2025-05-10 PROCEDURE — S5010 5% DEXTROSE AND 0.45% SALINE: HCPCS

## 2025-05-10 PROCEDURE — 84100 ASSAY OF PHOSPHORUS: CPT | Performed by: INTERNAL MEDICINE

## 2025-05-10 PROCEDURE — 96365 THER/PROPH/DIAG IV INF INIT: CPT | Mod: 59

## 2025-05-10 PROCEDURE — 80048 BASIC METABOLIC PNL TOTAL CA: CPT | Performed by: INTERNAL MEDICINE

## 2025-05-10 PROCEDURE — 63600175 PHARM REV CODE 636 W HCPCS: Performed by: STUDENT IN AN ORGANIZED HEALTH CARE EDUCATION/TRAINING PROGRAM

## 2025-05-10 PROCEDURE — 80048 BASIC METABOLIC PNL TOTAL CA: CPT

## 2025-05-10 PROCEDURE — 25000003 PHARM REV CODE 250: Performed by: INTERNAL MEDICINE

## 2025-05-10 PROCEDURE — 85025 COMPLETE CBC W/AUTO DIFF WBC: CPT

## 2025-05-10 PROCEDURE — 20000000 HC ICU ROOM

## 2025-05-10 PROCEDURE — 81003 URINALYSIS AUTO W/O SCOPE: CPT | Performed by: STUDENT IN AN ORGANIZED HEALTH CARE EDUCATION/TRAINING PROGRAM

## 2025-05-10 PROCEDURE — 83735 ASSAY OF MAGNESIUM: CPT | Performed by: INTERNAL MEDICINE

## 2025-05-10 PROCEDURE — 25000003 PHARM REV CODE 250: Performed by: NURSE PRACTITIONER

## 2025-05-10 PROCEDURE — 96366 THER/PROPH/DIAG IV INF ADDON: CPT

## 2025-05-10 PROCEDURE — 99900035 HC TECH TIME PER 15 MIN (STAT)

## 2025-05-10 PROCEDURE — 0241U SARS-COV2 (COVID) WITH FLU/RSV BY PCR: CPT

## 2025-05-10 PROCEDURE — 27000207 HC ISOLATION

## 2025-05-10 RX ORDER — DEXTROSE MONOHYDRATE AND SODIUM CHLORIDE 5; .45 G/100ML; G/100ML
INJECTION, SOLUTION INTRAVENOUS CONTINUOUS
Status: ACTIVE | OUTPATIENT
Start: 2025-05-10 | End: 2025-05-11

## 2025-05-10 RX ORDER — ENOXAPARIN SODIUM 100 MG/ML
40 INJECTION SUBCUTANEOUS EVERY 24 HOURS
Status: DISCONTINUED | OUTPATIENT
Start: 2025-05-10 | End: 2025-05-14 | Stop reason: HOSPADM

## 2025-05-10 RX ORDER — BACLOFEN 10 MG/1
20 TABLET ORAL 3 TIMES DAILY
Status: DISCONTINUED | OUTPATIENT
Start: 2025-05-10 | End: 2025-05-14 | Stop reason: HOSPADM

## 2025-05-10 RX ORDER — CEFTRIAXONE 1 G/1
1 INJECTION, POWDER, FOR SOLUTION INTRAMUSCULAR; INTRAVENOUS
Status: DISCONTINUED | OUTPATIENT
Start: 2025-05-10 | End: 2025-05-10

## 2025-05-10 RX ORDER — IBUPROFEN 200 MG
24 TABLET ORAL
Status: DISCONTINUED | OUTPATIENT
Start: 2025-05-10 | End: 2025-05-14 | Stop reason: HOSPADM

## 2025-05-10 RX ORDER — DANTROLENE SODIUM 25 MG/1
50 CAPSULE ORAL 3 TIMES DAILY
Status: DISCONTINUED | OUTPATIENT
Start: 2025-05-10 | End: 2025-05-14 | Stop reason: HOSPADM

## 2025-05-10 RX ORDER — HYDROMORPHONE HYDROCHLORIDE 2 MG/ML
0.5 INJECTION, SOLUTION INTRAMUSCULAR; INTRAVENOUS; SUBCUTANEOUS EVERY 6 HOURS PRN
Status: DISCONTINUED | OUTPATIENT
Start: 2025-05-10 | End: 2025-05-14 | Stop reason: HOSPADM

## 2025-05-10 RX ORDER — IBUPROFEN 200 MG
16 TABLET ORAL
Status: DISCONTINUED | OUTPATIENT
Start: 2025-05-10 | End: 2025-05-14 | Stop reason: HOSPADM

## 2025-05-10 RX ORDER — SODIUM CHLORIDE 0.9 % (FLUSH) 0.9 %
10 SYRINGE (ML) INJECTION
Status: DISCONTINUED | OUTPATIENT
Start: 2025-05-10 | End: 2025-05-14 | Stop reason: HOSPADM

## 2025-05-10 RX ORDER — IBUPROFEN 200 MG
16 TABLET ORAL
Status: CANCELLED | OUTPATIENT
Start: 2025-05-10

## 2025-05-10 RX ORDER — DEXTROSE MONOHYDRATE 50 MG/ML
INJECTION, SOLUTION INTRAVENOUS CONTINUOUS
Status: ACTIVE | OUTPATIENT
Start: 2025-05-10 | End: 2025-05-10

## 2025-05-10 RX ORDER — ONDANSETRON 4 MG/1
8 TABLET, ORALLY DISINTEGRATING ORAL EVERY 8 HOURS PRN
Status: DISCONTINUED | OUTPATIENT
Start: 2025-05-10 | End: 2025-05-14 | Stop reason: HOSPADM

## 2025-05-10 RX ORDER — MIRTAZAPINE 15 MG/1
15 TABLET, FILM COATED ORAL NIGHTLY
Status: DISCONTINUED | OUTPATIENT
Start: 2025-05-10 | End: 2025-05-14 | Stop reason: HOSPADM

## 2025-05-10 RX ORDER — ENOXAPARIN SODIUM 100 MG/ML
1 INJECTION SUBCUTANEOUS 2 TIMES DAILY
Status: CANCELLED | OUTPATIENT
Start: 2025-05-10

## 2025-05-10 RX ORDER — ACETAMINOPHEN 325 MG/1
650 TABLET ORAL EVERY 4 HOURS PRN
Status: DISCONTINUED | OUTPATIENT
Start: 2025-05-10 | End: 2025-05-14 | Stop reason: HOSPADM

## 2025-05-10 RX ORDER — GLUCAGON 1 MG
1 KIT INJECTION
Status: CANCELLED | OUTPATIENT
Start: 2025-05-10

## 2025-05-10 RX ORDER — AZITHROMYCIN 250 MG/1
500 TABLET, FILM COATED ORAL DAILY
Status: DISCONTINUED | OUTPATIENT
Start: 2025-05-10 | End: 2025-05-10

## 2025-05-10 RX ORDER — IBUPROFEN 200 MG
24 TABLET ORAL
Status: CANCELLED | OUTPATIENT
Start: 2025-05-10

## 2025-05-10 RX ORDER — BALSAM PERU/CASTOR OIL
OINTMENT (GRAM) TOPICAL 2 TIMES DAILY
Status: DISCONTINUED | OUTPATIENT
Start: 2025-05-10 | End: 2025-05-14 | Stop reason: HOSPADM

## 2025-05-10 RX ORDER — GLUCAGON 1 MG
1 KIT INJECTION
Status: DISCONTINUED | OUTPATIENT
Start: 2025-05-10 | End: 2025-05-14 | Stop reason: HOSPADM

## 2025-05-10 RX ORDER — DONEPEZIL HYDROCHLORIDE 5 MG/1
5 TABLET, FILM COATED ORAL NIGHTLY
Status: DISCONTINUED | OUTPATIENT
Start: 2025-05-10 | End: 2025-05-14 | Stop reason: HOSPADM

## 2025-05-10 RX ORDER — CETIRIZINE HYDROCHLORIDE 10 MG/1
10 TABLET ORAL DAILY
Status: DISCONTINUED | OUTPATIENT
Start: 2025-05-10 | End: 2025-05-14 | Stop reason: HOSPADM

## 2025-05-10 RX ORDER — MUPIROCIN 20 MG/G
OINTMENT TOPICAL 2 TIMES DAILY
Status: DISCONTINUED | OUTPATIENT
Start: 2025-05-10 | End: 2025-05-14 | Stop reason: HOSPADM

## 2025-05-10 RX ADMIN — DEXTROSE MONOHYDRATE 12.5 G: 25 INJECTION, SOLUTION INTRAVENOUS at 11:05

## 2025-05-10 RX ADMIN — MUPIROCIN: 20 OINTMENT TOPICAL at 09:05

## 2025-05-10 RX ADMIN — VANCOMYCIN HYDROCHLORIDE 1500 MG: 1.5 INJECTION, POWDER, LYOPHILIZED, FOR SOLUTION INTRAVENOUS at 01:05

## 2025-05-10 RX ADMIN — DEXTROSE MONOHYDRATE: 50 INJECTION, SOLUTION INTRAVENOUS at 03:05

## 2025-05-10 RX ADMIN — Medication: at 09:05

## 2025-05-10 RX ADMIN — Medication: at 12:05

## 2025-05-10 RX ADMIN — DEXTROSE AND SODIUM CHLORIDE: 5; 450 INJECTION, SOLUTION INTRAVENOUS at 04:05

## 2025-05-10 RX ADMIN — HYDROMORPHONE HYDROCHLORIDE 0.5 MG: 2 INJECTION, SOLUTION INTRAMUSCULAR; INTRAVENOUS; SUBCUTANEOUS at 11:05

## 2025-05-10 RX ADMIN — ENOXAPARIN SODIUM 40 MG: 40 INJECTION SUBCUTANEOUS at 06:05

## 2025-05-10 RX ADMIN — MUPIROCIN: 20 OINTMENT TOPICAL at 08:05

## 2025-05-10 RX ADMIN — SODIUM CHLORIDE, POTASSIUM CHLORIDE, SODIUM LACTATE AND CALCIUM CHLORIDE 1000 ML: 600; 310; 30; 20 INJECTION, SOLUTION INTRAVENOUS at 02:05

## 2025-05-10 NOTE — EICU
"Virtual ICU Admission    Admit Date: 2025  LOS: 0  Code Status: DNR   : 1980  Bed: 7055/7068 A:     Diagnosis: Hypernatremia    Patient  has a past medical history of Anxiety, Degenerative motor system disease, Depression, Insomnia secondary to depression with anxiety, Spastic quadriplegia, and Spinocerebellar atrophy.    Last VS: /69   Pulse 101   Temp 99 °F (37.2 °C) (Oral)   Resp 17   Ht 6' 3" (1.905 m)   Wt 60 kg (132 lb 4.4 oz)   SpO2 99%   BMI 16.53 kg/m²       VICU Review    VICU nurse assessment :  [place] completed and LDA documentation reconciliation completed                  "

## 2025-05-10 NOTE — PLAN OF CARE
MICU DAILY GOALS     Family/Goals of care/Code Status   Code Status: DNR    24H Vital Sign Range  Temp:  [94.5 °F (34.7 °C)-99 °F (37.2 °C)]   Pulse:  []   Resp:  [10-22]   BP: ()/(50-80)   SpO2:  [95 %-100 %]      Shift Events (include procedures and significant events)   No acute events throughout shift    AWAKE RASS: Goal -    Actual - RASS ([place] Agitation-Sedation Scale): alert and calm    Restraint necessity: Not necessary   BREATHE SBT: Not intubated    Coordinate A & B, analgesics/sedatives Pain: N/A   SAT: Not intubated   Delirium CAM-ICU:     Early(intubated/ Progressive (non-intubated) Mobility MOVE Screen (INTUBATED ONLY): Not intubated    Activity: Activity Management: Patient unable to perform activities   Feeding/Nutrition Diet order: Diet/Nutrition Received: NPO,     Thrombus DVT prophylaxis: VTE Core Measure: Pharmacological prophylaxis initiated/maintained   HOB Elevation Head of Bed (HOB) Positioning: HOB at 30 degrees   Ulcer Prophylaxis GI: yes   Glucose control managed     Skin Skin assessment:     Sacrum intact/not altered? No  Heels intact/not altered? No  Surgical wound? No    CHECK ONE!   (no altered skin or altered skin) and sub boxes:  [] No Altered Skin Integrity Present    []Prevention Measures Documented    [x] Altered Skin Integrity Present or Discovered   [x] LDA already present in EPIC, daily doc completed              [] LDA added if not already in EPIC (describe/stage wound).               [] Wound Image Taken (required on admit,                   transfer/discharge and every Tuesday)    Wound Care Consulted? Yes   Bowel Function no issues    Indwelling Catheter Necessity            De-escalation Antibiotics N/A        VS and assessment per flow sheet, patient progressing towards goals as tolerated, plan of care reviewed with family, all concerns addressed, will continue to monitor.

## 2025-05-10 NOTE — ED TRIAGE NOTES
Lisa Moreno, a 44 y.o. male presents to the ED via EMS w/ complaint of altered mental status. Per mother, pt has not being eating for the past day and has been hallucinating at home. Given 500cc fluids w/ EMS. Hx of ALS.     Triage note:  Chief Complaint   Patient presents with    Altered Mental Status     Presents via EMS, patient has ALS and normally is a GCS of 14, family states that patient has been hallucinating, per EMS patient was tachypnic and diaphoretic upon arrival, given 500 of fluids.      Review of patient's allergies indicates:   Allergen Reactions    Penicillins      Hives and Itching    Allergen ext-venom-honey bee      Past Medical History:   Diagnosis Date    Anxiety     Degenerative motor system disease     Depression     Insomnia secondary to depression with anxiety 10/30/2024    Spastic quadriplegia     Spinocerebellar atrophy

## 2025-05-10 NOTE — SUBJECTIVE & OBJECTIVE
Past Medical History:   Diagnosis Date    Anxiety     Degenerative motor system disease     Depression     Insomnia secondary to depression with anxiety 10/30/2024    Spastic quadriplegia     Spinocerebellar atrophy        Past Surgical History:   Procedure Laterality Date    BACLOFEN PUMP IMPLANTATION      COLONOSCOPY N/A 7/23/2020    Procedure: COLONOSCOPY;  Surgeon: Ziyad Fitch MD;  Location: [facility] ENDO;  Service: Endoscopy;  Laterality: N/A;    ESOPHAGOGASTRODUODENOSCOPY N/A 7/23/2020    Procedure: EGD (ESOPHAGOGASTRODUODENOSCOPY);  Surgeon: Ziyad Fitch MD;  Location: [facility] ENDO;  Service: Endoscopy;  Laterality: N/A;    FLUOROSCOPIC URODYNAMIC STUDY N/A 11/27/2018    Procedure: URODYNAMIC STUDY, FLUOROSCOPIC;  Surgeon: Tanja Okeefe MD;  Location: [facility] OR [address] [place] [address];  Service: Urology;  Laterality: N/A;  1 hour    REATTACHMENT OF MUSCLE Bilateral 2/18/2022    Procedure: PTOSIS REPAIR OF BOTH EYES;  Surgeon: Krupa Rios MD;  Location: [facility] OR [address] [place] [address];  Service: Ophthalmology;  Laterality: Bilateral;    UPPER GASTROINTESTINAL ENDOSCOPY         Review of patient's allergies indicates:   Allergen Reactions    Penicillins      Hives and Itching    Allergen ext-venom-honey bee        Family History       Problem Relation (Age of Onset)    Cancer Mother    Depression Mother    Hypertension Mother    Multiple sclerosis Other    No Known Problems Brother, Son    Thyroid cancer Mother          Tobacco Use    Smoking status: Never    Smokeless tobacco: Never   Substance and Sexual Activity    Alcohol use: Not Currently     Comment: social drinker, at times    Drug use: Not Currently    Sexual activity: Never      Review of Systems   Unable to perform ROS: Mental status change   Constitutional:  Positive for activity change, appetite change and diaphoresis.   Psychiatric/Behavioral:  Positive for confusion.      Objective:     Vital Signs (Most Recent):  Temp: 99 °F (37.2 °C) (05/09/25  2215)  Pulse: (!) 119 (05/10/25 0000)  Resp: 20 (05/09/25 2255)  BP: 135/80 (05/10/25 0000)  SpO2: 95 % (05/10/25 0000) Vital Signs (24h Range):  Temp:  [99 °F (37.2 °C)] 99 °F (37.2 °C)  Pulse:  [119-131] 119  Resp:  [17-20] 20  SpO2:  [95 %-100 %] 95 %  BP: (111-135)/(78-80) 135/80   Weight: 60 kg (132 lb 4.4 oz)  Body mass index is 16.53 kg/m².    No intake or output data in the 24 hours ending 05/10/25 0026       Physical Exam  Constitutional:       Appearance: He is ill-appearing.      Comments: Cachectic   HENT:      Head: Normocephalic and atraumatic.      Right Ear: External ear normal.      Left Ear: External ear normal.      Nose: Nose normal.      Mouth/Throat:      Mouth: Mucous membranes are dry.      Comments: Extremely dry mucous membranes  Eyes:      Extraocular Movements: Extraocular movements intact.   Cardiovascular:      Rate and Rhythm: Regular rhythm. Tachycardia present.   Pulmonary:      Effort: Pulmonary effort is normal.      Breath sounds: Normal breath sounds. No rhonchi or rales.   Abdominal:      Palpations: Abdomen is soft.   Musculoskeletal:      Cervical back: Normal range of motion.      Right lower leg: No edema.      Left lower leg: No edema.   Skin:     General: Skin is dry.   Neurological:      Mental Status: He is disoriented.            Vents:     Lines/Drains/Airways       Peripheral Intravenous Line  Duration                  Peripheral IV - Single Lumen 18 G Anterior;Distal;Left Upper Arm -- days         Peripheral IV - Single Lumen 05/09/25 2255 20 G Anterior;Left Forearm <1 day                  Significant Labs:    CBC/Anemia Profile:  Recent Labs   Lab 05/09/25 2254 05/09/25 2352   WBC 11.20  --    HGB 13.5*  --    HCT 45.4 38   MCV 90  --    RDW 15.6*  --         Chemistries:  Recent Labs   Lab 05/09/25 2254   *   K 4.8   *   CO2 27   BUN 39*   CREATININE 1.3   CALCIUM 9.5   ALBUMIN 2.5*   PROT 8.7*   BILITOT 0.3   ALKPHOS 65   ALT 13   AST 26   MG 2.5        All pertinent labs within the past 24 hours have been reviewed.    Significant Imaging: I have reviewed all pertinent imaging results/findings within the past 24 hours.

## 2025-05-10 NOTE — ASSESSMENT & PLAN NOTE
Hypernatremic to 162 on admission. Has not been eating or drinking well for past few days. More altered on day of admission. Free water deficit calculated at 5.7L.    Plan:  -S/p 2L fluid resuscitation with LR  -Following with 150cc/hr infusion of D5W for 12 hours  -Target correction of <=1-2 mEq/L per hour  -BMP q4h   -Monitor urine output, place romo if needed

## 2025-05-10 NOTE — ED NOTES
I-STAT Chem-8+ Results:   Value Reference Range   Sodium 162 136-145 mmol/L   Potassium  4.1 3.5-5.1 mmol/L   Chloride 117  mmol/L   Ionized Calcium 1.15 1.06-1.42 mmol/L   CO2 (measured) 33 23-29 mmol/L   Glucose 125  mg/dL   BUN 41 6-30 mg/dL   Creatinine 1.2 0.5-1.4 mg/dL   Hematocrit 38 36-54%

## 2025-05-10 NOTE — ASSESSMENT & PLAN NOTE
UMN predominance with diffuse limb spasticity. Slowly progressive. Fully-reliant on mother, others for self care and feeding and cleaning and toileting. Respiratory function is poor per FVC but he hasn't got evidence of orthopnea, dyspnea, hypercapnia.     -Continue home donepezil for neuroprotection  -Continue home dantrolene and baclofen for spacticity

## 2025-05-10 NOTE — PROGRESS NOTES
[facility] - Medical ICU  Wound Care    Patient Name:  Lisa Moreno   MRN:  [id number]  Date: 5/10/2025  Diagnosis: Hypernatremia    History:     Past Medical History:   Diagnosis Date    Anxiety     Degenerative motor system disease     Depression     Insomnia secondary to depression with anxiety 10/30/2024    Spastic quadriplegia     Spinocerebellar atrophy        Social History[1]    Precautions:     Allergies as of 05/09/2025 - Reviewed 05/09/2025   Allergen Reaction Noted    Penicillins  01/14/2015    Allergen ext-venom-honey bee  05/10/2022       WO Assessment Details/Treatment   Patient seen for wound care consultation.  Chart reviewed for this encounter.  See flow sheet for findings.    Pt in bed and agreeable to care. Stage 2 pressure injuries to the right shoulder, left calf and right heel. Unstageable pressure injuries to the right lower leg, sacrum and right posterior ankle. DTPI to the right lateral malleolus. Aquacel ag and hydrofera applied for absorption, antimicrobial properties and to create a moist wound healing environment to help facilitate autolytic debridement. Supplies at the bedside.     Recommendations:  - Right shoulder, left heel and bilateral ankle (scar tissue): cleanse with vashe, pat dry and apply a mepilex bordered foam dressing every 3 days    - Sacrum: cleanse with vashe, pat dry, apply aquacel ag to the wound bed, and secure with a mepilex bordered foam dressing every 2 days and prn soilage    - Right lower leg, right heel, left calf:  cleanse with vashe, pat dry, apply hydrofera blue ready to the wound beds and secure with a mepilex bordered foam dressing every 3 days    - Right lateral malleolus: apply BPCO every shift    - Turning every 2 hours  - Heel lift boots  - Immerse mattress      05/10/25 1151        Wound 04/23/25 1530 Pressure Injury Right lower Leg #1   Date First Assessed/Time First Assessed: 04/23/25 1530   Present on Original Admission: Yes  Primary Wound  Type: Pressure Injury  Side: Right  Orientation: lower  Location: Leg  Wound Number: #1  Is this injury device related?: No   Wound Image    Pressure Injury Stage U   Dressing Appearance Intact   Drainage Amount Scant   Appearance Dry;Red;Eschar   Periwound Area Intact;Dry   Care Cleansed with:;Wound cleanser;Other (see comments)  (vashe)   Dressing Foam        Wound 04/23/25 1530 Other (comment) Left Heel #2   Date First Assessed/Time First Assessed: 04/23/25 1530   Present on Original Admission: Yes  Primary Wound Type: Other (comment)  Side: Left  Location: Heel  Wound Number: #2   Wound Image    Dressing Appearance Open to air   Drainage Amount None   Appearance Intact;Dry   Periwound Area Intact;Dry   Dressing Changed;Foam        Wound 04/23/25 1531 Pressure Injury Right Heel #3   Date First Assessed/Time First Assessed: 04/23/25 1531   Present on Original Admission: Yes  Primary Wound Type: Pressure Injury  Side: Right  Location: Heel  Wound Number: #3  Is this injury device related?: No   Wound Image    Pressure Injury Stage DTPI   Dressing Appearance Intact;Moist drainage   Drainage Amount Small   Drainage Characteristics/Odor Serosanguineous   Appearance Pink;Moist;Purple   Periwound Area Intact;Dry   Care Cleansed with:;Wound cleanser  (vashe)   Dressing Applied;Other (comment);Changed;Foam  (hydrofera blue ready)        Wound 04/23/25 1537 Pressure Injury Sacral spine #4   Date First Assessed/Time First Assessed: 04/23/25 1537   Present on Original Admission: Yes  Primary Wound Type: Pressure Injury  Location: Sacral spine  Wound Number: #4  Is this injury device related?: No   Wound Image    Pressure Injury Stage U   Dressing Appearance Intact   Drainage Amount Small   Drainage Characteristics/Odor Serous   Appearance Moist;Yellow;Slough;Purple;Maroon   Periwound Area Intact;Dry   Care Cleansed with:;Wound cleanser  (vashe)   Dressing Applied;Hydrofiber;Changed;Silver;Other (comment)  (foam)        Wound  05/10/25 0300 Pressure Injury Left Calf   Date First Assessed/Time First Assessed: 05/10/25 0300   Present on Original Admission: Yes  Primary Wound Type: Pressure Injury  Side: Left  Location: Calf   Wound Image    Pressure Injury Stage 2   Dressing Appearance Intact;Moist drainage   Drainage Amount Small   Drainage Characteristics/Odor Serosanguineous   Appearance Pink;Red;Moist   Tissue loss description Partial thickness   Periwound Area Intact;Dry   Care Cleansed with:;Wound cleanser;Other (see comments)  (vashe)   Dressing Applied;Other (comment);Changed;Foam  (hydrofera blue ready)        Wound 05/10/25 1151 Pressure Injury Right posterior Ankle   Date First Assessed/Time First Assessed: 05/10/25 1151   Present on Original Admission: Yes  Primary Wound Type: Pressure Injury  Side: Right  Orientation: posterior  Location: Ankle   Wound Image    Pressure Injury Stage U   Dressing Appearance Intact;Moist drainage   Drainage Amount Small   Drainage Characteristics/Odor Serosanguineous   Appearance Moist;Slough;White   Periwound Area Intact;Dry   Care Cleansed with:;Wound cleanser  (vashe)   Dressing Applied;Other (comment);Changed;Foam  (hydrofera blue ready)        Wound 05/10/25 1151 Pressure Injury Right lateral Malleolus   Date First Assessed/Time First Assessed: 05/10/25 1151   Present on Original Admission: Yes  Primary Wound Type: Pressure Injury  Side: Right  Orientation: lateral  Location: Malleolus   Wound Image    Pressure Injury Stage DTPI   Dressing Appearance Open to air   Drainage Amount None   Appearance Intact;Dry;Purple   Periwound Area Intact;Dry        Wound 05/10/25 1151 Pressure Injury Right posterior Shoulder   Date First Assessed/Time First Assessed: 05/10/25 1151   Present on Original Admission: Yes  Primary Wound Type: Pressure Injury  Side: Right  Orientation: posterior  Location: Shoulder   Wound Image    Pressure Injury Stage 2   Dressing Appearance Open to air   Drainage Amount Scant    Appearance Pink;Dry   Tissue loss description Partial thickness   Periwound Area Intact;Dry   Care Cleansed with:;Wound cleanser;Other (see comments)  (vashe)   Dressing Applied;Foam     Right ankle - scar tissue - intact, pink and dry       Left ankle - scar tissue - intact, pink and dry       Recommendations made to primary team for above plan via secure chat. Wound care will follow-up as needed.   05/10/2025         [1]   Social History  Socioeconomic History    Marital status: Single   Tobacco Use    Smoking status: Never    Smokeless tobacco: Never   Substance and Sexual Activity    Alcohol use: Not Currently     Comment: social drinker, at times    Drug use: Not Currently    Sexual activity: Never     Social Drivers of Health     Financial Resource Strain: High Risk (4/17/2025)    Overall Financial Resource Strain (CARDIA)     Difficulty of Paying Living Expenses: Very hard   Food Insecurity: No Food Insecurity (4/17/2025)    Hunger Vital Sign     Worried About Running Out of Food in the Last Year: Never true     Ran Out of Food in the Last Year: Never true   Transportation Needs: Unmet Transportation Needs (4/17/2025)    PRAPARE - Transportation     Lack of Transportation (Medical): Yes     Lack of Transportation (Non-Medical): Yes   Physical Activity: Inactive (4/17/2025)    Exercise Vital Sign     Days of Exercise per Week: 0 days     Minutes of Exercise per Session: 0 min   Stress: Stress Concern Present (4/17/2025)    [employer] - Occupational Stress Questionnaire     Feeling of Stress : Rather much   Housing Stability: Low Risk  (4/17/2025)    Housing Stability Vital Sign     Unable to Pay for Housing in the Last Year: No     Number of Times Moved in the Last Year: 1     Homeless in the Last Year: No

## 2025-05-10 NOTE — HPI
Mr. Moreno is a 44yoM with PMHx significant for ALS, neuromuscular dysfunction of the bladder, sacral ulcers, and spinal cerebellar ataxia who presented to the ED with mother via EMS and c/o AMS (hallucinating) and tachypnea. Per mother, patient is normally able to communicate by speaking but was found today staring off, groaning intermittently, and not following commands. 11lb weight loss noted in the last week along with decreased oral intake x2 days. Denies fever, chills, n/v/d, or decreased urine output. Was given 500mL IVF per EMS.     While in the ED, labs significant for VB.54/35.2/64/30.1/8, POCT lactic acid 3.52, mag 2.5, Na 162, K 4.8, Cl 121, CO2 27, glucose 125, BUN 39, creatinine 1.3, ALP 65, AST/ALT 26/13, WBC 11.2, H/H 13.5/45.4, Hep C reactive.     [facility] consulted for hypernatremia. Will admit to the MICU for further workup.

## 2025-05-10 NOTE — EICU
Intervention Initiated From:  [facility] / EICU    Ren intervened regarding:  Rounding (Video assessment)    Virtual ICU Quality Rounds    Admit Date: 5/9/2025  Hospital Day: 0    ICU Day: 12h    24H Vital Sign Range:  Temp:  [94.5 °F (34.7 °C)-99 °F (37.2 °C)]   Pulse:  []   Resp:  [10-22]   BP: ()/(50-80)   SpO2:  [95 %-100 %]     VICU Surveillance Screening                    LDA reconciliation : Yes

## 2025-05-10 NOTE — ED PROVIDER NOTES
Encounter Date: 5/9/2025       History     Chief Complaint   Patient presents with    Altered Mental Status     Presents via EMS, patient has ALS and normally is a GCS of 14, family states that patient has been hallucinating, per EMS patient was tachypnic and diaphoretic upon arrival, given 500 of fluids.      HPI    44-year-old male with history of ALS, neuromuscular dysfunction of the bladder, generalized weakness, spinal cerebellar ataxia, neurogenic bladder, presenting for altered mental status.      Patient presents with his mother at bedside.  She reports he has had new onset altered mental status since this afternoon.  Normally he is able to communicate by speaking, however today he is only staring off and groaning intermittently, not following commands.  She notes 11 lb of weight loss in the last week, decreased oral intake progressively worsening over the last several days, and he has only drank and small sips of ensure today, but that has only this morning.  She denies fever, vomiting, diarrhea, decreased urine output.  She is currently caring for sacral ulcers as well.        Review of patient's allergies indicates:   Allergen Reactions    Penicillins      Hives and Itching    Allergen ext-venom-honey bee      Past Medical History:   Diagnosis Date    Anxiety     Degenerative motor system disease     Depression     Insomnia secondary to depression with anxiety 10/30/2024    Spastic quadriplegia     Spinocerebellar atrophy      Past Surgical History:   Procedure Laterality Date    BACLOFEN PUMP IMPLANTATION      COLONOSCOPY N/A 7/23/2020    Procedure: COLONOSCOPY;  Surgeon: Ziyad Fitch MD;  Location: [facility];  Service: Endoscopy;  Laterality: N/A;    ESOPHAGOGASTRODUODENOSCOPY N/A 7/23/2020    Procedure: EGD (ESOPHAGOGASTRODUODENOSCOPY);  Surgeon: Ziyad Fitch MD;  Location: [facility];  Service: Endoscopy;  Laterality: N/A;    FLUOROSCOPIC URODYNAMIC STUDY N/A 11/27/2018     Procedure: URODYNAMIC STUDY, FLUOROSCOPIC;  Surgeon: Tanja Okeefe MD;  Location: [facility] OR [address] [place] [address];  Service: Urology;  Laterality: N/A;  1 hour    REATTACHMENT OF MUSCLE Bilateral 2/18/2022    Procedure: PTOSIS REPAIR OF BOTH EYES;  Surgeon: Krupa Rios MD;  Location: [facility] OR [address] [place] [address];  Service: Ophthalmology;  Laterality: Bilateral;    UPPER GASTROINTESTINAL ENDOSCOPY       Family History   Problem Relation Name Age of Onset    Thyroid cancer Mother      Hypertension Mother      Depression Mother      Cancer Mother          thyroid cancer    Multiple sclerosis Other          mother's cousin    No Known Problems Brother      No Known Problems Son      ALS Neg Hx      Muscular dystrophy Neg Hx       Social History[1]  Review of Systems  See HPI for pertinent ROS.   Physical Exam     Initial Vitals [05/09/25 2215]   BP Pulse Resp Temp SpO2   111/78 (!) 128 17 99 °F (37.2 °C) 100 %      MAP       --         Physical Exam    Constitutional: He appears lethargic.   Cachectic   HENT:   Head: Normocephalic and atraumatic.   Mucous membranes extremely dry   Eyes: Pupils are equal, round, and reactive to light. No scleral icterus.   Neck: No JVD present.   Normal range of motion.  Cardiovascular:  Regular rhythm.     Exam reveals no gallop and no friction rub.       No murmur heard.  Tachycardic   Pulmonary/Chest: Breath sounds normal. No stridor. He has no wheezes. He has no rhonchi. He has no rales.   Abdominal: Abdomen is soft. There is no abdominal tenderness. There is no rebound and no guarding.   Musculoskeletal:         General: No tenderness or edema.      Cervical back: Normal range of motion.     Neurological: He appears lethargic. No cranial nerve deficit or sensory deficit. GCS score is 15. GCS eye subscore is 4. GCS verbal subscore is 2. GCS motor subscore is 5.   Skin: Skin is warm. Capillary refill takes less than 2 seconds.   Psychiatric: He has a normal mood and affect.         ED Course    Procedures  Labs Reviewed   HEPATITIS C ANTIBODY - Abnormal       Result Value    Hep C Ab Interp Reactive (*)    COMPREHENSIVE METABOLIC PANEL - Abnormal    Sodium 162 (*)     Potassium 4.8      Chloride 121 (*)     CO2 27      Glucose 125 (*)     BUN 39 (*)     Creatinine 1.3      Calcium 9.5      Protein Total 8.7 (*)     Albumin 2.5 (*)     Bilirubin Total 0.3      ALP 65      AST 26      ALT 13      Anion Gap 14      eGFR >60     URINALYSIS, REFLEX TO URINE CULTURE - Abnormal    Color, UA Yellow      Appearance, UA Clear      pH, UA 7.0      Spec Grav UA 1.030      Protein, UA 1+ (*)     Glucose, UA Negative      Ketones, UA Negative      Bilirubin, UA Negative      Blood, UA Negative      Nitrites, UA Negative      Urobilinogen, UA 2.0-3.0 (*)     Leukocyte Esterase, UA Trace (*)    CBC WITH DIFFERENTIAL - Abnormal    WBC 11.20      RBC 5.04      HGB 13.5 (*)     HCT 45.4      MCV 90      MCH 26.8 (*)     MCHC 29.7 (*)     RDW 15.6 (*)     Platelet Count 305      MPV 11.5      Nucleated RBC 0      Neut % 91.1 (*)     Lymph % 3.9 (*)     Mono % 3.7 (*)     Eos % 0.0      Basophil % 0.2      Imm Grans % 1.1 (*)     Neut # 10.21 (*)     Lymph # 0.44 (*)     Mono # 0.41      Eos # 0.00      Baso # 0.02      Imm Grans # 0.12 (*)    URINALYSIS MICROSCOPIC - Abnormal    RBC, UA 2      WBC, UA 8 (*)     Bacteria, UA Many (*)     Squamous Epithelial Cells, UA 1      Hyaline Casts, UA 18 (*)     Microscopic Comment       ISTAT PROCEDURE - Abnormal    POC PH 7.540 (*)     POC PCO2 35.2      POC PO2 64 (*)     POC HCO3 30.1 (*)     POC BE 8 (*)     POC SATURATED O2 95      POC TCO2 31 (*)     Sample VENOUS      Site Other      Allens Test N/A     ISTAT LACTATE - Abnormal    POC Lactate 3.52 (*)     Sample VENOUS      Site Other      Allens Test N/A     ISTAT PROCEDURE - Abnormal    POC Glucose 125 (*)     POC BUN 41 (*)     POC Creatinine 1.2      POC Sodium 162 (*)     POC Potassium 4.1      POC Chloride 117 (*)      POC TCO2 (MEASURED) 33 (*)     POC Ionized Calcium 1.15      POC Hematocrit 38      Sample MASHA     HIV 1 / 2 ANTIBODY - Normal    HIV 1/2 Ag/Ab Non-Reactive     MAGNESIUM - Normal    Magnesium  2.5     CULTURE, BLOOD   CULTURE, BLOOD   CBC W/ AUTO DIFFERENTIAL    Narrative:     The following orders were created for panel order CBC auto differential.  Procedure                               Abnormality         Status                     ---------                               -----------         ------                     CBC with Differential[[id number]]       Abnormal            Final result                 Please view results for these tests on the individual orders.   HEP C VIRUS HOLD SPECIMEN   HEPATITIS C RNA, QUANTITATIVE, PCR   GREY TOP URINE HOLD   BASIC METABOLIC PANEL   SARS-COV2 (COVID) WITH FLU/RSV BY PCR   LACTIC ACID, PLASMA   LACTIC ACID, PLASMA   CBC W/ AUTO DIFFERENTIAL    Narrative:     The following orders were created for panel order CBC auto differential.  Procedure                               Abnormality         Status                     ---------                               -----------         ------                     CBC with Differential[[id number]]                                                        Please view results for these tests on the individual orders.   CBC WITH DIFFERENTIAL   MAGNESIUM   PHOSPHORUS   ISTAT CHEM8   POCT GLUCOSE MONITORING CONTINUOUS          Imaging Results              X-Ray Chest AP Portable (Final result)  Result time 05/09/25 22:54:50      Final result by Luis Briceño MD (05/09/25 22:54:50)                   Impression:      Patchy airspace infiltrates in the right mid to lower lung field.  Possible pneumonia or aspiration.      Electronically signed by: Luis Briceño MD  Date:    05/09/2025  Time:    22:54               Narrative:    EXAMINATION:  XR CHEST AP PORTABLE    CLINICAL HISTORY:  Sepsis;    TECHNIQUE:  Single frontal view of the  chest was performed.    COMPARISON:  09/13/2023.    FINDINGS:  Patchy airspace infiltrates in the mid to lower right lung field.    Heart and lungs otherwise appear unchanged when allowing for differences in technique and positioning.                                       Medications   vancomycin 1,500 mg in 0.9% NaCl 250 mL IVPB (admixture device) (1,500 mg Intravenous New Bag 5/10/25 0116)   cetirizine tablet 10 mg (has no administration in time range)   dantrolene capsule 50 mg (has no administration in time range)   mirtazapine tablet 15 mg (15 mg Oral Not Given 5/10/25 0230)   donepeziL tablet 5 mg (5 mg Oral Not Given 5/10/25 0230)   baclofen tablet 20 mg (has no administration in time range)   cefTRIAXone injection 1 g (has no administration in time range)   lactated ringers bolus 1,000 mL (1,000 mLs Intravenous New Bag 5/10/25 0226)   D5W infusion (has no administration in time range)   sodium chloride 0.9% flush 10 mL (has no administration in time range)   enoxaparin injection 40 mg (has no administration in time range)   acetaminophen tablet 650 mg (has no administration in time range)   ondansetron disintegrating tablet 8 mg (has no administration in time range)   azithromycin (ZITHROMAX) 500 mg in 0.9% NaCl 250 mL IVPB (admixture device) (has no administration in time range)   lactated ringers bolus 1,000 mL (0 mLs Intravenous Stopped 5/10/25 0000)   ceFEPIme injection 1 g (1 g Intravenous Given 5/9/25 2338)   azithromycin (ZITHROMAX) 500 mg in 0.9% NaCl 250 mL IVPB (admixture device) (0 mg Intravenous Stopped 5/10/25 0044)     Medical Decision Making  Amount and/or Complexity of Data Reviewed  Labs: ordered. Decision-making details documented in ED Course.  Radiology: ordered. Decision-making details documented in ED Course.    Risk  Prescription drug management.  Decision regarding hospitalization.              Attending Attestation:   Physician Attestation Statement for Resident:  As the supervising  MD   Physician Attestation Statement: I have personally seen and examined this patient.   I agree with the above history.  -: 44-year-old male with extensive past medical history including ALS presents with 2-3 days of worsening altered mental status.  He is cachectic and appears dry.  He had a critically elevated sodium of 162.  Lactic acid is elevated as well.  Chest x-ray with a pneumonia.  Received antibiotics.  MICU consulted for admission.   As the supervising MD I agree with the above PE.     As the supervising MD I agree with the above treatment, course, plan, and disposition.     I have reviewed the following: old x-rays.                ED Course as of 05/10/25 0240   Fri May 09, 2025   2300 X-Ray Chest AP Portable     Impression:     Patchy airspace infiltrates in the right mid to lower lung field.  Possible pneumonia or aspiration.      [KB]   2313 POC Lactate(!!): 3.52 [KB]   Sat May 10, 2025   0148 CBC auto differential(!)  No evidence of leukocytosis, acute anemia requiring transfusion or thrombocytopenia.   [KB]      ED Course User Index  [KB] Cathy Stephens MD               Medical Decision Making:   Clinical Tests:   Sepsis Perfusion Assessment: "I attest a sepsis perfusion exam was performed within 6 hours of sepsis, severe sepsis, or septic shock presentation, following fluid resuscitation."         EKG:  Sinus tachycardia, short W[place], rate 132, normal axis deviation, no QTC prolongation or ST segment elevation.      44-year-old male with history of ALS presenting for altered mental status and decreased oral intake.  On presentation, patient is tachycardic with rates in the 130s, but normotensive and afebrile.  His mucous membranes appear very dry, his eyes are spontaneously open, he withdraws from pain, and groans intermittently.  Chest x-ray with right lower lobe pneumonia.  Initial lactate elevated at 3.5 to.  He was given 500 cc of IV fluids by EMS, ED gave an additional 1 L of LR.  Labs  resulted notable for hypernatremia with a sodium of 162, no significant BIBIAAN, and no leukocytosis, anemia. For pneumonia with elevated lactate/concern for sepsis, he was started on vanc, cefepime and azithromycin for atypical coverage.    Given his altered mental status with hyponatremia, critical care consulted.  Their recommendations include admission to MICU for further care.       This patient does have evidence of infective focus  My overall impression is sepsis.  Source: Respiratory  Antibiotics given-   Antibiotics (72h ago, onward)      Start     Stop Route Frequency Ordered    05/10/25 2344  azithromycin (ZITHROMAX) 500 mg in 0.9% NaCl 250 mL IVPB (admixture device)         05/12/25 2344 IV Every 24 hours (non-standard times) 05/10/25 0157    05/10/25 0600  cefTRIAXone injection 1 g         -- IV Every 24 hours (non-standard times) 05/10/25 0119    05/09/25 2315  vancomycin 1,500 mg in 0.9% NaCl 250 mL IVPB (admixture device)         05/10/25 1114 IV ED 1 Time 05/09/25 2304          Latest lactate reviewed-  Recent Labs   Lab 05/09/25  2259   POCLAC 3.52*     Organ dysfunction indicated by Encephalopathy    Fluid challenge Actual Body Weight- The patient's actual body weight will be used to calculate the 30 ml/kg fluid bolus.     Post- resuscitation assessment Yes - I attest a sepsis perfusion exam was performed within 6 hours of sepsis, severe sepsis, or septic shock presentation, following fluid resuscitation.      Will Not start Pressors- Levophed for MAP of 65  Source control achieved by: abx     Clinical Impression:  Final diagnoses:  [Z13.6] Screening for cardiovascular condition  [E87.0] Hypernatremia (Primary)  [J18.9] Pneumonia of right lower lobe due to infectious organism  [G93.40] Encephalopathy, unspecified type          ED Disposition Condition    Admit                   Cathy Stephens MD  Resident  05/10/25 0149         [1]   Social History  Tobacco Use    Smoking status: Never    Smokeless  tobacco: Never   Substance Use Topics    Alcohol use: Not Currently     Comment: social drinker, at times    Drug use: Not Currently        Fernando Armijo MD  05/10/25 9186

## 2025-05-10 NOTE — PLAN OF CARE
MICU DAILY GOALS     Family/Goals of care/Code Status   Code Status: DNR    24H Vital Sign Range  Temp:  [97.3 °F (36.3 °C)-99 °F (37.2 °C)]   Pulse:  []   Resp:  [15-22]   BP: (111-135)/(69-80)   SpO2:  [95 %-100 %]      Shift Events (include procedures and significant events)   No acute events throughout shift. Pt positive for covid    AWAKE RASS: Goal -    Actual - RASS ([place] Agitation-Sedation Scale): drowsy    Restraint necessity: Not necessary   BREATHE SBT: Not intubated    Coordinate A & B, analgesics/sedatives Pain: managed   SAT: Not intubated   Delirium CAM-ICU:     Early(intubated/ Progressive (non-intubated) Mobility MOVE Screen (INTUBATED ONLY): Not intubated    Activity: Activity Management: Patient unable to perform activities   Feeding/Nutrition Diet order: Diet/Nutrition Received: NPO,     Thrombus DVT prophylaxis: VTE Core Measure: Pharmacological prophylaxis initiated/maintained   HOB Elevation Head of Bed (HOB) Positioning: HOB at 30-45 degrees   Ulcer Prophylaxis GI: yes   Glucose control managed     Skin Skin assessment:     Sacrum intact/not altered? No  Heels intact/not altered? No  Surgical wound? No    CHECK ONE!   (no altered skin or altered skin) and sub boxes:  [] No Altered Skin Integrity Present    []Prevention Measures Documented    [x] Altered Skin Integrity Present or Discovered   [x] LDA already present in EPIC, daily doc completed              [x] LDA added if not already in EPIC (describe/stage wound).               [x] Wound Image Taken (required on admit,                   transfer/discharge and every Tuesday)    Wound Care Consulted? Yes   Bowel Function      Indwelling Catheter Necessity            De-escalation Antibiotics No        VS and assessment per flow sheet, patient progressing towards goals as tolerated, plan of care reviewed with Lisa Moreno, all concerns addressed, will continue to monitor.

## 2025-05-10 NOTE — ASSESSMENT & PLAN NOTE
"Patient presented with altered mental status. Tachycardic into 110s. O2 sat at 100% on room air. WBC 11.2. POCT lactate 3.5. CXR with "Patchy airspace infiltrates in the right mid to lower lung field. Possible pneumonia or aspiration."    Questonable pneumonia given CXR findings and elevated lactate but will opt to treat given acute encephalopathy.     Plan:  -CAP coverage with ceftriaxone and azithromycin  -Continuous pulse ox  -F/u flu/Covid PCR  -F/u remaining infectious workup including Blood cultures and UA  "

## 2025-05-10 NOTE — H&P
[facility] - Emergency Dept  Critical Care Medicine  History & Physical    Patient Name: Lisa Moreno  MRN: [id number]  Admission Date: 2025  Hospital Length of Stay: 0 days  Code Status: DNR  Attending Physician: Jeremy Dexter MD   Primary Care Provider: John Nixon II, MD   Principal Problem: Hypernatremia    Subjective:     HPI:  Mr. Moreno is a 44yoM with PMHx significant for ALS, neuromuscular dysfunction of the bladder, sacral ulcers, and spinal cerebellar ataxia who presented to the ED with mother via EMS and c/o AMS (hallucinating) and tachypnea. Per mother, patient is normally able to communicate by speaking but was found today staring off, groaning intermittently, and not following commands. 11lb weight loss noted in the last week along with decreased oral intake x2 days. Denies fever, chills, n/v/d, or decreased urine output. Was given 500mL IVF per EMS.     While in the ED, labs significant for VB.54/35.2/64/30.1/8, POCT lactic acid 3.52, mag 2.5, Na 162, K 4.8, Cl 121, CO2 27, glucose 125, BUN 39, creatinine 1.3, ALP 65, AST/ALT 26/13, WBC 11.2, H/H 13.5/45.4, Hep C reactive.     [facility] consulted for hypernatremia. Will admit to the MICU for further workup.     Hospital/ICU Course:  No notes on file     Past Medical History:   Diagnosis Date    Anxiety     Degenerative motor system disease     Depression     Insomnia secondary to depression with anxiety 10/30/2024    Spastic quadriplegia     Spinocerebellar atrophy        Past Surgical History:   Procedure Laterality Date    BACLOFEN PUMP IMPLANTATION      COLONOSCOPY N/A 2020    Procedure: COLONOSCOPY;  Surgeon: Ziyad Fitch MD;  Location: [facility];  Service: Endoscopy;  Laterality: N/A;    ESOPHAGOGASTRODUODENOSCOPY N/A 2020    Procedure: EGD (ESOPHAGOGASTRODUODENOSCOPY);  Surgeon: Ziyad Fitch MD;  Location: [facility];  Service: Endoscopy;  Laterality: N/A;    FLUOROSCOPIC URODYNAMIC STUDY  N/A 11/27/2018    Procedure: URODYNAMIC STUDY, FLUOROSCOPIC;  Surgeon: Tanja Okeefe MD;  Location: [facility] OR [address] [place] [address];  Service: Urology;  Laterality: N/A;  1 hour    REATTACHMENT OF MUSCLE Bilateral 2/18/2022    Procedure: PTOSIS REPAIR OF BOTH EYES;  Surgeon: Krupa Rios MD;  Location: [facility] OR [address] [place] [address];  Service: Ophthalmology;  Laterality: Bilateral;    UPPER GASTROINTESTINAL ENDOSCOPY         Review of patient's allergies indicates:   Allergen Reactions    Penicillins      Hives and Itching    Allergen ext-venom-honey bee        Family History       Problem Relation (Age of Onset)    Cancer Mother    Depression Mother    Hypertension Mother    Multiple sclerosis Other    No Known Problems Brother, Son    Thyroid cancer Mother          Tobacco Use    Smoking status: Never    Smokeless tobacco: Never   Substance and Sexual Activity    Alcohol use: Not Currently     Comment: social drinker, at times    Drug use: Not Currently    Sexual activity: Never      Review of Systems   Unable to perform ROS: Mental status change   Constitutional:  Positive for activity change, appetite change and diaphoresis.   Psychiatric/Behavioral:  Positive for confusion.      Objective:     Vital Signs (Most Recent):  Temp: 99 °F (37.2 °C) (05/09/25 2215)  Pulse: (!) 119 (05/10/25 0000)  Resp: 20 (05/09/25 2255)  BP: 135/80 (05/10/25 0000)  SpO2: 95 % (05/10/25 0000) Vital Signs (24h Range):  Temp:  [99 °F (37.2 °C)] 99 °F (37.2 °C)  Pulse:  [119-131] 119  Resp:  [17-20] 20  SpO2:  [95 %-100 %] 95 %  BP: (111-135)/(78-80) 135/80   Weight: 60 kg (132 lb 4.4 oz)  Body mass index is 16.53 kg/m².    No intake or output data in the 24 hours ending 05/10/25 0026       Physical Exam  Constitutional:       Appearance: He is ill-appearing.      Comments: Cachectic   HENT:      Head: Normocephalic and atraumatic.      Right Ear: External ear normal.      Left Ear: External ear normal.      Nose: Nose normal.      Mouth/Throat:      Mouth:  Mucous membranes are dry.      Comments: Extremely dry mucous membranes  Eyes:      Extraocular Movements: Extraocular movements intact.   Cardiovascular:      Rate and Rhythm: Regular rhythm. Tachycardia present.   Pulmonary:      Effort: Pulmonary effort is normal.      Breath sounds: Normal breath sounds. No rhonchi or rales.   Abdominal:      Palpations: Abdomen is soft.   Musculoskeletal:      Cervical back: Normal range of motion.      Right lower leg: No edema.      Left lower leg: No edema.   Skin:     General: Skin is dry.   Neurological:      Mental Status: He is disoriented.            Vents:     Lines/Drains/Airways       Peripheral Intravenous Line  Duration                  Peripheral IV - Single Lumen 18 G Anterior;Distal;Left Upper Arm -- days         Peripheral IV - Single Lumen 05/09/25 2255 20 G Anterior;Left Forearm <1 day                  Significant Labs:    CBC/Anemia Profile:  Recent Labs   Lab 05/09/25 2254 05/09/25  2352   WBC 11.20  --    HGB 13.5*  --    HCT 45.4 38   MCV 90  --    RDW 15.6*  --         Chemistries:  Recent Labs   Lab 05/09/25 2254   *   K 4.8   *   CO2 27   BUN 39*   CREATININE 1.3   CALCIUM 9.5   ALBUMIN 2.5*   PROT 8.7*   BILITOT 0.3   ALKPHOS 65   ALT 13   AST 26   MG 2.5       All pertinent labs within the past 24 hours have been reviewed.    Significant Imaging: I have reviewed all pertinent imaging results/findings within the past 24 hours.  Assessment/Plan:     Neuro  ALS (amyotrophic lateral sclerosis)  UMN predominance with diffuse limb spasticity. Slowly progressive. Fully-reliant on mother, others for self care and feeding and cleaning and toileting. Respiratory function is poor per FVC but he hasn't got evidence of orthopnea, dyspnea, hypercapnia.     -Continue home donepezil for neuroprotection  -Continue home dantrolene and baclofen for spacticity      Psychiatric  Recurrent major depressive disorder, in partial remission  -Continue home  "mirtazapine     Pulmonary  Pneumonia  Patient presented with altered mental status. Tachycardic into 110s. O2 sat at 100% on room air. WBC 11.2. POCT lactate 3.5. CXR with "Patchy airspace infiltrates in the right mid to lower lung field. Possible pneumonia or aspiration."    Questonable pneumonia given CXR findings and elevated lactate but will opt to treat given acute encephalopathy.     Plan:  -CAP coverage with ceftriaxone and azithromycin  -Continuous pulse ox  -F/u flu/Covid PCR  -F/u remaining infectious workup including Blood cultures and UA    Renal/  * Hypernatremia  Hypernatremic to 162 on admission. Has not been eating or drinking well for past few days. More altered on day of admission. Free water deficit calculated at 5.7L.    Plan:  -S/p 2L fluid resuscitation with LR  -Following with 150cc/hr infusion of D5W for 12 hours  -Target correction of <=1-2 mEq/L per hour  -BMP q4h   -Monitor urine output, place romo if needed    GI  Oropharyngeal dysphagia  -Aspiration precautions  -Bedside swallow eval  -Has been tolerating full diet with thin liquids           Critical secondary to Patient has a condition that poses threat to life and bodily function: hypernatremia  Patient has an abrupt change in neurologic status: altered mental status    Critical care was time spent personally by me on the following activities: development of treatment plan with patient or surrogate and bedside caregivers, discussions with consultants, evaluation of patient's response to treatment, examination of patient, ordering and performing treatments and interventions, ordering and review of laboratory studies, ordering and review of radiographic studies, pulse oximetry, re-evaluation of patient's condition. This critical care time did not overlap with that of any other provider or involve time for any procedures.     Denys Hester MD  Critical Care Medicine  [facility] - Emergency Dept  "

## 2025-05-10 NOTE — CONSULTS
Patient seen and evaluated by critical care medicine. To be admitted to ICU for further management. Full H&P to follow.     Denys Hester MD PGY II Internal Medicine  05/10/2025

## 2025-05-11 LAB
ABSOLUTE EOSINOPHIL (OHS): 0.05 K/UL
ABSOLUTE MONOCYTE (OHS): 0.39 K/UL (ref 0.3–1)
ABSOLUTE NEUTROPHIL COUNT (OHS): 5.28 K/UL (ref 1.8–7.7)
ANION GAP (OHS): 10 MMOL/L (ref 8–16)
ANION GAP (OHS): 6 MMOL/L (ref 8–16)
ANION GAP (OHS): 8 MMOL/L (ref 8–16)
BASOPHILS # BLD AUTO: 0.02 K/UL
BASOPHILS NFR BLD AUTO: 0.3 %
BUN SERPL-MCNC: 22 MG/DL (ref 6–20)
BUN SERPL-MCNC: 23 MG/DL (ref 6–20)
BUN SERPL-MCNC: 25 MG/DL (ref 6–20)
BUN SERPL-MCNC: 26 MG/DL (ref 6–20)
BUN SERPL-MCNC: 26 MG/DL (ref 6–20)
BUN SERPL-MCNC: 28 MG/DL (ref 6–20)
CALCIUM SERPL-MCNC: 8 MG/DL (ref 8.7–10.5)
CALCIUM SERPL-MCNC: 8 MG/DL (ref 8.7–10.5)
CALCIUM SERPL-MCNC: 8.1 MG/DL (ref 8.7–10.5)
CALCIUM SERPL-MCNC: 8.2 MG/DL (ref 8.7–10.5)
CALCIUM SERPL-MCNC: 8.2 MG/DL (ref 8.7–10.5)
CALCIUM SERPL-MCNC: 8.4 MG/DL (ref 8.7–10.5)
CHLORIDE SERPL-SCNC: 118 MMOL/L (ref 95–110)
CHLORIDE SERPL-SCNC: 118 MMOL/L (ref 95–110)
CHLORIDE SERPL-SCNC: 120 MMOL/L (ref 95–110)
CHLORIDE SERPL-SCNC: 123 MMOL/L (ref 95–110)
CHLORIDE SERPL-SCNC: 124 MMOL/L (ref 95–110)
CHLORIDE SERPL-SCNC: 125 MMOL/L (ref 95–110)
CO2 SERPL-SCNC: 23 MMOL/L (ref 23–29)
CO2 SERPL-SCNC: 23 MMOL/L (ref 23–29)
CO2 SERPL-SCNC: 24 MMOL/L (ref 23–29)
CO2 SERPL-SCNC: 25 MMOL/L (ref 23–29)
CREAT SERPL-MCNC: 0.6 MG/DL (ref 0.5–1.4)
CREAT SERPL-MCNC: 0.7 MG/DL (ref 0.5–1.4)
ERYTHROCYTE [DISTWIDTH] IN BLOOD BY AUTOMATED COUNT: 15.7 % (ref 11.5–14.5)
GFR SERPLBLD CREATININE-BSD FMLA CKD-EPI: >60 ML/MIN/1.73/M2
GLUCOSE SERPL-MCNC: 107 MG/DL (ref 70–110)
GLUCOSE SERPL-MCNC: 80 MG/DL (ref 70–110)
GLUCOSE SERPL-MCNC: 80 MG/DL (ref 70–110)
GLUCOSE SERPL-MCNC: 84 MG/DL (ref 70–110)
GLUCOSE SERPL-MCNC: 86 MG/DL (ref 70–110)
GLUCOSE SERPL-MCNC: 88 MG/DL (ref 70–110)
HCT VFR BLD AUTO: 31.4 % (ref 40–54)
HGB BLD-MCNC: 9.4 GM/DL (ref 14–18)
IMM GRANULOCYTES # BLD AUTO: 0.02 K/UL (ref 0–0.04)
IMM GRANULOCYTES NFR BLD AUTO: 0.3 % (ref 0–0.5)
LYMPHOCYTES # BLD AUTO: 1.31 K/UL (ref 1–4.8)
MAGNESIUM SERPL-MCNC: 2.1 MG/DL (ref 1.6–2.6)
MCH RBC QN AUTO: 26.9 PG (ref 27–31)
MCHC RBC AUTO-ENTMCNC: 29.9 G/DL (ref 32–36)
MCV RBC AUTO: 90 FL (ref 82–98)
NUCLEATED RBC (/100WBC) (OHS): 0 /100 WBC
PHOSPHATE SERPL-MCNC: 2.6 MG/DL (ref 2.7–4.5)
PLATELET # BLD AUTO: 215 K/UL (ref 150–450)
PMV BLD AUTO: 11.2 FL (ref 9.2–12.9)
POCT GLUCOSE: 85 MG/DL (ref 70–110)
POCT GLUCOSE: 92 MG/DL (ref 70–110)
POCT GLUCOSE: 93 MG/DL (ref 70–110)
POTASSIUM SERPL-SCNC: 3.3 MMOL/L (ref 3.5–5.1)
POTASSIUM SERPL-SCNC: 3.4 MMOL/L (ref 3.5–5.1)
POTASSIUM SERPL-SCNC: 3.8 MMOL/L (ref 3.5–5.1)
POTASSIUM SERPL-SCNC: 3.9 MMOL/L (ref 3.5–5.1)
POTASSIUM SERPL-SCNC: 3.9 MMOL/L (ref 3.5–5.1)
POTASSIUM SERPL-SCNC: 4.9 MMOL/L (ref 3.5–5.1)
RBC # BLD AUTO: 3.49 M/UL (ref 4.6–6.2)
RELATIVE EOSINOPHIL (OHS): 0.7 %
RELATIVE LYMPHOCYTE (OHS): 18.5 % (ref 18–48)
RELATIVE MONOCYTE (OHS): 5.5 % (ref 4–15)
RELATIVE NEUTROPHIL (OHS): 74.7 % (ref 38–73)
SODIUM SERPL-SCNC: 151 MMOL/L (ref 136–145)
SODIUM SERPL-SCNC: 154 MMOL/L (ref 136–145)
SODIUM SERPL-SCNC: 156 MMOL/L (ref 136–145)
SODIUM SERPL-SCNC: 158 MMOL/L (ref 136–145)
WBC # BLD AUTO: 7.07 K/UL (ref 3.9–12.7)

## 2025-05-11 PROCEDURE — 25000003 PHARM REV CODE 250: Performed by: HOSPITALIST

## 2025-05-11 PROCEDURE — 25000003 PHARM REV CODE 250

## 2025-05-11 PROCEDURE — 94760 N-INVAS EAR/PLS OXIMETRY 1: CPT

## 2025-05-11 PROCEDURE — 63600175 PHARM REV CODE 636 W HCPCS

## 2025-05-11 PROCEDURE — 94761 N-INVAS EAR/PLS OXIMETRY MLT: CPT

## 2025-05-11 PROCEDURE — 27000207 HC ISOLATION

## 2025-05-11 PROCEDURE — 92610 EVALUATE SWALLOWING FUNCTION: CPT

## 2025-05-11 PROCEDURE — S5010 5% DEXTROSE AND 0.45% SALINE: HCPCS

## 2025-05-11 PROCEDURE — 84100 ASSAY OF PHOSPHORUS: CPT

## 2025-05-11 PROCEDURE — 25000003 PHARM REV CODE 250: Performed by: INTERNAL MEDICINE

## 2025-05-11 PROCEDURE — 83735 ASSAY OF MAGNESIUM: CPT

## 2025-05-11 PROCEDURE — 80048 BASIC METABOLIC PNL TOTAL CA: CPT

## 2025-05-11 PROCEDURE — 99900035 HC TECH TIME PER 15 MIN (STAT)

## 2025-05-11 PROCEDURE — 20600001 HC STEP DOWN PRIVATE ROOM

## 2025-05-11 PROCEDURE — 85025 COMPLETE CBC W/AUTO DIFF WBC: CPT

## 2025-05-11 PROCEDURE — 36415 COLL VENOUS BLD VENIPUNCTURE: CPT

## 2025-05-11 RX ORDER — POTASSIUM CHLORIDE 7.45 MG/ML
10 INJECTION INTRAVENOUS
Status: COMPLETED | OUTPATIENT
Start: 2025-05-11 | End: 2025-05-11

## 2025-05-11 RX ORDER — DEXTROSE MONOHYDRATE AND SODIUM CHLORIDE 5; .45 G/100ML; G/100ML
INJECTION, SOLUTION INTRAVENOUS CONTINUOUS
Status: DISCONTINUED | OUTPATIENT
Start: 2025-05-11 | End: 2025-05-11

## 2025-05-11 RX ORDER — DEXTROSE MONOHYDRATE AND SODIUM CHLORIDE 5; .45 G/100ML; G/100ML
INJECTION, SOLUTION INTRAVENOUS CONTINUOUS
Status: ACTIVE | OUTPATIENT
Start: 2025-05-11 | End: 2025-05-12

## 2025-05-11 RX ORDER — TALC
6 POWDER (GRAM) TOPICAL NIGHTLY PRN
Status: DISCONTINUED | OUTPATIENT
Start: 2025-05-11 | End: 2025-05-14 | Stop reason: HOSPADM

## 2025-05-11 RX ADMIN — MUPIROCIN: 20 OINTMENT TOPICAL at 09:05

## 2025-05-11 RX ADMIN — DANTROLENE SODIUM 50 MG: 25 CAPSULE ORAL at 09:05

## 2025-05-11 RX ADMIN — DONEPEZIL HYDROCHLORIDE 5 MG: 5 TABLET, FILM COATED ORAL at 09:05

## 2025-05-11 RX ADMIN — Medication 6 MG: at 09:05

## 2025-05-11 RX ADMIN — MIRTAZAPINE 15 MG: 15 TABLET, FILM COATED ORAL at 09:05

## 2025-05-11 RX ADMIN — POTASSIUM CHLORIDE 10 MEQ: 7.46 INJECTION, SOLUTION INTRAVENOUS at 07:05

## 2025-05-11 RX ADMIN — DEXTROSE AND SODIUM CHLORIDE: 5; 450 INJECTION, SOLUTION INTRAVENOUS at 01:05

## 2025-05-11 RX ADMIN — Medication: at 08:05

## 2025-05-11 RX ADMIN — DANTROLENE SODIUM 50 MG: 25 CAPSULE ORAL at 04:05

## 2025-05-11 RX ADMIN — ENOXAPARIN SODIUM 40 MG: 40 INJECTION SUBCUTANEOUS at 04:05

## 2025-05-11 RX ADMIN — HYDROMORPHONE HYDROCHLORIDE 0.5 MG: 2 INJECTION, SOLUTION INTRAMUSCULAR; INTRAVENOUS; SUBCUTANEOUS at 10:05

## 2025-05-11 RX ADMIN — DEXTROSE AND SODIUM CHLORIDE: 5; 450 INJECTION, SOLUTION INTRAVENOUS at 06:05

## 2025-05-11 RX ADMIN — POTASSIUM CHLORIDE 10 MEQ: 7.46 INJECTION, SOLUTION INTRAVENOUS at 06:05

## 2025-05-11 RX ADMIN — BACLOFEN 20 MG: 10 TABLET ORAL at 09:05

## 2025-05-11 RX ADMIN — MUPIROCIN: 20 OINTMENT TOPICAL at 10:05

## 2025-05-11 RX ADMIN — BACLOFEN 20 MG: 10 TABLET ORAL at 04:05

## 2025-05-11 RX ADMIN — Medication: at 09:05

## 2025-05-11 RX ADMIN — CETIRIZINE HYDROCHLORIDE 10 MG: 10 TABLET, FILM COATED ORAL at 09:05

## 2025-05-11 RX ADMIN — POTASSIUM CHLORIDE 10 MEQ: 7.46 INJECTION, SOLUTION INTRAVENOUS at 08:05

## 2025-05-11 NOTE — EICU
Virtual ICU Quality Rounds    Admit Date: 5/9/2025  Hospital Day: 1    ICU Day: 1d 6h    24H Vital Sign Range:  Temp:  [96.5 °F (35.8 °C)-97.5 °F (36.4 °C)]   Pulse:  [78-95]   Resp:  [10-23]   BP: ()/(51-72)   SpO2:  [97 %-100 %]     VICU Surveillance Screening                    LDA reconciliation : Yes

## 2025-05-11 NOTE — PLAN OF CARE
MICU DAILY GOALS     Family/Goals of care/Code Status   Code Status: DNR    24H Vital Sign Range  Temp:  [94.5 °F (34.7 °C)-97.5 °F (36.4 °C)]   Pulse:  [78-95]   Resp:  [10-23]   BP: ()/(50-72)   SpO2:  [97 %-100 %]      Shift Events (include procedures and significant events)    No overnight shift events. D5 0.45%NS gtt increased to 125ml. Trending BMP     AWAKE RASS: Goal -    Actual - RASS ([place] Agitation-Sedation Scale): alert and calm    Restraint necessity: Not necessary   BREATHE SBT: Not intubated    Coordinate A & B, analgesics/sedatives Pain: managed   SAT: Not intubated   Delirium CAM-ICU:     Early(intubated/ Progressive (non-intubated) Mobility MOVE Screen (INTUBATED ONLY): Not intubated    Activity: Activity Management: Rolling - L1   Feeding/Nutrition Diet order: Diet/Nutrition Received: NPO,     Thrombus DVT prophylaxis: VTE Core Measure: Pharmacological prophylaxis initiated/maintained   HOB Elevation Head of Bed (HOB) Positioning: HOB elevated   Ulcer Prophylaxis GI: yes   Glucose control managed     Skin Skin assessment:     Sacrum intact/not altered? No  Heels intact/not altered? No  Surgical wound? No    CHECK ONE!   (no altered skin or altered skin) and sub boxes:  [] No Altered Skin Integrity Present    []Prevention Measures Documented    [x] Altered Skin Integrity Present or Discovered   [x] LDA already present in EPIC, daily doc completed              [] LDA added if not already in EPIC (describe/stage wound).               [] Wound Image Taken (required on admit,                   transfer/discharge and every Tuesday)    Wound Care Consulted? No   Bowel Function no issues    Indwelling Catheter Necessity            De-escalation Antibiotics No        Problem: Skin Injury Risk Increased  Goal: Skin Health and Integrity  Outcome: Progressing     Problem: Adult Inpatient Plan of Care  Goal: Plan of Care Review  Outcome: Progressing  Goal: Absence of Hospital-Acquired Illness or  Injury  Outcome: Progressing  Goal: Optimal Comfort and Wellbeing  Outcome: Progressing  Goal: Readiness for Transition of Care  Outcome: Progressing     Problem: Pneumonia  Goal: Fluid Balance  Outcome: Progressing

## 2025-05-11 NOTE — RESIDENT HANDOFF
Handoff     Primary Team: [facility] CRITICAL CARE MEDICINE TEAM 2 Room Number: [id number]     Patient Name: Lisa Moreno MRN: [id number]     Date of Birth: [id number] Allergies: Penicillins and Allergen ext-venom-honey bee     Age: 44 y.o. Admit Date: 5/9/2025     Sex: male  BMI: Body mass index is 16.53 kg/m².     Code Status: DNR        Illness Level (current clinical status): Watcher - Yes - History of ALS, neuromuscular dysfunction, COVID positive (currently asymptomatic)    Reason for Admission: Hypernatremia    Brief HPI (pertinent PMH and diagnosis or differential diagnosis): Mr. Moreno is a 44yoM with PMHx significant for ALS, neuromuscular dysfunction of the bladder, sacral ulcers, and spinal cerebellar ataxia who presented to the ED with mother via EMS and c/o AMS (hallucinating) and tachypnea. Admitted to MICU d/o hypernatremia. COVID- supportive therapy. No oxygen requirement currently. Improved mental status, back to baseline.    Procedure Date: NA    Hospital Course : Past medical history as above. Presented to the ED with mother via EMS and c/o AMS (hallucinating) and tachypnea. Per mother, patient is normally able to communicate by speaking but was found today staring off, groaning intermittently, and not following commands. 11lb weight loss noted in the last week along with decreased oral intake x2 days. Denies fever, chills, n/v/d, or decreased urine output. Was given 500mL IVF per EMS.. Admitted to MICU d/o hypernatremia with AMS. Back to baseline.     Tasks :  -- Cautious correct hypernatremia  -- Clarify [facility]      Contingency Plan :   Recommendations:  -- Continue Baclofen  -- Continue Dantrolene  -- Mirtazapine  -- Monitor and replace electrolyte as needed  -- Regular diet plus boost  -- Monitor vitals    Estimated Discharge Date: 05/13/2025    Discharge Disposition: Home or Self Care    Mentored By: Dr. Kartik Talamantes

## 2025-05-11 NOTE — SUBJECTIVE & OBJECTIVE
Interval History/Significant Events: NOEON. AMS improved, now baseline. Hypernatremia slowly improving, continue IVF, started diet and stepdown.    Review of Systems  Objective:     Vital Signs (Most Recent):  Temp: 97.4 °F (36.3 °C) (05/11/25 0700)  Pulse: 76 (05/11/25 1000)  Resp: 20 (05/11/25 1022)  BP: 103/70 (05/11/25 1000)  SpO2: 100 % (05/11/25 1000) Vital Signs (24h Range):  Temp:  [96.5 °F (35.8 °C)-97.5 °F (36.4 °C)] 97.4 °F (36.3 °C)  Pulse:  [76-95] 76  Resp:  [10-23] 20  SpO2:  [97 %-100 %] 100 %  BP: ()/(51-72) 103/70   Weight: 60 kg (132 lb 4.4 oz)  Body mass index is 16.53 kg/m².      Intake/Output Summary (Last 24 hours) at 5/11/2025 1204  Last data filed at 5/11/2025 1050  Gross per 24 hour   Intake 2612.33 ml   Output 430 ml   Net 2182.33 ml          Physical Exam  Constitutional:       Appearance: He is ill-appearing.      Comments: Cachectic   HENT:      Head: Normocephalic and atraumatic.      Right Ear: External ear normal.      Left Ear: External ear normal.      Nose: Nose normal.      Mouth/Throat:      Mouth: Mucous membranes are dry.      Comments: Extremely dry mucous membranes  Eyes:      Extraocular Movements: Extraocular movements intact.   Cardiovascular:      Rate and Rhythm: Regular rhythm. Tachycardia present.   Pulmonary:      Effort: Pulmonary effort is normal.      Breath sounds: Normal breath sounds. No rhonchi or rales.   Abdominal:      Palpations: Abdomen is soft.   Musculoskeletal:      Cervical back: Normal range of motion.      Right lower leg: No edema.      Left lower leg: No edema.   Skin:     General: Skin is dry.   Neurological:      Mental Status: Mental status is at baseline.            Vents:     Lines/Drains/Airways       Drain  Duration             Male External Urinary Catheter 05/10/25 0301 Medium 1 day              Peripheral Intravenous Line  Duration                  Peripheral IV - Single Lumen 18 G Anterior;Distal;Left Upper Arm -- days          Peripheral IV - Single Lumen 05/09/25 2255 20 G Anterior;Left Forearm 1 day                  Significant Labs:    CBC/Anemia Profile:  Recent Labs   Lab 05/09/25 2254 05/09/25  2352 05/10/25  0255 05/11/25  0414   WBC 11.20  --  11.40 7.07   HGB 13.5*  --  10.2* 9.4*   HCT 45.4 38 33.6* 31.4*     --  246 215   MCV 90  --  89 90   RDW 15.6*  --  15.5* 15.7*        Chemistries:  Recent Labs   Lab 05/09/25  2254 05/10/25  0548 05/10/25  1025 05/10/25  2313 05/11/25 0414 05/11/25  0828   * 154*   < > 158* 156* 154*   K 4.8 4.3   < > 3.4* 3.3* 3.8   * 125*   < > 124* 125* 123*   CO2 27 23   < > 24 23 25   BUN 39* 28*   < > 26* 28* 25*   CREATININE 1.3 0.7   < > 0.7 0.7 0.7   CALCIUM 9.5 7.3*   < > 8.4* 8.2* 8.0*   ALBUMIN 2.5*  --   --   --   --   --    PROT 8.7*  --   --   --   --   --    BILITOT 0.3  --   --   --   --   --    ALKPHOS 65  --   --   --   --   --    ALT 13  --   --   --   --   --    AST 26  --   --   --   --   --    MG 2.5 2.0  --   --  2.1  --    PHOS  --  2.7  --   --  2.6*  --     < > = values in this interval not displayed.       All pertinent labs within the past 24 hours have been reviewed.    Significant Imaging:  I have reviewed all pertinent imaging results/findings within the past 24 hours.  I have reviewed and interpreted all pertinent imaging results/findings within the past 24 hours.

## 2025-05-11 NOTE — HOSPITAL COURSE
Known history of ALS, neuromuscular dysfunction of the bladder, sacral ulcers, and spinal cerebellar ataxia. Admitted for hypernatremia with AMS and COVID. AMS has improved, patient now at his baseline. Covid positive, asymptomatic- continue supportive therapy.

## 2025-05-11 NOTE — PLAN OF CARE
[facility] ICU Acceptance Note    Date of Admit: 2025  Date of Transfer / Stepdown: 2025  Saud, C/J, L, Onc (IV chemo w/in 1 month), Gyn/Onc, or other special case?: No  ICU team stepping patient down: MICU/CCU/SICU/NCC  ICU team member giving handoff: Basilio   Accepting  team: R    History of Present Illness:     Mr. Moreno is a 44yoM with PMHx significant for ALS, neuromuscular dysfunction of the bladder, sacral ulcers, and spinal cerebellar ataxia who presented to the ED with mother via EMS and c/o AMS (hallucinating) and tachypnea. Per mother, patient is normally able to communicate by speaking but was found today staring off, groaning intermittently, and not following commands. 11lb weight loss noted in the last week along with decreased oral intake x2 days. Denies fever, chills, n/v/d, or decreased urine output. Was given 500mL IVF per EMS.      While in the ED, labs significant for VB.54/35.2/64/30.1/8, POCT lactic acid 3.52, mag 2.5, Na 162, K 4.8, Cl 121, CO2 27, glucose 125, BUN 39, creatinine 1.3, ALP 65, AST/ALT 26/13, WBC 11.2, H/H 13.5/45.4, Hep C reactive.      [facility] consulted for hypernatremia. Will admit to the MICU for further workup.     Hospital/ICU Course:     Past medical history as above. Presented to the ED with mother via EMS and c/o AMS (hallucinating) and tachypnea. Per mother, patient is normally able to communicate by speaking but was found today staring off, groaning intermittently, and not following commands. 11lb weight loss noted in the last week along with decreased oral intake x2 days. Denies fever, chills, n/v/d, or decreased urine output. Was given 500mL IVF per EMS.. Admitted to MICU d/o hypernatremia with AMS. Back to baseline.  Deemed appropriate for transfer to the floor on 2025, and was accepted to  team R for further care and management.    Consultants and Procedures:     Consultants:  Consults (From admission, onward)          Status  Ordering Provider     Inpatient consult to Skin Integrity  Practitioner  Once        Provider:  (Not yet assigned)    Acknowledged CHRISTINE HENDRICKS     Inpatient consult to Critical Care Medicine  Once        Provider:  (Not yet assigned)    Completed SONJA GAONA            Procedures:    As documented    Transfer Information:     Diet:  As ordered    Physical Activity:  As tolerated      Pending plan at time of transfer to the floor:  Continue current plan initiated by ICU, will further monitor and adjust as clinically indicated upon arrival to the floor.    Patient has been accepted by Hospital Medicine Team R, who will assume care of the patient upon arrival to the floor from the ICU. Please contact ICU team with any concerns prior to transfer to the floor.      Freddie Mustafa DO  Attending Physician  Department of Hospital Medicine  5/11/2025

## 2025-05-11 NOTE — PT/OT/SLP EVAL
Speech Language Pathology Evaluation  Bedside Swallow  And Discharge Summary    Patient Name:  Lisa Moreno   MRN:  [id number]  Admitting Diagnosis: Hypernatremia    Recommendations:                 General Recommendations:  Follow-up not indicated  Diet recommendations:  Regular Diet - IDDSI Level 7, Thin liquids - IDDSI Level 0   Aspiration Precautions: Assistance with meals and Standard aspiration precautions   General Precautions: Standard, aspiration, fall, droplet, airborne  Communication strategies:  none    Assessment:     Lisa Moreno is a 44 y.o. male with an SLP diagnosis of functional swallow.  He is appropriate to resume a regular diet with thin liquids. No ST f/u required.     History:     Past Medical History:   Diagnosis Date    Anxiety     Degenerative motor system disease     Depression     Insomnia secondary to depression with anxiety 10/30/2024    Spastic quadriplegia     Spinocerebellar atrophy        Past Surgical History:   Procedure Laterality Date    BACLOFEN PUMP IMPLANTATION      COLONOSCOPY N/A 7/23/2020    Procedure: COLONOSCOPY;  Surgeon: Ziyad Fitch MD;  Location: [facility];  Service: Endoscopy;  Laterality: N/A;    ESOPHAGOGASTRODUODENOSCOPY N/A 7/23/2020    Procedure: EGD (ESOPHAGOGASTRODUODENOSCOPY);  Surgeon: Ziyad Fitch MD;  Location: [facility];  Service: Endoscopy;  Laterality: N/A;    FLUOROSCOPIC URODYNAMIC STUDY N/A 11/27/2018    Procedure: URODYNAMIC STUDY, FLUOROSCOPIC;  Surgeon: Tanja Okeefe MD;  Location: [address];  Service: Urology;  Laterality: N/A;  1 hour    REATTACHMENT OF MUSCLE Bilateral 2/18/2022    Procedure: PTOSIS REPAIR OF BOTH EYES;  Surgeon: Krupa Rios MD;  Location: [facility] OR [address] [place] [address];  Service: Ophthalmology;  Laterality: Bilateral;    UPPER GASTROINTESTINAL ENDOSCOPY       HPI: Mr. Moreno is a 44yoM with PMHx significant for ALS, neuromuscular dysfunction of the bladder, sacral ulcers, and spinal  cerebellar ataxia who presented to the ED with mother via EMS and c/o AMS (hallucinating) and tachypnea. Per mother, patient is normally able to communicate by speaking but was found today staring off, groaning intermittently, and not following commands. 11lb weight loss noted in the last week along with decreased oral intake x2 days. Denies fever, chills, n/v/d, or decreased urine output. Was given 500mL IVF per EMS.    Social History: Patient lives with his mother.    Prior Intubation HX:  none this admission    Modified Barium Swallow: none on file    Chest X-Rays: 5/9- Patchy airspace infiltrates in the right mid to lower lung field. Possible pneumonia or aspiration.     Prior diet: regular/thins    Subjective     Spoke with RN prior to entering pt's room . Pt seen bedside for session with mother present. Alert and agreeable to ST.       Pain/Comfort:  Pain Rating 1: 0/10  Pain Rating Post-Intervention 1: 0/10    Respiratory Status: Room air    Objective:     Oral Musculature Evaluation  Oral Musculature: WFL  Dentition: present and adequate  Secretion Management: adequate  Mucosal Quality: good  Oral Labial Strength and Mobility: WFL  Lingual Strength and Mobility: WFL  Volitional Cough: WFL  Volitional Swallow: WFL  Voice Prior to PO Intake: dysarthric    Bedside Swallow Eval:   Consistencies Assessed:  Thin liquids via tsp, cup, straw  Puree applesauce  Solids cracker     Oral Phase:   WFL    Pharyngeal Phase:   no overt clinical signs/symptoms of aspiration  no overt clinical signs/symptoms of pharyngeal dysphagia    Compensatory Strategies  None    Treatment: Provided education to patient and family  re: role of ST,  POC, swallow precautions, recommendations for regular diet with  thin  liquids, and plan to DC ST services. They verbalized understanding. White board updated. RN and MD notified of results and recs. At end of session, pt remained bedside with call light and all needs in reach.       Goals:    Multidisciplinary Problems       SLP Goals       Not on file                    Plan:     Patient to be seen:      Plan of Care expires:     Plan of Care reviewed with:  patient, mother   SLP Follow-Up:  No       Discharge recommendations:  No Therapy Indicated   Barriers to Discharge:  None    Time Tracking:     SLP Treatment Date:   05/11/25  Speech Start Time:  0907  Speech Stop Time:  0919     Speech Total Time (min):  12 min    Billable Minutes: Eval Swallow and Oral Function 12    05/11/2025

## 2025-05-11 NOTE — PROGRESS NOTES
[facility] - Telemetry Stepdown  Critical Care Medicine  Progress Note    Patient Name: Lisa Moreno  MRN: [id number]  Admission Date: 2025  Hospital Length of Stay: 1 days  Code Status: DNR  Attending Provider: Hector Meier MD  Primary Care Provider: John Nixon II, MD   Principal Problem: Hypernatremia    Subjective:     HPI:  Mr. Moreno is a 44yoM with PMHx significant for ALS, neuromuscular dysfunction of the bladder, sacral ulcers, and spinal cerebellar ataxia who presented to the ED with mother via EMS and c/o AMS (hallucinating) and tachypnea. Per mother, patient is normally able to communicate by speaking but was found today staring off, groaning intermittently, and not following commands. 11lb weight loss noted in the last week along with decreased oral intake x2 days. Denies fever, chills, n/v/d, or decreased urine output. Was given 500mL IVF per EMS.     While in the ED, labs significant for VB.54/35.2/64/30.1/8, POCT lactic acid 3.52, mag 2.5, Na 162, K 4.8, Cl 121, CO2 27, glucose 125, BUN 39, creatinine 1.3, ALP 65, AST/ALT 26/13, WBC 11.2, H/H 13.5/45.4, Hep C reactive.     [facility] consulted for hypernatremia. Will admit to the MICU for further workup.     Hospital/ICU Course:  Known history of ALS, neuromuscular dysfunction of the bladder, sacral ulcers, and spinal cerebellar ataxia. Admitted for hypernatremia with AMS and COVID. AMS has improved, patient now at his baseline. Covid positive, asymptomatic- continue supportive therapy.    Interval History/Significant Events: NOEON. AMS improved, now baseline. Hypernatremia slowly improving, continue IVF, started diet and stepdown.    Review of Systems  Objective:     Vital Signs (Most Recent):  Temp: 97.4 °F (36.3 °C) (25 0700)  Pulse: 76 (25 1000)  Resp: 20 (25 1022)  BP: 103/70 (25 1000)  SpO2: 100 % (25 1000) Vital Signs (24h Range):  Temp:  [96.5 °F (35.8 °C)-97.5 °F (36.4 °C)] 97.4 °F (36.3  °C)  Pulse:  [76-95] 76  Resp:  [10-23] 20  SpO2:  [97 %-100 %] 100 %  BP: ()/(51-72) 103/70   Weight: 60 kg (132 lb 4.4 oz)  Body mass index is 16.53 kg/m².      Intake/Output Summary (Last 24 hours) at 5/11/2025 1204  Last data filed at 5/11/2025 1050  Gross per 24 hour   Intake 2612.33 ml   Output 430 ml   Net 2182.33 ml          Physical Exam  Constitutional:       Appearance: He is ill-appearing.      Comments: Cachectic   HENT:      Head: Normocephalic and atraumatic.      Right Ear: External ear normal.      Left Ear: External ear normal.      Nose: Nose normal.      Mouth/Throat:      Mouth: Mucous membranes are dry.      Comments: Extremely dry mucous membranes  Eyes:      Extraocular Movements: Extraocular movements intact.   Cardiovascular:      Rate and Rhythm: Regular rhythm. Tachycardia present.   Pulmonary:      Effort: Pulmonary effort is normal.      Breath sounds: Normal breath sounds. No rhonchi or rales.   Abdominal:      Palpations: Abdomen is soft.   Musculoskeletal:      Cervical back: Normal range of motion.      Right lower leg: No edema.      Left lower leg: No edema.   Skin:     General: Skin is dry.   Neurological:      Mental Status: Mental status is at baseline.            Vents:     Lines/Drains/Airways       Drain  Duration             Male External Urinary Catheter 05/10/25 0301 Medium 1 day              Peripheral Intravenous Line  Duration                  Peripheral IV - Single Lumen 18 G Anterior;Distal;Left Upper Arm -- days         Peripheral IV - Single Lumen 05/09/25 2255 20 G Anterior;Left Forearm 1 day                  Significant Labs:    CBC/Anemia Profile:  Recent Labs   Lab 05/09/25 2254 05/09/25  2352 05/10/25  0255 05/11/25  0414   WBC 11.20  --  11.40 7.07   HGB 13.5*  --  10.2* 9.4*   HCT 45.4 38 33.6* 31.4*     --  246 215   MCV 90  --  89 90   RDW 15.6*  --  15.5* 15.7*        Chemistries:  Recent Labs   Lab 05/09/25  2254 05/10/25  0548  "05/10/25  1025 05/10/25  2313 05/11/25  0414 05/11/25  0828   * 154*   < > 158* 156* 154*   K 4.8 4.3   < > 3.4* 3.3* 3.8   * 125*   < > 124* 125* 123*   CO2 27 23   < > 24 23 25   BUN 39* 28*   < > 26* 28* 25*   CREATININE 1.3 0.7   < > 0.7 0.7 0.7   CALCIUM 9.5 7.3*   < > 8.4* 8.2* 8.0*   ALBUMIN 2.5*  --   --   --   --   --    PROT 8.7*  --   --   --   --   --    BILITOT 0.3  --   --   --   --   --    ALKPHOS 65  --   --   --   --   --    ALT 13  --   --   --   --   --    AST 26  --   --   --   --   --    MG 2.5 2.0  --   --  2.1  --    PHOS  --  2.7  --   --  2.6*  --     < > = values in this interval not displayed.       All pertinent labs within the past 24 hours have been reviewed.    Significant Imaging:  I have reviewed all pertinent imaging results/findings within the past 24 hours.  I have reviewed and interpreted all pertinent imaging results/findings within the past 24 hours.    [facility]  Recent Labs   Lab 05/09/25  2255   PH 7.540*   PO2 64*   PCO2 35.2   HCO3 30.1*   BE 8*     Assessment/Plan:     Neuro  ALS (amyotrophic lateral sclerosis)  UMN predominance with diffuse limb spasticity. Slowly progressive. Fully-reliant on mother, others for self care and feeding and cleaning and toileting. Respiratory function is poor per FVC but he hasn't got evidence of orthopnea, dyspnea, hypercapnia.     -Continue home donepezil for neuroprotection  -Continue home dantrolene and baclofen for spacticity      Psychiatric  Recurrent major depressive disorder, in partial remission  -Continue home mirtazapine     Pulmonary  Pneumonia  Patient presented with altered mental status. Tachycardic into 110s. O2 sat at 100% on room air. WBC 11.2. POCT lactate 3.5. CXR with "Patchy airspace infiltrates in the right mid to lower lung field. Possible pneumonia or aspiration."    Questonable pneumonia given CXR findings and elevated lactate but will opt to treat given acute encephalopathy.     Plan:  -CAP coverage with " ceftriaxone and azithromycin  -Continuous pulse ox  -F/u flu/Covid PCR  -F/u remaining infectious workup including Blood cultures and UA    Renal/  * Hypernatremia  Hypernatremic to 162 on admission. Has not been eating or drinking well for past few days. More altered on day of admission. Free water deficit calculated at 5.7L.    Plan:  -S/p 2L fluid resuscitation with LR  -Following with 150cc/hr infusion of D5W for 12 hours  -Target correction of <=1-2 mEq/L per hour  -BMP q4h   -Monitor urine output, place romo if needed    GI  Oropharyngeal dysphagia  -Aspiration precautions  -Bedside swallow eval  -Has been tolerating full diet with thin liquids       Critical Care Daily Checklist:    A: Awake: RASS Goal/Actual Goal:    Actual:     B: Spontaneous Breathing Trial Performed?     C: SAT & SBT Coordinated?  NA                      D: Delirium: CAM-ICU     E: Early Mobility Performed? No   F: Feeding Goal:    Status:     Current Diet Order   Procedures    Diet Adult Regular Thin     Fluid consistency::   Thin      AS: Analgesia/Sedation NA   T: Thromboembolic Prophylaxis Enoxaparin   H: HOB > 300 Yes   U: Stress Ulcer Prophylaxis (if needed) NA   G: Glucose Control Contolled   B: Bowel Function     I: Indwelling Catheter (Lines & Romo) Necessity PIVs   D: De-escalation of Antimicrobials/Pharmacotherapies No    Plan for the day/ETD Start diet and Stepdown    Code Status:  Family/Goals of Care: DNR         Critical secondary to Patient has a condition that poses threat to life and bodily function: Severe Respiratory Distress      Critical care was time spent personally by me on the following activities: development of treatment plan with patient or surrogate and bedside caregivers, discussions with consultants, evaluation of patient's response to treatment, examination of patient, ordering and performing treatments and interventions, ordering and review of laboratory studies, ordering and review of radiographic  studies, pulse oximetry, re-evaluation of patient's condition. This critical care time did not overlap with that of any other provider or involve time for any procedures.     Daniela Fortune MD  Critical Care Medicine  [facility] - Telemetry Stepdown

## 2025-05-12 PROBLEM — U07.1 PNEUMONIA DUE TO COVID-19 VIRUS: Status: ACTIVE | Noted: 2025-05-10

## 2025-05-12 PROBLEM — J12.82 PNEUMONIA DUE TO COVID-19 VIRUS: Status: ACTIVE | Noted: 2025-05-10

## 2025-05-12 LAB
ABSOLUTE EOSINOPHIL (OHS): 0.04 K/UL
ABSOLUTE MONOCYTE (OHS): 0.3 K/UL (ref 0.3–1)
ABSOLUTE NEUTROPHIL COUNT (OHS): 3.77 K/UL (ref 1.8–7.7)
ANION GAP (OHS): 5 MMOL/L (ref 8–16)
ANION GAP (OHS): 6 MMOL/L (ref 8–16)
ANION GAP (OHS): 7 MMOL/L (ref 8–16)
ANION GAP (OHS): 7 MMOL/L (ref 8–16)
BASOPHILS # BLD AUTO: 0.02 K/UL
BASOPHILS NFR BLD AUTO: 0.4 %
BUN SERPL-MCNC: 11 MG/DL (ref 6–20)
BUN SERPL-MCNC: 12 MG/DL (ref 6–20)
BUN SERPL-MCNC: 13 MG/DL (ref 6–20)
BUN SERPL-MCNC: 20 MG/DL (ref 6–20)
CALCIUM SERPL-MCNC: 7.5 MG/DL (ref 8.7–10.5)
CALCIUM SERPL-MCNC: 7.8 MG/DL (ref 8.7–10.5)
CALCIUM SERPL-MCNC: 7.9 MG/DL (ref 8.7–10.5)
CALCIUM SERPL-MCNC: 7.9 MG/DL (ref 8.7–10.5)
CHLORIDE SERPL-SCNC: 111 MMOL/L (ref 95–110)
CHLORIDE SERPL-SCNC: 112 MMOL/L (ref 95–110)
CHLORIDE SERPL-SCNC: 112 MMOL/L (ref 95–110)
CHLORIDE SERPL-SCNC: 118 MMOL/L (ref 95–110)
CO2 SERPL-SCNC: 23 MMOL/L (ref 23–29)
CO2 SERPL-SCNC: 23 MMOL/L (ref 23–29)
CO2 SERPL-SCNC: 24 MMOL/L (ref 23–29)
CO2 SERPL-SCNC: 28 MMOL/L (ref 23–29)
CREAT SERPL-MCNC: 0.6 MG/DL (ref 0.5–1.4)
CREAT SERPL-MCNC: 0.7 MG/DL (ref 0.5–1.4)
ERYTHROCYTE [DISTWIDTH] IN BLOOD BY AUTOMATED COUNT: 15.4 % (ref 11.5–14.5)
GFR SERPLBLD CREATININE-BSD FMLA CKD-EPI: >60 ML/MIN/1.73/M2
GLUCOSE SERPL-MCNC: 230 MG/DL (ref 70–110)
GLUCOSE SERPL-MCNC: 85 MG/DL (ref 70–110)
GLUCOSE SERPL-MCNC: 87 MG/DL (ref 70–110)
GLUCOSE SERPL-MCNC: 87 MG/DL (ref 70–110)
HCT VFR BLD AUTO: 34.5 % (ref 40–54)
HGB BLD-MCNC: 10.3 GM/DL (ref 14–18)
IMM GRANULOCYTES # BLD AUTO: 0.03 K/UL (ref 0–0.04)
IMM GRANULOCYTES NFR BLD AUTO: 0.5 % (ref 0–0.5)
LYMPHOCYTES # BLD AUTO: 1.38 K/UL (ref 1–4.8)
MAGNESIUM SERPL-MCNC: 1.8 MG/DL (ref 1.6–2.6)
MCH RBC QN AUTO: 26.8 PG (ref 27–31)
MCHC RBC AUTO-ENTMCNC: 29.9 G/DL (ref 32–36)
MCV RBC AUTO: 90 FL (ref 82–98)
NUCLEATED RBC (/100WBC) (OHS): 0 /100 WBC
PHOSPHATE SERPL-MCNC: 2.9 MG/DL (ref 2.7–4.5)
PLATELET # BLD AUTO: 202 K/UL (ref 150–450)
PMV BLD AUTO: 11.3 FL (ref 9.2–12.9)
POCT GLUCOSE: 113 MG/DL (ref 70–110)
POCT GLUCOSE: 127 MG/DL (ref 70–110)
POCT GLUCOSE: 145 MG/DL (ref 70–110)
POCT GLUCOSE: 149 MG/DL (ref 70–110)
POTASSIUM SERPL-SCNC: 3.7 MMOL/L (ref 3.5–5.1)
POTASSIUM SERPL-SCNC: 3.8 MMOL/L (ref 3.5–5.1)
POTASSIUM SERPL-SCNC: 3.8 MMOL/L (ref 3.5–5.1)
POTASSIUM SERPL-SCNC: 4 MMOL/L (ref 3.5–5.1)
RBC # BLD AUTO: 3.85 M/UL (ref 4.6–6.2)
RELATIVE EOSINOPHIL (OHS): 0.7 %
RELATIVE LYMPHOCYTE (OHS): 24.9 % (ref 18–48)
RELATIVE MONOCYTE (OHS): 5.4 % (ref 4–15)
RELATIVE NEUTROPHIL (OHS): 68.1 % (ref 38–73)
SODIUM SERPL-SCNC: 141 MMOL/L (ref 136–145)
SODIUM SERPL-SCNC: 143 MMOL/L (ref 136–145)
SODIUM SERPL-SCNC: 144 MMOL/L (ref 136–145)
SODIUM SERPL-SCNC: 148 MMOL/L (ref 136–145)
WBC # BLD AUTO: 5.54 K/UL (ref 3.9–12.7)

## 2025-05-12 PROCEDURE — 80048 BASIC METABOLIC PNL TOTAL CA: CPT

## 2025-05-12 PROCEDURE — 25000003 PHARM REV CODE 250

## 2025-05-12 PROCEDURE — 85025 COMPLETE CBC W/AUTO DIFF WBC: CPT

## 2025-05-12 PROCEDURE — 36415 COLL VENOUS BLD VENIPUNCTURE: CPT

## 2025-05-12 PROCEDURE — 84100 ASSAY OF PHOSPHORUS: CPT

## 2025-05-12 PROCEDURE — 27000207 HC ISOLATION

## 2025-05-12 PROCEDURE — 20600001 HC STEP DOWN PRIVATE ROOM

## 2025-05-12 PROCEDURE — 25000003 PHARM REV CODE 250: Performed by: HOSPITALIST

## 2025-05-12 PROCEDURE — 63600175 PHARM REV CODE 636 W HCPCS

## 2025-05-12 PROCEDURE — 83735 ASSAY OF MAGNESIUM: CPT

## 2025-05-12 RX ORDER — POLYETHYLENE GLYCOL 3350 17 G/17G
17 POWDER, FOR SOLUTION ORAL DAILY
Status: DISCONTINUED | OUTPATIENT
Start: 2025-05-12 | End: 2025-05-14 | Stop reason: HOSPADM

## 2025-05-12 RX ORDER — DOCUSATE SODIUM 283 MG/5ML
1 LIQUID RECTAL DAILY PRN
Status: DISCONTINUED | OUTPATIENT
Start: 2025-05-12 | End: 2025-05-14 | Stop reason: HOSPADM

## 2025-05-12 RX ADMIN — DANTROLENE SODIUM 50 MG: 25 CAPSULE ORAL at 04:05

## 2025-05-12 RX ADMIN — Medication: at 09:05

## 2025-05-12 RX ADMIN — MUPIROCIN: 20 OINTMENT TOPICAL at 08:05

## 2025-05-12 RX ADMIN — Medication: at 08:05

## 2025-05-12 RX ADMIN — Medication 6 MG: at 08:05

## 2025-05-12 RX ADMIN — ENOXAPARIN SODIUM 40 MG: 40 INJECTION SUBCUTANEOUS at 04:05

## 2025-05-12 RX ADMIN — BACLOFEN 20 MG: 10 TABLET ORAL at 08:05

## 2025-05-12 RX ADMIN — POLYETHYLENE GLYCOL 3350 17 G: 17 POWDER, FOR SOLUTION ORAL at 02:05

## 2025-05-12 RX ADMIN — DANTROLENE SODIUM 50 MG: 25 CAPSULE ORAL at 08:05

## 2025-05-12 RX ADMIN — MUPIROCIN: 20 OINTMENT TOPICAL at 09:05

## 2025-05-12 RX ADMIN — DONEPEZIL HYDROCHLORIDE 5 MG: 5 TABLET, FILM COATED ORAL at 08:05

## 2025-05-12 RX ADMIN — MIRTAZAPINE 15 MG: 15 TABLET, FILM COATED ORAL at 08:05

## 2025-05-12 RX ADMIN — CETIRIZINE HYDROCHLORIDE 10 MG: 10 TABLET, FILM COATED ORAL at 08:05

## 2025-05-12 RX ADMIN — BACLOFEN 20 MG: 10 TABLET ORAL at 02:05

## 2025-05-12 NOTE — SUBJECTIVE & OBJECTIVE
Interval History: NAEO, patient is doing better per the mother. He was able to eat more and seems to be doing better overall.     Review of Systems   Unable to perform ROS: Patient nonverbal     Objective:     Vital Signs (Most Recent):  Temp: 97.9 °F (36.6 °C) (05/12/25 1542)  Pulse: 81 (05/12/25 1542)  Resp: 20 (05/12/25 1542)  BP: 135/87 (05/12/25 1542)  SpO2: 98 % (05/12/25 1542) Vital Signs (24h Range):  Temp:  [96.2 °F (35.7 °C)-98.2 °F (36.8 °C)] 97.9 °F (36.6 °C)  Pulse:  [63-83] 81  Resp:  [16-20] 20  SpO2:  [97 %-100 %] 98 %  BP: (113-135)/(72-87) 135/87     Weight: 60 kg (132 lb 4.4 oz)  Body mass index is 16.53 kg/m².  No intake or output data in the 24 hours ending 05/12/25 1613      Physical Exam  HENT:      Head: Normocephalic and atraumatic.   Cardiovascular:      Rate and Rhythm: Normal rate.      Pulses: Normal pulses.      Heart sounds: No murmur heard.  Pulmonary:      Effort: Pulmonary effort is normal. No respiratory distress.   Abdominal:      General: Abdomen is flat. Bowel sounds are normal. There is no distension.   Skin:     General: Skin is warm and dry.   Neurological:      Mental Status: He is alert.               Significant Labs: All pertinent labs within the past 24 hours have been reviewed.    Significant Imaging: I have reviewed all pertinent imaging results/findings within the past 24 hours.

## 2025-05-12 NOTE — ASSESSMENT & PLAN NOTE
Antibiotics (From admission, onward)      Start     Stop Route Frequency Ordered    05/10/25 0900  mupirocin 2 % ointment         05/15/25 0859 Nasl 2 times daily 05/10/25 0748            Microbiology Results (last 7 days)       Procedure Component Value Units Date/Time    Blood culture x two cultures. Draw prior to antibiotics. [[id number]]  (Normal) Collected: 05/09/25 2256    Order Status: Completed Specimen: Blood from Peripheral, Antecubital, Left Updated: 05/12/25 0103     Blood Culture No Growth After 48 Hours    Blood culture x two cultures. Draw prior to antibiotics. [[id number]]  (Normal) Collected: 05/09/25 2256    Order Status: Completed Specimen: Blood from Peripheral, Antecubital, Left Updated: 05/12/25 0103     Blood Culture No Growth After 48 Hours            - on room air without any respiratory distress. Likely the cause of dehydration and presentation to the hospital  - continue supportive care, no indication for treatment now

## 2025-05-12 NOTE — PROGRESS NOTES
[facility] - Telemetry [facility] Medicine  Progress Note    Patient Name: Lisa Moreno  MRN: [id number]  Patient Class: IP- Inpatient   Admission Date: 2025  Length of Stay: 2 days  Attending Physician: Km Murray DO  Primary Care Provider: John Nixon II, MD        Subjective     Principal Problem:Hypernatremia        HPI:  Mr. Moreno is a 44yoM with PMHx significant for ALS, neuromuscular dysfunction of the bladder, sacral ulcers, and spinal cerebellar ataxia who presented to the ED with mother via EMS and c/o AMS (hallucinating) and tachypnea. Per mother, patient is normally able to communicate by speaking but was found today staring off, groaning intermittently, and not following commands. 11lb weight loss noted in the last week along with decreased oral intake x2 days. Denies fever, chills, n/v/d, or decreased urine output. Was given 500mL IVF per EMS.      While in the ED, labs significant for VB.54/35.2/64/30.1/8, POCT lactic acid 3.52, mag 2.5, Na 162, K 4.8, Cl 121, CO2 27, glucose 125, BUN 39, creatinine 1.3, ALP 65, AST/ALT 26/13, WBC 11.2, H/H 13.5/45.4, Hep C reactive.      [facility] consulted for hypernatremia. Will admit to the MICU for further workup.     Overview/Hospital Course:  Past medical history as above. Presented to the ED with mother via EMS and c/o AMS (hallucinating) and tachypnea. Per mother, patient is normally able to communicate by speaking but was found today staring off, groaning intermittently, and not following commands. 11lb weight loss noted in the last week along with decreased oral intake x2 days.     Patient improving slowly with IV hydration, suspect that his presentation is due to COVID and possible losing his sense of taste and not wanting to take in much orally. Hope to discharge in the next day or so    Interval History: NAEO, patient is doing better per the mother. He was able to eat more and seems to be doing better overall.     Review of  Systems   Unable to perform ROS: Patient nonverbal     Objective:     Vital Signs (Most Recent):  Temp: 97.9 °F (36.6 °C) (05/12/25 1542)  Pulse: 81 (05/12/25 1542)  Resp: 20 (05/12/25 1542)  BP: 135/87 (05/12/25 1542)  SpO2: 98 % (05/12/25 1542) Vital Signs (24h Range):  Temp:  [96.2 °F (35.7 °C)-98.2 °F (36.8 °C)] 97.9 °F (36.6 °C)  Pulse:  [63-83] 81  Resp:  [16-20] 20  SpO2:  [97 %-100 %] 98 %  BP: (113-135)/(72-87) 135/87     Weight: 60 kg (132 lb 4.4 oz)  Body mass index is 16.53 kg/m².  No intake or output data in the 24 hours ending 05/12/25 1613      Physical Exam  HENT:      Head: Normocephalic and atraumatic.   Cardiovascular:      Rate and Rhythm: Normal rate.      Pulses: Normal pulses.      Heart sounds: No murmur heard.  Pulmonary:      Effort: Pulmonary effort is normal. No respiratory distress.   Abdominal:      General: Abdomen is flat. Bowel sounds are normal. There is no distension.   Skin:     General: Skin is warm and dry.   Neurological:      Mental Status: He is alert.               Significant Labs: All pertinent labs within the past 24 hours have been reviewed.    Significant Imaging: I have reviewed all pertinent imaging results/findings within the past 24 hours.      Assessment & Plan  Hypernatremia  Hypernatremia is likely due to Dehydration. The patient's most recent sodium results are listed below.  Recent Labs     05/12/25  0008 05/12/25  0829 05/12/25  1137   * 141 144     Plan  - Aim to correct the sodium by 8-10mEq in 24 hours.   - Plan to correct their hypernatremia with Select IV fluids: stopped once corrected   - Will plan to trend the patient's sodium: Daily  - The patient's hypernatremia is resolved  -  Recurrent major depressive disorder, in partial remission  -Continue home mirtazapine     Oropharyngeal dysphagia  -Aspiration precautions  -Bedside swallow eval  -Has been tolerating full diet with thin liquids    ALS (amyotrophic lateral sclerosis)  UMN predominance  with diffuse limb spasticity. Slowly progressive. Fully-reliant on mother, others for self care and feeding and cleaning and toileting. Respiratory function is poor per FVC but he hasn't got evidence of orthopnea, dyspnea, hypercapnia.      -Continue home donepezil for neuroprotection  -Continue home dantrolene and baclofen for spacticity    Pneumonia due to COVID-19 virus    Antibiotics (From admission, onward)      Start     Stop Route Frequency Ordered    05/10/25 0900  mupirocin 2 % ointment         05/15/25 0859 Nasl 2 times daily 05/10/25 0748            Microbiology Results (last 7 days)       Procedure Component Value Units Date/Time    Blood culture x two cultures. Draw prior to antibiotics. [[id number]]  (Normal) Collected: 05/09/25 2256    Order Status: Completed Specimen: Blood from Peripheral, Antecubital, Left Updated: 05/12/25 0103     Blood Culture No Growth After 48 Hours    Blood culture x two cultures. Draw prior to antibiotics. [[id number]]  (Normal) Collected: 05/09/25 2256    Order Status: Completed Specimen: Blood from Peripheral, Antecubital, Left Updated: 05/12/25 0103     Blood Culture No Growth After 48 Hours            - on room air without any respiratory distress. Likely the cause of dehydration and presentation to the hospital  - continue supportive care, no indication for treatment now    VTE Risk Mitigation (From admission, onward)           Ordered     enoxaparin injection 40 mg  Daily         05/10/25 0146     IP VTE HIGH RISK PATIENT  Once         05/10/25 0146     Place sequential compression device  Until discontinued         05/10/25 0146                    Discharge Planning   MARCELLE: 5/15/2025     Code Status: DNR   Medical Readiness for Discharge Date:   Discharge Plan A: Home, Home with family                        Km Murray DO  Department of Hospital Medicine   [facility] - Telemetry Stepdown

## 2025-05-12 NOTE — PLAN OF CARE
Problem: Adult Inpatient Plan of Care  Goal: Plan of Care Review  Outcome: Progressing  Goal: Patient-Specific Goal (Individualized)  Outcome: Progressing  Goal: Optimal Comfort and Wellbeing  Outcome: Progressing     Problem: Pneumonia  Goal: Effective Oxygenation and Ventilation  Outcome: Progressing     Problem: Fall Injury Risk  Goal: Absence of Fall and Fall-Related Injury  Outcome: Progressing     Problem: Delirium  Goal: Optimal Coping  Outcome: Progressing

## 2025-05-12 NOTE — HPI
Mr. Moreno is a 44yoM with PMHx significant for ALS, neuromuscular dysfunction of the bladder, sacral ulcers, and spinal cerebellar ataxia who presented to the ED with mother via EMS and c/o AMS (hallucinating) and tachypnea. Per mother, patient is normally able to communicate by speaking but was found today staring off, groaning intermittently, and not following commands. 11lb weight loss noted in the last week along with decreased oral intake x2 days. Denies fever, chills, n/v/d, or decreased urine output. Was given 500mL IVF per EMS.      While in the ED, labs significant for VB.54/35.2/64/30.1/8, POCT lactic acid 3.52, mag 2.5, Na 162, K 4.8, Cl 121, CO2 27, glucose 125, BUN 39, creatinine 1.3, ALP 65, AST/ALT 26/13, WBC 11.2, H/H 13.5/45.4, Hep C reactive.      [facility] consulted for hypernatremia. Will admit to the MICU for further workup.

## 2025-05-12 NOTE — CHAPLAIN
Patient: Lisa Moreno  MRN: [id number]  : 1980  Age: 44 y.o.  Legal sex: male   Hospital Length of Stay: 2 days  Code Status: DNR   Attending Provider: Km Murray DO  Principal Problem: Hypernatremia  Patient's Mu-ism: Druze  Length of my visit: 20 min  Purpose of visit:   Rounding     Patient in bed, unable to verbalize, mother at bedside. Mother stated being exhausted after several days in ICU and now on step-down unit. She stated having no social support system/family, nor any other resources; she has not eaten in days, and worries about next steps.     provided prayer, daily bread, and reached out to  for FU.         Rev. Jonel Maurice, [id number]  board certified , vanessa ([place]ic+)     support is available and on-site . Please call the on-call  for any emergent spiritual care needs, [phone].

## 2025-05-12 NOTE — ASSESSMENT & PLAN NOTE
Hypernatremia is likely due to Dehydration. The patient's most recent sodium results are listed below.  Recent Labs     05/12/25  0008 05/12/25  0829 05/12/25  1137   * 141 144     Plan  - Aim to correct the sodium by 8-10mEq in 24 hours.   - Plan to correct their hypernatremia with Select IV fluids: stopped once corrected   - Will plan to trend the patient's sodium: Daily  - The patient's hypernatremia is resolved  -

## 2025-05-12 NOTE — ASSESSMENT & PLAN NOTE
-Aspiration precautions  -Bedside swallow eval  -Has been tolerating full diet with thin liquids

## 2025-05-12 NOTE — PLAN OF CARE
Problem: Skin Injury Risk Increased  Goal: Skin Health and Integrity  Outcome: Progressing     Problem: Adult Inpatient Plan of Care  Goal: Plan of Care Review  Outcome: Progressing  Goal: Patient-Specific Goal (Individualized)  Outcome: Progressing  Goal: Absence of Hospital-Acquired Illness or Injury  Outcome: Progressing  Goal: Optimal Comfort and Wellbeing  Outcome: Progressing  Goal: Readiness for Transition of Care  Outcome: Progressing     Problem: Pneumonia  Goal: Fluid Balance  Outcome: Progressing  Goal: Resolution of Infection Signs and Symptoms  Outcome: Progressing  Goal: Effective Oxygenation and Ventilation  Outcome: Progressing     Problem: Wound  Goal: Optimal Coping  Outcome: Progressing  Goal: Optimal Functional Ability  Outcome: Progressing  Goal: Absence of Infection Signs and Symptoms  Outcome: Progressing  Goal: Improved Oral Intake  Outcome: Progressing  Goal: Optimal Pain Control and Function  Outcome: Progressing  Goal: Skin Health and Integrity  Outcome: Progressing  Goal: Optimal Wound Healing  Outcome: Progressing     Problem: Fall Injury Risk  Goal: Absence of Fall and Fall-Related Injury  Outcome: Progressing     Problem: Delirium  Goal: Optimal Coping  Outcome: Progressing  Goal: Improved Behavioral Control  Outcome: Progressing  Goal: Improved Attention and Thought Clarity  Outcome: Progressing  Goal: Improved Sleep  Outcome: Progressing

## 2025-05-12 NOTE — HOSPITAL COURSE
Past medical history as above. Presented to the ED with mother via EMS and c/o AMS (hallucinating) and tachypnea. Per mother, patient is normally able to communicate by speaking but was found today staring off, groaning intermittently, and not following commands. 11lb weight loss noted in the last week along with decreased oral intake x2 days.     Patient improving slowly with IV hydration, suspect that his presentation is due to COVID and possible losing his sense of taste and not wanting to take in much orally.     Oral intake has improved. Sodium has stabilized. Was able to have a bowl movement    Patient stable for discharge at this time    Plan of care reviewed with patient and family, home health ordered. Return precautions given      PE  No acute distress  Heart rrr  Lungs slight rhonchi bilaterally   Abdomen soft and nontender  Some improvement in left hand swelling

## 2025-05-12 NOTE — PLAN OF CARE
[facility] - Telemetry Stepdown  Initial Discharge Assessment       Primary Care Provider: John Nixon II, MD    Admission Diagnosis: Hypernatremia [E87.0]  Screening for cardiovascular condition [Z13.6]  Pneumonia of right lower lobe due to infectious organism [J18.9]  Encephalopathy, unspecified type [G93.40]    Admission Date: 5/9/2025  Expected Discharge Date: 5/13/2025    Transition of Care Barriers: None    Payor: MEDICARE / Plan: MEDICARE PART A & B / Product Type: Government /     Extended Emergency Contact Information  Primary Emergency Contact: Giovana Moreno  Address: [address] [place] [address] [facility]  Home Phone: [phone]  Mobile Phone: [phone]  Relation: Mother    Discharge Plan A: Home, Home with family  Discharge Plan B: Home, Home with family      [id number] - [place], [place] - [address] AT [facility] [address]  [address]  [place] [address]  Phone: [phone] Fax: [phone]    [id number] - [place], [place] - [address] AT [facility] [address]  [address]  [place] [address]  Phone: [phone] Fax: [phone]      Initial Assessment (most recent)       Adult Discharge Assessment - 05/12/25 1144          Discharge Assessment    Assessment Type Discharge Planning Assessment     Confirmed/corrected address, phone number and insurance Yes     Confirmed Demographics Correct on Facesheet     Source of Information patient;family     When was your last doctors appointment? --   March 2025 (virtual)    Communicated MARCELLE with patient/caregiver Yes   March 2025    Reason For Admission hypernatremia     People in Home parent(s);other relative(s)   aunt Sara and adult cousin Manuela    Do you expect to return to your current living situation? Yes     Do you have help at home or someone to help you manage your care at home? Yes     Who are your caregiver(s) and their  phone number(s)? Giovana Moreno (mother) [phone]     Prior to hospitilization cognitive status: Alert/Oriented     Current cognitive status: Alert/Oriented     Walking or Climbing Stairs Difficulty yes     Walking or Climbing Stairs transferring difficulty, dependent     Dressing/Bathing Difficulty yes     Dressing/Bathing bathing difficulty, assistance 1 person;dressing difficulty, assistance 1 person     Home Accessibility --   has ramp    Home Layout --   [facility] with ramp    Equipment Currently Used at Home hospital bed;other (see comments)   ramp    Readmission within 30 days? No     Patient currently being followed by outpatient case management? No     Do you currently have service(s) that help you manage your care at home? No   Pt's mom stated she was told there are no HH services near where she lives    Do you take prescription medications? Yes     Do you have prescription coverage? Yes     Coverage Medicare Part A and B primary, Medicaid secondary     Do you have any problems affording any of your prescribed medications? No     Is the patient taking medications as prescribed? yes     Who is going to help you get home at discharge? Giovana Moreno (mother) [phone]     How do you get to doctors appointments? family or friend will provide   Mother reports she has not been able to access Medicaid transportation since approx Sept 2024 and has been transporting Pt to appointments on her own.  Stated it is hard for her to transfer him to a W/C.    Are you on dialysis? No     Do you take coumadin? No     Discharge Plan A Home;Home with family     Discharge Plan B Home;Home with family     DME Needed Upon Discharge  none     Discharge Plan discussed with: Patient;Parent(s)     Name(s) and Number(s) Giovana Moreno (mother) [phone] (P)      Transition of Care Barriers None        Physical Activity    On average, how many minutes do you engage in exercise at this level? 0 min        Financial Resource Strain     How hard is it for you to pay for the very basics like food, housing, medical care, and heating? Not very hard        Housing Stability    In the last 12 months, was there a time when you were not able to pay the mortgage or rent on time? No     At any time in the past 12 months, were you homeless or living in a shelter (including now)? No        Transportation Needs    In the past 12 months, has lack of transportation kept you from medical appointments or from getting medications? No   Pt's mother stated that transporation services stopped abruptly Sept 2024 - uncertain why - and now she has been trasporting Pt on her own to appointments    In the past 12 months, has lack of transportation kept you from meetings, work, or from getting things needed for daily living? No        Food Insecurity    Within the past 12 months, you worried that your food would run out before you got the money to buy more. Never true     Within the past 12 months, the food you bought just didn't last and you didn't have money to get more. Never true        Stress    Do you feel stress - tense, restless, nervous, or anxious, or unable to sleep at night because your mind is troubled all the time - these days? To some extent   Pt's mother stated stress increased since Pt stopped attending  Sept 2024 (@ [facility])       Social Isolation    How often do you feel lonely or isolated from those around you?  Never        Alcohol Use    Q1: How often do you have a drink containing alcohol? Never     Q2: How many drinks containing alcohol do you have on a typical day when you are drinking? Patient does not drink     Q3: How often do you have six or more drinks on one occasion? Never        Settle    In the past 12 months has the electric, gas, oil, or water company threatened to shut off services in your home? No        Health Literacy    How often do you need to have someone help you when you read instructions,  pamphlets, or other written material from your doctor or pharmacy? Never        OTHER    Name(s) of People in Home Giovana Moreno (mother) [phone], aunt Sara, cousin Manuela                   CARLOS ALBERTO met with Pt and mother - Giovana Moreno [phone] at bedside to discuss case management role in dc process and to obtain assessment info.    Discharge planning booklet provided.  Pt's mother provided all info.  Facesheet info confirmed.  PCP confirmed: Dr Nixon- last seen for virtual appt March 2025.  Pt lives with mother, aunt and adult cousin.  [facility] with ramp.  DME: hospital bed.  No HH - mother stated she was told there are no HH agencies close to [place], [place].   Pt requires mother's assistance with dressing, bathing but feeds himself.   Pt's mother stated that transportation services ended abruptly around Sept 2024. Since then she has been transporting Pt to appointments.  Pt also stopped attending a  program @ [employer] [facility] [employer] he had been attending 10-15 years - due to transportation issues (mother uncertain of the issue).   Pt's mother stated Pt has been stressed/anxious since he stopped the program as he enjoyed it.  Pt  linked with [facility] but would like info on [facility].    No dialysis. No bloodthinners.   No concerns about housing, access to meds or food.  Pt takes meds as prescribed.   No drug/alcohol use.      2:21 PM    CARLOS ALBERTO met again with Pt's mother at bedside.   CARLOS ALBERTO provided website info for [employer] ([email or web address]).  Pt's mother stated she already tried to apply online but never received a response.  CARLOS ALBERTO also provided contact info for [employer] [facility] [employer] Adult  c/o [employer] [phone].  CARLOS ALBERTO explained that [employer] could offer info about transportation options but she emphasized Medicaid transportation is not an option.      Discharge Plan A and Plan B have been determined by review of patient's clinical status, future medical and  therapeutic needs, and coverage/benefits for post-acute care in coordination with multidisciplinary team members.     Julius Finch LCSW BACS    [phone]

## 2025-05-13 LAB
ABSOLUTE EOSINOPHIL (OHS): 0.03 K/UL
ABSOLUTE MONOCYTE (OHS): 0.33 K/UL (ref 0.3–1)
ABSOLUTE NEUTROPHIL COUNT (OHS): 3.3 K/UL (ref 1.8–7.7)
ANION GAP (OHS): 7 MMOL/L (ref 8–16)
BASOPHILS # BLD AUTO: 0.01 K/UL
BASOPHILS NFR BLD AUTO: 0.2 %
BUN SERPL-MCNC: 9 MG/DL (ref 6–20)
CALCIUM SERPL-MCNC: 7.9 MG/DL (ref 8.7–10.5)
CHLORIDE SERPL-SCNC: 113 MMOL/L (ref 95–110)
CO2 SERPL-SCNC: 25 MMOL/L (ref 23–29)
CREAT SERPL-MCNC: 0.6 MG/DL (ref 0.5–1.4)
ERYTHROCYTE [DISTWIDTH] IN BLOOD BY AUTOMATED COUNT: 14.8 % (ref 11.5–14.5)
GFR SERPLBLD CREATININE-BSD FMLA CKD-EPI: >60 ML/MIN/1.73/M2
GLUCOSE SERPL-MCNC: 69 MG/DL (ref 70–110)
HCT VFR BLD AUTO: 35.7 % (ref 40–54)
HCV RNA SERPL NAA+PROBE-LOG IU: NOT DETECTED {LOG_IU}/ML
HGB BLD-MCNC: 10.7 GM/DL (ref 14–18)
IMM GRANULOCYTES # BLD AUTO: 0.02 K/UL (ref 0–0.04)
IMM GRANULOCYTES NFR BLD AUTO: 0.4 % (ref 0–0.5)
LYMPHOCYTES # BLD AUTO: 1.74 K/UL (ref 1–4.8)
MAGNESIUM SERPL-MCNC: 2 MG/DL (ref 1.6–2.6)
MCH RBC QN AUTO: 26.4 PG (ref 27–31)
MCHC RBC AUTO-ENTMCNC: 30 G/DL (ref 32–36)
MCV RBC AUTO: 88 FL (ref 82–98)
NUCLEATED RBC (/100WBC) (OHS): 0 /100 WBC
PHOSPHATE SERPL-MCNC: 3.6 MG/DL (ref 2.7–4.5)
PLATELET # BLD AUTO: 261 K/UL (ref 150–450)
PMV BLD AUTO: 12.1 FL (ref 9.2–12.9)
POCT GLUCOSE: 107 MG/DL (ref 70–110)
POCT GLUCOSE: 129 MG/DL (ref 70–110)
POCT GLUCOSE: 79 MG/DL (ref 70–110)
POCT GLUCOSE: 85 MG/DL (ref 70–110)
POTASSIUM SERPL-SCNC: 3.8 MMOL/L (ref 3.5–5.1)
RBC # BLD AUTO: 4.06 M/UL (ref 4.6–6.2)
RELATIVE EOSINOPHIL (OHS): 0.6 %
RELATIVE LYMPHOCYTE (OHS): 32 % (ref 18–48)
RELATIVE MONOCYTE (OHS): 6.1 % (ref 4–15)
RELATIVE NEUTROPHIL (OHS): 60.7 % (ref 38–73)
SODIUM SERPL-SCNC: 145 MMOL/L (ref 136–145)
WBC # BLD AUTO: 5.43 K/UL (ref 3.9–12.7)

## 2025-05-13 PROCEDURE — 25000003 PHARM REV CODE 250

## 2025-05-13 PROCEDURE — 36415 COLL VENOUS BLD VENIPUNCTURE: CPT

## 2025-05-13 PROCEDURE — 27000207 HC ISOLATION

## 2025-05-13 PROCEDURE — 83735 ASSAY OF MAGNESIUM: CPT

## 2025-05-13 PROCEDURE — 94761 N-INVAS EAR/PLS OXIMETRY MLT: CPT

## 2025-05-13 PROCEDURE — 84100 ASSAY OF PHOSPHORUS: CPT

## 2025-05-13 PROCEDURE — 63600175 PHARM REV CODE 636 W HCPCS

## 2025-05-13 PROCEDURE — 85025 COMPLETE CBC W/AUTO DIFF WBC: CPT

## 2025-05-13 PROCEDURE — 20600001 HC STEP DOWN PRIVATE ROOM

## 2025-05-13 PROCEDURE — 99900035 HC TECH TIME PER 15 MIN (STAT)

## 2025-05-13 PROCEDURE — 80048 BASIC METABOLIC PNL TOTAL CA: CPT

## 2025-05-13 RX ORDER — DOCUSATE SODIUM 283 MG/5ML
1 LIQUID RECTAL ONCE
Status: COMPLETED | OUTPATIENT
Start: 2025-05-13 | End: 2025-05-13

## 2025-05-13 RX ADMIN — MUPIROCIN: 20 OINTMENT TOPICAL at 09:05

## 2025-05-13 RX ADMIN — DANTROLENE SODIUM 50 MG: 25 CAPSULE ORAL at 08:05

## 2025-05-13 RX ADMIN — BACLOFEN 20 MG: 10 TABLET ORAL at 09:05

## 2025-05-13 RX ADMIN — ENOXAPARIN SODIUM 40 MG: 40 INJECTION SUBCUTANEOUS at 04:05

## 2025-05-13 RX ADMIN — POLYETHYLENE GLYCOL 3350 17 G: 17 POWDER, FOR SOLUTION ORAL at 09:05

## 2025-05-13 RX ADMIN — MUPIROCIN: 20 OINTMENT TOPICAL at 10:05

## 2025-05-13 RX ADMIN — BACLOFEN 20 MG: 10 TABLET ORAL at 03:05

## 2025-05-13 RX ADMIN — DONEPEZIL HYDROCHLORIDE 5 MG: 5 TABLET, FILM COATED ORAL at 08:05

## 2025-05-13 RX ADMIN — Medication: at 10:05

## 2025-05-13 RX ADMIN — DANTROLENE SODIUM 50 MG: 25 CAPSULE ORAL at 09:05

## 2025-05-13 RX ADMIN — DOCUSATE SODIUM 1 ENEMA: 283 LIQUID RECTAL at 12:05

## 2025-05-13 RX ADMIN — MIRTAZAPINE 15 MG: 15 TABLET, FILM COATED ORAL at 08:05

## 2025-05-13 RX ADMIN — BACLOFEN 20 MG: 10 TABLET ORAL at 08:05

## 2025-05-13 RX ADMIN — Medication: at 09:05

## 2025-05-13 RX ADMIN — CETIRIZINE HYDROCHLORIDE 10 MG: 10 TABLET, FILM COATED ORAL at 09:05

## 2025-05-13 RX ADMIN — DANTROLENE SODIUM 50 MG: 25 CAPSULE ORAL at 03:05

## 2025-05-13 NOTE — PLAN OF CARE
Recommendations    --Continue Regular diet as tolerated and clinically indicated-texture per SLP    --Continue Boost Plus BID    --Will order Guzman BID to provide 90 kcal, 2.5 g protein, 7 g L-Arginine, 7 g L-Glutamine per serving to aid in wound healing    --Recommend MVI with minerals to aid in wound healing    --Nursing: please continue to document % meal eaten flowsheets  --Encourage good intakes and provide feeding assistance as needed    Goals:   1.  75% nutritional needs met with diet/EN/PN during admission    2. Maintain dry weight during admission      3. Display s/s of wound healing during admission    Nutrition Goal Status: new

## 2025-05-13 NOTE — ASSESSMENT & PLAN NOTE
Hypernatremia is likely due to Dehydration. The patient's most recent sodium results are listed below.  Recent Labs     05/12/25  1137 05/12/25  1548 05/13/25  0346    143 145     Plan  - Aim to correct the sodium by 8-10mEq in 24 hours.   - Plan to correct their hypernatremia with Select IV fluids: stopped once corrected   - Will plan to trend the patient's sodium: Daily  - The patient's hypernatremia is resolved  - resolved with IV fluids (now stopped), has been having increased oral intake

## 2025-05-13 NOTE — PHYSICIAN QUERY
Please provide the integumentary diagnosis related to the documentation of right heel.   Pressure Injury/Decubitus Ulcer, Stage 2

## 2025-05-13 NOTE — SUBJECTIVE & OBJECTIVE
Interval History: NAEO, overall is doing better. Na has normalized and he's been eating/drinking more. Working on getting the bowls to move    Review of Systems   Unable to perform ROS: Dementia (unreliable ROS)     Objective:     Vital Signs (Most Recent):  Temp: 98.2 °F (36.8 °C) (05/13/25 1111)  Pulse: 84 (05/13/25 1111)  Resp: 18 (05/13/25 1111)  BP: 116/77 (05/13/25 1111)  SpO2: 97 % (05/13/25 1300) Vital Signs (24h Range):  Temp:  [97.6 °F (36.4 °C)-98.8 °F (37.1 °C)] 98.2 °F (36.8 °C)  Pulse:  [70-98] 84  Resp:  [18-20] 18  SpO2:  [95 %-98 %] 97 %  BP: (107-135)/(58-87) 116/77     Weight: 60 kg (132 lb 4.4 oz)  Body mass index is 16.53 kg/m².    Intake/Output Summary (Last 24 hours) at 5/13/2025 1408  Last data filed at 5/13/2025 0945  Gross per 24 hour   Intake 360 ml   Output 1900 ml   Net -1540 ml         Physical Exam  Constitutional:       Appearance: Normal appearance.   HENT:      Head: Normocephalic.   Cardiovascular:      Rate and Rhythm: Normal rate.      Pulses: Normal pulses.      Heart sounds: No murmur heard.  Pulmonary:      Effort: Pulmonary effort is normal. No respiratory distress.      Breath sounds: Rhonchi present. No wheezing.   Abdominal:      General: Abdomen is flat. Bowel sounds are normal. There is no distension.   Musculoskeletal:      Comments: Left hand swelling below IV. No erythema or warmth noted   Skin:     General: Skin is warm and dry.   Neurological:      General: No focal deficit present.      Mental Status: He is alert.               Significant Labs: All pertinent labs within the past 24 hours have been reviewed.    Significant Imaging: I have reviewed all pertinent imaging results/findings within the past 24 hours.

## 2025-05-13 NOTE — PROGRESS NOTES
[facility] - Telemetry [facility] Medicine  Progress Note    Patient Name: Lisa Moreno  MRN: [id number]  Patient Class: IP- Inpatient   Admission Date: 2025  Length of Stay: 3 days  Attending Physician: Km Murray DO  Primary Care Provider: John Nixon II, MD        Subjective     Principal Problem:Hypernatremia        HPI:  Mr. Moreno is a 44yoM with PMHx significant for ALS, neuromuscular dysfunction of the bladder, sacral ulcers, and spinal cerebellar ataxia who presented to the ED with mother via EMS and c/o AMS (hallucinating) and tachypnea. Per mother, patient is normally able to communicate by speaking but was found today staring off, groaning intermittently, and not following commands. 11lb weight loss noted in the last week along with decreased oral intake x2 days. Denies fever, chills, n/v/d, or decreased urine output. Was given 500mL IVF per EMS.      While in the ED, labs significant for VB.54/35.2/64/30.1/8, POCT lactic acid 3.52, mag 2.5, Na 162, K 4.8, Cl 121, CO2 27, glucose 125, BUN 39, creatinine 1.3, ALP 65, AST/ALT 26/13, WBC 11.2, H/H 13.5/45.4, Hep C reactive.      [facility] consulted for hypernatremia. Will admit to the MICU for further workup.     Overview/Hospital Course:  Past medical history as above. Presented to the ED with mother via EMS and c/o AMS (hallucinating) and tachypnea. Per mother, patient is normally able to communicate by speaking but was found today staring off, groaning intermittently, and not following commands. 11lb weight loss noted in the last week along with decreased oral intake x2 days.     Patient improving slowly with IV hydration, suspect that his presentation is due to COVID and possible losing his sense of taste and not wanting to take in much orally.     Oral intake has improved. Sodium has stabilized. Working on getting bowls to move prior to discharge    Interval History: NAEO, overall is doing better. Na has normalized and he's  been eating/drinking more. Working on getting the bowls to move    Review of Systems   Unable to perform ROS: Dementia (unreliable ROS)     Objective:     Vital Signs (Most Recent):  Temp: 98.2 °F (36.8 °C) (05/13/25 1111)  Pulse: 84 (05/13/25 1111)  Resp: 18 (05/13/25 1111)  BP: 116/77 (05/13/25 1111)  SpO2: 97 % (05/13/25 1300) Vital Signs (24h Range):  Temp:  [97.6 °F (36.4 °C)-98.8 °F (37.1 °C)] 98.2 °F (36.8 °C)  Pulse:  [70-98] 84  Resp:  [18-20] 18  SpO2:  [95 %-98 %] 97 %  BP: (107-135)/(58-87) 116/77     Weight: 60 kg (132 lb 4.4 oz)  Body mass index is 16.53 kg/m².    Intake/Output Summary (Last 24 hours) at 5/13/2025 1408  Last data filed at 5/13/2025 0945  Gross per 24 hour   Intake 360 ml   Output 1900 ml   Net -1540 ml         Physical Exam  Constitutional:       Appearance: Normal appearance.   HENT:      Head: Normocephalic.   Cardiovascular:      Rate and Rhythm: Normal rate.      Pulses: Normal pulses.      Heart sounds: No murmur heard.  Pulmonary:      Effort: Pulmonary effort is normal. No respiratory distress.      Breath sounds: Rhonchi present. No wheezing.   Abdominal:      General: Abdomen is flat. Bowel sounds are normal. There is no distension.   Musculoskeletal:      Comments: Left hand swelling below IV. No erythema or warmth noted   Skin:     General: Skin is warm and dry.   Neurological:      General: No focal deficit present.      Mental Status: He is alert.               Significant Labs: All pertinent labs within the past 24 hours have been reviewed.    Significant Imaging: I have reviewed all pertinent imaging results/findings within the past 24 hours.      Assessment & Plan  Hypernatremia  Hypernatremia is likely due to Dehydration. The patient's most recent sodium results are listed below.  Recent Labs     05/12/25  1137 05/12/25  1548 05/13/25  0346    143 145     Plan  - Aim to correct the sodium by 8-10mEq in 24 hours.   - Plan to correct their hypernatremia with Select  IV fluids: stopped once corrected   - Will plan to trend the patient's sodium: Daily  - The patient's hypernatremia is resolved  - resolved with IV fluids (now stopped), has been having increased oral intake  Recurrent major depressive disorder, in partial remission  -Continue home mirtazapine     Oropharyngeal dysphagia  -Aspiration precautions  -Bedside swallow eval  -Has been tolerating full diet with thin liquids    ALS (amyotrophic lateral sclerosis)  UMN predominance with diffuse limb spasticity. Slowly progressive. Fully-reliant on mother, others for self care and feeding and cleaning and toileting. Respiratory function is poor per FVC but he hasn't got evidence of orthopnea, dyspnea, hypercapnia.      -Continue home donepezil for neuroprotection  -Continue home dantrolene and baclofen for spacticity    Pneumonia due to COVID-19 virus    Antibiotics (From admission, onward)      Start     Stop Route Frequency Ordered    05/10/25 0900  mupirocin 2 % ointment         05/15/25 0859 Nasl 2 times daily 05/10/25 0748            Microbiology Results (last 7 days)       Procedure Component Value Units Date/Time    Blood culture x two cultures. Draw prior to antibiotics. [[id number]]  (Normal) Collected: 05/09/25 2256    Order Status: Completed Specimen: Blood from Peripheral, Antecubital, Left Updated: 05/13/25 0105     Blood Culture No Growth After 72 Hours    Blood culture x two cultures. Draw prior to antibiotics. [[id number]]  (Normal) Collected: 05/09/25 2256    Order Status: Completed Specimen: Blood from Peripheral, Antecubital, Left Updated: 05/13/25 0105     Blood Culture No Growth After 72 Hours            - on room air without any respiratory distress. Likely the cause of dehydration and presentation to the hospital  - continue supportive care, no indication for treatment now    VTE Risk Mitigation (From admission, onward)           Ordered     enoxaparin injection 40 mg  Daily         05/10/25 0146     IP  VTE HIGH RISK PATIENT  Once         05/10/25 0146     Place sequential compression device  Until discontinued         05/10/25 0146                    Discharge Planning   MARCELLE: 5/15/2025     Code Status: DNR   Medical Readiness for Discharge Date:   Discharge Plan A: Home, Home with family                        Km Murray DO  Department of Hospital Medicine   [facility] - Telemetry Stepdown

## 2025-05-13 NOTE — PHYSICIAN QUERY
Please provide the integumentary diagnosis related to the documentation of right shoulder.   Pressure Injury/Decubitus Ulcer, Stage 2

## 2025-05-13 NOTE — ASSESSMENT & PLAN NOTE
Antibiotics (From admission, onward)      Start     Stop Route Frequency Ordered    05/10/25 0900  mupirocin 2 % ointment         05/15/25 0859 Nasl 2 times daily 05/10/25 0748            Microbiology Results (last 7 days)       Procedure Component Value Units Date/Time    Blood culture x two cultures. Draw prior to antibiotics. [[id number]]  (Normal) Collected: 05/09/25 2256    Order Status: Completed Specimen: Blood from Peripheral, Antecubital, Left Updated: 05/13/25 0105     Blood Culture No Growth After 72 Hours    Blood culture x two cultures. Draw prior to antibiotics. [[id number]]  (Normal) Collected: 05/09/25 2256    Order Status: Completed Specimen: Blood from Peripheral, Antecubital, Left Updated: 05/13/25 0105     Blood Culture No Growth After 72 Hours            - on room air without any respiratory distress. Likely the cause of dehydration and presentation to the hospital  - continue supportive care, no indication for treatment now

## 2025-05-13 NOTE — PHYSICIAN QUERY
Please provide the integumentary diagnosis related to the documentation of left calf.   Pressure Injury/Decubitus Ulcer, Stage 2

## 2025-05-13 NOTE — PHYSICIAN QUERY
Please clarify the nutritional diagnosis associated with the below clinical findings if applicable.   Moderate protein calorie malnutrition

## 2025-05-13 NOTE — CONSULTS
[facility] - Telemetry Stepdown  Adult Nutrition  Progress Note    SUMMARY       Recommendations    --Continue Regular diet as tolerated and clinically indicated-texture per SLP    --Continue Boost Plus BID    --Will order Guzman BID to provide 90 kcal, 2.5 g protein, 7 g L-Arginine, 7 g L-Glutamine per serving to aid in wound healing    --Recommend MVI with minerals to aid in wound healing    --Nursing: please continue to document % meal eaten flowsheets  --Encourage good intakes and provide feeding assistance as needed    Goals:   1.  75% nutritional needs met with diet/EN/PN during admission    2. Maintain dry weight during admission      3. Display s/s of wound healing during admission    Nutrition Goal Status: new  Communication of RD Recs: other (comment) (POC)    Nutrition Discharge Planning    Nutrition Discharge Planning: General healthy diet, Oral supplement regimen (comments)  Oral supplement regimen (comments): Guzman BID      Malnutrition Assessment     NFPE at follow up if warranted    Reason for Assessment    Reason For Assessment: consult (weight loss PTA)  General Information Comments: Consult received for weight loss prior to admission.  Patient admitted for hypernatremia with pmhx: dysphagia, ALS, neuromuscular dysfunction of the bladder, sacral ulcers, and spinal cerebellar ataxia.  Per medicine note, 11lb weight loss noted in the last week along with decreased oral intake x2 days.  Weight history indicates weight is stable x 6 months.  20 lb weight loss x 2 years (13.1%- non-significant).  Patient currently on Regular diet, Boost Plus tid with variable intakes (%).  Oral intake has improved. Sodium has stabilized. Working on getting bowls to move prior to discharge.  +BM 5/8. Hypoactive bowel sounds noted.  Patient has very low BMI but this appears to be UBW x 2 years.    Nutrition/Diet History    Spiritual, Cultural Beliefs, Anabaptism Practices, Values that Affect Care: no  Food Allergies:  "NKFA  Factors Affecting Nutritional Intake: None identified at this time    Nutrition Related Social Determinants of Health: SDOH: Unable to assess at this time.   Food Insecurity: No Food Insecurity (2025)    Hunger Vital Sign     Worried About Running Out of Food in the Last Year: Never true     Ran Out of Food in the Last Year: Never true       Anthropometrics    Height: 6' 3" (190.5 cm)  Height (inches): 75 in  Height Method: Stated  Weight: 60 kg (132 lb 4.4 oz)  Weight (lb): 132.28 lb  Weight Method: Stated  Ideal Body Weight (IBW), Male: 196 lb  % Ideal Body Weight, Male (lb): 67.49 %  BMI (Calculated): 16.5  BMI Grade: less than 18.5 - underweight  Usual Body Weight (UBW), k kg  % Usual Body Weight: 100.21  % Weight Change From Usual Weight: 0 %       Lab/Procedures/Meds    Pertinent Labs Reviewed: reviewed  Pertinent Labs Comments: BG  x 24 hours, Ca 7.9 (L)  Pertinent Medications Reviewed: reviewed  Pertinent Medications Comments: Baclofen, bowel reg, enoxaparin, sodium phosphates, mirtazapine    Estimated/Assessed Needs    Weight Used For Calorie Calculations: 60 kg (132 lb 4.4 oz)  Energy Calorie Requirements (kcal): 1222-7952 kcal (30-35 kcal/kg for weight gain)  Energy Need Method: Kcal/kg  Protein Requirements:  g (1.5-2.0 g/kg- wound healing)  Weight Used For Protein Calculations: 60 kg (132 lb 4.4 oz)     Estimated Fluid Requirement Method: RDA Method  RDA Method (mL): 1800         Nutrition Prescription Ordered    Current Diet Order: Regular  Oral Nutrition Supplement: Boost Plus TID    Evaluation of Received Nutrient/Fluid Intake    Energy Calories Required: meeting needs  Protein Required: meeting needs  Fluid Required: meeting needs  Tolerance: tolerating  % Intake of Estimated Energy Needs: 50 - 75 %  % Meal Intake: 50 - 75 %    PES Statement  Increased nutrient needs (protein, vit/min) related to Wound healing as evidenced by Wounds (Stage 2  left calf, unstageable " pressure injury right ankle, deep tissue pressure injury right malleolus, Stage 2  right shoulder, unstageable PI right leg, stage 2  left heel, deep tissue pressure injury right heel, unstageable PI sacral spine)  Status: New    Nutrition Risk    Level of Risk/Frequency of Follow-up: high     Monitor and Evaluation    Monitor and Evaluation: Energy intake, Weight, Skin, Food and beverage intake, Protein intake, Carbohydrate intake, Diet order, Lipid profile, Inflammatory profile, Glucose/endocrine profile, Gastrointestinal profile, Electrolyte and renal panel     Nutrition Follow-Up    RD Follow-up?: Yes

## 2025-05-13 NOTE — PLAN OF CARE
Problem: Adult Inpatient Plan of Care  Goal: Absence of Hospital-Acquired Illness or Injury  Outcome: Progressing  Goal: Optimal Comfort and Wellbeing  Outcome: Progressing     Problem: Wound  Goal: Improved Oral Intake  Outcome: Progressing     Problem: Fall Injury Risk  Goal: Absence of Fall and Fall-Related Injury  Outcome: Progressing     Problem: Delirium  Goal: Improved Sleep  Outcome: Progressing

## 2025-05-14 ENCOUNTER — RESULTS FOLLOW-UP (OUTPATIENT)
Dept: EMERGENCY MEDICINE | Facility: HOSPITAL | Age: 45
End: 2025-05-14

## 2025-05-14 VITALS
HEART RATE: 97 BPM | OXYGEN SATURATION: 97 % | WEIGHT: 132.25 LBS | TEMPERATURE: 98 F | RESPIRATION RATE: 17 BRPM | SYSTOLIC BLOOD PRESSURE: 98 MMHG | HEIGHT: 75 IN | DIASTOLIC BLOOD PRESSURE: 68 MMHG | BODY MASS INDEX: 16.44 KG/M2

## 2025-05-14 LAB
ABSOLUTE EOSINOPHIL (OHS): 0.02 K/UL
ABSOLUTE MONOCYTE (OHS): 0.49 K/UL (ref 0.3–1)
ABSOLUTE NEUTROPHIL COUNT (OHS): 4.67 K/UL (ref 1.8–7.7)
ANION GAP (OHS): 6 MMOL/L (ref 8–16)
BASOPHILS # BLD AUTO: 0.02 K/UL
BASOPHILS NFR BLD AUTO: 0.3 %
BUN SERPL-MCNC: 14 MG/DL (ref 6–20)
CALCIUM SERPL-MCNC: 7.9 MG/DL (ref 8.7–10.5)
CHLORIDE SERPL-SCNC: 110 MMOL/L (ref 95–110)
CO2 SERPL-SCNC: 25 MMOL/L (ref 23–29)
CREAT SERPL-MCNC: 0.7 MG/DL (ref 0.5–1.4)
ERYTHROCYTE [DISTWIDTH] IN BLOOD BY AUTOMATED COUNT: 14.7 % (ref 11.5–14.5)
GFR SERPLBLD CREATININE-BSD FMLA CKD-EPI: >60 ML/MIN/1.73/M2
GLUCOSE SERPL-MCNC: 89 MG/DL (ref 70–110)
HCT VFR BLD AUTO: 33.1 % (ref 40–54)
HGB BLD-MCNC: 10.2 GM/DL (ref 14–18)
IMM GRANULOCYTES # BLD AUTO: 0.04 K/UL (ref 0–0.04)
IMM GRANULOCYTES NFR BLD AUTO: 0.6 % (ref 0–0.5)
LYMPHOCYTES # BLD AUTO: 1.6 K/UL (ref 1–4.8)
MAGNESIUM SERPL-MCNC: 2 MG/DL (ref 1.6–2.6)
MCH RBC QN AUTO: 26.8 PG (ref 27–31)
MCHC RBC AUTO-ENTMCNC: 30.8 G/DL (ref 32–36)
MCV RBC AUTO: 87 FL (ref 82–98)
NUCLEATED RBC (/100WBC) (OHS): 0 /100 WBC
PHOSPHATE SERPL-MCNC: 2.7 MG/DL (ref 2.7–4.5)
PLATELET # BLD AUTO: 251 K/UL (ref 150–450)
PMV BLD AUTO: 11.8 FL (ref 9.2–12.9)
POCT GLUCOSE: 127 MG/DL (ref 70–110)
POTASSIUM SERPL-SCNC: 4.1 MMOL/L (ref 3.5–5.1)
RBC # BLD AUTO: 3.81 M/UL (ref 4.6–6.2)
RELATIVE EOSINOPHIL (OHS): 0.3 %
RELATIVE LYMPHOCYTE (OHS): 23.4 % (ref 18–48)
RELATIVE MONOCYTE (OHS): 7.2 % (ref 4–15)
RELATIVE NEUTROPHIL (OHS): 68.2 % (ref 38–73)
SODIUM SERPL-SCNC: 141 MMOL/L (ref 136–145)
WBC # BLD AUTO: 6.84 K/UL (ref 3.9–12.7)

## 2025-05-14 PROCEDURE — 25000003 PHARM REV CODE 250

## 2025-05-14 PROCEDURE — 25000003 PHARM REV CODE 250: Performed by: HOSPITALIST

## 2025-05-14 PROCEDURE — 84100 ASSAY OF PHOSPHORUS: CPT

## 2025-05-14 PROCEDURE — 36415 COLL VENOUS BLD VENIPUNCTURE: CPT

## 2025-05-14 PROCEDURE — 85025 COMPLETE CBC W/AUTO DIFF WBC: CPT

## 2025-05-14 PROCEDURE — 83735 ASSAY OF MAGNESIUM: CPT

## 2025-05-14 PROCEDURE — 82435 ASSAY OF BLOOD CHLORIDE: CPT

## 2025-05-14 PROCEDURE — 25000003 PHARM REV CODE 250: Performed by: INTERNAL MEDICINE

## 2025-05-14 RX ORDER — GUAIFENESIN AND DEXTROMETHORPHAN HYDROBROMIDE 10; 100 MG/5ML; MG/5ML
10 SYRUP ORAL EVERY 6 HOURS PRN
Status: DISCONTINUED | OUTPATIENT
Start: 2025-05-14 | End: 2025-05-14 | Stop reason: HOSPADM

## 2025-05-14 RX ADMIN — MUPIROCIN: 20 OINTMENT TOPICAL at 09:05

## 2025-05-14 RX ADMIN — BACLOFEN 20 MG: 10 TABLET ORAL at 09:05

## 2025-05-14 RX ADMIN — GUAIFENESIN AND DEXTROMETHORPHAN 10 ML: 100; 10 SYRUP ORAL at 09:05

## 2025-05-14 RX ADMIN — CETIRIZINE HYDROCHLORIDE 10 MG: 10 TABLET, FILM COATED ORAL at 09:05

## 2025-05-14 RX ADMIN — DANTROLENE SODIUM 50 MG: 25 CAPSULE ORAL at 09:05

## 2025-05-14 RX ADMIN — POLYETHYLENE GLYCOL 3350 17 G: 17 POWDER, FOR SOLUTION ORAL at 09:05

## 2025-05-14 RX ADMIN — BACLOFEN 20 MG: 10 TABLET ORAL at 02:05

## 2025-05-14 RX ADMIN — DANTROLENE SODIUM 50 MG: 25 CAPSULE ORAL at 02:05

## 2025-05-14 RX ADMIN — Medication: at 09:05

## 2025-05-14 NOTE — NURSING
Pt bathed and linens changed. Incontinence care provided. Wound care complete. Oral care complete. All needs met at this time.

## 2025-05-14 NOTE — ASSESSMENT & PLAN NOTE
Hypernatremia is likely due to Dehydration. The patient's most recent sodium results are listed below.  Recent Labs     05/12/25  1548 05/13/25  0346 05/14/25  0551    145 141     Plan  - Aim to correct the sodium by 8-10mEq in 24 hours.   - Plan to correct their hypernatremia with Select IV fluids: stopped once corrected   - Will plan to trend the patient's sodium: Daily  - The patient's hypernatremia is resolved  - resolved with IV fluids (now stopped), has been having increased oral intake

## 2025-05-14 NOTE — PLAN OF CARE
05/14/25 1331   Medicare Message   Important Message from Medicare regarding Discharge Appeal Rights Given to patient/caregiver;Explained to patient/caregiver;Signed/date by patient/caregiver   Date IMM was signed 05/14/25   Time IMM was signed 1329     Discharge Plan A and Plan B have been determined by review of patient's clinical status, future medical and therapeutic needs, and coverage/benefits for post-acute care in coordination with multidisciplinary team members.     Julius Finch LCSW [facility]    [phone]

## 2025-05-14 NOTE — DISCHARGE SUMMARY
[facility] - Telemetry [facility] Medicine  Discharge Summary      Patient Name: Lisa Moreno  MRN: [id number]  JOSELITO: [id number]  Patient Class: IP- Inpatient  Admission Date: 2025  Hospital Length of Stay: 4 days  Discharge Date and Time: 2025 11:36 AM  Attending Physician: Km Murray DO   Discharging Provider: Km Murray DO  Primary Care Provider: John Nixon II, MD  [facility] Medicine Team: [facility]OSP MED R Km Murray DO  Primary Care Team: [facility] HOSP MED R    HPI:   Mr. Moreno is a 44yoM with PMHx significant for ALS, neuromuscular dysfunction of the bladder, sacral ulcers, and spinal cerebellar ataxia who presented to the ED with mother via EMS and c/o AMS (hallucinating) and tachypnea. Per mother, patient is normally able to communicate by speaking but was found today staring off, groaning intermittently, and not following commands. 11lb weight loss noted in the last week along with decreased oral intake x2 days. Denies fever, chills, n/v/d, or decreased urine output. Was given 500mL IVF per EMS.      While in the ED, labs significant for VB.54/35.2/64/30.1/8, POCT lactic acid 3.52, mag 2.5, Na 162, K 4.8, Cl 121, CO2 27, glucose 125, BUN 39, creatinine 1.3, ALP 65, AST/ALT 26/13, WBC 11.2, H/H 13.5/45.4, Hep C reactive.      [facility] consulted for hypernatremia. Will admit to the MICU for further workup.     * No surgery found *      Hospital Course:   Past medical history as above. Presented to the ED with mother via EMS and c/o AMS (hallucinating) and tachypnea. Per mother, patient is normally able to communicate by speaking but was found today staring off, groaning intermittently, and not following commands. 11lb weight loss noted in the last week along with decreased oral intake x2 days.     Patient improving slowly with IV hydration, suspect that his presentation is due to COVID and possible losing his sense of taste and not wanting to take in much orally.      Oral intake has improved. Sodium has stabilized. Was able to have a bowl movement    Patient stable for discharge at this time    Plan of care reviewed with patient and family, home health ordered. Return precautions given      PE  No acute distress  Heart rrr  Lungs slight rhonchi bilaterally   Abdomen soft and nontender  Some improvement in left hand swelling     Goals of Care Treatment Preferences:  Code Status: DNR    Health care agent: Mother Giovana Moreno  [facility] care agent number: [phone]          What is most important right now is to focus on remaining as independent as possible.  Accordingly, we have decided that the best plan to meet the patient's goals includes continuing with treatment.         Consults:   Consults (From admission, onward)          Status Ordering Provider     Inpatient consult to Registered Dietitian/Nutritionist  Once        Provider:  (Not yet assigned)    Completed BARBIE MAYA     Inpatient consult to Skin Integrity  Practitioner  Once        Provider:  (Not yet assigned)    Acknowledged CHRISTINE HENDRICKS     Inpatient consult to Critical Care Medicine  Once        Provider:  (Not yet assigned)    Completed SONJA GAONA            Assessment & Plan  Hypernatremia  Hypernatremia is likely due to Dehydration. The patient's most recent sodium results are listed below.  Recent Labs     05/12/25  1548 05/13/25  0346 05/14/25  0551    145 141     Plan  - Aim to correct the sodium by 8-10mEq in 24 hours.   - Plan to correct their hypernatremia with Select IV fluids: stopped once corrected   - Will plan to trend the patient's sodium: Daily  - The patient's hypernatremia is resolved  - resolved with IV fluids (now stopped), has been having increased oral intake  Recurrent major depressive disorder, in partial remission  -Continue home mirtazapine     Oropharyngeal dysphagia  -Aspiration precautions  -Bedside swallow eval  -Has been tolerating full diet with thin  liquids    ALS (amyotrophic lateral sclerosis)  UMN predominance with diffuse limb spasticity. Slowly progressive. Fully-reliant on mother, others for self care and feeding and cleaning and toileting. Respiratory function is poor per FVC but he hasn't got evidence of orthopnea, dyspnea, hypercapnia.      -Continue home donepezil for neuroprotection  -Continue home dantrolene and baclofen for spacticity    Pneumonia due to COVID-19 virus    Antibiotics (From admission, onward)      Start     Stop Route Frequency Ordered    05/10/25 0900  mupirocin 2 % ointment         05/15/25 0859 Nasl 2 times daily 05/10/25 0748            Microbiology Results (last 7 days)       Procedure Component Value Units Date/Time    Blood culture x two cultures. Draw prior to antibiotics. [[id number]]  (Normal) Collected: 05/09/25 2256    Order Status: Completed Specimen: Blood from Peripheral, Antecubital, Left Updated: 05/14/25 0105     Blood Culture No Growth After 96 hours    Blood culture x two cultures. Draw prior to antibiotics. [[id number]]  (Normal) Collected: 05/09/25 2256    Order Status: Completed Specimen: Blood from Peripheral, Antecubital, Left Updated: 05/14/25 0105     Blood Culture No Growth After 96 hours            - on room air without any respiratory distress. Likely the cause of dehydration and presentation to the hospital  - continue supportive care, no indication for treatment now    Final Active Diagnoses:    Diagnosis Date Noted POA    PRINCIPAL PROBLEM:  Hypernatremia [E87.0] 05/10/2025 Yes    Pneumonia due to COVID-19 virus [U07.1, J12.82] 05/10/2025 Yes    ALS (amyotrophic lateral sclerosis) [G12.21] 09/14/2023 Yes    Recurrent major depressive disorder, in partial remission [F33.41] 12/09/2020 Yes    Oropharyngeal dysphagia [R13.12] 12/09/2020 Yes      Problems Resolved During this Admission:       Discharged Condition: good    Disposition: Home or Self Care    Follow Up:    Patient Instructions:   No  discharge procedures on file.    Significant Diagnostic Studies: N/A    Pending Diagnostic Studies:       Procedure Component Value Units Date/Time    HCV Virus Hold Specimen [[id number]] Collected: 05/09/25 2254    Order Status: Sent Lab Status: In process Updated: 05/09/25 2306    Specimen: Blood            Medications:  Reconciled Home Medications:      Medication List        CHANGE how you take these medications      baclofen 20 MG tablet  Commonly known as: LIORESAL  Take 1 tablet (20 mg total) by mouth 3 (three) times daily.  What changed: Another medication with the same name was removed. Continue taking this medication, and follow the directions you see here.            CONTINUE taking these medications      cetirizine 10 MG tablet  Commonly known as: ZYRTEC  TAKE 1 TABLET BY MOUTH DAILY     dantrolene 50 MG Cap  Commonly known as: DANTRIUM  TAKE 1 CAPSULE(50 MG) BY MOUTH THREE TIMES DAILY     dimethicone-zinc oxide 1-40 % Oint  Apply to affected area twice daily     donepeziL 5 MG tablet  Commonly known as: ARICEPT  Take 1 tablet (5 mg total) by mouth every evening.     mirtazapine 15 MG tablet  Commonly known as: REMERON  Take 1 tablet (15 mg total) by mouth every evening.              Indwelling Lines/Drains at time of discharge:   Lines/Drains/Airways       Drain  Duration             Male External Urinary Catheter 05/10/25 0301 Medium 4 days                    Time spent on the discharge of patient: 49 minutes         Km Murray DO  Department of Hospital Medicine  [facility] - Telemetry Stepdown

## 2025-05-14 NOTE — PLAN OF CARE
Problem: Skin Injury Risk Increased  Goal: Skin Health and Integrity  Outcome: Met     Problem: Adult Inpatient Plan of Care  Goal: Plan of Care Review  Outcome: Met  Goal: Patient-Specific Goal (Individualized)  Outcome: Met  Goal: Absence of Hospital-Acquired Illness or Injury  Outcome: Met  Goal: Optimal Comfort and Wellbeing  Outcome: Met  Goal: Readiness for Transition of Care  Outcome: Met     Problem: Pneumonia  Goal: Fluid Balance  Outcome: Met  Goal: Resolution of Infection Signs and Symptoms  Outcome: Met  Goal: Effective Oxygenation and Ventilation  Outcome: Met     Problem: Wound  Goal: Optimal Coping  Outcome: Met  Goal: Optimal Functional Ability  Outcome: Met  Goal: Absence of Infection Signs and Symptoms  Outcome: Met  Goal: Improved Oral Intake  Outcome: Met  Goal: Optimal Pain Control and Function  Outcome: Met  Goal: Skin Health and Integrity  Outcome: Met  Goal: Optimal Wound Healing  Outcome: Met     Problem: Fall Injury Risk  Goal: Absence of Fall and Fall-Related Injury  Outcome: Met     Problem: Delirium  Goal: Optimal Coping  Outcome: Met  Goal: Improved Behavioral Control  Outcome: Met  Goal: Improved Attention and Thought Clarity  Outcome: Met  Goal: Improved Sleep  Outcome: Met

## 2025-05-14 NOTE — PLAN OF CARE
Problem: Skin Injury Risk Increased  Goal: Skin Health and Integrity  Outcome: Progressing     Problem: Adult Inpatient Plan of Care  Goal: Plan of Care Review  Outcome: Progressing  Goal: Patient-Specific Goal (Individualized)  Outcome: Progressing  Goal: Absence of Hospital-Acquired Illness or Injury  Outcome: Progressing      Home

## 2025-05-14 NOTE — PLAN OF CARE
05/14/25 1008   Post-Acute Status   Post-Acute Authorization Home Health   Home Health Status Referrals Sent   Coverage Medicare Part A and B   Hospital Resources/Appts/Education Provided Provided patient/caregiver with written discharge plan information;Appointments scheduled and added to AVS   Discharge Delays None known at this time   Discharge Plan   Discharge Plan A Home;Home with family;Home Health   Discharge Plan B Home;Home with family     CARLOS ALBERTO met with Pt and mother at bedside.  Discussed HH referral to [facility].  Mother expressed agreement.  Sw sent referral [facility].    SW scheduled d/c transportation to home through [facility]. Patient is scheduled to be picked up at 2 pm.   SW in communication with nurse and medical team and advised of the above information.  Pt to be transported via ambulance, room air.  Pt's mother Giovana Moreno will ride home in ambulance with Pt.    1:08 PM   [facility] declined.  [facility], [address], [place]  [address]  [phone] and [facility],   [address] [place] [address] B, [place] [address]   [phone]    2:14 PM   [facility] declined.    CARLOS ALBERTO sent referrals to [facility] [address], [place] [address] and [facility], [address] [facility] [address], [place] [address]    2:48 PM  SW sent referrals to the following:  [facility]    3:56 PM  SW sent referral to [facility]    Discharge Plan A and Plan B have been determined by review of patient's clinical status, future medical and therapeutic needs, and coverage/benefits for post-acute care in coordination with multidisciplinary team members.     Julius Finch [facility]    [phone]

## 2025-05-14 NOTE — ASSESSMENT & PLAN NOTE
Antibiotics (From admission, onward)      Start     Stop Route Frequency Ordered    05/10/25 0900  mupirocin 2 % ointment         05/15/25 0859 Nasl 2 times daily 05/10/25 0748            Microbiology Results (last 7 days)       Procedure Component Value Units Date/Time    Blood culture x two cultures. Draw prior to antibiotics. [[id number]]  (Normal) Collected: 05/09/25 2256    Order Status: Completed Specimen: Blood from Peripheral, Antecubital, Left Updated: 05/14/25 0105     Blood Culture No Growth After 96 hours    Blood culture x two cultures. Draw prior to antibiotics. [[id number]]  (Normal) Collected: 05/09/25 2256    Order Status: Completed Specimen: Blood from Peripheral, Antecubital, Left Updated: 05/14/25 0105     Blood Culture No Growth After 96 hours            - on room air without any respiratory distress. Likely the cause of dehydration and presentation to the hospital  - continue supportive care, no indication for treatment now

## 2025-05-14 NOTE — NURSING
Pt discharged home with mom via ambulance with [facility]. IV access removed. Discharge paperwork reviewed and all questions answered. Pt stable and no acute distress noted at time of discharge.

## 2025-05-14 NOTE — PLAN OF CARE
[facility] - Telemetry Stepdown      HOME HEALTH ORDERS  FACE TO FACE ENCOUNTER    Patient Name: Lisa Moreno  YOB: 1980    PCP: John Nixon II, MD   [facility] Address[facility] [address] [facility] [place] [address]  PCP Phone Number: [phone]  PCP Fax: [phone]    Encounter Date: 5/9/25    Admit to Home Health    Diagnoses:  Active Hospital Problems    Diagnosis  POA    *Hypernatremia [E87.0]  Yes    Pneumonia due to COVID-19 virus [U07.1, J12.82]  Yes    ALS (amyotrophic lateral sclerosis) [G12.21]  Yes    Recurrent major depressive disorder, in partial remission [F33.41]  Yes    Oropharyngeal dysphagia [R13.12]  Yes      Resolved Hospital Problems   No resolved problems to display.       Follow Up Appointments:  Future Appointments   Date Time Provider Department Center   5/28/2025  8:00 AM [facility], CLINIC [facility] PCW   5/28/2025  8:00 AM [facility] OT, NOM REHAB [facility] REHAB [facility]   5/28/2025  8:00 AM [facility] PT, NOM REHAB [facility] REHAB [facility]   5/28/2025  8:00 AM [facility] SLP, [facility] REHAB [facility] REHAB [facility]       Allergies:  Review of patient's allergies indicates:   Allergen Reactions    Penicillins      Hives and Itching    Allergen ext-venom-honey bee        Medications: Review discharge medications with patient and family and provide education.    Current Medications[1]     Medication List        CHANGE how you take these medications      baclofen 20 MG tablet  Commonly known as: LIORESAL  Take 1 tablet (20 mg total) by mouth 3 (three) times daily.  What changed: Another medication with the same name was removed. Continue taking this medication, and follow the directions you see here.            CONTINUE taking these medications      cetirizine 10 MG tablet  Commonly known as: ZYRTEC  TAKE 1 TABLET BY MOUTH DAILY     dantrolene 50 MG Cap  Commonly known as: DANTRIUM  TAKE 1 CAPSULE(50 MG) BY MOUTH THREE TIMES DAILY     dimethicone-zinc oxide 1-40 % Oint  Apply to affected area  twice daily     donepeziL 5 MG tablet  Commonly known as: ARICEPT  Take 1 tablet (5 mg total) by mouth every evening.     mirtazapine 15 MG tablet  Commonly known as: REMERON  Take 1 tablet (15 mg total) by mouth every evening.                I have seen and examined this patient within the last 30 days. My clinical findings that support the need for the home health skilled services and home bound status are the following:no   Requiring assistive device to leave home due to unsteady gait caused by  ALS.  Medical restrictions requiring assistance of another human to leave home due to  ALS.     Diet:   regular diet    Labs:  Report Lab results to PCP.    Referrals/ Consults  Aide to provide assistance with personal care, ADLs, and vital signs.    Activities:   activity as tolerated    Nursing:   Agency to admit patient within 24 hours of hospital discharge unless specified on physician order or at patient request    SN to complete comprehensive assessment including routine vital signs. Instruct on disease process and s/s of complications to report to MD. Review/verify medication list sent home with the patient at time of discharge  and instruct patient/caregiver as needed. Frequency may be adjusted depending on start of care date.     Skilled nurse to perform up to 3 visits PRN for symptoms related to diagnosis    Notify MD if SBP > 160 or < 90; DBP > 90 or < 50; HR > 120 or < 50; Temp > 101; O2 < 88%; Other:       Ok to schedule additional visits based on staff availability and patient request on consecutive days within the home health episode.    When multiple disciplines ordered:    Start of Care occurs on Sunday - Wednesday schedule remaining discipline evaluations as ordered on separate consecutive days following the start of care.    Thursday SOC -schedule subsequent evaluations Friday and Monday the following week.     Friday - Saturday SOC - schedule subsequent discipline evaluations on consecutive days  starting Monday of the following week.    For all post-discharge communication and subsequent orders please contact patient's primary care physician. If unable to reach primary care physician or do not receive response within 30 minutes, please contact PCP for clinical staff order clarification    Miscellaneous   Routine Skin for Bedridden Patients: Instruct patient/caregiver to apply moisture barrier cream to all skin folds and wet areas in perineal area daily and after baths and all bowel movements.    Home Health Aide:  Nursing Three times weekly and Home Health Aide Three times weekly    Wound Care Orders  Sacrum: cleanse with vashe, pat dry, apply aquacel ag to the wound bed, and secure with a mepilex bordered foam dressing every 2 days and prn soilage     Right lower leg, right heel, left calf: cleanse with vashe, pat dry, apply hydrofera blue ready to the wound beds and secure with a mepilex bordered foam dressing every 3 days     Right lateral malleolus: apply BPCO every shift     Right shoulder, left heel and bilateral ankles (scar tissue): cleanse with vashe, pat dry and apply a mepilex bordered foam dressing every 3 days     I certify that this patient is confined to his home and needs intermittent skilled nursing care.              [1]   Current Facility-Administered Medications   Medication Dose Route Frequency Provider Last Rate Last Admin    acetaminophen tablet 650 mg  650 mg Oral Q4H PRN Denys Hester MD        baclofen tablet 20 mg  20 mg Oral TID Lawrence Xavier DO   20 mg at 05/14/25 0924    balsam peru-castor oiL Oint   Topical (Top) BID Genia Campo, NP   Given at 05/14/25 0923    cetirizine tablet 10 mg  10 mg Oral Daily Lawrence Xavier DO   10 mg at 05/14/25 0924    dantrolene capsule 50 mg  50 mg Oral TID Lawrence Xavier DO   50 mg at 05/14/25 0924    dextromethorphan-guaiFENesin  mg/5 ml liquid 10 mL  10 mL Oral Q6H PRN Paul Morrell DO   10 mL at 05/14/25 0924    dextrose 50%  injection 12.5 g  12.5 g Intravenous PRN Jeremy Covington MD   12.5 g at 05/10/25 1127    dextrose 50% injection 25 g  25 g Intravenous PRN Jeremy Covington MD        docusate sodium 1 enema  1 enema Rectal Daily PRN Km Murray DO        donepeziL tablet 5 mg  5 mg Oral QHS Lawrence Xavier DO   5 mg at 05/13/25 2018    enoxaparin injection 40 mg  40 mg Subcutaneous Daily Denys Hester MD   40 mg at 05/13/25 1633    glucagon (human recombinant) injection 1 mg  1 mg Intramuscular PRN Jeremy Covington MD        glucose chewable tablet 16 g  16 g Oral PRN Jeremy Covington MD        glucose chewable tablet 24 g  24 g Oral PRN Jeremy Covington MD        HYDROmorphone (PF) injection 0.5 mg  0.5 mg Intravenous Q6H PRN Jeremy Covington MD   0.5 mg at 05/11/25 1022    melatonin tablet 6 mg  6 mg Oral Nightly PRN Dinh Paredes MD   6 mg at 05/12/25 2058    mirtazapine tablet 15 mg  15 mg Oral QHS Lawrence Xavier DO   15 mg at 05/13/25 2017    mupirocin 2 % ointment   Nasal BID Jeremy Dexter MD   Given at 05/14/25 0924    ondansetron disintegrating tablet 8 mg  8 mg Oral Q8H PRN Denys Hester MD        polyethylene glycol packet 17 g  17 g Oral Daily Km Murray DO   17 g at 05/14/25 0924    sodium chloride 0.9% flush 10 mL  10 mL Intravenous PRN Denys Hester MD        sodium phosphates 19-7 gram/118 mL enema 1 enema  1 enema Rectal Once Km Murray DO

## 2025-05-15 ENCOUNTER — PATIENT OUTREACH (OUTPATIENT)
Dept: ADMINISTRATIVE | Facility: CLINIC | Age: 45
End: 2025-05-15
Payer: MEDICARE

## 2025-05-15 DIAGNOSIS — E87.0 HYPERNATREMIA: Primary | ICD-10-CM

## 2025-05-15 LAB
BACTERIA BLD CULT: NORMAL
BACTERIA BLD CULT: NORMAL

## 2025-05-15 NOTE — PROGRESS NOTES
C3 nurse attempted to contact Lisa Moreno  for a TCC post hospital discharge follow up call. No answer.  Voicemail left with callback information.  The patient has a scheduled HOSFU appointment with John Nixon II, MD on 5/30/2025 by 9 AM.

## 2025-05-15 NOTE — PLAN OF CARE
05/15/25 0858   Final Note   Assessment Type Final Discharge Note   Anticipated Discharge Disposition Home   Hospital Resources/Appts/Education Provided Provided patient/caregiver with written discharge plan information;Appointments scheduled and added to AVS   Post-Acute Status   Post-Acute Authorization Home Health   Home Health Status Set-up Complete/Auth obtained   Coverage Medicare Part A and B   Discharge Delays None known at this time     Pt discharged home with family and was trasported via ambulance, room air - mother rode with Pt home.    CARLOS ALBERTO contacted [facility]  [phone] and spoke with Ketty.  Ketty confirmed that Pt was accepted for nursing services.  CARLOS ALBERTO confirmed Pt was discharged 5/14 and Ketty confirmed she will follow-up with Pt and family this date.    Discharge Plan A and Plan B have been determined by review of patient's clinical status, future medical and therapeutic needs, and coverage/benefits for post-acute care in coordination with multidisciplinary team members.     Future Appointments   Date Time Provider Department Center   5/28/2025  8:00 AM [facility], CLINIC [facility] Gen YU   5/28/2025  8:00 AM [facility] OT, [facility] Gen jaya   5/28/2025  8:00 AM [facility] PT[facility] Gen jaya   5/28/2025  8:00 AM [facility] SLP, [facility] Gen jaya   5/30/2025  9:00 AM John Nixon II, MD [facility] Gen [facility], [facility]    [phone]

## 2025-05-19 ENCOUNTER — TELEPHONE (OUTPATIENT)
Dept: INTERNAL MEDICINE | Facility: CLINIC | Age: 45
End: 2025-05-19
Payer: MEDICARE

## 2025-05-19 ENCOUNTER — PATIENT MESSAGE (OUTPATIENT)
Dept: INTERNAL MEDICINE | Facility: CLINIC | Age: 45
End: 2025-05-19
Payer: MEDICARE

## 2025-05-19 NOTE — TELEPHONE ENCOUNTER
----- Message from [employer] sent at 5/19/2025 12:18 PM CDT -----  Contact: [facility] [phone]  [email or web address] Patient is calling for Medical Advice regarding: She went visit the pt today and has total of 10 pressure ulcers and wants a call back How long has patient had these symptoms:Pharmacy name and phone#:[facility] call back [facility]: call back Comments:Please advise patient replies from provider may take up to 48 hours.

## 2025-05-21 ENCOUNTER — PATIENT MESSAGE (OUTPATIENT)
Dept: INTERNAL MEDICINE | Facility: CLINIC | Age: 45
End: 2025-05-21
Payer: MEDICARE

## 2025-05-22 ENCOUNTER — PATIENT MESSAGE (OUTPATIENT)
Dept: HOME HEALTH SERVICES | Facility: CLINIC | Age: 45
End: 2025-05-22
Payer: MEDICARE

## 2025-05-22 ENCOUNTER — TELEPHONE (OUTPATIENT)
Dept: HOME HEALTH SERVICES | Facility: CLINIC | Age: 45
End: 2025-05-22
Payer: MEDICARE

## 2025-05-22 ENCOUNTER — TELEPHONE (OUTPATIENT)
Facility: CLINIC | Age: 45
End: 2025-05-22
Payer: MEDICARE

## 2025-05-22 DIAGNOSIS — R23.8 SKIN BREAKDOWN: ICD-10-CM

## 2025-05-22 DIAGNOSIS — Z78.9 IMPAIRED INSTRUMENTAL ACTIVITIES OF DAILY LIVING (IADL): ICD-10-CM

## 2025-05-22 DIAGNOSIS — G12.21 ALS (AMYOTROPHIC LATERAL SCLEROSIS): Primary | ICD-10-CM

## 2025-05-22 NOTE — TELEPHONE ENCOUNTER
Spoke to  nurse who states Mr. Moreno needs a low loss mattress and roho cushion. Thanks for your assistance.

## 2025-05-22 NOTE — TELEPHONE ENCOUNTER
Low Air Loss Mattress and ROHO Cushion    Orders placed and faxed to [facility] and [facility] along with most recent hospital notes (H&P, Wound Consult Notes, & D/C Summary)      Dank Conte RN, BSN, BS  ALS Clinical Care Coordinator  [phone]

## 2025-05-23 ENCOUNTER — TELEPHONE (OUTPATIENT)
Dept: HOME HEALTH SERVICES | Facility: CLINIC | Age: 45
End: 2025-05-23
Payer: MEDICARE

## 2025-05-23 NOTE — TELEPHONE ENCOUNTER
Patient mother returned call regarding message left to schedule a tcc home visit with NP from referral    She let me know she contacted 's office and they stated a HOSFU is not needed if patient is doing fine.    Referral Canceled.

## 2025-05-25 ENCOUNTER — NURSE TRIAGE (OUTPATIENT)
Dept: ADMINISTRATIVE | Facility: CLINIC | Age: 45
End: 2025-05-25
Payer: MEDICARE

## 2025-05-25 NOTE — TELEPHONE ENCOUNTER
Spoke with mother of pt who reports that pt is acting confused, states pt is laying in bed looking up at celing, and not responding to what his mother is saying to him. She denies pt stating wanting to harm himself. Advised to call 911. She verbalized understanding    Reason for Disposition   [1] Difficult to awaken or acting confused (e.g., disoriented, slurred speech) AND [2] present now AND [3] new-onset    Additional Information   Negative: [1] Difficult to awaken or acting confused (e.g., disoriented, slurred speech) AND [2] present now AND [3] diabetes mellitus    Protocols used: Confusion - Delirium-A-AH

## 2025-05-26 ENCOUNTER — TELEPHONE (OUTPATIENT)
Dept: INTERNAL MEDICINE | Facility: CLINIC | Age: 45
End: 2025-05-26
Payer: MEDICARE

## 2025-05-26 NOTE — TELEPHONE ENCOUNTER
----- Message from Mary sent at 5/22/2025 11:35 AM CDT -----  Contact: Taylor with [facility] are [phone]  [email or web address] Patient is calling for Medical Advice regarding:Taylor with [facility] are [phone] calling pt Calling about a order please call her back and advise.How long has patient had these symptoms:Pharmacy name and phone#:Patient wants a call back or [facility]:callbackComments:Please advise patient replies from provider may take up to 48 hours.

## 2025-05-26 NOTE — TELEPHONE ENCOUNTER
Spoke to  nurse who states Mr. Moreno has 3 new pressure wounds. Nurse is sending over orders to be signed to have them cleaned and bandaged. Will give orders to Dr. Nixon to sign.

## 2025-05-27 ENCOUNTER — PATIENT MESSAGE (OUTPATIENT)
Dept: INTERNAL MEDICINE | Facility: CLINIC | Age: 45
End: 2025-05-27
Payer: MEDICARE

## 2025-05-29 ENCOUNTER — NURSE TRIAGE (OUTPATIENT)
Dept: ADMINISTRATIVE | Facility: CLINIC | Age: 45
End: 2025-05-29
Payer: MEDICARE

## 2025-05-29 NOTE — TELEPHONE ENCOUNTER
Pts home health nurse just came to clean his wound. His vitals were stable other than his HR being 121. No fever. No other symptoms whatsoever. Home health nurse told pts mother she should call the doctor's office, so that's what she did. Message routed to pcp's office per dispo of call pcp within 24 hours. Caller aware. Will call back for any new/worsening symptoms.  Reason for Disposition   [1] Follow-up call from patient regarding patient's clinical status AND [2] information NON-URGENT    Protocols used: PCP Call - No Triage-A-AH

## 2025-05-30 NOTE — TELEPHONE ENCOUNTER
Spoke to Lisa's mother who states his HR isn't elevated. Also reached out to [facility] for paperwork that Dr. Nixon is supposed to received. JOSIE Hernández to fax paperwork to [phone].

## 2025-06-16 ENCOUNTER — TELEPHONE (OUTPATIENT)
Facility: CLINIC | Age: 45
End: 2025-06-16
Payer: MEDICARE

## 2025-06-16 NOTE — TELEPHONE ENCOUNTER
Urgent Call    Follow up email received from mother, Giovana, that Nurse with Dr. Snow office needed to speak with RN Coordinator.     However, no other information provided. Awaiting follow up from Ms. Akhtar.       Dank Conte RN, BSN, BS  ALS Clinical Care Coordinator  [phone]

## 2025-06-16 NOTE — TELEPHONE ENCOUNTER
Returned call to Taylor  Nurse.    Taylor indicated that patient has a Stage IV sacral wound that was debrided by Dr. Snow and wound vac has been ordered. However, he will need email stating low air loss mattress has been ordered.     RN Coordinator reminded Taylor that  Nurse noted she would order the low air loss mattress once the wounds were staged. Taylor indicated that the first wound care company was fired and that may have been where ball was dropped bc she failed to order the mattress.     The wound care clinic notes were requested by RN Coordinator so that can proceed with ordering mattress and email address for Dr. Snow to communicate with him once low air loss mattress ordered.       Dank Conte RN, BSN, BS  ALS Clinical Care Coordinator  [phone]

## 2025-06-24 ENCOUNTER — PATIENT MESSAGE (OUTPATIENT)
Dept: INTERNAL MEDICINE | Facility: CLINIC | Age: 45
End: 2025-06-24
Payer: MEDICARE

## 2025-06-24 DIAGNOSIS — F51.05 INSOMNIA SECONDARY TO DEPRESSION WITH ANXIETY: Primary | ICD-10-CM

## 2025-06-24 DIAGNOSIS — F41.8 INSOMNIA SECONDARY TO DEPRESSION WITH ANXIETY: Primary | ICD-10-CM

## 2025-06-26 RX ORDER — QUETIAPINE FUMARATE 50 MG/1
50 TABLET, FILM COATED ORAL NIGHTLY
Qty: 90 TABLET | Refills: 3 | Status: SHIPPED | OUTPATIENT
Start: 2025-06-26 | End: 2026-06-26

## 2025-06-29 ENCOUNTER — HOSPITAL ENCOUNTER (INPATIENT)
Facility: HOSPITAL | Age: 45
LOS: 23 days | Discharge: NURSING, REHAB OR LONG-TERM CARE | DRG: 871 | End: 2025-07-22
Attending: EMERGENCY MEDICINE | Admitting: STUDENT IN AN ORGANIZED HEALTH CARE EDUCATION/TRAINING PROGRAM
Payer: MEDICARE

## 2025-06-29 DIAGNOSIS — F51.05 INSOMNIA SECONDARY TO DEPRESSION WITH ANXIETY: ICD-10-CM

## 2025-06-29 DIAGNOSIS — A41.9 SEPSIS, DUE TO UNSPECIFIED ORGANISM, UNSPECIFIED WHETHER ACUTE ORGAN DYSFUNCTION PRESENT: Primary | ICD-10-CM

## 2025-06-29 DIAGNOSIS — R57.9 SHOCK: ICD-10-CM

## 2025-06-29 DIAGNOSIS — R07.9 CHEST PAIN: ICD-10-CM

## 2025-06-29 DIAGNOSIS — G80.1 SPASTIC DIPLEGIA: ICD-10-CM

## 2025-06-29 DIAGNOSIS — E87.5 HYPERKALEMIA: ICD-10-CM

## 2025-06-29 DIAGNOSIS — R56.9 WITNESSED SEIZURE-LIKE ACTIVITY: ICD-10-CM

## 2025-06-29 DIAGNOSIS — Z13.6 SCREENING FOR CARDIOVASCULAR CONDITION: ICD-10-CM

## 2025-06-29 DIAGNOSIS — F41.8 INSOMNIA SECONDARY TO DEPRESSION WITH ANXIETY: ICD-10-CM

## 2025-06-29 DIAGNOSIS — L89.154 DECUBITUS ULCER OF SACRAL REGION, STAGE 4: ICD-10-CM

## 2025-06-29 DIAGNOSIS — G11.8 SPINOCEREBELLAR ATAXIA: ICD-10-CM

## 2025-06-29 LAB
ABSOLUTE EOSINOPHIL (OHS): 0 K/UL
ABSOLUTE MONOCYTE (OHS): 0.37 K/UL (ref 0.3–1)
ABSOLUTE NEUTROPHIL COUNT (OHS): 6.01 K/UL (ref 1.8–7.7)
ALBUMIN SERPL BCP-MCNC: 2.2 G/DL (ref 3.5–5.2)
ALP SERPL-CCNC: 80 UNIT/L (ref 40–150)
ALT SERPL W/O P-5'-P-CCNC: 23 UNIT/L (ref 10–44)
ANION GAP (OHS): 17 MMOL/L (ref 8–16)
AST SERPL-CCNC: 34 UNIT/L (ref 11–45)
BACTERIA #/AREA URNS AUTO: ABNORMAL /HPF
BASOPHILS # BLD AUTO: 0.07 K/UL
BASOPHILS NFR BLD AUTO: 1 %
BILIRUB SERPL-MCNC: 0.2 MG/DL (ref 0.1–1)
BILIRUB UR QL STRIP.AUTO: NEGATIVE
BIPAP: 0
BNP SERPL-MCNC: <10 PG/ML (ref 0–99)
BUN SERPL-MCNC: 38 MG/DL (ref 6–20)
CALCIUM SERPL-MCNC: 9.7 MG/DL (ref 8.7–10.5)
CHLORIDE SERPL-SCNC: 111 MMOL/L (ref 95–110)
CLARITY UR: CLEAR
CO2 SERPL-SCNC: 21 MMOL/L (ref 23–29)
COLOR UR AUTO: YELLOW
CORRECTED TEMPERATURE (PCO2): 47.1 MMHG
CORRECTED TEMPERATURE (PH): 7.35
CORRECTED TEMPERATURE (PO2): 44.5 MMHG
CREAT SERPL-MCNC: 1 MG/DL (ref 0.5–1.4)
CRP SERPL-MCNC: 265.6 MG/L
ERYTHROCYTE [DISTWIDTH] IN BLOOD BY AUTOMATED COUNT: 16.7 % (ref 11.5–14.5)
FIO2: 21 %
FLUAV AG UPPER RESP QL IA.RAPID: NEGATIVE
FLUBV AG UPPER RESP QL IA.RAPID: NEGATIVE
GFR SERPLBLD CREATININE-BSD FMLA CKD-EPI: >60 ML/MIN/1.73/M2
GLUCOSE SERPL-MCNC: 149 MG/DL (ref 70–110)
GLUCOSE UR QL STRIP: NEGATIVE
HCT VFR BLD AUTO: 40.5 % (ref 40–54)
HCT VFR BLD CALC: 38.6 % (ref 36–54)
HGB BLD-MCNC: 12 GM/DL (ref 14–18)
HGB UR QL STRIP: NEGATIVE
HYALINE CASTS UR QL AUTO: 4 /LPF (ref 0–1)
IMM GRANULOCYTES # BLD AUTO: 0.05 K/UL (ref 0–0.04)
IMM GRANULOCYTES NFR BLD AUTO: 0.7 % (ref 0–0.5)
KETONES UR QL STRIP: NEGATIVE
LACTATE SERPL-SCNC: 2.6 MMOL/L (ref 0.5–2.2)
LDH SERPL L TO P-CCNC: 4.2 MMOL/L
LDH SERPL L TO P-CCNC: 4.9 MMOL/L (ref 0.5–2.2)
LDH SERPL L TO P-CCNC: 7.4 MMOL/L
LEUKOCYTE ESTERASE UR QL STRIP: NEGATIVE
LYMPHOCYTES # BLD AUTO: 0.61 K/UL (ref 1–4.8)
MCH RBC QN AUTO: 25.7 PG (ref 27–31)
MCHC RBC AUTO-ENTMCNC: 29.6 G/DL (ref 32–36)
MCV RBC AUTO: 87 FL (ref 82–98)
MICROSCOPIC COMMENT: ABNORMAL
NITRITE UR QL STRIP: NEGATIVE
NUCLEATED RBC (/100WBC) (OHS): 0 /100 WBC
PCO2 BLDA: 47.1 MMHG (ref 35–45)
PH SMN: 7.35 [PH] (ref 7.35–7.45)
PH UR STRIP: 8 [PH]
PLATELET # BLD AUTO: 315 K/UL (ref 150–450)
PMV BLD AUTO: 11.2 FL (ref 9.2–12.9)
PO2 BLDA: 44.5 MMHG (ref 40–60)
POC BASE DEFICIT: -0.1 MMOL/L
POC HCO3: 23.9 MMOL/L (ref 24–28)
POC PERFORMED BY: ABNORMAL
POC TEMPERATURE: 37 C
POTASSIUM SERPL-SCNC: 5.4 MMOL/L (ref 3.5–5.1)
PROCALCITONIN SERPL-MCNC: 8.42 NG/ML
PROT SERPL-MCNC: 8.7 GM/DL (ref 6–8.4)
PROT UR QL STRIP: ABNORMAL
RBC # BLD AUTO: 4.67 M/UL (ref 4.6–6.2)
RBC #/AREA URNS AUTO: 2 /HPF (ref 0–4)
RELATIVE EOSINOPHIL (OHS): 0 %
RELATIVE LYMPHOCYTE (OHS): 8.6 % (ref 18–48)
RELATIVE MONOCYTE (OHS): 5.2 % (ref 4–15)
RELATIVE NEUTROPHIL (OHS): 84.5 % (ref 38–73)
RSV A 5' UTR RNA NPH QL NAA+PROBE: NEGATIVE
SARS-COV-2 RNA RESP QL NAA+PROBE: NEGATIVE
SODIUM SERPL-SCNC: 149 MMOL/L (ref 136–145)
SP GR UR STRIP: 1.03
SPECIMEN SOURCE: ABNORMAL
SQUAMOUS #/AREA URNS AUTO: <1 /HPF
TROPONIN I SERPL HS-MCNC: <3 NG/L
UROBILINOGEN UR STRIP-ACNC: NEGATIVE EU/DL
WBC # BLD AUTO: 7.11 K/UL (ref 3.9–12.7)
WBC #/AREA URNS AUTO: 1 /HPF (ref 0–5)

## 2025-06-29 PROCEDURE — 83605 ASSAY OF LACTIC ACID: CPT | Performed by: EMERGENCY MEDICINE

## 2025-06-29 PROCEDURE — 25000003 PHARM REV CODE 250: Performed by: EMERGENCY MEDICINE

## 2025-06-29 PROCEDURE — 82803 BLOOD GASES ANY COMBINATION: CPT

## 2025-06-29 PROCEDURE — 84145 PROCALCITONIN (PCT): CPT | Performed by: EMERGENCY MEDICINE

## 2025-06-29 PROCEDURE — 99900035 HC TECH TIME PER 15 MIN (STAT)

## 2025-06-29 PROCEDURE — 85025 COMPLETE CBC W/AUTO DIFF WBC: CPT | Performed by: EMERGENCY MEDICINE

## 2025-06-29 PROCEDURE — 63600175 PHARM REV CODE 636 W HCPCS: Performed by: EMERGENCY MEDICINE

## 2025-06-29 PROCEDURE — 31720 CLEARANCE OF AIRWAYS: CPT

## 2025-06-29 PROCEDURE — 87040 BLOOD CULTURE FOR BACTERIA: CPT | Performed by: EMERGENCY MEDICINE

## 2025-06-29 PROCEDURE — 81003 URINALYSIS AUTO W/O SCOPE: CPT | Performed by: EMERGENCY MEDICINE

## 2025-06-29 PROCEDURE — 94761 N-INVAS EAR/PLS OXIMETRY MLT: CPT | Mod: XB

## 2025-06-29 PROCEDURE — 99291 CRITICAL CARE FIRST HOUR: CPT | Mod: ,,,

## 2025-06-29 PROCEDURE — 83880 ASSAY OF NATRIURETIC PEPTIDE: CPT | Performed by: EMERGENCY MEDICINE

## 2025-06-29 PROCEDURE — 12000002 HC ACUTE/MED SURGE SEMI-PRIVATE ROOM

## 2025-06-29 PROCEDURE — 96374 THER/PROPH/DIAG INJ IV PUSH: CPT

## 2025-06-29 PROCEDURE — 96360 HYDRATION IV INFUSION INIT: CPT | Mod: 59

## 2025-06-29 PROCEDURE — 0241U SARS-COV2 (COVID) WITH FLU/RSV BY PCR: CPT | Performed by: EMERGENCY MEDICINE

## 2025-06-29 PROCEDURE — 27000221 HC OXYGEN, UP TO 24 HOURS

## 2025-06-29 PROCEDURE — 80053 COMPREHEN METABOLIC PANEL: CPT | Performed by: EMERGENCY MEDICINE

## 2025-06-29 PROCEDURE — 86140 C-REACTIVE PROTEIN: CPT | Performed by: EMERGENCY MEDICINE

## 2025-06-29 PROCEDURE — 96361 HYDRATE IV INFUSION ADD-ON: CPT

## 2025-06-29 PROCEDURE — 84484 ASSAY OF TROPONIN QUANT: CPT | Performed by: EMERGENCY MEDICINE

## 2025-06-29 PROCEDURE — 99285 EMERGENCY DEPT VISIT HI MDM: CPT | Mod: 25

## 2025-06-29 PROCEDURE — 83605 ASSAY OF LACTIC ACID: CPT

## 2025-06-29 RX ORDER — CEFEPIME HYDROCHLORIDE 2 G/1
2 INJECTION, POWDER, FOR SOLUTION INTRAVENOUS
Status: COMPLETED | OUTPATIENT
Start: 2025-06-29 | End: 2025-06-29

## 2025-06-29 RX ORDER — DANTROLENE SODIUM 25 MG/1
50 CAPSULE ORAL 3 TIMES DAILY
Status: DISCONTINUED | OUTPATIENT
Start: 2025-06-29 | End: 2025-07-01

## 2025-06-29 RX ORDER — GLUCAGON 1 MG
1 KIT INJECTION
Status: DISCONTINUED | OUTPATIENT
Start: 2025-06-29 | End: 2025-07-22 | Stop reason: HOSPADM

## 2025-06-29 RX ORDER — CETIRIZINE HYDROCHLORIDE 10 MG/1
10 TABLET ORAL DAILY
Status: DISCONTINUED | OUTPATIENT
Start: 2025-06-30 | End: 2025-07-01

## 2025-06-29 RX ORDER — SODIUM CHLORIDE 0.9 % (FLUSH) 0.9 %
10 SYRINGE (ML) INJECTION EVERY 12 HOURS PRN
Status: DISCONTINUED | OUTPATIENT
Start: 2025-06-29 | End: 2025-07-22 | Stop reason: HOSPADM

## 2025-06-29 RX ORDER — QUETIAPINE FUMARATE 25 MG/1
50 TABLET, FILM COATED ORAL NIGHTLY
Status: DISCONTINUED | OUTPATIENT
Start: 2025-06-29 | End: 2025-07-01

## 2025-06-29 RX ORDER — NALOXONE HCL 0.4 MG/ML
0.02 VIAL (ML) INJECTION
Status: DISCONTINUED | OUTPATIENT
Start: 2025-06-29 | End: 2025-07-22 | Stop reason: HOSPADM

## 2025-06-29 RX ORDER — DONEPEZIL HYDROCHLORIDE 5 MG/1
5 TABLET, FILM COATED ORAL NIGHTLY
Status: DISCONTINUED | OUTPATIENT
Start: 2025-06-29 | End: 2025-07-01

## 2025-06-29 RX ORDER — MIRTAZAPINE 15 MG/1
15 TABLET, FILM COATED ORAL NIGHTLY
Status: DISCONTINUED | OUTPATIENT
Start: 2025-06-29 | End: 2025-07-01

## 2025-06-29 RX ORDER — ENOXAPARIN SODIUM 100 MG/ML
40 INJECTION SUBCUTANEOUS EVERY 24 HOURS
Status: DISCONTINUED | OUTPATIENT
Start: 2025-06-29 | End: 2025-06-30

## 2025-06-29 RX ORDER — BACLOFEN 10 MG/1
20 TABLET ORAL 3 TIMES DAILY
Status: DISCONTINUED | OUTPATIENT
Start: 2025-06-29 | End: 2025-07-01

## 2025-06-29 RX ORDER — IBUPROFEN 200 MG
16 TABLET ORAL
Status: DISCONTINUED | OUTPATIENT
Start: 2025-06-29 | End: 2025-07-01

## 2025-06-29 RX ORDER — CEFEPIME HYDROCHLORIDE 2 G/1
2 INJECTION, POWDER, FOR SOLUTION INTRAVENOUS
Status: DISCONTINUED | OUTPATIENT
Start: 2025-06-30 | End: 2025-06-30

## 2025-06-29 RX ORDER — SODIUM CHLORIDE, SODIUM LACTATE, POTASSIUM CHLORIDE, CALCIUM CHLORIDE 600; 310; 30; 20 MG/100ML; MG/100ML; MG/100ML; MG/100ML
INJECTION, SOLUTION INTRAVENOUS CONTINUOUS
Status: DISCONTINUED | OUTPATIENT
Start: 2025-06-29 | End: 2025-06-30

## 2025-06-29 RX ORDER — IBUPROFEN 200 MG
24 TABLET ORAL
Status: DISCONTINUED | OUTPATIENT
Start: 2025-06-29 | End: 2025-07-01

## 2025-06-29 RX ADMIN — CEFEPIME 2 G: 2 INJECTION, POWDER, FOR SOLUTION INTRAVENOUS at 07:06

## 2025-06-29 RX ADMIN — SODIUM CHLORIDE 1000 ML: 9 INJECTION, SOLUTION INTRAVENOUS at 07:06

## 2025-06-29 RX ADMIN — VANCOMYCIN HYDROCHLORIDE 1250 MG: 1.25 INJECTION, POWDER, LYOPHILIZED, FOR SOLUTION INTRAVENOUS at 09:06

## 2025-06-29 RX ADMIN — SODIUM CHLORIDE, POTASSIUM CHLORIDE, SODIUM LACTATE AND CALCIUM CHLORIDE 1000 ML: 600; 310; 30; 20 INJECTION, SOLUTION INTRAVENOUS at 06:06

## 2025-06-29 NOTE — ED PROVIDER NOTES
Encounter Date: 6/29/2025       History     Chief Complaint   Patient presents with    Shortness of Breath     Pt has ALS and sacral wound with wound vac. Family reported decreased LOC. EMS found RA O2 sat of 74% Pt placed NRB with Duo Nebs and O2 sat went to 94%. Pt de-sats if removed from NRB. Pt had recent pneumonia diagnosis with ABX treatment.      44-year-old male, history of ALS, neuromuscular dysfunction of the bladder, sacral ulcers, and spinal cerebellar ataxia, admitted about a month and a half ago for severe dehydration and electrolyte disturbances, COVID, now brought in by EMS for altered mental status and hypoxia.  Patient is nonverbal at baseline but apparently is much more alert at baseline.  Since yesterday he has become less responsive and interactive.  When EMS found patient this afternoon, he was almost obtunded, had an O2 sat of 74% and diffuse rhonchi on his lung exam.  They gave him Solu-Medrol and albuterol Atrovent EN route.  His breathing and oxygen saturation improved.  Mother does report that he has had increased difficulty with swallowing recently and decreased p.o. intake.    The history is provided by a parent.     Review of patient's allergies indicates:   Allergen Reactions    Penicillins      Hives and Itching    Allergen ext-venom-honey bee      Past Medical History:   Diagnosis Date    Anxiety     Degenerative motor system disease     Depression     Insomnia secondary to depression with anxiety 10/30/2024    Spastic quadriplegia     Spinocerebellar atrophy      Past Surgical History:   Procedure Laterality Date    BACLOFEN PUMP IMPLANTATION      COLONOSCOPY N/A 7/23/2020    Procedure: COLONOSCOPY;  Surgeon: Ziyad Fitch MD;  Location: [facility];  Service: Endoscopy;  Laterality: N/A;    ESOPHAGOGASTRODUODENOSCOPY N/A 7/23/2020    Procedure: EGD (ESOPHAGOGASTRODUODENOSCOPY);  Surgeon: Ziyad Fitch MD;  Location: [facility];  Service: Endoscopy;  Laterality:  N/A;    FLUOROSCOPIC URODYNAMIC STUDY N/A 11/27/2018    Procedure: URODYNAMIC STUDY, FLUOROSCOPIC;  Surgeon: Tanja Okeefe MD;  Location: [address];  Service: Urology;  Laterality: N/A;  1 hour    REATTACHMENT OF MUSCLE Bilateral 2/18/2022    Procedure: PTOSIS REPAIR OF BOTH EYES;  Surgeon: Krupa Rios MD;  Location: [address];  Service: Ophthalmology;  Laterality: Bilateral;    UPPER GASTROINTESTINAL ENDOSCOPY       Family History   Problem Relation Name Age of Onset    Thyroid cancer Mother      Hypertension Mother      Depression Mother      Cancer Mother          thyroid cancer    Multiple sclerosis Other          mother's cousin    No Known Problems Brother      No Known Problems Son      ALS Neg Hx      Muscular dystrophy Neg Hx       Social History[1]  Review of Systems    Physical Exam     Initial Vitals [06/29/25 1653]   BP Pulse Resp Temp SpO2   100/64 100 (!) 23 99.2 °F (37.3 °C) (!) 94 %      MAP       --         Physical Exam    Nursing note and vitals reviewed.  Constitutional: He appears lethargic. He appears cachectic. He appears ill.   HENT: Mouth/Throat: Oropharynx is clear and moist.   Eyes: Conjunctivae are normal.   Neck: Neck supple.   Cardiovascular:  Normal rate.           Pulmonary/Chest: He has rhonchi.   Abdominal: Abdomen is soft. He exhibits no distension. There is no abdominal tenderness. There is no rebound and no guarding.   Musculoskeletal:         General: No edema.      Cervical back: Neck supple.     Neurological: He appears lethargic.   Skin: Skin is warm.   Multiple wounds across body including over left hip, left heel, with necrotic tissue, foul-smelling, large wound VAC over sacrum which is covering a stage IV wound         ED Course   Procedures  Labs Reviewed   COMPREHENSIVE METABOLIC PANEL - Abnormal       Result Value    Sodium 149 (*)     Potassium 5.4 (*)     Chloride 111 (*)     CO2 21 (*)     Glucose 149 (*)     BUN 38 (*)     Creatinine 1.0       Calcium 9.7      Protein Total 8.7 (*)     Albumin 2.2 (*)     Bilirubin Total 0.2      ALP 80      AST 34      ALT 23      Anion Gap 17 (*)     eGFR >60     URINALYSIS, REFLEX TO URINE CULTURE - Abnormal    Color, UA Yellow      Appearance, UA Clear      pH, UA 8.0      Spec Grav UA 1.030      Protein, UA 1+ (*)     Glucose, UA Negative      Ketones, UA Negative      Bilirubin, UA Negative      Blood, UA Negative      Nitrites, UA Negative      Urobilinogen, UA Negative      Leukocyte Esterase, UA Negative     PROCALCITONIN - Abnormal    Procalcitonin 8.42 (*)    CBC WITH DIFFERENTIAL - Abnormal    WBC 7.11      RBC 4.67      HGB 12.0 (*)     HCT 40.5      MCV 87      MCH 25.7 (*)     MCHC 29.6 (*)     RDW 16.7 (*)     Platelet Count 315      MPV 11.2      Nucleated RBC 0      Neut % 84.5 (*)     Lymph % 8.6 (*)     Mono % 5.2      Eos % 0.0      Basophil % 1.0      Imm Grans % 0.7 (*)     Neut # 6.01      Lymph # 0.61 (*)     Mono # 0.37      Eos # 0.00      Baso # 0.07      Imm Grans # 0.05 (*)     Narrative:     This is an appended report.  These results have been appended to a previously verified report.   C-REACTIVE PROTEIN - Abnormal    .6 (*)    LACTIC ACID, PLASMA - Abnormal    Lactic Acid Level 2.6 (*)     Narrative:     Falsely low lactic acid results can be found in samples containing >=13.0 mg/dL total bilirubin and/or >=3.5 mg/dL direct bilirubin.    URINALYSIS MICROSCOPIC - Abnormal    RBC, UA 2      WBC, UA 1      Bacteria, UA Few (*)     Squamous Epithelial Cells, UA <1      Hyaline Casts, UA 4 (*)     Microscopic Comment       COMPREHENSIVE METABOLIC PANEL - Abnormal    Sodium 144      Potassium 5.1      Chloride 115 (*)     CO2 21 (*)     Glucose 145 (*)     BUN 33 (*)     Creatinine 0.7      Calcium 8.3 (*)     Protein Total 6.5      Albumin 1.7 (*)     Bilirubin Total 0.1      ALP 62      AST 26      ALT 15      Anion Gap 8      eGFR >60     PHOSPHORUS - Abnormal    Phosphorus Level 4.9  (*)    CBC WITH DIFFERENTIAL - Abnormal    WBC 8.29      RBC 3.13 (*)     HGB 8.1 (*)     HCT 26.2 (*)     MCV 84      MCH 25.9 (*)     MCHC 30.9 (*)     RDW 16.2 (*)     Platelet Count 245      MPV 11.4      Nucleated RBC 0      Neut % 92.0 (*)     Lymph % 4.5 (*)     Mono % 2.1 (*)     Eos % 0.0      Basophil % 0.7      Imm Grans % 0.7 (*)     Neut # 7.63      Lymph # 0.37 (*)     Mono # 0.17 (*)     Eos # 0.00      Baso # 0.06      Imm Grans # 0.06 (*)    COMPREHENSIVE METABOLIC PANEL - Abnormal    Sodium 146 (*)     Potassium 4.3      Chloride 116 (*)     CO2 23      Glucose 108      BUN 33 (*)     Creatinine 0.7      Calcium 8.2 (*)     Protein Total 6.1      Albumin 1.6 (*)     Bilirubin Total 0.1      ALP 58      AST 18      ALT 14      Anion Gap 7 (*)     eGFR >60     CBC WITH DIFFERENTIAL - Abnormal    WBC 7.44      RBC 2.97 (*)     HGB 7.8 (*)     HCT 24.6 (*)     MCV 83      MCH 26.3 (*)     MCHC 31.7 (*)     RDW 15.9 (*)     Platelet Count 234      MPV 10.9      Nucleated RBC 0      Neut % 90.5 (*)     Lymph % 6.3 (*)     Mono % 2.8 (*)     Eos % 0.0      Basophil % 0.1      Imm Grans % 0.3      Neut # 6.73      Lymph # 0.47 (*)     Mono # 0.21 (*)     Eos # 0.00      Baso # 0.01      Imm Grans # 0.02     POCT GLUCOSE - Abnormal    POCT Glucose 117 (*)    CULTURE, BLOOD - Normal    Blood Culture No Growth After 6 Hours     CULTURE, BLOOD - Normal    Blood Culture No Growth After 6 Hours     B-TYPE NATRIURETIC PEPTIDE - Normal    BNP <10     TROPONIN I HIGH SENSITIVITY - Normal    Troponin High Sensitive <3     SARS-COV2 (COVID) WITH FLU/RSV BY PCR - Normal    INFLUENZA A BY PCR Negative      INFLUENZA B BY PCR Negative      Respiratory Syncytial Virus PCR Negative      SARS-CoV-2 PCR Negative     MAGNESIUM - Normal    Magnesium  1.9     LACTIC ACID, PLASMA - Normal    Lactic Acid Level 1.7      Narrative:     Falsely low lactic acid results can be found in samples containing >=13.0 mg/dL total bilirubin  and/or >=3.5 mg/dL direct bilirubin.    MAGNESIUM - Normal    Magnesium  1.9     LACTIC ACID, PLASMA - Normal    Lactic Acid Level 1.3      Narrative:     Falsely low lactic acid results can be found in samples containing >=13.0 mg/dL total bilirubin and/or >=3.5 mg/dL direct bilirubin.    PHOSPHORUS - Normal    Phosphorus Level 4.5     CBC W/ AUTO DIFFERENTIAL    Narrative:     The following orders were created for panel order CBC auto differential.  Procedure                               Abnormality         Status                     ---------                               -----------         ------                     CBC with Differential[[id number]]       Abnormal            Final result                 Please view results for these tests on the individual orders.   CBC W/ AUTO DIFFERENTIAL    Narrative:     The following orders were created for panel order CBC with Automated Differential.  Procedure                               Abnormality         Status                     ---------                               -----------         ------                     CBC with Differential[[id number]]       Abnormal            Final result                 Please view results for these tests on the individual orders.   CBC W/ AUTO DIFFERENTIAL    Narrative:     The following orders were created for panel order CBC auto differential.  Procedure                               Abnormality         Status                     ---------                               -----------         ------                     CBC with Differential[[id number]]       Abnormal            Final result                 Please view results for these tests on the individual orders.   GREY TOP URINE HOLD        ECG Results              EKG 12-lead (Final result)        Collection Time Result Time QRS Duration [facility]HS QTC Calculation    06/30/25 06:25:03 06/30/25 08:11:25 94 488                     Final result by Interface, Lab In [facility] (06/30/25  08:11:34)                   Narrative:    Test Reason : Z13.6,    Vent. Rate :  72 BPM     Atrial Rate :  72 BPM     P-R Int : 124 ms          QRS Dur :  94 ms      QT Int : 446 ms       P-R-T Axes :  75  90  89 degrees    QTcB Int : 488 ms    Normal sinus rhythm  Rightward axis  Prolonged QT  Abnormal ECG  When compared with ECG of 30-Jun-2025 06:20,  No significant change was found  Confirmed by Franck Ortiz ([id number]) on 6/30/2025 8:11:21 AM    Referred By: AAAREFERRAL SELF           Confirmed By: Franck Ortiz                                     EKG 12-lead (Final result)        Collection Time Result Time QRS Duration OHS QTC Calculation    06/29/25 17:23:09 06/30/25 17:44:16 88 492                     Final result by Interface, Lab In [facility] (06/30/25 17:44:19)                   Narrative:    Test Reason :    Vent. Rate : 101 BPM     Atrial Rate : 101 BPM     P-R Int : 130 ms          QRS Dur :  88 ms      QT Int : 380 ms       P-R-T Axes :     78  -9 degrees    QTcB Int : 492 ms    Technically poor ECG tracing due to severe baseline artifact   No interpretation provided  Repeat ECG with artifact controlled, if clinically indicated  Poor data quality in current ECG precludes serial comparison  Confirmed by Simon Rodrigez ([id number]) on 6/30/2025 5:44:14 PM    Referred By: NICK SELF           Confirmed By: Simon Rodrigez                                  Imaging Results              CT Head Without Contrast (Final result)  Result time 06/30/25 08:31:27   Procedure changed from CT Head Stealth W/O Contrast     Final result by Hector Azar MD (06/30/25 08:31:27)                   Impression:      No acute intracranial pathology.  Stable appearance of the brain when compared to the prior study.    Electronically signed by resident: Jameel Cohen  Date:    06/30/2025  Time:    07:06    Electronically signed by: Hector Azar  Date:    06/30/2025  Time:    08:31               Narrative:    EXAMINATION:  CT HEAD WITHOUT  CONTRAST    CLINICAL HISTORY:  Altered mental status, nontraumatic (Ped 0-18y);Mental status change, unknown cause;    TECHNIQUE:  Low dose axial CT images obtained throughout the head without the use of intravenous contrast.  Axial, sagittal and coronal reconstructions were performed.    COMPARISON:  CT head 07/18/2020    FINDINGS:  Volume loss most notable within the posterior fossa is similar to prior.    No hemorrhage, edema, or acute major vascular distribution infarct.  No mass effect or midline shift.    Ventricles are unchanged in size and configuration.  No hydrocephalus.  Basal cisterns are patent.    No new extra-axial blood or fluid collections.    No displaced calvarial fracture.    Mastoid air cells and paranasal sinuses are essentially clear.                                       X-Ray Chest AP Portable (Final result)  Result time 06/29/25 19:01:39      Final result by Bertrand Lechuga DO (06/29/25 19:01:39)                   Impression:      Multifocal pneumonia.      Electronically signed by: Bertrand Lechuga  Date:    06/29/2025  Time:    19:01               Narrative:    EXAMINATION:  XR CHEST AP PORTABLE    CLINICAL HISTORY:  Sepsis;    TECHNIQUE:  Single frontal view of the chest was performed.    COMPARISON:  05/09/2025.    FINDINGS:  There are patchy opacities bilaterally, predominant within the bibasilar regions but also within the left lung apex and the left mid lung.  Findings are compatible with multifocal pneumonia.  The pleural spaces are clear the cardiac silhouette is unremarkable.  Osseous structures are intact.                                       Medications   donepeziL tablet 5 mg (5 mg Oral Given 6/30/25 2036)   cetirizine tablet 10 mg (10 mg Oral Given 6/30/25 1440)   baclofen tablet 20 mg (20 mg Oral Given 6/30/25 2036)   dantrolene capsule 50 mg (50 mg Oral Given 6/30/25 2036)   QUEtiapine tablet 50 mg (50 mg Oral Given 6/30/25 2036)   mirtazapine tablet 15 mg (15 mg Oral Given  6/30/25 2036)   sodium chloride 0.9% flush 10 mL (has no administration in time range)   naloxone 0.4 mg/mL injection 0.02 mg (has no administration in time range)   glucose chewable tablet 16 g (has no administration in time range)   glucose chewable tablet 24 g (has no administration in time range)   dextrose 50% injection 12.5 g (has no administration in time range)   dextrose 50% injection 25 g (has no administration in time range)   glucagon (human recombinant) injection 1 mg (has no administration in time range)   vancomycin - pharmacy to dose (has no administration in time range)   NORepinephrine bitartrate-D5W 4 mg/250 mL (16 mcg/mL) PERIPHERAL access infusion (0 mcg/kg/min × 59.9 kg Intravenous Stopped 6/30/25 2126)   pantoprazole injection 40 mg (40 mg Intravenous Given 6/30/25 2037)   0.9%  NaCl infusion (for blood administration) (has no administration in time range)   piperacillin-tazobactam (ZOSYN) 4.5 g in D5W 100 mL IVPB (MB+) (0 g Intravenous Stopped 6/30/25 2123)   ondansetron injection 4 mg (4 mg Intravenous Given 6/30/25 2120)   lactated ringers bolus 1,000 mL (0 mLs Intravenous Stopped 6/29/25 2120)   ceFEPIme injection 2 g (2 g Intravenous Given 6/29/25 1910)   vancomycin 1,250 mg in 0.9% NaCl 250 mL IVPB (admixture device) (0 mg Intravenous Stopped 6/29/25 2249)   sodium chloride 0.9% bolus 1,000 mL 1,000 mL (0 mLs Intravenous Stopped 6/29/25 2120)   etomidate (AMIDATE) 2 mg/mL injection (  Return to Cabinet 6/30/25 0645)   succinylcholine (ANECTINE) 20 mg/mL injection (  Return to Cabinet 6/30/25 0645)   rocuronium 10 mg/mL injection (  Return to Cabinet 6/30/25 0645)   lactated ringers bolus 1,000 mL (0 mLs Intravenous Stopped 6/30/25 0643)   lactated ringers bolus 500 mL ( Intravenous Stopped 6/30/25 1207)     Medical Decision Making  Patient is acutely demonstrating signs of acute, global encephalopathy.  DDx would include metabolic crisis/electrolyte derangement, infection, CNS process  like ICH or CVA or meningitis/encephalitis, seizures/post-ictal state or less likely NCSE, polypharmacy, drug overdose/intoxication, withdrawal syndrome, vitamin deficiency, hepatic encephalopathy, uremia.    At this point in time, I have a high index of suspicion for sepsis and dehydration as the etiology of the patient's AMS.    The following tests/interventions will be ordered to determine the etiology of the patient's AMS:   -Labs CBC, CMP, blood cultures, VBG  -Imaging CXR  -Broad spectrum antibiotics  -Medication review  -Close monitoring and frequent neuro checks  -patient requiring 6 L of oxygen right now for his respiratory failure, suspect secondary to aspiration pneumonitis  -in terms of source for infection, suspect infected decubitus ulcers    -Disposition: admission      Amount and/or Complexity of Data Reviewed  Independent Historian: parent  External Data Reviewed: labs, radiology and notes.  Labs: ordered. Decision-making details documented in ED Course.  Radiology: ordered and independent interpretation performed. Decision-making details documented in ED Course.  ECG/medicine tests: ordered and independent interpretation performed. Decision-making details documented in ED Course.    Risk  Prescription drug management.  Decision regarding hospitalization.              Attending Attestation:         Attending Critical Care:   Critical Care Times:   ==============================================================  Total Critical Care Time - exclusive of procedural time: 45 minutes.  ==============================================================  Critical care was necessary to treat or prevent imminent or life-threatening deterioration of the following conditions: sepsis, hypotension, dehydration and metabolic crisis.   Critical care was time spent personally by me on the following activities: examination of patient, obtaining history from patient or relative, review of x-rays / CT sent with the patient,  "review of old charts, ordering lab, x-rays, and/or EKG, development of treatment plan with patient or relative, ordering and performing treatments and interventions, evaluation of patient's response to treatment, discussion with consultants and re-evaluation of patient's conition.   Critical Care Condition: potentially life-threatening       Attending ED Notes:   Brief GOC discussion with pt's mother - would not want any limitations on invasive or potentially LST measures at this stage of the pt's illness.       ED Course as of 06/30/25 2251   Sun Jun 29, 2025 1847 POC PH(!): 7.349 [AS]   1847 POC PCO2(!): 47.1 [AS]   1847 POC Lactate(!!): 4.9 [AS]   1847 Procalcitonin(!): 8.42 [AS]   1847 Sodium(!): 149 [AS]   1847 Potassium(!): 5.4 [AS]   1847 BUN(!): 38 [AS]   1847 Creatinine: 1.0 [AS]      ED Course User Index  [AS] Kathleen Alvarez MD    Sepsis Perfusion Assessment: "I attest a sepsis perfusion exam was performed within 6 hours of sepsis, severe sepsis, or septic shock presentation, following fluid resuscitation."                      Clinical Impression:  Final diagnoses:  [Z13.6] Screening for cardiovascular condition  [A41.9] Sepsis, due to unspecified organism, unspecified whether acute organ dysfunction present (Primary)          ED Disposition Condition    Admit                       [1]   Social History  Tobacco Use    Smoking status: Never    Smokeless tobacco: Never   Substance Use Topics    Alcohol use: Not Currently     Comment: social drinker, at times    Drug use: Not Currently        Kathleen Alvarez MD  06/30/25 6131    "

## 2025-06-30 PROBLEM — N17.9 AKI (ACUTE KIDNEY INJURY): Status: RESOLVED | Noted: 2025-06-30 | Resolved: 2025-06-30

## 2025-06-30 PROBLEM — J96.01 ACUTE HYPOXIC RESPIRATORY FAILURE: Status: ACTIVE | Noted: 2025-06-30

## 2025-06-30 PROBLEM — G93.40 ACUTE ENCEPHALOPATHY: Status: ACTIVE | Noted: 2025-06-30

## 2025-06-30 PROBLEM — J18.9 PNEUMONIA: Status: ACTIVE | Noted: 2025-06-30

## 2025-06-30 PROBLEM — N17.9 AKI (ACUTE KIDNEY INJURY): Status: ACTIVE | Noted: 2025-06-30

## 2025-06-30 PROBLEM — L89.159 SACRAL DECUBITUS ULCER: Status: ACTIVE | Noted: 2025-06-30

## 2025-06-30 LAB
ABORH RETYPE: NORMAL
ABSOLUTE EOSINOPHIL (OHS): 0 K/UL
ABSOLUTE EOSINOPHIL (OHS): 0.02 K/UL
ABSOLUTE MONOCYTE (OHS): 0.17 K/UL (ref 0.3–1)
ABSOLUTE MONOCYTE (OHS): 0.21 K/UL (ref 0.3–1)
ABSOLUTE MONOCYTE (OHS): 0.26 K/UL (ref 0.3–1)
ABSOLUTE MONOCYTE (OHS): 0.38 K/UL (ref 0.3–1)
ABSOLUTE NEUTROPHIL COUNT (OHS): 5.63 K/UL (ref 1.8–7.7)
ABSOLUTE NEUTROPHIL COUNT (OHS): 6.73 K/UL (ref 1.8–7.7)
ABSOLUTE NEUTROPHIL COUNT (OHS): 7.63 K/UL (ref 1.8–7.7)
ABSOLUTE NEUTROPHIL COUNT (OHS): 8.84 K/UL (ref 1.8–7.7)
ALBUMIN SERPL BCP-MCNC: 1.6 G/DL (ref 3.5–5.2)
ALBUMIN SERPL BCP-MCNC: 1.7 G/DL (ref 3.5–5.2)
ALP SERPL-CCNC: 58 UNIT/L (ref 40–150)
ALP SERPL-CCNC: 62 UNIT/L (ref 40–150)
ALT SERPL W/O P-5'-P-CCNC: 14 UNIT/L (ref 10–44)
ALT SERPL W/O P-5'-P-CCNC: 15 UNIT/L (ref 10–44)
ANION GAP (OHS): 7 MMOL/L (ref 8–16)
ANION GAP (OHS): 8 MMOL/L (ref 8–16)
ANISOCYTOSIS BLD QL SMEAR: SLIGHT
AST SERPL-CCNC: 18 UNIT/L (ref 11–45)
AST SERPL-CCNC: 26 UNIT/L (ref 11–45)
BASOPHILS # BLD AUTO: 0.01 K/UL
BASOPHILS # BLD AUTO: 0.02 K/UL
BASOPHILS # BLD AUTO: 0.04 K/UL
BASOPHILS # BLD AUTO: 0.06 K/UL
BASOPHILS NFR BLD AUTO: 0.1 %
BASOPHILS NFR BLD AUTO: 0.3 %
BASOPHILS NFR BLD AUTO: 0.4 %
BASOPHILS NFR BLD AUTO: 0.7 %
BILIRUB SERPL-MCNC: 0.1 MG/DL (ref 0.1–1)
BILIRUB SERPL-MCNC: 0.1 MG/DL (ref 0.1–1)
BIPAP: 0
BIPAP: 0
BUN SERPL-MCNC: 33 MG/DL (ref 6–20)
BUN SERPL-MCNC: 33 MG/DL (ref 6–20)
CALCIUM SERPL-MCNC: 8.2 MG/DL (ref 8.7–10.5)
CALCIUM SERPL-MCNC: 8.3 MG/DL (ref 8.7–10.5)
CHLORIDE SERPL-SCNC: 115 MMOL/L (ref 95–110)
CHLORIDE SERPL-SCNC: 116 MMOL/L (ref 95–110)
CO2 SERPL-SCNC: 21 MMOL/L (ref 23–29)
CO2 SERPL-SCNC: 23 MMOL/L (ref 23–29)
CORRECTED TEMPERATURE (PCO2): 37.4 MMHG
CORRECTED TEMPERATURE (PCO2): 44.1 MMHG
CORRECTED TEMPERATURE (PH): 7.38
CORRECTED TEMPERATURE (PH): 7.43
CORRECTED TEMPERATURE (PO2): 129 MMHG
CORRECTED TEMPERATURE (PO2): 61.5 MMHG
CREAT SERPL-MCNC: 0.7 MG/DL (ref 0.5–1.4)
CREAT SERPL-MCNC: 0.7 MG/DL (ref 0.5–1.4)
DOHLE BOD BLD QL SMEAR: PRESENT
ERYTHROCYTE [DISTWIDTH] IN BLOOD BY AUTOMATED COUNT: 15.9 % (ref 11.5–14.5)
ERYTHROCYTE [DISTWIDTH] IN BLOOD BY AUTOMATED COUNT: 16 % (ref 11.5–14.5)
ERYTHROCYTE [DISTWIDTH] IN BLOOD BY AUTOMATED COUNT: 16.2 % (ref 11.5–14.5)
ERYTHROCYTE [DISTWIDTH] IN BLOOD BY AUTOMATED COUNT: 16.2 % (ref 11.5–14.5)
FIO2: 21 %
FIO2: 21 %
GFR SERPLBLD CREATININE-BSD FMLA CKD-EPI: >60 ML/MIN/1.73/M2
GFR SERPLBLD CREATININE-BSD FMLA CKD-EPI: >60 ML/MIN/1.73/M2
GLUCOSE SERPL-MCNC: 108 MG/DL (ref 70–110)
GLUCOSE SERPL-MCNC: 145 MG/DL (ref 70–110)
HCT VFR BLD AUTO: 22.9 % (ref 40–54)
HCT VFR BLD AUTO: 24.6 % (ref 40–54)
HCT VFR BLD AUTO: 25.9 % (ref 40–54)
HCT VFR BLD AUTO: 26.2 % (ref 40–54)
HCT VFR BLD CALC: 25.2 % (ref 36–54)
HGB BLD-MCNC: 7.1 GM/DL (ref 14–18)
HGB BLD-MCNC: 7.8 GM/DL (ref 14–18)
HGB BLD-MCNC: 8 GM/DL (ref 14–18)
HGB BLD-MCNC: 8.1 GM/DL (ref 14–18)
HOLD SPECIMEN: NORMAL
HYPOCHROMIA BLD QL SMEAR: ABNORMAL
IMM GRANULOCYTES # BLD AUTO: 0.02 K/UL (ref 0–0.04)
IMM GRANULOCYTES # BLD AUTO: 0.03 K/UL (ref 0–0.04)
IMM GRANULOCYTES # BLD AUTO: 0.06 K/UL (ref 0–0.04)
IMM GRANULOCYTES # BLD AUTO: 0.06 K/UL (ref 0–0.04)
IMM GRANULOCYTES NFR BLD AUTO: 0.3 % (ref 0–0.5)
IMM GRANULOCYTES NFR BLD AUTO: 0.4 % (ref 0–0.5)
IMM GRANULOCYTES NFR BLD AUTO: 0.6 % (ref 0–0.5)
IMM GRANULOCYTES NFR BLD AUTO: 0.7 % (ref 0–0.5)
INDIRECT COOMBS: NORMAL
LACTATE SERPL-SCNC: 1.3 MMOL/L (ref 0.5–2.2)
LACTATE SERPL-SCNC: 1.7 MMOL/L (ref 0.5–2.2)
LDH SERPL L TO P-CCNC: 1.1 MMOL/L (ref 0.5–2.2)
LYMPHOCYTES # BLD AUTO: 0.37 K/UL (ref 1–4.8)
LYMPHOCYTES # BLD AUTO: 0.47 K/UL (ref 1–4.8)
LYMPHOCYTES # BLD AUTO: 0.74 K/UL (ref 1–4.8)
LYMPHOCYTES # BLD AUTO: 1.2 K/UL (ref 1–4.8)
MAGNESIUM SERPL-MCNC: 1.9 MG/DL (ref 1.6–2.6)
MAGNESIUM SERPL-MCNC: 1.9 MG/DL (ref 1.6–2.6)
MCH RBC QN AUTO: 25.6 PG (ref 27–31)
MCH RBC QN AUTO: 25.8 PG (ref 27–31)
MCH RBC QN AUTO: 25.9 PG (ref 27–31)
MCH RBC QN AUTO: 26.3 PG (ref 27–31)
MCHC RBC AUTO-ENTMCNC: 30.9 G/DL (ref 32–36)
MCHC RBC AUTO-ENTMCNC: 30.9 G/DL (ref 32–36)
MCHC RBC AUTO-ENTMCNC: 31 G/DL (ref 32–36)
MCHC RBC AUTO-ENTMCNC: 31.7 G/DL (ref 32–36)
MCV RBC AUTO: 83 FL (ref 82–98)
MCV RBC AUTO: 84 FL (ref 82–98)
MRSA PCR SCRN (OHS): NOT DETECTED
NUCLEATED RBC (/100WBC) (OHS): 0 /100 WBC
OHS QRS DURATION: 88 MS
OHS QRS DURATION: 94 MS
OHS QTC CALCULATION: 488 MS
OHS QTC CALCULATION: 492 MS
PCO2 BLDA: 37.4 MMHG (ref 35–45)
PCO2 BLDA: 44.1 MMHG (ref 35–45)
PH SMN: 7.38 [PH] (ref 7.35–7.45)
PH SMN: 7.43 [PH] (ref 7.35–7.45)
PHOSPHATE SERPL-MCNC: 4.5 MG/DL (ref 2.7–4.5)
PHOSPHATE SERPL-MCNC: 4.9 MG/DL (ref 2.7–4.5)
PLATELET # BLD AUTO: 234 K/UL (ref 150–450)
PLATELET # BLD AUTO: 237 K/UL (ref 150–450)
PLATELET # BLD AUTO: 245 K/UL (ref 150–450)
PLATELET # BLD AUTO: 257 K/UL (ref 150–450)
PLATELET BLD QL SMEAR: ABNORMAL
PMV BLD AUTO: 10.9 FL (ref 9.2–12.9)
PMV BLD AUTO: 11.1 FL (ref 9.2–12.9)
PMV BLD AUTO: 11.4 FL (ref 9.2–12.9)
PMV BLD AUTO: 11.5 FL (ref 9.2–12.9)
PO2 BLDA: 129 MMHG (ref 80–100)
PO2 BLDA: 61.5 MMHG (ref 40–60)
POC BASE DEFICIT: 0.6 MMOL/L
POC BASE DEFICIT: 1.1 MMOL/L
POC HCO3: 25 MMOL/L (ref 24–28)
POC HCO3: 25.3 MMOL/L (ref 24–28)
POC IONIZED CALCIUM: 1.17 MMOL/L (ref 1.06–1.42)
POC PERFORMED BY: ABNORMAL
POC PERFORMED BY: ABNORMAL
POC SATURATED O2: 99.2 % (ref 95–100)
POC TEMPERATURE: 37 C
POC TEMPERATURE: 37 C
POCT GLUCOSE: 117 MG/DL (ref 70–110)
POLYCHROMASIA BLD QL SMEAR: ABNORMAL
POTASSIUM BLD-SCNC: 4.2 MMOL/L (ref 3.5–5.1)
POTASSIUM SERPL-SCNC: 4.3 MMOL/L (ref 3.5–5.1)
POTASSIUM SERPL-SCNC: 5.1 MMOL/L (ref 3.5–5.1)
PROT SERPL-MCNC: 6.1 GM/DL (ref 6–8.4)
PROT SERPL-MCNC: 6.5 GM/DL (ref 6–8.4)
RBC # BLD AUTO: 2.75 M/UL (ref 4.6–6.2)
RBC # BLD AUTO: 2.97 M/UL (ref 4.6–6.2)
RBC # BLD AUTO: 3.12 M/UL (ref 4.6–6.2)
RBC # BLD AUTO: 3.13 M/UL (ref 4.6–6.2)
RELATIVE EOSINOPHIL (OHS): 0 %
RELATIVE EOSINOPHIL (OHS): 0.2 %
RELATIVE LYMPHOCYTE (OHS): 11.1 % (ref 18–48)
RELATIVE LYMPHOCYTE (OHS): 11.4 % (ref 18–48)
RELATIVE LYMPHOCYTE (OHS): 4.5 % (ref 18–48)
RELATIVE LYMPHOCYTE (OHS): 6.3 % (ref 18–48)
RELATIVE MONOCYTE (OHS): 2.1 % (ref 4–15)
RELATIVE MONOCYTE (OHS): 2.8 % (ref 4–15)
RELATIVE MONOCYTE (OHS): 3.6 % (ref 4–15)
RELATIVE MONOCYTE (OHS): 3.9 % (ref 4–15)
RELATIVE NEUTROPHIL (OHS): 83.8 % (ref 38–73)
RELATIVE NEUTROPHIL (OHS): 84.3 % (ref 38–73)
RELATIVE NEUTROPHIL (OHS): 90.5 % (ref 38–73)
RELATIVE NEUTROPHIL (OHS): 92 % (ref 38–73)
RH BLD: NORMAL
SODIUM BLD-SCNC: 147 MMOL/L (ref 136–145)
SODIUM SERPL-SCNC: 144 MMOL/L (ref 136–145)
SODIUM SERPL-SCNC: 146 MMOL/L (ref 136–145)
SPECIMEN OUTDATE: NORMAL
SPECIMEN SOURCE: ABNORMAL
SPECIMEN SOURCE: ABNORMAL
TOXIC GRANULES BLD QL SMEAR: PRESENT
WBC # BLD AUTO: 10.54 K/UL (ref 3.9–12.7)
WBC # BLD AUTO: 6.68 K/UL (ref 3.9–12.7)
WBC # BLD AUTO: 7.44 K/UL (ref 3.9–12.7)
WBC # BLD AUTO: 8.29 K/UL (ref 3.9–12.7)
WBC TOXIC VACUOLES BLD QL SMEAR: PRESENT

## 2025-06-30 PROCEDURE — 82803 BLOOD GASES ANY COMBINATION: CPT

## 2025-06-30 PROCEDURE — 63600175 PHARM REV CODE 636 W HCPCS: Performed by: EMERGENCY MEDICINE

## 2025-06-30 PROCEDURE — 85014 HEMATOCRIT: CPT

## 2025-06-30 PROCEDURE — 84295 ASSAY OF SERUM SODIUM: CPT | Performed by: STUDENT IN AN ORGANIZED HEALTH CARE EDUCATION/TRAINING PROGRAM

## 2025-06-30 PROCEDURE — 85025 COMPLETE CBC W/AUTO DIFF WBC: CPT | Performed by: STUDENT IN AN ORGANIZED HEALTH CARE EDUCATION/TRAINING PROGRAM

## 2025-06-30 PROCEDURE — 84132 ASSAY OF SERUM POTASSIUM: CPT

## 2025-06-30 PROCEDURE — 63600175 PHARM REV CODE 636 W HCPCS: Performed by: STUDENT IN AN ORGANIZED HEALTH CARE EDUCATION/TRAINING PROGRAM

## 2025-06-30 PROCEDURE — 87641 MR-STAPH DNA AMP PROBE: CPT | Performed by: INTERNAL MEDICINE

## 2025-06-30 PROCEDURE — 82330 ASSAY OF CALCIUM: CPT

## 2025-06-30 PROCEDURE — 84100 ASSAY OF PHOSPHORUS: CPT | Performed by: STUDENT IN AN ORGANIZED HEALTH CARE EDUCATION/TRAINING PROGRAM

## 2025-06-30 PROCEDURE — 25000003 PHARM REV CODE 250: Performed by: STUDENT IN AN ORGANIZED HEALTH CARE EDUCATION/TRAINING PROGRAM

## 2025-06-30 PROCEDURE — 83605 ASSAY OF LACTIC ACID: CPT

## 2025-06-30 PROCEDURE — 93010 ELECTROCARDIOGRAM REPORT: CPT | Mod: ,,, | Performed by: INTERNAL MEDICINE

## 2025-06-30 PROCEDURE — 25000003 PHARM REV CODE 250: Performed by: EMERGENCY MEDICINE

## 2025-06-30 PROCEDURE — 94761 N-INVAS EAR/PLS OXIMETRY MLT: CPT | Mod: XB

## 2025-06-30 PROCEDURE — 84295 ASSAY OF SERUM SODIUM: CPT

## 2025-06-30 PROCEDURE — 85007 BL SMEAR W/DIFF WBC COUNT: CPT

## 2025-06-30 PROCEDURE — 25000003 PHARM REV CODE 250

## 2025-06-30 PROCEDURE — 99291 CRITICAL CARE FIRST HOUR: CPT | Mod: ,,, | Performed by: INTERNAL MEDICINE

## 2025-06-30 PROCEDURE — 83735 ASSAY OF MAGNESIUM: CPT | Performed by: STUDENT IN AN ORGANIZED HEALTH CARE EDUCATION/TRAINING PROGRAM

## 2025-06-30 PROCEDURE — 63600175 PHARM REV CODE 636 W HCPCS

## 2025-06-30 PROCEDURE — 80053 COMPREHEN METABOLIC PANEL: CPT | Performed by: STUDENT IN AN ORGANIZED HEALTH CARE EDUCATION/TRAINING PROGRAM

## 2025-06-30 PROCEDURE — 99900035 HC TECH TIME PER 15 MIN (STAT)

## 2025-06-30 PROCEDURE — 85025 COMPLETE CBC W/AUTO DIFF WBC: CPT | Performed by: INTERNAL MEDICINE

## 2025-06-30 PROCEDURE — 86900 BLOOD TYPING SEROLOGIC ABO: CPT

## 2025-06-30 PROCEDURE — 20000000 HC ICU ROOM

## 2025-06-30 PROCEDURE — 86920 COMPATIBILITY TEST SPIN: CPT

## 2025-06-30 PROCEDURE — 83605 ASSAY OF LACTIC ACID: CPT | Performed by: STUDENT IN AN ORGANIZED HEALTH CARE EDUCATION/TRAINING PROGRAM

## 2025-06-30 PROCEDURE — 93005 ELECTROCARDIOGRAM TRACING: CPT

## 2025-06-30 RX ORDER — ENOXAPARIN SODIUM 100 MG/ML
30 INJECTION SUBCUTANEOUS EVERY 24 HOURS
Status: DISCONTINUED | OUTPATIENT
Start: 2025-06-30 | End: 2025-06-30

## 2025-06-30 RX ORDER — NOREPINEPHRINE BITARTRATE/D5W 4MG/250ML
0-.2 PLASTIC BAG, INJECTION (ML) INTRAVENOUS CONTINUOUS
Status: DISCONTINUED | OUTPATIENT
Start: 2025-06-30 | End: 2025-07-01

## 2025-06-30 RX ORDER — SUCCINYLCHOLINE CHLORIDE 20 MG/ML
INJECTION INTRAMUSCULAR; INTRAVENOUS
Status: COMPLETED
Start: 2025-06-30 | End: 2025-06-30

## 2025-06-30 RX ORDER — NOREPINEPHRINE BITARTRATE/D5W 4MG/250ML
PLASTIC BAG, INJECTION (ML) INTRAVENOUS
Status: COMPLETED
Start: 2025-06-30 | End: 2025-06-30

## 2025-06-30 RX ORDER — PANTOPRAZOLE SODIUM 40 MG/10ML
40 INJECTION, POWDER, LYOPHILIZED, FOR SOLUTION INTRAVENOUS 2 TIMES DAILY
Status: DISCONTINUED | OUTPATIENT
Start: 2025-06-30 | End: 2025-07-01

## 2025-06-30 RX ORDER — ETOMIDATE 2 MG/ML
INJECTION INTRAVENOUS
Status: COMPLETED
Start: 2025-06-30 | End: 2025-06-30

## 2025-06-30 RX ORDER — ROCURONIUM BROMIDE 10 MG/ML
INJECTION, SOLUTION INTRAVENOUS
Status: COMPLETED
Start: 2025-06-30 | End: 2025-06-30

## 2025-06-30 RX ORDER — ONDANSETRON HYDROCHLORIDE 2 MG/ML
4 INJECTION, SOLUTION INTRAVENOUS EVERY 6 HOURS PRN
Status: DISCONTINUED | OUTPATIENT
Start: 2025-06-30 | End: 2025-07-22 | Stop reason: HOSPADM

## 2025-06-30 RX ORDER — NOREPINEPHRINE BITARTRATE/D5W 4MG/250ML
0-.2 PLASTIC BAG, INJECTION (ML) INTRAVENOUS CONTINUOUS
Status: DISCONTINUED | OUTPATIENT
Start: 2025-06-30 | End: 2025-06-30

## 2025-06-30 RX ORDER — HYDROCODONE BITARTRATE AND ACETAMINOPHEN 500; 5 MG/1; MG/1
TABLET ORAL
Status: DISCONTINUED | OUTPATIENT
Start: 2025-06-30 | End: 2025-07-09

## 2025-06-30 RX ADMIN — MIRTAZAPINE 15 MG: 15 TABLET, FILM COATED ORAL at 08:06

## 2025-06-30 RX ADMIN — SODIUM CHLORIDE, POTASSIUM CHLORIDE, SODIUM LACTATE AND CALCIUM CHLORIDE: 600; 310; 30; 20 INJECTION, SOLUTION INTRAVENOUS at 12:06

## 2025-06-30 RX ADMIN — CEFEPIME 2 G: 2 INJECTION, POWDER, FOR SOLUTION INTRAVENOUS at 03:06

## 2025-06-30 RX ADMIN — DANTROLENE SODIUM 50 MG: 25 CAPSULE ORAL at 02:06

## 2025-06-30 RX ADMIN — ONDANSETRON 4 MG: 2 INJECTION INTRAMUSCULAR; INTRAVENOUS at 09:06

## 2025-06-30 RX ADMIN — NOREPINEPHRINE BITARTRATE 0.04 MCG/KG/MIN: 4 INJECTION, SOLUTION INTRAVENOUS at 06:06

## 2025-06-30 RX ADMIN — SODIUM CHLORIDE, POTASSIUM CHLORIDE, SODIUM LACTATE AND CALCIUM CHLORIDE 500 ML: 600; 310; 30; 20 INJECTION, SOLUTION INTRAVENOUS at 11:06

## 2025-06-30 RX ADMIN — BACLOFEN 20 MG: 10 TABLET ORAL at 08:06

## 2025-06-30 RX ADMIN — VANCOMYCIN HYDROCHLORIDE 750 MG: 750 INJECTION, POWDER, LYOPHILIZED, FOR SOLUTION INTRAVENOUS at 10:06

## 2025-06-30 RX ADMIN — PANTOPRAZOLE SODIUM 40 MG: 40 INJECTION, POWDER, LYOPHILIZED, FOR SOLUTION INTRAVENOUS at 08:06

## 2025-06-30 RX ADMIN — DONEPEZIL HYDROCHLORIDE 5 MG: 5 TABLET, FILM COATED ORAL at 08:06

## 2025-06-30 RX ADMIN — CETIRIZINE HYDROCHLORIDE 10 MG: 10 TABLET, FILM COATED ORAL at 02:06

## 2025-06-30 RX ADMIN — DANTROLENE SODIUM 50 MG: 25 CAPSULE ORAL at 08:06

## 2025-06-30 RX ADMIN — NOREPINEPHRINE BITARTRATE 0.04 MCG/KG/MIN: 4 INJECTION, SOLUTION INTRAVENOUS at 10:06

## 2025-06-30 RX ADMIN — QUETIAPINE FUMARATE 50 MG: 25 TABLET ORAL at 08:06

## 2025-06-30 RX ADMIN — NOREPINEPHRINE BITARTRATE 0.02 MCG/KG/MIN: 4 INJECTION, SOLUTION INTRAVENOUS at 06:06

## 2025-06-30 RX ADMIN — PIPERACILLIN SODIUM AND TAZOBACTAM SODIUM 4.5 G: 4; .5 INJECTION, POWDER, LYOPHILIZED, FOR SOLUTION INTRAVENOUS at 05:06

## 2025-06-30 RX ADMIN — PANTOPRAZOLE SODIUM 40 MG: 40 INJECTION, POWDER, LYOPHILIZED, FOR SOLUTION INTRAVENOUS at 11:06

## 2025-06-30 RX ADMIN — SODIUM CHLORIDE, SODIUM LACTATE, POTASSIUM CHLORIDE, AND CALCIUM CHLORIDE 1000 ML: .6; .31; .03; .02 INJECTION, SOLUTION INTRAVENOUS at 06:06

## 2025-06-30 RX ADMIN — CEFEPIME 2 G: 2 INJECTION, POWDER, FOR SOLUTION INTRAVENOUS at 11:06

## 2025-06-30 RX ADMIN — BACLOFEN 20 MG: 10 TABLET ORAL at 02:06

## 2025-06-30 RX ADMIN — ENOXAPARIN SODIUM 40 MG: 40 INJECTION SUBCUTANEOUS at 12:06

## 2025-06-30 NOTE — ED NOTES
Pt in CT scan, ICU providers and rapids at bedside. 1L LR bolus given with pressure bag by rapid response nurses.

## 2025-06-30 NOTE — ASSESSMENT & PLAN NOTE
"This patient does have evidence of infective focus  My overall impression is sepsis without organ dysfunction.  Source: Respiratory Infection  Antibiotics given-   Antibiotics (72h ago, onward)      Start     Stop Route Frequency Ordered    06/30/25 0930  vancomycin 750 mg in 0.9% NaCl 250 mL IVPB (admixture device)         -- IV Every 12 hours (non-standard times) 06/29/25 2142 06/30/25 0300  ceFEPIme injection 2 g         -- IV Every 8 hours (non-standard times) 06/29/25 2056 06/29/25 2155  vancomycin - pharmacy to dose  (vancomycin IVPB (PEDS and ADULTS))        Placed in "And" Linked Group    -- IV pharmacy to manage frequency 06/29/25 2056 06/29/25 1800  vancomycin 1,250 mg in 0.9% NaCl 250 mL IVPB (admixture device)         -- IV Once 06/29/25 1746          Latest lactate reviewed-  Recent Labs   Lab 06/29/25 2053   POCLAC 4.2*     Will Not start   Source control achieved by:     Mr. Moreno is a 44yoM with PMHx significant for ALS, neuromuscular dysfunction of the bladder, sacral ulcers, and spinal cerebellar ataxia who was admitted recently admitted (5/9/2025) for severe dehydration, electrolyte disturbances, and covid who presented to the ED with EMS and c/o AMS and hypoxia. Nonverbal at baseline but apparently is usually more alert; has been less responsive and interactive x1 day. Upon EMS arrival, was obtunded and sating 74%. They gave him solumedrol and duonebs en route which improved his oxygen status to >90%. Mother at bedside reports increased difficulty with swallowing and decreased PO intake.       While in the ED, labs and imaging significant for Na 149, K 5.4, Cl 111, CO2 21, glucose 149, BUN 38, creat 1.0, albumin 2.2, ALP 80, AST/ALT 34/23, anion gap 17, procal 8.42, BNP <10, WBC 7.11, H/H 12/40.5, plts 315, covid/flu negative, .6, VBG 7.349/47.1/44.5/23.9, istat LA 4.9->7.4->4.2 (7.4 likely error?). Chest xray with patchy opacities bilaterally, predominant within the bibasilar " regions but also within the left lung apex and the left mid-lung; compatible with multifocal pneumonia. Started on BSA by the ED; vanc and cefepime.     - Repeat serum LA 2.6  - Continue BSA  - Follow up infectious workup  - Recommend deep suction per RT  - Pulmonary hygiene   - Maintain spo2 >90%; wean fio2 as tolerated  - Continuous telemetry and spO2 monitoring  - Neuro checks  - Delirium/aspiration/fall precautions  - Recommend Wound Care for sacral and lower extremity wounds  - Stable for stepdown unit at this time

## 2025-06-30 NOTE — ASSESSMENT & PLAN NOTE
-Chest X-ray revealed patchy bilateral opacities, most prominent in the bibasilar regions, as well as in the left apex and mid-lung zones--findings consistent with multifocal pneumonia.  -procalcitonin and CRP elevated  -Blood cultures pending  -On IV cefepime and vancomycin

## 2025-06-30 NOTE — PLAN OF CARE
MICU DAILY GOALS     Family/Goals of care/Code Status   Code Status: Full Code    24H Vital Sign Range  Temp:  [91.8 °F (33.2 °C)-98.4 °F (36.9 °C)]   Pulse:  []   Resp:  [9-27]   BP: ()/(42-79)   SpO2:  [91 %-100 %]      Shift Events (include procedures and significant events)   500mL LR bolus given d/t hypotension. ABXT cont. Levo gtt @ 0.04. Sergio hugger applied d/t/ hypothermia; Pt currently normothermic. Wo care consulted multiple times for sacral wound with wound vac in place. Pt place on impact bed. NGT placed; TF and Q6 water boluses in place. SLP ordered for when Pt more alert. Repeat CBC's performed d/t decreasing Hgb.       AWAKE RASS: Goal -    Actual -      Restraint necessity: Not necessary   BREATHE SBT: Not intubated    Coordinate A & B, analgesics/sedatives Pain: managed   SAT: Not intubated   Delirium CAM-ICU:     Early(intubated/ Progressive (non-intubated) Mobility MOVE Screen (INTUBATED ONLY): Not intubated    Activity: Activity Management: Rolling - L1   Feeding/Nutrition Diet order: Diet/Nutrition Received: tube feeding, Specialty Diet/Nutrition Received: high calorie, high protein   Thrombus DVT prophylaxis: VTE Core Measure: Per order contraindicated for SCDs/Anticoagulants   HOB Elevation Head of Bed (HOB) Positioning: HOB at 30-45 degrees   Ulcer Prophylaxis GI: yes   Glucose control managed Glycemic Management: blood glucose monitored   Skin Skin assessment:     Sacrum intact/not altered? No  Heels intact/not altered? No  Surgical wound? No    CHECK ONE!   (no altered skin or altered skin) and sub boxes:  [] No Altered Skin Integrity Present    []Prevention Measures Documented    [x] Altered Skin Integrity Present or Discovered   [x] LDA already present in EPIC, daily doc completed              [x] LDA added if not already in EPIC (describe/stage wound).               [x] Wound Image Taken (required on admit,                   transfer/discharge and every Tuesday)    Wound  Care Consulted? Yes   Bowel Function No issues     Indwelling Catheter Necessity      Urethral Catheter 06/30/25 0756 Temperature probe 16 Fr.-Reason for Continuing Urinary Catheterization: Non-healing sacral/perineal wound       Non healing wound   De-escalation Antibiotics No        VS and assessment per flow sheet, patient progressing towards goals as tolerated, plan of care reviewed with Pt and mother, all concerns addressed, will continue to monitor.

## 2025-06-30 NOTE — ASSESSMENT & PLAN NOTE
. Hypernatremia is likely due to Dehydration.     Plan  - Aim to correct the sodium by 8-10mEq in 24 hours.   - Plan to correct their hypernatremia with Select IV fluids: LR at a rate of 100 ml/hr.   - Will plan to trend the patient's sodium: Daily

## 2025-06-30 NOTE — ASSESSMENT & PLAN NOTE
Hypernatremia is likely due to Dehydration. The patient's most recent sodium results are listed below.  Recent Labs     06/29/25  1745   *     Plan  - Aim to correct the sodium by 8-10mEq in 24 hours.   - Plan to correct their hypernatremia with Select IV fluids: LR at a rate of 100 ml/hr.   - Will plan to trend the patient's sodium: Daily

## 2025-06-30 NOTE — ED NOTES
Upon arrival to room patient awake and alert. Pt following commands at this time. Providers aware.

## 2025-06-30 NOTE — ED NOTES
RN entered room to check pts temp and pt was not responding to her. Sternal rub performed and pt not responding. Rapid called and MD notified.

## 2025-06-30 NOTE — EICU
Virtual ICU Quality Rounds    Admit Date: 6/29/2025  Hospital Day: 1    ICU Day: 2h    24H Vital Sign Range:  Temp:  [91.8 °F (33.2 °C)-99.2 °F (37.3 °C)]   Pulse:  []   Resp:  [9-27]   BP: ()/(42-80)   SpO2:  [86 %-100 %]     VICU Surveillance Screening    Daily review of  line necessity(optional): Urinary catheter in place            Romo Indications : Non-healing perineal and sacral wounds in incontinent patients to avoid further deterioration of wound/skin    LDA reconciliation : Yes            Romo best practices : Nurse driven romo removal protocol ordered    Skin assessment entered into the flowsheet, Wound care consult placed, and Pressure injury prevention panel (order)

## 2025-06-30 NOTE — HPI
Mr. Moreno is a 44yoM with PMHx significant for ALS, neuromuscular dysfunction of the bladder, sacral ulcers, and spinal cerebellar ataxia who was admitted recently admitted (5/9/2025) for severe dehydration, electrolyte disturbances, and covid who presented to the ED with EMS and c/o AMS and hypoxia. Nonverbal at baseline but apparently is usually more alert; has been less responsive and interactive x1 day. Upon EMS arrival, was obtunded and sating 74%. They gave him solumedrol and duonebs en route which improved his oxygen status to >90%. Mother at bedside reports increased difficulty with swallowing and decreased PO intake.       While in the ED, labs and imaging significant for Na 149, K 5.4, Cl 111, CO2 21, glucose 149, BUN 38, creat 1.0, albumin 2.2, ALP 80, AST/ALT 34/23, anion gap 17, procal 8.42, BNP <10, WBC 7.11, H/H 12/40.5, plts 315, covid/flu negative, .6, VBG 7.349/47.1/44.5/23.9, istat LA 4.9->7.4->4.2 (7.4 likely error?). Chest xray with patchy opacities bilaterally, predominant within the bibasilar regions but also within the left lung apex and the left mid-lung; compatible with multifocal pneumonia. Started on BSA by the ED; vanc and cefepime. [facility] consulted for sepsis. During first examination the patient was stable with MAPs >65 and Lactic acid down trended to 2.6. Around 6 am of 6/30 rapids was called hypotension with MAPs in the 50. Pt was started on levophed and was transferred to MICU.

## 2025-06-30 NOTE — ASSESSMENT & PLAN NOTE
"This patient does have evidence of infective focus  My overall impression is sepsis without organ dysfunction.  Source: Respiratory Infection and Skin and Soft Tissue Infection: Deep Wound Infection  Antibiotics given-   Antibiotics (72h ago, onward)      Start     Stop Route Frequency Ordered    06/30/25 0930  vancomycin 750 mg in 0.9% NaCl 250 mL IVPB (admixture device)         -- IV Every 12 hours (non-standard times) 06/29/25 2142 06/30/25 0300  ceFEPIme injection 2 g         -- IV Every 8 hours (non-standard times) 06/29/25 2056 06/29/25 2155  vancomycin - pharmacy to dose  (vancomycin IVPB (PEDS and ADULTS))        Placed in "And" Linked Group    -- IV pharmacy to manage frequency 06/29/25 2056          Latest lactate reviewed-  Recent Labs   Lab 06/29/25 2053 06/29/25 2201   LACTATE  --  2.6*   POCLAC 4.2*  --      Organ dysfunction indicated by Acute respiratory failure    Fluid challenge Ideal Body Weight- The patient is obese (BMI>30) and their ideal body weight of Ideal body weight: 84.5 kg (186 lb 4.6 oz) will be used to calculate fluid bolus of 30 ml/kg.     Post- resuscitation assessment Yes - I attest a sepsis perfusion exam was performed within 6 hours of sepsis, severe sepsis, or septic shock presentation, following fluid resuscitation.    "

## 2025-06-30 NOTE — PLAN OF CARE
PULMONARY & CRITICAL CARE MEDICINE   PLAN OF CARE UPDATE    In brief, Mr. Moreno is a 44 y.o. man with ALS w/chronic hypoxemic respiratory failure, spinocerebellar ataxia and recurrent UTIs from neurogenic bladder. Chronic wounds including stage IV decubitus ulcer for which he is receiving wound care in community and had debridement in past few days. Presented for evaluation of acute encephalopathy. MICU consulted for acute change in mental status & hemodynamic instability.     On exam, does not follow commands but rarely opens eyes and tracks. Regular rate and rhythm w/o murmur, rub, gallop. Normal capillary refill. Breath sounds clear to auscultation bilaterally. Abdomen benign. Multiple wounds as in media tab.     No leukocytosis but granulocyte-predominance. Normocytic anemia Dehydrated with nonoliguric BIBIANA. AGMA (resolved). Azotemic & hypoalbuminemic. Few bacteria otherwise bland urinalysis. VBG 7.4 37 +1. Plain film patchy airspace disease in the baces. Large gastric bubble. Normal BiV function on bedside TTE. RAP 3-5 mmHg. No TR. No pericardial disease.     # Acute encephalopathy, multifactorial  # ALS w/diffuse spacticity/weakness on baclofen + dysphagia    # Shock, favor hypovolemic versus septic   # Chronic hypoxemic respiratory failure w/o hypercapnia   # Nonoliguric BIBIANA due to decreased EABV  # Septic shock, source likely sacral wound versus less likely pulmonary  # Oropharyngeal dysphagia  # Normocytic anemia, acute on chronic  - Acute encephalopathy in context of infection. Low suspicion for meningoencephalitis or seizures   - Continue home spasticity meds   - Give more crystalloid  - Vanc & pip-tazo pending culture data; add azithro if respiratory failure worsens but favor that the plain film changes are from an aspiration event and not pneumonic process   - Wound care & consider gen surg consultation. No evidence at present of NSTI   - Enteral access for nutrition  - Empiric PPI pending repeat given  significant change from baseline; no observed blood loss    Kennedy Bravo DO  12:05 PM 06/30/2025

## 2025-06-30 NOTE — H&P
[facility] - Emergency Dept  [facility] Medicine  History & Physical    Patient Name: Lisa Moreno  MRN: [id number]  Patient Class: IP- Inpatient  Admission Date: 6/29/2025  Attending Physician: Kathleen Alvarez MD   Primary Care Provider: John Nixon II, MD         Patient information was obtained from parent and ER records.     Subjective:     Principal Problem:Pneumonia    Chief Complaint:   Chief Complaint   Patient presents with    Shortness of Breath     Pt has ALS and sacral wound with wound vac. Family reported decreased LOC. EMS found RA O2 sat of 74% Pt placed NRB with Duo Nebs and O2 sat went to 94%. Pt de-sats if removed from NRB. Pt had recent pneumonia diagnosis with ABX treatment.         HPI: Mr. Moreno is a 44-year-old male with a medical history including ALS, spinal cerebellar ataxia, neuromuscular bladder dysfunction, and sacral pressure ulcers. He was recently hospitalized on 5/9/2025 for severe dehydration, electrolyte abnormalities, and COVID-19 infection. He now presents to the ED via EMS with altered mental status and hypoxia. The patient is nonverbal at baseline but is typically more alert and interactive. History was provided by his mother, who reported that he had been noticeably less responsive over the past day and had a decreased oral intake. She denied any fever, shortness of breath, nausea, vomiting, or diarrhea.    Upon EMS arrival, the patient was obtunded and hypoxic with an oxygen saturation of 74%. He was treated en route with Solu-Medrol and DuoNebs, resulting in improvement of his oxygen saturation to above 90%.    In the ED, patient was obtunded but stabilized with improved oxygenation after initial EMS interventions.  Labs notable for Na: 149, K: 5.4, Cl: 111, CO?: 21, Glucose: 149, BUN: 38, Creatinine: 1.0, CRP: 265.6, Procalcitonin: 8.42, i-STAT lactate: 4.9 ? 7.4 ? 4.2 (note: 7.4 likely lab error), Chest X-ray revealed patchy bilateral opacities, most  prominent in the bibasilar regions, as well as in the left apex and mid-lung zones--findings consistent with multifocal pneumonia.  He received 2 L of IV fluids, IV cefepime and vancomycin and admitted to hospital medicine for further management    Past Medical History:   Diagnosis Date    Anxiety     Degenerative motor system disease     Depression     Insomnia secondary to depression with anxiety 10/30/2024    Spastic quadriplegia     Spinocerebellar atrophy        Past Surgical History:   Procedure Laterality Date    BACLOFEN PUMP IMPLANTATION      COLONOSCOPY N/A 7/23/2020    Procedure: COLONOSCOPY;  Surgeon: Ziyad Fitch MD;  Location: [facility];  Service: Endoscopy;  Laterality: N/A;    ESOPHAGOGASTRODUODENOSCOPY N/A 7/23/2020    Procedure: EGD (ESOPHAGOGASTRODUODENOSCOPY);  Surgeon: Ziyad Fitch MD;  Location: [facility];  Service: Endoscopy;  Laterality: N/A;    FLUOROSCOPIC URODYNAMIC STUDY N/A 11/27/2018    Procedure: URODYNAMIC STUDY, FLUOROSCOPIC;  Surgeon: Tanja Okeefe MD;  Location: [facility] OR [address] [place] [address];  Service: Urology;  Laterality: N/A;  1 hour    REATTACHMENT OF MUSCLE Bilateral 2/18/2022    Procedure: PTOSIS REPAIR OF BOTH EYES;  Surgeon: Krupa Rios MD;  Location: [facility] OR [address] [place] [address];  Service: Ophthalmology;  Laterality: Bilateral;    UPPER GASTROINTESTINAL ENDOSCOPY         Review of patient's allergies indicates:   Allergen Reactions    Penicillins      Hives and Itching    Allergen ext-venom-honey bee        No current facility-administered medications on file prior to encounter.     Current Outpatient Medications on File Prior to Encounter   Medication Sig    baclofen (LIORESAL) 20 MG tablet Take 1 tablet (20 mg total) by mouth 3 (three) times daily.    cetirizine (ZYRTEC) 10 MG tablet TAKE 1 TABLET BY MOUTH DAILY    dantrolene (DANTRIUM) 50 MG Cap TAKE 1 CAPSULE(50 MG) BY MOUTH THREE TIMES DAILY    dimethicone-zinc oxide 1-40 % Oint Apply to affected area twice daily     donepeziL (ARICEPT) 5 MG tablet Take 1 tablet (5 mg total) by mouth every evening.    mirtazapine (REMERON) 15 MG tablet Take 1 tablet (15 mg total) by mouth every evening.    QUEtiapine (SEROQUEL) 50 MG tablet Take 1 tablet (50 mg total) by mouth every evening.     Family History       Problem Relation (Age of Onset)    Cancer Mother    Depression Mother    Hypertension Mother    Multiple sclerosis Other    No Known Problems Brother, Son    Thyroid cancer Mother          Tobacco Use    Smoking status: Never    Smokeless tobacco: Never   Substance and Sexual Activity    Alcohol use: Not Currently     Comment: social drinker, at times    Drug use: Not Currently    Sexual activity: Never     Review of Systems   Unable to perform ROS: Patient nonverbal     Objective:     Vital Signs (Most Recent):  Temp: 98.4 °F (36.9 °C) (06/29/25 2232)  Pulse: 82 (06/30/25 0000)  Resp: 13 (06/29/25 2332)  BP: 97/67 (06/30/25 0000)  SpO2: 100 % (06/30/25 0000) Vital Signs (24h Range):  Temp:  [98 °F (36.7 °C)-99.2 °F (37.3 °C)] 98.4 °F (36.9 °C)  Pulse:  [] 82  Resp:  [12-27] 13  SpO2:  [86 %-100 %] 100 %  BP: ()/(59-80) 97/67     Weight: 59.9 kg (132 lb 0.9 oz)  Body mass index is 16.51 kg/m².     Physical Exam  Constitutional:       General: He is not in acute distress.     Appearance: He is not ill-appearing.   Cardiovascular:      Rate and Rhythm: Normal rate and regular rhythm.      Pulses: Normal pulses.      Heart sounds: Normal heart sounds.   Pulmonary:      Effort: Pulmonary effort is normal.      Breath sounds: Normal breath sounds.      Comments: Transmitted sounds upper airway sounds  Musculoskeletal:      Right lower leg: No edema.      Left lower leg: No edema.   Skin:     Comments: Sacral wound with wound VAC in place   Neurological:      Comments: He is alert, mental status at baseline                Significant Labs: All pertinent labs within the past 24 hours have been reviewed.  Recent Lab Results   (Last 5 results in the past 24 hours)        06/29/25  2201 06/29/25 2053 06/29/25  2040   06/29/25  1814   06/29/25  1805        RSV Ag by Molecular Method       Negative         Influenza A, Molecular       Negative         Influenza B, Molecular       Negative         Performed By:   JOSE LUIS AMAYA           Specimen source   ST_NotSpecified   ST_NotSpecified           Appearance, UA Clear               Bacteria, UA Few               Bilirubin (UA) Negative               BIPAP   0   0           BNP         <10  Comment: Values of less than 100 pg/ml are consistent with non-CHF populations.        Color, UA Yellow               FiO2   21.0   21.0           Glucose, UA Negative               Hyaline Casts, UA 4               Ketones, UA Negative               Lactic Acid Level 2.6  Comment:   A repeat order for Lactic Acid has been placed for collection in 3 hours.               Leukocyte Esterase, UA Negative               Microscopic Comment   Comment: Other formed elements not mentioned in the report are not present in the microscopic examination.               NITRITE UA Negative               Blood, UA Negative               pH, UA 8.0               POC Lactate   4.2  Comment:  notified  at    read back  Value above critical limit     7.4  Comment:  notified  at    read back  Value above critical limit             POC Temp   37.0   37.0           Protein, UA 1+  Comment: Recommend a 24 hour urine protein or a urine protein/creatinine ratio if globulin induced proteinuria is clinically suspected.               RBC, UA 2               SARS-CoV2 (COVID-19) Qualitative PCR       Negative         Spec Grav UA 1.030               Squam Epithel, UA <1               Urobilinogen, UA Negative               WBC, UA 1                                      Significant Imaging: I have reviewed all pertinent imaging results/findings within the past 24 hours.  Assessment/Plan:     Assessment & Plan  Pneumonia  -Chest  X-ray revealed patchy bilateral opacities, most prominent in the bibasilar regions, as well as in the left apex and mid-lung zones--findings consistent with multifocal pneumonia.  -procalcitonin and CRP elevated  -Blood cultures pending  -On IV cefepime and vancomycin  Spastic diplegia  Continue baclofen and dantrolene    Spinocerebellar ataxia  Continue baclofen and dantrolene    Oropharyngeal dysphagia  NPO for now  Bedside swallow ordered       ALS (amyotrophic lateral sclerosis)  UMN predominance with diffuse limb spasticity. Slowly progressive. Fully-reliant on mother, others for self care and feeding and cleaning and toileting. Respiratory function is poor per FVC but he hasn't got evidence of orthopnea, dyspnea, hypercapnia.      -Continue home donepezil for neuroprotection  -Continue home dantrolene and baclofen for spacticity    Insomnia secondary to depression with anxiety  Continue quetiapine and mirtazapine      Hypernatremia  Hypernatremia is likely due to Dehydration. The patient's most recent sodium results are listed below.  Recent Labs     06/29/25  1745   *     Plan  - Aim to correct the sodium by 8-10mEq in 24 hours.   - Plan to correct their hypernatremia with Select IV fluids: LR at a rate of 100 ml/hr.   - Will plan to trend the patient's sodium: Daily    Sepsis  This patient does have evidence of infective focus  My overall impression is sepsis without organ dysfunction.  Source: Respiratory Infection and Skin and Soft Tissue Infection: Deep Wound Infection  Antibiotics given-   Antibiotics (72h ago, onward)      Start     Stop Route Frequency Ordered    06/30/25 0930  vancomycin 750 mg in 0.9% NaCl 250 mL IVPB (admixture device)         -- IV Every 12 hours (non-standard times) 06/29/25 2142 06/30/25 0300  ceFEPIme injection 2 g         -- IV Every 8 hours (non-standard times) 06/29/25 2056 06/29/25 2155  vancomycin - pharmacy to dose  (vancomycin IVPB (PEDS and ADULTS))       "  Placed in "And" Linked Group    -- IV pharmacy to manage frequency 06/29/25 2056          Latest lactate reviewed-  Recent Labs   Lab 06/29/25 2053 06/29/25  2201   LACTATE  --  2.6*   POCLAC 4.2*  --      Organ dysfunction indicated by Acute respiratory failure    Fluid challenge Ideal Body Weight- The patient is obese (BMI>30) and their ideal body weight of Ideal body weight: 84.5 kg (186 lb 4.6 oz) will be used to calculate fluid bolus of 30 ml/kg.     Post- resuscitation assessment Yes - I attest a sepsis perfusion exam was performed within 6 hours of sepsis, severe sepsis, or septic shock presentation, following fluid resuscitation.    Sacral decubitus ulcer  Has a wound VAC in place  Wound care consulted    Acute hypoxic respiratory failure  Patient with Hypoxic Respiratory failure which is Acute.  he is not on home oxygen. Supplemental oxygen was provided and noted-      .   Signs/symptoms of respiratory failure include- tachypnea and lethargy. Contributing diagnoses includes - Pneumonia Labs and images were reviewed. Patient Has recent ABG, which has been reviewed. Will treat underlying causes and adjust management of respiratory failure as follows- on IV cefepime and vancomycin    Wean off oxygen as tolerated  VTE Risk Mitigation (From admission, onward)           Ordered     enoxaparin injection 40 mg  Daily         06/29/25 2044     IP VTE HIGH RISK PATIENT  Once         06/29/25 2044     Place sequential compression device  Until discontinued         06/29/25 2044                                           Pharmacokinetic Initial Assessment: IV Vancomycin    Assessment/Plan:    Initiate intravenous vancomycin with loading dose of 1250 mg once, done in ED, followed by a maintenance dose of vancomycin 750 mg IV every 12 hours.  Desired empiric serum trough concentration is 10 to 20 mcg/mL.  Draw vancomycin trough level 60 min prior to fourth dose on 07/01/2025 at approximately 0830.  Pharmacy will " "continue to follow and monitor vancomycin.      Please contact pharmacy at extension [phone] with any questions regarding this assessment.     Thank you for the consult,   Delmar Azar       Patient brief summary:  Lisa Moreno is a 44 y.o. male initiated on antimicrobial therapy with IV Vancomycin for treatment of suspected skin & soft tissue infection.    Drug Allergies:   Review of patient's allergies indicates:   Allergen Reactions    Penicillins      Hives and Itching    Allergen ext-venom-honey bee        Actual Body Weight:   59.9 kg    Renal Function:   Estimated Creatinine Clearance: 79 mL/min (based on SCr of 1 mg/dL).     CBC (last 72 hours):  Recent Labs   Lab Result Units 06/29/25  1745   WBC K/uL 7.11   HGB gm/dL 12.0*   HCT % 40.5   Platelet Count K/uL 315   Lymph % % 8.6*   Mono % % 5.2   Eos % % 0.0   Basophil % % 1.0       Metabolic Panel (last 72 hours):  Recent Labs   Lab Result Units 06/29/25  1745   Sodium mmol/L 149*   Potassium mmol/L 5.4*   Chloride mmol/L 111*   CO2 mmol/L 21*   Glucose mg/dL 149*   BUN mg/dL 38*   Creatinine mg/dL 1.0   Albumin g/dL 2.2*   Bilirubin Total mg/dL 0.2   ALP unit/L 80   AST unit/L 34   ALT unit/L 23       Drug levels (last 3 results):  No results for input(s): "VANCOMYCINRA", "VANCORANDOM", "VANCOMYCINPE", "VANCOPEAK", "VANCOMYCINTR", "VANCOTROUGH" in the last 72 hours.    Microbiologic Results:  Microbiology Results (last 7 days)       Procedure Component Value Units Date/Time    Blood culture x two cultures. Draw prior to antibiotics. [[id number]] Collected: 06/29/25 1805    Order Status: Sent Specimen: Blood from Peripheral, Antecubital, Left Updated: 06/29/25 1916    Blood culture x two cultures. Draw prior to antibiotics. [[id number]] Collected: 06/29/25 1814    Order Status: Sent Specimen: Blood from Peripheral, Hand, Left Updated: 06/29/25 1916              Juno Mckeon MD  Department of Hospital Medicine  [facility] - Emergency " Dept

## 2025-06-30 NOTE — ED NOTES
Received report from Cynthia FORD. Assumed care of patient. Patient is alert and resting comfortably in bed in NAD. BP, cardiac, and O2 monitoring continued. Family member at bedside. Patient and family updated on plan of care, pt denies any needs or complaints at this time. Bed locked in lowest position, side rails up x2, call light within reach. VSS.

## 2025-06-30 NOTE — CODE/ RAPID DOCUMENTATION
RAPID RESPONSE NURSE NOTE        Admit Date: 2025  LOS: 1  Code Status: Full Code   Date of Consult: 2025  : 1980  Age: 44 y.o.  Weight:   Wt Readings from Last 1 Encounters:   25 59.9 kg (132 lb 0.9 oz)     Sex: male  Race: Black or    Bed: ED 36   MRN: [id number]  Time Rapid Response Team page Received: 0611  Time Rapid Response Team at Bedside: 06  Time Rapid Response Team left Bedside: 06  Was the patient discharged from an ICU this admission? No   Was the patient discharged from a PACU within last 24 hours? No   Did the patient receive conscious sedation/general anesthesia in last 24 hours? No  Was the patient in the ED within the past 24 hours? Yes  Was the patient on NIPPV within the past 24 hours? No   Did this progress into an ARC or CPA: No  Attending Physician: Chris Espinoza*  Primary Service: [facility]       SITUATION    Notified by Bedside RN via phone call.  Reason for alert: altered mental status  Called to evaluate the patient for Neuro.    BACKGROUND     Why is the patient in the hospital?: Pneumonia    Patient has a past medical history of Anxiety, Degenerative motor system disease, Depression, Insomnia secondary to depression with anxiety, Spastic quadriplegia, and Spinocerebellar atrophy.    Last Vitals:  Temp: 98.4 °F (36.9 °C) (2232)  Pulse: 71 (649)  Resp: 26 (649)  BP: 103/78 (649)  SpO2: 99 % (649)    24 Hours Vitals Range:  Temp:  [98 °F (36.7 °C)-99.2 °F (37.3 °C)]   Pulse:  []   Resp:  [10-27]   BP: ()/(42-80)   SpO2:  [86 %-100 %]     Labs:  Recent Labs     25  1745 25  1750 25  0327 25  0621 25  0629   WBC 7.11  --  8.29  --  7.44   HGB 12.0*  --  8.1*  --  7.8*   HCT 40.5   < > 26.2* 25.2 24.6*     --  245  --  234    < > = values in this interval not displayed.       Recent Labs     25  1745 25  0327   * 144   K 5.4* 5.1   CL  111* 115*   CO2 21* 21*   BUN 38* 33*   CREATININE 1.0 0.7   * 145*   PHOS  --  4.9*   MG  --  1.9        Recent Labs     06/29/25  1750 06/30/25  0621   PH 7.349* 7.384   PCO2 47.1* 44.1   PO2 44.5 61.5*   HCO3 23.9 25.3        ASSESSMENT     Physical Exam  Constitutional:       Appearance: He is cachectic. He is ill-appearing.   Cardiovascular:      Rate and Rhythm: Normal rate.   Pulmonary:      Effort: Bradypnea present.      Breath sounds: Decreased air movement present. Decreased breath sounds present.   Abdominal:      Palpations: Abdomen is soft.   Skin:     General: Skin is warm and dry.   Neurological:      Mental Status: He is unresponsive.      GCS: GCS eye subscore is 2. GCS verbal subscore is 1. GCS motor subscore is 1.     HR 74  BP 67/42 (50)  RR 9, SpO2 99% on room air      Notified by bedside RN that patient was unresponsive    Upon arrival to bedside patient lying on ED stretcher. Opens eyes slightly to pain, otherwise no response to noxious stimuli. Bradypneic, with very little air movement. First BP cycled 67/42 (50)    INTERVENTIONS    The patient was seen for Neurological problem. Staff concerns included mental status change and decreased responsiveness. The following interventions were performed: POCT glucose and 1L fluid bolus, POCT venous blood gas with lytes and lactic, CBC, CMP, mag, phos, lactic, STAT CT head w/o contrast, consult to critical care.    1000mL fluid bolus initiated to PIV with pressure bag    Additional PIV access placed by charge RN, blood drawn and sent for STAT labs and VBG. Venous Blood Gas result:  pO2 61.5; pCO2 44.1; pH 7.384;  HCO3 25.3, BE 1.1, K+ 4.2, Hct 25.2, lactate 1.1.     RUDY Andujar NP with critical care contacted, to bedside to evaluate pt. Decision made to take patient for STAT CT head w/o contrast. Norepinephrine gtt initiated to PIV as per order from NP    Patient taken to CT scan, and while in CT became more awake and alert, responding to  verbal commands, opening eyes and attempting to speak. CT head completed without issue, patient then settled into ED room 01.     CCM to accept pt, upgrade orders placed to transfer pt to MICU    RECOMMENDATIONS    We recommend: Critical care upgrade    PROVIDER ESCALATION    Orders received and case discussed with Kathleen Andujar NP.    Primary team arrival time: 0625     Disposition: Tx in ICU bed ED 01. transfer to MICU once bed becomes available    FOLLOW UP    ED bedside nurseNnamdi given bedside handoff updated on plan of care. Instructed to call the Rapid Response NurseLAVON RN at [phone] for additional questions or concerns.

## 2025-06-30 NOTE — PT/OT/SLP PROGRESS
Speech Language Pathology      [facility] Albaro Moreno  MRN: [id number]    Patient not seen today secondary to Nursing hold (Comment) 2/2 FLORIN. NG tube placed. Will follow-up tomorrow. .

## 2025-06-30 NOTE — ASSESSMENT & PLAN NOTE
"This patient does have evidence of infective focus  My overall impression is sepsis without organ dysfunction.  Source: Respiratory Infection  Antibiotics given-   Antibiotics (72h ago, onward)      Start     Stop Route Frequency Ordered    06/30/25 0930  vancomycin 750 mg in 0.9% NaCl 250 mL IVPB (admixture device)         -- IV Every 12 hours (non-standard times) 06/29/25 2142 06/30/25 0300  ceFEPIme injection 2 g         -- IV Every 8 hours (non-standard times) 06/29/25 2056 06/29/25 2155  vancomycin - pharmacy to dose  (vancomycin IVPB (PEDS and ADULTS))        Placed in "And" Linked Group    -- IV pharmacy to manage frequency 06/29/25 2056          Latest lactate reviewed-  Recent Labs   Lab 06/30/25  0621 06/30/25  0629   LACTATE  --  1.3   POCLAC 1.1  --        Will Not start   Source control achieved by:     Mr. Moreno is a 44yoM with PMHx significant for ALS, neuromuscular dysfunction of the bladder, sacral ulcers, and spinal cerebellar ataxia who was admitted recently admitted (5/9/2025) for severe dehydration, electrolyte disturbances, and covid who presented to the ED with EMS and c/o AMS and hypoxia. Nonverbal at baseline but apparently is usually more alert; has been less responsive and interactive x1 day. Upon EMS arrival, was obtunded and sating 74%. They gave him solumedrol and duonebs en route which improved his oxygen status to >90%. Mother at bedside reports increased difficulty with swallowing and decreased PO intake.       While in the ED, labs and imaging significant for Na 149, K 5.4, Cl 111, CO2 21, glucose 149, BUN 38, creat 1.0, albumin 2.2, ALP 80, AST/ALT 34/23, anion gap 17, procal 8.42, BNP <10, WBC 7.11, H/H 12/40.5, plts 315, covid/flu negative, .6, VBG 7.349/47.1/44.5/23.9, istat LA 4.9->7.4->4.2 (7.4 likely error?). Chest xray with patchy opacities bilaterally, predominant within the bibasilar regions but also within the left lung apex and the left mid-lung; compatible " with multifocal pneumonia. Started on BSA by the ED; vanc and cefepime.     - Repeat serum LA 2.6  - Continue BSA  - Follow up infectious workup  - Recommend deep suction per RT  - Pulmonary hygiene   - Maintain spo2 >90%; wean fio2 as tolerated  - Continuous telemetry and spO2 monitoring  - Neuro checks  - Delirium/aspiration/fall precautions  - Recommend Wound Care for sacral and lower extremity wounds

## 2025-06-30 NOTE — PROGRESS NOTES
This is an attestation of a separate note by the ICU resident/fellow. As the teaching physician, I saw the patient with the resident/fellow and agree with the resident/fellow's findings and plan. In addition to the resident/fellow's documentation, I add the following:     Lisa Moreno is a 43 yo man with neuromuscular disorder onset 2006 which has progress to loss of ambulation, spastic quadriparesis, abnormal swallow (requires thickeners) with intact cough, no evidence of hypercapnia, known decubitus, admitted with altered mental status, procalcitonin 8.4.    # encephalopathy - no hypercapnia, opens eyes and follows commands but chronically quadriparetic  # sepsis - markedly elevated procalcitonin, decubitus ulcer, multifocal airspace dz - started on vancomycin, cefepime   # hypoxemia - mild, oxygen 1 lpm, mild multifocal airspace dz, ?aspiration  # neuromuscular disease - progressive over 2 decades with weakness and spasticity and bulbar involvement  # spasticity - has been a significant problem - takes baclofen, dantrolene - continue  # nutrition - place NG tube as will not be able to maintain nutrition in short term. Consider gastrostomy tube placement - discuss with pt and mother     Richard Webb MD  [facility] Attending  Cell: [phone]    Critical Care time was spent validating the history and physical exam, reviewing the lab and imaging results, and discussing the care of the patient with the bedside nurse and the patient and/or surrogates. This critical care time did not overlap with that of any other provider or involve time for any procedures.  This patient has a high probability of sudden clinically significant deterioration which requires the highest level of physician preparedness to intervene urgently. I managed/supervised life or organ supporting interventions that required frequent physician assessments. I devoted my full attention in the ICU to the direct care of this patient for this period of  time. Organ systems which are failing and require intensive, critical care support are: sepsis, encephalopathy  Critical Care time: 60 minutes

## 2025-06-30 NOTE — PROGRESS NOTES
"Pharmacokinetic Initial Assessment: IV Vancomycin    Assessment/Plan:    Initiate intravenous vancomycin with loading dose of 1250 mg once, done in ED, followed by a maintenance dose of vancomycin 750 mg IV every 12 hours.  Desired empiric serum trough concentration is 10 to 20 mcg/mL.  Draw vancomycin trough level 60 min prior to fourth dose on 07/01/2025 at approximately 0830.  Pharmacy will continue to follow and monitor vancomycin.      Please contact pharmacy at extension [phone] with any questions regarding this assessment.     Thank you for the consult,   Delmar Azar       Patient brief summary:  Lisa Moreno is a 44 y.o. male initiated on antimicrobial therapy with IV Vancomycin for treatment of suspected skin & soft tissue infection.    Drug Allergies:   Review of patient's allergies indicates:   Allergen Reactions    Penicillins      Hives and Itching    Allergen ext-venom-honey bee        Actual Body Weight:   59.9 kg    Renal Function:   Estimated Creatinine Clearance: 79 mL/min (based on SCr of 1 mg/dL).     CBC (last 72 hours):  Recent Labs   Lab Result Units 06/29/25  1745   WBC K/uL 7.11   HGB gm/dL 12.0*   HCT % 40.5   Platelet Count K/uL 315   Lymph % % 8.6*   Mono % % 5.2   Eos % % 0.0   Basophil % % 1.0       Metabolic Panel (last 72 hours):  Recent Labs   Lab Result Units 06/29/25  1745   Sodium mmol/L 149*   Potassium mmol/L 5.4*   Chloride mmol/L 111*   CO2 mmol/L 21*   Glucose mg/dL 149*   BUN mg/dL 38*   Creatinine mg/dL 1.0   Albumin g/dL 2.2*   Bilirubin Total mg/dL 0.2   ALP unit/L 80   AST unit/L 34   ALT unit/L 23       Drug levels (last 3 results):  No results for input(s): "VANCOMYCINRA", "VANCORANDOM", "VANCOMYCINPE", "VANCOPEAK", "VANCOMYCINTR", "VANCOTROUGH" in the last 72 hours.    Microbiologic Results:  Microbiology Results (last 7 days)       Procedure Component Value Units Date/Time    Blood culture x two cultures. Draw prior to antibiotics. [[id number]] " Collected: 06/29/25 1805    Order Status: Sent Specimen: Blood from Peripheral, Antecubital, Left Updated: 06/29/25 1916    Blood culture x two cultures. Draw prior to antibiotics. [[id number]] Collected: 06/29/25 1814    Order Status: Sent Specimen: Blood from Peripheral, Hand, Left Updated: 06/29/25 1916

## 2025-06-30 NOTE — SUBJECTIVE & OBJECTIVE
Past Medical History:   Diagnosis Date    Anxiety     Degenerative motor system disease     Depression     Insomnia secondary to depression with anxiety 10/30/2024    Spastic quadriplegia     Spinocerebellar atrophy        Past Surgical History:   Procedure Laterality Date    BACLOFEN PUMP IMPLANTATION      COLONOSCOPY N/A 7/23/2020    Procedure: COLONOSCOPY;  Surgeon: Ziyad Fitch MD;  Location: [facility] ENDO;  Service: Endoscopy;  Laterality: N/A;    ESOPHAGOGASTRODUODENOSCOPY N/A 7/23/2020    Procedure: EGD (ESOPHAGOGASTRODUODENOSCOPY);  Surgeon: Ziyad Fitch MD;  Location: [facility] ENDO;  Service: Endoscopy;  Laterality: N/A;    FLUOROSCOPIC URODYNAMIC STUDY N/A 11/27/2018    Procedure: URODYNAMIC STUDY, FLUOROSCOPIC;  Surgeon: Tanja Okeefe MD;  Location: [facility] OR [address] [place] [address];  Service: Urology;  Laterality: N/A;  1 hour    REATTACHMENT OF MUSCLE Bilateral 2/18/2022    Procedure: PTOSIS REPAIR OF BOTH EYES;  Surgeon: Krupa Rios MD;  Location: [facility] OR [address] [place] [address];  Service: Ophthalmology;  Laterality: Bilateral;    UPPER GASTROINTESTINAL ENDOSCOPY         Review of patient's allergies indicates:   Allergen Reactions    Penicillins      Hives and Itching    Allergen ext-venom-honey bee        No current facility-administered medications on file prior to encounter.     Current Outpatient Medications on File Prior to Encounter   Medication Sig    baclofen (LIORESAL) 20 MG tablet Take 1 tablet (20 mg total) by mouth 3 (three) times daily.    cetirizine (ZYRTEC) 10 MG tablet TAKE 1 TABLET BY MOUTH DAILY    dantrolene (DANTRIUM) 50 MG Cap TAKE 1 CAPSULE(50 MG) BY MOUTH THREE TIMES DAILY    dimethicone-zinc oxide 1-40 % Oint Apply to affected area twice daily    donepeziL (ARICEPT) 5 MG tablet Take 1 tablet (5 mg total) by mouth every evening.    mirtazapine (REMERON) 15 MG tablet Take 1 tablet (15 mg total) by mouth every evening.    QUEtiapine (SEROQUEL) 50 MG tablet Take 1 tablet (50 mg total) by  mouth every evening.     Family History       Problem Relation (Age of Onset)    Cancer Mother    Depression Mother    Hypertension Mother    Multiple sclerosis Other    No Known Problems Brother, Son    Thyroid cancer Mother          Tobacco Use    Smoking status: Never    Smokeless tobacco: Never   Substance and Sexual Activity    Alcohol use: Not Currently     Comment: social drinker, at times    Drug use: Not Currently    Sexual activity: Never     Review of Systems   Unable to perform ROS: Patient nonverbal     Objective:     Vital Signs (Most Recent):  Temp: 98.4 °F (36.9 °C) (06/29/25 2232)  Pulse: 82 (06/30/25 0000)  Resp: 13 (06/29/25 2332)  BP: 97/67 (06/30/25 0000)  SpO2: 100 % (06/30/25 0000) Vital Signs (24h Range):  Temp:  [98 °F (36.7 °C)-99.2 °F (37.3 °C)] 98.4 °F (36.9 °C)  Pulse:  [] 82  Resp:  [12-27] 13  SpO2:  [86 %-100 %] 100 %  BP: ()/(59-80) 97/67     Weight: 59.9 kg (132 lb 0.9 oz)  Body mass index is 16.51 kg/m².     Physical Exam  Constitutional:       General: He is not in acute distress.     Appearance: He is not ill-appearing.   Cardiovascular:      Rate and Rhythm: Normal rate and regular rhythm.      Pulses: Normal pulses.      Heart sounds: Normal heart sounds.   Pulmonary:      Effort: Pulmonary effort is normal.      Breath sounds: Normal breath sounds.      Comments: Transmitted sounds upper airway sounds  Musculoskeletal:      Right lower leg: No edema.      Left lower leg: No edema.   Skin:     Comments: Sacral wound with wound VAC in place   Neurological:      Comments: He is alert, mental status at baseline                Significant Labs: All pertinent labs within the past 24 hours have been reviewed.  Recent Lab Results  (Last 5 results in the past 24 hours)        06/29/25  2201   06/29/25 2053 06/29/25 2040 06/29/25  1814   06/29/25  1805        RSV Ag by Molecular Method       Negative         Influenza A, Molecular       Negative         Influenza B,  Molecular       Negative         Performed By:   JOSE LUIS AMAYA           Specimen source   ST_NotSpecified   ST_NotSpecified           Appearance, UA Clear               Bacteria, UA Few               Bilirubin (UA) Negative               BIPAP   0   0           BNP         <10  Comment: Values of less than 100 pg/ml are consistent with non-CHF populations.        Color, UA Yellow               FiO2   21.0   21.0           Glucose, UA Negative               Hyaline Casts, UA 4               Ketones, UA Negative               Lactic Acid Level 2.6  Comment:   A repeat order for Lactic Acid has been placed for collection in 3 hours.               Leukocyte Esterase, UA Negative               Microscopic Comment   Comment: Other formed elements not mentioned in the report are not present in the microscopic examination.               NITRITE UA Negative               Blood, UA Negative               pH, UA 8.0               POC Lactate   4.2  Comment:  notified  at    read back  Value above critical limit     7.4  Comment:  notified  at    read back  Value above critical limit             POC Temp   37.0   37.0           Protein, UA 1+  Comment: Recommend a 24 hour urine protein or a urine protein/creatinine ratio if globulin induced proteinuria is clinically suspected.               RBC, UA 2               SARS-CoV2 (COVID-19) Qualitative PCR       Negative         Spec Grav UA 1.030               Squam Epithel, UA <1               Urobilinogen, UA Negative               WBC, UA 1                                      Significant Imaging: I have reviewed all pertinent imaging results/findings within the past 24 hours.

## 2025-06-30 NOTE — H&P
[facility] - Medical ICU  Critical Care Medicine  History & Physical    Patient Name: Lisa Moreno  MRN: [id number]  Admission Date: 6/29/2025  Hospital Length of Stay: 1 days  Code Status: Full Code  Attending Physician: Richard Webb MD   Primary Care Provider: John Nixon II, MD   Principal Problem: Pneumonia    Subjective:     HPI:  Mr. Moreno is a 44yoM with PMHx significant for ALS, neuromuscular dysfunction of the bladder, sacral ulcers, and spinal cerebellar ataxia who was admitted recently admitted (5/9/2025) for severe dehydration, electrolyte disturbances, and covid who presented to the ED with EMS and c/o AMS and hypoxia. Nonverbal at baseline but apparently is usually more alert; has been less responsive and interactive x1 day. Upon EMS arrival, was obtunded and sating 74%. They gave him solumedrol and duonebs en route which improved his oxygen status to >90%. Mother at bedside reports increased difficulty with swallowing and decreased PO intake.       While in the ED, labs and imaging significant for Na 149, K 5.4, Cl 111, CO2 21, glucose 149, BUN 38, creat 1.0, albumin 2.2, ALP 80, AST/ALT 34/23, anion gap 17, procal 8.42, BNP <10, WBC 7.11, H/H 12/40.5, plts 315, covid/flu negative, .6, VBG 7.349/47.1/44.5/23.9, istat LA 4.9->7.4->4.2 (7.4 likely error?). Chest xray with patchy opacities bilaterally, predominant within the bibasilar regions but also within the left lung apex and the left mid-lung; compatible with multifocal pneumonia. Started on BSA by the ED; vanc and cefepime. [facility] consulted for sepsis. During first examination the patient was stable with MAPs >65 and Lactic acid down trended to 2.6. Around 6 am of 6/30 [place] was called hypotension with MAPs in the 50. Pt was started on levophed and was transferred to MICU.     Hospital/ICU Course:  No notes on file     No new subjective & objective note has been filed under this hospital service since the last note was  "generated.    Assessment/Plan:     Neuro  Acute encephalopathy  Likely 2/2 sepsis    - see plan for sepsis    ALS (amyotrophic lateral sclerosis)  Hx of ALS w/diffuse spacticity/weakness + dysphagia     - continue baclofen and dantrolene    Spastic diplegia  -continue home baclofen    Psychiatric  Insomnia secondary to depression with anxiety  Continue quetiapine and mirtazapine       Pulmonary  * Pneumonia  - see plan for sepsis    Renal/  Hypernatremia  . Hypernatremia is likely due to Dehydration.     Plan  - Aim to correct the sodium by 8-10mEq in 24 hours.   - Plan to correct their hypernatremia with Select IV fluids: LR at a rate of 100 ml/hr.   - Will plan to trend the patient's sodium: Daily       ID  Sepsis  This patient does have evidence of infective focus  My overall impression is sepsis without organ dysfunction.  Source: Respiratory Infection  Antibiotics given-   Antibiotics (72h ago, onward)      Start     Stop Route Frequency Ordered    06/30/25 0930  vancomycin 750 mg in 0.9% NaCl 250 mL IVPB (admixture device)         -- IV Every 12 hours (non-standard times) 06/29/25 2142 06/30/25 0300  ceFEPIme injection 2 g         -- IV Every 8 hours (non-standard times) 06/29/25 2056 06/29/25 2155  vancomycin - pharmacy to dose  (vancomycin IVPB (PEDS and ADULTS))        Placed in "And" Linked Group    -- IV pharmacy to manage frequency 06/29/25 2056          Latest lactate reviewed-  Recent Labs   Lab 06/30/25  0621 06/30/25  0629   LACTATE  --  1.3   POCLAC 1.1  --        Will Not start   Source control achieved by:     Mr. Moreno is a 44yoM with PMHx significant for ALS, neuromuscular dysfunction of the bladder, sacral ulcers, and spinal cerebellar ataxia who was admitted recently admitted (5/9/2025) for severe dehydration, electrolyte disturbances, and covid who presented to the ED with EMS and c/o AMS and hypoxia. Nonverbal at baseline but apparently is usually more alert; has been less " responsive and interactive x1 day. Upon EMS arrival, was obtunded and sating 74%. They gave him solumedrol and duonebs en route which improved his oxygen status to >90%. Mother at bedside reports increased difficulty with swallowing and decreased PO intake.       While in the ED, labs and imaging significant for Na 149, K 5.4, Cl 111, CO2 21, glucose 149, BUN 38, creat 1.0, albumin 2.2, ALP 80, AST/ALT 34/23, anion gap 17, procal 8.42, BNP <10, WBC 7.11, H/H 12/40.5, plts 315, covid/flu negative, .6, VBG 7.349/47.1/44.5/23.9, istat LA 4.9->7.4->4.2 (7.4 likely error?). Chest xray with patchy opacities bilaterally, predominant within the bibasilar regions but also within the left lung apex and the left mid-lung; compatible with multifocal pneumonia. Started on BSA by the ED; vanc and cefepime.     - Repeat serum LA 2.6  - Continue BSA  - Follow up infectious workup  - Recommend deep suction per RT  - Pulmonary hygiene   - Maintain spo2 >90%; wean fio2 as tolerated  - Continuous telemetry and spO2 monitoring  - Neuro checks  - Delirium/aspiration/fall precautions  - Recommend Wound Care for sacral and lower extremity wounds    Orthopedic  Sacral decubitus ulcer  - consult to wound care  - consider Surgery consult for debridement        Critical Care Daily Checklist:    A: Awake: RASS Goal/Actual Goal:    Actual:     B: Spontaneous Breathing Trial Performed?     C: SAT & SBT Coordinated?                        D: Delirium: CAM-ICU     E: Early Mobility Performed? No   F: Feeding Goal:    Status:     Current Diet Order   Procedures    Diet NPO      AS: Analgesia/Sedation    T: Thromboembolic Prophylaxis none   H: HOB > 300 Yes   U: Stress Ulcer Prophylaxis (if needed) PPI   G: Glucose Control At goal   B: Bowel Function     I: Indwelling Catheter (Lines & Zeng) Necessity Zeng,PIV, NGT   D: De-escalation of Antimicrobials/Pharmacotherapies Cefepime, vanc    Plan for the day/ETD Abxc    Code Status:  Family/Goals  of Care: Full Code         Critical care was time spent personally by me on the following activities: development of treatment plan with patient or surrogate and bedside caregivers, discussions with consultants, evaluation of patient's response to treatment, examination of patient, ordering and performing treatments and interventions, ordering and review of laboratory studies, ordering and review of radiographic studies, pulse oximetry, re-evaluation of patient's condition. This critical care time did not overlap with that of any other provider or involve time for any procedures.     Tono Pozo MD  Critical Care Medicine  [facility]

## 2025-06-30 NOTE — HPI
By Dr. Juno Mckeon MD    Mr. Moreno is a 44-year-old male with a medical history including ALS, spinal cerebellar ataxia, neuromuscular bladder dysfunction, and sacral pressure ulcers. He was recently hospitalized on 5/9/2025 for severe dehydration, electrolyte abnormalities, and COVID-19 infection. He now presents to the ED via EMS with altered mental status and hypoxia. The patient is nonverbal at baseline but is typically more alert and interactive. History was provided by his mother, who reported that he had been noticeably less responsive over the past day and had a decreased oral intake. She denied any fever, shortness of breath, nausea, vomiting, or diarrhea.    Upon EMS arrival, the patient was obtunded and hypoxic with an oxygen saturation of 74%. He was treated en route with Solu-Medrol and DuoNebs, resulting in improvement of his oxygen saturation to above 90%.    In the ED, patient was obtunded but stabilized with improved oxygenation after initial EMS interventions. Chest X-ray revealed patchy bilateral opacities, most prominent in the bibasilar regions, as well as in the left apex and mid-lung zones--findings consistent with multifocal pneumonia.  He received 2 L of IV fluids, IV cefepime and vancomycin and admitted to hospital medicine for further management.

## 2025-06-30 NOTE — ED TRIAGE NOTES
Lisa Moreno, a 44 y.o. male presents to the ED w/ complaint of AMS.    Triage note:  Chief Complaint   Patient presents with    Shortness of Breath     Pt has ALS and sacral wound with wound vac. Family reported decreased LOC. EMS found RA O2 sat of 74% Pt placed NRB with Duo Nebs and O2 sat went to 94%. Pt de-sats if removed from NRB. Pt had recent pneumonia diagnosis with ABX treatment.      Review of patient's allergies indicates:   Allergen Reactions    Penicillins      Hives and Itching    Allergen ext-venom-honey bee      Past Medical History:   Diagnosis Date    Anxiety     Degenerative motor system disease     Depression     Insomnia secondary to depression with anxiety 10/30/2024    Spastic quadriplegia     Spinocerebellar atrophy

## 2025-06-30 NOTE — CONSULTS
[facility] - Emergency Dept  Critical Care Medicine  Consult Note    Patient Name: Lisa Moreno  MRN: [id number]  Admission Date: 6/29/2025  Hospital Length of Stay: 0 days  Code Status: Full Code  Attending Physician: Kathleen Alvarez MD   Primary Care Provider: John Nixon II, MD   Principal Problem: <principal problem not specified>    Inpatient consult to Critical Care Medicine  Consult performed by: Kathleen Andujar, [facility]  Consult ordered by: Kathleen Alvarez MD  Reason for consult: sepsis      Kathleen Alvarez MD   Subjective:     HPI:  Mr. Moreno is a 44yoM with PMHx significant for ALS, neuromuscular dysfunction of the bladder, sacral ulcers, and spinal cerebellar ataxia who was admitted recently admitted (5/9/2025) for severe dehydration, electrolyte disturbances, and covid who presented to the ED with EMS and c/o AMS and hypoxia. Nonverbal at baseline but apparently is usually more alert; has been less responsive and interactive x1 day. Upon EMS arrival, was obtunded and sating 74%. They gave him solumedrol and duonebs en route which improved his oxygen status to >90%. Mother at bedside reports increased difficulty with swallowing and decreased PO intake.       While in the ED, labs and imaging significant for Na 149, K 5.4, Cl 111, CO2 21, glucose 149, BUN 38, creat 1.0, albumin 2.2, ALP 80, AST/ALT 34/23, anion gap 17, procal 8.42, BNP <10, WBC 7.11, H/H 12/40.5, plts 315, covid/flu negative, .6, VBG 7.349/47.1/44.5/23.9, istat LA 4.9->7.4->4.2 (7.4 likely error?). Chest xray with patchy opacities bilaterally, predominant within the bibasilar regions but also within the left lung apex and the left mid-lung; compatible with multifocal pneumonia. Started on BSA by the ED; vanc and cefepime.     [facility] consulted for sepsis.    Hospital/ICU Course:  No notes on file    Past Medical History:   Diagnosis Date    Anxiety     Degenerative motor system disease     Depression      Insomnia secondary to depression with anxiety 10/30/2024    Spastic quadriplegia     Spinocerebellar atrophy        Past Surgical History:   Procedure Laterality Date    BACLOFEN PUMP IMPLANTATION      COLONOSCOPY N/A 7/23/2020    Procedure: COLONOSCOPY;  Surgeon: Ziyad Fitch MD;  Location: [facility];  Service: Endoscopy;  Laterality: N/A;    ESOPHAGOGASTRODUODENOSCOPY N/A 7/23/2020    Procedure: EGD (ESOPHAGOGASTRODUODENOSCOPY);  Surgeon: Ziyad Fitch MD;  Location: [facility];  Service: Endoscopy;  Laterality: N/A;    FLUOROSCOPIC URODYNAMIC STUDY N/A 11/27/2018    Procedure: URODYNAMIC STUDY, FLUOROSCOPIC;  Surgeon: Tanja Okeefe MD;  Location: [facility] OR [address] [place] [address];  Service: Urology;  Laterality: N/A;  1 hour    REATTACHMENT OF MUSCLE Bilateral 2/18/2022    Procedure: PTOSIS REPAIR OF BOTH EYES;  Surgeon: Krupa Rios MD;  Location: [facility] OR [address] [place] [address];  Service: Ophthalmology;  Laterality: Bilateral;    UPPER GASTROINTESTINAL ENDOSCOPY         Review of patient's allergies indicates:   Allergen Reactions    Penicillins      Hives and Itching    Allergen ext-venom-honey bee        Family History       Problem Relation (Age of Onset)    Cancer Mother    Depression Mother    Hypertension Mother    Multiple sclerosis Other    No Known Problems Brother, Son    Thyroid cancer Mother          Tobacco Use    Smoking status: Never    Smokeless tobacco: Never   Substance and Sexual Activity    Alcohol use: Not Currently     Comment: social drinker, at times    Drug use: Not Currently    Sexual activity: Never      Objective:     Vital Signs (Most Recent):  Temp: 98 °F (36.7 °C) (06/29/25 1928)  Pulse: 92 (06/29/25 2143)  Resp: 20 (06/29/25 2143)  BP: 107/74 (06/29/25 2100)  SpO2: 100 % (06/29/25 2143) Vital Signs (24h Range):  Temp:  [98 °F (36.7 °C)-99.2 °F (37.3 °C)] 98 °F (36.7 °C)  Pulse:  [] 92  Resp:  [19-27] 20  SpO2:  [86 %-100 %] 100 %  BP: ()/(60-80) 107/74   Weight: 59.9 kg  (132 lb 0.9 oz)  Body mass index is 16.51 kg/m².      Intake/Output Summary (Last 24 hours) at 6/29/2025 2152  Last data filed at 6/29/2025 2120  Gross per 24 hour   Intake 2000 ml   Output --   Net 2000 ml          Physical Exam  Vitals and nursing note reviewed.   Constitutional:       General: He is not in acute distress.     Appearance: He is cachectic. He is ill-appearing.   HENT:      Mouth/Throat:      Mouth: Mucous membranes are dry.      Pharynx: Oropharynx is clear.   Eyes:      Extraocular Movements: Extraocular movements intact.   Cardiovascular:      Rate and Rhythm: Normal rate and regular rhythm.      Comments: NSR  Pulmonary:      Effort: Pulmonary effort is normal. No respiratory distress.      Breath sounds: Normal breath sounds. Transmitted upper airway sounds present. No wheezing.      Comments: 6L NC O2  Abdominal:      General: There is no distension.      Palpations: Abdomen is soft.      Tenderness: There is no abdominal tenderness.   Musculoskeletal:      Right lower leg: No edema.      Left lower leg: No edema.      Comments: RLE wrapped   Skin:     General: Skin is warm and dry.      Capillary Refill: Capillary refill takes less than 2 seconds.      Comments: Sacral wound with vac   Neurological:      General: No focal deficit present.      Mental Status: He is alert. Mental status is at baseline.      GCS: GCS eye subscore is 4. GCS verbal subscore is 3. GCS motor subscore is 5.          Vents:     Lines/Drains/Airways       Peripheral Intravenous Line  Duration             Peripheral IV 06/29/25 1815 22 G Left;Posterior Hand <1 day    Peripheral IV 06/29/25 2114 20 G Anterior;Right Forearm <1 day                  Significant Labs:    CBC/Anemia Profile:  Recent Labs   Lab 06/29/25  1745 06/29/25  1750   WBC 7.11  --    HGB 12.0*  --    HCT 40.5 38.6     --    MCV 87  --    RDW 16.7*  --         Chemistries:  Recent Labs   Lab 06/29/25  1745   *   K 5.4*   *   CO2 21*  "  BUN 38*   CREATININE 1.0   CALCIUM 9.7   ALBUMIN 2.2*   PROT 8.7*   BILITOT 0.2   ALKPHOS 80   ALT 23   AST 34       All pertinent labs within the past 24 hours have been reviewed.    Significant Imaging: I have reviewed all pertinent imaging results/findings within the past 24 hours.    ABG  Recent Labs   Lab 06/29/25  1750   PH 7.349*   PO2 44.5   PCO2 47.1*   HCO3 23.9     Assessment/Plan:     ID  Sepsis  This patient does have evidence of infective focus  My overall impression is sepsis without organ dysfunction.  Source: Respiratory Infection  Antibiotics given-   Antibiotics (72h ago, onward)      Start     Stop Route Frequency Ordered    06/30/25 0930  vancomycin 750 mg in 0.9% NaCl 250 mL IVPB (admixture device)         -- IV Every 12 hours (non-standard times) 06/29/25 2142 06/30/25 0300  ceFEPIme injection 2 g         -- IV Every 8 hours (non-standard times) 06/29/25 2056 06/29/25 2155  vancomycin - pharmacy to dose  (vancomycin IVPB (PEDS and ADULTS))        Placed in "And" Linked Group    -- IV pharmacy to manage frequency 06/29/25 2056 06/29/25 1800  vancomycin 1,250 mg in 0.9% NaCl 250 mL IVPB (admixture device)         -- IV Once 06/29/25 1746          Latest lactate reviewed-  Recent Labs   Lab 06/29/25 2053   POCLAC 4.2*     Will Not start   Source control achieved by:     Mr. Moreno is a 44yoM with PMHx significant for ALS, neuromuscular dysfunction of the bladder, sacral ulcers, and spinal cerebellar ataxia who was admitted recently admitted (5/9/2025) for severe dehydration, electrolyte disturbances, and covid who presented to the ED with EMS and c/o AMS and hypoxia. Nonverbal at baseline but apparently is usually more alert; has been less responsive and interactive x1 day. Upon EMS arrival, was obtunded and sating 74%. They gave him solumedrol and duonebs en route which improved his oxygen status to >90%. Mother at bedside reports increased difficulty with swallowing and decreased PO " intake.       While in the ED, labs and imaging significant for Na 149, K 5.4, Cl 111, CO2 21, glucose 149, BUN 38, creat 1.0, albumin 2.2, ALP 80, AST/ALT 34/23, anion gap 17, procal 8.42, BNP <10, WBC 7.11, H/H 12/40.5, plts 315, covid/flu negative, .6, VBG 7.349/47.1/44.5/23.9, istat LA 4.9->7.4->4.2 (7.4 likely error?). Chest xray with patchy opacities bilaterally, predominant within the bibasilar regions but also within the left lung apex and the left mid-lung; compatible with multifocal pneumonia. Started on BSA by the ED; vanc and cefepime.     - Repeat serum LA 2.6 (decreased from 4.2)  - Continue BSA  - Follow up infectious workup  - Recommend deep suction per RT  - Pulmonary hygiene   - Maintain spo2 >90%; wean fio2 as tolerated  - Continuous telemetry and spO2 monitoring  - Neuro checks  - Delirium/aspiration/fall precautions  - Recommend Wound Care for sacral and lower extremity wounds  - Stable for stepdown unit at this time       Critical Care Time: 60 minutes    Thank you for your consult. I will place the patient on the Rapid Response Core List for follow up. Please call CCM with questions/concerns/deterioration of patient.      Kathleen Andujar, [facility]  Critical Care Medicine  [facility] - Emergency Dept

## 2025-06-30 NOTE — SUBJECTIVE & OBJECTIVE
Past Medical History:   Diagnosis Date    Anxiety     Degenerative motor system disease     Depression     Insomnia secondary to depression with anxiety 10/30/2024    Spastic quadriplegia     Spinocerebellar atrophy        Past Surgical History:   Procedure Laterality Date    BACLOFEN PUMP IMPLANTATION      COLONOSCOPY N/A 7/23/2020    Procedure: COLONOSCOPY;  Surgeon: Ziyad Fitch MD;  Location: [facility] ENDO;  Service: Endoscopy;  Laterality: N/A;    ESOPHAGOGASTRODUODENOSCOPY N/A 7/23/2020    Procedure: EGD (ESOPHAGOGASTRODUODENOSCOPY);  Surgeon: Ziyad Fitch MD;  Location: [facility] ENDO;  Service: Endoscopy;  Laterality: N/A;    FLUOROSCOPIC URODYNAMIC STUDY N/A 11/27/2018    Procedure: URODYNAMIC STUDY, FLUOROSCOPIC;  Surgeon: Tanja Okeefe MD;  Location: [facility] OR [address] [place] [address];  Service: Urology;  Laterality: N/A;  1 hour    REATTACHMENT OF MUSCLE Bilateral 2/18/2022    Procedure: PTOSIS REPAIR OF BOTH EYES;  Surgeon: Krupa Rios MD;  Location: [facility] OR [address] [place] [address];  Service: Ophthalmology;  Laterality: Bilateral;    UPPER GASTROINTESTINAL ENDOSCOPY         Review of patient's allergies indicates:   Allergen Reactions    Penicillins      Hives and Itching    Allergen ext-venom-honey bee        Family History       Problem Relation (Age of Onset)    Cancer Mother    Depression Mother    Hypertension Mother    Multiple sclerosis Other    No Known Problems Brother, Son    Thyroid cancer Mother          Tobacco Use    Smoking status: Never    Smokeless tobacco: Never   Substance and Sexual Activity    Alcohol use: Not Currently     Comment: social drinker, at times    Drug use: Not Currently    Sexual activity: Never      Objective:     Vital Signs (Most Recent):  Temp: 98 °F (36.7 °C) (06/29/25 1928)  Pulse: 92 (06/29/25 2143)  Resp: 20 (06/29/25 2143)  BP: 107/74 (06/29/25 2100)  SpO2: 100 % (06/29/25 2143) Vital Signs (24h Range):  Temp:  [98 °F (36.7 °C)-99.2 °F (37.3 °C)] 98 °F (36.7 °C)  Pulse:   [] 92  Resp:  [19-27] 20  SpO2:  [86 %-100 %] 100 %  BP: ()/(60-80) 107/74   Weight: 59.9 kg (132 lb 0.9 oz)  Body mass index is 16.51 kg/m².      Intake/Output Summary (Last 24 hours) at 6/29/2025 2152  Last data filed at 6/29/2025 2120  Gross per 24 hour   Intake 2000 ml   Output --   Net 2000 ml          Physical Exam  Vitals and nursing note reviewed.   Constitutional:       General: He is not in acute distress.     Appearance: He is cachectic. He is ill-appearing.   HENT:      Mouth/Throat:      Mouth: Mucous membranes are dry.      Pharynx: Oropharynx is clear.   Eyes:      Extraocular Movements: Extraocular movements intact.   Cardiovascular:      Rate and Rhythm: Normal rate and regular rhythm.      Comments: NSR  Pulmonary:      Effort: Pulmonary effort is normal. No respiratory distress.      Breath sounds: Normal breath sounds. Transmitted upper airway sounds present. No wheezing.      Comments: 6L NC O2  Abdominal:      General: There is no distension.      Palpations: Abdomen is soft.      Tenderness: There is no abdominal tenderness.   Musculoskeletal:      Right lower leg: No edema.      Left lower leg: No edema.      Comments: RLE wrapped   Skin:     General: Skin is warm and dry.      Capillary Refill: Capillary refill takes less than 2 seconds.      Comments: Sacral wound with vac   Neurological:      General: No focal deficit present.      Mental Status: He is alert. Mental status is at baseline.      GCS: GCS eye subscore is 4. GCS verbal subscore is 3. GCS motor subscore is 5.          Vents:     Lines/Drains/Airways       Peripheral Intravenous Line  Duration             Peripheral IV 06/29/25 1815 22 G Left;Posterior Hand <1 day    Peripheral IV 06/29/25 2114 20 G Anterior;Right Forearm <1 day                  Significant Labs:    CBC/Anemia Profile:  Recent Labs   Lab 06/29/25  1745 06/29/25  1750   WBC 7.11  --    HGB 12.0*  --    HCT 40.5 38.6     --    MCV 87  --    RDW  16.7*  --         Chemistries:  Recent Labs   Lab 06/29/25  1745   *   K 5.4*   *   CO2 21*   BUN 38*   CREATININE 1.0   CALCIUM 9.7   ALBUMIN 2.2*   PROT 8.7*   BILITOT 0.2   ALKPHOS 80   ALT 23   AST 34       All pertinent labs within the past 24 hours have been reviewed.    Significant Imaging: I have reviewed all pertinent imaging results/findings within the past 24 hours.

## 2025-07-01 PROBLEM — E43 SEVERE MALNUTRITION: Status: ACTIVE | Noted: 2025-07-01

## 2025-07-01 LAB
ABSOLUTE EOSINOPHIL (OHS): 0 K/UL
ABSOLUTE MONOCYTE (OHS): 0.35 K/UL (ref 0.3–1)
ABSOLUTE NEUTROPHIL COUNT (OHS): 7.74 K/UL (ref 1.8–7.7)
ALBUMIN SERPL BCP-MCNC: 1.6 G/DL (ref 3.5–5.2)
ALP SERPL-CCNC: 56 UNIT/L (ref 40–150)
ALT SERPL W/O P-5'-P-CCNC: 13 UNIT/L (ref 10–44)
ANION GAP (OHS): 9 MMOL/L (ref 8–16)
AST SERPL-CCNC: 17 UNIT/L (ref 11–45)
BASOPHILS # BLD AUTO: 0.03 K/UL
BASOPHILS NFR BLD AUTO: 0.3 %
BILIRUB SERPL-MCNC: 0.2 MG/DL (ref 0.1–1)
BUN SERPL-MCNC: 30 MG/DL (ref 6–20)
CALCIUM SERPL-MCNC: 8.2 MG/DL (ref 8.7–10.5)
CHLORIDE SERPL-SCNC: 116 MMOL/L (ref 95–110)
CO2 SERPL-SCNC: 21 MMOL/L (ref 23–29)
CREAT SERPL-MCNC: 0.8 MG/DL (ref 0.5–1.4)
ERYTHROCYTE [DISTWIDTH] IN BLOOD BY AUTOMATED COUNT: 16 % (ref 11.5–14.5)
GFR SERPLBLD CREATININE-BSD FMLA CKD-EPI: >60 ML/MIN/1.73/M2
GLUCOSE SERPL-MCNC: 67 MG/DL (ref 70–110)
HCT VFR BLD AUTO: 24.9 % (ref 40–54)
HGB BLD-MCNC: 7.8 GM/DL (ref 14–18)
IMM GRANULOCYTES # BLD AUTO: 0.04 K/UL (ref 0–0.04)
IMM GRANULOCYTES NFR BLD AUTO: 0.4 % (ref 0–0.5)
LYMPHOCYTES # BLD AUTO: 1.19 K/UL (ref 1–4.8)
MAGNESIUM SERPL-MCNC: 2 MG/DL (ref 1.6–2.6)
MCH RBC QN AUTO: 25.9 PG (ref 27–31)
MCHC RBC AUTO-ENTMCNC: 31.3 G/DL (ref 32–36)
MCV RBC AUTO: 83 FL (ref 82–98)
NUCLEATED RBC (/100WBC) (OHS): 0 /100 WBC
PHOSPHATE SERPL-MCNC: 4.8 MG/DL (ref 2.7–4.5)
PLATELET # BLD AUTO: 246 K/UL (ref 150–450)
PMV BLD AUTO: 11 FL (ref 9.2–12.9)
POCT GLUCOSE: 125 MG/DL (ref 70–110)
POTASSIUM SERPL-SCNC: 4.5 MMOL/L (ref 3.5–5.1)
PROT SERPL-MCNC: 6.1 GM/DL (ref 6–8.4)
RBC # BLD AUTO: 3.01 M/UL (ref 4.6–6.2)
RELATIVE EOSINOPHIL (OHS): 0 %
RELATIVE LYMPHOCYTE (OHS): 12.7 % (ref 18–48)
RELATIVE MONOCYTE (OHS): 3.7 % (ref 4–15)
RELATIVE NEUTROPHIL (OHS): 82.9 % (ref 38–73)
SODIUM SERPL-SCNC: 146 MMOL/L (ref 136–145)
WBC # BLD AUTO: 9.35 K/UL (ref 3.9–12.7)

## 2025-07-01 PROCEDURE — 25000003 PHARM REV CODE 250

## 2025-07-01 PROCEDURE — 63600175 PHARM REV CODE 636 W HCPCS

## 2025-07-01 PROCEDURE — 20000000 HC ICU ROOM

## 2025-07-01 PROCEDURE — 80053 COMPREHEN METABOLIC PANEL: CPT | Performed by: STUDENT IN AN ORGANIZED HEALTH CARE EDUCATION/TRAINING PROGRAM

## 2025-07-01 PROCEDURE — 25000003 PHARM REV CODE 250: Performed by: STUDENT IN AN ORGANIZED HEALTH CARE EDUCATION/TRAINING PROGRAM

## 2025-07-01 PROCEDURE — 63700000 PHARM REV CODE 250 ALT 637 W/O HCPCS: Performed by: STUDENT IN AN ORGANIZED HEALTH CARE EDUCATION/TRAINING PROGRAM

## 2025-07-01 PROCEDURE — 97535 SELF CARE MNGMENT TRAINING: CPT

## 2025-07-01 PROCEDURE — 84100 ASSAY OF PHOSPHORUS: CPT | Performed by: STUDENT IN AN ORGANIZED HEALTH CARE EDUCATION/TRAINING PROGRAM

## 2025-07-01 PROCEDURE — 99233 SBSQ HOSP IP/OBS HIGH 50: CPT | Mod: GC,,, | Performed by: STUDENT IN AN ORGANIZED HEALTH CARE EDUCATION/TRAINING PROGRAM

## 2025-07-01 PROCEDURE — 92610 EVALUATE SWALLOWING FUNCTION: CPT

## 2025-07-01 PROCEDURE — 83735 ASSAY OF MAGNESIUM: CPT | Performed by: STUDENT IN AN ORGANIZED HEALTH CARE EDUCATION/TRAINING PROGRAM

## 2025-07-01 PROCEDURE — 85025 COMPLETE CBC W/AUTO DIFF WBC: CPT | Performed by: STUDENT IN AN ORGANIZED HEALTH CARE EDUCATION/TRAINING PROGRAM

## 2025-07-01 PROCEDURE — 94761 N-INVAS EAR/PLS OXIMETRY MLT: CPT

## 2025-07-01 PROCEDURE — 92611 MOTION FLUOROSCOPY/SWALLOW: CPT

## 2025-07-01 RX ORDER — MIRTAZAPINE 15 MG/1
15 TABLET, FILM COATED ORAL NIGHTLY
Status: DISCONTINUED | OUTPATIENT
Start: 2025-07-01 | End: 2025-07-09

## 2025-07-01 RX ORDER — POLYETHYLENE GLYCOL 3350 17 G/17G
17 POWDER, FOR SOLUTION ORAL DAILY
Status: DISCONTINUED | OUTPATIENT
Start: 2025-07-02 | End: 2025-07-02

## 2025-07-01 RX ORDER — AMOXICILLIN 250 MG
1 CAPSULE ORAL DAILY
Status: DISCONTINUED | OUTPATIENT
Start: 2025-07-01 | End: 2025-07-02

## 2025-07-01 RX ORDER — IBUPROFEN 200 MG
16 TABLET ORAL
Status: DISCONTINUED | OUTPATIENT
Start: 2025-07-01 | End: 2025-07-18

## 2025-07-01 RX ORDER — CETIRIZINE HYDROCHLORIDE 5 MG/1
10 TABLET ORAL DAILY
Status: DISCONTINUED | OUTPATIENT
Start: 2025-07-02 | End: 2025-07-09

## 2025-07-01 RX ORDER — BACLOFEN 10 MG/1
20 TABLET ORAL 3 TIMES DAILY
Status: DISCONTINUED | OUTPATIENT
Start: 2025-07-01 | End: 2025-07-09

## 2025-07-01 RX ORDER — ENOXAPARIN SODIUM 100 MG/ML
30 INJECTION SUBCUTANEOUS EVERY 24 HOURS
Status: DISCONTINUED | OUTPATIENT
Start: 2025-07-01 | End: 2025-07-02

## 2025-07-01 RX ORDER — DANTROLENE SODIUM 25 MG/1
50 CAPSULE ORAL 3 TIMES DAILY
Status: DISCONTINUED | OUTPATIENT
Start: 2025-07-01 | End: 2025-07-09

## 2025-07-01 RX ORDER — DONEPEZIL HYDROCHLORIDE 5 MG/1
5 TABLET, FILM COATED ORAL NIGHTLY
Status: DISCONTINUED | OUTPATIENT
Start: 2025-07-01 | End: 2025-07-09

## 2025-07-01 RX ORDER — IBUPROFEN 200 MG
24 TABLET ORAL
Status: DISCONTINUED | OUTPATIENT
Start: 2025-07-01 | End: 2025-07-18

## 2025-07-01 RX ORDER — AZITHROMYCIN 250 MG/1
500 TABLET, FILM COATED ORAL DAILY
Status: COMPLETED | OUTPATIENT
Start: 2025-07-01 | End: 2025-07-03

## 2025-07-01 RX ORDER — QUETIAPINE FUMARATE 25 MG/1
50 TABLET, FILM COATED ORAL NIGHTLY
Status: DISCONTINUED | OUTPATIENT
Start: 2025-07-01 | End: 2025-07-09

## 2025-07-01 RX ADMIN — BACLOFEN 20 MG: 10 TABLET ORAL at 08:07

## 2025-07-01 RX ADMIN — ENOXAPARIN SODIUM 30 MG: 30 INJECTION SUBCUTANEOUS at 06:07

## 2025-07-01 RX ADMIN — AZITHROMYCIN DIHYDRATE 500 MG: 250 TABLET ORAL at 11:07

## 2025-07-01 RX ADMIN — DANTROLENE SODIUM 50 MG: 25 CAPSULE ORAL at 08:07

## 2025-07-01 RX ADMIN — PIPERACILLIN SODIUM AND TAZOBACTAM SODIUM 4.5 G: 4; .5 INJECTION, POWDER, LYOPHILIZED, FOR SOLUTION INTRAVENOUS at 01:07

## 2025-07-01 RX ADMIN — SENNOSIDES AND DOCUSATE SODIUM 1 TABLET: 50; 8.6 TABLET ORAL at 09:07

## 2025-07-01 RX ADMIN — MIRTAZAPINE 15 MG: 15 TABLET, FILM COATED ORAL at 08:07

## 2025-07-01 RX ADMIN — BACLOFEN 20 MG: 10 TABLET ORAL at 02:07

## 2025-07-01 RX ADMIN — PANTOPRAZOLE SODIUM 40 MG: 40 INJECTION, POWDER, LYOPHILIZED, FOR SOLUTION INTRAVENOUS at 08:07

## 2025-07-01 RX ADMIN — PIPERACILLIN SODIUM AND TAZOBACTAM SODIUM 4.5 G: 4; .5 INJECTION, POWDER, LYOPHILIZED, FOR SOLUTION INTRAVENOUS at 09:07

## 2025-07-01 RX ADMIN — DANTROLENE SODIUM 50 MG: 25 CAPSULE ORAL at 02:07

## 2025-07-01 RX ADMIN — QUETIAPINE FUMARATE 50 MG: 25 TABLET ORAL at 08:07

## 2025-07-01 RX ADMIN — CETIRIZINE HYDROCHLORIDE 10 MG: 10 TABLET, FILM COATED ORAL at 08:07

## 2025-07-01 RX ADMIN — CEFTRIAXONE 2 G: 2 INJECTION, POWDER, FOR SOLUTION INTRAMUSCULAR; INTRAVENOUS at 11:07

## 2025-07-01 RX ADMIN — DONEPEZIL HYDROCHLORIDE 5 MG: 5 TABLET, FILM COATED ORAL at 08:07

## 2025-07-01 NOTE — PROGRESS NOTES
[facility] - Medical ICU  Critical Care Medicine  Progress Note    Patient Name: Lisa Moreno  MRN: [id number]  Admission Date: 6/29/2025  Hospital Length of Stay: 2 days  Code Status: Full Code  Attending Provider: Cordleia Velasco MD  Primary Care Provider: John Nixon II, MD   Principal Problem: Pneumonia    Subjective:     HPI:  Mr. Moreno is a 44yoM with PMHx significant for ALS, neuromuscular dysfunction of the bladder, sacral ulcers, and spinal cerebellar ataxia who was admitted recently admitted (5/9/2025) for severe dehydration, electrolyte disturbances, and covid who presented to the ED with EMS and c/o AMS and hypoxia. Nonverbal at baseline but apparently is usually more alert; has been less responsive and interactive x1 day. Upon EMS arrival, was obtunded and sating 74%. They gave him solumedrol and duonebs en route which improved his oxygen status to >90%. Mother at bedside reports increased difficulty with swallowing and decreased PO intake.       While in the ED, labs and imaging significant for Na 149, K 5.4, Cl 111, CO2 21, glucose 149, BUN 38, creat 1.0, albumin 2.2, ALP 80, AST/ALT 34/23, anion gap 17, procal 8.42, BNP <10, WBC 7.11, H/H 12/40.5, plts 315, covid/flu negative, .6, VBG 7.349/47.1/44.5/23.9, istat LA 4.9->7.4->4.2 (7.4 likely error?). Chest xray with patchy opacities bilaterally, predominant within the bibasilar regions but also within the left lung apex and the left mid-lung; compatible with multifocal pneumonia. Started on BSA by the ED; vanc and cefepime. [facility] consulted for sepsis. During first examination the patient was stable with MAPs >65 and Lactic acid down trended to 2.6. Around 6 am of 6/30 [place] was called hypotension with MAPs in the 50. Pt was started on levophed and was transferred to MICU.     Hospital/ICU Course:  Admitted to the MICU for septic shock secondary to multifocal pneumonia. Initially requiring pressors but weaned. Started on broad spectrum  antibiotics (Zosyn). Afebrile. Zosyn de-escalated to Ceftriaxone and Azithromycin for CAP coverage. SLP consulted for dysphagia. FEES performed which showed residual puree. Recommend NPO. Will discuss with family about PEG.     Interval History/Significant Events: SLP consulted for dysphagia. Failed bedside swallow test. Remains NPO. Antibiotics de-escalated to CAP coverage. Discussing possible PEG with family.     Review of Systems  Objective:     Vital Signs (Most Recent):  Temp: 96.1 °F (35.6 °C) (07/01/25 1300)  Pulse: 76 (07/01/25 1300)  Resp: 15 (07/01/25 1300)  BP: 105/72 (07/01/25 1300)  SpO2: 95 % (07/01/25 1300) Vital Signs (24h Range):  Temp:  [95.2 °F (35.1 °C)-98.6 °F (37 °C)] 96.1 °F (35.6 °C)  Pulse:  [65-90] 76  Resp:  [11-22] 15  SpO2:  [94 %-100 %] 95 %  BP: ()/(51-82) 105/72   Weight: 59.9 kg (132 lb 0.9 oz)  Body mass index is 16.51 kg/m².      Intake/Output Summary (Last 24 hours) at 7/1/2025 1537  Last data filed at 7/1/2025 1100  Gross per 24 hour   Intake 1231.08 ml   Output 2115 ml   Net -883.92 ml          Physical Exam  Constitutional:       General: He is not in acute distress.     Appearance: He is underweight.      Comments: NGT   HENT:      Head: Normocephalic.   Eyes:      General: No scleral icterus.        Right eye: No discharge.         Left eye: No discharge.      Extraocular Movements: Extraocular movements intact.   Cardiovascular:      Rate and Rhythm: Normal rate and regular rhythm.      Pulses: Normal pulses.      Heart sounds: No murmur heard.     No friction rub. No gallop.   Pulmonary:      Effort: Pulmonary effort is normal. No respiratory distress.   Abdominal:      General: There is no distension.   Musculoskeletal:      Right lower leg: No edema.      Left lower leg: No edema.   Skin:     General: Skin is warm.      Coloration: Skin is not jaundiced.   Neurological:      Mental Status: He is alert.   Psychiatric:         Mood and Affect: Mood normal.          Behavior: Behavior normal.            Vents:     Lines/Drains/Airways       Drain  Duration                  NG/OG Tube 06/30/25 1200 Nuckolls sump 14 Fr. Right nostril 1 day         Urethral Catheter 06/30/25 0756 Temperature probe 16 Fr. 1 day              Peripheral Intravenous Line  Duration             Peripheral IV 06/29/25 2114 20 G Anterior;Right Forearm 1 day    Peripheral IV 06/30/25 0620 18 G 1 in Anterior;Left Forearm 1 day    Peripheral IV Double Lumen 06/30/25 1130 18 G 1 3/4 in Anterior;Right Upper Arm 1 day    Peripheral IV Single Lumen 06/29/25 2130 20 G Anterior;Left Upper Arm 1 day                  Significant Labs:    CBC/Anemia Profile:  Recent Labs   Lab 06/30/25  1144 06/30/25  1722 07/01/25  0242   WBC 6.68 10.54 9.35   HGB 7.1* 8.0* 7.8*   HCT 22.9* 25.9* 24.9*    257 246   MCV 83 83 83   RDW 16.0* 16.2* 16.0*        Chemistries:  Recent Labs   Lab 06/30/25  0327 06/30/25  0629 07/01/25  0242    146* 146*   K 5.1 4.3 4.5   * 116* 116*   CO2 21* 23 21*   BUN 33* 33* 30*   CREATININE 0.7 0.7 0.8   CALCIUM 8.3* 8.2* 8.2*   ALBUMIN 1.7* 1.6* 1.6*   PROT 6.5 6.1 6.1   BILITOT 0.1 0.1 0.2   ALKPHOS 62 58 56   ALT 15 14 13   AST 26 18 17   MG 1.9 1.9 2.0   PHOS 4.9* 4.5 4.8*       All pertinent labs within the past 24 hours have been reviewed.    Significant Imaging:  I have reviewed all pertinent imaging results/findings within the past 24 hours.    ABG  Recent Labs   Lab 06/30/25  0848   PH 7.431   PO2 129*   PCO2 37.4   HCO3 25.0     Assessment/Plan:     Neuro  Acute encephalopathy  Likely 2/2 sepsis. Resolving.     - see plan for sepsis    ALS (amyotrophic lateral sclerosis)  Hx of ALS w/diffuse spacticity/weakness + dysphagia     - continue baclofen and dantrolene    Spastic diplegia  -continue home baclofen    Psychiatric  Insomnia secondary to depression with anxiety  Continue quetiapine and mirtazapine       Pulmonary  * Pneumonia  - see plan for  sepsis    Renal/  Hypernatremia  . Hypernatremia is likely due to Dehydration.     Plan  - Aim to correct the sodium by 8-10mEq in 24 hours.   - Plan to correct their hypernatremia with Select IV fluids: LR at a rate of 100 ml/hr.   - Will plan to trend the patient's sodium: Daily       ID  Sepsis  This patient does have evidence of infective focus  My overall impression is sepsis without organ dysfunction.  Source: Respiratory Infection  Antibiotics given-   Antibiotics (72h ago, onward)      Start     Stop Route Frequency Ordered    07/01/25 1200  cefTRIAXone (ROCEPHIN) 2 g in 0.9% NaCl 100 mL IVPB (MB+)         07/06/25 1159 IV Every 24 hours (non-standard times) 07/01/25 1012    07/01/25 1145  azithromycin tablet 500 mg         07/04/25 0859 PER NG TUBE Daily 07/01/25 1038          Latest lactate reviewed-  Recent Labs   Lab 06/30/25  0621 06/30/25  0629   LACTATE  --  1.3   POCLAC 1.1  --        Will Not start   Source control achieved by:     Mr. Moreno is a 44yoM with PMHx significant for ALS, neuromuscular dysfunction of the bladder, sacral ulcers, and spinal cerebellar ataxia who was admitted recently admitted (5/9/2025) for severe dehydration, electrolyte disturbances, and covid who presented to the ED with EMS and c/o AMS and hypoxia. Nonverbal at baseline but apparently is usually more alert; has been less responsive and interactive x1 day. Upon EMS arrival, was obtunded and sating 74%. They gave him solumedrol and duonebs en route which improved his oxygen status to >90%. Mother at bedside reports increased difficulty with swallowing and decreased PO intake.       While in the ED, labs and imaging significant for Na 149, K 5.4, Cl 111, CO2 21, glucose 149, BUN 38, creat 1.0, albumin 2.2, ALP 80, AST/ALT 34/23, anion gap 17, procal 8.42, BNP <10, WBC 7.11, H/H 12/40.5, plts 315, covid/flu negative, .6, VBG 7.349/47.1/44.5/23.9, istat LA 4.9->7.4->4.2 (7.4 likely error?). Chest xray with  patchy opacities bilaterally, predominant within the bibasilar regions but also within the left lung apex and the left mid-lung; compatible with multifocal pneumonia. Started on BSA by the ED; vanc and cefepime.     - Started Ceftriaxone and Azithromycin for CAP coverage  - Bcx NGTD.   - Recommend deep suction per RT  - Pulmonary hygiene   - Maintain spo2 >90%; wean fio2 as tolerated  - Continuous telemetry and spO2 monitoring  - Neuro checks  - Delirium/aspiration/fall precautions  - Recommend Wound Care for sacral and lower extremity wounds    Endocrine  Severe protein-calorie malnutrition  Nutrition consulted. Most recent weight and BMI monitored-     Measurements:  Wt Readings from Last 1 Encounters:   07/01/25 59.9 kg (132 lb 0.9 oz)   Body mass index is 16.51 kg/m².    Patient has been screened and assessed by RD.    Malnutrition Type:  Context: chronic illness  Level: severe    Malnutrition Characteristic Summary:  Subcutaneous Fat (Malnutrition): severe depletion  Muscle Mass (Malnutrition): severe depletion    Interventions/Recommendations (treatment strategy):         Orthopedic  Sacral decubitus ulcer  - Wound care consulted, appreciate recs  - General Surgery consulted for possible debridement, appreciate recs       Critical Care Daily Checklist:    A: Awake: RASS Goal/Actual Goal:    Actual:     B: Spontaneous Breathing Trial Performed?     C: SAT & SBT Coordinated?  On room air                      D: Delirium: CAM-ICU     E: Early Mobility Performed? Yes   F: Feeding Goal: Goals: Meet % EEN/EPN by next RD follow-up  Status: Nutrition Goal Status: new   Current Diet Order   Procedures    Diet NPO      AS: Analgesia/Sedation PRNs    T: Thromboembolic Prophylaxis Enoxaparin   H: HOB > 300 Yes   U: Stress Ulcer Prophylaxis (if needed) N/a   G: Glucose Control Controlled   B: Bowel Function Unmeasured Stool Occurrence: 0   I: Indwelling Catheter (Lines & Zeng) Necessity PIV, UC, NGT,    D:  De-escalation of Antimicrobials/Pharmacotherapies Ceftriaxone and Azithromycin, De-esc Zosyn    Plan for the day/ETD     Code Status:  Family/Goals of Care: Full Code         Critical secondary to Patient has a condition that poses threat to life and bodily function: Septic Shock requiring pressors      Critical care was time spent personally by me on the following activities: development of treatment plan with patient or surrogate and bedside caregivers, discussions with consultants, evaluation of patient's response to treatment, examination of patient, ordering and performing treatments and interventions, ordering and review of laboratory studies, ordering and review of radiographic studies, pulse oximetry, re-evaluation of patient's condition. This critical care time did not overlap with that of any other provider or involve time for any procedures.     Odette Tran MD  Critical Care Medicine  [facility] - Medical ICU

## 2025-07-01 NOTE — SUBJECTIVE & OBJECTIVE
Interval History/Significant Events: SLP consulted for dysphagia. Failed bedside swallow test. Remains NPO. Antibiotics de-escalated to CAP coverage. Discussing possible PEG with family.     Review of Systems  Objective:     Vital Signs (Most Recent):  Temp: 96.1 °F (35.6 °C) (07/01/25 1300)  Pulse: 76 (07/01/25 1300)  Resp: 15 (07/01/25 1300)  BP: 105/72 (07/01/25 1300)  SpO2: 95 % (07/01/25 1300) Vital Signs (24h Range):  Temp:  [95.2 °F (35.1 °C)-98.6 °F (37 °C)] 96.1 °F (35.6 °C)  Pulse:  [65-90] 76  Resp:  [11-22] 15  SpO2:  [94 %-100 %] 95 %  BP: ()/(51-82) 105/72   Weight: 59.9 kg (132 lb 0.9 oz)  Body mass index is 16.51 kg/m².      Intake/Output Summary (Last 24 hours) at 7/1/2025 1537  Last data filed at 7/1/2025 1100  Gross per 24 hour   Intake 1231.08 ml   Output 2115 ml   Net -883.92 ml          Physical Exam  Constitutional:       General: He is not in acute distress.     Appearance: He is underweight.      Comments: NGT   HENT:      Head: Normocephalic.   Eyes:      General: No scleral icterus.        Right eye: No discharge.         Left eye: No discharge.      Extraocular Movements: Extraocular movements intact.   Cardiovascular:      Rate and Rhythm: Normal rate and regular rhythm.      Pulses: Normal pulses.      Heart sounds: No murmur heard.     No friction rub. No gallop.   Pulmonary:      Effort: Pulmonary effort is normal. No respiratory distress.   Abdominal:      General: There is no distension.   Musculoskeletal:      Right lower leg: No edema.      Left lower leg: No edema.   Skin:     General: Skin is warm.      Coloration: Skin is not jaundiced.   Neurological:      Mental Status: He is alert.   Psychiatric:         Mood and Affect: Mood normal.         Behavior: Behavior normal.            Vents:     Lines/Drains/Airways       Drain  Duration                  NG/OG Tube 06/30/25 1200 Wirt sump 14 Fr. Right nostril 1 day         Urethral Catheter 06/30/25 0756 Temperature probe  16 Fr. 1 day              Peripheral Intravenous Line  Duration             Peripheral IV 06/29/25 2114 20 G Anterior;Right Forearm 1 day    Peripheral IV 06/30/25 0620 18 G 1 in Anterior;Left Forearm 1 day    Peripheral IV Double Lumen 06/30/25 1130 18 G 1 3/4 in Anterior;Right Upper Arm 1 day    Peripheral IV Single Lumen 06/29/25 2130 20 G Anterior;Left Upper Arm 1 day                  Significant Labs:    CBC/Anemia Profile:  Recent Labs   Lab 06/30/25  1144 06/30/25  1722 07/01/25  0242   WBC 6.68 10.54 9.35   HGB 7.1* 8.0* 7.8*   HCT 22.9* 25.9* 24.9*    257 246   MCV 83 83 83   RDW 16.0* 16.2* 16.0*        Chemistries:  Recent Labs   Lab 06/30/25  0327 06/30/25  0629 07/01/25  0242    146* 146*   K 5.1 4.3 4.5   * 116* 116*   CO2 21* 23 21*   BUN 33* 33* 30*   CREATININE 0.7 0.7 0.8   CALCIUM 8.3* 8.2* 8.2*   ALBUMIN 1.7* 1.6* 1.6*   PROT 6.5 6.1 6.1   BILITOT 0.1 0.1 0.2   ALKPHOS 62 58 56   ALT 15 14 13   AST 26 18 17   MG 1.9 1.9 2.0   PHOS 4.9* 4.5 4.8*       All pertinent labs within the past 24 hours have been reviewed.    Significant Imaging:  I have reviewed all pertinent imaging results/findings within the past 24 hours.

## 2025-07-01 NOTE — PROGRESS NOTES
Pharmacokinetic Sign-off: IV Vancomycin    Therapy with vancomycin complete and/or consult discontinued by provider.  Pharmacy will sign off, please re-consult as needed.    Fransisca Landeros, PharmD, BCPS  [id number]

## 2025-07-01 NOTE — ASSESSMENT & PLAN NOTE
- No surgical debridement indicated at this time  - recommend local wound care and wound care nursing consult. Offload pressure as able.  - Remainder of care per primary team  - Please contact general surgery with any questions, concerns, or clinical status changes

## 2025-07-01 NOTE — ASSESSMENT & PLAN NOTE
- Wound care consulted, appreciate recs  - General Surgery consulted for possible debridement, appreciate recs

## 2025-07-01 NOTE — PROGRESS NOTES
This is an attestation of a separate note by the ICU resident/fellow. As the teaching physician, I saw the patient with the resident/fellow and agree with the resident/fellow's findings and plan. In addition to the resident/fellow's documentation, I add the following:     Lisa Moreno is a 43 yo man with neuromuscular disorder onset 2006 which has progress to loss of ambulation, spastic quadriparesis, abnormal swallow (requires thickeners) with intact cough, no evidence of hypercapnia, known decubitus, admitted with altered mental status, procalcitonin 8.4.     Encephalopathy: Mentation improving today. Opens eyes and follows commands but chronically quadriparetic.  Sepsis: Off pressors. Continue roceph/azithro. Possible respiratory source. Cultures NGTD. Surgery consult to eval for wound debridement of sacral ulcer. Wound care is consulted.  Hypoxemia: Improved and on room air today.   Neuromuscular disease: Progressive over 2 decades with weakness and spasticity and bulbar involvement  Spasticity: Has been a significant problem. Continue home baclofen and dantrolene.   Nutrition: NG tube as will not be able to maintain nutrition in short term. SLP following.     Feeding: TF  Analgesia: none  Sedation: none  Thrombo PPX: lovenox  Head of Bed: > 30 degrees  Ulcer PPX: none  Glucose: goal 140-180s  SBT/SAT: N/A  Bowel Regimen: senna-doc  Indwelling Lines: PIV x4, romo, NGT  Deescalation Abx: rocephin/azithro (7/1 - present)    Amber Hart MD [facility]

## 2025-07-01 NOTE — PLAN OF CARE
MICU DAILY GOALS     Family/Goals of care/Code Status   Code Status: Full Code    24H Vital Sign Range  Temp:  [91.8 °F (33.2 °C)-98.6 °F (37 °C)]   Pulse:  [59-90]   Resp:  [9-26]   BP: ()/(42-78)   SpO2:  [93 %-100 %]      Shift Events (include procedures and significant events)   Levo off since 21:30 Maps 66-70, hgb 7.8, 1.5L UOP, pending wound vac placement. No acute events throughout shift    AWAKE RASS: Goal -    Actual -      Restraint necessity: Not necessary   BREATHE SBT: Not intubated    Coordinate A & B, analgesics/sedatives Pain: managed   SAT: Not intubated   Delirium CAM-ICU:     Early(intubated/ Progressive (non-intubated) Mobility MOVE Screen (INTUBATED ONLY): Not intubated    Activity: Activity Management: Arm raise - L1   Feeding/Nutrition Diet order: Diet/Nutrition Received: tube feeding, Specialty Diet/Nutrition Received: high calorie, high protein   Thrombus DVT prophylaxis: VTE Core Measure: Per order contraindicated for SCDs/Anticoagulants   HOB Elevation Head of Bed (HOB) Positioning: HOB at 30-45 degrees   Ulcer Prophylaxis GI: yes   Glucose control managed Glycemic Management: blood glucose monitored   Skin Skin assessment:     Sacrum intact/not altered? No  Heels intact/not altered? No  Surgical wound? No    CHECK ONE!   (no altered skin or altered skin) and sub boxes:  [] No Altered Skin Integrity Present    [x]Prevention Measures Documented    [] Altered Skin Integrity Present or Discovered   [x] LDA already present in EPIC, daily doc completed              [] LDA added if not already in EPIC (describe/stage wound).               [] Wound Image Taken (required on admit,                   transfer/discharge and every Tuesday)    Wound Care Consulted? Yes   Bowel Function no issues    Indwelling Catheter Necessity      Urethral Catheter 06/30/25 0756 Temperature probe 16 Fr.-Reason for Continuing Urinary Catheterization: Non-healing sacral/perineal wound       yes   De-escalation  Antibiotics Yes        VS and assessment per flow sheet, patient progressing towards goals as tolerated, plan of care reviewed with mom, all concerns addressed, will continue to monitor.

## 2025-07-01 NOTE — PT/OT/SLP EVAL
Speech Language Pathology  [facility]  Fiberoptic Endoscopic Evaluation of Swallowing (FEES)    Patient Name:  Lisa Moreno   MRN:  [id number]    Impression:     The patient tolerated the procedure and the equipment was removed. Findings were consistent with the following swallow diagnoses following swallow diagnosis and severity: High aspiration risk with all PO intake given weakness, spillage to pyriforms, significantly delayed swallow, absent epiglottic inversion, decreased sensation and weak unproductive cough with aspiration    Dysphagia is further characterized by weakness, delayed timing, Impaired coordination, and Inadequate airway protection.      Recommendations:       General Recommendations: Dysphagia therapy  Diet Recommendations:  NPO, NPO   Pt okay for pleasure feeds of honey thick liquids and minced and moist diet with SLP ONLY   Medications: Non-Oral   Aspiration Precautions: Continue alternate means of nutrition and Strict aspiration precautions  General Precautions: Standard, aspiration, fall, NPO  Communication Strategies: Pt with minimal verbal responses, Mother at bedside stated pt smiles and nods yes to all stimuli at baseline    Referral:     Reason for Referral  Patient was referred for a Fiberoptic Endoscopic Evaluation of a Swallow (FEES) to assess the efficiency of swallow function, rule out aspiration and make recommendations regarding safe dietary consistencies, effective compensatory strategies, and safe eating environment. Please refer to initial assessments and H&P for detailed/pertinent past medical history.    Diagnosis: Pneumonia     History:           Past Medical History:   Diagnosis Date    Anxiety     Degenerative motor system disease     Depression     Insomnia secondary to depression with anxiety 10/30/2024    Spastic quadriplegia     Spinocerebellar atrophy        Subjective:   Pt seen earlier 4476-4122 for bedside swallow assessment. FEES study  recommended to assess swallow function further.   Bedside Swallow Eval:   Consistencies Assessed:  Thin liquids x4  Puree x3     Oral Phase:   Slow oral transit time    Pharyngeal Phase:   coughing/choking    desaturation to 80% with thin liquids via straw  delayed swallow initation      General Appearance:       Behavior/State: awake, calm, and cooperative     Feeding tube present: Yes, NGT      Tracheostomy present: No     Respiratory Status: Room air    Pain: Pain Rating 1: 0/10  Pain Rating Post-Intervention 1: 0/10    Additional Information: in bed and all lines intact    Objective:     Lisa was seen today for a fiberoptic endoscopic evaluation of swallowing (FEES). The patient was positioned upright in bed.    The patient's name and medical record number were verified via verbal consent and/or visualization of wristband. The purpose, procedure, and risks of FEES were explained to the patient. The patient expressed an understanding of potential risks and verbally consented to the procedure. Patient without known allergy to foods or synthetic food dyes offered during today's assessment.    Flexible Fiberoptic Endoscope was advanced transnasally through the most patent nasal cavity to the level of the velopharynx and larynx. A video of the examination was recorded.    Oral Mechanism Examination  Oral Musculature: unable to assess due to poor participation/comprehension  Dentition: present and adequate    Scope Utilized: Pentax 0195    Nares Entry: left    Visualization of Anatomy:      Velopharynx: Visualized but not assessed    Epiglottis: absent inversion, atrophy observed  no whiteout noted during swallow   Arytenoids: hypomobility observed L/R   True vocal folds: symmetric mobility appreciated, pt with difficulty producing voice on command   Secretion Management: adequate   Glottis: adequate airway patency at the level of the glottis  when achieved    Oral phase:  The oral phase cannot be directly  observed. The following behaviors are determined by the manner in which the bolus enters the pharynx.     prolonged A-P transfer  Premature spillage    Pharyngeal Phase:     Consistency Method/Volume Observation Compensatory Strategy   Thin Spoon Spillage to pyriforms with 25 second delay to initiate swallow and aspiration through arytenoid notch prior to swallow. Delayed sensory response with weak unproductive cough observed   Pt with cues and delayed secondary swallow  Aspirated material remains in airway post trials with no sensory response to clear post initial cough response.  Mild pharyngeal stasis post swallow Multiple swallows were ineffective in reducing or eliminating risk of aspiration   Honey Cup Pt with spillage to pyriforms with 10 second delay in swallow initiation mild vallecula and pyriform stasis noted post trial Multiple swallows   Puree Spoon Spillage to pyriforms with slightly delayed initial swallow and 30 second delay to initiate secondary swallow with (with max verbal cues) diffuse pooling noted  Severe pooling noted post all swallows with decreased sensation noted   Mild pharyngeal stasis post swallow Multiple swallows   Solid Spoon  peaches Peaches noted in both vallecula and pyriforms prior to initial swallow with peaches from vallecula spilling to pyriforms.     Spillage to pyriforms with slightly delayed initial swallow and 50 second delay with max verbal cues noted and no secondary swallow initiated. with diffuse pooling noted in pyriforms and vallecula, bolus eventually cleared with honey thick liquid wash     Pt with pooling of ~2 diced peaches in pyriforms  Multiple swallows                                        Thin liquids spillage                      Vocal Fold Abduction with aspirated material below the cords                                     Puree residue prior to secondary swallow        vocal Fold adduction    During/Post-Procedure Respiratory Status: Room  air    Additional Information: in bed, family member/caregiver at bedside, and all lines intact    Prognosis:     Guarded    Prognostic Barriers: co-morbidities, medical diagnosis, medical prognosis, and severity of symptoms    Education:     Results reviewed with: patient and family    Additional education provided regarding: recommendations, swallow anatomy and physiology, dysphagia diet, current status, prognosis, and plan of care    Plan:     Speech service will continue to closely monitor  and Direct Swallowing Treatment    Time Tracking:     SLP Treatment Date: 07/01/25  Speech Start Time: 0839 (1020)  Speech Stop Time: 0849 (1100)     Speech Total Time (min): 10 min +40 min    Billable Minutes: FEES Billable Minutes: Endoscopy Swallow Test 17 and Self Care/Home Management Training 23+ Bedside swallow evaluation 10 min    07/01/2025

## 2025-07-01 NOTE — EICU
Virtual ICU Quality Rounds    Admit Date: 6/29/2025  Hospital Day: 2    ICU Day: 1d 4h    24H Vital Sign Range:  Temp:  [95.2 °F (35.1 °C)-98.6 °F (37 °C)]   Pulse:  [65-90]   Resp:  [11-22]   BP: ()/(51-82)   SpO2:  [94 %-100 %]     VICU Surveillance Screening    Daily review of  line necessity(optional): Urinary catheter in place            Zeng Indications : Non-healing perineal and sacral wounds in incontinent patients to avoid further deterioration of wound/skin    LDA reconciliation : Yes

## 2025-07-01 NOTE — ASSESSMENT & PLAN NOTE
Nutrition consulted. Most recent weight and BMI monitored-     Measurements:  Wt Readings from Last 1 Encounters:   07/01/25 59.9 kg (132 lb 0.9 oz)   Body mass index is 16.51 kg/m².    Patient has been screened and assessed by RD.    Malnutrition Type:  Context: chronic illness  Level: severe    Malnutrition Characteristic Summary:  Subcutaneous Fat (Malnutrition): severe depletion  Muscle Mass (Malnutrition): severe depletion    Interventions/Recommendations (treatment strategy):

## 2025-07-01 NOTE — CONSULTS
[facility] Medical [facility]  Adult Nutrition  Consult Note    SUMMARY     Current tube feed regimen provides 1080 kcal (60% EEN) and 59 g protein (82% EPN).     Recommendations  --Continuous TF recommendation: Glucerna 1.5 @ 55 mL/hr to provide 1320 mL total volume, 1980 kcal, 176 g CHO, 109 g protein, 21 g fiber, 1001 mL free water, 132% DRI         --165 mL flush q4 hrs to provide additional 990 mL free water (Total 1991 mL)    --Nursing: please continue to document rate and formula on flowsheet    --RD to monitor weight, rate, tolerance    --Order Guzman BID as tube feed modifier to promote wound healing    Goals: Meet % EEN/EPN by next RD follow-up  Nutrition Goal Status: new  Communication of RD Recs:  (POC)    Nutrition Discharge Planning     Nutrition Discharge Planning: Enteral nutrition (comments)    Assessment and Plan    Endocrine  Severe protein-calorie malnutrition  Malnutrition Type:  Context: chronic illness  Level: severe    Related to (etiology):   Metabolic demand of disease and treatment    Signs and Symptoms (as evidenced by):   Muscle/fat wasting     Malnutrition Characteristic Summary:  Subcutaneous Fat (Malnutrition): severe depletion  Muscle Mass (Malnutrition): severe depletion      Interventions/Recommendations (treatment strategy):  Collaboration of nutrition care with other providers    Nutrition Diagnosis Status:   New         Malnutrition Assessment  Malnutrition Context: chronic illness  Malnutrition Level: severe  Skin (Micronutrient): none  Nails (Micronutrient): none  Hair/Scalp (Micronutrient): none  Eyes (Micronutrient): none  Extraoral (Micronutrient): none  Gums (Micronutrient): none  Lips/Mucous Membranes (Micronutrient): none  Teeth (Micronutrient): none  Tongue (Micronutrient): none  Neck/Chest (Micronutrient): bony prominence  Musculoskeletal/Lower Extremities: none   Micronutrient Evaluation Summary: suspected deficiency   Subcutaneous Fat (Malnutrition): severe  "depletion  Muscle Mass (Malnutrition): severe depletion   Orbital Region (Subcutaneous Fat Loss): severe depletion  Upper Arm Region (Subcutaneous Fat Loss): severe depletion   Anabaptist Region (Muscle Loss): severe depletion  Clavicle Bone Region (Muscle Loss): severe depletion  Clavicle and Acromion Bone Region (Muscle Loss): severe depletion  Dorsal Hand (Muscle Loss): severe depletion  Patellar Region (Muscle Loss): severe depletion  Anterior Thigh Region (Muscle Loss): severe depletion  Posterior Calf Region (Muscle Loss): severe depletion                 Reason for Assessment    Reason For Assessment: consult (BMI < 19, assess for malnutrition; wounds)  Diagnosis:  (Pneumonia)  General Information Comments: 44yoM with PMHx significant for ALS, neuromuscular dysfunction of the bladder, sacral ulcers, and spinal cerebellar ataxia who was admitted recently admitted (5/9/2025) for severe dehydration, electrolyte disturbances, and covid who presented to the ED with EMS and c/o AMS and hypoxia. RD consult for malnutrition assessment, wounds. Spoke with mother at bedside. Mother reports pt tolerating tube feed at current goal rate of 30 mL/hr via NG. Current tube feed regimen not meeting EEN but meeting EPN. See above for new recommendations. Mother endorses 9-10 lb weight loss. NPFE performed - see above for details. Multiple wounds noted - recommend Guzman BID to promote wound healing.     Nutrition Related Social Determinants of Health: SDOH: Adequate food in home environment     Food Insecurity: No Food Insecurity (5/12/2025)    Hunger Vital Sign     Worried About Running Out of Food in the Last Year: Never true     Ran Out of Food in the Last Year: Never true       Nutrition/Diet History    Spiritual, Cultural Beliefs, Lutheran Practices, Values that Affect Care: no  Food Allergies: NKFA  Factors Affecting Nutritional Intake: NPO    Anthropometrics    Height: 6' 3" (190.5 cm)  Height (inches): 75 in  Height Method: " Estimated  Weight: 59.9 kg (132 lb 0.9 oz)  Weight (lb): 132.06 lb  Weight Method: Estimated  Ideal Body Weight (IBW), Male: 196 lb  % Ideal Body Weight, Male (lb): 67.38 %  BMI (Calculated): 16.5  BMI Grade: less than 18.5 - underweight       Lab/Procedures/Meds    Pertinent Labs Reviewed: reviewed - Na 146, BUN 30, glucose 67, P 4.8, .6    Pertinent Medications Reviewed: reviewed - enoxaparin, mirtazapine, bowel reg    Estimated/Assessed Needs    Weight Used For Calorie Calculations: 59.9 kg (132 lb 0.9 oz)  Energy Calorie Requirements (kcal): 8280-1707 kcal/day (30-35 kcal/kg)  Energy Need Method: Kcal/kg  Protein Requirements:  g/day (1.2-2.0 g/kg)  Weight Used For Protein Calculations: 59.9 kg (132 lb 0.9 oz)     Estimated Fluid Requirement Method: RDA Method  RDA Method (mL): 1797         Nutrition Prescription Ordered    Current Diet Order: NPO  Current Nutrition Support Formula Ordered: Glucerna 1.5  Current Nutrition Support Rate Ordered: 30 (ml)  Current Nutrition Support Frequency Ordered: x 24 hours    Evaluation of Received Nutrient/Fluid Intake    Enteral Calories (kcal): 1080  Enteral Protein (gm): 59  Enteral (Free Water) Fluid (mL): 546  % Kcal Needs: 60  % Protein Needs: 82  Energy Calories Required: not meeting needs  Protein Required: meeting needs  Fluid Required:  (Per MD)  Comments: LBM 6/26  Tolerance: tolerating  % Intake of Estimated Energy Needs: 50 - 75 %  % Meal Intake: NPO    PES Statement  Inadequate enteral nutrition infusion related to  (Infusion volume not reached) as evidenced by  (Inadequate EN volume compared to estimated requirements)  Status: New    Nutrition Risk    Level of Risk/Frequency of Follow-up: high       Monitor and Evaluation    Monitor and Evaluation: Enteral and parenteral nutrition administration, Weight, Electrolyte and renal panel, Gastrointestinal profile, Glucose/endocrine profile, Inflammatory profile, Lipid profile, Nutrition focused physical  findings, Skin       Nutrition Follow-Up    RD Follow-up?: Yes

## 2025-07-01 NOTE — ASSESSMENT & PLAN NOTE
Malnutrition Type:  Context: chronic illness  Level: severe    Related to (etiology):   Metabolic demand of disease and treatment    Signs and Symptoms (as evidenced by):   Muscle/fat wasting     Malnutrition Characteristic Summary:  Subcutaneous Fat (Malnutrition): severe depletion  Muscle Mass (Malnutrition): severe depletion      Interventions/Recommendations (treatment strategy):  Collaboration of nutrition care with other providers    Nutrition Diagnosis Status:   New

## 2025-07-01 NOTE — EICU
Intervention Initiated From:  [facility] / EICU    Ren intervened regarding:  Rounding (Video assessment)    VICU Night Rounds Checklist  24H Vital Sign Range:  Temp:  [91.8 °F (33.2 °C)-97.5 °F (36.4 °C)]   Pulse:  [59-87]   Resp:  [9-26]   BP: ()/(42-78)   SpO2:  [93 %-100 %]     Video rounds and LDA reconciliation        Nursing orders placed : IP HUNTER Peripheral IV Access

## 2025-07-01 NOTE — ASSESSMENT & PLAN NOTE
This patient does have evidence of infective focus  My overall impression is sepsis without organ dysfunction.  Source: Respiratory Infection  Antibiotics given-   Antibiotics (72h ago, onward)      Start     Stop Route Frequency Ordered    07/01/25 1200  cefTRIAXone (ROCEPHIN) 2 g in 0.9% NaCl 100 mL IVPB (MB+)         07/06/25 1159 IV Every 24 hours (non-standard times) 07/01/25 1012    07/01/25 1145  azithromycin tablet 500 mg         07/04/25 0859 PER NG TUBE Daily 07/01/25 1038          Latest lactate reviewed-  Recent Labs   Lab 06/30/25  0621 06/30/25  0629   LACTATE  --  1.3   POCLAC 1.1  --        Will Not start   Source control achieved by:     Mr. Moreno is a 44yoM with PMHx significant for ALS, neuromuscular dysfunction of the bladder, sacral ulcers, and spinal cerebellar ataxia who was admitted recently admitted (5/9/2025) for severe dehydration, electrolyte disturbances, and covid who presented to the ED with EMS and c/o AMS and hypoxia. Nonverbal at baseline but apparently is usually more alert; has been less responsive and interactive x1 day. Upon EMS arrival, was obtunded and sating 74%. They gave him solumedrol and duonebs en route which improved his oxygen status to >90%. Mother at bedside reports increased difficulty with swallowing and decreased PO intake.       While in the ED, labs and imaging significant for Na 149, K 5.4, Cl 111, CO2 21, glucose 149, BUN 38, creat 1.0, albumin 2.2, ALP 80, AST/ALT 34/23, anion gap 17, procal 8.42, BNP <10, WBC 7.11, H/H 12/40.5, plts 315, covid/flu negative, .6, VBG 7.349/47.1/44.5/23.9, istat LA 4.9->7.4->4.2 (7.4 likely error?). Chest xray with patchy opacities bilaterally, predominant within the bibasilar regions but also within the left lung apex and the left mid-lung; compatible with multifocal pneumonia. Started on BSA by the ED; vanc and cefepime.     - Started Ceftriaxone and Azithromycin for CAP coverage  - Bcx NGTD.   - Recommend deep  suction per RT  - Pulmonary hygiene   - Maintain spo2 >90%; wean fio2 as tolerated  - Continuous telemetry and spO2 monitoring  - Neuro checks  - Delirium/aspiration/fall precautions  - Recommend Wound Care for sacral and lower extremity wounds

## 2025-07-01 NOTE — SUBJECTIVE & OBJECTIVE
History regarding patient's sacral decubitus ulcer was obtained from patient's mother who was at bedside. Patient is being seen by a Dr. Snow who provides care for this wound. Dr. Snow's team placed a vac over the wound approximately 1 week ago and it has since been changed by home care nurses. Mr. Fischer did have a wound vac in place on presentation to the ED but it has since been removed and replaced with a wet to dry dressing.     On exam, dressing was removed and wound inspected. Fibrinous tissue present but no evidence of infected or necrotic tissue. Wound bed appeared to have forming granulation tissue. Patient tolerated it well.    No current facility-administered medications on file prior to encounter.     Current Outpatient Medications on File Prior to Encounter   Medication Sig    baclofen (LIORESAL) 20 MG tablet Take 1 tablet (20 mg total) by mouth 3 (three) times daily.    cetirizine (ZYRTEC) 10 MG tablet TAKE 1 TABLET BY MOUTH DAILY    dantrolene (DANTRIUM) 50 MG Cap TAKE 1 CAPSULE(50 MG) BY MOUTH THREE TIMES DAILY    dimethicone-zinc oxide 1-40 % Oint Apply to affected area twice daily    donepeziL (ARICEPT) 5 MG tablet Take 1 tablet (5 mg total) by mouth every evening.    mirtazapine (REMERON) 15 MG tablet Take 1 tablet (15 mg total) by mouth every evening.    QUEtiapine (SEROQUEL) 50 MG tablet Take 1 tablet (50 mg total) by mouth every evening.       Review of patient's allergies indicates:   Allergen Reactions    Penicillins      Hives and Itching    Allergen ext-venom-honey bee        Past Medical History:   Diagnosis Date    Anxiety     Degenerative motor system disease     Depression     Insomnia secondary to depression with anxiety 10/30/2024    Spastic quadriplegia     Spinocerebellar atrophy      Past Surgical History:   Procedure Laterality Date    BACLOFEN PUMP IMPLANTATION      COLONOSCOPY N/A 7/23/2020    Procedure: COLONOSCOPY;  Surgeon: Ziyad Fitch MD;  Location:  [facility] ENDO;  Service: Endoscopy;  Laterality: N/A;    ESOPHAGOGASTRODUODENOSCOPY N/A 7/23/2020    Procedure: EGD (ESOPHAGOGASTRODUODENOSCOPY);  Surgeon: Ziyad Fitch MD;  Location: [facility];  Service: Endoscopy;  Laterality: N/A;    FLUOROSCOPIC URODYNAMIC STUDY N/A 11/27/2018    Procedure: URODYNAMIC STUDY, FLUOROSCOPIC;  Surgeon: Tanja Okeefe MD;  Location: [address];  Service: Urology;  Laterality: N/A;  1 hour    REATTACHMENT OF MUSCLE Bilateral 2/18/2022    Procedure: PTOSIS REPAIR OF BOTH EYES;  Surgeon: Krupa Rios MD;  Location: [facility] OR [address] [place] [address];  Service: Ophthalmology;  Laterality: Bilateral;    UPPER GASTROINTESTINAL ENDOSCOPY       Family History       Problem Relation (Age of Onset)    Cancer Mother    Depression Mother    Hypertension Mother    Multiple sclerosis Other    No Known Problems Brother, Son    Thyroid cancer Mother          Tobacco Use    Smoking status: Never    Smokeless tobacco: Never   Substance and Sexual Activity    Alcohol use: Not Currently     Comment: social drinker, at times    Drug use: Not Currently    Sexual activity: Never     Review of Systems   Unable to perform ROS: Patient nonverbal     Objective:     Vital Signs (Most Recent):  Temp: 96.1 °F (35.6 °C) (07/01/25 1300)  Pulse: 76 (07/01/25 1300)  Resp: 15 (07/01/25 1300)  BP: 105/72 (07/01/25 1300)  SpO2: 95 % (07/01/25 1300) Vital Signs (24h Range):  Temp:  [95.2 °F (35.1 °C)-98.6 °F (37 °C)] 96.1 °F (35.6 °C)  Pulse:  [65-90] 76  Resp:  [11-22] 15  SpO2:  [94 %-100 %] 95 %  BP: ()/(51-82) 105/72     Weight: 59.9 kg (132 lb 0.9 oz)  Body mass index is 16.51 kg/m².     Physical Exam  Constitutional:       General: He is not in acute distress.     Appearance: He is cachectic. He is ill-appearing. He is not diaphoretic.   Cardiovascular:      Rate and Rhythm: Normal rate.   Pulmonary:      Effort: Pulmonary effort is normal.      Breath sounds: Stridor present. Wheezing present.    Genitourinary:     Comments: Stage 4 decubitus ulcer with granulation tissue. Without evidence of purulence or necrosis.  Musculoskeletal:         General: Deformity present.   Skin:     General: Skin is dry.   Neurological:      Mental Status: He is lethargic.      Comments: Quadraplegic            I have reviewed all pertinent lab results within the past 24 hours.    Significant Diagnostics:  I have reviewed all pertinent imaging results/findings within the past 24 hours.

## 2025-07-01 NOTE — PLAN OF CARE
[facility] - Medical ICU  Initial Discharge Assessment       Primary Care Provider: John Nixon II, MD    Admission Diagnosis: Screening for cardiovascular condition [Z13.6]  Chest pain [R07.9]  Sepsis, due to unspecified organism, unspecified whether acute organ dysfunction present [A41.9]    Admission Date: 6/29/2025  Expected Discharge Date: 7/8/2025    Transition of Care Barriers: None    Payor: MEDICARE / Plan: MEDICARE PART A & B / Product Type: Government /     Extended Emergency Contact Information  Primary Emergency Contact: Giovana Moreno  Address: [address] [place] [address] [facility]  Home Phone: [phone]  Mobile Phone: [phone]  Relation: Mother    Discharge Plan A: Skilled Nursing Facility  Discharge Plan B: Home with family      [id number] - [place], [place] - [address] AT [facility] [address]  [address]  [facility] [address]  Phone: [phone] Fax: [phone]    [id number] - [place], [place] - [address] AT [facility] [address]  [address]  [facility] [address]  Phone: [phone] Fax: [phone]      Initial Assessment (most recent)       Adult Discharge Assessment - 07/01/25 1422          Discharge Assessment    Assessment Type Discharge Planning Assessment     Confirmed/corrected address, phone number and insurance Yes     Confirmed Demographics Correct on Facesheet     Source of Information family     Communicated MARCELLE with patient/caregiver Date not available/Unable to determine     People in Home sibling(s);parent(s)     Facility Arrived From: home     Do you expect to return to your current living situation? No     Do you have help at home or someone to help you manage your care at home? Yes     Who are your caregiver(s) and their phone number(s)? Brother assist in care     Current cognitive status: --   Alrert non vebal    Walking or Climbing Stairs Difficulty yes      Mobility Management bed bound     Home Accessibility wheelchair accessible   Ramp    Home Layout Able to live on 1st floor     Equipment Currently Used at Home wheelchair;hospital bed     Readmission within 30 days? No     Patient currently being followed by outpatient case management? Yes     If yes, name of outpatient case management following: --   Karen Medina    Do you currently have service(s) that help you manage your care at home? Yes     Name and Contact number of agency brother assist with care     Do you take prescription medications? Yes     Do you have prescription coverage? Yes     Do you have any problems affording any of your prescribed medications? TBD     Is the patient taking medications as prescribed? yes     How do you get to doctors appointments? health plan transportation;family or friend will provide     Are you on dialysis? No     Do you take coumadin? No     Discharge Plan A Skilled Nursing Facility     Discharge Plan B Home with family     DME Needed Upon Discharge  --   TBD    Discharge Plan discussed with: Parent(s);Patient     Name(s) and Number(s) Giovana     Transition of Care Barriers None        Financial Resource Strain    How hard is it for you to pay for the very basics like food, housing, medical care, and heating? Somewhat hard        Housing Stability    In the last 12 months, was there a time when you were not able to pay the mortgage or rent on time? No     At any time in the past 12 months, were you homeless or living in a shelter (including now)? No        Transportation Needs    In the past 12 months, has lack of transportation kept you from medical appointments or from getting medications? No     In the past 12 months, has lack of transportation kept you from meetings, work, or from getting things needed for daily living? No        Food Insecurity    Within the past 12 months, you worried that your food would run out before you got the money to buy more. Never true      Within the past 12 months, the food you bought just didn't last and you didn't have money to get more. Never true        Social Isolation    How often do you feel lonely or isolated from those around you?  Rarely        Alcohol Use    Q2: How many drinks containing alcohol do you have on a typical day when you are drinking? Patient does not drink        Utilities    In the past 12 months has the electric, gas, oil, or water company threatened to shut off services in your home? No        Health Literacy    How often do you need to have someone help you when you read instructions, pamphlets, or other written material from your doctor or pharmacy? Always                      Discharge Plan A and Plan B have been determined by review of patient's clinical status, future medical and therapeutic needs, and coverage/benefits for post-acute care in coordination with multidisciplinary team members.     CM spoke with patient and mother at bedside, patient is nonverbal. All information was verified on facesheet. Patient lives in a 1 story house with mother and sibling,has a ramp to get into the home.  Patient is mainly bed bound, has Hospital bed and wc for home use. Brother is he caregiver. Mother reports that she was trying to get patet into a SNF prior to admission, that [facility] would like  [facility] placement pending discharge needs one facility has in depth wound care. Mother is agreeable to sending notes and updates to both facilities. Patient is not on dialysis nor use Coumadin as a blood thinner. Patient takes medication as prescribed and has resources for all prescriptive needs. Patient will have help from family member upon discharge. Patient will need transportation to home or facility at discharge. Mother reports difficulty with paying Hospital bills. CM will research resources.  All Questions and concerns addressed and whiteboard updated with CM contact information. Will continue to follow for  course of hospitalization.       Zakia Ward RN  Case Management  [phone]

## 2025-07-01 NOTE — HOSPITAL COURSE
Admitted to the MICU for septic shock secondary to multifocal pneumonia. Initially requiring pressors but weaned. Started on broad spectrum antibiotics (Zosyn). Afebrile. Zosyn de-escalated to Ceftriaxone and Azithromycin for CAP coverage. SLP consulted for dysphagia. FEES performed which showed residual puree. Recommend NPO. PEG placed with IR on 7/5. Increased secretions noted on exam s/p completion of antibiotic course of CAP coverage. Restarted patient on Vancomycin and Zosyn. Respiratory culture with many gram negative rods. Vancomycin discontinued. Continued on Zosyn. Respiratory cx positive for E.Coli ESBL. Started on Ertapenem, Zosyn discontinued. ID consulted for additional recommendations. SLP re-consulted for swallowing evaluation in the setting of new PEG tube. Able to have small occasional pleasure feeds of puree and honey thick liquids via tsp. Stable for floor on 7/7.

## 2025-07-01 NOTE — PLAN OF CARE
Bedside swallow study and FEES completed this date. Continue alternate means of nutrition at this time.   7/1/2025

## 2025-07-01 NOTE — PLAN OF CARE
Current tube feed regimen provides 1080 kcal (60% EEN) and 59 g protein (82% EPN).     Recommendations  --Continuous TF recommendation: Glucerna 1.5 @ 55 mL/hr to provide 1320 mL total volume, 1980 kcal, 176 g CHO, 109 g protein, 21 g fiber, 1001 mL free water, 132% DRI         --165 mL flush q4 hrs to provide additional 990 mL free water (Total 1991 mL)    --Nursing: please continue to document rate and formula on flowsheet    --RD to monitor weight, rate, tolerance    --Order Guzman BID as tube feed modifier to promote wound healing    Goals: Meet % EEN/EPN by next RD follow-up  Nutrition Goal Status: new  Communication of RD Recs:  (POC)

## 2025-07-01 NOTE — CONSULTS
[facility] - Medical ICU  General Surgery  Consult Note    Patient Name: Lisa Moreno  MRN: [id number]  Code Status: Full Code  Admission Date: 6/29/2025  Hospital Length of Stay: 2 days  Attending Physician: Cordelia Velasco MD  Primary Care Provider: John Nixon II, MD    Patient information was obtained from parent, past medical records, and ER records.     Inpatient consult to General Surgery  Consult performed by: Cammy Marcum MD  Consult ordered by: Odette Christiansen MD        Subjective:     Principal Problem: Pneumonia    History of Present Illness: Patient is a 44 year-old-male with pmh ALS, cerebellar ataxia and sacral pressure ulcers who presented to the ED on 6/29 for one day of altered mental status. He was found to have an oxygen saturation of 74% which improved with solumedrol and duonebs. CXR in the ED showed patchy opacities concerning for multifocal pneumonia. On presentation WBC was 7.11, Hgb was 12, and lactic acid was 2.6. There was concern for sepsis without end organ failure, so blood cultures were drawn and he was started on vanc/cefepime. On 6/30 he was noted to be unresponsive to sternal rub and hypotensive. He was started on levophed and upgraded to the MICU. His abx were switched to zosyn. By the end of the day he was weaned off the levo. Today his abx were switched to azithromycin and ceftriaxone. Since presentation, he has remained afebrile. WBC peaked at 10.54 on 6/30 but was 9.35 today. Repeat lactate was 1.3. Blood cultures remain no growth to date.    History regarding patient's sacral decubitus ulcer was obtained from patient's mother who was at bedside. Patient is being seen by a Dr. Snow who provides care for this wound. Dr. Snow's team placed a vac over the wound approximately 1 week ago and it has since been changed by home care nurses. Mr. Fischer did have a wound vac in place on presentation to the ED but it has since been removed and replaced with a wet to dry  dressing.     On exam, dressing was removed and wound inspected. Fibrinous tissue present but no evidence of infected or necrotic tissue. Wound bed appeared to have forming granulation tissue. Patient tolerated it well.    No current facility-administered medications on file prior to encounter.     Current Outpatient Medications on File Prior to Encounter   Medication Sig    baclofen (LIORESAL) 20 MG tablet Take 1 tablet (20 mg total) by mouth 3 (three) times daily.    cetirizine (ZYRTEC) 10 MG tablet TAKE 1 TABLET BY MOUTH DAILY    dantrolene (DANTRIUM) 50 MG Cap TAKE 1 CAPSULE(50 MG) BY MOUTH THREE TIMES DAILY    dimethicone-zinc oxide 1-40 % Oint Apply to affected area twice daily    donepeziL (ARICEPT) 5 MG tablet Take 1 tablet (5 mg total) by mouth every evening.    mirtazapine (REMERON) 15 MG tablet Take 1 tablet (15 mg total) by mouth every evening.    QUEtiapine (SEROQUEL) 50 MG tablet Take 1 tablet (50 mg total) by mouth every evening.       Review of patient's allergies indicates:   Allergen Reactions    Penicillins      Hives and Itching    Allergen ext-venom-honey bee        Past Medical History:   Diagnosis Date    Anxiety     Degenerative motor system disease     Depression     Insomnia secondary to depression with anxiety 10/30/2024    Spastic quadriplegia     Spinocerebellar atrophy      Past Surgical History:   Procedure Laterality Date    BACLOFEN PUMP IMPLANTATION      COLONOSCOPY N/A 7/23/2020    Procedure: COLONOSCOPY;  Surgeon: Ziyad Fitch MD;  Location: [facility];  Service: Endoscopy;  Laterality: N/A;    ESOPHAGOGASTRODUODENOSCOPY N/A 7/23/2020    Procedure: EGD (ESOPHAGOGASTRODUODENOSCOPY);  Surgeon: Ziyad Fitch MD;  Location: [facility];  Service: Endoscopy;  Laterality: N/A;    FLUOROSCOPIC URODYNAMIC STUDY N/A 11/27/2018    Procedure: URODYNAMIC STUDY, FLUOROSCOPIC;  Surgeon: Tanja Okeefe MD;  Location: [address];  Service: Urology;  Laterality: N/A;  1  hour    REATTACHMENT OF MUSCLE Bilateral 2/18/2022    Procedure: PTOSIS REPAIR OF BOTH EYES;  Surgeon: Krupa Rios MD;  Location: [facility] OR [address] [place] [address];  Service: Ophthalmology;  Laterality: Bilateral;    UPPER GASTROINTESTINAL ENDOSCOPY       Family History       Problem Relation (Age of Onset)    Cancer Mother    Depression Mother    Hypertension Mother    Multiple sclerosis Other    No Known Problems Brother, Son    Thyroid cancer Mother          Tobacco Use    Smoking status: Never    Smokeless tobacco: Never   Substance and Sexual Activity    Alcohol use: Not Currently     Comment: social drinker, at times    Drug use: Not Currently    Sexual activity: Never     Review of Systems   Unable to perform ROS: Patient nonverbal     Objective:     Vital Signs (Most Recent):  Temp: 96.1 °F (35.6 °C) (07/01/25 1300)  Pulse: 76 (07/01/25 1300)  Resp: 15 (07/01/25 1300)  BP: 105/72 (07/01/25 1300)  SpO2: 95 % (07/01/25 1300) Vital Signs (24h Range):  Temp:  [95.2 °F (35.1 °C)-98.6 °F (37 °C)] 96.1 °F (35.6 °C)  Pulse:  [65-90] 76  Resp:  [11-22] 15  SpO2:  [94 %-100 %] 95 %  BP: ()/(51-82) 105/72     Weight: 59.9 kg (132 lb 0.9 oz)  Body mass index is 16.51 kg/m².     Physical Exam  Constitutional:       General: He is not in acute distress.     Appearance: He is cachectic. He is ill-appearing. He is not diaphoretic.   Cardiovascular:      Rate and Rhythm: Normal rate.   Pulmonary:      Effort: Pulmonary effort is normal.      Breath sounds: Stridor present. Wheezing present.   Genitourinary:     Comments: Stage 4 decubitus ulcer with granulation tissue. Without evidence of purulence or necrosis.  Musculoskeletal:         General: Deformity present.   Skin:     General: Skin is dry.   Neurological:      Mental Status: He is lethargic.      Comments: Quadraplegic            I have reviewed all pertinent lab results within the past 24 hours.    Significant Diagnostics:  I have reviewed all pertinent imaging  results/findings within the past 24 hours.    Assessment/Plan:     Patient is a 44 year-old-male with pmh ALS, cerebellar ataxia and sacral pressure ulcers who presented to the ED on 6/29 with altered mental status and hypoxia, found to be hypotensive with concern for sepsis likely secondary to pneumonia as seen on CXR. General surgery was consulted to assess sacral decubitus ulcer.    Sacral decubitus ulcer  - No surgical debridement indicated at this time  - recommend local wound care and wound care nursing consult. Offload pressure as able.  - Remainder of care per primary team  - Please contact general surgery with any questions, concerns, or clinical status changes      VTE Risk Mitigation (From admission, onward)           Ordered     enoxaparin injection 30 mg  Daily         07/01/25 1003     IP VTE HIGH RISK PATIENT  Once         07/01/25 1003     Place sequential compression device  Until discontinued         06/29/25 2044                    Thank you for your consult. I will sign off. Please contact us if you have any additional questions.    Cammy Marcum MD  General Surgery  [facility] - Medical ICU

## 2025-07-02 PROBLEM — L89.606 PRESSURE INJURY OF DEEP TISSUE OF HEEL: Status: ACTIVE | Noted: 2025-07-02

## 2025-07-02 PROBLEM — L97.429 ULCER OF LEFT HEEL: Status: ACTIVE | Noted: 2025-07-02

## 2025-07-02 LAB
ABSOLUTE EOSINOPHIL (OHS): 0.03 K/UL
ABSOLUTE MONOCYTE (OHS): 0.19 K/UL (ref 0.3–1)
ABSOLUTE NEUTROPHIL COUNT (OHS): 4.14 K/UL (ref 1.8–7.7)
ALBUMIN SERPL BCP-MCNC: 1.6 G/DL (ref 3.5–5.2)
ALBUMIN SERPL BCP-MCNC: 1.7 G/DL (ref 3.5–5.2)
ALP SERPL-CCNC: 61 UNIT/L (ref 40–150)
ALP SERPL-CCNC: 64 UNIT/L (ref 40–150)
ALT SERPL W/O P-5'-P-CCNC: 16 UNIT/L (ref 10–44)
ALT SERPL W/O P-5'-P-CCNC: 17 UNIT/L (ref 10–44)
ANION GAP (OHS): 7 MMOL/L (ref 8–16)
ANION GAP (OHS): 8 MMOL/L (ref 8–16)
AST SERPL-CCNC: 19 UNIT/L (ref 11–45)
AST SERPL-CCNC: 19 UNIT/L (ref 11–45)
BASOPHILS # BLD AUTO: 0.02 K/UL
BASOPHILS NFR BLD AUTO: 0.4 %
BILIRUB SERPL-MCNC: 0.2 MG/DL (ref 0.1–1)
BILIRUB SERPL-MCNC: 0.2 MG/DL (ref 0.1–1)
BUN SERPL-MCNC: 37 MG/DL (ref 6–20)
BUN SERPL-MCNC: 37 MG/DL (ref 6–20)
CALCIUM SERPL-MCNC: 8.1 MG/DL (ref 8.7–10.5)
CALCIUM SERPL-MCNC: 8.1 MG/DL (ref 8.7–10.5)
CHLORIDE SERPL-SCNC: 110 MMOL/L (ref 95–110)
CHLORIDE SERPL-SCNC: 110 MMOL/L (ref 95–110)
CO2 SERPL-SCNC: 23 MMOL/L (ref 23–29)
CO2 SERPL-SCNC: 25 MMOL/L (ref 23–29)
CREAT SERPL-MCNC: 0.9 MG/DL (ref 0.5–1.4)
CREAT SERPL-MCNC: 1 MG/DL (ref 0.5–1.4)
ERYTHROCYTE [DISTWIDTH] IN BLOOD BY AUTOMATED COUNT: 16.1 % (ref 11.5–14.5)
GFR SERPLBLD CREATININE-BSD FMLA CKD-EPI: >60 ML/MIN/1.73/M2
GFR SERPLBLD CREATININE-BSD FMLA CKD-EPI: >60 ML/MIN/1.73/M2
GLUCOSE SERPL-MCNC: 62 MG/DL (ref 70–110)
GLUCOSE SERPL-MCNC: 74 MG/DL (ref 70–110)
HCT VFR BLD AUTO: 31.7 % (ref 40–54)
HGB BLD-MCNC: 9.5 GM/DL (ref 14–18)
IMM GRANULOCYTES # BLD AUTO: 0.06 K/UL (ref 0–0.04)
IMM GRANULOCYTES NFR BLD AUTO: 1.1 % (ref 0–0.5)
LYMPHOCYTES # BLD AUTO: 0.81 K/UL (ref 1–4.8)
MAGNESIUM SERPL-MCNC: 1.9 MG/DL (ref 1.6–2.6)
MCH RBC QN AUTO: 25.1 PG (ref 27–31)
MCHC RBC AUTO-ENTMCNC: 30 G/DL (ref 32–36)
MCV RBC AUTO: 84 FL (ref 82–98)
NUCLEATED RBC (/100WBC) (OHS): 0 /100 WBC
PHOSPHATE SERPL-MCNC: 4.2 MG/DL (ref 2.7–4.5)
PLATELET # BLD AUTO: 259 K/UL (ref 150–450)
PMV BLD AUTO: 11.5 FL (ref 9.2–12.9)
POCT GLUCOSE: 85 MG/DL (ref 70–110)
POCT GLUCOSE: 88 MG/DL (ref 70–110)
POTASSIUM SERPL-SCNC: 4.4 MMOL/L (ref 3.5–5.1)
POTASSIUM SERPL-SCNC: 4.5 MMOL/L (ref 3.5–5.1)
PROCALCITONIN SERPL-MCNC: 5.13 NG/ML
PROT SERPL-MCNC: 6.3 GM/DL (ref 6–8.4)
PROT SERPL-MCNC: 6.5 GM/DL (ref 6–8.4)
RBC # BLD AUTO: 3.78 M/UL (ref 4.6–6.2)
RELATIVE EOSINOPHIL (OHS): 0.6 %
RELATIVE LYMPHOCYTE (OHS): 15.4 % (ref 18–48)
RELATIVE MONOCYTE (OHS): 3.6 % (ref 4–15)
RELATIVE NEUTROPHIL (OHS): 78.9 % (ref 38–73)
SODIUM SERPL-SCNC: 141 MMOL/L (ref 136–145)
SODIUM SERPL-SCNC: 142 MMOL/L (ref 136–145)
WBC # BLD AUTO: 5.25 K/UL (ref 3.9–12.7)

## 2025-07-02 PROCEDURE — 63600175 PHARM REV CODE 636 W HCPCS

## 2025-07-02 PROCEDURE — 94761 N-INVAS EAR/PLS OXIMETRY MLT: CPT

## 2025-07-02 PROCEDURE — 84145 PROCALCITONIN (PCT): CPT

## 2025-07-02 PROCEDURE — 84100 ASSAY OF PHOSPHORUS: CPT | Performed by: STUDENT IN AN ORGANIZED HEALTH CARE EDUCATION/TRAINING PROGRAM

## 2025-07-02 PROCEDURE — 83735 ASSAY OF MAGNESIUM: CPT | Performed by: STUDENT IN AN ORGANIZED HEALTH CARE EDUCATION/TRAINING PROGRAM

## 2025-07-02 PROCEDURE — 25000003 PHARM REV CODE 250: Performed by: STUDENT IN AN ORGANIZED HEALTH CARE EDUCATION/TRAINING PROGRAM

## 2025-07-02 PROCEDURE — 25000003 PHARM REV CODE 250: Performed by: INTERNAL MEDICINE

## 2025-07-02 PROCEDURE — 25000003 PHARM REV CODE 250

## 2025-07-02 PROCEDURE — 80053 COMPREHEN METABOLIC PANEL: CPT | Performed by: STUDENT IN AN ORGANIZED HEALTH CARE EDUCATION/TRAINING PROGRAM

## 2025-07-02 PROCEDURE — 85025 COMPLETE CBC W/AUTO DIFF WBC: CPT | Performed by: STUDENT IN AN ORGANIZED HEALTH CARE EDUCATION/TRAINING PROGRAM

## 2025-07-02 PROCEDURE — 20000000 HC ICU ROOM

## 2025-07-02 PROCEDURE — 80053 COMPREHEN METABOLIC PANEL: CPT | Performed by: INTERNAL MEDICINE

## 2025-07-02 PROCEDURE — 92526 ORAL FUNCTION THERAPY: CPT

## 2025-07-02 PROCEDURE — 97535 SELF CARE MNGMENT TRAINING: CPT

## 2025-07-02 PROCEDURE — 99232 SBSQ HOSP IP/OBS MODERATE 35: CPT | Mod: ,,, | Performed by: FAMILY MEDICINE

## 2025-07-02 PROCEDURE — 99233 SBSQ HOSP IP/OBS HIGH 50: CPT | Mod: GC,,, | Performed by: INTERNAL MEDICINE

## 2025-07-02 PROCEDURE — 63700000 PHARM REV CODE 250 ALT 637 W/O HCPCS: Performed by: STUDENT IN AN ORGANIZED HEALTH CARE EDUCATION/TRAINING PROGRAM

## 2025-07-02 RX ORDER — POLYETHYLENE GLYCOL 3350 17 G/17G
17 POWDER, FOR SOLUTION ORAL DAILY
Status: DISCONTINUED | OUTPATIENT
Start: 2025-07-02 | End: 2025-07-02

## 2025-07-02 RX ORDER — POLYETHYLENE GLYCOL 3350 17 G/17G
17 POWDER, FOR SOLUTION ORAL 2 TIMES DAILY
Status: DISCONTINUED | OUTPATIENT
Start: 2025-07-03 | End: 2025-07-09

## 2025-07-02 RX ORDER — AMMONIUM LACTATE 12 G/100G
LOTION TOPICAL 2 TIMES DAILY PRN
Status: DISCONTINUED | OUTPATIENT
Start: 2025-07-02 | End: 2025-07-17

## 2025-07-02 RX ORDER — AMOXICILLIN 250 MG
1 CAPSULE ORAL 2 TIMES DAILY
Status: DISCONTINUED | OUTPATIENT
Start: 2025-07-03 | End: 2025-07-09

## 2025-07-02 RX ADMIN — DONEPEZIL HYDROCHLORIDE 5 MG: 5 TABLET, FILM COATED ORAL at 09:07

## 2025-07-02 RX ADMIN — POLYETHYLENE GLYCOL 3350 17 G: 17 POWDER, FOR SOLUTION ORAL at 08:07

## 2025-07-02 RX ADMIN — DANTROLENE SODIUM 50 MG: 25 CAPSULE ORAL at 02:07

## 2025-07-02 RX ADMIN — DANTROLENE SODIUM 50 MG: 25 CAPSULE ORAL at 08:07

## 2025-07-02 RX ADMIN — CEFTRIAXONE 2 G: 2 INJECTION, POWDER, FOR SOLUTION INTRAMUSCULAR; INTRAVENOUS at 12:07

## 2025-07-02 RX ADMIN — SENNOSIDES AND DOCUSATE SODIUM 1 TABLET: 50; 8.6 TABLET ORAL at 08:07

## 2025-07-02 RX ADMIN — AZITHROMYCIN DIHYDRATE 500 MG: 250 TABLET ORAL at 08:07

## 2025-07-02 RX ADMIN — BACLOFEN 20 MG: 10 TABLET ORAL at 02:07

## 2025-07-02 RX ADMIN — MIRTAZAPINE 15 MG: 15 TABLET, FILM COATED ORAL at 09:07

## 2025-07-02 RX ADMIN — QUETIAPINE FUMARATE 50 MG: 25 TABLET ORAL at 09:07

## 2025-07-02 RX ADMIN — CETIRIZINE HYDROCHLORIDE 10 MG: 10 TABLET, FILM COATED ORAL at 08:07

## 2025-07-02 RX ADMIN — DANTROLENE SODIUM 50 MG: 25 CAPSULE ORAL at 09:07

## 2025-07-02 RX ADMIN — BACLOFEN 20 MG: 10 TABLET ORAL at 09:07

## 2025-07-02 RX ADMIN — BACLOFEN 20 MG: 10 TABLET ORAL at 08:07

## 2025-07-02 NOTE — CONSULTS
[facility] - Medical ICU  Wound Care    Patient Name:  Lisa Moreno   MRN:  [id number]  Date: 7/2/2025  Diagnosis: Pneumonia    History:     Past Medical History:   Diagnosis Date    Anxiety     Degenerative motor system disease     Depression     Insomnia secondary to depression with anxiety 10/30/2024    Spastic quadriplegia     Spinocerebellar atrophy        Social History[1]    Precautions:     Allergies as of 06/29/2025 - Reviewed 06/29/2025   Allergen Reaction Noted    Penicillins  01/14/2015    Allergen ext-venom-honey bee  05/10/2022       WO Assessment Details/Treatment     Patient seen for inpatient wound care consult. Primary RN at bedside and agreeable to assessment at this time. Upon assessment, sacrum with stage 4 pressure wound. Scant serosanguineous drainage noted. Per family, patient typically has wound vac in place. Wound thoroughly cleansed with vashe for antimicrobial properties and pat dry. Vashe wet to dry dressing applied with plan for wound vacuum placement tomorrow.    Left heel with unstageable wound with firmly adherent, black eschar. No active drainage noted. Paint daily with betadine and leave open to air.    Right heel with chronic wound with scant serosanguineous drainage and tan slough. Cleanse right heel wound with vashe for antimicrobial properties and pat dry. Apply aquacel ag to wound bed. Cover with silicone foam border dressing. Change every other day or PRN if soiled.     Right buttocks and ischium with partial thickness tissue loss and stage 2 pressure injuries. No drianage noted. Cleanse with vashe and pat dry. Apply silicone foam border dressing. Change every three days or PRN if soiled.    Immerse surface in use for pressure redistribution. EHOB boots in use for offloading. Amlactin ordered for BLE for moisturizing agent. No other issues or concerns. Will continue to follow up.         Reviewed chart for this encounter.  See flowsheet for findings.        Discussed POC  with patient and primary nurse.  See EMR for orders and patient education.    Bedside nursing to continue care and monitoring.  Bedside to maintain pressure injury prevention interventions.        07/02/25 0800   WOCN Assessment   WOCN Total Time (mins) 60   Visit Date 07/02/25   Visit Time 0800   Consult Type New   WOCN Speciality Wound   WOCN List wound vac   Intervention assessed;changed;applied;chart review;coordination of care;orders   Teaching on-going        Wound 04/23/25 1530 Other (comment) Left Heel #2   Date First Assessed/Time First Assessed: 04/23/25 1530   Present on Original Admission: Yes  Primary Wound Type: Other (comment)  Side: Left  Location: Heel  Wound Number: #2   Wound Image    Pressure Injury Stage U   Dressing Appearance Open to air;Dry   Drainage Amount None   Drainage Characteristics/Odor No odor   Appearance Black;Tan   Care   (Paint with betadine, leave open to air)        Wound 04/23/25 1531 Pressure Injury Right Heel #3   Date First Assessed/Time First Assessed: 04/23/25 1531   Present on Original Admission: Yes  Primary Wound Type: Pressure Injury  Side: Right  Location: Heel  Wound Number: #3  Is this injury device related?: No   Wound Image    Pressure Injury Stage U   Dressing Appearance Open to air   Drainage Amount Scant   Drainage Characteristics/Odor No odor   Appearance Black;Tan   Care Cleansed with:;Antimicrobial agent  (Vashe)   Dressing Applied  (Aquacel, foam)        Wound 04/23/25 1537 Pressure Injury Sacral spine #4   Date First Assessed/Time First Assessed: 04/23/25 1537   Present on Original Admission: Yes  Primary Wound Type: Pressure Injury  Location: Sacral spine  Wound Number: #4  Is this injury device related?: No   Wound Image    Pressure Injury Stage 4   Dressing Appearance Moist drainage   Drainage Amount Scant   Drainage Characteristics/Odor Serosanguineous   Appearance Pink;Red   Tissue loss description Full thickness   Care Cleansed with:;Antimicrobial  agent  (Vashe)   Dressing   (Vashe WTD)        Wound 06/29/25 1945 Pressure Injury Left posterior Buttocks #1   Date First Assessed/Time First Assessed: 06/29/25 1945   Primary Wound Type: Pressure Injury  Side: Left  Orientation: posterior  Location: Buttocks  Wound Number: #1  Is this injury device related?: No   Wound Image    Pressure Injury Stage 2   Dressing Appearance Open to air   Drainage Amount None   Drainage Characteristics/Odor No odor   Appearance Pink;Maroon   Tissue loss description Partial thickness   Care Cleansed with:;Antimicrobial agent  (Vashe)   Dressing Applied  (Aquacel, foam)           Orders placed.   Richard ARGUETA, RN, [facility]  07/02/2025         [1]   Social History  Socioeconomic History    Marital status: Single   Tobacco Use    Smoking status: Never    Smokeless tobacco: Never   Substance and Sexual Activity    Alcohol use: Not Currently     Comment: social drinker, at times    Drug use: Not Currently    Sexual activity: Never     Social Drivers of Health     Financial Resource Strain: Medium Risk (7/1/2025)    Overall Financial Resource Strain (CARDIA)     Difficulty of Paying Living Expenses: Somewhat hard   Food Insecurity: No Food Insecurity (7/1/2025)    Hunger Vital Sign     Worried About Running Out of Food in the Last Year: Never true     Ran Out of Food in the Last Year: Never true   Transportation Needs: No Transportation Needs (7/1/2025)    PRAPARE - Transportation     Lack of Transportation (Medical): No     Lack of Transportation (Non-Medical): No   Recent Concern: Transportation Needs - Unmet Transportation Needs (4/17/2025)    PRAPARE - Transportation     Lack of Transportation (Medical): Yes     Lack of Transportation (Non-Medical): Yes   Physical Activity: Inactive (5/12/2025)    Exercise Vital Sign     Days of Exercise per Week: 0 days     Minutes of Exercise per Session: 0 min   Stress: Stress Concern Present (5/12/2025)    [employer] [place] [employer] - Occupational Stress Questionnaire     Feeling of Stress : To some extent   Housing Stability: Low Risk  (7/1/2025)    Housing Stability Vital Sign     Unable to Pay for Housing in the Last Year: No     Number of Times Moved in the Last Year: 1     Homeless in the Last Year: No

## 2025-07-02 NOTE — PLAN OF CARE
07/02/25 1108   Post-Acute Status   Post-Acute Authorization Placement   Discharge Delays None known at this time   Discharge Plan   Discharge Plan A Skilled Nursing Facility   Discharge Plan B Home with family;Home;Home Health     Update on SNF placement:    [facility]- Pending    [facility]- declined, pt doesn't meet Clinical criteria      Discharge Plan A and Plan B have been determined by review of patient's clinical status, future medical and therapeutic needs, and coverage/benefits for post-acute care in coordination with multidisciplinary team members.           Aurora Villafana, [facility]   - [facility]

## 2025-07-02 NOTE — ASSESSMENT & PLAN NOTE
L Heel with black eschar. R Heel pressure ulcer with scant serosanguineous drainage. Photos uploaded into chart.     - Wound care following, appreciate recs  - Podiatry consulted, appreciate recs

## 2025-07-02 NOTE — ASSESSMENT & PLAN NOTE
Secondary to ALS/unknown neuromuscular disorder.     - SLP evaluated, failed swallow eval. Recommend NPO and PEG tube evaluation.   - IR consulted, appreciate recs   - Will not proceed with percutaneous gastrostomy tube per patient's mother request

## 2025-07-02 NOTE — ASSESSMENT & PLAN NOTE
- Wound care consulted, appreciate recs  - General Surgery consulted for possible debridement, appreciate recs   - No indication for inpatient debridement

## 2025-07-02 NOTE — PROGRESS NOTES
This is an attestation of a separate note by the ICU resident/fellow. As the teaching physician, I saw the patient with the resident/fellow and agree with the resident/fellow's findings and plan. In addition to the resident/fellow's documentation, I add the following:     Lisa Moreno is a 43 yo man with neuromuscular disorder onset 2006 which has progress to loss of ambulation, spastic quadriparesis, abnormal swallow (requires thickeners) with intact cough, no evidence of hypercapnia, known decubitus, admitted with altered mental status, procalcitonin 8.4.    # encephalopathy - no hypercapnia, alert and follows commands; chronically quadriparetic  # sepsis - markedly elevated procalcitonin, decubitus ulcer, multifocal airspace dz - started on vancomycin, cefepime, changed to ceftriaxone/azithromycin for CAP coverage - observe with f/u procalcitonin  # hypoxemia - resolved  # neuromuscular disease - progressive over 2 decades with weakness and spasticity and bulbar involvement  # spasticity - has been a significant problem - takes baclofen, dantrolene - continue  # nutrition - continue via NG tube as will not be able to maintain nutrition in short term. Discussed gastrostomy tube placement with pt and mother - this will be helpful. IR notes that window for placement is small. Plan to proceed with they are ready.     Richard Webb MD  [facility] Attending  Cell: [phone]

## 2025-07-02 NOTE — CONSULTS
Interventional Radiology  Consult/History & Physical Note    Consult Requested By: Odette Christiansen MD/ Richard Webb MD  Reason for Consult: gastrostomy tube placement    SUBJECTIVE:     Chief Complaint:  ALS / dysphagia    History of Present Illness:  Lisa Moreno is a 44 y.o. male with a PMHx of ALS, neuromuscular dysfunction of the bladder, sacral ulcers, and spinal cerebellar ataxia who was admitted recently admitted (5/9/2025) for severe dehydration, electrolyte disturbances, and covid who presented to the ED with EMS and c/o AMS and hypoxia on 6/29/2025. Hospital course notable for sepsis due to pneumonia. Interventional Radiology has been consulted for percutaneous gastrostomy tube placement for management of long term enteral feeds. Pt unable to tolerate oral feeds due to dysphagia 2/2 ALS. he has had imaging including a CT Urogram Abd Pelvis on 11/24/2020 which revealed a difficult window. Pt was evaluated by SLP. The pt does have an NG tube in place. The pt's WBC is 5.25 and is trending down. Pt is afebrile and hemodynamically stable. He has been administered enoxaparin.    Review of Systems   Unable to perform ROS: Other (nonverbal ALS patient)       Scheduled Meds:   azithromycin  500 mg Per NG tube Daily    baclofen  20 mg Per NG tube TID    cefTRIAXone (Rocephin) IV (PEDS and ADULTS)  2 g Intravenous Q24H    cetirizine  10 mg Per NG tube Daily    dantrolene  50 mg Per NG tube TID    donepeziL  5 mg Per NG tube QHS    enoxparin  30 mg Subcutaneous Daily    mirtazapine  15 mg Per NG tube QHS    polyethylene glycol  17 g Per NG tube Daily    QUEtiapine  50 mg Per NG tube QHS    senna-docusate  1 tablet Per NG tube Daily     Continuous Infusions:  PRN Meds:  Current Facility-Administered Medications:     0.9%  NaCl infusion (for blood administration), , Intravenous, Q24H PRN    dextrose 50%, 12.5 g, Intravenous, PRN    dextrose 50%, 25 g, Intravenous, PRN    glucagon (human recombinant), 1 mg,  Intramuscular, PRN    glucose, 16 g, Per NG tube, PRN    glucose, 24 g, Per NG tube, PRN    naloxone, 0.02 mg, Intravenous, PRN    ondansetron, 4 mg, Intravenous, Q6H PRN    sodium chloride 0.9%, 10 mL, Intravenous, Q12H PRN    Review of patient's allergies indicates:   Allergen Reactions    Penicillins      Hives and Itching  Tolerated zosyn- 7/1/2025    Allergen ext-venom-honey bee        Past Medical History:   Diagnosis Date    Anxiety     Degenerative motor system disease     Depression     Insomnia secondary to depression with anxiety 10/30/2024    Spastic quadriplegia     Spinocerebellar atrophy      Past Surgical History:   Procedure Laterality Date    BACLOFEN PUMP IMPLANTATION      COLONOSCOPY N/A 7/23/2020    Procedure: COLONOSCOPY;  Surgeon: Ziyad Fitch MD;  Location: [facility];  Service: Endoscopy;  Laterality: N/A;    ESOPHAGOGASTRODUODENOSCOPY N/A 7/23/2020    Procedure: EGD (ESOPHAGOGASTRODUODENOSCOPY);  Surgeon: Ziyad Fitch MD;  Location: [facility];  Service: Endoscopy;  Laterality: N/A;    FLUOROSCOPIC URODYNAMIC STUDY N/A 11/27/2018    Procedure: URODYNAMIC STUDY, FLUOROSCOPIC;  Surgeon: Tanja Okeefe MD;  Location: [facility] OR [address] [place] [address];  Service: Urology;  Laterality: N/A;  1 hour    REATTACHMENT OF MUSCLE Bilateral 2/18/2022    Procedure: PTOSIS REPAIR OF BOTH EYES;  Surgeon: Krupa Rios MD;  Location: [address];  Service: Ophthalmology;  Laterality: Bilateral;    UPPER GASTROINTESTINAL ENDOSCOPY       Family History   Problem Relation Name Age of Onset    Thyroid cancer Mother      Hypertension Mother      Depression Mother      Cancer Mother          thyroid cancer    Multiple sclerosis Other          mother's cousin    No Known Problems Brother      No Known Problems Son      ALS Neg Hx      Muscular dystrophy Neg Hx       Social History[1]    OBJECTIVE:     Vital Signs (Most Recent)  Temp: 98.6 °F (37 °C) (07/02/25 0806)  Pulse: 87 (07/02/25 0806)  Resp: 15  (07/02/25 0806)  BP: 109/75 (07/02/25 0806)  SpO2: 100 % (07/02/25 0806)    Physical Exam:  Physical Exam  Constitutional:       General: He is not in acute distress.  HENT:      Head: Normocephalic and atraumatic.   Cardiovascular:      Rate and Rhythm: Normal rate.   Pulmonary:      Effort: Pulmonary effort is normal. No respiratory distress.   Abdominal:      General: There is no distension.   Skin:     General: Skin is warm and dry.         Laboratory  I have reviewed all pertinent lab results within the past 24 hours.    ASA/Mallampati  ASA: 3  Mallampati: per anesthesia    Imaging:  Recent imaging studies including CT Urogram Abd Pelvis on 11/24/2020 which was independently reviewed by Dr. Eric.     ASSESSMENT/PLAN:     Assessment:  44 y.o. male with a PMHx of ALS, neuromuscular dysfunction of the bladder, sacral ulcers, and spinal cerebellar ataxia who was admitted recently admitted (5/9/2025) for severe dehydration, electrolyte disturbances who has been referred to IR for percutaneous gastrostomy tube placement for long term enteral feeds due to dysphagia 2/2 ALS. The procedure was discussed in great detail with the patient's mother, Giovana Moreno, including thorough explanations of the potential risks and benefits of gastrostomy tube placement. Explained to patient's mother, Giovana Moreno, and primary team that NG tube placement is required to insufflate the stomach during the procedure and to give barium through prior to the procedure, both of which make G tube placement a safer procedure. Risks include but are not limited to sepsis, severe infection, hemorrhage, bowel injury, catheter dislodgement, catheter blockage and need for additional procedures. Explained to pt/pt's family that opting to forgo gastrostomy tube placement could pose a risk to life or bodily function. The pt is a candidate for percutaneous gastrostromy tube placement under general anesthesia. Plan discussed with ordering physician  and patient's mother, Giovana Moreno, who verbalized understanding of the plan but does not want to proceed at this time. Referring team notified.    Plan:  Will not proceed with percutaneous gastrostomy tube at this time per patient's mother's request.  If the decision is made to move forward, please let IR know.   Thank you for the consult. Please contact with questions via [facility] secure chat or spectra.    Time spent during patient care today was 75 minutes. This includes time spent before the visit reviewing the chart, discussing case with staff physician and ordering provider, time spent during the face to face patient visit, and time spent after the visit on documentation. Time excludes procedure time.     TIMUR Soriano, FNP  Interventional Radiology         [1]   Social History  Tobacco Use    Smoking status: Never    Smokeless tobacco: Never   Substance Use Topics    Alcohol use: Not Currently     Comment: social drinker, at times    Drug use: Not Currently

## 2025-07-02 NOTE — EICU
Virtual ICU Quality Rounds    Admit Date: 6/29/2025  Hospital Day: 3    ICU Day: 1d 22h    24H Vital Sign Range:  Temp:  [95.2 °F (35.1 °C)-99 °F (37.2 °C)]   Pulse:  [65-94]   Resp:  [11-25]   BP: ()/(55-86)   SpO2:  [92 %-100 %]     VICU Surveillance Screening    Daily review of  line necessity(optional): Urinary catheter in place            Zeng Indications : Non-healing perineal and sacral wounds in incontinent patients to avoid further deterioration of wound/skin    LDA reconciliation : Yes

## 2025-07-02 NOTE — PROGRESS NOTES
[facility] - Medical ICU  Critical Care Medicine  Progress Note    Patient Name: Lisa Moreno  MRN: [id number]  Admission Date: 6/29/2025  Hospital Length of Stay: 3 days  Code Status: Full Code  Attending Provider: Richard Webb MD  Primary Care Provider: John Nixon II, MD   Principal Problem: Pneumonia    Subjective:     HPI:  Mr. Moreno is a 44yoM with PMHx significant for ALS, neuromuscular dysfunction of the bladder, sacral ulcers, and spinal cerebellar ataxia who was admitted recently admitted (5/9/2025) for severe dehydration, electrolyte disturbances, and covid who presented to the ED with EMS and c/o AMS and hypoxia. Nonverbal at baseline but apparently is usually more alert; has been less responsive and interactive x1 day. Upon EMS arrival, was obtunded and sating 74%. They gave him solumedrol and duonebs en route which improved his oxygen status to >90%. Mother at bedside reports increased difficulty with swallowing and decreased PO intake.       While in the ED, labs and imaging significant for Na 149, K 5.4, Cl 111, CO2 21, glucose 149, BUN 38, creat 1.0, albumin 2.2, ALP 80, AST/ALT 34/23, anion gap 17, procal 8.42, BNP <10, WBC 7.11, H/H 12/40.5, plts 315, covid/flu negative, .6, VBG 7.349/47.1/44.5/23.9, istat LA 4.9->7.4->4.2 (7.4 likely error?). Chest xray with patchy opacities bilaterally, predominant within the bibasilar regions but also within the left lung apex and the left mid-lung; compatible with multifocal pneumonia. Started on BSA by the ED; vanc and cefepime. [place] [facility] consulted for sepsis. During first examination the patient was stable with MAPs >65 and Lactic acid down trended to 2.6. Around 6 am of 6/30 [place] was called hypotension with MAPs in the 50. Pt was started on levophed and was transferred to MICU.     Hospital/ICU Course:  Admitted to the MICU for septic shock secondary to multifocal pneumonia. Initially requiring pressors but weaned. Started on broad  spectrum antibiotics (Zosyn). Afebrile. Zosyn de-escalated to Ceftriaxone and Azithromycin for CAP coverage. SLP consulted for dysphagia. FEES performed which showed residual puree. Recommend NPO. Will discuss with family about PEG.     Interval History/Significant Events: NAEO. Doing better this AM but not at baseline mentation per mother. IR evaluated for PEG. Family would like to discuss further.     Review of Systems  Objective:     Vital Signs (Most Recent):  Temp: 99.1 °F (37.3 °C) (07/02/25 1301)  Pulse: 94 (07/02/25 1301)  Resp: 19 (07/02/25 1301)  BP: 91/62 (07/02/25 1301)  SpO2: 99 % (07/02/25 1301) Vital Signs (24h Range):  Temp:  [95.5 °F (35.3 °C)-99.1 °F (37.3 °C)] 99.1 °F (37.3 °C)  Pulse:  [72-94] 94  Resp:  [11-25] 19  SpO2:  [96 %-100 %] 99 %  BP: ()/(59-86) 91/62   Weight: 59.9 kg (132 lb 0.9 oz)  Body mass index is 16.51 kg/m².      Intake/Output Summary (Last 24 hours) at 7/2/2025 1616  Last data filed at 7/2/2025 1401  Gross per 24 hour   Intake 720 ml   Output 1035 ml   Net -315 ml          Physical Exam  Constitutional:       General: He is not in acute distress.     Appearance: He is underweight.      Comments: NGT   HENT:      Head: Normocephalic.   Eyes:      General: No scleral icterus.        Right eye: No discharge.         Left eye: No discharge.      Extraocular Movements: Extraocular movements intact.   Cardiovascular:      Rate and Rhythm: Normal rate and regular rhythm.      Pulses: Normal pulses.      Heart sounds: No murmur heard.     No friction rub. No gallop.   Pulmonary:      Effort: Pulmonary effort is normal. No respiratory distress.   Abdominal:      General: There is no distension.   Musculoskeletal:      Right lower leg: No edema.      Left lower leg: No edema.   Skin:     General: Skin is warm.      Coloration: Skin is not jaundiced.   Neurological:      Mental Status: He is alert.   Psychiatric:         Mood and Affect: Mood normal.         Behavior: Behavior  normal.            Vents:     Lines/Drains/Airways       Drain  Duration                  NG/OG Tube 06/30/25 1200 [place] sump 14 Fr. Right nostril 2 days         Urethral Catheter 06/30/25 0756 Temperature probe 16 Fr. 2 days              Peripheral Intravenous Line  Duration             Peripheral IV 06/29/25 2114 20 G Anterior;Right Forearm 2 days    Peripheral IV 06/30/25 0620 18 G 1 in Anterior;Left Forearm 2 days    Peripheral IV Double Lumen 06/30/25 1130 18 G 1 3/4 in Anterior;Right Upper Arm 2 days    Peripheral IV Single Lumen 06/29/25 2130 20 G Anterior;Left Upper Arm 2 days                  Significant Labs:    CBC/Anemia Profile:  Recent Labs   Lab 06/30/25  1722 07/01/25  0242 07/02/25  0459   WBC 10.54 9.35 5.25   HGB 8.0* 7.8* 9.5*   HCT 25.9* 24.9* 31.7*    246 259   MCV 83 83 84   RDW 16.2* 16.0* 16.1*        Chemistries:  Recent Labs   Lab 07/01/25  0242 07/02/25  0459 07/02/25  0726   * 141 142   K 4.5 4.5 4.4   * 110 110   CO2 21* 23 25   BUN 30* 37* 37*   CREATININE 0.8 0.9 1.0   CALCIUM 8.2* 8.1* 8.1*   ALBUMIN 1.6* 1.7* 1.6*   PROT 6.1 6.5 6.3   BILITOT 0.2 0.2 0.2   ALKPHOS 56 64 61   ALT 13 16 17   AST 17 19 19   MG 2.0 1.9  --    PHOS 4.8* 4.2  --        All pertinent labs within the past 24 hours have been reviewed.    Significant Imaging:  I have reviewed all pertinent imaging results/findings within the past 24 hours.    ABG  Recent Labs   Lab 06/30/25  0848   PH 7.431   PO2 129*   PCO2 37.4   HCO3 25.0     Assessment/Plan:     Neuro  Acute encephalopathy  Likely 2/2 sepsis. Resolving.     - see plan for sepsis    ALS (amyotrophic lateral sclerosis)  Hx of ALS w/diffuse spacticity/weakness + dysphagia     - continue baclofen and dantrolene    Spinocerebellar ataxia  - Continue baclofen and dantrolene    Spastic diplegia  -continue home baclofen    Psychiatric  Insomnia secondary to depression with anxiety  Continue quetiapine and mirtazapine       Pulmonary  *  Pneumonia  - see plan for sepsis    Renal/  Hypernatremia  . Hypernatremia is likely due to Dehydration.     Plan  - Aim to correct the sodium by 8-10mEq in 24 hours.   - Plan to correct their hypernatremia with Select IV fluids: LR at a rate of 100 ml/hr.   - Will plan to trend the patient's sodium: Daily       ID  Sepsis  This patient does have evidence of infective focus  My overall impression is sepsis without organ dysfunction.  Source: Respiratory Infection  Antibiotics given-   Antibiotics (72h ago, onward)      Start     Stop Route Frequency Ordered    07/01/25 1200  cefTRIAXone (ROCEPHIN) 2 g in 0.9% NaCl 100 mL IVPB (MB+)         07/06/25 1159 IV Every 24 hours (non-standard times) 07/01/25 1012    07/01/25 1145  azithromycin tablet 500 mg         07/04/25 0859 PER NG TUBE Daily 07/01/25 1038          Latest lactate reviewed-  Recent Labs   Lab 06/30/25  0621 06/30/25  0629   LACTATE  --  1.3   POCLAC 1.1  --        Will Not start   Source control achieved by:     Mr. Moreno is a 44yoM with PMHx significant for ALS, neuromuscular dysfunction of the bladder, sacral ulcers, and spinal cerebellar ataxia who was admitted recently admitted (5/9/2025) for severe dehydration, electrolyte disturbances, and covid who presented to the ED with EMS and c/o AMS and hypoxia. Nonverbal at baseline but apparently is usually more alert; has been less responsive and interactive x1 day. Upon EMS arrival, was obtunded and sating 74%. They gave him solumedrol and duonebs en route which improved his oxygen status to >90%. Mother at bedside reports increased difficulty with swallowing and decreased PO intake.       While in the ED, labs and imaging significant for Na 149, K 5.4, Cl 111, CO2 21, glucose 149, BUN 38, creat 1.0, albumin 2.2, ALP 80, AST/ALT 34/23, anion gap 17, procal 8.42, BNP <10, WBC 7.11, H/H 12/40.5, plts 315, covid/flu negative, .6, VBG 7.349/47.1/44.5/23.9, istat LA 4.9->7.4->4.2 (7.4 likely  error?). Chest xray with patchy opacities bilaterally, predominant within the bibasilar regions but also within the left lung apex and the left mid-lung; compatible with multifocal pneumonia. Started on BSA by the ED; vanc and cefepime.     - Started 5-Day course of Ceftriaxone and Azithromycin for CAP coverage (End date: 7/6)   - Bcx NGTD.   - Recommend deep suction per RT  - Pulmonary hygiene   - Maintain spo2 >90%; wean fio2 as tolerated  - Continuous telemetry and spO2 monitoring  - Neuro checks  - Delirium/aspiration/fall precautions  - Recommend Wound Care for sacral and lower extremity wounds    Endocrine  Severe protein-calorie malnutrition  Nutrition consulted. Most recent weight and BMI monitored-     Measurements:  Wt Readings from Last 1 Encounters:   07/01/25 59.9 kg (132 lb 0.9 oz)   Body mass index is 16.51 kg/m².    Patient has been screened and assessed by RD.    Malnutrition Type:  Context: chronic illness  Level: severe    Malnutrition Characteristic Summary:  Subcutaneous Fat (Malnutrition): severe depletion  Muscle Mass (Malnutrition): severe depletion    Interventions/Recommendations (treatment strategy):         GI  Oropharyngeal dysphagia  Secondary to ALS/unknown neuromuscular disorder.     - SLP evaluated, failed swallow eval. Recommend NPO and PEG tube evaluation.   - IR consulted, appreciate recs   - Will not proceed with percutaneous gastrostomy tube per patient's mother request     Orthopedic  Pressure injury of deep tissue of heel  L Heel with black eschar. R Heel pressure ulcer with scant serosanguineous drainage. Photos uploaded into chart.     - Wound care following, appreciate recs  - Podiatry consulted, appreciate recs    Sacral decubitus ulcer  - Wound care consulted, appreciate recs  - General Surgery consulted for possible debridement, appreciate recs   - No indication for inpatient debridement       Critical Care Daily Checklist:    A: Awake: RASS Goal/Actual Goal:    Actual:      B: Spontaneous Breathing Trial Performed?     C: SAT & SBT Coordinated?  On room air                      D: Delirium: CAM-ICU     E: Early Mobility Performed? Yes   F: Feeding Goal: Goals: Meet % EEN/EPN by next RD follow-up  Status: Nutrition Goal Status: progressing towards goal   Current Diet Order   Procedures    Diet NPO      AS: Analgesia/Sedation None   T: Thromboembolic Prophylaxis SCDs   H: HOB > 300 Yes   U: Stress Ulcer Prophylaxis (if needed) N/a   G: Glucose Control Controlled   B: Bowel Function Unmeasured Stool Occurrence: 0   I: Indwelling Catheter (Lines & Zeng) Necessity UC, PIV, NGT    D: De-escalation of Antimicrobials/Pharmacotherapies CTX and Azithro    Plan for the day/ETD Ongoing PEG discussions    Code Status:  Family/Goals of Care: Full Code         Critical secondary to Patient has a condition that poses threat to life and bodily function: Severe Respiratory Distress      Critical care was time spent personally by me on the following activities: development of treatment plan with patient or surrogate and bedside caregivers, discussions with consultants, evaluation of patient's response to treatment, examination of patient, ordering and performing treatments and interventions, ordering and review of laboratory studies, ordering and review of radiographic studies, pulse oximetry, re-evaluation of patient's condition. This critical care time did not overlap with that of any other provider or involve time for any procedures.     Odette Tran MD  Critical Care Medicine  [facility] - Medical ICU

## 2025-07-02 NOTE — SUBJECTIVE & OBJECTIVE
Interval History/Significant Events: NAEO. Doing better this AM but not at baseline mentation per mother. IR evaluated for PEG. Family would like to discuss further.     Review of Systems  Objective:     Vital Signs (Most Recent):  Temp: 99.1 °F (37.3 °C) (07/02/25 1301)  Pulse: 94 (07/02/25 1301)  Resp: 19 (07/02/25 1301)  BP: 91/62 (07/02/25 1301)  SpO2: 99 % (07/02/25 1301) Vital Signs (24h Range):  Temp:  [95.5 °F (35.3 °C)-99.1 °F (37.3 °C)] 99.1 °F (37.3 °C)  Pulse:  [72-94] 94  Resp:  [11-25] 19  SpO2:  [96 %-100 %] 99 %  BP: ()/(59-86) 91/62   Weight: 59.9 kg (132 lb 0.9 oz)  Body mass index is 16.51 kg/m².      Intake/Output Summary (Last 24 hours) at 7/2/2025 1616  Last data filed at 7/2/2025 1401  Gross per 24 hour   Intake 720 ml   Output 1035 ml   Net -315 ml          Physical Exam  Constitutional:       General: He is not in acute distress.     Appearance: He is underweight.      Comments: NGT   HENT:      Head: Normocephalic.   Eyes:      General: No scleral icterus.        Right eye: No discharge.         Left eye: No discharge.      Extraocular Movements: Extraocular movements intact.   Cardiovascular:      Rate and Rhythm: Normal rate and regular rhythm.      Pulses: Normal pulses.      Heart sounds: No murmur heard.     No friction rub. No gallop.   Pulmonary:      Effort: Pulmonary effort is normal. No respiratory distress.   Abdominal:      General: There is no distension.   Musculoskeletal:      Right lower leg: No edema.      Left lower leg: No edema.   Skin:     General: Skin is warm.      Coloration: Skin is not jaundiced.   Neurological:      Mental Status: He is alert.   Psychiatric:         Mood and Affect: Mood normal.         Behavior: Behavior normal.            Vents:     Lines/Drains/Airways       Drain  Duration                  NG/OG Tube 06/30/25 1200 [place] sump 14 Fr. Right nostril 2 days         Urethral Catheter 06/30/25 0756 Temperature probe 16 Fr. 2 days               Peripheral Intravenous Line  Duration             Peripheral IV 06/29/25 2114 20 G Anterior;Right Forearm 2 days    Peripheral IV 06/30/25 0620 18 G 1 in Anterior;Left Forearm 2 days    Peripheral IV Double Lumen 06/30/25 1130 18 G 1 3/4 in Anterior;Right Upper Arm 2 days    Peripheral IV Single Lumen 06/29/25 2130 20 G Anterior;Left Upper Arm 2 days                  Significant Labs:    CBC/Anemia Profile:  Recent Labs   Lab 06/30/25  1722 07/01/25  0242 07/02/25  0459   WBC 10.54 9.35 5.25   HGB 8.0* 7.8* 9.5*   HCT 25.9* 24.9* 31.7*    246 259   MCV 83 83 84   RDW 16.2* 16.0* 16.1*        Chemistries:  Recent Labs   Lab 07/01/25  0242 07/02/25  0459 07/02/25  0726   * 141 142   K 4.5 4.5 4.4   * 110 110   CO2 21* 23 25   BUN 30* 37* 37*   CREATININE 0.8 0.9 1.0   CALCIUM 8.2* 8.1* 8.1*   ALBUMIN 1.6* 1.7* 1.6*   PROT 6.1 6.5 6.3   BILITOT 0.2 0.2 0.2   ALKPHOS 56 64 61   ALT 13 16 17   AST 17 19 19   MG 2.0 1.9  --    PHOS 4.8* 4.2  --        All pertinent labs within the past 24 hours have been reviewed.    Significant Imaging:  I have reviewed all pertinent imaging results/findings within the past 24 hours.

## 2025-07-02 NOTE — PLAN OF CARE
07/02/25 1330   Rounds   Attendance Provider;;Charge nurse   Discharge Plan A Skilled Nursing Facility   Why the patient remains in the hospital Requires continued medical care   Transition of Care Barriers None

## 2025-07-02 NOTE — EICU
Intervention Initiated From:  COR / ABHISHEKU    Ren intervened regarding:  Rounding (Video assessment)    VICU Night Rounds Checklist  24H Vital Sign Range:  Temp:  [95.2 °F (35.1 °C)-98.6 °F (37 °C)]   Pulse:  [65-90]   Resp:  [11-23]   BP: ()/(51-86)   SpO2:  [92 %-100 %]     Video rounds and LDA reconciliation

## 2025-07-02 NOTE — PLAN OF CARE
Current tube feed regimen provides 1080 kcal (60% EEN) and 59 g protein (82% EPN).      Recommendations  --Continuous TF recommendation: Glucerna 1.5 @ 55 mL/hr to provide 1320 mL total volume, 1980 kcal, 176 g CHO, 109 g protein, 21 g fiber, 1001 mL free water, 132% DRI         --165 mL flush q4 hrs to provide additional 990 mL free water (Total 1991 mL)     --Nursing: please continue to document rate and formula on flowsheet    --RD to monitor weight, rate, tolerance     --Order Guzman BID as tube feed modifier to promote wound healing    Goals: Meet % EEN/EPN by next RD follow-up  Nutrition Goal Status: progressing towards goal  Communication of RD Recs:  (POC)

## 2025-07-02 NOTE — PT/OT/SLP PROGRESS
"Speech Language Pathology Treatment    Patient Name:  Lisa Moreno   MRN:  [id number]  Admitting Diagnosis: Pneumonia    Recommendations:                 General Recommendations:  Dysphagia therapy  Diet recommendations:  NPO, Liquid Diet Level: NPO   Pt able to have small occasional pleasure feeds of puree and honey thick liquids via tsp (6 bites/6 sips) or occasional ice chips with caregiver   Aspiration Precautions: Slow rate, small bites/sips and second swallow per trial   General Precautions: Standard, aspiration, fall, NPO  Communication strategies:  yes/no questions only  Discharge recommendations:    low    Assessment:     Lisa Moreno is a 44 y.o. male with an SLP diagnosis of Dysphagia.   Subjective     Awake/alert  Mother at bedside   "I don't want anymore"     Pain/Comfort:  Pain Rating 1: 0/10  Pain Rating Post-Intervention 1: 0/10    Respiratory Status: Room air    Objective:     Has the patient been evaluated by SLP for swallowing?   Yes  Keep patient NPO? Yes     Pt repositioned upright in bed for PO trials. SLP educated pt on  results of FEES study yesterday, pleasure feeds, and SLP POC. Pt tolerated honey thick liquids x4 via tsp and puree x8 with prolonged AP transit time, delayed cued secondary swallow  (tactile and verbal) and no overt s/s of airway compromise. Ongoing education provided to mother on how to encourage secondary swallow, thicken liquids to honey thick consistency and swallow precautions. Recommend continue alternate means of nutrition as primary source of nutrition. Pt may have small amounts for pleasure feeds of (6 bites/sips) of honey thick liquids and puree outside of SLP therapy with mother. Pt may have occasional ice chips. Pt's mother verbalized understanding of information and was agreeable to plan.     Goals:   Multidisciplinary Problems       SLP Goals          Problem: SLP    Goal Priority Disciplines Outcome   SLP Goal     SLP    Description: Speech " Language Pathology Goals  Goals expected to be met by 7/9    1. Pt will participate in ongoing swallow assessment                                Plan:     Patient to be seen:  4 x/week   Plan of Care expires:     Plan of Care reviewed with:  patient, mother   SLP Follow-Up:  Yes       Time Tracking:     SLP Treatment Date:   07/02/25  Speech Start Time:  0929  Speech Stop Time:  0950     Speech Total Time (min):  21 min    Billable Minutes: Treatment Swallowing Dysfunction 11 and Self Care/Home Management Training 10    07/02/2025

## 2025-07-02 NOTE — PROGRESS NOTES
[facility] - Medical ICU  Adult Nutrition  Progress Note    SUMMARY   Current tube feed regimen provides 1080 kcal (60% EEN) and 59 g protein (82% EPN).      Recommendations  --Continuous TF recommendation: Glucerna 1.5 @ 55 mL/hr to provide 1320 mL total volume, 1980 kcal, 176 g CHO, 109 g protein, 21 g fiber, 1001 mL free water, 132% DRI         --165 mL flush q4 hrs to provide additional 990 mL free water (Total 1991 mL)     --Nursing: please continue to document rate and formula on flowsheet    --RD to monitor weight, rate, tolerance     --Order Guzman BID as tube feed modifier to promote wound healing    Goals: Meet % EEN/EPN by next RD follow-up  Nutrition Goal Status: progressing towards goal  Communication of RD Recs:  (POC)    Nutrition Discharge Planning    Nutrition Discharge Planning: Enteral nutrition (comments)    Assessment and Plan    Endocrine  Severe protein-calorie malnutrition  Malnutrition Type:  Context: chronic illness  Level: severe    Related to (etiology):   Metabolic demand of disease and treatment    Signs and Symptoms (as evidenced by):   Muscle/fat wasting     Malnutrition Characteristic Summary:  Subcutaneous Fat (Malnutrition): severe depletion  Muscle Mass (Malnutrition): severe depletion      Interventions/Recommendations (treatment strategy):  Collaboration of nutrition care with other providers    Nutrition Diagnosis Status:   Continues\    Malnutrition Assessment  Malnutrition Context: chronic illness  Malnutrition Level: severe  Skin (Micronutrient): none  Nails (Micronutrient): none  Hair/Scalp (Micronutrient): none  Eyes (Micronutrient): none  Extraoral (Micronutrient): none  Gums (Micronutrient): none  Lips/Mucous Membranes (Micronutrient): none  Teeth (Micronutrient): none  Tongue (Micronutrient): none  Neck/Chest (Micronutrient): bony prominence  Musculoskeletal/Lower Extremities: none   Micronutrient Evaluation Summary: suspected deficiency   Subcutaneous Fat  "(Malnutrition): severe depletion  Muscle Mass (Malnutrition): severe depletion   Orbital Region (Subcutaneous Fat Loss): severe depletion  Upper Arm Region (Subcutaneous Fat Loss): severe depletion   Moravian Region (Muscle Loss): severe depletion  Clavicle Bone Region (Muscle Loss): severe depletion  Clavicle and Acromion Bone Region (Muscle Loss): severe depletion  Dorsal Hand (Muscle Loss): severe depletion  Patellar Region (Muscle Loss): severe depletion  Anterior Thigh Region (Muscle Loss): severe depletion  Posterior Calf Region (Muscle Loss): severe depletion                 Reason for Assessment    Reason For Assessment: RD follow-up  Diagnosis:  (Pneumonia)  General Information Comments: RD f/u. TF infusing at 30 mL/hr via NG/OG. Plans to discharge to SNF. SLP rec'd NPO today. Malnutrition continues. RD to f/u and monitor.    Nutrition/Diet History    Spiritual, Cultural Beliefs, Hinduism Practices, Values that Affect Care: no  Food Allergies: NKFA  Factors Affecting Nutritional Intake: NPO  Nutrition-related SDOH: None Identified    Anthropometrics    Height: 6' 3" (190.5 cm)  Height (inches): 75 in  Height Method: Estimated  Weight: 59.9 kg (132 lb 0.9 oz)  Weight (lb): 132.06 lb  Weight Method: Estimated  Ideal Body Weight (IBW), Male: 196 lb  % Ideal Body Weight, Male (lb): 67.38 %  BMI (Calculated): 16.5  BMI Grade: less than 18.5 - underweight       Lab/Procedures/Meds    Pertinent Labs Reviewed: reviewed  Pertinent Labs Comments: BUN 37 (H)  Pertinent Medications Reviewed: reviewed  Pertinent Medications Comments: Abx, bowel reg    Estimated/Assessed Needs    Weight Used For Calorie Calculations: 59.9 kg (132 lb 0.9 oz)  Energy Calorie Requirements (kcal): 2563-2646 kcal/day (30-35 kcal/kg)  Energy Need Method: Kcal/kg  Protein Requirements:  g/day (1.2-2.0 g/kg)  Weight Used For Protein Calculations: 59.9 kg (132 lb 0.9 oz)     Estimated Fluid Requirement Method: RDA Method  RDA Method (mL): " 1797         Nutrition Prescription Ordered    Current Diet Order: NPO  Current Nutrition Support Formula Ordered: Glucerna 1.5  Current Nutrition Support Rate Ordered: 30 (ml)  Current Nutrition Support Frequency Ordered: x 24 hours    Evaluation of Received Nutrient/Fluid Intake    Enteral Calories (kcal): 1080  Enteral Protein (gm): 59  Enteral (Free Water) Fluid (mL): 546  % Kcal Needs: 60  % Protein Needs: 82  I/O: +2.4 L since admit  Energy Calories Required: not meeting needs  Protein Required: meeting needs  Fluid Required:  (Per MD)  Comments: LBM 6/26  Tolerance: tolerating  % Intake of Estimated Energy Needs: 50 - 75 %  % Meal Intake: NPO    PES Statement  Inadequate enteral nutrition infusion related to  (Infusion volume not reached) as evidenced by  (Inadequate EN volume compared to estimated requirements)  Status: Continues    Nutrition Risk    Level of Risk/Frequency of Follow-up: high     Monitor and Evaluation    Monitor and Evaluation: Enteral and parenteral nutrition administration, Weight, Electrolyte and renal panel, Gastrointestinal profile, Glucose/endocrine profile, Inflammatory profile, Lipid profile, Nutrition focused physical findings, Skin     Nutrition Follow-Up    RD Follow-up?: Yes

## 2025-07-02 NOTE — ASSESSMENT & PLAN NOTE
This patient does have evidence of infective focus  My overall impression is sepsis without organ dysfunction.  Source: Respiratory Infection  Antibiotics given-   Antibiotics (72h ago, onward)      Start     Stop Route Frequency Ordered    07/01/25 1200  cefTRIAXone (ROCEPHIN) 2 g in 0.9% NaCl 100 mL IVPB (MB+)         07/06/25 1159 IV Every 24 hours (non-standard times) 07/01/25 1012    07/01/25 1145  azithromycin tablet 500 mg         07/04/25 0859 PER NG TUBE Daily 07/01/25 1038          Latest lactate reviewed-  Recent Labs   Lab 06/30/25  0621 06/30/25  0629   LACTATE  --  1.3   POCLAC 1.1  --        Will Not start   Source control achieved by:     Mr. Moreno is a 44yoM with PMHx significant for ALS, neuromuscular dysfunction of the bladder, sacral ulcers, and spinal cerebellar ataxia who was admitted recently admitted (5/9/2025) for severe dehydration, electrolyte disturbances, and covid who presented to the ED with EMS and c/o AMS and hypoxia. Nonverbal at baseline but apparently is usually more alert; has been less responsive and interactive x1 day. Upon EMS arrival, was obtunded and sating 74%. They gave him solumedrol and duonebs en route which improved his oxygen status to >90%. Mother at bedside reports increased difficulty with swallowing and decreased PO intake.       While in the ED, labs and imaging significant for Na 149, K 5.4, Cl 111, CO2 21, glucose 149, BUN 38, creat 1.0, albumin 2.2, ALP 80, AST/ALT 34/23, anion gap 17, procal 8.42, BNP <10, WBC 7.11, H/H 12/40.5, plts 315, covid/flu negative, .6, VBG 7.349/47.1/44.5/23.9, istat LA 4.9->7.4->4.2 (7.4 likely error?). Chest xray with patchy opacities bilaterally, predominant within the bibasilar regions but also within the left lung apex and the left mid-lung; compatible with multifocal pneumonia. Started on BSA by the ED; vanc and cefepime.     - Started 5-Day course of Ceftriaxone and Azithromycin for CAP coverage (End date: 7/6)   -  Bcx NGTD.   - Recommend deep suction per RT  - Pulmonary hygiene   - Maintain spo2 >90%; wean fio2 as tolerated  - Continuous telemetry and spO2 monitoring  - Neuro checks  - Delirium/aspiration/fall precautions  - Recommend Wound Care for sacral and lower extremity wounds

## 2025-07-02 NOTE — PLAN OF CARE
MICU DAILY GOALS     Family/Goals of care/Code Status   Code Status: Full Code    24H Vital Sign Range  Temp:  [95.2 °F (35.1 °C)-97.9 °F (36.6 °C)]   Pulse:  [65-86]   Resp:  [11-23]   BP: ()/(55-86)   SpO2:  [92 %-100 %]      Shift Events (include procedures and significant events)   No acute events throughout shift    AWAKE RASS: Goal -    Actual -      Restraint necessity: Not necessary   BREATHE SBT: Not intubated    Coordinate A & B, analgesics/sedatives Pain: managed   SAT: Not intubated   Delirium CAM-ICU:     Early(intubated/ Progressive (non-intubated) Mobility MOVE Screen (INTUBATED ONLY): Not intubated    Activity: Activity Management: Arm raise - L1   Feeding/Nutrition Diet order: Diet/Nutrition Received: tube feeding, Specialty Diet/Nutrition Received: high calorie, high protein   Thrombus DVT prophylaxis: VTE Core Measure: Per order contraindicated for SCDs/Anticoagulants   HOB Elevation Head of Bed (HOB) Positioning: HOB at 30-45 degrees   Ulcer Prophylaxis GI: n/a   Glucose control managed Glycemic Management: blood glucose monitored   Skin Skin assessment:     Sacrum intact/not altered? No  Heels intact/not altered? No  Surgical wound? Yes    CHECK ONE!   (no altered skin or altered skin) and sub boxes:  [] No Altered Skin Integrity Present    []Prevention Measures Documented    [x] Altered Skin Integrity Present or Discovered   [x] LDA already present in EPIC, daily doc completed              [] LDA added if not already in EPIC (describe/stage wound).               [] Wound Image Taken (required on admit,                   transfer/discharge and every Tuesday)    Wound Care Consulted? Yes   Bowel Function constipation    Indwelling Catheter Necessity      Urethral Catheter 06/30/25 0756 Temperature probe 16 Fr.-Reason for Continuing Urinary Catheterization: Non-healing sacral/perineal wound          De-escalation Antibiotics Yes        VS and assessment per flow sheet, patient progressing  towards goals as tolerated, plan of care reviewed with family, all concerns addressed, will continue to monitor.   Problem: Adult Inpatient Plan of Care  Goal: Plan of Care Review  Outcome: Progressing  Goal: Patient-Specific Goal (Individualized)  Outcome: Progressing  Goal: Absence of Hospital-Acquired Illness or Injury  Outcome: Progressing  Goal: Optimal Comfort and Wellbeing  Outcome: Progressing  Goal: Readiness for Transition of Care  Outcome: Progressing     Problem: Skin Injury Risk Increased  Goal: Skin Health and Integrity  Outcome: Progressing     Problem: Sepsis/Septic Shock  Goal: Optimal Coping  Outcome: Progressing  Goal: Absence of Bleeding  Outcome: Progressing  Goal: Blood Glucose Level Within Targeted Range  Outcome: Progressing  Goal: Absence of Infection Signs and Symptoms  Outcome: Progressing  Goal: Optimal Nutrition Intake  Outcome: Progressing     Problem: Pneumonia  Goal: Fluid Balance  Outcome: Progressing  Goal: Resolution of Infection Signs and Symptoms  Outcome: Progressing  Goal: Effective Oxygenation and Ventilation  Outcome: Progressing     Problem: Wound  Goal: Optimal Coping  Outcome: Progressing  Goal: Optimal Functional Ability  Outcome: Progressing  Goal: Absence of Infection Signs and Symptoms  Outcome: Progressing  Goal: Improved Oral Intake  Outcome: Progressing  Goal: Optimal Pain Control and Function  Outcome: Progressing  Goal: Skin Health and Integrity  Outcome: Progressing  Goal: Optimal Wound Healing  Outcome: Progressing     Problem: Delirium  Goal: Optimal Coping  Outcome: Progressing  Goal: Improved Behavioral Control  Outcome: Progressing  Goal: Improved Attention and Thought Clarity  Outcome: Progressing  Goal: Improved Sleep  Outcome: Progressing     Problem: Infection  Goal: Absence of Infection Signs and Symptoms  Outcome: Progressing     Problem: Fall Injury Risk  Goal: Absence of Fall and Fall-Related Injury  Outcome: Progressing

## 2025-07-02 NOTE — PLAN OF CARE
MICU DAILY GOALS     Family/Goals of care/Code Status   Code Status: Full Code    24H Vital Sign Range  Temp:  [95.5 °F (35.3 °C)-99.9 °F (37.7 °C)]   Pulse:  []   Resp:  [11-25]   BP: ()/(59-86)   SpO2:  [96 %-100 %]      Shift Events (include procedures and significant events)   No acute events throughout shift    AWAKE RASS: Goal -    Actual -      Restraint necessity: Not necessary   BREATHE SBT: Not intubated    Coordinate A & B, analgesics/sedatives Pain: managed   SAT: Not intubated   Delirium CAM-ICU:     Early(intubated/ Progressive (non-intubated) Mobility MOVE Screen (INTUBATED ONLY): Not intubated    Activity: Activity Management: Rolling - L1   Feeding/Nutrition Diet order: Diet/Nutrition Received: NPO, tube feeding, Specialty Diet/Nutrition Received: high calorie, high protein   Thrombus DVT prophylaxis: VTE Core Measure: Per order contraindicated for SCDs/Anticoagulants   HOB Elevation Head of Bed (HOB) Positioning: HOB at 30-45 degrees   Ulcer Prophylaxis GI: yes   Glucose control managed Glycemic Management: blood glucose monitored   Skin Skin assessment:     Sacrum intact/not altered? No  Heels intact/not altered? No  Surgical wound? No    CHECK ONE!   (no altered skin or altered skin) and sub boxes:  [] No Altered Skin Integrity Present    []Prevention Measures Documented    [x] Altered Skin Integrity Present or Discovered   [x] LDA already present in EPIC, daily doc completed              [] LDA added if not already in EPIC (describe/stage wound).               [] Wound Image Taken (required on admit,                   transfer/discharge and every Tuesday)    Wound Care Consulted? Yes   Bowel Function constipation    Indwelling Catheter Necessity      Urethral Catheter 06/30/25 0756 Temperature probe 16 Fr.-Reason for Continuing Urinary Catheterization: Urinary retention, Non-healing sacral/perineal wound       chronic   De-escalation Antibiotics No        VS and assessment per flow  sheet, patient progressing towards goals as tolerated, plan of care reviewed with patients mother, all concerns addressed, will continue to monitor.

## 2025-07-03 LAB
ABSOLUTE EOSINOPHIL (OHS): 0.09 K/UL
ABSOLUTE MONOCYTE (OHS): 0.35 K/UL (ref 0.3–1)
ABSOLUTE NEUTROPHIL COUNT (OHS): 3.49 K/UL (ref 1.8–7.7)
ALBUMIN SERPL BCP-MCNC: 1.6 G/DL (ref 3.5–5.2)
ALP SERPL-CCNC: 56 UNIT/L (ref 40–150)
ALT SERPL W/O P-5'-P-CCNC: 12 UNIT/L (ref 10–44)
ANION GAP (OHS): 8 MMOL/L (ref 8–16)
AST SERPL-CCNC: 17 UNIT/L (ref 11–45)
BASOPHILS # BLD AUTO: 0.02 K/UL
BASOPHILS NFR BLD AUTO: 0.4 %
BILIRUB SERPL-MCNC: 0.2 MG/DL (ref 0.1–1)
BUN SERPL-MCNC: 32 MG/DL (ref 6–20)
CALCIUM SERPL-MCNC: 7.7 MG/DL (ref 8.7–10.5)
CHLORIDE SERPL-SCNC: 107 MMOL/L (ref 95–110)
CO2 SERPL-SCNC: 25 MMOL/L (ref 23–29)
CREAT SERPL-MCNC: 0.7 MG/DL (ref 0.5–1.4)
ERYTHROCYTE [DISTWIDTH] IN BLOOD BY AUTOMATED COUNT: 15.9 % (ref 11.5–14.5)
GFR SERPLBLD CREATININE-BSD FMLA CKD-EPI: >60 ML/MIN/1.73/M2
GLUCOSE SERPL-MCNC: 83 MG/DL (ref 70–110)
HCT VFR BLD AUTO: 29.4 % (ref 40–54)
HGB BLD-MCNC: 9 GM/DL (ref 14–18)
IMM GRANULOCYTES # BLD AUTO: 0.05 K/UL (ref 0–0.04)
IMM GRANULOCYTES NFR BLD AUTO: 1 % (ref 0–0.5)
LYMPHOCYTES # BLD AUTO: 0.99 K/UL (ref 1–4.8)
MAGNESIUM SERPL-MCNC: 2 MG/DL (ref 1.6–2.6)
MCH RBC QN AUTO: 25.6 PG (ref 27–31)
MCHC RBC AUTO-ENTMCNC: 30.6 G/DL (ref 32–36)
MCV RBC AUTO: 84 FL (ref 82–98)
NUCLEATED RBC (/100WBC) (OHS): 1 /100 WBC
PHOSPHATE SERPL-MCNC: 3 MG/DL (ref 2.7–4.5)
PLATELET # BLD AUTO: 209 K/UL (ref 150–450)
PMV BLD AUTO: 11.6 FL (ref 9.2–12.9)
POCT GLUCOSE: 122 MG/DL (ref 70–110)
POTASSIUM SERPL-SCNC: 4.1 MMOL/L (ref 3.5–5.1)
PROT SERPL-MCNC: 6.2 GM/DL (ref 6–8.4)
RBC # BLD AUTO: 3.51 M/UL (ref 4.6–6.2)
RELATIVE EOSINOPHIL (OHS): 1.8 %
RELATIVE LYMPHOCYTE (OHS): 19.8 % (ref 18–48)
RELATIVE MONOCYTE (OHS): 7 % (ref 4–15)
RELATIVE NEUTROPHIL (OHS): 70 % (ref 38–73)
SODIUM SERPL-SCNC: 140 MMOL/L (ref 136–145)
WBC # BLD AUTO: 4.99 K/UL (ref 3.9–12.7)

## 2025-07-03 PROCEDURE — 31720 CLEARANCE OF AIRWAYS: CPT

## 2025-07-03 PROCEDURE — 63600175 PHARM REV CODE 636 W HCPCS

## 2025-07-03 PROCEDURE — 94761 N-INVAS EAR/PLS OXIMETRY MLT: CPT

## 2025-07-03 PROCEDURE — 20000000 HC ICU ROOM

## 2025-07-03 PROCEDURE — 63700000 PHARM REV CODE 250 ALT 637 W/O HCPCS: Performed by: STUDENT IN AN ORGANIZED HEALTH CARE EDUCATION/TRAINING PROGRAM

## 2025-07-03 PROCEDURE — 25000003 PHARM REV CODE 250: Performed by: STUDENT IN AN ORGANIZED HEALTH CARE EDUCATION/TRAINING PROGRAM

## 2025-07-03 PROCEDURE — 85025 COMPLETE CBC W/AUTO DIFF WBC: CPT | Performed by: STUDENT IN AN ORGANIZED HEALTH CARE EDUCATION/TRAINING PROGRAM

## 2025-07-03 PROCEDURE — 99233 SBSQ HOSP IP/OBS HIGH 50: CPT | Mod: GC,,, | Performed by: INTERNAL MEDICINE

## 2025-07-03 PROCEDURE — 80053 COMPREHEN METABOLIC PANEL: CPT | Performed by: STUDENT IN AN ORGANIZED HEALTH CARE EDUCATION/TRAINING PROGRAM

## 2025-07-03 PROCEDURE — 25000003 PHARM REV CODE 250

## 2025-07-03 PROCEDURE — 83735 ASSAY OF MAGNESIUM: CPT | Performed by: STUDENT IN AN ORGANIZED HEALTH CARE EDUCATION/TRAINING PROGRAM

## 2025-07-03 PROCEDURE — 99223 1ST HOSP IP/OBS HIGH 75: CPT | Mod: ,,, | Performed by: PODIATRIST

## 2025-07-03 PROCEDURE — 84100 ASSAY OF PHOSPHORUS: CPT | Performed by: STUDENT IN AN ORGANIZED HEALTH CARE EDUCATION/TRAINING PROGRAM

## 2025-07-03 RX ADMIN — QUETIAPINE FUMARATE 50 MG: 25 TABLET ORAL at 09:07

## 2025-07-03 RX ADMIN — DONEPEZIL HYDROCHLORIDE 5 MG: 5 TABLET, FILM COATED ORAL at 09:07

## 2025-07-03 RX ADMIN — POLYETHYLENE GLYCOL 3350 17 G: 17 POWDER, FOR SOLUTION ORAL at 09:07

## 2025-07-03 RX ADMIN — CETIRIZINE HYDROCHLORIDE 10 MG: 10 TABLET, FILM COATED ORAL at 10:07

## 2025-07-03 RX ADMIN — BACLOFEN 20 MG: 10 TABLET ORAL at 09:07

## 2025-07-03 RX ADMIN — MIRTAZAPINE 15 MG: 15 TABLET, FILM COATED ORAL at 09:07

## 2025-07-03 RX ADMIN — CEFTRIAXONE 2 G: 2 INJECTION, POWDER, FOR SOLUTION INTRAMUSCULAR; INTRAVENOUS at 11:07

## 2025-07-03 RX ADMIN — AZITHROMYCIN DIHYDRATE 500 MG: 250 TABLET ORAL at 10:07

## 2025-07-03 RX ADMIN — DANTROLENE SODIUM 50 MG: 25 CAPSULE ORAL at 03:07

## 2025-07-03 RX ADMIN — SENNOSIDES AND DOCUSATE SODIUM 1 TABLET: 50; 8.6 TABLET ORAL at 09:07

## 2025-07-03 RX ADMIN — BACLOFEN 20 MG: 10 TABLET ORAL at 03:07

## 2025-07-03 RX ADMIN — DANTROLENE SODIUM 50 MG: 25 CAPSULE ORAL at 09:07

## 2025-07-03 NOTE — CARE UPDATE
After further discussion with team and patient's mother the joint decision was made to pursue G tube placement with IR. Patient's mother consented to the procedure with the understanding that the patient would be allowed pleasure feeds in the future with the tube available for supplemental nutrition. Will plan to pursue G tube tentatively Saturday 7/5/25.    Please administer barium per NG tube midnight before the procedure. Otherwise NPO at midnight the night before the procedure.   Hold anticoagulation    Javier Daniels MD   Radiology PGY4

## 2025-07-03 NOTE — EICU
Virtual ICU Quality Rounds    Admit Date: 6/29/2025  Hospital Day: 4    ICU Day: 2d 22h    24H Vital Sign Range:  Temp:  [97.7 °F (36.5 °C)-100 °F (37.8 °C)]   Pulse:  []   Resp:  [14-23]   BP: ()/(53-83)   SpO2:  [95 %-100 %]     VICU Surveillance Screening    Daily review of  line necessity(optional): Urinary catheter in place            Zeng Indications : Non-healing perineal and sacral wounds in incontinent patients to avoid further deterioration of wound/skin    LDA reconciliation : Yes

## 2025-07-03 NOTE — PROGRESS NOTES
This is an attestation of a separate note by the ICU resident/fellow. As the teaching physician, I saw the patient with the resident/fellow and agree with the resident/fellow's findings and plan. In addition to the resident/fellow's documentation, I add the following:     Lisa Moreno is a 45 yo man with neuromuscular disorder onset 2006 which has progress to loss of ambulation, spastic quadriparesis, abnormal swallow (requires thickeners) with intact cough, no evidence of hypercapnia, known decubitus, admitted with altered mental status, procalcitonin 8.4.    # encephalopathy - no hypercapnia, alert and follows commands; chronically quadriparetic  # sepsis - markedly elevated procalcitonin, decubitus ulcer, multifocal airspace dz - started on vancomycin, cefepime, changed to ceftriaxone/azithromycin for CAP coverage - observe with f/u procalcitonin  # hypoxemia - resolved  # neuromuscular disease - progressive over 2 decades with weakness and spasticity and bulbar involvement  # spasticity - has been a significant problem - takes baclofen, dantrolene - continue  # nutrition - plan IR gastrostomy this visit if possible, with tamara-key upgrade in 8 weeks. Indication - weight loss, access for supplemental nutrition, medications     Richard Webb MD  [facility] Attending  Cell: [phone]

## 2025-07-03 NOTE — PLAN OF CARE
[facility] - Medical ICU  Discharge Reassessment    Primary Care Provider: John Nixon II, MD    Expected Discharge Date: 7/8/2025    Reassessment (most recent)       Discharge Reassessment - 07/03/25 1549          Discharge Reassessment    Assessment Type Discharge Planning Reassessment     Did the patient's condition or plan change since previous assessment? No     Discharge Plan discussed with: Patient;Parent(s)     Communicated MARCELLE with patient/caregiver Date not available/Unable to determine     Discharge Plan A Skilled Nursing Facility     Discharge Plan B Skilled Nursing Facility     DME Needed Upon Discharge  none     Transition of Care Barriers None     Why the patient remains in the hospital Requires continued medical care        Post-Acute Status    Discharge Delays None known at this time                   Discharge Plan A and Plan B have been determined by review of patient's clinical status, future medical and therapeutic needs, and coverage/benefits for post-acute care in coordination with multidisciplinary team members.     Cm spoke with  patient's mother states will have feeding tube placed on Saturday, reviewed referrals were sent to requested facilities.     Zakia Ward RN  Case Management  [phone]

## 2025-07-03 NOTE — PROGRESS NOTES
[facility] - Medical ICU  Wound Care    Patient Name:  Lisa Moreno   MRN:  [id number]  Date: 7/3/2025  Diagnosis: Pneumonia    History:     Past Medical History:   Diagnosis Date    Anxiety     Degenerative motor system disease     Depression     Insomnia secondary to depression with anxiety 10/30/2024    Spastic quadriplegia     Spinocerebellar atrophy        Social History[1]    Precautions:     Allergies as of 06/29/2025 - Reviewed 06/29/2025   Allergen Reaction Noted    Penicillins  01/14/2015    Allergen ext-venom-honey bee  05/10/2022       WOC Assessment Details/Treatment     Patient seen for sacral wound vac placement. Wound vac applied with urgotul as contact layer and for antimicrobial properties. 1 medium black sponge applied and covered with drape. Good seal achieved with no evidence of leakage and continuous suction at 125mmHg. Plan for wound vac changes on Monday/Thursday schedule. Will continue to follow.         Reviewed chart for this encounter.  See flowsheet for findings.        Discussed POC with patient and primary nurse.  See EMR for orders and patient education.    Bedside nursing to continue care and monitoring.  Bedside to maintain pressure injury prevention interventions.         07/03/25 0845   WOCN Assessment   WOCN Total Time (mins) 45   Visit Date 07/03/25   Visit Time 0845   Consult Type Follow Up   WOCN Speciality Wound   WOCN List wound vac   Intervention assessed;changed;applied;chart review;coordination of care;orders   Teaching on-going       Orders placed.   Richard ARGUETA, RN, [facility]  07/03/2025         [1]   Social History  Socioeconomic History    Marital status: Single   Tobacco Use    Smoking status: Never    Smokeless tobacco: Never   Substance and Sexual Activity    Alcohol use: Not Currently     Comment: social drinker, at times    Drug use: Not Currently    Sexual activity: Never     Social Drivers of Health     Financial Resource Strain: Medium Risk (7/1/2025)     Overall Financial Resource Strain (CARDIA)     Difficulty of Paying Living Expenses: Somewhat hard   Food Insecurity: No Food Insecurity (7/1/2025)    Hunger Vital Sign     Worried About Running Out of Food in the Last Year: Never true     Ran Out of Food in the Last Year: Never true   Transportation Needs: No Transportation Needs (7/1/2025)    PRAPARE - Transportation     Lack of Transportation (Medical): No     Lack of Transportation (Non-Medical): No   Recent Concern: Transportation Needs - Unmet Transportation Needs (4/17/2025)    PRAPARE - Transportation     Lack of Transportation (Medical): Yes     Lack of Transportation (Non-Medical): Yes   Physical Activity: Inactive (5/12/2025)    Exercise Vital Sign     Days of Exercise per Week: 0 days     Minutes of Exercise per Session: 0 min   Stress: Stress Concern Present (5/12/2025)    [employer] - Occupational Stress Questionnaire     Feeling of Stress : To some extent   Housing Stability: Low Risk  (7/1/2025)    Housing Stability Vital Sign     Unable to Pay for Housing in the Last Year: No     Number of Times Moved in the Last Year: 1     Homeless in the Last Year: No

## 2025-07-03 NOTE — HPI
Lisa Moreno is a 44 y.o. male with a PMHx of ALS, neuromuscular dysfunction of the bladder, sacral ulcers, and spinal cerebellar ataxia who was admitted recently admitted (5/9/2025) for severe dehydration, electrolyte disturbances, and covid who presented to the ED with EMS and c/o AMS and hypoxia on 6/29/2025.Patient's mom at bedside,  Podiatry consulted for B/l heels wounds. Patient altered, poor interview, unable to assess pain. Per mom his bed bound and has never smoked.

## 2025-07-03 NOTE — CONSULTS
[facility] ICU  Podiatry  Consult Note    Patient Name: Lisa Moreno  MRN: [id number]  Admission Date: 6/29/2025  Hospital Length of Stay: 4 days  Attending Physician: Richard Webb MD  Primary Care Provider: John Nixon II, MD     Inpatient consult to Podiatry  Consult performed by: Samantha Small MD  Consult ordered by: Odette Christiansen MD  Reason for consult: b/l heel ulcerations        Subjective:     History of Present Illness:  Lisa Moreno is a 44 y.o. male with a PMHx of ALS, neuromuscular dysfunction of the bladder, sacral ulcers, and spinal cerebellar ataxia who was admitted recently admitted (5/9/2025) for severe dehydration, electrolyte disturbances, and covid who presented to the ED with EMS and c/o AMS and hypoxia on 6/29/2025.Patient's mom at bedside,  Podiatry consulted for B/l heels wounds. Patient altered, poor interview, unable to assess pain. Per mom his bed bound and has never smoked.     Scheduled Meds:   azithromycin  500 mg Per NG tube Daily    baclofen  20 mg Per NG tube TID    cefTRIAXone (Rocephin) IV (PEDS and ADULTS)  2 g Intravenous Q24H    cetirizine  10 mg Per NG tube Daily    dantrolene  50 mg Per NG tube TID    donepeziL  5 mg Per NG tube QHS    mirtazapine  15 mg Per NG tube QHS    polyethylene glycol  17 g Per NG tube BID    QUEtiapine  50 mg Per NG tube QHS    senna-docusate  1 tablet Per NG tube BID     Continuous Infusions:  PRN Meds:  Current Facility-Administered Medications:     0.9%  NaCl infusion (for blood administration), , Intravenous, Q24H PRN    ammonium lactate, , Topical (Top), BID PRN    dextrose 50%, 12.5 g, Intravenous, PRN    dextrose 50%, 25 g, Intravenous, PRN    glucagon (human recombinant), 1 mg, Intramuscular, PRN    glucose, 16 g, Per NG tube, PRN    glucose, 24 g, Per NG tube, PRN    naloxone, 0.02 mg, Intravenous, PRN    ondansetron, 4 mg, Intravenous, Q6H PRN    sodium chloride 0.9%, 10 mL, Intravenous, Q12H  PRN    Review of patient's allergies indicates:   Allergen Reactions    Penicillins      Hives and Itching  Tolerated zosyn- 7/1/2025    Allergen ext-venom-honey bee         Past Medical History:   Diagnosis Date    Anxiety     Degenerative motor system disease     Depression     Insomnia secondary to depression with anxiety 10/30/2024    Spastic quadriplegia     Spinocerebellar atrophy      Past Surgical History:   Procedure Laterality Date    BACLOFEN PUMP IMPLANTATION      COLONOSCOPY N/A 7/23/2020    Procedure: COLONOSCOPY;  Surgeon: Ziyad Fitch MD;  Location: [facility];  Service: Endoscopy;  Laterality: N/A;    ESOPHAGOGASTRODUODENOSCOPY N/A 7/23/2020    Procedure: EGD (ESOPHAGOGASTRODUODENOSCOPY);  Surgeon: Ziyad Fitch MD;  Location: [facility];  Service: Endoscopy;  Laterality: N/A;    FLUOROSCOPIC URODYNAMIC STUDY N/A 11/27/2018    Procedure: URODYNAMIC STUDY, FLUOROSCOPIC;  Surgeon: Tanja Okeefe MD;  Location: [facility] OR [address] [place] [address];  Service: Urology;  Laterality: N/A;  1 hour    REATTACHMENT OF MUSCLE Bilateral 2/18/2022    Procedure: PTOSIS REPAIR OF BOTH EYES;  Surgeon: Krupa Rios MD;  Location: [facility] OR [address] [place] [address];  Service: Ophthalmology;  Laterality: Bilateral;    UPPER GASTROINTESTINAL ENDOSCOPY         Family History       Problem Relation (Age of Onset)    Cancer Mother    Depression Mother    Hypertension Mother    Multiple sclerosis Other    No Known Problems Brother, Son    Thyroid cancer Mother          Tobacco Use    Smoking status: Never    Smokeless tobacco: Never   Substance and Sexual Activity    Alcohol use: Not Currently     Comment: social drinker, at times    Drug use: Not Currently    Sexual activity: Never     Review of Systems   Reason unable to perform ROS: AMS.     Objective:     Vital Signs (Most Recent):  Temp: 98.2 °F (36.8 °C) (07/03/25 0601)  Pulse: 83 (07/03/25 0601)  Resp: 19 (07/03/25 0601)  BP: 97/63 (07/03/25 0601)  SpO2: 100 % (07/03/25 0601)  Vital Signs (24h Range):  Temp:  [97.7 °F (36.5 °C)-100 °F (37.8 °C)] 98.2 °F (36.8 °C)  Pulse:  [] 83  Resp:  [14-23] 19  SpO2:  [95 %-100 %] 100 %  BP: ()/(53-83) 97/63     Weight: 59.9 kg (132 lb 0.9 oz)  Body mass index is 16.51 kg/m².    Foot Exam    General  Orientation: unable to assess       Right Foot/Ankle     Inspection and Palpation  Right foot ecchymosis: heel.  Tenderness: none   Swelling: none   Hammertoes: second toe, third toe, fourth toe and fifth toe  Skin Exam: callus, dry skin, skin changes, abnormal color and ulcer; no drainage, skin not intact, no cellulitis and no erythema     Neurovascular  Dorsalis pedis: 2+  Posterior tibial: 2+    Comments  Large fibrotic ulceration of the heel, Probes to wound, aubree-wound eschar and callus. No drainage, malodor, purulence. No TTP.     Moderate Lateral mal ulcer with fibroglandular ulceration. No drainage, malodor or purulence.     Left Foot/Ankle      Inspection and Palpation  Left foot ecchymosis: heel.  Tenderness: none   Swelling: none   Hammertoes: second toe, third toe, fourth toe and fifth toe  Skin Exam: callus and abnormal color; no drainage, skin not intact, no cellulitis and no erythema     Neurovascular  Dorsalis pedis: 2+  Posterior tibial: 2+    Comments  Large heel ulceration with dry eschar cap. No aubree-wound erythema, drainage or purulence. No fluctuance.     Clinical media:  Right          Left    Laboratory:  All pertinent labs reviewed within the last 24 hours.    Diagnostic Results:  I have reviewed all pertinent imaging results/findings within the past 24 hours.    Assessment/Plan:     Orthopedic  Pressure injury of deep tissue of heel  45 y/o M presented to [facility] for AMS. Podiatry consulted for b/l heel wounds. Patient bed bound. VSS. WBC WNL. Radiographs demonstrate no evidence of acute fracture or bony destructive process. Physical exam demonstrated stable heel ulcerations b/l. No cardinal signs of infection,  ulcerations likely due to pressure.     Plan:  - No surgical intervention  - Recommend wound care: L heel betadine paint, leave open to dry. R heel dressed with betadine soaked gauze, ABD pad, kerlix and ACE wrap.   - Agree with wound care orders  - Recommend offloading boots at all times while in bed.  - Recommend dressing changes every 2-3 days.  - Podiatry will sign off        Thank you for your consult. I will sign off. Please contact us if you have any additional questions.    Samantha Small MD  Podiatry  [facility] - Medical ICU

## 2025-07-03 NOTE — EICU
[facility] Night Rounds Checklist  24H Vital Sign Range:  Temp:  [97.7 °F (36.5 °C)-100 °F (37.8 °C)]   Pulse:  []   Resp:  [14-22]   BP: ()/(53-83)   SpO2:  [95 %-100 %]     Video rounds

## 2025-07-03 NOTE — ASSESSMENT & PLAN NOTE
45 y/o M presented to [facility] for AMS. Podiatry consulted for b/l heel wounds. Patient bed bound. VSS. WBC WNL. Radiographs demonstrate no evidence of acute fracture or bony destructive process. Physical exam demonstrated stable heel ulcerations b/l. No cardinal signs of infection, ulcerations likely due to pressure.     Plan:  - No surgical intervention  - Recommend wound care: L heel betadine paint, leave open to dry. R heel dressed with betadine soaked gauze, ABD pad, kerlix and ACE wrap.   - Recommend offloading boots at all times while in bed.  - Recommend dressing changes every 3 days.  - Podiatry will sign off

## 2025-07-03 NOTE — PLAN OF CARE
MIC[facility] DAILY GOALS     Family/Goals of care/Code Status   Code Status: Full Code    24H Vital Sign Range  Temp:  [97.7 °F (36.5 °C)-100 °F (37.8 °C)]   Pulse:  []   Resp:  [14-22]   BP: ()/(53-83)   SpO2:  [95 %-100 %]      Shift Events (include procedures and significant events)   No acute events throughout shift    AWAKE RASS: Goal -    Actual -      Restraint necessity: Not necessary   BREATHE SBT: Not intubated    Coordinate A & B, analgesics/sedatives Pain: managed   SAT: Not intubated   Delirium CAM-ICU:     Early(intubated/ Progressive (non-intubated) Mobility MOVE Screen (INTUBATED ONLY): Not intubated    Activity: Activity Management: Rolling - L1   Feeding/Nutrition Diet order: Diet/Nutrition Received: NPO, tube feeding, Specialty Diet/Nutrition Received: high calorie, high protein   Thrombus DVT prophylaxis: VTE Core Measure: Per order contraindicated for SCDs/Anticoagulants   HOB Elevation Head of Bed (HOB) Positioning: HOB at 30 degrees   Ulcer Prophylaxis GI: yes   Glucose control managed Glycemic Management: blood glucose monitored   Skin Skin assessment:     Sacrum intact/not altered? No  Heels intact/not altered? No  Surgical wound? No    CHECK ONE!   (no altered skin or altered skin) and sub boxes:  [] No Altered Skin Integrity Present    []Prevention Measures Documented    [x] Altered Skin Integrity Present or Discovered   [x] LDA already present in EPIC, daily doc completed              [] LDA added if not already in EPIC (describe/stage wound).               [] Wound Image Taken (required on admit,                   transfer/discharge and every Tuesday)    Wound Care Consulted? Yes   Bowel Function constipation    Indwelling Catheter Necessity      Urethral Catheter 06/30/25 0756 Temperature probe 16 Fr.-Reason for Continuing Urinary Catheterization: Non-healing sacral/perineal wound          De-escalation Antibiotics Yes        VS and assessment per flow sheet, patient progressing  towards goals as tolerated, plan of care reviewed with family, all concerns addressed, will continue to monitor.   Problem: Adult Inpatient Plan of Care  Goal: Plan of Care Review  Outcome: Progressing  Goal: Patient-Specific Goal (Individualized)  Outcome: Progressing  Goal: Absence of Hospital-Acquired Illness or Injury  Outcome: Progressing  Goal: Optimal Comfort and Wellbeing  Outcome: Progressing  Goal: Readiness for Transition of Care  Outcome: Progressing     Problem: Skin Injury Risk Increased  Goal: Skin Health and Integrity  Outcome: Progressing     Problem: Sepsis/Septic Shock  Goal: Optimal Coping  Outcome: Progressing  Goal: Absence of Bleeding  Outcome: Progressing  Goal: Blood Glucose Level Within Targeted Range  Outcome: Progressing  Goal: Absence of Infection Signs and Symptoms  Outcome: Progressing  Goal: Optimal Nutrition Intake  Outcome: Progressing     Problem: Pneumonia  Goal: Fluid Balance  Outcome: Progressing  Goal: Resolution of Infection Signs and Symptoms  Outcome: Progressing  Goal: Effective Oxygenation and Ventilation  Outcome: Progressing     Problem: Wound  Goal: Optimal Coping  Outcome: Progressing  Goal: Optimal Functional Ability  Outcome: Progressing  Goal: Absence of Infection Signs and Symptoms  Outcome: Progressing  Goal: Improved Oral Intake  Outcome: Progressing  Goal: Optimal Pain Control and Function  Outcome: Progressing  Goal: Skin Health and Integrity  Outcome: Progressing  Goal: Optimal Wound Healing  Outcome: Progressing     Problem: Delirium  Goal: Optimal Coping  Outcome: Progressing  Goal: Improved Behavioral Control  Outcome: Progressing  Goal: Improved Attention and Thought Clarity  Outcome: Progressing  Goal: Improved Sleep  Outcome: Progressing     Problem: Infection  Goal: Absence of Infection Signs and Symptoms  Outcome: Progressing     Problem: Fall Injury Risk  Goal: Absence of Fall and Fall-Related Injury  Outcome: Progressing

## 2025-07-03 NOTE — SUBJECTIVE & OBJECTIVE
Scheduled Meds:   azithromycin  500 mg Per NG tube Daily    baclofen  20 mg Per NG tube TID    cefTRIAXone (Rocephin) IV (PEDS and ADULTS)  2 g Intravenous Q24H    cetirizine  10 mg Per NG tube Daily    dantrolene  50 mg Per NG tube TID    donepeziL  5 mg Per NG tube QHS    mirtazapine  15 mg Per NG tube QHS    polyethylene glycol  17 g Per NG tube BID    QUEtiapine  50 mg Per NG tube QHS    senna-docusate  1 tablet Per NG tube BID     Continuous Infusions:  PRN Meds:  Current Facility-Administered Medications:     0.9%  NaCl infusion (for blood administration), , Intravenous, Q24H PRN    ammonium lactate, , Topical (Top), BID PRN    dextrose 50%, 12.5 g, Intravenous, PRN    dextrose 50%, 25 g, Intravenous, PRN    glucagon (human recombinant), 1 mg, Intramuscular, PRN    glucose, 16 g, Per NG tube, PRN    glucose, 24 g, Per NG tube, PRN    naloxone, 0.02 mg, Intravenous, PRN    ondansetron, 4 mg, Intravenous, Q6H PRN    sodium chloride 0.9%, 10 mL, Intravenous, Q12H PRN    Review of patient's allergies indicates:   Allergen Reactions    Penicillins      Hives and Itching  Tolerated zosyn- 7/1/2025    Allergen ext-venom-honey bee         Past Medical History:   Diagnosis Date    Anxiety     Degenerative motor system disease     Depression     Insomnia secondary to depression with anxiety 10/30/2024    Spastic quadriplegia     Spinocerebellar atrophy      Past Surgical History:   Procedure Laterality Date    BACLOFEN PUMP IMPLANTATION      COLONOSCOPY N/A 7/23/2020    Procedure: COLONOSCOPY;  Surgeon: Ziyad Fitch MD;  Location: [facility];  Service: Endoscopy;  Laterality: N/A;    ESOPHAGOGASTRODUODENOSCOPY N/A 7/23/2020    Procedure: EGD (ESOPHAGOGASTRODUODENOSCOPY);  Surgeon: Ziyad Fitch MD;  Location: [facility];  Service: Endoscopy;  Laterality: N/A;    FLUOROSCOPIC URODYNAMIC STUDY N/A 11/27/2018    Procedure: URODYNAMIC STUDY, FLUOROSCOPIC;  Surgeon: Tanja Okeefe MD;  Location: [facility]  OR 1ST FLR;  Service: Urology;  Laterality: N/A;  1 hour    REATTACHMENT OF MUSCLE Bilateral 2/18/2022    Procedure: PTOSIS REPAIR OF BOTH EYES;  Surgeon: Krupa Rios MD;  Location: [facility] OR 1ST FLR;  Service: Ophthalmology;  Laterality: Bilateral;    UPPER GASTROINTESTINAL ENDOSCOPY         Family History       Problem Relation (Age of Onset)    Cancer Mother    Depression Mother    Hypertension Mother    Multiple sclerosis Other    No Known Problems Brother, Son    Thyroid cancer Mother          Tobacco Use    Smoking status: Never    Smokeless tobacco: Never   Substance and Sexual Activity    Alcohol use: Not Currently     Comment: social drinker, at times    Drug use: Not Currently    Sexual activity: Never     Review of Systems   Reason unable to perform ROS: AMS.     Objective:     Vital Signs (Most Recent):  Temp: 98.2 °F (36.8 °C) (07/03/25 0601)  Pulse: 83 (07/03/25 0601)  Resp: 19 (07/03/25 0601)  BP: 97/63 (07/03/25 0601)  SpO2: 100 % (07/03/25 0601) Vital Signs (24h Range):  Temp:  [97.7 °F (36.5 °C)-100 °F (37.8 °C)] 98.2 °F (36.8 °C)  Pulse:  [] 83  Resp:  [14-23] 19  SpO2:  [95 %-100 %] 100 %  BP: ()/(53-83) 97/63     Weight: 59.9 kg (132 lb 0.9 oz)  Body mass index is 16.51 kg/m².    Foot Exam    General  Orientation: unable to assess       Right Foot/Ankle     Inspection and Palpation  Right foot ecchymosis: heel.  Tenderness: none   Swelling: none   Hammertoes: second toe, third toe, fourth toe and fifth toe  Skin Exam: callus, dry skin, skin changes, abnormal color and ulcer; no drainage, skin not intact, no cellulitis and no erythema     Neurovascular  Dorsalis pedis: 2+  Posterior tibial: 2+    Comments  Large fibrotic ulceration of the heel, Probes to wound, aubree-wound eschar and callus. No drainage, malodor, purulence. No TTP.     Moderate Lateral mal ulcer with fibroglandular ulceration. No drainage, malodor or purulence.     Left Foot/Ankle      Inspection and Palpation  Left  foot ecchymosis: heel.  Tenderness: none   Swelling: none   Hammertoes: second toe, third toe, fourth toe and fifth toe  Skin Exam: callus and abnormal color; no drainage, skin not intact, no cellulitis and no erythema     Neurovascular  Dorsalis pedis: 2+  Posterior tibial: 2+    Comments  Large heel ulceration with dry eschar cap. No aubree-wound erythema, drainage or purulence. No fluctuance.     Clinical media:  Right          Left    Laboratory:  All pertinent labs reviewed within the last 24 hours.    Diagnostic Results:  I have reviewed all pertinent imaging results/findings within the past 24 hours.

## 2025-07-04 LAB
ABO + RH BLD: NORMAL
ABO + RH BLD: NORMAL
ABSOLUTE EOSINOPHIL (OHS): 0.12 K/UL
ABSOLUTE MONOCYTE (OHS): 0.53 K/UL (ref 0.3–1)
ABSOLUTE NEUTROPHIL COUNT (OHS): 4.42 K/UL (ref 1.8–7.7)
ALBUMIN SERPL BCP-MCNC: 1.6 G/DL (ref 3.5–5.2)
ALP SERPL-CCNC: 52 UNIT/L (ref 40–150)
ALT SERPL W/O P-5'-P-CCNC: 13 UNIT/L (ref 10–44)
ANION GAP (OHS): 6 MMOL/L (ref 8–16)
AST SERPL-CCNC: 16 UNIT/L (ref 11–45)
BACTERIA BLD CULT: NORMAL
BACTERIA BLD CULT: NORMAL
BASOPHILS # BLD AUTO: 0.01 K/UL
BASOPHILS NFR BLD AUTO: 0.2 %
BILIRUB SERPL-MCNC: 0.2 MG/DL (ref 0.1–1)
BLD PROD TYP BPU: NORMAL
BLD PROD TYP BPU: NORMAL
BLOOD UNIT EXPIRATION DATE: NORMAL
BLOOD UNIT EXPIRATION DATE: NORMAL
BLOOD UNIT TYPE CODE: 6200
BLOOD UNIT TYPE CODE: 6200
BUN SERPL-MCNC: 24 MG/DL (ref 6–20)
CALCIUM SERPL-MCNC: 7.5 MG/DL (ref 8.7–10.5)
CHLORIDE SERPL-SCNC: 104 MMOL/L (ref 95–110)
CO2 SERPL-SCNC: 27 MMOL/L (ref 23–29)
CREAT SERPL-MCNC: 0.6 MG/DL (ref 0.5–1.4)
CROSSMATCH INTERPRETATION: NORMAL
CROSSMATCH INTERPRETATION: NORMAL
DISPENSE STATUS: NORMAL
DISPENSE STATUS: NORMAL
ERYTHROCYTE [DISTWIDTH] IN BLOOD BY AUTOMATED COUNT: 15.7 % (ref 11.5–14.5)
GFR SERPLBLD CREATININE-BSD FMLA CKD-EPI: >60 ML/MIN/1.73/M2
GLUCOSE SERPL-MCNC: 95 MG/DL (ref 70–110)
HCT VFR BLD AUTO: 30.1 % (ref 40–54)
HGB BLD-MCNC: 9.2 GM/DL (ref 14–18)
IMM GRANULOCYTES # BLD AUTO: 0.04 K/UL (ref 0–0.04)
IMM GRANULOCYTES NFR BLD AUTO: 0.6 % (ref 0–0.5)
LYMPHOCYTES # BLD AUTO: 1.19 K/UL (ref 1–4.8)
MAGNESIUM SERPL-MCNC: 1.8 MG/DL (ref 1.6–2.6)
MCH RBC QN AUTO: 25.3 PG (ref 27–31)
MCHC RBC AUTO-ENTMCNC: 30.6 G/DL (ref 32–36)
MCV RBC AUTO: 83 FL (ref 82–98)
NUCLEATED RBC (/100WBC) (OHS): 0 /100 WBC
PHOSPHATE SERPL-MCNC: 2.3 MG/DL (ref 2.7–4.5)
PLATELET # BLD AUTO: 187 K/UL (ref 150–450)
PMV BLD AUTO: 12.1 FL (ref 9.2–12.9)
POCT GLUCOSE: 103 MG/DL (ref 70–110)
POCT GLUCOSE: 104 MG/DL (ref 70–110)
POCT GLUCOSE: 105 MG/DL (ref 70–110)
POCT GLUCOSE: 80 MG/DL (ref 70–110)
POTASSIUM SERPL-SCNC: 4.4 MMOL/L (ref 3.5–5.1)
PROT SERPL-MCNC: 6 GM/DL (ref 6–8.4)
RBC # BLD AUTO: 3.64 M/UL (ref 4.6–6.2)
RELATIVE EOSINOPHIL (OHS): 1.9 %
RELATIVE LYMPHOCYTE (OHS): 18.9 % (ref 18–48)
RELATIVE MONOCYTE (OHS): 8.4 % (ref 4–15)
RELATIVE NEUTROPHIL (OHS): 70 % (ref 38–73)
SODIUM SERPL-SCNC: 137 MMOL/L (ref 136–145)
UNIT NUMBER: NORMAL
UNIT NUMBER: NORMAL
VANCOMYCIN SERPL-MCNC: 11.3 UG/ML (ref ?–80)
WBC # BLD AUTO: 6.31 K/UL (ref 3.9–12.7)

## 2025-07-04 PROCEDURE — 25000003 PHARM REV CODE 250: Performed by: STUDENT IN AN ORGANIZED HEALTH CARE EDUCATION/TRAINING PROGRAM

## 2025-07-04 PROCEDURE — 99900035 HC TECH TIME PER 15 MIN (STAT)

## 2025-07-04 PROCEDURE — 80053 COMPREHEN METABOLIC PANEL: CPT | Performed by: STUDENT IN AN ORGANIZED HEALTH CARE EDUCATION/TRAINING PROGRAM

## 2025-07-04 PROCEDURE — 25000003 PHARM REV CODE 250

## 2025-07-04 PROCEDURE — 99233 SBSQ HOSP IP/OBS HIGH 50: CPT | Mod: GC,,, | Performed by: INTERNAL MEDICINE

## 2025-07-04 PROCEDURE — 31720 CLEARANCE OF AIRWAYS: CPT

## 2025-07-04 PROCEDURE — 80202 ASSAY OF VANCOMYCIN: CPT | Performed by: INTERNAL MEDICINE

## 2025-07-04 PROCEDURE — 20000000 HC ICU ROOM

## 2025-07-04 PROCEDURE — 83735 ASSAY OF MAGNESIUM: CPT | Performed by: STUDENT IN AN ORGANIZED HEALTH CARE EDUCATION/TRAINING PROGRAM

## 2025-07-04 PROCEDURE — 85025 COMPLETE CBC W/AUTO DIFF WBC: CPT | Performed by: STUDENT IN AN ORGANIZED HEALTH CARE EDUCATION/TRAINING PROGRAM

## 2025-07-04 PROCEDURE — 25000003 PHARM REV CODE 250: Performed by: INTERNAL MEDICINE

## 2025-07-04 PROCEDURE — 84100 ASSAY OF PHOSPHORUS: CPT | Performed by: STUDENT IN AN ORGANIZED HEALTH CARE EDUCATION/TRAINING PROGRAM

## 2025-07-04 PROCEDURE — 63600175 PHARM REV CODE 636 W HCPCS: Performed by: INTERNAL MEDICINE

## 2025-07-04 PROCEDURE — 87205 SMEAR GRAM STAIN: CPT

## 2025-07-04 PROCEDURE — 63600175 PHARM REV CODE 636 W HCPCS

## 2025-07-04 PROCEDURE — 94761 N-INVAS EAR/PLS OXIMETRY MLT: CPT

## 2025-07-04 RX ORDER — MUPIROCIN 20 MG/G
OINTMENT TOPICAL 2 TIMES DAILY
Status: COMPLETED | OUTPATIENT
Start: 2025-07-04 | End: 2025-07-08

## 2025-07-04 RX ADMIN — PIPERACILLIN SODIUM AND TAZOBACTAM SODIUM 4.5 G: 4; .5 INJECTION, POWDER, LYOPHILIZED, FOR SOLUTION INTRAVENOUS at 05:07

## 2025-07-04 RX ADMIN — MUPIROCIN: 20 OINTMENT TOPICAL at 09:07

## 2025-07-04 RX ADMIN — BACLOFEN 20 MG: 10 TABLET ORAL at 09:07

## 2025-07-04 RX ADMIN — DANTROLENE SODIUM 50 MG: 25 CAPSULE ORAL at 03:07

## 2025-07-04 RX ADMIN — DANTROLENE SODIUM 50 MG: 25 CAPSULE ORAL at 09:07

## 2025-07-04 RX ADMIN — QUETIAPINE FUMARATE 50 MG: 25 TABLET ORAL at 09:07

## 2025-07-04 RX ADMIN — VANCOMYCIN HYDROCHLORIDE 1250 MG: 1.25 INJECTION, POWDER, LYOPHILIZED, FOR SOLUTION INTRAVENOUS at 09:07

## 2025-07-04 RX ADMIN — BACLOFEN 20 MG: 10 TABLET ORAL at 03:07

## 2025-07-04 RX ADMIN — POLYETHYLENE GLYCOL 3350 17 G: 17 POWDER, FOR SOLUTION ORAL at 09:07

## 2025-07-04 RX ADMIN — DONEPEZIL HYDROCHLORIDE 5 MG: 5 TABLET, FILM COATED ORAL at 09:07

## 2025-07-04 RX ADMIN — CETIRIZINE HYDROCHLORIDE 10 MG: 10 TABLET, FILM COATED ORAL at 09:07

## 2025-07-04 RX ADMIN — SENNOSIDES AND DOCUSATE SODIUM 1 TABLET: 50; 8.6 TABLET ORAL at 09:07

## 2025-07-04 RX ADMIN — SODIUM PHOSPHATE, MONOBASIC, MONOHYDRATE AND SODIUM PHOSPHATE, DIBASIC, ANHYDROUS 15 MMOL: 142; 276 INJECTION, SOLUTION INTRAVENOUS at 11:07

## 2025-07-04 RX ADMIN — MIRTAZAPINE 15 MG: 15 TABLET, FILM COATED ORAL at 09:07

## 2025-07-04 RX ADMIN — PIPERACILLIN SODIUM AND TAZOBACTAM SODIUM 4.5 G: 4; .5 INJECTION, POWDER, LYOPHILIZED, FOR SOLUTION INTRAVENOUS at 09:07

## 2025-07-04 NOTE — PLAN OF CARE
Recommendations  --Continuous TF recommendation: Isosource 1.5 @ 55 mL/hr to provide 1320 mL total volume, 1980 kcal, 90 g protein, 1008 mL free water, 132% DRI         --165 mL flush q4 hrs to provide additional 990 mL free water (Total 1991 mL)  --Bolus TF recs: Isosource 1.5, bolus 5 cartons/day to provide 1250 mL total volume, 1875 kcals, 85 g protein, 955 mL fluid - FWF per mD  --Nursing: please continue to document rate and formula on flowsheet    --RD to monitor weight, rate, tolerance     --Order Guzman BID as tube feed modifier to promote wound healing     Goals: Meet % EEN/EPN by next RD follow-up  Nutrition Goal Status: progressing towards goal  Communication of RD Recs:  (POC)

## 2025-07-04 NOTE — ASSESSMENT & PLAN NOTE
Secondary to ALS/unknown neuromuscular disorder.     - SLP evaluated, failed swallow eval. Recommend NPO and PEG tube evaluation.   - IR consulted, appreciate recs   - Plan for PEG tube placement with IR on 7/5.

## 2025-07-04 NOTE — EICU
Virtual ICU Quality Rounds    Admit Date: 6/29/2025  Hospital Day: 5    ICU Day: 4d 1h    24H Vital Sign Range:  Temp:  [96.4 °F (35.8 °C)-97.9 °F (36.6 °C)]   Pulse:  [81-95]   Resp:  [14-27]   BP: ()/(55-85)   SpO2:  [93 %-100 %]     VICU Surveillance Screening    Daily review of  line necessity(optional): Urinary catheter in place            Zeng Indications : Urinary retention, Non-healing perineal and sacral wounds in incontinent patients to avoid further deterioration of wound/skin, and Chronic in-dwelling urinary catheter (present on admission)

## 2025-07-04 NOTE — SUBJECTIVE & OBJECTIVE
Interval History/Significant Events: NAEO. Increased secretions today. Restarted on broad spectrum abx. Planning for PEG tube tomorrow with IR    Review of Systems  Objective:     Vital Signs (Most Recent):  Temp: 98.8 °F (37.1 °C) (07/04/25 1800)  Pulse: 85 (07/04/25 1800)  Resp: 16 (07/04/25 1800)  BP: 103/69 (07/04/25 1800)  SpO2: 99 % (07/04/25 1800) Vital Signs (24h Range):  Temp:  [96.4 °F (35.8 °C)-98.8 °F (37.1 °C)] 98.8 °F (37.1 °C)  Pulse:  [81-95] 85  Resp:  [12-25] 16  SpO2:  [95 %-100 %] 99 %  BP: ()/(55-82) 103/69   Weight: 59.9 kg (132 lb 0.9 oz)  Body mass index is 16.51 kg/m².      Intake/Output Summary (Last 24 hours) at 7/4/2025 1846  Last data filed at 7/4/2025 1841  Gross per 24 hour   Intake 2645 ml   Output 2620 ml   Net 25 ml          Physical Exam  Constitutional:       General: He is not in acute distress.     Appearance: He is underweight.      Comments: NGT   HENT:      Head: Normocephalic.   Eyes:      General: No scleral icterus.        Right eye: No discharge.         Left eye: No discharge.      Extraocular Movements: Extraocular movements intact.   Cardiovascular:      Rate and Rhythm: Normal rate and regular rhythm.      Pulses: Normal pulses.      Heart sounds: No murmur heard.     No friction rub. No gallop.   Pulmonary:      Effort: Pulmonary effort is normal. No respiratory distress.   Abdominal:      General: There is no distension.   Musculoskeletal:      Right lower leg: No edema.      Left lower leg: No edema.   Skin:     General: Skin is warm.      Coloration: Skin is not jaundiced.   Neurological:      Mental Status: He is alert.   Psychiatric:         Mood and Affect: Mood normal.         Behavior: Behavior normal.            Vents:     Lines/Drains/Airways       Drain  Duration                  NG/OG Tube 06/30/25 1200 [place] sump 14 Fr. Right nostril 4 days         Urethral Catheter 06/30/25 0756 Temperature probe 16 Fr. 4 days              Peripheral Intravenous  Line  Duration             Peripheral IV 06/29/25 2114 20 G Anterior;Right Forearm 4 days    Peripheral IV 06/30/25 0620 18 G 1 in Anterior;Left Forearm 4 days    Peripheral IV Single Lumen 06/29/25 2130 20 G Anterior;Left Upper Arm 4 days    Peripheral IV Single Lumen 06/30/25 1130 18 G 1 3/4 in Anterior;Right Upper Arm 4 days                  Significant Labs:    CBC/Anemia Profile:  Recent Labs   Lab 07/03/25  0326 07/04/25  0311   WBC 4.99 6.31   HGB 9.0* 9.2*   HCT 29.4* 30.1*    187   MCV 84 83   RDW 15.9* 15.7*        Chemistries:  Recent Labs   Lab 07/03/25  0326 07/04/25  0311    137   K 4.1 4.4    104   CO2 25 27   BUN 32* 24*   CREATININE 0.7 0.6   CALCIUM 7.7* 7.5*   ALBUMIN 1.6* 1.6*   PROT 6.2 6.0   BILITOT 0.2 0.2   ALKPHOS 56 52   ALT 12 13   AST 17 16   MG 2.0 1.8   PHOS 3.0 2.3*       All pertinent labs within the past 24 hours have been reviewed.    Significant Imaging:  I have reviewed all pertinent imaging results/findings within the past 24 hours.

## 2025-07-04 NOTE — PROGRESS NOTES
Pharmacokinetic Initial Assessment: IV Vancomycin    Assessment/Plan:    Initiate intravenous vancomycin with loading dose of 1250 mg once with subsequent doses when random concentrations are less than 20 mcg/mL  Desired empiric serum trough concentration is 15 to 20 mcg/mL  Draw vancomycin random level on 7/4 at 2100.  Pharmacy will continue to follow and monitor vancomycin.      Please contact pharmacy at extension [phone] with any questions regarding this assessment.     Thank you for the consult,   Kristyn Barnes       Patient brief summary:  Lisa Moreno is a 44 y.o. male initiated on antimicrobial therapy with IV Vancomycin for treatment of suspected lower respiratory infection    Drug Allergies:   Review of patient's allergies indicates:   Allergen Reactions    Penicillins      Hives and Itching  Tolerated zosyn- 7/1/2025    Allergen ext-venom-honey bee        Actual Body Weight:   59.9kg    Renal Function:   Estimated Creatinine Clearance: 131.7 mL/min (based on SCr of 0.6 mg/dL).,     Dialysis Method (if applicable):  N/A    CBC (last 72 hours):  Recent Labs   Lab Result Units 07/02/25  0459 07/03/25  0326 07/04/25  0311   WBC K/uL 5.25 4.99 6.31   HGB gm/dL 9.5* 9.0* 9.2*   HCT % 31.7* 29.4* 30.1*   Platelet Count K/uL 259 209 187   Lymph % % 15.4* 19.8 18.9   Mono % % 3.6* 7.0 8.4   Eos % % 0.6 1.8 1.9   Basophil % % 0.4 0.4 0.2       Metabolic Panel (last 72 hours):  Recent Labs   Lab Result Units 07/02/25  0459 07/02/25  0726 07/03/25  0326 07/04/25  0311   Sodium mmol/L 141 142 140 137   Potassium mmol/L 4.5 4.4 4.1 4.4   Chloride mmol/L 110 110 107 104   CO2 mmol/L 23 25 25 27   Glucose mg/dL 62* 74 83 95   BUN mg/dL 37* 37* 32* 24*   Creatinine mg/dL 0.9 1.0 0.7 0.6   Albumin g/dL 1.7* 1.6* 1.6* 1.6*   Bilirubin Total mg/dL 0.2 0.2 0.2 0.2   ALP unit/L 64 61 56 52   AST unit/L 19 19 17 16   ALT unit/L 16 17 12 13   Magnesium  mg/dL 1.9  --  2.0 1.8   Phosphorus Level mg/dL 4.2  --  3.0 2.3*  "      Drug levels (last 3 results):  No results for input(s): "VANCOMYCINRA", "VANCORANDOM", "VANCOMYCINPE", "VANCOPEAK", "VANCOMYCINTR", "VANCOTROUGH" in the last 72 hours.    Microbiologic Results:  Microbiology Results (last 7 days)       Procedure Component Value Units Date/Time    Culture, Respiratory with Gram Stain [[id number]] Collected: 07/04/25 0833    Order Status: Sent Specimen: Respiratory from Sputum, Induced Updated: 07/04/25 0922    Blood culture x two cultures. Draw prior to antibiotics. [[id number]]  (Normal) Collected: 06/29/25 1805    Order Status: Completed Specimen: Blood from Peripheral, Antecubital, Left Updated: 07/03/25 2300     Blood Culture No Growth After 96 hours    Blood culture x two cultures. Draw prior to antibiotics. [[id number]]  (Normal) Collected: 06/29/25 1814    Order Status: Completed Specimen: Blood from Peripheral, Hand, Left Updated: 07/03/25 2300     Blood Culture No Growth After 96 hours    MRSA Screen by PCR [[id number]]  (Normal) Collected: 06/30/25 1144    Order Status: Completed Specimen: Nasal Swab Updated: 06/30/25 1442     MRSA PCR Screen Not Detected            "

## 2025-07-04 NOTE — PROGRESS NOTES
This is an attestation of a separate note by the ICU resident/fellow. As the teaching physician, I saw the patient with the resident/fellow and agree with the resident/fellow's findings and plan. In addition to the resident/fellow's documentation, I add the following:     Lisa Moreno is a 45 yo man with neuromuscular disorder onset 2006 which has progress to loss of ambulation, spastic quadriparesis, abnormal swallow (requires thickeners) with intact cough, no evidence of hypercapnia, known decubitus, admitted with altered mental status, procalcitonin 8.4.    # encephalopathy - no hypercapnia, alert and follows commands; chronically quadriparetic  # sepsis - markedly elevated procalcitonin, decubitus ulcer, multifocal airspace dz - started on vancomycin, cefepime, changed to ceftriaxone/azithromycin for CAP coverage - completed 5 days, extended spectrum today for new respiratory purulence - culture sent  # weak cough - not that secretions are present, nasotracheal suction, abdominal assist for cough and mechanical cough assist may be helpful  # hypoxemia - resolved  # neuromuscular disease - progressive over 2 decades with weakness and spasticity and bulbar involvement  # spasticity - has been a significant problem - takes baclofen, dantrolene - continue  # nutrition - plan IR gastrostomy this visit if possible, with tamara-key upgrade in 8 weeks. Indication - weight loss, access for supplemental nutrition, medications     Richard Webb MD  [facility] Attending  Cell: [phone]

## 2025-07-04 NOTE — PROGRESS NOTES
[facility] - Medical ICU  Critical Care Medicine  Progress Note    Patient Name: Lisa Moreno  MRN: [id number]  Admission Date: 6/29/2025  Hospital Length of Stay: 5 days  Code Status: Full Code  Attending Provider: Richard Webb MD  Primary Care Provider: John Nixon II, MD   Principal Problem: Pneumonia    Subjective:     HPI:  Mr. Moreno is a 44yoM with PMHx significant for ALS, neuromuscular dysfunction of the bladder, sacral ulcers, and spinal cerebellar ataxia who was admitted recently admitted (5/9/2025) for severe dehydration, electrolyte disturbances, and covid who presented to the ED with EMS and c/o AMS and hypoxia. Nonverbal at baseline but apparently is usually more alert; has been less responsive and interactive x1 day. Upon EMS arrival, was obtunded and sating 74%. They gave him solumedrol and duonebs en route which improved his oxygen status to >90%. Mother at bedside reports increased difficulty with swallowing and decreased PO intake.       While in the ED, labs and imaging significant for Na 149, K 5.4, Cl 111, CO2 21, glucose 149, BUN 38, creat 1.0, albumin 2.2, ALP 80, AST/ALT 34/23, anion gap 17, procal 8.42, BNP <10, WBC 7.11, H/H 12/40.5, plts 315, covid/flu negative, .6, VBG 7.349/47.1/44.5/23.9, istat LA 4.9->7.4->4.2 (7.4 likely error?). Chest xray with patchy opacities bilaterally, predominant within the bibasilar regions but also within the left lung apex and the left mid-lung; compatible with multifocal pneumonia. Started on BSA by the ED; vanc and cefepime. [facility] consulted for sepsis. During first examination the patient was stable with MAPs >65 and Lactic acid down trended to 2.6. Around 6 am of 6/30 rapids was called hypotension with MAPs in the 50. Pt was started on levophed and was transferred to MICU.     Hospital/ICU Course:  Admitted to the MICU for septic shock secondary to multifocal pneumonia. Initially requiring pressors but weaned. Started on broad  spectrum antibiotics (Zosyn). Afebrile. Zosyn de-escalated to Ceftriaxone and Azithromycin for CAP coverage. SLP consulted for dysphagia. FEES performed which showed residual puree. Recommend NPO. Plan for PEG with IR on 7/5. Increased secretions noted on exam s/p completion of antibiotic course of CAP coverage. Restarted patient on broad spectrum antibiotics.     Interval History/Significant Events: NAEO. Increased secretions today. Restarted on broad spectrum abx. Planning for PEG tube tomorrow with IR    Review of Systems  Objective:     Vital Signs (Most Recent):  Temp: 98.8 °F (37.1 °C) (07/04/25 1800)  Pulse: 85 (07/04/25 1800)  Resp: 16 (07/04/25 1800)  BP: 103/69 (07/04/25 1800)  SpO2: 99 % (07/04/25 1800) Vital Signs (24h Range):  Temp:  [96.4 °F (35.8 °C)-98.8 °F (37.1 °C)] 98.8 °F (37.1 °C)  Pulse:  [81-95] 85  Resp:  [12-25] 16  SpO2:  [95 %-100 %] 99 %  BP: ()/(55-82) 103/69   Weight: 59.9 kg (132 lb 0.9 oz)  Body mass index is 16.51 kg/m².      Intake/Output Summary (Last 24 hours) at 7/4/2025 1846  Last data filed at 7/4/2025 1841  Gross per 24 hour   Intake 2645 ml   Output 2620 ml   Net 25 ml          Physical Exam  Constitutional:       General: He is not in acute distress.     Appearance: He is underweight.      Comments: NGT   HENT:      Head: Normocephalic.   Eyes:      General: No scleral icterus.        Right eye: No discharge.         Left eye: No discharge.      Extraocular Movements: Extraocular movements intact.   Cardiovascular:      Rate and Rhythm: Normal rate and regular rhythm.      Pulses: Normal pulses.      Heart sounds: No murmur heard.     No friction rub. No gallop.   Pulmonary:      Effort: Pulmonary effort is normal. No respiratory distress.   Abdominal:      General: There is no distension.   Musculoskeletal:      Right lower leg: No edema.      Left lower leg: No edema.   Skin:     General: Skin is warm.      Coloration: Skin is not jaundiced.   Neurological:       Mental Status: He is alert.   Psychiatric:         Mood and Affect: Mood normal.         Behavior: Behavior normal.            Vents:     Lines/Drains/Airways       Drain  Duration                  NG/OG Tube 06/30/25 1200 [place] sump 14 Fr. Right nostril 4 days         Urethral Catheter 06/30/25 0756 Temperature probe 16 Fr. 4 days              Peripheral Intravenous Line  Duration             Peripheral IV 06/29/25 2114 20 G Anterior;Right Forearm 4 days    Peripheral IV 06/30/25 0620 18 G 1 in Anterior;Left Forearm 4 days    Peripheral IV Single Lumen 06/29/25 2130 20 G Anterior;Left Upper Arm 4 days    Peripheral IV Single Lumen 06/30/25 1130 18 G 1 3/4 in Anterior;Right Upper Arm 4 days                  Significant Labs:    CBC/Anemia Profile:  Recent Labs   Lab 07/03/25  0326 07/04/25  0311   WBC 4.99 6.31   HGB 9.0* 9.2*   HCT 29.4* 30.1*    187   MCV 84 83   RDW 15.9* 15.7*        Chemistries:  Recent Labs   Lab 07/03/25 0326 07/04/25  0311    137   K 4.1 4.4    104   CO2 25 27   BUN 32* 24*   CREATININE 0.7 0.6   CALCIUM 7.7* 7.5*   ALBUMIN 1.6* 1.6*   PROT 6.2 6.0   BILITOT 0.2 0.2   ALKPHOS 56 52   ALT 12 13   AST 17 16   MG 2.0 1.8   PHOS 3.0 2.3*       All pertinent labs within the past 24 hours have been reviewed.    Significant Imaging:  I have reviewed all pertinent imaging results/findings within the past 24 hours.    ABG  Recent Labs   Lab 06/30/25  0848   PH 7.431   PO2 129*   PCO2 37.4   HCO3 25.0     Assessment/Plan:     Neuro  Acute encephalopathy  Likely 2/2 sepsis. Resolving.     - see plan for sepsis    ALS (amyotrophic lateral sclerosis)  Hx of ALS w/diffuse spacticity/weakness + dysphagia     - continue baclofen and dantrolene    Spinocerebellar ataxia  - Continue baclofen and dantrolene    Spastic diplegia  -continue home baclofen    Psychiatric  Insomnia secondary to depression with anxiety  Continue quetiapine and mirtazapine       Pulmonary  * Pneumonia  - see plan  "for sepsis    Renal/  Hypernatremia  . Hypernatremia is likely due to Dehydration.     Plan  - Aim to correct the sodium by 8-10mEq in 24 hours.   - Plan to correct their hypernatremia with Select IV fluids: LR at a rate of 100 ml/hr.   - Will plan to trend the patient's sodium: Daily       ID  Sepsis  This patient does have evidence of infective focus  My overall impression is sepsis without organ dysfunction.  Source: Respiratory Infection  Antibiotics given-   Antibiotics (72h ago, onward)      Start     Stop Route Frequency Ordered    07/04/25 1015  mupirocin 2 % ointment         07/09/25 0859 Nasl 2 times daily 07/04/25 0900    07/04/25 1000  piperacillin-tazobactam (ZOSYN) 4.5 g in D5W 100 mL IVPB (MB+)         -- IV Every 8 hours (non-standard times) 07/04/25 0857    07/04/25 0956  vancomycin - pharmacy to dose  (vancomycin IVPB (PEDS and ADULTS))        Placed in "And" Linked Group    -- IV pharmacy to manage frequency 07/04/25 0857          Latest lactate reviewed-  No results for input(s): "LACTATE", "POCLAC" in the last 72 hours.    Will Not start   Source control achieved by:     Mr. Moreno is a 44yoM with PMHx significant for ALS, neuromuscular dysfunction of the bladder, sacral ulcers, and spinal cerebellar ataxia who was admitted recently admitted (5/9/2025) for severe dehydration, electrolyte disturbances, and covid who presented to the ED with EMS and c/o AMS and hypoxia. Nonverbal at baseline but apparently is usually more alert; has been less responsive and interactive x1 day. Upon EMS arrival, was obtunded and sating 74%. They gave him solumedrol and duonebs en route which improved his oxygen status to >90%. Mother at bedside reports increased difficulty with swallowing and decreased PO intake.       While in the ED, labs and imaging significant for Na 149, K 5.4, Cl 111, CO2 21, glucose 149, BUN 38, creat 1.0, albumin 2.2, ALP 80, AST/ALT 34/23, anion gap 17, procal 8.42, BNP <10, WBC " 7.11, H/H 12/40.5, plts 315, covid/flu negative, .6, VBG 7.349/47.1/44.5/23.9, istat LA 4.9->7.4->4.2 (7.4 likely error?). Chest xray with patchy opacities bilaterally, predominant within the bibasilar regions but also within the left lung apex and the left mid-lung; compatible with multifocal pneumonia. Started on BSA by the ED; vanc and cefepime.       - Increased secretions on 7/4. Started on BS antibiotics (Vancomycin and Zosyn)   - Bcx NGTD.   - Recommend deep suction per RT  - Pulmonary hygiene   - Maintain spo2 >90%; wean fio2 as tolerated  - Continuous telemetry and spO2 monitoring  - Neuro checks  - Delirium/aspiration/fall precautions  - Recommend Wound Care for sacral and lower extremity wounds    Endocrine  Severe protein-calorie malnutrition  Nutrition consulted. Most recent weight and BMI monitored-     Measurements:  Wt Readings from Last 1 Encounters:   07/01/25 59.9 kg (132 lb 0.9 oz)   Body mass index is 16.51 kg/m².    Patient has been screened and assessed by RD.    Malnutrition Type:  Context: chronic illness  Level: severe    Malnutrition Characteristic Summary:  Subcutaneous Fat (Malnutrition): severe depletion  Muscle Mass (Malnutrition): severe depletion    Interventions/Recommendations (treatment strategy):         GI  Oropharyngeal dysphagia  Secondary to ALS/unknown neuromuscular disorder.     - SLP evaluated, failed swallow eval. Recommend NPO and PEG tube evaluation.   - IR consulted, appreciate recs   - Plan for PEG tube placement with IR on 7/5.     Orthopedic  Pressure injury of deep tissue of heel  L Heel with black eschar. R Heel pressure ulcer with scant serosanguineous drainage. Photos uploaded into chart.     - Wound care following, appreciate recs  - Podiatry consulted, appreciate recs    Sacral decubitus ulcer  - Wound care consulted, appreciate recs  - General Surgery consulted for possible debridement, appreciate recs   - No indication for inpatient debridement        Critical Care Daily Checklist:    A: Awake: RASS Goal/Actual Goal:    Actual:     B: Spontaneous Breathing Trial Performed?     C: SAT & SBT Coordinated?  Yes                      D: Delirium: CAM-ICU     E: Early Mobility Performed? Yes   F: Feeding Goal: Goals: Meet % EEN/EPN by next RD follow-up  Status: Nutrition Goal Status: progressing towards goal   Current Diet Order   Procedures    Diet NPO      AS: Analgesia/Sedation PRNs   T: Thromboembolic Prophylaxis Lovenox   H: HOB > 300 Yes   U: Stress Ulcer Prophylaxis (if needed)    G: Glucose Control Controlled   B: Bowel Function Unmeasured Stool Occurrence: 0   I: Indwelling Catheter (Lines & Zeng) Necessity PIV, UC, NGT   D: De-escalation of Antimicrobials/Pharmacotherapies Vancomycin and Zosyn    Plan for the day/ETD Antibiotics    Code Status:  Family/Goals of Care: Full Code         Critical secondary to Patient has a condition that poses threat to life and bodily function: Severe Respiratory Distress      Critical care was time spent personally by me on the following activities: development of treatment plan with patient or surrogate and bedside caregivers, discussions with consultants, evaluation of patient's response to treatment, examination of patient, ordering and performing treatments and interventions, ordering and review of laboratory studies, ordering and review of radiographic studies, pulse oximetry, re-evaluation of patient's condition. This critical care time did not overlap with that of any other provider or involve time for any procedures.     Odette Tran MD  Critical Care Medicine  [facility] - Medical ICU

## 2025-07-04 NOTE — PROGRESS NOTES
RD messaged by PharmD for non diabetic tube feeding recs. Plans for PEG placement. For last RD full note, see note from 7/2.      Recommendations  --Continuous TF recommendation: Isosource 1.5 @ 55 mL/hr to provide 1320 mL total volume, 1980 kcal, 90 g protein, 1008 mL free water, 132% DRI         --165 mL flush q4 hrs to provide additional 990 mL free water (Total 1991 mL)  --Bolus TF recs: Isosource 1.5, bolus 5 cartons/day to provide 1250 mL total volume, 1875 kcals, 85 g protein, 955 mL fluid - FWF per mD  --Nursing: please continue to document rate and formula on flowsheet    --RD to monitor weight, rate, tolerance     --Order Guzman BID as tube feed modifier to promote wound healing     Goals: Meet % EEN/EPN by next RD follow-up  Nutrition Goal Status: progressing towards goal  Communication of RD Recs:  (POC)   not applicable

## 2025-07-04 NOTE — ASSESSMENT & PLAN NOTE
"This patient does have evidence of infective focus  My overall impression is sepsis without organ dysfunction.  Source: Respiratory Infection  Antibiotics given-   Antibiotics (72h ago, onward)      Start     Stop Route Frequency Ordered    07/04/25 1015  mupirocin 2 % ointment         07/09/25 0859 Nasl 2 times daily 07/04/25 0900    07/04/25 1000  piperacillin-tazobactam (ZOSYN) 4.5 g in D5W 100 mL IVPB (MB+)         -- IV Every 8 hours (non-standard times) 07/04/25 0857    07/04/25 0956  vancomycin - pharmacy to dose  (vancomycin IVPB (PEDS and ADULTS))        Placed in "And" Linked Group    -- IV pharmacy to manage frequency 07/04/25 0857          Latest lactate reviewed-  No results for input(s): "LACTATE", "POCLAC" in the last 72 hours.    Will Not start   Source control achieved by:     Mr. Moreno is a 44yoM with PMHx significant for ALS, neuromuscular dysfunction of the bladder, sacral ulcers, and spinal cerebellar ataxia who was admitted recently admitted (5/9/2025) for severe dehydration, electrolyte disturbances, and covid who presented to the ED with EMS and c/o AMS and hypoxia. Nonverbal at baseline but apparently is usually more alert; has been less responsive and interactive x1 day. Upon EMS arrival, was obtunded and sating 74%. They gave him solumedrol and duonebs en route which improved his oxygen status to >90%. Mother at bedside reports increased difficulty with swallowing and decreased PO intake.       While in the ED, labs and imaging significant for Na 149, K 5.4, Cl 111, CO2 21, glucose 149, BUN 38, creat 1.0, albumin 2.2, ALP 80, AST/ALT 34/23, anion gap 17, procal 8.42, BNP <10, WBC 7.11, H/H 12/40.5, plts 315, covid/flu negative, .6, VBG 7.349/47.1/44.5/23.9, istat LA 4.9->7.4->4.2 (7.4 likely error?). Chest xray with patchy opacities bilaterally, predominant within the bibasilar regions but also within the left lung apex and the left mid-lung; compatible with multifocal pneumonia. " Started on BSA by the ED; vanc and cefepime.       - Increased secretions on 7/4. Started on BS antibiotics (Vancomycin and Zosyn)   - Bcx NGTD.   - Recommend deep suction per RT  - Pulmonary hygiene   - Maintain spo2 >90%; wean fio2 as tolerated  - Continuous telemetry and spO2 monitoring  - Neuro checks  - Delirium/aspiration/fall precautions  - Recommend Wound Care for sacral and lower extremity wounds

## 2025-07-04 NOTE — EICU
[facility] Night Rounds Checklist  24H Vital Sign Range:  Temp:  [96.4 °F (35.8 °C)-98.2 °F (36.8 °C)]   Pulse:  [79-90]   Resp:  [13-28]   BP: ()/(55-85)   SpO2:  [91 %-100 %]     Video rounds

## 2025-07-05 ENCOUNTER — ANESTHESIA (OUTPATIENT)
Dept: INTERVENTIONAL RADIOLOGY/VASCULAR | Facility: HOSPITAL | Age: 45
End: 2025-07-05
Payer: MEDICARE

## 2025-07-05 PROBLEM — D64.9 ANEMIA: Status: ACTIVE | Noted: 2025-07-05

## 2025-07-05 PROBLEM — Z93.1 GASTROSTOMY STATUS: Status: ACTIVE | Noted: 2025-07-05

## 2025-07-05 PROBLEM — E83.39 HYPOPHOSPHATEMIA: Status: ACTIVE | Noted: 2025-07-05

## 2025-07-05 PROBLEM — R65.21 SEPTIC SHOCK: Status: ACTIVE | Noted: 2025-06-29

## 2025-07-05 LAB
ABSOLUTE EOSINOPHIL (OHS): 0.13 K/UL
ABSOLUTE MONOCYTE (OHS): 0.59 K/UL (ref 0.3–1)
ABSOLUTE NEUTROPHIL COUNT (OHS): 4.77 K/UL (ref 1.8–7.7)
ALBUMIN SERPL BCP-MCNC: 1.8 G/DL (ref 3.5–5.2)
ALP SERPL-CCNC: 59 UNIT/L (ref 40–150)
ALT SERPL W/O P-5'-P-CCNC: 13 UNIT/L (ref 10–44)
ANION GAP (OHS): 9 MMOL/L (ref 8–16)
AST SERPL-CCNC: 16 UNIT/L (ref 11–45)
BASOPHILS # BLD AUTO: 0.03 K/UL
BASOPHILS NFR BLD AUTO: 0.4 %
BILIRUB SERPL-MCNC: 0.2 MG/DL (ref 0.1–1)
BUN SERPL-MCNC: 19 MG/DL (ref 6–20)
CALCIUM SERPL-MCNC: 8 MG/DL (ref 8.7–10.5)
CHLORIDE SERPL-SCNC: 102 MMOL/L (ref 95–110)
CO2 SERPL-SCNC: 26 MMOL/L (ref 23–29)
CREAT SERPL-MCNC: 0.5 MG/DL (ref 0.5–1.4)
ERYTHROCYTE [DISTWIDTH] IN BLOOD BY AUTOMATED COUNT: 16 % (ref 11.5–14.5)
GFR SERPLBLD CREATININE-BSD FMLA CKD-EPI: >60 ML/MIN/1.73/M2
GLUCOSE SERPL-MCNC: 75 MG/DL (ref 70–110)
HCT VFR BLD AUTO: 34.2 % (ref 40–54)
HGB BLD-MCNC: 10.3 GM/DL (ref 14–18)
HOLD SPECIMEN: NORMAL
IMM GRANULOCYTES # BLD AUTO: 0.04 K/UL (ref 0–0.04)
IMM GRANULOCYTES NFR BLD AUTO: 0.6 % (ref 0–0.5)
LYMPHOCYTES # BLD AUTO: 1.54 K/UL (ref 1–4.8)
MAGNESIUM SERPL-MCNC: 1.9 MG/DL (ref 1.6–2.6)
MCH RBC QN AUTO: 25.4 PG (ref 27–31)
MCHC RBC AUTO-ENTMCNC: 30.1 G/DL (ref 32–36)
MCV RBC AUTO: 84 FL (ref 82–98)
NUCLEATED RBC (/100WBC) (OHS): 0 /100 WBC
PHOSPHATE SERPL-MCNC: 3.1 MG/DL (ref 2.7–4.5)
PLATELET # BLD AUTO: 205 K/UL (ref 150–450)
PMV BLD AUTO: 11.7 FL (ref 9.2–12.9)
POCT GLUCOSE: 78 MG/DL (ref 70–110)
POTASSIUM SERPL-SCNC: 4.5 MMOL/L (ref 3.5–5.1)
PROT SERPL-MCNC: 6.7 GM/DL (ref 6–8.4)
RBC # BLD AUTO: 4.06 M/UL (ref 4.6–6.2)
RELATIVE EOSINOPHIL (OHS): 1.8 %
RELATIVE LYMPHOCYTE (OHS): 21.7 % (ref 18–48)
RELATIVE MONOCYTE (OHS): 8.3 % (ref 4–15)
RELATIVE NEUTROPHIL (OHS): 67.2 % (ref 38–73)
SODIUM SERPL-SCNC: 137 MMOL/L (ref 136–145)
WBC # BLD AUTO: 7.1 K/UL (ref 3.9–12.7)

## 2025-07-05 PROCEDURE — 20000000 HC ICU ROOM

## 2025-07-05 PROCEDURE — 83735 ASSAY OF MAGNESIUM: CPT | Performed by: STUDENT IN AN ORGANIZED HEALTH CARE EDUCATION/TRAINING PROGRAM

## 2025-07-05 PROCEDURE — 63600175 PHARM REV CODE 636 W HCPCS: Performed by: INTERNAL MEDICINE

## 2025-07-05 PROCEDURE — 63600175 PHARM REV CODE 636 W HCPCS: Mod: JZ,TB | Performed by: STUDENT IN AN ORGANIZED HEALTH CARE EDUCATION/TRAINING PROGRAM

## 2025-07-05 PROCEDURE — A9698 NON-RAD CONTRAST MATERIALNOC: HCPCS | Performed by: FAMILY MEDICINE

## 2025-07-05 PROCEDURE — 25000003 PHARM REV CODE 250: Performed by: INTERNAL MEDICINE

## 2025-07-05 PROCEDURE — 99900035 HC TECH TIME PER 15 MIN (STAT)

## 2025-07-05 PROCEDURE — 0DH63UZ INSERTION OF FEEDING DEVICE INTO STOMACH, PERCUTANEOUS APPROACH: ICD-10-PCS | Performed by: RADIOLOGY

## 2025-07-05 PROCEDURE — 82040 ASSAY OF SERUM ALBUMIN: CPT | Performed by: STUDENT IN AN ORGANIZED HEALTH CARE EDUCATION/TRAINING PROGRAM

## 2025-07-05 PROCEDURE — 25000003 PHARM REV CODE 250: Performed by: STUDENT IN AN ORGANIZED HEALTH CARE EDUCATION/TRAINING PROGRAM

## 2025-07-05 PROCEDURE — 25000003 PHARM REV CODE 250: Performed by: NURSE ANESTHETIST, CERTIFIED REGISTERED

## 2025-07-05 PROCEDURE — 25500020 PHARM REV CODE 255: Performed by: FAMILY MEDICINE

## 2025-07-05 PROCEDURE — 25000003 PHARM REV CODE 250

## 2025-07-05 PROCEDURE — 99233 SBSQ HOSP IP/OBS HIGH 50: CPT | Mod: GC,,, | Performed by: INTERNAL MEDICINE

## 2025-07-05 PROCEDURE — 84100 ASSAY OF PHOSPHORUS: CPT | Performed by: STUDENT IN AN ORGANIZED HEALTH CARE EDUCATION/TRAINING PROGRAM

## 2025-07-05 PROCEDURE — 85025 COMPLETE CBC W/AUTO DIFF WBC: CPT | Performed by: STUDENT IN AN ORGANIZED HEALTH CARE EDUCATION/TRAINING PROGRAM

## 2025-07-05 PROCEDURE — 63600175 PHARM REV CODE 636 W HCPCS: Performed by: NURSE ANESTHETIST, CERTIFIED REGISTERED

## 2025-07-05 PROCEDURE — 94761 N-INVAS EAR/PLS OXIMETRY MLT: CPT

## 2025-07-05 PROCEDURE — 63600175 PHARM REV CODE 636 W HCPCS

## 2025-07-05 RX ORDER — MIDAZOLAM HYDROCHLORIDE 1 MG/ML
INJECTION INTRAMUSCULAR; INTRAVENOUS
Status: DISCONTINUED | OUTPATIENT
Start: 2025-07-05 | End: 2025-07-05

## 2025-07-05 RX ORDER — DEXMEDETOMIDINE HYDROCHLORIDE 100 UG/ML
INJECTION, SOLUTION INTRAVENOUS
Status: DISCONTINUED | OUTPATIENT
Start: 2025-07-05 | End: 2025-07-05

## 2025-07-05 RX ORDER — OXYCODONE HYDROCHLORIDE 5 MG/1
5 TABLET ORAL EVERY 6 HOURS PRN
Refills: 0 | Status: DISCONTINUED | OUTPATIENT
Start: 2025-07-05 | End: 2025-07-18

## 2025-07-05 RX ADMIN — DANTROLENE SODIUM 50 MG: 25 CAPSULE ORAL at 09:07

## 2025-07-05 RX ADMIN — DEXMEDETOMIDINE 2 MCG: 200 INJECTION, SOLUTION INTRAVENOUS at 11:07

## 2025-07-05 RX ADMIN — BARIUM SULFATE 450 ML: 20 SUSPENSION ORAL at 12:07

## 2025-07-05 RX ADMIN — QUETIAPINE FUMARATE 50 MG: 25 TABLET ORAL at 09:07

## 2025-07-05 RX ADMIN — POLYETHYLENE GLYCOL 3350 17 G: 17 POWDER, FOR SOLUTION ORAL at 09:07

## 2025-07-05 RX ADMIN — PIPERACILLIN SODIUM AND TAZOBACTAM SODIUM 4.5 G: 4; .5 INJECTION, POWDER, LYOPHILIZED, FOR SOLUTION INTRAVENOUS at 05:07

## 2025-07-05 RX ADMIN — BACLOFEN 20 MG: 10 TABLET ORAL at 09:07

## 2025-07-05 RX ADMIN — MIDAZOLAM HYDROCHLORIDE 2 MG: 2 INJECTION, SOLUTION INTRAMUSCULAR; INTRAVENOUS at 11:07

## 2025-07-05 RX ADMIN — GLUCAGON 0.5 MG: KIT at 11:07

## 2025-07-05 RX ADMIN — MUPIROCIN: 20 OINTMENT TOPICAL at 09:07

## 2025-07-05 RX ADMIN — PIPERACILLIN SODIUM AND TAZOBACTAM SODIUM 4.5 G: 4; .5 INJECTION, POWDER, LYOPHILIZED, FOR SOLUTION INTRAVENOUS at 09:07

## 2025-07-05 RX ADMIN — PIPERACILLIN SODIUM AND TAZOBACTAM SODIUM 4.5 G: 4; .5 INJECTION, POWDER, LYOPHILIZED, FOR SOLUTION INTRAVENOUS at 01:07

## 2025-07-05 RX ADMIN — MIRTAZAPINE 15 MG: 15 TABLET, FILM COATED ORAL at 09:07

## 2025-07-05 RX ADMIN — CETIRIZINE HYDROCHLORIDE 10 MG: 10 TABLET, FILM COATED ORAL at 09:07

## 2025-07-05 RX ADMIN — DEXMEDETOMIDINE 4 MCG: 200 INJECTION, SOLUTION INTRAVENOUS at 11:07

## 2025-07-05 RX ADMIN — DONEPEZIL HYDROCHLORIDE 5 MG: 5 TABLET, FILM COATED ORAL at 09:07

## 2025-07-05 RX ADMIN — SENNOSIDES AND DOCUSATE SODIUM 1 TABLET: 50; 8.6 TABLET ORAL at 09:07

## 2025-07-05 RX ADMIN — VANCOMYCIN HYDROCHLORIDE 750 MG: 750 INJECTION, POWDER, LYOPHILIZED, FOR SOLUTION INTRAVENOUS at 12:07

## 2025-07-05 RX ADMIN — BARIUM SULFATE 450 ML: 20 SUSPENSION ORAL at 01:07

## 2025-07-05 RX ADMIN — BACLOFEN 20 MG: 10 TABLET ORAL at 02:07

## 2025-07-05 RX ADMIN — OXYCODONE HYDROCHLORIDE 5 MG: 5 TABLET ORAL at 04:07

## 2025-07-05 RX ADMIN — DANTROLENE SODIUM 50 MG: 25 CAPSULE ORAL at 02:07

## 2025-07-05 RX ADMIN — SODIUM CHLORIDE, SODIUM GLUCONATE, SODIUM ACETATE, POTASSIUM CHLORIDE, MAGNESIUM CHLORIDE, SODIUM PHOSPHATE, DIBASIC, AND POTASSIUM PHOSPHATE: .53; .5; .37; .037; .03; .012; .00082 INJECTION, SOLUTION INTRAVENOUS at 11:07

## 2025-07-05 NOTE — ASSESSMENT & PLAN NOTE
Chronic anemia.    Plan  - Monitor serial CBC  - Transfuse PRBC if patient becomes hemodynamically unstable, symptomatic or H/H drops below 7/21.

## 2025-07-05 NOTE — EICU
Virtual ICU Quality Rounds    Admit Date: 6/29/2025  Hospital Day: 6    ICU Day: 5d 1h    24H Vital Sign Range:  Temp:  [96.4 °F (35.8 °C)-99.1 °F (37.3 °C)]   Pulse:  [81-90]   Resp:  [11-27]   BP: ()/(52-82)   SpO2:  [95 %-100 %]     VICU Surveillance Screening    Daily review of  line necessity(optional): Urinary catheter in place                LDA reconciliation : Yes

## 2025-07-05 NOTE — TRANSFER OF CARE
"Anesthesia Transfer of Care Note    Patient: Lisa Moreno    Procedure(s) Performed: * No procedures listed *    Patient location: PACU    Anesthesia Type: general    Transport from OR: Transported from OR on room air with adequate spontaneous ventilation. Continuous ECG monitoring in transport. Continuous SpO2 monitoring in transport    Post pain: adequate analgesia    Post assessment: no apparent anesthetic complications and tolerated procedure well    Post vital signs: stable    Level of consciousness: awake    Nausea/Vomiting: no nausea/vomiting    Complications: none    Transfer of care protocol was followed      Last vitals: Visit Vitals  BP 93/61   Pulse 88   Temp 36.2 °C (97.2 °F)   Resp 12   Ht 6' 3" (1.905 m)   Wt 59.9 kg (132 lb 0.9 oz)   SpO2 100%   BMI 16.51 kg/m²     "

## 2025-07-05 NOTE — HOSPITAL COURSE
Mr. Moreno was initially admitted to Hospital Medicine for management of sepsis 2/2 multifocal pneumonia. While in the ED, [facility] consulted for sepsis. During first examination the patient was stable with MAPs >65 and Lactic acid down trended to 2.6. Around 6am of 6/30 rapids was called hypotension with MAPs in the 50. He was started on Levophed and Zosyn, and was transferred to MICU for septic shock 2/2 multifocal pneumonia. He was weaned off pressors and Zosyn was de-escalated to Ceftriaxone and Azithromycin for CAP coverage. SLP consulted for dysphagia. FEES performed which showed residual puree. Recommend NPO. PEG placed with IR on 7/5. Increased secretions noted on exam s/p completion of antibiotic course of CAP coverage. Restarted patient on Vancomycin and Zosyn. Respiratory culture with many gram negative rods. Vancomycin discontinued. Continued on Zosyn. Respiratory cx positive for E.Coli ESBL. Started on Ertapenem, Zosyn discontinued. ID consulted for additional recommendations, who suggest a course through 7/12. SLP re-consulted for swallowing evaluation in the setting of new PEG tube. Able to have small occasional pleasure feeds of puree and honey thick liquids via tsp. SD to  on 7/7. He developed hyperkalemia from Isosource TF. They were stopped and changed to Novasource renal with resolution of his hyperkalemia.  He was being discharged to [facility] in [place] on 7/14 when he had right eye gaze, mouth twitching, drooling, BUE shaking, and unresponsiveness.  He was stepped up to [facility] for possible seizures. He was loaded on Keppra.  EEG was initially notable for discharges from the left hemisphere but has been negative since. He required Levophed for hypotension, which was turned off on 7/16.  He has remained hypothermic.  ID was consulted and he was started on Vanc and Meropenem.  LP was completed by IR, initial CSF studies have been negative. His right PIV appeared infected and was removed,  after which he had significant clinical improvement.  ID suggested a 5 day course of Doxycyline for SSTI.  He was SD to  on 7/17.

## 2025-07-05 NOTE — PROCEDURES
Radiology Post-Procedure Note    Pre Op Diagnosis: Dysphagia  Post Op Diagnosis: Same    Procedure: Gastrostomy tube insertion    Procedure performed by: Andrew Scott MD    Written Informed Consent Obtained: Yes  Specimen Removed: NO  Estimated Blood Loss: Minimal    Findings:   18 Fr gastrostomy tube placed without complication.     Patient tolerated procedure well.    Leave tube to bag drainage overnight and monitor output. Do not use for feeding overnight. If appropriate drainage of gastric fluid into bag, may d/c bag tomorrow and use tube for feeding. Please contact IR with any questions or concerns.     Andrew Scott MD  Interventional Radiology  Department of Radiology

## 2025-07-05 NOTE — ASSESSMENT & PLAN NOTE
UMN predominance with diffuse limb spasticity. Slowly progressive. Fully-reliant on mother, others for self care and feeding and cleaning and toileting. Respiratory function is poor per FVC .     -Continue home donepezil for neuroprotection  -Continue home dantrolene and baclofen for spacticity  -- turn q2h with aspiration precautions    ...    ...

## 2025-07-05 NOTE — SUBJECTIVE & OBJECTIVE
Interval History/Significant Events: NAEO. PEG today with IR. Resp Cx with many GNR. Vancomycin d/c. Plan for stepdown tomorrow.     Review of Systems  Objective:     Vital Signs (Most Recent):  Temp: (!) 95.7 °F (35.4 °C) (07/05/25 1622)  Pulse: 103 (07/05/25 1622)  Resp: 20 (07/05/25 1622)  BP: 103/68 (07/05/25 1622)  SpO2: 100 % (07/05/25 1622) Vital Signs (24h Range):  Temp:  [95.2 °F (35.1 °C)-99.1 °F (37.3 °C)] 95.7 °F (35.4 °C)  Pulse:  [] 103  Resp:  [11-27] 20  SpO2:  [97 %-100 %] 100 %  BP: ()/(52-77) 103/68   Weight: 59.9 kg (132 lb 0.9 oz)  Body mass index is 16.51 kg/m².      Intake/Output Summary (Last 24 hours) at 7/5/2025 1659  Last data filed at 7/5/2025 1600  Gross per 24 hour   Intake 2079.93 ml   Output 3380 ml   Net -1300.07 ml          Physical Exam  Constitutional:       General: He is not in acute distress.     Appearance: He is underweight.      Comments: NGT   HENT:      Head: Normocephalic.   Eyes:      General: No scleral icterus.        Right eye: No discharge.         Left eye: No discharge.      Extraocular Movements: Extraocular movements intact.   Cardiovascular:      Rate and Rhythm: Normal rate and regular rhythm.      Pulses: Normal pulses.      Heart sounds: No murmur heard.     No friction rub. No gallop.   Pulmonary:      Effort: Pulmonary effort is normal. No respiratory distress.   Abdominal:      General: There is no distension.   Musculoskeletal:      Right lower leg: No edema.      Left lower leg: No edema.   Skin:     General: Skin is warm.      Coloration: Skin is not jaundiced.   Neurological:      Mental Status: He is alert.   Psychiatric:         Mood and Affect: Mood normal.         Behavior: Behavior normal.            Vents:     Lines/Drains/Airways       Drain  Duration                  NG/OG Tube 06/30/25 1200 [place] sump 14 Fr. Right nostril 5 days         Urethral Catheter 06/30/25 0756 Temperature probe 16 Fr. 5 days         Gastrostomy/Enterostomy  07/05/25 1136 Gastrostomy tube w/ balloon LUQ feeding <1 day              Peripheral Intravenous Line  Duration             Peripheral IV Single Lumen 06/30/25 1130 18 G 1 3/4 in Anterior;Right Upper Arm 5 days    Peripheral IV 07/05/25 0336 18 G 1 1/4 in Anterior;Left Forearm <1 day                  Significant Labs:    CBC/Anemia Profile:  Recent Labs   Lab 07/04/25  0311 07/05/25  0346   WBC 6.31 7.10   HGB 9.2* 10.3*   HCT 30.1* 34.2*    205   MCV 83 84   RDW 15.7* 16.0*        Chemistries:  Recent Labs   Lab 07/04/25  0311 07/05/25  0346    137   K 4.4 4.5    102   CO2 27 26   BUN 24* 19   CREATININE 0.6 0.5   CALCIUM 7.5* 8.0*   ALBUMIN 1.6* 1.8*   PROT 6.0 6.7   BILITOT 0.2 0.2   ALKPHOS 52 59   ALT 13 13   AST 16 16   MG 1.8 1.9   PHOS 2.3* 3.1       All pertinent labs within the past 24 hours have been reviewed.    Significant Imaging:  I have reviewed all pertinent imaging results/findings within the past 24 hours.

## 2025-07-05 NOTE — PROGRESS NOTES
[facility] ICU  Hospital Medicine  ACCEPTANCE Note    Patient Name: Lisa Moreno  MRN: [id number]  Patient Class: IP- Inpatient   Admission Date: 6/29/2025  Length of Stay: 6 days  Attending Physician: Richard Webb MD  Primary Care Provider: John Nixon II, MD        Subjective     Principal Problem:Pneumonia        HPI:  Mr. Moreno is a 44-year-old male with a medical history including ALS, spinal cerebellar ataxia, neuromuscular bladder dysfunction, and sacral pressure ulcers. He was recently hospitalized on 5/9/2025 for severe dehydration, electrolyte abnormalities, and COVID-19 infection. He now presents to the ED via EMS with altered mental status and hypoxia. The patient is nonverbal at baseline but is typically more alert and interactive. History was provided by his mother, who reported that he had been noticeably less responsive over the past day and had a decreased oral intake. She denied any fever, shortness of breath, nausea, vomiting, or diarrhea.    Upon EMS arrival, the patient was obtunded and hypoxic with an oxygen saturation of 74%. He was treated en route with Solu-Medrol and DuoNebs, resulting in improvement of his oxygen saturation to above 90%.    In the ED, patient was obtunded but stabilized with improved oxygenation after initial EMS interventions. Chest X-ray revealed patchy bilateral opacities, most prominent in the bibasilar regions, as well as in the left apex and mid-lung zones--findings consistent with multifocal pneumonia.  He received 2 L of IV fluids, IV cefepime and vancomycin and admitted to hospital medicine for further management.    Overview/Hospital Course:  Treated for septic shock in MICU from PNA.  Got PEG 7/5.    7/5/2025 patient deemed stable for med floor    No new subjective & objective note has been filed under this hospital service since the last note was generated.        Assessment & Plan  Pneumonia  Chest X-ray revealed patchy bilateral  opacities, most prominent in the bibasilar regions, as well as in the left apex and mid-lung zones--findings consistent with multifocal pneumonia.  procalcitonin and CRP elevated  Within 24h of admission admitted to the MICU for septic shock secondary to multifocal pneumonia. Initially requiring pressors but weaned. Started on broad spectrum antibiotics (Zosyn). Afebrile. Zosyn de-escalated to Ceftriaxone and Azithromycin for CAP coverage.   -c/w abx, f/u cultures.  Bcx NGTD.   - Increased secretions on 7/4. Started on BS antibiotics   - Recommend deep suction per RT  - Pulmonary hygiene     Oropharyngeal dysphagia  SLP consulted for dysphagia. FEES performed which showed residual puree. Recommend NPO.   S/p PEG by IR on 7/5/2025  -- tube feeds, ok for pleasure feeds if alert      ALS (amyotrophic lateral sclerosis)  Spinocerebellar ataxia  Spastic diplegia  UMN predominance with diffuse limb spasticity. Slowly progressive. Fully-reliant on mother, others for self care and feeding and cleaning and toileting. Respiratory function is poor per FVC .     -Continue home donepezil for neuroprotection  -Continue home dantrolene and baclofen for spacticity  -- turn q2h with aspiration precautions    ...    ...    Insomnia secondary to depression with anxiety  Continue quetiapine and mirtazapine      Septic shock  This patient does have evidence of infective focus  My overall impression is sepsis without organ dysfunction.  Source: Respiratory Infection and Skin and Soft Tissue Infection: Deep Wound Infection      Organ dysfunction indicated by Acute respiratory failure    Fluid challenge Ideal Body Weight- The patient is obese (BMI>30) and their ideal body weight of Ideal body weight: 84.5 kg (186 lb 4.6 oz) will be used to calculate fluid bolus of 30 ml/kg.     Patient treated in MICU.    Sacral decubitus ulcer  Has a wound VAC in place  - Wound care consulted, appreciate recs  - General Surgery consulted for possible  debridement, appreciate recs.  No indication for inpatient debridement    Acute hypoxic respiratory failure  Upon EMS arrival, the patient was obtunded and hypoxic with an oxygen saturation of 74%.  .   Signs/symptoms of respiratory failure include- tachypnea and lethargy. Contributing diagnoses includes - Pneumonia Labs and images were reviewed. Patient Has recent ABG, which has been reviewed. Will treat underlying causes and adjust management of respiratory failure as follows- given abx.   -- Wean off oxygen as tolerated  Acute encephalopathy  Likely 2/2 sepsis. Resolving.   - Neuro checks  - Delirium/aspiration/fall precautions  Severe protein-calorie malnutrition  Nutrition consulted. Most recent weight and BMI monitored-     Measurements:  Wt Readings from Last 1 Encounters:   07/01/25 59.9 kg (132 lb 0.9 oz)   Body mass index is 16.51 kg/m².    Patient has been screened and assessed by RD.    Malnutrition Type:  Context: chronic illness  Level: severe    Malnutrition Characteristic Summary:  Subcutaneous Fat (Malnutrition): severe depletion  Muscle Mass (Malnutrition): severe depletion    Interventions/Recommendations (treatment strategy):     -- started on Tube feeds  Pressure injury of deep tissue of heel  L Heel with black eschar. R Heel pressure ulcer with scant serosanguineous drainage. Photos uploaded into chart.   - Wound care following, appreciate recs  - Podiatry consulted, appreciate recs  Anemia  Chronic anemia.    Plan  - Monitor serial CBC  - Transfuse PRBC if patient becomes hemodynamically unstable, symptomatic or H/H drops below 7/21.    Gastrostomy status  Patient noted to have a percutaneous endoscopic gastrostomy tube in place. Tube was placed on this admission, with date of procedure- 7/5/2025  The tube is patent. Routine care to be done by wound care and nursing staff.       VTE Risk Mitigation (From admission, onward)           Ordered     IP VTE HIGH RISK PATIENT  Once         07/01/25  1003     Place sequential compression device  Until discontinued         06/29/25 2044                    Discharge Planning   MARCELLE: 7/8/2025     Code Status: Full Code   Medical Readiness for Discharge Date:   Discharge Plan A: Skilled Nursing Facility   Discharge Delays: None known at this time                    Sean Adam MD  Department of Hospital Medicine   [facility] - Medical ICU

## 2025-07-05 NOTE — ASSESSMENT & PLAN NOTE
"This patient does have evidence of infective focus  My overall impression is sepsis without organ dysfunction.  Source: Respiratory Infection  Antibiotics given-   Antibiotics (72h ago, onward)      Start     Stop Route Frequency Ordered    07/04/25 1015  mupirocin 2 % ointment         07/09/25 0859 Nasl 2 times daily 07/04/25 0900    07/04/25 1000  piperacillin-tazobactam (ZOSYN) 4.5 g in D5W 100 mL IVPB (MB+)         -- IV Every 8 hours (non-standard times) 07/04/25 0857          Latest lactate reviewed-  No results for input(s): "LACTATE", "POCLAC" in the last 72 hours.    Will Not start   Source control achieved by:     Mr. Moreno is a 44yoM with PMHx significant for ALS, neuromuscular dysfunction of the bladder, sacral ulcers, and spinal cerebellar ataxia who was admitted recently admitted (5/9/2025) for severe dehydration, electrolyte disturbances, and covid who presented to the ED with EMS and c/o AMS and hypoxia. Nonverbal at baseline but apparently is usually more alert; has been less responsive and interactive x1 day. Upon EMS arrival, was obtunded and sating 74%. They gave him solumedrol and duonebs en route which improved his oxygen status to >90%. Mother at bedside reports increased difficulty with swallowing and decreased PO intake.       While in the ED, labs and imaging significant for Na 149, K 5.4, Cl 111, CO2 21, glucose 149, BUN 38, creat 1.0, albumin 2.2, ALP 80, AST/ALT 34/23, anion gap 17, procal 8.42, BNP <10, WBC 7.11, H/H 12/40.5, plts 315, covid/flu negative, .6, VBG 7.349/47.1/44.5/23.9, istat LA 4.9->7.4->4.2 (7.4 likely error?). Chest xray with patchy opacities bilaterally, predominant within the bibasilar regions but also within the left lung apex and the left mid-lung; compatible with multifocal pneumonia. Started on BSA by the ED; vanc and cefepime.       - Increased secretions on 7/4. Started on BS antibiotics.    - Resp Cx (7/5) with many GNRs. Vancomycin de-escalated. Will " continue Zosyn.   - Bcx NGTD.   - Recommend deep suction per RT  - Pulmonary hygiene   - Maintain spo2 >90%; wean fio2 as tolerated  - Continuous telemetry and spO2 monitoring  - Neuro checks  - Delirium/aspiration/fall precautions  - Recommend Wound Care for sacral and lower extremity wounds

## 2025-07-05 NOTE — ASSESSMENT & PLAN NOTE
Hypernatremia is likely due to Dehydration. The patient's most recent sodium results are listed below.  Recent Labs     07/03/25  0326 07/04/25  0311 07/05/25  0346    137 137       Plan  - Aim to correct the sodium by 8-10mEq in 24 hours.   - Plan to correct their hypernatremia with Select IV fluids: LR at a rate of 100 ml/hr.   - Will plan to trend the patient's sodium: Daily

## 2025-07-05 NOTE — PROGRESS NOTES
This is an attestation of a separate note by the ICU resident/fellow. As the teaching physician, I saw the patient with the resident/fellow and agree with the resident/fellow's findings and plan. In addition to the resident/fellow's documentation, I add the following:     Lisa Moreno is a 45 yo man with neuromuscular disorder onset 2006 which has progress to loss of ambulation, spastic quadriparesis, abnormal swallow (requires thickeners) with intact cough, no evidence of hypercapnia, known decubitus, admitted with altered mental status, procalcitonin 8.4.    # encephalopathy - no hypercapnia, alert and follows commands; chronically quadriparetic  # sepsis - markedly elevated procalcitonin, decubitus ulcer, multifocal airspace dz - started on vancomycin, cefepime, changed to ceftriaxone/azithromycin for CAP coverage - completed 5 days, extended spectrum today for new respiratory purulence - culture sent  # weak cough - when secretions are present, nasotracheal suction, abdominal assist for cough and mechanical cough assist may be helpful  # hypoxemia - resolved  # neuromuscular disease - progressive over 2 decades with weakness and spasticity and bulbar involvement  # spasticity - has been a significant problem - takes baclofen, dantrolene - continue  # nutrition - s/p IR gastrostomy today, with tamara-key upgrade in 8 weeks. Indication - weight loss, access for supplemental nutrition, medications     Richard Webb MD  [facility] Attending  Cell: [phone]

## 2025-07-05 NOTE — ASSESSMENT & PLAN NOTE
Chest X-ray revealed patchy bilateral opacities, most prominent in the bibasilar regions, as well as in the left apex and mid-lung zones--findings consistent with multifocal pneumonia.  procalcitonin and CRP elevated  Within 24h of admission admitted to the MICU for septic shock secondary to multifocal pneumonia. Initially requiring pressors but weaned. Started on broad spectrum antibiotics (Zosyn). Afebrile. Zosyn de-escalated to Ceftriaxone and Azithromycin for CAP coverage.   -c/w abx, f/u cultures.  Bcx NGTD.   - Increased secretions on 7/4. Started on BS antibiotics   - Recommend deep suction per RT  - Pulmonary hygiene

## 2025-07-05 NOTE — PROGRESS NOTES
[facility] - Medical ICU  Critical Care Medicine  Progress Note    Patient Name: Lisa Moreno  MRN: [id number]  Admission Date: 6/29/2025  Hospital Length of Stay: 6 days  Code Status: Full Code  Attending Provider: Richard Webb MD  Primary Care Provider: John Nixon II, MD   Principal Problem: Pneumonia    Subjective:     HPI:  Mr. Moreno is a 44yoM with PMHx significant for ALS, neuromuscular dysfunction of the bladder, sacral ulcers, and spinal cerebellar ataxia who was admitted recently admitted (5/9/2025) for severe dehydration, electrolyte disturbances, and covid who presented to the ED with EMS and c/o AMS and hypoxia. Nonverbal at baseline but apparently is usually more alert; has been less responsive and interactive x1 day. Upon EMS arrival, was obtunded and sating 74%. They gave him solumedrol and duonebs en route which improved his oxygen status to >90%. Mother at bedside reports increased difficulty with swallowing and decreased PO intake.       While in the ED, labs and imaging significant for Na 149, K 5.4, Cl 111, CO2 21, glucose 149, BUN 38, creat 1.0, albumin 2.2, ALP 80, AST/ALT 34/23, anion gap 17, procal 8.42, BNP <10, WBC 7.11, H/H 12/40.5, plts 315, covid/flu negative, .6, VBG 7.349/47.1/44.5/23.9, istat LA 4.9->7.4->4.2 (7.4 likely error?). Chest xray with patchy opacities bilaterally, predominant within the bibasilar regions but also within the left lung apex and the left mid-lung; compatible with multifocal pneumonia. Started on BSA by the ED; vanc and cefepime. [facility] consulted for sepsis. During first examination the patient was stable with MAPs >65 and Lactic acid down trended to 2.6. Around 6 am of 6/30 [place] was called hypotension with MAPs in the 50. Pt was started on levophed and was transferred to MICU.     Hospital/ICU Course:  Admitted to the MICU for septic shock secondary to multifocal pneumonia. Initially requiring pressors but weaned. Started on broad  spectrum antibiotics (Zosyn). Afebrile. Zosyn de-escalated to Ceftriaxone and Azithromycin for CAP coverage. SLP consulted for dysphagia. FEES performed which showed residual puree. Recommend NPO. Plan for PEG with IR on 7/5. Increased secretions noted on exam s/p completion of antibiotic course of CAP coverage. Restarted patient on Vancomycin and Zosyn. Respiratory culture with many gram negative rods. Vancomycin discontinued. Continued on Zosyn. Stable for stepdown.     Interval History/Significant Events: NAEO. PEG today with IR. Resp Cx with many GNR. Vancomycin d/c. Plan for stepdown tomorrow.     Review of Systems  Objective:     Vital Signs (Most Recent):  Temp: (!) 95.7 °F (35.4 °C) (07/05/25 1622)  Pulse: 103 (07/05/25 1622)  Resp: 20 (07/05/25 1622)  BP: 103/68 (07/05/25 1622)  SpO2: 100 % (07/05/25 1622) Vital Signs (24h Range):  Temp:  [95.2 °F (35.1 °C)-99.1 °F (37.3 °C)] 95.7 °F (35.4 °C)  Pulse:  [] 103  Resp:  [11-27] 20  SpO2:  [97 %-100 %] 100 %  BP: ()/(52-77) 103/68   Weight: 59.9 kg (132 lb 0.9 oz)  Body mass index is 16.51 kg/m².      Intake/Output Summary (Last 24 hours) at 7/5/2025 1659  Last data filed at 7/5/2025 1600  Gross per 24 hour   Intake 2079.93 ml   Output 3380 ml   Net -1300.07 ml          Physical Exam  Constitutional:       General: He is not in acute distress.     Appearance: He is underweight.      Comments: NGT   HENT:      Head: Normocephalic.   Eyes:      General: No scleral icterus.        Right eye: No discharge.         Left eye: No discharge.      Extraocular Movements: Extraocular movements intact.   Cardiovascular:      Rate and Rhythm: Normal rate and regular rhythm.      Pulses: Normal pulses.      Heart sounds: No murmur heard.     No friction rub. No gallop.   Pulmonary:      Effort: Pulmonary effort is normal. No respiratory distress.   Abdominal:      General: There is no distension.   Musculoskeletal:      Right lower leg: No edema.      Left lower  leg: No edema.   Skin:     General: Skin is warm.      Coloration: Skin is not jaundiced.   Neurological:      Mental Status: He is alert.   Psychiatric:         Mood and Affect: Mood normal.         Behavior: Behavior normal.            Vents:     Lines/Drains/Airways       Drain  Duration                  NG/OG Tube 06/30/25 1200 [place] sump 14 Fr. Right nostril 5 days         Urethral Catheter 06/30/25 0756 Temperature probe 16 Fr. 5 days         Gastrostomy/Enterostomy 07/05/25 1136 Gastrostomy tube w/ balloon LUQ feeding <1 day              Peripheral Intravenous Line  Duration             Peripheral IV Single Lumen 06/30/25 1130 18 G 1 3/4 in Anterior;Right Upper Arm 5 days    Peripheral IV 07/05/25 0336 18 G 1 1/4 in Anterior;Left Forearm <1 day                  Significant Labs:    CBC/Anemia Profile:  Recent Labs   Lab 07/04/25  0311 07/05/25  0346   WBC 6.31 7.10   HGB 9.2* 10.3*   HCT 30.1* 34.2*    205   MCV 83 84   RDW 15.7* 16.0*        Chemistries:  Recent Labs   Lab 07/04/25  0311 07/05/25  0346    137   K 4.4 4.5    102   CO2 27 26   BUN 24* 19   CREATININE 0.6 0.5   CALCIUM 7.5* 8.0*   ALBUMIN 1.6* 1.8*   PROT 6.0 6.7   BILITOT 0.2 0.2   ALKPHOS 52 59   ALT 13 13   AST 16 16   MG 1.8 1.9   PHOS 2.3* 3.1       All pertinent labs within the past 24 hours have been reviewed.    Significant Imaging:  I have reviewed all pertinent imaging results/findings within the past 24 hours.    ABG  Recent Labs   Lab 06/30/25  0848   PH 7.431   PO2 129*   PCO2 37.4   HCO3 25.0     Assessment/Plan:     Neuro  Acute encephalopathy  Likely 2/2 sepsis. Resolving.     - see plan for sepsis    ALS (amyotrophic lateral sclerosis)  Hx of ALS w/diffuse spacticity/weakness + dysphagia     - continue baclofen and dantrolene    Spinocerebellar ataxia  - Continue baclofen and dantrolene    Spastic diplegia  -continue home baclofen    Psychiatric  Insomnia secondary to depression with anxiety  Continue  "quetiapine and mirtazapine       Pulmonary  * Pneumonia  - see plan for sepsis    Renal/  Hypernatremia  . Hypernatremia is likely due to Dehydration.     Plan  - Aim to correct the sodium by 8-10mEq in 24 hours.   - Plan to correct their hypernatremia with Select IV fluids: LR at a rate of 100 ml/hr.   - Will plan to trend the patient's sodium: Daily       ID  Septic shock  This patient does have evidence of infective focus  My overall impression is sepsis without organ dysfunction.  Source: Respiratory Infection  Antibiotics given-   Antibiotics (72h ago, onward)      Start     Stop Route Frequency Ordered    07/04/25 1015  mupirocin 2 % ointment         07/09/25 0859 Nasl 2 times daily 07/04/25 0900    07/04/25 1000  piperacillin-tazobactam (ZOSYN) 4.5 g in D5W 100 mL IVPB (MB+)         -- IV Every 8 hours (non-standard times) 07/04/25 0857          Latest lactate reviewed-  No results for input(s): "LACTATE", "POCLAC" in the last 72 hours.    Will Not start   Source control achieved by:     Mr. Moreno is a 44yoM with PMHx significant for ALS, neuromuscular dysfunction of the bladder, sacral ulcers, and spinal cerebellar ataxia who was admitted recently admitted (5/9/2025) for severe dehydration, electrolyte disturbances, and covid who presented to the ED with EMS and c/o AMS and hypoxia. Nonverbal at baseline but apparently is usually more alert; has been less responsive and interactive x1 day. Upon EMS arrival, was obtunded and sating 74%. They gave him solumedrol and duonebs en route which improved his oxygen status to >90%. Mother at bedside reports increased difficulty with swallowing and decreased PO intake.       While in the ED, labs and imaging significant for Na 149, K 5.4, Cl 111, CO2 21, glucose 149, BUN 38, creat 1.0, albumin 2.2, ALP 80, AST/ALT 34/23, anion gap 17, procal 8.42, BNP <10, WBC 7.11, H/H 12/40.5, plts 315, covid/flu negative, .6, VBG 7.349/47.1/44.5/23.9, istat LA " 4.9->7.4->4.2 (7.4 likely error?). Chest xray with patchy opacities bilaterally, predominant within the bibasilar regions but also within the left lung apex and the left mid-lung; compatible with multifocal pneumonia. Started on BSA by the ED; vanc and cefepime.       - Increased secretions on 7/4. Started on BS antibiotics.    - Resp Cx (7/5) with many GNRs. Vancomycin de-escalated. Will continue Zosyn.   - Bcx NGTD.   - Recommend deep suction per RT  - Pulmonary hygiene   - Maintain spo2 >90%; wean fio2 as tolerated  - Continuous telemetry and spO2 monitoring  - Neuro checks  - Delirium/aspiration/fall precautions  - Recommend Wound Care for sacral and lower extremity wounds    Endocrine  Severe protein-calorie malnutrition  Nutrition consulted. Most recent weight and BMI monitored-     Measurements:  Wt Readings from Last 1 Encounters:   07/01/25 59.9 kg (132 lb 0.9 oz)   Body mass index is 16.51 kg/m².    Patient has been screened and assessed by RD.    Malnutrition Type:  Context: chronic illness  Level: severe    Malnutrition Characteristic Summary:  Subcutaneous Fat (Malnutrition): severe depletion  Muscle Mass (Malnutrition): severe depletion    Interventions/Recommendations (treatment strategy):         GI  Oropharyngeal dysphagia  Secondary to ALS/unknown neuromuscular disorder.     - SLP evaluated, failed swallow eval. Recommend NPO and PEG tube evaluation.   - IR consulted, appreciate recs   - Plan for PEG tube placement with IR on 7/5.     Orthopedic  Pressure injury of deep tissue of heel  L Heel with black eschar. R Heel pressure ulcer with scant serosanguineous drainage. Photos uploaded into chart.     - Wound care following, appreciate recs  - Podiatry consulted, appreciate recs    Sacral decubitus ulcer  - Wound care consulted, appreciate recs  - General Surgery consulted for possible debridement, appreciate recs   - No indication for inpatient debridement       Critical Care Daily Checklist:    A:  Awake: RASS Goal/Actual Goal:    Actual:     B: Spontaneous Breathing Trial Performed?     C: SAT & SBT Coordinated?  Yes                      D: Delirium: CAM-ICU     E: Early Mobility Performed? Yes   F: Feeding Goal: Goals: Meet % EEN/EPN by next RD follow-up  Status: Nutrition Goal Status: progressing towards goal   Current Diet Order   Procedures    Diet NPO      AS: Analgesia/Sedation PRN Oxy   T: Thromboembolic Prophylaxis Lovenox   H: HOB > 300 Yes   U: Stress Ulcer Prophylaxis (if needed) N/a   G: Glucose Control Controlled   B: Bowel Function Unmeasured Stool Occurrence: 0   I: Indwelling Catheter (Lines & Zeng) Necessity PIV, PEG, UC   D: De-escalation of Antimicrobials/Pharmacotherapies Zosyn. Vancomycin de-esc (7/5)    Plan for the day/ETD PEG with IR    Code Status:  Family/Goals of Care: Full Code         Critical secondary to Patient has a condition that poses threat to life and bodily function: Septic Shock      Critical care was time spent personally by me on the following activities: development of treatment plan with patient or surrogate and bedside caregivers, discussions with consultants, evaluation of patient's response to treatment, examination of patient, ordering and performing treatments and interventions, ordering and review of laboratory studies, ordering and review of radiographic studies, pulse oximetry, re-evaluation of patient's condition. This critical care time did not overlap with that of any other provider or involve time for any procedures.     Odette Tran MD  Critical Care Medicine  [facility] Medical ICU

## 2025-07-05 NOTE — ASSESSMENT & PLAN NOTE
Patient noted to have a percutaneous endoscopic gastrostomy tube in place. Tube was placed on this admission, with date of procedure- 7/5/2025  The tube is patent. Routine care to be done by wound care and nursing staff.

## 2025-07-05 NOTE — ASSESSMENT & PLAN NOTE
Upon EMS arrival, the patient was obtunded and hypoxic with an oxygen saturation of 74%.  .   Signs/symptoms of respiratory failure include- tachypnea and lethargy. Contributing diagnoses includes - Pneumonia Labs and images were reviewed. Patient Has recent ABG, which has been reviewed. Will treat underlying causes and adjust management of respiratory failure as follows- given abx.   -- Wean off oxygen as tolerated

## 2025-07-05 NOTE — ASSESSMENT & PLAN NOTE
This patient does have evidence of infective focus  My overall impression is sepsis without organ dysfunction.  Source: Respiratory Infection and Skin and Soft Tissue Infection: Deep Wound Infection      Organ dysfunction indicated by Acute respiratory failure    Fluid challenge Ideal Body Weight- The patient is obese (BMI>30) and their ideal body weight of Ideal body weight: 84.5 kg (186 lb 4.6 oz) will be used to calculate fluid bolus of 30 ml/kg.     Patient treated in MICU.

## 2025-07-05 NOTE — EICU
[facility] Night Rounds Checklist  24H Vital Sign Range:  Temp:  [97.2 °F (36.2 °C)-99.1 °F (37.3 °C)]   Pulse:  [81-91]   Resp:  [12-27]   BP: ()/(57-82)   SpO2:  [95 %-100 %]     Video rounds

## 2025-07-05 NOTE — ANESTHESIA POSTPROCEDURE EVALUATION
Anesthesia Post Evaluation    Patient: Lisa Moreno    Procedure(s) Performed: * No procedures listed *    Final Anesthesia Type: general      Patient location during evaluation: ICU  Patient participation: Yes- Able to Participate  Level of consciousness: awake and alert  Post-procedure vital signs: reviewed and stable  Pain management: adequate  Airway patency: patent    PONV status at discharge: No PONV  Anesthetic complications: no      Cardiovascular status: blood pressure returned to baseline  Respiratory status: unassisted  Hydration status: euvolemic  Follow-up not needed.          Vitals Value Taken Time   /61 07/05/25 18:01   Temp 36.8 °C (98.24 °F) 07/05/25 18:48   Pulse 106 07/05/25 18:48   Resp 17 07/05/25 18:48   SpO2 100 % 07/05/25 18:48   Vitals shown include unfiled device data.      No case tracking events are documented in the log.      Pain/Aleida Score: Pain Rating Prior to Med Admin: 8 (7/5/2025  4:22 PM)

## 2025-07-05 NOTE — ANESTHESIA PREPROCEDURE EVALUATION
[facility]  Anesthesia Pre-Operative Evaluation    Patient Name: Lisa Moreno  YOB: 1980  MRN: [id number]    SUBJECTIVE:   07/05/2025    Pre-operative evaluation for * IR G TUBE*    Lisa Moreno is a 44 y.o. male with a PMHx significant for ALS, spinal cerebella ataxia, PNA, dysphagia, AMS. Patient now presents for the above procedure(s).    Per chart review, patient with increased secretions 7/4/25.    Previous Airway: None documented.    LDA:  Peripheral IV Single Lumen 06/30/25 1130 18 G 1 3/4 in Anterior;Right Upper Arm (Active)   Site Assessment Clean;Dry;Intact;No redness;No swelling 07/05/25 0301   Line Securement Device Secured with sutureless device 07/04/25 1901   Extremity Assessment Distal to IV No abnormal discoloration;No redness;No swelling 07/05/25 0301   Line Status Infusing 07/05/25 0301   Line 1 Status Flushed;Infusing;No blood return 07/05/25 0301   Line 2 Status Flushed 07/03/25 0301   Dressing Status Clean;Dry;Intact 07/05/25 0301   Dressing Intervention Integrity maintained 07/05/25 0301   Dressing Change Due 07/04/25 07/05/25 0301   Site Change Due 07/04/25 07/01/25 1901   Reason Not Rotated Not due 07/05/25 0301   Number of days: 4       Peripheral IV 07/05/25 0336 18 G 1 1/4 in Anterior;Left Forearm (Active)   Number of days: 0            NG/OG Tube 06/30/25 1200 [place] sump 14 Fr. Right nostril (Active)   Placement Check placement verified by x-ray 07/05/25 0301   Tolerance no signs/symptoms of discomfort 07/05/25 0301   Securement secured to nostril center w/ adhesive device 07/05/25 0301   Clamp Status/Tolerance no nausea;no emesis 07/04/25 1545   Suction Setting/Drainage Method suction at the bedside 07/05/25 0301   Insertion Site Appearance no redness, warmth, tenderness, skin breakdown, drainage 07/05/25 0301   Flush/Irrigation flushed w/;water 07/04/25 2301   Feeding Type continuous;by pump 07/04/25 9294   Feeding Action feeding  "held 07/05/25 0301   Current Rate (mL/hr) 40 mL/hr 07/04/25 1901   Goal Rate (mL/hr) 55 mL/hr 07/04/25 1901   Intake (mL) 425 mL 07/05/25 0135   Water Bolus (mL) 200 mL 07/04/25 2001   Formula Name Impact Peptide 1.5 07/04/25 0301   Intake (mL) - Formula Tube Feeding 40 07/05/25 0001   Number of days: 4            Urethral Catheter 06/30/25 0756 Temperature probe 16 Fr. (Active)   Site Assessment Clean;Intact 07/05/25 0301   Collection Container Urimeter 07/05/25 0301   Securement Method secured to top of thigh w/ adhesive device 07/05/25 0301   Catheter Care Performed yes 07/05/25 0301   Reason for Continuing Urinary Catheterization Urinary retention 07/05/25 0301   CAUTI Prevention Bundle Securement Device in place with 1" slack;Intact seal between catheter & drainage tubing;Drainage bag/urimeter off the floor;Sheeting clip in use;No dependent loops or kinks;Drainage bag/urimeter not overfilled (<2/3 full);Drainage bag/urimeter below bladder 07/05/25 0301   Output (mL) 150 mL 07/05/25 0401   Number of days: 4     Transthoracic Echo :  No results found for this or any previous visit.    Problem List[1]    Review of patient's allergies indicates:   Allergen Reactions    Penicillins      Hives and Itching  Tolerated zosyn- 7/1/2025    Allergen ext-venom-honey bee        Current Outpatient Medications   Medication Instructions    baclofen (LIORESAL) 20 mg, Oral, 3 times daily    cetirizine (ZYRTEC) 10 mg, Oral    dantrolene (DANTRIUM) 50 MG Cap TAKE 1 CAPSULE(50 MG) BY MOUTH THREE TIMES DAILY    dimethicone-zinc oxide 1-40 % Oint Apply to affected area twice daily    donepeziL (ARICEPT) 5 mg, Oral, Nightly    mirtazapine (REMERON) 15 mg, Oral, Nightly    QUEtiapine (SEROQUEL) 50 mg, Oral, Nightly       Past Surgical History:   Procedure Laterality Date    BACLOFEN PUMP IMPLANTATION      COLONOSCOPY N/A 7/23/2020    Procedure: COLONOSCOPY;  Surgeon: Ziyad Fitch MD;  Location: [facility];  " "Service: Endoscopy;  Laterality: N/A;    ESOPHAGOGASTRODUODENOSCOPY N/A 7/23/2020    Procedure: EGD (ESOPHAGOGASTRODUODENOSCOPY);  Surgeon: Ziyad Fitch MD;  Location: [facility];  Service: Endoscopy;  Laterality: N/A;    FLUOROSCOPIC URODYNAMIC STUDY N/A 11/27/2018    Procedure: URODYNAMIC STUDY, FLUOROSCOPIC;  Surgeon: Tanja Okeefe MD;  Location: [address];  Service: Urology;  Laterality: N/A;  1 hour    REATTACHMENT OF MUSCLE Bilateral 2/18/2022    Procedure: PTOSIS REPAIR OF BOTH EYES;  Surgeon: Krupa Rios MD;  Location: [address];  Service: Ophthalmology;  Laterality: Bilateral;    UPPER GASTROINTESTINAL ENDOSCOPY         Social History     Substance and Sexual Activity   Drug Use Not Currently     Alcohol Use: Not At Risk (7/1/2025)    AUDIT-C     Frequency of Alcohol Consumption: Never     Average Number of Drinks: Patient does not drink     Frequency of Binge Drinking: Never     Tobacco Use: Low Risk  (6/29/2025)    Patient History     Smoking Tobacco Use: Never     Smokeless Tobacco Use: Never     Passive Exposure: Not on file       OBJECTIVE:     Vital Signs Range:      5/9/2025    10:15 PM 6/29/2025     4:53 PM 6/29/2025     7:46 PM   Vitals - 1 value per visit   SYSTOLIC 111 100    DIASTOLIC 78 64    Pulse 128 100    Temp 37.2 °C (99 °F) 37.3 °C (99.2 °F)    Resp 17 23    SPO2 100 % 94 %    Weight (lb) 132.28 132    Weight (kg) 60 59.875    Height 6' 3" (1.905 m)  6' 3" (1.905 m)   BMI (Calculated) 16.5  16.5         CBC:   Lab Results   Component Value Date    WBC 7.10 07/05/2025    HGB 10.3 (L) 07/05/2025    HCT 34.2 (L) 07/05/2025    MCV 84 07/05/2025     07/05/2025         CMP:   Sodium   Date Value Ref Range Status   07/05/2025 137 136 - 145 mmol/L Final   09/15/2023 138 136 - 145 mmol/L Final     Potassium   Date Value Ref Range Status   07/05/2025 4.5 3.5 - 5.1 mmol/L Final   09/15/2023 3.9 3.5 - 5.1 mmol/L Final     Chloride   Date Value Ref Range " Status   07/05/2025 102 95 - 110 mmol/L Final   09/15/2023 105 95 - 110 mmol/L Final     CO2   Date Value Ref Range Status   07/05/2025 26 23 - 29 mmol/L Final   09/15/2023 23 23 - 29 mmol/L Final     Glucose   Date Value Ref Range Status   07/05/2025 75 70 - 110 mg/dL Final   09/15/2023 70 70 - 110 mg/dL Final     BUN   Date Value Ref Range Status   07/05/2025 19 6 - 20 mg/dL Final     Creatinine   Date Value Ref Range Status   07/05/2025 0.5 0.5 - 1.4 mg/dL Final     Calcium   Date Value Ref Range Status   07/05/2025 8.0 (L) 8.7 - 10.5 mg/dL Final   09/15/2023 8.3 (L) 8.7 - 10.5 mg/dL Final     Protein Total   Date Value Ref Range Status   07/05/2025 6.7 6.0 - 8.4 gm/dL Final     Total Protein   Date Value Ref Range Status   09/15/2023 6.4 6.0 - 8.4 g/dL Final     Albumin   Date Value Ref Range Status   07/05/2025 1.8 (L) 3.5 - 5.2 g/dL Final   09/15/2023 3.4 (L) 3.5 - 5.2 g/dL Final     Total Bilirubin   Date Value Ref Range Status   09/15/2023 0.2 0.1 - 1.0 mg/dL Final     Comment:     For infants and newborns, interpretation of results should be based  on gestational age, weight and in agreement with clinical  observations.    Premature Infant recommended reference ranges:  Up to 24 hours.............<8.0 mg/dL  Up to 48 hours............<12.0 mg/dL  3-5 days..................<15.0 mg/dL  6-29 days.................<15.0 mg/dL       Bilirubin Total   Date Value Ref Range Status   07/05/2025 0.2 0.1 - 1.0 mg/dL Final     Comment:     For infants and newborns, interpretation of results should be based   on gestational age, weight and in agreement with clinical   observations.    Premature Infant recommended reference ranges:   0-24 hours:  <8.0 mg/dL   24-48 hours: <12.0 mg/dL   3-5 days:    <15.0 mg/dL   6-29 days:   <15.0 mg/dL     Alkaline Phosphatase   Date Value Ref Range Status   09/15/2023 38 (L) 55 - 135 U/L Final     ALP   Date Value Ref Range Status   07/05/2025 59 40 - 150 unit/L Final     AST   Date  Value Ref Range Status   07/05/2025 16 11 - 45 unit/L Final   09/15/2023 31 10 - 40 U/L Final     ALT   Date Value Ref Range Status   07/05/2025 13 10 - 44 unit/L Final   09/15/2023 21 10 - 44 U/L Final     Anion Gap   Date Value Ref Range Status   07/05/2025 9 8 - 16 mmol/L Final     eGFR if    Date Value Ref Range Status   03/24/2021 >60.0 >60 mL/min/1.73 m^2 Final     eGFR if non    Date Value Ref Range Status   03/24/2021 >60.0 >60 mL/min/1.73 m^2 Final     Comment:     Calculation used to obtain the estimated glomerular filtration  rate (eGFR) is the CKD-EPI equation.          INR:  Lab Results   Component Value Date    INR 1.0 02/22/2015       Cardiac Studies    EKG:   Results for orders placed or performed during the hospital encounter of 06/29/25   EKG 12-lead    Collection Time: 06/30/25  6:25 AM   Result Value Ref Range    QRS Duration 94 ms    [facility] QTC Calculation 488 ms    Narrative    Test Reason : Z13.6,    Vent. Rate :  72 BPM     Atrial Rate :  72 BPM     P-R Int : 124 ms          QRS Dur :  94 ms      QT Int : 446 ms       P-R-T Axes :  75  90  89 degrees    QTcB Int : 488 ms    Normal sinus rhythm  Rightward axis  Prolonged QT  Abnormal ECG  When compared with ECG of 30-Jun-2025 06:20,  No significant change was found  Confirmed by Franck Ortiz ([id number]) on 6/30/2025 8:11:21 AM    Referred By: AAAREFERRAL SELF           Confirmed By: Franck Ortiz       Transthoracic Echo:  No results found for this or any previous visit.      Transesophageal Echo:  No results found for this or any previous visit.      Nuclear Stress Test:  No results found for this or any previous visit.      Stress Echo:  No results found for this or any previous visit.      Nuclear Stress Echo:  No results found for this or any previous visit.      Cardiac Catheterization:  No results found for this or any previous visit.      Cardiac Device Check:  No results found for this or any previous  visit.      ASSESSMENT/PLAN:                                                                                                                07/05/2025  Lisa Moreno is a 44 y.o., male.      Pre-op Assessment    I have reviewed the Patient Summary Reports.    I have reviewed the NPO Status.   I have reviewed the Medications.     Review of Systems  Pulmonary:  Pneumonia                  Pulmonary Infection:  Pneumonia.     Neurological:    Neuromuscular Disease,                                 Neuromuscular Disease   Psych:  Psychiatric History                Physical Exam  General: Alert  Quadriparetic.   Airway:  Mallampati: unable to assess       Anesthesia Plan  Type of Anesthesia, risks & benefits discussed:    Anesthesia Type: MAC, Gen Natural Airway, Gen ETT  Intra-op Monitoring Plan: Standard ASA Monitors  Post Op Pain Control Plan: multimodal analgesia and IV/PO Opioids PRN  Induction:  IV  Informed Consent: Informed consent signed with the Patient representative and all parties understand the risks and agree with anesthesia plan.  All questions answered.   ASA Score: 3  Day of Surgery Review of History & Physical: H&P Update referred to the surgeon/provider.    Ready For Surgery From Anesthesia Perspective.     .             [1]  Patient Active Problem List  Diagnosis    Spastic diplegia    Gait instability    Upper motor neuron disease    Neurogenic bladder    Hypophonia    Weakness    Spasticity    Spinal ataxia    Spinocerebellar ataxia    Neuropathic pain    Bronchitis with wheezing    Voiding dysfunction    Dysarthria    Chronic constipation    Impaired mobility and ADLs    Upper extremity weakness    Decreased coordination    Impaired transfers    Recurrent major depressive disorder, in partial remission    Anxiety    Oropharyngeal dysphagia    Decreased strength, endurance, and mobility    Requires daily assistance for activities of daily living (ADL) and comfort needs     Impaired instrumental activities of daily living (IADL)    Self-care deficit for feeding, bathing, and toileting    Paraplegia, unspecified    Muscular dystrophy    Epididymo-orchitis    COVID-19    Abnormal urine    ALS (amyotrophic lateral sclerosis)    DNR (do not resuscitate)    Requires continuous supervision for activities of daily living (ADL)    Insomnia secondary to depression with anxiety    Cognitive communication disorder    Hypernatremia    Pneumonia due to COVID-19 virus    Sepsis    Sacral decubitus ulcer    Acute hypoxic respiratory failure    Pneumonia    BMI < 18.5    Acute encephalopathy    Severe protein-calorie malnutrition    Pressure injury of deep tissue of heel

## 2025-07-05 NOTE — ASSESSMENT & PLAN NOTE
Has a wound VAC in place  - Wound care consulted, appreciate recs  - General Surgery consulted for possible debridement, appreciate recs.  No indication for inpatient debridement

## 2025-07-05 NOTE — NURSING
MICU DAILY GOALS     Family/Goals of care/Code Status   Code Status: Full Code    24H Vital Sign Range  Temp:  [96.4 °F (35.8 °C)-99.1 °F (37.3 °C)]   Pulse:  [81-91]   Resp:  [11-27]   BP: ()/(52-82)   SpO2:  [95 %-100 %]      Shift Events (include procedures and significant events)   Pt Received 425mL of Barium by NGT X2 after midnight. Tube feeds and water boluses paused at midnight.    AWAKE RASS: Goal -    Actual -      Restraint necessity: Not necessary   BREATHE SBT: Not intubated    Coordinate A & B, analgesics/sedatives Pain: managed   SAT: Not intubated   Delirium CAM-ICU:     Early(intubated/ Progressive (non-intubated) Mobility MOVE Screen (INTUBATED ONLY): Not intubated    Activity: Activity Management: Rolling - L1   Feeding/Nutrition Diet order: Diet/Nutrition Received: tube feeding, Specialty Diet/Nutrition Received: high calorie, high protein   Thrombus DVT prophylaxis: VTE Core Measure: (SCDs) Sequential compression device initiated/maintained   HOB Elevation Head of Bed (HOB) Positioning: HOB elevated   Ulcer Prophylaxis GI: yes   Glucose control managed Glycemic Management: blood glucose monitored   Skin Skin assessment:     Sacrum intact/not altered? No  Heels intact/not altered? No  Surgical wound? No    CHECK ONE!   (no altered skin or altered skin) and sub boxes:  [] No Altered Skin Integrity Present    []Prevention Measures Documented    [x] Altered Skin Integrity Present or Discovered   [x] LDA already present in EPIC, daily doc completed              [] LDA added if not already in EPIC (describe/stage wound).               [] Wound Image Taken (required on admit,                   transfer/discharge and every Tuesday)    Wound Care Consulted? Yes   Bowel Function No issues   Indwelling Catheter Necessity      Urethral Catheter 06/30/25 0756 Temperature probe 16 Fr.-Reason for Continuing Urinary Catheterization: Urinary retention          De-escalation Antibiotics Yes        VS and  assessment per flow sheet, patient progressing towards goals as tolerated, plan of care reviewed with [unfilled] and family, all concerns addressed, will continue to monitor.

## 2025-07-05 NOTE — ASSESSMENT & PLAN NOTE
SLP consulted for dysphagia. FEES performed which showed residual puree. Recommend NPO.   S/p PEG by IR on 7/5/2025  -- tube feeds, ok for pleasure feeds if alert

## 2025-07-05 NOTE — ASSESSMENT & PLAN NOTE
Likely 2/2 sepsis. Resolving.   - Neuro checks  - Delirium/aspiration/fall precautions   yes no no no no no no no no yes no

## 2025-07-05 NOTE — ASSESSMENT & PLAN NOTE
Nutrition consulted. Most recent weight and BMI monitored-     Measurements:  Wt Readings from Last 1 Encounters:   07/01/25 59.9 kg (132 lb 0.9 oz)   Body mass index is 16.51 kg/m².    Patient has been screened and assessed by RD.    Malnutrition Type:  Context: chronic illness  Level: severe    Malnutrition Characteristic Summary:  Subcutaneous Fat (Malnutrition): severe depletion  Muscle Mass (Malnutrition): severe depletion    Interventions/Recommendations (treatment strategy):     -- started on Tube feeds

## 2025-07-06 PROBLEM — J15.5: Status: ACTIVE | Noted: 2025-06-30

## 2025-07-06 LAB
ABSOLUTE EOSINOPHIL (OHS): 0.02 K/UL
ABSOLUTE MONOCYTE (OHS): 0.39 K/UL (ref 0.3–1)
ABSOLUTE NEUTROPHIL COUNT (OHS): 8.18 K/UL (ref 1.8–7.7)
ALBUMIN SERPL BCP-MCNC: 1.8 G/DL (ref 3.5–5.2)
ALP SERPL-CCNC: 60 UNIT/L (ref 40–150)
ALT SERPL W/O P-5'-P-CCNC: 15 UNIT/L (ref 10–44)
ANION GAP (OHS): 10 MMOL/L (ref 8–16)
AST SERPL-CCNC: 15 UNIT/L (ref 11–45)
BASOPHILS # BLD AUTO: 0.03 K/UL
BASOPHILS NFR BLD AUTO: 0.3 %
BILIRUB SERPL-MCNC: 0.4 MG/DL (ref 0.1–1)
BUN SERPL-MCNC: 20 MG/DL (ref 6–20)
CALCIUM SERPL-MCNC: 8.3 MG/DL (ref 8.7–10.5)
CHLORIDE SERPL-SCNC: 100 MMOL/L (ref 95–110)
CO2 SERPL-SCNC: 26 MMOL/L (ref 23–29)
CREAT SERPL-MCNC: 0.6 MG/DL (ref 0.5–1.4)
ERYTHROCYTE [DISTWIDTH] IN BLOOD BY AUTOMATED COUNT: 16.1 % (ref 11.5–14.5)
GFR SERPLBLD CREATININE-BSD FMLA CKD-EPI: >60 ML/MIN/1.73/M2
GLUCOSE SERPL-MCNC: 59 MG/DL (ref 70–110)
HCT VFR BLD AUTO: 29.6 % (ref 40–54)
HGB BLD-MCNC: 9.4 GM/DL (ref 14–18)
IMM GRANULOCYTES # BLD AUTO: 0.03 K/UL (ref 0–0.04)
IMM GRANULOCYTES NFR BLD AUTO: 0.3 % (ref 0–0.5)
LYMPHOCYTES # BLD AUTO: 1.17 K/UL (ref 1–4.8)
MAGNESIUM SERPL-MCNC: 1.8 MG/DL (ref 1.6–2.6)
MCH RBC QN AUTO: 25.6 PG (ref 27–31)
MCHC RBC AUTO-ENTMCNC: 31.8 G/DL (ref 32–36)
MCV RBC AUTO: 81 FL (ref 82–98)
NUCLEATED RBC (/100WBC) (OHS): 0 /100 WBC
PHOSPHATE SERPL-MCNC: 3.6 MG/DL (ref 2.7–4.5)
PLATELET # BLD AUTO: 198 K/UL (ref 150–450)
PMV BLD AUTO: 11.9 FL (ref 9.2–12.9)
POCT GLUCOSE: 100 MG/DL (ref 70–110)
POCT GLUCOSE: 63 MG/DL (ref 70–110)
POCT GLUCOSE: 71 MG/DL (ref 70–110)
POCT GLUCOSE: 73 MG/DL (ref 70–110)
POCT GLUCOSE: 76 MG/DL (ref 70–110)
POCT GLUCOSE: 77 MG/DL (ref 70–110)
POCT GLUCOSE: 81 MG/DL (ref 70–110)
POTASSIUM SERPL-SCNC: 4.4 MMOL/L (ref 3.5–5.1)
PROT SERPL-MCNC: 6.6 GM/DL (ref 6–8.4)
RBC # BLD AUTO: 3.67 M/UL (ref 4.6–6.2)
RELATIVE EOSINOPHIL (OHS): 0.2 %
RELATIVE LYMPHOCYTE (OHS): 11.9 % (ref 18–48)
RELATIVE MONOCYTE (OHS): 4 % (ref 4–15)
RELATIVE NEUTROPHIL (OHS): 83.3 % (ref 38–73)
SODIUM SERPL-SCNC: 136 MMOL/L (ref 136–145)
WBC # BLD AUTO: 9.82 K/UL (ref 3.9–12.7)

## 2025-07-06 PROCEDURE — 63600175 PHARM REV CODE 636 W HCPCS: Performed by: INTERNAL MEDICINE

## 2025-07-06 PROCEDURE — 84100 ASSAY OF PHOSPHORUS: CPT | Performed by: STUDENT IN AN ORGANIZED HEALTH CARE EDUCATION/TRAINING PROGRAM

## 2025-07-06 PROCEDURE — 83735 ASSAY OF MAGNESIUM: CPT | Performed by: STUDENT IN AN ORGANIZED HEALTH CARE EDUCATION/TRAINING PROGRAM

## 2025-07-06 PROCEDURE — 80053 COMPREHEN METABOLIC PANEL: CPT | Performed by: STUDENT IN AN ORGANIZED HEALTH CARE EDUCATION/TRAINING PROGRAM

## 2025-07-06 PROCEDURE — 25000003 PHARM REV CODE 250

## 2025-07-06 PROCEDURE — 94761 N-INVAS EAR/PLS OXIMETRY MLT: CPT

## 2025-07-06 PROCEDURE — 85025 COMPLETE CBC W/AUTO DIFF WBC: CPT | Performed by: STUDENT IN AN ORGANIZED HEALTH CARE EDUCATION/TRAINING PROGRAM

## 2025-07-06 PROCEDURE — 25000003 PHARM REV CODE 250: Performed by: STUDENT IN AN ORGANIZED HEALTH CARE EDUCATION/TRAINING PROGRAM

## 2025-07-06 PROCEDURE — 99233 SBSQ HOSP IP/OBS HIGH 50: CPT | Mod: GC,,, | Performed by: INTERNAL MEDICINE

## 2025-07-06 PROCEDURE — 20000000 HC ICU ROOM

## 2025-07-06 PROCEDURE — 99900035 HC TECH TIME PER 15 MIN (STAT)

## 2025-07-06 PROCEDURE — 63600175 PHARM REV CODE 636 W HCPCS

## 2025-07-06 RX ORDER — ENOXAPARIN SODIUM 100 MG/ML
40 INJECTION SUBCUTANEOUS EVERY 24 HOURS
Status: DISCONTINUED | OUTPATIENT
Start: 2025-07-06 | End: 2025-07-06

## 2025-07-06 RX ORDER — ENOXAPARIN SODIUM 100 MG/ML
30 INJECTION SUBCUTANEOUS EVERY 24 HOURS
Status: DISCONTINUED | OUTPATIENT
Start: 2025-07-06 | End: 2025-07-15

## 2025-07-06 RX ADMIN — DANTROLENE SODIUM 50 MG: 25 CAPSULE ORAL at 09:07

## 2025-07-06 RX ADMIN — ENOXAPARIN SODIUM 30 MG: 30 INJECTION SUBCUTANEOUS at 06:07

## 2025-07-06 RX ADMIN — OXYCODONE HYDROCHLORIDE 5 MG: 5 TABLET ORAL at 01:07

## 2025-07-06 RX ADMIN — PIPERACILLIN SODIUM AND TAZOBACTAM SODIUM 4.5 G: 4; .5 INJECTION, POWDER, LYOPHILIZED, FOR SOLUTION INTRAVENOUS at 12:07

## 2025-07-06 RX ADMIN — Medication 16 G: at 12:07

## 2025-07-06 RX ADMIN — SENNOSIDES AND DOCUSATE SODIUM 1 TABLET: 50; 8.6 TABLET ORAL at 09:07

## 2025-07-06 RX ADMIN — BACLOFEN 20 MG: 10 TABLET ORAL at 09:07

## 2025-07-06 RX ADMIN — DONEPEZIL HYDROCHLORIDE 5 MG: 5 TABLET, FILM COATED ORAL at 09:07

## 2025-07-06 RX ADMIN — MIRTAZAPINE 15 MG: 15 TABLET, FILM COATED ORAL at 09:07

## 2025-07-06 RX ADMIN — POLYETHYLENE GLYCOL 3350 17 G: 17 POWDER, FOR SOLUTION ORAL at 08:07

## 2025-07-06 RX ADMIN — MUPIROCIN: 20 OINTMENT TOPICAL at 09:07

## 2025-07-06 RX ADMIN — QUETIAPINE FUMARATE 50 MG: 25 TABLET ORAL at 09:07

## 2025-07-06 RX ADMIN — POLYETHYLENE GLYCOL 3350 17 G: 17 POWDER, FOR SOLUTION ORAL at 09:07

## 2025-07-06 RX ADMIN — SODIUM CHLORIDE 1 G: 9 INJECTION, SOLUTION INTRAVENOUS at 03:07

## 2025-07-06 RX ADMIN — PIPERACILLIN SODIUM AND TAZOBACTAM SODIUM 4.5 G: 4; .5 INJECTION, POWDER, LYOPHILIZED, FOR SOLUTION INTRAVENOUS at 10:07

## 2025-07-06 RX ADMIN — CETIRIZINE HYDROCHLORIDE 10 MG: 10 TABLET, FILM COATED ORAL at 09:07

## 2025-07-06 RX ADMIN — DANTROLENE SODIUM 50 MG: 25 CAPSULE ORAL at 02:07

## 2025-07-06 RX ADMIN — BACLOFEN 20 MG: 10 TABLET ORAL at 02:07

## 2025-07-06 NOTE — PROGRESS NOTES
This is an attestation of a separate note by the ICU resident/fellow. As the teaching physician, I saw the patient with the resident/fellow and agree with the resident/fellow's findings and plan. In addition to the resident/fellow's documentation, I add the following:     Lisa Moreno is a 45 yo man with neuromuscular disorder onset 2006 which has progress to loss of ambulation, spastic quadriparesis, abnormal swallow (requires thickeners) with intact cough, no evidence of hypercapnia, known decubitus, admitted with altered mental status, procalcitonin 8.4.    # sepsis - markedly elevated procalcitonin, decubitus ulcer, multifocal airspace dz - started on vancomycin, cefepime, changed to ceftriaxone/azithromycin for CAP coverage - completed 5 days, extended spectrum on 7/4 for new respiratory purulence - culture sent - ESBL E Coli in sample (+ WBCs, also >10 epithelial cells). With hypothermia and encephalopathy today, started ertapenem    # weak cough - when secretions are present, nasotracheal suction, abdominal assist for cough and mechanical cough assist may be helpful  # hypoxemia - resolved  # neuromuscular disease - progressive over 2 decades with weakness and spasticity and bulbar involvement  # spasticity - has been a significant problem - takes baclofen, dantrolene - continue  # nutrition - s/p IR gastrostomy 7/5/25, with tamara-key upgrade in 8 weeks. Indication - weight loss, access for supplemental nutrition, medications. Begin enteral nutrition, remove nasogastric tube  # sacral decubitus wound - wound vac awaiting sponge change     Richard Webb MD  [facility] Attending  Cell: [phone]

## 2025-07-06 NOTE — PROGRESS NOTES
[facility] - Medical ICU  Critical Care Medicine  Progress Note    Patient Name: Lisa Moreno  MRN: [id number]  Admission Date: 6/29/2025  Hospital Length of Stay: 7 days  Code Status: Full Code  Attending Provider: Richard Webb MD  Primary Care Provider: John Nixon II, MD   Principal Problem: Pneumonia    Subjective:     HPI:  Mr. Moreno is a 44yoM with PMHx significant for ALS, neuromuscular dysfunction of the bladder, sacral ulcers, and spinal cerebellar ataxia who was admitted recently admitted (5/9/2025) for severe dehydration, electrolyte disturbances, and covid who presented to the ED with EMS and c/o AMS and hypoxia. Nonverbal at baseline but apparently is usually more alert; has been less responsive and interactive x1 day. Upon EMS arrival, was obtunded and sating 74%. They gave him solumedrol and duonebs en route which improved his oxygen status to >90%. Mother at bedside reports increased difficulty with swallowing and decreased PO intake.       While in the ED, labs and imaging significant for Na 149, K 5.4, Cl 111, CO2 21, glucose 149, BUN 38, creat 1.0, albumin 2.2, ALP 80, AST/ALT 34/23, anion gap 17, procal 8.42, BNP <10, WBC 7.11, H/H 12/40.5, plts 315, covid/flu negative, .6, VBG 7.349/47.1/44.5/23.9, istat LA 4.9->7.4->4.2 (7.4 likely error?). Chest xray with patchy opacities bilaterally, predominant within the bibasilar regions but also within the left lung apex and the left mid-lung; compatible with multifocal pneumonia. Started on BSA by the ED; vanc and cefepime. [facility] consulted for sepsis. During first examination the patient was stable with MAPs >65 and Lactic acid down trended to 2.6. Around 6 am of 6/30 [place] was called hypotension with MAPs in the 50. Pt was started on levophed and was transferred to MICU.     Hospital/ICU Course:  Admitted to the MICU for septic shock secondary to multifocal pneumonia. Initially requiring pressors but weaned. Started on broad  spectrum antibiotics (Zosyn). Afebrile. Zosyn de-escalated to Ceftriaxone and Azithromycin for CAP coverage. SLP consulted for dysphagia. FEES performed which showed residual puree. Recommend NPO. PEG placed with IR on 7/5. Increased secretions noted on exam s/p completion of antibiotic course of CAP coverage. Restarted patient on Vancomycin and Zosyn. Respiratory culture with many gram negative rods. Vancomycin discontinued. Continued on Zosyn. Respiratory cx positive for E.Coli ESBL. Started on Ertapenem, Zosyn discontinued. ID consulted for additional recommendations. SLP re-consulted for swallowing evaluation in the setting of new PEG tube.     Interval History/Significant Events: NAEO. Resp Cx positive for Ecoli with ESBL. Zosyn changed to Ertapenem. ID consulted, appreciate recs. Re-engaged SLP for swallow evaluation.     Review of Systems  Objective:     Vital Signs (Most Recent):  Temp: 98.1 °F (36.7 °C) (07/06/25 1400)  Pulse: 81 (07/06/25 1500)  Resp: 10 (07/06/25 1400)  BP: 103/68 (07/06/25 1400)  SpO2: 100 % (07/06/25 1400) Vital Signs (24h Range):  Temp:  [95.5 °F (35.3 °C)-100.2 °F (37.9 °C)] 98.1 °F (36.7 °C)  Pulse:  [] 81  Resp:  [9-24] 10  SpO2:  [98 %-100 %] 100 %  BP: ()/(54-80) 103/68   Weight: 59.9 kg (132 lb 0.9 oz)  Body mass index is 16.51 kg/m².      Intake/Output Summary (Last 24 hours) at 7/6/2025 1554  Last data filed at 7/6/2025 1400  Gross per 24 hour   Intake 349.16 ml   Output 1610 ml   Net -1260.84 ml          Physical Exam  Constitutional:       General: He is not in acute distress.     Appearance: He is underweight.      Comments: NGT   HENT:      Head: Normocephalic.   Eyes:      General: No scleral icterus.        Right eye: No discharge.         Left eye: No discharge.      Extraocular Movements: Extraocular movements intact.   Cardiovascular:      Rate and Rhythm: Normal rate and regular rhythm.      Pulses: Normal pulses.      Heart sounds: No murmur heard.     No  friction rub. No gallop.   Pulmonary:      Effort: Pulmonary effort is normal. No respiratory distress.   Abdominal:      General: There is no distension.      Comments: PEG in place. C/D/I   Musculoskeletal:      Right lower leg: No edema.      Left lower leg: No edema.   Skin:     General: Skin is warm.      Coloration: Skin is not jaundiced.   Neurological:      Mental Status: He is alert.   Psychiatric:         Mood and Affect: Mood normal.         Behavior: Behavior normal.            Vents:     Lines/Drains/Airways       Drain  Duration                  NG/OG Tube 06/30/25 1200 [place] sump 14 Fr. Right nostril 6 days         Urethral Catheter 06/30/25 0756 Temperature probe 16 Fr. 6 days         Gastrostomy/Enterostomy 07/05/25 1136 Gastrostomy tube w/ balloon LUQ feeding 1 day              Peripheral Intravenous Line  Duration             Peripheral IV Single Lumen 06/30/25 1130 18 G 1 3/4 in Anterior;Right Upper Arm 6 days    Peripheral IV 07/05/25 0336 18 G 1 1/4 in Anterior;Left Forearm 1 day                  Significant Labs:    CBC/Anemia Profile:  Recent Labs   Lab 07/05/25  0346 07/06/25  0435   WBC 7.10 9.82   HGB 10.3* 9.4*   HCT 34.2* 29.6*    198   MCV 84 81*   RDW 16.0* 16.1*        Chemistries:  Recent Labs   Lab 07/05/25  0346 07/06/25  0435    136   K 4.5 4.4    100   CO2 26 26   BUN 19 20   CREATININE 0.5 0.6   CALCIUM 8.0* 8.3*   ALBUMIN 1.8* 1.8*   PROT 6.7 6.6   BILITOT 0.2 0.4   ALKPHOS 59 60   ALT 13 15   AST 16 15   MG 1.9 1.8   PHOS 3.1 3.6       All pertinent labs within the past 24 hours have been reviewed.    Significant Imaging:  I have reviewed all pertinent imaging results/findings within the past 24 hours.    ABG  Recent Labs   Lab 06/30/25  0848   PH 7.431   PO2 129*   PCO2 37.4   HCO3 25.0     Assessment/Plan:     Neuro  Acute encephalopathy  Likely 2/2 sepsis. Resolving.     - see plan for sepsis    ALS (amyotrophic lateral sclerosis)  Hx of ALS w/diffuse  "spacticity/weakness + dysphagia     - continue baclofen and dantrolene    Spinocerebellar ataxia  - Continue baclofen and dantrolene    Spastic diplegia  -continue home baclofen    Psychiatric  Insomnia secondary to depression with anxiety  Continue quetiapine and mirtazapine       Pulmonary  * Pneumonia  - see plan for sepsis    Renal/  Hypernatremia  . Hypernatremia is likely due to Dehydration.     Plan  - Aim to correct the sodium by 8-10mEq in 24 hours.   - Plan to correct their hypernatremia with Select IV fluids: LR at a rate of 100 ml/hr.   - Will plan to trend the patient's sodium: Daily       ID  Septic shock  This patient does have evidence of infective focus  My overall impression is sepsis without organ dysfunction.  Source: Respiratory Infection  Antibiotics given-   Antibiotics (72h ago, onward)      Start     Stop Route Frequency Ordered    07/06/25 1545  ertapenem (INVANZ) 1 g in 0.9% NaCl 100 mL IVPB (MB+)         -- IV Every 24 hours (non-standard times) 07/06/25 1436    07/04/25 1015  mupirocin 2 % ointment         07/09/25 0859 Nasl 2 times daily 07/04/25 0900          Latest lactate reviewed-  No results for input(s): "LACTATE", "POCLAC" in the last 72 hours.    Will Not start   Source control achieved by:     Mr. Moreno is a 44yoM with PMHx significant for ALS, neuromuscular dysfunction of the bladder, sacral ulcers, and spinal cerebellar ataxia who was admitted recently admitted (5/9/2025) for severe dehydration, electrolyte disturbances, and covid who presented to the ED with EMS and c/o AMS and hypoxia. Nonverbal at baseline but apparently is usually more alert; has been less responsive and interactive x1 day. Upon EMS arrival, was obtunded and sating 74%. They gave him solumedrol and duonebs en route which improved his oxygen status to >90%. Mother at bedside reports increased difficulty with swallowing and decreased PO intake.       While in the ED, labs and imaging significant " "for Na 149, K 5.4, Cl 111, CO2 21, glucose 149, BUN 38, creat 1.0, albumin 2.2, ALP 80, AST/ALT 34/23, anion gap 17, procal 8.42, BNP <10, WBC 7.11, H/H 12/40.5, plts 315, covid/flu negative, .6, VBG 7.349/47.1/44.5/23.9, istat LA 4.9->7.4->4.2 (7.4 likely error?). Chest xray with patchy opacities bilaterally, predominant within the bibasilar regions but also within the left lung apex and the left mid-lung; compatible with multifocal pneumonia. Started on BSA by the ED; vanc and cefepime.     - Completed CAP coverage 7/3. Increased secretions on 7/4. Started on BS antibiotics.    - Resp Cx (7/5) positive for Ecoli ESBL. Zosyn switched to Ertapenem   - ID consulted, appreciate recs  - Bcx NGTD.   - Recommend deep suction per RT  - Pulmonary hygiene   - Maintain spo2 >90%; wean fio2 as tolerated  - Continuous telemetry and spO2 monitoring  - Neuro checks  - Delirium/aspiration/fall precautions  - Recommend Wound Care for sacral and lower extremity wounds    Oncology  Anemia  Admit with hemoglobin 7.8 Baseline 10      Lab Results   Component Value Date    FERRITIN 50 09/13/2023     No results found for: "FOLATE"  Lab Results   Component Value Date    [id number] 601 08/25/2020     No results found for: "RETICCTPCT"      Plan:   -   Lab Results   Component Value Date    HGB 9.4 (L) 07/06/2025     - Daily CBC   - Transfuse hgb <7      Endocrine  Severe protein-calorie malnutrition  Nutrition consulted. Most recent weight and BMI monitored-     Measurements:  Wt Readings from Last 1 Encounters:   07/01/25 59.9 kg (132 lb 0.9 oz)   Body mass index is 16.51 kg/m².    Patient has been screened and assessed by RD.    Malnutrition Type:  Context: chronic illness  Level: severe    Malnutrition Characteristic Summary:  Subcutaneous Fat (Malnutrition): severe depletion  Muscle Mass (Malnutrition): severe depletion    Interventions/Recommendations (treatment strategy):         GI  Oropharyngeal dysphagia  Secondary to " ALS/unknown neuromuscular disorder.     - SLP evaluated, failed swallow eval. Recommend NPO and PEG tube evaluation.   - IR consulted, appreciate recs   - Plan for PEG tube placement with IR on 7/5.     Orthopedic  Pressure injury of deep tissue of heel  L Heel with black eschar. R Heel pressure ulcer with scant serosanguineous drainage. Photos uploaded into chart.     - Wound care following, appreciate recs  - Podiatry consulted, appreciate recs    Sacral decubitus ulcer  - Wound care consulted, appreciate recs  - General Surgery consulted for possible debridement, appreciate recs   - No indication for inpatient debridement       Critical Care Daily Checklist:    A: Awake: RASS Goal/Actual Goal:    Actual:     B: Spontaneous Breathing Trial Performed?     C: SAT & SBT Coordinated?  Coordinated                      D: Delirium: CAM-ICU     E: Early Mobility Performed? Yes   F: Feeding Goal: Goals: Meet % EEN/EPN by next RD follow-up  Status: Nutrition Goal Status: progressing towards goal   Current Diet Order   Procedures    Diet NPO      AS: Analgesia/Sedation PRNs   T: Thromboembolic Prophylaxis Lovenox   H: HOB > 300 Yes   U: Stress Ulcer Prophylaxis (if needed) N/a   G: Glucose Control Controlled   B: Bowel Function Unmeasured Stool Occurrence: 1   I: Indwelling Catheter (Lines & Zeng) Necessity PIV, UC, PEG   D: De-escalation of Antimicrobials/Pharmacotherapies Ertapenem    Plan for the day/ETD SLP eval    Code Status:  Family/Goals of Care: Full Code         Critical secondary to Patient has a condition that poses threat to life and bodily function: ALS      Critical care was time spent personally by me on the following activities: development of treatment plan with patient or surrogate and bedside caregivers, discussions with consultants, evaluation of patient's response to treatment, examination of patient, ordering and performing treatments and interventions, ordering and review of laboratory  studies, ordering and review of radiographic studies, pulse oximetry, re-evaluation of patient's condition. This critical care time did not overlap with that of any other provider or involve time for any procedures.     Odette Tran MD  Critical Care Medicine  [facility]

## 2025-07-06 NOTE — ASSESSMENT & PLAN NOTE
"This patient does have evidence of infective focus  My overall impression is sepsis without organ dysfunction.  Source: Respiratory Infection  Antibiotics given-   Antibiotics (72h ago, onward)      Start     Stop Route Frequency Ordered    07/06/25 1545  ertapenem (INVANZ) 1 g in 0.9% NaCl 100 mL IVPB (MB+)         -- IV Every 24 hours (non-standard times) 07/06/25 1436    07/04/25 1015  mupirocin 2 % ointment         07/09/25 0859 Nasl 2 times daily 07/04/25 0900          Latest lactate reviewed-  No results for input(s): "LACTATE", "POCLAC" in the last 72 hours.    Will Not start   Source control achieved by:     Mr. Moreno is a 44yoM with PMHx significant for ALS, neuromuscular dysfunction of the bladder, sacral ulcers, and spinal cerebellar ataxia who was admitted recently admitted (5/9/2025) for severe dehydration, electrolyte disturbances, and covid who presented to the ED with EMS and c/o AMS and hypoxia. Nonverbal at baseline but apparently is usually more alert; has been less responsive and interactive x1 day. Upon EMS arrival, was obtunded and sating 74%. They gave him solumedrol and duonebs en route which improved his oxygen status to >90%. Mother at bedside reports increased difficulty with swallowing and decreased PO intake.       While in the ED, labs and imaging significant for Na 149, K 5.4, Cl 111, CO2 21, glucose 149, BUN 38, creat 1.0, albumin 2.2, ALP 80, AST/ALT 34/23, anion gap 17, procal 8.42, BNP <10, WBC 7.11, H/H 12/40.5, plts 315, covid/flu negative, .6, VBG 7.349/47.1/44.5/23.9, istat LA 4.9->7.4->4.2 (7.4 likely error?). Chest xray with patchy opacities bilaterally, predominant within the bibasilar regions but also within the left lung apex and the left mid-lung; compatible with multifocal pneumonia. Started on BSA by the ED; vanc and cefepime.     - Completed CAP coverage 7/3. Increased secretions on 7/4. Started on BS antibiotics.    - Resp Cx (7/5) positive for Ecoli ESBL. " Zosyn switched to Ertapenem   - ID consulted, appreciate recs  - Bcx NGTD.   - Recommend deep suction per RT  - Pulmonary hygiene   - Maintain spo2 >90%; wean fio2 as tolerated  - Continuous telemetry and spO2 monitoring  - Neuro checks  - Delirium/aspiration/fall precautions  - Recommend Wound Care for sacral and lower extremity wounds

## 2025-07-06 NOTE — SUBJECTIVE & OBJECTIVE
Interval History/Significant Events: NAEO. Resp Cx positive for Ecoli with ESBL. Zosyn changed to Ertapenem. ID consulted, appreciate recs. Re-engaged SLP for swallow evaluation.     Review of Systems  Objective:     Vital Signs (Most Recent):  Temp: 98.1 °F (36.7 °C) (07/06/25 1400)  Pulse: 81 (07/06/25 1500)  Resp: 10 (07/06/25 1400)  BP: 103/68 (07/06/25 1400)  SpO2: 100 % (07/06/25 1400) Vital Signs (24h Range):  Temp:  [95.5 °F (35.3 °C)-100.2 °F (37.9 °C)] 98.1 °F (36.7 °C)  Pulse:  [] 81  Resp:  [9-24] 10  SpO2:  [98 %-100 %] 100 %  BP: ()/(54-80) 103/68   Weight: 59.9 kg (132 lb 0.9 oz)  Body mass index is 16.51 kg/m².      Intake/Output Summary (Last 24 hours) at 7/6/2025 1554  Last data filed at 7/6/2025 1400  Gross per 24 hour   Intake 349.16 ml   Output 1610 ml   Net -1260.84 ml          Physical Exam  Constitutional:       General: He is not in acute distress.     Appearance: He is underweight.      Comments: NGT   HENT:      Head: Normocephalic.   Eyes:      General: No scleral icterus.        Right eye: No discharge.         Left eye: No discharge.      Extraocular Movements: Extraocular movements intact.   Cardiovascular:      Rate and Rhythm: Normal rate and regular rhythm.      Pulses: Normal pulses.      Heart sounds: No murmur heard.     No friction rub. No gallop.   Pulmonary:      Effort: Pulmonary effort is normal. No respiratory distress.   Abdominal:      General: There is no distension.      Comments: PEG in place. C/D/I   Musculoskeletal:      Right lower leg: No edema.      Left lower leg: No edema.   Skin:     General: Skin is warm.      Coloration: Skin is not jaundiced.   Neurological:      Mental Status: He is alert.   Psychiatric:         Mood and Affect: Mood normal.         Behavior: Behavior normal.            Vents:     Lines/Drains/Airways       Drain  Duration                  NG/OG Tube 06/30/25 1200 [place] sump 14 Fr. Right nostril 6 days         Urethral Catheter  06/30/25 0756 Temperature probe 16 Fr. 6 days         Gastrostomy/Enterostomy 07/05/25 1136 Gastrostomy tube w/ balloon LUQ feeding 1 day              Peripheral Intravenous Line  Duration             Peripheral IV Single Lumen 06/30/25 1130 18 G 1 3/4 in Anterior;Right Upper Arm 6 days    Peripheral IV 07/05/25 0336 18 G 1 1/4 in Anterior;Left Forearm 1 day                  Significant Labs:    CBC/Anemia Profile:  Recent Labs   Lab 07/05/25  0346 07/06/25  0435   WBC 7.10 9.82   HGB 10.3* 9.4*   HCT 34.2* 29.6*    198   MCV 84 81*   RDW 16.0* 16.1*        Chemistries:  Recent Labs   Lab 07/05/25  0346 07/06/25  0435    136   K 4.5 4.4    100   CO2 26 26   BUN 19 20   CREATININE 0.5 0.6   CALCIUM 8.0* 8.3*   ALBUMIN 1.8* 1.8*   PROT 6.7 6.6   BILITOT 0.2 0.4   ALKPHOS 59 60   ALT 13 15   AST 16 15   MG 1.9 1.8   PHOS 3.1 3.6       All pertinent labs within the past 24 hours have been reviewed.    Significant Imaging:  I have reviewed all pertinent imaging results/findings within the past 24 hours.

## 2025-07-06 NOTE — EICU
[facility] Night Rounds Checklist  24H Vital Sign Range:  Temp:  [95.2 °F (35.1 °C)-98.8 °F (37.1 °C)]   Pulse:  []   Resp:  [11-27]   BP: ()/(52-80)   SpO2:  [98 %-100 %]     Video rounds and LDA reconciliation

## 2025-07-06 NOTE — EICU
Virtual ICU Quality Rounds    Admit Date: 6/29/2025  Hospital Day: 7    ICU Day: 5d 23h    24H Vital Sign Range:  Temp:  [95.2 °F (35.1 °C)-100.2 °F (37.9 °C)]   Pulse:  []   Resp:  [10-21]   BP: ()/(54-80)   SpO2:  [98 %-100 %]     VICU Surveillance Screening    Daily review of  line necessity(optional): Urinary catheter in place            Zeng Indications : Urinary retention and quadriparesis    LDA reconciliation : Yes

## 2025-07-06 NOTE — ASSESSMENT & PLAN NOTE
"Admit with hemoglobin 7.8 Baseline 10      Lab Results   Component Value Date    FERRITIN 50 09/13/2023     No results found for: "FOLATE"  Lab Results   Component Value Date    [id number] 601 08/25/2020     No results found for: "RETICCTPCT"      Plan:   -   Lab Results   Component Value Date    HGB 9.4 (L) 07/06/2025     - Daily CBC   - Transfuse hgb <7    "

## 2025-07-07 PROBLEM — L89.153 SACRAL DECUBITUS ULCER, STAGE III: Status: ACTIVE | Noted: 2025-06-30

## 2025-07-07 LAB
ABSOLUTE EOSINOPHIL (OHS): 0.04 K/UL
ABSOLUTE MONOCYTE (OHS): 0.37 K/UL (ref 0.3–1)
ABSOLUTE NEUTROPHIL COUNT (OHS): 6.41 K/UL (ref 1.8–7.7)
ALBUMIN SERPL BCP-MCNC: 1.8 G/DL (ref 3.5–5.2)
ALP SERPL-CCNC: 56 UNIT/L (ref 40–150)
ALT SERPL W/O P-5'-P-CCNC: 13 UNIT/L (ref 10–44)
ANION GAP (OHS): 7 MMOL/L (ref 8–16)
AST SERPL-CCNC: 15 UNIT/L (ref 11–45)
BACTERIA SPEC CULT: ABNORMAL
BACTERIA SPEC CULT: ABNORMAL
BASOPHILS # BLD AUTO: 0.01 K/UL
BASOPHILS NFR BLD AUTO: 0.1 %
BILIRUB SERPL-MCNC: 0.2 MG/DL (ref 0.1–1)
BUN SERPL-MCNC: 19 MG/DL (ref 6–20)
CALCIUM SERPL-MCNC: 8.4 MG/DL (ref 8.7–10.5)
CHLORIDE SERPL-SCNC: 101 MMOL/L (ref 95–110)
CO2 SERPL-SCNC: 28 MMOL/L (ref 23–29)
CREAT SERPL-MCNC: 0.6 MG/DL (ref 0.5–1.4)
ERYTHROCYTE [DISTWIDTH] IN BLOOD BY AUTOMATED COUNT: 16.3 % (ref 11.5–14.5)
GFR SERPLBLD CREATININE-BSD FMLA CKD-EPI: >60 ML/MIN/1.73/M2
GLUCOSE SERPL-MCNC: 57 MG/DL (ref 70–110)
GRAM STN SPEC: ABNORMAL
HCT VFR BLD AUTO: 28.3 % (ref 40–54)
HGB BLD-MCNC: 8.9 GM/DL (ref 14–18)
IMM GRANULOCYTES # BLD AUTO: 0.04 K/UL (ref 0–0.04)
IMM GRANULOCYTES NFR BLD AUTO: 0.5 % (ref 0–0.5)
LYMPHOCYTES # BLD AUTO: 1.62 K/UL (ref 1–4.8)
MAGNESIUM SERPL-MCNC: 2 MG/DL (ref 1.6–2.6)
MCH RBC QN AUTO: 25.9 PG (ref 27–31)
MCHC RBC AUTO-ENTMCNC: 31.4 G/DL (ref 32–36)
MCV RBC AUTO: 83 FL (ref 82–98)
NUCLEATED RBC (/100WBC) (OHS): 0 /100 WBC
PHOSPHATE SERPL-MCNC: 3.8 MG/DL (ref 2.7–4.5)
PLATELET # BLD AUTO: 211 K/UL (ref 150–450)
PMV BLD AUTO: 11.5 FL (ref 9.2–12.9)
POCT GLUCOSE: 104 MG/DL (ref 70–110)
POCT GLUCOSE: 107 MG/DL (ref 70–110)
POCT GLUCOSE: 56 MG/DL (ref 70–110)
POCT GLUCOSE: 74 MG/DL (ref 70–110)
POCT GLUCOSE: 75 MG/DL (ref 70–110)
POTASSIUM SERPL-SCNC: 4.5 MMOL/L (ref 3.5–5.1)
PROT SERPL-MCNC: 6.4 GM/DL (ref 6–8.4)
RBC # BLD AUTO: 3.43 M/UL (ref 4.6–6.2)
RELATIVE EOSINOPHIL (OHS): 0.5 %
RELATIVE LYMPHOCYTE (OHS): 19.1 % (ref 18–48)
RELATIVE MONOCYTE (OHS): 4.4 % (ref 4–15)
RELATIVE NEUTROPHIL (OHS): 75.4 % (ref 38–73)
SODIUM SERPL-SCNC: 136 MMOL/L (ref 136–145)
WBC # BLD AUTO: 8.49 K/UL (ref 3.9–12.7)

## 2025-07-07 PROCEDURE — 63600175 PHARM REV CODE 636 W HCPCS: Performed by: INTERNAL MEDICINE

## 2025-07-07 PROCEDURE — 25000003 PHARM REV CODE 250: Performed by: STUDENT IN AN ORGANIZED HEALTH CARE EDUCATION/TRAINING PROGRAM

## 2025-07-07 PROCEDURE — 99223 1ST HOSP IP/OBS HIGH 75: CPT | Mod: ,,, | Performed by: INTERNAL MEDICINE

## 2025-07-07 PROCEDURE — 99900035 HC TECH TIME PER 15 MIN (STAT)

## 2025-07-07 PROCEDURE — 92526 ORAL FUNCTION THERAPY: CPT

## 2025-07-07 PROCEDURE — 20000000 HC ICU ROOM

## 2025-07-07 PROCEDURE — 84100 ASSAY OF PHOSPHORUS: CPT | Performed by: STUDENT IN AN ORGANIZED HEALTH CARE EDUCATION/TRAINING PROGRAM

## 2025-07-07 PROCEDURE — 83735 ASSAY OF MAGNESIUM: CPT | Performed by: STUDENT IN AN ORGANIZED HEALTH CARE EDUCATION/TRAINING PROGRAM

## 2025-07-07 PROCEDURE — 99291 CRITICAL CARE FIRST HOUR: CPT | Mod: GC,,, | Performed by: STUDENT IN AN ORGANIZED HEALTH CARE EDUCATION/TRAINING PROGRAM

## 2025-07-07 PROCEDURE — 80053 COMPREHEN METABOLIC PANEL: CPT | Performed by: STUDENT IN AN ORGANIZED HEALTH CARE EDUCATION/TRAINING PROGRAM

## 2025-07-07 PROCEDURE — 25000003 PHARM REV CODE 250

## 2025-07-07 PROCEDURE — 97535 SELF CARE MNGMENT TRAINING: CPT

## 2025-07-07 PROCEDURE — 94761 N-INVAS EAR/PLS OXIMETRY MLT: CPT

## 2025-07-07 PROCEDURE — 63600175 PHARM REV CODE 636 W HCPCS

## 2025-07-07 PROCEDURE — 85025 COMPLETE CBC W/AUTO DIFF WBC: CPT | Performed by: STUDENT IN AN ORGANIZED HEALTH CARE EDUCATION/TRAINING PROGRAM

## 2025-07-07 RX ADMIN — SENNOSIDES AND DOCUSATE SODIUM 1 TABLET: 50; 8.6 TABLET ORAL at 09:07

## 2025-07-07 RX ADMIN — MUPIROCIN: 20 OINTMENT TOPICAL at 09:07

## 2025-07-07 RX ADMIN — QUETIAPINE FUMARATE 50 MG: 25 TABLET ORAL at 09:07

## 2025-07-07 RX ADMIN — DANTROLENE SODIUM 50 MG: 25 CAPSULE ORAL at 09:07

## 2025-07-07 RX ADMIN — POLYETHYLENE GLYCOL 3350 17 G: 17 POWDER, FOR SOLUTION ORAL at 09:07

## 2025-07-07 RX ADMIN — CETIRIZINE HYDROCHLORIDE 10 MG: 10 TABLET, FILM COATED ORAL at 09:07

## 2025-07-07 RX ADMIN — BACLOFEN 20 MG: 10 TABLET ORAL at 09:07

## 2025-07-07 RX ADMIN — DANTROLENE SODIUM 50 MG: 25 CAPSULE ORAL at 03:07

## 2025-07-07 RX ADMIN — OXYCODONE HYDROCHLORIDE 5 MG: 5 TABLET ORAL at 05:07

## 2025-07-07 RX ADMIN — ENOXAPARIN SODIUM 30 MG: 30 INJECTION SUBCUTANEOUS at 05:07

## 2025-07-07 RX ADMIN — BACLOFEN 20 MG: 10 TABLET ORAL at 03:07

## 2025-07-07 RX ADMIN — MIRTAZAPINE 15 MG: 15 TABLET, FILM COATED ORAL at 09:07

## 2025-07-07 RX ADMIN — DONEPEZIL HYDROCHLORIDE 5 MG: 5 TABLET, FILM COATED ORAL at 09:07

## 2025-07-07 RX ADMIN — SODIUM CHLORIDE 1 G: 9 INJECTION, SOLUTION INTRAVENOUS at 03:07

## 2025-07-07 RX ADMIN — Medication 16 G: at 04:07

## 2025-07-07 NOTE — CONSULTS
[facility] - Medical ICU  Adult Nutrition  Consult Note    SUMMARY     Recommendations  --Continuous TF recommendation: Isosource 1.5 @ 55 mL/hr to provide 1320 mL total volume, 1980 kcal, 232 g CHO, 90 g protein, 20 g fiber, 1008 mL free water, 132% DRI         --160 mL flush q4 hrs to provide additional 960 mL free water (Total 1968 mL)    --Nursing: please continue to document rate and formula on flowsheet    --RD to monitor weight, rate, tolerance    Goals: Meet % EEN/EPN by next RD follow-up  Nutrition Goal Status: new  Communication of RD Recs:  (POC)    Nutrition Discharge Planning     Nutrition Discharge Planning: Enteral nutrition (comments)    Assessment and Plan    Endocrine  Severe protein-calorie malnutrition  Malnutrition Type:  Context: chronic illness  Level: severe    Related to (etiology):   Metabolic demand of disease and treatment    Signs and Symptoms (as evidenced by):   Muscle/fat wasting     Malnutrition Characteristic Summary:  Subcutaneous Fat (Malnutrition): severe depletion  Muscle Mass (Malnutrition): severe depletion      Interventions/Recommendations (treatment strategy):  Collaboration of nutrition care with other providers    Nutrition Diagnosis Status:   Continues         Malnutrition Assessment  Malnutrition Context: chronic illness  Malnutrition Level: severe  Skin (Micronutrient): none  Nails (Micronutrient): none  Hair/Scalp (Micronutrient): none  Eyes (Micronutrient): none  Extraoral (Micronutrient): none  Gums (Micronutrient): none  Lips/Mucous Membranes (Micronutrient): none  Teeth (Micronutrient): none  Tongue (Micronutrient): none  Neck/Chest (Micronutrient): bony prominence  Musculoskeletal/Lower Extremities: none   Micronutrient Evaluation Summary: suspected deficiency   Subcutaneous Fat (Malnutrition): severe depletion  Muscle Mass (Malnutrition): severe depletion   Orbital Region (Subcutaneous Fat Loss): severe depletion  Upper Arm Region (Subcutaneous Fat Loss):  "severe depletion   [place] Region (Muscle Loss): severe depletion  Clavicle Bone Region (Muscle Loss): severe depletion  Clavicle and Acromion Bone Region (Muscle Loss): severe depletion  Dorsal Hand (Muscle Loss): severe depletion  Patellar Region (Muscle Loss): severe depletion  Anterior Thigh Region (Muscle Loss): severe depletion  Posterior Calf Region (Muscle Loss): severe depletion                 Reason for Assessment    Reason For Assessment: consult (Tube feed recommendations)  Diagnosis:  (Pneumonia due to E. coli)  General Information Comments: 44yoM with PMHx significant for ALS, neuromuscular dysfunction of the bladder, sacral ulcers, and spinal cerebellar ataxia who was admitted recently admitted (5/9/2025) for severe dehydration, electrolyte disturbances, and covid who presented to the ED with EMS and c/o AMS and hypoxia. RD consult for tube feed recommendations. Tube feed currently running at 10 mL/hr with goal of 50 mL/hr via PEG - meeting EEN/EPN. NFPE performed on 7/1 - malnutrition diagnosis continues.     Nutrition Related Social Determinants of Health: SDOH: Adequate food in home environment     Food Insecurity: No Food Insecurity (7/1/2025)    Hunger Vital Sign     Worried About Running Out of Food in the Last Year: Never true     Ran Out of Food in the Last Year: Never true       Nutrition/Diet History    Spiritual, Cultural Beliefs, Adventism Practices, Values that Affect Care: no  Food Allergies: NKFA  Factors Affecting Nutritional Intake: NPO  Nutrition-related SDOH: None Identified    Anthropometrics    Height: 6' 3" (190.5 cm)  Height (inches): 75 in  Height Method: Estimated  Weight: 59.9 kg (132 lb 0.9 oz)  Weight (lb): 132.06 lb  Weight Method: Estimated  Ideal Body Weight (IBW), Male: 196 lb  % Ideal Body Weight, Male (lb): 67.38 %  BMI (Calculated): 16.5  BMI Grade: less than 18.5 - underweight       Lab/Procedures/Meds    Pertinent Labs Reviewed: reviewed - glucose 57    Pertinent " Medications Reviewed: reviewed - enoxaparin, mirtazapine, bowel reg    Estimated/Assessed Needs    Weight Used For Calorie Calculations: 59.9 kg (132 lb 0.9 oz)  Energy Calorie Requirements (kcal): 6175-2438 kcal/day (30-35 kcal/kg)  Energy Need Method: Kcal/kg  Protein Requirements:  g/day (1.2-2.0 g/kg)  Weight Used For Protein Calculations: 59.9 kg (132 lb 0.9 oz)     Estimated Fluid Requirement Method: RDA Method  RDA Method (mL): 1797         Nutrition Prescription Ordered    Current Diet Order: NPO  Current Nutrition Support Formula Ordered: Isosource 1.5  Current Nutrition Support Rate Ordered: 55 (ml)  Current Nutrition Support Frequency Ordered: x 24 hours    Evaluation of Received Nutrient/Fluid Intake    Enteral Calories (kcal): 1980  Enteral Protein (gm): 90  Enteral (Free Water) Fluid (mL): 1008  % Kcal Needs: 100  % Protein Needs: 100  Energy Calories Required: meeting needs  Protein Required: meeting needs  Fluid Required:  (Per MD)  Comments: LBM 7/5  Tolerance: tolerating  % Intake of Estimated Energy Needs: 75 - 100 %  % Meal Intake: NPO    PES Statement  Inadequate enteral nutrition infusion related to  (Infusion volume not reached) as evidenced by  (Inadequate EN volume compared to estimated requirements)  Status: Resolved    Nutrition Risk    Level of Risk/Frequency of Follow-up: high       Monitor and Evaluation    Monitor and Evaluation: Enteral and parenteral nutrition administration, Weight, Electrolyte and renal panel, Gastrointestinal profile, Glucose/endocrine profile, Nutrition focused physical findings, Lipid profile, Inflammatory profile, Skin       Nutrition Follow-Up    RD Follow-up?: Yes

## 2025-07-07 NOTE — PROGRESS NOTES
[facility] - Medical ICU  Wound Care    Patient Name:  Lisa Moreno   MRN:  [id number]  Date: 7/7/2025  Diagnosis: Pneumonia due to E. coli    History:     Past Medical History:   Diagnosis Date    Anxiety     Cognitive communication disorder 05/10/2022    Nonverbal at baseline but alert    Degenerative motor system disease 2006    Dr. Wise in movement disorder clinic on 2/23/2015.    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    Multiple drug resistant organism (MDRO) culture positive     E.Coli ESBL PNA    Neurogenic bladder 01/16/2015    Seizure 02/22/2015    Spastic quadriplegia     Spinocerebellar atrophy        Social History[1]    Precautions:     Allergies as of 06/29/2025 - Reviewed 06/29/2025   Allergen Reaction Noted    Penicillins  01/14/2015    Allergen ext-venom-honey bee  05/10/2022       WOC Assessment Details/Treatment     Patient seen for sacral wound vac placement. Wound vac applied with urgotul as contact layer and for antimicrobial properties. 1 medium black sponge applied and covered with drape. Good seal achieved with no evidence of leakage and continuous suction at 125mmHg. After discussion with patient's mother, plan for wound vac changes on Monday, Wednesday, Friday schedule. Will continue to follow.              Reviewed chart for this encounter.  See flowsheet for findings.           Discussed POC with patient and primary nurse.  See EMR for orders and patient education.     Bedside nursing to continue care and monitoring.  Bedside to maintain pressure injury prevention interventions.       07/07/25 0900   WOCN Assessment   WOCN Total Time (mins) 45   Visit Date 07/07/25   Visit Time 0900   Consult Type Follow Up   WOCN Speciality Wound   WOCN List wound vac   Intervention assessed;changed;applied;chart review;coordination of care;orders   Teaching on-going   Skin Interventions   Pressure Reduction Techniques  frequent weight shift encouraged        Wound 04/23/25 1537 Pressure Injury Sacral spine #4   Date First Assessed/Time First Assessed: 04/23/25 1537   Present on Original Admission: Yes  Primary Wound Type: Pressure Injury  Location: Sacral spine  Wound Number: #4  Is this injury device related?: No   Wound Image    Pressure Injury Stage 4   Dressing Appearance Moist drainage   Drainage Amount Scant   Drainage Characteristics/Odor Serosanguineous   Appearance Pink;Red   Tissue loss description Full thickness   Care Cleansed with:;Antimicrobial agent  (Vashe)   Dressing Change Due 07/09/25        Negative Pressure Wound Therapy  07/03/25 1420   Placement Date/Time: 07/03/25 1420   Location: Sacral Spine   NPWT Type Vacuum Therapy   Therapy Setting NPWT Continuous therapy   Pressure Setting NPWT 125 mmHg   Sponges Inserted NPWT Black;1   Sponges Removed NPWT Black;1       Orders placed.   Richard ARGUETA, RN, [facility]  07/07/2025         [1]   Social History  Socioeconomic History    Marital status: Single   Tobacco Use    Smoking status: Never    Smokeless tobacco: Never   Substance and Sexual Activity    Alcohol use: Not Currently     Comment: social drinker, at times    Drug use: Not Currently    Sexual activity: Never     Social Drivers of Health     Financial Resource Strain: Medium Risk (7/1/2025)    Overall Financial Resource Strain (CARDIA)     Difficulty of Paying Living Expenses: Somewhat hard   Food Insecurity: No Food Insecurity (7/1/2025)    Hunger Vital Sign     Worried About Running Out of Food in the Last Year: Never true     Ran Out of Food in the Last Year: Never true   Transportation Needs: No Transportation Needs (7/1/2025)    PRAPARE - Transportation     Lack of Transportation (Medical): No     Lack of Transportation (Non-Medical): No   Recent Concern: Transportation Needs - Unmet Transportation Needs (4/17/2025)    PRAPARE - Transportation     Lack of Transportation (Medical): Yes     Lack of  Transportation (Non-Medical): Yes   Physical Activity: Inactive (5/12/2025)    Exercise Vital Sign     Days of Exercise per Week: 0 days     Minutes of Exercise per Session: 0 min   Stress: Stress Concern Present (5/12/2025)    [employer] - Occupational Stress Questionnaire     Feeling of Stress : To some extent   Housing Stability: Low Risk  (7/1/2025)    Housing Stability Vital Sign     Unable to Pay for Housing in the Last Year: No     Number of Times Moved in the Last Year: 1     Homeless in the Last Year: No

## 2025-07-07 NOTE — EICU
[facility] Night Rounds Checklist  24H Vital Sign Range:  Temp:  [97.3 °F (36.3 °C)-100.2 °F (37.9 °C)]   Pulse:  []   Resp:  [9-35]   BP: ()/(54-71)   SpO2:  [98 %-100 %]     Video rounds and LDA reconciliation

## 2025-07-07 NOTE — PLAN OF CARE
07/07/25 1108   Discharge Reassessment   Assessment Type Discharge Planning Reassessment   Did the patient's condition or plan change since previous assessment? No   Discharge Plan discussed with: Patient;Parent(s)   Communicated MARCELLE with patient/caregiver Date not available/Unable to determine   Discharge Plan A Skilled Nursing Facility   Discharge Plan B Skilled Nursing Facility   DME Needed Upon Discharge    (TBD)   Transition of Care Barriers None   Why the patient remains in the hospital Requires continued medical care   Post-Acute Status   Discharge Delays None known at this time         Discharge Plan A and Plan B have been determined by review of patient's clinical status, future medical and therapeutic needs, and coverage/benefits for post-acute care in coordination with multidisciplinary team members. \    Patient had PEG placed over the weekend, continues to improve, reviewed with mother that [facility] declined patient, she contacted John with the facility , Cm spoke with John as well, states to resend updated notes to the facility  for review and acceptance. Cm sent updated notes. Per Mother  [facility]  states did not receive paperwork, Jass placed call to facility left message with Betty waiting for return call will hard fax paperwork.     Zakia Ward RN  Case Management  [phone]

## 2025-07-07 NOTE — NURSING
MICU DAILY GOALS     Family/Goals of care/Code Status   Code Status: Full Code    24H Vital Sign Range  Temp:  [97 °F (36.1 °C)-98.8 °F (37.1 °C)]   Pulse:  [76-91]   Resp:  [9-35]   BP: ()/(54-71)   SpO2:  [99 %-100 %]      Shift Events (include procedures and significant events)   No acute events throughout shift    AWAKE RASS: Goal -    Actual - RASS ([place] Agitation-Sedation Scale): alert and calm    Restraint necessity: Not necessary   BREATHE SBT: Not intubated    Coordinate A & B, analgesics/sedatives Pain: managed   SAT: Not intubated   Delirium CAM-ICU:     Early(intubated/ Progressive (non-intubated) Mobility MOVE Screen (INTUBATED ONLY): Not intubated    Activity: Activity Management: Rolling - L1   Feeding/Nutrition Diet order: Diet/Nutrition Received: tube feeding, Specialty Diet/Nutrition Received: high calorie, high protein   Thrombus DVT prophylaxis: VTE Core Measure: (SCDs) Sequential compression device initiated/maintained   HOB Elevation Head of Bed (HOB) Positioning: HOB elevated   Ulcer Prophylaxis GI: yes   Glucose control managed Glycemic Management: blood glucose monitored   Skin Skin assessment:     Sacrum intact/not altered? No  Heels intact/not altered? No  Surgical wound? Yes    CHECK ONE!   (no altered skin or altered skin) and sub boxes:  [] No Altered Skin Integrity Present    []Prevention Measures Documented    [x] Altered Skin Integrity Present or Discovered   [x] LDA already present in EPIC, daily doc completed              [] LDA added if not already in EPIC (describe/stage wound).               [] Wound Image Taken (required on admit,                   transfer/discharge and every Tuesday)    Wound Care Consulted? Yes   Bowel Function no issues    Indwelling Catheter Necessity      Urethral Catheter 06/30/25 0756 Temperature probe 16 Fr.-Reason for Continuing Urinary Catheterization: Urinary retention          De-escalation Antibiotics Yes        VS and assessment per  flow sheet, patient progressing towards goals as tolerated, plan of care reviewed with [unfilled] and family, all concerns addressed, will continue to monitor.

## 2025-07-07 NOTE — ASSESSMENT & PLAN NOTE
"This patient does have evidence of infective focus  My overall impression is sepsis without organ dysfunction.  Source: Respiratory Infection  Antibiotics given-   Antibiotics (72h ago, onward)    Start     Stop Route Frequency Ordered    07/06/25 1545  ertapenem (INVANZ) 1 g in 0.9% NaCl 100 mL IVPB (MB+)         -- IV Every 24 hours (non-standard times) 07/06/25 1436    07/04/25 1015  mupirocin 2 % ointment         07/09/25 0859 Nasl 2 times daily 07/04/25 0900        Latest lactate reviewed-  No results for input(s): "LACTATE", "POCLAC" in the last 72 hours.    Will Not start   Source control achieved by:     Mr. Moreno is a 44yoM with PMHx significant for ALS, neuromuscular dysfunction of the bladder, sacral ulcers, and spinal cerebellar ataxia who was admitted recently admitted (5/9/2025) for severe dehydration, electrolyte disturbances, and covid who presented to the ED with EMS and c/o AMS and hypoxia. Nonverbal at baseline but apparently is usually more alert; has been less responsive and interactive x1 day. Upon EMS arrival, was obtunded and sating 74%. They gave him solumedrol and duonebs en route which improved his oxygen status to >90%. Mother at bedside reports increased difficulty with swallowing and decreased PO intake.       While in the ED, labs and imaging significant for Na 149, K 5.4, Cl 111, CO2 21, glucose 149, BUN 38, creat 1.0, albumin 2.2, ALP 80, AST/ALT 34/23, anion gap 17, procal 8.42, BNP <10, WBC 7.11, H/H 12/40.5, plts 315, covid/flu negative, .6, VBG 7.349/47.1/44.5/23.9, istat LA 4.9->7.4->4.2 (7.4 likely error?). Chest xray with patchy opacities bilaterally, predominant within the bibasilar regions but also within the left lung apex and the left mid-lung; compatible with multifocal pneumonia. Started on BSA by the ED; vanc and cefepime.     - Completed CAP coverage 7/3. Increased secretions on 7/4. Started on BS antibiotics.    - Resp Cx (7/5) positive for Ecoli ESBL. Zosyn " switched to Ertapenem   - ID consulted, appreciate recs  - Bcx NGTD.   - Recommend deep suction per RT  - Pulmonary hygiene   - Maintain spo2 >90%; wean fio2 as tolerated  - Continuous telemetry and spO2 monitoring  - Neuro checks  - Delirium/aspiration/fall precautions  - Recommend Wound Care for sacral and lower extremity wounds

## 2025-07-07 NOTE — PLAN OF CARE
Recommendations  --Continuous TF recommendation: Isosource 1.5 @ 55 mL/hr to provide 1320 mL total volume, 1980 kcal, 232 g CHO, 90 g protein, 20 g fiber, 1008 mL free water, 132% DRI         --160 mL flush q4 hrs to provide additional 960 mL free water (Total 1968 mL)    --Nursing: please continue to document rate and formula on flowsheet    --RD to monitor weight, rate, tolerance    Goals: Meet % EEN/EPN by next RD follow-up  Nutrition Goal Status: new  Communication of RD Recs:  (POC)

## 2025-07-07 NOTE — ASSESSMENT & PLAN NOTE
"Admit with hemoglobin 7.8 Baseline 10      Lab Results   Component Value Date    FERRITIN 50 09/13/2023     No results found for: "FOLATE"  Lab Results   Component Value Date    ADIXJAFW88 601 08/25/2020     No results found for: "RETICCTPCT"      Plan:   -   Lab Results   Component Value Date    HGB 8.9 (L) 07/07/2025     - Daily CBC   - Transfuse hgb <7    "

## 2025-07-07 NOTE — PT/OT/SLP PROGRESS
Speech Language Pathology Treatment    Patient Name:  Lisa Moreno   MRN:  [id number]  [id number]    Admitting Diagnosis: Pneumonia due to E. coli    Recommendations:                 General Recommendations:  Dysphagia therapy  Diet recommendations:  NPO, Liquid Diet Level: NPO   Pt able to have small occasional pleasure feeds of puree and honey thick liquids via tsp (6-10 bites/sips at a time) or occasional ice chips with caregiver   Aspiration Precautions: Slow rate, small bites/sips and second swallow per trial   General Precautions: Standard, fall, aspiration, NPO  Communication strategies:  yes/no questions only  Discharge recommendations:    low    Assessment:     Lisa Moreno is a 44 y.o. male with an SLP diagnosis of Dysphagia.     Subjective     Patient awake and alert. Mother at bedside.     Respiratory Status: Room air    Objective:     Has the patient been evaluated by SLP for swallowing?   Yes  Keep patient NPO? Yes     Pt repositioned upright in bed for PO trials. SLP reviewed SLP recommendations and POC. Pt tolerated honey thick liquids x1 via tsp and puree x5 with prolonged AP transit time, delayed cued secondary swallows, and coughing following teaspoon sip of honey thick juice and final bite of puree. Patient in agreement to hold on further trials at this time. Ongoing education provided to mother on types of thickener (xanthan gum vs corn starch), honey thick liquids, importance of oral care, recommended solid consistency, and swallow precautions. Recommend continue alternate means of nutrition as primary source of nutrition. Pt may have small amounts for pleasure feeds of (6-10 bites/sips) of honey thick liquids and puree outside of SLP therapy with mother. Pt may have occasional ice chips. Pt's mother verbalized understanding of information and was agreeable to plan.     Goals:   Multidisciplinary Problems       SLP Goals          Problem: SLP    Goal Priority Disciplines Outcome    SLP Goal     SLP    Description: Speech Language Pathology Goals  Goals expected to be met by 7/9    1. Pt will participate in ongoing swallow assessment                                Plan:     Patient to be seen:  4 x/week   Plan of Care expires:     Plan of Care reviewed with:  patient, mother   SLP Follow-Up:  Yes       Time Tracking:     SLP Treatment Date:   07/07/25  Speech Start Time:  1148  Speech Stop Time:  1210     Speech Total Time (min):  22 min    Billable Minutes: Treatment Swallowing Dysfunction 11 and Self Care/Home Management Training 11    07/07/2025

## 2025-07-07 NOTE — SUBJECTIVE & OBJECTIVE
Interval History/Significant Events: NAEON and VSS. Patient improving from mothers perspective. Concern about wound care, hypoglycemia. Will increase tube feeds and wound care to visit patient 7/7    Review of Systems   Unable to perform ROS: Patient nonverbal     Objective:     Vital Signs (Most Recent):  Temp: 97.9 °F (36.6 °C) (07/07/25 1700)  Pulse: 82 (07/07/25 1700)  Resp: 10 (07/07/25 1700)  BP: 110/85 (07/07/25 1700)  SpO2: 100 % (07/07/25 1700) Vital Signs (24h Range):  Temp:  [97 °F (36.1 °C)-99.7 °F (37.6 °C)] 97.9 °F (36.6 °C)  Pulse:  [78-98] 82  Resp:  [1-31] 10  SpO2:  [97 %-100 %] 100 %  BP: ()/(55-85) 110/85   Weight: 59.9 kg (132 lb 0.9 oz)  Body mass index is 16.51 kg/m².      Intake/Output Summary (Last 24 hours) at 7/7/2025 1739  Last data filed at 7/7/2025 1502  Gross per 24 hour   Intake 580 ml   Output 670 ml   Net -90 ml          Physical Exam  Constitutional:       General: He is not in acute distress.     Appearance: He is underweight.      Comments: NGT   HENT:      Head: Normocephalic.   Eyes:      General: No scleral icterus.        Right eye: No discharge.         Left eye: No discharge.      Extraocular Movements: Extraocular movements intact.   Cardiovascular:      Rate and Rhythm: Normal rate and regular rhythm.      Pulses: Normal pulses.      Heart sounds: No murmur heard.     No friction rub. No gallop.   Pulmonary:      Effort: Pulmonary effort is normal. No respiratory distress.   Abdominal:      General: There is no distension.      Comments: PEG in place. C/D/I   Musculoskeletal:      Right lower leg: No edema.      Left lower leg: No edema.   Skin:     General: Skin is warm.      Coloration: Skin is not jaundiced.   Neurological:      Mental Status: He is alert.   Psychiatric:         Mood and Affect: Mood normal.         Behavior: Behavior normal.            Vents:     Lines/Drains/Airways       Drain  Duration                  Urethral Catheter 06/30/25 0570  Temperature probe 16 Fr. 7 days         Gastrostomy/Enterostomy 07/05/25 1136 Gastrostomy tube w/ balloon LUQ feeding 2 days              Peripheral Intravenous Line  Duration             Peripheral IV Single Lumen 06/30/25 1130 18 G 1 3/4 in Anterior;Right Upper Arm 7 days    Peripheral IV 07/05/25 0336 18 G 1 1/4 in Anterior;Left Forearm 2 days                  Significant Labs:    CBC/Anemia Profile:  Recent Labs   Lab 07/06/25  0435 07/07/25  0319   WBC 9.82 8.49   HGB 9.4* 8.9*   HCT 29.6* 28.3*    211   MCV 81* 83   RDW 16.1* 16.3*        Chemistries:  Recent Labs   Lab 07/06/25  0435 07/07/25  0319    136   K 4.4 4.5    101   CO2 26 28   BUN 20 19   CREATININE 0.6 0.6   CALCIUM 8.3* 8.4*   ALBUMIN 1.8* 1.8*   PROT 6.6 6.4   BILITOT 0.4 0.2   ALKPHOS 60 56   ALT 15 13   AST 15 15   MG 1.8 2.0   PHOS 3.6 3.8       All pertinent labs within the past 24 hours have been reviewed.    Significant Imaging:  I have reviewed all pertinent imaging results/findings within the past 24 hours.

## 2025-07-07 NOTE — EICU
Virtual ICU Quality Rounds    Admit Date: 6/29/2025  Hospital Day: 8    ICU Day: 6d 23h    24H Vital Sign Range:  Temp:  [97 °F (36.1 °C)-99.1 °F (37.3 °C)]   Pulse:  [76-98]   Resp:  [9-35]   BP: ()/(54-71)   SpO2:  [97 %-100 %]     VICU Surveillance Screening                    LDA reconciliation : Yes

## 2025-07-07 NOTE — PROGRESS NOTES
[facility] - Medical ICU  Critical Care Medicine  Progress Note    Patient Name: Lisa Moreno  MRN: [id number]  Admission Date: 6/29/2025  Hospital Length of Stay: 8 days  Code Status: Full Code  Attending Provider: Duncan Connor MD  Primary Care Provider: John Nixon II, MD   Principal Problem: Pneumonia due to E. coli    Subjective:     HPI:  Mr. Moreno is a 44yoM with PMHx significant for ALS, neuromuscular dysfunction of the bladder, sacral ulcers, and spinal cerebellar ataxia who was admitted recently admitted (5/9/2025) for severe dehydration, electrolyte disturbances, and covid who presented to the ED with EMS and c/o AMS and hypoxia. Nonverbal at baseline but apparently is usually more alert; has been less responsive and interactive x1 day. Upon EMS arrival, was obtunded and sating 74%. They gave him solumedrol and duonebs en route which improved his oxygen status to >90%. Mother at bedside reports increased difficulty with swallowing and decreased PO intake.       While in the ED, labs and imaging significant for Na 149, K 5.4, Cl 111, CO2 21, glucose 149, BUN 38, creat 1.0, albumin 2.2, ALP 80, AST/ALT 34/23, anion gap 17, procal 8.42, BNP <10, WBC 7.11, H/H 12/40.5, plts 315, covid/flu negative, .6, VBG 7.349/47.1/44.5/23.9, istat LA 4.9->7.4->4.2 (7.4 likely error?). Chest xray with patchy opacities bilaterally, predominant within the bibasilar regions but also within the left lung apex and the left mid-lung; compatible with multifocal pneumonia. Started on BSA by the ED; vanc and cefepime. [facility] consulted for sepsis. During first examination the patient was stable with MAPs >65 and Lactic acid down trended to 2.6. Around 6 am of 6/30 [place] was called hypotension with MAPs in the 50. Pt was started on levophed and was transferred to MICU.     Hospital/ICU Course:  Admitted to the MICU for septic shock secondary to multifocal pneumonia. Initially requiring pressors but weaned. Started  on broad spectrum antibiotics (Zosyn). Afebrile. Zosyn de-escalated to Ceftriaxone and Azithromycin for CAP coverage. SLP consulted for dysphagia. FEES performed which showed residual puree. Recommend NPO. PEG placed with IR on 7/5. Increased secretions noted on exam s/p completion of antibiotic course of CAP coverage. Restarted patient on Vancomycin and Zosyn. Respiratory culture with many gram negative rods. Vancomycin discontinued. Continued on Zosyn. Respiratory cx positive for E.Coli ESBL. Started on Ertapenem, Zosyn discontinued. ID consulted for additional recommendations. SLP re-consulted for swallowing evaluation in the setting of new PEG tube. Able to have small occasional pleasure feeds of puree and honey thick liquids via tsp. Stable for floor on 7/7.     Interval History/Significant Events: NAEON and VSS. Patient improving from mothers perspective. Concern about wound care, hypoglycemia. Will increase tube feeds and wound care to visit patient 7/7    Review of Systems   Unable to perform ROS: Patient nonverbal     Objective:     Vital Signs (Most Recent):  Temp: 97.9 °F (36.6 °C) (07/07/25 1700)  Pulse: 82 (07/07/25 1700)  Resp: 10 (07/07/25 1700)  BP: 110/85 (07/07/25 1700)  SpO2: 100 % (07/07/25 1700) Vital Signs (24h Range):  Temp:  [97 °F (36.1 °C)-99.7 °F (37.6 °C)] 97.9 °F (36.6 °C)  Pulse:  [78-98] 82  Resp:  [1-31] 10  SpO2:  [97 %-100 %] 100 %  BP: ()/(55-85) 110/85   Weight: 59.9 kg (132 lb 0.9 oz)  Body mass index is 16.51 kg/m².      Intake/Output Summary (Last 24 hours) at 7/7/2025 7027  Last data filed at 7/7/2025 1502  Gross per 24 hour   Intake 580 ml   Output 670 ml   Net -90 ml          Physical Exam  Constitutional:       General: He is not in acute distress.     Appearance: He is underweight.      Comments: NGT   HENT:      Head: Normocephalic.   Eyes:      General: No scleral icterus.        Right eye: No discharge.         Left eye: No discharge.      Extraocular Movements:  "Extraocular movements intact.   Cardiovascular:      Rate and Rhythm: Normal rate and regular rhythm.      Pulses: Normal pulses.      Heart sounds: No murmur heard.     No friction rub. No gallop.   Pulmonary:      Effort: Pulmonary effort is normal. No respiratory distress.   Abdominal:      General: There is no distension.      Comments: PEG in place. C/D/I   Musculoskeletal:      Right lower leg: No edema.      Left lower leg: No edema.   Skin:     General: Skin is warm.      Coloration: Skin is not jaundiced.   Neurological:      Mental Status: He is alert.   Psychiatric:         Mood and Affect: Mood normal.         Behavior: Behavior normal.            Vents:     Lines/Drains/Airways       Drain  Duration                  Urethral Catheter 06/30/25 0756 Temperature probe 16 Fr. 7 days         Gastrostomy/Enterostomy 07/05/25 1136 Gastrostomy tube w/ balloon LUQ feeding 2 days              Peripheral Intravenous Line  Duration             Peripheral IV Single Lumen 06/30/25 1130 18 G 1 3/4 in Anterior;Right Upper Arm 7 days    Peripheral IV 07/05/25 0336 18 G 1 1/4 in Anterior;Left Forearm 2 days                  Significant Labs:    CBC/Anemia Profile:  Recent Labs   Lab 07/06/25  0435 07/07/25  0319   WBC 9.82 8.49   HGB 9.4* 8.9*   HCT 29.6* 28.3*    211   MCV 81* 83   RDW 16.1* 16.3*        Chemistries:  Recent Labs   Lab 07/06/25  0435 07/07/25  0319    136   K 4.4 4.5    101   CO2 26 28   BUN 20 19   CREATININE 0.6 0.6   CALCIUM 8.3* 8.4*   ALBUMIN 1.8* 1.8*   PROT 6.6 6.4   BILITOT 0.4 0.2   ALKPHOS 60 56   ALT 15 13   AST 15 15   MG 1.8 2.0   PHOS 3.6 3.8       All pertinent labs within the past 24 hours have been reviewed.    Significant Imaging:  I have reviewed all pertinent imaging results/findings within the past 24 hours.    ABG  No results for input(s): "PH", "PO2", "PCO2", "HCO3", "BE" in the last 168 hours.  Assessment/Plan:     Neuro  Acute encephalopathy  Likely 2/2 " "sepsis. Resolving.     - see plan for sepsis    ALS (amyotrophic lateral sclerosis)  Hx of ALS w/diffuse spacticity/weakness + dysphagia     - continue baclofen and dantrolene    Spinocerebellar ataxia  - Continue baclofen and dantrolene    Spastic diplegia  -continue home baclofen    Psychiatric  Insomnia secondary to depression with anxiety  Continue quetiapine and mirtazapine       Pulmonary  * Pneumonia due to E. coli  - see plan for sepsis    Renal/  Hypernatremia  . Hypernatremia is likely due to Dehydration.     Plan  - Aim to correct the sodium by 8-10mEq in 24 hours.   - Plan to correct their hypernatremia with Select IV fluids: LR at a rate of 100 ml/hr.   - Will plan to trend the patient's sodium: Daily       ID  Septic shock  This patient does have evidence of infective focus  My overall impression is sepsis without organ dysfunction.  Source: Respiratory Infection  Antibiotics given-   Antibiotics (72h ago, onward)      Start     Stop Route Frequency Ordered    07/06/25 1545  ertapenem (INVANZ) 1 g in 0.9% NaCl 100 mL IVPB (MB+)         -- IV Every 24 hours (non-standard times) 07/06/25 1436    07/04/25 1015  mupirocin 2 % ointment         07/09/25 0859 Nasl 2 times daily 07/04/25 0900          Latest lactate reviewed-  No results for input(s): "LACTATE", "POCLAC" in the last 72 hours.    Will Not start   Source control achieved by:     Mr. Moreno is a 44yoM with PMHx significant for ALS, neuromuscular dysfunction of the bladder, sacral ulcers, and spinal cerebellar ataxia who was admitted recently admitted (5/9/2025) for severe dehydration, electrolyte disturbances, and covid who presented to the ED with EMS and c/o AMS and hypoxia. Nonverbal at baseline but apparently is usually more alert; has been less responsive and interactive x1 day. Upon EMS arrival, was obtunded and sating 74%. They gave him solumedrol and duonebs en route which improved his oxygen status to >90%. Mother at bedside " "reports increased difficulty with swallowing and decreased PO intake.       While in the ED, labs and imaging significant for Na 149, K 5.4, Cl 111, CO2 21, glucose 149, BUN 38, creat 1.0, albumin 2.2, ALP 80, AST/ALT 34/23, anion gap 17, procal 8.42, BNP <10, WBC 7.11, H/H 12/40.5, plts 315, covid/flu negative, .6, VBG 7.349/47.1/44.5/23.9, istat LA 4.9->7.4->4.2 (7.4 likely error?). Chest xray with patchy opacities bilaterally, predominant within the bibasilar regions but also within the left lung apex and the left mid-lung; compatible with multifocal pneumonia. Started on BSA by the ED; vanc and cefepime.     - Completed CAP coverage 7/3. Increased secretions on 7/4. Started on BS antibiotics.    - Resp Cx (7/5) positive for Ecoli ESBL. Zosyn switched to Ertapenem   - ID consulted, appreciate recs  - Bcx NGTD.   - Recommend deep suction per RT  - Pulmonary hygiene   - Maintain spo2 >90%; wean fio2 as tolerated  - Continuous telemetry and spO2 monitoring  - Neuro checks  - Delirium/aspiration/fall precautions  - Recommend Wound Care for sacral and lower extremity wounds    Oncology  Anemia  Admit with hemoglobin 7.8 Baseline 10      Lab Results   Component Value Date    FERRITIN 50 09/13/2023     No results found for: "FOLATE"  Lab Results   Component Value Date    XFEREXAT88 601 08/25/2020     No results found for: "RETICCTPCT"      Plan:   -   Lab Results   Component Value Date    HGB 8.9 (L) 07/07/2025     - Daily CBC   - Transfuse hgb <7      Endocrine  Severe protein-calorie malnutrition  Nutrition consulted. Most recent weight and BMI monitored-     Measurements:  Wt Readings from Last 1 Encounters:   07/07/25 59.9 kg (132 lb 0.9 oz)   Body mass index is 16.51 kg/m².    Patient has been screened and assessed by RD.    Malnutrition Type:  Context: chronic illness  Level: severe    Malnutrition Characteristic Summary:  Subcutaneous Fat (Malnutrition): severe depletion  Muscle Mass (Malnutrition): severe " depletion    Interventions/Recommendations (treatment strategy):         GI  Oropharyngeal dysphagia  Secondary to ALS/unknown neuromuscular disorder.     - SLP evaluated, failed swallow eval. Recommend NPO and PEG tube evaluation.   - IR consulted, appreciate recs   - Plan for PEG tube placement with IR on 7/5.     Orthopedic  Pressure injury of deep tissue of heel  L Heel with black eschar. R Heel pressure ulcer with scant serosanguineous drainage. Photos uploaded into chart.     - Wound care following, appreciate recs  - Podiatry consulted, appreciate recs    Sacral decubitus ulcer  - Wound care consulted, appreciate recs  - General Surgery consulted for possible debridement, appreciate recs   - No indication for inpatient debridement       Critical Care Daily Checklist:    A: Awake: RASS Goal/Actual Goal:    Actual:     B: Spontaneous Breathing Trial Performed?     C: SAT & SBT Coordinated?  N/A                      D: Delirium: CAM-ICU     E: Early Mobility Performed? No   F: Feeding Goal: Goals: Meet % EEN/EPN by next RD follow-up  Status: Nutrition Goal Status: new   Current Diet Order   Procedures    Diet NPO      AS: Analgesia/Sedation N/A   T: Thromboembolic Prophylaxis Lovenox   H: HOB > 300 Yes   U: Stress Ulcer Prophylaxis (if needed) PPI   G: Glucose Control 140-180   B: Bowel Function Unmeasured Stool Occurrence: 1   I: Indwelling Catheter (Lines & Zeng) Necessity PIV x2, Gastrostomy/Enterostomy   D: De-escalation of Antimicrobials/Pharmacotherapies Ertapenem    Plan for the day/ETD Monitor blood glucose    Code Status:  Family/Goals of Care: Full Code         Stable for floor       Critical care was time spent personally by me on the following activities: development of treatment plan with patient or surrogate and bedside caregivers, discussions with consultants, evaluation of patient's response to treatment, examination of patient, ordering and performing treatments and interventions,  ordering and review of laboratory studies, ordering and review of radiographic studies, pulse oximetry, re-evaluation of patient's condition. This critical care time did not overlap with that of any other provider or involve time for any procedures.     Benny Najera MD  Critical Care Medicine  [facility]

## 2025-07-08 ENCOUNTER — TELEPHONE (OUTPATIENT)
Dept: INFECTIOUS DISEASES | Facility: CLINIC | Age: 45
End: 2025-07-08
Payer: MEDICARE

## 2025-07-08 LAB
ABSOLUTE EOSINOPHIL (OHS): 0.05 K/UL
ABSOLUTE MONOCYTE (OHS): 0.47 K/UL (ref 0.3–1)
ABSOLUTE NEUTROPHIL COUNT (OHS): 4.79 K/UL (ref 1.8–7.7)
ALBUMIN SERPL BCP-MCNC: 1.9 G/DL (ref 3.5–5.2)
ALP SERPL-CCNC: 61 UNIT/L (ref 40–150)
ALT SERPL W/O P-5'-P-CCNC: 13 UNIT/L (ref 10–44)
ANION GAP (OHS): 8 MMOL/L (ref 8–16)
AST SERPL-CCNC: 12 UNIT/L (ref 11–45)
BASOPHILS # BLD AUTO: 0.01 K/UL
BASOPHILS NFR BLD AUTO: 0.1 %
BILIRUB SERPL-MCNC: 0.1 MG/DL (ref 0.1–1)
BUN SERPL-MCNC: 18 MG/DL (ref 6–20)
CALCIUM SERPL-MCNC: 7.9 MG/DL (ref 8.7–10.5)
CHLORIDE SERPL-SCNC: 98 MMOL/L (ref 95–110)
CO2 SERPL-SCNC: 28 MMOL/L (ref 23–29)
CREAT SERPL-MCNC: 0.5 MG/DL (ref 0.5–1.4)
ERYTHROCYTE [DISTWIDTH] IN BLOOD BY AUTOMATED COUNT: 16.2 % (ref 11.5–14.5)
GFR SERPLBLD CREATININE-BSD FMLA CKD-EPI: >60 ML/MIN/1.73/M2
GLUCOSE SERPL-MCNC: 90 MG/DL (ref 70–110)
HCT VFR BLD AUTO: 28.3 % (ref 40–54)
HGB BLD-MCNC: 8.7 GM/DL (ref 14–18)
IMM GRANULOCYTES # BLD AUTO: 0.03 K/UL (ref 0–0.04)
IMM GRANULOCYTES NFR BLD AUTO: 0.4 % (ref 0–0.5)
LYMPHOCYTES # BLD AUTO: 1.79 K/UL (ref 1–4.8)
MAGNESIUM SERPL-MCNC: 1.9 MG/DL (ref 1.6–2.6)
MCH RBC QN AUTO: 25.6 PG (ref 27–31)
MCHC RBC AUTO-ENTMCNC: 30.7 G/DL (ref 32–36)
MCV RBC AUTO: 83 FL (ref 82–98)
NUCLEATED RBC (/100WBC) (OHS): 0 /100 WBC
PHOSPHATE SERPL-MCNC: 3.5 MG/DL (ref 2.7–4.5)
PLATELET # BLD AUTO: 246 K/UL (ref 150–450)
PMV BLD AUTO: 11.4 FL (ref 9.2–12.9)
POCT GLUCOSE: 113 MG/DL (ref 70–110)
POCT GLUCOSE: 125 MG/DL (ref 70–110)
POCT GLUCOSE: 125 MG/DL (ref 70–110)
POTASSIUM SERPL-SCNC: 5 MMOL/L (ref 3.5–5.1)
PROT SERPL-MCNC: 6.6 GM/DL (ref 6–8.4)
RBC # BLD AUTO: 3.4 M/UL (ref 4.6–6.2)
RELATIVE EOSINOPHIL (OHS): 0.7 %
RELATIVE LYMPHOCYTE (OHS): 25.1 % (ref 18–48)
RELATIVE MONOCYTE (OHS): 6.6 % (ref 4–15)
RELATIVE NEUTROPHIL (OHS): 67.1 % (ref 38–73)
SODIUM SERPL-SCNC: 134 MMOL/L (ref 136–145)
WBC # BLD AUTO: 7.14 K/UL (ref 3.9–12.7)

## 2025-07-08 PROCEDURE — 25000003 PHARM REV CODE 250: Performed by: STUDENT IN AN ORGANIZED HEALTH CARE EDUCATION/TRAINING PROGRAM

## 2025-07-08 PROCEDURE — 99291 CRITICAL CARE FIRST HOUR: CPT | Mod: GC,,, | Performed by: STUDENT IN AN ORGANIZED HEALTH CARE EDUCATION/TRAINING PROGRAM

## 2025-07-08 PROCEDURE — 27000207 HC ISOLATION

## 2025-07-08 PROCEDURE — 85025 COMPLETE CBC W/AUTO DIFF WBC: CPT | Performed by: STUDENT IN AN ORGANIZED HEALTH CARE EDUCATION/TRAINING PROGRAM

## 2025-07-08 PROCEDURE — 83735 ASSAY OF MAGNESIUM: CPT | Performed by: STUDENT IN AN ORGANIZED HEALTH CARE EDUCATION/TRAINING PROGRAM

## 2025-07-08 PROCEDURE — 80053 COMPREHEN METABOLIC PANEL: CPT | Performed by: STUDENT IN AN ORGANIZED HEALTH CARE EDUCATION/TRAINING PROGRAM

## 2025-07-08 PROCEDURE — 25000003 PHARM REV CODE 250

## 2025-07-08 PROCEDURE — 63600175 PHARM REV CODE 636 W HCPCS: Performed by: INTERNAL MEDICINE

## 2025-07-08 PROCEDURE — 11000001 HC ACUTE MED/SURG PRIVATE ROOM

## 2025-07-08 PROCEDURE — 63600175 PHARM REV CODE 636 W HCPCS: Performed by: STUDENT IN AN ORGANIZED HEALTH CARE EDUCATION/TRAINING PROGRAM

## 2025-07-08 PROCEDURE — 84100 ASSAY OF PHOSPHORUS: CPT | Performed by: STUDENT IN AN ORGANIZED HEALTH CARE EDUCATION/TRAINING PROGRAM

## 2025-07-08 PROCEDURE — 51702 INSERT TEMP BLADDER CATH: CPT

## 2025-07-08 PROCEDURE — 94761 N-INVAS EAR/PLS OXIMETRY MLT: CPT

## 2025-07-08 RX ORDER — ERYTHROMYCIN 5 MG/G
OINTMENT OPHTHALMIC NIGHTLY
Status: DISPENSED | OUTPATIENT
Start: 2025-07-08 | End: 2025-07-17

## 2025-07-08 RX ADMIN — QUETIAPINE FUMARATE 50 MG: 25 TABLET ORAL at 08:07

## 2025-07-08 RX ADMIN — ERYTHROMYCIN: 5 OINTMENT OPHTHALMIC at 06:07

## 2025-07-08 RX ADMIN — DANTROLENE SODIUM 50 MG: 25 CAPSULE ORAL at 08:07

## 2025-07-08 RX ADMIN — POLYETHYLENE GLYCOL 3350 17 G: 17 POWDER, FOR SOLUTION ORAL at 08:07

## 2025-07-08 RX ADMIN — MIRTAZAPINE 15 MG: 15 TABLET, FILM COATED ORAL at 08:07

## 2025-07-08 RX ADMIN — BACLOFEN 20 MG: 10 TABLET ORAL at 02:07

## 2025-07-08 RX ADMIN — SENNOSIDES AND DOCUSATE SODIUM 1 TABLET: 50; 8.6 TABLET ORAL at 08:07

## 2025-07-08 RX ADMIN — SODIUM CHLORIDE 1 G: 9 INJECTION, SOLUTION INTRAVENOUS at 02:07

## 2025-07-08 RX ADMIN — DONEPEZIL HYDROCHLORIDE 5 MG: 5 TABLET, FILM COATED ORAL at 08:07

## 2025-07-08 RX ADMIN — MUPIROCIN: 20 OINTMENT TOPICAL at 08:07

## 2025-07-08 RX ADMIN — LACTULOSE 10 G: 20 SOLUTION ORAL at 08:07

## 2025-07-08 RX ADMIN — CETIRIZINE HYDROCHLORIDE 10 MG: 10 TABLET, FILM COATED ORAL at 08:07

## 2025-07-08 RX ADMIN — ENOXAPARIN SODIUM 30 MG: 30 INJECTION SUBCUTANEOUS at 04:07

## 2025-07-08 RX ADMIN — BACLOFEN 20 MG: 10 TABLET ORAL at 08:07

## 2025-07-08 RX ADMIN — ERYTHROMYCIN: 5 OINTMENT OPHTHALMIC at 08:07

## 2025-07-08 RX ADMIN — LACTULOSE 10 G: 20 SOLUTION ORAL at 12:07

## 2025-07-08 RX ADMIN — DANTROLENE SODIUM 50 MG: 25 CAPSULE ORAL at 02:07

## 2025-07-08 NOTE — ASSESSMENT & PLAN NOTE
44 year old male with a history of amyotropic lateral sclerosis (ALS), spasticity, dysarthria, sacral decubitus ulcer and recent hospital admission in may of 2025 secondary to COVID infection and associated hypoxia.  He is now admitted with altered mental status and hypoxia.  Respiratory cultures were obtained and were positive for ESBL E coli.  ID is consulted to assist with his care.    Plan  IV ertapenem 1 gram q 24 hours for 7 days total.  Last day of antibiotics would be July 12.

## 2025-07-08 NOTE — EICU
Virtual ICU Quality Rounds    Admit Date: 6/29/2025  Hospital Day: 9    ICU Day: 7d 23h    24H Vital Sign Range:  Temp:  [96.3 °F (35.7 °C)-100.4 °F (38 °C)]   Pulse:  [73-97]   Resp:  [1-19]   BP: ()/(54-85)   SpO2:  [98 %-100 %]     VICU Surveillance Screening                    LDA reconciliation : Yes

## 2025-07-08 NOTE — PLAN OF CARE
Outpatient Antibiotic Therapy Plan:    Please send referral to [facility].    1) Infection: ESBL E coli lung infection.    2) Discharge Antibiotics:    Intravenous antibiotics:  Ertapenem 1 gram IV q 24 hours       3) Therapy Duration:  7 days    Estimated end date of IV antibiotics: 07/12/2025    4) Outpatient Weekly Labs:    Order the following labs to be drawn on Mondays:   CBC  CMP       5) Fax Lab Results to Infectious Diseases Provider: Dr. Frausto    [facility] Fax Number: [phone]    6) Outpatient Infectious Diseases Follow-up    Follow-up appointment will be arranged by the ID clinic and will be found in the patient's appointments tab.    Prior to discharge, please ensure the patient's follow-up has been scheduled.    If there is still no follow-up scheduled prior to discharge, please send an swiftQueue message to [facility] Clinical Pool or Call Infectious Diseases Dept.

## 2025-07-08 NOTE — ASSESSMENT & PLAN NOTE
Patient will require good local wound care and adequate nutrition in order for wounds to heal.  The short course of IV ertapenem which he will received for his pneumonia may be helpful for any soft tissue infection.

## 2025-07-08 NOTE — PROGRESS NOTES
Recommendations    -- Bolus TF Recs for discharge: Isosource 1.5, bolus 5 cartons/day or 416 mL 3 x/day to provide 1250 mL total volume, 1875 kcals, 85 g protein, 955 mL fluid, 125% DIR - [facility] per MD   - Please provide bolus tube feeds for 24-48 hours prior to discharge to ensure tolerance  --Nursing: please continue to document rate and formula on flowsheet    --RD to monitor weight, rate, tolerance     Goals: Meet % EEN/EPN by next RD follow-up  Nutrition Goal Status: new  Communication of RD Recs:  (POC)    Thanks,    Lucina Robins RD, LDN

## 2025-07-08 NOTE — PLAN OF CARE
JENNIFER contacted Betty with [facility] facility, states they are still under review of referral needs to make sure bed will be available  in ALS wing, and can meet needs of the patient. Jennifer spoke with mother at bedside and reviewed above, also reviewed that [facility] has accepted the patient and are just waiting on medical readiness to move forward.     Zakia Ward RN  Case Management  [phone]

## 2025-07-08 NOTE — EICU
[facility] Night Rounds Checklist  24H Vital Sign Range:  Temp:  [97 °F (36.1 °C)-99.7 °F (37.6 °C)]   Pulse:  [75-98]   Resp:  [1-22]   BP: ()/(55-85)   SpO2:  [97 %-100 %]     Video rounds and LDA reconciliation

## 2025-07-08 NOTE — HPI
44 year old male with a history of amyotropic lateral sclerosis (ALS), spasticity, dysarthria, sacral decubitus ulcer and recent hospital admission in may of 2025 secondary to COVID infection and associated hypoxia.  He is now admitted with altered mental status and hypoxia.  Respiratory cultures were obtained and were positive for ESBL E coli.  ID is consulted to assist with his care.    ID reconsulted 7/16 for antibiotic assistance for potential CNS infection. Since pt was last seen, he completed a course of ertapenem for ESBL Ecoli pneumonia. He was transferred to ICU for seizure like activity on 7/14. LP was attempted at bedside, unsuccessful. Pending IR LP. MRI brain without evidence of acute infection. Repeat blood cx ngtd. Pt is currently on acyclovir, ctx, and vancomycin. Per mother at bedside, pt is doing better. Pt shakes head no to  abdominal pain, sob, or cough. Remains on pressor support.

## 2025-07-08 NOTE — PLAN OF CARE
Recommendations    -- Bolus TF Recs for discharge: Isosource 1.5, bolus 5 cartons/day or 416 mL 3 x/day to provide 1250 mL total volume, 1875 kcals, 85 g protein, 955 mL fluid, 125% DIR - [facility] per MD  - Please provide bolus tube feeds for 24-48 hours prior to discharge to ensure tolerance  --Nursing: please continue to document rate and formula on flowsheet    --RD to monitor weight, rate, tolerance     Goals: Meet % EEN/EPN by next RD follow-up  Nutrition Goal Status: new  Communication of RD Recs:  (POC)

## 2025-07-08 NOTE — CONSULTS
[facility] ICU  Infectious Disease  Consult Note    Patient Name: Lisa Moreno  MRN: [id number]  Admission Date: 6/29/2025  Hospital Length of Stay: 8 days  Attending Physician: Duncan Connor MD  Primary Care Provider: John Nixon II, MD     Isolation Status: No active isolations    Patient information was obtained from parent, past medical records, and ER records.      Inpatient consult to Infectious Diseases  Consult performed by: Mateus Frausto MD  Consult ordered by: Odette Christiansen MD        Assessment/Plan:     Pulmonary  * Pneumonia due to E. coli  44 year old male with a history of amyotropic lateral sclerosis (ALS), spasticity, dysarthria, sacral decubitus ulcer and recent hospital admission in may of 2025 secondary to COVID infection and associated hypoxia.  He is now admitted with altered mental status and hypoxia.  Respiratory cultures were obtained and were positive for ESBL E coli.  ID is consulted to assist with his care.    Plan  IV ertapenem 1 gram q 24 hours for 7 days total.  Last day of antibiotics would be July 12.    Orthopedic  Sacral decubitus ulcer, stage III  Patient will require good local wound care and adequate nutrition in order for wounds to heal.  The short course of IV ertapenem which he will received for his pneumonia may be helpful for any soft tissue infection.        Thank you for your consult. I will sign off. Please contact us if you have any additional questions.    Mateus Frausto MD  Infectious Disease  [facility] ICU    Time: 75 minutes   50% of time spent on face-to-face counseling and coordination of care. Counseling included review of test results, diagnosis, and treatment plan with patient and/or family.  I have reviewed hospital notes from ICU service and other specialty providers as well as outside medical records. I have also reviewed CBC, CMP/BMP,  cultures and imaging with my interpretation as documented. Patient is high risk of  morbidity, on antibiotics requiring intensive monitoring for toxicity.       Subjective:     Principal Problem: Pneumonia due to E. coli    HPI: 44 year old male with a history of amyotropic lateral sclerosis (ALS), spasticity, dysarthria, sacral decubitus ulcer and recent hospital admission in may of 2025 secondary to COVID infection and associated hypoxia.  He is now admitted with altered mental status and hypoxia.  Respiratory cultures were obtained and were positive for ESBL E coli.  ID is consulted to assist with his care.    Past Medical History:   Diagnosis Date    Anxiety     Cognitive communication disorder 05/10/2022    Nonverbal at baseline but alert    Degenerative motor system disease 2006    Dr. Wise in movement disorder clinic on 2/23/2015.    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    Multiple drug resistant organism (MDRO) culture positive     E.Coli ESBL PNA    Neurogenic bladder 01/16/2015    Seizure 02/22/2015    Spastic quadriplegia     Spinocerebellar atrophy        Past Surgical History:   Procedure Laterality Date    BACLOFEN PUMP IMPLANTATION      COLONOSCOPY N/A 7/23/2020    Procedure: COLONOSCOPY;  Surgeon: Ziyad Fitch MD;  Location: [facility];  Service: Endoscopy;  Laterality: N/A;    ESOPHAGOGASTRODUODENOSCOPY N/A 7/23/2020    Procedure: EGD (ESOPHAGOGASTRODUODENOSCOPY);  Surgeon: Ziyad Fitch MD;  Location: [facility];  Service: Endoscopy;  Laterality: N/A;    FLUOROSCOPIC URODYNAMIC STUDY N/A 11/27/2018    Procedure: URODYNAMIC STUDY, FLUOROSCOPIC;  Surgeon: Tanja Okeefe MD;  Location: [address];  Service: Urology;  Laterality: N/A;  1 hour    REATTACHMENT OF MUSCLE Bilateral 2/18/2022    Procedure: PTOSIS REPAIR OF BOTH EYES;  Surgeon: Krupa Rios MD;  Location: [facility] OR [address] [place] [address];  Service: Ophthalmology;  Laterality: Bilateral;    UPPER GASTROINTESTINAL ENDOSCOPY          Review of patient's allergies indicates:   Allergen Reactions    Penicillins      Hives and Itching  Tolerated zosyn- 7/1/2025    Allergen ext-venom-honey bee        Medications:  Medications Prior to Admission   Medication Sig    baclofen (LIORESAL) 20 MG tablet Take 1 tablet (20 mg total) by mouth 3 (three) times daily.    cetirizine (ZYRTEC) 10 MG tablet TAKE 1 TABLET BY MOUTH DAILY    dantrolene (DANTRIUM) 50 MG Cap TAKE 1 CAPSULE(50 MG) BY MOUTH THREE TIMES DAILY    dimethicone-zinc oxide 1-40 % Oint Apply to affected area twice daily    donepeziL (ARICEPT) 5 MG tablet Take 1 tablet (5 mg total) by mouth every evening.    mirtazapine (REMERON) 15 MG tablet Take 1 tablet (15 mg total) by mouth every evening.    QUEtiapine (SEROQUEL) 50 MG tablet Take 1 tablet (50 mg total) by mouth every evening.     Antibiotics (From admission, onward)      Start     Stop Route Frequency Ordered    07/06/25 1545  ertapenem (INVANZ) 1 g in 0.9% NaCl 100 mL IVPB (MB+)         -- IV Every 24 hours (non-standard times) 07/06/25 1436    07/04/25 1015  mupirocin 2 % ointment         07/09/25 0859 Nasl 2 times daily 07/04/25 0900          Antifungals (From admission, onward)      None          Antivirals (From admission, onward)      None             Immunization History   Administered Date(s) Administered    COVID-19 MRNA, LN-S PF (MODERNA HALF 0.25 ML DOSE) 02/17/2022    COVID-19, MRNA, LN-S, PF (MODERNA FULL 0.5 ML DOSE) 02/09/2021, 03/09/2021, 07/22/2022    COVID-19, MRNA, LN-S, PF (Pfizer) (Purple Cap) 01/15/2021    DTP 03/04/1980, 05/20/1980, 07/22/1980, 07/28/1981, 04/24/1984    Influenza - Quadrivalent - PF *Preferred* (6 months and older) 02/26/2015, 09/24/2019    Influenza - Trivalent - Fluarix, Flulaval, Fluzone, Afluria - PF 02/26/2015    MMR 03/31/1981, 01/05/2005    OPV 03/04/1980, 05/20/1980, 07/22/1980, 07/28/1981, 04/24/1984    PPD Test 03/04/2015, 10/05/2018    Td (ADULT) 04/05/1994, 01/05/2005    Tdap  09/24/2019       Family History       Problem Relation (Age of Onset)    Cancer Mother    Depression Mother    Hypertension Mother    Multiple sclerosis Other    No Known Problems Brother, Son    Thyroid cancer Mother          Social History     Socioeconomic History    Marital status: Single   Tobacco Use    Smoking status: Never    Smokeless tobacco: Never   Substance and Sexual Activity    Alcohol use: Not Currently     Comment: social drinker, at times    Drug use: Not Currently    Sexual activity: Never     Social Drivers of Health     Financial Resource Strain: Medium Risk (7/1/2025)    Overall Financial Resource Strain (CARDIA)     Difficulty of Paying Living Expenses: Somewhat hard   Food Insecurity: No Food Insecurity (7/1/2025)    Hunger Vital Sign     Worried About Running Out of Food in the Last Year: Never true     Ran Out of Food in the Last Year: Never true   Transportation Needs: No Transportation Needs (7/1/2025)    PRAPARE - Transportation     Lack of Transportation (Medical): No     Lack of Transportation (Non-Medical): No   Recent Concern: Transportation Needs - Unmet Transportation Needs (4/17/2025)    PRAPARE - Transportation     Lack of Transportation (Medical): Yes     Lack of Transportation (Non-Medical): Yes   Physical Activity: Inactive (5/12/2025)    Exercise Vital Sign     Days of Exercise per Week: 0 days     Minutes of Exercise per Session: 0 min   Stress: Stress Concern Present (5/12/2025)    [employer] [place] [employer] - Occupational Stress Questionnaire     Feeling of Stress : To some extent   Housing Stability: Low Risk  (7/1/2025)    Housing Stability Vital Sign     Unable to Pay for Housing in the Last Year: No     Number of Times Moved in the Last Year: 1     Homeless in the Last Year: No     Review of Systems   Unable to perform ROS: Patient nonverbal     Objective:     Vital Signs (Most Recent):  Temp: 97.5 °F (36.4 °C) (07/07/25 2101)  Pulse: 75 (07/07/25  2101)  Resp: 11 (07/07/25 2101)  BP: 114/81 (07/07/25 2101)  SpO2: 100 % (07/07/25 2101) Vital Signs (24h Range):  Temp:  [97 °F (36.1 °C)-99.7 °F (37.6 °C)] 97.5 °F (36.4 °C)  Pulse:  [75-98] 75  Resp:  [1-21] 11  SpO2:  [97 %-100 %] 100 %  BP: ()/(55-85) 114/81     Weight: 59.9 kg (132 lb 0.9 oz)  Body mass index is 16.51 kg/m².    Estimated Creatinine Clearance: 131.7 mL/min (based on SCr of 0.6 mg/dL).     Physical Exam  Constitutional:       General: He is not in acute distress.     Appearance: He is underweight.      Comments: NGT   HENT:      Head: Normocephalic.   Eyes:      General: No scleral icterus.        Right eye: No discharge.         Left eye: No discharge.      Extraocular Movements: Extraocular movements intact.   Cardiovascular:      Rate and Rhythm: Normal rate and regular rhythm.      Pulses: Normal pulses.      Heart sounds: No murmur heard.     No friction rub. No gallop.   Pulmonary:      Effort: Pulmonary effort is normal. No respiratory distress.   Abdominal:      General: There is no distension.      Comments: PEG in place. C/D/I   Musculoskeletal:      Right lower leg: No edema.      Left lower leg: No edema.   Skin:     General: Skin is warm.      Coloration: Skin is not jaundiced.   Neurological:      Mental Status: He is alert.   Psychiatric:         Mood and Affect: Mood normal.         Behavior: Behavior normal.          Significant Labs:   Microbiology Results (last 7 days)       Procedure Component Value Units Date/Time    Culture, Respiratory with Gram Stain [[id number]]  (Abnormal)  (Susceptibility) Collected: 07/04/25 0833    Order Status: Completed Specimen: Respiratory from Sputum, Induced Updated: 07/07/25 0955     Respiratory Culture No S aureus or Pseudomonas isolated      Few Escherichia coli ESBL     Comment: with normal respiratory abundio        GRAM STAIN >10 Epithelial Cells/LPF      Many WBC seen      Rare Yeast      Many Gram Negative Rods      Many Gram  positive cocci    Blood culture x two cultures. Draw prior to antibiotics. [[id number]]  (Normal) Collected: 06/29/25 1805    Order Status: Completed Specimen: Blood from Peripheral, Antecubital, Left Updated: 07/04/25 2300     Blood Culture No Growth After 5 Days    Blood culture x two cultures. Draw prior to antibiotics. [[id number]]  (Normal) Collected: 06/29/25 1814    Order Status: Completed Specimen: Blood from Peripheral, Hand, Left Updated: 07/04/25 2300     Blood Culture No Growth After 5 Days          Pathology Results  (Last 10 years)                 07/23/20 1604  Specimen to Pathology, Surgery Gastrointestinal tract Final result    Narrative:  Pre-op Diagnosis: Chronic idiopathic constipation [K59.04]   Abdominal pain, unspecified abdominal location [R10.9]   Dysphagia, unspecified type [R13.10]   Procedure(s):   EGD (ESOPHAGOGASTRODUODENOSCOPY)   COLONOSCOPY   Number of specimens:   Jar 1; stomach bx r/o HP   Specimen total (fresh, frozen, permanent):->1               Significant Imaging: I have reviewed all pertinent imaging results/findings within the past 24 hours.

## 2025-07-08 NOTE — SUBJECTIVE & OBJECTIVE
Past Medical History:   Diagnosis Date    Anxiety     Cognitive communication disorder 05/10/2022    Nonverbal at baseline but alert    Degenerative motor system disease 2006    Dr. Wise in movement disorder clinic on 2/23/2015.    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    Multiple drug resistant organism (MDRO) culture positive     E.Coli ESBL PNA    Neurogenic bladder 01/16/2015    Seizure 02/22/2015    Spastic quadriplegia     Spinocerebellar atrophy        Past Surgical History:   Procedure Laterality Date    BACLOFEN PUMP IMPLANTATION      COLONOSCOPY N/A 7/23/2020    Procedure: COLONOSCOPY;  Surgeon: Ziyad Fitch MD;  Location: [facility];  Service: Endoscopy;  Laterality: N/A;    ESOPHAGOGASTRODUODENOSCOPY N/A 7/23/2020    Procedure: EGD (ESOPHAGOGASTRODUODENOSCOPY);  Surgeon: Ziyad Fitch MD;  Location: [facility];  Service: Endoscopy;  Laterality: N/A;    FLUOROSCOPIC URODYNAMIC STUDY N/A 11/27/2018    Procedure: URODYNAMIC STUDY, FLUOROSCOPIC;  Surgeon: Tanja Okeefe MD;  Location: [facility] OR [address] [place] [address];  Service: Urology;  Laterality: N/A;  1 hour    REATTACHMENT OF MUSCLE Bilateral 2/18/2022    Procedure: PTOSIS REPAIR OF BOTH EYES;  Surgeon: Krupa Rios MD;  Location: [facility] OR [address] [place] [address];  Service: Ophthalmology;  Laterality: Bilateral;    UPPER GASTROINTESTINAL ENDOSCOPY         Review of patient's allergies indicates:   Allergen Reactions    Penicillins      Hives and Itching  Tolerated zosyn- 7/1/2025    Allergen ext-venom-honey bee        Medications:  Medications Prior to Admission   Medication Sig    baclofen (LIORESAL) 20 MG tablet Take 1 tablet (20 mg total) by mouth 3 (three) times daily.    cetirizine (ZYRTEC) 10 MG tablet TAKE 1 TABLET BY MOUTH DAILY    dantrolene (DANTRIUM) 50 MG Cap TAKE 1 CAPSULE(50 MG) BY MOUTH THREE TIMES DAILY    dimethicone-zinc oxide 1-40 % Oint Apply to affected area twice  daily    donepeziL (ARICEPT) 5 MG tablet Take 1 tablet (5 mg total) by mouth every evening.    mirtazapine (REMERON) 15 MG tablet Take 1 tablet (15 mg total) by mouth every evening.    QUEtiapine (SEROQUEL) 50 MG tablet Take 1 tablet (50 mg total) by mouth every evening.     Antibiotics (From admission, onward)      Start     Stop Route Frequency Ordered    07/06/25 1545  ertapenem (INVANZ) 1 g in 0.9% NaCl 100 mL IVPB (MB+)         -- IV Every 24 hours (non-standard times) 07/06/25 1436    07/04/25 1015  mupirocin 2 % ointment         07/09/25 0859 Nasl 2 times daily 07/04/25 0900          Antifungals (From admission, onward)      None          Antivirals (From admission, onward)      None             Immunization History   Administered Date(s) Administered    COVID-19 MRNA, LN-S PF (MODERNA HALF 0.25 ML DOSE) 02/17/2022    COVID-19, MRNA, LN-S, PF (MODERNA FULL 0.5 ML DOSE) 02/09/2021, 03/09/2021, 07/22/2022    COVID-19, MRNA, LN-S, PF (Pfizer) (Purple Cap) 01/15/2021    DTP 03/04/1980, 05/20/1980, 07/22/1980, 07/28/1981, 04/24/1984    Influenza - Quadrivalent - PF *Preferred* (6 months and older) 02/26/2015, 09/24/2019    Influenza - Trivalent - Fluarix, Flulaval, Fluzone, Afluria - PF 02/26/2015    MMR 03/31/1981, 01/05/2005    OPV 03/04/1980, 05/20/1980, 07/22/1980, 07/28/1981, 04/24/1984    PPD Test 03/04/2015, 10/05/2018    Td (ADULT) 04/05/1994, 01/05/2005    Tdap 09/24/2019       Family History       Problem Relation (Age of Onset)    Cancer Mother    Depression Mother    Hypertension Mother    Multiple sclerosis Other    No Known Problems Brother, Son    Thyroid cancer Mother          Social History     Socioeconomic History    Marital status: Single   Tobacco Use    Smoking status: Never    Smokeless tobacco: Never   Substance and Sexual Activity    Alcohol use: Not Currently     Comment: social drinker, at times    Drug use: Not Currently    Sexual activity: Never     Social Drivers of Health      Financial Resource Strain: Medium Risk (7/1/2025)    Overall Financial Resource Strain (CARDIA)     Difficulty of Paying Living Expenses: Somewhat hard   Food Insecurity: No Food Insecurity (7/1/2025)    Hunger Vital Sign     Worried About Running Out of Food in the Last Year: Never true     Ran Out of Food in the Last Year: Never true   Transportation Needs: No Transportation Needs (7/1/2025)    PRAPARE - Transportation     Lack of Transportation (Medical): No     Lack of Transportation (Non-Medical): No   Recent Concern: Transportation Needs - Unmet Transportation Needs (4/17/2025)    PRAPARE - Transportation     Lack of Transportation (Medical): Yes     Lack of Transportation (Non-Medical): Yes   Physical Activity: Inactive (5/12/2025)    Exercise Vital Sign     Days of Exercise per Week: 0 days     Minutes of Exercise per Session: 0 min   Stress: Stress Concern Present (5/12/2025)    [employer] [place] [employer] - Occupational Stress Questionnaire     Feeling of Stress : To some extent   Housing Stability: Low Risk  (7/1/2025)    Housing Stability Vital Sign     Unable to Pay for Housing in the Last Year: No     Number of Times Moved in the Last Year: 1     Homeless in the Last Year: No     Review of Systems   Unable to perform ROS: Patient nonverbal     Objective:     Vital Signs (Most Recent):  Temp: 97.5 °F (36.4 °C) (07/07/25 2101)  Pulse: 75 (07/07/25 2101)  Resp: 11 (07/07/25 2101)  BP: 114/81 (07/07/25 2101)  SpO2: 100 % (07/07/25 2101) Vital Signs (24h Range):  Temp:  [97 °F (36.1 °C)-99.7 °F (37.6 °C)] 97.5 °F (36.4 °C)  Pulse:  [75-98] 75  Resp:  [1-21] 11  SpO2:  [97 %-100 %] 100 %  BP: ()/(55-85) 114/81     Weight: 59.9 kg (132 lb 0.9 oz)  Body mass index is 16.51 kg/m².    Estimated Creatinine Clearance: 131.7 mL/min (based on SCr of 0.6 mg/dL).     Physical Exam  Constitutional:       General: He is not in acute distress.     Appearance: He is underweight.      Comments: NGT    HENT:      Head: Normocephalic.   Eyes:      General: No scleral icterus.        Right eye: No discharge.         Left eye: No discharge.      Extraocular Movements: Extraocular movements intact.   Cardiovascular:      Rate and Rhythm: Normal rate and regular rhythm.      Pulses: Normal pulses.      Heart sounds: No murmur heard.     No friction rub. No gallop.   Pulmonary:      Effort: Pulmonary effort is normal. No respiratory distress.   Abdominal:      General: There is no distension.      Comments: PEG in place. C/D/I   Musculoskeletal:      Right lower leg: No edema.      Left lower leg: No edema.   Skin:     General: Skin is warm.      Coloration: Skin is not jaundiced.   Neurological:      Mental Status: He is alert.   Psychiatric:         Mood and Affect: Mood normal.         Behavior: Behavior normal.          Significant Labs:   Microbiology Results (last 7 days)       Procedure Component Value Units Date/Time    Culture, Respiratory with Gram Stain [[id number]]  (Abnormal)  (Susceptibility) Collected: 07/04/25 0833    Order Status: Completed Specimen: Respiratory from Sputum, Induced Updated: 07/07/25 0955     Respiratory Culture No S aureus or Pseudomonas isolated      Few Escherichia coli ESBL     Comment: with normal respiratory abundio        GRAM STAIN >10 Epithelial Cells/LPF      Many WBC seen      Rare Yeast      Many Gram Negative Rods      Many Gram positive cocci    Blood culture x two cultures. Draw prior to antibiotics. [[id number]]  (Normal) Collected: 06/29/25 1805    Order Status: Completed Specimen: Blood from Peripheral, Antecubital, Left Updated: 07/04/25 2300     Blood Culture No Growth After 5 Days    Blood culture x two cultures. Draw prior to antibiotics. [[id number]]  (Normal) Collected: 06/29/25 1814    Order Status: Completed Specimen: Blood from Peripheral, Hand, Left Updated: 07/04/25 2300     Blood Culture No Growth After 5 Days          Pathology Results  (Last 10 years)                  07/23/20 1604  Specimen to Pathology, Surgery Gastrointestinal tract Final result    Narrative:  Pre-op Diagnosis: Chronic idiopathic constipation [K59.04]   Abdominal pain, unspecified abdominal location [R10.9]   Dysphagia, unspecified type [R13.10]   Procedure(s):   EGD (ESOPHAGOGASTRODUODENOSCOPY)   COLONOSCOPY   Number of specimens:   Jar 1; stomach bx r/o HP   Specimen total (fresh, frozen, permanent):->1               Significant Imaging: I have reviewed all pertinent imaging results/findings within the past 24 hours.

## 2025-07-08 NOTE — NURSING
MICU DAILY GOALS     Family/Goals of care/Code Status   Code Status: Full Code    24H Vital Sign Range  Temp:  [96.3 °F (35.7 °C)-100.4 °F (38 °C)]   Pulse:  [73-98]   Resp:  [1-14]   BP: ()/(54-85)   SpO2:  [97 %-100 %]      Shift Events (include procedures and significant events)   No acute events throughout shift    AWAKE RASS: Goal -    Actual - RASS ([place] Agitation-Sedation Scale): alert and calm    Restraint necessity: Not necessary   BREATHE SBT: Not intubated    Coordinate A & B, analgesics/sedatives Pain: managed   SAT: Not intubated   Delirium CAM-ICU:     Early(intubated/ Progressive (non-intubated) Mobility MOVE Screen (INTUBATED ONLY): Not intubated    Activity: Activity Management: Rolling - L1   Feeding/Nutrition Diet order: Diet/Nutrition Received: tube feeding, Specialty Diet/Nutrition Received: high calorie, high protein   Thrombus DVT prophylaxis: VTE Core Measure: (SCDs) Sequential compression device initiated/maintained   HOB Elevation Head of Bed (HOB) Positioning: HOB elevated   Ulcer Prophylaxis GI: yes   Glucose control managed Glycemic Management: blood glucose monitored   Skin Skin assessment:     Sacrum intact/not altered? No  Heels intact/not altered? No  Surgical wound? Yes    CHECK ONE!   (no altered skin or altered skin) and sub boxes:  [] No Altered Skin Integrity Present    []Prevention Measures Documented    [x] Altered Skin Integrity Present or Discovered   [x] LDA already present in EPIC, daily doc completed              [] LDA added if not already in EPIC (describe/stage wound).               [] Wound Image Taken (required on admit,                   transfer/discharge and every Tuesday)    Wound Care Consulted? Yes   Bowel Function constipation    Indwelling Catheter Necessity      Urethral Catheter 06/30/25 0756 Temperature probe 16 Fr.-Reason for Continuing Urinary Catheterization: Urinary retention          De-escalation Antibiotics Yes        VS and assessment per  flow sheet, patient progressing towards goals as tolerated, plan of care reviewed with [unfilled] and family, all concerns addressed, will continue to monitor.

## 2025-07-09 PROBLEM — L89.154 DECUBITUS ULCER OF SACRAL REGION, STAGE 4: Status: ACTIVE | Noted: 2025-06-30

## 2025-07-09 LAB
ABSOLUTE EOSINOPHIL (OHS): 0.04 K/UL
ABSOLUTE MONOCYTE (OHS): 0.46 K/UL (ref 0.3–1)
ABSOLUTE NEUTROPHIL COUNT (OHS): 4.13 K/UL (ref 1.8–7.7)
ALBUMIN SERPL BCP-MCNC: 2 G/DL (ref 3.5–5.2)
ALP SERPL-CCNC: 64 UNIT/L (ref 40–150)
ALT SERPL W/O P-5'-P-CCNC: 16 UNIT/L (ref 10–44)
ANION GAP (OHS): 7 MMOL/L (ref 8–16)
AST SERPL-CCNC: 20 UNIT/L (ref 11–45)
BASOPHILS # BLD AUTO: 0.02 K/UL
BASOPHILS NFR BLD AUTO: 0.3 %
BILIRUB SERPL-MCNC: 0.1 MG/DL (ref 0.1–1)
BUN SERPL-MCNC: 16 MG/DL (ref 6–20)
CALCIUM SERPL-MCNC: 8.5 MG/DL (ref 8.7–10.5)
CHLORIDE SERPL-SCNC: 100 MMOL/L (ref 95–110)
CO2 SERPL-SCNC: 26 MMOL/L (ref 23–29)
CREAT SERPL-MCNC: 0.5 MG/DL (ref 0.5–1.4)
ERYTHROCYTE [DISTWIDTH] IN BLOOD BY AUTOMATED COUNT: 16.6 % (ref 11.5–14.5)
GFR SERPLBLD CREATININE-BSD FMLA CKD-EPI: >60 ML/MIN/1.73/M2
GLUCOSE SERPL-MCNC: 82 MG/DL (ref 70–110)
HCT VFR BLD AUTO: 31.6 % (ref 40–54)
HGB BLD-MCNC: 9.9 GM/DL (ref 14–18)
IMM GRANULOCYTES # BLD AUTO: 0.02 K/UL (ref 0–0.04)
IMM GRANULOCYTES NFR BLD AUTO: 0.3 % (ref 0–0.5)
LYMPHOCYTES # BLD AUTO: 1.18 K/UL (ref 1–4.8)
MAGNESIUM SERPL-MCNC: 2 MG/DL (ref 1.6–2.6)
MCH RBC QN AUTO: 26 PG (ref 27–31)
MCHC RBC AUTO-ENTMCNC: 31.3 G/DL (ref 32–36)
MCV RBC AUTO: 83 FL (ref 82–98)
NUCLEATED RBC (/100WBC) (OHS): 0 /100 WBC
OHS QRS DURATION: 74 MS
OHS QTC CALCULATION: 432 MS
PHOSPHATE SERPL-MCNC: 3.5 MG/DL (ref 2.7–4.5)
PLATELET # BLD AUTO: 240 K/UL (ref 150–450)
PMV BLD AUTO: 11.6 FL (ref 9.2–12.9)
POCT GLUCOSE: 100 MG/DL (ref 70–110)
POCT GLUCOSE: 78 MG/DL (ref 70–110)
POTASSIUM SERPL-SCNC: 4.8 MMOL/L (ref 3.5–5.1)
PROT SERPL-MCNC: 7.1 GM/DL (ref 6–8.4)
RBC # BLD AUTO: 3.81 M/UL (ref 4.6–6.2)
RELATIVE EOSINOPHIL (OHS): 0.7 %
RELATIVE LYMPHOCYTE (OHS): 20.2 % (ref 18–48)
RELATIVE MONOCYTE (OHS): 7.9 % (ref 4–15)
RELATIVE NEUTROPHIL (OHS): 70.6 % (ref 38–73)
SODIUM SERPL-SCNC: 133 MMOL/L (ref 136–145)
WBC # BLD AUTO: 5.85 K/UL (ref 3.9–12.7)

## 2025-07-09 PROCEDURE — 27000207 HC ISOLATION

## 2025-07-09 PROCEDURE — 63600175 PHARM REV CODE 636 W HCPCS: Performed by: INTERNAL MEDICINE

## 2025-07-09 PROCEDURE — 36415 COLL VENOUS BLD VENIPUNCTURE: CPT | Performed by: STUDENT IN AN ORGANIZED HEALTH CARE EDUCATION/TRAINING PROGRAM

## 2025-07-09 PROCEDURE — 11000001 HC ACUTE MED/SURG PRIVATE ROOM

## 2025-07-09 PROCEDURE — 25000003 PHARM REV CODE 250: Performed by: STUDENT IN AN ORGANIZED HEALTH CARE EDUCATION/TRAINING PROGRAM

## 2025-07-09 PROCEDURE — 63600175 PHARM REV CODE 636 W HCPCS: Performed by: STUDENT IN AN ORGANIZED HEALTH CARE EDUCATION/TRAINING PROGRAM

## 2025-07-09 PROCEDURE — 83735 ASSAY OF MAGNESIUM: CPT | Performed by: STUDENT IN AN ORGANIZED HEALTH CARE EDUCATION/TRAINING PROGRAM

## 2025-07-09 PROCEDURE — 25000003 PHARM REV CODE 250

## 2025-07-09 PROCEDURE — 84100 ASSAY OF PHOSPHORUS: CPT | Performed by: STUDENT IN AN ORGANIZED HEALTH CARE EDUCATION/TRAINING PROGRAM

## 2025-07-09 PROCEDURE — 85025 COMPLETE CBC W/AUTO DIFF WBC: CPT | Performed by: STUDENT IN AN ORGANIZED HEALTH CARE EDUCATION/TRAINING PROGRAM

## 2025-07-09 PROCEDURE — 25000003 PHARM REV CODE 250: Performed by: HOSPITALIST

## 2025-07-09 PROCEDURE — 82040 ASSAY OF SERUM ALBUMIN: CPT | Performed by: STUDENT IN AN ORGANIZED HEALTH CARE EDUCATION/TRAINING PROGRAM

## 2025-07-09 PROCEDURE — 93005 ELECTROCARDIOGRAM TRACING: CPT

## 2025-07-09 PROCEDURE — 93010 ELECTROCARDIOGRAM REPORT: CPT | Mod: ,,, | Performed by: INTERNAL MEDICINE

## 2025-07-09 RX ORDER — MIRTAZAPINE 15 MG/1
15 TABLET, FILM COATED ORAL NIGHTLY
Status: DISCONTINUED | OUTPATIENT
Start: 2025-07-09 | End: 2025-07-22 | Stop reason: HOSPADM

## 2025-07-09 RX ORDER — AMOXICILLIN 250 MG
1 CAPSULE ORAL 2 TIMES DAILY
Status: DISCONTINUED | OUTPATIENT
Start: 2025-07-09 | End: 2025-07-18

## 2025-07-09 RX ORDER — BACLOFEN 10 MG/1
20 TABLET ORAL 3 TIMES DAILY
Status: DISCONTINUED | OUTPATIENT
Start: 2025-07-09 | End: 2025-07-22 | Stop reason: HOSPADM

## 2025-07-09 RX ORDER — POLYETHYLENE GLYCOL 3350 17 G/17G
17 POWDER, FOR SOLUTION ORAL 2 TIMES DAILY
Status: DISCONTINUED | OUTPATIENT
Start: 2025-07-09 | End: 2025-07-18

## 2025-07-09 RX ORDER — CETIRIZINE HYDROCHLORIDE 5 MG/1
10 TABLET ORAL DAILY
Status: DISCONTINUED | OUTPATIENT
Start: 2025-07-10 | End: 2025-07-15

## 2025-07-09 RX ORDER — DANTROLENE SODIUM 25 MG/1
50 CAPSULE ORAL 3 TIMES DAILY
Status: DISCONTINUED | OUTPATIENT
Start: 2025-07-09 | End: 2025-07-22 | Stop reason: HOSPADM

## 2025-07-09 RX ORDER — DONEPEZIL HYDROCHLORIDE 5 MG/1
5 TABLET, FILM COATED ORAL NIGHTLY
Status: DISCONTINUED | OUTPATIENT
Start: 2025-07-09 | End: 2025-07-22 | Stop reason: HOSPADM

## 2025-07-09 RX ORDER — ELECTROLYTES/DEXTROSE
200 SOLUTION, ORAL ORAL
Status: COMPLETED | OUTPATIENT
Start: 2025-07-09 | End: 2025-07-10

## 2025-07-09 RX ORDER — QUETIAPINE FUMARATE 25 MG/1
50 TABLET, FILM COATED ORAL NIGHTLY
Status: DISCONTINUED | OUTPATIENT
Start: 2025-07-09 | End: 2025-07-15

## 2025-07-09 RX ADMIN — SODIUM CHLORIDE 1 G: 9 INJECTION, SOLUTION INTRAVENOUS at 03:07

## 2025-07-09 RX ADMIN — BACLOFEN 20 MG: 10 TABLET ORAL at 08:07

## 2025-07-09 RX ADMIN — BACLOFEN 20 MG: 10 TABLET ORAL at 03:07

## 2025-07-09 RX ADMIN — DONEPEZIL HYDROCHLORIDE 5 MG: 5 TABLET, FILM COATED ORAL at 08:07

## 2025-07-09 RX ADMIN — SENNOSIDES AND DOCUSATE SODIUM 1 TABLET: 50; 8.6 TABLET ORAL at 08:07

## 2025-07-09 RX ADMIN — Medication 200 ML: at 08:07

## 2025-07-09 RX ADMIN — ERYTHROMYCIN: 5 OINTMENT OPHTHALMIC at 08:07

## 2025-07-09 RX ADMIN — DANTROLENE SODIUM 50 MG: 25 CAPSULE ORAL at 09:07

## 2025-07-09 RX ADMIN — POLYETHYLENE GLYCOL 3350 17 G: 17 POWDER, FOR SOLUTION ORAL at 08:07

## 2025-07-09 RX ADMIN — CETIRIZINE HYDROCHLORIDE 10 MG: 10 TABLET, FILM COATED ORAL at 08:07

## 2025-07-09 RX ADMIN — MIRTAZAPINE 15 MG: 15 TABLET, FILM COATED ORAL at 08:07

## 2025-07-09 RX ADMIN — Medication 200 ML: at 06:07

## 2025-07-09 RX ADMIN — DANTROLENE SODIUM 50 MG: 25 CAPSULE ORAL at 08:07

## 2025-07-09 RX ADMIN — Medication 200 ML: at 03:07

## 2025-07-09 RX ADMIN — QUETIAPINE FUMARATE 50 MG: 25 TABLET ORAL at 08:07

## 2025-07-09 RX ADMIN — LACTULOSE 10 G: 20 SOLUTION ORAL at 08:07

## 2025-07-09 RX ADMIN — DANTROLENE SODIUM 50 MG: 25 CAPSULE ORAL at 04:07

## 2025-07-09 RX ADMIN — Medication 200 ML: at 12:07

## 2025-07-09 RX ADMIN — ENOXAPARIN SODIUM 30 MG: 30 INJECTION SUBCUTANEOUS at 04:07

## 2025-07-09 NOTE — ASSESSMENT & PLAN NOTE
UMN predominance with diffuse limb spasticity. Slowly progressive. Fully-reliant on mother, others for self care and feeding and cleaning and toileting. Respiratory function is poor per FVC .     -Continue home donepezil for neuroprotection  -Continue home dantrolene and baclofen for spacticity  -- turn q2h with aspiration precautions  -- 7/9 romo DCd  -- f/u with mom and CM if he needs HH or SNF

## 2025-07-09 NOTE — SUBJECTIVE & OBJECTIVE
Interval History/Significant Events: NAEON and VSS. Glucose stable with increased feeds. Fever of 100.4 overnight though without symptoms. Continuing current management.     Review of Systems   Unable to perform ROS: Patient nonverbal     Objective:     Vital Signs (Most Recent):  Temp: 98.4 °F (36.9 °C) (07/08/25 1607)  Pulse: 85 (07/08/25 1909)  Resp: 11 (07/08/25 1909)  BP: 102/75 (07/08/25 1800)  SpO2: 100 % (07/08/25 1909) Vital Signs (24h Range):  Temp:  [96.3 °F (35.7 °C)-100.4 °F (38 °C)] 98.4 °F (36.9 °C)  Pulse:  [73-97] 85  Resp:  [2-29] 11  SpO2:  [98 %-100 %] 100 %  BP: ()/(54-85) 102/75   Weight: 59.9 kg (132 lb 0.9 oz)  Body mass index is 16.51 kg/m².      Intake/Output Summary (Last 24 hours) at 7/8/2025 1942  Last data filed at 7/8/2025 1616  Gross per 24 hour   Intake 2325 ml   Output 2700 ml   Net -375 ml          Physical Exam  Constitutional:       General: He is not in acute distress.     Appearance: He is underweight.      Comments: NGT   HENT:      Head: Normocephalic.   Eyes:      General: No scleral icterus.        Right eye: No discharge.         Left eye: No discharge.      Extraocular Movements: Extraocular movements intact.   Cardiovascular:      Rate and Rhythm: Normal rate and regular rhythm.      Pulses: Normal pulses.      Heart sounds: No murmur heard.     No friction rub. No gallop.   Pulmonary:      Effort: Pulmonary effort is normal. No respiratory distress.   Abdominal:      General: There is no distension.      Comments: PEG in place. C/D/I   Musculoskeletal:      Right lower leg: No edema.      Left lower leg: No edema.   Skin:     General: Skin is warm.      Coloration: Skin is not jaundiced.   Neurological:      Mental Status: He is alert.   Psychiatric:         Mood and Affect: Mood normal.         Behavior: Behavior normal.            Vents:     Lines/Drains/Airways       Drain  Duration                  Gastrostomy/Enterostomy 07/05/25 1136 Gastrostomy tube w/  balloon LUQ feeding 3 days         Urethral Catheter 07/08/25 1145 Non-latex <1 day              Peripheral Intravenous Line  Duration             Peripheral IV Single Lumen 06/30/25 1130 18 G 1 3/4 in Anterior;Right Upper Arm 8 days    Peripheral IV 07/05/25 0336 18 G 1 1/4 in Anterior;Left Forearm 3 days                  Significant Labs:    CBC/Anemia Profile:  Recent Labs   Lab 07/07/25  0319 07/08/25  0249   WBC 8.49 7.14   HGB 8.9* 8.7*   HCT 28.3* 28.3*    246   MCV 83 83   RDW 16.3* 16.2*        Chemistries:  Recent Labs   Lab 07/07/25 0319 07/08/25  0249    134*   K 4.5 5.0    98   CO2 28 28   BUN 19 18   CREATININE 0.6 0.5   CALCIUM 8.4* 7.9*   ALBUMIN 1.8* 1.9*   PROT 6.4 6.6   BILITOT 0.2 0.1   ALKPHOS 56 61   ALT 13 13   AST 15 12   MG 2.0 1.9   PHOS 3.8 3.5       All pertinent labs within the past 24 hours have been reviewed.    Significant Imaging:  I have reviewed all pertinent imaging results/findings within the past 24 hours.

## 2025-07-09 NOTE — ASSESSMENT & PLAN NOTE
Nutrition consulted. Most recent weight and BMI monitored-     Measurements:  Wt Readings from Last 1 Encounters:   07/07/25 59.9 kg (132 lb 0.9 oz)   Body mass index is 16.51 kg/m².    Patient has been screened and assessed by RD.    Malnutrition Type:  Context: chronic illness  Level: severe    Malnutrition Characteristic Summary:  Subcutaneous Fat (Malnutrition): severe depletion  Muscle Mass (Malnutrition): severe depletion    Interventions/Recommendations (treatment strategy):

## 2025-07-09 NOTE — ASSESSMENT & PLAN NOTE
"Admit with hemoglobin 7.8 Baseline 10      Lab Results   Component Value Date    FERRITIN 50 09/13/2023     No results found for: "FOLATE"  Lab Results   Component Value Date    [id number] 601 08/25/2020     No results found for: "RETICCTPCT"      Plan:   -   Lab Results   Component Value Date    HGB 8.7 (L) 07/08/2025     - Daily CBC   - Transfuse hgb <7    "

## 2025-07-09 NOTE — ASSESSMENT & PLAN NOTE
"This patient does have evidence of infective focus  My overall impression is sepsis without organ dysfunction.  Source: Respiratory Infection  Antibiotics given-   Antibiotics (72h ago, onward)      Start     Stop Route Frequency Ordered    07/08/25 1545  ertapenem (INVANZ) 1 g in 0.9% NaCl 100 mL IVPB (MB+)         07/13/25 1544 IV Every 24 hours (non-standard times) 07/08/25 1017    07/08/25 0415  erythromycin 5 mg/gram (0.5 %) ophthalmic ointment         -- Both Eyes Nightly 07/08/25 0301    07/04/25 1015  mupirocin 2 % ointment         07/09/25 0859 Nasl 2 times daily 07/04/25 0900          Latest lactate reviewed-  No results for input(s): "LACTATE", "POCLAC" in the last 72 hours.    Will Not start   Source control achieved by:     Mr. Moreno is a 44yoM with PMHx significant for ALS, neuromuscular dysfunction of the bladder, sacral ulcers, and spinal cerebellar ataxia who was admitted recently admitted (5/9/2025) for severe dehydration, electrolyte disturbances, and covid who presented to the ED with EMS and c/o AMS and hypoxia. Nonverbal at baseline but apparently is usually more alert; has been less responsive and interactive x1 day. Upon EMS arrival, was obtunded and sating 74%. They gave him solumedrol and duonebs en route which improved his oxygen status to >90%. Mother at bedside reports increased difficulty with swallowing and decreased PO intake.       While in the ED, labs and imaging significant for Na 149, K 5.4, Cl 111, CO2 21, glucose 149, BUN 38, creat 1.0, albumin 2.2, ALP 80, AST/ALT 34/23, anion gap 17, procal 8.42, BNP <10, WBC 7.11, H/H 12/40.5, plts 315, covid/flu negative, .6, VBG 7.349/47.1/44.5/23.9, istat LA 4.9->7.4->4.2 (7.4 likely error?). Chest xray with patchy opacities bilaterally, predominant within the bibasilar regions but also within the left lung apex and the left mid-lung; compatible with multifocal pneumonia. Started on BSA by the ED; vanc and cefepime.     - " Completed CAP coverage 7/3. Increased secretions on 7/4. Started on BS antibiotics.    - Resp Cx (7/5) positive for Ecoli ESBL. Zosyn switched to Ertapenem, continue until July 12th   - ID consulted, appreciate recs  - Bcx NGTD.   - Recommend deep suction per RT  - Pulmonary hygiene   - Maintain spo2 >90%; wean fio2 as tolerated  - Continuous telemetry and spO2 monitoring  - Neuro checks  - Delirium/aspiration/fall precautions  - Recommend Wound Care for sacral and lower extremity wounds

## 2025-07-09 NOTE — ASSESSMENT & PLAN NOTE
Resolved  Upon EMS arrival, the patient was obtunded and hypoxic with an oxygen saturation of 74%.  .   Signs/symptoms of respiratory failure include- tachypnea and lethargy. Contributing diagnoses includes - Pneumonia Labs and images were reviewed. Patient Has recent ABG, which has been reviewed. Will treat underlying causes and adjust management of respiratory failure as follows- given abx.

## 2025-07-09 NOTE — PROGRESS NOTES
[facility] - Medical ICU  Critical Care Medicine  Progress Note    Patient Name: Lisa Moreno  MRN: [id number]  Admission Date: 6/29/2025  Hospital Length of Stay: 9 days  Code Status: Full Code  Attending Provider: Duncan Connor MD  Primary Care Provider: John Nixon II, MD   Principal Problem: Pneumonia due to E. coli    Subjective:     HPI:  Mr. Moreno is a 44yoM with PMHx significant for ALS, neuromuscular dysfunction of the bladder, sacral ulcers, and spinal cerebellar ataxia who was admitted recently admitted (5/9/2025) for severe dehydration, electrolyte disturbances, and covid who presented to the ED with EMS and c/o AMS and hypoxia. Nonverbal at baseline but apparently is usually more alert; has been less responsive and interactive x1 day. Upon EMS arrival, was obtunded and sating 74%. They gave him solumedrol and duonebs en route which improved his oxygen status to >90%. Mother at bedside reports increased difficulty with swallowing and decreased PO intake.       While in the ED, labs and imaging significant for Na 149, K 5.4, Cl 111, CO2 21, glucose 149, BUN 38, creat 1.0, albumin 2.2, ALP 80, AST/ALT 34/23, anion gap 17, procal 8.42, BNP <10, WBC 7.11, H/H 12/40.5, plts 315, covid/flu negative, .6, VBG 7.349/47.1/44.5/23.9, istat LA 4.9->7.4->4.2 (7.4 likely error?). Chest xray with patchy opacities bilaterally, predominant within the bibasilar regions but also within the left lung apex and the left mid-lung; compatible with multifocal pneumonia. Started on BSA by the ED; vanc and cefepime. [facility] consulted for sepsis. During first examination the patient was stable with MAPs >65 and Lactic acid down trended to 2.6. Around 6 am of 6/30 [place] was called hypotension with MAPs in the 50. Pt was started on levophed and was transferred to MICU.     Hospital/ICU Course:  Admitted to the MICU for septic shock secondary to multifocal pneumonia. Initially requiring pressors but weaned. Started  on broad spectrum antibiotics (Zosyn). Afebrile. Zosyn de-escalated to Ceftriaxone and Azithromycin for CAP coverage. SLP consulted for dysphagia. FEES performed which showed residual puree. Recommend NPO. PEG placed with IR on 7/5. Increased secretions noted on exam s/p completion of antibiotic course of CAP coverage. Restarted patient on Vancomycin and Zosyn. Respiratory culture with many gram negative rods. Vancomycin discontinued. Continued on Zosyn. Respiratory cx positive for E.Coli ESBL. Started on Ertapenem, Zosyn discontinued. ID consulted for additional recommendations. SLP re-consulted for swallowing evaluation in the setting of new PEG tube. Able to have small occasional pleasure feeds of puree and honey thick liquids via tsp. Stable for floor on 7/7.     Interval History/Significant Events: NAEON and VSS. Glucose stable with increased feeds. Fever of 100.4 overnight though without symptoms. Continuing current management.     Review of Systems   Unable to perform ROS: Patient nonverbal     Objective:     Vital Signs (Most Recent):  Temp: 98.4 °F (36.9 °C) (07/08/25 1607)  Pulse: 85 (07/08/25 1909)  Resp: 11 (07/08/25 1909)  BP: 102/75 (07/08/25 1800)  SpO2: 100 % (07/08/25 1909) Vital Signs (24h Range):  Temp:  [96.3 °F (35.7 °C)-100.4 °F (38 °C)] 98.4 °F (36.9 °C)  Pulse:  [73-97] 85  Resp:  [2-29] 11  SpO2:  [98 %-100 %] 100 %  BP: ()/(54-85) 102/75   Weight: 59.9 kg (132 lb 0.9 oz)  Body mass index is 16.51 kg/m².      Intake/Output Summary (Last 24 hours) at 7/8/2025 1942  Last data filed at 7/8/2025 1616  Gross per 24 hour   Intake 2325 ml   Output 2700 ml   Net -375 ml          Physical Exam  Constitutional:       General: He is not in acute distress.     Appearance: He is underweight.      Comments: NGT   HENT:      Head: Normocephalic.   Eyes:      General: No scleral icterus.        Right eye: No discharge.         Left eye: No discharge.      Extraocular Movements: Extraocular movements  "intact.   Cardiovascular:      Rate and Rhythm: Normal rate and regular rhythm.      Pulses: Normal pulses.      Heart sounds: No murmur heard.     No friction rub. No gallop.   Pulmonary:      Effort: Pulmonary effort is normal. No respiratory distress.   Abdominal:      General: There is no distension.      Comments: PEG in place. C/D/I   Musculoskeletal:      Right lower leg: No edema.      Left lower leg: No edema.   Skin:     General: Skin is warm.      Coloration: Skin is not jaundiced.   Neurological:      Mental Status: He is alert.   Psychiatric:         Mood and Affect: Mood normal.         Behavior: Behavior normal.            Vents:     Lines/Drains/Airways       Drain  Duration                  Gastrostomy/Enterostomy 07/05/25 1136 Gastrostomy tube w/ balloon LUQ feeding 3 days         Urethral Catheter 07/08/25 1145 Non-latex <1 day              Peripheral Intravenous Line  Duration             Peripheral IV Single Lumen 06/30/25 1130 18 G 1 3/4 in Anterior;Right Upper Arm 8 days    Peripheral IV 07/05/25 0336 18 G 1 1/4 in Anterior;Left Forearm 3 days                  Significant Labs:    CBC/Anemia Profile:  Recent Labs   Lab 07/07/25  0319 07/08/25  0249   WBC 8.49 7.14   HGB 8.9* 8.7*   HCT 28.3* 28.3*    246   MCV 83 83   RDW 16.3* 16.2*        Chemistries:  Recent Labs   Lab 07/07/25  0319 07/08/25  0249    134*   K 4.5 5.0    98   CO2 28 28   BUN 19 18   CREATININE 0.6 0.5   CALCIUM 8.4* 7.9*   ALBUMIN 1.8* 1.9*   PROT 6.4 6.6   BILITOT 0.2 0.1   ALKPHOS 56 61   ALT 13 13   AST 15 12   MG 2.0 1.9   PHOS 3.8 3.5       All pertinent labs within the past 24 hours have been reviewed.    Significant Imaging:  I have reviewed all pertinent imaging results/findings within the past 24 hours.    ABG  No results for input(s): "PH", "PO2", "PCO2", "HCO3", "BE" in the last 168 hours.  Assessment/Plan:     Neuro  Acute encephalopathy  Likely 2/2 sepsis. Resolving.     - see plan for " "sepsis    ALS (amyotrophic lateral sclerosis)  Hx of ALS w/diffuse spacticity/weakness + dysphagia     - continue baclofen and dantrolene    Spinocerebellar ataxia  - Continue baclofen and dantrolene    Spastic diplegia  -continue home baclofen    Psychiatric  Insomnia secondary to depression with anxiety  Continue quetiapine and mirtazapine       Pulmonary  * Pneumonia due to E. coli  - see plan for sepsis    Renal/  Hypernatremia  . Hypernatremia is likely due to Dehydration.     Plan  - Aim to correct the sodium by 8-10mEq in 24 hours.   - Plan to correct their hypernatremia with Select IV fluids: LR at a rate of 100 ml/hr.   - Will plan to trend the patient's sodium: Daily       ID  Septic shock  This patient does have evidence of infective focus  My overall impression is sepsis without organ dysfunction.  Source: Respiratory Infection  Antibiotics given-   Antibiotics (72h ago, onward)      Start     Stop Route Frequency Ordered    07/08/25 1545  ertapenem (INVANZ) 1 g in 0.9% NaCl 100 mL IVPB (MB+)         07/13/25 1544 IV Every 24 hours (non-standard times) 07/08/25 1017    07/08/25 0415  erythromycin 5 mg/gram (0.5 %) ophthalmic ointment         -- Both Eyes Nightly 07/08/25 0301    07/04/25 1015  mupirocin 2 % ointment         07/09/25 0859 Nasl 2 times daily 07/04/25 0900          Latest lactate reviewed-  No results for input(s): "LACTATE", "POCLAC" in the last 72 hours.    Will Not start   Source control achieved by:     Mr. Moreno is a 44yoM with PMHx significant for ALS, neuromuscular dysfunction of the bladder, sacral ulcers, and spinal cerebellar ataxia who was admitted recently admitted (5/9/2025) for severe dehydration, electrolyte disturbances, and covid who presented to the ED with EMS and c/o AMS and hypoxia. Nonverbal at baseline but apparently is usually more alert; has been less responsive and interactive x1 day. Upon EMS arrival, was obtunded and sating 74%. They gave him solumedrol " "and duonebs en route which improved his oxygen status to >90%. Mother at bedside reports increased difficulty with swallowing and decreased PO intake.       While in the ED, labs and imaging significant for Na 149, K 5.4, Cl 111, CO2 21, glucose 149, BUN 38, creat 1.0, albumin 2.2, ALP 80, AST/ALT 34/23, anion gap 17, procal 8.42, BNP <10, WBC 7.11, H/H 12/40.5, plts 315, covid/flu negative, .6, VBG 7.349/47.1/44.5/23.9, istat LA 4.9->7.4->4.2 (7.4 likely error?). Chest xray with patchy opacities bilaterally, predominant within the bibasilar regions but also within the left lung apex and the left mid-lung; compatible with multifocal pneumonia. Started on BSA by the ED; vanc and cefepime.     - Completed CAP coverage 7/3. Increased secretions on 7/4. Started on BS antibiotics.    - Resp Cx (7/5) positive for Ecoli ESBL. Zosyn switched to Ertapenem, continue until July 12th   - ID consulted, appreciate recs  - Bcx NGTD.   - Recommend deep suction per RT  - Pulmonary hygiene   - Maintain spo2 >90%; wean fio2 as tolerated  - Continuous telemetry and spO2 monitoring  - Neuro checks  - Delirium/aspiration/fall precautions  - Recommend Wound Care for sacral and lower extremity wounds    Oncology  Anemia  Admit with hemoglobin 7.8 Baseline 10      Lab Results   Component Value Date    FERRITIN 50 09/13/2023     No results found for: "FOLATE"  Lab Results   Component Value Date    [id number] 601 08/25/2020     No results found for: "RETICCTPCT"      Plan:   -   Lab Results   Component Value Date    HGB 8.7 (L) 07/08/2025     - Daily CBC   - Transfuse hgb <7      Endocrine  Severe protein-calorie malnutrition  Nutrition consulted. Most recent weight and BMI monitored-     Measurements:  Wt Readings from Last 1 Encounters:   07/07/25 59.9 kg (132 lb 0.9 oz)   Body mass index is 16.51 kg/m².    Patient has been screened and assessed by RD.    Malnutrition Type:  Context: chronic illness  Level: severe    Malnutrition " Characteristic Summary:  Subcutaneous Fat (Malnutrition): severe depletion  Muscle Mass (Malnutrition): severe depletion    Interventions/Recommendations (treatment strategy):         GI  Oropharyngeal dysphagia  Secondary to ALS/unknown neuromuscular disorder.     - SLP evaluated, failed swallow eval. Recommend NPO and PEG tube evaluation.   - IR consulted, appreciate recs   - Plan for PEG tube placement with IR on 7/5.     Orthopedic  Pressure injury of deep tissue of heel  L Heel with black eschar. R Heel pressure ulcer with scant serosanguineous drainage. Photos uploaded into chart.     - Wound care following, appreciate recs  - Podiatry consulted, appreciate recs    Sacral decubitus ulcer, stage III  - Wound care consulted, appreciate recs  - General Surgery consulted for possible debridement, appreciate recs   - No indication for inpatient debridement          Critical Care Daily Checklist:     A: Awake: RASS Goal/Actual Goal:    Actual:     B: Spontaneous Breathing Trial Performed?    C: SAT & SBT Coordinated?  N/A                      D: Delirium: CAM-ICU    E: Early Mobility Performed? No   F: Feeding Goal: Goals: Meet % EEN/EPN by next RD follow-up  Status: Nutrition Goal Status: new       Current Diet Order   Procedures    Diet NPO      AS: Analgesia/Sedation N/A   T: Thromboembolic Prophylaxis Lovenox   H: HOB > 300 Yes   U: Stress Ulcer Prophylaxis (if needed) PPI   G: Glucose Control 140-180   B: Bowel Function Unmeasured Stool Occurrence: 1   I: Indwelling Catheter (Lines & Zeng) Necessity PIV x2, Gastrostomy/Enterostomy   D: De-escalation of Antimicrobials/Pharmacotherapies Ertapenem     Plan for the day/ETD Monitor blood glucose     Code Status:  Family/Goals of Care: Full Code            Stable for floor       Critical care was time spent personally by me on the following activities: development of treatment plan with patient or surrogate and bedside caregivers, discussions with consultants,  evaluation of patient's response to treatment, examination of patient, ordering and performing treatments and interventions, ordering and review of laboratory studies, ordering and review of radiographic studies, pulse oximetry, re-evaluation of patient's condition. This critical care time did not overlap with that of any other provider or involve time for any procedures.     Benny Najera MD  Critical Care Medicine  [facility]

## 2025-07-09 NOTE — ASSESSMENT & PLAN NOTE
Has a wound VAC in place  General Surgery consulted for possible debridement, appreciate recs.  No indication for inpatient debridement  - Wound care consulted, appreciate recs

## 2025-07-09 NOTE — PROGRESS NOTES
[facility] - Acute Medical Stepdown  Wound Care    Patient Name:  Lisa Moreno   MRN:  [id number]  Date: 7/9/2025  Diagnosis: Pneumonia due to E. coli    History:     Past Medical History:   Diagnosis Date    Anxiety     Cognitive communication disorder 05/10/2022    Nonverbal at baseline but alert    Degenerative motor system disease 2006    Dr. Wise in movement disorder clinic on 2/23/2015.    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    Multiple drug resistant organism (MDRO) culture positive     E.Coli ESBL PNA    Neurogenic bladder 01/16/2015    Seizure 02/22/2015    Spastic quadriplegia     Spinocerebellar atrophy        Social History[1]    Precautions:     Allergies as of 06/29/2025 - Reviewed 06/29/2025   Allergen Reaction Noted    Penicillins  01/14/2015    Allergen ext-venom-honey bee  05/10/2022       WOC Assessment Details/Treatment     Patient seen for sacral wound vac placement. Wound vac applied with urgotul as contact layer and for antimicrobial properties. 1 medium black sponge applied and covered with drape. Good seal achieved with no evidence of leakage and continuous suction at 125mmHg. After discussion with patient's mother, plan for wound vac changes on Monday, Wednesday, Friday schedule. Will continue to follow.               Reviewed chart for this encounter.  See flowsheet for findings.           Discussed POC with patient and primary nurse.  See EMR for orders and patient education.     Bedside nursing to continue care and monitoring.  Bedside to maintain pressure injury prevention interventions.          07/09/25 1000   WOCN Assessment   WOCN Total Time (mins) 60   Visit Date 07/09/25   Visit Time 1000   Consult Type Follow Up   WOCN Speciality Wound   WOCN List wound vac   Intervention assessed;changed;applied;chart review;coordination of care;orders   Teaching on-going   Skin Interventions   Pressure Reduction  Techniques positioned off wounds   Skin Protection incontinence pads utilized   Positioning   Body Position turned;right   Head of Bed (HOB) Positioning HOB elevated   Positioning/Transfer Devices pillows        Wound 04/23/25 1537 Pressure Injury Sacral spine #4   Date First Assessed/Time First Assessed: 04/23/25 1537   Present on Original Admission: Yes  Primary Wound Type: Pressure Injury  Location: Sacral spine  Wound Number: #4  Is this injury device related?: No   Pressure Injury Stage 4   Dressing Appearance Moist drainage   Drainage Amount Scant   Drainage Characteristics/Odor Serosanguineous   Appearance Pink;Red   Tissue loss description Full thickness   Care Cleansed with:;Antimicrobial agent  (Vashe)   Dressing Change Due 07/11/25        Negative Pressure Wound Therapy  07/03/25 1420   Placement Date/Time: 07/03/25 1420   Location: Sacral Spine   NPWT Type Vacuum Therapy   Therapy Setting NPWT Continuous therapy   Pressure Setting NPWT 125 mmHg   Sponges Inserted NPWT Black;1   Sponges Removed NPWT Black;1       Orders placed.   Richard ARGUETA, RN, [facility]  07/09/2025         [1]   Social History  Socioeconomic History    Marital status: Single   Tobacco Use    Smoking status: Never    Smokeless tobacco: Never   Substance and Sexual Activity    Alcohol use: Not Currently     Comment: social drinker, at times    Drug use: Not Currently    Sexual activity: Never     Social Drivers of Health     Financial Resource Strain: Medium Risk (7/1/2025)    Overall Financial Resource Strain (CARDIA)     Difficulty of Paying Living Expenses: Somewhat hard   Food Insecurity: No Food Insecurity (7/1/2025)    Hunger Vital Sign     Worried About Running Out of Food in the Last Year: Never true     Ran Out of Food in the Last Year: Never true   Transportation Needs: No Transportation Needs (7/1/2025)    PRAPARE - Transportation     Lack of Transportation (Medical): No     Lack of Transportation (Non-Medical): No    Recent Concern: Transportation Needs - Unmet Transportation Needs (4/17/2025)    PRAPARE - Transportation     Lack of Transportation (Medical): Yes     Lack of Transportation (Non-Medical): Yes   Physical Activity: Inactive (5/12/2025)    Exercise Vital Sign     Days of Exercise per Week: 0 days     Minutes of Exercise per Session: 0 min   Stress: Stress Concern Present (5/12/2025)    [employer] [place] [employer] - Occupational Stress Questionnaire     Feeling of Stress : To some extent   Housing Stability: Low Risk  (7/1/2025)    Housing Stability Vital Sign     Unable to Pay for Housing in the Last Year: No     Number of Times Moved in the Last Year: 1     Homeless in the Last Year: No

## 2025-07-09 NOTE — PROGRESS NOTES
[facility] Medicine  Progress Note    Patient Name: Lisa Moreno  MRN: [id number]  Patient Class: IP- Inpatient   Admission Date: 6/29/2025  Length of Stay: 10 days  Attending Physician: Sean Adam MD  Primary Care Provider: John Nixon II, MD        Subjective     Principal Problem:Pneumonia due to E. coli        HPI:  Mr. Moreno is a 44-year-old male with a medical history including ALS, spinal cerebellar ataxia, neuromuscular bladder dysfunction, and sacral pressure ulcers. He was recently hospitalized on 5/9/2025 for severe dehydration, electrolyte abnormalities, and COVID-19 infection. He now presents to the ED via EMS with altered mental status and hypoxia. The patient is nonverbal at baseline but is typically more alert and interactive. History was provided by his mother, who reported that he had been noticeably less responsive over the past day and had a decreased oral intake. She denied any fever, shortness of breath, nausea, vomiting, or diarrhea.    Upon EMS arrival, the patient was obtunded and hypoxic with an oxygen saturation of 74%. He was treated en route with Solu-Medrol and DuoNebs, resulting in improvement of his oxygen saturation to above 90%.    In the ED, patient was obtunded but stabilized with improved oxygenation after initial EMS interventions. Chest X-ray revealed patchy bilateral opacities, most prominent in the bibasilar regions, as well as in the left apex and mid-lung zones--findings consistent with multifocal pneumonia.  He received 2 L of IV fluids, IV cefepime and vancomycin and admitted to hospital medicine for further management.    Overview/Hospital Course:  Treated for septic shock in MICU from PNA.  Got PEG 7/5.    7/5/2025 patient deemed stable for med floor  7/9/2025 pt arrived to floor    See Assessment and Plan below.      Interval History:     Information from mom and RN    Symptoms:  Improved breathing and comfort.  Mom  thinks his skin looks a bit sunken as if dehydrated. We are not sure if he got his enteral water boluses.    Diet:  tolerating tube feeds  Activity level:   Normal for him with assistance.   Pain:  No pain.       Review of Systems   Constitutional:  Negative for fever.   Respiratory:  Negative for cough and shortness of breath.    Cardiovascular:  Negative for chest pain and leg swelling.   Gastrointestinal:  Negative for abdominal pain.     Objective:     Vital Signs (Most Recent):  Temp:  (Couldn't get temp, nurse notified) (07/09/25 1220)  Pulse: 83 (07/09/25 1557)  Resp: 12 (07/09/25 1220)  BP: 99/69 (07/09/25 1220)  SpO2: 100 % (07/09/25 1557) Vital Signs (24h Range):  Temp:  [96.6 °F (35.9 °C)-97.1 °F (36.2 °C)] 96.6 °F (35.9 °C)  Pulse:  [68-88] 83  Resp:  [8-27] 12  SpO2:  [96 %-100 %] 100 %  BP: ()/(63-83) 99/69     Weight: 59.9 kg (132 lb 0.9 oz)  Body mass index is 16.51 kg/m².    Intake/Output Summary (Last 24 hours) at 7/9/2025 1622  Last data filed at 7/9/2025 1452  Gross per 24 hour   Intake 200 ml   Output 1675 ml   Net -1475 ml         Physical Exam  Constitutional:       Appearance: Normal appearance. He is cachectic. He is not ill-appearing.      Comments: He is alert and pleasantly smiles when you talk to him.  Unable to respond back verbally.   Cardiovascular:      Rate and Rhythm: Normal rate and regular rhythm.   Pulmonary:      Effort: No respiratory distress.      Breath sounds: No wheezing.   Abdominal:      General: There is no distension.      Tenderness: There is no abdominal tenderness. There is no guarding.      Comments: PEG site c/d/i   Musculoskeletal:      Right lower leg: No edema.      Left lower leg: No edema.   Neurological:      Mental Status: Mental status is at baseline.             Significant Labs: All pertinent labs within the past 24 hours have been reviewed.    Significant Imaging: I have reviewed all pertinent imaging results/findings within the past 24 hours.        Assessment & Plan  Pneumonia due to E. coli  IMPROVED SECRETIONS AND BREATHING  Chest X-ray revealed patchy bilateral opacities, most prominent in the bibasilar regions, as well as in the left apex and mid-lung zones--findings consistent with multifocal pneumonia.  procalcitonin and CRP elevated  Within 24h of admission admitted to the MICU for septic shock secondary to multifocal pneumonia. Initially requiring pressors but weaned.   -ertapenem till 7/12 per ID  - Recommend deep suction per RT  - Pulmonary hygiene     Oropharyngeal dysphagia  SLP consulted for dysphagia. FEES performed which showed residual puree. Recommend NPO.   S/p PEG by IR on 7/5/2025  -- tube feeds, ok for pleasure feeds if alert (puree and honey thick liquids via tsp. )  -- 7/9 changed to bolus feeds    ALS (amyotrophic lateral sclerosis)  Spinocerebellar ataxia  Spastic diplegia  UMN predominance with diffuse limb spasticity. Slowly progressive. Fully-reliant on mother, others for self care and feeding and cleaning and toileting. Respiratory function is poor per FVC .     -Continue home donepezil for neuroprotection  -Continue home dantrolene and baclofen for spacticity  -- turn q2h with aspiration precautions  -- 7/9 romo DCd  -- f/u with mom and CM if he needs HH or SNF    Decubitus ulcer of sacral region, stage 4  Has a wound VAC in place  General Surgery consulted for possible debridement, appreciate recs.  No indication for inpatient debridement  - Wound care consulted, appreciate recs      Pressure injury of deep tissue of heel  L Heel with black eschar. R Heel pressure ulcer with scant serosanguineous drainage. Photos uploaded into chart.   - Wound care following, appreciate recs  - Podiatry consulted, appreciate recs  Insomnia secondary to depression with anxiety  Continue quetiapine and mirtazapine      Septic shock  RESOLVED  This patient does have evidence of infective focus  My overall impression is sepsis without organ  dysfunction.  Source: Respiratory Infection and Skin and Soft Tissue Infection: Deep Wound Infection  Organ dysfunction indicated by Acute respiratory failure  Fluid challenge Ideal Body Weight- The patient is obese (BMI>30) and their ideal body weight of Ideal body weight: 84.5 kg (186 lb 4.6 oz) will be used to calculate fluid bolus of 30 ml/kg.     Patient treated in MICU with pressors.    Acute hypoxic respiratory failure  Resolved  Upon EMS arrival, the patient was obtunded and hypoxic with an oxygen saturation of 74%.  .   Signs/symptoms of respiratory failure include- tachypnea and lethargy. Contributing diagnoses includes - Pneumonia Labs and images were reviewed. Patient Has recent ABG, which has been reviewed. Will treat underlying causes and adjust management of respiratory failure as follows- given abx.     Acute encephalopathy  RESOLVED  Likely 2/2 sepsis. .   - Neuro checks  - Delirium/aspiration/fall precautions  Severe protein-calorie malnutrition  Nutrition consulted. Most recent weight and BMI monitored-     Measurements:  Wt Readings from Last 1 Encounters:   07/07/25 59.9 kg (132 lb 0.9 oz)   Body mass index is 16.51 kg/m².    Patient has been screened and assessed by RD.    Malnutrition Type:  Context: chronic illness  Level: severe    Malnutrition Characteristic Summary:  Subcutaneous Fat (Malnutrition): severe depletion  Muscle Mass (Malnutrition): severe depletion    Interventions/Recommendations (treatment strategy):     -- started on Tube feeds  Anemia  Chronic anemia.    Plan  - Monitor serial CBC  - Transfuse PRBC if patient becomes hemodynamically unstable, symptomatic or H/H drops below 7/21.    Gastrostomy status  Patient noted to have a percutaneous endoscopic gastrostomy tube in place. Tube was placed on this admission, with date of procedure- 7/5/2025  The tube is patent. Routine care to be done by wound care and nursing staff.       VTE Risk Mitigation (From admission, onward)            Ordered     enoxaparin injection 30 mg  Daily         07/06/25 1653     IP VTE HIGH RISK PATIENT  Once         07/06/25 1634     Place sequential compression device  Until discontinued         06/29/25 2044                    Discharge Planning   MARCELLE: 7/12/2025     Code Status: Full Code   Medical Readiness for Discharge Date:   Discharge Plan A: Skilled Nursing Facility   Discharge Delays: None known at this time                    Sean Adam MD  Department of Hospital Medicine   [facility]

## 2025-07-09 NOTE — SUBJECTIVE & OBJECTIVE
Interval History:     Information from mom and RN    Symptoms:  Improved breathing and comfort.  Mom thinks his skin looks a bit sunken as if dehydrated. We are not sure if he got his enteral water boluses.    Diet:  tolerating tube feeds  Activity level:   Normal for him with assistance.   Pain:  No pain.       Review of Systems   Constitutional:  Negative for fever.   Respiratory:  Negative for cough and shortness of breath.    Cardiovascular:  Negative for chest pain and leg swelling.   Gastrointestinal:  Negative for abdominal pain.     Objective:     Vital Signs (Most Recent):  Temp:  (Couldn't get temp, nurse notified) (07/09/25 1220)  Pulse: 83 (07/09/25 1557)  Resp: 12 (07/09/25 1220)  BP: 99/69 (07/09/25 1220)  SpO2: 100 % (07/09/25 1557) Vital Signs (24h Range):  Temp:  [96.6 °F (35.9 °C)-97.1 °F (36.2 °C)] 96.6 °F (35.9 °C)  Pulse:  [68-88] 83  Resp:  [8-27] 12  SpO2:  [96 %-100 %] 100 %  BP: ()/(63-83) 99/69     Weight: 59.9 kg (132 lb 0.9 oz)  Body mass index is 16.51 kg/m².    Intake/Output Summary (Last 24 hours) at 7/9/2025 1611  Last data filed at 7/9/2025 1452  Gross per 24 hour   Intake 200 ml   Output 1880 ml   Net -1680 ml         Physical Exam  Constitutional:       Appearance: Normal appearance. He is cachectic. He is not ill-appearing.      Comments: He is alert and pleasantly smiles when you talk to him.  Unable to respond back verbally.   Cardiovascular:      Rate and Rhythm: Normal rate.      Heart sounds: Murmur heard.   Pulmonary:      Effort: No respiratory distress.      Breath sounds: No wheezing.   Abdominal:      General: There is no distension.      Tenderness: There is no abdominal tenderness. There is no guarding.      Comments: PEG site c/d/i   Musculoskeletal:      Right lower leg: No edema.      Left lower leg: No edema.   Neurological:      Mental Status: Mental status is at baseline.               Significant Labs: All pertinent labs within the past 24 hours have been  reviewed.    Significant Imaging: I have reviewed all pertinent imaging results/findings within the past 24 hours.

## 2025-07-09 NOTE — ASSESSMENT & PLAN NOTE
Nutrition consulted. Most recent weight and BMI monitored-     Measurements:  Wt Readings from Last 1 Encounters:   07/07/25 59.9 kg (132 lb 0.9 oz)   Body mass index is 16.51 kg/m².    Patient has been screened and assessed by RD.    Malnutrition Type:  Context: chronic illness  Level: severe    Malnutrition Characteristic Summary:  Subcutaneous Fat (Malnutrition): severe depletion  Muscle Mass (Malnutrition): severe depletion    Interventions/Recommendations (treatment strategy):     -- started on Tube feeds

## 2025-07-09 NOTE — ASSESSMENT & PLAN NOTE
IMPROVED SECRETIONS AND BREATHING  Chest X-ray revealed patchy bilateral opacities, most prominent in the bibasilar regions, as well as in the left apex and mid-lung zones--findings consistent with multifocal pneumonia.  procalcitonin and CRP elevated  Within 24h of admission admitted to the MICU for septic shock secondary to multifocal pneumonia. Initially requiring pressors but weaned.   -ertapenem till 7/12 per ID  - Recommend deep suction per RT  - Pulmonary hygiene

## 2025-07-09 NOTE — ASSESSMENT & PLAN NOTE
SLP consulted for dysphagia. FEES performed which showed residual puree. Recommend NPO.   S/p PEG by IR on 7/5/2025  -- tube feeds, ok for pleasure feeds if alert (puree and honey thick liquids via tsp. )  -- 7/9 changed to bolus feeds

## 2025-07-09 NOTE — SUBJECTIVE & OBJECTIVE
Interval History: No acute events overnight.  Mom at bedside.  She says he's hungry.  Will trial pureed soft diet for pleasure feeds, and boost with ice cream to increase caloric intake.      Review of Systems   Constitutional:  Negative for chills, fatigue and fever.   Respiratory:  Negative for cough and shortness of breath.    Cardiovascular:  Negative for chest pain, palpitations and leg swelling.   Gastrointestinal:  Negative for abdominal pain, diarrhea, nausea and vomiting.   Genitourinary:  Negative for dysuria and urgency.   Neurological:  Negative for dizziness and headaches.   All other systems reviewed and are negative.    Objective:     Vital Signs (Most Recent):  Temp:  (Couldn't get temp, nurse notified) (07/09/25 1220)  Pulse: 83 (07/09/25 1557)  Resp: 12 (07/09/25 1220)  BP: 99/69 (07/09/25 1220)  SpO2: 100 % (07/09/25 1557) Vital Signs (24h Range):  Temp:  [96.6 °F (35.9 °C)-97.1 °F (36.2 °C)] 96.6 °F (35.9 °C)  Pulse:  [68-88] 83  Resp:  [8-27] 12  SpO2:  [96 %-100 %] 100 %  BP: ()/(63-83) 99/69     Weight: 59.9 kg (132 lb 0.9 oz)  Body mass index is 16.51 kg/m².    Intake/Output Summary (Last 24 hours) at 7/9/2025 1622  Last data filed at 7/9/2025 1452  Gross per 24 hour   Intake 200 ml   Output 1675 ml   Net -1475 ml         Physical Exam  Constitutional:       Appearance: He is cachectic. He is ill-appearing.      Comments: He is alert and pleasantly smiles when you talk to him.  Unable to respond back verbally.   HENT:      Head: Normocephalic and atraumatic.   Cardiovascular:      Rate and Rhythm: Normal rate and regular rhythm.      Heart sounds: No murmur heard.  Pulmonary:      Effort: Pulmonary effort is normal. No respiratory distress.      Breath sounds: Normal breath sounds. No wheezing or rales.   Abdominal:      General: There is no distension.      Palpations: Abdomen is soft.      Tenderness: There is no abdominal tenderness. There is no guarding.      Comments: PEG site  c/d/i   Musculoskeletal:         General: No deformity.      Right lower leg: No edema.      Left lower leg: No edema.   Neurological:      Mental Status: He is alert. Mental status is at baseline.               Significant Labs: All pertinent labs within the past 24 hours have been reviewed.    Significant Imaging: I have reviewed all pertinent imaging results/findings within the past 24 hours.

## 2025-07-09 NOTE — PT/OT/SLP PROGRESS
Speech Language Pathology      Lisa Moreno  MRN: [id number]    Patient not seen today secondary to toileting on 1st attempt and speaking with other providers on 2nd attempt. Will follow-up per SLP POC. Continue with most recent recommendations as listed below:      General Recommendations:  Dysphagia therapy  Diet recommendations:  NPO, Liquid Diet Level: NPO   Pt able to have small occasional pleasure feeds of puree and honey thick liquids via tsp (6-10 bites/sips at a time) or occasional ice chips with caregiver   Aspiration Precautions: Slow rate, small bites/sips and second swallow per trial   General Precautions: Standard, fall, aspiration, NPO  Communication strategies:  yes/no questions only  Discharge recommendations:    low      7/9/2025

## 2025-07-09 NOTE — PLAN OF CARE
Problem: Skin Injury Risk Increased  Goal: Skin Health and Integrity  Outcome: Progressing     Problem: Sepsis/Septic Shock  Goal: Optimal Coping  Outcome: Progressing  Goal: Absence of Bleeding  Outcome: Progressing  Goal: Absence of Infection Signs and Symptoms  Outcome: Progressing     Problem: Pneumonia  Goal: Resolution of Infection Signs and Symptoms  Outcome: Progressing

## 2025-07-09 NOTE — PLAN OF CARE
Patient aao smiles and nods say one or two words non verbal mostly o2 sat 100% on RA. Telemetry reported ST elevation 12 lead completed no acute changes noted vss. Peg tube intact infusing 55ml/hr of isosource 1.5 patient had multiple bowel movements and mother reported that patient eyes seems most sunken and that patient appears dehydrated md gave new orders for pedialyte. Zeng catheter removed. Bed in lowest position safety precautions maintained mother is at bedside. Plan of care ongoing.        Problem: Adult Inpatient Plan of Care  Goal: Plan of Care Review  Outcome: Progressing  Goal: Patient-Specific Goal (Individualized)  Outcome: Progressing  Goal: Absence of Hospital-Acquired Illness or Injury  Outcome: Progressing  Goal: Optimal Comfort and Wellbeing  Outcome: Progressing  Goal: Readiness for Transition of Care  Outcome: Progressing     Problem: Skin Injury Risk Increased  Goal: Skin Health and Integrity  Outcome: Progressing     Problem: Sepsis/Septic Shock  Goal: Optimal Coping  Outcome: Progressing  Goal: Absence of Bleeding  Outcome: Progressing  Goal: Blood Glucose Level Within Targeted Range  Outcome: Progressing  Goal: Absence of Infection Signs and Symptoms  Outcome: Progressing  Goal: Optimal Nutrition Intake  Outcome: Progressing     Problem: Pneumonia  Goal: Fluid Balance  Outcome: Progressing  Goal: Resolution of Infection Signs and Symptoms  Outcome: Progressing  Goal: Effective Oxygenation and Ventilation  Outcome: Progressing     Problem: Wound  Goal: Optimal Coping  Outcome: Progressing  Goal: Optimal Functional Ability  Outcome: Progressing  Goal: Absence of Infection Signs and Symptoms  Outcome: Progressing  Goal: Improved Oral Intake  Outcome: Progressing  Goal: Optimal Pain Control and Function  Outcome: Progressing  Goal: Skin Health and Integrity  Outcome: Progressing  Goal: Optimal Wound Healing  Outcome: Progressing     Problem: Delirium  Goal: Optimal Coping  Outcome:  Progressing  Goal: Improved Behavioral Control  Outcome: Progressing  Goal: Improved Attention and Thought Clarity  Outcome: Progressing  Goal: Improved Sleep  Outcome: Progressing     Problem: Infection  Goal: Absence of Infection Signs and Symptoms  Outcome: Progressing     Problem: Fall Injury Risk  Goal: Absence of Fall and Fall-Related Injury  Outcome: Progressing

## 2025-07-09 NOTE — ASSESSMENT & PLAN NOTE
RESOLVED  This patient does have evidence of infective focus  My overall impression is sepsis without organ dysfunction.  Source: Respiratory Infection and Skin and Soft Tissue Infection: Deep Wound Infection  Organ dysfunction indicated by Acute respiratory failure  Fluid challenge Ideal Body Weight- The patient is obese (BMI>30) and their ideal body weight of Ideal body weight: 84.5 kg (186 lb 4.6 oz) will be used to calculate fluid bolus of 30 ml/kg.     Patient treated in MICU with pressors.

## 2025-07-09 NOTE — PLAN OF CARE
[facility] - Acute Medical Stepdown  Discharge Reassessment    Primary Care Provider: John Nixon II, MD    Expected Discharge Date: 7/12/2025    Reassessment (most recent)       Discharge Reassessment - 07/09/25 1550          Discharge Reassessment    Assessment Type Discharge Planning Reassessment (P)      Did the patient's condition or plan change since previous assessment? No (P)      Discharge Plan discussed with: Parent(s) (P)      Communicated MARCELLE with patient/caregiver Yes (P)      Discharge Plan A Skilled Nursing Facility (P)      Discharge Plan B Skilled Nursing Facility (P)      DME Needed Upon Discharge  none (P)      Transition of Care Barriers None (P)      Why the patient remains in the hospital Requires continued medical care (P)         Post-Acute Status    Post-Acute Authorization Placement (P)      Post-Acute Placement Status Set-up Complete/Auth obtained (P)      Discharge Delays None known at this time (P)                  Discharge Plan A and Plan B have been determined by review of patient's clinical status, future medical and therapeutic needs, and coverage/benefits for post-acute care in coordination with multidisciplinary team members.    Brenden Rader, RN, BSN

## 2025-07-10 PROBLEM — E16.2 HYPOGLYCEMIA: Status: ACTIVE | Noted: 2025-07-10

## 2025-07-10 PROBLEM — E87.5 HYPERKALEMIA: Status: ACTIVE | Noted: 2025-07-10

## 2025-07-10 PROBLEM — E87.1 HYPONATREMIA: Status: ACTIVE | Noted: 2025-07-10

## 2025-07-10 LAB
ABSOLUTE EOSINOPHIL (OHS): 0.1 K/UL
ABSOLUTE MONOCYTE (OHS): 0.52 K/UL (ref 0.3–1)
ABSOLUTE NEUTROPHIL COUNT (OHS): 3.72 K/UL (ref 1.8–7.7)
ALBUMIN SERPL BCP-MCNC: 2.2 G/DL (ref 3.5–5.2)
ALP SERPL-CCNC: 72 UNIT/L (ref 40–150)
ALT SERPL W/O P-5'-P-CCNC: 27 UNIT/L (ref 10–44)
ANION GAP (OHS): 8 MMOL/L (ref 8–16)
AST SERPL-CCNC: 33 UNIT/L (ref 11–45)
BASOPHILS # BLD AUTO: 0.02 K/UL
BASOPHILS NFR BLD AUTO: 0.4 %
BILIRUB SERPL-MCNC: 0.1 MG/DL (ref 0.1–1)
BUN SERPL-MCNC: 13 MG/DL (ref 6–20)
CALCIUM SERPL-MCNC: 8.3 MG/DL (ref 8.7–10.5)
CHLORIDE SERPL-SCNC: 96 MMOL/L (ref 95–110)
CO2 SERPL-SCNC: 28 MMOL/L (ref 23–29)
CREAT SERPL-MCNC: 0.5 MG/DL (ref 0.5–1.4)
ERYTHROCYTE [DISTWIDTH] IN BLOOD BY AUTOMATED COUNT: 16.2 % (ref 11.5–14.5)
GFR SERPLBLD CREATININE-BSD FMLA CKD-EPI: >60 ML/MIN/1.73/M2
GLUCOSE SERPL-MCNC: 66 MG/DL (ref 70–110)
HCT VFR BLD AUTO: 32.2 % (ref 40–54)
HGB BLD-MCNC: 9.9 GM/DL (ref 14–18)
IMM GRANULOCYTES # BLD AUTO: 0.01 K/UL (ref 0–0.04)
IMM GRANULOCYTES NFR BLD AUTO: 0.2 % (ref 0–0.5)
LYMPHOCYTES # BLD AUTO: 1.16 K/UL (ref 1–4.8)
MAGNESIUM SERPL-MCNC: 2 MG/DL (ref 1.6–2.6)
MCH RBC QN AUTO: 25.6 PG (ref 27–31)
MCHC RBC AUTO-ENTMCNC: 30.7 G/DL (ref 32–36)
MCV RBC AUTO: 83 FL (ref 82–98)
NUCLEATED RBC (/100WBC) (OHS): 0 /100 WBC
PHOSPHATE SERPL-MCNC: 3.1 MG/DL (ref 2.7–4.5)
PLATELET # BLD AUTO: 265 K/UL (ref 150–450)
PMV BLD AUTO: 10.9 FL (ref 9.2–12.9)
POTASSIUM SERPL-SCNC: 5.2 MMOL/L (ref 3.5–5.1)
PROT SERPL-MCNC: 7.4 GM/DL (ref 6–8.4)
RBC # BLD AUTO: 3.87 M/UL (ref 4.6–6.2)
RELATIVE EOSINOPHIL (OHS): 1.8 %
RELATIVE LYMPHOCYTE (OHS): 21 % (ref 18–48)
RELATIVE MONOCYTE (OHS): 9.4 % (ref 4–15)
RELATIVE NEUTROPHIL (OHS): 67.2 % (ref 38–73)
SODIUM SERPL-SCNC: 132 MMOL/L (ref 136–145)
WBC # BLD AUTO: 5.53 K/UL (ref 3.9–12.7)

## 2025-07-10 PROCEDURE — 83735 ASSAY OF MAGNESIUM: CPT | Performed by: STUDENT IN AN ORGANIZED HEALTH CARE EDUCATION/TRAINING PROGRAM

## 2025-07-10 PROCEDURE — 97535 SELF CARE MNGMENT TRAINING: CPT

## 2025-07-10 PROCEDURE — 63600175 PHARM REV CODE 636 W HCPCS: Performed by: STUDENT IN AN ORGANIZED HEALTH CARE EDUCATION/TRAINING PROGRAM

## 2025-07-10 PROCEDURE — 94761 N-INVAS EAR/PLS OXIMETRY MLT: CPT

## 2025-07-10 PROCEDURE — 25000003 PHARM REV CODE 250: Performed by: STUDENT IN AN ORGANIZED HEALTH CARE EDUCATION/TRAINING PROGRAM

## 2025-07-10 PROCEDURE — 11000001 HC ACUTE MED/SURG PRIVATE ROOM

## 2025-07-10 PROCEDURE — 82040 ASSAY OF SERUM ALBUMIN: CPT | Performed by: STUDENT IN AN ORGANIZED HEALTH CARE EDUCATION/TRAINING PROGRAM

## 2025-07-10 PROCEDURE — 27000207 HC ISOLATION

## 2025-07-10 PROCEDURE — 63600175 PHARM REV CODE 636 W HCPCS: Performed by: INTERNAL MEDICINE

## 2025-07-10 PROCEDURE — 85025 COMPLETE CBC W/AUTO DIFF WBC: CPT | Performed by: STUDENT IN AN ORGANIZED HEALTH CARE EDUCATION/TRAINING PROGRAM

## 2025-07-10 PROCEDURE — 84100 ASSAY OF PHOSPHORUS: CPT | Performed by: STUDENT IN AN ORGANIZED HEALTH CARE EDUCATION/TRAINING PROGRAM

## 2025-07-10 PROCEDURE — 36415 COLL VENOUS BLD VENIPUNCTURE: CPT | Performed by: STUDENT IN AN ORGANIZED HEALTH CARE EDUCATION/TRAINING PROGRAM

## 2025-07-10 PROCEDURE — 25000003 PHARM REV CODE 250: Performed by: HOSPITALIST

## 2025-07-10 RX ADMIN — DANTROLENE SODIUM 50 MG: 25 CAPSULE ORAL at 02:07

## 2025-07-10 RX ADMIN — BACLOFEN 20 MG: 10 TABLET ORAL at 02:07

## 2025-07-10 RX ADMIN — QUETIAPINE FUMARATE 50 MG: 25 TABLET ORAL at 09:07

## 2025-07-10 RX ADMIN — SODIUM CHLORIDE 1 G: 9 INJECTION, SOLUTION INTRAVENOUS at 02:07

## 2025-07-10 RX ADMIN — CETIRIZINE HYDROCHLORIDE 10 MG: 5 TABLET, FILM COATED ORAL at 08:07

## 2025-07-10 RX ADMIN — BACLOFEN 20 MG: 10 TABLET ORAL at 08:07

## 2025-07-10 RX ADMIN — ERYTHROMYCIN: 5 OINTMENT OPHTHALMIC at 09:07

## 2025-07-10 RX ADMIN — DANTROLENE SODIUM 50 MG: 25 CAPSULE ORAL at 08:07

## 2025-07-10 RX ADMIN — ENOXAPARIN SODIUM 30 MG: 30 INJECTION SUBCUTANEOUS at 05:07

## 2025-07-10 RX ADMIN — Medication 200 ML: at 12:07

## 2025-07-10 RX ADMIN — DANTROLENE SODIUM 50 MG: 25 CAPSULE ORAL at 10:07

## 2025-07-10 RX ADMIN — DONEPEZIL HYDROCHLORIDE 5 MG: 5 TABLET, FILM COATED ORAL at 09:07

## 2025-07-10 RX ADMIN — MIRTAZAPINE 15 MG: 15 TABLET, FILM COATED ORAL at 09:07

## 2025-07-10 RX ADMIN — BACLOFEN 20 MG: 10 TABLET ORAL at 09:07

## 2025-07-10 NOTE — ASSESSMENT & PLAN NOTE
UMN predominance with diffuse limb spasticity. Slowly progressive. Fully-reliant on mother, others for self care and feeding and cleaning and toileting. Respiratory function is poor per FVC .     Continue home donepezil for neuroprotection  Continue home dantrolene and baclofen for spacticity  Turn q2h with aspiration precautions  Mom wants SNF at [facility] [place]

## 2025-07-10 NOTE — ASSESSMENT & PLAN NOTE
The patients most recent potassium results are listed below.  Recent Labs     07/08/25  0249 07/09/25  0415 07/10/25  0531   K 5.0 4.8 5.2*     Plan  - Monitor for arrhythmias with EKG and/or continuous telemetry.   - Treat the hyperkalemia with Potassium Binders and IVF.   - Monitor potassium: Daily  - The patient's hyperkalemia is worsening. Will continue current treatment

## 2025-07-10 NOTE — PLAN OF CARE
No acute changes noted on this shift mom is at bedside plan of care ongoing.            Problem: Adult Inpatient Plan of Care  Goal: Plan of Care Review  Outcome: Progressing  Goal: Patient-Specific Goal (Individualized)  Outcome: Progressing  Goal: Absence of Hospital-Acquired Illness or Injury  Outcome: Progressing  Goal: Optimal Comfort and Wellbeing  Outcome: Progressing  Goal: Readiness for Transition of Care  Outcome: Progressing     Problem: Skin Injury Risk Increased  Goal: Skin Health and Integrity  Outcome: Progressing     Problem: Sepsis/Septic Shock  Goal: Optimal Coping  Outcome: Progressing  Goal: Absence of Bleeding  Outcome: Progressing  Goal: Blood Glucose Level Within Targeted Range  Outcome: Progressing  Goal: Absence of Infection Signs and Symptoms  Outcome: Progressing  Goal: Optimal Nutrition Intake  Outcome: Progressing     Problem: Pneumonia  Goal: Fluid Balance  Outcome: Progressing  Goal: Resolution of Infection Signs and Symptoms  Outcome: Progressing  Goal: Effective Oxygenation and Ventilation  Outcome: Progressing     Problem: Wound  Goal: Optimal Coping  Outcome: Progressing  Goal: Optimal Functional Ability  Outcome: Progressing  Goal: Absence of Infection Signs and Symptoms  Outcome: Progressing  Goal: Improved Oral Intake  Outcome: Progressing  Goal: Optimal Pain Control and Function  Outcome: Progressing  Goal: Skin Health and Integrity  Outcome: Progressing  Goal: Optimal Wound Healing  Outcome: Progressing     Problem: Delirium  Goal: Optimal Coping  Outcome: Progressing  Goal: Improved Behavioral Control  Outcome: Progressing  Goal: Improved Attention and Thought Clarity  Outcome: Progressing  Goal: Improved Sleep  Outcome: Progressing     Problem: Infection  Goal: Absence of Infection Signs and Symptoms  Outcome: Progressing     Problem: Fall Injury Risk  Goal: Absence of Fall and Fall-Related Injury  Outcome: Progressing

## 2025-07-10 NOTE — PLAN OF CARE
Recommendations     Interventions: General healthful diet, Texture modified diet, Enteral nutrition management, Medical food supplement therapy, Vitamin and mineral supplement therapy     Recommendations:   1. Continue bolus TF recommendation: 5 cans/day of Isosource 1.5 to provide 1250 mL total volume, 1875 kcal, 220 g CHO, 85 protein, 19 fiber, 955 mL free water    - 90 mL flush before and after each (10 flushes total) can to provide additional 900 mL free water (Total 1855 mL)    2. Continue soft and bite sized textured diet as tolerated      3. Continue boost glucose control TID     4. Recommend arnie BID to aid in wound healing    5. Recommend MVI    6. RD to monitor weight, labs, meds, intake, tolerance     Goal #1: Optimize weight status toward healthy weight range by discharge  Nutrition Goal Status #1: progressing towards goal  Goal #2: Oral supplement consumption of greater than or equal to 50% of estimated needs by RD follow up  Nutrition Goal Status #2: progressing towards goal  Communication of RD Recs:  (POC)     Nutrition Discharge Planning     Nutrition Discharge Planning: General healthy diet, Diet texture per SLP, Enteral nutrition (comments), Oral supplement regimen (comments)  Oral supplement regimen (comments): arnie BID  Enteral nutrition (comments): via PEG

## 2025-07-10 NOTE — ASSESSMENT & PLAN NOTE
Nutrition consulted. Most recent weight and BMI monitored-     Measurements:  Wt Readings from Last 1 Encounters:   07/07/25 59.9 kg (132 lb 0.9 oz)   Body mass index is 16.51 kg/m².    Patient has been screened and assessed by RD.    Malnutrition Type:  Context: chronic illness  Level: severe    Malnutrition Characteristic Summary:  Subcutaneous Fat (Malnutrition): severe depletion  Muscle Mass (Malnutrition): severe depletion    Interventions/Recommendations (treatment strategy):  1. Continue bolus TF recommendation: 5 cans/day of Isosource 1.5 to provide 1250 mL total volume, 1875 kcal, 220 g CHO, 85 protein, 19 fiber, 955 mL free water  - 90 mL flush before and after each (10 flushes total) can to provide additional 900 mL free water (Total 1855 mL)  2. Continue soft and bite sized textured diet as tolerated    3. Continue boost glucose control TID   4. Recommend arnie BID to aid in wound healing  5. Recommend MVI  6. RD to monitor weight, labs, meds, intake, tolerance  -- started on Tube feeds

## 2025-07-10 NOTE — ASSESSMENT & PLAN NOTE
Admitted to MICU for septic shock 2/2 ESBL E. Coli PNA  Upon EMS arrival, the patient was obtunded and hypoxic with an oxygen saturation of 74%.  Weaned off Levophed  On Ceftriaxone through 7/12 per ID recs  Satting well on room air

## 2025-07-10 NOTE — ASSESSMENT & PLAN NOTE
Has a wound VAC in place  General Surgery consulted for possible debridement, appreciate recs.  No indication for inpatient debridement  Wound care consulted, appreciate recs

## 2025-07-10 NOTE — ASSESSMENT & PLAN NOTE
UMN predominance with diffuse limb spasticity. Slowly progressive. Fully-reliant on mother, others for self care and feeding and cleaning and toileting. Respiratory function is poor per FVC .     Continue home donepezil for neuroprotection  Continue home dantrolene and baclofen for spacticity  Turn q2h with aspiration precautions  Mom wants SNF at [facility] [place]

## 2025-07-10 NOTE — PROGRESS NOTES
[facility] - Acute Medical Stepdown  Adult Nutrition  Progress Note    SUMMARY       Recommendations    Interventions: General healthful diet, Texture modified diet, Enteral nutrition management, Medical food supplement therapy, Vitamin and mineral supplement therapy    Recommendations:   1. Continue bolus TF recommendation: 5 cans/day of Isosource 1.5 to provide 1250 mL total volume, 1875 kcal, 220 g CHO, 85 protein, 19 fiber, 955 mL free water    - 90 mL flush before and after each (10 flushes total) can to provide additional 900 mL free water (Total 1855 mL)    2. Continue soft and bite sized textured diet as tolerated      3. Continue boost glucose control TID     4. Recommend arnie BID to aid in wound healing    5. Recommend MVI    6. RD to monitor weight, labs, meds, intake, tolerance    Goal #1: Optimize weight status toward healthy weight range by discharge  Nutrition Goal Status #1: progressing towards goal  Goal #2: Oral supplement consumption of greater than or equal to 50% of estimated needs by RD follow up  Nutrition Goal Status #2: progressing towards goal  Communication of RD Recs:  (POC)    Nutrition Discharge Planning    Nutrition Discharge Planning: General healthy diet, Diet texture per SLP, Enteral nutrition (comments), Oral supplement regimen (comments)  Oral supplement regimen (comments): arnie BID  Enteral nutrition (comments): via PEG    Assessment and Plan    Endocrine  Severe protein-calorie malnutrition  Malnutrition Type:  Context: chronic illness  Level: severe    Related to (etiology):   Metabolic demand of disease and treatment    Signs and Symptoms (as evidenced by):   Muscle/fat wasting     Malnutrition Characteristic Summary:  Subcutaneous Fat (Malnutrition): severe depletion  Muscle Mass (Malnutrition): severe depletion    Interventions/Recommendations (treatment strategy):  Collaboration of nutrition care with other providers    Nutrition Diagnosis Status:   Continues      Malnutrition Assessment    Malnutrition Context: chronic illness  Malnutrition Level: severe  Skin (Micronutrient): none  Nails (Micronutrient): none  Hair/Scalp (Micronutrient): none  Eyes (Micronutrient): none  Extraoral (Micronutrient): none  Gums (Micronutrient): none  Lips/Mucous Membranes (Micronutrient): none  Teeth (Micronutrient): none  Tongue (Micronutrient): none  Neck/Chest (Micronutrient): bony prominence  Musculoskeletal/Lower Extremities: none   Micronutrient Evaluation Summary: suspected deficiency   Subcutaneous Fat (Malnutrition): severe depletion  Muscle Mass (Malnutrition): severe depletion   Orbital Region (Subcutaneous Fat Loss): severe depletion  Upper Arm Region (Subcutaneous Fat Loss): severe depletion   [place] Region (Muscle Loss): severe depletion  Clavicle Bone Region (Muscle Loss): severe depletion  Clavicle and Acromion Bone Region (Muscle Loss): severe depletion  Dorsal Hand (Muscle Loss): severe depletion  Patellar Region (Muscle Loss): severe depletion  Anterior Thigh Region (Muscle Loss): severe depletion  Posterior Calf Region (Muscle Loss): severe depletion     Reason for Assessment    Reason For Assessment: RD follow-up  Diagnosis:  (Pneumonia due to E. coli)  General Information Comments: RD team following this pt. Pt admitted with pneumonia due to E. Coli. No new surgical hx. No edema noted. Negative pressure wound therapy in place. Multiple wounds noted. Pt only smiles and nods - mostly nonverbal. No GI s/s - BM: 7/10. PEG present. Diet was advanced this morning from being NPO/on tube feedings. SLP saw pt yesterday and made it okay for pleasure feedings. Secure chatted nurse, they plan to still give pt bolus tube feedings since diet was just advanced. Pt is tolerating tube feedings at 5 cartons a day. Malnutrition continues. Wt stable during admission.    Nutrition/Diet History    Spiritual, Cultural Beliefs, Mormonism Practices, Values that Affect Care: no  Food  "Allergies: NKFA  Factors Affecting Nutritional Intake: None identified at this time  Nutrition-related SDOH: None Identified    Anthropometrics    Height: 6' 3" (190.5 cm)  Height (inches): 75 in  Height Method: Estimated  Weight: 59.9 kg (132 lb 0.9 oz)  Weight (lb): 132.06 lb  Weight Method: Estimated  Ideal Body Weight (IBW), Male: 196 lb  % Ideal Body Weight, Male (lb): 67.38 %  BMI (Calculated): 16.5  BMI Grade: less than 18.5 - underweight    Lab/Procedures/Meds    Pertinent Labs Reviewed: reviewed  Pertinent Labs Comments: H/H 9.9/32.2, Na 132 low, K 5.2 high, glucose 66 low, Ca 8.3 low, albumin 2.2 low  Pertinent Medications Reviewed: reviewed  Pertinent Medications Comments: baclofen, donepezil, enoxaparin, lactulose, mirtazapine, quetiapine, senna    Estimated/Assessed Needs    Weight Used For Calorie Calculations: 59.9 kg (132 lb 0.9 oz)  Energy Calorie Requirements (kcal): 4438-9591 kcal/day (30-35 kcal/kg)  Energy Need Method: Kcal/kg  Protein Requirements:  g/day (1.2-2.0 g/kg)  Weight Used For Protein Calculations: 59.9 kg (132 lb 0.9 oz)     Estimated Fluid Requirement Method: RDA Method  RDA Method (mL): 1797    Nutrition Prescription Ordered    Current Diet Order: soft and bite sized  Current Nutrition Support Formula Ordered: Isosource 1.5  Current Nutrition Support Frequency Ordered: bolus feedings of 5 cartons   Oral Nutrition Supplement: boost glucose control TID    Evaluation of Received Nutrient/Fluid Intake    Enteral Calories (kcal): 1875  Enteral Protein (gm): 85  Enteral (Free Water) Fluid (mL): 905  % Kcal Needs: 100  % Protein Needs: 100  I/O: -1,676.3 since admit  Energy Calories Required: meeting needs  Protein Required: meeting needs  Fluid Required:  (Per MD)  Tolerance: tolerating  % Intake of Estimated Energy Needs: 75 - 100 %  % Meal Intake: 0 - 25 %    PES Statement    Increased nutrient needs related to Wound healing (protein/calorie) as evidenced by Wounds (Multiple " pressure injuries)  Status: New    Nutrition Risk    Level of Risk/Frequency of Follow-up: high (2/week)     Monitor and Evaluation    Monitor and Evaluation: Energy intake, Food and beverage intake, Carbohydrate intake, Diet order, Protein intake, Enteral and parenteral nutrition administration, Weight, Electrolyte and renal panel, Gastrointestinal profile, Inflammatory profile, Lipid profile, Glucose/endocrine profile, Nutrition focused physical findings, Skin (NFPE performed on 7/1)     Nutrition Follow-Up    RD Follow-up?: Yes

## 2025-07-10 NOTE — ASSESSMENT & PLAN NOTE
UMN predominance with diffuse limb spasticity. Slowly progressive. Fully-reliant on mother, others for self care and feeding and cleaning and toileting. Respiratory function is poor per FVC .     Continue home donepezil for neuroprotection  Continue home dantrolene and baclofen for spacticity  Turn q2h with aspiration precautions  Mom wants SNF at [facility] [place]

## 2025-07-10 NOTE — ASSESSMENT & PLAN NOTE
SLP consulted for dysphagia. FEES performed which showed residual puree. Recommend NPO.   S/p PEG by IR on 7/5/2025  Tube feeds, ok for pleasure feeds if alert (puree and honey thick liquids via tsp. )

## 2025-07-10 NOTE — PLAN OF CARE
Problem: Adult Inpatient Plan of Care  Goal: Plan of Care Review  Outcome: Progressing  Goal: Patient-Specific Goal (Individualized)  Outcome: Progressing  Goal: Absence of Hospital-Acquired Illness or Injury  Outcome: Progressing  Goal: Optimal Comfort and Wellbeing  Outcome: Progressing  Goal: Readiness for Transition of Care  Outcome: Progressing     Problem: Skin Injury Risk Increased  Goal: Skin Health and Integrity  Outcome: Progressing     Problem: Sepsis/Septic Shock  Goal: Optimal Coping  Outcome: Progressing  Goal: Absence of Bleeding  Outcome: Progressing  Goal: Blood Glucose Level Within Targeted Range  Outcome: Progressing  Goal: Absence of Infection Signs and Symptoms  Outcome: Progressing  Goal: Optimal Nutrition Intake  Outcome: Progressing     Problem: Pneumonia  Goal: Fluid Balance  Outcome: Progressing  Goal: Resolution of Infection Signs and Symptoms  Outcome: Progressing  Goal: Effective Oxygenation and Ventilation  Outcome: Progressing     Problem: Wound  Goal: Optimal Coping  Outcome: Progressing  Goal: Optimal Functional Ability  Outcome: Progressing  Goal: Absence of Infection Signs and Symptoms  Outcome: Progressing  Goal: Improved Oral Intake  Outcome: Progressing  Goal: Optimal Pain Control and Function  Outcome: Progressing  Goal: Skin Health and Integrity  Outcome: Progressing  Goal: Optimal Wound Healing  Outcome: Progressing     Problem: Delirium  Goal: Optimal Coping  Outcome: Progressing  Goal: Improved Behavioral Control  Outcome: Progressing  Goal: Improved Attention and Thought Clarity  Outcome: Progressing  Goal: Improved Sleep  Outcome: Progressing     Problem: Infection  Goal: Absence of Infection Signs and Symptoms  Outcome: Progressing     Problem: Fall Injury Risk  Goal: Absence of Fall and Fall-Related Injury  Outcome: Progressing

## 2025-07-10 NOTE — ASSESSMENT & PLAN NOTE
L Heel with black eschar. R Heel pressure ulcer with scant serosanguineous drainage. Photos uploaded into chart.   Wound care following, appreciate recs  Podiatry consulted, appreciate recs

## 2025-07-10 NOTE — PROGRESS NOTES
[facility]  Progress Note    Patient Name: Lisa Moreno  MRN: [id number]  Patient Class: IP- Inpatient   Admission Date: 6/29/2025  Length of Stay: 11 days  Attending Physician: Caroline Nelson MD  Primary Care Provider: John Nixon II, MD        Subjective     Principal Problem:Pneumonia due to E. coli        HPI:  By Dr. Juno Mckeon MD    Mr. Moreno is a 44-year-old male with a medical history including ALS, spinal cerebellar ataxia, neuromuscular bladder dysfunction, and sacral pressure ulcers. He was recently hospitalized on 5/9/2025 for severe dehydration, electrolyte abnormalities, and COVID-19 infection. He now presents to the ED via EMS with altered mental status and hypoxia. The patient is nonverbal at baseline but is typically more alert and interactive. History was provided by his mother, who reported that he had been noticeably less responsive over the past day and had a decreased oral intake. She denied any fever, shortness of breath, nausea, vomiting, or diarrhea.    Upon EMS arrival, the patient was obtunded and hypoxic with an oxygen saturation of 74%. He was treated en route with Solu-Medrol and DuoNebs, resulting in improvement of his oxygen saturation to above 90%.    In the ED, patient was obtunded but stabilized with improved oxygenation after initial EMS interventions. Chest X-ray revealed patchy bilateral opacities, most prominent in the bibasilar regions, as well as in the left apex and mid-lung zones--findings consistent with multifocal pneumonia.  He received 2 L of IV fluids, IV cefepime and vancomycin and admitted to hospital medicine for further management.    Overview/Hospital Course:  Mr. Moreno was initially admitted to Hospital Medicine for management of sepsis 2/2 multifocal pneumonia.  While in the ED, [facility] consulted for sepsis. During first examination the patient was stable with MAPs >65 and Lactic acid down trended to  2.6. Around 6am of 6/30 rap[place] was called hypotension with MAPs in the 50. He was started on Levophed and Zosyn, and was transferred to MICU for septic shock 2/2 multifocal pneumonia.  He was weaned off pressors and Zosyn was de-escalated to Ceftriaxone and Azithromycin for CAP coverage. SLP consulted for dysphagia. FEES performed which showed residual puree. Recommend NPO. PEG placed with IR on 7/5. Increased secretions noted on exam s/p completion of antibiotic course of CAP coverage. Restarted patient on Vancomycin and Zosyn. Respiratory culture with many gram negative rods. Vancomycin discontinued. Continued on Zosyn. Respiratory cx positive for E.Coli ESBL. Started on Ertapenem, Zosyn discontinued. ID consulted for additional recommendations, who suggest a course through 7/12. SLP re-consulted for swallowing evaluation in the setting of new PEG tube. Able to have small occasional pleasure feeds of puree and honey thick liquids via tsp. SD to  on 7/7.  He is waiting on SNF.        Interval History: No acute events overnight.  Mom at bedside.  She says he's hungry.  Will trial pureed soft diet for pleasure feeds, and boost with ice cream to increase caloric intake.      Review of Systems   Constitutional:  Negative for chills, fatigue and fever.   Respiratory:  Negative for cough and shortness of breath.    Cardiovascular:  Negative for chest pain, palpitations and leg swelling.   Gastrointestinal:  Negative for abdominal pain, diarrhea, nausea and vomiting.   Genitourinary:  Negative for dysuria and urgency.   Neurological:  Negative for dizziness and headaches.   All other systems reviewed and are negative.    Objective:     Vital Signs (Most Recent):  Temp:  (Couldn't get temp, nurse notified) (07/09/25 1220)  Pulse: 83 (07/09/25 1557)  Resp: 12 (07/09/25 1220)  BP: 99/69 (07/09/25 1220)  SpO2: 100 % (07/09/25 1557) Vital Signs (24h Range):  Temp:  [96.6 °F (35.9 °C)-97.1 °F (36.2 °C)] 96.6 °F (35.9  °C)  Pulse:  [68-88] 83  Resp:  [8-27] 12  SpO2:  [96 %-100 %] 100 %  BP: ()/(63-83) 99/69     Weight: 59.9 kg (132 lb 0.9 oz)  Body mass index is 16.51 kg/m².    Intake/Output Summary (Last 24 hours) at 7/9/2025 1622  Last data filed at 7/9/2025 1452  Gross per 24 hour   Intake 200 ml   Output 1675 ml   Net -1475 ml         Physical Exam  Constitutional:       Appearance: He is cachectic. He is ill-appearing.      Comments: He is alert and pleasantly smiles when you talk to him.  Unable to respond back verbally.   HENT:      Head: Normocephalic and atraumatic.   Cardiovascular:      Rate and Rhythm: Normal rate and regular rhythm.      Heart sounds: No murmur heard.  Pulmonary:      Effort: Pulmonary effort is normal. No respiratory distress.      Breath sounds: Normal breath sounds. No wheezing or rales.   Abdominal:      General: There is no distension.      Palpations: Abdomen is soft.      Tenderness: There is no abdominal tenderness. There is no guarding.      Comments: PEG site c/d/i   Musculoskeletal:         General: No deformity.      Right lower leg: No edema.      Left lower leg: No edema.   Neurological:      Mental Status: He is alert. Mental status is at baseline.               Significant Labs: All pertinent labs within the past 24 hours have been reviewed.    Significant Imaging: I have reviewed all pertinent imaging results/findings within the past 24 hours.      Assessment & Plan  Septic shock  Pneumonia due to E. coli  Acute hypoxic respiratory failure  Admitted to MICU for septic shock 2/2 ESBL E. Coli PNA  Upon EMS arrival, the patient was obtunded and hypoxic with an oxygen saturation of 74%.  Weaned off Levophed  On Ceftriaxone through 7/12 per ID recs  Satting well on room air  Oropharyngeal dysphagia  SLP consulted for dysphagia. FEES performed which showed residual puree. Recommend NPO.   S/p PEG by IR on 7/5/2025  Tube feeds, ok for pleasure feeds if alert (puree and honey thick  liquids via tsp. )  ALS (amyotrophic lateral sclerosis)  Spinocerebellar ataxia  Spastic diplegia  UMN predominance with diffuse limb spasticity. Slowly progressive. Fully-reliant on mother, others for self care and feeding and cleaning and toileting. Respiratory function is poor per FVC .     Continue home donepezil for neuroprotection  Continue home dantrolene and baclofen for spacticity  Turn q2h with aspiration precautions  Mom wants SNF at [facility] [place]  Decubitus ulcer of sacral region, stage 4  Has a wound VAC in place  General Surgery consulted for possible debridement, appreciate recs.  No indication for inpatient debridement  Wound care consulted, appreciate recs    Pressure injury of deep tissue of heel  L Heel with black eschar. R Heel pressure ulcer with scant serosanguineous drainage. Photos uploaded into chart.   Wound care following, appreciate recs  Podiatry consulted, appreciate recs  Insomnia secondary to depression with anxiety  Chronic and stable  Continue quetiapine and mirtazapine    Acute encephalopathy  Likely 2/2 sepsis. .   Neuro checks  Delirium/aspiration/fall precautions  Severe protein-calorie malnutrition  Body mass index (BMI) less than 19  Nutrition consulted. Most recent weight and BMI monitored-     Measurements:  Wt Readings from Last 1 Encounters:   07/07/25 59.9 kg (132 lb 0.9 oz)   Body mass index is 16.51 kg/m².    Patient has been screened and assessed by RD.    Malnutrition Type:  Context: chronic illness  Level: severe    Malnutrition Characteristic Summary:  Subcutaneous Fat (Malnutrition): severe depletion  Muscle Mass (Malnutrition): severe depletion    Interventions/Recommendations (treatment strategy):  1. Continue bolus TF recommendation: 5 cans/day of Isosource 1.5 to provide 1250 mL total volume, 1875 kcal, 220 g CHO, 85 protein, 19 fiber, 955 mL free water  - 90 mL flush before and after each (10 flushes total) can to provide additional 900 mL free water  (Total 1855 mL)  2. Continue soft and bite sized textured diet as tolerated    3. Continue boost glucose control TID   4. Recommend arnie BID to aid in wound healing  5. Recommend MVI  6. RD to monitor weight, labs, meds, intake, tolerance  -- started on Tube feeds      Anemia  Chronic anemia.    Plan  - Monitor serial CBC  - Transfuse PRBC if patient becomes hemodynamically unstable, symptomatic or H/H drops below 7/21.    Gastrostomy status  Patient noted to have a percutaneous endoscopic gastrostomy tube in place. Tube was placed on this admission, with date of procedure- 7/5/2025  The tube is patent. Routine care to be done by wound care and nursing staff.   Hyponatremia  Hyponatremia is likely due to Dehydration/hypovolemia. The patient's most recent sodium results are listed below.  Recent Labs     07/08/25  0249 07/09/25  0415 07/10/25  0531   * 133* 132*     Plan  - Enteral free water bolus  Hyperkalemia  The patients most recent potassium results are listed below.  Recent Labs     07/08/25  0249 07/09/25  0415 07/10/25  0531   K 5.0 4.8 5.2*     Plan  - Monitor for arrhythmias with EKG and/or continuous telemetry.   - Treat the hyperkalemia with Potassium Binders and IVF.   - Monitor potassium: Daily  - The patient's hyperkalemia is worsening. Will continue current treatment  Hypoglycemia  Blood sugar 66 this morning on blood work  Monitor with glucose checks    VTE Risk Mitigation (From admission, onward)           Ordered     enoxaparin injection 30 mg  Daily         07/06/25 1653     IP VTE HIGH RISK PATIENT  Once         07/06/25 1634     Place sequential compression device  Until discontinued         06/29/25 2044                    Discharge Planning   MARCELLE: 7/12/2025     Code Status: Full Code   Medical Readiness for Discharge Date:   Discharge Plan A: Skilled Nursing Facility   Discharge Delays: None known at this time                Caroline Nelson MD  Department of Hospital Medicine   [facility] - [facility] Stepdown

## 2025-07-10 NOTE — ASSESSMENT & PLAN NOTE
Hyponatremia is likely due to Dehydration/hypovolemia. The patient's most recent sodium results are listed below.  Recent Labs     07/08/25  0249 07/09/25  0415 07/10/25  0531   * 133* 132*     Plan  - Enteral free water bolus

## 2025-07-10 NOTE — NURSING
Uneventful night, patient remained in stable condition, VSS, CBG WNL, no distress noted, skin warm dry resp even unlabored, O2 sats >90% on RA, Q2hr repositioning in progress, tolerate tube feedings well, safety measures in place, care plan ongoing.

## 2025-07-10 NOTE — PT/OT/SLP PROGRESS
Speech Language Pathology Treatment    Patient Name:  Lisa Moreno   MRN:  [id number]  Admitting Diagnosis: Septic shock    Recommendations:                 General Recommendations:  Dysphagia therapy  Diet recommendations:  NPO, Liquid Diet Level: NPO   Pt able to have small occasional pleasure feeds of puree and honey thick liquids via tsp (6-10 bites/sips at a time) or occasional ice chips with caregiver   Aspiration Precautions: Slow rate, small bites/sips and second swallow per trial   General Precautions: Standard, fall, aspiration, NPO  Communication strategies:  yes/no questions only  Discharge recommendations:    low     Assessment:     Lisa Moreno is a 44 y.o. male with an SLP diagnosis of Dysphagia.     Subjective     Pt seen with family member bedside.     Pain/Comfort:  Pain Rating 1: 0/10    Respiratory Status: Room air    Objective:     Has the patient been evaluated by SLP for swallowing?   Yes  Keep patient NPO? Yes   Current Respiratory Status:    Room air     Pt was seen for ongoing dysphagia tx. Spoke with pt regarding trialing spoons of honey thick liquids and purees, pt indicating that he did not want to this session. Provided education to family regarding pleasure feeds and FEEs results indicating dysphagia. Diet was changed to soft and bite size per MD team, though discussed that pt is only on pleasure feeds for now, they are aware. Updated whiteboard with diet recs, family verbalized understanding. SLP will follow.     Goals:   Multidisciplinary Problems       SLP Goals          Problem: SLP    Goal Priority Disciplines Outcome   SLP Goal     SLP Progressing   Description: Speech Language Pathology Goals  Goals expected to be met by 7/9    1. Pt will participate in ongoing swallow assessment                                Plan:     Patient to be seen:  4 x/week   Plan of Care expires:     Plan of Care reviewed with:  patient, mother   SLP Follow-Up:  Yes       Time Tracking:      SLP Treatment Date:   07/10/25  Speech Start Time:  1140  Speech Stop Time:  1150     Speech Total Time (min):  10 min    Billable Minutes: Self Care/Home Management Training 10    07/10/2025

## 2025-07-11 LAB
ABSOLUTE EOSINOPHIL (OHS): 0.04 K/UL
ABSOLUTE MONOCYTE (OHS): 0.68 K/UL (ref 0.3–1)
ABSOLUTE NEUTROPHIL COUNT (OHS): 3.83 K/UL (ref 1.8–7.7)
ALBUMIN SERPL BCP-MCNC: 2.1 G/DL (ref 3.5–5.2)
ALP SERPL-CCNC: 83 UNIT/L (ref 40–150)
ALT SERPL W/O P-5'-P-CCNC: 47 UNIT/L (ref 10–44)
ANION GAP (OHS): 10 MMOL/L (ref 8–16)
ANION GAP (OHS): 11 MMOL/L (ref 8–16)
ANION GAP (OHS): 9 MMOL/L (ref 8–16)
ANION GAP (OHS): 9 MMOL/L (ref 8–16)
AST SERPL-CCNC: 57 UNIT/L (ref 11–45)
BASOPHILS # BLD AUTO: 0.05 K/UL
BASOPHILS NFR BLD AUTO: 0.9 %
BILIRUB SERPL-MCNC: 0.1 MG/DL (ref 0.1–1)
BUN SERPL-MCNC: 12 MG/DL (ref 6–20)
BUN SERPL-MCNC: 14 MG/DL (ref 6–20)
CALCIUM SERPL-MCNC: 7.9 MG/DL (ref 8.7–10.5)
CALCIUM SERPL-MCNC: 8.3 MG/DL (ref 8.7–10.5)
CALCIUM SERPL-MCNC: 8.4 MG/DL (ref 8.7–10.5)
CALCIUM SERPL-MCNC: 8.4 MG/DL (ref 8.7–10.5)
CHLORIDE SERPL-SCNC: 101 MMOL/L (ref 95–110)
CHLORIDE SERPL-SCNC: 95 MMOL/L (ref 95–110)
CHLORIDE SERPL-SCNC: 96 MMOL/L (ref 95–110)
CHLORIDE SERPL-SCNC: 97 MMOL/L (ref 95–110)
CO2 SERPL-SCNC: 18 MMOL/L (ref 23–29)
CO2 SERPL-SCNC: 23 MMOL/L (ref 23–29)
CO2 SERPL-SCNC: 26 MMOL/L (ref 23–29)
CO2 SERPL-SCNC: 28 MMOL/L (ref 23–29)
CREAT SERPL-MCNC: 0.5 MG/DL (ref 0.5–1.4)
ERYTHROCYTE [DISTWIDTH] IN BLOOD BY AUTOMATED COUNT: 17.1 % (ref 11.5–14.5)
GFR SERPLBLD CREATININE-BSD FMLA CKD-EPI: >60 ML/MIN/1.73/M2
GLUCOSE SERPL-MCNC: 56 MG/DL (ref 70–110)
GLUCOSE SERPL-MCNC: 72 MG/DL (ref 70–110)
GLUCOSE SERPL-MCNC: 75 MG/DL (ref 70–110)
GLUCOSE SERPL-MCNC: 94 MG/DL (ref 70–110)
HCT VFR BLD AUTO: 33.2 % (ref 40–54)
HGB BLD-MCNC: 10.4 GM/DL (ref 14–18)
IMM GRANULOCYTES # BLD AUTO: 0.01 K/UL (ref 0–0.04)
IMM GRANULOCYTES NFR BLD AUTO: 0.2 % (ref 0–0.5)
LYMPHOCYTES # BLD AUTO: 1.02 K/UL (ref 1–4.8)
MAGNESIUM SERPL-MCNC: 2.1 MG/DL (ref 1.6–2.6)
MCH RBC QN AUTO: 26.4 PG (ref 27–31)
MCHC RBC AUTO-ENTMCNC: 31.3 G/DL (ref 32–36)
MCV RBC AUTO: 84 FL (ref 82–98)
NUCLEATED RBC (/100WBC) (OHS): 0 /100 WBC
OHS QRS DURATION: 66 MS
OHS QTC CALCULATION: 392 MS
PHOSPHATE SERPL-MCNC: 4.7 MG/DL (ref 2.7–4.5)
PLATELET # BLD AUTO: 231 K/UL (ref 150–450)
PMV BLD AUTO: 11.5 FL (ref 9.2–12.9)
POTASSIUM SERPL-SCNC: 4.3 MMOL/L (ref 3.5–5.1)
POTASSIUM SERPL-SCNC: 4.7 MMOL/L (ref 3.5–5.1)
POTASSIUM SERPL-SCNC: 5.6 MMOL/L (ref 3.5–5.1)
POTASSIUM SERPL-SCNC: 6 MMOL/L (ref 3.5–5.1)
PROT SERPL-MCNC: 7.6 GM/DL (ref 6–8.4)
RBC # BLD AUTO: 3.94 M/UL (ref 4.6–6.2)
RELATIVE EOSINOPHIL (OHS): 0.7 %
RELATIVE LYMPHOCYTE (OHS): 18.1 % (ref 18–48)
RELATIVE MONOCYTE (OHS): 12.1 % (ref 4–15)
RELATIVE NEUTROPHIL (OHS): 68 % (ref 38–73)
SODIUM SERPL-SCNC: 129 MMOL/L (ref 136–145)
SODIUM SERPL-SCNC: 131 MMOL/L (ref 136–145)
SODIUM SERPL-SCNC: 131 MMOL/L (ref 136–145)
SODIUM SERPL-SCNC: 132 MMOL/L (ref 136–145)
WBC # BLD AUTO: 5.63 K/UL (ref 3.9–12.7)

## 2025-07-11 PROCEDURE — 25000003 PHARM REV CODE 250: Performed by: HOSPITALIST

## 2025-07-11 PROCEDURE — 94761 N-INVAS EAR/PLS OXIMETRY MLT: CPT

## 2025-07-11 PROCEDURE — 84100 ASSAY OF PHOSPHORUS: CPT | Performed by: STUDENT IN AN ORGANIZED HEALTH CARE EDUCATION/TRAINING PROGRAM

## 2025-07-11 PROCEDURE — 85025 COMPLETE CBC W/AUTO DIFF WBC: CPT | Performed by: STUDENT IN AN ORGANIZED HEALTH CARE EDUCATION/TRAINING PROGRAM

## 2025-07-11 PROCEDURE — 93010 ELECTROCARDIOGRAM REPORT: CPT | Mod: ,,, | Performed by: INTERNAL MEDICINE

## 2025-07-11 PROCEDURE — 97606 NEG PRS WND THER DME>50 SQCM: CPT

## 2025-07-11 PROCEDURE — 82310 ASSAY OF CALCIUM: CPT | Performed by: HOSPITALIST

## 2025-07-11 PROCEDURE — 63600175 PHARM REV CODE 636 W HCPCS: Performed by: INTERNAL MEDICINE

## 2025-07-11 PROCEDURE — 25000242 PHARM REV CODE 250 ALT 637 W/ HCPCS: Performed by: HOSPITALIST

## 2025-07-11 PROCEDURE — 25000003 PHARM REV CODE 250: Performed by: STUDENT IN AN ORGANIZED HEALTH CARE EDUCATION/TRAINING PROGRAM

## 2025-07-11 PROCEDURE — 36415 COLL VENOUS BLD VENIPUNCTURE: CPT | Performed by: HOSPITALIST

## 2025-07-11 PROCEDURE — 80053 COMPREHEN METABOLIC PANEL: CPT | Performed by: STUDENT IN AN ORGANIZED HEALTH CARE EDUCATION/TRAINING PROGRAM

## 2025-07-11 PROCEDURE — 11000001 HC ACUTE MED/SURG PRIVATE ROOM

## 2025-07-11 PROCEDURE — 36415 COLL VENOUS BLD VENIPUNCTURE: CPT | Performed by: STUDENT IN AN ORGANIZED HEALTH CARE EDUCATION/TRAINING PROGRAM

## 2025-07-11 PROCEDURE — 94640 AIRWAY INHALATION TREATMENT: CPT

## 2025-07-11 PROCEDURE — 83735 ASSAY OF MAGNESIUM: CPT | Performed by: STUDENT IN AN ORGANIZED HEALTH CARE EDUCATION/TRAINING PROGRAM

## 2025-07-11 PROCEDURE — 63600175 PHARM REV CODE 636 W HCPCS: Performed by: STUDENT IN AN ORGANIZED HEALTH CARE EDUCATION/TRAINING PROGRAM

## 2025-07-11 PROCEDURE — 93005 ELECTROCARDIOGRAM TRACING: CPT

## 2025-07-11 PROCEDURE — 97535 SELF CARE MNGMENT TRAINING: CPT

## 2025-07-11 PROCEDURE — 27000207 HC ISOLATION

## 2025-07-11 RX ORDER — SODIUM CHLORIDE 9 MG/ML
INJECTION, SOLUTION INTRAVENOUS CONTINUOUS
Status: ACTIVE | OUTPATIENT
Start: 2025-07-11 | End: 2025-07-11

## 2025-07-11 RX ORDER — ALBUTEROL SULFATE 2.5 MG/.5ML
10 SOLUTION RESPIRATORY (INHALATION) ONCE
Status: COMPLETED | OUTPATIENT
Start: 2025-07-11 | End: 2025-07-11

## 2025-07-11 RX ORDER — ALBUTEROL SULFATE 2.5 MG/.5ML
10 SOLUTION RESPIRATORY (INHALATION)
Status: DISCONTINUED | OUTPATIENT
Start: 2025-07-11 | End: 2025-07-11

## 2025-07-11 RX ADMIN — DANTROLENE SODIUM 50 MG: 25 CAPSULE ORAL at 09:07

## 2025-07-11 RX ADMIN — MIRTAZAPINE 15 MG: 15 TABLET, FILM COATED ORAL at 09:07

## 2025-07-11 RX ADMIN — DONEPEZIL HYDROCHLORIDE 5 MG: 5 TABLET, FILM COATED ORAL at 09:07

## 2025-07-11 RX ADMIN — ERYTHROMYCIN: 5 OINTMENT OPHTHALMIC at 09:07

## 2025-07-11 RX ADMIN — CETIRIZINE HYDROCHLORIDE 10 MG: 5 TABLET, FILM COATED ORAL at 09:07

## 2025-07-11 RX ADMIN — BACLOFEN 20 MG: 10 TABLET ORAL at 09:07

## 2025-07-11 RX ADMIN — DANTROLENE SODIUM 50 MG: 25 CAPSULE ORAL at 03:07

## 2025-07-11 RX ADMIN — SODIUM ZIRCONIUM CYCLOSILICATE 10 G: 10 POWDER, FOR SUSPENSION ORAL at 03:07

## 2025-07-11 RX ADMIN — ENOXAPARIN SODIUM 30 MG: 30 INJECTION SUBCUTANEOUS at 05:07

## 2025-07-11 RX ADMIN — ALBUTEROL SULFATE 10 MG: 2.5 SOLUTION RESPIRATORY (INHALATION) at 08:07

## 2025-07-11 RX ADMIN — SODIUM CHLORIDE: 9 INJECTION, SOLUTION INTRAVENOUS at 09:07

## 2025-07-11 RX ADMIN — SENNOSIDES AND DOCUSATE SODIUM 1 TABLET: 50; 8.6 TABLET ORAL at 09:07

## 2025-07-11 RX ADMIN — QUETIAPINE FUMARATE 50 MG: 25 TABLET ORAL at 09:07

## 2025-07-11 RX ADMIN — DANTROLENE SODIUM 50 MG: 25 CAPSULE ORAL at 10:07

## 2025-07-11 RX ADMIN — ALBUTEROL SULFATE 10 MG: 2.5 SOLUTION RESPIRATORY (INHALATION) at 02:07

## 2025-07-11 RX ADMIN — SODIUM ZIRCONIUM CYCLOSILICATE 10 G: 10 POWDER, FOR SUSPENSION ORAL at 09:07

## 2025-07-11 RX ADMIN — BACLOFEN 20 MG: 10 TABLET ORAL at 03:07

## 2025-07-11 RX ADMIN — SODIUM CHLORIDE 1 G: 9 INJECTION, SOLUTION INTRAVENOUS at 04:07

## 2025-07-11 NOTE — CONSULTS
[facility] - Acute Medical Stepdown    Wound Care     Patient Name:  Lisa Moreno  MRN:  [id number]  Date: 7/11/2025  Diagnosis: Septic shock     History:  Past Medical History:   Diagnosis Date    Anxiety     Cognitive communication disorder 05/10/2022    Nonverbal at baseline but alert    Degenerative motor system disease 2006    Dr. Wise in movement disorder clinic on 2/23/2015.    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   Labs:   WBC (K/uL)   Date Value   07/11/2025 5.63   07/10/2025 5.53     Glucose (mg/dL)   Date Value   07/11/2025 72   07/11/2025 56 (L)   09/15/2023 70   09/14/2023 76     Albumin (g/dL)   Date Value   07/11/2025 2.1 (L)   07/10/2025 2.2 (L)   09/15/2023 3.4 (L)   09/14/2023 3.2 (L)     Morgan Score: 14    Narrative:  Pt seen for WC follow-up and agreed to assessment/NPWT change.  Chart reviewed for this encounter.   See Flow Sheet for additional documentation and media.     Pt laying on immerse bed with family at bedside. Pt turned with maximum assistance, wound vac and drape removed using adhesive remover, 1 piece of black foam and UrgoTul removed from wound bed revealing a full-thickness sacral wound with undermining. Wound cleansed with Vashe, 3M skin prep applied periwound.  One piece of black foam applied to the wound bed. VAC drape applied, bridging from the sacral wound to the right anterior hip. Mushroom cap attached to VAC  track pad. Good seal achieved. NPWT set to continuous suction at 125 mmHg.  Patient tolerated procedure well.     RECOMMENDATIONS:  Bedside nurse assess for acute changes (purulence, increased redness/swelling, increased drainage, malodor, increased pain, pallor, necrosis) please contact physician on any acute changes.     WC to change NPWT dressing three times weekly(Monday / Wednesday / Friday)   If wound vac machine malfunctions for up to 2 hours:   1) reach out to MD and wound care team,   2) remove dressing,   3) apply gauze moistened with NS to the wound bed    Discussed POC with patient's mom and primary nurse.     Bedside nursing to continue care, dressing changes, & continue monitoring.  Bedside nursing to maintain pressure injury prevention interventions, (PIP).     Recommendations made to primary team for above plan.    Thank you for the consult. Wound Care will continue to follow.     07/11/25 1300   WOCN Assessment   WOCN Total Time (mins) 45   Visit Date 07/11/25   Visit Time 1300   Consult Type Follow Up   WOCN Speciality Wound   WOCN List wound vac   Wound pressure   Procedure wound vac   Intervention chart review;assessed;changed        Negative Pressure Wound Therapy  07/03/25 1420   Placement Date/Time: 07/03/25 1420   Location: Sacral Spine   NPWT Type Vacuum Therapy   Therapy Setting NPWT Continuous therapy   Pressure Setting NPWT 125 mmHg   Sponges Inserted NPWT 1;Black   Sponges Removed NPWT 1;Black   General Output (mL) 150          [1]   Social History  Socioeconomic History    Marital status: Single   Tobacco Use    Smoking status: Never    Smokeless tobacco: Never   Substance and Sexual Activity    Alcohol use: Not Currently     Comment: social drinker, at times    Drug use: Not Currently    Sexual activity: Never     Social Drivers of Health     Financial Resource Strain: Medium Risk (7/1/2025)    Overall Financial Resource Strain (CARDIA)     Difficulty of Paying Living Expenses:  Somewhat hard   Food Insecurity: No Food Insecurity (7/1/2025)    Hunger Vital Sign     Worried About Running Out of Food in the Last Year: Never true     Ran Out of Food in the Last Year: Never true   Transportation Needs: No Transportation Needs (7/1/2025)    PRAPARE - Transportation     Lack of Transportation (Medical): No     Lack of Transportation (Non-Medical): No   Recent Concern: Transportation Needs - Unmet Transportation Needs (4/17/2025)    PRAPARE - Transportation     Lack of Transportation (Medical): Yes     Lack of Transportation (Non-Medical): Yes   Physical Activity: Inactive (5/12/2025)    Exercise Vital Sign     Days of Exercise per Week: 0 days     Minutes of Exercise per Session: 0 min   Stress: Stress Concern Present (5/12/2025)    [employer] - Occupational Stress Questionnaire     Feeling of Stress : To some extent   Housing Stability: Low Risk  (7/1/2025)    Housing Stability Vital Sign     Unable to Pay for Housing in the Last Year: No     Number of Times Moved in the Last Year: 1     Homeless in the Last Year: No

## 2025-07-11 NOTE — ASSESSMENT & PLAN NOTE
UMN predominance with diffuse limb spasticity. Slowly progressive. Fully-reliant on mother, others for self care and feeding and cleaning and toileting. Respiratory function is poor per FVC .     Continue home donepezil for neuroprotection  Continue home dantrolene and baclofen for spacticity  Turn q2h with aspiration precautions  Mom wants SNF at [facility] [place]

## 2025-07-11 NOTE — PT/OT/SLP PROGRESS
Speech Language Pathology Treatment    Patient Name:  Lisa Moreno   MRN:  [id number]  Admitting Diagnosis: Septic shock    Recommendations:                 General Recommendations:  Dysphagia therapy  Diet recommendations:  NPO, Liquid Diet Level: NPO   Pt able to have small occasional pleasure feeds of puree and honey thick liquids via tsp (6-10 bites/sips at a time) or occasional ice chips with caregiver   Aspiration Precautions: Strict aspiration precautions   General Precautions: Standard, aspiration, NPO  Communication strategies:  yes/no questions only  Discharge recommendations:    TBD      Assessment:     Lisa Moreno is a 44 y.o. male with an SLP diagnosis of Dysphagia.      Subjective     Awake/alert    Pain/Comfort:  Pain Rating 1: 0/10  Pain Rating Post-Intervention 1: 0/10    Respiratory Status: Room air    Objective:     Has the patient been evaluated by SLP for swallowing?   Yes  Keep patient NPO? Yes     Pt upright in bed and mother at bedside. Pt placed on diet per family request despite FEES study results and SLP recommendations. SLP provided extensive education on feeding strategies, results of FEES study, dysphagia, swallow prognosis and diet recommendations should pt chose to eat despite recommendations. Mother verbalized understanding and stated pt tolerated breakfast without difficulty. SLP reinforced education on FEES results and severity of swallow impairment. SLP will follow up.     Goals:   Multidisciplinary Problems       SLP Goals          Problem: SLP    Goal Priority Disciplines Outcome   SLP Goal     SLP Progressing   Description: Speech Language Pathology Goals  Goals expected to be met by 7/9    1. Pt will participate in ongoing swallow assessment                                Plan:     Patient to be seen:  4 x/week   Plan of Care reviewed with:  patient, mother   SLP Follow-Up:  Yes       Time Tracking:     SLP Treatment Date:   07/11/25  Speech Start Time:   1159  Speech Stop Time:  1207     Speech Total Time (min):  8 min    Billable Minutes: Self Care/Home Management Training 8    07/11/2025

## 2025-07-11 NOTE — ASSESSMENT & PLAN NOTE
The patients most recent potassium results are listed below.  Recent Labs     07/09/25  0415 07/10/25  0531 07/11/25  0600   K 4.8 5.2* 6.0*     Plan  - Monitor for arrhythmias with EKG and/or continuous telemetry.   - Treat the hyperkalemia with Potassium Binders and IVF and Albuterol, Lokelma  - Trend BMP q4h  - Change TF as likely source if 75mEq of Potassium in TF per Pharmacy

## 2025-07-11 NOTE — NURSING
Pt nonverbal at baseline. VSS. CBG WNL, no distress noted. Tolerated medication through PEG well. Q2hr repositioning completed. Safety measures in place. No acute events this shift.

## 2025-07-11 NOTE — PLAN OF CARE
Problem: Adult Inpatient Plan of Care  Goal: Plan of Care Review  Outcome: Ongoing  Goal: Patient-Specific Goal (Individualized)  Outcome: Ongoing  Goal: Absence of Hospital-Acquired Illness or Injury  Outcome: Ongoing  Goal: Optimal Comfort and Wellbeing  Outcome: Ongoing  Goal: Readiness for Transition of Care  Outcome: Ongoing     Problem: Skin Injury Risk Increased  Goal: Skin Health and Integrity  Outcome: Ongoing     Problem: Sepsis/Septic Shock  Goal: Optimal Coping  Outcome: Ongoing  Goal: Absence of Bleeding  Outcome: Ongoing  Goal: Blood Glucose Level Within Targeted Range  Outcome: Ongoing  Goal: Absence of Infection Signs and Symptoms  Outcome: Ongoing  Goal: Optimal Nutrition Intake  Outcome: Ongoing     Problem: Pneumonia  Goal: Fluid Balance  Outcome: Ongoing  Goal: Resolution of Infection Signs and Symptoms  Outcome: Ongoing  Goal: Effective Oxygenation and Ventilation  Outcome: Ongoing     Problem: Wound  Goal: Optimal Coping  Outcome: Ongoing  Goal: Optimal Functional Ability  Outcome: Ongoing  Goal: Absence of Infection Signs and Symptoms  Outcome: Ongoing  Goal: Improved Oral Intake  Outcome: Ongoing  Goal: Optimal Pain Control and Function  Outcome: Ongoing  Goal: Skin Health and Integrity  Outcome: Ongoing  Goal: Optimal Wound Healing  Outcome: Ongoing     Problem: Delirium  Goal: Optimal Coping  Outcome: Ongoing  Goal: Improved Behavioral Control  Outcome: Ongoing  Goal: Improved Attention and Thought Clarity  Outcome: Ongoing  Goal: Improved Sleep  Outcome: Ongoing     Problem: Infection  Goal: Absence of Infection Signs and Symptoms  Outcome: Ongoing     Problem: Fall Injury Risk  Goal: Absence of Fall and Fall-Related Injury  Outcome: Ongoing

## 2025-07-11 NOTE — PROGRESS NOTES
[facility] Medicine  Progress Note    Patient Name: Lisa Moreno  MRN: [id number]  Patient Class: IP- Inpatient   Admission Date: 6/29/2025  Length of Stay: 12 days  Attending Physician: Caroline Nelson MD  Primary Care Provider: John Nixon II, MD        Subjective     Principal Problem:Septic shock        HPI:  By Dr. Juno Mckeon MD    Mr. Moreno is a 44-year-old male with a medical history including ALS, spinal cerebellar ataxia, neuromuscular bladder dysfunction, and sacral pressure ulcers. He was recently hospitalized on 5/9/2025 for severe dehydration, electrolyte abnormalities, and COVID-19 infection. He now presents to the ED via EMS with altered mental status and hypoxia. The patient is nonverbal at baseline but is typically more alert and interactive. History was provided by his mother, who reported that he had been noticeably less responsive over the past day and had a decreased oral intake. She denied any fever, shortness of breath, nausea, vomiting, or diarrhea.    Upon EMS arrival, the patient was obtunded and hypoxic with an oxygen saturation of 74%. He was treated en route with Solu-Medrol and DuoNebs, resulting in improvement of his oxygen saturation to above 90%.    In the ED, patient was obtunded but stabilized with improved oxygenation after initial EMS interventions. Chest X-ray revealed patchy bilateral opacities, most prominent in the bibasilar regions, as well as in the left apex and mid-lung zones--findings consistent with multifocal pneumonia.  He received 2 L of IV fluids, IV cefepime and vancomycin and admitted to hospital medicine for further management.    Overview/Hospital Course:  Mr. Moreno was initially admitted to Hospital Medicine for management of sepsis 2/2 multifocal pneumonia.  While in the ED, [facility] consulted for sepsis. During first examination the patient was stable with MAPs >65 and Lactic acid down trended to 2.6. Around  6am of 6/30 rap[place] was called hypotension with MAPs in the 50. He was started on Levophed and Zosyn, and was transferred to MICU for septic shock 2/2 multifocal pneumonia.  He was weaned off pressors and Zosyn was de-escalated to Ceftriaxone and Azithromycin for CAP coverage. SLP consulted for dysphagia. FEES performed which showed residual puree. Recommend NPO. PEG placed with IR on 7/5. Increased secretions noted on exam s/p completion of antibiotic course of CAP coverage. Restarted patient on Vancomycin and Zosyn. Respiratory culture with many gram negative rods. Vancomycin discontinued. Continued on Zosyn. Respiratory cx positive for E.Coli ESBL. Started on Ertapenem, Zosyn discontinued. ID consulted for additional recommendations, who suggest a course through 7/12. SLP re-consulted for swallowing evaluation in the setting of new PEG tube. Able to have small occasional pleasure feeds of puree and honey thick liquids via tsp. SD to HM on 7/7.  He developed hyperkalemia from TF.  They were stopped.        Interval History: No acute events overnight.  Mom at bedside.  Hyperkalemic today to 6.  Ordered Nebs, IVF, Lokelma.  EKG ordered but unable to be done.  Monitoring labs q4h.  Likely 2/2 75mEq of Potassium in TF.  Reached out to Nutrition to change TF recs.       Review of Systems   Constitutional:  Negative for chills, fatigue and fever.   Respiratory:  Negative for cough and shortness of breath.    Cardiovascular:  Negative for chest pain, palpitations and leg swelling.   Gastrointestinal:  Negative for abdominal pain, diarrhea, nausea and vomiting.   Genitourinary:  Negative for dysuria and urgency.   Neurological:  Negative for dizziness and headaches.   All other systems reviewed and are negative.    Objective:     Vital Signs (Most Recent):  Temp: 97.5 °F (36.4 °C) (07/11/25 0410)  Pulse: 81 (07/11/25 1057)  Resp: 16 (07/11/25 0900)  BP: 98/72 (07/11/25 0900)  SpO2: 98 % (07/11/25 0900) Vital Signs (24h  Range):  Temp:  [97.1 °F (36.2 °C)-98.6 °F (37 °C)] 97.5 °F (36.4 °C)  Pulse:  [] 81  Resp:  [16-20] 16  SpO2:  [91 %-100 %] 98 %  BP: ()/(64-81) 98/72     Weight: 59.9 kg (132 lb 0.9 oz)  Body mass index is 16.51 kg/m².    Intake/Output Summary (Last 24 hours) at 7/11/2025 1120  Last data filed at 7/11/2025 0640  Gross per 24 hour   Intake 1095 ml   Output 1025 ml   Net 70 ml         Physical Exam  Constitutional:       Appearance: He is cachectic. He is ill-appearing.      Comments: He is alert and pleasantly smiles when you talk to him.  Unable to respond back verbally.   HENT:      Head: Normocephalic and atraumatic.   Cardiovascular:      Rate and Rhythm: Normal rate and regular rhythm.      Heart sounds: No murmur heard.  Pulmonary:      Effort: Pulmonary effort is normal. No respiratory distress.      Breath sounds: Normal breath sounds. No wheezing or rales.   Abdominal:      General: There is no distension.      Palpations: Abdomen is soft.      Tenderness: There is no abdominal tenderness. There is no guarding.      Comments: PEG site c/d/i   Musculoskeletal:         General: No deformity.      Right lower leg: No edema.      Left lower leg: No edema.   Neurological:      Mental Status: He is alert. Mental status is at baseline.               Significant Labs: All pertinent labs within the past 24 hours have been reviewed.    Significant Imaging: I have reviewed all pertinent imaging results/findings within the past 24 hours.      Assessment & Plan  Septic shock  Pneumonia due to E. coli  Acute hypoxic respiratory failure  Admitted to MICU for septic shock 2/2 ESBL E. Coli PNA  Upon EMS arrival, the patient was obtunded and hypoxic with an oxygen saturation of 74%.  Weaned off Levophed  On Ceftriaxone through 7/12 per ID recs  Satting well on room air  Oropharyngeal dysphagia  SLP consulted for dysphagia. FEES performed which showed residual puree. Recommend NPO.   S/p PEG by IR on  7/5/2025  Tube feeds, ok for pleasure feeds if alert (puree and honey thick liquids via tsp. )  TF being changed given hyperkalemia  ALS (amyotrophic lateral sclerosis)  Spinocerebellar ataxia  Spastic diplegia  UMN predominance with diffuse limb spasticity. Slowly progressive. Fully-reliant on mother, others for self care and feeding and cleaning and toileting. Respiratory function is poor per FVC .     Continue home donepezil for neuroprotection  Continue home dantrolene and baclofen for spacticity  Turn q2h with aspiration precautions  Mom wants SNF at [facility] [place]  Decubitus ulcer of sacral region, stage 4  Has a wound VAC in place  General Surgery consulted for possible debridement, appreciate recs.  No indication for inpatient debridement  Wound care consulted, appreciate recs    Pressure injury of deep tissue of heel  L Heel with black eschar. R Heel pressure ulcer with scant serosanguineous drainage. Photos uploaded into chart.   Wound care following, appreciate recs  Podiatry consulted, appreciate recs  Insomnia secondary to depression with anxiety  Chronic and stable  Continue quetiapine and mirtazapine    Acute encephalopathy  Likely 2/2 sepsis. .   Neuro checks  Delirium/aspiration/fall precautions  Severe protein-calorie malnutrition  Body mass index (BMI) less than 19  Nutrition consulted. Most recent weight and BMI monitored-     Measurements:  Wt Readings from Last 1 Encounters:   07/07/25 59.9 kg (132 lb 0.9 oz)   Body mass index is 16.51 kg/m².    Patient has been screened and assessed by RD.    Malnutrition Type:  Context: chronic illness  Level: severe    Malnutrition Characteristic Summary:  Subcutaneous Fat (Malnutrition): severe depletion  Muscle Mass (Malnutrition): severe depletion    Interventions/Recommendations (treatment strategy):  1. Continue bolus TF recommendation: 5 cans/day of Isosource 1.5 to provide 1250 mL total volume, 1875 kcal, 220 g CHO, 85 protein, 19 fiber, 955  mL free water  - 90 mL flush before and after each (10 flushes total) can to provide additional 900 mL free water (Total 1855 mL)  2. Continue soft and bite sized textured diet as tolerated    3. Continue boost glucose control TID   4. Recommend arnie BID to aid in wound healing  5. Recommend MVI  6. RD to monitor weight, labs, meds, intake, tolerance  -- started on Tube feeds      Anemia  Chronic anemia.    Plan  - Monitor serial CBC  - Transfuse PRBC if patient becomes hemodynamically unstable, symptomatic or H/H drops below 7/21.    Gastrostomy status  Patient noted to have a percutaneous endoscopic gastrostomy tube in place. Tube was placed on this admission, with date of procedure- 7/5/2025  The tube is patent. Routine care to be done by wound care and nursing staff.   Hyponatremia  Hyponatremia is likely due to Dehydration/hypovolemia. The patient's most recent sodium results are listed below.  Recent Labs     07/09/25  0415 07/10/25  0531 07/11/25  0600   * 132* 129*     Plan  - Sodium worsening  - Enteral free water bolus  Hyperkalemia  The patients most recent potassium results are listed below.  Recent Labs     07/09/25  0415 07/10/25  0531 07/11/25  0600   K 4.8 5.2* 6.0*     Plan  - Monitor for arrhythmias with EKG and/or continuous telemetry.   - Treat the hyperkalemia with Potassium Binders and IVF and Albuterol, Lokelma  - Trend BMP q4h  - Change TF as likely source if 75mEq of Potassium in TF per Pharmacy  Hypoglycemia  Monitor with glucose checks    VTE Risk Mitigation (From admission, onward)           Ordered     enoxaparin injection 30 mg  Daily         07/06/25 1653     IP VTE HIGH RISK PATIENT  Once         07/06/25 1634     Place sequential compression device  Until discontinued         06/29/25 2044                    Discharge Planning   MARCELLE: 7/14/2025     Code Status: Full Code   Medical Readiness for Discharge Date:   Discharge Plan A: Skilled Nursing Facility   Discharge Delays:  None known at this time        High Risk Conditions:   Patient has a condition that poses threat to life and bodily function: Hyperkalemia      Critical Care Time: 30 minutes    Critical Care was time spent personally by me on the following activities: evaluating this patient's organ dysfunction, development of treatment plan, discussing treatment plan with patient or surrogate and bedside caregivers, discussions with consultants, evaluation of patient's response to treatment, examination of patient, ordering and performing treatments and interventions, ordering and review of laboratory studies, ordering and review of radiographic studies, re-evaluation of patient's condition. This critical care time did not overlap with that of any other provider or involve time for any separately billed procedures    Diagnosis requiring critical care: Hyperkalemia                 Caroline Nelson MD  Department of Hospital Medicine   [facility]

## 2025-07-11 NOTE — ASSESSMENT & PLAN NOTE
UMN predominance with diffuse limb spasticity. Slowly progressive. Fully-reliant on mother, others for self care and feeding and cleaning and toileting. Respiratory function is poor per FVC .     Continue home donepezil for neuroprotection  Continue home dantrolene and baclofen for spacticity  Turn q2h with aspiration precautions  Mom wants SNF at [facility] [place]

## 2025-07-11 NOTE — PROGRESS NOTES
Spoke with team via secure chat - wanting to switch to more potassium friendly formula d/t elevated potassium levels. See below for recommendations.     Recommendations     Interventions: General healthful diet, Texture modified diet, Enteral nutrition management, Medical food supplement therapy, Vitamin and mineral supplement therapy     Recommendations:   --Bolus TF recommendation: 4 cans/day of Novasource Renal to provide 948 mL total volume, 1900 kcal, 172 g CHO, 88 g protein, 0 g fiber, 672 mL free water, 95% DRI         --155 mL flush before and after each can to provide additional 1240 mL free water (Total 1912 mL)      --Order Guzman BID as tube feed modifier to promote wound healing     --Continue Soft & Bite Sized (IDDSI Level 6) Honey Thick (Moderately Thick) diet as tolerated and clinically indicated   --Encourage good intakes   --Nursing: please continue to document % meal eaten, tube feed formula and rate/volume on flowsheets  --RD to monitor weight, PO intake     Goal #1: Optimize weight status toward healthy weight range by discharge  Nutrition Goal Status #1: progressing towards goal  Goal #2: Oral supplement consumption of greater than or equal to 50% of estimated needs by RD follow up  Nutrition Goal Status #2: progressing towards goal  Communication of RD Recs:  (POC)

## 2025-07-11 NOTE — ASSESSMENT & PLAN NOTE
SLP consulted for dysphagia. FEES performed which showed residual puree. Recommend NPO.   S/p PEG by IR on 7/5/2025  Tube feeds, ok for pleasure feeds if alert (puree and honey thick liquids via tsp. )  TF being changed given hyperkalemia

## 2025-07-11 NOTE — NURSING
Pt AA- nonverbal at baseline. No s/s of pain. VSS, NSR on telemetry. Peg tube intact. TF formula changed. Novosource Renal at the bedside. Bolus TF done. Free water boluses done. Pt tolerated feeds/water well. Wound vac changed by  nurse. Wound vac set to 125mm/hg with no leaks. Wound care to buttocks and heels done. Potassium 6.0 at the beginning of the shift. MD aware. New orders given. Potassium decreased to 4.7. MD aware. Bilateral heel boots in use. No acute events this shift. Family supportive at the bedside. Contact isolation maintained. Safety measures in place.

## 2025-07-11 NOTE — ASSESSMENT & PLAN NOTE
UMN predominance with diffuse limb spasticity. Slowly progressive. Fully-reliant on mother, others for self care and feeding and cleaning and toileting. Respiratory function is poor per FVC .     Continue home donepezil for neuroprotection  Continue home dantrolene and baclofen for spacticity  Turn q2h with aspiration precautions  Mom wants SNF at [facility] [place]

## 2025-07-11 NOTE — SUBJECTIVE & OBJECTIVE
Interval History: No acute events overnight.  Mom at bedside.  Hyperkalemic today to 6.  Ordered Nebs, IVF, Lokelma.  EKG ordered but unable to be done.  Monitoring labs q4h.  Likely 2/2 75mEq of Potassium in TF.  Reached out to Nutrition to change TF recs.       Review of Systems   Constitutional:  Negative for chills, fatigue and fever.   Respiratory:  Negative for cough and shortness of breath.    Cardiovascular:  Negative for chest pain, palpitations and leg swelling.   Gastrointestinal:  Negative for abdominal pain, diarrhea, nausea and vomiting.   Genitourinary:  Negative for dysuria and urgency.   Neurological:  Negative for dizziness and headaches.   All other systems reviewed and are negative.    Objective:     Vital Signs (Most Recent):  Temp: 97.5 °F (36.4 °C) (07/11/25 0410)  Pulse: 81 (07/11/25 1057)  Resp: 16 (07/11/25 0900)  BP: 98/72 (07/11/25 0900)  SpO2: 98 % (07/11/25 0900) Vital Signs (24h Range):  Temp:  [97.1 °F (36.2 °C)-98.6 °F (37 °C)] 97.5 °F (36.4 °C)  Pulse:  [] 81  Resp:  [16-20] 16  SpO2:  [91 %-100 %] 98 %  BP: ()/(64-81) 98/72     Weight: 59.9 kg (132 lb 0.9 oz)  Body mass index is 16.51 kg/m².    Intake/Output Summary (Last 24 hours) at 7/11/2025 1120  Last data filed at 7/11/2025 0640  Gross per 24 hour   Intake 1095 ml   Output 1025 ml   Net 70 ml         Physical Exam  Constitutional:       Appearance: He is cachectic. He is ill-appearing.      Comments: He is alert and pleasantly smiles when you talk to him.  Unable to respond back verbally.   HENT:      Head: Normocephalic and atraumatic.   Cardiovascular:      Rate and Rhythm: Normal rate and regular rhythm.      Heart sounds: No murmur heard.  Pulmonary:      Effort: Pulmonary effort is normal. No respiratory distress.      Breath sounds: Normal breath sounds. No wheezing or rales.   Abdominal:      General: There is no distension.      Palpations: Abdomen is soft.      Tenderness: There is no abdominal tenderness.  There is no guarding.      Comments: PEG site c/d/i   Musculoskeletal:         General: No deformity.      Right lower leg: No edema.      Left lower leg: No edema.   Neurological:      Mental Status: He is alert. Mental status is at baseline.               Significant Labs: All pertinent labs within the past 24 hours have been reviewed.    Significant Imaging: I have reviewed all pertinent imaging results/findings within the past 24 hours.

## 2025-07-11 NOTE — ASSESSMENT & PLAN NOTE
Hyponatremia is likely due to Dehydration/hypovolemia. The patient's most recent sodium results are listed below.  Recent Labs     07/09/25  0415 07/10/25  0531 07/11/25  0600   * 132* 129*     Plan  - Sodium worsening  - Enteral free water bolus

## 2025-07-12 LAB
ALBUMIN SERPL BCP-MCNC: 1.9 G/DL (ref 3.5–5.2)
ALP SERPL-CCNC: 72 UNIT/L (ref 40–150)
ALT SERPL W/O P-5'-P-CCNC: 41 UNIT/L (ref 10–44)
ANION GAP (OHS): 7 MMOL/L (ref 8–16)
AST SERPL-CCNC: 38 UNIT/L (ref 11–45)
BILIRUB SERPL-MCNC: 0.1 MG/DL (ref 0.1–1)
BUN SERPL-MCNC: 17 MG/DL (ref 6–20)
CALCIUM SERPL-MCNC: 8.1 MG/DL (ref 8.7–10.5)
CHLORIDE SERPL-SCNC: 98 MMOL/L (ref 95–110)
CO2 SERPL-SCNC: 28 MMOL/L (ref 23–29)
CREAT SERPL-MCNC: 0.5 MG/DL (ref 0.5–1.4)
GFR SERPLBLD CREATININE-BSD FMLA CKD-EPI: >60 ML/MIN/1.73/M2
GLUCOSE SERPL-MCNC: 94 MG/DL (ref 70–110)
MAGNESIUM SERPL-MCNC: 2 MG/DL (ref 1.6–2.6)
PHOSPHATE SERPL-MCNC: 3 MG/DL (ref 2.7–4.5)
POTASSIUM SERPL-SCNC: 4 MMOL/L (ref 3.5–5.1)
PROT SERPL-MCNC: 6.7 GM/DL (ref 6–8.4)
SODIUM SERPL-SCNC: 133 MMOL/L (ref 136–145)

## 2025-07-12 PROCEDURE — 63600175 PHARM REV CODE 636 W HCPCS

## 2025-07-12 PROCEDURE — 63600175 PHARM REV CODE 636 W HCPCS: Performed by: HOSPITALIST

## 2025-07-12 PROCEDURE — 80053 COMPREHEN METABOLIC PANEL: CPT | Performed by: STUDENT IN AN ORGANIZED HEALTH CARE EDUCATION/TRAINING PROGRAM

## 2025-07-12 PROCEDURE — 11000001 HC ACUTE MED/SURG PRIVATE ROOM

## 2025-07-12 PROCEDURE — 36415 COLL VENOUS BLD VENIPUNCTURE: CPT | Performed by: STUDENT IN AN ORGANIZED HEALTH CARE EDUCATION/TRAINING PROGRAM

## 2025-07-12 PROCEDURE — 25000003 PHARM REV CODE 250: Performed by: STUDENT IN AN ORGANIZED HEALTH CARE EDUCATION/TRAINING PROGRAM

## 2025-07-12 PROCEDURE — 27000207 HC ISOLATION

## 2025-07-12 PROCEDURE — 83735 ASSAY OF MAGNESIUM: CPT | Performed by: STUDENT IN AN ORGANIZED HEALTH CARE EDUCATION/TRAINING PROGRAM

## 2025-07-12 PROCEDURE — 63600175 PHARM REV CODE 636 W HCPCS: Performed by: INTERNAL MEDICINE

## 2025-07-12 PROCEDURE — 25000003 PHARM REV CODE 250: Performed by: HOSPITALIST

## 2025-07-12 PROCEDURE — 63600175 PHARM REV CODE 636 W HCPCS: Performed by: STUDENT IN AN ORGANIZED HEALTH CARE EDUCATION/TRAINING PROGRAM

## 2025-07-12 PROCEDURE — 84100 ASSAY OF PHOSPHORUS: CPT | Performed by: STUDENT IN AN ORGANIZED HEALTH CARE EDUCATION/TRAINING PROGRAM

## 2025-07-12 RX ORDER — SODIUM CHLORIDE, SODIUM LACTATE, POTASSIUM CHLORIDE, CALCIUM CHLORIDE 600; 310; 30; 20 MG/100ML; MG/100ML; MG/100ML; MG/100ML
INJECTION, SOLUTION INTRAVENOUS CONTINUOUS
Status: ACTIVE | OUTPATIENT
Start: 2025-07-12 | End: 2025-07-12

## 2025-07-12 RX ADMIN — BACLOFEN 20 MG: 10 TABLET ORAL at 09:07

## 2025-07-12 RX ADMIN — BACLOFEN 20 MG: 10 TABLET ORAL at 05:07

## 2025-07-12 RX ADMIN — LACTULOSE 10 G: 20 SOLUTION ORAL at 08:07

## 2025-07-12 RX ADMIN — DANTROLENE SODIUM 50 MG: 25 CAPSULE ORAL at 09:07

## 2025-07-12 RX ADMIN — SODIUM CHLORIDE 1 G: 9 INJECTION, SOLUTION INTRAVENOUS at 06:07

## 2025-07-12 RX ADMIN — DANTROLENE SODIUM 50 MG: 25 CAPSULE ORAL at 08:07

## 2025-07-12 RX ADMIN — POLYETHYLENE GLYCOL 3350 17 G: 17 POWDER, FOR SOLUTION ORAL at 08:07

## 2025-07-12 RX ADMIN — ONDANSETRON 4 MG: 2 INJECTION INTRAMUSCULAR; INTRAVENOUS at 12:07

## 2025-07-12 RX ADMIN — SODIUM CHLORIDE, POTASSIUM CHLORIDE, SODIUM LACTATE AND CALCIUM CHLORIDE: 600; 310; 30; 20 INJECTION, SOLUTION INTRAVENOUS at 01:07

## 2025-07-12 RX ADMIN — DANTROLENE SODIUM 50 MG: 25 CAPSULE ORAL at 05:07

## 2025-07-12 RX ADMIN — ENOXAPARIN SODIUM 30 MG: 30 INJECTION SUBCUTANEOUS at 05:07

## 2025-07-12 RX ADMIN — ERYTHROMYCIN: 5 OINTMENT OPHTHALMIC at 09:07

## 2025-07-12 RX ADMIN — DONEPEZIL HYDROCHLORIDE 5 MG: 5 TABLET, FILM COATED ORAL at 09:07

## 2025-07-12 RX ADMIN — QUETIAPINE FUMARATE 50 MG: 25 TABLET ORAL at 09:07

## 2025-07-12 RX ADMIN — CETIRIZINE HYDROCHLORIDE 10 MG: 5 TABLET, FILM COATED ORAL at 08:07

## 2025-07-12 RX ADMIN — MIRTAZAPINE 15 MG: 15 TABLET, FILM COATED ORAL at 09:07

## 2025-07-12 RX ADMIN — BACLOFEN 20 MG: 10 TABLET ORAL at 08:07

## 2025-07-12 RX ADMIN — SENNOSIDES AND DOCUSATE SODIUM 1 TABLET: 50; 8.6 TABLET ORAL at 08:07

## 2025-07-12 NOTE — ASSESSMENT & PLAN NOTE
UMN predominance with diffuse limb spasticity. Slowly progressive. Fully-reliant on mother, others for self care and feeding and cleaning and toileting. Respiratory function is poor per FVC .     Continue home donepezil for neuroprotection  Continue home dantrolene and baclofen for spacticity  Turn q2h with aspiration precautions  Mom wants SNF at [facility] [place]

## 2025-07-12 NOTE — ASSESSMENT & PLAN NOTE
UMN predominance with diffuse limb spasticity. Slowly progressive. Fully-reliant on mother, others for self care and feeding and cleaning and toileting. Respiratory function is poor per FVC .     Continue home donepezil for neuroprotection  Continue home dantrolene and baclofen for spacticity  Turn q2h with aspiration precautions  Mom wants SN[facility] [place]

## 2025-07-12 NOTE — PROGRESS NOTES
[facility] Medicine  Progress Note    Patient Name: Lisa Moreno  MRN: [id number]  Patient Class: IP- Inpatient   Admission Date: 6/29/2025  Length of Stay: 13 days  Attending Physician: Caroline Nelson MD  Primary Care Provider: John Nixon II, MD        Subjective     Principal Problem:Septic shock        HPI:  By Dr. Juno Mckeon MD    Mr. Moreno is a 44-year-old male with a medical history including ALS, spinal cerebellar ataxia, neuromuscular bladder dysfunction, and sacral pressure ulcers. He was recently hospitalized on 5/9/2025 for severe dehydration, electrolyte abnormalities, and COVID-19 infection. He now presents to the ED via EMS with altered mental status and hypoxia. The patient is nonverbal at baseline but is typically more alert and interactive. History was provided by his mother, who reported that he had been noticeably less responsive over the past day and had a decreased oral intake. She denied any fever, shortness of breath, nausea, vomiting, or diarrhea.    Upon EMS arrival, the patient was obtunded and hypoxic with an oxygen saturation of 74%. He was treated en route with Solu-Medrol and DuoNebs, resulting in improvement of his oxygen saturation to above 90%.    In the ED, patient was obtunded but stabilized with improved oxygenation after initial EMS interventions. Chest X-ray revealed patchy bilateral opacities, most prominent in the bibasilar regions, as well as in the left apex and mid-lung zones--findings consistent with multifocal pneumonia.  He received 2 L of IV fluids, IV cefepime and vancomycin and admitted to hospital medicine for further management.    Overview/Hospital Course:  Mr. Moreno was initially admitted to Hospital Medicine for management of sepsis 2/2 multifocal pneumonia.  While in the ED, [facility] consulted for sepsis. During first examination the patient was stable with MAPs >65 and Lactic acid down trended to 2.6. Around  6am of 6/30 rapids was called hypotension with MAPs in the 50. He was started on Levophed and Zosyn, and was transferred to MICU for septic shock 2/2 multifocal pneumonia.  He was weaned off pressors and Zosyn was de-escalated to Ceftriaxone and Azithromycin for CAP coverage. SLP consulted for dysphagia. FEES performed which showed residual puree. Recommend NPO. PEG placed with IR on 7/5. Increased secretions noted on exam s/p completion of antibiotic course of CAP coverage. Restarted patient on Vancomycin and Zosyn. Respiratory culture with many gram negative rods. Vancomycin discontinued. Continued on Zosyn. Respiratory cx positive for E.Coli ESBL. Started on Ertapenem, Zosyn discontinued. ID consulted for additional recommendations, who suggest a course through 7/12. SLP re-consulted for swallowing evaluation in the setting of new PEG tube. Able to have small occasional pleasure feeds of puree and honey thick liquids via tsp. SD to  on 7/7.  He developed hyperkalemia from Isosource TF.  They were stopped and changed to Novasource renal with resolution of his hyperkalemia.        Interval History: No acute events overnight.  Mom at bedside.  Hyperkalemia resolved with changing TF.  Hypothermic and placed on bear hugger.  Last day of Ertapenem today.  Will get some IVF as mom concerned about dehydration      Review of Systems   Constitutional:  Negative for chills, fatigue and fever.   Respiratory:  Negative for cough and shortness of breath.    Cardiovascular:  Negative for chest pain, palpitations and leg swelling.   Gastrointestinal:  Negative for abdominal pain, diarrhea, nausea and vomiting.   Genitourinary:  Negative for dysuria and urgency.   Neurological:  Negative for dizziness and headaches.   All other systems reviewed and are negative.    Objective:     Vital Signs (Most Recent):  Temp: 97.6 °F (36.4 °C) (07/12/25 1214)  Pulse: 84 (07/12/25 1432)  Resp: 18 (07/12/25 1118)  BP: 97/62 (07/12/25  1118)  SpO2: 97 % (07/12/25 1118) Vital Signs (24h Range):  Temp:  [95 °F (35 °C)-98 °F (36.7 °C)] 97.6 °F (36.4 °C)  Pulse:  [68-85] 84  Resp:  [18] 18  SpO2:  [95 %-100 %] 97 %  BP: ()/(61-71) 97/62     Weight: 59.9 kg (132 lb 0.9 oz)  Body mass index is 16.51 kg/m².    Intake/Output Summary (Last 24 hours) at 7/12/2025 1445  Last data filed at 7/12/2025 0421  Gross per 24 hour   Intake 4038.74 ml   Output 1625 ml   Net 2413.74 ml         Physical Exam  Constitutional:       Appearance: He is cachectic. He is ill-appearing.      Comments: He is alert and pleasantly smiles when you talk to him.  Unable to respond back verbally.   HENT:      Head: Normocephalic and atraumatic.   Cardiovascular:      Rate and Rhythm: Normal rate and regular rhythm.      Heart sounds: No murmur heard.  Pulmonary:      Effort: Pulmonary effort is normal. No respiratory distress.      Breath sounds: Normal breath sounds. No wheezing or rales.   Abdominal:      General: There is no distension.      Palpations: Abdomen is soft.      Tenderness: There is no abdominal tenderness. There is no guarding.      Comments: PEG site c/d/i   Musculoskeletal:         General: No deformity.      Right lower leg: No edema.      Left lower leg: No edema.   Neurological:      Mental Status: He is alert. Mental status is at baseline.               Significant Labs: All pertinent labs within the past 24 hours have been reviewed.    Significant Imaging: I have reviewed all pertinent imaging results/findings within the past 24 hours.      Assessment & Plan  Septic shock  Pneumonia due to E. coli  Acute hypoxic respiratory failure  Admitted to MICU for septic shock 2/2 ESBL E. Coli PNA  Upon EMS arrival, the patient was obtunded and hypoxic with an oxygen saturation of 74%.  Weaned off Levophed  On Ceftriaxone through 7/12 per ID recs  Satting well on room air  Oropharyngeal dysphagia  SLP consulted for dysphagia. FEES performed which showed residual  puree. Recommend NPO.   S/p PEG by IR on 7/5/2025  Tube feeds, ok for pleasure feeds if alert (puree and honey thick liquids via tsp. )  TF being changed given hyperkalemia  ALS (amyotrophic lateral sclerosis)  Spinocerebellar ataxia  Spastic diplegia  UMN predominance with diffuse limb spasticity. Slowly progressive. Fully-reliant on mother, others for self care and feeding and cleaning and toileting. Respiratory function is poor per FVC .     Continue home donepezil for neuroprotection  Continue home dantrolene and baclofen for spacticity  Turn q2h with aspiration precautions  Mom wants SNF at [facility] [place]  Decubitus ulcer of sacral region, stage 4  Has a wound VAC in place  General Surgery consulted for possible debridement, appreciate recs.  No indication for inpatient debridement  Wound care consulted, appreciate recs    Pressure injury of deep tissue of heel  L Heel with black eschar. R Heel pressure ulcer with scant serosanguineous drainage. Photos uploaded into chart.   Wound care following, appreciate recs  Podiatry consulted, appreciate recs  Insomnia secondary to depression with anxiety  Chronic and stable  Continue quetiapine and mirtazapine    Acute encephalopathy  Likely 2/2 sepsis. .   Neuro checks  Delirium/aspiration/fall precautions  Severe protein-calorie malnutrition  Body mass index (BMI) less than 19  Nutrition consulted. Most recent weight and BMI monitored-     Measurements:  Wt Readings from Last 1 Encounters:   07/07/25 59.9 kg (132 lb 0.9 oz)   Body mass index is 16.51 kg/m².    Patient has been screened and assessed by RD.    Malnutrition Type:  Context: chronic illness  Level: severe    Malnutrition Characteristic Summary:  Subcutaneous Fat (Malnutrition): severe depletion  Muscle Mass (Malnutrition): severe depletion    Interventions/Recommendations (treatment strategy):  1. Continue bolus TF recommendation: 5 cans/day of Isosource 1.5 to provide 1250 mL total volume, 1875  kcal, 220 g CHO, 85 protein, 19 fiber, 955 mL free water  - 90 mL flush before and after each (10 flushes total) can to provide additional 900 mL free water (Total 1855 mL)  2. Continue soft and bite sized textured diet as tolerated    3. Continue boost glucose control TID   4. Recommend arnie BID to aid in wound healing  5. Recommend MVI  6. RD to monitor weight, labs, meds, intake, tolerance  -- started on Tube feeds      Anemia  Chronic anemia.    Plan  - Monitor serial CBC  - Transfuse PRBC if patient becomes hemodynamically unstable, symptomatic or H/H drops below 7/21.    Gastrostomy status  Patient noted to have a percutaneous endoscopic gastrostomy tube in place. Tube was placed on this admission, with date of procedure- 7/5/2025  The tube is patent. Routine care to be done by wound care and nursing staff.   Hyponatremia  Hyponatremia is likely due to Dehydration/hypovolemia. The patient's most recent sodium results are listed below.  Recent Labs     07/11/25  1553 07/11/25 1957 07/12/25  0434   * 132* 133*     Plan  - Sodium worsening  - Enteral free water bolus  Hyperkalemia  2/2 Isopeptide TF.  The patients most recent potassium results are listed below.  Recent Labs     07/11/25  1553 07/11/25 1957 07/12/25  0434   K 4.7 4.3 4.0     Now resolved and hypokalemic  Hypoglycemia  Monitor with glucose checks    VTE Risk Mitigation (From admission, onward)           Ordered     enoxaparin injection 30 mg  Daily         07/06/25 1653     IP VTE HIGH RISK PATIENT  Once         07/06/25 1634     Place sequential compression device  Until discontinued         06/29/25 2044                    Discharge Planning   MARCELLE: 7/14/2025     Code Status: Full Code   Medical Readiness for Discharge Date:   Discharge Plan A: Skilled Nursing Facility   Discharge Delays: None known at this time                    Caroline Nelson MD  Department of Hospital Medicine   [facility] - Acute Medical Stepdown

## 2025-07-12 NOTE — PROGRESS NOTES
LR @100mL-per family request, hypothermic-tx with joseluis jenkins, temp corrected, joseluis jenkins dc'allyson. Safety measures in place. Mother at bedside. Call light within reach.     07/12/25 0829   Pain/Comfort/Sleep   Preferred Pain Scale rFLACC (Revised Face Legs Arms Cry Consolability Scale)   Comfort/Acceptable Pain Level 0   Pain Rating (0-10): Rest 0   Pain Rating (0-10): Activity 0   FACES Pain Rating: Rest 0-->no hurt   FACES Pain Rating: Activity 0-->no hurt   rFLACC Pain Rating: - Face 0-->no particular expression or smile   rFLACC Pain Rating: - Legs 0-->normal position or relaxed   rFLACC Pain Rating: - Activity 0-->lying quietly, normal position, moves easily   rFLACC Pain Rating: - Cry 0-->no cry (awake or asleep)   rFLACC Pain Rating: - Consolability 0-->content, relaxed   rFLACC Score: 0   Pain Management Interventions care clustered   POSS ([place] Opioid-Induced Sed Scale) 1 - Awake and alert   Fever Reduction/Comfort Measures other (see comments)  (bare hugger applied due to 95 temp rectal)   Sleep/Rest/Relaxation no problem identified   Pain Reassessment   Pain Rating Prior to Med Admin 0   Cognitive   Cognitive/Neuro/Behavioral WDL ex   Level of Consciousness (AVPU) alert   Arousal Level opens eyes spontaneously   Orientation other (see comments)  (nonverbal, however, able to nod when answering questions; physical responses appropriate)   Speech nods/gestures appropriately   Mood/Behavior calm;cooperative   Cognitive/Behavioral/Neuro   General Motor Response paraplegia   LUE Motor Response purposeful motor response   RUE Motor Response purposeful motor response   LLE Motor Response no movement   RLE Motor Response no movement   Cognitive Interventions   Communication Enhancement Strategies family/caregiver assisted with communication;family involved in communication plan;nonverbal strategies used   Pupils   Pupil PERRLA yes   [place] Coma Scale   Best Eye Response 4-->(E4) spontaneous   Best Motor Response  6-->(M6) obeys commands   Best Verbal Response 1-->(V1) none   Joseph Coma Scale Score 11   HEENT   HEENT WDL WDL   Respiratory   Respiratory WDL WDL   Breath Sounds   Breath Sounds RUL;JOE   All Lung Fields Breath Sounds Anterior:   JOE Breath Sounds clear;equal bilaterally   Cardiac   Cardiac WDL WDL   ECG   Pulse 69   Peripheral Neurovascular   Peripheral Neurovascular WDL WDL   Capillary Refill, General less than/equal to 3 secs   VTE Core Measure (SCDs) Sequential compression device initiated/maintained   VTE Prevention/Management remove, assess skin, and reapply sequential compression device   Pulse Radial   Left Radial Pulse 2+ (normal)   Right Radial Pulse 2+ (normal)   Pulse Dorsalis Pedis   Left Dorsalis Pedis Pulse 1+ (weak)   Right Dorsalis Pedis Pulse 1+ (weak)   Skin   Skin WDL ex   4EYES: 2 Clinical Staff Inspection of bony prominences No new pressure injury noted   4EYES: 2nd Clinical Staff Member (Type Name) LOGAN Godwin   Morgan Risk Assessment   Sensory Perception 3-->slightly limited   Moisture 3-->occasionally moist   Activity 1-->bedfast   Mobility 2-->very limited   Nutrition 2-->probably inadequate   Friction and Shear 2-->potential problem   Morgan Score 13        Wound 06/29/25 1947 Pressure Injury Right posterior Buttocks #2   Date First Assessed/Time First Assessed: 06/29/25 1947   Primary Wound Type: Pressure Injury  Side: Right  Orientation: posterior  Location: Buttocks  Wound Number: #2  Is this injury device related?: No   Dressing Appearance Dry;Intact;Clean   Drainage Amount None   Drainage Characteristics/Odor No odor        Wound 06/30/25 0947 Pressure Injury Left Ankle   Date First Assessed/Time First Assessed: 06/30/25 0947   Present on Original Admission: Yes  Primary Wound Type: Pressure Injury  Side: Left  Location: Ankle   Dressing Appearance Open to air   Drainage Amount None   Drainage Characteristics/Odor No odor        Wound 06/30/25 0948 Pressure Injury Left distal Thigh    Date First Assessed/Time First Assessed: 06/30/25 0948   Present on Original Admission: Yes  Primary Wound Type: Pressure Injury  Side: Left  Orientation: distal  Location: Thigh   Dressing Appearance Open to air   Drainage Amount None   Drainage Characteristics/Odor No odor        Wound 06/30/25 0950 Pressure Injury Left Leg   Date First Assessed/Time First Assessed: 06/30/25 0950   Present on Original Admission: Yes  Primary Wound Type: Pressure Injury  Side: Left  Location: Leg   Dressing Appearance Open to air;Dry;Intact;Clean   Drainage Amount None   Drainage Characteristics/Odor No odor   Musculoskeletal   Musculoskeletal WDL ex   General Mobility significantly impaired   Extremity Movement LUE;RUE   Gastrointestinal   GI WDL WDL   Abdominal Appearance flat;nondistended   Last Bowel Movement 07/11/25   Genitourinary   Genitourinary WDL ex   Voiding Characteristics incontinence   Urine Characteristics yellow   Male External Urinary Catheter 07/11/25 0600 Medium   Placement Date/Time: 07/11/25 0600   Inserted by: RN  External Urinary Catheter Sizes: Medium   Collection Container Urimeter   Skin no redness;no breakdown   Tolerance no signs/symptoms of discomfort   Coping/Psychosocial   Observed Emotional State calm;cooperative   Verbalized Emotional State nonverbal   Plan of Care Reviewed With mother;patient   Safety   Safety WDL ex   Safety Factors ID band on;call light in reach;wheels locked;bed in low position   Fall Risk Assessment (every shift)   History Of Fall (W/I 3 Mos) 0-->No   Polypharmacy 3-->Yes   Central Nervous System/Psychotropic Medication 3-->Yes   Cardiovascular Medication 0-->No   Age Greater Than 65 Years 0-->No   Altered Elimination 2-->Yes   Cognitive Deficit 2-->Yes   Sensory Deficit 2-->Yes   Dizziness/Vertigo 0-->No   Depression 0-->No   Mobility Deficit/Weakness 2-->Yes   Male 1-->Yes   Fall Risk Score 15   ABC Risk for Fall with Injury Assessment   A= Age: Is the patient greater than  or equal to 85 years old or frail due to clinical condition? No   B=Bones: Does the patient have osteoporosis, previous fracture, prolonged steroid use, or metastatic bone cancer? No   C=Coagulation Disorders: Does the patient have a bleeding disorder, either through anticoagulants or underlying clinical condition? No   S=recent Surgery: Is the patient post-op surgicalwith a recent lower limb amputation or recent major abdominal or thoracic surgery? No   Safety Management   Safety Promotion/Fall Prevention assistive device/personal item within reach;Fall Risk reviewed with patient/family;instructed to call staff for mobility   Medication Review/Management medications reviewed   Patient Rounds bed in low position;bed wheels locked;ID band on   Safety Bands on Patient Fall Risk Band   Daily Care   Activity Management Rolling - L1   Activity Assistance Provided assistance, 1 person   Mobility   GEMS ([place] Early Mobility Scale) Level 1-Primary in bed activities   Positioning   Body Position turned   Head of Bed (HOB) Positioning HOB elevated   Positioning/Transfer Devices pillows   Hygiene Care   Bathing/Skin Care incontinence care   RN Clinical Review   I have evaluated the data collected on this patient and nursing care provided. Done

## 2025-07-12 NOTE — ASSESSMENT & PLAN NOTE
Hyponatremia is likely due to Dehydration/hypovolemia. The patient's most recent sodium results are listed below.  Recent Labs     07/11/25  1553 07/11/25  1957 07/12/25  0434   * 132* 133*     Plan  - Sodium worsening  - Enteral free water bolus

## 2025-07-12 NOTE — ASSESSMENT & PLAN NOTE
2/2 Isopeptide TF.  The patients most recent potassium results are listed below.  Recent Labs     07/11/25  1553 07/11/25  1957 07/12/25  0434   K 4.7 4.3 4.0     Now resolved and hypokalemic

## 2025-07-12 NOTE — NURSING
Pt nonverbal at baseline. No symptoms of pain or distress. VSS. Tele-NSR. PEG tube intact. TF and free water bolus given, tolerated well. Condom cath changed-output 325ml. Wound vac in place-output 250ml. Bilateral heel boots in use. Mother at bedside. Safety measures in place, precautions maintained. No acute events this shift.

## 2025-07-12 NOTE — PLAN OF CARE
[facility] - Acute Medical Stepdown  Discharge Reassessment    Primary Care Provider: John Nixon II, MD    Expected Discharge Date: 7/14/2025    Reassessment (most recent)       Discharge Reassessment - 07/12/25 1838          Discharge Reassessment    Assessment Type Discharge Planning Reassessment (P)      Did the patient's condition or plan change since previous assessment? No (P)      Discharge Plan A Skilled Nursing Facility (P)      Discharge Plan B Home Health (P)      Transition of Care Barriers None (P)      Why the patient remains in the hospital Requires continued medical care (P)         Post-Acute Status    Post-Acute Placement Status Set-up Complete/Auth obtained (P)      Discharge Delays None known at this time (P)                    Pt continues to require inpatient medical care.  Pt scheduled to discharge to [facility] in [place] for skilled nursing.    Discharge Plan A and Plan B have been determined by review of patient's clinical status, future medical and therapeutic needs, and coverage/benefits for post-acute care in coordination with multidisciplinary team members.     Nadira Conte RN, BSN  Case Management  [phone]

## 2025-07-12 NOTE — SUBJECTIVE & OBJECTIVE
Interval History: No acute events overnight.  Mom at bedside.  Hyperkalemia resolved with changing TF.  Hypothermic and placed on bear hugger.  Last day of Ertapenem today.  Will get some IVF as mom concerned about dehydration      Review of Systems   Constitutional:  Negative for chills, fatigue and fever.   Respiratory:  Negative for cough and shortness of breath.    Cardiovascular:  Negative for chest pain, palpitations and leg swelling.   Gastrointestinal:  Negative for abdominal pain, diarrhea, nausea and vomiting.   Genitourinary:  Negative for dysuria and urgency.   Neurological:  Negative for dizziness and headaches.   All other systems reviewed and are negative.    Objective:     Vital Signs (Most Recent):  Temp: 97.6 °F (36.4 °C) (07/12/25 1214)  Pulse: 84 (07/12/25 1432)  Resp: 18 (07/12/25 1118)  BP: 97/62 (07/12/25 1118)  SpO2: 97 % (07/12/25 1118) Vital Signs (24h Range):  Temp:  [95 °F (35 °C)-98 °F (36.7 °C)] 97.6 °F (36.4 °C)  Pulse:  [68-85] 84  Resp:  [18] 18  SpO2:  [95 %-100 %] 97 %  BP: ()/(61-71) 97/62     Weight: 59.9 kg (132 lb 0.9 oz)  Body mass index is 16.51 kg/m².    Intake/Output Summary (Last 24 hours) at 7/12/2025 1445  Last data filed at 7/12/2025 0421  Gross per 24 hour   Intake 4038.74 ml   Output 1625 ml   Net 2413.74 ml         Physical Exam  Constitutional:       Appearance: He is cachectic. He is ill-appearing.      Comments: He is alert and pleasantly smiles when you talk to him.  Unable to respond back verbally.   HENT:      Head: Normocephalic and atraumatic.   Cardiovascular:      Rate and Rhythm: Normal rate and regular rhythm.      Heart sounds: No murmur heard.  Pulmonary:      Effort: Pulmonary effort is normal. No respiratory distress.      Breath sounds: Normal breath sounds. No wheezing or rales.   Abdominal:      General: There is no distension.      Palpations: Abdomen is soft.      Tenderness: There is no abdominal tenderness. There is no guarding.       Comments: PEG site c/d/i   Musculoskeletal:         General: No deformity.      Right lower leg: No edema.      Left lower leg: No edema.   Neurological:      Mental Status: He is alert. Mental status is at baseline.               Significant Labs: All pertinent labs within the past 24 hours have been reviewed.    Significant Imaging: I have reviewed all pertinent imaging results/findings within the past 24 hours.

## 2025-07-12 NOTE — ASSESSMENT & PLAN NOTE
UMN predominance with diffuse limb spasticity. Slowly progressive. Fully-reliant on mother, others for self care and feeding and cleaning and toileting. Respiratory function is poor per FVC .     Continue home donepezil for neuroprotection  Continue home dantrolene and baclofen for spacticity  Turn q2h with aspiration precautions  Mom wants SNF at [facility] [place]

## 2025-07-13 PROCEDURE — 63600175 PHARM REV CODE 636 W HCPCS: Performed by: INTERNAL MEDICINE

## 2025-07-13 PROCEDURE — 11000001 HC ACUTE MED/SURG PRIVATE ROOM

## 2025-07-13 PROCEDURE — 25000003 PHARM REV CODE 250: Performed by: HOSPITALIST

## 2025-07-13 PROCEDURE — 27000207 HC ISOLATION

## 2025-07-13 RX ORDER — ERYTHROMYCIN 5 MG/G
OINTMENT OPHTHALMIC NIGHTLY
Status: ON HOLD
Start: 2025-07-13

## 2025-07-13 RX ORDER — POLYETHYLENE GLYCOL 3350 17 G/17G
17 POWDER, FOR SOLUTION ORAL 2 TIMES DAILY
Status: ON HOLD
Start: 2025-07-13

## 2025-07-13 RX ORDER — AMMONIUM LACTATE 12 G/100G
LOTION TOPICAL 2 TIMES DAILY PRN
Status: ON HOLD
Start: 2025-07-13

## 2025-07-13 RX ORDER — BACLOFEN 20 MG/1
20 TABLET ORAL 3 TIMES DAILY
Status: ON HOLD
Start: 2025-07-13 | End: 2026-07-13

## 2025-07-13 RX ORDER — DANTROLENE SODIUM 50 MG/1
50 CAPSULE ORAL 3 TIMES DAILY
Status: ON HOLD
Start: 2025-07-13

## 2025-07-13 RX ORDER — CETIRIZINE HYDROCHLORIDE 10 MG/1
10 TABLET ORAL DAILY
Status: ON HOLD
Start: 2025-07-13

## 2025-07-13 RX ORDER — AMOXICILLIN 250 MG
1 CAPSULE ORAL 2 TIMES DAILY
Status: ON HOLD
Start: 2025-07-13

## 2025-07-13 RX ORDER — DONEPEZIL HYDROCHLORIDE 5 MG/1
5 TABLET, FILM COATED ORAL NIGHTLY
Status: ON HOLD
Start: 2025-07-13 | End: 2027-01-04

## 2025-07-13 RX ORDER — QUETIAPINE FUMARATE 50 MG/1
50 TABLET, FILM COATED ORAL NIGHTLY
Status: ON HOLD
Start: 2025-07-13 | End: 2026-07-13

## 2025-07-13 RX ORDER — MIRTAZAPINE 15 MG/1
15 TABLET, FILM COATED ORAL NIGHTLY
Status: ON HOLD
Start: 2025-07-13 | End: 2026-07-13

## 2025-07-13 RX ADMIN — LACTULOSE 10 G: 20 SOLUTION ORAL at 09:07

## 2025-07-13 RX ADMIN — DANTROLENE SODIUM 50 MG: 25 CAPSULE ORAL at 09:07

## 2025-07-13 RX ADMIN — POLYETHYLENE GLYCOL 3350 17 G: 17 POWDER, FOR SOLUTION ORAL at 08:07

## 2025-07-13 RX ADMIN — BACLOFEN 20 MG: 10 TABLET ORAL at 03:07

## 2025-07-13 RX ADMIN — LACTULOSE 10 G: 20 SOLUTION ORAL at 08:07

## 2025-07-13 RX ADMIN — DANTROLENE SODIUM 50 MG: 25 CAPSULE ORAL at 03:07

## 2025-07-13 RX ADMIN — MIRTAZAPINE 15 MG: 15 TABLET, FILM COATED ORAL at 08:07

## 2025-07-13 RX ADMIN — ERYTHROMYCIN: 5 OINTMENT OPHTHALMIC at 08:07

## 2025-07-13 RX ADMIN — ENOXAPARIN SODIUM 30 MG: 30 INJECTION SUBCUTANEOUS at 03:07

## 2025-07-13 RX ADMIN — BACLOFEN 20 MG: 10 TABLET ORAL at 09:07

## 2025-07-13 RX ADMIN — DONEPEZIL HYDROCHLORIDE 5 MG: 5 TABLET, FILM COATED ORAL at 08:07

## 2025-07-13 RX ADMIN — SENNOSIDES AND DOCUSATE SODIUM 1 TABLET: 50; 8.6 TABLET ORAL at 09:07

## 2025-07-13 RX ADMIN — BACLOFEN 20 MG: 10 TABLET ORAL at 08:07

## 2025-07-13 RX ADMIN — SENNOSIDES AND DOCUSATE SODIUM 1 TABLET: 50; 8.6 TABLET ORAL at 08:07

## 2025-07-13 RX ADMIN — QUETIAPINE FUMARATE 50 MG: 25 TABLET ORAL at 08:07

## 2025-07-13 RX ADMIN — CETIRIZINE HYDROCHLORIDE 10 MG: 5 TABLET, FILM COATED ORAL at 09:07

## 2025-07-13 RX ADMIN — POLYETHYLENE GLYCOL 3350 17 G: 17 POWDER, FOR SOLUTION ORAL at 09:07

## 2025-07-13 NOTE — ASSESSMENT & PLAN NOTE
UMN predominance with diffuse limb spasticity. Slowly progressive. Fully-reliant on mother, others for self care and feeding and cleaning and toileting. Respiratory function is poor per FVC .     Continue home donepezil for neuroprotection  Continue home dantrolene and baclofen for spacticity  Turn q2h with aspiration precautions  Mom wants SNF at [facility] [place]

## 2025-07-13 NOTE — ASSESSMENT & PLAN NOTE
UMN predominance with diffuse limb spasticity. Slowly progressive. Fully-reliant on mother, others for self care and feeding and cleaning and toileting. Respiratory function is poor per FVC .     Continue home donepezil for neuroprotection  Continue home dantrolene and baclofen for spacticity  Turn q2h with aspiration precautions  Mom wants SNF at [facility] [place]

## 2025-07-13 NOTE — SUBJECTIVE & OBJECTIVE
Interval History: No acute events overnight.  Mom at bedside.  Has no complaints.  Doing well. Should DC to SNF tomorrow.  Labs ordered for tomorrow to ensure stability for DC      Review of Systems   Constitutional:  Negative for chills, fatigue and fever.   Respiratory:  Negative for cough and shortness of breath.    Cardiovascular:  Negative for chest pain, palpitations and leg swelling.   Gastrointestinal:  Negative for abdominal pain, diarrhea, nausea and vomiting.   Genitourinary:  Negative for dysuria and urgency.   Neurological:  Negative for dizziness and headaches.   All other systems reviewed and are negative.    Objective:     Vital Signs (Most Recent):  Temp: 97.7 °F (36.5 °C) (07/13/25 0731)  Pulse: 72 (07/13/25 0731)  Resp: 18 (07/13/25 0446)  BP: 109/84 (07/13/25 0731)  SpO2: 99 % (07/13/25 0731) Vital Signs (24h Range):  Temp:  [97 °F (36.1 °C)-97.7 °F (36.5 °C)] 97.7 °F (36.5 °C)  Pulse:  [64-87] 72  Resp:  [18] 18  SpO2:  [97 %-100 %] 99 %  BP: ()/(62-84) 109/84     Weight: 59.9 kg (132 lb 0.9 oz)  Body mass index is 16.51 kg/m².    Intake/Output Summary (Last 24 hours) at 7/13/2025 1001  Last data filed at 7/13/2025 0446  Gross per 24 hour   Intake 1914 ml   Output 1675 ml   Net 239 ml         Physical Exam  Constitutional:       Appearance: He is cachectic. He is ill-appearing.      Comments: He is alert and pleasantly smiles when you talk to him.  Unable to respond back verbally.   HENT:      Head: Normocephalic and atraumatic.   Cardiovascular:      Rate and Rhythm: Normal rate and regular rhythm.      Heart sounds: No murmur heard.  Pulmonary:      Effort: Pulmonary effort is normal. No respiratory distress.      Breath sounds: Normal breath sounds. No wheezing or rales.   Abdominal:      General: There is no distension.      Palpations: Abdomen is soft.      Tenderness: There is no abdominal tenderness. There is no guarding.      Comments: PEG site c/d/i   Musculoskeletal:          General: No deformity.      Right lower leg: No edema.      Left lower leg: No edema.   Neurological:      Mental Status: He is alert. Mental status is at baseline.               Significant Labs: All pertinent labs within the past 24 hours have been reviewed.    Significant Imaging: I have reviewed all pertinent imaging results/findings within the past 24 hours.

## 2025-07-13 NOTE — PLAN OF CARE
[facility]  [address]  [place] [address]  Phone: [phone]    Nursing Home Orders      07/14/2025      Admit To Nursing Home:  Skilled Bed        Diagnoses:  Active Hospital Problems    Diagnosis  POA    *Septic shock [A41.9, R65.21]  Yes    Body mass index (BMI) less than 19 [Z68.1]  Not Applicable    Hyponatremia [E87.1]  Yes    Hyperkalemia [E87.5]  Yes    Hypoglycemia [E16.2]  No    Anemia [D64.9]  Yes    Gastrostomy status [Z93.1]  Not Applicable    Pressure injury of deep tissue of heel [L89.606]  Yes    Severe protein-calorie malnutrition [E43]  Yes    Decubitus ulcer of sacral region, stage 4 [L89.154]  Yes    Acute hypoxic respiratory failure [J96.01]  Yes    Pneumonia due to E. 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 Wise in movement disorder clinic on 2/23/2015  Diagnosis of SCA at that visit        Spastic diplegia [G80.1]  Yes      Resolved Hospital Problems   No resolved problems to display.         Patient is homebound due to:  Septic shock      Allergies:  Review of patient's allergies indicates:   Allergen Reactions    Penicillins      Hives and Itching  Tolerated zosyn- 7/1/2025    Allergen ext-venom-honey bee          Code Status:    Code Status: Full Code      Vitals:  Every shift (Skilled Nursing patients)      Diet: Soft and bite sized, honey thick liquids - pleasure feeds  Supplement:  Bolus TF recommendation:   4 cans/day of Novasource Renal to provide 948 mL total volume, 1900 kcal, 172 g CHO, 88 g protein, 0 g fiber, 672 mL free water, 95% DRI  155 mL flush before and after each can to provide additional 1240 mL free water (Total 1912 mL)    Guzman BID as tube feed modifier to promote wound healing       Referrals/  Consults     Physical Therapy to evaluate and treat 5x/week     Occupational Therapy to evaluate and treat 5x/week     Speech Therapy  to evaluate and treat    Wound Care to evaluate and treat     Nutrition to evaluate and recommend diet       Activities:   activity as tolerated      Wound Care Orders:  Paint left heel with betadine and leave open to air daily.    Cleanse right heel with vashe and pat dry. Apply aquacel ag to wound bed. Cover with silicone foam border dressing. Change every other day or PRN if soiled.    Wound Vac Incision Management:  Negative pressure settinmmHg  a.  Ensure that canister is engaged into pump, replace canister weekly or when full.    b.  If canister fills before 7 day wear time, notify physician of increased drainage.    c.  If leak alarm occurs, apply additional thin film at location of leak.    d.  Alarm troubleshooting contact [employer].  Phone# is [phone], Select option 5.  [employer] representative is available 24 hours a day 7 days per week.    Wound Vac Therapy:  Pressure injury spine  Dressing frequency:  q2 days  With 500mL gel canister: Yes  Dressing size:  Large Blk  Cycle settings:  Continuous  Negative pressure settinmmHg  Foam kit:  Black  a. Ensure that canister is engaged into pump, replace canister weekly or when full   b. If leak alarm occurs, apply additional thin film at location of leak   c. Alarm troubleshooting contact [employer]. Phone # is [phone], Select option 5. [employer] representative is available 24 hours a day 7 days per week.   e. When therapy is discontinued or patient is discharged, please return non disposable NPWT unit per facility guidelines.      Nursing Precautions:    Aspiration  and Pressure ulcer prevention      Miscellaneous:   PEG Care:  Instruct patient/caregiver to clean site.  Monitor skin integrity.  Routine Skin for Bedridden Patients: Instruct patient/caregiver to apply moisture barrier cream to all skin folds and wet areas in  perineal area daily and after baths and all bowel movements.          Medications: Discontinue all previous medication orders, if any. See new list below.    Current Discharge Medication List        START taking these medications    Details   ammonium lactate (LAC-HYDRIN) 12 % lotion Apply topically 2 (two) times daily as needed for Dry Skin. Apply to BLE BID and PRN      erythromycin (ROMYCIN) ophthalmic ointment Place into both eyes every evening.      lactulose (CHRONULAC) 20 gram/30 mL Soln 15 mLs (10 g total) by Per G Tube route 2 (two) times daily.      polyethylene glycol (GLYCOLAX) 17 gram PwPk 17 g by Per G Tube route 2 (two) times daily.      senna-docusate (PERICOLACE) 8.6-50 mg per tablet 1 tablet by Per G Tube route 2 (two) times daily.           CONTINUE these medications which have CHANGED    Details   baclofen (LIORESAL) 20 MG tablet 1 tablet (20 mg total) by Per G Tube route 3 (three) times daily.    Associated Diagnoses: Spastic diplegia      cetirizine (ZYRTEC) 10 MG tablet 1 tablet (10 mg total) by Per G Tube route once daily.      dantrolene (DANTRIUM) 50 MG Cap 1 capsule (50 mg total) by Per G Tube route 3 (three) times daily. TAKE 1 CAPSULE(50 MG) BY MOUTH THREE TIMES DAILY    Associated Diagnoses: Spastic diplegia      donepeziL (ARICEPT) 5 MG tablet 1 tablet (5 mg total) by Per G Tube route every evening.    Comments: Only refill as patient will need follow-up with neurologist to assess if still needed, or not  Associated Diagnoses: Spastic diplegia; Spinocerebellar ataxia      mirtazapine (REMERON) 15 MG tablet 1 tablet (15 mg total) by Per G Tube route every evening.    Associated Diagnoses: Insomnia secondary to depression with anxiety      QUEtiapine (SEROQUEL) 50 MG tablet 1 tablet (50 mg total) by Per G Tube route every evening.    Associated Diagnoses: Insomnia secondary to depression with anxiety           STOP taking these medications       dimethicone-zinc oxide 1-40 % Oint  Comments:   Reason for Stopping:                 No future appointments.        Caroline Nelson MD  [facility]

## 2025-07-13 NOTE — ASSESSMENT & PLAN NOTE
UMN predominance with diffuse limb spasticity. Slowly progressive. Fully-reliant on mother, others for self care and feeding and cleaning and toileting. Respiratory function is poor per FVC .     Continue home donepezil for neuroprotection  Continue home dantrolene and baclofen for spacticity  Turn q2h with aspiration precautions  Mom wants SNF at [facility] [place]

## 2025-07-13 NOTE — PROGRESS NOTES
No acute changes during shift. Tube feeds and bolus water feeds tolerated well. Possible DC tomorrow to SNF. Safety measures in place. Mother remains at BS.     07/13/25 0731   Pain/Comfort/Sleep   Preferred Pain Scale rFLACC (Revised Face Legs Arms Cry Consolability Scale)   Pain Rating (0-10): Rest 0   Pain Rating (0-10): Activity 0   FACES Pain Rating: Rest 0-->no hurt   FACES Pain Rating: Activity 0-->no hurt   rFLACC Pain Rating: - Face 0-->no particular expression or smile   rFLACC Pain Rating: - Legs 0-->normal position or relaxed   rFLACC Pain Rating: - Activity 0-->lying quietly, normal position, moves easily   rFLACC Pain Rating: - Cry 0-->no cry (awake or asleep)   rFLACC Pain Rating: - Consolability 0-->content, relaxed   rFLACC Score: 0   Pain Management Interventions care clustered   POSS ([place] Opioid-Induced Sed Scale) 1 - Awake and alert   Cognitive   Cognitive/Neuro/Behavioral WDL ex;speech   Level of Consciousness (AVPU) alert   Arousal Level opens eyes spontaneously   Orientation other (see comments)  (nonverbal)   Speech aphasic - expressive (can't express thoughts);nods/gestures appropriately   Mood/Behavior calm;cooperative   Cognitive/Behavioral/Neuro   General Motor Response paraplegia   LUE Motor Response purposeful motor response   RUE Motor Response purposeful motor response   LLE Motor Response no movement   RLE Motor Response no movement   Pupils   Pupil PERRLA yes   [place] Coma Scale   Best Eye Response 4-->(E4) spontaneous   Best Motor Response 6-->(M6) obeys commands   Best Verbal Response 1-->(V1) none   Joseph Coma Scale Score 11   HEENT   HEENT WDL ex   Nose Symptoms Right Nostril:   Respiratory   Respiratory WDL WDL   Oxygen Therapy   Device (Oxygen Therapy) room air   Cardiac   Cardiac WDL WDL   ECG   Pulse 72   Peripheral Neurovascular   Peripheral Neurovascular WDL WDL   Capillary Refill, General less than/equal to 3 secs   Pulse Radial   Left Radial Pulse 2+ (normal)    Right Radial Pulse 2+ (normal)   Pulse Dorsalis Pedis   Left Dorsalis Pedis Pulse 1+ (weak)   Right Dorsalis Pedis Pulse 1+ (weak)   Peripheral IV 07/12/25 1245 22 G Anterior;Left;Proximal Forearm   Placement Date/Time: 07/12/25 1245   Present Prior to Hospital Arrival?: No  Inserted by: RN  Size (G): 22 G  Orientation: Anterior;Left;Proximal  Location: Forearm  Site Prep: Alcohol  Insertion attempts (enter comment if more than 2 attempts): 1  Pa...   Site Assessment Clean;Dry;Intact;No redness;No swelling   Skin   Skin WDL ex   4EYES: 2 Clinical Staff Inspection of bony prominences No new pressure injury noted   4EYES: 2nd Clinical Staff Member (Type Name) LOGAN Godwin   Morgan Risk Assessment   Sensory Perception 2-->very limited   Moisture 3-->occasionally moist   Activity 1-->bedfast   Mobility 2-->very limited   Nutrition 2-->probably inadequate   Friction and Shear 2-->potential problem   Morgan Score 12   Musculoskeletal   Musculoskeletal WDL ex;extremity movement;mobility   General Mobility significantly impaired   LUE Extremity Movement active ROM moderately impaired   RUE Extremity Movement active ROM moderately impaired   Gastrointestinal   GI WDL ex   Abdominal Appearance flat;nondistended   Last Bowel Movement 07/12/25   Genitourinary   Genitourinary WDL ex   Voiding Characteristics external catheter   Safety   Safety WDL ex   Safety Factors ID band on;call light in reach;wheels locked;bed in low position   Fall Risk Assessment (every shift)   History Of Fall (W/I 3 Mos) 0-->No   Polypharmacy 3-->Yes   Central Nervous System/Psychotropic Medication 3-->Yes   Cardiovascular Medication 0-->No   Age Greater Than 65 Years 0-->No   Altered Elimination 2-->Yes   Cognitive Deficit 2-->Yes   Sensory Deficit 2-->Yes   Dizziness/Vertigo 0-->No   Depression 0-->No   Mobility Deficit/Weakness 2-->Yes   Male 1-->Yes   Fall Risk Score 15   ABC Risk for Fall with Injury Assessment   A= Age: Is the patient greater than  or equal to 85 years old or frail due to clinical condition? No   B=Bones: Does the patient have osteoporosis, previous fracture, prolonged steroid use, or metastatic bone cancer? No   C=Coagulation Disorders: Does the patient have a bleeding disorder, either through anticoagulants or underlying clinical condition? No   S=recent Surgery: Is the patient post-op surgicalwith a recent lower limb amputation or recent major abdominal or thoracic surgery? No   Safety Management   Safety Promotion/Fall Prevention assistive device/personal item within reach;family to remain at bedside   Mobility   GEMS (Waldo Early Mobility Scale) Level 1-Primary in bed activities   RN Clinical Review   I have evaluated the data collected on this patient and nursing care provided. Done

## 2025-07-13 NOTE — PROGRESS NOTES
[facility] - [facility] Medicine  Progress Note    Patient Name: Lisa Moreno  MRN: [id number]  Patient Class: IP- Inpatient   Admission Date: 6/29/2025  Length of Stay: 14 days  Attending Physician: Caroline Nelson MD  Primary Care Provider: John Nixon II, MD        Subjective     Principal Problem:Septic shock        HPI:  By Dr. Juno Mckeon MD    Mr. Moreno is a 44-year-old male with a medical history including ALS, spinal cerebellar ataxia, neuromuscular bladder dysfunction, and sacral pressure ulcers. He was recently hospitalized on 5/9/2025 for severe dehydration, electrolyte abnormalities, and COVID-19 infection. He now presents to the ED via EMS with altered mental status and hypoxia. The patient is nonverbal at baseline but is typically more alert and interactive. History was provided by his mother, who reported that he had been noticeably less responsive over the past day and had a decreased oral intake. She denied any fever, shortness of breath, nausea, vomiting, or diarrhea.    Upon EMS arrival, the patient was obtunded and hypoxic with an oxygen saturation of 74%. He was treated en route with Solu-Medrol and DuoNebs, resulting in improvement of his oxygen saturation to above 90%.    In the ED, patient was obtunded but stabilized with improved oxygenation after initial EMS interventions. Chest X-ray revealed patchy bilateral opacities, most prominent in the bibasilar regions, as well as in the left apex and mid-lung zones--findings consistent with multifocal pneumonia.  He received 2 L of IV fluids, IV cefepime and vancomycin and admitted to hospital medicine for further management.    Overview/Hospital Course:  Mr. Moreno was initially admitted to Hospital Medicine for management of sepsis 2/2 multifocal pneumonia.  While in the ED, [facility] consulted for sepsis. During first examination the patient was stable with MAPs >65 and Lactic acid down trended to 2.6. Around  6am of 6/30 rapids was called hypotension with MAPs in the 50. He was started on Levophed and Zosyn, and was transferred to MICU for septic shock 2/2 multifocal pneumonia.  He was weaned off pressors and Zosyn was de-escalated to Ceftriaxone and Azithromycin for CAP coverage. SLP consulted for dysphagia. FEES performed which showed residual puree. Recommend NPO. PEG placed with IR on 7/5. Increased secretions noted on exam s/p completion of antibiotic course of CAP coverage. Restarted patient on Vancomycin and Zosyn. Respiratory culture with many gram negative rods. Vancomycin discontinued. Continued on Zosyn. Respiratory cx positive for E.Coli ESBL. Started on Ertapenem, Zosyn discontinued. ID consulted for additional recommendations, who suggest a course through 7/12. SLP re-consulted for swallowing evaluation in the setting of new PEG tube. Able to have small occasional pleasure feeds of puree and honey thick liquids via tsp. SD to  on 7/7.  He developed hyperkalemia from Isosource TF.  They were stopped and changed to Novasource renal with resolution of his hyperkalemia.        Interval History: No acute events overnight.  Mom at bedside.  Has no complaints.  Doing well. Should DC to SNF tomorrow.  Labs ordered for tomorrow to ensure stability for DC      Review of Systems   Constitutional:  Negative for chills, fatigue and fever.   Respiratory:  Negative for cough and shortness of breath.    Cardiovascular:  Negative for chest pain, palpitations and leg swelling.   Gastrointestinal:  Negative for abdominal pain, diarrhea, nausea and vomiting.   Genitourinary:  Negative for dysuria and urgency.   Neurological:  Negative for dizziness and headaches.   All other systems reviewed and are negative.    Objective:     Vital Signs (Most Recent):  Temp: 97.7 °F (36.5 °C) (07/13/25 0731)  Pulse: 72 (07/13/25 0731)  Resp: 18 (07/13/25 0446)  BP: 109/84 (07/13/25 0731)  SpO2: 99 % (07/13/25 0731) Vital Signs (24h  Range):  Temp:  [97 °F (36.1 °C)-97.7 °F (36.5 °C)] 97.7 °F (36.5 °C)  Pulse:  [64-87] 72  Resp:  [18] 18  SpO2:  [97 %-100 %] 99 %  BP: ()/(62-84) 109/84     Weight: 59.9 kg (132 lb 0.9 oz)  Body mass index is 16.51 kg/m².    Intake/Output Summary (Last 24 hours) at 7/13/2025 1001  Last data filed at 7/13/2025 0446  Gross per 24 hour   Intake 1914 ml   Output 1675 ml   Net 239 ml         Physical Exam  Constitutional:       Appearance: He is cachectic. He is ill-appearing.      Comments: He is alert and pleasantly smiles when you talk to him.  Unable to respond back verbally.   HENT:      Head: Normocephalic and atraumatic.   Cardiovascular:      Rate and Rhythm: Normal rate and regular rhythm.      Heart sounds: No murmur heard.  Pulmonary:      Effort: Pulmonary effort is normal. No respiratory distress.      Breath sounds: Normal breath sounds. No wheezing or rales.   Abdominal:      General: There is no distension.      Palpations: Abdomen is soft.      Tenderness: There is no abdominal tenderness. There is no guarding.      Comments: PEG site c/d/i   Musculoskeletal:         General: No deformity.      Right lower leg: No edema.      Left lower leg: No edema.   Neurological:      Mental Status: He is alert. Mental status is at baseline.               Significant Labs: All pertinent labs within the past 24 hours have been reviewed.    Significant Imaging: I have reviewed all pertinent imaging results/findings within the past 24 hours.      Assessment & Plan  Septic shock  Pneumonia due to E. coli  Acute hypoxic respiratory failure  Admitted to MICU for septic shock 2/2 ESBL E. Coli PNA  Upon EMS arrival, the patient was obtunded and hypoxic with an oxygen saturation of 74%.  Weaned off Levophed  On Ceftriaxone through 7/12 per ID recs  Satting well on room air  Oropharyngeal dysphagia  SLP consulted for dysphagia. FEES performed which showed residual puree. Recommend NPO.   S/p PEG by IR on 7/5/2025  Tube  feeds, ok for pleasure feeds if alert (puree and honey thick liquids via tsp. )  TF being changed given hyperkalemia  ALS (amyotrophic lateral sclerosis)  Spinocerebellar ataxia  Spastic diplegia  UMN predominance with diffuse limb spasticity. Slowly progressive. Fully-reliant on mother, others for self care and feeding and cleaning and toileting. Respiratory function is poor per FVC .     Continue home donepezil for neuroprotection  Continue home dantrolene and baclofen for spacticity  Turn q2h with aspiration precautions  Mom wants SNF at [facility] [place]  Decubitus ulcer of sacral region, stage 4  Has a wound VAC in place  General Surgery consulted for possible debridement, appreciate recs.  No indication for inpatient debridement  Wound care consulted, appreciate recs    Pressure injury of deep tissue of heel  L Heel with black eschar. R Heel pressure ulcer with scant serosanguineous drainage. Photos uploaded into chart.   Wound care following, appreciate recs  Podiatry consulted, appreciate recs  Insomnia secondary to depression with anxiety  Chronic and stable  Continue quetiapine and mirtazapine    Acute encephalopathy  Likely 2/2 sepsis. .   Neuro checks  Delirium/aspiration/fall precautions  Severe protein-calorie malnutrition  Body mass index (BMI) less than 19  Nutrition consulted. Most recent weight and BMI monitored-     Measurements:  Wt Readings from Last 1 Encounters:   07/07/25 59.9 kg (132 lb 0.9 oz)   Body mass index is 16.51 kg/m².    Patient has been screened and assessed by RD.    Malnutrition Type:  Context: chronic illness  Level: severe    Malnutrition Characteristic Summary:  Subcutaneous Fat (Malnutrition): severe depletion  Muscle Mass (Malnutrition): severe depletion    Interventions/Recommendations (treatment strategy):  1. Continue bolus TF recommendation: 5 cans/day of Isosource 1.5 to provide 1250 mL total volume, 1875 kcal, 220 g CHO, 85 protein, 19 fiber, 955 mL free water  -  90 mL flush before and after each (10 flushes total) can to provide additional 900 mL free water (Total 1855 mL)  2. Continue soft and bite sized textured diet as tolerated    3. Continue boost glucose control TID   4. Recommend arnie BID to aid in wound healing  5. Recommend MVI  6. RD to monitor weight, labs, meds, intake, tolerance  -- started on Tube feeds      Anemia  Chronic anemia.    Plan  - Monitor serial CBC  - Transfuse PRBC if patient becomes hemodynamically unstable, symptomatic or H/H drops below 7/21.    Gastrostomy status  Patient noted to have a percutaneous endoscopic gastrostomy tube in place. Tube was placed on this admission, with date of procedure- 7/5/2025  The tube is patent. Routine care to be done by wound care and nursing staff.   Hyponatremia  Hyponatremia is likely due to Dehydration/hypovolemia. The patient's most recent sodium results are listed below.  Recent Labs     07/11/25  1553 07/11/25 1957 07/12/25  0434   * 132* 133*     Plan  - Sodium worsening  - Enteral free water bolus  Hyperkalemia  2/2 Isopeptide TF.  The patients most recent potassium results are listed below.  Recent Labs     07/11/25  1553 07/11/25 1957 07/12/25  0434   K 4.7 4.3 4.0     Now resolved and hypokalemic  Hypoglycemia  Monitor with glucose checks    VTE Risk Mitigation (From admission, onward)           Ordered     enoxaparin injection 30 mg  Daily         07/06/25 1653     IP VTE HIGH RISK PATIENT  Once         07/06/25 1634     Place sequential compression device  Until discontinued         06/29/25 2044                    Discharge Planning   MARCELLE: 7/14/2025     Code Status: Full Code   Medical Readiness for Discharge Date:   Discharge Plan A: Skilled Nursing Facility   Discharge Delays: None known at this time                    Caroline Nelson MD  Department of Hospital Medicine   [facility] - Acute Medical Stepdown

## 2025-07-14 LAB
ABSOLUTE EOSINOPHIL (OHS): 0.04 K/UL
ABSOLUTE MONOCYTE (OHS): 0.16 K/UL (ref 0.3–1)
ABSOLUTE NEUTROPHIL COUNT (OHS): 4.8 K/UL (ref 1.8–7.7)
ALBUMIN SERPL BCP-MCNC: 2.1 G/DL (ref 3.5–5.2)
ALP SERPL-CCNC: 75 UNIT/L (ref 40–150)
ALT SERPL W/O P-5'-P-CCNC: 52 UNIT/L (ref 10–44)
ANION GAP (OHS): 6 MMOL/L (ref 8–16)
AST SERPL-CCNC: 44 UNIT/L (ref 11–45)
BASOPHILS # BLD AUTO: 0.03 K/UL
BASOPHILS NFR BLD AUTO: 0.6 %
BILIRUB SERPL-MCNC: 0.1 MG/DL (ref 0.1–1)
BUN SERPL-MCNC: 19 MG/DL (ref 6–20)
CALCIUM SERPL-MCNC: 8 MG/DL (ref 8.7–10.5)
CHLORIDE SERPL-SCNC: 100 MMOL/L (ref 95–110)
CO2 SERPL-SCNC: 29 MMOL/L (ref 23–29)
CREAT SERPL-MCNC: 0.5 MG/DL (ref 0.5–1.4)
ERYTHROCYTE [DISTWIDTH] IN BLOOD BY AUTOMATED COUNT: 17 % (ref 11.5–14.5)
GFR SERPLBLD CREATININE-BSD FMLA CKD-EPI: >60 ML/MIN/1.73/M2
GLUCOSE SERPL-MCNC: 80 MG/DL (ref 70–110)
HCT VFR BLD AUTO: 28.3 % (ref 40–54)
HGB BLD-MCNC: 8.8 GM/DL (ref 14–18)
IMM GRANULOCYTES # BLD AUTO: 0.02 K/UL (ref 0–0.04)
IMM GRANULOCYTES NFR BLD AUTO: 0.4 % (ref 0–0.5)
LYMPHOCYTES # BLD AUTO: 0.33 K/UL (ref 1–4.8)
MAGNESIUM SERPL-MCNC: 1.8 MG/DL (ref 1.6–2.6)
MCH RBC QN AUTO: 26.1 PG (ref 27–31)
MCHC RBC AUTO-ENTMCNC: 31.1 G/DL (ref 32–36)
MCV RBC AUTO: 84 FL (ref 82–98)
NUCLEATED RBC (/100WBC) (OHS): 0 /100 WBC
PHOSPHATE SERPL-MCNC: 2.6 MG/DL (ref 2.7–4.5)
PLATELET # BLD AUTO: 340 K/UL (ref 150–450)
PLATELET BLD QL SMEAR: NORMAL
PMV BLD AUTO: 10.8 FL (ref 9.2–12.9)
POCT GLUCOSE: 114 MG/DL (ref 70–110)
POTASSIUM SERPL-SCNC: 4.4 MMOL/L (ref 3.5–5.1)
PROT SERPL-MCNC: 7 GM/DL (ref 6–8.4)
RBC # BLD AUTO: 3.37 M/UL (ref 4.6–6.2)
RELATIVE EOSINOPHIL (OHS): 0.7 %
RELATIVE LYMPHOCYTE (OHS): 6.1 % (ref 18–48)
RELATIVE MONOCYTE (OHS): 3 % (ref 4–15)
RELATIVE NEUTROPHIL (OHS): 89.2 % (ref 38–73)
SODIUM SERPL-SCNC: 135 MMOL/L (ref 136–145)
WBC # BLD AUTO: 5.38 K/UL (ref 3.9–12.7)

## 2025-07-14 PROCEDURE — 25000003 PHARM REV CODE 250: Performed by: HOSPITALIST

## 2025-07-14 PROCEDURE — 84450 TRANSFERASE (AST) (SGOT): CPT | Performed by: HOSPITALIST

## 2025-07-14 PROCEDURE — 80053 COMPREHEN METABOLIC PANEL: CPT | Performed by: FAMILY MEDICINE

## 2025-07-14 PROCEDURE — 97535 SELF CARE MNGMENT TRAINING: CPT

## 2025-07-14 PROCEDURE — 92526 ORAL FUNCTION THERAPY: CPT

## 2025-07-14 PROCEDURE — 11000001 HC ACUTE MED/SURG PRIVATE ROOM

## 2025-07-14 PROCEDURE — 84100 ASSAY OF PHOSPHORUS: CPT | Performed by: HOSPITALIST

## 2025-07-14 PROCEDURE — 85025 COMPLETE CBC W/AUTO DIFF WBC: CPT | Performed by: FAMILY MEDICINE

## 2025-07-14 PROCEDURE — 99291 CRITICAL CARE FIRST HOUR: CPT | Mod: ,,,

## 2025-07-14 PROCEDURE — 85025 COMPLETE CBC W/AUTO DIFF WBC: CPT | Performed by: HOSPITALIST

## 2025-07-14 PROCEDURE — 36415 COLL VENOUS BLD VENIPUNCTURE: CPT | Performed by: HOSPITALIST

## 2025-07-14 PROCEDURE — 83735 ASSAY OF MAGNESIUM: CPT | Performed by: HOSPITALIST

## 2025-07-14 PROCEDURE — 83605 ASSAY OF LACTIC ACID: CPT | Performed by: FAMILY MEDICINE

## 2025-07-14 PROCEDURE — 63600175 PHARM REV CODE 636 W HCPCS: Performed by: INTERNAL MEDICINE

## 2025-07-14 PROCEDURE — 84146 ASSAY OF PROLACTIN: CPT | Performed by: FAMILY MEDICINE

## 2025-07-14 PROCEDURE — 27000207 HC ISOLATION

## 2025-07-14 RX ORDER — LEVETIRACETAM 500 MG/5ML
1500 INJECTION, SOLUTION, CONCENTRATE INTRAVENOUS ONCE
Status: DISCONTINUED | OUTPATIENT
Start: 2025-07-14 | End: 2025-07-14

## 2025-07-14 RX ORDER — SODIUM,POTASSIUM PHOSPHATES 280-250MG
2 POWDER IN PACKET (EA) ORAL ONCE
Status: COMPLETED | OUTPATIENT
Start: 2025-07-14 | End: 2025-07-14

## 2025-07-14 RX ORDER — LORAZEPAM 2 MG/ML
4 INJECTION INTRAMUSCULAR
Status: DISCONTINUED | OUTPATIENT
Start: 2025-07-15 | End: 2025-07-15

## 2025-07-14 RX ADMIN — BACLOFEN 20 MG: 10 TABLET ORAL at 05:07

## 2025-07-14 RX ADMIN — DANTROLENE SODIUM 50 MG: 25 CAPSULE ORAL at 09:07

## 2025-07-14 RX ADMIN — SODIUM CHLORIDE, POTASSIUM CHLORIDE, SODIUM LACTATE AND CALCIUM CHLORIDE 500 ML: 600; 310; 30; 20 INJECTION, SOLUTION INTRAVENOUS at 11:07

## 2025-07-14 RX ADMIN — LACTULOSE 10 G: 20 SOLUTION ORAL at 09:07

## 2025-07-14 RX ADMIN — POLYETHYLENE GLYCOL 3350 17 G: 17 POWDER, FOR SOLUTION ORAL at 09:07

## 2025-07-14 RX ADMIN — BACLOFEN 20 MG: 10 TABLET ORAL at 09:07

## 2025-07-14 RX ADMIN — QUETIAPINE FUMARATE 50 MG: 25 TABLET ORAL at 09:07

## 2025-07-14 RX ADMIN — MIRTAZAPINE 15 MG: 15 TABLET, FILM COATED ORAL at 09:07

## 2025-07-14 RX ADMIN — SENNOSIDES AND DOCUSATE SODIUM 1 TABLET: 50; 8.6 TABLET ORAL at 09:07

## 2025-07-14 RX ADMIN — ERYTHROMYCIN: 5 OINTMENT OPHTHALMIC at 09:07

## 2025-07-14 RX ADMIN — POTASSIUM & SODIUM PHOSPHATES POWDER PACK 280-160-250 MG 2 PACKET: 280-160-250 PACK at 10:07

## 2025-07-14 RX ADMIN — ENOXAPARIN SODIUM 30 MG: 30 INJECTION SUBCUTANEOUS at 05:07

## 2025-07-14 RX ADMIN — DONEPEZIL HYDROCHLORIDE 5 MG: 5 TABLET, FILM COATED ORAL at 09:07

## 2025-07-14 RX ADMIN — CETIRIZINE HYDROCHLORIDE 10 MG: 5 TABLET, FILM COATED ORAL at 09:07

## 2025-07-14 RX ADMIN — DANTROLENE SODIUM 50 MG: 25 CAPSULE ORAL at 05:07

## 2025-07-14 RX ADMIN — LORAZEPAM 2 MG: 2 INJECTION INTRAMUSCULAR; INTRAVENOUS at 11:07

## 2025-07-14 NOTE — ASSESSMENT & PLAN NOTE
UMN predominance with diffuse limb spasticity. Slowly progressive. Fully-reliant on mother, others for self care and feeding and cleaning and toileting. Respiratory function is poor per FVC .     Continue home donepezil for neuroprotection  Continue home dantrolene and baclofen for spacticity  Turn q2h with aspiration precautions  Mom wants SNF at [facility] [place]

## 2025-07-14 NOTE — DISCHARGE SUMMARY
[facility] - [facility] Medicine  Discharge Summary      Patient Name: Lisa Moreno  MRN: [id number]  [facility]: [id number]  Patient Class: IP- Inpatient  Admission Date: 6/29/2025  Hospital Length of Stay: 15 days  Discharge Date and Time: No discharge date for patient encounter.  Attending Physician: Caroline Nelson MD   Discharging Provider: Caroline Nelson MD  Primary Care Provider: John Nixon II, MD  Hospital Medicine Team: [facility] MED U Caroline Nelson MD  Primary Care Team: [facility]    HPI:   By Dr. Juno Mckeno MD    Mr. Moreno is a 44-year-old male with a medical history including ALS, spinal cerebellar ataxia, neuromuscular bladder dysfunction, and sacral pressure ulcers. He was recently hospitalized on 5/9/2025 for severe dehydration, electrolyte abnormalities, and COVID-19 infection. He now presents to the ED via EMS with altered mental status and hypoxia. The patient is nonverbal at baseline but is typically more alert and interactive. History was provided by his mother, who reported that he had been noticeably less responsive over the past day and had a decreased oral intake. She denied any fever, shortness of breath, nausea, vomiting, or diarrhea.    Upon EMS arrival, the patient was obtunded and hypoxic with an oxygen saturation of 74%. He was treated en route with Solu-Medrol and DuoNebs, resulting in improvement of his oxygen saturation to above 90%.    In the ED, patient was obtunded but stabilized with improved oxygenation after initial EMS interventions. Chest X-ray revealed patchy bilateral opacities, most prominent in the bibasilar regions, as well as in the left apex and mid-lung zones--findings consistent with multifocal pneumonia.  He received 2 L of IV fluids, IV cefepime and vancomycin and admitted to hospital medicine for further management.    * No surgery found *      Hospital Course:   Mr. Moreno was initially admitted to Hospital Medicine  for management of sepsis 2/2 multifocal pneumonia.  While in the ED, [facility] consulted for sepsis. During first examination the patient was stable with MAPs >65 and Lactic acid down trended to 2.6. Around 6am of 6/30 rapids was called hypotension with MAPs in the 50. He was started on Levophed and Zosyn, and was transferred to MICU for septic shock 2/2 multifocal pneumonia.  He was weaned off pressors and Zosyn was de-escalated to Ceftriaxone and Azithromycin for CAP coverage. SLP consulted for dysphagia. FEES performed which showed residual puree. Recommend NPO. PEG placed with IR on 7/5. Increased secretions noted on exam s/p completion of antibiotic course of CAP coverage. Restarted patient on Vancomycin and Zosyn. Respiratory culture with many gram negative rods. Vancomycin discontinued. Continued on Zosyn. Respiratory cx positive for E.Coli ESBL. Started on Ertapenem, Zosyn discontinued. ID consulted for additional recommendations, who suggest a course through 7/12. SLP re-consulted for swallowing evaluation in the setting of new PEG tube. Able to have small occasional pleasure feeds of puree and honey thick liquids via tsp. SD to  on 7/7.  He developed hyperkalemia from Isosource TF.  They were stopped and changed to Novasource renal with resolution of his hyperkalemia.  He was discharged to [facility] in [place].    Mr. Lisa Moreno was deemed appropriate for discharge.  At the time of discharge, the plan of care was discussed with patient/family, who were agreeable and amenable.  All medications were verbally reviewed and discussed with the patient/family.  They endorsed understanding and compliance.  Informed them that these changes will be available on their discharge paperwork, as well.  Outpatient follow-ups were scheduled, or if unable to be scheduled ambulatory referrals were placed, and ER return precautions were given.  All questions were answered to the patient/family's satisfaction.   Mr. Lisa Moreno was subsequently discharged in stable condition.       Goals of Care Treatment Preferences:  Code Status: Full Code    Health care agent: Mother Giovana Moreno  Health care agent number: [phone]          What is most important right now is to focus on remaining as independent as possible.  Accordingly, we have decided that the best plan to meet the patient's goals includes continuing with treatment.         Consults:   Consults (From admission, onward)          Status Ordering Provider     Inpatient consult to Infectious Diseases  Once        Provider:  (Not yet assigned)    Completed NATALEE, NALINI     Inpatient consult to Registered Dietitian/Nutritionist  Once        Provider:  (Not yet assigned)    Completed NATALEE, NALINI     Inpatient consult to Podiatry  Once        Provider:  (Not yet assigned)    Completed NATALEE, NALINI     Inpatient consult to Interventional Radiology  Once        Provider:  (Not yet assigned)    Completed NATALEE, NALINI     Inpatient consult to General Surgery  Once        Provider:  (Not yet assigned)    Completed NATALEE, NALINI     Inpatient consult to Registered Dietitian/Nutritionist  Once        Provider:  (Not yet assigned)    Completed CHRIS ESTRADA     Inpatient consult to Critical Care Medicine  Once        Provider:  (Not yet assigned)    Completed TAMAR SIMPSON            Assessment & Plan  Septic shock  Pneumonia due to E. coli  Acute hypoxic respiratory failure  Admitted to MICU for septic shock 2/2 ESBL E. Coli PNA  Upon EMS arrival, the patient was obtunded and hypoxic with an oxygen saturation of 74%.  Weaned off Levophed  On Ceftriaxone through 7/12 per ID recs  Satting well on room air  Oropharyngeal dysphagia  SLP consulted for dysphagia. FEES performed which showed residual puree. Recommend NPO.   S/p PEG by IR on 7/5/2025  Tube feeds, ok for pleasure feeds if alert (puree and honey thick liquids via tsp. )  TF being changed  given hyperkalemia  ALS (amyotrophic lateral sclerosis)  Spinocerebellar ataxia  Spastic diplegia  UMN predominance with diffuse limb spasticity. Slowly progressive. Fully-reliant on mother, others for self care and feeding and cleaning and toileting. Respiratory function is poor per FVC .     Continue home donepezil for neuroprotection  Continue home dantrolene and baclofen for spacticity  Turn q2h with aspiration precautions  Mom wants SNF at [facility] [place]  Decubitus ulcer of sacral region, stage 4  Has a wound VAC in place  General Surgery consulted for possible debridement, appreciate recs.  No indication for inpatient debridement  Wound care consulted, appreciate recs    Pressure injury of deep tissue of heel  L Heel with black eschar. R Heel pressure ulcer with scant serosanguineous drainage. Photos uploaded into chart.   Wound care following, appreciate recs  Podiatry consulted, appreciate recs  Insomnia secondary to depression with anxiety  Chronic and stable  Continue quetiapine and mirtazapine    Acute encephalopathy  Likely 2/2 sepsis. .   Neuro checks  Delirium/aspiration/fall precautions  Severe protein-calorie malnutrition  Body mass index (BMI) less than 19  Nutrition consulted. Most recent weight and BMI monitored-     Measurements:  Wt Readings from Last 1 Encounters:   07/07/25 59.9 kg (132 lb 0.9 oz)   Body mass index is 16.51 kg/m².    Patient has been screened and assessed by RD.    Malnutrition Type:  Context: chronic illness  Level: severe    Malnutrition Characteristic Summary:  Subcutaneous Fat (Malnutrition): severe depletion  Muscle Mass (Malnutrition): severe depletion    Interventions/Recommendations (treatment strategy):  1. Continue bolus TF recommendation: 5 cans/day of Isosource 1.5 to provide 1250 mL total volume, 1875 kcal, 220 g CHO, 85 protein, 19 fiber, 955 mL free water  - 90 mL flush before and after each (10 flushes total) can to provide additional 900 mL free water  (Total 1855 mL)  2. Continue soft and bite sized textured diet as tolerated    3. Continue boost glucose control TID   4. Recommend arnie BID to aid in wound healing  5. Recommend MVI  6. RD to monitor weight, labs, meds, intake, tolerance  -- started on Tube feeds      Anemia  Chronic anemia.    Plan  - Monitor serial CBC  - Transfuse PRBC if patient becomes hemodynamically unstable, symptomatic or H/H drops below 7/21.    Gastrostomy status  Patient noted to have a percutaneous endoscopic gastrostomy tube in place. Tube was placed on this admission, with date of procedure- 7/5/2025  The tube is patent. Routine care to be done by wound care and nursing staff.   Hyponatremia  Hyponatremia is likely due to Dehydration/hypovolemia. The patient's most recent sodium results are listed below.  Recent Labs     07/11/25 1957 07/12/25 0434 07/14/25  0744   * 133* 135*     Plan  - Sodium worsening  - Enteral free water bolus  Hyperkalemia  2/2 Isopeptide TF.  The patients most recent potassium results are listed below.  Recent Labs     07/11/25 1957 07/12/25 0434 07/14/25  0744   K 4.3 4.0 4.4     Now resolved and hypokalemic  Hypoglycemia  Monitor with glucose checks    Hypophosphatemia  Patient's most recent phosphorus results are listed below.   Recent Labs     07/12/25 0434 07/14/25  0744   PHOS 3.0 2.6*     Plan  - Will treat hypophosphatemia with replacement  - Patient's hypophosphatemia is worsening. Will continue current treatment    Final Active Diagnoses:    Diagnosis Date Noted POA    PRINCIPAL PROBLEM:  Septic shock [A41.9, R65.21] 06/29/2025 Yes    Body mass index (BMI) less than 19 [Z68.1] 07/10/2025 Not Applicable    Hyponatremia [E87.1] 07/10/2025 Yes    Hyperkalemia [E87.5] 07/10/2025 Yes    Hypoglycemia [E16.2] 07/10/2025 No    Anemia [D64.9] 07/05/2025 Yes    Gastrostomy status [Z93.1] 07/05/2025 Not Applicable    Hypophosphatemia [E83.39] 07/05/2025 Yes    Pressure injury of deep tissue of heel  [L89.606] 07/02/2025 Yes    Severe protein-calorie malnutrition [E43] 07/01/2025 Yes    Decubitus ulcer of sacral region, stage 4 [L89.154] 06/30/2025 Yes    Acute hypoxic respiratory failure [J96.01] 06/30/2025 Yes    Pneumonia due to E. coli [J15.5] 06/30/2025 Yes    Acute encephalopathy [G93.40] 06/30/2025 Yes    Insomnia secondary to depression with anxiety [F51.05, F41.8] 10/30/2024 Yes    ALS (amyotrophic lateral sclerosis) [G12.21] 09/14/2023 Yes    Oropharyngeal dysphagia [R13.12] 12/09/2020 Yes    Spinocerebellar ataxia [G11.8] 03/27/2015 Yes    Spastic diplegia [G80.1] 01/16/2015 Yes      Problems Resolved During this Admission:       Discharged Condition: stable    Disposition: Skilled Nursing Facility    Follow Up:   Contact information for after-discharge care       Destination       [facility] .    Service: Skilled Nursing  Contact information:  [address] [facility] [address] [phone]                                 Patient Instructions:   No discharge procedures on file.    Significant Diagnostic Studies: Labs: CMP   Recent Labs   Lab 07/14/25  0744   *   K 4.4      CO2 29   GLU 80   BUN 19   CREATININE 0.5   CALCIUM 8.0*   PROT 7.0   ALBUMIN 2.1*   BILITOT 0.1   ALKPHOS 75   AST 44   ALT 52*   ANIONGAP 6*    and CBC   Recent Labs   Lab 07/14/25  0744   WBC 5.38   HGB 8.8*   HCT 28.3*          Pending Diagnostic Studies:       None           Medications:  Reconciled Home Medications:      Medication List        START taking these medications      ammonium lactate 12 % lotion  Commonly known as: LAC-HYDRIN  Apply topically 2 (two) times daily as needed for Dry Skin. Apply to BLE BID and PRN     erythromycin ophthalmic ointment  Commonly known as: ROMYCIN  Place into both eyes every evening.     lactulose 20 gram/30 mL Soln  Commonly known as: CHRONULAC  15 mLs (10 g total) by Per G Tube route 2 (two) times daily.      polyethylene glycol 17 gram Pwpk  Commonly known as: GLYCOLAX  17 g by Per G Tube route 2 (two) times daily.     senna-docusate 8.6-50 mg per tablet  Commonly known as: PERICOLACE  1 tablet by Per G Tube route 2 (two) times daily.            CHANGE how you take these medications      baclofen 20 MG tablet  Commonly known as: LIORESAL  1 tablet (20 mg total) by Per G Tube route 3 (three) times daily.  What changed: how to take this     cetirizine 10 MG tablet  Commonly known as: ZYRTEC  1 tablet (10 mg total) by Per G Tube route once daily.  What changed:   how to take this  when to take this     dantrolene 50 MG Cap  Commonly known as: DANTRIUM  1 capsule (50 mg total) by Per G Tube route 3 (three) times daily. TAKE 1 CAPSULE(50 MG) BY MOUTH THREE TIMES DAILY  What changed:   how much to take  how to take this  when to take this     donepeziL 5 MG tablet  Commonly known as: ARICEPT  1 tablet (5 mg total) by Per G Tube route every evening.  What changed: how to take this     mirtazapine 15 MG tablet  Commonly known as: REMERON  1 tablet (15 mg total) by Per G Tube route every evening.  What changed: how to take this     QUEtiapine 50 MG tablet  Commonly known as: SEROQUEL  1 tablet (50 mg total) by Per G Tube route every evening.  What changed: how to take this            STOP taking these medications      dimethicone-zinc oxide 1-40 % Oint              Indwelling Lines/Drains at time of discharge:   Lines/Drains/Airways       Drain  Duration                  Gastrostomy/Enterostomy 07/05/25 1136 Gastrostomy tube w/ balloon LUQ feeding 9 days    Male External Urinary Catheter 07/11/25 0600 Medium 3 days                        Time spent on the discharge of patient: 35 minutes         Caroline Nelson MD  Department of Hospital Medicine  [facility]

## 2025-07-14 NOTE — ASSESSMENT & PLAN NOTE
UMN predominance with diffuse limb spasticity. Slowly progressive. Fully-reliant on mother, others for self care and feeding and cleaning and toileting. Respiratory function is poor per FVC .     Continue home donepezil for neuroprotection  Continue home dantrolene and baclofen for spacticity  Turn q2h with aspiration precautions  Mom wants SNF at [facility] [place]

## 2025-07-14 NOTE — ASSESSMENT & PLAN NOTE
UMN predominance with diffuse limb spasticity. Slowly progressive. Fully-reliant on mother, others for self care and feeding and cleaning and toileting. Respiratory function is poor per FVC .     Continue home donepezil for neuroprotection  Continue home dantrolene and baclofen for spacticity  Turn q2h with aspiration precautions  Mom wants SNF at [facility] [place]

## 2025-07-14 NOTE — ASSESSMENT & PLAN NOTE
UMN predominance with diffuse limb spasticity. Slowly progressive. Fully-reliant on mother, others for self care and feeding and cleaning and toileting. Respiratory function is poor per FVC .     Continue home donepezil for neuroprotection  Continue home dantrolene and baclofen for spacticity  Turn q2h with aspiration precautions  Mom wants SNF at [facility] [place]

## 2025-07-14 NOTE — PROGRESS NOTES
[facility] Medicine  Progress Note    Patient Name: Lisa Moreno  MRN: [id number]  Patient Class: IP- Inpatient   Admission Date: 6/29/2025  Length of Stay: 15 days  Attending Physician: Caroline Nelson MD  Primary Care Provider: John Nixno II, MD        Subjective     Principal Problem:Septic shock        HPI:  By Dr. Juno Mckeon MD    Mr. Moreno is a 44-year-old male with a medical history including ALS, spinal cerebellar ataxia, neuromuscular bladder dysfunction, and sacral pressure ulcers. He was recently hospitalized on 5/9/2025 for severe dehydration, electrolyte abnormalities, and COVID-19 infection. He now presents to the ED via EMS with altered mental status and hypoxia. The patient is nonverbal at baseline but is typically more alert and interactive. History was provided by his mother, who reported that he had been noticeably less responsive over the past day and had a decreased oral intake. She denied any fever, shortness of breath, nausea, vomiting, or diarrhea.    Upon EMS arrival, the patient was obtunded and hypoxic with an oxygen saturation of 74%. He was treated en route with Solu-Medrol and DuoNebs, resulting in improvement of his oxygen saturation to above 90%.    In the ED, patient was obtunded but stabilized with improved oxygenation after initial EMS interventions. Chest X-ray revealed patchy bilateral opacities, most prominent in the bibasilar regions, as well as in the left apex and mid-lung zones--findings consistent with multifocal pneumonia.  He received 2 L of IV fluids, IV cefepime and vancomycin and admitted to hospital medicine for further management.    Overview/Hospital Course:  Mr. Moreno was initially admitted to Hospital Medicine for management of sepsis 2/2 multifocal pneumonia.  While in the ED, [facility] consulted for sepsis. During first examination the patient was stable with MAPs >65 and Lactic acid down trended to 2.6. Around  6am of 6/30 rapids was called hypotension with MAPs in the 50. He was started on Levophed and Zosyn, and was transferred to MICU for septic shock 2/2 multifocal pneumonia.  He was weaned off pressors and Zosyn was de-escalated to Ceftriaxone and Azithromycin for CAP coverage. SLP consulted for dysphagia. FEES performed which showed residual puree. Recommend NPO. PEG placed with IR on 7/5. Increased secretions noted on exam s/p completion of antibiotic course of CAP coverage. Restarted patient on Vancomycin and Zosyn. Respiratory culture with many gram negative rods. Vancomycin discontinued. Continued on Zosyn. Respiratory cx positive for E.Coli ESBL. Started on Ertapenem, Zosyn discontinued. ID consulted for additional recommendations, who suggest a course through 7/12. SLP re-consulted for swallowing evaluation in the setting of new PEG tube. Able to have small occasional pleasure feeds of puree and honey thick liquids via tsp. SD to  on 7/7.  He developed hyperkalemia from Isosource TF.  They were stopped and changed to Novasource renal with resolution of his hyperkalemia.        Interval History: No acute events overnight.  Mom at bedside.  Has no complaints.  Ready to go to SNF today.  Phos being replaced.      Review of Systems   Constitutional:  Negative for chills, fatigue and fever.   Respiratory:  Negative for cough and shortness of breath.    Cardiovascular:  Negative for chest pain, palpitations and leg swelling.   Gastrointestinal:  Negative for abdominal pain, diarrhea, nausea and vomiting.   Genitourinary:  Negative for dysuria and urgency.   Neurological:  Negative for dizziness and headaches.   All other systems reviewed and are negative.    Objective:     Vital Signs (Most Recent):  Temp: 98.2 °F (36.8 °C) (07/14/25 0758)  Pulse: 94 (07/14/25 0800)  Resp: 18 (07/14/25 0758)  BP: 101/63 (07/14/25 0758)  SpO2: 99 % (07/14/25 0758) Vital Signs (24h Range):  Temp:  [97 °F (36.1 °C)-98.2 °F (36.8 °C)]  98.2 °F (36.8 °C)  Pulse:  [75-94] 94  Resp:  [16-20] 18  SpO2:  [94 %-100 %] 99 %  BP: ()/(59-74) 101/63     Weight: 59.9 kg (132 lb 0.9 oz)  Body mass index is 16.51 kg/m².    Intake/Output Summary (Last 24 hours) at 7/14/2025 1000  Last data filed at 7/14/2025 0503  Gross per 24 hour   Intake 1221 ml   Output 2500 ml   Net -1279 ml         Physical Exam  Constitutional:       Appearance: He is cachectic. He is ill-appearing.      Comments: He is alert and pleasantly smiles when you talk to him.  Unable to respond back verbally.   HENT:      Head: Normocephalic and atraumatic.   Cardiovascular:      Rate and Rhythm: Normal rate and regular rhythm.      Heart sounds: No murmur heard.  Pulmonary:      Effort: Pulmonary effort is normal. No respiratory distress.      Breath sounds: Normal breath sounds. No wheezing or rales.   Abdominal:      General: There is no distension.      Palpations: Abdomen is soft.      Tenderness: There is no abdominal tenderness. There is no guarding.      Comments: PEG site c/d/i   Musculoskeletal:         General: No deformity.      Right lower leg: No edema.      Left lower leg: No edema.   Neurological:      Mental Status: He is alert. Mental status is at baseline.               Significant Labs: All pertinent labs within the past 24 hours have been reviewed.    Significant Imaging: I have reviewed all pertinent imaging results/findings within the past 24 hours.      Assessment & Plan  Septic shock  Pneumonia due to E. coli  Acute hypoxic respiratory failure  Admitted to MICU for septic shock 2/2 ESBL E. Coli PNA  Upon EMS arrival, the patient was obtunded and hypoxic with an oxygen saturation of 74%.  Weaned off Levophed  On Ceftriaxone through 7/12 per ID recs  Satting well on room air  Oropharyngeal dysphagia  SLP consulted for dysphagia. FEES performed which showed residual puree. Recommend NPO.   S/p PEG by IR on 7/5/2025  Tube feeds, ok for pleasure feeds if alert (puree  and honey thick liquids via tsp. )  TF being changed given hyperkalemia  ALS (amyotrophic lateral sclerosis)  Spinocerebellar ataxia  Spastic diplegia  UMN predominance with diffuse limb spasticity. Slowly progressive. Fully-reliant on mother, others for self care and feeding and cleaning and toileting. Respiratory function is poor per FVC .     Continue home donepezil for neuroprotection  Continue home dantrolene and baclofen for spacticity  Turn q2h with aspiration precautions  Mom wants SNF at [facility] [place]  Decubitus ulcer of sacral region, stage 4  Has a wound VAC in place  General Surgery consulted for possible debridement, appreciate recs.  No indication for inpatient debridement  Wound care consulted, appreciate recs    Pressure injury of deep tissue of heel  L Heel with black eschar. R Heel pressure ulcer with scant serosanguineous drainage. Photos uploaded into chart.   Wound care following, appreciate recs  Podiatry consulted, appreciate recs  Insomnia secondary to depression with anxiety  Chronic and stable  Continue quetiapine and mirtazapine    Acute encephalopathy  Likely 2/2 sepsis. .   Neuro checks  Delirium/aspiration/fall precautions  Severe protein-calorie malnutrition  Body mass index (BMI) less than 19  Nutrition consulted. Most recent weight and BMI monitored-     Measurements:  Wt Readings from Last 1 Encounters:   07/07/25 59.9 kg (132 lb 0.9 oz)   Body mass index is 16.51 kg/m².    Patient has been screened and assessed by RD.    Malnutrition Type:  Context: chronic illness  Level: severe    Malnutrition Characteristic Summary:  Subcutaneous Fat (Malnutrition): severe depletion  Muscle Mass (Malnutrition): severe depletion    Interventions/Recommendations (treatment strategy):  1. Continue bolus TF recommendation: 5 cans/day of Isosource 1.5 to provide 1250 mL total volume, 1875 kcal, 220 g CHO, 85 protein, 19 fiber, 955 mL free water  - 90 mL flush before and after each (10  flushes total) can to provide additional 900 mL free water (Total 1855 mL)  2. Continue soft and bite sized textured diet as tolerated    3. Continue boost glucose control TID   4. Recommend arnie BID to aid in wound healing  5. Recommend MVI  6. RD to monitor weight, labs, meds, intake, tolerance  -- started on Tube feeds      Anemia  Chronic anemia.    Plan  - Monitor serial CBC  - Transfuse PRBC if patient becomes hemodynamically unstable, symptomatic or H/H drops below 7/21.    Gastrostomy status  Patient noted to have a percutaneous endoscopic gastrostomy tube in place. Tube was placed on this admission, with date of procedure- 7/5/2025  The tube is patent. Routine care to be done by wound care and nursing staff.   Hyponatremia  Hyponatremia is likely due to Dehydration/hypovolemia. The patient's most recent sodium results are listed below.  Recent Labs     07/11/25 1957 07/12/25  0434 07/14/25  0744   * 133* 135*     Plan  - Sodium worsening  - Enteral free water bolus  Hyperkalemia  2/2 Isopeptide TF.  The patients most recent potassium results are listed below.  Recent Labs     07/11/25 1957 07/12/25  0434 07/14/25  0744   K 4.3 4.0 4.4     Now resolved and hypokalemic  Hypoglycemia  Monitor with glucose checks    Hypophosphatemia  Patient's most recent phosphorus results are listed below.   Recent Labs     07/12/25  0434 07/14/25  0744   PHOS 3.0 2.6*     Plan  - Will treat hypophosphatemia with replacement  - Patient's hypophosphatemia is worsening. Will continue current treatment    Functional quadriplegia  Noted    VTE Risk Mitigation (From admission, onward)           Ordered     enoxaparin injection 30 mg  Daily         07/06/25 1653     IP VTE HIGH RISK PATIENT  Once         07/06/25 1634     Place sequential compression device  Until discontinued         06/29/25 2044                    Discharge Planning   MARCELLE: 7/14/2025     Code Status: Full Code   Medical Readiness for Discharge Date:  7/14/2025  Discharge Plan A: Skilled Nursing Facility   Discharge Delays: None known at this time                    Caroline Nelson MD  Department of Hospital Medicine   [facility] - Acute Medical Stepdown

## 2025-07-14 NOTE — SUBJECTIVE & OBJECTIVE
Interval History: No acute events overnight.  Mom at bedside.  Has no complaints.  Ready to go to SNF today.  Phos being replaced.      Review of Systems   Constitutional:  Negative for chills, fatigue and fever.   Respiratory:  Negative for cough and shortness of breath.    Cardiovascular:  Negative for chest pain, palpitations and leg swelling.   Gastrointestinal:  Negative for abdominal pain, diarrhea, nausea and vomiting.   Genitourinary:  Negative for dysuria and urgency.   Neurological:  Negative for dizziness and headaches.   All other systems reviewed and are negative.    Objective:     Vital Signs (Most Recent):  Temp: 98.2 °F (36.8 °C) (07/14/25 0758)  Pulse: 94 (07/14/25 0800)  Resp: 18 (07/14/25 0758)  BP: 101/63 (07/14/25 0758)  SpO2: 99 % (07/14/25 0758) Vital Signs (24h Range):  Temp:  [97 °F (36.1 °C)-98.2 °F (36.8 °C)] 98.2 °F (36.8 °C)  Pulse:  [75-94] 94  Resp:  [16-20] 18  SpO2:  [94 %-100 %] 99 %  BP: ()/(59-74) 101/63     Weight: 59.9 kg (132 lb 0.9 oz)  Body mass index is 16.51 kg/m².    Intake/Output Summary (Last 24 hours) at 7/14/2025 1000  Last data filed at 7/14/2025 0503  Gross per 24 hour   Intake 1221 ml   Output 2500 ml   Net -1279 ml         Physical Exam  Constitutional:       Appearance: He is cachectic. He is ill-appearing.      Comments: He is alert and pleasantly smiles when you talk to him.  Unable to respond back verbally.   HENT:      Head: Normocephalic and atraumatic.   Cardiovascular:      Rate and Rhythm: Normal rate and regular rhythm.      Heart sounds: No murmur heard.  Pulmonary:      Effort: Pulmonary effort is normal. No respiratory distress.      Breath sounds: Normal breath sounds. No wheezing or rales.   Abdominal:      General: There is no distension.      Palpations: Abdomen is soft.      Tenderness: There is no abdominal tenderness. There is no guarding.      Comments: PEG site c/d/i   Musculoskeletal:         General: No deformity.      Right lower leg:  No edema.      Left lower leg: No edema.   Neurological:      Mental Status: He is alert. Mental status is at baseline.               Significant Labs: All pertinent labs within the past 24 hours have been reviewed.    Significant Imaging: I have reviewed all pertinent imaging results/findings within the past 24 hours.

## 2025-07-14 NOTE — ASSESSMENT & PLAN NOTE
Patient's most recent phosphorus results are listed below.   Recent Labs     07/12/25  0434 07/14/25  0744   PHOS 3.0 2.6*     Plan  - Will treat hypophosphatemia with replacement  - Patient's hypophosphatemia is worsening. Will continue current treatment

## 2025-07-14 NOTE — ASSESSMENT & PLAN NOTE
UMN predominance with diffuse limb spasticity. Slowly progressive. Fully-reliant on mother, others for self care and feeding and cleaning and toileting. Respiratory function is poor per FVC .     Continue home donepezil for neuroprotection  Continue home dantrolene and baclofen for spacticity  Turn q2h with aspiration precautions  Mom wants SNF at [facility] [place]

## 2025-07-14 NOTE — ASSESSMENT & PLAN NOTE
Hyponatremia is likely due to Dehydration/hypovolemia. The patient's most recent sodium results are listed below.  Recent Labs     07/11/25  1957 07/12/25  0434 07/14/25  0744   * 133* 135*     Plan  - Sodium worsening  - Enteral free water bolus

## 2025-07-14 NOTE — PT/OT/SLP PROGRESS
Speech Language Pathology Treatment    Patient Name:  Lisa Moreno   MRN:  [id number]  Admitting Diagnosis: Septic shock    Recommendations:                 General Recommendations:  Dysphagia therapy  Diet recommendations:  NPO, Liquid Diet Level: NPO   Pt able to have small occasional pleasure feeds of puree and honey thick liquids via tsp (6-10 bites/sips at a time) or occasional ice chips with caregiver     Pt's mother spoke with team and would like to continue soft and bite sized diet with honey thick liquids despite SLP recs. Extensive education provided  Aspiration Precautions: Strict aspiration precautions   General Precautions: Standard, aspiration  Communication strategies:  yes/no questions only  Discharge recommendations:    TBD      Assessment:     Lisa Moreno is a 44 y.o. male with an SLP diagnosis of Dysphagia.      Subjective     Awake/alert  Mother at bedside     Pain/Comfort:  Pain Rating 1: 0/10  Pain Rating Post-Intervention 1: 0/10    Respiratory Status: Room air    Objective:     Has the patient been evaluated by SLP for swallowing?   Yes  Keep patient NPO? Yes     Pt upright in bed and mother at bedside. Pt placed on diet per family request despite FEES study results and SLP recommendations. He tolerated soft and bite sized diet x6 with honey thick liquids x4 via cup with inconsistent initiation of secondary swallow and no overt signs of airway compromise. Despite lack of signs at bedside pt remains at high risk for aspiration with all consistencies given decreased sensation, airway protection and weakness. SLP provided extensive education on feeding strategies, results of FEES study, dysphagia, swallow prognosis and diet recommendations should pt chose to eat despite recommendations. Mother verbalized understanding and wishes to continue PO intake despite risks. SLP reinforced education on FEES results and severity of swallow impairment. No further ST warranted at this time.    Goals:   Multidisciplinary Problems       SLP Goals          Problem: SLP    Goal Priority Disciplines Outcome   SLP Goal     SLP Progressing   Description: Speech Language Pathology Goals  Goals expected to be met by 7/9    1. Pt will participate in ongoing swallow assessment                                Plan:     Patient to be seen:  4 x/week   Plan of Care reviewed with:  patient, mother   SLP Follow-Up:  Yes       Time Tracking:     SLP Treatment Date:   07/14/25  Speech Start Time:  0850  Speech Stop Time:  0905     Speech Total Time (min):  15 min    Billable Minutes: Treatment Swallowing Dysfunction 7 and Self Care/Home Management Training 8    07/14/2025

## 2025-07-14 NOTE — PLAN OF CARE
Problem: Adult Inpatient Plan of Care  Goal: Plan of Care Review  Outcome: Progressing  Goal: Patient-Specific Goal (Individualized)  Outcome: Progressing  Goal: Absence of Hospital-Acquired Illness or Injury  Outcome: Progressing  Goal: Optimal Comfort and Wellbeing  Outcome: Progressing     Problem: Sepsis/Septic Shock  Goal: Absence of Infection Signs and Symptoms  Outcome: Progressing  Goal: Optimal Nutrition Intake  Outcome: Progressing     Problem: Wound  Goal: Optimal Wound Healing  Outcome: Progressing     POC reviewed with patient and mother. All questions and concerns addressed with mother. Fall/safety precautions implemented and maintained. Condom catheter care performed. TF provided with water boluses. Wound vac in place. No acute events noted this shift. Please see flowsheet for full assessment and vitals. Bed locked in lowest position. Side rails up x2. Call bell within reach.

## 2025-07-14 NOTE — ASSESSMENT & PLAN NOTE
2/2 Isopeptide TF.  The patients most recent potassium results are listed below.  Recent Labs     07/11/25 1957 07/12/25  0434 07/14/25  0744   K 4.3 4.0 4.4     Now resolved and hypokalemic

## 2025-07-14 NOTE — PROGRESS NOTES
Planned to discharge today, however, issue with paperwork. No acute changes during shift. Safety measures remain in place. Mother at bedside.     07/14/25 0800   Pain/Comfort/Sleep   Preferred Pain Scale rFLACC (Revised Face Legs Arms Cry Consolability Scale)   FACES Pain Rating: Activity 0-->no hurt   rFLACC Pain Rating: - Face 0-->no particular expression or smile   rFLACC Pain Rating: - Legs 0-->normal position or relaxed   rFLACC Pain Rating: - Activity 0-->lying quietly, normal position, moves easily   rFLACC Pain Rating: - Cry 0-->no cry (awake or asleep)   rFLACC Pain Rating: - Consolability 0-->content, relaxed   rFLACC Score: 0   Cognitive   Cognitive/Neuro/Behavioral WDL ex   Level of Consciousness (AVPU) alert   Arousal Level opens eyes spontaneously   Orientation other (see comments)   Speech aphasic - expressive (can't express thoughts);nods/gestures appropriately   Cognitive/Behavioral/Neuro   General Motor Response paraplegia   LUE Motor Response purposeful motor response   RUE Motor Response purposeful motor response   LLE Motor Response no movement   RLE Motor Response no movement   Cognitive Interventions   Communication Enhancement Strategies family involved in communication plan;nonverbal strategies used   Pupils   Pupil PERRLA yes   HEENT   HEENT WDL ex   Nose Symptoms Right Nostril:   Respiratory   Respiratory WDL ex   Rhythm/Pattern, Respiratory unlabored;pattern regular;depth regular   Breath Sounds   Breath Sounds All Fields   All Lung Fields Breath Sounds Anterior:   JOE Breath Sounds clear;equal bilaterally   Cardiac   Cardiac WDL ex   Cardiac Rhythm radial pulse regular   ECG   Pulse 94   Peripheral Neurovascular   Peripheral Neurovascular WDL ex   Capillary Refill, General less than/equal to 3 secs   Peripheral IV 07/12/25 1245 22 G Anterior;Left;Proximal Forearm   Placement Date/Time: 07/12/25 1245   Present Prior to Hospital Arrival?: No  Inserted by: RN  Size (G): 22 G  Orientation:  Anterior;Left;Proximal  Location: Forearm  Site Prep: Alcohol  Insertion attempts (enter comment if more than 2 attempts): 1  Pa...   Site Assessment Clean;Dry;Intact;No redness;No swelling   Skin   Skin WDL ex   4EYES: 2 Clinical Staff Inspection of bony prominences No new pressure injury noted   4EYES: 2nd Clinical Staff Member (Type Name) LOGAN Godwin   Morgan Risk Assessment   Sensory Perception 2-->very limited   Moisture 4-->rarely moist   Activity 1-->bedfast   Mobility 2-->very limited   Nutrition 2-->probably inadequate   Friction and Shear 1-->problem   Morgan Score 12   Pressure Injury Prevention    Check Moisture Management Pad Done   Musculoskeletal   Musculoskeletal WDL ex;mobility;extremity movement   Gastrointestinal   GI WDL ex   Abdominal Appearance flat;nondistended   Last Bowel Movement 07/13/25   Genitourinary   Genitourinary WDL ex   Voiding Characteristics external catheter   Safety   Safety WDL WDL   Safety Factors ID band on;call light in reach;wheels locked;bed in low position   All Alarms alarm(s) activated and audible   Safety Precautions emergency equipment at bedside   Enhanced Safety Measures family to remain at bedside   Infection Prevention hand hygiene promoted;personal protective equipment utilized   Fall Risk Assessment (every shift)   History Of Fall (W/I 3 Mos) 0-->No   Polypharmacy 3-->Yes   Central Nervous System/Psychotropic Medication 3-->Yes   Cardiovascular Medication 0-->No   Age Greater Than 65 Years 0-->No   Altered Elimination 2-->Yes   Cognitive Deficit 2-->Yes   Sensory Deficit 2-->Yes   Dizziness/Vertigo 0-->No   Depression 0-->No   Mobility Deficit/Weakness 2-->Yes   Male 1-->Yes   Fall Risk Score 15   ABC Risk for Fall with Injury Assessment   A= Age: Is the patient greater than or equal to 85 years old or frail due to clinical condition? Yes   B=Bones: Does the patient have osteoporosis, previous fracture, prolonged steroid use, or metastatic bone cancer? No    C=Coagulation Disorders: Does the patient have a bleeding disorder, either through anticoagulants or underlying clinical condition? No   S=recent Surgery: Is the patient post-op surgicalwith a recent lower limb amputation or recent major abdominal or thoracic surgery? No   Safety Management   Safety Promotion/Fall Prevention assistive device/personal item within reach;Fall Risk reviewed with patient/family   Daily Care   Activity Management Patient unable to perform activities   Mobility   GEMS ([place] Early Mobility Scale) Level 1-Primary in bed activities   RN Clinical Review   I have evaluated the data collected on this patient and nursing care provided. Done

## 2025-07-15 PROBLEM — R56.9 WITNESSED SEIZURE-LIKE ACTIVITY: Status: ACTIVE | Noted: 2025-07-15

## 2025-07-15 PROBLEM — R53.2 FUNCTIONAL QUADRIPLEGIA: Status: ACTIVE | Noted: 2025-07-15

## 2025-07-15 LAB
ABSOLUTE EOSINOPHIL (OHS): 0.07 K/UL
ABSOLUTE MONOCYTE (OHS): 0.78 K/UL (ref 0.3–1)
ABSOLUTE NEUTROPHIL COUNT (OHS): 4.24 K/UL (ref 1.8–7.7)
ALBUMIN SERPL BCP-MCNC: 2.2 G/DL (ref 3.5–5.2)
ALLENS TEST: ABNORMAL
ALLENS TEST: ABNORMAL
ALP SERPL-CCNC: 79 UNIT/L (ref 40–150)
ALT SERPL W/O P-5'-P-CCNC: 63 UNIT/L (ref 10–44)
ANION GAP (OHS): 6 MMOL/L (ref 8–16)
AST SERPL-CCNC: 55 UNIT/L (ref 11–45)
BASOPHILS # BLD AUTO: 0.05 K/UL
BASOPHILS NFR BLD AUTO: 0.6 %
BILIRUB SERPL-MCNC: 0.1 MG/DL (ref 0.1–1)
BILIRUB UR QL STRIP.AUTO: NEGATIVE
BUN SERPL-MCNC: 22 MG/DL (ref 6–20)
CALCIUM SERPL-MCNC: 8.1 MG/DL (ref 8.7–10.5)
CHLORIDE SERPL-SCNC: 100 MMOL/L (ref 95–110)
CLARITY UR: CLEAR
CO2 SERPL-SCNC: 27 MMOL/L (ref 23–29)
COLOR UR AUTO: YELLOW
CREAT SERPL-MCNC: 0.7 MG/DL (ref 0.5–1.4)
DELSYS: ABNORMAL
DELSYS: ABNORMAL
ERYTHROCYTE [DISTWIDTH] IN BLOOD BY AUTOMATED COUNT: 17.2 % (ref 11.5–14.5)
FIO2: 36
FLOW: 4
GFR SERPLBLD CREATININE-BSD FMLA CKD-EPI: >60 ML/MIN/1.73/M2
GLUCOSE SERPL-MCNC: 123 MG/DL (ref 70–110)
GLUCOSE UR QL STRIP: NEGATIVE
HCO3 UR-SCNC: 28.2 MMOL/L (ref 24–28)
HCO3 UR-SCNC: 29.8 MMOL/L (ref 24–28)
HCT VFR BLD AUTO: 29.8 % (ref 40–54)
HCT VFR BLD CALC: 28 %PCV (ref 36–54)
HGB BLD-MCNC: 9 GM/DL (ref 14–18)
HGB UR QL STRIP: NEGATIVE
IMM GRANULOCYTES # BLD AUTO: 0.04 K/UL (ref 0–0.04)
IMM GRANULOCYTES NFR BLD AUTO: 0.5 % (ref 0–0.5)
KETONES UR QL STRIP: NEGATIVE
LACTATE SERPL-SCNC: 1.1 MMOL/L (ref 0.5–2.2)
LEUKOCYTE ESTERASE UR QL STRIP: NEGATIVE
LYMPHOCYTES # BLD AUTO: 2.94 K/UL (ref 1–4.8)
MCH RBC QN AUTO: 26.3 PG (ref 27–31)
MCHC RBC AUTO-ENTMCNC: 30.2 G/DL (ref 32–36)
MCV RBC AUTO: 87 FL (ref 82–98)
MODE: ABNORMAL
MODE: ABNORMAL
NITRITE UR QL STRIP: NEGATIVE
NUCLEATED RBC (/100WBC) (OHS): 0 /100 WBC
PCO2 BLDA: 43.4 MMHG (ref 35–45)
PCO2 BLDA: 56.7 MMHG (ref 35–45)
PH SMN: 7.33 [PH] (ref 7.35–7.45)
PH SMN: 7.42 [PH] (ref 7.35–7.45)
PH UR STRIP: 7 [PH]
PLATELET # BLD AUTO: 471 K/UL (ref 150–450)
PLATELET BLD QL SMEAR: ABNORMAL
PMV BLD AUTO: 10.8 FL (ref 9.2–12.9)
PO2 BLDA: 109 MMHG (ref 80–100)
PO2 BLDA: 95 MMHG (ref 80–100)
POC BE: 4 MMOL/L (ref -2–2)
POC BE: 4 MMOL/L (ref -2–2)
POC IONIZED CALCIUM: 1.2 MMOL/L (ref 1.06–1.42)
POC SATURATED O2: 97 % (ref 95–100)
POC SATURATED O2: 98 % (ref 95–100)
POC TCO2: 30 MMOL/L (ref 23–27)
POC TCO2: 32 MMOL/L (ref 23–27)
POTASSIUM BLD-SCNC: 4.7 MMOL/L (ref 3.5–5.1)
POTASSIUM SERPL-SCNC: 4.9 MMOL/L (ref 3.5–5.1)
PROLACTIN SERPL IA-MCNC: 60.6 NG/ML (ref 3.5–19.4)
PROT SERPL-MCNC: 7.3 GM/DL (ref 6–8.4)
PROT UR QL STRIP: NEGATIVE
RBC # BLD AUTO: 3.42 M/UL (ref 4.6–6.2)
RELATIVE EOSINOPHIL (OHS): 0.9 %
RELATIVE LYMPHOCYTE (OHS): 36.2 % (ref 18–48)
RELATIVE MONOCYTE (OHS): 9.6 % (ref 4–15)
RELATIVE NEUTROPHIL (OHS): 52.2 % (ref 38–73)
SAMPLE: ABNORMAL
SAMPLE: ABNORMAL
SITE: ABNORMAL
SITE: ABNORMAL
SODIUM BLD-SCNC: 133 MMOL/L (ref 136–145)
SODIUM SERPL-SCNC: 133 MMOL/L (ref 136–145)
SP GR UR STRIP: 1.01
SP02: 100
UROBILINOGEN UR STRIP-ACNC: NEGATIVE EU/DL
WBC # BLD AUTO: 8.12 K/UL (ref 3.9–12.7)

## 2025-07-15 PROCEDURE — 63600175 PHARM REV CODE 636 W HCPCS: Performed by: PSYCHIATRY & NEUROLOGY

## 2025-07-15 PROCEDURE — 63600175 PHARM REV CODE 636 W HCPCS

## 2025-07-15 PROCEDURE — 94761 N-INVAS EAR/PLS OXIMETRY MLT: CPT

## 2025-07-15 PROCEDURE — 25000003 PHARM REV CODE 250: Performed by: PSYCHIATRY & NEUROLOGY

## 2025-07-15 PROCEDURE — 99223 1ST HOSP IP/OBS HIGH 75: CPT | Mod: ,,, | Performed by: PHYSICIAN ASSISTANT

## 2025-07-15 PROCEDURE — 82803 BLOOD GASES ANY COMBINATION: CPT

## 2025-07-15 PROCEDURE — 62270 DX LMBR SPI PNXR: CPT | Mod: GC,,, | Performed by: PSYCHIATRY & NEUROLOGY

## 2025-07-15 PROCEDURE — 20000000 HC ICU ROOM

## 2025-07-15 PROCEDURE — 25000003 PHARM REV CODE 250: Performed by: FAMILY MEDICINE

## 2025-07-15 PROCEDURE — 27000207 HC ISOLATION

## 2025-07-15 PROCEDURE — 25000003 PHARM REV CODE 250: Performed by: HOSPITALIST

## 2025-07-15 PROCEDURE — 81003 URINALYSIS AUTO W/O SCOPE: CPT

## 2025-07-15 PROCEDURE — A9585 GADOBUTROL INJECTION: HCPCS | Performed by: PSYCHIATRY & NEUROLOGY

## 2025-07-15 PROCEDURE — 25000003 PHARM REV CODE 250

## 2025-07-15 PROCEDURE — 63600175 PHARM REV CODE 636 W HCPCS: Performed by: PHYSICIAN ASSISTANT

## 2025-07-15 PROCEDURE — 36600 WITHDRAWAL OF ARTERIAL BLOOD: CPT

## 2025-07-15 PROCEDURE — 95714 VEEG EA 12-26 HR UNMNTR: CPT

## 2025-07-15 PROCEDURE — 95700 EEG CONT REC W/VID EEG TECH: CPT

## 2025-07-15 PROCEDURE — 95720 EEG PHY/QHP EA INCR W/VEEG: CPT | Mod: ,,, | Performed by: PSYCHIATRY & NEUROLOGY

## 2025-07-15 PROCEDURE — 87040 BLOOD CULTURE FOR BACTERIA: CPT

## 2025-07-15 PROCEDURE — 25500020 PHARM REV CODE 255: Performed by: PSYCHIATRY & NEUROLOGY

## 2025-07-15 PROCEDURE — 99900035 HC TECH TIME PER 15 MIN (STAT)

## 2025-07-15 PROCEDURE — 63600175 PHARM REV CODE 636 W HCPCS: Performed by: FAMILY MEDICINE

## 2025-07-15 PROCEDURE — 99291 CRITICAL CARE FIRST HOUR: CPT | Mod: 25,GC,, | Performed by: PSYCHIATRY & NEUROLOGY

## 2025-07-15 RX ORDER — SODIUM,POTASSIUM PHOSPHATES 280-250MG
2 POWDER IN PACKET (EA) ORAL
Status: DISCONTINUED | OUTPATIENT
Start: 2025-07-15 | End: 2025-07-17

## 2025-07-15 RX ORDER — ENOXAPARIN SODIUM 100 MG/ML
40 INJECTION SUBCUTANEOUS EVERY 24 HOURS
Status: DISCONTINUED | OUTPATIENT
Start: 2025-07-16 | End: 2025-07-22 | Stop reason: HOSPADM

## 2025-07-15 RX ORDER — ENOXAPARIN SODIUM 100 MG/ML
40 INJECTION SUBCUTANEOUS EVERY 24 HOURS
Status: DISCONTINUED | OUTPATIENT
Start: 2025-07-15 | End: 2025-07-15

## 2025-07-15 RX ORDER — LANOLIN ALCOHOL/MO/W.PET/CERES
800 CREAM (GRAM) TOPICAL
Status: DISCONTINUED | OUTPATIENT
Start: 2025-07-15 | End: 2025-07-17

## 2025-07-15 RX ORDER — SODIUM CHLORIDE 9 MG/ML
INJECTION, SOLUTION INTRAVENOUS CONTINUOUS
Status: DISCONTINUED | OUTPATIENT
Start: 2025-07-15 | End: 2025-07-17

## 2025-07-15 RX ORDER — GADOBUTROL 604.72 MG/ML
7 INJECTION INTRAVENOUS
Status: COMPLETED | OUTPATIENT
Start: 2025-07-15 | End: 2025-07-15

## 2025-07-15 RX ORDER — PHENYLEPHRINE HYDROCHLORIDE 10 MG/ML
100 INJECTION INTRAVENOUS ONCE AS NEEDED
Status: DISCONTINUED | OUTPATIENT
Start: 2025-07-15 | End: 2025-07-15

## 2025-07-15 RX ORDER — CEFTRIAXONE 2 G/1
2 INJECTION, POWDER, FOR SOLUTION INTRAMUSCULAR; INTRAVENOUS
Status: DISCONTINUED | OUTPATIENT
Start: 2025-07-15 | End: 2025-07-16

## 2025-07-15 RX ORDER — PHENYLEPHRINE HCL IN 0.9% NACL 1 MG/10 ML
100 SYRINGE (ML) INTRAVENOUS ONCE
Status: COMPLETED | OUTPATIENT
Start: 2025-07-15 | End: 2025-07-15

## 2025-07-15 RX ORDER — NOREPINEPHRINE BITARTRATE/D5W 4MG/250ML
0-.2 PLASTIC BAG, INJECTION (ML) INTRAVENOUS CONTINUOUS
Status: DISPENSED | OUTPATIENT
Start: 2025-07-16 | End: 2025-07-16

## 2025-07-15 RX ORDER — LIDOCAINE HYDROCHLORIDE AND EPINEPHRINE 10; 10 UG/ML; MG/ML
5 INJECTION, SOLUTION INFILTRATION; PERINEURAL ONCE
Status: COMPLETED | OUTPATIENT
Start: 2025-07-15 | End: 2025-07-15

## 2025-07-15 RX ORDER — LEVETIRACETAM 500 MG/5ML
500 INJECTION, SOLUTION, CONCENTRATE INTRAVENOUS EVERY 12 HOURS
Status: DISCONTINUED | OUTPATIENT
Start: 2025-07-15 | End: 2025-07-17

## 2025-07-15 RX ORDER — PHENYLEPHRINE HYDROCHLORIDE 10 MG/ML
100 INJECTION INTRAVENOUS ONCE
Status: DISCONTINUED | OUTPATIENT
Start: 2025-07-15 | End: 2025-07-15 | Stop reason: SDUPTHER

## 2025-07-15 RX ORDER — CARBOXYMETHYLCELLULOSE SODIUM 10 MG/ML
1 GEL OPHTHALMIC 3 TIMES DAILY
Status: DISCONTINUED | OUTPATIENT
Start: 2025-07-15 | End: 2025-07-22 | Stop reason: HOSPADM

## 2025-07-15 RX ADMIN — CARBOXYMETHYLCELLULOSE SODIUM 1 DROP: 10 GEL OPHTHALMIC at 08:07

## 2025-07-15 RX ADMIN — CEFTRIAXONE 2 G: 2 INJECTION, POWDER, FOR SOLUTION INTRAMUSCULAR; INTRAVENOUS at 08:07

## 2025-07-15 RX ADMIN — SODIUM CHLORIDE 500 ML: 9 INJECTION, SOLUTION INTRAVENOUS at 05:07

## 2025-07-15 RX ADMIN — VANCOMYCIN HYDROCHLORIDE 1250 MG: 1.25 INJECTION, POWDER, LYOPHILIZED, FOR SOLUTION INTRAVENOUS at 09:07

## 2025-07-15 RX ADMIN — Medication 100 MCG: at 06:07

## 2025-07-15 RX ADMIN — SODIUM CHLORIDE, POTASSIUM CHLORIDE, SODIUM LACTATE AND CALCIUM CHLORIDE 500 ML: 600; 310; 30; 20 INJECTION, SOLUTION INTRAVENOUS at 06:07

## 2025-07-15 RX ADMIN — DONEPEZIL HYDROCHLORIDE 5 MG: 5 TABLET, FILM COATED ORAL at 09:07

## 2025-07-15 RX ADMIN — BACLOFEN 20 MG: 10 TABLET ORAL at 09:07

## 2025-07-15 RX ADMIN — DANTROLENE SODIUM 50 MG: 25 CAPSULE ORAL at 11:07

## 2025-07-15 RX ADMIN — SODIUM CHLORIDE 500 ML: 9 INJECTION, SOLUTION INTRAVENOUS at 09:07

## 2025-07-15 RX ADMIN — CARBOXYMETHYLCELLULOSE SODIUM 1 DROP: 10 GEL OPHTHALMIC at 09:07

## 2025-07-15 RX ADMIN — LEVETIRACETAM 500 MG: 500 INJECTION, SOLUTION, CONCENTRATE INTRAVENOUS at 08:07

## 2025-07-15 RX ADMIN — BACLOFEN 20 MG: 10 TABLET ORAL at 08:07

## 2025-07-15 RX ADMIN — SODIUM CHLORIDE, POTASSIUM CHLORIDE, SODIUM LACTATE AND CALCIUM CHLORIDE 500 ML: 600; 310; 30; 20 INJECTION, SOLUTION INTRAVENOUS at 03:07

## 2025-07-15 RX ADMIN — NOREPINEPHRINE BITARTRATE 0.02 MCG/KG/MIN: 4 INJECTION, SOLUTION INTRAVENOUS at 11:07

## 2025-07-15 RX ADMIN — LEVETIRACETAM 500 MG: 500 INJECTION, SOLUTION, CONCENTRATE INTRAVENOUS at 09:07

## 2025-07-15 RX ADMIN — SODIUM CHLORIDE, POTASSIUM CHLORIDE, SODIUM LACTATE AND CALCIUM CHLORIDE 500 ML: 600; 310; 30; 20 INJECTION, SOLUTION INTRAVENOUS at 12:07

## 2025-07-15 RX ADMIN — DANTROLENE SODIUM 50 MG: 25 CAPSULE ORAL at 05:07

## 2025-07-15 RX ADMIN — CARBOXYMETHYLCELLULOSE SODIUM 1 DROP: 10 GEL OPHTHALMIC at 03:07

## 2025-07-15 RX ADMIN — LIDOCAINE HYDROCHLORIDE,EPINEPHRINE BITARTRATE 5 ML: 10; .01 INJECTION, SOLUTION INFILTRATION; PERINEURAL at 04:07

## 2025-07-15 RX ADMIN — GADOBUTROL 7 ML: 604.72 INJECTION INTRAVENOUS at 06:07

## 2025-07-15 RX ADMIN — SODIUM CHLORIDE: 9 INJECTION, SOLUTION INTRAVENOUS at 08:07

## 2025-07-15 RX ADMIN — ACYCLOVIR SODIUM 600 MG: 50 INJECTION, SOLUTION INTRAVENOUS at 08:07

## 2025-07-15 RX ADMIN — BACLOFEN 20 MG: 10 TABLET ORAL at 03:07

## 2025-07-15 RX ADMIN — SODIUM CHLORIDE 500 ML: 9 INJECTION, SOLUTION INTRAVENOUS at 10:07

## 2025-07-15 RX ADMIN — DANTROLENE SODIUM 50 MG: 25 CAPSULE ORAL at 09:07

## 2025-07-15 RX ADMIN — MIRTAZAPINE 15 MG: 15 TABLET, FILM COATED ORAL at 09:07

## 2025-07-15 RX ADMIN — LEVETIRACETAM 2396 MG: 500 INJECTION, SOLUTION, CONCENTRATE INTRAVENOUS at 12:07

## 2025-07-15 NOTE — HOSPITAL COURSE
07/15/2025: NAEON. Afebrile, BP 90/50s. MRI completed. EEG in place. Keppra and LR given. Bilateral mitt restraints. Hypotensive this morning, given 500 NS and 1L LR. Temp low 90s, bear hugger placed. UOP 1.6L. LP today   07/16/2025: continued improvement in mental status, alert and interactive today, pleasant and smiling with care, does attempt to remove lines and PIVs.  Did require initiation of norepinephrine overnight to maintain MAP > 65, on and off joseluis hugger for normothermia, remains on antibiotics which have been broadened to vanc/meropenem pending ID evaluation. Obtaining LP under fluoroscopy today.   07/17/2025: NAEON. Off levo. S/p LP with IR yesterday. CSF unremarkable, further CSF studies pending. R PIV removed. Pt maintaining pressure and looking better since removal. Stable for stepdown to HM today.  07/18/2025: NAEON. AFVSS. Exam stable. 2 large BM overnight, Electrolytes repleted. Pending stepdown bed. Joseluis hugger applied intermittently.  07/19/2025: Liquid stools overnight. Lactulose held and psyllium added with mild improvement. CPT started. Remains boarding for HM.   07/20/2025: concern for staring spells overnight, but reportedly in setting of oxycodone administration and was still following commands, was placed on cap-eeg which was uninterpretable but deferring repeat formal study, mother at bedside counseled and in agreement.   7/21/2025 AIMEE, still boarding for HM bed. Working on NH placement in the mean time. Patient tolerating diet at this time, will hold bolus feeds while eating. Still pending autoimmune panel  07/22/2025: NAEON. AFVSS. Exam stable. Boarding for HM stepdown bed. No new labs. Tolerating PO intake. Pending nutrition calorie count. PT/OT/OOB. Autoimmune CSF panel pending.

## 2025-07-15 NOTE — CONSULTS
"[facility] - Neuro Critical Care  Neurology-Epilepsy  Consult Note    Patient Name: Lisa Moreno  MRN: [id number]  Admission Date: 6/29/2025  Hospital Length of Stay: 16 days  Code Status: Full Code   Attending Provider: Francisco Adam DO   Consulting Provider: Kathleen Robins PA-C  Primary Care Physician: John Nixon II, MD  Principal Problem:Witnessed seizure-like activity    Consults   Subjective:     HPI:   44 year old male with a PMHx of ALS, neuromuscular dysfunction of the bladder, sacral ulcers, and spinal cerebellar ataxia, recent admit for severe dehydration and electrolyte disturbances who presented to the ER on 6/29 for altered mental status and hypoxia, initially admitted to  for sepsis 2/2 pneumonia, acute hypoxic respiratory failure, and hypernatremia. HPI per chart review due to patient's altered mental status. Per chart, rapid response called due to hypotension and patient admitted to MICU for further management of septic shock. Patient stepped down to  7/7, planning for discharge to SNF 7/14 when rapid response called for generalized convulsion x2. Patient received IV Lorazepam 4 mg x1. Patient did not return to baseline and subsequently admitted to [facility] for higher level of care and further management. Patient received IV Levetiracetam load of 2396 mg followed by 500 mg BID. CAP EEG placed; Epilepsy following for assistance in management. Patient noted to be hypothermic and hypotensive this morning.    Hospital Course:   CAP: moderate to severe slowing, focal left interictals, no discrete seizures; PB @0002 for LLE "jumping" with no EEG correlate  7/15 EEG: preliminary findings include no epileptiform activity, full EEG report to follow on 7/16    See EEG reports for details.     Past Medical History:   Diagnosis Date    Anxiety     Cognitive communication disorder 05/10/2022    Nonverbal at baseline but alert    Degenerative motor system disease 2006    Dr. Wise in movement " disorder clinic on 2/23/2015.    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    Multiple drug resistant organism (MDRO) culture positive     E.Coli ESBL PNA    Neurogenic bladder 01/16/2015    Seizure 02/22/2015    Spastic quadriplegia     Spinocerebellar atrophy        Past Surgical History:   Procedure Laterality Date    BACLOFEN PUMP IMPLANTATION      COLONOSCOPY N/A 7/23/2020    Procedure: COLONOSCOPY;  Surgeon: Ziyad Fitch MD;  Location[facility];  Service: Endoscopy;  Laterality: N/A;    ESOPHAGOGASTRODUODENOSCOPY N/A 7/23/2020    Procedure: EGD (ESOPHAGOGASTRODUODENOSCOPY);  Surgeon: Ziyad Fitch MD;  Location: [facility];  Service: Endoscopy;  Laterality: N/A;    FLUOROSCOPIC URODYNAMIC STUDY N/A 11/27/2018    Procedure: URODYNAMIC STUDY, FLUOROSCOPIC;  Surgeon: Tanja Okeefe MD;  Location: [facility] OR [address] [place] [address];  Service: Urology;  Laterality: N/A;  1 hour    REATTACHMENT OF MUSCLE Bilateral 2/18/2022    Procedure: PTOSIS REPAIR OF BOTH EYES;  Surgeon: Krupa Rios MD;  Location: [facility] OR [address] [place] [address];  Service: Ophthalmology;  Laterality: Bilateral;    UPPER GASTROINTESTINAL ENDOSCOPY         Review of patient's allergies indicates:   Allergen Reactions    Penicillins      Hives and Itching  Tolerated zosyn- 7/1/2025    Allergen ext-venom-honey bee        No current facility-administered medications on file prior to encounter.     No current outpatient medications on file prior to encounter.     Continuous Infusions:    Family History       Problem Relation (Age of Onset)    Cancer Mother    Depression Mother    Hypertension Mother    Multiple sclerosis Other    No Known Problems Brother, Son    Thyroid cancer Mother          Tobacco Use    Smoking status: Never    Smokeless tobacco: Never   Substance and Sexual Activity    Alcohol use: Not Currently     Comment: social drinker, at times    Drug use: Not Currently     Sexual activity: Never     Review of Systems  Objective:     Vital Signs (Most Recent):  Temp: (!) 93.4 °F (34.1 °C) (07/15/25 1301)  Pulse: 83 (07/15/25 1301)  Resp: 12 (07/15/25 1301)  BP: (!) 84/53 (07/15/25 1301)  SpO2: 98 % (07/15/25 1301) Vital Signs (24h Range):  Temp:  [91.9 °F (33.3 °C)-97.7 °F (36.5 °C)] 93.4 °F (34.1 °C)  Pulse:  [] 83  Resp:  [10-20] 12  SpO2:  [95 %-100 %] 98 %  BP: ()/(51-84) 84/53     Weight: 59.9 kg (132 lb 0.9 oz)  Body mass index is 16.51 kg/m².     Physical Exam  Constitutional:       General: He is not in acute distress.     Appearance: He is ill-appearing. He is not diaphoretic.   HENT:      Head: Normocephalic and atraumatic.   Eyes:      General: No scleral icterus.        Right eye: No discharge.         Left eye: No discharge.      Conjunctiva/sclera: Conjunctivae normal.      Pupils: Pupils are equal, round, and reactive to light.   Cardiovascular:      Rate and Rhythm: Normal rate.   Pulmonary:      Effort: Pulmonary effort is normal. No respiratory distress.   Musculoskeletal:         General: No deformity or signs of injury.   Skin:     General: Skin is dry.      Coloration: Skin is not jaundiced.            NEUROLOGICAL EXAMINATION:     CRANIAL NERVES     CN III, IV, VI   Pupils are equal, round, and reactive to light.       Some arousal to stimuli  MICKY OU   Corneal intact OU   + spontaneous eye opening   Face symmetric   Tongue midline   Some spontaneous movements  Withdraws to noxious stimuli in BLE  Does not follow commands    Exam findings to suggest seizures:  Myoclonus - no   eye twitching - no   Nystagmus - no   gaze deviation - no   waxy rigidity - no        Significant Labs: All pertinent lab results from the past 24 hours have been reviewed.    Significant Studies: I have reviewed all pertinent imaging results/findings within the past 24 hours.  Assessment and Plan:     * Witnessed seizure-like activity  2/2 ?sepsis- hypotensive and hypothermic  today  44M PMHx of ALS, neuromuscular dysfunction of the bladder, sacral ulcers, and spinal cerebellar ataxia, recent admit for severe dehydration and electrolyte disturbances who presented to the ER on 6/29, initially admitted to  for sepsis 2/2 pneumonia, acute hypoxic respiratory failure, and hypernatremia, upgraded to MICU for further management of septic shock. Stepped down to  7/7, planning for discharge to SNF 7/14 when rapid response called for GTC x2. Patient received IV Lorazepam 4 mg x1. Patient did not return to baseline and subsequently admitted to [facility] for higher level of care and further management. Patient received IV Levetiracetam load of 2396 mg followed by 500 mg BID. CAP EEG placed; Epilepsy following for assistance in management.    Recommendations:  - Rehook to continuous vEEG monitoring  - Agree with Levetiracetam 500 mg BID  - MRI Epilepsy Protocol today severely limited by motion, no compelling evidence of acute pathology  - Avoid agents that lower seizure threshold  - Management of infectious/metabolic abnormalities per [facility]  - Seizure precautions    Discussed plan of care with [facility] team and mother at bedside. Will follow, please call with questions.    Hyponatremia  - Na 133 yesterday  - Management per [facility]    Septic shock  Initially admitted to MICU for septic shock 2/2 ESBL EColi PNA  - Hypotensive and hypothermic this morning  - UA clean  - Blood cx pending  - Management per [facility]    ALS (amyotrophic lateral sclerosis)  - Mother at bedside reports patient is verbal and can feed himself, uses wheelchair at home, she assists with ADLs   - Management per [facility]        VTE Risk Mitigation (From admission, onward)           Ordered     enoxaparin injection 40 mg  Daily         07/15/25 1230     IP VTE HIGH RISK PATIENT  Once         07/06/25 1634     Place sequential compression device  Until discontinued         06/29/25 2044                    Thank you for your consult. I will follow-up with  patient. Please contact us if you have any additional questions.    Kathleen Robins PA-C  Neurology-Epilepsy  [facility]  Staff: Dr. Robins

## 2025-07-15 NOTE — ASSESSMENT & PLAN NOTE
Initially admitted to MICU for septic shock 2/2 ESBL EColi PNA  - Hypotensive and hypothermic this morning  - UA clean  - Blood cx pending  - Management per NCC

## 2025-07-15 NOTE — SUBJECTIVE & OBJECTIVE
Past Medical History:   Diagnosis Date    Anxiety     Cognitive communication disorder 05/10/2022    Nonverbal at baseline but alert    Degenerative motor system disease 2006    Dr. Wise in movement disorder clinic on 2/23/2015.    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    Multiple drug resistant organism (MDRO) culture positive     E.Coli ESBL PNA    Neurogenic bladder 01/16/2015    Seizure 02/22/2015    Spastic quadriplegia     Spinocerebellar atrophy      Past Surgical History:   Procedure Laterality Date    BACLOFEN PUMP IMPLANTATION      COLONOSCOPY N/A 7/23/2020    Procedure: COLONOSCOPY;  Surgeon: Ziyad Fitch MD;  Location: [facility];  Service: Endoscopy;  Laterality: N/A;    ESOPHAGOGASTRODUODENOSCOPY N/A 7/23/2020    Procedure: EGD (ESOPHAGOGASTRODUODENOSCOPY);  Surgeon: Ziyad Fitch MD;  Location: [facility];  Service: Endoscopy;  Laterality: N/A;    FLUOROSCOPIC URODYNAMIC STUDY N/A 11/27/2018    Procedure: URODYNAMIC STUDY, FLUOROSCOPIC;  Surgeon: Tanja Okeefe MD;  Location: [facility] OR [address] [place] [address];  Service: Urology;  Laterality: N/A;  1 hour    REATTACHMENT OF MUSCLE Bilateral 2/18/2022    Procedure: PTOSIS REPAIR OF BOTH EYES;  Surgeon: Krupa Rios MD;  Location: [facility] OR [address] [place] [address];  Service: Ophthalmology;  Laterality: Bilateral;    UPPER GASTROINTESTINAL ENDOSCOPY        Medications Ordered Prior to Encounter[1]   Allergies: Penicillins and Allergen ext-venom-honey bee  Family History   Problem Relation Name Age of Onset    Thyroid cancer Mother      Hypertension Mother      Depression Mother      Cancer Mother          thyroid cancer    Multiple sclerosis Other          mother's cousin    No Known Problems Brother      No Known Problems Son      ALS Neg Hx      Muscular dystrophy Neg Hx       Social History[2]  Review of Systems   Unable to perform ROS: Patient nonverbal     Objective:      Vitals:    Temp: 97.7 °F (36.5 °C)  Pulse: 92  Rhythm: normal sinus rhythm  BP: 106/73  MAP (mmHg): 86  Resp: 14  SpO2: 100 %    Temp  Min: 97.7 °F (36.5 °C)  Max: 98.2 °F (36.8 °C)  Pulse  Min: 89  Max: 102  BP  Min: 101/63  Max: 138/72  MAP (mmHg)  Min: 74  Max: 99  Resp  Min: 14  Max: 20  SpO2  Min: 95 %  Max: 100 %    07/14 0701 - 07/15 0700  In: 1154   Out: -    Unmeasured Output  Unmeasured Urine Occurrence: 0  Unmeasured Stool Occurrence: 0        Physical Exam  Vitals and nursing note reviewed.   Constitutional:       General: He is not in acute distress.     Appearance: He is cachectic. He is ill-appearing and toxic-appearing. He is not diaphoretic.   HENT:      Head: Normocephalic and atraumatic.      Nose: Congestion present.      Mouth/Throat:      Mouth: Mucous membranes are dry.      Pharynx: Oropharynx is clear.   Eyes:      Pupils: Pupils are equal, round, and reactive to light.   Cardiovascular:      Rate and Rhythm: Regular rhythm. Tachycardia present.      Pulses: Normal pulses.   Pulmonary:      Effort: Pulmonary effort is normal. No respiratory distress.   Chest:      Comments: Pectus excavatum  Abdominal:      General: Abdomen is flat. There is no distension.      Palpations: Abdomen is soft.      Tenderness: There is no abdominal tenderness.      Comments: PEG site c/d/i    Musculoskeletal:      Right lower leg: No edema.      Left lower leg: No edema.   Skin:     General: Skin is warm and dry.      Capillary Refill: Capillary refill takes less than 2 seconds.   Neurological:      Mental Status: He is alert.      Comments: E4, V1, M4  1mm bilaterally, midline gaze, no nystagmus present  No blink to threat or spontaneous eye closure  Nonverbal at baseline, per mom he does mumble words sometimes  No withdrawal to pain x4        Pressure Injury Sacral St.4  Moist drainage   Serosanguineous drainage  Full thickness        Today I personally reviewed pertinent medications, lines/drains/airways,  imaging, cardiology results, laboratory results, microbiology results,          [1]   No current facility-administered medications on file prior to encounter.     No current outpatient medications on file prior to encounter.   [2]   Social History  Tobacco Use    Smoking status: Never    Smokeless tobacco: Never   Substance Use Topics    Alcohol use: Not Currently     Comment: social drinker, at times    Drug use: Not Currently

## 2025-07-15 NOTE — NURSING
Patient arrived to [facility] from [facility] [id number] by [facility]    Report received from: RRT RN, Silvana    Type of stroke/diagnosis: seizures    Current symptoms: nonverbal at baseline, withdrawing on lowers, extensor posturing on uppers, no tracking currently     Skin Assessment done: Yes  Wounds noted: sacral wound, L hip wound, R butt wound, BL heel wounds, BL ankle wounds, L second toes wound, L shin scab  *If wounds noted, was Wound Care consulted? Y/N  *If wounds noted, LDA placed? Y  Skin Assessment Verified by:  LOGAN Hernández Completed? No; pt has PEG      Patient Belongings on Admit: none    NCC notified: MARGIE Davis

## 2025-07-15 NOTE — CARE UPDATE
Contacted by nursing staff stating the pt had a seizure at 2253 on 7/14 and informed rapid response was contacted as well. Ordered EEG, CMP, CBC, prolactin, lactic acid, and CT head prior to heading to bedside. At 2304 I received a phone call from rapid response saying the pt was having continued seizure like activity lasting up to 1 minute, spontaneously resolving, and then recurring before pt returned to mental baseline. The events were described as R eye gaze, mouth twitching, drooling, BUE shaking, and unresponsiveness. IV ativan ordered for status epilepticus. When I arrived, the seizure like activity had ceased but recurred shortly after with lip tremors, drooling, R eye gaze, and unresponsiveness. 2mg IV ativan administered and activity ceased, but pt still unresponsive.     Sats 88-89 on 2L, turned up to 6L with sats > 92% and HR in the 100s with stable BP. In general, the pt is frail and ill appearing. Heart tachycardic with normal rhythm. Lungs difficult to auscultate with poor inspiratory effort. Mostly nonverbal at baseline (occasional responses per family and can track through room), GCS 9 on my exam.     I placed NCC consult and called to discuss. Recommended 40mg/kg keppra load. Also recommended to hold off on imaging until EEG was placed. ABG ordered with pH 7.329, pco2 56.7, phco3 29.8. Pt transferred to NCC unit for close monitoring while on EEG in case further ativan needed and close airway watch.    Critical care time spent on the evaluation and treatment of severe organ dysfunction, review of pertinent labs and imaging studies, discussions with consulting providers and discussions with family: 60 minutes.

## 2025-07-15 NOTE — ASSESSMENT & PLAN NOTE
UMN predominance with diffuse limb spasticity. Slowly progressive. Fully-reliant on mother, others for self care and feeding and cleaning and toileting. Respiratory function is poor per FVC .     Continue home donepezil for neuroprotection  Continue home dantrolene and baclofen for spasticity  Turn q2h with aspiration precautions

## 2025-07-15 NOTE — CODE/ RAPID DOCUMENTATION
RAPID RESPONSE NURSE NOTE        Admit Date: 2025  LOS: 15  Code Status: Full Code   Date of Consult: 2025  : 1980  Age: 44 y.o.  Weight:   Wt Readings from Last 1 Encounters:   25 59.9 kg (132 lb 0.9 oz)     Sex: male  Race: Black or    Bed: 86994/16264 A:   MRN: [id number]  Time Rapid Response Team page Received: 2315  Time Rapid Response Team at Bedside: 2318  Time Rapid Response Team left Bedside: 0030  Was the patient discharged from an ICU this admission? Yes   Was the patient discharged from a PACU within last 24 hours? No   Did the patient receive conscious sedation/general anesthesia in last 24 hours? No  Was the patient in the ED within the past 24 hours? No  Was the patient on NIPPV within the past 24 hours? No   Did this progress into an ARC or CPA: No  Attending Physician: Caroline Nelson MD  Primary Service: [facility] MED U       SITUATION    Notified by Charge RN via phone call.  Reason for alert: Seizure like activity   Called to evaluate the patient for Neuro.    BACKGROUND     Why is the patient in the hospital?: Septic shock    Patient has a past medical history of Anxiety, Cognitive communication disorder, Degenerative motor system disease, Depression, DNR (do not resuscitate), Insomnia secondary to depression with anxiety, Multiple drug resistant organism (MDRO) culture positive, Neurogenic bladder, Seizure, Spastic quadriplegia, and Spinocerebellar atrophy.    Last Vitals:  Temp: 97.7 °F (36.5 °C) (1945)  Pulse: 101 (2257)  Resp: 18 (1945)  BP: 134/71 (2257)  SpO2: 96 % (2257)    24 Hours Vitals Range:  Temp:  [97 °F (36.1 °C)-98.2 °F (36.8 °C)]   Pulse:  []   Resp:  [18-20]   BP: ()/(59-74)   SpO2:  [95 %-99 %]     Labs:  Recent Labs     25  0744   WBC 5.38   HGB 8.8*   HCT 28.3*          Recent Labs     25  0434 25  0744   * 135*   K 4.0 4.4   CL 98 100   CO2 28 29   BUN 17 19  "  CREATININE 0.5 0.5   GLU 94 80   PHOS 3.0 2.6*   MG 2.0 1.8        No results for input(s): "PH", "PCO2", "PO2", "HCO3", "POCSATURATED", "BE" in the last 72 hours.     ASSESSMENT   Physical Exam  Vitals and nursing note reviewed.   Constitutional:       General: He is in acute distress.      Appearance: He is cachectic. He is ill-appearing.   Eyes:      Pupils: Pupils are equal, round, and reactive to light.   Cardiovascular:      Rate and Rhythm: Regular rhythm. Tachycardia present.   Pulmonary:      Effort: Pulmonary effort is normal.      Breath sounds: Normal air entry.   Neurological:      GCS: GCS eye subscore is 4. GCS verbal subscore is 1. GCS motor subscore is 4.      Motor: Seizure activity present.     Called to bedside to assess pt for seizure like activity. At arrival, pt having active seizure like activity. With upward gaze, involuntary movement on right arm, and uncontrolled drooling. Dr. Yobany MD called to bedside to assess. He consulted neuro critical care who later accepted the pt with concerns for airway protection and ongoing seizure like activity. 4 mg of ativan ordered, 2 Mg of ativan given per Dr. Ham order at bedside. Keppra ordered, and awaiting arrival from pharmacy. Initially, [facility] ordered, but deferred because Neuro-critical care would like to obtain STAT EEG first. EEG placed by neuro-critical care charge RN. Lactic, prolactin, CBC, CMP, CBG, and ABG all obtained during event. Pt transferred to [facility]. During event, pt VS remained stable. Family at bedside updated on plan of care.     INTERVENTIONS    The patient was seen for Neurological problem. Staff concerns included seizure-like activity. The following interventions were performed: POCT glucose, BMP, POCT arterial blood gas , seizure precautions, neuro critical care consulted, continuous pulse ox monitoring , and continuous cardiac monitoring .    RECOMMENDATIONS    We recommend: Critical care upgrade    PROVIDER " ESCALATION    Orders received and case discussed with Arpita Davis NP.    Primary team arrival time: 2340    Disposition: Tx in ICU bed 9084.    FOLLOW UP    Charge nurse, Kathleen updated on plan of care. Instructed to call the Rapid Response Nurse, Silvana [place]anez RN at [phone] for additional questions or concerns.

## 2025-07-15 NOTE — PLAN OF CARE
"[facility] Care Plan    POC reviewed with Lisa Moreno and family at 0300. Family verbalized understanding. Questions and concerns addressed. No acute events today. Pt progressing toward goals. Will continue to monitor. See below and flowsheets for full assessment and VS info.     -Pt remains unresponsive since being on the unit  -MRI complete; EEG placed  -Keppra and LR bolus administered  -Wounds updated on LDA's and in flowsheets  -Bilateral mitt restraints applied to hands per wife's request because she said he will take everything off        Is this a stroke patient? no    Neuro:  Nebo Coma Scale  Best Eye Response: 4-->(E4) spontaneous  Best Motor Response: 4-->(M4) withdraws from pain  Best Verbal Response: 1-->(V1) none  Joseph Coma Scale Score: 9  Assessment Qualifiers: Patient not sedated/intubated  Pupil PERRLA: yes     24 hr Temp:  [97 °F (36.1 °C)-98.2 °F (36.8 °C)]     CV:   Rhythm: normal sinus rhythm  BP goals:   SBP < 180  MAP > 65    Resp:           Plan: N/A    GI/:     Diet/Nutrition Received: NPO  Last Bowel Movement: 07/14/25  Voiding Characteristics: external catheter    Intake/Output Summary (Last 24 hours) at 7/15/2025 0559  Last data filed at 7/15/2025 0302  Gross per 24 hour   Intake 1541.33 ml   Output 125 ml   Net 1416.33 ml     Unmeasured Output  Unmeasured Urine Occurrence: 0  Unmeasured Stool Occurrence: 0    Labs/Accuchecks:  Recent Labs   Lab 07/14/25  2319 07/15/25  0002   WBC 8.12  --    RBC 3.42*  --    HGB 9.0*  --    HCT 29.8* 28*   *  --       Recent Labs   Lab 07/14/25 2319   *   K 4.9   CO2 27      BUN 22*   CREATININE 0.7   ALKPHOS 79   ALT 63*   AST 55*   BILITOT 0.1    No results for input(s): "PROTIME", "INR", "APTT", "HEPANTIXA" in the last 168 hours. No results for input(s): "CPK", "CPKMB", "TROPONINI", "MB" in the last 168 hours.    Electrolytes: N/A - electrolytes WDL  Accuchecks: none    Gtts:      LDA/Wounds:    Morgan Risk " Assessment  Sensory Perception: 2-->very limited  Moisture: 3-->occasionally moist  Activity: 1-->bedfast  Mobility: 1-->completely immobile  Nutrition: 2-->probably inadequate  Friction and Shear: 1-->problem  Morgan Score: 10  Is your morgan score 12 or less? yes enrolled in the [facility] program, morgan mobility score is 2 or less, they are on a immerse bed, if their moisture score is 2 or less, they do not have a sacral mepilex and instead have zinc barrier cream, and heel boots on          Restraints:   Restraint Order  Length of Order: Order good for next 24 hours or when removed.  Date that the current order will : 25  Time that the current order will : 050  Order Upon Application: Yes    [facility]         Problem: Adult Inpatient Plan of Care  Goal: Plan of Care Review  Outcome: Not Progressing  Goal: Patient-Specific Goal (Individualized)  Outcome: Not Progressing  Goal: Absence of Hospital-Acquired Illness or Injury  Outcome: Not Progressing  Goal: Optimal Comfort and Wellbeing  Outcome: Not Progressing  Goal: Readiness for Transition of Care  Outcome: Not Progressing     Problem: Skin Injury Risk Increased  Goal: Skin Health and Integrity  Outcome: Not Progressing     Problem: Sepsis/Septic Shock  Goal: Optimal Coping  Outcome: Not Progressing  Goal: Absence of Bleeding  Outcome: Not Progressing  Goal: Blood Glucose Level Within Targeted Range  Outcome: Not Progressing  Goal: Absence of Infection Signs and Symptoms  Outcome: Not Progressing  Goal: Optimal Nutrition Intake  Outcome: Not Progressing     Problem: Pneumonia  Goal: Fluid Balance  Outcome: Not Progressing  Goal: Resolution of Infection Signs and Symptoms  Outcome: Not Progressing  Goal: Effective Oxygenation and Ventilation  Outcome: Not Progressing     Problem: Wound  Goal: Optimal Coping  Outcome: Not Progressing  Goal: Optimal Functional Ability  Outcome: Not Progressing  Goal: Absence of Infection Signs and Symptoms  Outcome: Not  Progressing  Goal: Improved Oral Intake  Outcome: Not Progressing  Goal: Optimal Pain Control and Function  Outcome: Not Progressing  Goal: Skin Health and Integrity  Outcome: Not Progressing  Goal: Optimal Wound Healing  Outcome: Not Progressing     Problem: Infection  Goal: Absence of Infection Signs and Symptoms  Outcome: Not Progressing     Problem: Restraint, Nonviolent  Goal: Absence of Harm or Injury  Outcome: Not Progressing

## 2025-07-15 NOTE — EICU
"Virtual ICU Admission    Admit Date: 2025  LOS: 16  Code Status: Full Code   : 1980  Bed: 9076/9076 A:     Diagnosis: Septic shock    Patient  has a past medical history of Anxiety, Cognitive communication disorder, Degenerative motor system disease, Depression, DNR (do not resuscitate), Insomnia secondary to depression with anxiety, Multiple drug resistant organism (MDRO) culture positive, Neurogenic bladder, Seizure, Spastic quadriplegia, and Spinocerebellar atrophy.    Last VS: /71   Pulse 98   Temp 97.7 °F (36.5 °C) (Oral)   Resp 20   Ht 6' 3" (1.905 m)   Wt 59.9 kg (132 lb 0.9 oz)   SpO2 98%   BMI 16.51 kg/m²       VICU Review    VICU nurse assessment :  Pala completed, LDA documentation reconciliation completed, Skin care/wounds LDA reconciliation, and VTE prophylaxis review    Skin breakdown present on admission, Skin breakdown visualized today, LDA for skin injury placed in the chart, Picture linked to skin LDA, Picture recorded in media, Skin assessment entered into the flowsheet, and Wound care consult placed    Nursing orders placed : IP HUNTER Peripheral IV Access          "

## 2025-07-15 NOTE — ASSESSMENT & PLAN NOTE
Wound vac removed on 7/14 prior patient was going to be discharged.  Wound care following. Plan to reapply sacral wound vac with dressing changes on [facility]  General surgery consulted previously, no indication for inpatient debridement recommended; consider reconsult if needed

## 2025-07-15 NOTE — ASSESSMENT & PLAN NOTE
- Mother at bedside reports patient is verbal and can feed himself, uses wheelchair at home, she assists with ADLs   - Management per NCC

## 2025-07-15 NOTE — PROCEDURES
24 hr. Video EEG Monitoring    Date/Time: 7/15/2025 7:49 AM    Performed by: Alex Robins MD  Authorized by: Christo Chan III, MD      ICU EEG/VIDEO MONITORING REPORT    DATE OF SERVICE: 7/15/25  EEG NUMBER:   REQUESTED BY: Rocio  LOCATION OF SERVICE: [facility]     METHODOLOGY   Electroencephalographic (EEG) recording is with electrodes placed according to the International 10-20 placement system.  Thirty two (32) channels of digital signal are simultaneously recorded from the scalp and may include EKG, EMG, and/or eye monitors.   Recording band pass was 0.1 to 512 hz.  Digital video recording of the patient is simultaneously recorded with the EEG.  The nursing staff report clinical symptoms and may press an event button when the patient has symptoms of clinical interest to the treating physicians.  EEG and video recording is stored and archived in digital format.  The entire recording is visually reviewed and the times identified by computer analysis as being spikes or seizures are reviewed again.  Activation procedures which include photic stimulation, hyperventilation and instructing patients to perform simple task are done in selected patients.   Compresses spectral analysis (CSA) is also performed on the activity recorded from each individual channel.  This is displayed as a power display of frequencies from 0 to 30 Hz over time.   The CSA analysis is done and displayed continuously.  This is reviewed for asymmetries in power between homologous areas of the scalp and for presence of changes in power which canbe seen when seizures occur.  Sections of suspected abnormalities on the CSA is then compared with the original EEG recording.     [employer] software was also utilized in the review of this study.  This software suite analyzes the EEG recording in multiple domains.  Coherence and rhythmicity is computed to identify EEG sections which may contain organized seizures.  Each channel undergoes analysis  "to detect presence of spike and sharp waves which have special and morphological characteristic of epileptic activity.  The routine EEG recording is converted from spacial into frequency domain.  This is then displayed comparing homologous areas to identify areas of significant asymmetry.  Algorithm to identify non-cortically generated artifact is used to separate eye movement, EMG and other artifact from the EEG.      Recording Times  Start on 7/14/25 a 23:48  Stop on 7/15/25 at 07:00  A total of 7 minutes of EEG was recorded.    EEG FINDINGS  The record shows a poor organization at rest, with no discernible posterior dominant rhythm with poor reactivity. There is mild bilateral beta activity. There is continuous diffuse delta and theta range background slowing. There are frequent clusters of spike and wave discharges over the left hemos[corinne phase reversing over F3-C3.    State changes are not seen.     Provocative maneuvers including hyperventilation and photic stimulation were not performed.     EKG is not recording.    There is one push button or clinical event at 00:02 for left leg "jumping" reported by a family member (though not seen on video). No epileptiform discharges or electrographic seizures are seen during this time.    IMPRESSION:  Abnormal study due to moderate to severe  diffuse background slowing consistent with diffuse cerebral dysfunction and encephalopathy which may be on the basis of toxic, metabolic, or primary neuronal disorder. Focal interictal discharges are seen over the left hemisphere and are consistent with underlying focal cortical irritability and seizure potential.     7/15/2025    "

## 2025-07-15 NOTE — PROGRESS NOTES
[facility] Critical Care  Neurocritical Care  Progress Note    Admit Date: 6/29/2025  Service Date: 07/15/2025  Length of Stay: 16    Subjective:     Chief Complaint: Witnessed seizure-like activity    History of Present Illness: Mr. Moreno is a 44yoM with PMHx significant for ALS (onset 2006), neuromuscular dysfunction of the bladder, sacral ulcers, and spinal cerebellar ataxia who was admitted recently admitted (5/9/2025) for severe dehydration, electrolyte disturbances, and covid who presented to the ED with EMS and c/o AMS and hypoxia. Nonverbal at baseline but apparently is usually more alert; has been less responsive and interactive x1 day. Upon EMS arrival, was obtunded and sating 74%. They gave him solumedrol and duonebs en route which improved his oxygen status to >90%. Mother at bedside reports increased difficulty with swallowing and decreased PO intake.        While in the ED, labs and imaging significant for Na 149, K 5.4, Cl 111, CO2 21, glucose 149, BUN 38, creat 1.0, albumin 2.2, ALP 80, AST/ALT 34/23, anion gap 17, procal 8.42, BNP <10, WBC 7.11, H/H 12/40.5, plts 315, covid/flu negative, .6, VBG 7.349/47.1/44.5/23.9, istat LA 4.9->7.4->4.2 (7.4 likely error?). Chest xray with patchy opacities bilaterally, predominant within the bibasilar regions but also within the left lung apex and the left mid-lung; compatible with multifocal pneumonia. Started on BSA by the ED; vanc and cefepime. CCM consulted for sepsis. During first examination the patient was stable with MAPs >65 and Lactic acid down trended to 2.6. Around 6 am of 6/30 rap[place] was called hypotension with MAPs in the 50. Pt was started on levophed and was transferred to MICU 6/29 for septic shock secondary to multifocal pneumonia. Patient initially requiring pressors but since discontinued. Given dysphagia, IR placed a PEG tube 7/5 for nutrition and medication administration. Patient was stepped down to  on 7/7.    Patient was  awaiting discharge home tonight on 7/14 when a rapid response was called as the patient began seizing. Two witnessed tonic-clonic seizures were observed lasting for about 1-2 minutes, given 2mg Ativan. After these seizures, he never returned to baseline. Baseline was described as full movement in neck and all extremities, tracking with head & neck, mumbles per mom, and following commands. Initial assessment, patient was found staring, with no blink to threat or closure of eyes, midline gaze, and not moving extremities with painful stimuli. During seizure, it is reported that patient became hypertensive, hypoxic, and tachycardic, with strong flexor posturing bilaterally. EEG applied showing discharges over the left hemisphere, no history of seizures or brain mass/lesions/infarction. Patient loaded with 40mg/kg of Keppra. Given concern for mentation and need for closer neurological monitoring, patient was transferred to [facility] where he will be admitted.       Hospital Course: 07/15/2025: NAEON. Afebrile, BP 90/50s. MRI completed. EEG in place. Keppra and LR given. Bilateral mitt restraints. Hypotensive this morning, given 500 NS and 1L LR. Temp low 90s, bear hugger placed. UOP 1.6L. LP today       Interval History:  see hospital course    Objective:     Vitals:  Temp: (!) 94.3 °F (34.6 °C)  Pulse: 89  Rhythm: normal sinus rhythm  BP: (!) 100/55  MAP (mmHg): 72  Resp: 17  SpO2: 100 %    Temp  Min: 91.9 °F (33.3 °C)  Max: 97.7 °F (36.5 °C)  Pulse  Min: 69  Max: 102  BP  Min: 81/55  Max: 138/72  MAP (mmHg)  Min: 62  Max: 99  Resp  Min: 10  Max: 20  SpO2  Min: 95 %  Max: 100 %    07/14 0701 - 07/15 0700  In: 1541.3   Out: 625 [Urine:625]   Unmeasured Output  Unmeasured Urine Occurrence: 0  Unmeasured Stool Occurrence: 0        Physical Exam  Vitals and nursing note reviewed.   Constitutional:       General: He is not in acute distress.     Appearance: He is cachectic. He is ill-appearing. He is not diaphoretic.   HENT:       Head: Normocephalic and atraumatic.      Mouth/Throat:      Mouth: Mucous membranes are dry.      Pharynx: Oropharynx is clear.   Eyes:      Extraocular Movements: Extraocular movements intact.      Pupils: Pupils are equal, round, and reactive to light.   Cardiovascular:      Rate and Rhythm: Regular rhythm. Tachycardia present.      Pulses: Normal pulses.   Pulmonary:      Effort: Pulmonary effort is normal. No respiratory distress.   Chest:      Comments: Pectus excavatum  Abdominal:      General: Abdomen is flat. There is no distension.      Palpations: Abdomen is soft.      Tenderness: There is no abdominal tenderness.      Comments: PEG site c/d/i    Musculoskeletal:      Right lower leg: No edema.      Left lower leg: No edema.   Skin:     General: Skin is warm and dry.      Capillary Refill: Capillary refill takes less than 2 seconds.   Neurological:      Mental Status: He is alert.      Comments: [id number]  PERRL  Tracks  Minimally verbal at baseline, per mom he does mumble words sometimes  Intermittent spont movement  FCx0         Unable to test orientation, language, memory, judgment, insight, fund of knowledge, hearing, shoulder shrug, tongue protrusion, coordination, gait due to level of consciousness.       Medications:  Continuous     Scheduled  baclofen, 20 mg, TID  carboxymethylcellulose sodium, 1 drop, TID  dantrolene, 50 mg, TID  donepeziL, 5 mg, QHS  [START ON 7/16/2025] enoxparin, 40 mg, Daily  erythromycin, , QHS  lactated ringers, 500 mL, Once  lactulose, 10 g, BID  levETIRAcetam (Keppra) IV (PEDS and ADULTS), 500 mg, Q12H  mirtazapine, 15 mg, QHS  polyethylene glycol, 17 g, BID  senna-docusate, 1 tablet, BID    PRN  ammonium lactate, , BID PRN  barium sulfate, 450 mL, PRN  dextrose 50%, 12.5 g, PRN  dextrose 50%, 25 g, PRN  glucagon (human recombinant), 1 mg, PRN  glucose, 16 g, PRN  glucose, 24 g, PRN  magnesium oxide, 800 mg, PRN  magnesium oxide, 800 mg, PRN  naloxone, 0.02 mg,  PRN  ondansetron, 4 mg, Q6H PRN  oxyCODONE, 5 mg, Q6H PRN  potassium bicarbonate, 35 mEq, PRN  potassium bicarbonate, 50 mEq, PRN  potassium bicarbonate, 60 mEq, PRN  potassium, sodium phosphates, 2 packet, PRN  potassium, sodium phosphates, 2 packet, PRN  potassium, sodium phosphates, 2 packet, PRN  sodium chloride 0.9%, 10 mL, Q12H PRN      Today I personally reviewed pertinent medications, lines/drains/airways, imaging, cardiology results, laboratory results, microbiology results,    Diet  Diet NPO        Assessment/Plan:     Neuro  * Witnessed seizure-like activity  Patient was awaiting discharge home tonight on 7/14 when a rapid response was called as the patient began seizing. Two witnessed tonic-clonic seizures were observed lasting for about 1-2 minutes, given 2mg Ativan. After these seizures, he never returned to baseline. Baseline was described as full movement in neck and all extremities, tracking with head & neck, mumbles per mom, and following commands. Initial assessment, patient was found staring, with no blink to threat or closure of eyes, midline gaze, and not moving extremities with painful stimuli. During seizure, it is reported that patient became hypertensive, hypoxic, and tachycardic, with strong flexor posturing bilaterally. EEG applied showing discharges over the left hemisphere, no history of seizures or brain mass/lesions/infarction. Patient loaded with 40mg/kg of Keppra. Given concern for mentation and need for closer neurological monitoring, patient was transferred to [facility] where he will be admitted.   Admit to [facility]  Hourly neurochecks and vital signs  SBP <180, MAP >65  Brain MRI w/wo epilepsy protocol ordered for morning  cEEG - discharges in left hemisphere  Continue keppra 500mg IVP BID  Maintain seizure precautions  NPO - plan to resume tube feeds pending EEG  No AC/AP  SCDs & LVX for DVT ppx  PT/OT/SLP to continue treatment  Q6h bladder scans  LP today  Resend blood cultures  today  Normal Saline IVF @75ml/hr for 24 hours    ALS (amyotrophic lateral sclerosis)  UMN predominance with diffuse limb spasticity. Slowly progressive. Fully-reliant on mother, others for self care and feeding and cleaning and toileting. Respiratory function is poor per FVC .     Continue home donepezil for neuroprotection  Continue home dantrolene and baclofen for spasticity  Turn q2h with aspiration precautions    Orthopedic  Decubitus ulcer of sacral region, stage 4  Wound vac removed on 7/14 prior patient was going to be discharged.  Wound care following. Plan to reapply sacral wound vac with dressing changes on [facility]  General surgery consulted previously, no indication for inpatient debridement recommended; consider reconsult if needed            The patient is being Prophylaxed for:  Venous Thromboembolism with: Mechanical or Chemical  Stress Ulcer with: None  Ventilator Pneumonia with: not applicable    Activity Orders            Diet NPO: NPO starting at 07/15 0157    Elevate HOB Elevate HOB 30-45 degrees during feeding unless contraindicated starting at 06/30 1357    Progressive Mobility Protocol (mobilize patient to their highest level of functioning at least twice daily) starting at 06/30 0800    Turn patient every 2 hours starting at 06/30 0000          Full Code    Karthikeyan Wynne MD  Neurocritical Care  [facility]

## 2025-07-15 NOTE — PLAN OF CARE
"[facility] Care Plan  POC reviewed with Lisa Moreno and family at 1400. Patient and Family verbalized understanding. Questions and concerns addressed. No acute events today. Pt progressing toward goals. Will continue to monitor. See below and flowsheets for full assessment and VS info.   EEG,   Tried LP  #L bolus plus 1 huma bump today          Is this a stroke patient? no    Neuro:  Joseph Coma Scale  Best Eye Response: 4-->(E4) spontaneous  Best Motor Response: 6-->(M6) obeys commands  Best Verbal Response: 1-->(V1) none  Joseph Coma Scale Score: 11  Assessment Qualifiers: Patient not sedated/intubated  Pupil PERRLA: yes     24 hr Temp:  [91.9 °F (33.3 °C)-98.4 °F (36.9 °C)]     CV:   Rhythm: normal sinus rhythm  BP goals:   SBP < 180  MAP > 65    Resp:           Plan: N/A    GI/:     Diet/Nutrition Received: NPO  Last Bowel Movement: 07/14/25  Voiding Characteristics: external catheter    Intake/Output Summary (Last 24 hours) at 7/15/2025 1821  Last data filed at 7/15/2025 1801  Gross per 24 hour   Intake 2564.2 ml   Output 2420 ml   Net 144.2 ml     Unmeasured Output  Unmeasured Urine Occurrence: 0  Unmeasured Stool Occurrence: 0    Labs/Accuchecks:  Recent Labs   Lab 07/14/25  2319 07/15/25  0002   WBC 8.12  --    RBC 3.42*  --    HGB 9.0*  --    HCT 29.8* 28*   *  --       Recent Labs   Lab 07/14/25 2319   *   K 4.9   CO2 27      BUN 22*   CREATININE 0.7   ALKPHOS 79   ALT 63*   AST 55*   BILITOT 0.1    No results for input(s): "PROTIME", "INR", "APTT", "HEPANTIXA" in the last 168 hours. No results for input(s): "CPK", "CPKMB", "TROPONINI", "MB" in the last 168 hours.    Electrolytes: Electrolytes replaced  Accuchecks: none    Gtts:      LDA/Wounds:    Morgan Risk Assessment  Sensory Perception: 2-->very limited  Moisture: 3-->occasionally moist  Activity: 1-->bedfast  Mobility: 1-->completely immobile  Nutrition: 2-->probably inadequate  Friction and Shear: 1-->problem  Morgan Score: " 10    Is your tor score 12 or less? no            Restraints:   Restraint Order  Length of Order: Order good for next 24 hours or when removed.  Date that the current order will : 25  Time that the current order will : 133  Order Upon Application: Yes    WC

## 2025-07-15 NOTE — HPI
44 year old male with a PMHx of ALS, neuromuscular dysfunction of the bladder, sacral ulcers, and spinal cerebellar ataxia, recent admit for severe dehydration and electrolyte disturbances who presented to the ER on 6/29 for altered mental status and hypoxia, initially admitted to  for sepsis 2/2 pneumonia, acute hypoxic respiratory failure, and hypernatremia. HPI per chart review due to patient's altered mental status. Per chart, rapid response called due to hypotension and patient admitted to MICU for further management of septic shock. Patient stepped down to  7/7, planning for discharge to SNF 7/14 when rapid response called for generalized convulsion x2. Patient received IV Lorazepam 4 mg x1. Patient did not return to baseline and subsequently admitted to [facility] for higher level of care and further management. Patient received IV Levetiracetam load of 2396 mg followed by 500 mg BID. CAP EEG placed; Epilepsy following for assistance in management. Patient noted to be hypothermic and hypotensive this morning.

## 2025-07-15 NOTE — CARE UPDATE
RRN IV START       IV started during emergency event. 20g placed to R FA.   Please call Rapid Response RN, Kennedy Chauhan RN with any questions or concerns at [phone].

## 2025-07-15 NOTE — PLAN OF CARE
Recommendations  --Continue Bolus TF recommendation: 4 cans/day of Novasource Renal to provide 948 mL total volume, 1900 kcal, 172 g CHO, 88 g protein, 0 g fiber, 672 mL free water, 95% DRI         --155 mL flush before and after each can to provide additional 1240 mL free water (Total 1912 mL)       --Order Guzman BID as tube feed modifier to promote wound healing      --Continue Soft & Bite Sized (IDDSI Level 6) Honey Thick (Moderately Thick) diet as tolerated and clinically indicated   --Encourage good intakes   --Nursing: please continue to document % meal eaten, tube feed formula and rate/volume on flowsheets  --RD to monitor weight, PO intake     Interventions: General healthful diet, Texture modified diet, Enteral nutrition management, Medical food supplement therapy, Vitamin and mineral supplement therapy  Goals: Meet % EEN/EPN by next RD follow-up  Nutrition Goal Status #2: progressing to  Communication of RD Recs:  (POC)

## 2025-07-15 NOTE — PROCEDURES
Indications, risks, and benefits explained to patient's surrogate decision maker (Mother) and informed consent obtained. A time-out was completed verifying correct patient, procedure, and site. All pre-operative labs and imaging were reviewed. Patient positioned in the lateral decubitus position, prepped and draped in usual sterile fashion. L4/5 space located using bilateral iliac crests as landmarks. 1% Xylocaine with epinephrine [id number] was used to anesthetize the area. A 22G spinal needle was introduced into the arachnoid space. The stylet was removed without appropriate fluid return. The stylet was then replaced and moved to the L3/4 space without appropriate fluid return. This was attempted again at the L2/3 space without success. Blood loss was minimal. A bandaid was placed over the insertion site. Patient tolerated the procedure well and no complications were observed.     Total fluid removed: 0 mL    Karthikeyan Wynne MD, PhD, MONA  Neurosurgery Resident, PGY-2

## 2025-07-15 NOTE — SUBJECTIVE & OBJECTIVE
Interval History:  see hospital course    Objective:     Vitals:  Temp: (!) 94.3 °F (34.6 °C)  Pulse: 89  Rhythm: normal sinus rhythm  BP: (!) 100/55  MAP (mmHg): 72  Resp: 17  SpO2: 100 %    Temp  Min: 91.9 °F (33.3 °C)  Max: 97.7 °F (36.5 °C)  Pulse  Min: 69  Max: 102  BP  Min: 81/55  Max: 138/72  MAP (mmHg)  Min: 62  Max: 99  Resp  Min: 10  Max: 20  SpO2  Min: 95 %  Max: 100 %    07/14 0701 - 07/15 0700  In: 1541.3   Out: 625 [Urine:625]   Unmeasured Output  Unmeasured Urine Occurrence: 0  Unmeasured Stool Occurrence: 0        Physical Exam  Vitals and nursing note reviewed.   Constitutional:       General: He is not in acute distress.     Appearance: He is cachectic. He is ill-appearing. He is not diaphoretic.   HENT:      Head: Normocephalic and atraumatic.      Mouth/Throat:      Mouth: Mucous membranes are dry.      Pharynx: Oropharynx is clear.   Eyes:      Extraocular Movements: Extraocular movements intact.      Pupils: Pupils are equal, round, and reactive to light.   Cardiovascular:      Rate and Rhythm: Regular rhythm. Tachycardia present.      Pulses: Normal pulses.   Pulmonary:      Effort: Pulmonary effort is normal. No respiratory distress.   Chest:      Comments: Pectus excavatum  Abdominal:      General: Abdomen is flat. There is no distension.      Palpations: Abdomen is soft.      Tenderness: There is no abdominal tenderness.      Comments: PEG site c/d/i    Musculoskeletal:      Right lower leg: No edema.      Left lower leg: No edema.   Skin:     General: Skin is warm and dry.      Capillary Refill: Capillary refill takes less than 2 seconds.   Neurological:      Mental Status: He is alert.      Comments: [id number]  PERRL  Tracks  Minimally verbal at baseline, per mom he does mumble words sometimes  Intermittent spont movement  FCx0         Unable to test orientation, language, memory, judgment, insight, fund of knowledge, hearing, shoulder shrug, tongue protrusion, coordination, gait due to  level of consciousness.       Medications:  Continuous     Scheduled  baclofen, 20 mg, TID  carboxymethylcellulose sodium, 1 drop, TID  dantrolene, 50 mg, TID  donepeziL, 5 mg, QHS  [START ON 7/16/2025] enoxparin, 40 mg, Daily  erythromycin, , QHS  lactated ringers, 500 mL, Once  lactulose, 10 g, BID  levETIRAcetam (Keppra) IV (PEDS and ADULTS), 500 mg, Q12H  mirtazapine, 15 mg, QHS  polyethylene glycol, 17 g, BID  senna-docusate, 1 tablet, BID    PRN  ammonium lactate, , BID PRN  barium sulfate, 450 mL, PRN  dextrose 50%, 12.5 g, PRN  dextrose 50%, 25 g, PRN  glucagon (human recombinant), 1 mg, PRN  glucose, 16 g, PRN  glucose, 24 g, PRN  magnesium oxide, 800 mg, PRN  magnesium oxide, 800 mg, PRN  naloxone, 0.02 mg, PRN  ondansetron, 4 mg, Q6H PRN  oxyCODONE, 5 mg, Q6H PRN  potassium bicarbonate, 35 mEq, PRN  potassium bicarbonate, 50 mEq, PRN  potassium bicarbonate, 60 mEq, PRN  potassium, sodium phosphates, 2 packet, PRN  potassium, sodium phosphates, 2 packet, PRN  potassium, sodium phosphates, 2 packet, PRN  sodium chloride 0.9%, 10 mL, Q12H PRN      Today I personally reviewed pertinent medications, lines/drains/airways, imaging, cardiology results, laboratory results, microbiology results,    Diet  Diet NPO

## 2025-07-15 NOTE — ASSESSMENT & PLAN NOTE
2/2 ?sepsis- hypotensive and hypothermic today  44M PMHx of ALS, neuromuscular dysfunction of the bladder, sacral ulcers, and spinal cerebellar ataxia, recent admit for severe dehydration and electrolyte disturbances who presented to the ER on 6/29, initially admitted to  for sepsis 2/2 pneumonia, acute hypoxic respiratory failure, and hypernatremia, upgraded to MICU for further management of septic shock. Stepped down to  7/7, planning for discharge to SNF 7/14 when rapid response called for GTC x2. Patient received IV Lorazepam 4 mg x1. Patient did not return to baseline and subsequently admitted to [facility] for higher level of care and further management. Patient received IV Levetiracetam load of 2396 mg followed by 500 mg BID. CAP EEG placed; Epilepsy following for assistance in management.    Recommendations:  - Rehook to continuous vEEG monitoring  - Agree with Levetiracetam 500 mg BID  - MRI Epilepsy Protocol today severely limited by motion, no compelling evidence of acute pathology  - Avoid agents that lower seizure threshold  - Management of infectious/metabolic abnormalities per NCC  - Seizure precautions    Discussed plan of care with NCC team and mother at bedside. Will follow, please call with questions.

## 2025-07-15 NOTE — EICU
Virtual ICU Quality Rounds    Admit Date: 6/29/2025  Hospital Day: 16    ICU Day: 6h    24H Vital Sign Range:  Temp:  [97 °F (36.1 °C)-98.2 °F (36.8 °C)]   Pulse:  []   Resp:  [13-20]   BP: ()/(63-84)   SpO2:  [95 %-100 %]     VICU Surveillance Screening                    LDA reconciliation : Yes

## 2025-07-15 NOTE — PLAN OF CARE
07/15/25 1617   Rounds   Attendance Provider;   Discharge Plan A Other   Why the patient remains in the hospital Requires continued medical care   Transition of Care Barriers None     Aura Parker MSW, LCSW  [facility]  Case Management Dept.

## 2025-07-15 NOTE — HPI
Mr. Moreno is a 44yoM with PMHx significant for ALS (onset 2006), neuromuscular dysfunction of the bladder, sacral ulcers, and spinal cerebellar ataxia who was admitted recently admitted (5/9/2025) for severe dehydration, electrolyte disturbances, and covid who presented to the ED with EMS and c/o AMS and hypoxia. Nonverbal at baseline but apparently is usually more alert; has been less responsive and interactive x1 day. Upon EMS arrival, was obtunded and sating 74%. They gave him solumedrol and duonebs en route which improved his oxygen status to >90%. Mother at bedside reports increased difficulty with swallowing and decreased PO intake.        While in the ED, labs and imaging significant for Na 149, K 5.4, Cl 111, CO2 21, glucose 149, BUN 38, creat 1.0, albumin 2.2, ALP 80, AST/ALT 34/23, anion gap 17, procal 8.42, BNP <10, WBC 7.11, H/H 12/40.5, plts 315, covid/flu negative, .6, VBG 7.349/47.1/44.5/23.9, istat LA 4.9->7.4->4.2 (7.4 likely error?). Chest xray with patchy opacities bilaterally, predominant within the bibasilar regions but also within the left lung apex and the left mid-lung; compatible with multifocal pneumonia. Started on BSA by the ED; vanc and cefepime. CCM consulted for sepsis. During first examination the patient was stable with MAPs >65 and Lactic acid down trended to 2.6. Around 6 am of 6/30 rapids was called hypotension with MAPs in the 50. Pt was started on levophed and was transferred to MICU 6/29 for septic shock secondary to multifocal pneumonia. Patient initially requiring pressors but since discontinued. Given dysphagia, IR placed a PEG tube 7/5 for nutrition and medication administration. Patient was stepped down to  on 7/7.    Patient was awaiting discharge home tonight on 7/14 when a rapid response was called as the patient began seizing. Two witnessed tonic-clonic seizures were observed lasting for about 1-2 minutes, given 2mg Ativan. After these seizures, he never  returned to baseline. Baseline was described as full movement in neck and all extremities, tracking with head & neck, mumbles per mom, and following commands. Initial assessment, patient was found staring, with no blink to threat or closure of eyes, midline gaze, and not moving extremities with painful stimuli. During seizure, it is reported that patient became hypertensive, hypoxic, and tachycardic, with strong flexor posturing bilaterally. EEG applied showing discharges over the left hemisphere, no history of seizures or brain mass/lesions/infarction. Patient loaded with 40mg/kg of Keppra. Given concern for mentation and need for closer neurological monitoring, patient was transferred to [facility] where he will be admitted.

## 2025-07-15 NOTE — ASSESSMENT & PLAN NOTE
UMN predominance with diffuse limb spasticity. Slowly progressive. Fully-reliant on mother, others for self care and feeding and cleaning and toileting. Respiratory function is poor per FVC .     Continue home donepezil for neuroprotection  Continue home dantrolene and baclofen for spacticity  Turn q2h with aspiration precautions

## 2025-07-15 NOTE — ASSESSMENT & PLAN NOTE
Patient was awaiting discharge home tonight on 7/14 when a rapid response was called as the patient began seizing. Two witnessed tonic-clonic seizures were observed lasting for about 1-2 minutes, given 2mg Ativan. After these seizures, he never returned to baseline. Baseline was described as full movement in neck and all extremities, tracking with head & neck, mumbles per mom, and following commands. Initial assessment, patient was found staring, with no blink to threat or closure of eyes, midline gaze, and not moving extremities with painful stimuli. During seizure, it is reported that patient became hypertensive, hypoxic, and tachycardic, with strong flexor posturing bilaterally. EEG applied showing discharges over the left hemisphere, no history of seizures or brain mass/lesions/infarction. Patient loaded with 40mg/kg of Keppra. Given concern for mentation and need for closer neurological monitoring, patient was transferred to [facility] where he will be admitted.   Admit to [facility]  Hourly neurochecks and vital signs  SBP <180, MAP >65  Brain MRI w/wo epilepsy protocol ordered for morning  cEEG - prelim showing discharges in left hemisphere, awaiting final read  Continue keppra 500mg IVP BID  Maintain seizure precautions  NPO - plan to resume once seizures well-controlled  No AC/AP  SCDs & LVX for DVT ppx  PT/OT/SLP to continue treatment

## 2025-07-15 NOTE — NURSING
Patient started having seizures before bolus via peg feeds, even though the mother to patient has been giving the patient Pedialyte orally.  Doctor notified and rapid response called. Ativan 2mg given, RL bolus given given, ABGs done. Seizure activities monitored. Patient transferred to [facility] and report given at bedside.

## 2025-07-15 NOTE — ASSESSMENT & PLAN NOTE
L Heel with black eschar. R Heel pressure ulcer with scant serosanguineous drainage. Photos uploaded into chart.   Wound care following

## 2025-07-15 NOTE — ASSESSMENT & PLAN NOTE
Patient was awaiting discharge home tonight on 7/14 when a rapid response was called as the patient began seizing. Two witnessed tonic-clonic seizures were observed lasting for about 1-2 minutes, given 2mg Ativan. After these seizures, he never returned to baseline. Baseline was described as full movement in neck and all extremities, tracking with head & neck, mumbles per mom, and following commands. Initial assessment, patient was found staring, with no blink to threat or closure of eyes, midline gaze, and not moving extremities with painful stimuli. During seizure, it is reported that patient became hypertensive, hypoxic, and tachycardic, with strong flexor posturing bilaterally. EEG applied showing discharges over the left hemisphere, no history of seizures or brain mass/lesions/infarction. Patient loaded with 40mg/kg of Keppra. Given concern for mentation and need for closer neurological monitoring, patient was transferred to [facility] where he will be admitted.   Admit to [facility]  Hourly neurochecks and vital signs  SBP <180, MAP >65  Brain MRI w/wo epilepsy protocol ordered for morning  cEEG - discharges in left hemisphere  Continue keppra 500mg IVP BID  Maintain seizure precautions  NPO - plan to resume tube feeds pending EEG  No AC/AP  SCDs & LVX for DVT ppx  PT/OT/SLP to continue treatment  Q6h bladder scans  LP today  Resend blood cultures today  Normal Saline IVF @75ml/hr for 24 hours

## 2025-07-15 NOTE — HOSPITAL COURSE
"CAP: moderate to severe slowing, focal left interictals, no discrete seizures; PB @0002 for LLE "jumping" with no EEG correlate  7/15>7/16 EEG: no epileptiform activity, no seizures    See EEG reports for details.  "

## 2025-07-15 NOTE — SUBJECTIVE & OBJECTIVE
Past Medical History:   Diagnosis Date    Anxiety     Cognitive communication disorder 05/10/2022    Nonverbal at baseline but alert    Degenerative motor system disease 2006    Dr. Wise in movement disorder clinic on 2/23/2015.    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    Multiple drug resistant organism (MDRO) culture positive     E.Coli ESBL PNA    Neurogenic bladder 01/16/2015    Seizure 02/22/2015    Spastic quadriplegia     Spinocerebellar atrophy        Past Surgical History:   Procedure Laterality Date    BACLOFEN PUMP IMPLANTATION      COLONOSCOPY N/A 7/23/2020    Procedure: COLONOSCOPY;  Surgeon: Ziyad Fitch MD;  Location: [facility];  Service: Endoscopy;  Laterality: N/A;    ESOPHAGOGASTRODUODENOSCOPY N/A 7/23/2020    Procedure: EGD (ESOPHAGOGASTRODUODENOSCOPY);  Surgeon: Ziyad Fitch MD;  Location: [facility];  Service: Endoscopy;  Laterality: N/A;    FLUOROSCOPIC URODYNAMIC STUDY N/A 11/27/2018    Procedure: URODYNAMIC STUDY, FLUOROSCOPIC;  Surgeon: Tanja Okeefe MD;  Location: [facility] OR [address] [place] [address];  Service: Urology;  Laterality: N/A;  1 hour    REATTACHMENT OF MUSCLE Bilateral 2/18/2022    Procedure: PTOSIS REPAIR OF BOTH EYES;  Surgeon: Krupa Rios MD;  Location: [facility] OR [address] [place] [address];  Service: Ophthalmology;  Laterality: Bilateral;    UPPER GASTROINTESTINAL ENDOSCOPY         Review of patient's allergies indicates:   Allergen Reactions    Penicillins      Hives and Itching  Tolerated zosyn- 7/1/2025    Allergen ext-venom-honey bee        No current facility-administered medications on file prior to encounter.     No current outpatient medications on file prior to encounter.     Continuous Infusions:    Family History       Problem Relation (Age of Onset)    Cancer Mother    Depression Mother    Hypertension Mother    Multiple sclerosis Other    No Known Problems Brother, Son    Thyroid cancer Mother           Tobacco Use    Smoking status: Never    Smokeless tobacco: Never   Substance and Sexual Activity    Alcohol use: Not Currently     Comment: social drinker, at times    Drug use: Not Currently    Sexual activity: Never     Review of Systems  Objective:     Vital Signs (Most Recent):  Temp: (!) 93.4 °F (34.1 °C) (07/15/25 1301)  Pulse: 83 (07/15/25 1301)  Resp: 12 (07/15/25 1301)  BP: (!) 84/53 (07/15/25 1301)  SpO2: 98 % (07/15/25 1301) Vital Signs (24h Range):  Temp:  [91.9 °F (33.3 °C)-97.7 °F (36.5 °C)] 93.4 °F (34.1 °C)  Pulse:  [] 83  Resp:  [10-20] 12  SpO2:  [95 %-100 %] 98 %  BP: ()/(51-84) 84/53     Weight: 59.9 kg (132 lb 0.9 oz)  Body mass index is 16.51 kg/m².     Physical Exam  Constitutional:       General: He is not in acute distress.     Appearance: He is ill-appearing. He is not diaphoretic.   HENT:      Head: Normocephalic and atraumatic.   Eyes:      General: No scleral icterus.        Right eye: No discharge.         Left eye: No discharge.      Conjunctiva/sclera: Conjunctivae normal.      Pupils: Pupils are equal, round, and reactive to light.   Cardiovascular:      Rate and Rhythm: Normal rate.   Pulmonary:      Effort: Pulmonary effort is normal. No respiratory distress.   Musculoskeletal:         General: No deformity or signs of injury.   Skin:     General: Skin is dry.      Coloration: Skin is not jaundiced.            NEUROLOGICAL EXAMINATION:     CRANIAL NERVES     CN III, IV, VI   Pupils are equal, round, and reactive to light.       Some arousal to stimuli  MICKY OU   Corneal intact OU   + spontaneous eye opening   Face symmetric   Tongue midline   Some spontaneous movements  Withdraws to noxious stimuli in BLE  Does not follow commands    Exam findings to suggest seizures:  Myoclonus - no   eye twitching - no   Nystagmus - no   gaze deviation - no   waxy rigidity - no        Significant Labs: All pertinent lab results from the past 24 hours have been  reviewed.    Significant Studies: I have reviewed all pertinent imaging results/findings within the past 24 hours.

## 2025-07-15 NOTE — PROGRESS NOTES
[facility] Neuro Critical Care  Adult Nutrition  Progress Note    SUMMARY       Recommendations  --Continue Bolus TF recommendation: 4 cans/day of Novasource Renal to provide 948 mL total volume, 1900 kcal, 172 g CHO, 88 g protein, 0 g fiber, 672 mL free water, 95% DRI         --155 mL flush before and after each can to provide additional 1240 mL free water (Total 1912 mL)       --Order Guzman BID as tube feed modifier to promote wound healing      --Continue Soft & Bite Sized (IDDSI Level 6) Honey Thick (Moderately Thick) diet as tolerated and clinically indicated   --Encourage good intakes   --Nursing: please continue to document % meal eaten, tube feed formula and rate/volume on flowsheets  --RD to monitor weight, PO intake     Interventions: General healthful diet, Texture modified diet, Enteral nutrition management, Medical food supplement therapy, Vitamin and mineral supplement therapy  Goals: Meet % EEN/EPN by next RD follow-up  Nutrition Goal Status #2: progressing to  Communication of RD Recs:  (POC)    Nutrition Discharge Planning    Nutrition Discharge Planning: General healthy diet, Diet texture per SLP, Enteral nutrition (comments), Oral supplement regimen (comments)  Oral supplement regimen (comments): guzman BID  Enteral nutrition (comments): via PEG    Assessment and Plan    Endocrine  Severe protein-calorie malnutrition  Malnutrition Type:  Context: chronic illness  Level: severe    Related to (etiology):   Metabolic demand of disease and treatment    Signs and Symptoms (as evidenced by):   Muscle/fat wasting     Malnutrition Characteristic Summary:  Subcutaneous Fat (Malnutrition): severe depletion  Muscle Mass (Malnutrition): severe depletion      Interventions/Recommendations (treatment strategy):  Collaboration of nutrition care with other providers    Nutrition Diagnosis Status:   Continues         Malnutrition Assessment  Malnutrition Context: chronic illness  Malnutrition Level:  severe  Skin (Micronutrient): none  Nails (Micronutrient): none  Hair/Scalp (Micronutrient): none  Eyes (Micronutrient): none  Extraoral (Micronutrient): none  Gums (Micronutrient): none  Lips/Mucous Membranes (Micronutrient): none  Teeth (Micronutrient): none  Tongue (Micronutrient): none  Neck/Chest (Micronutrient): bony prominence  Musculoskeletal/Lower Extremities: none   Micronutrient Evaluation Summary: suspected deficiency   Subcutaneous Fat (Malnutrition): severe depletion  Muscle Mass (Malnutrition): severe depletion   Orbital Region (Subcutaneous Fat Loss): severe depletion  Upper Arm Region (Subcutaneous Fat Loss): severe depletion   Taoist Region (Muscle Loss): severe depletion  Clavicle Bone Region (Muscle Loss): severe depletion  Clavicle and Acromion Bone Region (Muscle Loss): severe depletion  Dorsal Hand (Muscle Loss): severe depletion  Patellar Region (Muscle Loss): severe depletion  Anterior Thigh Region (Muscle Loss): severe depletion  Posterior Calf Region (Muscle Loss): severe depletion                 Reason for Assessment    Reason For Assessment: RD follow-up  Diagnosis:  (Pneumonia due to E. coli)  General Information Comments: 44yoM with PMHx significant for ALS (onset 2006), neuromuscular dysfunction of the bladder, sacral ulcers, and spinal cerebellar ataxia who was admitted recently admitted (5/9/2025) for severe dehydration, electrolyte disturbances, and covid who presented to the ED with EMS and c/o AMS and hypoxia. RD follow-up. Pt currently NPO. Current tube feed regimen meeting EEN/EPN via PEG. Multiple wounds noted - stage 1 pressure injury left posterior buttocks, stage 2 pressure injury right posterior buttocks, left posterior hip, rectum, pressure injury left ankle, skin tear left anterior foot, unstageable pressure injury right posterior ankle, stage 1 pressure injury right lateral malleolus, unstageable pressure injury right lower leg, and stage 2 left heel, stage 4 pressure  "injury sacral spine.     Nutrition Related Social Determinants of Health: SDOH: Adequate food in home environment     Food Insecurity: No Food Insecurity (7/13/2025)    Hunger Vital Sign     Worried About Running Out of Food in the Last Year: Never true     Ran Out of Food in the Last Year: Never true       Nutrition/Diet History    Spiritual, Cultural Beliefs, Congregation Practices, Values that Affect Care: no  Food Allergies: NKFA  Factors Affecting Nutritional Intake: None identified at this time  Nutrition-related SDOH: None Identified    Anthropometrics    Height: 6' 3" (190.5 cm)  Height (inches): 75 in  Height Method: Measured  Weight: 59.9 kg (132 lb 0.9 oz)  Weight (lb): 132.06 lb  Weight Method: Bed Scale  Ideal Body Weight (IBW), Male: 196 lb  % Ideal Body Weight, Male (lb): 67.38 %  BMI (Calculated): 16.5  BMI Grade: less than 18.5 - underweight       Lab/Procedures/Meds    Pertinent Labs Reviewed: reviewed - Na 133, BUN 22, glucose 123, P 2.6, AST 55, ALT 63    Pertinent Medications Reviewed: reviewed - enoxaparin, bowel reg, mirtazapine    Estimated/Assessed Needs    Weight Used For Calorie Calculations: 59.9 kg (132 lb 0.9 oz)  Energy Calorie Requirements (kcal): 8865-4228 kcal/day (30-35 kcal/kg)  Energy Need Method: Kcal/kg  Protein Requirements:  g/day (1.2-2.0 g/kg)  Weight Used For Protein Calculations: 59.9 kg (132 lb 0.9 oz)     Estimated Fluid Requirement Method: RDA Method  RDA Method (mL): 1797         Nutrition Prescription Ordered    Current Diet Order: NPO  Current Nutrition Support Formula Ordered: Novasource Renal  Current Nutrition Support Frequency Ordered: 4 cans/day    Evaluation of Received Nutrient/Fluid Intake    Enteral Calories (kcal): 1900  Enteral Protein (gm): 88  Enteral (Free Water) Fluid (mL): 672  % Kcal Needs: 100  % Protein Needs: 100  Energy Calories Required: meeting needs  Protein Required: meeting needs  Fluid Required:  (Per MD)  Comments: LBM 7/14  Tolerance: " tolerating  % Intake of Estimated Energy Needs: 75 - 100 %  % Meal Intake: NPO    PES Statement  Increased nutrient needs related to Wound healing (protein/calorie) as evidenced by Wounds (Multiple pressure injuries)  Status: Continues    Nutrition Risk    Level of Risk/Frequency of Follow-up: high (2/week)     Monitor and Evaluation    Monitor and Evaluation: Energy intake, Food and beverage intake, Carbohydrate intake, Diet order, Protein intake, Enteral and parenteral nutrition administration, Weight, Electrolyte and renal panel, Gastrointestinal profile, Inflammatory profile, Lipid profile, Glucose/endocrine profile, Nutrition focused physical findings, Skin (NFPE performed on 7/1)     Nutrition Follow-Up    RD Follow-up?: Yes

## 2025-07-15 NOTE — ASSESSMENT & PLAN NOTE
Admitted to MICU for septic shock 2/2 ESBL E. Coli PNA  Upon EMS arrival, the patient was obtunded and hypoxic with an oxygen saturation of 74%.  Weaned off Levophed  On Ceftriaxone through 7/12 per ID recs, now completed  Satting well on room air    This has since resolved

## 2025-07-15 NOTE — ASSESSMENT & PLAN NOTE
Wound vac removed on 7/14 prior patient was going to be discharged.  Consult wound care to continue providing attention to sacral wound while still admitted  General surgery consulted previously, no indication for inpatient debridement recommended; consider reconsult if needed

## 2025-07-15 NOTE — PROGRESS NOTES
[facility] - Neuro Critical Care  Wound Care    Patient Name:  Lisa Moreno   MRN:  [id number]  Date: 7/15/2025  Diagnosis: Witnessed seizure-like activity    History:     Past Medical History:   Diagnosis Date    Anxiety     Cognitive communication disorder 05/10/2022    Nonverbal at baseline but alert    Degenerative motor system disease 2006    Dr. Wise in movement disorder clinic on 2/23/2015.    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    Multiple drug resistant organism (MDRO) culture positive     E.Coli ESBL PNA    Neurogenic bladder 01/16/2015    Seizure 02/22/2015    Spastic quadriplegia     Spinocerebellar atrophy        Social History[1]    Precautions:     Allergies as of 06/29/2025 - Reviewed 06/29/2025   Allergen Reaction Noted    Penicillins  01/14/2015    Allergen ext-venom-honey bee  05/10/2022       WO Assessment Details/Treatment     Patient was seen due to change in clinical status. Wound vac removed yesterday prior to anticipated discharge, prior to rapid response and escalation to NCC. Spoke with mother at bedside who would like to resume wound vac for sacral wound, but would like dressing changes three days per week. Due to inpatient wound care schedule, recommended wound vac dressing changes on Monday, Wednesday, and Fridays. Patient's mother agreed. Temporary orders placed with placed with plan to place sacral wound vac dressing tomorrow. No others issues or concerns at this time.        07/15/25 0830   WOCN Assessment   WOCN Total Time (mins) 30   Visit Date 07/15/25   Visit Time 0830   Consult Type Follow Up   WOCN Speciality Wound   Intervention chart review;coordination of care;orders   Teaching on-going       Orders placed.   Richard ARGUETA, RN, [facility]  07/15/2025         [1]   Social History  Socioeconomic History    Marital status: Single   Tobacco Use    Smoking status: Never    Smokeless tobacco: Never    Substance and Sexual Activity    Alcohol use: Not Currently     Comment: social drinker, at times    Drug use: Not Currently    Sexual activity: Never     Social Drivers of Health     Financial Resource Strain: Low Risk  (7/13/2025)    Overall Financial Resource Strain (CARDIA)     Difficulty of Paying Living Expenses: Not hard at all   Recent Concern: Financial Resource Strain - Medium Risk (7/1/2025)    Overall Financial Resource Strain (CARDIA)     Difficulty of Paying Living Expenses: Somewhat hard   Food Insecurity: No Food Insecurity (7/13/2025)    Hunger Vital Sign     Worried About Running Out of Food in the Last Year: Never true     Ran Out of Food in the Last Year: Never true   Transportation Needs: No Transportation Needs (7/13/2025)    PRAPARE - Transportation     Lack of Transportation (Medical): No     Lack of Transportation (Non-Medical): No   Recent Concern: Transportation Needs - Unmet Transportation Needs (4/17/2025)    PRAPARE - Transportation     Lack of Transportation (Medical): Yes     Lack of Transportation (Non-Medical): Yes   Physical Activity: Inactive (5/12/2025)    Exercise Vital Sign     Days of Exercise per Week: 0 days     Minutes of Exercise per Session: 0 min   Stress: No Stress Concern Present (7/13/2025)    [employer] - Occupational Stress Questionnaire     Feeling of Stress : Not at all   Recent Concern: Stress - Stress Concern Present (5/12/2025)    [employer] [place] [employer] - Occupational Stress Questionnaire     Feeling of Stress : To some extent   Housing Stability: Low Risk  (7/13/2025)    Housing Stability Vital Sign     Unable to Pay for Housing in the Last Year: No     Number of Times Moved in the Last Year: 1     Homeless in the Last Year: No

## 2025-07-15 NOTE — H&P
[facility] - Neuro Critical Care  Neurocritical Care  History & Physical    Admit Date: 6/29/2025  Service Date: 07/15/2025  Length of Stay: 16    Subjective:     Chief Complaint: Witnessed seizure-like activity    History of Present Illness: Mr. Moreno is a 44yoM with PMHx significant for ALS (onset 2006), neuromuscular dysfunction of the bladder, sacral ulcers, and spinal cerebellar ataxia who was admitted recently admitted (5/9/2025) for severe dehydration, electrolyte disturbances, and covid who presented to the ED with EMS and c/o AMS and hypoxia. Nonverbal at baseline but apparently is usually more alert; has been less responsive and interactive x1 day. Upon EMS arrival, was obtunded and sating 74%. They gave him solumedrol and duonebs en route which improved his oxygen status to >90%. Mother at bedside reports increased difficulty with swallowing and decreased PO intake.        While in the ED, labs and imaging significant for Na 149, K 5.4, Cl 111, CO2 21, glucose 149, BUN 38, creat 1.0, albumin 2.2, ALP 80, AST/ALT 34/23, anion gap 17, procal 8.42, BNP <10, WBC 7.11, H/H 12/40.5, plts 315, covid/flu negative, .6, VBG 7.349/47.1/44.5/23.9, istat LA 4.9->7.4->4.2 (7.4 likely error?). Chest xray with patchy opacities bilaterally, predominant within the bibasilar regions but also within the left lung apex and the left mid-lung; compatible with multifocal pneumonia. Started on BSA by the ED; vanc and cefepime. CCM consulted for sepsis. During first examination the patient was stable with MAPs >65 and Lactic acid down trended to 2.6. Around 6 am of 6/30 [place] was called hypotension with MAPs in the 50. Pt was started on levophed and was transferred to MICU 6/29 for septic shock secondary to multifocal pneumonia. Patient initially requiring pressors but since discontinued. Given dysphagia, IR placed a PEG tube 7/5 for nutrition and medication administration. Patient was stepped down to  on 7/7.    Patient  was awaiting discharge home tonight on 7/14 when a rapid response was called as the patient began seizing. Two witnessed tonic-clonic seizures were observed lasting for about 1-2 minutes, given 2mg Ativan. After these seizures, he never returned to baseline. Baseline was described as full movement in neck and all extremities, tracking with head & neck, mumbles per mom, and following commands. Initial assessment, patient was found staring, with no blink to threat or closure of eyes, midline gaze, and not moving extremities with painful stimuli. During seizure, it is reported that patient became hypertensive, hypoxic, and tachycardic, with strong flexor posturing bilaterally. EEG applied showing discharges over the left hemisphere, no history of seizures or brain mass/lesions/infarction. Patient loaded with 40mg/kg of Keppra. Given concern for mentation and need for closer neurological monitoring, patient was transferred to [facility] where he will be admitted.         Past Medical History:   Diagnosis Date    Anxiety     Cognitive communication disorder 05/10/2022    Nonverbal at baseline but alert    Degenerative motor system disease 2006    Dr. Wise in movement disorder clinic on 2/23/2015.    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    Multiple drug resistant organism (MDRO) culture positive     E.Coli ESBL PNA    Neurogenic bladder 01/16/2015    Seizure 02/22/2015    Spastic quadriplegia     Spinocerebellar atrophy      Past Surgical History:   Procedure Laterality Date    BACLOFEN PUMP IMPLANTATION      COLONOSCOPY N/A 7/23/2020    Procedure: COLONOSCOPY;  Surgeon: Ziyad Fitch MD;  Location: [facility];  Service: Endoscopy;  Laterality: N/A;    ESOPHAGOGASTRODUODENOSCOPY N/A 7/23/2020    Procedure: EGD (ESOPHAGOGASTRODUODENOSCOPY);  Surgeon: Ziyad Fitch MD;  Location: [facility];  Service: Endoscopy;  Laterality: N/A;     FLUOROSCOPIC URODYNAMIC STUDY N/A 11/27/2018    Procedure: URODYNAMIC STUDY, FLUOROSCOPIC;  Surgeon: Tanja Okeefe MD;  Location: [facility] OR [address] [place] [address];  Service: Urology;  Laterality: N/A;  1 hour    REATTACHMENT OF MUSCLE Bilateral 2/18/2022    Procedure: PTOSIS REPAIR OF BOTH EYES;  Surgeon: Krupa Rios MD;  Location: [facility] OR [address] [place] [address];  Service: Ophthalmology;  Laterality: Bilateral;    UPPER GASTROINTESTINAL ENDOSCOPY        Medications Ordered Prior to Encounter[1]   Allergies: Penicillins and Allergen ext-venom-honey bee  Family History   Problem Relation Name Age of Onset    Thyroid cancer Mother      Hypertension Mother      Depression Mother      Cancer Mother          thyroid cancer    Multiple sclerosis Other          mother's cousin    No Known Problems Brother      No Known Problems Son      ALS Neg Hx      Muscular dystrophy Neg Hx       Social History[2]  Review of Systems   Unable to perform ROS: Patient nonverbal     Objective:     Vitals:    Temp: 97.7 °F (36.5 °C)  Pulse: 92  Rhythm: normal sinus rhythm  BP: 106/73  MAP (mmHg): 86  Resp: 14  SpO2: 100 %    Temp  Min: 97.7 °F (36.5 °C)  Max: 98.2 °F (36.8 °C)  Pulse  Min: 89  Max: 102  BP  Min: 101/63  Max: 138/72  MAP (mmHg)  Min: 74  Max: 99  Resp  Min: 14  Max: 20  SpO2  Min: 95 %  Max: 100 %    07/14 0701 - 07/15 0700  In: 1154   Out: -    Unmeasured Output  Unmeasured Urine Occurrence: 0  Unmeasured Stool Occurrence: 0        Physical Exam  Vitals and nursing note reviewed.   Constitutional:       General: He is not in acute distress.     Appearance: He is cachectic. He is ill-appearing and toxic-appearing. He is not diaphoretic.   HENT:      Head: Normocephalic and atraumatic.      Nose: Congestion present.      Mouth/Throat:      Mouth: Mucous membranes are dry.      Pharynx: Oropharynx is clear.   Eyes:      Pupils: Pupils are equal, round, and reactive to light.   Cardiovascular:      Rate and Rhythm: Regular rhythm. Tachycardia present.       Pulses: Normal pulses.   Pulmonary:      Effort: Pulmonary effort is normal. No respiratory distress.   Chest:      Comments: Pectus excavatum  Abdominal:      General: Abdomen is flat. There is no distension.      Palpations: Abdomen is soft.      Tenderness: There is no abdominal tenderness.      Comments: PEG site c/d/i    Musculoskeletal:      Right lower leg: No edema.      Left lower leg: No edema.   Skin:     General: Skin is warm and dry.      Capillary Refill: Capillary refill takes less than 2 seconds.   Neurological:      Mental Status: He is alert.      Comments: E4, V1, M4  1mm bilaterally, midline gaze, no nystagmus present  No blink to threat or spontaneous eye closure  Nonverbal at baseline, per mom he does mumble words sometimes  No withdrawal to pain x4        Pressure Injury Sacral St.4  Moist drainage   Serosanguineous drainage  Full thickness        Today I personally reviewed pertinent medications, lines/drains/airways, imaging, cardiology results, laboratory results, microbiology results,     Assessment/Plan:     Neuro  * Witnessed seizure-like activity  Patient was awaiting discharge home tonight on 7/14 when a rapid response was called as the patient began seizing. Two witnessed tonic-clonic seizures were observed lasting for about 1-2 minutes, given 2mg Ativan. After these seizures, he never returned to baseline. Baseline was described as full movement in neck and all extremities, tracking with head & neck, mumbles per mom, and following commands. Initial assessment, patient was found staring, with no blink to threat or closure of eyes, midline gaze, and not moving extremities with painful stimuli. During seizure, it is reported that patient became hypertensive, hypoxic, and tachycardic, with strong flexor posturing bilaterally. EEG applied showing discharges over the left hemisphere, no history of seizures or brain mass/lesions/infarction. Patient loaded with 40mg/kg of Keppra. Given  concern for mentation and need for closer neurological monitoring, patient was transferred to [facility] where he will be admitted.   Admit to [facility]  Hourly neurochecks and vital signs  SBP <180, MAP >65  Brain MRI w/wo epilepsy protocol ordered for morning  cEEG - prelim showing discharges in left hemisphere, awaiting final read  Continue keppra 500mg IVP BID  Maintain seizure precautions  NPO - plan to resume once seizures well-controlled  No AC/AP  SCDs & LVX for DVT ppx  PT/OT/SLP to continue treatment    ALS (amyotrophic lateral sclerosis)  UMN predominance with diffuse limb spasticity. Slowly progressive. Fully-reliant on mother, others for self care and feeding and cleaning and toileting. Respiratory function is poor per FVC .     Continue home donepezil for neuroprotection  Continue home dantrolene and baclofen for spacticity  Turn q2h with aspiration precautions    Spinocerebellar ataxia  See ALS (amyotrophic lateral sclerosis)      Spastic diplegia  See ALS (amyotrophic lateral sclerosis)    Pulmonary  Pneumonia due to E. coli  Admitted to MICU for septic shock 2/2 ESBL E. Coli PNA  Upon EMS arrival, the patient was obtunded and hypoxic with an oxygen saturation of 74%.  Weaned off Levophed  On Ceftriaxone through 7/12 per ID recs, now completed  Satting well on room air    This has since resolved     Orthopedic  Pressure injury of deep tissue of heel  L Heel with black eschar. R Heel pressure ulcer with scant serosanguineous drainage. Photos uploaded into chart.   Wound care following      Decubitus ulcer of sacral region, stage 4  Wound vac removed on 7/14 prior patient was going to be discharged.  Consult wound care to continue providing attention to sacral wound while still admitted  General surgery consulted previously, no indication for inpatient debridement recommended; consider reconsult if needed            The patient is being Prophylaxed for:  Venous Thromboembolism with: Mechanical or Chemical  Stress  Ulcer with: Not Applicable   Ventilator Pneumonia with: not applicable    Activity Orders            Diet NPO: NPO starting at 07/15 0157    Elevate HOB Elevate HOB 30-45 degrees during feeding unless contraindicated starting at 06/30 1357    Progressive Mobility Protocol (mobilize patient to their highest level of functioning at least twice daily) starting at 06/30 0800    Turn patient every 2 hours starting at 06/30 0000          Full Code    60 minutes of Critical care time was spent personally by me on the following activities: development of treatment plan with patient or surrogate and bedside caregivers, discussions with consultants, evaluation of patient's response to treatment, examination of patient, ordering and performing treatments and interventions, ordering and review of laboratory studies, ordering and review of radiographic studies, pulse oximetry, antibiotic titration if applicable, vasopressor titration if applicable, re-evaluation of patient's condition. This critical care time did not overlap with that of any other provider or involve time for any procedures. There is high probability for acute neurological change leading to clinical and possibly life-threatening deterioration requiring highest level of provider preparedness for urgent intervention.     Arpita Davis, NP  Neurocritical Care  [facility] - Neuro Critical Care       [1]   No current facility-administered medications on file prior to encounter.     No current outpatient medications on file prior to encounter.   [2]   Social History  Tobacco Use    Smoking status: Never    Smokeless tobacco: Never   Substance Use Topics    Alcohol use: Not Currently     Comment: social drinker, at times    Drug use: Not Currently

## 2025-07-16 LAB
ABSOLUTE EOSINOPHIL (OHS): 0.02 K/UL
ABSOLUTE MONOCYTE (OHS): 0.61 K/UL (ref 0.3–1)
ABSOLUTE NEUTROPHIL COUNT (OHS): 3.77 K/UL (ref 1.8–7.7)
ALBUMIN SERPL BCP-MCNC: 1.8 G/DL (ref 3.5–5.2)
ALP SERPL-CCNC: 68 UNIT/L (ref 40–150)
ALT SERPL W/O P-5'-P-CCNC: 43 UNIT/L (ref 10–44)
ANION GAP (OHS): 7 MMOL/L (ref 8–16)
AORTIC SIZE INDEX (SOV): 1.6 CM/M2
AST SERPL-CCNC: 37 UNIT/L (ref 11–45)
BASOPHILS # BLD AUTO: 0.03 K/UL
BASOPHILS NFR BLD AUTO: 0.5 %
BILIRUB SERPL-MCNC: 0.1 MG/DL (ref 0.1–1)
BSA FOR ECHO PROCEDURE: 1.78 M2
BUN SERPL-MCNC: 17 MG/DL (ref 6–20)
C GATTII+NEOFOR DNA CSF QL NAA+NON-PROBE: NOT DETECTED
CALCIUM SERPL-MCNC: 7.6 MG/DL (ref 8.7–10.5)
CHLORIDE SERPL-SCNC: 107 MMOL/L (ref 95–110)
CLARITY CSF: CLEAR
CLARITY CSF: CLEAR
CMV DNA CSF QL NAA+NON-PROBE: NOT DETECTED
CO2 SERPL-SCNC: 23 MMOL/L (ref 23–29)
COLOR CSF: ABNORMAL
COLOR CSF: COLORLESS
CREAT SERPL-MCNC: 0.5 MG/DL (ref 0.5–1.4)
CSF TUBE NUMBER (OHS): 2
CSF TUBE NUMBER (OHS): 2
CV ECHO LV RWT: 0.42 CM
DOP CALC LVOT AREA: 3.1 CM2
DOP CALC LVOT DIAMETER: 2 CM
E COLI K1 DNA CSF QL NAA+NON-PROBE: NOT DETECTED
ECHO EF ESTIMATED: 71 %
ECHO LV POSTERIOR WALL: 0.8 CM (ref 0.6–1.1)
ERYTHROCYTE [DISTWIDTH] IN BLOOD BY AUTOMATED COUNT: 17.5 % (ref 11.5–14.5)
EV RNA CSF QL NAA+NON-PROBE: NOT DETECTED
FRACTIONAL SHORTENING: 39.5 % (ref 28–44)
FREEZE AND HOLD: NORMAL
GFR SERPLBLD CREATININE-BSD FMLA CKD-EPI: >60 ML/MIN/1.73/M2
GLUCOSE CSF-MCNC: 54 MG/DL (ref 40–70)
GLUCOSE SERPL-MCNC: 74 MG/DL (ref 70–110)
GP B STREP DNA CSF QL NAA+NON-PROBE: NOT DETECTED
HAEM INFLU DNA CSF QL NAA+NON-PROBE: NOT DETECTED
HCT VFR BLD AUTO: 25.3 % (ref 40–54)
HGB BLD-MCNC: 7.9 GM/DL (ref 14–18)
HHV6 DNA CSF QL NAA+NON-PROBE: NOT DETECTED
HOLD SPECIMEN: NORMAL
HSV1 DNA CSF QL NAA+NON-PROBE: NOT DETECTED
HSV2 DNA CSF QL NAA+NON-PROBE: NOT DETECTED
IMM GRANULOCYTES # BLD AUTO: 0 K/UL (ref 0–0.04)
IMM GRANULOCYTES NFR BLD AUTO: 0 % (ref 0–0.5)
INTERVENTRICULAR SEPTUM: 0.5 CM (ref 0.6–1.1)
L MONOCYTOG DNA CSF QL NAA+NON-PROBE: NOT DETECTED
LEFT ATRIUM SIZE: 2 CM
LEFT INTERNAL DIMENSION IN SYSTOLE: 2.3 CM (ref 2.1–4)
LEFT VENTRICLE DIASTOLIC VOLUME INDEX: 33.15 ML/M2
LEFT VENTRICLE DIASTOLIC VOLUME: 61 ML
LEFT VENTRICLE MASS INDEX: 35.5 G/M2
LEFT VENTRICLE SYSTOLIC VOLUME INDEX: 9.2 ML/M2
LEFT VENTRICLE SYSTOLIC VOLUME: 17 ML
LEFT VENTRICULAR INTERNAL DIMENSION IN DIASTOLE: 3.8 CM (ref 3.5–6)
LEFT VENTRICULAR MASS: 65.3 G
LYMPHOCYTES # BLD AUTO: 1.37 K/UL (ref 1–4.8)
LYMPHOCYTES NFR CSF MANUAL: 14 % (ref 40–80)
LYMPHOCYTES NFR CSF MANUAL: 45 % (ref 40–80)
Lab: 1.6 CM/M
MAGNESIUM SERPL-MCNC: 1.8 MG/DL (ref 1.6–2.6)
MCH RBC QN AUTO: 26.2 PG (ref 27–31)
MCHC RBC AUTO-ENTMCNC: 31.2 G/DL (ref 32–36)
MCV RBC AUTO: 84 FL (ref 82–98)
MONOS+MACROS NFR CSF MANUAL: 11 % (ref 15–45)
MONOS+MACROS NFR CSF MANUAL: 9 % (ref 15–45)
N MEN DNA CSF QL NAA+NON-PROBE: NOT DETECTED
NEUTROPHILS NFR CSF MANUAL: 45 %
NEUTROPHILS NFR CSF MANUAL: 75 %
NUCLEATED RBC (/100WBC) (OHS): 0 /100 WBC
OHS CV CPX PATIENT HEIGHT IN: 75
PARECHOVIRUS A RNA CSF QL NAA+NON-PROBE: NOT DETECTED
PHOSPHATE SERPL-MCNC: 3.2 MG/DL (ref 2.7–4.5)
PLATELET # BLD AUTO: 419 K/UL (ref 150–450)
PMV BLD AUTO: 10.5 FL (ref 9.2–12.9)
POTASSIUM SERPL-SCNC: 4.5 MMOL/L (ref 3.5–5.1)
PROT CSF-MCNC: 42 MG/DL (ref 15–40)
PROT SERPL-MCNC: 6.2 GM/DL (ref 6–8.4)
RA PRESSURE ESTIMATED: 0 MMHG
RBC # BLD AUTO: 3.02 M/UL (ref 4.6–6.2)
RBC # CSF: 1000 /CU MM
RBC # CSF: 305 /CU MM
RELATIVE EOSINOPHIL (OHS): 0.3 %
RELATIVE LYMPHOCYTE (OHS): 23.6 % (ref 18–48)
RELATIVE MONOCYTE (OHS): 10.5 % (ref 4–15)
RELATIVE NEUTROPHIL (OHS): 65.1 % (ref 38–73)
S PNEUM DNA CSF QL NAA+NON-PROBE: NOT DETECTED
SINUS: 3 CM
SODIUM SERPL-SCNC: 137 MMOL/L (ref 136–145)
SPECIMEN VOL CSF: 4 ML
SPECIMEN VOL CSF: 5 ML
STJ: 2.8 CM
VANCOMYCIN SERPL-MCNC: 15.8 UG/ML (ref ?–80)
VZV DNA CSF QL NAA+NON-PROBE: NOT DETECTED
WBC # BLD AUTO: 5.8 K/UL (ref 3.9–12.7)
WBC # CSF: 2 /CU MM
WBC # CSF: 2 /CU MM
Z-SCORE OF LEFT VENTRICULAR DIMENSION IN END DIASTOLE: -2.95
Z-SCORE OF LEFT VENTRICULAR DIMENSION IN END SYSTOLE: -2.49

## 2025-07-16 PROCEDURE — 99233 SBSQ HOSP IP/OBS HIGH 50: CPT | Mod: ,,, | Performed by: PHYSICIAN ASSISTANT

## 2025-07-16 PROCEDURE — 87529 HSV DNA AMP PROBE: CPT | Performed by: PSYCHIATRY & NEUROLOGY

## 2025-07-16 PROCEDURE — 84157 ASSAY OF PROTEIN OTHER: CPT | Performed by: PSYCHIATRY & NEUROLOGY

## 2025-07-16 PROCEDURE — 99222 1ST HOSP IP/OBS MODERATE 55: CPT | Mod: ,,, | Performed by: STUDENT IN AN ORGANIZED HEALTH CARE EDUCATION/TRAINING PROGRAM

## 2025-07-16 PROCEDURE — 87116 MYCOBACTERIA CULTURE: CPT | Performed by: PSYCHIATRY & NEUROLOGY

## 2025-07-16 PROCEDURE — 84100 ASSAY OF PHOSPHORUS: CPT

## 2025-07-16 PROCEDURE — 63600175 PHARM REV CODE 636 W HCPCS

## 2025-07-16 PROCEDURE — 86592 SYPHILIS TEST NON-TREP QUAL: CPT | Performed by: PSYCHIATRY & NEUROLOGY

## 2025-07-16 PROCEDURE — 25000003 PHARM REV CODE 250: Performed by: HOSPITALIST

## 2025-07-16 PROCEDURE — 87070 CULTURE OTHR SPECIMN AEROBIC: CPT | Performed by: PSYCHIATRY & NEUROLOGY

## 2025-07-16 PROCEDURE — 20000000 HC ICU ROOM

## 2025-07-16 PROCEDURE — 85025 COMPLETE CBC W/AUTO DIFF WBC: CPT

## 2025-07-16 PROCEDURE — 87798 DETECT AGENT NOS DNA AMP: CPT | Performed by: PSYCHIATRY & NEUROLOGY

## 2025-07-16 PROCEDURE — 94761 N-INVAS EAR/PLS OXIMETRY MLT: CPT

## 2025-07-16 PROCEDURE — 97606 NEG PRS WND THER DME>50 SQCM: CPT

## 2025-07-16 PROCEDURE — 86255 FLUORESCENT ANTIBODY SCREEN: CPT | Performed by: PSYCHIATRY & NEUROLOGY

## 2025-07-16 PROCEDURE — 80202 ASSAY OF VANCOMYCIN: CPT | Performed by: PSYCHIATRY & NEUROLOGY

## 2025-07-16 PROCEDURE — 27000207 HC ISOLATION

## 2025-07-16 PROCEDURE — 83735 ASSAY OF MAGNESIUM: CPT

## 2025-07-16 PROCEDURE — 009U3ZX DRAINAGE OF SPINAL CANAL, PERCUTANEOUS APPROACH, DIAGNOSTIC: ICD-10-PCS | Performed by: RADIOLOGY

## 2025-07-16 PROCEDURE — 87483 CNS DNA AMP PROBE TYPE 12-25: CPT | Performed by: PSYCHIATRY & NEUROLOGY

## 2025-07-16 PROCEDURE — 87498 ENTEROVIRUS PROBE&REVRS TRNS: CPT | Performed by: PSYCHIATRY & NEUROLOGY

## 2025-07-16 PROCEDURE — 87206 SMEAR FLUORESCENT/ACID STAI: CPT | Performed by: PSYCHIATRY & NEUROLOGY

## 2025-07-16 PROCEDURE — 87102 FUNGUS ISOLATION CULTURE: CPT | Performed by: PSYCHIATRY & NEUROLOGY

## 2025-07-16 PROCEDURE — 63600175 PHARM REV CODE 636 W HCPCS: Performed by: PSYCHIATRY & NEUROLOGY

## 2025-07-16 PROCEDURE — 25000003 PHARM REV CODE 250

## 2025-07-16 PROCEDURE — 80053 COMPREHEN METABOLIC PANEL: CPT

## 2025-07-16 PROCEDURE — 99291 CRITICAL CARE FIRST HOUR: CPT | Mod: GC,,, | Performed by: PSYCHIATRY & NEUROLOGY

## 2025-07-16 PROCEDURE — 89051 BODY FLUID CELL COUNT: CPT | Performed by: PSYCHIATRY & NEUROLOGY

## 2025-07-16 PROCEDURE — G0545 PR VISIT INHERENT TO INPT OR OBS CARE, INFECTIOUS DISEASE: HCPCS | Mod: ,,, | Performed by: STUDENT IN AN ORGANIZED HEALTH CARE EDUCATION/TRAINING PROGRAM

## 2025-07-16 PROCEDURE — 86403 PARTICLE AGGLUT ANTBDY SCRN: CPT | Performed by: PSYCHIATRY & NEUROLOGY

## 2025-07-16 PROCEDURE — 99000 SPECIMEN HANDLING OFFICE-LAB: CPT | Performed by: PSYCHIATRY & NEUROLOGY

## 2025-07-16 PROCEDURE — 25000003 PHARM REV CODE 250: Performed by: PSYCHIATRY & NEUROLOGY

## 2025-07-16 PROCEDURE — 99223 1ST HOSP IP/OBS HIGH 75: CPT | Mod: 25,,,

## 2025-07-16 PROCEDURE — 82945 GLUCOSE OTHER FLUID: CPT | Performed by: PSYCHIATRY & NEUROLOGY

## 2025-07-16 RX ORDER — LIDOCAINE HYDROCHLORIDE 10 MG/ML
3 INJECTION, SOLUTION INFILTRATION; PERINEURAL ONCE
Status: COMPLETED | OUTPATIENT
Start: 2025-07-16 | End: 2025-07-16

## 2025-07-16 RX ADMIN — ENOXAPARIN SODIUM 40 MG: 40 INJECTION SUBCUTANEOUS at 05:07

## 2025-07-16 RX ADMIN — BACLOFEN 20 MG: 10 TABLET ORAL at 08:07

## 2025-07-16 RX ADMIN — CARBOXYMETHYLCELLULOSE SODIUM 1 DROP: 10 GEL OPHTHALMIC at 09:07

## 2025-07-16 RX ADMIN — POLYETHYLENE GLYCOL 3350 17 G: 17 POWDER, FOR SOLUTION ORAL at 09:07

## 2025-07-16 RX ADMIN — NOREPINEPHRINE BITARTRATE 0.08 MCG/KG/MIN: 4 INJECTION, SOLUTION INTRAVENOUS at 07:07

## 2025-07-16 RX ADMIN — ACYCLOVIR SODIUM 600 MG: 50 INJECTION, SOLUTION INTRAVENOUS at 09:07

## 2025-07-16 RX ADMIN — BACLOFEN 20 MG: 10 TABLET ORAL at 04:07

## 2025-07-16 RX ADMIN — ERYTHROMYCIN: 5 OINTMENT OPHTHALMIC at 09:07

## 2025-07-16 RX ADMIN — LACTULOSE 10 G: 20 SOLUTION ORAL at 09:07

## 2025-07-16 RX ADMIN — BACLOFEN 20 MG: 10 TABLET ORAL at 09:07

## 2025-07-16 RX ADMIN — ACYCLOVIR SODIUM 600 MG: 50 INJECTION, SOLUTION INTRAVENOUS at 03:07

## 2025-07-16 RX ADMIN — SENNOSIDES AND DOCUSATE SODIUM 1 TABLET: 50; 8.6 TABLET ORAL at 08:07

## 2025-07-16 RX ADMIN — LEVETIRACETAM 500 MG: 500 INJECTION, SOLUTION, CONCENTRATE INTRAVENOUS at 09:07

## 2025-07-16 RX ADMIN — MEROPENEM 2 G: 2 INJECTION, POWDER, FOR SOLUTION INTRAVENOUS at 05:07

## 2025-07-16 RX ADMIN — ACYCLOVIR SODIUM 600 MG: 50 INJECTION, SOLUTION INTRAVENOUS at 12:07

## 2025-07-16 RX ADMIN — DANTROLENE SODIUM 50 MG: 25 CAPSULE ORAL at 04:07

## 2025-07-16 RX ADMIN — SODIUM CHLORIDE: 9 INJECTION, SOLUTION INTRAVENOUS at 06:07

## 2025-07-16 RX ADMIN — DANTROLENE SODIUM 50 MG: 25 CAPSULE ORAL at 09:07

## 2025-07-16 RX ADMIN — VANCOMYCIN HYDROCHLORIDE 1000 MG: 1 INJECTION, POWDER, LYOPHILIZED, FOR SOLUTION INTRAVENOUS at 10:07

## 2025-07-16 RX ADMIN — DONEPEZIL HYDROCHLORIDE 5 MG: 5 TABLET, FILM COATED ORAL at 08:07

## 2025-07-16 RX ADMIN — DANTROLENE SODIUM 50 MG: 25 CAPSULE ORAL at 08:07

## 2025-07-16 RX ADMIN — SODIUM CHLORIDE: 9 INJECTION, SOLUTION INTRAVENOUS at 05:07

## 2025-07-16 RX ADMIN — SENNOSIDES AND DOCUSATE SODIUM 1 TABLET: 50; 8.6 TABLET ORAL at 09:07

## 2025-07-16 RX ADMIN — LACTULOSE 10 G: 20 SOLUTION ORAL at 08:07

## 2025-07-16 RX ADMIN — MEROPENEM 2 G: 2 INJECTION, POWDER, FOR SOLUTION INTRAVENOUS at 09:07

## 2025-07-16 RX ADMIN — VANCOMYCIN HYDROCHLORIDE 1000 MG: 1 INJECTION, POWDER, LYOPHILIZED, FOR SOLUTION INTRAVENOUS at 09:07

## 2025-07-16 RX ADMIN — POLYETHYLENE GLYCOL 3350 17 G: 17 POWDER, FOR SOLUTION ORAL at 08:07

## 2025-07-16 RX ADMIN — LEVETIRACETAM 500 MG: 500 INJECTION, SOLUTION, CONCENTRATE INTRAVENOUS at 08:07

## 2025-07-16 RX ADMIN — LIDOCAINE HYDROCHLORIDE 3 ML: 10 INJECTION, SOLUTION INFILTRATION; PERINEURAL at 02:07

## 2025-07-16 RX ADMIN — CARBOXYMETHYLCELLULOSE SODIUM 1 DROP: 10 GEL OPHTHALMIC at 04:07

## 2025-07-16 RX ADMIN — CARBOXYMETHYLCELLULOSE SODIUM 1 DROP: 10 GEL OPHTHALMIC at 08:07

## 2025-07-16 RX ADMIN — MIRTAZAPINE 15 MG: 15 TABLET, FILM COATED ORAL at 08:07

## 2025-07-16 NOTE — H&P
Inpatient Radiology Pre-procedure Note    History of Present Illness:  Lisa Moreno is a 44 y.o. male presents for lumbar puncture.  Admission H&P reviewed.  Past Medical History:   Diagnosis Date    Anxiety     Cognitive communication disorder 05/10/2022    Nonverbal at baseline but alert    Degenerative motor system disease 2006    Dr. Wise in movement disorder clinic on 2/23/2015.    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    Multiple drug resistant organism (MDRO) culture positive     E.Coli ESBL PNA    Neurogenic bladder 01/16/2015    Seizure 02/22/2015    Spastic quadriplegia     Spinocerebellar atrophy      Past Surgical History:   Procedure Laterality Date    BACLOFEN PUMP IMPLANTATION      COLONOSCOPY N/A 7/23/2020    Procedure: COLONOSCOPY;  Surgeon: Zyiad Fitch MD;  Location: [facility];  Service: Endoscopy;  Laterality: N/A;    ESOPHAGOGASTRODUODENOSCOPY N/A 7/23/2020    Procedure: EGD (ESOPHAGOGASTRODUODENOSCOPY);  Surgeon: Ziyad Fitch MD;  Location: [facility];  Service: Endoscopy;  Laterality: N/A;    FLUOROSCOPIC URODYNAMIC STUDY N/A 11/27/2018    Procedure: URODYNAMIC STUDY, FLUOROSCOPIC;  Surgeon: Tanja Okeefe MD;  Location: [facility] OR [address] [place] [address];  Service: Urology;  Laterality: N/A;  1 hour    REATTACHMENT OF MUSCLE Bilateral 2/18/2022    Procedure: PTOSIS REPAIR OF BOTH EYES;  Surgeon: Krupa Rios MD;  Location: [facility] OR [address] [place] [address];  Service: Ophthalmology;  Laterality: Bilateral;    UPPER GASTROINTESTINAL ENDOSCOPY         Review of Systems:   As documented in primary team H&P    Home Meds:   Prior to Admission medications    Medication Sig Start Date End Date Taking? Authorizing Provider   ammonium lactate (LAC-HYDRIN) 12 % lotion Apply topically 2 (two) times daily as needed for Dry Skin. Apply to BLE BID and PRN 7/13/25   Caroline Nelson MD   baclofen (LIORESAL) 20 MG tablet 1 tablet (20  mg total) by Per G Tube route 3 (three) times daily. 7/13/25 7/13/26  Caroline Nelson MD   cetirizine (ZYRTEC) 10 MG tablet 1 tablet (10 mg total) by Per G Tube route once daily. 7/13/25   Caroline Nelson MD   dantrolene (DANTRIUM) 50 MG Cap 1 capsule (50 mg total) by Per G Tube route 3 (three) times daily. TAKE 1 CAPSULE(50 MG) BY MOUTH THREE TIMES DAILY 7/13/25   Caroline Nelson MD   donepeziL (ARICEPT) 5 MG tablet 1 tablet (5 mg total) by Per G Tube route every evening. 7/13/25 1/4/27  Caroline Nelson MD   erythromycin (ROMYCIN) ophthalmic ointment Place into both eyes every evening. 7/13/25   Caroline Nelson MD   lactulose (CHRONULAC) 20 gram/30 mL Soln 15 mLs (10 g total) by Per G Tube route 2 (two) times daily. 7/13/25   Caroline Nelson MD   mirtazapine (REMERON) 15 MG tablet 1 tablet (15 mg total) by Per G Tube route every evening. 7/13/25 7/13/26  Caroline Nelson MD   polyethylene glycol (GLYCOLAX) 17 gram PwPk 17 g by Per G Tube route 2 (two) times daily. 7/13/25   Caroline Nelson MD   QUEtiapine (SEROQUEL) 50 MG tablet 1 tablet (50 mg total) by Per G Tube route every evening. 7/13/25 7/13/26  Caroline Nelson MD   senna-docusate (PERICOLACE) 8.6-50 mg per tablet 1 tablet by Per G Tube route 2 (two) times daily. 7/13/25   Caroline Nelson MD     Scheduled Meds:    acyclovir  10 mg/kg Intravenous Q8H    baclofen  20 mg Per G Tube TID    carboxymethylcellulose sodium  1 drop Ophthalmic TID    dantrolene  50 mg Per G Tube TID    donepeziL  5 mg Per G Tube QHS    enoxparin  40 mg Subcutaneous Daily    erythromycin   Both Eyes QHS    lactulose  10 g Per G Tube BID    levETIRAcetam (Keppra) IV (PEDS and ADULTS)  500 mg Intravenous Q12H    meropenem IV (PEDS and ADULTS)  2 g Intravenous Q8H    mirtazapine  15 mg Per G Tube QHS    polyethylene glycol  17 g Per G Tube BID    senna-docusate  1 tablet Per G Tube BID    vancomycin (VANCOCIN) IV (PEDS and ADULTS)  1,000 mg Intravenous Q12H  "    Continuous Infusions:    0.9% NaCl   Intravenous Continuous 75 mL/hr at 07/16/25 1301 Rate Verify at 07/16/25 1301    NORepinephrine bitartrate-D5W  0-0.2 mcg/kg/min Intravenous Continuous 18 mL/hr at 07/16/25 1301 0.08 mcg/kg/min at 07/16/25 1301     PRN Meds:  Current Facility-Administered Medications:     ammonium lactate, , Topical (Top), BID PRN    barium sulfate, 450 mL, Per NG tube, PRN    dextrose 50%, 12.5 g, Intravenous, PRN    dextrose 50%, 25 g, Intravenous, PRN    glucagon (human recombinant), 1 mg, Intramuscular, PRN    glucose, 16 g, Per NG tube, PRN    glucose, 24 g, Per NG tube, PRN    magnesium oxide, 800 mg, Per G Tube, PRN    magnesium oxide, 800 mg, Per G Tube, PRN    naloxone, 0.02 mg, Intravenous, PRN    ondansetron, 4 mg, Intravenous, Q6H PRN    oxyCODONE, 5 mg, Per NG tube, Q6H PRN    potassium bicarbonate, 35 mEq, Per G Tube, PRN    potassium bicarbonate, 50 mEq, Per G Tube, PRN    potassium bicarbonate, 60 mEq, Per G Tube, PRN    potassium, sodium phosphates, 2 packet, Per G Tube, PRN    potassium, sodium phosphates, 2 packet, Per G Tube, PRN    potassium, sodium phosphates, 2 packet, Per G Tube, PRN    sodium chloride 0.9%, 10 mL, Intravenous, Q12H PRN    Pharmacy to dose Vancomycin consult, , , Once **AND** vancomycin - pharmacy to dose, , Intravenous, pharmacy to manage frequency  Anticoagulants/Antiplatelets: no anticoagulation    Allergies:   Review of patient's allergies indicates:   Allergen Reactions    Penicillins      Hives and Itching  Tolerated zosyn- 7/1/2025    Allergen ext-venom-honey bee      Sedation Hx: have not been any systemic reactions    Labs:  No results for input(s): "INR", "PT", "PTT" in the last 168 hours.    Recent Labs   Lab 07/16/25  0339   WBC 5.80   HGB 7.9*   HCT 25.3*   MCV 84         Recent Labs   Lab 07/16/25  0524   GLU 74      K 4.5      CO2 23   BUN 17   CREATININE 0.5   CALCIUM 7.6*   MG 1.8   ALT 43   AST 37   ALBUMIN 1.8* "   BILITOT 0.1         Vitals:  Temp: (!) 95.7 °F (35.4 °C) (07/16/25 1301)  Pulse: 68 (07/16/25 1301)  Resp: (!) 21 (07/16/25 1301)  BP: (!) 105/57 (07/16/25 1301)  SpO2: 100 % (07/16/25 1301)     Physical Exam:    General: no acute distress  Mental Status: GCS 11 E4V1M6  HEENT: normocephalic, atraumatic  Chest: unlabored breathing  Heart: regular heart rate  Abdomen: nondistended  Extremity: moves all extremities    Plan: lumbar puncture  Sedation Plan: local    Simon Jose PA-C  Interventional Radiology

## 2025-07-16 NOTE — ASSESSMENT & PLAN NOTE
Unknown source as of this time but just completed treatment  course of ertapenem for ESBL pneumonia.  - on vancomycin & Meropenem with CNS coverage pending LP  - other cultures in process  - ID consulted recommendations appreciated

## 2025-07-16 NOTE — CONSULTS
"Interventional Radiology  Consult/History & Physical Note    Consult Requested By: Francisco Adam MD  Reason for Consult: lumbar puncture    SUBJECTIVE:     Chief Complaint:  Witnessed seizure-like activity    History of Present Illness:  Lisa Moreno is a 44 y.o. male with a PMHx of ALS (onset 2006), neuromuscular dysfunction of the bladder, sacral ulcers, and spinal cerebellar ataxia who was admitted recently admitted (5/9/2025) for severe dehydration, electrolyte disturbances, and covid  who was admitted on 6/29 for AMS and hypoxia. Hospital course notable for witnessed seizure activity on 7/14. Transfer to [facility]. MRI Brain and EEG. Interventional Radiology has been consulted for fluoroscopic lumbar puncture. Pt has had recent imaging including a MRI Brain on 7/15 which revealed "No compelling evidence of acute intracranial pathology. Cerebral and cerebellar atrophy, greater than expected for age. No definite focal epileptogenic lesion identified". Pt is afebrile and hemodynamically stable. He does not take any anticoagulants.     Review of Systems   Unable to perform ROS: Patient nonverbal       Scheduled Meds:   acyclovir  10 mg/kg Intravenous Q8H    baclofen  20 mg Per G Tube TID    carboxymethylcellulose sodium  1 drop Ophthalmic TID    dantrolene  50 mg Per G Tube TID    donepeziL  5 mg Per G Tube QHS    enoxparin  40 mg Subcutaneous Daily    erythromycin   Both Eyes QHS    lactulose  10 g Per G Tube BID    levETIRAcetam (Keppra) IV (PEDS and ADULTS)  500 mg Intravenous Q12H    meropenem IV (PEDS and ADULTS)  2 g Intravenous Q8H    mirtazapine  15 mg Per G Tube QHS    polyethylene glycol  17 g Per G Tube BID    senna-docusate  1 tablet Per G Tube BID    vancomycin (VANCOCIN) IV (PEDS and ADULTS)  1,000 mg Intravenous Q12H     Continuous Infusions:   0.9% NaCl   Intravenous Continuous 75 mL/hr at 07/16/25 0701 Rate Verify at 07/16/25 0701    NORepinephrine bitartrate-D5W  0-0.2 mcg/kg/min Intravenous " Continuous 18 mL/hr at 07/16/25 0705 0.08 mcg/kg/min at 07/16/25 0705     PRN Meds:  Current Facility-Administered Medications:     ammonium lactate, , Topical (Top), BID PRN    barium sulfate, 450 mL, Per NG tube, PRN    dextrose 50%, 12.5 g, Intravenous, PRN    dextrose 50%, 25 g, Intravenous, PRN    glucagon (human recombinant), 1 mg, Intramuscular, PRN    glucose, 16 g, Per NG tube, PRN    glucose, 24 g, Per NG tube, PRN    magnesium oxide, 800 mg, Per G Tube, PRN    magnesium oxide, 800 mg, Per G Tube, PRN    naloxone, 0.02 mg, Intravenous, PRN    ondansetron, 4 mg, Intravenous, Q6H PRN    oxyCODONE, 5 mg, Per NG tube, Q6H PRN    potassium bicarbonate, 35 mEq, Per G Tube, PRN    potassium bicarbonate, 50 mEq, Per G Tube, PRN    potassium bicarbonate, 60 mEq, Per G Tube, PRN    potassium, sodium phosphates, 2 packet, Per G Tube, PRN    potassium, sodium phosphates, 2 packet, Per G Tube, PRN    potassium, sodium phosphates, 2 packet, Per G Tube, PRN    sodium chloride 0.9%, 10 mL, Intravenous, Q12H PRN    Pharmacy to dose Vancomycin consult, , , Once **AND** vancomycin - pharmacy to dose, , Intravenous, pharmacy to manage frequency    Review of patient's allergies indicates:   Allergen Reactions    Penicillins      Hives and Itching  Tolerated zosyn- 7/1/2025    Allergen ext-venom-honey bee        Past Medical History:   Diagnosis Date    Anxiety     Cognitive communication disorder 05/10/2022    Nonverbal at baseline but alert    Degenerative motor system disease 2006    Dr. Wise in movement disorder clinic on 2/23/2015.    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    Multiple drug resistant organism (MDRO) culture positive     E.Coli ESBL PNA    Neurogenic bladder 01/16/2015    Seizure 02/22/2015    Spastic quadriplegia     Spinocerebellar atrophy      Past Surgical History:   Procedure Laterality Date    BACLOFEN PUMP IMPLANTATION       COLONOSCOPY N/A 7/23/2020    Procedure: COLONOSCOPY;  Surgeon: Ziyad Fitch MD;  Location: [facility];  Service: Endoscopy;  Laterality: N/A;    ESOPHAGOGASTRODUODENOSCOPY N/A 7/23/2020    Procedure: EGD (ESOPHAGOGASTRODUODENOSCOPY);  Surgeon: Ziyad Fitch MD;  Location: [facility];  Service: Endoscopy;  Laterality: N/A;    FLUOROSCOPIC URODYNAMIC STUDY N/A 11/27/2018    Procedure: URODYNAMIC STUDY, FLUOROSCOPIC;  Surgeon: Tanja Okeefe MD;  Location: [facility] OR [address] [place] [address];  Service: Urology;  Laterality: N/A;  1 hour    REATTACHMENT OF MUSCLE Bilateral 2/18/2022    Procedure: PTOSIS REPAIR OF BOTH EYES;  Surgeon: Krupa Rios MD;  Location: [facility] OR [address] [place] [address];  Service: Ophthalmology;  Laterality: Bilateral;    UPPER GASTROINTESTINAL ENDOSCOPY       Family History   Problem Relation Name Age of Onset    Thyroid cancer Mother      Hypertension Mother      Depression Mother      Cancer Mother          thyroid cancer    Multiple sclerosis Other          mother's cousin    No Known Problems Brother      No Known Problems Son      ALS Neg Hx      Muscular dystrophy Neg Hx       Social History[1]    OBJECTIVE:     Vital Signs (Most Recent)  Temp: 97.7 °F (36.5 °C) (07/16/25 0828)  Pulse: 76 (07/16/25 0828)  Resp: 11 (07/16/25 0828)  BP: (!) 98/58 (07/16/25 0701)  SpO2: 99 % (07/16/25 0828)    Physical Exam:  Physical Exam  Constitutional:       General: He is not in acute distress.     Appearance: He is ill-appearing.   HENT:      Head:      Comments: EEG in place     Nose: Nose normal.      Mouth/Throat:      Mouth: Mucous membranes are dry.      Pharynx: Oropharynx is clear.   Eyes:      Extraocular Movements: Extraocular movements intact.      Conjunctiva/sclera: Conjunctivae normal.   Cardiovascular:      Rate and Rhythm: Normal rate.   Pulmonary:      Effort: Pulmonary effort is normal. No respiratory distress.   Abdominal:      General: There is no distension.   Musculoskeletal:      Right lower  "leg: No edema.      Left lower leg: No edema.   Skin:     General: Skin is warm and dry.   Neurological:      Mental Status: He is alert.      GCS: GCS eye subscore is 4. GCS verbal subscore is 1. GCS motor subscore is 6.   Psychiatric:         Mood and Affect: Mood normal.         Behavior: Behavior normal.         Laboratory  I have reviewed all pertinent lab results within the past 24 hours.  CBC:   Recent Labs   Lab 07/16/25  0339   WBC 5.80   RBC 3.02*   HGB 7.9*   HCT 25.3*      MCV 84   MCH 26.2*   MCHC 31.2*     CMP:   Recent Labs   Lab 07/16/25  0524   GLU 74   CALCIUM 7.6*   ALBUMIN 1.8*   PROT 6.2      K 4.5   CO2 23      BUN 17   CREATININE 0.5   ALKPHOS 68   ALT 43   AST 37   BILITOT 0.1     Coagulation: No results for input(s): "LABPROT", "INR", "APTT" in the last 168 hours.      Imaging:  Recent imaging studies including MRI Brain on 7/15 which was independently reviewed by Dr. Melendrez.     ASSESSMENT/PLAN:     Assessment:  44 y.o. male with a PMHx of ALS (onset 2006), neuromuscular dysfunction of the bladder, sacral ulcers, and spinal cerebellar ataxia who was admitted recently admitted (5/9/2025) for severe dehydration, electrolyte disturbances, and covid  who has been referred to IR for fluoroscopic lumbar puncture. The procedure was discussed in great detail with the patient and mother including thorough explanations of the potential risks and benefits. Risks include but are not limited to sepsis, severe infection, hemorrhage, damage to surrounding structures and need for additional procedures. The patient is a candidate for fluoroscopic lumbar puncture under local anesthesia. Plan discussed with ordering physician and pt who verbalized understanding of the plan and would like to proceed.    Plan:  Will proceed with fluoro LP under local anesthesia on 7/16.   Anticoagulation history reviewed.   Coagulation labs reviewed. Plt count 419 on 7/16.  Thank you for the consult. Please " contact with questions via [employer] secure chat    Time spent during patient care today was 75-89 minutes. This includes time spent before the visit reviewing the chart, discussing case with staff physician and ordering provider, time spent during the face to face patient visit, and time spent after the visit on documentation. Time excludes procedure time.     Simon Jose PA-C  Interventional Radiology        [1]   Social History  Tobacco Use    Smoking status: Never    Smokeless tobacco: Never   Substance Use Topics    Alcohol use: Not Currently     Comment: social drinker, at times    Drug use: Not Currently

## 2025-07-16 NOTE — CONSULTS
[facility]  Infectious Disease  Consult Note    Patient Name: Lisa Moreno  MRN: [id number]  Admission Date: 6/29/2025  Hospital Length of Stay: 17 days  Attending Physician: Francisco Adam DO  Primary Care Provider: John Nixon II, MD     Isolation Status: Contact    Patient information was obtained from parent, past medical records, ER records, and primary team.      Inpatient consult to Infectious Diseases  Consult performed by: Betty Crowell MD  Consult ordered by: Katya Reilly NP        Assessment/Plan:     Neuro  * Witnessed seizure-like activity  45 yo male with ALS s/p PEG, sacral decub with current hospital course notable for ESBL Ecoli PNA s/p treatment with ertapenem with transfer to ICU on 7/14 for seizure like activity in shock requiring pressors. LP attempted, unsuccessful. Pending IR Lp. MRI brain without evidence of acute infection. Per mother at bedside, mentation is almost back to normal. Blood cx so far no growth to date. On exam, he does have an infiltrated R PIV with associated phlebitis, suspect contributing to his shock vs CNS infection (lower suspicion) vs bacteremia (though blcx so far no growth to date). He is on room air and denied respiratory symptoms, CXR without significant consolidation.     Recommendations:  -pending LP, follow up studies  -ok for broad spectrum antimicrobials at this time, though favor de-escalating pending cx data/work up  -follow up cx  -remove R PIV            Thank you for your consult. I will follow-up with patient. Please contact us if you have any additional questions. Above d/w primary team.       Betty Crowell MD  Infectious Disease  [facility]      Subjective:     Principal Problem: Witnessed seizure-like activity    HPI: 44 year old male with a history of amyotropic lateral sclerosis (ALS), spasticity, dysarthria, sacral decubitus ulcer and recent hospital admission in may of 2025 secondary  to COVID infection and associated hypoxia.  He is now admitted with altered mental status and hypoxia.  Respiratory cultures were obtained and were positive for ESBL E coli.  ID is consulted to assist with his care.    ID reconsulted 7/16 for antibiotic assistance for potential CNS infection. Since pt was last seen, he completed a course of ertapenem for ESBL Ecoli pneumonia. He was transferred to ICU for seizure like activity on 7/14. LP was attempted at bedside, unsuccessful. Pending IR LP. MRI brain without evidence of acute infection. Repeat blood cx ngtd. Pt is currently on acyclovir, ctx, and vancomycin. Per mother at bedside, pt is doing better. Pt shakes head no to  abdominal pain, sob, or cough. Remains on pressor support.     Past Medical History:   Diagnosis Date    Anxiety     Cognitive communication disorder 05/10/2022    Nonverbal at baseline but alert    Degenerative motor system disease 2006    Dr. Wise in movement disorder clinic on 2/23/2015.    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    Multiple drug resistant organism (MDRO) culture positive     E.Coli ESBL PNA    Neurogenic bladder 01/16/2015    Seizure 02/22/2015    Spastic quadriplegia     Spinocerebellar atrophy        Past Surgical History:   Procedure Laterality Date    BACLOFEN PUMP IMPLANTATION      COLONOSCOPY N/A 7/23/2020    Procedure: COLONOSCOPY;  Surgeon: Ziyad Fitch MD;  Location: [facility];  Service: Endoscopy;  Laterality: N/A;    ESOPHAGOGASTRODUODENOSCOPY N/A 7/23/2020    Procedure: EGD (ESOPHAGOGASTRODUODENOSCOPY);  Surgeon: Ziyad Fitch MD;  Location: [facility];  Service: Endoscopy;  Laterality: N/A;    FLUOROSCOPIC URODYNAMIC STUDY N/A 11/27/2018    Procedure: URODYNAMIC STUDY, FLUOROSCOPIC;  Surgeon: Tanja Okeefe MD;  Location: [address];  Service: Urology;  Laterality: N/A;  1 hour    REATTACHMENT OF MUSCLE  "Bilateral 2/18/2022    Procedure: PTOSIS REPAIR OF BOTH EYES;  Surgeon: Krupa Rios MD;  Location: [facility] OR [address] [place] [address];  Service: Ophthalmology;  Laterality: Bilateral;    UPPER GASTROINTESTINAL ENDOSCOPY         Review of patient's allergies indicates:   Allergen Reactions    Penicillins      Hives and Itching  Tolerated zosyn- 7/1/2025    Allergen ext-venom-honey bee        Medications:  Medications Prior to Admission   Medication Sig    [DISCONTINUED] baclofen (LIORESAL) 20 MG tablet Take 1 tablet (20 mg total) by mouth 3 (three) times daily.    [DISCONTINUED] cetirizine (ZYRTEC) 10 MG tablet TAKE 1 TABLET BY MOUTH DAILY    [DISCONTINUED] dantrolene (DANTRIUM) 50 MG Cap TAKE 1 CAPSULE(50 MG) BY MOUTH THREE TIMES DAILY    [DISCONTINUED] dimethicone-zinc oxide 1-40 % Oint Apply to affected area twice daily    [DISCONTINUED] donepeziL (ARICEPT) 5 MG tablet Take 1 tablet (5 mg total) by mouth every evening.    [DISCONTINUED] mirtazapine (REMERON) 15 MG tablet Take 1 tablet (15 mg total) by mouth every evening.    [DISCONTINUED] QUEtiapine (SEROQUEL) 50 MG tablet Take 1 tablet (50 mg total) by mouth every evening.     Antibiotics (From admission, onward)      Start     Stop Route Frequency Ordered    07/16/25 0900  vancomycin (VANCOCIN) 1,000 mg in 0.9% NaCl 250 mL IVPB (admixture device)         -- IV Every 12 hours (non-standard times) 07/16/25 0759    07/16/25 0900  meropenem 2 g in 0.9% NaCl 100 mL IVPB (MB+)         -- IV Every 8 hours (non-standard times) 07/16/25 0814    07/15/25 2000  vancomycin - pharmacy to dose  (vancomycin IVPB (PEDS and ADULTS))        Placed in "And" Linked Group    -- IV pharmacy to manage frequency 07/15/25 1907 07/08/25 0415  erythromycin 5 mg/gram (0.5 %) ophthalmic ointment         07/17/25 2059 Both Eyes Nightly 07/08/25 0301          Antifungals (From admission, onward)      None          Antivirals (From admission, onward)          Stop Route Frequency     acyclovir (ZOVIRAX) " injection         -- IV Every 8 hours (non-standard times)             Immunization History   Administered Date(s) Administered    COVID-19 MRNA, LN-S PF (MODERNA HALF 0.25 ML DOSE) 02/17/2022    COVID-19, MRNA, LN-S, PF (MODERNA FULL 0.5 ML DOSE) 02/09/2021, 03/09/2021, 07/22/2022    COVID-19, MRNA, LN-S, PF (Pfizer) (Purple Cap) 01/15/2021    DTP 03/04/1980, 05/20/1980, 07/22/1980, 07/28/1981, 04/24/1984    Influenza - Quadrivalent - PF *Preferred* (6 months and older) 02/26/2015, 09/24/2019    Influenza - Trivalent - Fluarix, Flulaval, Fluzone, Afluria - PF 02/26/2015    MMR 03/31/1981, 01/05/2005    OPV 03/04/1980, 05/20/1980, 07/22/1980, 07/28/1981, 04/24/1984    PPD Test 03/04/2015, 10/05/2018    Td (ADULT) 04/05/1994, 01/05/2005    Tdap 09/24/2019       Family History       Problem Relation (Age of Onset)    Cancer Mother    Depression Mother    Hypertension Mother    Multiple sclerosis Other    No Known Problems Brother, Son    Thyroid cancer Mother          Social History     Socioeconomic History    Marital status: Single   Tobacco Use    Smoking status: Never    Smokeless tobacco: Never   Substance and Sexual Activity    Alcohol use: Not Currently     Comment: social drinker, at times    Drug use: Not Currently    Sexual activity: Never     Social Drivers of Health     Financial Resource Strain: Low Risk  (7/13/2025)    Overall Financial Resource Strain (CARDIA)     Difficulty of Paying Living Expenses: Not hard at all   Recent Concern: Financial Resource Strain - Medium Risk (7/1/2025)    Overall Financial Resource Strain (CARDIA)     Difficulty of Paying Living Expenses: Somewhat hard   Food Insecurity: No Food Insecurity (7/13/2025)    Hunger Vital Sign     Worried About Running Out of Food in the Last Year: Never true     Ran Out of Food in the Last Year: Never true   Transportation Needs: No Transportation Needs (7/13/2025)    PRAPARE - Transportation     Lack of Transportation (Medical): No     Lack  of Transportation (Non-Medical): No   Recent Concern: Transportation Needs - Unmet Transportation Needs (4/17/2025)    PRAPARE - Transportation     Lack of Transportation (Medical): Yes     Lack of Transportation (Non-Medical): Yes   Physical Activity: Inactive (5/12/2025)    Exercise Vital Sign     Days of Exercise per Week: 0 days     Minutes of Exercise per Session: 0 min   Stress: No Stress Concern Present (7/13/2025)    [facility] of Occupational Health - Occupational Stress Questionnaire     Feeling of Stress : Not at all   Recent Concern: Stress - Stress Concern Present (5/12/2025)    [facility] of Occupational Health - Occupational Stress Questionnaire     Feeling of Stress : To some extent   Housing Stability: Low Risk  (7/13/2025)    Housing Stability Vital Sign     Unable to Pay for Housing in the Last Year: No     Number of Times Moved in the Last Year: 1     Homeless in the Last Year: No     Review of Systems   Unable to perform ROS: Patient nonverbal     Objective:     Vital Signs (Most Recent):  Temp: 96.1 °F (35.6 °C) (07/16/25 1101)  Pulse: 72 (07/16/25 1101)  Resp: 12 (07/16/25 1101)  BP: 99/61 (07/16/25 1101)  SpO2: 100 % (07/16/25 1101) Vital Signs (24h Range):  Temp:  [91.9 °F (33.3 °C)-100.4 °F (38 °C)] 96.1 °F (35.6 °C)  Pulse:  [71-98] 72  Resp:  [9-23] 12  SpO2:  [97 %-100 %] 100 %  BP: ()/(48-67) 99/61     Weight: 59.9 kg (132 lb 0.9 oz)  Body mass index is 16.51 kg/m².    Estimated Creatinine Clearance: 158.1 mL/min (based on SCr of 0.5 mg/dL).     Physical Exam  Constitutional:       General: He is not in acute distress.     Appearance: He is not ill-appearing.   Pulmonary:      Effort: Pulmonary effort is normal. No respiratory distress.   Abdominal:      Comments: PEG   Musculoskeletal:      Right lower leg: No edema.      Left lower leg: No edema.   Skin:     General: Skin is warm and dry.      Comments: Infiltrated R PIV with phlebitis   Neurological:      Mental  Status: He is alert.          Significant Labs:   Microbiology Results (last 7 days)       Procedure Component Value Units Date/Time    Gram stain [[id number]]     Order Status: Sent Specimen: CSF (Spinal Fluid) from CSF Tap, Tube 3     CSF culture [[id number]]     Order Status: Sent Specimen: CSF (Spinal Fluid) from CSF Tap, Tube 3     Fungus culture [[id number]]     Order Status: Sent Specimen: CSF (Spinal Fluid) from CSF Tap, Tube 3     AFB Culture & Smear [[id number]]     Order Status: Sent Specimen: CSF (Spinal Fluid) from CSF Tap, Tube 3     Cryptococcal antigen, CSF [[id number]]     Order Status: Sent Specimen: CSF (Spinal Fluid) from CSF Tap, Tube 3     Blood culture [[id number]]  (Normal) Collected: 07/15/25 1328    Order Status: Completed Specimen: Blood from Peripheral, Hand, Right Updated: 07/15/25 2301     Blood Culture No Growth After 6 Hours    Blood culture [[id number]]  (Normal) Collected: 07/15/25 1319    Order Status: Completed Specimen: Blood from Peripheral, Hand, Left Updated: 07/15/25 2301     Blood Culture No Growth After 6 Hours            Significant Imaging: I have reviewed all pertinent imaging results/findings within the past 24 hours.

## 2025-07-16 NOTE — SUBJECTIVE & OBJECTIVE
Past Medical History:   Diagnosis Date    Anxiety     Cognitive communication disorder 05/10/2022    Nonverbal at baseline but alert    Degenerative motor system disease 2006    Dr. Wise in movement disorder clinic on 2/23/2015.    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    Multiple drug resistant organism (MDRO) culture positive     E.Coli ESBL PNA    Neurogenic bladder 01/16/2015    Seizure 02/22/2015    Spastic quadriplegia     Spinocerebellar atrophy        Past Surgical History:   Procedure Laterality Date    BACLOFEN PUMP IMPLANTATION      COLONOSCOPY N/A 7/23/2020    Procedure: COLONOSCOPY;  Surgeon: Ziyad Fitch MD;  Location: [facility];  Service: Endoscopy;  Laterality: N/A;    ESOPHAGOGASTRODUODENOSCOPY N/A 7/23/2020    Procedure: EGD (ESOPHAGOGASTRODUODENOSCOPY);  Surgeon: Ziyad Fitch MD;  Location: [facility];  Service: Endoscopy;  Laterality: N/A;    FLUOROSCOPIC URODYNAMIC STUDY N/A 11/27/2018    Procedure: URODYNAMIC STUDY, FLUOROSCOPIC;  Surgeon: Tanja Okeefe MD;  Location: [facility] OR [address] [place] [address];  Service: Urology;  Laterality: N/A;  1 hour    REATTACHMENT OF MUSCLE Bilateral 2/18/2022    Procedure: PTOSIS REPAIR OF BOTH EYES;  Surgeon: Krupa Rios MD;  Location: [facility] OR [address] [place] [address];  Service: Ophthalmology;  Laterality: Bilateral;    UPPER GASTROINTESTINAL ENDOSCOPY         Review of patient's allergies indicates:   Allergen Reactions    Penicillins      Hives and Itching  Tolerated zosyn- 7/1/2025    Allergen ext-venom-honey bee        Medications:  Medications Prior to Admission   Medication Sig    [DISCONTINUED] baclofen (LIORESAL) 20 MG tablet Take 1 tablet (20 mg total) by mouth 3 (three) times daily.    [DISCONTINUED] cetirizine (ZYRTEC) 10 MG tablet TAKE 1 TABLET BY MOUTH DAILY    [DISCONTINUED] dantrolene (DANTRIUM) 50 MG Cap TAKE 1 CAPSULE(50 MG) BY MOUTH THREE TIMES DAILY    [DISCONTINUED]  "dimethicone-zinc oxide 1-40 % Oint Apply to affected area twice daily    [DISCONTINUED] donepeziL (ARICEPT) 5 MG tablet Take 1 tablet (5 mg total) by mouth every evening.    [DISCONTINUED] mirtazapine (REMERON) 15 MG tablet Take 1 tablet (15 mg total) by mouth every evening.    [DISCONTINUED] QUEtiapine (SEROQUEL) 50 MG tablet Take 1 tablet (50 mg total) by mouth every evening.     Antibiotics (From admission, onward)      Start     Stop Route Frequency Ordered    07/16/25 0900  vancomycin (VANCOCIN) 1,000 mg in 0.9% NaCl 250 mL IVPB (admixture device)         -- IV Every 12 hours (non-standard times) 07/16/25 0759    07/16/25 0900  meropenem 2 g in 0.9% NaCl 100 mL IVPB (MB+)         -- IV Every 8 hours (non-standard times) 07/16/25 0814    07/15/25 2000  vancomycin - pharmacy to dose  (vancomycin IVPB (PEDS and ADULTS))        Placed in "And" Linked Group    -- IV pharmacy to manage frequency 07/15/25 1907    07/08/25 0415  erythromycin 5 mg/gram (0.5 %) ophthalmic ointment         07/17/25 2059 Both Eyes Nightly 07/08/25 0301          Antifungals (From admission, onward)      None          Antivirals (From admission, onward)          Stop Route Frequency     acyclovir (ZOVIRAX) injection         -- IV Every 8 hours (non-standard times)             Immunization History   Administered Date(s) Administered    COVID-19 MRNA, LN-S PF (MODERNA HALF 0.25 ML DOSE) 02/17/2022    COVID-19, MRNA, LN-S, PF (MODERNA FULL 0.5 ML DOSE) 02/09/2021, 03/09/2021, 07/22/2022    COVID-19, MRNA, LN-S, PF (Pfizer) (Purple Cap) 01/15/2021    DTP 03/04/1980, 05/20/1980, 07/22/1980, 07/28/1981, 04/24/1984    Influenza - Quadrivalent - PF *Preferred* (6 months and older) 02/26/2015, 09/24/2019    Influenza - Trivalent - Fluarix, Flulaval, Fluzone, Afluria - PF 02/26/2015    MMR 03/31/1981, 01/05/2005    OPV 03/04/1980, 05/20/1980, 07/22/1980, 07/28/1981, 04/24/1984    PPD Test 03/04/2015, 10/05/2018    Td (ADULT) 04/05/1994, 01/05/2005    " Tdap 09/24/2019       Family History       Problem Relation (Age of Onset)    Cancer Mother    Depression Mother    Hypertension Mother    Multiple sclerosis Other    No Known Problems Brother, Son    Thyroid cancer Mother          Social History     Socioeconomic History    Marital status: Single   Tobacco Use    Smoking status: Never    Smokeless tobacco: Never   Substance and Sexual Activity    Alcohol use: Not Currently     Comment: social drinker, at times    Drug use: Not Currently    Sexual activity: Never     Social Drivers of Health     Financial Resource Strain: Low Risk  (7/13/2025)    Overall Financial Resource Strain (CARDIA)     Difficulty of Paying Living Expenses: Not hard at all   Recent Concern: Financial Resource Strain - Medium Risk (7/1/2025)    Overall Financial Resource Strain (CARDIA)     Difficulty of Paying Living Expenses: Somewhat hard   Food Insecurity: No Food Insecurity (7/13/2025)    Hunger Vital Sign     Worried About Running Out of Food in the Last Year: Never true     Ran Out of Food in the Last Year: Never true   Transportation Needs: No Transportation Needs (7/13/2025)    PRAPARE - Transportation     Lack of Transportation (Medical): No     Lack of Transportation (Non-Medical): No   Recent Concern: Transportation Needs - Unmet Transportation Needs (4/17/2025)    PRAPARE - Transportation     Lack of Transportation (Medical): Yes     Lack of Transportation (Non-Medical): Yes   Physical Activity: Inactive (5/12/2025)    Exercise Vital Sign     Days of Exercise per Week: 0 days     Minutes of Exercise per Session: 0 min   Stress: No Stress Concern Present (7/13/2025)    [employer] - Occupational Stress Questionnaire     Feeling of Stress : Not at all   Recent Concern: Stress - Stress Concern Present (5/12/2025)    [employer] - Occupational Stress Questionnaire     Feeling of Stress : To some extent   Housing Stability: Low  Risk  (7/13/2025)    Housing Stability Vital Sign     Unable to Pay for Housing in the Last Year: No     Number of Times Moved in the Last Year: 1     Homeless in the Last Year: No     Review of Systems   Unable to perform ROS: Patient nonverbal     Objective:     Vital Signs (Most Recent):  Temp: 96.1 °F (35.6 °C) (07/16/25 1101)  Pulse: 72 (07/16/25 1101)  Resp: 12 (07/16/25 1101)  BP: 99/61 (07/16/25 1101)  SpO2: 100 % (07/16/25 1101) Vital Signs (24h Range):  Temp:  [91.9 °F (33.3 °C)-100.4 °F (38 °C)] 96.1 °F (35.6 °C)  Pulse:  [71-98] 72  Resp:  [9-23] 12  SpO2:  [97 %-100 %] 100 %  BP: ()/(48-67) 99/61     Weight: 59.9 kg (132 lb 0.9 oz)  Body mass index is 16.51 kg/m².    Estimated Creatinine Clearance: 158.1 mL/min (based on SCr of 0.5 mg/dL).     Physical Exam  Constitutional:       General: He is not in acute distress.     Appearance: He is not ill-appearing.   Pulmonary:      Effort: Pulmonary effort is normal. No respiratory distress.   Abdominal:      Comments: PEG   Musculoskeletal:      Right lower leg: No edema.      Left lower leg: No edema.   Skin:     General: Skin is warm and dry.      Comments: Infiltrated R PIV with phlebitis   Neurological:      Mental Status: He is alert.          Significant Labs:   Microbiology Results (last 7 days)       Procedure Component Value Units Date/Time    Gram stain [[id number]]     Order Status: Sent Specimen: CSF (Spinal Fluid) from CSF Tap, Tube 3     CSF culture [[id number]]     Order Status: Sent Specimen: CSF (Spinal Fluid) from CSF Tap, Tube 3     Fungus culture [[id number]]     Order Status: Sent Specimen: CSF (Spinal Fluid) from CSF Tap, Tube 3     AFB Culture & Smear [[id number]]     Order Status: Sent Specimen: CSF (Spinal Fluid) from CSF Tap, Tube 3     Cryptococcal antigen, CSF [[id number]]     Order Status: Sent Specimen: CSF (Spinal Fluid) from CSF Tap, Tube 3     Blood culture [[id number]]  (Normal) Collected: 07/15/25 1328    Order Status:  Completed Specimen: Blood from Peripheral, Hand, Right Updated: 07/15/25 2301     Blood Culture No Growth After 6 Hours    Blood culture [[id number]]  (Normal) Collected: 07/15/25 1319    Order Status: Completed Specimen: Blood from Peripheral, Hand, Left Updated: 07/15/25 2301     Blood Culture No Growth After 6 Hours            Significant Imaging: I have reviewed all pertinent imaging results/findings within the past 24 hours.

## 2025-07-16 NOTE — EICU
Virtual ICU Quality Rounds    Admit Date: 6/29/2025  Hospital Day: 17    ICU Day: 1d 9h    24H Vital Sign Range:  Temp:  [91.9 °F (33.3 °C)-100.4 °F (38 °C)]   Pulse:  [69-98]   Resp:  [9-23]   BP: ()/(48-67)   SpO2:  [97 %-100 %]     VICU Surveillance Screening      LDA reconciliation : Yes

## 2025-07-16 NOTE — SUBJECTIVE & OBJECTIVE
Interval History: Persistent hypotension overnight requiring vasopressors. No epileptiform activity on EEG. Clinically improved this morning. Updated mother at bedside who reports patient appears at baseline.     Current Medications[1]  Continuous Infusions:   0.9% NaCl   Intravenous Continuous 75 mL/hr at 07/16/25 1201 Rate Verify at 07/16/25 1201    NORepinephrine bitartrate-D5W  0-0.2 mcg/kg/min Intravenous Continuous 18 mL/hr at 07/16/25 1201 0.08 mcg/kg/min at 07/16/25 1201       Review of Systems  Objective:     Vital Signs (Most Recent):  Temp: (!) 95.9 °F (35.5 °C) (07/16/25 1201)  Pulse: 70 (07/16/25 1201)  Resp: (!) 7 (07/16/25 1201)  BP: 113/61 (07/16/25 1201)  SpO2: 100 % (07/16/25 1201) Vital Signs (24h Range):  Temp:  [92.8 °F (33.8 °C)-100.4 °F (38 °C)] 95.9 °F (35.5 °C)  Pulse:  [70-98] 70  Resp:  [7-23] 7  SpO2:  [97 %-100 %] 100 %  BP: ()/(48-67) 113/61     Weight: 59.9 kg (132 lb 0.9 oz)  Body mass index is 16.51 kg/m².     Physical Exam  Constitutional:       General: He is not in acute distress.     Appearance: He is ill-appearing. He is not diaphoretic.   HENT:      Head: Normocephalic and atraumatic.      Comments: EEG in place  Eyes:      General: No scleral icterus.        Right eye: No discharge.         Left eye: No discharge.      Conjunctiva/sclera: Conjunctivae normal.      Pupils: Pupils are equal, round, and reactive to light.   Cardiovascular:      Rate and Rhythm: Normal rate.   Pulmonary:      Effort: Pulmonary effort is normal. No respiratory distress.   Musculoskeletal:         General: No deformity or signs of injury.   Skin:     General: Skin is dry.      Coloration: Skin is not jaundiced.            NEUROLOGICAL EXAMINATION:     CRANIAL NERVES     CN III, IV, VI   Pupils are equal, round, and reactive to light.       Alert, nods, tracks provider  MICKY OU   Corneal intact OU   + spontaneous eye opening   Face symmetric   Tongue midline   Spontaneous movements in  BUE  Follows commands in BUE  Withdraws in BLE    Exam findings to suggest seizures:  Myoclonus - no   eye twitching - no   Nystagmus - no   gaze deviation - no   waxy rigidity - no        Significant Labs: All pertinent lab results from the past 24 hours have been reviewed.    Significant Studies: I have reviewed all pertinent imaging results/findings within the past 24 hours.       [1]   Current Facility-Administered Medications   Medication Dose Route Frequency Provider Last Rate Last Admin    0.9% NaCl infusion   Intravenous Continuous Jenny Sandhu PA-C 75 mL/hr at 07/16/25 1201 Rate Verify at 07/16/25 1201    acyclovir 600 mg in 0.9% NaCl 100 mL IVPB  10 mg/kg Intravenous Q8H Jenny Sandhu PA-C 100 mL/hr at 07/16/25 1232 600 mg at 07/16/25 1232    ammonium lactate 12 % lotion   Topical (Top) BID PRN Richard Webb MD        baclofen tablet 20 mg  20 mg Per G Tube TID Sean Adam MD   20 mg at 07/16/25 0908    barium sulfate (READI-CAT) suspension 450 mL  450 mL Per NG tube PRN Javier Daniels MD        carboxymethylcellulose sodium 1 % DpGe 1 drop  1 drop Ophthalmic TID Arpita Davis, MARGIE   1 drop at 07/16/25 0908    dantrolene capsule 50 mg  50 mg Per G Tube TID Sean Adam MD   50 mg at 07/16/25 0908    dextrose 50% injection 12.5 g  12.5 g Intravenous PRN Juno Mckeon MD        dextrose 50% injection 25 g  25 g Intravenous PRN Juno Mckeon MD        donepeziL tablet 5 mg  5 mg Per G Tube QHS Sean Adam MD   5 mg at 07/15/25 2111    enoxaparin injection 40 mg  40 mg Subcutaneous Daily Francisco Adam DO        erythromycin 5 mg/gram (0.5 %) ophthalmic ointment   Both Eyes QHS Francisco Adam DO   Given at 07/14/25 2101    glucagon (human recombinant) injection 1 mg  1 mg Intramuscular PRN Juno Mckeon MD   0.5 mg at 07/05/25 1128    glucose chewable tablet 16 g  16 g Per NG tube PRN Cordelia Velasco MD   16 g at 07/07/25 9074     glucose chewable tablet 24 g  24 g Per NG tube PRN Cordelia Velasco MD        lactulose 20 gram/30 mL solution Soln 10 g  10 g Per G Tube BID Sean Adam MD   10 g at 07/16/25 0913    levETIRAcetam injection 500 mg  500 mg Intravenous Q12H Arpita Davis NP   500 mg at 07/16/25 0909    magnesium oxide tablet 800 mg  800 mg Per G Tube PRN Jenny Sandhu PA-C        magnesium oxide tablet 800 mg  800 mg Per G Tube PRN Jenny Sandhu PA-C        meropenem 2 g in 0.9% NaCl 100 mL IVPB (MB+)  2 g Intravenous Q8H Francisco Adam DO   Stopped at 07/16/25 1029    mirtazapine tablet 15 mg  15 mg Per G Tube QHS Sean Adam MD   15 mg at 07/15/25 2111    naloxone 0.4 mg/mL injection 0.02 mg  0.02 mg Intravenous PRN Juno Mckeon MD        NORepinephrine bitartrate-D5W 4 mg/250 mL (16 mcg/mL) PERIPHERAL access infusion  0-0.2 mcg/kg/min Intravenous Continuous Katya Reilly NP 18 mL/hr at 07/16/25 1201 0.08 mcg/kg/min at 07/16/25 1201    ondansetron injection 4 mg  4 mg Intravenous Q6H PRN Edwin Figueroa DO   4 mg at 07/12/25 1231    oxyCODONE immediate release tablet 5 mg  5 mg Per NG tube Q6H PRN Odette Christiansen MD   5 mg at 07/07/25 0536    polyethylene glycol packet 17 g  17 g Per G Tube BID Sean Adam MD   17 g at 07/16/25 0913    potassium bicarbonate disintegrating tablet 35 mEq  35 mEq Per G Tube PRN Jenny Sandhu PA-C        potassium bicarbonate disintegrating tablet 50 mEq  50 mEq Per G Tube PRN Jenny Sandhu PA-C        potassium bicarbonate disintegrating tablet 60 mEq  60 mEq Per G Tube PRN Jenny Sandhu PA-C        potassium, sodium phosphates 280-160-250 mg packet 2 packet  2 packet Per G Tube PRN Jenny Sandhu PA-C        potassium, sodium phosphates 280-160-250 mg packet 2 packet  2 packet Per G Tube PRN Jenny Sandhu PA-C        potassium, sodium phosphates 280-160-250 mg packet 2 packet  2 packet Per G Tube PRN Jenny Sandhu PA-C         senna-docusate 8.6-50 mg per tablet 1 tablet  1 tablet Per G Tube BID Sean Adam MD   1 tablet at 07/16/25 0913    sodium chloride 0.9% flush 10 mL  10 mL Intravenous Q12H PRN Juno Mckeon MD        vancomycin (VANCOCIN) 1,000 mg in 0.9% NaCl 250 mL IVPB (admixture device)  1,000 mg Intravenous Q12H Francisco Adam DO   Stopped at 07/16/25 1038    vancomycin - pharmacy to dose   Intravenous pharmacy to manage frequency Jenny Sandhu, PA-C

## 2025-07-16 NOTE — PROGRESS NOTES
Pharmacokinetic Assessment Follow Up: IV Vancomycin    Vancomycin serum concentration assessment/plan:  Random level resulted as 15.8 mcg/mL; Goal 15-20 mcg/mL, Meningitis  Renal function stable  Change regimen to Vancomycin 1000 mg IV q12h  Next level to be drawn on 7/17/25 at 2000     Drug levels (last 3 results):  Recent Labs   Lab Result Units 07/16/25  0339   Vancomycin Random ug/ml 15.8       Pharmacy will continue to follow and monitor vancomycin.    Please contact pharmacy at extension [phone] for questions regarding this assessment.    Thank you for the consult,   Josey Charles       Patient brief summary:  Lisa Moreno is a 44 y.o. male initiated on antimicrobial therapy with IV Vancomycin for treatment of meningitis    The patient's current regimen is 1000 mg Q12H     Drug Allergies:   Review of patient's allergies indicates:   Allergen Reactions    Penicillins      Hives and Itching  Tolerated zosyn- 7/1/2025    Allergen ext-venom-honey bee        Actual Body Weight:   59.9 kg     Renal Function:   Estimated Creatinine Clearance: 158.1 mL/min (based on SCr of 0.5 mg/dL).,     Dialysis Method (if applicable):  N/A    CBC (last 72 hours):  Recent Labs   Lab Result Units 07/14/25  0744 07/14/25 2319 07/16/25  0339   WBC K/uL 5.38 8.12 5.80   HGB gm/dL 8.8* 9.0* 7.9*   HCT % 28.3* 29.8* 25.3*   Platelet Count K/uL 340 471* 419   Lymph % % 6.1* 36.2 23.6   Mono % % 3.0* 9.6 10.5   Eos % % 0.7 0.9 0.3   Basophil % % 0.6 0.6 0.5       Metabolic Panel (last 72 hours):  Recent Labs   Lab Result Units 07/14/25  0744 07/14/25  2319 07/15/25  0059 07/16/25  0524   Sodium mmol/L 135* 133*  --  137   Potassium mmol/L 4.4 4.9  --  4.5   Chloride mmol/L 100 100  --  107   CO2 mmol/L 29 27  --  23   Glucose mg/dL 80 123*  --  74   Glucose, UA   --   --  Negative  --    BUN mg/dL 19 22*  --  17   Creatinine mg/dL 0.5 0.7  --  0.5   Albumin g/dL 2.1* 2.2*  --  1.8*   Bilirubin Total mg/dL 0.1 0.1  --  0.1   ALP  unit/L 75 79  --  68   AST unit/L 44 55*  --  37   ALT unit/L 52* 63*  --  43   Magnesium  mg/dL 1.8  --   --  1.8   Phosphorus Level mg/dL 2.6*  --   --  3.2       Vancomycin Administrations:  vancomycin given in the last 96 hours                     vancomycin 1,250 mg in 0.9% NaCl 250 mL IVPB (admixture device) (mg) 1,250 mg New Bag 07/15/25 2105                    Microbiologic Results:  Microbiology Results (last 7 days)       Procedure Component Value Units Date/Time    Blood culture [[id number]]  (Normal) Collected: 07/15/25 1328    Order Status: Completed Specimen: Blood from Peripheral, Hand, Right Updated: 07/15/25 2301     Blood Culture No Growth After 6 Hours    Blood culture [[id number]]  (Normal) Collected: 07/15/25 1319    Order Status: Completed Specimen: Blood from Peripheral, Hand, Left Updated: 07/15/25 2301     Blood Culture No Growth After 6 Hours

## 2025-07-16 NOTE — PROGRESS NOTES
[facility]    Wound Care     Patient Name:  Lisa Moreno  MRN:  [id number]  Date: 7/16/2025  Diagnosis: Witnessed seizure-like activity     History:  Past Medical History:   Diagnosis Date    Anxiety     Cognitive communication disorder 05/10/2022    Nonverbal at baseline but alert    Degenerative motor system disease 2006    Dr. Wise in movement disorder clinic on 2/23/2015.    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   Labs:   WBC (K/uL)   Date Value   07/16/2025 5.80   07/14/2025 8.12     Glucose (mg/dL)   Date Value   07/16/2025 74   07/14/2025 123 (H)   09/15/2023 70   09/14/2023 76     Albumin (g/dL)   Date Value   07/16/2025 1.8 (L)   07/14/2025 2.2 (L)   09/15/2023 3.4 (L)   09/14/2023 3.2 (L)       Morgan Score: 10    Narrative:  Pt seen for WC follow-up and agreed to assessment. Pt awake and alert. Family at bedside. Turns with moderate assist.   Lying on immerse. Bilateral EHOB boots on.   Chart reviewed for this encounter.   See Flow Sheet for additional documentation and media.    Wound vac applied. New Cannister.       Cleansed with Vashe. Wound bed pink and moist with slough. Scrring and induration to aubree wound.   Non sting barrier spray applied to aubree-wound    Applied 1 Urgutol  1 Black foam - track padding to left anterior hip.     Wound vac seal intact with no leaks. Pt tolerated well.    -125 mmHg continuous                 RECOMMENDATIONS:  Bedside nurse assess for acute changes (purulence, increased redness/swelling, increased drainage, malodor, increased pain, pallor, necrosis) please contact physician on any acute changes.      Bedside RN can reinforce wound vac dressing if leak detected.     If unable to maintain seal for >2 hours, remove and replace dressing with wet to dry dressing. Contact wound care and MD.     Thank you for the consult. Wound Care will continue to follow.           07/16/25 1545        Wound 04/23/25 1537 Pressure Injury Sacral spine #4   Date First Assessed/Time First Assessed: 04/23/25 1537   Present on Original Admission: Yes  Primary Wound Type: Pressure Injury  Location: Sacral spine  Wound Number: #4  Is this injury device related?: No   Wound Image    Pressure Injury Stage 4   Dressing Appearance Intact;Saturated   Drainage Amount Small   Drainage Characteristics/Odor Serosanguineous   Appearance Pink;Slough;Moist   Tissue loss description Full thickness   Periwound Area Scar tissue;Indurated   Wound Length (cm) 9 cm   Wound Width (cm) 9.4 cm   Wound Depth (cm) 2 cm   Wound Volume (cm^3) 88.593 cm^3   Wound Surface Area (cm^2) 66.44 cm^2   Supply Surface Area (L x W) 84.6 sq cm   Tunneling (depth (cm)/location) 1.7cm/11oclock   Undermining (depth (cm)/location) 7-5o;clock   Care Cleansed with:;Other (see comments)  (vashe)        Negative Pressure Wound Therapy  07/03/25 1420   Placement Date/Time: 07/03/25 1420   Location: Sacral Spine   NPWT Type Vacuum Therapy   Therapy Setting NPWT Continuous therapy   Pressure Setting NPWT 125 mmHg   Therapy Interventions NPWT Canister changed;Dressing changed;Trac pad replaced   Sponges Inserted NPWT Black;1                        [1]   Social History  Socioeconomic History    Marital status: Single   Tobacco Use    Smoking status: Never    Smokeless tobacco: Never   Substance and Sexual Activity    Alcohol use: Not Currently     Comment: social  drinker, at times    Drug use: Not Currently    Sexual activity: Never     Social Drivers of Health     Financial Resource Strain: Low Risk  (7/13/2025)    Overall Financial Resource Strain (CARDIA)     Difficulty of Paying Living Expenses: Not hard at all   Recent Concern: Financial Resource Strain - Medium Risk (7/1/2025)    Overall Financial Resource Strain (CARDIA)     Difficulty of Paying Living Expenses: Somewhat hard   Food Insecurity: No Food Insecurity (7/13/2025)    Hunger Vital Sign     Worried About Running Out of Food in the Last Year: Never true     Ran Out of Food in the Last Year: Never true   Transportation Needs: No Transportation Needs (7/13/2025)    PRAPARE - Transportation     Lack of Transportation (Medical): No     Lack of Transportation (Non-Medical): No   Recent Concern: Transportation Needs - Unmet Transportation Needs (4/17/2025)    PRAPARE - Transportation     Lack of Transportation (Medical): Yes     Lack of Transportation (Non-Medical): Yes   Physical Activity: Inactive (5/12/2025)    Exercise Vital Sign     Days of Exercise per Week: 0 days     Minutes of Exercise per Session: 0 min   Stress: No Stress Concern Present (7/13/2025)    [employer] - Occupational Stress Questionnaire     Feeling of Stress : Not at all   Recent Concern: Stress - Stress Concern Present (5/12/2025)    [employer] - Occupational Stress Questionnaire     Feeling of Stress : To some extent   Housing Stability: Low Risk  (7/13/2025)    Housing Stability Vital Sign     Unable to Pay for Housing in the Last Year: No     Number of Times Moved in the Last Year: 1     Homeless in the Last Year: No

## 2025-07-16 NOTE — ASSESSMENT & PLAN NOTE
Patient was awaiting discharge home on 7/14 when a rapid response was called as the patient began seizing. Two witnessed tonic-clonic seizures were observed lasting for about 1-2 minutes, given 2mg Ativan. After these seizures, he never returned to baseline. Baseline was described as full movement in neck and all extremities, tracking with head & neck, mumbles per mom, and following commands. Initial assessment, patient was found staring, with no blink to threat or closure of eyes, midline gaze, and not moving extremities with painful stimuli. During seizure, it is reported that patient became hypertensive, hypoxic, and tachycardic, with strong flexor posturing bilaterally. EEG applied showing discharges over the left hemisphere, no history of seizures or brain mass/lesions/infarction. Patient loaded with 40mg/kg of Keppra. Given concern for mentation and need for closer neurological monitoring, patient was transferred to [facility] where he will be admitted.     Admitted to [facility]  Hourly neurochecks and vital signs  SBP <180, MAP >65  Brain MRI w/wo epilepsy protocol - negative for acute process  cEEG - discharges in left hemisphere no seizures  Discontinued 7/16  Continue keppra 500mg IVP BID  Maintain seizure precautions  TF resuming in PM after LP  No AC/AP  SCDs & LVX for DVT ppx  PT/OT/SLP to continue treatment  Q6h bladder scans  LP at bedside unsuccessful 7/15, will obtain 7/16 under fluoroscopic guidance  Blood cultures resent, other workup pending and ID consulted  Adequately volume resuscitated now on low-dose norepinephrine

## 2025-07-16 NOTE — ASSESSMENT & PLAN NOTE
Nutrition consulted. Most recent weight and BMI monitored-     Measurements:  Wt Readings from Last 1 Encounters:   07/16/25 59.9 kg (132 lb)   Body mass index is 16.5 kg/m².    Patient has been screened and assessed by RD.    Malnutrition Type:  Context: chronic illness  Level: severe    Malnutrition Characteristic Summary:  Subcutaneous Fat (Malnutrition): severe depletion  Muscle Mass (Malnutrition): severe depletion    Interventions/Recommendations (treatment strategy):  1. Continue bolus TF recommendation: 5 cans/day of Isosource 1.5 to provide 1250 mL total volume, 1875 kcal, 220 g CHO, 85 protein, 19 fiber, 955 mL free water  - 90 mL flush before and after each (10 flushes total) can to provide additional 900 mL free water (Total 1855 mL)  2. Continue soft and bite sized textured diet as tolerated    3. Continue boost glucose control TID   4. Recommend arnie BID to aid in wound healing  5. Recommend MVI  6. RD to monitor weight, labs, meds, intake, tolerance

## 2025-07-16 NOTE — ASSESSMENT & PLAN NOTE
In setting of sepsis, pending LP  44M PMHx of ALS, neuromuscular dysfunction of the bladder, sacral ulcers, and spinal cerebellar ataxia, recent admit for severe dehydration and electrolyte disturbances who presented to the ER on 6/29, initially admitted to  for sepsis 2/2 pneumonia, acute hypoxic respiratory failure, and hypernatremia, upgraded to MICU for further management of septic shock. Stepped down to  7/7, planning for discharge to SNF 7/14 when rapid response called for GTC x2. Patient received IV Lorazepam 4 mg x1. Patient did not return to baseline and subsequently admitted to [facility] for higher level of care and further management. Patient received IV Levetiracetam load of 2396 mg followed by 500 mg BID. CAP EEG placed; Epilepsy following for assistance in management.    Recommendations:  - Discontinue vEEG monitoring  - Recommend to continue Levetiracetam 500 mg BID  - MRI Epilepsy Protocol today severely limited by motion, no compelling evidence of acute pathology  - Avoid agents that lower seizure threshold  - Management of infectious/metabolic abnormalities per NCC: pending LP  - Seizure precautions    Discussed plan of care with NCC team and mother at bedside. Will sign off, please call with questions.

## 2025-07-16 NOTE — ASSESSMENT & PLAN NOTE
45 yo male with ALS s/p PEG, sacral decub with current hospital course notable for ESBL Ecoli PNA s/p treatment with ertapenem with transfer to ICU on 7/14 for seizure like activity in shock requiring pressors. LP attempted, unsuccessful. Pending IR Lp. MRI brain without evidence of acute infection. Per mother at bedside, mentation is almost back to normal. Blood cx so far no growth to date. On exam, he does have an infiltrated R PIV with associated phlebitis, suspect contributing to his shock vs CNS infection (lower suspicion) vs bacteremia (though blcx so far no growth to date). He is on room air and denied respiratory symptoms, CXR without significant consolidation.     Recommendations:  -pending LP, follow up studies  -ok for broad spectrum antimicrobials at this time, though favor de-escalating pending cx data/work up  -follow up cx  -remove R PIV

## 2025-07-16 NOTE — PLAN OF CARE
"[facility] Care Plan    POC reviewed with Lisa Moreno and family at 0300. Family verbalized understanding. Questions and concerns addressed. No acute events today. Pt progressing toward goals. Will continue to monitor. See below and flowsheets for full assessment and VS info.     -No acute changes overnight  -Pt turned frequently off wounds to enhance the healing process  -Levo gtt started for MAPs repeatedly <65; 500cc bolus started but only half was infused per Katya, NP to begin Levo gtt  -Levo gtt @ 0.1 mcg/kg/min  -NS gtt @ 75 mL/hr  -Pending IR LP today        Is this a stroke patient? no    Neuro:  Joseph Coma Scale  Best Eye Response: 4-->(E4) spontaneous  Best Motor Response: 6-->(M6) obeys commands  Best Verbal Response: 1-->(V1) none  Joseph Coma Scale Score: 11  Assessment Qualifiers: Patient not sedated/intubated  Pupil PERRLA: yes     24 hr Temp:  [91.9 °F (33.3 °C)-100.4 °F (38 °C)]     CV:   Rhythm: normal sinus rhythm  BP goals:   SBP < 180  MAP > 65    Resp:           Plan: N/A    GI/:     Diet/Nutrition Received: supplemental food, tube feeding  Last Bowel Movement: 07/14/25  Voiding Characteristics: external catheter    Intake/Output Summary (Last 24 hours) at 7/16/2025 0425  Last data filed at 7/16/2025 0402  Gross per 24 hour   Intake 4513.58 ml   Output 3490 ml   Net 1023.58 ml     Unmeasured Output  Unmeasured Urine Occurrence: 0  Unmeasured Stool Occurrence: 0    Labs/Accuchecks:  Recent Labs   Lab 07/16/25  0339   WBC 5.80   RBC 3.02*   HGB 7.9*   HCT 25.3*         Recent Labs   Lab 07/14/25  2319   *   K 4.9   CO2 27      BUN 22*   CREATININE 0.7   ALKPHOS 79   ALT 63*   AST 55*   BILITOT 0.1    No results for input(s): "PROTIME", "INR", "APTT", "HEPANTIXA" in the last 168 hours. No results for input(s): "CPK", "CPKMB", "TROPONINI", "MB" in the last 168 hours.    Electrolytes: N/A - electrolytes WDL  Accuchecks: none    Gtts:   0.9% NaCl   Intravenous Continuous " 75 mL/hr at 25 0402 Rate Verify at 25 0402    NORepinephrine bitartrate-D5W  0-0.2 mcg/kg/min Intravenous Continuous 27 mL/hr at 25 0402 0.12 mcg/kg/min at 25 040       LDA/Wounds:    Morgan Risk Assessment  Sensory Perception: 2-->very limited  Moisture: 3-->occasionally moist  Activity: 1-->bedfast  Mobility: 2-->very limited  Nutrition: 1-->very poor  Friction and Shear: 1-->problem  Morgan Score: 10  Is your morgan score 12 or less? yes enrolled in the pea program, morgan mobility score is 2 or less, they are on a immerse bed, if their moisture score is 2 or less, they do not have a sacral mepilex and instead have zinc barrier cream, and heel boots on          Restraints:   Restraint Order  Length of Order: Order good for next 24 hours or when removed.  Date that the current order will : 25  Time that the current order will :   Order Upon Application: Yes    [facility]         Problem: Adult Inpatient Plan of Care  Goal: Plan of Care Review  Outcome: Progressing  Goal: Patient-Specific Goal (Individualized)  Outcome: Progressing  Goal: Absence of Hospital-Acquired Illness or Injury  Outcome: Progressing  Goal: Optimal Comfort and Wellbeing  Outcome: Progressing  Goal: Readiness for Transition of Care  Outcome: Progressing     Problem: Skin Injury Risk Increased  Goal: Skin Health and Integrity  Outcome: Progressing     Problem: Sepsis/Septic Shock  Goal: Optimal Coping  Outcome: Progressing  Goal: Absence of Bleeding  Outcome: Progressing  Goal: Blood Glucose Level Within Targeted Range  Outcome: Progressing  Goal: Absence of Infection Signs and Symptoms  Outcome: Progressing  Goal: Optimal Nutrition Intake  Outcome: Progressing     Problem: Pneumonia  Goal: Fluid Balance  Outcome: Progressing  Goal: Resolution of Infection Signs and Symptoms  Outcome: Progressing  Goal: Effective Oxygenation and Ventilation  Outcome: Progressing     Problem: Wound  Goal: Optimal  Coping  Outcome: Progressing  Goal: Optimal Functional Ability  Outcome: Progressing  Goal: Absence of Infection Signs and Symptoms  Outcome: Progressing  Goal: Improved Oral Intake  Outcome: Progressing  Goal: Optimal Pain Control and Function  Outcome: Progressing  Goal: Skin Health and Integrity  Outcome: Progressing  Goal: Optimal Wound Healing  Outcome: Progressing

## 2025-07-16 NOTE — PLAN OF CARE
"[facility] Care Plan  POC reviewed with Lisa Irvin Moreno and family at 1400. Patient and Family verbalized understanding. Questions and concerns addressed. No acute events today. Pt progressing toward goals. Will continue to monitor. See below and flowsheets for full assessment and VS info.   LP done          Is this a stroke patient? no    Neuro:  Joseph Coma Scale  Best Eye Response: 4-->(E4) spontaneous  Best Motor Response: 6-->(M6) obeys commands  Best Verbal Response: 1-->(V1) none  Joseph Coma Scale Score: 11  Assessment Qualifiers: Patient not sedated/intubated  Pupil PERRLA: yes     24 hr Temp:  [92.8 °F (33.8 °C)-100.4 °F (38 °C)]     CV:   Rhythm: normal sinus rhythm  BP goals:   SBP < 180  MAP > 65    Resp:           Plan: N/A    GI/:     Diet/Nutrition Received: supplemental food, tube feeding  Last Bowel Movement: 07/14/25  Voiding Characteristics: external catheter    Intake/Output Summary (Last 24 hours) at 7/16/2025 1749  Last data filed at 7/16/2025 1701  Gross per 24 hour   Intake 4920.72 ml   Output 1255 ml   Net 3665.72 ml     Unmeasured Output  Unmeasured Urine Occurrence: 0  Unmeasured Stool Occurrence: 0    Labs/Accuchecks:  Recent Labs   Lab 07/16/25  0339   WBC 5.80   RBC 3.02*   HGB 7.9*   HCT 25.3*         Recent Labs   Lab 07/16/25  0524      K 4.5   CO2 23      BUN 17   CREATININE 0.5   ALKPHOS 68   ALT 43   AST 37   BILITOT 0.1    No results for input(s): "PROTIME", "INR", "APTT", "HEPANTIXA" in the last 168 hours. No results for input(s): "CPK", "CPKMB", "TROPONINI", "MB" in the last 168 hours.    Electrolytes: N/A - electrolytes WDL  Accuchecks: none    Gtts:   0.9% NaCl   Intravenous Continuous 75 mL/hr at 07/16/25 1705 New Bag at 07/16/25 1705    NORepinephrine bitartrate-D5W  0-0.2 mcg/kg/min Intravenous Continuous 4.5 mL/hr at 07/16/25 1701 0.02 mcg/kg/min at 07/16/25 1701       LDA/Wounds:    Morgan Risk Assessment  Sensory Perception: 2-->very " limited  Moisture: 3-->occasionally moist  Activity: 1-->bedfast  Mobility: 2-->very limited  Nutrition: 1-->very poor  Friction and Shear: 1-->problem  Morgan Score: 10    Is your morgan score 12 or less? no            Restraints:   Restraint Order  Length of Order: Order good for next 24 hours or when removed.  Date that the current order will : 25  Time that the current order will : 1126  Order Upon Application: Yes    [facility]

## 2025-07-16 NOTE — PROGRESS NOTES
[facility] - Neuro Critical Care  Neurocritical Care  Progress Note    Admit Date: 6/29/2025  Service Date: 07/16/2025  Length of Stay: 17    Subjective:     Chief Complaint: Witnessed seizure-like activity    History of Present Illness: Mr. Moreno is a 44yoM with PMHx significant for ALS (onset 2006), neuromuscular dysfunction of the bladder, sacral ulcers, and spinal cerebellar ataxia who was admitted recently admitted (5/9/2025) for severe dehydration, electrolyte disturbances, and covid who presented to the ED with EMS and c/o AMS and hypoxia. Nonverbal at baseline but apparently is usually more alert; has been less responsive and interactive x1 day. Upon EMS arrival, was obtunded and sating 74%. They gave him solumedrol and duonebs en route which improved his oxygen status to >90%. Mother at bedside reports increased difficulty with swallowing and decreased PO intake.        While in the ED, labs and imaging significant for Na 149, K 5.4, Cl 111, CO2 21, glucose 149, BUN 38, creat 1.0, albumin 2.2, ALP 80, AST/ALT 34/23, anion gap 17, procal 8.42, BNP <10, WBC 7.11, H/H 12/40.5, plts 315, covid/flu negative, .6, VBG 7.349/47.1/44.5/23.9, istat LA 4.9->7.4->4.2 (7.4 likely error?). Chest xray with patchy opacities bilaterally, predominant within the bibasilar regions but also within the left lung apex and the left mid-lung; compatible with multifocal pneumonia. Started on BSA by the ED; vanc and cefepime. CCM consulted for sepsis. During first examination the patient was stable with MAPs >65 and Lactic acid down trended to 2.6. Around 6 am of 6/30 [place] was called hypotension with MAPs in the 50. Pt was started on levophed and was transferred to MICU 6/29 for septic shock secondary to multifocal pneumonia. Patient initially requiring pressors but since discontinued. Given dysphagia, IR placed a PEG tube 7/5 for nutrition and medication administration. Patient was stepped down to  on 7/7.    Patient was  awaiting discharge home tonight on 7/14 when a rapid response was called as the patient began seizing. Two witnessed tonic-clonic seizures were observed lasting for about 1-2 minutes, given 2mg Ativan. After these seizures, he never returned to baseline. Baseline was described as full movement in neck and all extremities, tracking with head & neck, mumbles per mom, and following commands. Initial assessment, patient was found staring, with no blink to threat or closure of eyes, midline gaze, and not moving extremities with painful stimuli. During seizure, it is reported that patient became hypertensive, hypoxic, and tachycardic, with strong flexor posturing bilaterally. EEG applied showing discharges over the left hemisphere, no history of seizures or brain mass/lesions/infarction. Patient loaded with 40mg/kg of Keppra. Given concern for mentation and need for closer neurological monitoring, patient was transferred to [facility] where he will be admitted.       Hospital Course: 07/15/2025: NAEON. Afebrile, BP 90/50s. MRI completed. EEG in place. Keppra and LR given. Bilateral mitt restraints. Hypotensive this morning, given 500 NS and 1L LR. Temp low 90s, bear hugger placed. UOP 1.6L. LP today   07/16/2025: continued improvement in mental status, alert and interactive today, pleasant and smiling with care, does attempt to remove lines and PIVs.  Did require initiation of norepinephrine overnight to maintain MAP > 65, on and off joseluis hugger for normothermia, remains on antibiotics which have been broadened to vanc/meropenem pending ID evaluation. Obtaining LP under fluoroscopy today.         Objective:     Vitals:  Temp: (!) 95.9 °F (35.5 °C)  Pulse: 69  Rhythm: normal sinus rhythm  BP: (!) 110/59  MAP (mmHg): 78  Resp: 19  SpO2: 100 %    Temp  Min: 92.8 °F (33.8 °C)  Max: 100.4 °F (38 °C)  Pulse  Min: 68  Max: 121  BP  Min: 83/48  Max: 121/55  MAP (mmHg)  Min: 63  Max: 81  Resp  Min: 7  Max: 23  SpO2  Min: 43 %  Max: 100  %    07/15 0701 - 07/16 0700  In: 4835.9 [I.V.:839.6]  Out: 2990 [Urine:2990]   Unmeasured Output  Unmeasured Urine Occurrence: 0  Unmeasured Stool Occurrence: 0        Physical Exam  Vitals and nursing note reviewed.   Constitutional:       General: He is not in acute distress.     Appearance: He is cachectic. He is ill-appearing. He is not diaphoretic.   HENT:      Head: Normocephalic and atraumatic.      Mouth/Throat:      Mouth: Mucous membranes are dry.      Pharynx: Oropharynx is clear.   Eyes:   No scleral icterus or injection  Cardiovascular:      Rate and Rhythm: Regular rhythm. Tachycardia present.      Pulses: Normal pulses.   Pulmonary:      Effort: Pulmonary effort is normal. No respiratory distress.   Chest:      Comments: Pectus excavatum  Abdominal:      General: Abdomen is flat. There is no distension.      Palpations: Abdomen is soft.      Tenderness: There is no abdominal tenderness.      Comments: PEG site c/d/i    Musculoskeletal:      Right lower leg: No edema.      Left lower leg: No edema.   Skin:     General: Skin is warm and dry.      Capillary Refill: Capillary refill takes less than 2 seconds.   Large sacral ulcer present prior to this hospital stay  Neurological:      Mental Status: He is alert.      Comments: E4V2M6  Awake and alert, nods/shakes head appropriately and follows some simple commands  PERRL, EOMi, face symmetric, tongue midline  Antigravity in arms with volitional movement and intermittent commands         Medications:  Continuous0.9% NaCl, Last Rate: 75 mL/hr at 07/16/25 1501  NORepinephrine bitartrate-D5W, Last Rate: 0.08 mcg/kg/min (07/16/25 1501)    Scheduledacyclovir, 10 mg/kg, Q8H  baclofen, 20 mg, TID  carboxymethylcellulose sodium, 1 drop, TID  dantrolene, 50 mg, TID  donepeziL, 5 mg, QHS  enoxparin, 40 mg, Daily  erythromycin, , QHS  lactulose, 10 g, BID  levETIRAcetam (Keppra) IV (PEDS and ADULTS), 500 mg, Q12H  meropenem IV (PEDS and ADULTS), 2 g,  Q8H  mirtazapine, 15 mg, QHS  polyethylene glycol, 17 g, BID  senna-docusate, 1 tablet, BID  vancomycin (VANCOCIN) IV (PEDS and ADULTS), 1,000 mg, Q12H    PRNammonium lactate, , BID PRN  barium sulfate, 450 mL, PRN  dextrose 50%, 12.5 g, PRN  dextrose 50%, 25 g, PRN  glucagon (human recombinant), 1 mg, PRN  glucose, 16 g, PRN  glucose, 24 g, PRN  magnesium oxide, 800 mg, PRN  magnesium oxide, 800 mg, PRN  naloxone, 0.02 mg, PRN  ondansetron, 4 mg, Q6H PRN  oxyCODONE, 5 mg, Q6H PRN  potassium bicarbonate, 35 mEq, PRN  potassium bicarbonate, 50 mEq, PRN  potassium bicarbonate, 60 mEq, PRN  potassium, sodium phosphates, 2 packet, PRN  potassium, sodium phosphates, 2 packet, PRN  potassium, sodium phosphates, 2 packet, PRN  sodium chloride 0.9%, 10 mL, Q12H PRN  vancomycin - pharmacy to dose, , pharmacy to manage frequency      Today I personally reviewed pertinent medications, lines/drains/airways, imaging, cardiology results, laboratory results, microbiology results,     Diet  Diet NPO  Diet NPO        Assessment/Plan:     Neuro  * Witnessed seizure-like activity  Patient was awaiting discharge home on 7/14 when a rapid response was called as the patient began seizing. Two witnessed tonic-clonic seizures were observed lasting for about 1-2 minutes, given 2mg Ativan. After these seizures, he never returned to baseline. Baseline was described as full movement in neck and all extremities, tracking with head & neck, mumbles per mom, and following commands. Initial assessment, patient was found staring, with no blink to threat or closure of eyes, midline gaze, and not moving extremities with painful stimuli. During seizure, it is reported that patient became hypertensive, hypoxic, and tachycardic, with strong flexor posturing bilaterally. EEG applied showing discharges over the left hemisphere, no history of seizures or brain mass/lesions/infarction. Patient loaded with 40mg/kg of Keppra. Given concern for mentation and  need for closer neurological monitoring, patient was transferred to [facility] where he will be admitted.     Admitted to [facility]  Hourly neurochecks and vital signs  SBP <180, MAP >65  Brain MRI w/wo epilepsy protocol - negative for acute process  cEEG - discharges in left hemisphere no seizures  Discontinued 7/16  Continue keppra 500mg IVP BID  Maintain seizure precautions  TF resuming in PM after LP  No AC/AP  SCDs & LVX for DVT ppx  PT/OT/SLP to continue treatment  Q6h bladder scans  LP at bedside unsuccessful 7/15, will obtain 7/16 under fluoroscopic guidance  Blood cultures resent, other workup pending and ID consulted  Adequately volume resuscitated now on low-dose norepinephrine    Acute encephalopathy  Secondary to seizures, improving    ALS (amyotrophic lateral sclerosis)  UMN predominance with diffuse limb spasticity. Slowly progressive. Fully-reliant on mother, others for self care and feeding and cleaning and toileting. Respiratory function is poor per FVC .     Continue home donepezil for neuroprotection  Continue home dantrolene and baclofen for spasticity  Turn q2h with aspiration precautions    Spinocerebellar ataxia  See ALS (amyotrophic lateral sclerosis)      Spastic diplegia  See ALS (amyotrophic lateral sclerosis)    Psychiatric  Insomnia secondary to depression with anxiety  baseline    Pulmonary  Pneumonia due to E. coli  Admitted to MICU for septic shock 2/2 ESBL E. Coli PNA  Upon EMS arrival, the patient was obtunded and hypoxic with an oxygen saturation of 74%.  Weaned off Levophed  On Ceftriaxone through 7/12 per ID recs, now completed  Satting well on room air    This has since resolved     - now with new septic appearing picture see separate issue    ID  Septic shock  Unknown source as of this time but just completed treatment  course of ertapenem for ESBL pneumonia.  - on vancomycin & Meropenem with CNS coverage pending LP  - other cultures in process  - ID consulted recommendations  appreciated    Endocrine  Body mass index (BMI) less than 19  See malnutrition    Severe protein-calorie malnutrition  Nutrition consulted. Most recent weight and BMI monitored-     Measurements:  Wt Readings from Last 1 Encounters:   07/16/25 59.9 kg (132 lb)   Body mass index is 16.5 kg/m².    Patient has been screened and assessed by RD.    Malnutrition Type:  Context: chronic illness  Level: severe    Malnutrition Characteristic Summary:  Subcutaneous Fat (Malnutrition): severe depletion  Muscle Mass (Malnutrition): severe depletion    Interventions/Recommendations (treatment strategy):  1. Continue bolus TF recommendation: 5 cans/day of Isosource 1.5 to provide 1250 mL total volume, 1875 kcal, 220 g CHO, 85 protein, 19 fiber, 955 mL free water  - 90 mL flush before and after each (10 flushes total) can to provide additional 900 mL free water (Total 1855 mL)  2. Continue soft and bite sized textured diet as tolerated    3. Continue boost glucose control TID   4. Recommend arnie BID to aid in wound healing  5. Recommend MVI  6. RD to monitor weight, labs, meds, intake, tolerance      GI  Gastrostomy status  Placed this hospitalization    Orthopedic  Pressure injury of deep tissue of heel  L Heel with black eschar. R Heel pressure ulcer with scant serosanguineous drainage. Photos uploaded into chart.   Wound care following      Decubitus ulcer of sacral region, stage 4  Wound vac removed on 7/14 prior patient was going to be discharged.  Wound care following. Plan to reapply sacral wound vac with dressing changes on [facility]  General surgery consulted previously, no indication for inpatient debridement recommended; consider reconsult if needed            The patient is being Prophylaxed for:  Venous Thromboembolism with: Mechanical or Chemical  Stress Ulcer with: None  Ventilator Pneumonia with: not applicable    Activity Orders            Diet NPO: NPO starting at 07/15 0157    Elevate HOB Elevate HOB 30-45 degrees  during feeding unless contraindicated starting at 06/30 1357    Progressive Mobility Protocol (mobilize patient to their highest level of functioning at least twice daily) starting at 06/30 0800    Turn patient every 2 hours starting at 06/30 0000          Full Code    Patient and mother updated at bedside on care plan and progress thus far.     Critical condition in that Patient has a condition that poses threat to life and bodily function: see associated documentation     40 minutes of Critical care time was spent personally by me on the following activities: development of treatment plan with patient or surrogate and bedside caregivers, discussions with consultants, evaluation of patient's response to treatment, examination of patient, ordering and performing treatments and interventions, ordering and review of laboratory studies, ordering and review of radiographic studies, pulse oximetry, antibiotic titration if applicable, vasopressor titration if applicable, re-evaluation of patient's condition. This critical care time did not overlap with that of any other provider or involve time for any procedures. There is high probability for acute neurological change leading to clinical and possibly life-threatening deterioration requiring highest level of physician preparedness for urgent intervention.      Francisco Adam, DO  Neurocritical Care  [facility] - Neuro Critical Care

## 2025-07-16 NOTE — PLAN OF CARE
07/16/25 1433   Rounds   Attendance Provider;   Discharge Plan A Other   Why the patient remains in the hospital Requires continued medical care   Transition of Care Barriers None     Aura Parker MSW, LCSW  [facility]  Case Management Dept.

## 2025-07-16 NOTE — PROGRESS NOTES
Attempted to apply wound vac, Pt bedside RN stated that pt will be going down to IR at 1400, requested to come back after getting back from IR to place wound vac.

## 2025-07-16 NOTE — ASSESSMENT & PLAN NOTE
Admitted to MICU for septic shock 2/2 ESBL E. Coli PNA  Upon EMS arrival, the patient was obtunded and hypoxic with an oxygen saturation of 74%.  Weaned off Levophed  On Ceftriaxone through 7/12 per ID recs, now completed  Satting well on room air    This has since resolved     - now with new septic appearing picture see separate issue

## 2025-07-16 NOTE — ASSESSMENT & PLAN NOTE
Wound vac removed on 7/14 prior patient was going to be discharged.  Wound care following. Plan to reapply sacral wound vac with dressing changes on [facility]  General surgery consulted previously, no indication for inpatient debridement recommended; consider reconsult if needed

## 2025-07-16 NOTE — ASSESSMENT & PLAN NOTE
Initially admitted to MICU for septic shock 2/2 ESBL EColi PNA  - On Acyclovir, Yuri, Vanc  - UA clean  - Blood cx NGTD  - Pending LP  - Management per NCC

## 2025-07-16 NOTE — PROCEDURES
DATE: 7/15/25    EEG NUMBER:  [id number]    REFERRING PHYSICIAN:  Dr. Adam      This EEG was performed to assess for evidence of underlying epilepsy.     ELECTROENCEPHALOGRAM REPORT     METHODOLOGY:  Electroencephalographic (EEG) recording is with electrodes placed according to the International 10-20 placement system.  Thirty two (32) channels of digital signal are simultaneously recorded from the scalp and may include EKG, EMG, and/or eye monitors.   Recording band pass was 0.1 to 512 hz.  Digital video recording of the patient is simultaneously recorded with the EEG.  The staff report clinical symptoms and may press an event button when the patient has symptoms of clinical interest to the treating physicians.  EEG and video recording is stored and archived in digital format.  The entire recording is visually reviewed, and the times identified by computer analysis as being spikes or seizures are reviewed again.  Activation procedures which include photic stimulation, hyperventilation and instructing patients to perform simple task are done in selected patients.   Compresses spectral analysis (CSA) is also performed on the activity recorded from each individual channel.  This is displayed as a power display of frequencies from 0 to 30 Hz over time.   The CSA analysis is done and displayed continuously.  This is reviewed for asymmetries in power between homologous areas of the scalp and for presence of changes in power which can be seen when seizures occur.  Sections of suspected abnormalities on the CSA is then compared with the original EEG recording.                [employer] software was also utilized in the review of this study.  This software suite analyzes the EEG recording in multiple domains.  Coherence and rhythmicity is computed to identify EEG sections which may contain organized seizures.  Each channel undergoes analysis to detect presence of spike and sharp waves which have special and morphological  characteristic of epileptic activity.  The routine EEG recording is converted from spacial into frequency domain.  This is then displayed comparing homologous areas to identify areas of significant asymmetry.  Algorithm to identify non-cortically generated artifact is used to separate eye movement, EMG and other artifact from the EEG.     Recording times   Start on July 15, 2025 at hours 7 minute 1 seconds 2   End on July 15, 2025 at hour 8 minute 18 seconds 40  Start on July 15, 2025 at hour 14 minute 19 seconds 52   End on July 16, 2025 at hours 7 minute 0 seconds 2   Start on July 16, 2025 at hour 7 minute 0 seconds 37   End on July 16, 2025 at hours 13 minute 12 seconds 3   Total time of EEG recording for the study was 24 hrs and 6 min     EEG FINDINGS:  The recording was obtained with a number of standard bipolar and referential montages during wakefulness, drowsiness and sleep.  In the alert state, the posterior background rhythm was a symmetric, well-modulated 9 to 10 Hz alpha rhythm, which reacted symmetrically to eye opening. During drowsiness, the background rhythm waxed and waned and there were periods of slowing.  During stage II sleep, symmetric V waves and sleep spindles were noted.  There were no focal abnormalities.  There were no interictal epileptiform abnormalities and no clinical or electrographic seizures were recorded.  Excessive beta activity was noted throughout the record which was diffuse.     The EKG channel revealed a sinus tachycardia.     IMPRESSION:  This is a normal EEG during wakefulness, drowsiness and sleep.     CLINICAL CORRELATION:  The patient is a 44-year-old male with a history of ALS who had a witnessed seizure and is currently maintained on Keppra. This is a normal EEG during wakefulness, drowsiness and sleep.  There is no evidence for neither cortical dysfunction nor an epileptic process on this recording.  No seizures were recorded during this study.  The excessive beta  range activity is likely secondary to use of benzodiazepine

## 2025-07-16 NOTE — EICU
Intervention Initiated From:  COR / ABHISHEKU    Ren intervened regarding:  Rounding (Video assessment)  VICU Night Rounds Checklist  24H Vital Sign Range:  Temp:  [91.9 °F (33.3 °C)-98.4 °F (36.9 °C)]   Pulse:  []   Resp:  [10-21]   BP: ()/(50-84)   SpO2:  [95 %-100 %]     Video rounds

## 2025-07-16 NOTE — SUBJECTIVE & OBJECTIVE
Objective:     Vitals:  Temp: (!) 95.9 °F (35.5 °C)  Pulse: 69  Rhythm: normal sinus rhythm  BP: (!) 110/59  MAP (mmHg): 78  Resp: 19  SpO2: 100 %    Temp  Min: 92.8 °F (33.8 °C)  Max: 100.4 °F (38 °C)  Pulse  Min: 68  Max: 121  BP  Min: 83/48  Max: 121/55  MAP (mmHg)  Min: 63  Max: 81  Resp  Min: 7  Max: 23  SpO2  Min: 43 %  Max: 100 %    07/15 0701 - 07/16 0700  In: 4835.9 [I.V.:839.6]  Out: 2990 [Urine:2990]   Unmeasured Output  Unmeasured Urine Occurrence: 0  Unmeasured Stool Occurrence: 0        Physical Exam  Vitals and nursing note reviewed.   Constitutional:       General: He is not in acute distress.     Appearance: He is cachectic. He is ill-appearing. He is not diaphoretic.   HENT:      Head: Normocephalic and atraumatic.      Mouth/Throat:      Mouth: Mucous membranes are dry.      Pharynx: Oropharynx is clear.   Eyes:   No scleral icterus or injection  Cardiovascular:      Rate and Rhythm: Regular rhythm. Tachycardia present.      Pulses: Normal pulses.   Pulmonary:      Effort: Pulmonary effort is normal. No respiratory distress.   Chest:      Comments: Pectus excavatum  Abdominal:      General: Abdomen is flat. There is no distension.      Palpations: Abdomen is soft.      Tenderness: There is no abdominal tenderness.      Comments: PEG site c/d/i    Musculoskeletal:      Right lower leg: No edema.      Left lower leg: No edema.   Skin:     General: Skin is warm and dry.      Capillary Refill: Capillary refill takes less than 2 seconds.   Large sacral ulcer present prior to this hospital stay  Neurological:      Mental Status: He is alert.      Comments: E4V2M6  Awake and alert, nods/shakes head appropriately and follows some simple commands  PERRL, EOMi, face symmetric, tongue midline  Antigravity in arms with volitional movement and intermittent commands         Medications:  Continuous0.9% NaCl, Last Rate: 75 mL/hr at 07/16/25 1501  NORepinephrine bitartrate-D5W, Last Rate: 0.08 mcg/kg/min  (07/16/25 6121)    Scheduledacyclovir, 10 mg/kg, Q8H  baclofen, 20 mg, TID  carboxymethylcellulose sodium, 1 drop, TID  dantrolene, 50 mg, TID  donepeziL, 5 mg, QHS  enoxparin, 40 mg, Daily  erythromycin, , QHS  lactulose, 10 g, BID  levETIRAcetam (Keppra) IV (PEDS and ADULTS), 500 mg, Q12H  meropenem IV (PEDS and ADULTS), 2 g, Q8H  mirtazapine, 15 mg, QHS  polyethylene glycol, 17 g, BID  senna-docusate, 1 tablet, BID  vancomycin (VANCOCIN) IV (PEDS and ADULTS), 1,000 mg, Q12H    PRNammonium lactate, , BID PRN  barium sulfate, 450 mL, PRN  dextrose 50%, 12.5 g, PRN  dextrose 50%, 25 g, PRN  glucagon (human recombinant), 1 mg, PRN  glucose, 16 g, PRN  glucose, 24 g, PRN  magnesium oxide, 800 mg, PRN  magnesium oxide, 800 mg, PRN  naloxone, 0.02 mg, PRN  ondansetron, 4 mg, Q6H PRN  oxyCODONE, 5 mg, Q6H PRN  potassium bicarbonate, 35 mEq, PRN  potassium bicarbonate, 50 mEq, PRN  potassium bicarbonate, 60 mEq, PRN  potassium, sodium phosphates, 2 packet, PRN  potassium, sodium phosphates, 2 packet, PRN  potassium, sodium phosphates, 2 packet, PRN  sodium chloride 0.9%, 10 mL, Q12H PRN  vancomycin - pharmacy to dose, , pharmacy to manage frequency      Today I personally reviewed pertinent medications, lines/drains/airways, imaging, cardiology results, laboratory results, microbiology results,     Diet  Diet NPO  Diet NPO

## 2025-07-16 NOTE — PROCEDURES
Radiology Post-Procedure Note    Pre Op Diagnosis: seizure-like activity    Post Op Diagnosis: Same    Procedure: lumbar puncture    Procedure performed by: Simon Jose PA-C, Evan Dillon MD    Written Informed Consent Obtained: Yes    Specimen Removed: 16 mL CSF    Estimated Blood Loss: Minimal    Findings: Following written informed consent and sterile prep and drape, a 22 gauge spinal needle was inserted at L4 - L5 intralaminar space under fluoroscopic surveillance. Opening pressure was measured as less than 9 cm H2O.  16 mL blood-tinged CSF removed and sent to the lab for further analysis.  There were no complications.    Patient tolerated procedure well.  See report for further details.    Simon Jose PA-C  Interventional Radiology

## 2025-07-16 NOTE — PROGRESS NOTES
"[facility]  Neurology-Epilepsy  Progress Note    Patient Name: Lisa Moreno  MRN: [id number]  Admission Date: 6/29/2025  Hospital Length of Stay: 17 days  Code Status: Full Code   Attending Provider: Francisco Adam DO  Primary Care Physician: John Nixon II, MD   Principal Problem:Witnessed seizure-like activity    Subjective:     Hospital Course:   CAP: moderate to severe slowing, focal left interictals, no discrete seizures; PB @0002 for LLE "jumping" with no EEG correlate  7/15>7/16 EEG: no epileptiform activity, no seizures    See EEG reports for details.    Interval History: Persistent hypotension overnight requiring vasopressors. No epileptiform activity on EEG. Clinically improved this morning. Updated mother at bedside who reports patient appears at baseline.     [Current Medications]    [Current Medications]  Current Facility-Administered Medications   Medication Dose Route Frequency Provider Last Rate Last Admin    0.9% NaCl infusion   Intravenous Continuous Jenny Sandhu PA-C 75 mL/hr at 07/16/25 1201 Rate Verify at 07/16/25 1201    acyclovir 600 mg in 0.9% NaCl 100 mL IVPB  10 mg/kg Intravenous Q8H Jenny Sandhu PA-C 100 mL/hr at 07/16/25 1232 600 mg at 07/16/25 1232    ammonium lactate 12 % lotion   Topical (Top) BID PRN Richard Webb MD        baclofen tablet 20 mg  20 mg Per G Tube TID Sean Adam MD   20 mg at 07/16/25 0908    barium sulfate (READI-CAT) suspension 450 mL  450 mL Per NG tube PRN Javier Daniels MD        carboxymethylcellulose sodium 1 % DpGe 1 drop  1 drop Ophthalmic TID Arpita Davis NP   1 drop at 07/16/25 0908    dantrolene capsule 50 mg  50 mg Per G Tube TID Sena Adam MD   50 mg at 07/16/25 0908    dextrose 50% injection 12.5 g  12.5 g Intravenous PRN Juno Mckeon MD        dextrose 50% injection 25 g  25 g Intravenous PRN Juno Mckeon MD        donepeziL tablet 5 mg  5 mg Per G Tube " QHS Sean Adam MD   5 mg at 07/15/25 2111    enoxaparin injection 40 mg  40 mg Subcutaneous Daily Francisco Adam DO        erythromycin 5 mg/gram (0.5 %) ophthalmic ointment   Both Eyes QHS Francisco Adam DO   Given at 07/14/25 2101    glucagon (human recombinant) injection 1 mg  1 mg Intramuscular PRN Juno Mckeon MD   0.5 mg at 07/05/25 1128    glucose chewable tablet 16 g  16 g Per NG tube PRN Cordelia Velasco MD   16 g at 07/07/25 0424    glucose chewable tablet 24 g  24 g Per NG tube PRN Cordelia Velasco MD        lactulose 20 gram/30 mL solution Soln 10 g  10 g Per G Tube BID Sean Adam MD   10 g at 07/16/25 0913    levETIRAcetam injection 500 mg  500 mg Intravenous Q12H Arpita Davis NP   500 mg at 07/16/25 0909    magnesium oxide tablet 800 mg  800 mg Per G Tube PRN Jenny Sandhu PA-C        magnesium oxide tablet 800 mg  800 mg Per G Tube PRN Jenny Sandhu PA-C        meropenem 2 g in 0.9% NaCl 100 mL IVPB (MB+)  2 g Intravenous Q8H Francisco Adam DO   Stopped at 07/16/25 1029    mirtazapine tablet 15 mg  15 mg Per G Tube QHS Sean Adam MD   15 mg at 07/15/25 2111    naloxone 0.4 mg/mL injection 0.02 mg  0.02 mg Intravenous PRN Juno Mckeon MD        NORepinephrine bitartrate-D5W 4 mg/250 mL (16 mcg/mL) PERIPHERAL access infusion  0-0.2 mcg/kg/min Intravenous Katya Mejia NP 18 mL/hr at 07/16/25 1201 0.08 mcg/kg/min at 07/16/25 1201    ondansetron injection 4 mg  4 mg Intravenous Q6H PRN Edwin Figueroa DO   4 mg at 07/12/25 1231    oxyCODONE immediate release tablet 5 mg  5 mg Per NG tube Q6H PRN Odette Christiansen MD   5 mg at 07/07/25 0536    polyethylene glycol packet 17 g  17 g Per G Tube BID Sean Adam MD   17 g at 07/16/25 0913    potassium bicarbonate disintegrating tablet 35 mEq  35 mEq Per G Tube PRN Jenny Sandhu, ESTEE        potassium bicarbonate disintegrating tablet 50 mEq  50 mEq Per G Tube PRN Jenny Sandhu,  ESTEE        potassium bicarbonate disintegrating tablet 60 mEq  60 mEq Per G Tube PRN Jenny Sandhu PA-C        potassium, sodium phosphates 280-160-250 mg packet 2 packet  2 packet Per G Tube PRN Jenny Sandhu PA-C        potassium, sodium phosphates 280-160-250 mg packet 2 packet  2 packet Per G Tube PRN Jenny Sandhu PA-C        potassium, sodium phosphates 280-160-250 mg packet 2 packet  2 packet Per G Tube PRN Jenny Sandhu PA-C        senna-docusate 8.6-50 mg per tablet 1 tablet  1 tablet Per G Tube BID Sean Adam MD   1 tablet at 07/16/25 0913    sodium chloride 0.9% flush 10 mL  10 mL Intravenous Q12H PRN Juno Mckeon MD        vancomycin (VANCOCIN) 1,000 mg in 0.9% NaCl 250 mL IVPB (admixture device)  1,000 mg Intravenous Q12H Francisco Adam DO   Stopped at 07/16/25 1038    vancomycin - pharmacy to dose   Intravenous pharmacy to manage frequency Jenny Sandhu PA-C         Continuous Infusions:   0.9% NaCl   Intravenous Continuous 75 mL/hr at 07/16/25 1201 Rate Verify at 07/16/25 1201    NORepinephrine bitartrate-D5W  0-0.2 mcg/kg/min Intravenous Continuous 18 mL/hr at 07/16/25 1201 0.08 mcg/kg/min at 07/16/25 1201       Review of Systems  Objective:     Vital Signs (Most Recent):  Temp: (!) 95.9 °F (35.5 °C) (07/16/25 1201)  Pulse: 70 (07/16/25 1201)  Resp: (!) 7 (07/16/25 1201)  BP: 113/61 (07/16/25 1201)  SpO2: 100 % (07/16/25 1201) Vital Signs (24h Range):  Temp:  [92.8 °F (33.8 °C)-100.4 °F (38 °C)] 95.9 °F (35.5 °C)  Pulse:  [70-98] 70  Resp:  [7-23] 7  SpO2:  [97 %-100 %] 100 %  BP: ()/(48-67) 113/61     Weight: 59.9 kg (132 lb 0.9 oz)  Body mass index is 16.51 kg/m².     Physical Exam  Constitutional:       General: He is not in acute distress.     Appearance: He is not diaphoretic.   HENT:      Head: Normocephalic and atraumatic.      Comments: EEG in place  Eyes:      General: No scleral icterus.        Right eye: No discharge.         Left  eye: No discharge.      Conjunctiva/sclera: Conjunctivae normal.      Pupils: Pupils are equal, round, and reactive to light.   Cardiovascular:      Rate and Rhythm: Normal rate.   Pulmonary:      Effort: Pulmonary effort is normal. No respiratory distress.   Musculoskeletal:         General: No deformity or signs of injury.   Skin:     General: Skin is dry.      Coloration: Skin is not jaundiced.            NEUROLOGICAL EXAMINATION:     CRANIAL NERVES     CN III, IV, VI   Pupils are equal, round, and reactive to light.       Alert, nods, tracks provider  MICKY OU   Corneal intact OU   + spontaneous eye opening   Face symmetric   Tongue midline   Spontaneous movements in BUE  Follows commands in BUE  Withdraws in BLE    Exam findings to suggest seizures:  Myoclonus - no   eye twitching - no   Nystagmus - no   gaze deviation - no   waxy rigidity - no        Significant Labs: All pertinent lab results from the past 24 hours have been reviewed.    Significant Studies: I have reviewed all pertinent imaging results/findings within the past 24 hours.    Assessment and Plan:     * Witnessed seizure-like activity  In setting of sepsis, pending LP  44M PMHx of ALS, neuromuscular dysfunction of the bladder, sacral ulcers, and spinal cerebellar ataxia, recent admit for severe dehydration and electrolyte disturbances who presented to the ER on 6/29, initially admitted to  for sepsis 2/2 pneumonia, acute hypoxic respiratory failure, and hypernatremia, upgraded to MICU for further management of septic shock. Stepped down to  7/7, planning for discharge to SNF 7/14 when rapid response called for GTC x2. Patient received IV Lorazepam 4 mg x1. Patient did not return to baseline and subsequently admitted to [facility] for higher level of care and further management. Patient received IV Levetiracetam load of 2396 mg followed by 500 mg BID. CAP EEG placed; Epilepsy following for assistance in management.    Recommendations:  - Discontinue  vEEG monitoring  - Recommend to continue Levetiracetam 500 mg BID  - MRI Epilepsy Protocol severely limited by motion, no compelling evidence of acute pathology  - Avoid agents that lower seizure threshold  - Management of infectious/metabolic abnormalities per NCC: pending LP  - Seizure precautions    Discussed plan of care with NCC team and mother at bedside. Will sign off, please call with questions.    Hyponatremia  - Improving, Na 133 > 137  - Management per [facility]    Septic shock  Initially admitted to MICU for septic shock 2/2 ESBL EColi PNA  - UA clean  - Blood cx NGTD  - On Acyclovir, Yuri, Vanc > Pending LP  - Management per [facility]    ALS (amyotrophic lateral sclerosis)  - Mother at bedside reports patient is verbal and can feed himself, uses wheelchair at home, she assists with ADLs   - Management per [facility]        VTE Risk Mitigation (From admission, onward)           Ordered     enoxaparin injection 40 mg  Daily         07/15/25 1230     IP VTE HIGH RISK PATIENT  Once         07/06/25 1634     Place sequential compression device  Until discontinued         06/29/25 2044                    Kathleen Robins PA-C  Neurology-Epilepsy  [facility]  Staff: Dr. Verdugo

## 2025-07-17 PROBLEM — T68.XXXA HYPOTHERMIA: Status: ACTIVE | Noted: 2025-07-17

## 2025-07-17 LAB
ABSOLUTE EOSINOPHIL (OHS): 0.07 K/UL
ABSOLUTE MONOCYTE (OHS): 0.54 K/UL (ref 0.3–1)
ABSOLUTE NEUTROPHIL COUNT (OHS): 2.67 K/UL (ref 1.8–7.7)
ACID FAST MOD KINY STN SPEC: NORMAL
ALBUMIN SERPL BCP-MCNC: 1.7 G/DL (ref 3.5–5.2)
ALP SERPL-CCNC: 62 UNIT/L (ref 40–150)
ALT SERPL W/O P-5'-P-CCNC: 35 UNIT/L (ref 10–44)
ANION GAP (OHS): 7 MMOL/L (ref 8–16)
AST SERPL-CCNC: 28 UNIT/L (ref 11–45)
BASOPHILS # BLD AUTO: 0.04 K/UL
BASOPHILS NFR BLD AUTO: 0.9 %
BILIRUB SERPL-MCNC: 0.1 MG/DL (ref 0.1–1)
BUN SERPL-MCNC: 15 MG/DL (ref 6–20)
CA-I BLD-SCNC: 1.07 MMOL/L (ref 1.06–1.42)
CALCIUM SERPL-MCNC: 7.7 MG/DL (ref 8.7–10.5)
CHLORIDE SERPL-SCNC: 107 MMOL/L (ref 95–110)
CO2 SERPL-SCNC: 22 MMOL/L (ref 23–29)
CREAT SERPL-MCNC: 0.5 MG/DL (ref 0.5–1.4)
CRYPTOC AG CSF QL IA.RAPID: NEGATIVE
ERYTHROCYTE [DISTWIDTH] IN BLOOD BY AUTOMATED COUNT: 17.8 % (ref 11.5–14.5)
GFR SERPLBLD CREATININE-BSD FMLA CKD-EPI: >60 ML/MIN/1.73/M2
GLUCOSE SERPL-MCNC: 87 MG/DL (ref 70–110)
HCT VFR BLD AUTO: 24.3 % (ref 40–54)
HGB BLD-MCNC: 7.5 GM/DL (ref 14–18)
IMM GRANULOCYTES # BLD AUTO: 0.01 K/UL (ref 0–0.04)
IMM GRANULOCYTES NFR BLD AUTO: 0.2 % (ref 0–0.5)
LYMPHOCYTES # BLD AUTO: 1.33 K/UL (ref 1–4.8)
MAGNESIUM SERPL-MCNC: 1.7 MG/DL (ref 1.6–2.6)
MCH RBC QN AUTO: 26.2 PG (ref 27–31)
MCHC RBC AUTO-ENTMCNC: 30.9 G/DL (ref 32–36)
MCV RBC AUTO: 85 FL (ref 82–98)
NUCLEATED RBC (/100WBC) (OHS): 0 /100 WBC
PHOSPHATE SERPL-MCNC: 2.7 MG/DL (ref 2.7–4.5)
PLATELET # BLD AUTO: 335 K/UL (ref 150–450)
PMV BLD AUTO: 10.6 FL (ref 9.2–12.9)
POTASSIUM SERPL-SCNC: 4 MMOL/L (ref 3.5–5.1)
PROT SERPL-MCNC: 6 GM/DL (ref 6–8.4)
RBC # BLD AUTO: 2.86 M/UL (ref 4.6–6.2)
RELATIVE EOSINOPHIL (OHS): 1.5 %
RELATIVE LYMPHOCYTE (OHS): 28.5 % (ref 18–48)
RELATIVE MONOCYTE (OHS): 11.6 % (ref 4–15)
RELATIVE NEUTROPHIL (OHS): 57.3 % (ref 38–73)
SODIUM SERPL-SCNC: 136 MMOL/L (ref 136–145)
WBC # BLD AUTO: 4.66 K/UL (ref 3.9–12.7)

## 2025-07-17 PROCEDURE — 20600001 HC STEP DOWN PRIVATE ROOM

## 2025-07-17 PROCEDURE — 63600175 PHARM REV CODE 636 W HCPCS

## 2025-07-17 PROCEDURE — G0545 PR VISIT INHERENT TO INPT OR OBS CARE, INFECTIOUS DISEASE: HCPCS | Mod: ,,, | Performed by: STUDENT IN AN ORGANIZED HEALTH CARE EDUCATION/TRAINING PROGRAM

## 2025-07-17 PROCEDURE — 25000003 PHARM REV CODE 250

## 2025-07-17 PROCEDURE — 25000003 PHARM REV CODE 250: Performed by: STUDENT IN AN ORGANIZED HEALTH CARE EDUCATION/TRAINING PROGRAM

## 2025-07-17 PROCEDURE — 97165 OT EVAL LOW COMPLEX 30 MIN: CPT

## 2025-07-17 PROCEDURE — 85025 COMPLETE CBC W/AUTO DIFF WBC: CPT

## 2025-07-17 PROCEDURE — 63600175 PHARM REV CODE 636 W HCPCS: Performed by: PSYCHIATRY & NEUROLOGY

## 2025-07-17 PROCEDURE — 27000207 HC ISOLATION

## 2025-07-17 PROCEDURE — 99233 SBSQ HOSP IP/OBS HIGH 50: CPT | Mod: GC,,, | Performed by: PSYCHIATRY & NEUROLOGY

## 2025-07-17 PROCEDURE — 97530 THERAPEUTIC ACTIVITIES: CPT

## 2025-07-17 PROCEDURE — 94761 N-INVAS EAR/PLS OXIMETRY MLT: CPT

## 2025-07-17 PROCEDURE — 83735 ASSAY OF MAGNESIUM: CPT

## 2025-07-17 PROCEDURE — 97161 PT EVAL LOW COMPLEX 20 MIN: CPT

## 2025-07-17 PROCEDURE — 80053 COMPREHEN METABOLIC PANEL: CPT

## 2025-07-17 PROCEDURE — 82330 ASSAY OF CALCIUM: CPT | Performed by: PSYCHIATRY & NEUROLOGY

## 2025-07-17 PROCEDURE — 25000003 PHARM REV CODE 250: Performed by: PSYCHIATRY & NEUROLOGY

## 2025-07-17 PROCEDURE — 99232 SBSQ HOSP IP/OBS MODERATE 35: CPT | Mod: ,,, | Performed by: STUDENT IN AN ORGANIZED HEALTH CARE EDUCATION/TRAINING PROGRAM

## 2025-07-17 PROCEDURE — 97130 THER IVNTJ EA ADDL 15 MIN: CPT

## 2025-07-17 PROCEDURE — 97535 SELF CARE MNGMENT TRAINING: CPT

## 2025-07-17 PROCEDURE — 84100 ASSAY OF PHOSPHORUS: CPT

## 2025-07-17 PROCEDURE — 25000003 PHARM REV CODE 250: Performed by: HOSPITALIST

## 2025-07-17 RX ORDER — FENTANYL CITRATE 50 UG/ML
25 INJECTION, SOLUTION INTRAMUSCULAR; INTRAVENOUS
Refills: 0 | Status: DISCONTINUED | OUTPATIENT
Start: 2025-07-17 | End: 2025-07-17

## 2025-07-17 RX ORDER — LEVETIRACETAM 500 MG/1
500 TABLET ORAL 2 TIMES DAILY
Status: DISCONTINUED | OUTPATIENT
Start: 2025-07-17 | End: 2025-07-17

## 2025-07-17 RX ORDER — DOXYCYCLINE HYCLATE 100 MG
100 TABLET ORAL EVERY 12 HOURS
Status: COMPLETED | OUTPATIENT
Start: 2025-07-17 | End: 2025-07-20

## 2025-07-17 RX ORDER — LEVETIRACETAM 100 MG/ML
500 SOLUTION ORAL 2 TIMES DAILY
Status: DISCONTINUED | OUTPATIENT
Start: 2025-07-17 | End: 2025-07-22 | Stop reason: HOSPADM

## 2025-07-17 RX ADMIN — DOXYCYCLINE HYCLATE 100 MG: 100 TABLET, COATED ORAL at 09:07

## 2025-07-17 RX ADMIN — ENOXAPARIN SODIUM 40 MG: 40 INJECTION SUBCUTANEOUS at 05:07

## 2025-07-17 RX ADMIN — POLYETHYLENE GLYCOL 3350 17 G: 17 POWDER, FOR SOLUTION ORAL at 08:07

## 2025-07-17 RX ADMIN — CARBOXYMETHYLCELLULOSE SODIUM 1 DROP: 10 GEL OPHTHALMIC at 09:07

## 2025-07-17 RX ADMIN — DANTROLENE SODIUM 50 MG: 25 CAPSULE ORAL at 03:07

## 2025-07-17 RX ADMIN — LACTULOSE 10 G: 20 SOLUTION ORAL at 09:07

## 2025-07-17 RX ADMIN — BACLOFEN 20 MG: 10 TABLET ORAL at 03:07

## 2025-07-17 RX ADMIN — LEVETIRACETAM 500 MG: 500 SOLUTION ORAL at 09:07

## 2025-07-17 RX ADMIN — BACLOFEN 20 MG: 10 TABLET ORAL at 08:07

## 2025-07-17 RX ADMIN — LACTULOSE 10 G: 20 SOLUTION ORAL at 08:07

## 2025-07-17 RX ADMIN — POLYETHYLENE GLYCOL 3350 17 G: 17 POWDER, FOR SOLUTION ORAL at 09:07

## 2025-07-17 RX ADMIN — ACYCLOVIR SODIUM 600 MG: 50 INJECTION, SOLUTION INTRAVENOUS at 04:07

## 2025-07-17 RX ADMIN — MEROPENEM 2 G: 2 INJECTION, POWDER, FOR SOLUTION INTRAVENOUS at 08:07

## 2025-07-17 RX ADMIN — CARBOXYMETHYLCELLULOSE SODIUM 1 DROP: 10 GEL OPHTHALMIC at 08:07

## 2025-07-17 RX ADMIN — LEVETIRACETAM 500 MG: 500 INJECTION, SOLUTION, CONCENTRATE INTRAVENOUS at 08:07

## 2025-07-17 RX ADMIN — VANCOMYCIN HYDROCHLORIDE 1000 MG: 1 INJECTION, POWDER, LYOPHILIZED, FOR SOLUTION INTRAVENOUS at 08:07

## 2025-07-17 RX ADMIN — DANTROLENE SODIUM 50 MG: 25 CAPSULE ORAL at 08:07

## 2025-07-17 RX ADMIN — SENNOSIDES AND DOCUSATE SODIUM 1 TABLET: 50; 8.6 TABLET ORAL at 09:07

## 2025-07-17 RX ADMIN — CARBOXYMETHYLCELLULOSE SODIUM 1 DROP: 10 GEL OPHTHALMIC at 03:07

## 2025-07-17 RX ADMIN — MEROPENEM 2 G: 2 INJECTION, POWDER, FOR SOLUTION INTRAVENOUS at 12:07

## 2025-07-17 RX ADMIN — BACLOFEN 20 MG: 10 TABLET ORAL at 09:07

## 2025-07-17 RX ADMIN — DANTROLENE SODIUM 50 MG: 25 CAPSULE ORAL at 09:07

## 2025-07-17 RX ADMIN — DONEPEZIL HYDROCHLORIDE 5 MG: 5 TABLET, FILM COATED ORAL at 09:07

## 2025-07-17 RX ADMIN — SENNOSIDES AND DOCUSATE SODIUM 1 TABLET: 50; 8.6 TABLET ORAL at 08:07

## 2025-07-17 RX ADMIN — MIRTAZAPINE 15 MG: 15 TABLET, FILM COATED ORAL at 09:07

## 2025-07-17 NOTE — ASSESSMENT & PLAN NOTE
Unknown source as of this time but just completed treatment  course of ertapenem for ESBL pneumonia.  - on vancomycin & Meropenem with CNS, ok to stop today  - follow up CSF studies  - other cultures in process  - ID consulted recommendations appreciated    - given LP findings, de-escalate to doxycycline 100 mg BID via PEG, last day 7/19 to complete 5d course for ssti    - isolation precaution per hospital protocol

## 2025-07-17 NOTE — PROGRESS NOTES
[facility]  Neurocritical Care  Progress Note    Admit Date: 6/29/2025  Service Date: 07/17/2025  Length of Stay: 18    Subjective:     Chief Complaint: Witnessed seizure-like activity    History of Present Illness: Mr. Moreno is a 44yoM with PMHx significant for ALS (onset 2006), neuromuscular dysfunction of the bladder, sacral ulcers, and spinal cerebellar ataxia who was admitted recently admitted (5/9/2025) for severe dehydration, electrolyte disturbances, and covid who presented to the ED with EMS and c/o AMS and hypoxia. Nonverbal at baseline but apparently is usually more alert; has been less responsive and interactive x1 day. Upon EMS arrival, was obtunded and sating 74%. They gave him solumedrol and duonebs en route which improved his oxygen status to >90%. Mother at bedside reports increased difficulty with swallowing and decreased PO intake.        While in the ED, labs and imaging significant for Na 149, K 5.4, Cl 111, CO2 21, glucose 149, BUN 38, creat 1.0, albumin 2.2, ALP 80, AST/ALT 34/23, anion gap 17, procal 8.42, BNP <10, WBC 7.11, H/H 12/40.5, plts 315, covid/flu negative, .6, VBG 7.349/47.1/44.5/23.9, istat LA 4.9->7.4->4.2 (7.4 likely error?). Chest xray with patchy opacities bilaterally, predominant within the bibasilar regions but also within the left lung apex and the left mid-lung; compatible with multifocal pneumonia. Started on BSA by the ED; vanc and cefepime. CCM consulted for sepsis. During first examination the patient was stable with MAPs >65 and Lactic acid down trended to 2.6. Around 6 am of 6/30 [place] was called hypotension with MAPs in the 50. Pt was started on levophed and was transferred to MICU 6/29 for septic shock secondary to multifocal pneumonia. Patient initially requiring pressors but since discontinued. Given dysphagia, IR placed a PEG tube 7/5 for nutrition and medication administration. Patient was stepped down to  on 7/7.    Patient was  awaiting discharge home tonight on 7/14 when a rapid response was called as the patient began seizing. Two witnessed tonic-clonic seizures were observed lasting for about 1-2 minutes, given 2mg Ativan. After these seizures, he never returned to baseline. Baseline was described as full movement in neck and all extremities, tracking with head & neck, mumbles per mom, and following commands. Initial assessment, patient was found staring, with no blink to threat or closure of eyes, midline gaze, and not moving extremities with painful stimuli. During seizure, it is reported that patient became hypertensive, hypoxic, and tachycardic, with strong flexor posturing bilaterally. EEG applied showing discharges over the left hemisphere, no history of seizures or brain mass/lesions/infarction. Patient loaded with 40mg/kg of Keppra. Given concern for mentation and need for closer neurological monitoring, patient was transferred to [facility] where he will be admitted.       Hospital Course: 07/15/2025: NAEON. Afebrile, BP 90/50s. MRI completed. EEG in place. Keppra and LR given. Bilateral mitt restraints. Hypotensive this morning, given 500 NS and 1L LR. Temp low 90s, bear hugger placed. UOP 1.6L. LP today   07/16/2025: continued improvement in mental status, alert and interactive today, pleasant and smiling with care, does attempt to remove lines and PIVs.  Did require initiation of norepinephrine overnight to maintain MAP > 65, on and off joseluis hugger for normothermia, remains on antibiotics which have been broadened to vanc/meropenem pending ID evaluation. Obtaining LP under fluoroscopy today.   07/17/2025: NAEON. Off levo. S/p LP with IR yesterday. CSF unremarkable, further CSF studies pending. R PIV removed. Pt maintaining pressure and looking better since removal. Stable for stepdown to  today.    Interval History:  see hospital course    Objective:     Vitals:  Temp: 97.7 °F (36.5 °C)  Pulse: 86  Rhythm: normal sinus  rhythm  BP: (!) 114/53  MAP (mmHg): 77  Resp: 20  SpO2: 100 %    Temp  Min: 92.5 °F (33.6 °C)  Max: 97.7 °F (36.5 °C)  Pulse  Min: 63  Max: 121  BP  Min: 105/57  Max: 134/71  MAP (mmHg)  Min: 73  Max: 92  Resp  Min: 11  Max: 21  SpO2  Min: 43 %  Max: 100 %    07/16 0701 - 07/17 0700  In: 4174.6 [I.V.:1679.7]  Out: 2151 [Urine:2150]   Unmeasured Output  Unmeasured Urine Occurrence: 0  Unmeasured Stool Occurrence: 0        Physical Exam  Vitals and nursing note reviewed.   Constitutional:       General: He is not in acute distress.     Appearance: He is cachectic. He is not diaphoretic.   HENT:      Head: Normocephalic and atraumatic.      Mouth/Throat:      Mouth: Mucous membranes are dry.      Pharynx: Oropharynx is clear.   Eyes:      Extraocular Movements: Extraocular movements intact.      Pupils: Pupils are equal, round, and reactive to light.   Cardiovascular:      Rate and Rhythm: Normal rate and regular rhythm.      Pulses: Normal pulses.   Pulmonary:      Effort: Pulmonary effort is normal. No respiratory distress.   Chest:      Comments: Pectus excavatum  Abdominal:      General: Abdomen is flat. There is no distension.      Palpations: Abdomen is soft.      Tenderness: There is no abdominal tenderness.      Comments: PEG site c/d/i    Musculoskeletal:      Right lower leg: No edema.      Left lower leg: No edema.   Skin:     General: Skin is warm and dry.      Capillary Refill: Capillary refill takes less than 2 seconds.   Neurological:      Mental Status: He is alert.      Comments: [id number]  PERRL  Tracks appropriately  Minimally verbal at baseline  spont movement  Fcx0  BUE mittens         Unable to test orientation, language, memory, judgment, insight, fund of knowledge, hearing, shoulder shrug, tongue protrusion, coordination, gait due to level of consciousness.       Medications:  Continuous     Scheduled  baclofen, 20 mg, TID  carboxymethylcellulose sodium, 1 drop, TID  dantrolene, 50 mg,  TID  donepeziL, 5 mg, QHS  doxycycline, 100 mg, Q12H  enoxparin, 40 mg, Daily  erythromycin, , QHS  lactulose, 10 g, BID  levETIRAcetam, 500 mg, BID  mirtazapine, 15 mg, QHS  polyethylene glycol, 17 g, BID  senna-docusate, 1 tablet, BID    PRN  dextrose 50%, 12.5 g, PRN  dextrose 50%, 25 g, PRN  glucagon (human recombinant), 1 mg, PRN  glucose, 16 g, PRN  glucose, 24 g, PRN  magnesium oxide, 800 mg, PRN  magnesium oxide, 800 mg, PRN  naloxone, 0.02 mg, PRN  ondansetron, 4 mg, Q6H PRN  oxyCODONE, 5 mg, Q6H PRN  potassium bicarbonate, 35 mEq, PRN  potassium bicarbonate, 50 mEq, PRN  potassium bicarbonate, 60 mEq, PRN  potassium, sodium phosphates, 2 packet, PRN  potassium, sodium phosphates, 2 packet, PRN  potassium, sodium phosphates, 2 packet, PRN  sodium chloride 0.9%, 10 mL, Q12H PRN      Today I personally reviewed pertinent medications, lines/drains/airways, imaging, cardiology results, laboratory results, microbiology results,    Diet  Diet NPO      Assessment/Plan:     Neuro  * Witnessed seizure-like activity  Patient was awaiting discharge home on 7/14 when a rapid response was called as the patient began seizing. Two witnessed tonic-clonic seizures were observed lasting for about 1-2 minutes, given 2mg Ativan. After these seizures, he never returned to baseline. Baseline was described as full movement in neck and all extremities, tracking with head & neck, mumbles per mom, and following commands. Initial assessment, patient was found staring, with no blink to threat or closure of eyes, midline gaze, and not moving extremities with painful stimuli. During seizure, it is reported that patient became hypertensive, hypoxic, and tachycardic, with strong flexor posturing bilaterally. EEG applied showing discharges over the left hemisphere, no history of seizures or brain mass/lesions/infarction. Patient loaded with 40mg/kg of Keppra. Given concern for mentation and need for closer neurological monitoring, patient  was transferred to [facility] where he will be admitted.     Admitted to [facility], stable to step down today 7/17  Q4h neurochecks and hourly vital signs  SBP <180, MAP >65  Brain MRI w/wo epilepsy protocol - negative for acute process  cEEG - discharges in left hemisphere no seizures  Discontinued 7/16  Continue keppra 500mg BID, continue until outpatient follow up with Epilepsy (referral placed)  Maintain seizure precautions  TF resumed  SCDs & LVX for DVT ppx  PT/OT/SLP to continue treatment  Q6h bladder scans  LP at bedside unsuccessful 7/15, obtained 7/16 under fluoroscopic guidance  Blood cultures resent, other workup pending and ID consulted. See plan above  Adequately volume resuscitated    ID  Septic shock  Unknown source as of this time but just completed treatment  course of ertapenem for ESBL pneumonia.  - on vancomycin & Meropenem with CNS, ok to stop today  - follow up CSF studies  - other cultures in process  - ID consulted recommendations appreciated    - given LP findings, de-escalate to doxycycline 100 mg BID via PEG, last day 7/19 to complete 5d course for ssti    - isolation precaution per hospital protocol     Orthopedic  Decubitus ulcer of sacral region, stage 4  Wound vac removed on 7/14 prior patient was going to be discharged.  Wound care following. Plan to reapply sacral wound vac with dressing changes on [facility]  General surgery consulted previously, no indication for inpatient debridement recommended; consider reconsult if needed            The patient is being Prophylaxed for:  Venous Thromboembolism with: Mechanical or Chemical  Stress Ulcer with: Not Applicable   Ventilator Pneumonia with: not applicable    Activity Orders            Diet NPO: NPO starting at 07/15 0157    Elevate HOB Elevate HOB 30-45 degrees during feeding unless contraindicated starting at 06/30 1357    Progressive Mobility Protocol (mobilize patient to their highest level of functioning at least twice daily) starting at 06/30  0800    Turn patient every 2 hours starting at 06/30 0000          Full Code    Karthikeyan Wynne MD  Neurocritical Care  [facility] - Neuro Critical Care

## 2025-07-17 NOTE — PLAN OF CARE
07/17/25 1417   Rounds   Attendance Provider;   Discharge Plan A Skilled Nursing Facility   Why the patient remains in the hospital Requires continued medical care   Transition of Care Barriers None     uAra Parker MSW, LCSW  [facility]  Case Management Dept.

## 2025-07-17 NOTE — PLAN OF CARE
"[facility] Care Plan    POC reviewed with Lisa Moreno and family at 0300. Family verbalized understanding. Questions and concerns addressed. No acute events today. Pt progressing toward goals. Will continue to monitor. See below and flowsheets for full assessment and VS info.     Levo gtt off   Sergio hugger on - Oral temp much higher then rectal temp       Is this a stroke patient? no    Neuro:  Joseph Coma Scale  Best Eye Response: 4-->(E4) spontaneous  Best Motor Response: 6-->(M6) obeys commands  Best Verbal Response: 1-->(V1) none  Joseph Coma Scale Score: 11  Assessment Qualifiers: Patient not sedated/intubated  Pupil PERRLA: yes     24 hr Temp:  [92.5 °F (33.6 °C)-100.4 °F (38 °C)]     CV:   Rhythm: normal sinus rhythm  BP goals:   SBP < 180  MAP > 65    Resp:           Plan: N/A    GI/:     Diet/Nutrition Received: NPO  Last Bowel Movement: 07/17/25  Voiding Characteristics: external catheter    Intake/Output Summary (Last 24 hours) at 7/17/2025 0347  Last data filed at 7/17/2025 0340  Gross per 24 hour   Intake 4052.75 ml   Output 601 ml   Net 3451.75 ml     Unmeasured Output  Unmeasured Urine Occurrence: 0  Unmeasured Stool Occurrence: 0    Labs/Accuchecks:  Recent Labs   Lab 07/17/25  0101   WBC 4.66   RBC 2.86*   HGB 7.5*   HCT 24.3*         Recent Labs   Lab 07/17/25  0101      K 4.0   CO2 22*      BUN 15   CREATININE 0.5   ALKPHOS 62   ALT 35   AST 28   BILITOT 0.1    No results for input(s): "PROTIME", "INR", "APTT", "HEPANTIXA" in the last 168 hours. No results for input(s): "CPK", "CPKMB", "TROPONINI", "MB" in the last 168 hours.    Electrolytes: N/A - electrolytes WDL  Accuchecks: none    Gtts:   0.9% NaCl   Intravenous Continuous 75 mL/hr at 07/17/25 0340 Rate Verify at 07/17/25 0340       LDA/Wounds:    Morgan Risk Assessment  Sensory Perception: 2-->very limited  Moisture: 3-->occasionally moist  Activity: 1-->bedfast  Mobility: 2-->very limited  Nutrition: " 3-->adequate  Friction and Shear: 1-->problem  Morgan Score: 12  Is your morgan score 12 or less? yes enrolled in the [facility] program, morgan mobility score is 2 or less, they are on a immerse bed, and heel boots on          Restraints:   Restraint Order  Length of Order: Order good for next 24 hours or when removed.  Date that the current order will : 25  Time that the current order will : 1126  Order Upon Application: Yes    [facility]

## 2025-07-17 NOTE — SUBJECTIVE & OBJECTIVE
Interval History:  see hospital course    Objective:     Vitals:  Temp: 97.7 °F (36.5 °C)  Pulse: 86  Rhythm: normal sinus rhythm  BP: (!) 114/53  MAP (mmHg): 77  Resp: 20  SpO2: 100 %    Temp  Min: 92.5 °F (33.6 °C)  Max: 97.7 °F (36.5 °C)  Pulse  Min: 63  Max: 121  BP  Min: 105/57  Max: 134/71  MAP (mmHg)  Min: 73  Max: 92  Resp  Min: 11  Max: 21  SpO2  Min: 43 %  Max: 100 %    07/16 0701 - 07/17 0700  In: 4174.6 [I.V.:1679.7]  Out: 2151 [Urine:2150]   Unmeasured Output  Unmeasured Urine Occurrence: 0  Unmeasured Stool Occurrence: 0        Physical Exam  Vitals and nursing note reviewed.   Constitutional:       General: He is not in acute distress.     Appearance: He is cachectic. He is not diaphoretic.   HENT:      Head: Normocephalic and atraumatic.      Mouth/Throat:      Mouth: Mucous membranes are dry.      Pharynx: Oropharynx is clear.   Eyes:      Extraocular Movements: Extraocular movements intact.      Pupils: Pupils are equal, round, and reactive to light.   Cardiovascular:      Rate and Rhythm: Normal rate and regular rhythm.      Pulses: Normal pulses.   Pulmonary:      Effort: Pulmonary effort is normal. No respiratory distress.   Chest:      Comments: Pectus excavatum  Abdominal:      General: Abdomen is flat. There is no distension.      Palpations: Abdomen is soft.      Tenderness: There is no abdominal tenderness.      Comments: PEG site c/d/i    Musculoskeletal:      Right lower leg: No edema.      Left lower leg: No edema.   Skin:     General: Skin is warm and dry.      Capillary Refill: Capillary refill takes less than 2 seconds.   Neurological:      Mental Status: He is alert.      Comments: [id number]  PERRL  Tracks appropriately  Minimally verbal at baseline  spont movement  Fcx0  BUE mittens         Unable to test orientation, language, memory, judgment, insight, fund of knowledge, hearing, shoulder shrug, tongue protrusion, coordination, gait due to level of consciousness.        Medications:  Continuous     Scheduled  baclofen, 20 mg, TID  carboxymethylcellulose sodium, 1 drop, TID  dantrolene, 50 mg, TID  donepeziL, 5 mg, QHS  doxycycline, 100 mg, Q12H  enoxparin, 40 mg, Daily  erythromycin, , QHS  lactulose, 10 g, BID  levETIRAcetam, 500 mg, BID  mirtazapine, 15 mg, QHS  polyethylene glycol, 17 g, BID  senna-docusate, 1 tablet, BID    PRN  dextrose 50%, 12.5 g, PRN  dextrose 50%, 25 g, PRN  glucagon (human recombinant), 1 mg, PRN  glucose, 16 g, PRN  glucose, 24 g, PRN  magnesium oxide, 800 mg, PRN  magnesium oxide, 800 mg, PRN  naloxone, 0.02 mg, PRN  ondansetron, 4 mg, Q6H PRN  oxyCODONE, 5 mg, Q6H PRN  potassium bicarbonate, 35 mEq, PRN  potassium bicarbonate, 50 mEq, PRN  potassium bicarbonate, 60 mEq, PRN  potassium, sodium phosphates, 2 packet, PRN  potassium, sodium phosphates, 2 packet, PRN  potassium, sodium phosphates, 2 packet, PRN  sodium chloride 0.9%, 10 mL, Q12H PRN      Today I personally reviewed pertinent medications, lines/drains/airways, imaging, cardiology results, laboratory results, microbiology results,    Diet  Diet NPO

## 2025-07-17 NOTE — PLAN OF CARE
Problem: Occupational Therapy  Goal: Occupational Therapy Goal  Description: Goals to be met by: 7/31/2025     Patient will increase functional independence with ADLs by performing:    Feeding with Set-up Assistance.  UE Dressing with Set-up Assistance.  Grooming with HOB elevated with Set-up Assistance.  Rolling bilaterally with Minimal Assistance.   Outcome: Progressing     OT goals established upon evaluation.

## 2025-07-17 NOTE — PLAN OF CARE
[facility] Neuro Critical Care  Discharge Reassessment    Primary Care Provider: John Nixon II, MD    Expected Discharge Date: 7/19/2025    Reassessment (most recent)       Discharge Reassessment - 07/17/25 1609          Discharge Reassessment    Assessment Type Discharge Planning Reassessment (P)      Did the patient's condition or plan change since previous assessment? No (P)      Discharge Plan discussed with: Patient (P)      Name(s) and Number(s) lokesh miller (P)      Communicated MARCELLE with patient/caregiver Yes (P)      Discharge Plan A Skilled Nursing Facility (P)      Discharge Plan B Home with family (P)      DME Needed Upon Discharge  none (P)      Transition of Care Barriers None (P)      Why the patient remains in the hospital Requires continued medical care (P)         Post-Acute Status    Discharge Delays None known at this time (P)                    Discharge Plan A and Plan B have been determined by review of patient's clinical status, future medical and therapeutic needs, and coverage/benefits for post-acute care in coordination with multidisciplinary team members.    Aura Parker MSW, SERGIOW  [facility]  Case Management Dept.

## 2025-07-17 NOTE — PLAN OF CARE
"[facility] Care Plan  POC reviewed with Lisa Moreno and family at 1400. Patient and Family verbalized understanding. Questions and concerns addressed. No acute events today. Pt progressing toward goals. Will continue to monitor. See below and flowsheets for full assessment and VS info.   Stepdown order          Is this a stroke patient? no    Neuro:  Joseph Coma Scale  Best Eye Response: 4-->(E4) spontaneous  Best Motor Response: 6-->(M6) obeys commands  Best Verbal Response: 1-->(V1) none  Joseph Coma Scale Score: 11  Assessment Qualifiers: Patient not sedated/intubated  Pupil PERRLA: yes     24 hr Temp:  [89.1 °F (31.7 °C)-97.7 °F (36.5 °C)]     CV:   Rhythm: normal sinus rhythm  BP goals:   SBP < 180  MAP > 65    Resp:           Plan: N/A    GI/:     Diet/Nutrition Received: NPO  Last Bowel Movement: 07/17/25  Voiding Characteristics: external catheter    Intake/Output Summary (Last 24 hours) at 7/17/2025 1815  Last data filed at 7/17/2025 1701  Gross per 24 hour   Intake 3474.92 ml   Output 2071 ml   Net 1403.92 ml     Unmeasured Output  Unmeasured Urine Occurrence: 0  Unmeasured Stool Occurrence: 0    Labs/Accuchecks:  Recent Labs   Lab 07/17/25  0101   WBC 4.66   RBC 2.86*   HGB 7.5*   HCT 24.3*         Recent Labs   Lab 07/17/25  0101      K 4.0   CO2 22*      BUN 15   CREATININE 0.5   ALKPHOS 62   ALT 35   AST 28   BILITOT 0.1    No results for input(s): "PROTIME", "INR", "APTT", "HEPANTIXA" in the last 168 hours. No results for input(s): "CPK", "CPKMB", "TROPONINI", "MB" in the last 168 hours.    Electrolytes: N/A - electrolytes WDL  Accuchecks: none    Gtts:      LDA/Wounds:    Morgan Risk Assessment  Sensory Perception: 2-->very limited  Moisture: 3-->occasionally moist  Activity: 1-->bedfast  Mobility: 2-->very limited  Nutrition: 3-->adequate  Friction and Shear: 1-->problem  Morgan Score: 12    Is your morgan score 12 or less? no            Restraints:   Restraint " Order  Length of Order: Order good for next 24 hours or when removed.  Date that the current order will : 25  Time that the current order will : 903  Order Upon Application: Yes    WC

## 2025-07-17 NOTE — ASSESSMENT & PLAN NOTE
Wound vac removed on 7/14 prior patient was going to be discharged.  Wound care following. Plan to reapply sacral wound vac with dressing changes on [facility]  General surgery consulted previously, no indication for inpatient debridement recommended; consider reconsult if needed

## 2025-07-17 NOTE — PROVIDER TRANSFER
Neuro Critical Care Transfer of Care note    Date of Admit: 6/29/2025  Date of Transfer / Stepdown: 7/17/2025    Brief History of Present Illness:      Lisa Moreno is a 44 y.o. male who  has a past medical history of Anxiety, Cognitive communication disorder (05/10/2022), Degenerative motor system disease (2006), Depression, DNR (do not resuscitate) (09/14/2023), Insomnia secondary to depression with anxiety (10/30/2024), Multiple drug resistant organism (MDRO) culture positive, Neurogenic bladder (01/16/2015), Seizure (02/22/2015), Spastic quadriplegia, and Spinocerebellar atrophy.. The patient presented to [facility] on 6/29/2025 with a primary complaint of Shortness of Breath (Pt has ALS and sacral wound with wound vac. Family reported decreased LOC. EMS found RA O2 sat of 74% Pt placed NRB with Duo Nebs and O2 sat went to 94%. Pt de-sats if removed from NRB. Pt had recent pneumonia diagnosis with ABX treatment. )       Mr. Moreno is a 44yoM with PMHx significant for ALS (onset 2006), neuromuscular dysfunction of the bladder, sacral ulcers, and spinal cerebellar ataxia who was admitted recently admitted (5/9/2025) for severe dehydration, electrolyte disturbances, and covid who presented to the ED with EMS and c/o AMS and hypoxia. Nonverbal at baseline but apparently is usually more alert; has been less responsive and interactive x1 day. Upon EMS arrival, was obtunded and sating 74%. They gave him solumedrol and duonebs en route which improved his oxygen status to >90%. Mother at bedside reports increased difficulty with swallowing and decreased PO intake.        While in the ED, labs and imaging significant for Na 149, K 5.4, Cl 111, CO2 21, glucose 149, BUN 38, creat 1.0, albumin 2.2, ALP 80, AST/ALT 34/23, anion gap 17, procal 8.42, BNP <10, WBC 7.11, H/H 12/40.5, plts 315, covid/flu negative, .6, VBG 7.349/47.1/44.5/23.9, istat LA 4.9->7.4->4.2 (7.4 likely error?). Chest xray with  patchy opacities bilaterally, predominant within the bibasilar regions but also within the left lung apex and the left mid-lung; compatible with multifocal pneumonia. Started on BSA by the ED; vanc and cefepime. CCM consulted for sepsis. During first examination the patient was stable with MAPs >65 and Lactic acid down trended to 2.6. Around 6 am of 6/30 rap[place] was called hypotension with MAPs in the 50. Pt was started on levophed and was transferred to MICU 6/29 for septic shock secondary to multifocal pneumonia. Patient initially requiring pressors but since discontinued. Given dysphagia, IR placed a PEG tube 7/5 for nutrition and medication administration. Patient was stepped down to  on 7/7.     Patient was awaiting discharge home tonight on 7/14 when a rapid response was called as the patient began seizing. Two witnessed tonic-clonic seizures were observed lasting for about 1-2 minutes, given 2mg Ativan. After these seizures, he never returned to baseline. Baseline was described as full movement in neck and all extremities, tracking with head & neck, mumbles per mom, and following commands. Initial assessment, patient was found staring, with no blink to threat or closure of eyes, midline gaze, and not moving extremities with painful stimuli. During seizure, it is reported that patient became hypertensive, hypoxic, and tachycardic, with strong flexor posturing bilaterally. EEG applied showing discharges over the left hemisphere, no history of seizures or brain mass/lesions/infarction. Patient loaded with 40mg/kg of Keppra. Given concern for mentation and need for closer neurological monitoring, patient was transferred to [facility] where he will be admitted.      07/15/2025: NAEON. Afebrile, BP 90/50s. MRI completed. EEG in place. Keppra and LR given. Bilateral mitt restraints. Hypotensive this morning, given 500 NS and 1L LR. Temp low 90s, bear hugger placed. UOP 1.6L. LP today   07/16/2025: continued improvement  in mental status, alert and interactive today, pleasant and smiling with care, does attempt to remove lines and PIVs.  Did require initiation of norepinephrine overnight to maintain MAP > 65, on and off joseluis hugger for normothermia, remains on antibiotics which have been broadened to vanc/meropenem pending ID evaluation. Obtaining LP under fluoroscopy today.   07/17/2025: NAEON. Off levo. S/p LP with IR yesterday. CSF unremarkable, further CSF studies pending. R PIV removed. Pt maintaining pressure and looking better since removal. Stable for stepdown to  today.    Hospital Course By Problem with Pertinent Findings:     Neuro  * Witnessed seizure-like activity  Patient was awaiting discharge home on 7/14 when a rapid response was called as the patient began seizing. Two witnessed tonic-clonic seizures were observed lasting for about 1-2 minutes, given 2mg Ativan. After these seizures, he never returned to baseline. Baseline was described as full movement in neck and all extremities, tracking with head & neck, mumbles per mom, and following commands. Initial assessment, patient was found staring, with no blink to threat or closure of eyes, midline gaze, and not moving extremities with painful stimuli. During seizure, it is reported that patient became hypertensive, hypoxic, and tachycardic, with strong flexor posturing bilaterally. EEG applied showing discharges over the left hemisphere, no history of seizures or brain mass/lesions/infarction. Patient loaded with 40mg/kg of Keppra. Given concern for mentation and need for closer neurological monitoring, patient was transferred to [facility] where he will be admitted.      Admitted to [facility], stable to step down today 7/17  Q4h neurochecks and hourly vital signs  SBP <180, MAP >65  Brain MRI w/wo epilepsy protocol - negative for acute process  cEEG - discharges in left hemisphere no seizures  Discontinued 7/16  Continue keppra 500mg BID, continue until outpatient follow up  with Epilepsy (referral placed)  Maintain seizure precautions  TF resumed  SCDs & LVX for DVT ppx  PT/OT/SLP to continue treatment  Q6h bladder scans  LP at bedside unsuccessful 7/15, obtained 7/16 under fluoroscopic guidance  Blood cultures resent, other workup pending and ID consulted. See plan above  Adequately volume resuscitated     ID  Septic shock  Unknown source as of this time but just completed treatment  course of ertapenem for ESBL pneumonia.  - on vancomycin & Meropenem with CNS, ok to stop today  - follow up CSF studies  - other cultures in process  - ID consulted recommendations appreciated    - given LP findings, de-escalate to doxycycline 100 mg BID via PEG, last day 7/19 to complete 5d course for ssti    - isolation precaution per hospital protocol      Orthopedic  Decubitus ulcer of sacral region, stage 4  Wound vac removed on 7/14 prior patient was going to be discharged.  Wound care following. Plan to reapply sacral wound vac with dressing changes on [facility]  General surgery consulted previously, no indication for inpatient debridement recommended; consider reconsult if needed    Consultants and Procedures:     Consultants:  ID  Epilepsy - EEG (negative)  Gen Surg  Wound Care - Sacral Ulcer (wound vac)  IR - Lumbar Puncture 7/16  Nutrition    Procedures:    Lumbar Puncture    Transfer Information:     Diet:    Recommendations  --Continue Bolus TF recommendation: 4 cans/day of Novasource Renal to provide 948 mL total volume, 1900 kcal, 172 g CHO, 88 g protein, 0 g fiber, 672 mL free water, 95% DRI         --155 mL flush before and after each can to provide additional 1240 mL free water (Total 1912 mL)       --Order Guzman BID as tube feed modifier to promote wound healing      --Continue Soft & Bite Sized (IDDSI Level 6) Honey Thick (Moderately Thick) diet as tolerated and clinically indicated   --Encourage good intakes   --Nursing: please continue to document % meal eaten, tube feed formula and  rate/volume on flowsheets  --RD to monitor weight, PO intake      Interventions: General healthful diet, Texture modified diet, Enteral nutrition management, Medical food supplement therapy, Vitamin and mineral supplement therapy  Goals: Meet % EEN/EPN by next RD follow-up  Nutrition Goal Status #2: progressing to  Communication of RD Recs:  (POC)        Physical Activity:  PT/OT      To Do / Pending Studies / Follow ups:  Awaiting final ID recs  Source of hypotension likely infected Right PIV because once removed everything corrected  Has been off pressors since this morning  Transfer to [facility] once off pressors for 24 hours and final ID recs    Karthikeyan [place]  Neuro Crtical [facility]

## 2025-07-17 NOTE — PROGRESS NOTES
[facility] - Neuro Critical Care  Infectious Disease  Progress Note    Patient Name: Lisa Moreno  MRN: [id number]  Admission Date: 6/29/2025  Length of Stay: 18 days  Attending Physician: Francisco Adam DO  Primary Care Provider: John Nixon II, MD    Isolation Status: Contact  Assessment/Plan:      Neuro  * Witnessed seizure-like activity  43 yo male with ALS s/p PEG, sacral decub with current hospital course notable for ESBL Ecoli PNA s/p treatment with ertapenem with transfer to ICU on 7/14 for seizure like activity in shock requiring pressors now off. MRI brain without evidence of acute infection. S/p LP with FL, CSF studies not c/f infection, MEP negative. Mentation now back to baseline. Pt did have an infiltrated R piv that was removed with interval improvement in phlebitis. Blood cx have been no growth to date. Acv dc'd this morning.     Recommendations:  -favor stopping vanc/tamara  -given LP findings, de-escalate to doxycycline 100 mg BID via PEG, last day 7/19 to complete 5d course for ssti  -isolation precaution per hospital protocol               Thank you for your consult. Will sign off, please call with questions, new culture data or clinical changes. Above d/w primary team.         Betty Crowell MD  Infectious Disease  [facility] - Neuro Critical Care    35 minutes of total time spent on the encounter, which includes face to face time and non-face to face time preparing to see the patient (eg, review of tests), obtaining and/or reviewing separately obtained history, documenting clinical information in the electronic or other health record, independently interpreting results (not separately reported) and communicating results to the patient/family/caregiver, or care coordination (not separately reported). I have reviewed hospital notes from  NCC service and other specialty providers as well as outside medical records. I have also reviewed CBC, CMP/BMP,  cultures and imaging with my  interpretation as documented. Patient is high risk of morbidity, on antibiotics requiring intensive monitoring for toxicity.        Subjective:     Principal Problem:Witnessed seizure-like activity    HPI: 44 year old male with a history of amyotropic lateral sclerosis (ALS), spasticity, dysarthria, sacral decubitus ulcer and recent hospital admission in may of 2025 secondary to COVID infection and associated hypoxia.  He is now admitted with altered mental status and hypoxia.  Respiratory cultures were obtained and were positive for ESBL E coli.  ID is consulted to assist with his care.    ID reconsulted 7/16 for antibiotic assistance for potential CNS infection. Since pt was last seen, he completed a course of ertapenem for ESBL Ecoli pneumonia. He was transferred to ICU for seizure like activity on 7/14. LP was attempted at bedside, unsuccessful. Pending IR LP. MRI brain without evidence of acute infection. Repeat blood cx ngtd. Pt is currently on acyclovir, ctx, and vancomycin. Per mother at bedside, pt is doing better. Pt shakes head no to  abdominal pain, sob, or cough. Remains on pressor support.   Interval History: No fevers documented overnight and off pressors. Mom at bedside. Shakes head yes to interval improvement in R arm pain after prior infiltrated removed. S/p LP yesterday, studies not c/f infection.     Review of Systems   Unable to perform ROS: Patient nonverbal     Objective:     Vital Signs (Most Recent):  Temp: 97.7 °F (36.5 °C) (07/17/25 1001)  Pulse: 86 (07/17/25 1001)  Resp: 20 (07/17/25 1001)  BP: (!) 114/53 (07/17/25 1001)  SpO2: 100 % (07/17/25 1001) Vital Signs (24h Range):  Temp:  [92.5 °F (33.6 °C)-97.7 °F (36.5 °C)] 97.7 °F (36.5 °C)  Pulse:  [] 86  Resp:  [7-21] 20  SpO2:  [43 %-100 %] 100 %  BP: ()/(51-71) 114/53     Weight: 59.9 kg (132 lb)  Body mass index is 16.5 kg/m².    Estimated Creatinine Clearance: 158.1 mL/min (based on SCr of 0.5 mg/dL).     Physical  Exam  Constitutional:       General: He is not in acute distress.     Appearance: He is not ill-appearing.   HENT:      Head:      Comments: EEG cap, VPS  Eyes:      General:         Right eye: No discharge.         Left eye: No discharge.   Pulmonary:      Effort: Pulmonary effort is normal. No respiratory distress.   Abdominal:      Comments: PEG    Musculoskeletal:      Right lower leg: No edema.      Left lower leg: No edema.   Skin:     General: Skin is warm and dry.      Comments: Prior R infiltrated PIV erythema improving, less tender     Neurological:      Mental Status: He is alert.          Significant Labs:   Microbiology Results (last 7 days)       Procedure Component Value Units Date/Time    CSF culture [[id number]] Collected: 07/16/25 1453    Order Status: Completed Specimen: CSF (Spinal Fluid) from CSF Tap, Tube 3 Updated: 07/17/25 0626     CULTURE, CSF No Growth To Date     GRAM STAIN Cytospin indicates:      No WBCs      No organisms seen    Blood culture [[id number]]  (Normal) Collected: 07/15/25 1328    Order Status: Completed Specimen: Blood from Peripheral, Hand, Right Updated: 07/17/25 0500     Blood Culture No Growth After 36 Hours    Blood culture [[id number]]  (Normal) Collected: 07/15/25 1319    Order Status: Completed Specimen: Blood from Peripheral, Hand, Left Updated: 07/17/25 0500     Blood Culture No Growth After 36 Hours    Cryptococcal antigen, CSF [[id number]] Collected: 07/16/25 1453    Order Status: Sent Specimen: CSF (Spinal Fluid) from CSF Tap, Tube 3 Updated: 07/16/25 1911    Afb Culture Stain [[id number]] Collected: 07/16/25 1453    Order Status: Sent Specimen: CSF (Spinal Fluid) from CSF Tap, Tube 3 Updated: 07/16/25 1911    AFB Culture & Smear [[id number]] Collected: 07/16/25 1453    Order Status: Resulted Specimen: CSF (Spinal Fluid) from CSF Tap, Tube 3 Updated: 07/16/25 1911    Fungus culture [[id number]] Collected: 07/16/25 1453    Order Status: Sent Specimen: CSF  (Spinal Fluid) from CSF Tap, Tube 3 Updated: 07/16/25 1911    Gram stain [[id number]] Collected: 07/16/25 1453    Order Status: Canceled Specimen: CSF (Spinal Fluid) from CSF Tap, Tube 3             Significant Imaging: I have reviewed all pertinent imaging results/findings within the past 24 hours.

## 2025-07-17 NOTE — EICU
Virtual ICU Quality Rounds    Admit Date: 6/29/2025  Hospital Day: 18    ICU Day: 2d 7h    24H Vital Sign Range:  Temp:  [92.5 °F (33.6 °C)-97.7 °F (36.5 °C)]   Pulse:  []   Resp:  [7-21]   BP: ()/(51-71)   SpO2:  [43 %-100 %]     VICU Surveillance Screening      LDA reconciliation : Yes

## 2025-07-17 NOTE — PT/OT/SLP PROGRESS
Physical Therapy      Patient Name:  Lisa Moreno   MRN:  [id number]    Patient seen today for initial evaluation. PT obtained hx from pt's mother and provided education that pt is not appropriate for acute care PT due to chronicity of deficits but may benefit from PT on d/c from hospital to assist w/ regaining ability to stand as family reports he was able to  parallel bars ~1yr ago before PT was stopped. Education provided on different settings of PT and that therapist would also communicate the need for roho cushion to CM as pt's mom reports his OP providers were strongly recommending this 2/2 sacral wound. PT to d/c pt at this time with recommendation for low intensity PT on d/c. Please reach out w/ any questions/concerns.    PLOF: pt lives w/ mom in a [facility] w/ ramp entry. He has a hospital bed, is able to assist w/ rolling using BUE on siderails of bed, uses BUE to assist w/ dressing, feeds himself, and selectively communicates w/ others (speaks plentiful when uncle is present). Family dependently gives him bed baths, toileting is done through briefs, dependent for pericare, and mom reports pt may help up to 10% w/ scoot pivot transfer to w/c by pulling on arm rest of w/c once transfer partially completed. He has a power w/c but it is too complicated for him to propel, so manual tilt in space w/c is used which family propels. He sits up for 2-3 hrs at a time but not daily, never sits unsupported.    Time in: 1006  Time out: 1026  Total time: 20min    Billing: Kate 10min and TA 10min for skilled education provided to pt + mom    Hawa Woods, PT

## 2025-07-17 NOTE — CONSULTS
VANCOMYCIN DOSING BY PHARMACY DISCONTINUATION NOTE    Lisa Moreno is a 44 y.o. male who had been consulted for vancomycin dosing.    The pharmacy consult for vancomycin dosing has been discontinued.     Vancomycin Dosing by Pharmacy Consult will sign-off. Please reconsult if necessary. Thank you for allowing us to participate in this patient's care.     Josey Charles, PharmD   [phone]

## 2025-07-17 NOTE — ASSESSMENT & PLAN NOTE
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 MRI brain without evidence of acute infection. S/p LP with FL, CSF studies not c/f infection, MEP negative. Mentation now back to baseline. Pt did have an infiltrated R piv that was removed with interval improvement in phlebitis. Blood cx have been no growth to date. Acv dc'd this morning.     Recommendations:  -favor stopping vanc/tamara  -given LP findings, de-escalate to doxycycline 100 mg BID via PEG, last day 7/19 to complete 5d course for ssti  -isolation precaution per hospital protocol

## 2025-07-17 NOTE — ASSESSMENT & PLAN NOTE
Patient was awaiting discharge home on 7/14 when a rapid response was called as the patient began seizing. Two witnessed tonic-clonic seizures were observed lasting for about 1-2 minutes, given 2mg Ativan. After these seizures, he never returned to baseline. Baseline was described as full movement in neck and all extremities, tracking with head & neck, mumbles per mom, and following commands. Initial assessment, patient was found staring, with no blink to threat or closure of eyes, midline gaze, and not moving extremities with painful stimuli. During seizure, it is reported that patient became hypertensive, hypoxic, and tachycardic, with strong flexor posturing bilaterally. EEG applied showing discharges over the left hemisphere, no history of seizures or brain mass/lesions/infarction. Patient loaded with 40mg/kg of Keppra. Given concern for mentation and need for closer neurological monitoring, patient was transferred to [facility] where he will be admitted.     Admitted to [facility], stable to step down today 7/17  Q4h neurochecks and hourly vital signs  SBP <180, MAP >65  Brain MRI w/wo epilepsy protocol - negative for acute process  cEEG - discharges in left hemisphere no seizures  Discontinued 7/16  Continue keppra 500mg BID, continue until outpatient follow up with Epilepsy (referral placed)  Maintain seizure precautions  TF resumed  SCDs & LVX for DVT ppx  PT/OT/SLP to continue treatment  Q6h bladder scans  LP at bedside unsuccessful 7/15, obtained 7/16 under fluoroscopic guidance  Blood cultures resent, other workup pending and ID consulted. See plan above  Adequately volume resuscitated

## 2025-07-17 NOTE — EICU
Intervention Initiated From:  COR / ABHISHEKU    Ren intervened regarding:  Rounding (Video assessment)  VICU Night Rounds Checklist  24H Vital Sign Range:  Temp:  [92.5 °F (33.6 °C)-100.4 °F (38 °C)]   Pulse:  []   Resp:  [7-21]   BP: ()/(48-71)   SpO2:  [43 %-100 %]     Video rounds

## 2025-07-17 NOTE — PROVIDER TRANSFER
Hospital Medicine  Stepdown Acceptance Note      Patient Name: Lisa Moreno  MRN:  [id number]  Primary Team: Networked reference to record PCT    Date of Admission:  6/29/2025     Length of Stay:  LOS: 18 days   Principal Problem:  Witnessed seizure-like activity      HPI:       By Dr. Juno Mckeon MD     Mr. Moreno is a 44-year-old male with a medical history including ALS, spinal cerebellar ataxia, neuromuscular bladder dysfunction, and sacral pressure ulcers. He was recently hospitalized on 5/9/2025 for severe dehydration, electrolyte abnormalities, and COVID-19 infection. He now presents to the ED via EMS with altered mental status and hypoxia. The patient is nonverbal at baseline but is typically more alert and interactive. History was provided by his mother, who reported that he had been noticeably less responsive over the past day and had a decreased oral intake. She denied any fever, shortness of breath, nausea, vomiting, or diarrhea.     Upon EMS arrival, the patient was obtunded and hypoxic with an oxygen saturation of 74%. He was treated en route with Solu-Medrol and DuoNebs, resulting in improvement of his oxygen saturation to above 90%.     In the ED, patient was obtunded but stabilized with improved oxygenation after initial EMS interventions. Chest X-ray revealed patchy bilateral opacities, most prominent in the bibasilar regions, as well as in the left apex and mid-lung zones--findings consistent with multifocal pneumonia.  He received 2 L of IV fluids, IV cefepime and vancomycin and admitted to hospital medicine for further management.        Hospital Course:       Mr. Moreno was initially admitted to Hospital Medicine for management of sepsis 2/2 multifocal pneumonia. While in the ED, [facility] consulted for sepsis. During first examination the patient was stable with MAPs >65 and Lactic acid down trended to 2.6. Around 6am of 6/30 rapids was called hypotension with MAPs in the 50. He was  started on Levophed and Zosyn, and was transferred to MICU for septic shock 2/2 multifocal pneumonia. He was weaned off pressors and Zosyn was de-escalated to Ceftriaxone and Azithromycin for CAP coverage. SLP consulted for dysphagia. FEES performed which showed residual puree. Recommend NPO. PEG placed with IR on 7/5. Increased secretions noted on exam s/p completion of antibiotic course of CAP coverage. Restarted patient on Vancomycin and Zosyn. Respiratory culture with many gram negative rods. Vancomycin discontinued. Continued on Zosyn. Respiratory cx positive for E.Coli ESBL. Started on Ertapenem, Zosyn discontinued. ID consulted for additional recommendations, who suggest a course through 7/12. SLP re-consulted for swallowing evaluation in the setting of new PEG tube. Able to have small occasional pleasure feeds of puree and honey thick liquids via tsp. SD to  on 7/7. He developed hyperkalemia from Isosource TF. They were stopped and changed to Novasource renal with resolution of his hyperkalemia.  He was being discharged to [facility] in [place] on 7/14 when he had right eye gaze, mouth twitching, drooling, BUE shaking, and unresponsiveness.  He was stepped up to [facility] for possible seizures. He was loaded on Keppra.  EEG was initially notable for discharges from the left hemisphere but has been negative since. He required Levophed for hypotension, which was turned off on 7/16.  He has remained hypothermic.  ID was consulted and he was started on Vanc and Meropenem.  LP was completed by IR, initial CSF studies have been negative. His right PIV appeared infected and was removed, after which he had significant clinical improvement.  ID suggested a 5 day course of Doxycyline for SSTI.  He was SD to  on 7/17.    Disposition:  [facility] in [place] for SLP, Wound care, TF  Discharge Needs:  TBD  MARCELLE:  7/19/2025  Code Status:    Code Status: Full Code      Patient has been accepted by [facility]  Medicine IMU, who will assume care of the patient upon arrival to the floor.  Please contact [facility] [phone] with any concerns prior to arrival.      Caroline Nelson MD, [facility] Physician  [facility] Medicine

## 2025-07-17 NOTE — PT/OT/SLP EVAL
Occupational Therapy Co-Evaluation and Treatment    Name: Lisa Moreno  MRN: [id number]  Admitting Diagnosis: Witnessed seizure-like activity  Recent Surgery: * No surgery found *      Recommendations:     Discharge Recommendations: Low Intensity Therapy  Level of Assistance Recommended: 24 hours significant assistance  Discharge Equipment Recommendations: lift device    Assessment:     Lisa Moreno is a 44 y.o. male with a medical diagnosis of Witnessed seizure-like activity. He presents with performance deficits affecting function including impaired self care skills, impaired functional mobility, impaired balance, impaired endurance, weakness. Pt's PMH included below. Pt's level of assistance in ADLs and functional mobility prior to admission are detailed below. Upon arrival, pt is alert and smiling, with HOB elevated and mother present. Social history obtained from mother as pt is primarily non-verbal as baseline. Pt is pleasant, cooperative, and willing to participate in therapy. Pt with good activity tolerance, demonstrating near baseline function for ADLs, but more decreased bed mobility than usual. While in the hospital, he would benefit from skilled OT services to maximize independence in ADLs and return to PLOF.     Rehab Prognosis: Good; patient would benefit from acute OT services to address these deficits and reach maximum level of function.    Co-evaluation/treatment performed due to patient's multiple deficits requiring two skilled therapists to safely assess patient's ability to complete ADLs and functional mobility in addition to accommodating for patient's activity tolerance while in acute care setting.    Plan:     Patient to be seen 2 x/week to address the above listed problems via self-care/home management, therapeutic activities, therapeutic exercises  Plan of Care Expires: 07/31/25  Plan of Care Reviewed with: patient, mother    Subjective     Chief Complaint: pt did not verbalize  "or indicate any complaints   Patient Comments/Goals: "he knows how to do, all you have to do is cue him" -mother reports during UBD  Pain/Comfort:  Pain Rating 1: 0/10    Patients cultural, spiritual, Nondenominational conflicts given the current situation: no    Social History:  Living Environment: Patient lives with their mother in a single story home with ramped  Prior Level of Function: Prior to admission, patient required the following levels of assistance for ADLs:   Feeding: set-up assistance in WC  Grooming/hygiene: bed level with minimal assistance  UBD: bed level with HOB elevated with set-up assistance  LBD: bed level with moderate assistance  Bathing: bed level, dependent  Toileting: briefs/diapers, dependent   Bed mobility: minimal assistance for rolling L/R; moderate-maximum assistance for sit<>supine  Mother reports pt will sit in tilt-in-space for ~2-3 hours/day, alternating days  Roles and Routines: Patient was not driving and not working prior to admission.  Equipment Used at Home: hospital bed, wheelchair  DME owned (not currently used): none  Assistance Upon Discharge: family    Objective:     Communicated with nursing prior to session. Patient found HOB elevated with blood pressure cuff, pulse ox (continuous), telemetry, peripheral IV upon OT entry to room.    General Precautions: Standard, aspiration, fall   Orthopedic Precautions: N/A   Braces: N/A    Respiratory Status: Room air    Cognitive and Psychosocial Function:  Oriented to: unable to assess, patient non-verbal  Follows Commands/Attention: attentive and follows two-step commands  Communication: mostly non-verbal but will say 1-2 words on occasion  Mood/Affect/Coping Skills/Emotional Control: Happy and Pleasant    Hearing: Intact    Vision: No deficits noted      Physical Exam:      Left UE Right UE   UE Edema None noted None noted   UE ROM AROM WFL AROM WFL   UE Strength 5/5 - elbow flex 5/5 - elbow flex    Strength grasp WFL grasp WFL "   Sensation LUE  INTACT:  WFL RUE  INTACT:  WFL   Fine Motor Coordination:  LUE  INTACT:  WFL RUE  INTACT:    WFL   Gross Motor Coordination: LUE  INTACT:  WFL RUE  INTACT:  WFL     Occupational Performance    Bed Mobility:   Rolling/Turning to Left with maximal assistance  Rolling/Turning to Right with moderate assistance    Activities of Daily Living:  Grooming: set up assistance for washing face with HOB elevated  Upper Body Dressing: minimum assistance donning/doffing gown (in front) with HOB elevated    AMPAC 6 Click ADL:  AMPAC Total Score: 13    Treatment & Education:  Patient educated on role of OT, POC, and goals for therapy.  Pt instructed to perform therex in all planes to maintain ROM/strength required for completing ADLs    Patient not clear to transfer with RN/PCT, medi-chair transfer via drawsheet only.    Patient left HOB elevated with all lines intact, call button in reach, RN notified, and family present.    GOALS:   Multidisciplinary Problems       Occupational Therapy Goals          Problem: Occupational Therapy    Goal Priority Disciplines Outcome Interventions   Occupational Therapy Goal     OT, PT/OT Progressing    Description: Goals to be met by: 7/31/2025     Patient will increase functional independence with ADLs by performing:    Feeding with Set-up Assistance.  UE Dressing with Set-up Assistance.  Grooming with HOB elevated with Set-up Assistance.  Rolling bilaterally with Minimal Assistance.                      DME Justifications:  No DME recommended requiring DME justifications    History:     Past Medical History:   Diagnosis Date    Anxiety     Cognitive communication disorder 05/10/2022    Nonverbal at baseline but alert    Degenerative motor system disease 2006    Dr. Wise in movement disorder clinic on 2/23/2015.    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    Multiple drug resistant organism (MDRO) culture  positive     E.Coli ESBL PNA    Neurogenic bladder 01/16/2015    Seizure 02/22/2015    Spastic quadriplegia     Spinocerebellar atrophy      Past Surgical History:   Procedure Laterality Date    BACLOFEN PUMP IMPLANTATION      COLONOSCOPY N/A 7/23/2020    Procedure: COLONOSCOPY;  Surgeon: Ziyad Fitch MD;  Location: [facility];  Service: Endoscopy;  Laterality: N/A;    ESOPHAGOGASTRODUODENOSCOPY N/A 7/23/2020    Procedure: EGD (ESOPHAGOGASTRODUODENOSCOPY);  Surgeon: Ziyad Fitch MD;  Location: [facility];  Service: Endoscopy;  Laterality: N/A;    FLUOROSCOPIC URODYNAMIC STUDY N/A 11/27/2018    Procedure: URODYNAMIC STUDY, FLUOROSCOPIC;  Surgeon: Tanja Okeefe MD;  Location: [facility] OR [address] [place] [address];  Service: Urology;  Laterality: N/A;  1 hour    REATTACHMENT OF MUSCLE Bilateral 2/18/2022    Procedure: PTOSIS REPAIR OF BOTH EYES;  Surgeon: Krupa Rios MD;  Location: [address];  Service: Ophthalmology;  Laterality: Bilateral;    UPPER GASTROINTESTINAL ENDOSCOPY       Time Tracking:     OT Date of Treatment: 07/17/25  OT Start Time: 1007  OT Stop Time: 1039  OT Total Time (min): 32 min    Billable Minutes: Evaluation 20 and Self Care/Home Management 12    7/17/2025

## 2025-07-17 NOTE — SUBJECTIVE & OBJECTIVE
Interval History: No fevers documented overnight and off pressors. Mom at bedside. Shakes head yes to interval improvement in R arm pain after prior infiltrated removed. S/p LP yesterday, studies not c/f infection.     Review of Systems   Unable to perform ROS: Patient nonverbal     Objective:     Vital Signs (Most Recent):  Temp: 97.7 °F (36.5 °C) (07/17/25 1001)  Pulse: 86 (07/17/25 1001)  Resp: 20 (07/17/25 1001)  BP: (!) 114/53 (07/17/25 1001)  SpO2: 100 % (07/17/25 1001) Vital Signs (24h Range):  Temp:  [92.5 °F (33.6 °C)-97.7 °F (36.5 °C)] 97.7 °F (36.5 °C)  Pulse:  [] 86  Resp:  [7-21] 20  SpO2:  [43 %-100 %] 100 %  BP: ()/(51-71) 114/53     Weight: 59.9 kg (132 lb)  Body mass index is 16.5 kg/m².    Estimated Creatinine Clearance: 158.1 mL/min (based on SCr of 0.5 mg/dL).     Physical Exam  Constitutional:       General: He is not in acute distress.     Appearance: He is not ill-appearing.   HENT:      Head:      Comments: EEG cap, VPS  Eyes:      General:         Right eye: No discharge.         Left eye: No discharge.   Pulmonary:      Effort: Pulmonary effort is normal. No respiratory distress.   Abdominal:      Comments: PEG    Musculoskeletal:      Right lower leg: No edema.      Left lower leg: No edema.   Skin:     General: Skin is warm and dry.      Comments: Prior R infiltrated PIV erythema improving, less tender     Neurological:      Mental Status: He is alert.          Significant Labs:   Microbiology Results (last 7 days)       Procedure Component Value Units Date/Time    CSF culture [[id number]] Collected: 07/16/25 1453    Order Status: Completed Specimen: CSF (Spinal Fluid) from CSF Tap, Tube 3 Updated: 07/17/25 0626     CULTURE, CSF No Growth To Date     GRAM STAIN Cytospin indicates:      No WBCs      No organisms seen    Blood culture [[id number]]  (Normal) Collected: 07/15/25 1328    Order Status: Completed Specimen: Blood from Peripheral, Hand, Right Updated: 07/17/25 5133      Blood Culture No Growth After 36 Hours    Blood culture [[id number]]  (Normal) Collected: 07/15/25 1319    Order Status: Completed Specimen: Blood from Peripheral, Hand, Left Updated: 07/17/25 0500     Blood Culture No Growth After 36 Hours    Cryptococcal antigen, CSF [[id number]] Collected: 07/16/25 1453    Order Status: Sent Specimen: CSF (Spinal Fluid) from CSF Tap, Tube 3 Updated: 07/16/25 1911    Afb Culture Stain [[id number]] Collected: 07/16/25 1453    Order Status: Sent Specimen: CSF (Spinal Fluid) from CSF Tap, Tube 3 Updated: 07/16/25 1911    AFB Culture & Smear [[id number]] Collected: 07/16/25 1453    Order Status: Resulted Specimen: CSF (Spinal Fluid) from CSF Tap, Tube 3 Updated: 07/16/25 1911    Fungus culture [[id number]] Collected: 07/16/25 1453    Order Status: Sent Specimen: CSF (Spinal Fluid) from CSF Tap, Tube 3 Updated: 07/16/25 1911    Gram stain [[id number]] Collected: 07/16/25 1453    Order Status: Canceled Specimen: CSF (Spinal Fluid) from CSF Tap, Tube 3             Significant Imaging: I have reviewed all pertinent imaging results/findings within the past 24 hours.

## 2025-07-17 NOTE — PT/OT/SLP PROGRESS
Speech Language Pathology Treatment  Discharge    Patient Name:  Lisa Moreno   MRN:  [id number]  [id number]    Admitting Diagnosis: Witnessed seizure-like activity    Recommendations:                 General Recommendations:  follow up not indicated  Diet recommendations:  NPO, Liquid Diet Level: NPO; primary means of nutrition/hydration/medication via PEG    Pt able to have small occasional pleasure feeds of puree and honey thick liquids via tsp (6-10 bites/sips at a time) or occasional ice chips with caregiver with double swallows and close monitoring. Stop feeding if noting s/s of aspiration.     Pt's mother spoke with prior team and would like to continue soft and bite sized diet with honey thick liquids despite SLP recs. Extensive education provided across multiple sessions.     Aspiration Precautions: Strict aspiration precautions   General Precautions: Standard, aspiration  Communication strategies:  yes/no questions only  Discharge recommendations:    TBD      Assessment:     Lisa Moreno is a 44 y.o. male with an SLP diagnosis of Dysphagia. Per prior SLP and s/p completion of Fiberoptic Endoscopic Evaluation of Swallowing on 7/1, despite lack of signs at bedside pt remains at high risk for aspiration with all consistencies given decreased sensation, airway protection and weakness. SLP provided extensive education on feeding strategies, results of FEES study, dysphagia, swallow prognosis and diet recommendations should pt chose to eat despite recommendations. Mother verbalized understanding and wishes to continue PO intake despite risks. SLP reinforced education on FEES results and severity of swallow impairment. No further ST warranted at this time.     Subjective     Patient awake and alert. Mother at bedside.     Respiratory Status: Room air    Objective:     Has the patient been evaluated by SLP for swallowing?   Yes  Keep patient NPO? Yes     New orders placed to evaluate swallowing 2/2  patient's mother requesting diet. Patient's mother reports she will continue ST pleasure feeding recommendations, but she only agreed to a PEG if her son would continue to have bites/sips by mouth. She independently recalled importance of double swallows and discontinuing feeds when patient presenting with s/s of aspiration. SLP reviewed FEES results, ongoing high aspiration risks, SLP recommendations, and POC. Patient's mother verbalized understanding of all discussed and is in agreement with continuing primary means of nutrition/hydration/medication via PEG with pleasure feedings. All questions addressed within SLP scope. SLP provided patient with pre-thickened apple juice, extra thickener packets, cups, and spoons. SLP communicated with NP and RN.    Goals:   Multidisciplinary Problems       SLP Goals       Not on file              Multidisciplinary Problems (Resolved)          Problem: SLP    Goal Priority Disciplines Outcome   SLP Goal   (Resolved)     SLP Met   Description: Speech Language Pathology Goals  Goals expected to be met by 7/9    1. Pt will participate in ongoing swallow assessment                                Plan:       Plan of Care reviewed with:  patient, mother   SLP Follow-Up:  No       Time Tracking:     SLP Treatment Date:   07/17/25  Speech Start Time:  0806  Speech Stop Time:  0822     Speech Total Time (min):  16 min    Billable Minutes: Treatment Swallowing Dysfunction 8 and Self Care/Home Management Training 8    07/17/2025

## 2025-07-18 LAB
ABSOLUTE EOSINOPHIL (OHS): 0.08 K/UL
ABSOLUTE MONOCYTE (OHS): 0.55 K/UL (ref 0.3–1)
ABSOLUTE NEUTROPHIL COUNT (OHS): 2.74 K/UL (ref 1.8–7.7)
ALBUMIN SERPL BCP-MCNC: 1.7 G/DL (ref 3.5–5.2)
ALP SERPL-CCNC: 65 UNIT/L (ref 40–150)
ALT SERPL W/O P-5'-P-CCNC: 31 UNIT/L (ref 10–44)
ANION GAP (OHS): 5 MMOL/L (ref 8–16)
AST SERPL-CCNC: 26 UNIT/L (ref 11–45)
BASOPHILS # BLD AUTO: 0.03 K/UL
BASOPHILS NFR BLD AUTO: 0.6 %
BILIRUB SERPL-MCNC: 0.1 MG/DL (ref 0.1–1)
BUN SERPL-MCNC: 12 MG/DL (ref 6–20)
CALCIUM SERPL-MCNC: 7.6 MG/DL (ref 8.7–10.5)
CHLORIDE SERPL-SCNC: 107 MMOL/L (ref 95–110)
CO2 SERPL-SCNC: 25 MMOL/L (ref 23–29)
CREAT SERPL-MCNC: 0.5 MG/DL (ref 0.5–1.4)
ERYTHROCYTE [DISTWIDTH] IN BLOOD BY AUTOMATED COUNT: 18 % (ref 11.5–14.5)
GFR SERPLBLD CREATININE-BSD FMLA CKD-EPI: >60 ML/MIN/1.73/M2
GLUCOSE SERPL-MCNC: 89 MG/DL (ref 70–110)
HCT VFR BLD AUTO: 25.7 % (ref 40–54)
HGB BLD-MCNC: 8.1 GM/DL (ref 14–18)
HSV1 DNA CSF QL NAA+PROBE: NEGATIVE
HSV2 DNA CSF QL NAA+PROBE: NEGATIVE
IMM GRANULOCYTES # BLD AUTO: 0.01 K/UL (ref 0–0.04)
IMM GRANULOCYTES NFR BLD AUTO: 0.2 % (ref 0–0.5)
LYMPHOCYTES # BLD AUTO: 1.22 K/UL (ref 1–4.8)
MAGNESIUM SERPL-MCNC: 1.7 MG/DL (ref 1.6–2.6)
MCH RBC QN AUTO: 26.4 PG (ref 27–31)
MCHC RBC AUTO-ENTMCNC: 31.5 G/DL (ref 32–36)
MCV RBC AUTO: 84 FL (ref 82–98)
NUCLEATED RBC (/100WBC) (OHS): 0 /100 WBC
PHOSPHATE SERPL-MCNC: 2.2 MG/DL (ref 2.7–4.5)
PLATELET # BLD AUTO: 326 K/UL (ref 150–450)
PMV BLD AUTO: 11 FL (ref 9.2–12.9)
POTASSIUM SERPL-SCNC: 4 MMOL/L (ref 3.5–5.1)
PROT SERPL-MCNC: 6.1 GM/DL (ref 6–8.4)
RBC # BLD AUTO: 3.07 M/UL (ref 4.6–6.2)
RELATIVE EOSINOPHIL (OHS): 1.7 %
RELATIVE LYMPHOCYTE (OHS): 26.3 % (ref 18–48)
RELATIVE MONOCYTE (OHS): 11.9 % (ref 4–15)
RELATIVE NEUTROPHIL (OHS): 59.3 % (ref 38–73)
SODIUM SERPL-SCNC: 137 MMOL/L (ref 136–145)
WBC # BLD AUTO: 4.63 K/UL (ref 3.9–12.7)

## 2025-07-18 PROCEDURE — 84100 ASSAY OF PHOSPHORUS: CPT

## 2025-07-18 PROCEDURE — 25000003 PHARM REV CODE 250: Performed by: HOSPITALIST

## 2025-07-18 PROCEDURE — 63600175 PHARM REV CODE 636 W HCPCS: Performed by: PSYCHIATRY & NEUROLOGY

## 2025-07-18 PROCEDURE — 83735 ASSAY OF MAGNESIUM: CPT

## 2025-07-18 PROCEDURE — 20600001 HC STEP DOWN PRIVATE ROOM

## 2025-07-18 PROCEDURE — 25000003 PHARM REV CODE 250: Performed by: STUDENT IN AN ORGANIZED HEALTH CARE EDUCATION/TRAINING PROGRAM

## 2025-07-18 PROCEDURE — 80053 COMPREHEN METABOLIC PANEL: CPT

## 2025-07-18 PROCEDURE — 99233 SBSQ HOSP IP/OBS HIGH 50: CPT | Mod: GC,,, | Performed by: PSYCHIATRY & NEUROLOGY

## 2025-07-18 PROCEDURE — 85025 COMPLETE CBC W/AUTO DIFF WBC: CPT

## 2025-07-18 PROCEDURE — 11000001 HC ACUTE MED/SURG PRIVATE ROOM

## 2025-07-18 PROCEDURE — 94761 N-INVAS EAR/PLS OXIMETRY MLT: CPT

## 2025-07-18 PROCEDURE — 25000003 PHARM REV CODE 250: Performed by: PSYCHIATRY & NEUROLOGY

## 2025-07-18 PROCEDURE — 27000207 HC ISOLATION

## 2025-07-18 PROCEDURE — 97606 NEG PRS WND THER DME>50 SQCM: CPT

## 2025-07-18 PROCEDURE — 25000003 PHARM REV CODE 250

## 2025-07-18 RX ORDER — SODIUM,POTASSIUM PHOSPHATES 280-250MG
2 POWDER IN PACKET (EA) ORAL
Status: DISCONTINUED | OUTPATIENT
Start: 2025-07-18 | End: 2025-07-22 | Stop reason: HOSPADM

## 2025-07-18 RX ORDER — OXYCODONE HYDROCHLORIDE 5 MG/1
5 TABLET ORAL EVERY 6 HOURS PRN
Refills: 0 | Status: DISCONTINUED | OUTPATIENT
Start: 2025-07-18 | End: 2025-07-22 | Stop reason: HOSPADM

## 2025-07-18 RX ORDER — IBUPROFEN 200 MG
16 TABLET ORAL
Status: DISCONTINUED | OUTPATIENT
Start: 2025-07-18 | End: 2025-07-22 | Stop reason: HOSPADM

## 2025-07-18 RX ORDER — LANOLIN ALCOHOL/MO/W.PET/CERES
800 CREAM (GRAM) TOPICAL
Status: DISCONTINUED | OUTPATIENT
Start: 2025-07-18 | End: 2025-07-22 | Stop reason: HOSPADM

## 2025-07-18 RX ORDER — IBUPROFEN 200 MG
24 TABLET ORAL
Status: DISCONTINUED | OUTPATIENT
Start: 2025-07-18 | End: 2025-07-22 | Stop reason: HOSPADM

## 2025-07-18 RX ADMIN — ENOXAPARIN SODIUM 40 MG: 40 INJECTION SUBCUTANEOUS at 04:07

## 2025-07-18 RX ADMIN — DONEPEZIL HYDROCHLORIDE 5 MG: 5 TABLET, FILM COATED ORAL at 08:07

## 2025-07-18 RX ADMIN — LEVETIRACETAM 500 MG: 500 SOLUTION ORAL at 08:07

## 2025-07-18 RX ADMIN — MIRTAZAPINE 15 MG: 15 TABLET, FILM COATED ORAL at 08:07

## 2025-07-18 RX ADMIN — BACLOFEN 20 MG: 10 TABLET ORAL at 08:07

## 2025-07-18 RX ADMIN — Medication 800 MG: at 08:07

## 2025-07-18 RX ADMIN — CARBOXYMETHYLCELLULOSE SODIUM 1 DROP: 10 GEL OPHTHALMIC at 03:07

## 2025-07-18 RX ADMIN — DANTROLENE SODIUM 50 MG: 25 CAPSULE ORAL at 03:07

## 2025-07-18 RX ADMIN — DOXYCYCLINE HYCLATE 100 MG: 100 TABLET, COATED ORAL at 08:07

## 2025-07-18 RX ADMIN — POTASSIUM & SODIUM PHOSPHATES POWDER PACK 280-160-250 MG 2 PACKET: 280-160-250 PACK at 04:07

## 2025-07-18 RX ADMIN — Medication 800 MG: at 04:07

## 2025-07-18 RX ADMIN — CARBOXYMETHYLCELLULOSE SODIUM 1 DROP: 10 GEL OPHTHALMIC at 08:07

## 2025-07-18 RX ADMIN — DANTROLENE SODIUM 50 MG: 25 CAPSULE ORAL at 08:07

## 2025-07-18 RX ADMIN — BACLOFEN 20 MG: 10 TABLET ORAL at 03:07

## 2025-07-18 RX ADMIN — POTASSIUM & SODIUM PHOSPHATES POWDER PACK 280-160-250 MG 2 PACKET: 280-160-250 PACK at 01:07

## 2025-07-18 RX ADMIN — POTASSIUM & SODIUM PHOSPHATES POWDER PACK 280-160-250 MG 2 PACKET: 280-160-250 PACK at 08:07

## 2025-07-18 NOTE — PLAN OF CARE
Pt is medically ready to discharge.  SW sent updated progress notes to [facility].  SW will continue to monitor pt needs.     10:43 AM  CARLOS ALBERTO spoke with Janessa  at [facility] and she indicated that there are beds available but they are waiting on the 142 before being able to admit.  CARLOS ALBERTO will follow up on the 142    2:46 PM  CARLOS ALBERTO followed up with Assessment pro and [facility]  [place]  [phone] to get a status of the 142.  [facility] indicated that the  failed to include pt ALS diagnosis and in order for the 142 to be approved, it has to be corrected.  Correction was made and [facility] was made aware.       Discharge Plan A and Plan B have been determined by review of patient's clinical status, future medical and therapeutic needs, and coverage/benefits for post-acute care in coordination with multidisciplinary team members.    Aura Parker MSW, LCSW  [facility]  Case Management Dept.

## 2025-07-18 NOTE — PROGRESS NOTES
[facility] - Neuro Critical Care  Neurocritical Care  Progress Note    Admit Date: 6/29/2025  Service Date: 07/18/2025  Length of Stay: 19    Subjective:     Chief Complaint: Witnessed seizure-like activity    History of Present Illness: Mr. Moreno is a 44yoM with PMHx significant for ALS (onset 2006), neuromuscular dysfunction of the bladder, sacral ulcers, and spinal cerebellar ataxia who was admitted recently admitted (5/9/2025) for severe dehydration, electrolyte disturbances, and covid who presented to the ED with EMS and c/o AMS and hypoxia. Nonverbal at baseline but apparently is usually more alert; has been less responsive and interactive x1 day. Upon EMS arrival, was obtunded and sating 74%. They gave him solumedrol and duonebs en route which improved his oxygen status to >90%. Mother at bedside reports increased difficulty with swallowing and decreased PO intake.        While in the ED, labs and imaging significant for Na 149, K 5.4, Cl 111, CO2 21, glucose 149, BUN 38, creat 1.0, albumin 2.2, ALP 80, AST/ALT 34/23, anion gap 17, procal 8.42, BNP <10, WBC 7.11, H/H 12/40.5, plts 315, covid/flu negative, .6, VBG 7.349/47.1/44.5/23.9, istat LA 4.9->7.4->4.2 (7.4 likely error?). Chest xray with patchy opacities bilaterally, predominant within the bibasilar regions but also within the left lung apex and the left mid-lung; compatible with multifocal pneumonia. Started on BSA by the ED; vanc and cefepime. CCM consulted for sepsis. During first examination the patient was stable with MAPs >65 and Lactic acid down trended to 2.6. Around 6 am of 6/30 [place] was called hypotension with MAPs in the 50. Pt was started on levophed and was transferred to MICU 6/29 for septic shock secondary to multifocal pneumonia. Patient initially requiring pressors but since discontinued. Given dysphagia, IR placed a PEG tube 7/5 for nutrition and medication administration. Patient was stepped down to  on 7/7.    Patient was  awaiting discharge home tonight on 7/14 when a rapid response was called as the patient began seizing. Two witnessed tonic-clonic seizures were observed lasting for about 1-2 minutes, given 2mg Ativan. After these seizures, he never returned to baseline. Baseline was described as full movement in neck and all extremities, tracking with head & neck, mumbles per mom, and following commands. Initial assessment, patient was found staring, with no blink to threat or closure of eyes, midline gaze, and not moving extremities with painful stimuli. During seizure, it is reported that patient became hypertensive, hypoxic, and tachycardic, with strong flexor posturing bilaterally. EEG applied showing discharges over the left hemisphere, no history of seizures or brain mass/lesions/infarction. Patient loaded with 40mg/kg of Keppra. Given concern for mentation and need for closer neurological monitoring, patient was transferred to [facility] where he will be admitted.       Hospital Course: 07/15/2025: NAEON. Afebrile, BP 90/50s. MRI completed. EEG in place. Keppra and LR given. Bilateral mitt restraints. Hypotensive this morning, given 500 NS and 1L LR. Temp low 90s, bear hugger placed. UOP 1.6L. LP today   07/16/2025: continued improvement in mental status, alert and interactive today, pleasant and smiling with care, does attempt to remove lines and PIVs.  Did require initiation of norepinephrine overnight to maintain MAP > 65, on and off joseluis hugger for normothermia, remains on antibiotics which have been broadened to vanc/meropenem pending ID evaluation. Obtaining LP under fluoroscopy today.   07/17/2025: NAEON. Off levo. S/p LP with IR yesterday. CSF unremarkable, further CSF studies pending. R PIV removed. Pt maintaining pressure and looking better since removal. Stable for stepdown to  today.  07/18/2025: NAEON. AFVSS. Exam stable. 2 large BM overnight, Electrolytes repleted. Pending stepdown bed. Joseluis hugger applied  intermittently.    Interval History:  see hospital course    Objective:     Vitals:  Temp: 96.4 °F (35.8 °C)  Pulse: 79  Rhythm: normal sinus rhythm  BP: 102/62  MAP (mmHg): 76  Resp: 17  SpO2: 100 %    Temp  Min: 89.1 °F (31.7 °C)  Max: 98.1 °F (36.7 °C)  Pulse  Min: 63  Max: 82  BP  Min: 100/56  Max: 133/67  MAP (mmHg)  Min: 73  Max: 93  Resp  Min: 11  Max: 17  SpO2  Min: 96 %  Max: 100 %    07/17 0701 - 07/18 0700  In: 2991.1 [I.V.:265]  Out: 1700 [Urine:1700]   Unmeasured Output  Unmeasured Urine Occurrence: 0  Unmeasured Stool Occurrence: 1  Pad Count: 2        Physical Exam  Vitals and nursing note reviewed.   Constitutional:       General: He is not in acute distress.     Appearance: He is cachectic. He is not diaphoretic.   HENT:      Head: Normocephalic and atraumatic.      Mouth/Throat:      Mouth: Mucous membranes are dry.      Pharynx: Oropharynx is clear.   Eyes:      Extraocular Movements: Extraocular movements intact.      Pupils: Pupils are equal, round, and reactive to light.   Cardiovascular:      Rate and Rhythm: Normal rate and regular rhythm.      Pulses: Normal pulses.   Pulmonary:      Effort: Pulmonary effort is normal. No respiratory distress.   Chest:      Comments: Pectus excavatum  Abdominal:      General: Abdomen is flat. There is no distension.      Palpations: Abdomen is soft.      Tenderness: There is no abdominal tenderness.      Comments: PEG site c/d/i    Musculoskeletal:      Right lower leg: No edema.      Left lower leg: No edema.   Skin:     General: Skin is warm and dry.      Capillary Refill: Capillary refill takes less than 2 seconds.   Neurological:      Mental Status: He is alert.      Comments: [id number]  PERRL  Tracks appropriately  Minimally verbal at baseline  spont movement  Fcx0  BUE mittens         Unable to test orientation, language, memory, judgment, insight, fund of knowledge, hearing, shoulder shrug, tongue protrusion, coordination, gait due to level of  consciousness.       Medications:  Continuous     Scheduled  baclofen, 20 mg, TID  carboxymethylcellulose sodium, 1 drop, TID  dantrolene, 50 mg, TID  donepeziL, 5 mg, QHS  doxycycline, 100 mg, Q12H  enoxparin, 40 mg, Daily  lactulose, 10 g, BID  levetiracetam, 500 mg, BID  mirtazapine, 15 mg, QHS    PRN  dextrose 50%, 12.5 g, PRN  dextrose 50%, 25 g, PRN  glucagon (human recombinant), 1 mg, PRN  glucose, 16 g, PRN  glucose, 24 g, PRN  magnesium oxide, 800 mg, PRN  magnesium oxide, 800 mg, PRN  naloxone, 0.02 mg, PRN  ondansetron, 4 mg, Q6H PRN  oxyCODONE, 5 mg, Q6H PRN  potassium bicarbonate, 35 mEq, PRN  potassium bicarbonate, 50 mEq, PRN  potassium bicarbonate, 60 mEq, PRN  potassium, sodium phosphates, 2 packet, PRN  potassium, sodium phosphates, 2 packet, PRN  potassium, sodium phosphates, 2 packet, PRN  sodium chloride 0.9%, 10 mL, Q12H PRN      Today I personally reviewed pertinent medications, lines/drains/airways, imaging, cardiology results, laboratory results, microbiology results,     Diet  Diet Soft & Bite Sized (IDDSI Level 6) Honey Thick (Moderately Thick); Standard Tray    Assessment/Plan:     Neuro  * Witnessed seizure-like activity  Patient was awaiting discharge home on 7/14 when a rapid response was called as the patient began seizing. Two witnessed tonic-clonic seizures were observed lasting for about 1-2 minutes, given 2mg Ativan. After these seizures, he never returned to baseline. Baseline was described as full movement in neck and all extremities, tracking with head & neck, mumbles per mom, and following commands. Initial assessment, patient was found staring, with no blink to threat or closure of eyes, midline gaze, and not moving extremities with painful stimuli. During seizure, it is reported that patient became hypertensive, hypoxic, and tachycardic, with strong flexor posturing bilaterally. EEG applied showing discharges over the left hemisphere, no history of seizures or brain  mass/lesions/infarction. Patient loaded with 40mg/kg of Keppra. Given concern for mentation and need for closer neurological monitoring, patient was transferred to [facility] where he will be admitted.     Admitted to [facility], stable to step down 7/17, pending bed  Q4h neurochecks and hourly vital signs  SBP <180, MAP >65  Brain MRI w/wo epilepsy protocol - negative for acute process  cEEG - discharges in left hemisphere no seizures  Discontinued 7/16  Continue keppra 500mg BID, continue until outpatient follow up with Epilepsy (referral placed)  Maintain seizure precautions  TF resumed  SCDs & LVX for DVT ppx  PT/OT/SLP to continue treatment  Q6h bladder scans  LP at bedside unsuccessful 7/15, obtained 7/16 under fluoroscopic guidance  Blood cultures resent, other workup pending and ID consulted. See plan above  Adequately volume resuscitated    ID  Septic shock  Unknown source as of this time but just completed treatment  course of ertapenem for ESBL pneumonia.  - on vancomycin & Meropenem with CNS, discontinued 7/17  - follow up CSF studies  - other cultures in process  - ID consulted recommendations appreciated    - given LP findings, de-escalate to doxycycline 100 mg BID via PEG, last day 7/19 to complete 5d course for ssti    - isolation precaution per hospital protocol     Endocrine  Severe protein-calorie malnutrition  - ok to resume pleasure feeds, soft and bite sized diet with honey thick liquids ordered  Nutrition consulted. Most recent weight and BMI monitored-     Measurements:  Wt Readings from Last 1 Encounters:   07/16/25 59.9 kg (132 lb)   Body mass index is 16.5 kg/m².    Patient has been screened and assessed by RD.    Malnutrition Type:  Context: chronic illness  Level: severe    Malnutrition Characteristic Summary:  Subcutaneous Fat (Malnutrition): severe depletion  Muscle Mass (Malnutrition): severe depletion    Interventions/Recommendations (treatment strategy):  1. Continue bolus TF recommendation: 5  cans/day of Isosource 1.5 to provide 1250 mL total volume, 1875 kcal, 220 g CHO, 85 protein, 19 fiber, 955 mL free water  - 90 mL flush before and after each (10 flushes total) can to provide additional 900 mL free water (Total 1855 mL)  2. Continue soft and bite sized textured diet as tolerated    3. Continue boost glucose control TID   4. Recommend arnie BID to aid in wound healing  5. Recommend MVI  6. RD to monitor weight, labs, meds, intake, tolerance            The patient is being Prophylaxed for:  Venous Thromboembolism with: Mechanical or Chemical  Stress Ulcer with: Not Applicable   Ventilator Pneumonia with: not applicable    Activity Orders            Diet Soft & Bite Sized (IDDSI Level 6) Honey Thick (Moderately Thick); Standard Tray: Soft & Bite Sized starting at 07/18 1431    Elevate HOB Elevate HOB 30-45 degrees during feeding unless contraindicated starting at 06/30 1357    Progressive Mobility Protocol (mobilize patient to their highest level of functioning at least twice daily) starting at 06/30 0800    Turn patient every 2 hours starting at 06/30 0000          Full Code    Karthikeyan Wynne MD  Neurocritical Care  [facility] - Neuro Critical Care

## 2025-07-18 NOTE — PLAN OF CARE
"[facility] Care Plan  POC reviewed with Lisa Moreno and family at 1400. Patient and Family verbalized understanding. Questions and concerns addressed. No acute events today. Pt progressing toward goals. Will continue to monitor. See below and flowsheets for full assessment and VS info.   Transfer order          Is this a stroke patient? no    Neuro:  Joseph Coma Scale  Best Eye Response: 4-->(E4) spontaneous  Best Motor Response: 6-->(M6) obeys commands  Best Verbal Response: 1-->(V1) none  Joseph Coma Scale Score: 11  Assessment Qualifiers: Patient not sedated/intubated  Pupil PERRLA: yes     24 hr Temp:  [89.1 °F (31.7 °C)-98.1 °F (36.7 °C)]     CV:   Rhythm: normal sinus rhythm  BP goals:   SBP < 180  MAP > 65    Resp:           Plan: N/A    GI/:     Diet/Nutrition Received: tube feeding  Last Bowel Movement: 07/18/25  Voiding Characteristics: external catheter    Intake/Output Summary (Last 24 hours) at 7/18/2025 1722  Last data filed at 7/18/2025 1701  Gross per 24 hour   Intake 2833 ml   Output 1980 ml   Net 853 ml     Unmeasured Output  Unmeasured Urine Occurrence: 0  Unmeasured Stool Occurrence: 1  Pad Count: 2    Labs/Accuchecks:  Recent Labs   Lab 07/18/25  0118   WBC 4.63   RBC 3.07*   HGB 8.1*   HCT 25.7*         Recent Labs   Lab 07/18/25  0118      K 4.0   CO2 25      BUN 12   CREATININE 0.5   ALKPHOS 65   ALT 31   AST 26   BILITOT 0.1    No results for input(s): "PROTIME", "INR", "APTT", "HEPANTIXA" in the last 168 hours. No results for input(s): "CPK", "CPKMB", "TROPONINI", "MB" in the last 168 hours.    Electrolytes: Electrolytes replaced  Accuchecks: none    Gtts:      LDA/Wounds:    Morgan Risk Assessment  Sensory Perception: 2-->very limited  Moisture: 3-->occasionally moist  Activity: 1-->bedfast  Mobility: 2-->very limited  Nutrition: 3-->adequate  Friction and Shear: 1-->problem  Morgan Score: 12    Is your morgan score 12 or less? no            Restraints: "   Restraint Order  Length of Order: Order good for next 24 hours or when removed.  Date that the current order will : 25  Time that the current order will : 0549  Order Upon Application: Yes    WC

## 2025-07-18 NOTE — ASSESSMENT & PLAN NOTE
- ok to resume pleasure feeds, soft and bite sized diet with honey thick liquids ordered  Nutrition consulted. Most recent weight and BMI monitored-     Measurements:  Wt Readings from Last 1 Encounters:   07/16/25 59.9 kg (132 lb)   Body mass index is 16.5 kg/m².    Patient has been screened and assessed by RD.    Malnutrition Type:  Context: chronic illness  Level: severe    Malnutrition Characteristic Summary:  Subcutaneous Fat (Malnutrition): severe depletion  Muscle Mass (Malnutrition): severe depletion    Interventions/Recommendations (treatment strategy):  1. Continue bolus TF recommendation: 5 cans/day of Isosource 1.5 to provide 1250 mL total volume, 1875 kcal, 220 g CHO, 85 protein, 19 fiber, 955 mL free water  - 90 mL flush before and after each (10 flushes total) can to provide additional 900 mL free water (Total 1855 mL)  2. Continue soft and bite sized textured diet as tolerated    3. Continue boost glucose control TID   4. Recommend arnie BID to aid in wound healing  5. Recommend MVI  6. RD to monitor weight, labs, meds, intake, tolerance

## 2025-07-18 NOTE — ASSESSMENT & PLAN NOTE
Patient was awaiting discharge home on 7/14 when a rapid response was called as the patient began seizing. Two witnessed tonic-clonic seizures were observed lasting for about 1-2 minutes, given 2mg Ativan. After these seizures, he never returned to baseline. Baseline was described as full movement in neck and all extremities, tracking with head & neck, mumbles per mom, and following commands. Initial assessment, patient was found staring, with no blink to threat or closure of eyes, midline gaze, and not moving extremities with painful stimuli. During seizure, it is reported that patient became hypertensive, hypoxic, and tachycardic, with strong flexor posturing bilaterally. EEG applied showing discharges over the left hemisphere, no history of seizures or brain mass/lesions/infarction. Patient loaded with 40mg/kg of Keppra. Given concern for mentation and need for closer neurological monitoring, patient was transferred to [facility] where he will be admitted.     Admitted to [facility], stable to step down 7/17, pending bed  Q4h neurochecks and hourly vital signs  SBP <180, MAP >65  Brain MRI w/wo epilepsy protocol - negative for acute process  cEEG - discharges in left hemisphere no seizures  Discontinued 7/16  Continue keppra 500mg BID, continue until outpatient follow up with Epilepsy (referral placed)  Maintain seizure precautions  TF resumed  SCDs & LVX for DVT ppx  PT/OT/SLP to continue treatment  Q6h bladder scans  LP at bedside unsuccessful 7/15, obtained 7/16 under fluoroscopic guidance  Blood cultures resent, other workup pending and ID consulted. See plan above  Adequately volume resuscitated

## 2025-07-18 NOTE — PLAN OF CARE
"[facility] Care Plan    POC reviewed with Lisa Moreno and family at 0300. Patient and Family verbalized understanding. Questions and concerns addressed. No acute events today. Pt progressing toward goals. Will continue to monitor. See below and flowsheets for full assessment and VS info.     - Two large, loose BM overnight.   - Initiated replacement of Mg and Phos.     Is this a stroke patient? no    Neuro:  Joseph Coma Scale  Best Eye Response: 4-->(E4) spontaneous  Best Motor Response: 6-->(M6) obeys commands  Best Verbal Response: 1-->(V1) none  Joseph Coma Scale Score: 11  Assessment Qualifiers: Patient not sedated/intubated, No eye obstruction present  Pupil PERRLA: yes     24 hr Temp:  [89.1 °F (31.7 °C)-98.1 °F (36.7 °C)]     CV:   Rhythm: normal sinus rhythm  BP goals:   SBP < 180  MAP > 65    Resp:           Plan: N/A    GI/:     Diet/Nutrition Received: tube feeding  Last Bowel Movement: 07/18/25  Voiding Characteristics: external catheter    Intake/Output Summary (Last 24 hours) at 7/18/2025 0458  Last data filed at 7/18/2025 0430  Gross per 24 hour   Intake 3252.02 ml   Output 1970 ml   Net 1282.02 ml     Unmeasured Output  Unmeasured Urine Occurrence: 0  Unmeasured Stool Occurrence: 1  Pad Count: 2    Labs/Accuchecks:  Recent Labs   Lab 07/18/25  0118   WBC 4.63   RBC 3.07*   HGB 8.1*   HCT 25.7*         Recent Labs   Lab 07/18/25  0118      K 4.0   CO2 25      BUN 12   CREATININE 0.5   ALKPHOS 65   ALT 31   AST 26   BILITOT 0.1    No results for input(s): "PROTIME", "INR", "APTT", "HEPANTIXA" in the last 168 hours. No results for input(s): "CPK", "CPKMB", "TROPONINI", "MB" in the last 168 hours.    Electrolytes: Electrolytes replaced  Accuchecks: none    Gtts:      LDA/Wounds:    Morgan Risk Assessment  Sensory Perception: 2-->very limited  Moisture: 3-->occasionally moist  Activity: 1-->bedfast  Mobility: 2-->very limited  Nutrition: 3-->adequate  Friction and Shear: " 1-->problem  Morgan Score: 12  Is your morgan score 12 or less? yes enrolled in the pea program, morgan mobility score is 2 or less, they are on a immerse bed, and heel boots on      Restraints:   Restraint Order  Length of Order: Order good for next 24 hours or when removed.  Date that the current order will : 25  Time that the current order will : 903  Order Upon Application: Yes    [facility]     Problem: Adult Inpatient Plan of Care  Goal: Plan of Care Review  Outcome: Adequate for Care Transition     Problem: Adult Inpatient Plan of Care  Goal: Absence of Hospital-Acquired Illness or Injury  Outcome: Adequate for Care Transition     Problem: Adult Inpatient Plan of Care  Goal: Optimal Comfort and Wellbeing  Outcome: Adequate for Care Transition     Problem: Adult Inpatient Plan of Care  Goal: Readiness for Transition of Care  Outcome: Adequate for Care Transition     Problem: Sepsis/Septic Shock  Goal: Optimal Nutrition Intake  Outcome: Adequate for Care Transition     Problem: Restraint, Nonviolent  Goal: Absence of Harm or Injury  Outcome: Adequate for Care Transition

## 2025-07-18 NOTE — ASSESSMENT & PLAN NOTE
Unknown source as of this time but just completed treatment  course of ertapenem for ESBL pneumonia.  - on vancomycin & Meropenem with CNS, discontinued 7/17  - follow up CSF studies  - other cultures in process  - ID consulted recommendations appreciated    - given LP findings, de-escalate to doxycycline 100 mg BID via PEG, last day 7/19 to complete 5d course for ssti    - isolation precaution per hospital protocol

## 2025-07-18 NOTE — PLAN OF CARE
Recommendations  Interventions: Enteral nutrition management, Medical food supplement therapy, Vitamin and mineral supplement therapy    --Continue Bolus TF recommendation: 4 cans/day of Novasource Renal to provide 948 mL total volume, 1900 kcal, 172 g CHO, 88 g protein, 0 g fiber, 672 mL free water, 95% DRI         --155 mL flush before and after each can to provide additional 1240 mL free water (Total 1912 mL)       --Order Guzman BID as tube feed modifier to promote wound healing     Goals: Meet % EEN/EPN by next RD follow-up  Nutrition Goal Status #2: goal met  Communication of RD Recs:  (POC)

## 2025-07-18 NOTE — PROGRESS NOTES
[facility]    Wound Care     Patient Name:  Lisa Moreno  MRN:  [id number]  Date: 7/18/2025  Diagnosis: Witnessed seizure-like activity     History:  Past Medical History:   Diagnosis Date    Anxiety     Cognitive communication disorder 05/10/2022    Nonverbal at baseline but alert    Degenerative motor system disease 2006    Dr. Wise in movement disorder clinic on 2/23/2015.    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    Multiple drug resistant organism (MDRO) culture positive     E.Coli ESBL PNA    Neurogenic bladder 01/16/2015    Seizure 02/22/2015    Spastic quadriplegia     Spinocerebellar atrophy      Social History[1]  Precautions:  Allergies as of 06/29/2025 - Reviewed 06/29/2025   Allergen Reaction Noted    Penicillins  01/14/2015    Allergen ext-venom-honey bee  05/10/2022       [facility] Assessment Details / Treatment:    Patient seen for wound care: Follow-up   Chart reviewed for this encounter.   Labs:   WBC (K/uL)   Date Value   07/18/2025 4.63   07/17/2025 4.66     Glucose (mg/dL)   Date Value   07/18/2025 89   07/17/2025 87   09/15/2023 70   09/14/2023 76     Albumin (g/dL)   Date Value   07/18/2025 1.7 (L)   07/17/2025 1.7 (L)   09/15/2023 3.4 (L)   09/14/2023 3.2 (L)       Morgan Score: 12    Narrative:  Pt seen for WC follow-up and agreed to assessment  Chart reviewed for this encounter.   See Flow Sheet for additional documentation and media.    Wound vac changed.   Removed with adhesive remover spray.   1 urgutol removed  1 Black foam removed    Cleansed with saline. Wound bed pink and moist.   Non sting barrier spray applied to aubree-wound    Applied 1 urgotul 1 Black foam     Wound vac seal intact with no leaks. Tolerated well.    -125 mmHg continuous        See flowsheets for additional wounds.           RECOMMENDATIONS:  Bedside nurse assess for acute changes (purulence, increased redness/swelling,  increased drainage, malodor, increased pain, pallor, necrosis) please contact physician on any acute changes.    Bedside RN can reinforce wound vac dressing if leak detected.     If unable to maintain seal for >2 hours, remove and replace dressing with wet to dry dressing. Contact wound care and MD.     Left hip, Left ischium, right hip: cleanse with vashe, pat dry with gauze. Apply Aquacel Ag to wound bed, cover with silicone foam border. Change daily and PRN soiling    -Turn Q2    Bilateral heels and right lateral ankle: Cleanse with vashe, pat dry with gauze, apply hydrofera blue to wound bed, wrap with cast padding or kerlix, secure with medipore tape.    -EHOB boots at all times   -podiatry rec to primary team.     Thank you for the consult. Wound Care will continue to follow.           07/18/25 1030        Wound 05/10/25 1151 Pressure Injury Right posterior Ankle   Date First Assessed/Time First Assessed: 05/10/25 1151   Present on Original Admission: Yes  Primary Wound Type: Pressure Injury  Side: Right  Orientation: posterior  Location: Ankle   Wound Image    Pressure Injury Stage U   Dressing Appearance Intact;Moist drainage   Drainage Amount Small   Drainage Characteristics/Odor Serosanguineous   Appearance Yellow;Slough;Pink;Moist   Periwound Area Macerated   Dressing Changed;Foam        Wound 04/23/25 1530 Pressure Injury Right lower Leg #1   Date First Assessed/Time First Assessed: 04/23/25 1530   Present on Original Admission: Yes  Primary Wound Type: Pressure Injury  Side: Right  Orientation: lower  Location: Leg  Wound Number: #1  Is this injury device related?: No   Wound Image    Dressing Appearance Intact;Moist drainage   Drainage Amount Small   Drainage Characteristics/Odor Serosanguineous   Appearance Pink;Moist   Tissue loss description Partial thickness   Care Cleansed with:;Other (see comments)  (vashe)   Dressing Changed;Foam        Wound 04/23/25 1530 Other (comment) Left Heel #2   Date  First Assessed/Time First Assessed: 04/23/25 1530   Present on Original Admission: Yes  Primary Wound Type: Other (comment)  Side: Left  Location: Heel  Wound Number: #2   Wound Image    Pressure Injury Stage U   Dressing Appearance Intact;Clean;Dry   Drainage Amount None   Appearance Pink;Red;Dry;Eschar   Periwound Area Scar tissue        Wound 04/23/25 1537 Pressure Injury Sacral spine #4   Date First Assessed/Time First Assessed: 04/23/25 1537   Present on Original Admission: Yes  Primary Wound Type: Pressure Injury  Location: Sacral spine  Wound Number: #4  Is this injury device related?: No   Pressure Injury Stage 4   Appearance Pink;Moist;Slough   Tissue loss description Full thickness   Periwound Area Indurated;Scar tissue   Care Cleansed with:;Other (see comments)  (vashe)        Wound 06/29/25 1945 Pressure Injury Left posterior Buttocks   Date First Assessed/Time First Assessed: 06/29/25 1945   Primary Wound Type: Pressure Injury  Side: Left  Orientation: posterior  Location: Buttocks  Is this injury device related?: No   Wound Image    Pressure Injury Stage U   Dressing Appearance Intact;Saturated   Drainage Amount Small   Drainage Characteristics/Odor Serosanguineous   Appearance Pink;Slough;Eschar;Moist   Care Cleansed with:;Other (see comments)  (vashe)   Dressing Changed;Foam        Wound 06/29/25 1947 Pressure Injury Right posterior Buttocks #2   Date First Assessed/Time First Assessed: 06/29/25 1947   Primary Wound Type: Pressure Injury  Side: Right  Orientation: posterior  Location: Buttocks  Wound Number: #2  Is this injury device related?: No   Wound Image    Pressure Injury Stage U   Dressing Appearance Intact;Moist drainage   Drainage Amount Small   Drainage Characteristics/Odor Serosanguineous   Appearance [place];Slough;Moist   Periwound Area Scar tissue   Care Cleansed with:;Other (see comments)  (vashe)   Dressing Changed;Foam        Wound 06/29/25 1953 Pressure Injury Left posterior Hip    Date First Assessed/Time First Assessed: 06/29/25 1953   Primary Wound Type: Pressure Injury  Side: Left  Orientation: posterior  Location: Hip   Wound Image    Pressure Injury Stage DTPI   Dressing Appearance Intact;Moist drainage   Drainage Amount Small   Drainage Characteristics/Odor Serosanguineous   Appearance [place];Red;Pink;Purple;Moist   Care Cleansed with:;Other (see comments)  (vashe)   Dressing Changed;Foam        Negative Pressure Wound Therapy  07/03/25 1420   Placement Date/Time: 07/03/25 1420   Location: Sacral Spine   NPWT Type Vacuum Therapy   Therapy Setting NPWT Continuous therapy   Pressure Setting NPWT 125 mmHg   Therapy Interventions NPWT Dressing changed;Trac pad replaced   Sponges Inserted NPWT Black;1;Other  (urgotul)   Sponges Removed NPWT Black;Other;1  (urgotul)                      [1]   Social History  Socioeconomic History    Marital status: Single   Tobacco Use    Smoking status: Never    Smokeless tobacco: Never   Substance and Sexual Activity    Alcohol use: Not Currently     Comment: social drinker, at times    Drug use: Not Currently    Sexual activity: Never     Social Drivers of Health     Financial Resource Strain: Low Risk  (7/13/2025)    Overall Financial Resource Strain (CARDIA)     Difficulty of Paying Living Expenses: Not hard at all   Recent Concern: Financial Resource Strain - Medium Risk (7/1/2025)    Overall Financial Resource Strain (CARDIA)     Difficulty of Paying Living Expenses: Somewhat hard   Food Insecurity: No Food Insecurity (7/13/2025)    Hunger Vital Sign     Worried About Running Out of Food in the Last Year: Never true     Ran Out of Food in the Last Year: Never true   Transportation Needs: No Transportation Needs (7/13/2025)    PRAPARE - Transportation     Lack of Transportation (Medical): No     Lack of Transportation (Non-Medical): No   Recent Concern: Transportation Needs - Unmet Transportation Needs (4/17/2025)    PRAPARE - Transportation     Lack  of Transportation (Medical): Yes     Lack of Transportation (Non-Medical): Yes   Physical Activity: Inactive (5/12/2025)    Exercise Vital Sign     Days of Exercise per Week: 0 days     Minutes of Exercise per Session: 0 min   Stress: No Stress Concern Present (7/13/2025)    [employer] [place] [employer] - Occupational Stress Questionnaire     Feeling of Stress : Not at all   Recent Concern: Stress - Stress Concern Present (5/12/2025)    [employer] [place] [employer] - Occupational Stress Questionnaire     Feeling of Stress : To some extent   Housing Stability: Low Risk  (7/13/2025)    Housing Stability Vital Sign     Unable to Pay for Housing in the Last Year: No     Number of Times Moved in the Last Year: 1     Homeless in the Last Year: No

## 2025-07-18 NOTE — PROGRESS NOTES
[facility]  Adult Nutrition  Progress Note    SUMMARY       Recommendations  Interventions: Enteral nutrition management, Medical food supplement therapy, Vitamin and mineral supplement therapy    --Continue Bolus TF recommendation: 4 cans/day of Novasource Renal to provide 948 mL total volume, 1900 kcal, 172 g CHO, 88 g protein, 0 g fiber, 672 mL free water, 95% DRI         --155 mL flush before and after each can to provide additional 1240 mL free water (Total 1912 mL)       --Order Guzman BID as tube feed modifier to promote wound healing     Goals: Meet % EEN/EPN by next RD follow-up  Nutrition Goal Status #2: goal met  Communication of RD Recs:  (POC)    Nutrition Discharge Planning    Nutrition Discharge Planning: General healthy diet, Diet texture per SLP, Enteral nutrition (comments), Oral supplement regimen (comments)  Oral supplement regimen (comments): guzman BID  Enteral nutrition (comments): via PEG    Assessment and Plan    Endocrine  Severe protein-calorie malnutrition  Malnutrition Type:  Context: chronic illness  Level: severe    Related to (etiology):   Metabolic demand of disease and treatment    Signs and Symptoms (as evidenced by):   Muscle/fat wasting     Malnutrition Characteristic Summary:  Subcutaneous Fat (Malnutrition): severe depletion  Muscle Mass (Malnutrition): severe depletion      Interventions/Recommendations (treatment strategy):  Collaboration of nutrition care with other providers    Nutrition Diagnosis Status:   Continues         Malnutrition Assessment  Malnutrition Context: chronic illness  Malnutrition Level: severe  Skin (Micronutrient): none  Nails (Micronutrient): none  Hair/Scalp (Micronutrient): none  Eyes (Micronutrient): none  Extraoral (Micronutrient): none  Gums (Micronutrient): none  Lips/Mucous Membranes (Micronutrient): none  Teeth (Micronutrient): none  Tongue (Micronutrient): none  Neck/Chest (Micronutrient): bony  prominence  Musculoskeletal/Lower Extremities: none   Micronutrient Evaluation Summary: suspected deficiency   Subcutaneous Fat (Malnutrition): severe depletion  Muscle Mass (Malnutrition): severe depletion   Orbital Region (Subcutaneous Fat Loss): severe depletion  Upper Arm Region (Subcutaneous Fat Loss): severe depletion   Congregational Region (Muscle Loss): severe depletion  Clavicle Bone Region (Muscle Loss): severe depletion  Clavicle and Acromion Bone Region (Muscle Loss): severe depletion  Dorsal Hand (Muscle Loss): severe depletion  Patellar Region (Muscle Loss): severe depletion  Anterior Thigh Region (Muscle Loss): severe depletion  Posterior Calf Region (Muscle Loss): severe depletion                 Reason for Assessment    Reason For Assessment: RD follow-up  Diagnosis:  (Pneumonia due to E. coli)  General Information Comments: 44yoM with PMHx significant for ALS (onset 2006), neuromuscular dysfunction of the bladder, sacral ulcers, and spinal cerebellar ataxia who was admitted recently admitted (5/9/2025) for severe dehydration, electrolyte disturbances, and covid who presented to the ED with EMS and c/o AMS and hypoxia. RD follow-up. Pt currently NPO. Current tube feed regimen meeting EEN/EPN via PEG. Multiple wounds noted - stage 1 pressure injury left posterior buttocks, stage 2 pressure injury right posterior buttocks, left posterior hip, rectum, pressure injury left ankle, skin tear left anterior foot, unstageable pressure injury right posterior ankle, stage 1 pressure injury right lateral malleolus, unstageable pressure injury right lower leg, and stage 2 left heel, stage 4 pressure injury sacral spine. NFPE previously performed - malnutrition diagnosis continues.     Nutrition Related Social Determinants of Health: SDOH: Adequate food in home environment     Food Insecurity: No Food Insecurity (7/13/2025)    Hunger Vital Sign     Worried About Running Out of Food in the Last Year: Never true     Ran  "Out of Food in the Last Year: Never true       Nutrition/Diet History    Spiritual, Cultural Beliefs, Synagogue Practices, Values that Affect Care: no  Food Allergies: NKFA  Factors Affecting Nutritional Intake: None identified at this time  Nutrition-related SDOH: None Identified    Anthropometrics    Height: 6' 3" (190.5 cm)  Height (inches): 75 in  Height Method: Measured  Weight: 59.9 kg (132 lb)  Weight (lb): 132 lb  Weight Method: Bed Scale  Ideal Body Weight (IBW), Male: 196 lb  % Ideal Body Weight, Male (lb): 67.35 %  BMI (Calculated): 16.5  BMI Grade: less than 18.5 - underweight       Lab/Procedures/Meds    Pertinent Labs Reviewed: reviewed - P 2.2    Pertinent Medications Reviewed: reviewed - enoxaparin, mirtazapine, bowel reg    Estimated/Assessed Needs    Weight Used For Calorie Calculations: 59.9 kg (132 lb 0.9 oz)  Energy Calorie Requirements (kcal): 8698-9299 kcal/day (30-35 kcal/kg)  Energy Need Method: Kcal/kg  Protein Requirements:  g/day (1.2-2.0 g/kg)  Weight Used For Protein Calculations: 59.9 kg (132 lb 0.9 oz)     Estimated Fluid Requirement Method: RDA Method  RDA Method (mL): 1797         Nutrition Prescription Ordered    Current Diet Order: NPO  Current Nutrition Support Formula Ordered: Novasource Renal  Current Nutrition Support Frequency Ordered: 4 cans/day    Evaluation of Received Nutrient/Fluid Intake    Enteral Calories (kcal): 1900  Enteral Protein (gm): 88  Enteral (Free Water) Fluid (mL): 672  % Kcal Needs: 100  % Protein Needs: 100  Energy Calories Required: meeting needs  Protein Required: meeting needs  Fluid Required:  (Per MD)  Comments: LBM 7/14  Tolerance: tolerating  % Intake of Estimated Energy Needs: 75 - 100 %  % Meal Intake: NPO    PES Statement  Increased nutrient needs related to Wound healing (protein/calorie) as evidenced by Wounds (Multiple pressure injuries)  Status: Continues    Nutrition Risk    Level of Risk/Frequency of Follow-up: high (2/week) "     Monitor and Evaluation    Monitor and Evaluation: Energy intake, Food and beverage intake, Carbohydrate intake, Diet order, Protein intake, Enteral and parenteral nutrition administration, Weight, Electrolyte and renal panel, Gastrointestinal profile, Inflammatory profile, Lipid profile, Glucose/endocrine profile, Nutrition focused physical findings, Skin (NFPE performed on 7/1)     Nutrition Follow-Up    RD Follow-up?: Yes

## 2025-07-18 NOTE — SUBJECTIVE & OBJECTIVE
Interval History:  see hospital course    Objective:     Vitals:  Temp: 96.4 °F (35.8 °C)  Pulse: 79  Rhythm: normal sinus rhythm  BP: 102/62  MAP (mmHg): 76  Resp: 17  SpO2: 100 %    Temp  Min: 89.1 °F (31.7 °C)  Max: 98.1 °F (36.7 °C)  Pulse  Min: 63  Max: 82  BP  Min: 100/56  Max: 133/67  MAP (mmHg)  Min: 73  Max: 93  Resp  Min: 11  Max: 17  SpO2  Min: 96 %  Max: 100 %    07/17 0701 - 07/18 0700  In: 2991.1 [I.V.:265]  Out: 1700 [Urine:1700]   Unmeasured Output  Unmeasured Urine Occurrence: 0  Unmeasured Stool Occurrence: 1  Pad Count: 2        Physical Exam  Vitals and nursing note reviewed.   Constitutional:       General: He is not in acute distress.     Appearance: He is cachectic. He is not diaphoretic.   HENT:      Head: Normocephalic and atraumatic.      Mouth/Throat:      Mouth: Mucous membranes are dry.      Pharynx: Oropharynx is clear.   Eyes:      Extraocular Movements: Extraocular movements intact.      Pupils: Pupils are equal, round, and reactive to light.   Cardiovascular:      Rate and Rhythm: Normal rate and regular rhythm.      Pulses: Normal pulses.   Pulmonary:      Effort: Pulmonary effort is normal. No respiratory distress.   Chest:      Comments: Pectus excavatum  Abdominal:      General: Abdomen is flat. There is no distension.      Palpations: Abdomen is soft.      Tenderness: There is no abdominal tenderness.      Comments: PEG site c/d/i    Musculoskeletal:      Right lower leg: No edema.      Left lower leg: No edema.   Skin:     General: Skin is warm and dry.      Capillary Refill: Capillary refill takes less than 2 seconds.   Neurological:      Mental Status: He is alert.      Comments: [id number]  PERRL  Tracks appropriately  Minimally verbal at baseline  spont movement  Fcx0  BUE mittens         Unable to test orientation, language, memory, judgment, insight, fund of knowledge, hearing, shoulder shrug, tongue protrusion, coordination, gait due to level of consciousness.        Medications:  Continuous     Scheduled  baclofen, 20 mg, TID  carboxymethylcellulose sodium, 1 drop, TID  dantrolene, 50 mg, TID  donepeziL, 5 mg, QHS  doxycycline, 100 mg, Q12H  enoxparin, 40 mg, Daily  lactulose, 10 g, BID  levetiracetam, 500 mg, BID  mirtazapine, 15 mg, QHS    PRN  dextrose 50%, 12.5 g, PRN  dextrose 50%, 25 g, PRN  glucagon (human recombinant), 1 mg, PRN  glucose, 16 g, PRN  glucose, 24 g, PRN  magnesium oxide, 800 mg, PRN  magnesium oxide, 800 mg, PRN  naloxone, 0.02 mg, PRN  ondansetron, 4 mg, Q6H PRN  oxyCODONE, 5 mg, Q6H PRN  potassium bicarbonate, 35 mEq, PRN  potassium bicarbonate, 50 mEq, PRN  potassium bicarbonate, 60 mEq, PRN  potassium, sodium phosphates, 2 packet, PRN  potassium, sodium phosphates, 2 packet, PRN  potassium, sodium phosphates, 2 packet, PRN  sodium chloride 0.9%, 10 mL, Q12H PRN      Today I personally reviewed pertinent medications, lines/drains/airways, imaging, cardiology results, laboratory results, microbiology results,     Diet  Diet Soft & Bite Sized (IDDSI Level 6) Honey Thick (Moderately Thick); Standard Tray

## 2025-07-18 NOTE — PLAN OF CARE
07/17/25 1609   Rounds   Attendance Provider;   Discharge Plan A Skilled Nursing Facility   Why the patient remains in the hospital Requires continued medical care   Transition of Care Barriers None      NH awaiting 142.   SW will continue to monitor pt needs.       Discharge Plan A and Plan B have been determined by review of patient's clinical status, future medical and therapeutic needs, and coverage/benefits for post-acute care in coordination with multidisciplinary team members.\    Aura Parker MSW, SERGIOW  [facility]  Case Management Dept.

## 2025-07-19 LAB
ABSOLUTE EOSINOPHIL (OHS): 0.09 K/UL
ABSOLUTE MONOCYTE (OHS): 0.53 K/UL (ref 0.3–1)
ABSOLUTE NEUTROPHIL COUNT (OHS): 1.93 K/UL (ref 1.8–7.7)
ALBUMIN SERPL BCP-MCNC: 1.8 G/DL (ref 3.5–5.2)
ALP SERPL-CCNC: 73 UNIT/L (ref 40–150)
ALT SERPL W/O P-5'-P-CCNC: 29 UNIT/L (ref 10–44)
AMMONIA PLAS-SCNC: 33 UMOL/L (ref 10–50)
ANION GAP (OHS): 5 MMOL/L (ref 8–16)
AST SERPL-CCNC: 25 UNIT/L (ref 11–45)
BASOPHILS # BLD AUTO: 0.03 K/UL
BASOPHILS NFR BLD AUTO: 0.7 %
BILIRUB SERPL-MCNC: 0.1 MG/DL (ref 0.1–1)
BUN SERPL-MCNC: 15 MG/DL (ref 6–20)
CA-I BLD-SCNC: 1.09 MMOL/L (ref 1.06–1.42)
CALCIUM SERPL-MCNC: 7.6 MG/DL (ref 8.7–10.5)
CHLORIDE SERPL-SCNC: 106 MMOL/L (ref 95–110)
CO2 SERPL-SCNC: 27 MMOL/L (ref 23–29)
CREAT SERPL-MCNC: 0.5 MG/DL (ref 0.5–1.4)
ERYTHROCYTE [DISTWIDTH] IN BLOOD BY AUTOMATED COUNT: 18.5 % (ref 11.5–14.5)
EV RNA SPEC QL NAA+NON-PROBE: NEGATIVE
GFR SERPLBLD CREATININE-BSD FMLA CKD-EPI: >60 ML/MIN/1.73/M2
GLUCOSE SERPL-MCNC: 57 MG/DL (ref 70–110)
HCT VFR BLD AUTO: 25.5 % (ref 40–54)
HGB BLD-MCNC: 8 GM/DL (ref 14–18)
IMM GRANULOCYTES # BLD AUTO: 0.01 K/UL (ref 0–0.04)
IMM GRANULOCYTES NFR BLD AUTO: 0.2 % (ref 0–0.5)
LYMPHOCYTES # BLD AUTO: 1.44 K/UL (ref 1–4.8)
MAGNESIUM SERPL-MCNC: 1.7 MG/DL (ref 1.6–2.6)
MCH RBC QN AUTO: 26.5 PG (ref 27–31)
MCHC RBC AUTO-ENTMCNC: 31.4 G/DL (ref 32–36)
MCV RBC AUTO: 84 FL (ref 82–98)
NUCLEATED RBC (/100WBC) (OHS): 0 /100 WBC
PHOSPHATE SERPL-MCNC: 3 MG/DL (ref 2.7–4.5)
PLATELET # BLD AUTO: 327 K/UL (ref 150–450)
PMV BLD AUTO: 10.9 FL (ref 9.2–12.9)
POCT GLUCOSE: 116 MG/DL (ref 70–110)
POCT GLUCOSE: 67 MG/DL (ref 70–110)
POCT GLUCOSE: 79 MG/DL (ref 70–110)
POTASSIUM SERPL-SCNC: 4.3 MMOL/L (ref 3.5–5.1)
PROT SERPL-MCNC: 6.2 GM/DL (ref 6–8.4)
RBC # BLD AUTO: 3.02 M/UL (ref 4.6–6.2)
RELATIVE EOSINOPHIL (OHS): 2.2 %
RELATIVE LYMPHOCYTE (OHS): 35.7 % (ref 18–48)
RELATIVE MONOCYTE (OHS): 13.2 % (ref 4–15)
RELATIVE NEUTROPHIL (OHS): 48 % (ref 38–73)
SODIUM SERPL-SCNC: 138 MMOL/L (ref 136–145)
SPECIMEN SOURCE: NORMAL
SPECIMEN SOURCE: NORMAL
T GONDII DNA SPEC QL NAA+PROBE: NEGATIVE
WBC # BLD AUTO: 4.03 K/UL (ref 3.9–12.7)
WNV RNA CSF QL NAA+PROBE: NEGATIVE

## 2025-07-19 PROCEDURE — 94667 MNPJ CHEST WALL 1ST: CPT

## 2025-07-19 PROCEDURE — 82140 ASSAY OF AMMONIA: CPT | Performed by: NURSE PRACTITIONER

## 2025-07-19 PROCEDURE — 25000003 PHARM REV CODE 250: Performed by: STUDENT IN AN ORGANIZED HEALTH CARE EDUCATION/TRAINING PROGRAM

## 2025-07-19 PROCEDURE — 25000003 PHARM REV CODE 250

## 2025-07-19 PROCEDURE — 94761 N-INVAS EAR/PLS OXIMETRY MLT: CPT

## 2025-07-19 PROCEDURE — 63600175 PHARM REV CODE 636 W HCPCS: Performed by: PSYCHIATRY & NEUROLOGY

## 2025-07-19 PROCEDURE — 85025 COMPLETE CBC W/AUTO DIFF WBC: CPT

## 2025-07-19 PROCEDURE — 27000207 HC ISOLATION

## 2025-07-19 PROCEDURE — 11000001 HC ACUTE MED/SURG PRIVATE ROOM

## 2025-07-19 PROCEDURE — 99233 SBSQ HOSP IP/OBS HIGH 50: CPT | Mod: FS,,, | Performed by: PSYCHIATRY & NEUROLOGY

## 2025-07-19 PROCEDURE — 82040 ASSAY OF SERUM ALBUMIN: CPT

## 2025-07-19 PROCEDURE — 83735 ASSAY OF MAGNESIUM: CPT

## 2025-07-19 PROCEDURE — 20600001 HC STEP DOWN PRIVATE ROOM

## 2025-07-19 PROCEDURE — 25000003 PHARM REV CODE 250: Performed by: PSYCHIATRY & NEUROLOGY

## 2025-07-19 PROCEDURE — 25000003 PHARM REV CODE 250: Performed by: HOSPITALIST

## 2025-07-19 PROCEDURE — 94668 MNPJ CHEST WALL SBSQ: CPT

## 2025-07-19 PROCEDURE — 82330 ASSAY OF CALCIUM: CPT

## 2025-07-19 PROCEDURE — 84100 ASSAY OF PHOSPHORUS: CPT

## 2025-07-19 PROCEDURE — 25000242 PHARM REV CODE 250 ALT 637 W/ HCPCS: Performed by: NURSE PRACTITIONER

## 2025-07-19 PROCEDURE — 99499 UNLISTED E&M SERVICE: CPT | Mod: ,,,

## 2025-07-19 RX ORDER — CALCIUM GLUCONATE 20 MG/ML
1 INJECTION, SOLUTION INTRAVENOUS
Status: DISCONTINUED | OUTPATIENT
Start: 2025-07-19 | End: 2025-07-22

## 2025-07-19 RX ORDER — CALCIUM GLUCONATE 20 MG/ML
3 INJECTION, SOLUTION INTRAVENOUS
Status: DISCONTINUED | OUTPATIENT
Start: 2025-07-19 | End: 2025-07-22

## 2025-07-19 RX ORDER — CALCIUM GLUCONATE 20 MG/ML
2 INJECTION, SOLUTION INTRAVENOUS
Status: DISCONTINUED | OUTPATIENT
Start: 2025-07-19 | End: 2025-07-22

## 2025-07-19 RX ADMIN — BACLOFEN 20 MG: 10 TABLET ORAL at 08:07

## 2025-07-19 RX ADMIN — Medication 800 MG: at 08:07

## 2025-07-19 RX ADMIN — MIRTAZAPINE 15 MG: 15 TABLET, FILM COATED ORAL at 08:07

## 2025-07-19 RX ADMIN — OXYCODONE HYDROCHLORIDE 5 MG: 5 TABLET ORAL at 08:07

## 2025-07-19 RX ADMIN — Medication 800 MG: at 02:07

## 2025-07-19 RX ADMIN — DANTROLENE SODIUM 50 MG: 25 CAPSULE ORAL at 08:07

## 2025-07-19 RX ADMIN — DONEPEZIL HYDROCHLORIDE 5 MG: 5 TABLET, FILM COATED ORAL at 08:07

## 2025-07-19 RX ADMIN — DOXYCYCLINE HYCLATE 100 MG: 100 TABLET, COATED ORAL at 08:07

## 2025-07-19 RX ADMIN — CARBOXYMETHYLCELLULOSE SODIUM 1 DROP: 10 GEL OPHTHALMIC at 08:07

## 2025-07-19 RX ADMIN — LEVETIRACETAM 500 MG: 500 SOLUTION ORAL at 08:07

## 2025-07-19 RX ADMIN — CARBOXYMETHYLCELLULOSE SODIUM 1 DROP: 10 GEL OPHTHALMIC at 02:07

## 2025-07-19 RX ADMIN — DANTROLENE SODIUM 50 MG: 25 CAPSULE ORAL at 02:07

## 2025-07-19 RX ADMIN — DANTROLENE SODIUM 50 MG: 25 CAPSULE ORAL at 09:07

## 2025-07-19 RX ADMIN — CALCIUM GLUCONATE 1 G: 20 INJECTION, SOLUTION INTRAVENOUS at 02:07

## 2025-07-19 RX ADMIN — PSYLLIUM HUSK 1 PACKET: 3.4 POWDER ORAL at 08:07

## 2025-07-19 RX ADMIN — ENOXAPARIN SODIUM 40 MG: 40 INJECTION SUBCUTANEOUS at 05:07

## 2025-07-19 RX ADMIN — BACLOFEN 20 MG: 10 TABLET ORAL at 02:07

## 2025-07-19 NOTE — EICU
Virtual ICU Quality Rounds    Admit Date: 6/29/2025  Hospital Day: 20    ICU Day: 4d 6h    24H Vital Sign Range:  Temp:  [96 °F (35.6 °C)-98.2 °F (36.8 °C)]   Pulse:  [63-86]   Resp:  [11-20]   BP: ()/(53-79)   SpO2:  [96 %-100 %]     VICU Surveillance Screening    LDA reconciliation : Yes

## 2025-07-19 NOTE — EICU
[facility] Night Rounds Checklist  24H Vital Sign Range:  Temp:  [95.2 °F (35.1 °C)-98.2 °F (36.8 °C)]   Pulse:  [63-86]   Resp:  [11-20]   BP: (100-133)/(56-79)   SpO2:  [96 %-100 %]     Video rounds and LDA reconciliation

## 2025-07-19 NOTE — PLAN OF CARE
"[facility] Care Plan    POC reviewed with Lisa Moreno and family at 0300. Patient verbalized understanding. Questions and concerns addressed. No acute events today. Pt progressing toward goals. Will continue to monitor. See below and flowsheets for full assessment and VS info.   -  Awaiting Transfer  Is this a stroke patient? no    Neuro:  Joseph Coma Scale  Best Eye Response: 4-->(E4) spontaneous  Best Motor Response: 6-->(M6) obeys commands  Best Verbal Response: 1-->(V1) none  Jospeh Coma Scale Score: 11  Assessment Qualifiers: Patient not sedated/intubated  Pupil PERRLA: yes     24 hr Temp:  [96 °F (35.6 °C)-98.2 °F (36.8 °C)]     CV:   Rhythm: normal sinus rhythm  BP goals:   SBP < 180  MAP > 65    Resp:           Plan: N/A    GI/:     Diet/Nutrition Received: tube feeding  Last Bowel Movement: 07/18/25  Voiding Characteristics: external catheter    Intake/Output Summary (Last 24 hours) at 7/19/2025 0522  Last data filed at 7/19/2025 0019  Gross per 24 hour   Intake 2096 ml   Output 2730 ml   Net -634 ml     Unmeasured Output  Unmeasured Urine Occurrence: 0  Unmeasured Stool Occurrence: 1  Pad Count: 2    Labs/Accuchecks:  Recent Labs   Lab 07/19/25  0316   WBC 4.03   RBC 3.02*   HGB 8.0*   HCT 25.5*         Recent Labs   Lab 07/19/25  0316      K 4.3   CO2 27      BUN 15   CREATININE 0.5   ALKPHOS 73   ALT 29   AST 25   BILITOT 0.1    No results for input(s): "PROTIME", "INR", "APTT", "HEPANTIXA" in the last 168 hours. No results for input(s): "CPK", "CPKMB", "TROPONINI", "MB" in the last 168 hours.    Electrolytes: N/A - electrolytes WDL  Accuchecks: none    Gtts:      LDA/Wounds:    Morgan Risk Assessment  Sensory Perception: 2-->very limited  Moisture: 3-->occasionally moist  Activity: 1-->bedfast  Mobility: 1-->completely immobile  Nutrition: 3-->adequate  Friction and Shear: 1-->problem  Morgan Score: 11  Is your morgan score 12 or less? yes [facility] " mobility score is 2 or less, they are on a immerse bed, if their moisture score is 2 or less, they do not have a sacral mepilex and instead have zinc barrier cream, and heel boots on          Restraints:   Restraint Order  Length of Order: Order good for next 24 hours or when removed.  Date that the current order will : 25  Time that the current order will : 520  Order Upon Application: Yes    [facility]

## 2025-07-19 NOTE — PROGRESS NOTES
[facility]  Neurocritical Care  Progress Note    Admit Date: 6/29/2025  Service Date: 07/19/2025  Length of Stay: 20    Subjective:     Chief Complaint: Witnessed seizure-like activity    History of Present Illness: Mr. Moreno is a 44yoM with PMHx significant for ALS (onset 2006), neuromuscular dysfunction of the bladder, sacral ulcers, and spinal cerebellar ataxia who was admitted recently admitted (5/9/2025) for severe dehydration, electrolyte disturbances, and covid who presented to the ED with EMS and c/o AMS and hypoxia. Nonverbal at baseline but apparently is usually more alert; has been less responsive and interactive x1 day. Upon EMS arrival, was obtunded and sating 74%. They gave him solumedrol and duonebs en route which improved his oxygen status to >90%. Mother at bedside reports increased difficulty with swallowing and decreased PO intake.        While in the ED, labs and imaging significant for Na 149, K 5.4, Cl 111, CO2 21, glucose 149, BUN 38, creat 1.0, albumin 2.2, ALP 80, AST/ALT 34/23, anion gap 17, procal 8.42, BNP <10, WBC 7.11, H/H 12/40.5, plts 315, covid/flu negative, .6, VBG 7.349/47.1/44.5/23.9, istat LA 4.9->7.4->4.2 (7.4 likely error?). Chest xray with patchy opacities bilaterally, predominant within the bibasilar regions but also within the left lung apex and the left mid-lung; compatible with multifocal pneumonia. Started on BSA by the ED; vanc and cefepime. CCM consulted for sepsis. During first examination the patient was stable with MAPs >65 and Lactic acid down trended to 2.6. Around 6 am of 6/30 [place] was called hypotension with MAPs in the 50. Pt was started on levophed and was transferred to MICU 6/29 for septic shock secondary to multifocal pneumonia. Patient initially requiring pressors but since discontinued. Given dysphagia, IR placed a PEG tube 7/5 for nutrition and medication administration. Patient was stepped down to  on 7/7.    Patient was  awaiting discharge home tonight on 7/14 when a rapid response was called as the patient began seizing. Two witnessed tonic-clonic seizures were observed lasting for about 1-2 minutes, given 2mg Ativan. After these seizures, he never returned to baseline. Baseline was described as full movement in neck and all extremities, tracking with head & neck, mumbles per mom, and following commands. Initial assessment, patient was found staring, with no blink to threat or closure of eyes, midline gaze, and not moving extremities with painful stimuli. During seizure, it is reported that patient became hypertensive, hypoxic, and tachycardic, with strong flexor posturing bilaterally. EEG applied showing discharges over the left hemisphere, no history of seizures or brain mass/lesions/infarction. Patient loaded with 40mg/kg of Keppra. Given concern for mentation and need for closer neurological monitoring, patient was transferred to [facility] where he will be admitted.       Hospital Course: 07/15/2025: NAEON. Afebrile, BP 90/50s. MRI completed. EEG in place. Keppra and LR given. Bilateral mitt restraints. Hypotensive this morning, given 500 NS and 1L LR. Temp low 90s, bear hugger placed. UOP 1.6L. LP today   07/16/2025: continued improvement in mental status, alert and interactive today, pleasant and smiling with care, does attempt to remove lines and PIVs.  Did require initiation of norepinephrine overnight to maintain MAP > 65, on and off joseluis hugger for normothermia, remains on antibiotics which have been broadened to vanc/meropenem pending ID evaluation. Obtaining LP under fluoroscopy today.   07/17/2025: NAEON. Off levo. S/p LP with IR yesterday. CSF unremarkable, further CSF studies pending. R PIV removed. Pt maintaining pressure and looking better since removal. Stable for stepdown to  today.  07/18/2025: NAEON. AFVSS. Exam stable. 2 large BM overnight, Electrolytes repleted. Pending stepdown bed. Joseluis hugger applied  intermittently.  07/19/2025: Liquid stools overnight. Lactulose held and psyllium added with mild improvement. CPT started. Remains boarding for HM.       Interval History:  See above.     Objective:     Vitals:  Temp: 97.4 °F (36.3 °C)  Pulse: 81  Rhythm: normal sinus rhythm  BP: 122/75  MAP (mmHg): 93  Resp: 14  SpO2: 100 %    Temp  Min: 96 °F (35.6 °C)  Max: 98.2 °F (36.8 °C)  Pulse  Min: 69  Max: 87  BP  Min: 97/53  Max: 124/79  MAP (mmHg)  Min: 69  Max: 96  Resp  Min: 11  Max: 22  SpO2  Min: 99 %  Max: 100 %    07/18 0701 - 07/19 0700  In: 2096 [P.O.:200]  Out: 2600 [Urine:2600]   Unmeasured Output  Unmeasured Urine Occurrence: 0  Unmeasured Stool Occurrence: 0  Pad Count: 2        Physical Exam  Vitals and nursing note reviewed.   Constitutional:       General: He is not in acute distress.     Appearance: He is cachectic. He is not diaphoretic.   HENT:      Head: Normocephalic and atraumatic.   Cardiovascular:      Rate and Rhythm: Normal rate and regular rhythm.      Pulses: Normal pulses.   Pulmonary:      Effort: Pulmonary effort is normal. No respiratory distress.   Chest:      Comments: Pectus excavatum  Abdominal:      General: Abdomen is flat.      Comments: PEG site c/d/i    Musculoskeletal:      Right lower leg: No edema.      Left lower leg: No edema.   Skin:     General: Skin is dry.      Capillary Refill: Capillary refill takes less than 2 seconds.   Neurological:      Mental Status: He is alert.      Comments: E4V1M6  PERRL  Tracks appropriately  Minimally verbal at baseline  spont movement  Fcx0  BUE mittens              Medications:  Continuous Scheduledbaclofen, 20 mg, TID  carboxymethylcellulose sodium, 1 drop, TID  dantrolene, 50 mg, TID  donepeziL, 5 mg, QHS  doxycycline, 100 mg, Q12H  enoxparin, 40 mg, Daily  levetiracetam, 500 mg, BID  mirtazapine, 15 mg, QHS  psyllium husk, 1 packet, Daily    PRNcalcium gluconate IVPB, 1 g, PRN  calcium gluconate IVPB, 2 g, PRN  calcium gluconate IVPB, 3  g, PRN  dextrose 50%, 12.5 g, PRN  dextrose 50%, 25 g, PRN  glucagon (human recombinant), 1 mg, PRN  glucose, 16 g, PRN  glucose, 24 g, PRN  magnesium oxide, 800 mg, PRN  magnesium oxide, 800 mg, PRN  naloxone, 0.02 mg, PRN  ondansetron, 4 mg, Q6H PRN  oxyCODONE, 5 mg, Q6H PRN  potassium bicarbonate, 35 mEq, PRN  potassium bicarbonate, 50 mEq, PRN  potassium bicarbonate, 60 mEq, PRN  potassium, sodium phosphates, 2 packet, PRN  potassium, sodium phosphates, 2 packet, PRN  potassium, sodium phosphates, 2 packet, PRN  sodium chloride 0.9%, 10 mL, Q12H PRN      Today I personally reviewed pertinent medications, lines/drains/airways, imaging, cardiology results, laboratory results, microbiology results, notably:    Diet  Diet Soft & Bite Sized (IDDSI Level 6) Honey Thick (Moderately Thick); Standard Tray  Diet Soft & Bite Sized (IDDSI Level 6) Honey Thick (Moderately Thick); Standard Tray        Assessment/Plan:     Neuro  * Witnessed seizure-like activity  Patient was awaiting discharge home on 7/14 when a rapid response was called as the patient began seizing. Two witnessed tonic-clonic seizures were observed lasting for about 1-2 minutes, given 2mg Ativan. After these seizures, he never returned to baseline. Baseline was described as full movement in neck and all extremities, tracking with head & neck, mumbles per mom, and following commands. Initial assessment, patient was found staring, with no blink to threat or closure of eyes, midline gaze, and not moving extremities with painful stimuli. During seizure, it is reported that patient became hypertensive, hypoxic, and tachycardic, with strong flexor posturing bilaterally. EEG applied showing discharges over the left hemisphere, no history of seizures or brain mass/lesions/infarction. Patient loaded with 40mg/kg of Keppra. Given concern for mentation and need for closer neurological monitoring, patient was transferred to [facility] where he will be admitted.     Admitted  to [facility], stable to step down 7/17, pending bed  Q4h neurochecks and hourly vital signs  SBP <180, MAP >65  Brain MRI w/wo epilepsy protocol - negative for acute process  cEEG - discharges in left hemisphere no seizures  Discontinued 7/16  Continue keppra 500mg BID, continue until outpatient follow up with Epilepsy (referral placed)  Maintain seizure precautions  TF resumed  SCDs & LVX for DVT ppx  PT/OT/SLP to continue treatment  Q6h bladder scans  LP at bedside unsuccessful 7/15, obtained 7/16 under fluoroscopic guidance  Blood cultures resent, other workup pending and ID consulted. See plan above  Adequately volume resuscitated    Acute encephalopathy  Secondary to seizures, improving    ALS (amyotrophic lateral sclerosis)  UMN predominance with diffuse limb spasticity. Slowly progressive. Fully-reliant on mother, others for self care and feeding and cleaning and toileting. Respiratory function is poor per FVC .     Continue home donepezil for neuroprotection  Continue home dantrolene and baclofen for spasticity  Turn q2h with aspiration precautions    Spinocerebellar ataxia  See ALS (amyotrophic lateral sclerosis)      Spastic diplegia  See ALS (amyotrophic lateral sclerosis)    Psychiatric  Insomnia secondary to depression with anxiety  baseline    ID  Septic shock  Unknown source as of this time but just completed treatment  course of ertapenem for ESBL pneumonia.  - on vancomycin & Meropenem with CNS, discontinued 7/17  - follow up CSF studies  - other cultures in process  - ID consulted recommendations appreciated    - given LP findings, de-escalate to doxycycline 100 mg BID via PEG, last day 7/19 to complete 5d course for ssti    - isolation precaution per hospital protocol     Endocrine  Severe protein-calorie malnutrition  ok to resume pleasure feeds, soft and bite sized diet with honey thick liquids ordered  Nutrition consulted. Most recent weight and BMI monitored-     Measurements:  Wt Readings from  Last 1 Encounters:   07/16/25 59.9 kg (132 lb)   Body mass index is 16.5 kg/m².    Patient has been screened and assessed by RD.    Malnutrition Type:  Context: chronic illness  Level: severe    Malnutrition Characteristic Summary:  Subcutaneous Fat (Malnutrition): severe depletion  Muscle Mass (Malnutrition): severe depletion    Interventions/Recommendations (treatment strategy):  1. Continue bolus TF recommendation: 5 cans/day of Isosource 1.5 to provide 1250 mL total volume, 1875 kcal, 220 g CHO, 85 protein, 19 fiber, 955 mL free water  - 90 mL flush before and after each (10 flushes total) can to provide additional 900 mL free water (Total 1855 mL)  2. Continue soft and bite sized textured diet as tolerated    3. Continue boost glucose control TID   4. Recommend arnie BID to aid in wound healing  5. Recommend MVI  6. RD to monitor weight, labs, meds, intake, tolerance      GI  Oropharyngeal dysphagia  S/p PEG    Orthopedic  Pressure injury of deep tissue of heel  L Heel with black eschar. R Heel pressure ulcer with scant serosanguineous drainage. Photos uploaded into chart.   Wound care following      Decubitus ulcer of sacral region, stage 4  Wound vac removed on 7/14 prior patient was going to be discharged.  Wound care following. Plan to reapply sacral wound vac with dressing changes on [facility]  General surgery consulted previously, no indication for inpatient debridement recommended; consider reconsult if needed            The patient is being Prophylaxed for:  Venous Thromboembolism with: Mechanical or Chemical  Stress Ulcer with: None  Ventilator Pneumonia with: none    Activity Orders            Diet Soft & Bite Sized (IDDSI Level 6) Honey Thick (Moderately Thick); Standard Tray: Soft & Bite Sized starting at 07/18 1431    Elevate HOB Elevate HOB 30-45 degrees during feeding unless contraindicated starting at 06/30 1357    Progressive Mobility Protocol (mobilize patient to their highest level of functioning  at least twice daily) starting at 06/30 0800    Turn patient every 2 hours starting at 06/30 0000          Full Code  Level III    Jenny Sandhu PA-C  Neurocritical Care  [facility]

## 2025-07-19 NOTE — ASSESSMENT & PLAN NOTE
Patient was awaiting discharge home on 7/14 when a rapid response was called as the patient began seizing. Two witnessed tonic-clonic seizures were observed lasting for about 1-2 minutes, given 2mg Ativan. After these seizures, he never returned to baseline. Baseline was described as full movement in neck and all extremities, tracking with head & neck, mumbles per mom, and following commands. Initial assessment, patient was found staring, with no blink to threat or closure of eyes, midline gaze, and not moving extremities with painful stimuli. During seizure, it is reported that patient became hypertensive, hypoxic, and tachycardic, with strong flexor posturing bilaterally. EEG applied showing discharges over the left hemisphere, no history of seizures or brain mass/lesions/infarction. Patient loaded with 40mg/kg of Keppra. Given concern for mentation and need for closer neurological monitoring, patient was transferred to [facility] where he will be admitted.     Admitted to [facility], stable to step down 7/17, pending bed  Q4h neurochecks and hourly vital signs  SBP <180, MAP >65  Brain MRI w/wo epilepsy protocol - negative for acute process  cEEG - discharges in left hemisphere no seizures  Discontinued 7/16  Continue keppra 500mg BID, continue until outpatient follow up with Epilepsy (referral placed)  Maintain seizure precautions  TF resumed  SCDs & LVX for DVT ppx  PT/OT/SLP to continue treatment  Q6h bladder scans  LP at bedside unsuccessful 7/15, obtained 7/16 under fluoroscopic guidance  Blood cultures resent, other workup pending and ID consulted. See plan above  Adequately volume resuscitated

## 2025-07-19 NOTE — ASSESSMENT & PLAN NOTE
Wound vac removed on 7/14 prior patient was going to be discharged.  Wound care following. Plan to reapply sacral wound vac with dressing changes on [facility]  General surgery consulted previously, no indication for inpatient debridement recommended; consider reconsult if needed

## 2025-07-19 NOTE — PLAN OF CARE
"[facility] Care Plan  POC reviewed with Lisa Moreno and family at 1400. Patient and Family verbalized understanding. Questions and concerns addressed. No acute events today. Pt progressing toward goals. Will continue to monitor. See below and flowsheets for full assessment and VS info. Awaiting stepdown, eating well, wound care as ordered.            Is this a stroke patient? no    Neuro:  Joseph Coma Scale  Best Eye Response: 4-->(E4) spontaneous  Best Motor Response: 6-->(M6) obeys commands  Best Verbal Response: 1-->(V1) none  Joseph Coma Scale Score: 11  Assessment Qualifiers: Patient not sedated/intubated  Pupil PERRLA: yes     24 hr Temp:  [96 °F (35.6 °C)-98.2 °F (36.8 °C)]     CV:   Rhythm: normal sinus rhythm  BP goals:   SBP < 180  MAP > 65    Resp:           Plan: N/A    GI/:     Diet/Nutrition Received: mechanical/dental soft, tube feeding  Last Bowel Movement: 07/19/25  Voiding Characteristics: external catheter    Intake/Output Summary (Last 24 hours) at 7/19/2025 1826  Last data filed at 7/19/2025 1700  Gross per 24 hour   Intake 2532.67 ml   Output 3450 ml   Net -917.33 ml     Unmeasured Output  Unmeasured Urine Occurrence: 1  Unmeasured Stool Occurrence: 1  Pad Count: 2    Labs/Accuchecks:  Recent Labs   Lab 07/19/25  0316   WBC 4.03   RBC 3.02*   HGB 8.0*   HCT 25.5*         Recent Labs   Lab 07/19/25  0316      K 4.3   CO2 27      BUN 15   CREATININE 0.5   ALKPHOS 73   ALT 29   AST 25   BILITOT 0.1    No results for input(s): "PROTIME", "INR", "APTT", "HEPANTIXA" in the last 168 hours. No results for input(s): "CPK", "CPKMB", "TROPONINI", "MB" in the last 168 hours.    Electrolytes: Electrolytes replaced  Accuchecks: none    Gtts:      LDA/Wounds:    Morgan Risk Assessment  Sensory Perception: 2-->very limited  Moisture: 3-->occasionally moist  Activity: 1-->bedfast  Mobility: 1-->completely immobile  Nutrition: 3-->adequate  Friction and Shear: 1-->problem  Morgan Score: " 11    Is your tor score 12 or less? yes tor mobility score is 2 or less, they are on a immerse bed, if their moisture score is 2 or less, they do not have a sacral mepilex and instead have zinc barrier cream, and heel boots on            Restraints:   Restraint Order  Length of Order: Order good for next 24 hours or when removed.  Date that the current order will : 25  Time that the current order will : 519  Order Upon Application: Yes    [facility]

## 2025-07-19 NOTE — SUBJECTIVE & OBJECTIVE
Interval History:  See above.     Objective:     Vitals:  Temp: 97.4 °F (36.3 °C)  Pulse: 81  Rhythm: normal sinus rhythm  BP: 122/75  MAP (mmHg): 93  Resp: 14  SpO2: 100 %    Temp  Min: 96 °F (35.6 °C)  Max: 98.2 °F (36.8 °C)  Pulse  Min: 69  Max: 87  BP  Min: 97/53  Max: 124/79  MAP (mmHg)  Min: 69  Max: 96  Resp  Min: 11  Max: 22  SpO2  Min: 99 %  Max: 100 %    07/18 0701 - 07/19 0700  In: 2096 [P.O.:200]  Out: 2600 [Urine:2600]   Unmeasured Output  Unmeasured Urine Occurrence: 0  Unmeasured Stool Occurrence: 0  Pad Count: 2        Physical Exam  Vitals and nursing note reviewed.   Constitutional:       General: He is not in acute distress.     Appearance: He is cachectic. He is not diaphoretic.   HENT:      Head: Normocephalic and atraumatic.   Cardiovascular:      Rate and Rhythm: Normal rate and regular rhythm.      Pulses: Normal pulses.   Pulmonary:      Effort: Pulmonary effort is normal. No respiratory distress.   Chest:      Comments: Pectus excavatum  Abdominal:      General: Abdomen is flat.      Comments: PEG site c/d/i    Musculoskeletal:      Right lower leg: No edema.      Left lower leg: No edema.   Skin:     General: Skin is dry.      Capillary Refill: Capillary refill takes less than 2 seconds.   Neurological:      Mental Status: He is alert.      Comments: E4V1M6  PERRL  Tracks appropriately  Minimally verbal at baseline  spont movement  Fcx0  BUE mittens              Medications:  Continuous Scheduledbaclofen, 20 mg, TID  carboxymethylcellulose sodium, 1 drop, TID  dantrolene, 50 mg, TID  donepeziL, 5 mg, QHS  doxycycline, 100 mg, Q12H  enoxparin, 40 mg, Daily  levetiracetam, 500 mg, BID  mirtazapine, 15 mg, QHS  psyllium husk, 1 packet, Daily    PRNcalcium gluconate IVPB, 1 g, PRN  calcium gluconate IVPB, 2 g, PRN  calcium gluconate IVPB, 3 g, PRN  dextrose 50%, 12.5 g, PRN  dextrose 50%, 25 g, PRN  glucagon (human recombinant), 1 mg, PRN  glucose, 16 g, PRN  glucose, 24 g, PRN  magnesium  oxide, 800 mg, PRN  magnesium oxide, 800 mg, PRN  naloxone, 0.02 mg, PRN  ondansetron, 4 mg, Q6H PRN  oxyCODONE, 5 mg, Q6H PRN  potassium bicarbonate, 35 mEq, PRN  potassium bicarbonate, 50 mEq, PRN  potassium bicarbonate, 60 mEq, PRN  potassium, sodium phosphates, 2 packet, PRN  potassium, sodium phosphates, 2 packet, PRN  potassium, sodium phosphates, 2 packet, PRN  sodium chloride 0.9%, 10 mL, Q12H PRN      Today I personally reviewed pertinent medications, lines/drains/airways, imaging, cardiology results, laboratory results, microbiology results, notably:    Diet  Diet Soft & Bite Sized (IDDSI Level 6) Honey Thick (Moderately Thick); Standard Tray  Diet Soft & Bite Sized (IDDSI Level 6) Honey Thick (Moderately Thick); Standard Tray

## 2025-07-19 NOTE — ASSESSMENT & PLAN NOTE
ok to resume pleasure feeds, soft and bite sized diet with honey thick liquids ordered  Nutrition consulted. Most recent weight and BMI monitored-     Measurements:  Wt Readings from Last 1 Encounters:   07/16/25 59.9 kg (132 lb)   Body mass index is 16.5 kg/m².    Patient has been screened and assessed by RD.    Malnutrition Type:  Context: chronic illness  Level: severe    Malnutrition Characteristic Summary:  Subcutaneous Fat (Malnutrition): severe depletion  Muscle Mass (Malnutrition): severe depletion    Interventions/Recommendations (treatment strategy):  1. Continue bolus TF recommendation: 5 cans/day of Isosource 1.5 to provide 1250 mL total volume, 1875 kcal, 220 g CHO, 85 protein, 19 fiber, 955 mL free water  - 90 mL flush before and after each (10 flushes total) can to provide additional 900 mL free water (Total 1855 mL)  2. Continue soft and bite sized textured diet as tolerated    3. Continue boost glucose control TID   4. Recommend arnie BID to aid in wound healing  5. Recommend MVI  6. RD to monitor weight, labs, meds, intake, tolerance

## 2025-07-20 LAB
ABSOLUTE EOSINOPHIL (OHS): 0.11 K/UL
ABSOLUTE MONOCYTE (OHS): 0.58 K/UL (ref 0.3–1)
ABSOLUTE NEUTROPHIL COUNT (OHS): 2.89 K/UL (ref 1.8–7.7)
ALBUMIN SERPL BCP-MCNC: 1.9 G/DL (ref 3.5–5.2)
ALP SERPL-CCNC: 87 UNIT/L (ref 40–150)
ALT SERPL W/O P-5'-P-CCNC: 28 UNIT/L (ref 10–44)
ANION GAP (OHS): 6 MMOL/L (ref 8–16)
AST SERPL-CCNC: 21 UNIT/L (ref 11–45)
BACTERIA BLD CULT: NORMAL
BACTERIA BLD CULT: NORMAL
BASOPHILS # BLD AUTO: 0.03 K/UL
BASOPHILS NFR BLD AUTO: 0.6 %
BILIRUB SERPL-MCNC: 0.1 MG/DL (ref 0.1–1)
BUN SERPL-MCNC: 16 MG/DL (ref 6–20)
CALCIUM SERPL-MCNC: 7.9 MG/DL (ref 8.7–10.5)
CHLORIDE SERPL-SCNC: 103 MMOL/L (ref 95–110)
CO2 SERPL-SCNC: 26 MMOL/L (ref 23–29)
CREAT SERPL-MCNC: 0.5 MG/DL (ref 0.5–1.4)
ERYTHROCYTE [DISTWIDTH] IN BLOOD BY AUTOMATED COUNT: 18 % (ref 11.5–14.5)
FUNGUS SPEC CULT: NORMAL
GFR SERPLBLD CREATININE-BSD FMLA CKD-EPI: >60 ML/MIN/1.73/M2
GLUCOSE SERPL-MCNC: 102 MG/DL (ref 70–110)
HCT VFR BLD AUTO: 29.1 % (ref 40–54)
HGB BLD-MCNC: 8.9 GM/DL (ref 14–18)
IMM GRANULOCYTES # BLD AUTO: 0.02 K/UL (ref 0–0.04)
IMM GRANULOCYTES NFR BLD AUTO: 0.4 % (ref 0–0.5)
LYMPHOCYTES # BLD AUTO: 1.43 K/UL (ref 1–4.8)
MAGNESIUM SERPL-MCNC: 1.7 MG/DL (ref 1.6–2.6)
MCH RBC QN AUTO: 26.2 PG (ref 27–31)
MCHC RBC AUTO-ENTMCNC: 30.6 G/DL (ref 32–36)
MCV RBC AUTO: 86 FL (ref 82–98)
NUCLEATED RBC (/100WBC) (OHS): 0 /100 WBC
PHOSPHATE SERPL-MCNC: 2.7 MG/DL (ref 2.7–4.5)
PLATELET # BLD AUTO: 328 K/UL (ref 150–450)
PMV BLD AUTO: 10.5 FL (ref 9.2–12.9)
POTASSIUM SERPL-SCNC: 4.2 MMOL/L (ref 3.5–5.1)
PROT SERPL-MCNC: 6.8 GM/DL (ref 6–8.4)
RBC # BLD AUTO: 3.4 M/UL (ref 4.6–6.2)
RELATIVE EOSINOPHIL (OHS): 2.2 %
RELATIVE LYMPHOCYTE (OHS): 28.3 % (ref 18–48)
RELATIVE MONOCYTE (OHS): 11.5 % (ref 4–15)
RELATIVE NEUTROPHIL (OHS): 57 % (ref 38–73)
SODIUM SERPL-SCNC: 135 MMOL/L (ref 136–145)
WBC # BLD AUTO: 5.06 K/UL (ref 3.9–12.7)

## 2025-07-20 PROCEDURE — 25000003 PHARM REV CODE 250: Performed by: STUDENT IN AN ORGANIZED HEALTH CARE EDUCATION/TRAINING PROGRAM

## 2025-07-20 PROCEDURE — 80053 COMPREHEN METABOLIC PANEL: CPT

## 2025-07-20 PROCEDURE — 85025 COMPLETE CBC W/AUTO DIFF WBC: CPT

## 2025-07-20 PROCEDURE — 25000003 PHARM REV CODE 250: Performed by: NURSE PRACTITIONER

## 2025-07-20 PROCEDURE — 25000003 PHARM REV CODE 250: Performed by: PSYCHIATRY & NEUROLOGY

## 2025-07-20 PROCEDURE — 25000003 PHARM REV CODE 250

## 2025-07-20 PROCEDURE — 25000003 PHARM REV CODE 250: Performed by: HOSPITALIST

## 2025-07-20 PROCEDURE — 11000001 HC ACUTE MED/SURG PRIVATE ROOM

## 2025-07-20 PROCEDURE — 94668 MNPJ CHEST WALL SBSQ: CPT

## 2025-07-20 PROCEDURE — 20600001 HC STEP DOWN PRIVATE ROOM

## 2025-07-20 PROCEDURE — 25000242 PHARM REV CODE 250 ALT 637 W/ HCPCS: Performed by: NURSE PRACTITIONER

## 2025-07-20 PROCEDURE — 94761 N-INVAS EAR/PLS OXIMETRY MLT: CPT

## 2025-07-20 PROCEDURE — 27000207 HC ISOLATION

## 2025-07-20 PROCEDURE — 63600175 PHARM REV CODE 636 W HCPCS: Performed by: PSYCHIATRY & NEUROLOGY

## 2025-07-20 PROCEDURE — 83735 ASSAY OF MAGNESIUM: CPT

## 2025-07-20 PROCEDURE — 99233 SBSQ HOSP IP/OBS HIGH 50: CPT | Mod: ,,, | Performed by: PSYCHIATRY & NEUROLOGY

## 2025-07-20 PROCEDURE — 84100 ASSAY OF PHOSPHORUS: CPT

## 2025-07-20 RX ORDER — TALC
6 POWDER (GRAM) TOPICAL NIGHTLY PRN
Status: DISCONTINUED | OUTPATIENT
Start: 2025-07-20 | End: 2025-07-22 | Stop reason: HOSPADM

## 2025-07-20 RX ADMIN — CARBOXYMETHYLCELLULOSE SODIUM 1 DROP: 10 GEL OPHTHALMIC at 03:07

## 2025-07-20 RX ADMIN — LEVETIRACETAM 500 MG: 500 SOLUTION ORAL at 08:07

## 2025-07-20 RX ADMIN — DONEPEZIL HYDROCHLORIDE 5 MG: 5 TABLET, FILM COATED ORAL at 09:07

## 2025-07-20 RX ADMIN — DOXYCYCLINE HYCLATE 100 MG: 100 TABLET, COATED ORAL at 08:07

## 2025-07-20 RX ADMIN — ENOXAPARIN SODIUM 40 MG: 40 INJECTION SUBCUTANEOUS at 04:07

## 2025-07-20 RX ADMIN — BACLOFEN 20 MG: 10 TABLET ORAL at 09:07

## 2025-07-20 RX ADMIN — MELATONIN TAB 3 MG 6 MG: 3 TAB at 09:07

## 2025-07-20 RX ADMIN — DANTROLENE SODIUM 50 MG: 25 CAPSULE ORAL at 08:07

## 2025-07-20 RX ADMIN — CARBOXYMETHYLCELLULOSE SODIUM 1 DROP: 10 GEL OPHTHALMIC at 09:07

## 2025-07-20 RX ADMIN — OXYCODONE HYDROCHLORIDE 5 MG: 5 TABLET ORAL at 09:07

## 2025-07-20 RX ADMIN — DANTROLENE SODIUM 50 MG: 25 CAPSULE ORAL at 03:07

## 2025-07-20 RX ADMIN — LEVETIRACETAM 500 MG: 500 SOLUTION ORAL at 09:07

## 2025-07-20 RX ADMIN — PSYLLIUM HUSK 1 PACKET: 3.4 POWDER ORAL at 08:07

## 2025-07-20 RX ADMIN — MIRTAZAPINE 15 MG: 15 TABLET, FILM COATED ORAL at 09:07

## 2025-07-20 RX ADMIN — DANTROLENE SODIUM 50 MG: 25 CAPSULE ORAL at 09:07

## 2025-07-20 RX ADMIN — BACLOFEN 20 MG: 10 TABLET ORAL at 03:07

## 2025-07-20 RX ADMIN — POTASSIUM & SODIUM PHOSPHATES POWDER PACK 280-160-250 MG 2 PACKET: 280-160-250 PACK at 05:07

## 2025-07-20 RX ADMIN — BACLOFEN 20 MG: 10 TABLET ORAL at 08:07

## 2025-07-20 RX ADMIN — CARBOXYMETHYLCELLULOSE SODIUM 1 DROP: 10 GEL OPHTHALMIC at 08:07

## 2025-07-20 RX ADMIN — Medication 800 MG: at 05:07

## 2025-07-20 NOTE — ASSESSMENT & PLAN NOTE
Patient was awaiting discharge home on 7/14 when a rapid response was called as the patient began seizing. Two witnessed tonic-clonic seizures were observed lasting for about 1-2 minutes, given 2mg Ativan. After these seizures, he never returned to baseline. Baseline was described as full movement in neck and all extremities, tracking with head & neck, mumbles per mom, and following commands. Initial assessment, patient was found staring, with no blink to threat or closure of eyes, midline gaze, and not moving extremities with painful stimuli. During seizure, it is reported that patient became hypertensive, hypoxic, and tachycardic, with strong flexor posturing bilaterally. EEG applied showing discharges over the left hemisphere, no history of seizures or brain mass/lesions/infarction. Patient loaded with 40mg/kg of Keppra. Given concern for mentation and need for closer neurological monitoring, patient was transferred to [facility] where he will be admitted.     Admitted to [facility], stable to step down 7/17, pending bed  Q4h neurochecks and hourly vital signs  SBP <180, MAP >65  Brain MRI w/wo epilepsy protocol - negative for acute process  cEEG - discharges in left hemisphere no seizures  Discontinued 7/16  Continue keppra 500mg BID, continue until outpatient follow up with Epilepsy (referral placed)  Maintain seizure precautions  TF resumed  SCDs & LVX for DVT ppx  PT/OT/SLP to continue treatment  Q6h bladder scans  LP at bedside unsuccessful 7/15, obtained 7/16 under fluoroscopic guidance  Blood cultures resent, other workup pending and ID consulted. See plan above  Adequately volume resuscitated

## 2025-07-20 NOTE — ASSESSMENT & PLAN NOTE
Admitted to MICU for septic shock 2/2 ESBL E. Coli PNA  Upon EMS arrival, the patient was obtunded and hypoxic with an oxygen saturation of 74%.  Weaned off Levophed  On Ceftriaxone through 7/12 per ID recs, now completed  Satting well on room air    This has since resolved     - later presented back to [facility] with new septic appearing picture see separate issue

## 2025-07-20 NOTE — NURSING
Called to pt room by pt mother with concern for seizures due to staring episodes. Pt answering questions and following commands, neuro exam remains unchanged. Sheard Np notified. EEG cap placed on pt.

## 2025-07-20 NOTE — PROGRESS NOTES
[facility]  Neurocritical Care  Progress Note    Admit Date: 6/29/2025  Service Date: 07/20/2025  Length of Stay: 21    Subjective:     Chief Complaint: Witnessed seizure-like activity    History of Present Illness: Mr. Moreno is a 44yoM with PMHx significant for ALS (onset 2006), neuromuscular dysfunction of the bladder, sacral ulcers, and spinal cerebellar ataxia who was admitted recently admitted (5/9/2025) for severe dehydration, electrolyte disturbances, and covid who presented to the ED with EMS and c/o AMS and hypoxia. Nonverbal at baseline but apparently is usually more alert; has been less responsive and interactive x1 day. Upon EMS arrival, was obtunded and sating 74%. They gave him solumedrol and duonebs en route which improved his oxygen status to >90%. Mother at bedside reports increased difficulty with swallowing and decreased PO intake.        While in the ED, labs and imaging significant for Na 149, K 5.4, Cl 111, CO2 21, glucose 149, BUN 38, creat 1.0, albumin 2.2, ALP 80, AST/ALT 34/23, anion gap 17, procal 8.42, BNP <10, WBC 7.11, H/H 12/40.5, plts 315, covid/flu negative, .6, VBG 7.349/47.1/44.5/23.9, istat LA 4.9->7.4->4.2 (7.4 likely error?). Chest xray with patchy opacities bilaterally, predominant within the bibasilar regions but also within the left lung apex and the left mid-lung; compatible with multifocal pneumonia. Started on BSA by the ED; vanc and cefepime. CCM consulted for sepsis. During first examination the patient was stable with MAPs >65 and Lactic acid down trended to 2.6. Around 6 am of 6/30 rap[place] was called hypotension with MAPs in the 50. Pt was started on levophed and was transferred to MICU 6/29 for septic shock secondary to multifocal pneumonia. Patient initially requiring pressors but since discontinued. Given dysphagia, IR placed a PEG tube 7/5 for nutrition and medication administration. Patient was stepped down to  on 7/7.    Patient was  awaiting discharge home tonight on 7/14 when a rapid response was called as the patient began seizing. Two witnessed tonic-clonic seizures were observed lasting for about 1-2 minutes, given 2mg Ativan. After these seizures, he never returned to baseline. Baseline was described as full movement in neck and all extremities, tracking with head & neck, mumbles per mom, and following commands. Initial assessment, patient was found staring, with no blink to threat or closure of eyes, midline gaze, and not moving extremities with painful stimuli. During seizure, it is reported that patient became hypertensive, hypoxic, and tachycardic, with strong flexor posturing bilaterally. EEG applied showing discharges over the left hemisphere, no history of seizures or brain mass/lesions/infarction. Patient loaded with 40mg/kg of Keppra. Given concern for mentation and need for closer neurological monitoring, patient was transferred to [facility] where he will be admitted.       Hospital Course: 07/15/2025: NAEON. Afebrile, BP 90/50s. MRI completed. EEG in place. Keppra and LR given. Bilateral mitt restraints. Hypotensive this morning, given 500 NS and 1L LR. Temp low 90s, bear hugger placed. UOP 1.6L. LP today   07/16/2025: continued improvement in mental status, alert and interactive today, pleasant and smiling with care, does attempt to remove lines and PIVs.  Did require initiation of norepinephrine overnight to maintain MAP > 65, on and off joseluis hugger for normothermia, remains on antibiotics which have been broadened to vanc/meropenem pending ID evaluation. Obtaining LP under fluoroscopy today.   07/17/2025: NAEON. Off levo. S/p LP with IR yesterday. CSF unremarkable, further CSF studies pending. R PIV removed. Pt maintaining pressure and looking better since removal. Stable for stepdown to  today.  07/18/2025: NAEON. AFVSS. Exam stable. 2 large BM overnight, Electrolytes repleted. Pending stepdown bed. Joseluis hugger applied  intermittently.  07/19/2025: Liquid stools overnight. Lactulose held and psyllium added with mild improvement. CPT started. Remains boarding for HM.   07/20/2025: concern for staring spells overnight, but reportedly in setting of oxycodone administration and was still following commands, was placed on cap-eeg which was uninterpretable but deferring repeat formal study, mother at bedside counseled and in agreement.       Objective:     Vitals:  Temp: 97.8 °F (36.6 °C)  Pulse: 84  Rhythm: normal sinus rhythm  BP: 124/77  MAP (mmHg): 95  Resp: 20  SpO2: 100 %    Temp  Min: 97.2 °F (36.2 °C)  Max: 98.2 °F (36.8 °C)  Pulse  Min: 72  Max: 90  BP  Min: 99/58  Max: 129/68  MAP (mmHg)  Min: 73  Max: 95  Resp  Min: 9  Max: 26  SpO2  Min: 100 %  Max: 100 %    07/19 0701 - 07/20 0700  In: 2532.7 [P.O.:360]  Out: 1700 [Urine:1650]   Unmeasured Output  Unmeasured Urine Occurrence: 1  Unmeasured Stool Occurrence: 1  Pad Count: 2        Physical Exam  Vitals and nursing note reviewed.   Constitutional:       General: He is not in acute distress.     Appearance: He is cachectic. He is not diaphoretic.   HENT:      Head: Normocephalic and atraumatic.   Cardiovascular:      Rate and Rhythm: Normal rate and regular rhythm.      Pulses: Normal pulses.   Pulmonary:      Effort: Pulmonary effort is normal. No respiratory distress.   Chest:      Comments: Pectus excavatum  Abdominal:      General: Abdomen is flat.      Comments: PEG site c/d/i    Musculoskeletal:      Right lower leg: No edema.      Left lower leg: No edema.   Skin:     General: Skin is dry.      Capillary Refill: Capillary refill takes less than 2 seconds.   Neurological:      Mental Status: He is alert.      Comments: E4V2M6  PERRL  Tracks appropriately  Minimally verbal at baseline  spont movement  Follows simple commands with head nods/shakes, occasionally in limbs  BUE mittens        Medications:  Continuous Scheduledbaclofen, 20 mg, TID  carboxymethylcellulose  sodium, 1 drop, TID  dantrolene, 50 mg, TID  donepeziL, 5 mg, QHS  enoxparin, 40 mg, Daily  levetiracetam, 500 mg, BID  mirtazapine, 15 mg, QHS  psyllium husk, 1 packet, Daily    PRNcalcium gluconate IVPB, 1 g, PRN  calcium gluconate IVPB, 2 g, PRN  calcium gluconate IVPB, 3 g, PRN  dextrose 50%, 12.5 g, PRN  dextrose 50%, 25 g, PRN  glucagon (human recombinant), 1 mg, PRN  glucose, 16 g, PRN  glucose, 24 g, PRN  magnesium oxide, 800 mg, PRN  magnesium oxide, 800 mg, PRN  naloxone, 0.02 mg, PRN  ondansetron, 4 mg, Q6H PRN  oxyCODONE, 5 mg, Q6H PRN  potassium bicarbonate, 35 mEq, PRN  potassium bicarbonate, 50 mEq, PRN  potassium bicarbonate, 60 mEq, PRN  potassium, sodium phosphates, 2 packet, PRN  potassium, sodium phosphates, 2 packet, PRN  potassium, sodium phosphates, 2 packet, PRN  sodium chloride 0.9%, 10 mL, Q12H PRN      Today I personally reviewed pertinent medications, lines/drains/airways, imaging, cardiology results, laboratory results, microbiology results,     Diet  Diet Soft & Bite Sized (IDDSI Level 6) Honey Thick (Moderately Thick); Standard Tray  Diet Soft & Bite Sized (IDDSI Level 6) Honey Thick (Moderately Thick); Standard Tray        Assessment/Plan:     Neuro  * Witnessed seizure-like activity  Patient was awaiting discharge home on 7/14 when a rapid response was called as the patient began seizing. Two witnessed tonic-clonic seizures were observed lasting for about 1-2 minutes, given 2mg Ativan. After these seizures, he never returned to baseline. Baseline was described as full movement in neck and all extremities, tracking with head & neck, mumbles per mom, and following commands. Initial assessment, patient was found staring, with no blink to threat or closure of eyes, midline gaze, and not moving extremities with painful stimuli. During seizure, it is reported that patient became hypertensive, hypoxic, and tachycardic, with strong flexor posturing bilaterally. EEG applied showing discharges  over the left hemisphere, no history of seizures or brain mass/lesions/infarction. Patient loaded with 40mg/kg of Keppra. Given concern for mentation and need for closer neurological monitoring, patient was transferred to [facility] where he will be admitted.     Admitted to [facility], stable to step down 7/17, pending bed  Q4h neurochecks and hourly vital signs  SBP <180, MAP >65  Brain MRI w/wo epilepsy protocol - negative for acute process  cEEG - discharges in left hemisphere no seizures  Discontinued 7/16  Continue keppra 500mg BID, continue until outpatient follow up with Epilepsy (referral placed)  Maintain seizure precautions  TF resumed  SCDs & LVX for DVT ppx  PT/OT/SLP to continue treatment  Q6h bladder scans  LP at bedside unsuccessful 7/15, obtained 7/16 under fluoroscopic guidance  Blood cultures resent, other workup pending and ID consulted. See plan above  Adequately volume resuscitated    Acute encephalopathy  Secondary to seizures, improving    ALS (amyotrophic lateral sclerosis)  UMN predominance with diffuse limb spasticity. Slowly progressive. Fully-reliant on mother, others for self care and feeding and cleaning and toileting. Respiratory function is poor per FVC .     Continue home donepezil for neuroprotection  Continue home dantrolene and baclofen for spasticity  Turn q2h with aspiration precautions    Spinocerebellar ataxia  See ALS (amyotrophic lateral sclerosis)      Spastic diplegia  See ALS (amyotrophic lateral sclerosis)    Psychiatric  Insomnia secondary to depression with anxiety  baseline    Pulmonary  Pneumonia due to E. coli  Admitted to MICU for septic shock 2/2 ESBL E. Coli PNA  Upon EMS arrival, the patient was obtunded and hypoxic with an oxygen saturation of 74%.  Weaned off Levophed  On Ceftriaxone through 7/12 per ID recs, now completed  Satting well on room air    This has since resolved     - later presented back to [facility] with new septic appearing picture see separate  issue    ID  Septic shock  Unknown source as of this time but just completed treatment  course of ertapenem for ESBL pneumonia.  - on vancomycin & Meropenem with CNS, discontinued 7/17  - follow up CSF studies  - other cultures in process  - ID consulted recommendations appreciated    - given LP findings, de-escalate to doxycycline 100 mg BID via PEG, last day 7/19 to complete 5d course for ssti    - isolation precaution per hospital protocol     Endocrine  Body mass index (BMI) less than 19  See malnutrition    Severe protein-calorie malnutrition  ok to resume pleasure feeds, soft and bite sized diet with honey thick liquids ordered  Nutrition consulted. Most recent weight and BMI monitored-     Measurements:  Wt Readings from Last 1 Encounters:   07/16/25 59.9 kg (132 lb)   Body mass index is 16.5 kg/m².    Patient has been screened and assessed by RD.    Malnutrition Type:  Context: chronic illness  Level: severe    Malnutrition Characteristic Summary:  Subcutaneous Fat (Malnutrition): severe depletion  Muscle Mass (Malnutrition): severe depletion    Interventions/Recommendations (treatment strategy):  1. Continue bolus TF recommendation: 5 cans/day of Isosource 1.5 to provide 1250 mL total volume, 1875 kcal, 220 g CHO, 85 protein, 19 fiber, 955 mL free water  - 90 mL flush before and after each (10 flushes total) can to provide additional 900 mL free water (Total 1855 mL)  2. Continue soft and bite sized textured diet as tolerated    3. Continue boost glucose control TID   4. Recommend arnie BID to aid in wound healing  5. Recommend MVI  6. RD to monitor weight, labs, meds, intake, tolerance      GI  Gastrostomy status  Placed this hospitalization    Oropharyngeal dysphagia  S/p PEG    Orthopedic  Pressure injury of deep tissue of heel  L Heel with black eschar. R Heel pressure ulcer with scant serosanguineous drainage. Photos uploaded into chart.   Wound care following      Decubitus ulcer of sacral region,  stage 4  Wound vac removed on 7/14 prior patient was going to be discharged.  Wound care following. Plan to reapply sacral wound vac with dressing changes on [facility]  General surgery consulted previously, no indication for inpatient debridement recommended; consider reconsult if needed      Other  Hypothermia  Baseline intermittently present and seemingly unrelated to other concerns  Sergio alice as needed, not an icu indication          The patient is being Prophylaxed for:  Venous Thromboembolism with: Mechanical or Chemical  Stress Ulcer with: H2B  Ventilator Pneumonia with: not applicable    Activity Orders            Diet Soft & Bite Sized (IDDSI Level 6) Honey Thick (Moderately Thick); Standard Tray: Soft & Bite Sized starting at 07/18 1431    Elevate HOB Elevate HOB 30-45 degrees during feeding unless contraindicated starting at 06/30 1357    Progressive Mobility Protocol (mobilize patient to their highest level of functioning at least twice daily) starting at 06/30 0800    Turn patient every 2 hours starting at 06/30 0000          Full Code    Dispo: step down to hospital medicine while awaiting placement    Billing: Level 3    Francisco Adam,   Neurocritical Care  [facility] Neuro Critical Care

## 2025-07-20 NOTE — SUBJECTIVE & OBJECTIVE
Objective:     Vitals:  Temp: 97.8 °F (36.6 °C)  Pulse: 84  Rhythm: normal sinus rhythm  BP: 124/77  MAP (mmHg): 95  Resp: 20  SpO2: 100 %    Temp  Min: 97.2 °F (36.2 °C)  Max: 98.2 °F (36.8 °C)  Pulse  Min: 72  Max: 90  BP  Min: 99/58  Max: 129/68  MAP (mmHg)  Min: 73  Max: 95  Resp  Min: 9  Max: 26  SpO2  Min: 100 %  Max: 100 %    07/19 0701 - 07/20 0700  In: 2532.7 [P.O.:360]  Out: 1700 [Urine:1650]   Unmeasured Output  Unmeasured Urine Occurrence: 1  Unmeasured Stool Occurrence: 1  Pad Count: 2        Physical Exam  Vitals and nursing note reviewed.   Constitutional:       General: He is not in acute distress.     Appearance: He is cachectic. He is not diaphoretic.   HENT:      Head: Normocephalic and atraumatic.   Cardiovascular:      Rate and Rhythm: Normal rate and regular rhythm.      Pulses: Normal pulses.   Pulmonary:      Effort: Pulmonary effort is normal. No respiratory distress.   Chest:      Comments: Pectus excavatum  Abdominal:      General: Abdomen is flat.      Comments: PEG site c/d/i    Musculoskeletal:      Right lower leg: No edema.      Left lower leg: No edema.   Skin:     General: Skin is dry.      Capillary Refill: Capillary refill takes less than 2 seconds.   Neurological:      Mental Status: He is alert.      Comments: E4V2M6  PERRL  Tracks appropriately  Minimally verbal at baseline  spont movement  Follows simple commands with head nods/shakes, occasionally in limbs  BUE mittens        Medications:  Continuous Scheduledbaclofen, 20 mg, TID  carboxymethylcellulose sodium, 1 drop, TID  dantrolene, 50 mg, TID  donepeziL, 5 mg, QHS  enoxparin, 40 mg, Daily  levetiracetam, 500 mg, BID  mirtazapine, 15 mg, QHS  psyllium husk, 1 packet, Daily    PRNcalcium gluconate IVPB, 1 g, PRN  calcium gluconate IVPB, 2 g, PRN  calcium gluconate IVPB, 3 g, PRN  dextrose 50%, 12.5 g, PRN  dextrose 50%, 25 g, PRN  glucagon (human recombinant), 1 mg, PRN  glucose, 16 g, PRN  glucose, 24 g, PRN  magnesium  oxide, 800 mg, PRN  magnesium oxide, 800 mg, PRN  naloxone, 0.02 mg, PRN  ondansetron, 4 mg, Q6H PRN  oxyCODONE, 5 mg, Q6H PRN  potassium bicarbonate, 35 mEq, PRN  potassium bicarbonate, 50 mEq, PRN  potassium bicarbonate, 60 mEq, PRN  potassium, sodium phosphates, 2 packet, PRN  potassium, sodium phosphates, 2 packet, PRN  potassium, sodium phosphates, 2 packet, PRN  sodium chloride 0.9%, 10 mL, Q12H PRN      Today I personally reviewed pertinent medications, lines/drains/airways, imaging, cardiology results, laboratory results, microbiology results,     Diet  Diet Soft & Bite Sized (IDDSI Level 6) Honey Thick (Moderately Thick); Standard Tray  Diet Soft & Bite Sized (IDDSI Level 6) Honey Thick (Moderately Thick); Standard Tray

## 2025-07-20 NOTE — EICU
[facility] Night Rounds Checklist  24H Vital Sign Range:  Temp:  [96 °F (35.6 °C)-97.9 °F (36.6 °C)]   Pulse:  [69-89]   Resp:  [11-25]   BP: ()/(53-81)   SpO2:  [99 %-100 %]     Video rounds and LDA reconciliation

## 2025-07-20 NOTE — ASSESSMENT & PLAN NOTE
Wound vac removed on 7/14 prior patient was going to be discharged.  Wound care following. Plan to reapply sacral wound vac with dressing changes on [facility]  General surgery consulted previously, no indication for inpatient debridement recommended; consider reconsult if needed

## 2025-07-20 NOTE — PLAN OF CARE
"[facility] Care Plan    POC reviewed with Lisa Moreno and family at 0300. Patient verbalized understanding. Questions and concerns addressed. No acute events today. Pt progressing toward goals. Will continue to monitor. See below and flowsheets for full assessment and VS info.   - Magnesium and Phosphorus replaced   - Bm x2   - EEG cap placed   PRN oxycod  Is this a stroke patient? no    Neuro:  Elmo Coma Scale  Best Eye Response: 4-->(E4) spontaneous  Best Motor Response: 6-->(M6) obeys commands  Best Verbal Response: 1-->(V1) none  Elmo Coma Scale Score: 11  Assessment Qualifiers: Patient not sedated/intubated  Pupil PERRLA: yes     24 hr Temp:  [97.2 °F (36.2 °C)-97.9 °F (36.6 °C)]     CV:   Rhythm: normal sinus rhythm  BP goals:   SBP < 180  MAP > 65    Resp:           Plan: N/A    GI/:     Diet/Nutrition Received: mechanical/dental soft  Last Bowel Movement: 07/19/25  Voiding Characteristics: external catheter    Intake/Output Summary (Last 24 hours) at 7/20/2025 0220  Last data filed at 7/19/2025 1900  Gross per 24 hour   Intake 2532.67 ml   Output 1700 ml   Net 832.67 ml     Unmeasured Output  Unmeasured Urine Occurrence: 1  Unmeasured Stool Occurrence: 1  Pad Count: 2    Labs/Accuchecks:  Recent Labs   Lab 07/20/25  0117   WBC 5.06   RBC 3.40*   HGB 8.9*   HCT 29.1*         Recent Labs   Lab 07/20/25  0117   *   K 4.2   CO2 26      BUN 16   CREATININE 0.5   ALKPHOS 87   ALT 28   AST 21   BILITOT 0.1    No results for input(s): "PROTIME", "INR", "APTT", "HEPANTIXA" in the last 168 hours. No results for input(s): "CPK", "CPKMB", "TROPONINI", "MB" in the last 168 hours.    Electrolytes: Electrolytes replaced  Accuchecks: none    Gtts:      LDA/Wounds:    Morgan Risk Assessment  Sensory Perception: 2-->very limited  Moisture: 3-->occasionally moist  Activity: 1-->bedfast  Mobility: 2-->very limited  Nutrition: 3-->adequate  Friction and Shear: 1-->problem  Morgan Score: 12  Is your " tor score 12 or less? yes tro mobility score is 2 or less, they are on a immerse bed, if their moisture score is 2 or less, they do not have a sacral mepilex and instead have zinc barrier cream, and heel boots on          Restraints:   Restraint Order  Length of Order: Order good for next 24 hours or when removed.  Date that the current order will : 25  Time that the current order will : 519  Order Upon Application: Yes    [facility]

## 2025-07-20 NOTE — PLAN OF CARE
"[facility] Care Plan  POC reviewed with Lisa Moreno and family at 1800. Family verbalized understanding. Will continue to monitor. See below and flowsheets for full assessment and VS info.     - Wound care complete   Is this a stroke patient? no    Neuro:  Joseph Coma Scale  Best Eye Response: 4-->(E4) spontaneous  Best Motor Response: 6-->(M6) obeys commands  Best Verbal Response: 1-->(V1) none  Linn Coma Scale Score: 11  Assessment Qualifiers: Patient not sedated/intubated  Pupil PERRLA: yes     24 hr Temp:  [97.2 °F (36.2 °C)-98.2 °F (36.8 °C)]     CV:   Rhythm: normal sinus rhythm  BP goals:   SBP < 180  MAP > 65    Resp:           Plan: N/A    GI/:     Diet/Nutrition Received: mechanical/dental soft  Last Bowel Movement: 07/19/25  Voiding Characteristics: external catheter    Intake/Output Summary (Last 24 hours) at 7/20/2025 1841  Last data filed at 7/20/2025 1802  Gross per 24 hour   Intake 2313 ml   Output 3000 ml   Net -687 ml     Unmeasured Output  Unmeasured Urine Occurrence: 1  Unmeasured Stool Occurrence: 1  Pad Count: 2    Labs/Accuchecks:  Recent Labs   Lab 07/20/25  0117   WBC 5.06   RBC 3.40*   HGB 8.9*   HCT 29.1*         Recent Labs   Lab 07/20/25  0117   *   K 4.2   CO2 26      BUN 16   CREATININE 0.5   ALKPHOS 87   ALT 28   AST 21   BILITOT 0.1    No results for input(s): "PROTIME", "INR", "APTT", "HEPANTIXA" in the last 168 hours. No results for input(s): "CPK", "CPKMB", "TROPONINI", "MB" in the last 168 hours.    Electrolytes: N/A - electrolytes WDL  Accuchecks: none    Gtts:      LDA/Wounds:    Morgan Risk Assessment  Sensory Perception: 2-->very limited  Moisture: 3-->occasionally moist  Activity: 1-->bedfast  Mobility: 2-->very limited  Nutrition: 3-->adequate  Friction and Shear: 1-->problem  Morgan Score: 12    Is your morgan score 12 or less? yes enrolled in the [facility] program, morgan mobility score is 2 or less, they are on a immerse bed, if their moisture " score is 2 or less, they do not have a sacral mepilex and instead have zinc barrier cream, and heel boots on    Restraints: L and R mitten   Restraint Order  Length of Order: Order good for next 24 hours or when removed.  Date that the current order will : 25  Time that the current order will : 0553  Order Upon Application: Yes

## 2025-07-20 NOTE — ASSESSMENT & PLAN NOTE
Baseline intermittently present and seemingly unrelated to other concerns  Sergio hugger as needed, not an icu indication

## 2025-07-21 PROBLEM — J96.01 ACUTE HYPOXIC RESPIRATORY FAILURE: Status: RESOLVED | Noted: 2025-06-30 | Resolved: 2025-07-21

## 2025-07-21 LAB
ABSOLUTE EOSINOPHIL (OHS): 0.12 K/UL
ABSOLUTE MONOCYTE (OHS): 0.57 K/UL (ref 0.3–1)
ABSOLUTE NEUTROPHIL COUNT (OHS): 2.9 K/UL (ref 1.8–7.7)
ALBUMIN SERPL BCP-MCNC: 2 G/DL (ref 3.5–5.2)
ALP SERPL-CCNC: 86 UNIT/L (ref 40–150)
ALT SERPL W/O P-5'-P-CCNC: 25 UNIT/L (ref 10–44)
AMMONIA PLAS-SCNC: 35 UMOL/L (ref 10–50)
ANION GAP (OHS): 7 MMOL/L (ref 8–16)
AST SERPL-CCNC: 20 UNIT/L (ref 11–45)
BACTERIA CSF CULT: NO GROWTH
BASOPHILS # BLD AUTO: 0.02 K/UL
BASOPHILS NFR BLD AUTO: 0.4 %
BILIRUB SERPL-MCNC: 0.1 MG/DL (ref 0.1–1)
BUN SERPL-MCNC: 16 MG/DL (ref 6–20)
CALCIUM SERPL-MCNC: 8.2 MG/DL (ref 8.7–10.5)
CHLORIDE SERPL-SCNC: 103 MMOL/L (ref 95–110)
CO2 SERPL-SCNC: 25 MMOL/L (ref 23–29)
CREAT SERPL-MCNC: 0.5 MG/DL (ref 0.5–1.4)
ERYTHROCYTE [DISTWIDTH] IN BLOOD BY AUTOMATED COUNT: 18.3 % (ref 11.5–14.5)
GFR SERPLBLD CREATININE-BSD FMLA CKD-EPI: >60 ML/MIN/1.73/M2
GLUCOSE SERPL-MCNC: 124 MG/DL (ref 70–110)
GRAM STN SPEC: NORMAL
HCT VFR BLD AUTO: 29.2 % (ref 40–54)
HGB BLD-MCNC: 9 GM/DL (ref 14–18)
IMM GRANULOCYTES # BLD AUTO: 0.01 K/UL (ref 0–0.04)
IMM GRANULOCYTES NFR BLD AUTO: 0.2 % (ref 0–0.5)
LYMPHOCYTES # BLD AUTO: 1.39 K/UL (ref 1–4.8)
MAGNESIUM SERPL-MCNC: 1.9 MG/DL (ref 1.6–2.6)
MCH RBC QN AUTO: 26.4 PG (ref 27–31)
MCHC RBC AUTO-ENTMCNC: 30.8 G/DL (ref 32–36)
MCV RBC AUTO: 86 FL (ref 82–98)
NUCLEATED RBC (/100WBC) (OHS): 0 /100 WBC
PHOSPHATE SERPL-MCNC: 3.4 MG/DL (ref 2.7–4.5)
PLATELET # BLD AUTO: 321 K/UL (ref 150–450)
PMV BLD AUTO: 11.2 FL (ref 9.2–12.9)
POTASSIUM SERPL-SCNC: 4.5 MMOL/L (ref 3.5–5.1)
PROT SERPL-MCNC: 6.9 GM/DL (ref 6–8.4)
RBC # BLD AUTO: 3.41 M/UL (ref 4.6–6.2)
REAGIN AB CSF QL: NON REACTIVE
RELATIVE EOSINOPHIL (OHS): 2.4 %
RELATIVE LYMPHOCYTE (OHS): 27.7 % (ref 18–48)
RELATIVE MONOCYTE (OHS): 11.4 % (ref 4–15)
RELATIVE NEUTROPHIL (OHS): 57.9 % (ref 38–73)
SODIUM SERPL-SCNC: 135 MMOL/L (ref 136–145)
WBC # BLD AUTO: 5.01 K/UL (ref 3.9–12.7)

## 2025-07-21 PROCEDURE — 80053 COMPREHEN METABOLIC PANEL: CPT

## 2025-07-21 PROCEDURE — 25000003 PHARM REV CODE 250: Performed by: NURSE PRACTITIONER

## 2025-07-21 PROCEDURE — 25000003 PHARM REV CODE 250: Performed by: PSYCHIATRY & NEUROLOGY

## 2025-07-21 PROCEDURE — 94761 N-INVAS EAR/PLS OXIMETRY MLT: CPT

## 2025-07-21 PROCEDURE — 84100 ASSAY OF PHOSPHORUS: CPT

## 2025-07-21 PROCEDURE — 11000001 HC ACUTE MED/SURG PRIVATE ROOM

## 2025-07-21 PROCEDURE — 20600001 HC STEP DOWN PRIVATE ROOM

## 2025-07-21 PROCEDURE — 85025 COMPLETE CBC W/AUTO DIFF WBC: CPT

## 2025-07-21 PROCEDURE — 83735 ASSAY OF MAGNESIUM: CPT

## 2025-07-21 PROCEDURE — 63600175 PHARM REV CODE 636 W HCPCS: Performed by: PSYCHIATRY & NEUROLOGY

## 2025-07-21 PROCEDURE — 99499 UNLISTED E&M SERVICE: CPT | Mod: ,,, | Performed by: PHYSICIAN ASSISTANT

## 2025-07-21 PROCEDURE — 25000003 PHARM REV CODE 250: Performed by: HOSPITALIST

## 2025-07-21 PROCEDURE — 25000003 PHARM REV CODE 250

## 2025-07-21 PROCEDURE — 25000242 PHARM REV CODE 250 ALT 637 W/ HCPCS: Performed by: NURSE PRACTITIONER

## 2025-07-21 PROCEDURE — 94668 MNPJ CHEST WALL SBSQ: CPT

## 2025-07-21 PROCEDURE — 82140 ASSAY OF AMMONIA: CPT | Performed by: NURSE PRACTITIONER

## 2025-07-21 RX ADMIN — ENOXAPARIN SODIUM 40 MG: 40 INJECTION SUBCUTANEOUS at 04:07

## 2025-07-21 RX ADMIN — CARBOXYMETHYLCELLULOSE SODIUM 1 DROP: 10 GEL OPHTHALMIC at 08:07

## 2025-07-21 RX ADMIN — DANTROLENE SODIUM 50 MG: 25 CAPSULE ORAL at 08:07

## 2025-07-21 RX ADMIN — LEVETIRACETAM 500 MG: 500 SOLUTION ORAL at 08:07

## 2025-07-21 RX ADMIN — BACLOFEN 20 MG: 10 TABLET ORAL at 02:07

## 2025-07-21 RX ADMIN — CARBOXYMETHYLCELLULOSE SODIUM 1 DROP: 10 GEL OPHTHALMIC at 02:07

## 2025-07-21 RX ADMIN — DONEPEZIL HYDROCHLORIDE 5 MG: 5 TABLET, FILM COATED ORAL at 08:07

## 2025-07-21 RX ADMIN — BACLOFEN 20 MG: 10 TABLET ORAL at 08:07

## 2025-07-21 RX ADMIN — DANTROLENE SODIUM 50 MG: 25 CAPSULE ORAL at 02:07

## 2025-07-21 RX ADMIN — PSYLLIUM HUSK 1 PACKET: 3.4 POWDER ORAL at 08:07

## 2025-07-21 RX ADMIN — MIRTAZAPINE 15 MG: 15 TABLET, FILM COATED ORAL at 08:07

## 2025-07-21 RX ADMIN — MELATONIN TAB 3 MG 6 MG: 3 TAB at 08:07

## 2025-07-21 NOTE — PLAN OF CARE
[facility] Critical Care  Discharge Reassessment    Primary Care Provider: John Nixon II, MD    Expected Discharge Date: 7/22/2025    Reassessment (most recent)       Discharge Reassessment - 07/21/25 1213          Discharge Reassessment    Assessment Type Discharge Planning Reassessment (P)      Did the patient's condition or plan change since previous assessment? No (P)      Discharge Plan discussed with: Parent(s) (P)      Name(s) and Number(s) Giovana Moreno (Mother)  [phone] (P)      Communicated MARCELLE with patient/caregiver Yes (P)      Discharge Plan A Skilled Nursing Facility (P)      Discharge Plan B Home with family (P)      DME Needed Upon Discharge  none (P)      Transition of Care Barriers None (P)      Why the patient remains in the hospital Requires continued medical care (P)         Post-Acute Status    Post-Acute Authorization Placement (P)      Post-Acute Placement Status Pending state direction/certification (P)      Patient choice form signed by patient/caregiver List with quality metrics by geographic area provided (P)      Discharge Delays -- (P)    awaiting state approval                  SW met with pt  mother at bedside.  Pt mother had concerns about pt going to one of [facility] facilities such as Rehab or Ltac before being placed in skilled nursing.   SW discuss dispo and inquired about  her reasons.  She indicated that she is concerned about pt needs not being met at a nursing home.  She would like for him to gain more strength so that he can return back home.   SW  reviewed expectations of other LTAC and Rehab and told her medical dispo is up to the medical team and that the SW will follow up with them on options.   SW will continue to monitor pt needs.       Discharge Plan A and Plan B have been determined by review of patient's clinical status, future medical and therapeutic needs, and coverage/benefits for post-acute care in coordination with multidisciplinary team  members.    Aura Parker MSW, SERGIOW  [facility]  Case Management Dept.

## 2025-07-21 NOTE — PROGRESS NOTES
[facility] - Neuro Critical Care  Wound Care    Patient Name:  Lisa Moreno   MRN:  [id number]  Date: 7/21/2025  Diagnosis: Witnessed seizure-like activity    History:     Past Medical History:   Diagnosis Date    Anxiety     Cognitive communication disorder 05/10/2022    Nonverbal at baseline but alert    Degenerative motor system disease 2006    Dr. Wise in movement disorder clinic on 2/23/2015.    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    Multiple drug resistant organism (MDRO) culture positive     E.Coli ESBL PNA    Neurogenic bladder 01/16/2015    Seizure 02/22/2015    Spastic quadriplegia     Spinocerebellar atrophy        Social History[1]    Precautions:     Allergies as of 06/29/2025 - Reviewed 06/29/2025   Allergen Reaction Noted    Penicillins  01/14/2015    Allergen ext-venom-honey bee  05/10/2022       WOC Assessment Details/Treatment     Patient seen for sacral wound vac placement. Wound vac applied with urgotul as contact layer and for antimicrobial properties. 1 medium black sponge applied and covered with drape. Good seal achieved with no evidence of leakage and continuous suction at 125mmHg. After discussion with patient's mother, plan for wound vac changes on Monday, Wednesday, Friday schedule. Will continue to follow.               Reviewed chart for this encounter.  See flowsheet for findings.           Discussed POC with patient and primary nurse.  See EMR for orders and patient education.     Bedside nursing to continue care and monitoring.  Bedside to maintain pressure injury prevention interventions.       07/21/25 1000   WOCN Assessment   WOCN Total Time (mins) 45   Visit Date 07/21/25   Visit Time 1000   Consult Type Follow Up   WOCN Speciality Wound   WOCN List wound vac   Wound pressure   Procedure wound vac   Intervention assessed;changed;applied;chart review;coordination of care;orders   Teaching on-going         Wound 04/23/25 1537 Pressure Injury Sacral spine #4   Date First Assessed/Time First Assessed: 04/23/25 1537   Present on Original Admission: Yes  Primary Wound Type: Pressure Injury  Location: Sacral spine  Wound Number: #4  Is this injury device related?: No   Wound Image    Pressure Injury Stage 4   Dressing Appearance Moist drainage   Drainage Amount Scant   Drainage Characteristics/Odor Serosanguineous   Appearance Pink;Red   Tissue loss description Full thickness   Care Cleansed with:;Antimicrobial agent  (Vashe)        Negative Pressure Wound Therapy  07/03/25 1420   Placement Date/Time: 07/03/25 1420   Location: Sacral Spine   NPWT Type Vacuum Therapy   Therapy Setting NPWT Continuous therapy   Pressure Setting NPWT 125 mmHg   Sponges Inserted NPWT Black;1   Sponges Removed NPWT Black;1       Orders placed.   Richard ARGUETA, RN, [facility]  07/21/2025         [1]   Social History  Socioeconomic History    Marital status: Single   Tobacco Use    Smoking status: Never    Smokeless tobacco: Never   Substance and Sexual Activity    Alcohol use: Not Currently     Comment: social drinker, at times    Drug use: Not Currently    Sexual activity: Never     Social Drivers of Health     Financial Resource Strain: Low Risk  (7/13/2025)    Overall Financial Resource Strain (CARDIA)     Difficulty of Paying Living Expenses: Not hard at all   Recent Concern: Financial Resource Strain - Medium Risk (7/1/2025)    Overall Financial Resource Strain (CARDIA)     Difficulty of Paying Living Expenses: Somewhat hard   Food Insecurity: No Food Insecurity (7/13/2025)    Hunger Vital Sign     Worried About Running Out of Food in the Last Year: Never true     Ran Out of Food in the Last Year: Never true   Transportation Needs: No Transportation Needs (7/13/2025)    PRAPARE - Transportation     Lack of Transportation (Medical): No     Lack of Transportation (Non-Medical): No   Recent Concern: Transportation Needs - Unmet  Transportation Needs (4/17/2025)    PRAPARE - Transportation     Lack of Transportation (Medical): Yes     Lack of Transportation (Non-Medical): Yes   Physical Activity: Inactive (5/12/2025)    Exercise Vital Sign     Days of Exercise per Week: 0 days     Minutes of Exercise per Session: 0 min   Stress: No Stress Concern Present (7/13/2025)    [employer] - Occupational Stress Questionnaire     Feeling of Stress : Not at all   Recent Concern: Stress - Stress Concern Present (5/12/2025)    [employer] - Occupational Stress Questionnaire     Feeling of Stress : To some extent   Housing Stability: Low Risk  (7/13/2025)    Housing Stability Vital Sign     Unable to Pay for Housing in the Last Year: No     Number of Times Moved in the Last Year: 1     Homeless in the Last Year: No

## 2025-07-21 NOTE — PLAN OF CARE
CARLOS ALBERTO followed up with Malu at [facility] [phone]  to get a status of pt 142 ( PASSAR).  Malu indicated that a new Passar need to be completed with ALS diagnosis.   SW completed the PASSAR and resubmitted .  CARLOS ALBERTO is awaiting approval and will continue to follow pt needs.       Discharge Plan A and Plan B have been determined by review of patient's clinical status, future medical and therapeutic needs, and coverage/benefits for post-acute care in coordination with multidisciplinary team members.    Aura Parker MSW, SERGIOW  [facility]  Case Management Dept.

## 2025-07-21 NOTE — PROGRESS NOTES
[facility] Neuro Critical Care  Neurocritical Care  Progress Note    Admit Date: 6/29/2025  Service Date: 07/21/2025  Length of Stay: 22    Subjective:     Chief Complaint: Witnessed seizure-like activity    History of Present Illness: Mr. Moreno is a 44yoM with PMHx significant for ALS (onset 2006), neuromuscular dysfunction of the bladder, sacral ulcers, and spinal cerebellar ataxia who was admitted recently admitted (5/9/2025) for severe dehydration, electrolyte disturbances, and covid who presented to the ED with EMS and c/o AMS and hypoxia. Nonverbal at baseline but apparently is usually more alert; has been less responsive and interactive x1 day. Upon EMS arrival, was obtunded and sating 74%. They gave him solumedrol and duonebs en route which improved his oxygen status to >90%. Mother at bedside reports increased difficulty with swallowing and decreased PO intake.        While in the ED, labs and imaging significant for Na 149, K 5.4, Cl 111, CO2 21, glucose 149, BUN 38, creat 1.0, albumin 2.2, ALP 80, AST/ALT 34/23, anion gap 17, procal 8.42, BNP <10, WBC 7.11, H/H 12/40.5, plts 315, covid/flu negative, .6, VBG 7.349/47.1/44.5/23.9, istat LA 4.9->7.4->4.2 (7.4 likely error?). Chest xray with patchy opacities bilaterally, predominant within the bibasilar regions but also within the left lung apex and the left mid-lung; compatible with multifocal pneumonia. Started on BSA by the ED; vanc and cefepime. CCM consulted for sepsis. During first examination the patient was stable with MAPs >65 and Lactic acid down trended to 2.6. Around 6 am of 6/30 [place] was called hypotension with MAPs in the 50. Pt was started on levophed and was transferred to MICU 6/29 for septic shock secondary to multifocal pneumonia. Patient initially requiring pressors but since discontinued. Given dysphagia, IR placed a PEG tube 7/5 for nutrition and medication administration. Patient was stepped down to  on 7/7.    Patient was  awaiting discharge home tonight on 7/14 when a rapid response was called as the patient began seizing. Two witnessed tonic-clonic seizures were observed lasting for about 1-2 minutes, given 2mg Ativan. After these seizures, he never returned to baseline. Baseline was described as full movement in neck and all extremities, tracking with head & neck, mumbles per mom, and following commands. Initial assessment, patient was found staring, with no blink to threat or closure of eyes, midline gaze, and not moving extremities with painful stimuli. During seizure, it is reported that patient became hypertensive, hypoxic, and tachycardic, with strong flexor posturing bilaterally. EEG applied showing discharges over the left hemisphere, no history of seizures or brain mass/lesions/infarction. Patient loaded with 40mg/kg of Keppra. Given concern for mentation and need for closer neurological monitoring, patient was transferred to [facility] where he will be admitted.       Hospital Course: 07/15/2025: NAEON. Afebrile, BP 90/50s. MRI completed. EEG in place. Keppra and LR given. Bilateral mitt restraints. Hypotensive this morning, given 500 NS and 1L LR. Temp low 90s, bear hugger placed. UOP 1.6L. LP today   07/16/2025: continued improvement in mental status, alert and interactive today, pleasant and smiling with care, does attempt to remove lines and PIVs.  Did require initiation of norepinephrine overnight to maintain MAP > 65, on and off joseluis hugger for normothermia, remains on antibiotics which have been broadened to vanc/meropenem pending ID evaluation. Obtaining LP under fluoroscopy today.   07/17/2025: NAEON. Off levo. S/p LP with IR yesterday. CSF unremarkable, further CSF studies pending. R PIV removed. Pt maintaining pressure and looking better since removal. Stable for stepdown to  today.  07/18/2025: NAEON. AFVSS. Exam stable. 2 large BM overnight, Electrolytes repleted. Pending stepdown bed. Joseluis hugger applied  intermittently.  07/19/2025: Liquid stools overnight. Lactulose held and psyllium added with mild improvement. CPT started. Remains boarding for HM.   07/20/2025: concern for staring spells overnight, but reportedly in setting of oxycodone administration and was still following commands, was placed on cap-eeg which was uninterpretable but deferring repeat formal study, mother at bedside counseled and in agreement.   7/21/2025 NAEO, still boarding for HM bed. Working on NH placement in the mean time. Patient tolerating diet at this time, will hold bolus feeds while eating. Still pending autoimmune panel    Interval History:  See above.     Objective:     Vitals:  Temp: 97 °F (36.1 °C)  Pulse: 92  Rhythm: normal sinus rhythm  BP: 98/68  MAP (mmHg): 78  Resp: (!) 23  SpO2: 100 %    Temp  Min: 97 °F (36.1 °C)  Max: 97.8 °F (36.6 °C)  Pulse  Min: 71  Max: 99  BP  Min: 98/68  Max: 132/80  MAP (mmHg)  Min: 74  Max: 99  Resp  Min: 10  Max: 24  SpO2  Min: 99 %  Max: 100 %    07/20 0701 - 07/21 0700  In: 2913   Out: 4400 [Urine:4400]   Unmeasured Output  Unmeasured Urine Occurrence: 1  Unmeasured Stool Occurrence: 1  Pad Count: 2        Physical Exam  Vitals and nursing note reviewed.   Constitutional:       General: He is not in acute distress.     Appearance: He is cachectic. He is not diaphoretic.   HENT:      Head: Normocephalic and atraumatic.   Cardiovascular:      Rate and Rhythm: Normal rate and regular rhythm.   Pulmonary:      Effort: Pulmonary effort is normal. No respiratory distress.   Chest:      Comments: Pectus excavatum  Abdominal:      General: Abdomen is flat.      Comments: PEG site c/d/i    Musculoskeletal:      Right lower leg: No edema.      Left lower leg: No edema.   Skin:     General: Skin is dry.   Neurological:      Mental Status: He is alert.      Comments: [id number]  PERRL  Tracks appropriately  Minimally verbal at baseline  spont movement  Fcx0  BUE mittens              Medications:  Continuous  Scheduledbaclofen, 20 mg, TID  carboxymethylcellulose sodium, 1 drop, TID  dantrolene, 50 mg, TID  donepeziL, 5 mg, QHS  enoxparin, 40 mg, Daily  levetiracetam, 500 mg, BID  mirtazapine, 15 mg, QHS  psyllium husk, 1 packet, Daily    PRNcalcium gluconate IVPB, 1 g, PRN  calcium gluconate IVPB, 2 g, PRN  calcium gluconate IVPB, 3 g, PRN  dextrose 50%, 12.5 g, PRN  dextrose 50%, 25 g, PRN  glucagon (human recombinant), 1 mg, PRN  glucose, 16 g, PRN  glucose, 24 g, PRN  magnesium oxide, 800 mg, PRN  magnesium oxide, 800 mg, PRN  melatonin, 6 mg, Nightly PRN  naloxone, 0.02 mg, PRN  ondansetron, 4 mg, Q6H PRN  oxyCODONE, 5 mg, Q6H PRN  potassium bicarbonate, 35 mEq, PRN  potassium bicarbonate, 50 mEq, PRN  potassium bicarbonate, 60 mEq, PRN  potassium, sodium phosphates, 2 packet, PRN  potassium, sodium phosphates, 2 packet, PRN  potassium, sodium phosphates, 2 packet, PRN  sodium chloride 0.9%, 10 mL, Q12H PRN      Today I personally reviewed pertinent medications, lines/drains/airways, imaging, cardiology results, laboratory results, microbiology results,    Diet  Diet Soft & Bite Sized (IDDSI Level 6) Honey Thick (Moderately Thick); Standard Tray  Diet Soft & Bite Sized (IDDSI Level 6) Honey Thick (Moderately Thick); Standard Tray        Review of Systems  Assessment/Plan:     Neuro  * Witnessed seizure-like activity  Patient was awaiting discharge home on 7/14 when a rapid response was called as the patient began seizing. Two witnessed tonic-clonic seizures were observed lasting for about 1-2 minutes, given 2mg Ativan. After these seizures, he never returned to baseline. Baseline was described as full movement in neck and all extremities, tracking with head & neck, mumbles per mom, and following commands. Initial assessment, patient was found staring, with no blink to threat or closure of eyes, midline gaze, and not moving extremities with painful stimuli. During seizure, it is reported that patient became  hypertensive, hypoxic, and tachycardic, with strong flexor posturing bilaterally. EEG applied showing discharges over the left hemisphere, no history of seizures or brain mass/lesions/infarction. Patient loaded with 40mg/kg of Keppra. Given concern for mentation and need for closer neurological monitoring, patient was transferred to [facility] where he will be admitted.     Admitted to [facility], stable to step down 7/17, pending bed  Q4h neurochecks and hourly vital signs  SBP <180, MAP >65  Brain MRI w/wo epilepsy protocol - negative for acute process  cEEG - discharges in left hemisphere no seizures  Discontinued 7/16  Continue keppra 500mg BID, continue until outpatient follow up with Epilepsy (referral placed)  Maintain seizure precautions  SCDs & LVX for DVT ppx  PT/OT/SLP to continue treatment  Q6h bladder scans  LP at bedside unsuccessful 7/15, obtained 7/16 under fluoroscopic guidance  Blood cultures resent, other workup pending and ID consulted. See plan above      Acute encephalopathy  Secondary to seizures, improving    ALS (amyotrophic lateral sclerosis)  UMN predominance with diffuse limb spasticity. Slowly progressive. Fully-reliant on mother, others for self care and feeding and cleaning and toileting. Respiratory function is poor per FVC .     Continue home donepezil for neuroprotection  Continue home dantrolene and baclofen for spasticity  Turn q2h with aspiration precautions    Spinocerebellar ataxia  See ALS (amyotrophic lateral sclerosis)      Spastic diplegia  See ALS (amyotrophic lateral sclerosis)    Pulmonary  Pneumonia due to E. coli  Admitted to MICU for septic shock 2/2 ESBL E. Coli PNA  Upon EMS arrival, the patient was obtunded and hypoxic with an oxygen saturation of 74%.  Weaned off Levophed  On Ceftriaxone through 7/12 per ID recs, now completed  Satting well on room air    This has since resolved     - later presented back to [facility] with new septic appearing picture see separate  issue    ID  Septic shock   completed treatment  course of ertapenem for ESBL pneumonia  - other cultures in process  - ID consulted recommendations appreciated    - given LP findings, de-escalate to doxycycline 100 mg BID via PEG, last day 7/19 to complete 5d course for ssti    - isolation precaution per hospital protocol     Oncology  Anemia  Baseline, transfuse if <7    Endocrine  Body mass index (BMI) less than 19  See malnutrition    Severe protein-calorie malnutrition  ok to resume pleasure feeds, soft and bite sized diet with honey thick liquids ordered  Nutrition consulted. Most recent weight and BMI monitored-     Measurements:  Wt Readings from Last 1 Encounters:   07/16/25 59.9 kg (132 lb)   Body mass index is 16.5 kg/m².    Patient has been screened and assessed by RD.    Malnutrition Type:  Context: chronic illness  Level: severe    Malnutrition Characteristic Summary:  Subcutaneous Fat (Malnutrition): severe depletion  Muscle Mass (Malnutrition): severe depletion    Interventions/Recommendations (treatment strategy):  1. Continue bolus TF recommendation: 5 cans/day of Isosource 1.5 to provide 1250 mL total volume, 1875 kcal, 220 g CHO, 85 protein, 19 fiber, 955 mL free water  - 90 mL flush before and after each (10 flushes total) can to provide additional 900 mL free water (Total 1855 mL)  2. Continue soft and bite sized textured diet as tolerated    3. Continue boost glucose control TID   4. Recommend arnie BID to aid in wound healing  5. Recommend MVI  6. RD to monitor weight, labs, meds, intake, tolerance      GI  Gastrostomy status  Placed this hospitalization    Oropharyngeal dysphagia  S/p PEG    Orthopedic  Pressure injury of deep tissue of heel  L Heel with black eschar. R Heel pressure ulcer with scant serosanguineous drainage. Photos uploaded into chart.   Wound care following      Decubitus ulcer of sacral region, stage 4  Wound vac removed on 7/14 prior patient was going to be  discharged.  Wound care following. sacral wound vac with dressing changes on [facility]  General surgery consulted previously, no indication for inpatient debridement recommended            The patient is being Prophylaxed for:  Venous Thromboembolism with: Chemical  Stress Ulcer with: Not Applicable   Ventilator Pneumonia with: not applicable    Activity Orders            Diet Soft & Bite Sized (IDDSI Level 6) Honey Thick (Moderately Thick); Standard Tray: Soft & Bite Sized starting at 07/18 1431    Elevate HOB Elevate HOB 30-45 degrees during feeding unless contraindicated starting at 06/30 1357    Progressive Mobility Protocol (mobilize patient to their highest level of functioning at least twice daily) starting at 06/30 0800    Turn patient every 2 hours starting at 06/30 0000          Full Code    Fernanda Abbott PA-C  Neurocritical Care  [facility] - Neuro Critical Care

## 2025-07-21 NOTE — PRE ADMISSION SCREENING
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 coli 6/30/2025      Acute encephalopathy 6/30/2025      Severe protein-calorie malnutrition 7/1/2025      Pressure injury of deep tissue of heel 7/2/2025      Anemia 7/5/2025      Hypophosphatemia 7/5/2025      Gastrostomy status 7/5/2025      Body mass index (BMI) less than 19 7/10/2025      Hyponatremia 7/10/2025      Hyperkalemia 7/10/2025      Hypoglycemia 7/10/2025      Functional quadriplegia 7/15/2025      Hypothermia 7/17/2025       LTACH Admission Criteria:    Management of at least one of the following complex medical conditions:  Other WITNESSED SEIZURE LIKE ACTIVITY     Must meet at least three of the following concomitant treatments/intervention daily unless notes: (excludes PO meds unless notes)  Cardiac Monitoring  Complex wound care  Laboratory assessment and medication adjustment(s)  Negative pressure wound therapy  Neurological assessment greater than or equal to 3 times a day  Rehab Therapy (PT/OT/ST) 1-3 hours a day greater than or equal to 5 days a week  Parenteral nutrition/Enteral feedings        LTACH more appropriate than other levels of care (eg, skilled nursing facility, home health care), as indicated by:    Clinical management of patient deemed too frequent and needed beyond the capabilities of alternative levels of care as evidence by:   Frequent diagnostic services needed on an inpatient basis, including clinical assessments, laboratory, and imaging as evidence by: Frequent monitoring and clinical assessments performed by a licensed RN to identify current and future patient needs by  incorporating the recognition of normal vs abnormal body physiology, and to prompt recognition of pertinent changes to identify and prioritize appropriate interventions that can be performed within the acute inpatient setting. , Frequent monitoring and clinical assessments performed by a licensed RT to identify current and future patient needs by incorporating the recognition of normal vs abnormal body physiology of the Respiratory System, and to prompt recognition of pertinent changes to identify and prioritize appropriate interventions that can be performed within the acute inpatient setting. , and Frequent laboratory testing and/or imaging to aide in the improvement and effectiveness of patient's individualized treatment plan.   More intensive services, such as speciality nursing care, and/or onsite physician assessments needed that are not available at a lower level of care as evidence by: Daily physician intervention , Collaboration between consulting and attending providers still deemed a necessity to aide in the improvement and effectiveness of patient's individualized treatment plan, which can be provided at an [facility] level of care. , and Therapy Services to be included in patient's treatment plan in an effort to restore/improve patient's modality status to a safe level of functioning prior to acute illness.    Lower level of care has failed (eg, patient readmitted to acute care from a lower level of care).     [facility] as indicated by ALL of the following:      Hypotension Absent     Cardiovascular status stable     Stable chest findings     Renal function accepctable   Pain adequately managed    Intake acceptable       No acute significant hepatic dysfunction (eg, new encephalopathy)   No acute severe unstable neurologic abnormalities (eg, Altered mental status that is severe or persistent, or evidence of ongoing CNS embolization or ischemia, worsening hydrocephalus)   No  active bleeding or unstable disorders of hemostasis (eg, no recent need for transfusion, severe thrombocytopenia with bleeding)   No need for respiratory or other isolation, OR manageable at LTACH level of care    Long-term enteral feeding (eg, PEG) and intravenous access established, not needed, OR to be placed at LTACH level of care      Anticipated Discharge Disposition:    Home with caregiver support or Home with Home Health      Referring Physician  ANEUDY BAGLEY MD     Facility Status: Pending Review    Admitting Physician:  Cruzito Alexis MD    Primary Care Physician:  John Nixon II, MD    History         Patient Active Problem List    Diagnosis Date Noted    Hypothermia 07/17/2025    Witnessed seizure-like activity 07/15/2025    Functional quadriplegia 07/15/2025    Body mass index (BMI) less than 19 07/10/2025    Hyponatremia 07/10/2025    Hyperkalemia 07/10/2025    Hypoglycemia 07/10/2025    Anemia 07/05/2025    Hypophosphatemia 07/05/2025    Gastrostomy status 07/05/2025    Pressure injury of deep tissue of heel 07/02/2025    Severe protein-calorie malnutrition 07/01/2025    Decubitus ulcer of sacral region, stage 4 06/30/2025    Pneumonia due to E. coli 06/30/2025    Acute encephalopathy 06/30/2025    Septic shock 06/29/2025    Hypernatremia 05/10/2025    Pneumonia due to COVID-19 virus 05/10/2025    Insomnia secondary to depression with anxiety 10/30/2024    Requires continuous supervision for activities of daily living (ADL) 10/04/2023    COVID-19 09/14/2023    Abnormal urine 09/14/2023    ALS (amyotrophic lateral sclerosis) 09/14/2023    DNR (do not resuscitate) 09/14/2023    Epididymo-orchitis 05/05/2023    Cognitive communication disorder 05/10/2022    Paraplegia, unspecified 04/30/2021    Requires daily assistance for activities of daily living (ADL) and comfort needs 03/07/2021    Impaired instrumental activities of daily living (IADL) 03/07/2021    Self-care deficit for feeding,  bathing, and toileting 03/07/2021    Decreased strength, endurance, and mobility 03/05/2021    Recurrent major depressive disorder, in partial remission 12/09/2020    Anxiety 12/09/2020    Oropharyngeal dysphagia 12/09/2020    Impaired transfers 11/30/2020    Upper extremity weakness 11/18/2020    Decreased coordination 11/18/2020    Impaired mobility and ADLs 08/12/2020    Chronic constipation 07/18/2020    Dysarthria 09/19/2019    Voiding dysfunction 11/27/2018    Bronchitis with wheezing 09/08/2018    Spinal ataxia 03/27/2015    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    Neuropathic pain 03/27/2015    Weakness 03/19/2015    Spasticity 03/19/2015    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 Wise in movement disorder clinic on 2/23/2015.    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    Multiple drug resistant organism (MDRO) culture positive     E.Coli ESBL PNA    Neurogenic bladder 01/16/2015    Seizure 02/22/2015    Spastic quadriplegia     Spinocerebellar atrophy            History of Present Illness     44yoM with PMHx significant for ALS (onset 2006), neuromuscular dysfunction of the bladder, sacral ulcers, and spinal cerebellar ataxia who was admitted recently admitted (5/9/2025) for severe dehydration, electrolyte disturbances, and covid who presented to the ED with EMS and c/o AMS and hypoxia. Nonverbal at  baseline but apparently is usually more alert; has been less responsive and interactive x1 day. Upon EMS arrival, was obtunded and sating 74%. They gave him solumedrol and duonebs en route which improved his oxygen status to >90%. Mother at bedside reports increased difficulty with swallowing and decreased PO intake.        While in the ED, labs and imaging significant for Na 149, K 5.4, Cl 111, CO2 21, glucose 149, BUN 38, creat 1.0, albumin 2.2, ALP 80, AST/ALT 34/23, anion gap 17, procal 8.42, BNP <10, WBC 7.11, H/H 12/40.5, plts 315, covid/flu negative, .6, VBG 7.349/47.1/44.5/23.9, istat LA 4.9->7.4->4.2 (7.4 likely error?). Chest xray with patchy opacities bilaterally, predominant within the bibasilar regions but also within the left lung apex and the left mid-lung; compatible with multifocal pneumonia. Started on BSA by the ED; vanc and cefepime. [facility] consulted for sepsis. During first examination the patient was stable with MAPs >65 and Lactic acid down trended to 2.6. Around 6 am of 6/30 [place] was called hypotension with MAPs in the 50. Pt was started on levophed and was transferred to MICU 6/29 for septic shock secondary to multifocal pneumonia. Patient initially requiring pressors but since discontinued. Given dysphagia, IR placed a PEG tube 7/5 for nutrition and medication administration. Patient was stepped down to  on 7/7.     Patient was awaiting discharge home tonight on 7/14 when a rapid response was called as the patient began seizing. Two witnessed tonic-clonic seizures were observed lasting for about 1-2 minutes, given 2mg Ativan. After these seizures, he never returned to baseline. Baseline was described as full movement in neck and all extremities, tracking with head & neck, mumbles per mom, and following commands. Initial assessment, patient was found staring, with no blink to threat or closure of eyes, midline gaze, and not moving extremities with painful stimuli. During seizure, it  is reported that patient became hypertensive, hypoxic, and tachycardic, with strong flexor posturing bilaterally. EEG applied showing discharges over the left hemisphere, no history of seizures or brain mass/lesions/infarction. Patient loaded with 40mg/kg of Keppra. Given concern for mentation and need for closer neurological monitoring, patient was transferred to [facility] where he will be admitted.         Hospital Course: 07/15/2025: NAEON. Afebrile, BP 90/50s. MRI completed. EEG in place. Keppra and LR given. Bilateral mitt restraints. Hypotensive this morning, given 500 NS and 1L LR. Temp low 90s, bear hugger placed. UOP 1.6L. LP today   07/16/2025: continued improvement in mental status, alert and interactive today, pleasant and smiling with care, does attempt to remove lines and PIVs.  Did require initiation of norepinephrine overnight to maintain MAP > 65, on and off joseluis hugger for normothermia, remains on antibiotics which have been broadened to vanc/meropenem pending ID evaluation. Obtaining LP under fluoroscopy today.   07/17/2025: NAEON. Off levo. S/p LP with IR yesterday. CSF unremarkable, further CSF studies pending. R PIV removed. Pt maintaining pressure and looking better since removal. Stable for stepdown to HM today.  07/18/2025: NAEON. AFVSS. Exam stable. 2 large BM overnight, Electrolytes repleted. Pending stepdown bed. Joseluis hugger applied intermittently.  07/19/2025: Liquid stools overnight. Lactulose held and psyllium added with mild improvement. CPT started. Remains boarding for HM.   07/20/2025: concern for staring spells overnight, but reportedly in setting of oxycodone administration and was still following commands, was placed on cap-eeg which was uninterpretable but deferring repeat formal study, mother at bedside counseled and in agreement.   7/21/2025 NAEO, still boarding for  bed. Working on NH placement in the mean time. Patient tolerating diet at this time, will hold bolus feeds while  eating. Still pending autoimmune panel         Patient Traveled outside of the [place] in the last 3 months? no     Patient discharged from this LTAC to SNF within the last 45 days? no    Patient discharged from this LTAC to Rehab within the last 27 days? no    Prior residence: home    Prior Post-Acute Services: home health    Allergies:   Review of patient's allergies indicates:   Allergen Reactions    Penicillins      Hives and Itching  Tolerated zosyn- 7/1/2025    Allergen ext-venom-honey bee        Has patient received the current influenza vaccine (Oct 1 - March 31)? Unknown     Has patient received PPD skin test prior to admit? N/A     Code Status: Full Code    Orientation: Unable to assess (ventilated/sedated/aphasic)    Speech: unable to assess (ventilated/sedated/aphasic)    Vital Signs:     Date 7/21/2025    Blood Pressure 132/76    Pulse 86    RESPIRATIONS  17    O2 Saturation 100%    Temperature 97.4        Bowel/Bladder: incontinent of bladder and incontinent of bowel    Dialysis: N/A    Peripheral IV 07/12/25 1245 22 G Anterior;Left;Proximal Forearm (Active)   Site Assessment Clean;Dry;Intact;No redness;No swelling 07/21/25 1501   Line Securement Device Secured with sutureless device 07/20/25 0702   Extremity Assessment Distal to IV No abnormal discoloration;No redness;No swelling;No warmth 07/21/25 1501   Line Status Flushed;Saline locked 07/21/25 1501   Dressing Status Clean;Dry;Intact 07/21/25 1501   Dressing Intervention Integrity maintained 07/21/25 1501   Dressing Change Due 07/25/25 07/21/25 1501   Site Change Due 07/25/25 07/20/25 1102   Reason Not Rotated Not due 07/20/25 1502   Number of days: 9       Peripheral IV 07/20/25 1646 22 G Anterior;Distal;Left Upper Arm (Active)   Site Assessment Clean;Dry;Intact;No redness;No swelling 07/21/25 1501   Line Securement Device Secured with sutureless device 07/20/25 1900   Extremity Assessment Distal to IV No abnormal discoloration;No redness;No  swelling;No warmth 07/21/25 1501   Dressing Status Clean;Dry;Intact 07/21/25 1501   Dressing Intervention Integrity maintained 07/21/25 1501   Dressing Change Due 07/24/25 07/21/25 1501   Site Change Due 07/24/25 07/20/25 1900   Reason Not Rotated Not due 07/21/25 1501   Number of days: 0            Gastrostomy/Enterostomy 07/05/25 1136 Gastrostomy tube w/ balloon LUQ feeding (Active)   Securement secured to abdomen w/ adhesive device 07/21/25 1501   Interventions Prior to Feeding patency checked 07/21/25 1501   Suction Setting/Drainage Method dependent drainage 07/12/25 2015   Output Characteristics brown 07/18/25 1701   Feeding Type bolus;by pump 07/21/25 1501   Clamp Status/Tolerance clamped 07/21/25 1501   Feeding Action feeding continued 07/21/25 1501   Dressing dry and intact 07/21/25 1501   Insertion Site no warmth;no tenderness;no swelling;no redness 07/21/25 1501   Insertion Site Drainage  scant 07/18/25 1701   Site Care sterile 4 x 4 gauze dressing applied 07/21/25 1501   Flush/Irrigation flushed w/;water;no resistance met 07/21/25 1501   Current Rate (mL/hr) 0 mL/hr 07/15/25 0701   Goal Rate (mL/hr) 55 mL/hr 07/12/25 2015   Intake (mL) 100 mL 07/20/25 1900   Water Bolus (mL) 155 mL 07/21/25 1510   Tube Output(mL)(Include Discarded Residual) 650 mL 07/10/25 2040   Formula Name novasource 07/21/25 0500   Tube Feeding Intake (mL) 237 07/21/25 0845   Residual Amount (ml) 0 ml 07/19/25 0701   Number of days: 16       Male External Urinary Catheter 07/11/25 0600 Medium (Active)   Collection Container Urimeter 07/21/25 1501   Securement Method secured to top of thigh w/ adhesive device 07/21/25 1501   Skin no redness;no breakdown 07/21/25 1501   Tolerance no signs/symptoms of discomfort 07/21/25 1501   Output (mL) 1200 mL 07/20/25 1802   Catheter Change Date 07/18/25 07/21/25 0500   Catheter Change Time 1900 07/21/25 0500   Number of days: 10       CBGs/Accuchecks: No     Precautions: Fall, Aspiration, and  Seizures    Restraints: Yes     Isolation Precautions: N/A       Facility-Administered Medications as of 2025   Medication Dose Route Frequency Provider Last Rate Last Admin    [] 0.9% NaCl infusion   Intravenous Continuous Caroline Nelson MD   Stopped at 25 1814    [COMPLETED] albuterol sulfate nebulizer solution 10 mg  10 mg Nebulization Once Caroline Nelson MD   10 mg at 25 0850    [COMPLETED] azithromycin tablet 500 mg  500 mg Per NG tube Daily Cordelia Velasco MD   500 mg at 25 1018    baclofen tablet 20 mg  20 mg Per G Tube TID Sean Adam MD   20 mg at 25 1456    [COMPLETED] barium sulfate (READI-CAT) suspension 450 mL  450 mL Per NG tube Once [place]ela booker., NP   450 mL at 25 0037    [COMPLETED] barium sulfate (READI-CAT) suspension 450 mL  450 mL Per NG tube Once [place] book[place] NP   450 mL at 25 0135    calcium gluconate 1 g in NS IVPB (premixed)  1 g Intravenous PRN Jenny Sandhu PA-C   Stopped at 25 1535    calcium gluconate 1 g in NS IVPB (premixed)  2 g Intravenous PRN Jenny Sandhu PA-C        calcium gluconate 1 g in NS IVPB (premixed)  3 g Intravenous PRN NicJenny davenport PA-SRINIVAS        carboxymethylcellulose sodium 1 % DpGe 1 drop  1 drop Ophthalmic TID Arpita Davis, NP   1 drop at 25 1456    [COMPLETED] ceFEPIme injection 2 g  2 g Intravenous ED 1 Time Kathleen Alvarez MD   2 g at 25 1910    [COMPLETED] cefTRIAXone (ROCEPHIN) 2 g in 0.9% NaCl 100 mL IVPB (MB+)  2 g Intravenous Q24H Anuj Quigley MD   Stopped at 25 1211    dantrolene capsule 50 mg  50 mg Per G Tube TID Sean Adam MD   50 mg at 25 1455    dextrose 50% injection 12.5 g  12.5 g Intravenous PRN Juno Mckeon MD        dextrose 50% injection 25 g  25 g Intravenous PRN Juno Mckeon MD        donepeziL tablet 5 mg  5 mg Per G Tube QHS Sean Adam MD   5 mg at 25    [COMPLETED]  doxycycline tablet 100 mg  100 mg Per G Tube Q12H Betty Crowell MD   100 mg at 25 0828    [COMPLETED] electrolytes-dextrose (Pedialyte) oral solution 200 mL  200 mL Per NG tube Q3H Sean Adam MD   200 mL at 07/10/25 0000    enoxaparin injection 40 mg  40 mg Subcutaneous Daily Francisco Adam DO   40 mg at 25 1606    [COMPLETED] ertapenem (INVANZ) 1 g in 0.9% NaCl 100 mL IVPB (MB+)  1 g Intravenous Q24H Duncan Connor MD   Stopped at 25 1851    [] erythromycin 5 mg/gram (0.5 %) ophthalmic ointment   Both Eyes QHS Francisco Adam DO   Given at 25 2100    [COMPLETED] etomidate (AMIDATE) 2 mg/mL injection             [COMPLETED] gadobutroL (GADAVIST) injection 7 mL  7 mL Intravenous ONCE PRN Francisco Adam DO   7 mL at 07/15/25 0624    glucagon (human recombinant) injection 1 mg  1 mg Intramuscular PRN Juno Mckeon MD   0.5 mg at 25 1128    glucose chewable tablet 16 g  16 g Per G Tube PRN Francisco Adam DO        glucose chewable tablet 24 g  24 g Per G Tube PRN Francisco Adam DO        [COMPLETED] lactated ringers bolus 1,000 mL  1,000 mL Intravenous ED 1 Time Kathleen Alvarez MD   Stopped at 25 2120    [COMPLETED] lactated ringers bolus 1,000 mL  1,000 mL Intravenous Once Brock Rodarte MD   Stopped at 25 0643    [COMPLETED] lactated ringers bolus 500 mL  500 mL Intravenous Once Andrew Daniels MD   Stopped at 25 1207    [COMPLETED] lactated ringers bolus 500 mL  500 mL Intravenous Once Arpita Davis NP   Stopped at 07/15/25 0018    [COMPLETED] lactated ringers bolus 500 mL  500 mL Intravenous Once Arpita Davis NP   Stopped at 07/15/25 0748    [COMPLETED] lactated ringers bolus 500 mL  500 mL Intravenous Once Karthikeyan Wynne MD   Stopped at 07/15/25 1358    [COMPLETED] lactated ringers bolus 500 mL  500 mL Intravenous Once Karthikeyan Wynne MD   Stopped at 07/15/25 1613    [] lactated ringers infusion    Intravenous Continuous Caroline Nelson  mL/hr at 25 1315 New Bag at 25 1315    [COMPLETED] levETIRAcetam (Keppra) 2,396 mg in 0.9% NaCl 250 mL IVPB  40 mg/kg Intravenous Once Christo Chan III, MD   Stopped at 07/15/25 0128    levetiracetam 500 mg/5 mL (5 mL) liquid Soln 500 mg  500 mg Per G Tube BID Francisco Adam DO   500 mg at 25 0837    [COMPLETED] LIDOcaine HCL 10 mg/ml (1%) injection 3 mL  3 mL Other Once Francisco Adam DO   3 mL at 25 1450    [COMPLETED] LIDOcaine-EPINEPHrine 1%-1:100,000 injection 5 mL  5 mL Intradermal Once Fernanda Abbott PA-C   5 mL at 07/15/25 1653    magnesium oxide tablet 800 mg  800 mg Per G Tube PRN Margy Howard PA-C   800 mg at 25 0516    magnesium oxide tablet 800 mg  800 mg Per G Tube PRN Margy Howard PA-C        melatonin tablet 6 mg  6 mg Per G Tube Nightly PRN Anurag Watters NP   6 mg at 25 210    mirtazapine tablet 15 mg  15 mg Per G Tube QHS Sean Adam MD   15 mg at 25 2103    [COMPLETED] mupirocin 2 % ointment   Nasal BID Richard Webb MD   Given at 25 2048    naloxone 0.4 mg/mL injection 0.02 mg  0.02 mg Intravenous PRN Juno Mckeon MD        [] NORepinephrine bitartrate-D5W 4 mg/250 mL (16 mcg/mL) PERIPHERAL access infusion  0-0.2 mcg/kg/min Intravenous Continuous Katya Reilly NP   Stopped at 25 2150    ondansetron injection 4 mg  4 mg Intravenous Q6H PRN Edwin Figueroa DO   4 mg at 25 1231    oxyCODONE immediate release tablet 5 mg  5 mg Per G Tube Q6H PRN Francisco Adam DO   5 mg at 25    [COMPLETED] PHENYLephrine HCl in 0.9% NaCl 1 mg/10 mL (100 mcg/mL) syringe 100 mcg  100 mcg Intravenous Once Francisco Adam DO   100 mcg at 07/15/25 184    potassium bicarbonate disintegrating tablet 35 mEq  35 mEq Per G Tube PRN Margy Howard, PA-C        potassium bicarbonate disintegrating tablet 50 mEq  50 mEq Per G Tube PRN  Margy Howard PA-C        potassium bicarbonate disintegrating tablet 60 mEq  60 mEq Per G Tube PRN Margy Howard PA-C        [COMPLETED] potassium, sodium phosphates 280-160-250 mg packet 2 packet  2 packet Per G Tube Once Caroline Nelson MD   2 packet at 07/14/25 1007    potassium, sodium phosphates 280-160-250 mg packet 2 packet  2 packet Per G Tube PRN Margy Howard PA-C   2 packet at 07/20/25 0516    potassium, sodium phosphates 280-160-250 mg packet 2 packet  2 packet Per G Tube PRN Margy Howard PA-C   2 packet at 07/18/25 1304    potassium, sodium phosphates 280-160-250 mg packet 2 packet  2 packet Per G Tube PRN Margy Howard PA-C        psyllium husk packet 1 packet  1 packet Per G Tube Daily Anurag Watters NP   1 packet at 07/21/25 0843    [COMPLETED] rocuronium 10 mg/mL injection             [COMPLETED] sodium chloride 0.9% bolus 1,000 mL 1,000 mL  1,000 mL Intravenous ED 1 Time Kathleen Alvarez MD   Stopped at 06/29/25 2120    [COMPLETED] sodium chloride 0.9% bolus 500 mL 500 mL  500 mL Intravenous Once Jenny Sandhu PA-C   Stopped at 07/15/25 1015    [COMPLETED] sodium chloride 0.9% bolus 500 mL 500 mL  500 mL Intravenous Once Francisco Adam DO   Stopped at 07/15/25 1830    [COMPLETED] sodium chloride 0.9% bolus 500 mL 500 mL  500 mL Intravenous Once Katya Reilly NP   Stopped at 07/15/25 2253    sodium chloride 0.9% flush 10 mL  10 mL Intravenous Q12H PRN Juno Mckeon MD        [COMPLETED] sodium phosphate 15 mmol in D5W 250 mL IVPB  15 mmol Intravenous Once Odette Christiansen MD   Stopped at 07/04/25 1528    [COMPLETED] sodium zirconium cyclosilicate packet 10 g  10 g Per G Tube Once Caroline Nelson MD   10 g at 07/11/25 0906    [COMPLETED] sodium zirconium cyclosilicate packet 10 g  10 g Per G Tube Once Caroline Nelson MD   10 g at 07/11/25 1548    [COMPLETED] succinylcholine (ANECTINE) 20 mg/mL injection             [COMPLETED] vancomycin 1,250 mg  in 0.9% NaCl 250 mL IVPB (admixture device)  20 mg/kg Intravenous Once Kathleen Alvarez MD   Stopped at 06/29/25 2249    [COMPLETED] vancomycin 1,250 mg in 0.9% NaCl 250 mL IVPB (admixture device)  20 mg/kg Intravenous Once Richard Webb MD   Stopped at 07/04/25 1109    [COMPLETED] vancomycin 1,250 mg in 0.9% NaCl 250 mL IVPB (admixture device)  20 mg/kg Intravenous Once Francisco Adam DO   Stopped at 07/15/25 2235    [COMPLETED] vancomycin 750 mg in 0.9% NaCl 250 mL IVPB (admixture device)  12.5 mg/kg Intravenous Once Richard Webb MD   Stopped at 07/05/25 0211     Outpatient Medications as of 7/21/2025   Medication Sig Dispense Refill    ammonium lactate (LAC-HYDRIN) 12 % lotion Apply topically 2 (two) times daily as needed for Dry Skin. Apply to BLE BID and PRN      baclofen (LIORESAL) 20 MG tablet 1 tablet (20 mg total) by Per G Tube route 3 (three) times daily.      cetirizine (ZYRTEC) 10 MG tablet 1 tablet (10 mg total) by Per G Tube route once daily.      dantrolene (DANTRIUM) 50 MG Cap 1 capsule (50 mg total) by Per G Tube route 3 (three) times daily. TAKE 1 CAPSULE(50 MG) BY MOUTH THREE TIMES DAILY      donepeziL (ARICEPT) 5 MG tablet 1 tablet (5 mg total) by Per G Tube route every evening.      erythromycin (ROMYCIN) ophthalmic ointment Place into both eyes every evening.      lactulose (CHRONULAC) 20 gram/30 mL Soln 15 mLs (10 g total) by Per G Tube route 2 (two) times daily.      mirtazapine (REMERON) 15 MG tablet 1 tablet (15 mg total) by Per G Tube route every evening.      polyethylene glycol (GLYCOLAX) 17 gram PwPk 17 g by Per G Tube route 2 (two) times daily.      QUEtiapine (SEROQUEL) 50 MG tablet 1 tablet (50 mg total) by Per G Tube route every evening.      senna-docusate (PERICOLACE) 8.6-50 mg per tablet 1 tablet by Per G Tube route 2 (two) times daily.          Cardiovascular:    Cardiovascular Review: Requires Review    Telemetry: Yes    Rhythm: NSR    AICD:  "No      Respiratory:    Oxygen: N/A    CPT/Frequency: NQ 8 HRS     Incentive Spirometer/Frequency: N/A    CPAP/Settings: N/A    BiPAP/Settings: N/A    Oxygen Saturation: 100% ON ROOMAIR    Suction Frequency: ASP/SUCTION NASOTRACHEAL PRN       Vent Settings:   Mode:   Rate:   TV:   FIO2:   PEEP:   PS:   iTime:    Trial/HS Settings:   Mode:   Rate:   TV:   FIO2:   PEEP:   PS:       Nutrition:      Ht Readings from Last 3 Encounters:   07/16/25 6' 3" (1.905 m)   05/13/25 6' 3" (1.905 m)   04/23/25 6' 3" (1.905 m)     Wt Readings from Last 1 Encounters:   07/16/25 1301 59.9 kg (132 lb)   07/15/25 1001 59.9 kg (132 lb 0.9 oz)   07/07/25 1429 59.9 kg (132 lb 0.9 oz)   07/01/25 1350 59.9 kg (132 lb 0.9 oz)   06/29/25 1946 59.9 kg (132 lb 0.9 oz)   06/29/25 1653 59.9 kg (132 lb)       Feeding Status:   Current Diet: SOFT AND BITE SIZED IDDSI LEVEL 6 HONEY THICK LIQUIDS    Supplements:BOOST TID    Tube Feeding: NOVASOURCE RENAL 4 CANS/DAY  WITH BENEPROTEIN    Flushes: 155 ML QID       Integumentary:    Integumentary: Special bed and Other: PLEASE SEE DOCUMENTATION ADDED BELOW.               Negative Pressure Wound Therapy  07/03/25 1420 (Active)   07/03/25 1420   Side:    Orientation:    Location: Sacral Spine   Additional Comments:    Removal Indication and Assessment:    Location:    SDO Location:    NPWT Type Vacuum Therapy 07/21/25 1000   Therapy Setting NPWT Continuous therapy 07/21/25 1000   Pressure Setting NPWT 125 mmHg 07/21/25 1000   Therapy Interventions NPWT Seal intact 07/21/25 0701   Sponges Inserted NPWT Black;1 07/21/25 1000   Sponges Removed NPWT Black;1 07/21/25 1000   General Output (mL) 50 07/19/25 1822   Number of days: 18            Wound 04/23/25 1530 Pressure Injury Right lower Leg #1 (Active)   04/23/25 1530 Leg   Present on Original Admission: Y   Primary Wound Type: Pressure inj   Side: Right   Orientation: lower   Wound Approximate Age at First Assessment (Weeks):    Wound Number: #1   Is this " injury device related?: No   Incision Type:    Closure Method:    Wound Description (Comments):    Type:    Additional Comments:    Ankle-Brachial Index:    Pulses:    Removal Indication and Assessment:    Wound Outcome:    Wound Image   07/18/25 1030   Pressure Injury Stage U 07/19/25 1900   Dressing Appearance Dry;Intact;Clean 07/21/25 0701   Drainage Amount None 07/21/25 0701   Drainage Characteristics/Odor No odor 07/20/25 1900   Appearance Intact 07/21/25 0701   Tissue loss description Partial thickness 07/19/25 0701   Care Cleansed with:;Other (see comments) 07/18/25 1030   Dressing Changed;Foam 07/18/25 1030   Periwound Care Dry periwound area maintained 07/21/25 0701   Off Loading Pillow;Wedge 07/21/25 0701   Number of days: 89            Wound 04/23/25 1530 Other (comment) Left Heel #2 (Active)   04/23/25 1530 Heel   Present on Original Admission: Y   Primary Wound Type: Other   Side: Left   Orientation:    Wound Approximate Age at First Assessment (Weeks):    Wound Number: #2   Is this injury device related?:    Incision Type:    Closure Method:    Wound Description (Comments):    Type:    Additional Comments:    Ankle-Brachial Index:    Pulses:    Removal Indication and Assessment:    Wound Outcome:    Wound Image   07/18/25 1030   Pressure Injury Stage U 07/19/25 1900   Dressing Appearance Intact 07/21/25 0701   Drainage Amount None 07/21/25 0701   Drainage Characteristics/Odor No odor 07/20/25 1900   Appearance Pink 07/21/25 0701   Tissue loss description Partial thickness 07/18/25 0701   Periwound Area Scar tissue 07/19/25 0701   Care Cleansed with:;Wound cleanser 07/21/25 0701   Dressing Foam;Changed 07/21/25 0701   Periwound Care Dry periwound area maintained 07/21/25 0701   Off Loading Heel lift boot 07/21/25 0701   Number of days: 89            Wound 04/23/25 1537 Pressure Injury Sacral spine #4 (Active)   04/23/25 1537 Sacral spine   Present on Original Admission: Y   Primary Wound Type: Pressure  inj   Side:    Orientation:    Wound Approximate Age at First Assessment (Weeks):    Wound Number: #4   Is this injury device related?: No   Incision Type:    Closure Method:    Wound Description (Comments):    Type:    Additional Comments:    Ankle-Brachial Index:    Pulses:    Removal Indication and Assessment:    Wound Outcome:    Wound Image   07/21/25 1000   Pressure Injury Stage 4 07/21/25 1000   Dressing Appearance Moist drainage 07/21/25 1000   Drainage Amount Scant 07/21/25 1000   Drainage Characteristics/Odor Serosanguineous 07/21/25 1000   Appearance Pink;Red 07/21/25 1000   Tissue loss description Full thickness 07/21/25 1000   Periwound Area Indurated;Scar tissue 07/19/25 0701   Wound Length (cm) 9 cm 07/16/25 1545   Wound Width (cm) 9.4 cm 07/16/25 1545   Wound Depth (cm) 2 cm 07/16/25 1545   Wound Volume (cm^3) 88.593 cm^3 07/16/25 1545   Wound Surface Area (cm^2) 66.44 cm^2 07/16/25 1545   Supply Surface Area (L x W) 84.6 sq cm 07/16/25 1545   Tunneling (depth (cm)/location) 1.7cm/11oclock 07/16/25 1545   Undermining (depth (cm)/location) 7-5o;clock 07/16/25 1545   Care Cleansed with:;Antimicrobial agent 07/21/25 1000   Dressing Foam;Changed 07/21/25 0701   Off Loading Pillow;Wedge 07/21/25 0701   Dressing Change Due 07/15/25 07/18/25 1801   Number of days: 89            Wound 05/10/25 1151 Pressure Injury Right posterior Ankle (Active)   05/10/25 1151 Ankle   Present on Original Admission: Y   Primary Wound Type: Pressure inj   Side: Right   Orientation: posterior   Wound Approximate Age at First Assessment (Weeks):    Wound Number:    Is this injury device related?:    Incision Type:    Closure Method:    Wound Description (Comments):    Type:    Additional Comments:    Ankle-Brachial Index:    Pulses:    Removal Indication and Assessment:    Wound Outcome:    Wound Image   07/18/25 1030   Pressure Injury Stage U 07/20/25 1900   Dressing Appearance Intact 07/21/25 0701   Drainage Amount None  07/21/25 0701   Drainage Characteristics/Odor No odor 07/20/25 1900   Appearance Yellow 07/21/25 0701   Periwound Area Macerated 07/18/25 1030   Care Cleansed with:;Wound cleanser;Applied: 07/19/25 1800   Dressing Applied;Calcium alginate 07/19/25 1800   Off Loading Heel lift boot 07/21/25 0701   Number of days: 72            Wound 05/10/25 1151 Pressure Injury Right lateral Malleolus (Active)   05/10/25 1151 Malleolus   Present on Original Admission: Y   Primary Wound Type: Pressure inj   Side: Right   Orientation: lateral   Wound Approximate Age at First Assessment (Weeks):    Wound Number:    Is this injury device related?:    Incision Type:    Closure Method:    Wound Description (Comments):    Type:    Additional Comments:    Ankle-Brachial Index:    Pulses:    Removal Indication and Assessment:    Wound Outcome:    Wound Image   07/15/25 0030   Pressure Injury Stage 1 07/19/25 0701   Dressing Appearance Open to air 07/21/25 0701   Drainage Amount None 07/21/25 0701   Drainage Characteristics/Odor No odor 07/20/25 1900   Appearance Pink 07/21/25 0701   Care Cleansed with:;Wound cleanser 07/21/25 0701   Periwound Care Dry periwound area maintained 07/21/25 0701   Off Loading Pillow 07/21/25 0701   Number of days: 72            Wound 06/29/25 1945 Pressure Injury Left posterior Buttocks (Active)   06/29/25 1945 Buttocks   Present on Original Admission:    Primary Wound Type: Pressure inj   Side: Left   Orientation: posterior   Wound Approximate Age at First Assessment (Weeks):    Wound Number:    Is this injury device related?: No   Incision Type:    Closure Method:    Wound Description (Comments):    Type:    Additional Comments:    Ankle-Brachial Index:    Pulses:    Removal Indication and Assessment:    Wound Outcome:    Wound Image   07/18/25 1030   Pressure Injury Stage U 07/21/25 0701   Dressing Appearance Intact 07/21/25 0701   Drainage Amount Small 07/21/25 0701   Drainage Characteristics/Odor  Serosanguineous 07/21/25 0701   Appearance Pink;Slough 07/21/25 0701   Tissue loss description Partial thickness 07/21/25 0701   Periwound Area Intact;Dry 07/21/25 0701   Care Cleansed with:;Wound cleanser 07/21/25 0701   Dressing Foam;Changed 07/21/25 0701   Periwound Care Cleansed with pH balanced cleanser;Dry periwound area maintained 07/21/25 0701   Off Loading Wedge 07/21/25 0701   Number of days: 22            Wound 06/29/25 1947 Pressure Injury Right posterior Buttocks #2 (Active)   06/29/25 1947 Buttocks   Present on Original Admission:    Primary Wound Type: Pressure inj   Side: Right   Orientation: posterior   Wound Approximate Age at First Assessment (Weeks):    Wound Number: #2   Is this injury device related?: No   Incision Type:    Closure Method:    Wound Description (Comments):    Type:    Additional Comments:    Ankle-Brachial Index:    Pulses:    Removal Indication and Assessment:    Wound Outcome:    Wound Image   07/18/25 1030   Pressure Injury Stage U 07/21/25 0701   Dressing Appearance Intact 07/21/25 0701   Drainage Amount Small 07/21/25 0701   Drainage Characteristics/Odor Serosanguineous 07/21/25 0701   Appearance Pink 07/21/25 0701   Tissue loss description Partial thickness 07/21/25 0701   Periwound Area Scar tissue 07/19/25 0701   Care Cleansed with:;Wound cleanser 07/21/25 0701   Dressing Foam;Changed 07/21/25 0701   Off Loading Wedge 07/21/25 0701   Number of days: 22            Wound 06/29/25 1953 Pressure Injury Left posterior Hip (Active)   06/29/25 1953 Hip   Present on Original Admission:    Primary Wound Type: Pressure inj   Side: Left   Orientation: posterior   Wound Approximate Age at First Assessment (Weeks):    Wound Number:    Is this injury device related?:    Incision Type:    Closure Method:    Wound Description (Comments):    Type:    Additional Comments:    Ankle-Brachial Index:    Pulses:    Removal Indication and Assessment:    Wound Outcome:    Wound Image   07/18/25  1030   Pressure Injury Stage DTPI 07/20/25 1900   Dressing Appearance Dry;Intact;Clean 07/21/25 0701   Drainage Amount Small 07/21/25 0701   Drainage Characteristics/Odor Serosanguineous 07/21/25 0701   Appearance Pink 07/21/25 0701   Care Cleansed with:;Wound cleanser 07/20/25 1102   Dressing Changed 07/20/25 1102   Number of days: 22            Wound 06/29/25 1951 Rectum (Active)   06/29/25 1951 Rectum   Present on Original Admission: Y   Primary Wound Type:    Side:    Orientation:    Wound Approximate Age at First Assessment (Weeks):    Wound Number:    Is this injury device related?:    Incision Type:    Closure Method:    Wound Description (Comments):    Type:    Additional Comments:    Ankle-Brachial Index:    Pulses:    Removal Indication and Assessment:    Wound Outcome:    Wound Image   07/05/25 0700   Number of days: 22            Wound 06/30/25 0947 Pressure Injury Left Ankle (Active)   06/30/25 0947 Ankle   Present on Original Admission: Y   Primary Wound Type: Pressure inj   Side: Left   Orientation:    Wound Approximate Age at First Assessment (Weeks):    Wound Number:    Is this injury device related?:    Incision Type:    Closure Method:    Wound Description (Comments):    Type:    Additional Comments:    Ankle-Brachial Index:    Pulses:    Removal Indication and Assessment:    Wound Outcome:    Wound Image   07/15/25 0030   Pressure Injury Stage 2 07/19/25 0701   Dressing Appearance Open to air 07/21/25 0701   Drainage Amount None 07/21/25 0701   Drainage Characteristics/Odor No odor 07/20/25 1900   Appearance Pink 07/21/25 0701   Care Applied:;Povidone iodine 07/19/25 0701   Periwound Care Dry periwound area maintained 07/21/25 0701   Off Loading Heel lift boot 07/21/25 0701   Number of days: 21            Wound 07/15/25 0230 Skin Tear Left anterior Foot (Active)   07/15/25 0230 Foot   Present on Original Admission:    Primary Wound Type: Skin tear   Side: Left   Orientation: anterior   Wound  Approximate Age at First Assessment (Weeks):    Wound Number:    Is this injury device related?: No   Incision Type:    Closure Method:    Wound Description (Comments):    Type:    Additional Comments:    Ankle-Brachial Index: second toe   Pulses:    Removal Indication and Assessment:    Wound Outcome:    Wound Image   07/15/25 0302   Dressing Appearance Open to air 07/21/25 0701   Drainage Amount None 07/21/25 0701   Drainage Characteristics/Odor No odor 07/20/25 1900   Appearance Intact 07/21/25 0701   Tissue loss description Partial thickness 07/19/25 0701   Periwound Care Dry periwound area maintained 07/21/25 0701   Off Loading Heel lift boot 07/21/25 0701   Number of days: 6         Musculoskeletal:    Transfer assist: Total Assistance    Weight Bearing Status: N/A    Comments: N/A    ADL Assist: Total Assistance    Special Equipment: N/A    Radiology:    Radiology (Last 168 hours)               07/20 1717 Cardiac monitoring strips     07/19 1822 Cardiac monitoring strips     07/18 1154 Cardiac monitoring strips     07/17 2209 Cardiac monitoring strips     07/17 0248 Cardiac monitoring strips     07/16 1524 FL Lumbar Puncture (xpd)       07/16 1358 Echo       07/16 0120 Cardiac monitoring strips     07/15 0623 MRI Brain Epilepsy W W/O Contrast              ..  FL Lumbar Puncture (xpd)  Narrative: EXAMINATION:  FL LUMBAR PUNCTURE DIAGNOSTIC WITH IMAGING [id number]    CLINICAL HISTORY:  cns infection;    TECHNIQUE:  The risks and potential complications of the procedure were discussed with the patient and written informed consent was obtained.  The patient was positioned prone on the fluoroscopy table.  A time-out was performed to verify the patient and procedure.  The skin overlying the lumbar spine was prepped and draped using aseptic technique.  Fluoroscopy was used to localize the L4-L5 interlaminar space.  Local anesthesia was provided using 1% lidocaine.  A 22 gauge 3.5 inch (9 cm) spinal needle and stylet  were advanced under fluoroscopic guidance via a paramedianapproach into the thecal sac. The needle was inserted approximately 8 cm of its length before return of CSF.    Opening pressure was measured as less than 9 cm H2O.  Closing pressure was not measured.  16 ML of blood tinged CSF was passively collected and sent for the requested studies in 4 vials.  The stylet was replaced and the needle and stylet were removed.  The site was cleaned and a band-aid was applied over the entry site.  The patient tolerated the procedure without complication.  Instructions were provided regarding possible postprocedural headache or pain.    Estimated blood loss: Less than 1 mL.    Complications: None.    Fluoroscopy time: 0.5 minutes    Performing Provider: Simon Jose PA-C    Resident: Evan Dillon MD  Impression: Lumbar puncture using fluoroscopic guidance.  16 cc of CSF removed as requested.    Electronically signed by resident: Simon Jose  Date:    07/16/2025  Time:    15:40    Electronically signed by: Lew Melendrez MD  Date:    07/16/2025  Time:    17:06  Echo    The only readable images were from the parasternal window  due to budy   habitus, feeding tube etc.    Left Ventricle: There is normal systolic function with a visually   estimated ejection fraction of 60 - 65%.    Right Ventricle: Systolic function is normal.    Left Atrium: Left atrium was not well visualized.      Lab/Cultures:    BUN   Date Value Ref Range Status   07/21/2025 16 6 - 20 mg/dL Final   07/20/2025 16 6 - 20 mg/dL Final     Creatinine   Date Value Ref Range Status   07/21/2025 0.5 0.5 - 1.4 mg/dL Final   07/20/2025 0.5 0.5 - 1.4 mg/dL Final     WBC   Date Value Ref Range Status   07/21/2025 5.01 3.90 - 12.70 K/uL Final   07/20/2025 5.06 3.90 - 12.70 K/uL Final      Blood Culture   Date Value Ref Range Status   07/15/2025 No Growth After 5 Days  Final     Blood Culture, Routine   Date Value Ref Range Status   09/13/2023 No growth after 5 days.   "Final   09/13/2023 No growth after 5 days.  Final     Urine Culture   Date Value Ref Range Status   04/23/2025 >100,000 cfu/ml Escherichia coli (A)  Final     Urine Culture, Routine   Date Value Ref Range Status   09/13/2023 ENTEROCOCCUS FAECALIS  >100,000 cfu/ml   (A)  Final     No results for input(s): "PH", "PCO2", "PO2", "HCO3", "POCSATURATED", "BE" in the last 72 hours.       "

## 2025-07-21 NOTE — PLAN OF CARE
"[facility] Care Plan    POC reviewed with Lisa Moreno and family at 0300. Patient and Family verbalized understanding. Questions and concerns addressed. No acute events today. Pt progressing toward goals. Will continue to monitor. See below and flowsheets for full assessment and VS info.   - BM x1   - Wound care complete (PRN oxycodone given)  - Full bath and linen change   - awaiting transfer   Is this a stroke patient? no    Neuro:  Joseph Coma Scale  Best Eye Response: 4-->(E4) spontaneous  Best Motor Response: 6-->(M6) obeys commands  Best Verbal Response: 1-->(V1) none  [place] Coma Scale Score: 11  Assessment Qualifiers: Patient not sedated/intubated  Pupil PERRLA: yes     24 hr Temp:  [97.3 °F (36.3 °C)-98.2 °F (36.8 °C)]     CV:   Rhythm: normal sinus rhythm  BP goals:   SBP < 180  MAP > 65    Resp:           Plan: N/A    GI/:     Diet/Nutrition Received: mechanical/dental soft  Last Bowel Movement: 07/20/25  Voiding Characteristics: external catheter    Intake/Output Summary (Last 24 hours) at 7/21/2025 0447  Last data filed at 7/21/2025 0313  Gross per 24 hour   Intake 2913 ml   Output 3000 ml   Net -87 ml     Unmeasured Output  Unmeasured Urine Occurrence: 1  Unmeasured Stool Occurrence: 1  Pad Count: 2    Labs/Accuchecks:  Recent Labs   Lab 07/21/25  0035   WBC 5.01   RBC 3.41*   HGB 9.0*   HCT 29.2*         Recent Labs   Lab 07/21/25  0035   *   K 4.5   CO2 25      BUN 16   CREATININE 0.5   ALKPHOS 86   ALT 25   AST 20   BILITOT 0.1    No results for input(s): "PROTIME", "INR", "APTT", "HEPANTIXA" in the last 168 hours. No results for input(s): "CPK", "CPKMB", "TROPONINI", "MB" in the last 168 hours.    Electrolytes: N/A - electrolytes WDL  Accuchecks: none    Gtts:      LDA/Wounds:    Morgan Risk Assessment  Sensory Perception: 2-->very limited  Moisture: 3-->occasionally moist  Activity: 1-->bedfast  Mobility: 2-->very limited  Nutrition: 3-->adequate  Friction and Shear: " 1-->problem  Morgan Score: 12  Is your morgan score 12 or less? yes [facility], morgan mobility score is 2 or less, they are on a immerse bed, if their moisture score is 2 or less, they do not have a sacral mepilex and instead have zinc barrier cream, and heel boots on          Restraints:   Restraint Order  Length of Order: Order good for next 24 hours or when removed.  Date that the current order will : 25  Time that the current order will : 05  Order Upon Application: Yes    [facility]

## 2025-07-21 NOTE — ASSESSMENT & PLAN NOTE
completed treatment  course of ertapenem for ESBL pneumonia  - other cultures in process  - ID consulted recommendations appreciated    - given LP findings, de-escalate to doxycycline 100 mg BID via PEG, last day 7/19 to complete 5d course for ssti    - isolation precaution per hospital protocol

## 2025-07-21 NOTE — PLAN OF CARE
07/21/25 1213   Rounds   Attendance Provider;   Discharge Plan A Skilled Nursing Facility   Why the patient remains in the hospital Requires continued medical care   Transition of Care Barriers None     Discharge Plan A and Plan B have been determined by review of patient's clinical status, future medical and therapeutic needs, and coverage/benefits for post-acute care in coordination with multidisciplinary team members.    Aura Parker MSW, SERGIOW  [facility]  Case Management Dept.

## 2025-07-21 NOTE — ASSESSMENT & PLAN NOTE
Wound vac removed on 7/14 prior patient was going to be discharged.  Wound care following. sacral wound vac with dressing changes on [facility]  General surgery consulted previously, no indication for inpatient debridement recommended

## 2025-07-21 NOTE — PLAN OF CARE
"[facility] Care Plan  POC reviewed with Lisa Moreno and family at 1400. Family verbalized understanding. Questions and concerns addressed. No acute events today. Pt progressing toward goals. Will continue to monitor. See below and flowsheets for full assessment and VS info.     -TTF    Is this a stroke patient? no    Neuro:  Joseph Coma Scale  Best Eye Response: 4-->(E4) spontaneous  Best Motor Response: 6-->(M6) obeys commands  Best Verbal Response: 1-->(V1) none  [place] Coma Scale Score: 11  Assessment Qualifiers: Patient not sedated/intubated, No eye obstruction present  Pupil PERRLA: yes     24 hr Temp:  [97 °F (36.1 °C)-97.6 °F (36.4 °C)]     CV:   Rhythm: normal sinus rhythm  BP goals:   SBP < 180  MAP > 65    Resp:           Plan: N/A    GI/:     Diet/Nutrition Received:  (minced and moist)  Last Bowel Movement: 07/20/25  Voiding Characteristics: external catheter    Intake/Output Summary (Last 24 hours) at 7/21/2025 1600  Last data filed at 7/21/2025 1510  Gross per 24 hour   Intake 2396 ml   Output 2800 ml   Net -404 ml     Unmeasured Output  Unmeasured Urine Occurrence: 1  Unmeasured Stool Occurrence: 1  Pad Count: 2    Labs/Accuchecks:  Recent Labs   Lab 07/21/25  0035   WBC 5.01   RBC 3.41*   HGB 9.0*   HCT 29.2*         Recent Labs   Lab 07/21/25  0035   *   K 4.5   CO2 25      BUN 16   CREATININE 0.5   ALKPHOS 86   ALT 25   AST 20   BILITOT 0.1    No results for input(s): "PROTIME", "INR", "APTT", "HEPANTIXA" in the last 168 hours. No results for input(s): "CPK", "CPKMB", "TROPONINI", "MB" in the last 168 hours.    Electrolytes: N/A - electrolytes WDL  Accuchecks: none    Gtts:      LDA/Wounds:    Morgan Risk Assessment  Sensory Perception: 2-->very limited  Moisture: 3-->occasionally moist  Activity: 1-->bedfast  Mobility: 2-->very limited  Nutrition: 3-->adequate  Friction and Shear: 1-->problem  Morgan Score: 12    Is your morgan score 12 or less? yes enrolled in the [facility] " program, tor mobility score is 2 or less, they are on a immerse bed, and heel boots on            Restraints:   Restraint Order  Length of Order: Order good for next 24 hours or when removed.  Date that the current order will : 25  Time that the current order will : 634  Order Upon Application: Yes    [facility]

## 2025-07-21 NOTE — EICU
Virtual ICU Quality Rounds    Admit Date: 6/29/2025  Hospital Day: 22    ICU Day: 6d 14h    24H Vital Sign Range:  Temp:  [97 °F (36.1 °C)-97.8 °F (36.6 °C)]   Pulse:  [71-99]   Resp:  [10-24]   BP: ()/(58-81)   SpO2:  [99 %-100 %]     VICU Surveillance Screening                    LDA reconciliation : Yes

## 2025-07-21 NOTE — MEDICARE RECERTIFICATION
MEDICARE INPATIENT  PHYSICIAN RECERTIFICATION  OF MEDICAL NECESSITY      1st Recertification (required no later than the 20th day of hospitalization)     I certify that this patient's continued medical needs require an Inpatient level of care due to seizures, neuro checks q4h, chest physiotherapy q8h, pulse ox continuous, stage IV sacral ulcer wound care.  I estimate that the patient will be in the hospital for another 7days.  Post-acute planning is in process, and currently the patient will likely be discharged to a SNF.      Mariluz Elizabeth MD  Neurocritical Care   Neurology-Epilepsy

## 2025-07-21 NOTE — ASSESSMENT & PLAN NOTE
Admitted to MICU for septic shock 2/2 ESBL E. Coli PNA  Upon EMS arrival, the patient was obtunded and hypoxic with an oxygen saturation of 74%.  Weaned off Levophed  On Ceftriaxone through 7/12 per ID recs, now completed  Satting well on room air    This has since resolved     - later [facility] with new septic appearing picture see separate issue

## 2025-07-21 NOTE — EICU
[facility] Night Rounds Checklist  24H Vital Sign Range:  Temp:  [97.5 °F (36.4 °C)-98.2 °F (36.8 °C)]   Pulse:  [72-99]   Resp:  [9-26]   BP: ()/(58-81)   SpO2:  [99 %-100 %]     Video rounds and LDA reconciliation

## 2025-07-21 NOTE — SUBJECTIVE & OBJECTIVE
Interval History:  See above.     Objective:     Vitals:  Temp: 97 °F (36.1 °C)  Pulse: 92  Rhythm: normal sinus rhythm  BP: 98/68  MAP (mmHg): 78  Resp: (!) 23  SpO2: 100 %    Temp  Min: 97 °F (36.1 °C)  Max: 97.8 °F (36.6 °C)  Pulse  Min: 71  Max: 99  BP  Min: 98/68  Max: 132/80  MAP (mmHg)  Min: 74  Max: 99  Resp  Min: 10  Max: 24  SpO2  Min: 99 %  Max: 100 %    07/20 0701 - 07/21 0700  In: 2913   Out: 4400 [Urine:4400]   Unmeasured Output  Unmeasured Urine Occurrence: 1  Unmeasured Stool Occurrence: 1  Pad Count: 2        Physical Exam  Vitals and nursing note reviewed.   Constitutional:       General: He is not in acute distress.     Appearance: He is cachectic. He is not diaphoretic.   HENT:      Head: Normocephalic and atraumatic.   Cardiovascular:      Rate and Rhythm: Normal rate and regular rhythm.   Pulmonary:      Effort: Pulmonary effort is normal. No respiratory distress.   Chest:      Comments: Pectus excavatum  Abdominal:      General: Abdomen is flat.      Comments: PEG site c/d/i    Musculoskeletal:      Right lower leg: No edema.      Left lower leg: No edema.   Skin:     General: Skin is dry.   Neurological:      Mental Status: He is alert.      Comments: E4V1M6  PERRL  Tracks appropriately  Minimally verbal at baseline  spont movement  Fcx0  BUE mittens              Medications:  Continuous Scheduledbaclofen, 20 mg, TID  carboxymethylcellulose sodium, 1 drop, TID  dantrolene, 50 mg, TID  donepeziL, 5 mg, QHS  enoxparin, 40 mg, Daily  levetiracetam, 500 mg, BID  mirtazapine, 15 mg, QHS  psyllium husk, 1 packet, Daily    PRNcalcium gluconate IVPB, 1 g, PRN  calcium gluconate IVPB, 2 g, PRN  calcium gluconate IVPB, 3 g, PRN  dextrose 50%, 12.5 g, PRN  dextrose 50%, 25 g, PRN  glucagon (human recombinant), 1 mg, PRN  glucose, 16 g, PRN  glucose, 24 g, PRN  magnesium oxide, 800 mg, PRN  magnesium oxide, 800 mg, PRN  melatonin, 6 mg, Nightly PRN  naloxone, 0.02 mg, PRN  ondansetron, 4 mg, Q6H  PRN  oxyCODONE, 5 mg, Q6H PRN  potassium bicarbonate, 35 mEq, PRN  potassium bicarbonate, 50 mEq, PRN  potassium bicarbonate, 60 mEq, PRN  potassium, sodium phosphates, 2 packet, PRN  potassium, sodium phosphates, 2 packet, PRN  potassium, sodium phosphates, 2 packet, PRN  sodium chloride 0.9%, 10 mL, Q12H PRN      Today I personally reviewed pertinent medications, lines/drains/airways, imaging, cardiology results, laboratory results, microbiology results,    Diet  Diet Soft & Bite Sized (IDDSI Level 6) Honey Thick (Moderately Thick); Standard Tray  Diet Soft & Bite Sized (IDDSI Level 6) Honey Thick (Moderately Thick); Standard Tray        Review of Systems

## 2025-07-21 NOTE — ASSESSMENT & PLAN NOTE
Patient was awaiting discharge home on 7/14 when a rapid response was called as the patient began seizing. Two witnessed tonic-clonic seizures were observed lasting for about 1-2 minutes, given 2mg Ativan. After these seizures, he never returned to baseline. Baseline was described as full movement in neck and all extremities, tracking with head & neck, mumbles per mom, and following commands. Initial assessment, patient was found staring, with no blink to threat or closure of eyes, midline gaze, and not moving extremities with painful stimuli. During seizure, it is reported that patient became hypertensive, hypoxic, and tachycardic, with strong flexor posturing bilaterally. EEG applied showing discharges over the left hemisphere, no history of seizures or brain mass/lesions/infarction. Patient loaded with 40mg/kg of Keppra. Given concern for mentation and need for closer neurological monitoring, patient was transferred to [facility] where he will be admitted.     Admitted to [facility], stable to step down 7/17, pending bed  Q4h neurochecks and hourly vital signs  SBP <180, MAP >65  Brain MRI w/wo epilepsy protocol - negative for acute process  cEEG - discharges in left hemisphere no seizures  Discontinued 7/16  Continue keppra 500mg BID, continue until outpatient follow up with Epilepsy (referral placed)  Maintain seizure precautions  SCDs & LVX for DVT ppx  PT/OT/SLP to continue treatment  Q6h bladder scans  LP at bedside unsuccessful 7/15, obtained 7/16 under fluoroscopic guidance  Blood cultures resent, other workup pending and ID consulted. See plan above

## 2025-07-22 ENCOUNTER — EXTERNAL HOME HEALTH (OUTPATIENT)
Dept: HOME HEALTH SERVICES | Facility: HOSPITAL | Age: 45
End: 2025-07-22
Payer: MEDICARE

## 2025-07-22 VITALS
TEMPERATURE: 98 F | HEART RATE: 94 BPM | OXYGEN SATURATION: 100 % | HEIGHT: 75 IN | SYSTOLIC BLOOD PRESSURE: 107 MMHG | DIASTOLIC BLOOD PRESSURE: 63 MMHG | BODY MASS INDEX: 16.41 KG/M2 | RESPIRATION RATE: 18 BRPM | WEIGHT: 132 LBS

## 2025-07-22 PROCEDURE — 25000003 PHARM REV CODE 250: Performed by: PSYCHIATRY & NEUROLOGY

## 2025-07-22 PROCEDURE — 97530 THERAPEUTIC ACTIVITIES: CPT

## 2025-07-22 PROCEDURE — 25000242 PHARM REV CODE 250 ALT 637 W/ HCPCS: Performed by: NURSE PRACTITIONER

## 2025-07-22 PROCEDURE — 94761 N-INVAS EAR/PLS OXIMETRY MLT: CPT

## 2025-07-22 PROCEDURE — 25000003 PHARM REV CODE 250: Performed by: HOSPITALIST

## 2025-07-22 PROCEDURE — 25000003 PHARM REV CODE 250

## 2025-07-22 RX ORDER — DEXMEDETOMIDINE HYDROCHLORIDE 4 UG/ML
INJECTION, SOLUTION INTRAVENOUS
Status: DISCONTINUED
Start: 2025-07-22 | End: 2025-07-22

## 2025-07-22 RX ORDER — SODIUM,POTASSIUM PHOSPHATES 280-250MG
2 POWDER IN PACKET (EA) ORAL
Status: CANCELLED | OUTPATIENT
Start: 2025-07-22

## 2025-07-22 RX ORDER — ENOXAPARIN SODIUM 100 MG/ML
40 INJECTION SUBCUTANEOUS EVERY 24 HOURS
Status: CANCELLED | OUTPATIENT
Start: 2025-07-22

## 2025-07-22 RX ORDER — DONEPEZIL HYDROCHLORIDE 5 MG/1
5 TABLET, FILM COATED ORAL NIGHTLY
Status: CANCELLED | OUTPATIENT
Start: 2025-07-22

## 2025-07-22 RX ORDER — CARBOXYMETHYLCELLULOSE SODIUM 10 MG/ML
1 GEL OPHTHALMIC 3 TIMES DAILY
Status: CANCELLED | OUTPATIENT
Start: 2025-07-22

## 2025-07-22 RX ORDER — TALC
6 POWDER (GRAM) TOPICAL NIGHTLY PRN
Status: CANCELLED | OUTPATIENT
Start: 2025-07-22

## 2025-07-22 RX ORDER — OXYCODONE HYDROCHLORIDE 5 MG/1
5 TABLET ORAL EVERY 6 HOURS PRN
Refills: 0 | Status: CANCELLED | OUTPATIENT
Start: 2025-07-22

## 2025-07-22 RX ORDER — LANOLIN ALCOHOL/MO/W.PET/CERES
800 CREAM (GRAM) TOPICAL
Status: CANCELLED | OUTPATIENT
Start: 2025-07-22

## 2025-07-22 RX ORDER — DANTROLENE SODIUM 50 MG/1
50 CAPSULE ORAL 3 TIMES DAILY
Status: CANCELLED | OUTPATIENT
Start: 2025-07-22

## 2025-07-22 RX ORDER — IBUPROFEN 200 MG
16 TABLET ORAL
Status: CANCELLED | OUTPATIENT
Start: 2025-07-22

## 2025-07-22 RX ORDER — MIRTAZAPINE 15 MG/1
15 TABLET, FILM COATED ORAL NIGHTLY
Status: CANCELLED | OUTPATIENT
Start: 2025-07-22

## 2025-07-22 RX ORDER — IBUPROFEN 200 MG
24 TABLET ORAL
Status: CANCELLED | OUTPATIENT
Start: 2025-07-22

## 2025-07-22 RX ORDER — ONDANSETRON HYDROCHLORIDE 2 MG/ML
4 INJECTION, SOLUTION INTRAVENOUS EVERY 6 HOURS PRN
Status: CANCELLED | OUTPATIENT
Start: 2025-07-22

## 2025-07-22 RX ORDER — SODIUM CHLORIDE 0.9 % (FLUSH) 0.9 %
10 SYRINGE (ML) INJECTION EVERY 12 HOURS PRN
Status: CANCELLED | OUTPATIENT
Start: 2025-07-22

## 2025-07-22 RX ORDER — GLUCAGON 1 MG
1 KIT INJECTION
Status: CANCELLED | OUTPATIENT
Start: 2025-07-22

## 2025-07-22 RX ORDER — BACLOFEN 10 MG/1
20 TABLET ORAL 3 TIMES DAILY
Status: CANCELLED | OUTPATIENT
Start: 2025-07-22

## 2025-07-22 RX ORDER — NALOXONE HCL 0.4 MG/ML
0.02 VIAL (ML) INJECTION
Status: CANCELLED | OUTPATIENT
Start: 2025-07-22

## 2025-07-22 RX ORDER — LEVETIRACETAM 100 MG/ML
500 SOLUTION ORAL 2 TIMES DAILY
Status: CANCELLED | OUTPATIENT
Start: 2025-07-22

## 2025-07-22 RX ADMIN — DANTROLENE SODIUM 50 MG: 25 CAPSULE ORAL at 09:07

## 2025-07-22 RX ADMIN — DANTROLENE SODIUM 50 MG: 25 CAPSULE ORAL at 04:07

## 2025-07-22 RX ADMIN — LEVETIRACETAM 500 MG: 500 SOLUTION ORAL at 08:07

## 2025-07-22 RX ADMIN — BACLOFEN 20 MG: 10 TABLET ORAL at 08:07

## 2025-07-22 RX ADMIN — PSYLLIUM HUSK 1 PACKET: 3.4 POWDER ORAL at 08:07

## 2025-07-22 RX ADMIN — CARBOXYMETHYLCELLULOSE SODIUM 1 DROP: 10 GEL OPHTHALMIC at 08:07

## 2025-07-22 RX ADMIN — CARBOXYMETHYLCELLULOSE SODIUM 1 DROP: 10 GEL OPHTHALMIC at 04:07

## 2025-07-22 RX ADMIN — BACLOFEN 20 MG: 10 TABLET ORAL at 04:07

## 2025-07-22 NOTE — PLAN OF CARE
"[facility] Care Plan    POC reviewed with Lisa Moreno and family at 0300. Family verbalized understanding. Questions and concerns addressed. No acute events today. Pt progressing toward goals. Will continue to monitor. See below and flowsheets for full assessment and VS info.     - NAEON  - pending stepdown    Is this a stroke patient? no    Neuro:  D[place]port Coma Scale  Best Eye Response: 4-->(E4) spontaneous  Best Motor Response: 6-->(M6) obeys commands  Best Verbal Response: 1-->(V1) none  Joseph Coma Scale Score: 11  Assessment Qualifiers: Patient not sedated/intubated, No eye obstruction present  Pupil PERRLA: yes     24 hr Temp:  [97 °F (36.1 °C)-98 °F (36.7 °C)]     CV:   Rhythm: normal sinus rhythm  BP goals:   SBP < 180  MAP > 65    Resp:           Plan: N/A    GI/:     Diet/Nutrition Received: mechanical/dental soft, tube feeding  Last Bowel Movement: 07/20/25  Voiding Characteristics: voids spontaneously without difficulty, external catheter    Intake/Output Summary (Last 24 hours) at 7/22/2025 0220  Last data filed at 7/22/2025 0100  Gross per 24 hour   Intake 1986 ml   Output 2910 ml   Net -924 ml     Unmeasured Output  Unmeasured Urine Occurrence: 1  Unmeasured Stool Occurrence: 1  Pad Count: 2    Labs/Accuchecks:  Recent Labs   Lab 07/21/25  0035   WBC 5.01   RBC 3.41*   HGB 9.0*   HCT 29.2*         Recent Labs   Lab 07/21/25  0035   *   K 4.5   CO2 25      BUN 16   CREATININE 0.5   ALKPHOS 86   ALT 25   AST 20   BILITOT 0.1    No results for input(s): "PROTIME", "INR", "APTT", "HEPANTIXA" in the last 168 hours. No results for input(s): "CPK", "CPKMB", "TROPONINI", "MB" in the last 168 hours.    Electrolytes: N/A - electrolytes WDL  Accuchecks: none    Gtts:      LDA/Wounds:    Morgan Risk Assessment  Sensory Perception: 2-->very limited  Moisture: 3-->occasionally moist  Activity: 1-->bedfast  Mobility: 2-->very limited  Nutrition: 3-->adequate  Friction and Shear: " 1-->problem  Morgan Score: 12  Is your morgan score 12 or less? yes enrolled in the [facility] program, morgan mobility score is 2 or less, they are on a immerse bed, if their moisture score is 2 or less, they do not have a sacral mepilex and instead have zinc barrier cream, and heel boots on          Restraints:   Restraint Order  Length of Order: Order good for next 24 hours or when removed.  Date that the current order will : 25  Time that the current order will : 634  Order Upon Application: Yes    [facility]

## 2025-07-22 NOTE — CONSULTS
RD consult for calorie count. Notification sign placed and papers to document intakes placed on door.  Please document all food and beverages consumed as accurately as possible, and save all meals tickets as well. RD to follow back up in 48 hours (7/24) to evaluate PO intake.     Thanks!  Donna Yanez, MS, RD, LDN

## 2025-07-22 NOTE — ASSESSMENT & PLAN NOTE
Patient was awaiting discharge home on 7/14 when a rapid response was called as the patient began seizing. Two witnessed tonic-clonic seizures were observed lasting for about 1-2 minutes, given 2mg Ativan. After these seizures, he never returned to baseline. Baseline was described as full movement in neck and all extremities, tracking with head & neck, mumbles per mom, and following commands. Initial assessment, patient was found staring, with no blink to threat or closure of eyes, midline gaze, and not moving extremities with painful stimuli. During seizure, it is reported that patient became hypertensive, hypoxic, and tachycardic, with strong flexor posturing bilaterally. EEG applied showing discharges over the left hemisphere, no history of seizures or brain mass/lesions/infarction. Patient loaded with 40mg/kg of Keppra. Given concern for mentation and need for closer neurological monitoring, patient was transferred to [facility] where he will be admitted.     Admitted to [facility], stable to step down 7/17, pending bed  Neurochecks every shift and q4h vital signs  SBP <180, MAP >65  Brain MRI w/wo epilepsy protocol - negative for acute process  cEEG - discharges in left hemisphere no seizures  Discontinued 7/16  Continue keppra 500mg BID, continue until outpatient follow up with Epilepsy (referral placed)  Maintain seizure precautions  SCDs & LVX for DVT ppx  PT/OT/SLP to continue treatment - OOB, sitting in bedside chair as tolerated  Q6h bladder scans  LP at bedside unsuccessful 7/15, obtained 7/16 under fluoroscopic guidance  Blood cultures resent, other workup pending and ID consulted. See plan above

## 2025-07-22 NOTE — EICU
Virtual ICU Quality Rounds    Admit Date: 6/29/2025  Hospital Day: 23    ICU Day: 7d 11h    24H Vital Sign Range:  Temp:  [97.4 °F (36.3 °C)-98 °F (36.7 °C)]   Pulse:  []   Resp:  [10-26]   BP: ()/(56-87)   SpO2:  [97 %-100 %]     VICU Surveillance Screening                    LDA reconciliation : Yes

## 2025-07-22 NOTE — SUBJECTIVE & OBJECTIVE
Interval History:  see hospital course      Objective:     Vitals:  Temp: 97.9 °F (36.6 °C)  Pulse: 76  Rhythm: normal sinus rhythm  BP: 109/60  MAP (mmHg): 78  Resp: 10  SpO2: 100 %    Temp  Min: 97.4 °F (36.3 °C)  Max: 98 °F (36.7 °C)  Pulse  Min: 76  Max: 100  BP  Min: 97/56  Max: 148/87  MAP (mmHg)  Min: 69  Max: 109  Resp  Min: 10  Max: 26  SpO2  Min: 97 %  Max: 100 %    07/21 0701 - 07/22 0700  In: 1641   Out: 1885 [Urine:1885]   Unmeasured Output  Unmeasured Urine Occurrence: 1  Unmeasured Stool Occurrence: 1  Pad Count: 2        Physical Exam  Vitals and nursing note reviewed.   Constitutional:       General: He is not in acute distress.     Appearance: He is cachectic. He is not diaphoretic.   HENT:      Head: Normocephalic and atraumatic.   Cardiovascular:      Rate and Rhythm: Normal rate and regular rhythm.   Pulmonary:      Effort: Pulmonary effort is normal. No respiratory distress.   Chest:      Comments: Pectus excavatum  Abdominal:      General: Abdomen is flat.      Comments: PEG site c/d/i    Musculoskeletal:      Right lower leg: No edema.      Left lower leg: No edema.   Skin:     General: Skin is dry.   Neurological:      Mental Status: He is alert.      Comments: E4V1M6  PERRL  Tracks appropriately  Minimally verbal at baseline  spont movement  Fcx0  BUE mittens              Medications:  Continuous     Scheduled  baclofen, 20 mg, TID  carboxymethylcellulose sodium, 1 drop, TID  dantrolene, 50 mg, TID  donepeziL, 5 mg, QHS  enoxparin, 40 mg, Daily  levetiracetam, 500 mg, BID  mirtazapine, 15 mg, QHS  psyllium husk, 1 packet, Daily    PRN  dextrose 50%, 12.5 g, PRN  dextrose 50%, 25 g, PRN  glucagon (human recombinant), 1 mg, PRN  glucose, 16 g, PRN  glucose, 24 g, PRN  magnesium oxide, 800 mg, PRN  magnesium oxide, 800 mg, PRN  melatonin, 6 mg, Nightly PRN  naloxone, 0.02 mg, PRN  ondansetron, 4 mg, Q6H PRN  oxyCODONE, 5 mg, Q6H PRN  potassium bicarbonate, 35 mEq, PRN  potassium bicarbonate,  50 mEq, PRN  potassium bicarbonate, 60 mEq, PRN  potassium, sodium phosphates, 2 packet, PRN  potassium, sodium phosphates, 2 packet, PRN  potassium, sodium phosphates, 2 packet, PRN  sodium chloride 0.9%, 10 mL, Q12H PRN      Today I personally reviewed pertinent medications, lines/drains/airways, imaging, cardiology results, laboratory results, microbiology results,    Diet  Diet Soft & Bite Sized (IDDSI Level 6) Honey Thick (Moderately Thick); Standard Tray

## 2025-07-22 NOTE — PT/OT/SLP PROGRESS
Occupational Therapy   Treatment    Name: Lisa Moreno  MRN: [id number]  Admitting Diagnosis:  Witnessed seizure-like activity       Recommendations:     Discharge Recommendations: Low Intensity Therapy  Discharge Equipment Recommendations:  lift device    Assessment:     Lisa Moreno is a 44 y.o. male with a medical diagnosis of Witnessed seizure-like activity.  He presents with impaired self care skills, impaired functional mobility, impaired balance, impaired endurance, weakness. Pt is pleasant, cooperative, and willing to participate in therapy. Pt with good activity tolerance, completing BUE exercises listed below with HOB elevated, using demonstration and v/c for proper technique. Pt with good BUE strength/ROM and appears to be at UE baseline in comparison to what mother has reported.  Pt demonstrates decreased bed mobility than what he is usually able to perform (per mother report), requiring maximum assistance to roll and t/f from supine <> sitting up. Pt is progressing towards goals and would benefit from continued OT services to maximize independence in ADLs and improve QOL.     Rehab Prognosis:  Good; patient would benefit from acute skilled OT services to address these deficits and reach maximum level of function.       Plan:     Patient to be seen 2 x/week to address the above listed problems via self-care/home management, therapeutic activities, therapeutic exercises  Plan of Care Expires: 07/31/25  Plan of Care Reviewed with: patient, mother    Subjective     Chief Complaint: n/a from patient; mother ready to get out of hospital so son can get more therapy  Patient/Family Comments/goals: pt smiling and nodding throughout session to communicate with therapist  Pain/Comfort:  Pain Rating 1: 0/10    Objective:     Communicated with: nursing prior to session.  Patient found HOB elevated with blood pressure cuff, pulse ox (continuous), telemetry, Condom Catheter, pressure relief boots, SCD,  peripheral IV upon OT entry to room.    General Precautions: Standard, fall, aspiration    Orthopedic Precautions:N/A  Braces: N/A  Respiratory Status: Room air     Occupational Performance:     Bed Mobility:    Patient completed Rolling/Turning to Left with  maximal assistance  Patient completed Rolling/Turning to Right with maximal assistance  Patient completed Sit to Supine with maximal assistance     Activities of Daily Living:  Grooming: set-up assistance for washing face with washcloth with HOB elevated      Therapeutic Activities  Pt completed 10 repetitions of the following exercises to maintain/build strength required to complete UBD, feeding, and grooming at PLOF:  BUE shoulder flexion  BUE bicep curls (with resistance - therapist)  BUE tricep extensions (with resistance - therapist)  B fist squeezes  B alternating punches to target (therapist hands)  BLE PROM knee flexion x5 to maintain ROM and minimize potential for contractures - pt assisting with pulling knee to chest with v/c     AMPAC 6 Click ADL: 13    Treatment & Education:  Pt educated on role of OT in acute care, POC, and discharge recommendations    Patient left HOB elevated with all lines intact, call button in reach, RN notified, and mother present    GOALS:   Multidisciplinary Problems       Occupational Therapy Goals          Problem: Occupational Therapy    Goal Priority Disciplines Outcome Interventions   Occupational Therapy Goal     OT, PT/OT Progressing    Description: Goals to be met by: 7/31/2025     Patient will increase functional independence with ADLs by performing:    Feeding with Set-up Assistance.  UE Dressing with Set-up Assistance.  Grooming with HOB elevated with Set-up Assistance.  Rolling bilaterally with Minimal Assistance.                      DME Justifications:  No DME recommended requiring DME justifications    Time Tracking:     OT Date of Treatment: 07/22/25  OT Start Time: 1016  OT Stop Time: 1035  OT Total Time  (min): 19 min    Billable Minutes:Therapeutic Activity 19    OT/PARISH: OT     Number of PARISH visits since last OT visit: 0    7/22/2025

## 2025-07-22 NOTE — PLAN OF CARE
[facility]  Discharge Final Note    Primary Care Provider: John Nixon II, MD    Expected Discharge Date: 7/22/2025    Final Discharge Note (most recent)       Final Note - 07/22/25 1417          Final Note    Assessment Type Final Discharge Note (P)      Anticipated Discharge Disposition Long Term Acute Care (P)      What phone number can be called within the next 1-3 days to see how you are doing after discharge? [phone] (P)         Post-Acute Status    Patient choice form signed by patient/caregiver List with quality metrics by geographic area provided (P)      Discharge Delays None known at this time (P)                      Important Message from Medicare             After-discharge care                Destination       *[facility]   Service: Long Term Acute Care    [address] [place] [address]   Phone: [phone]                     No future appointments.    Patient discharged  to [facility] via [facility].   Report number  [id number], room 230 . There are no discharge needs at the moment per medical team.     Discharge Plan A and Plan B have been determined by review of patient's clinical status, future medical and therapeutic needs, and coverage/benefits for post-acute care in coordination with multidisciplinary team members.    Aura Parker MSW, SERGIOW  [facility]  Case Management Dept.

## 2025-07-22 NOTE — EICU
Intervention Initiated From:  COR / ABHISHEKU    Ren intervened regarding:  Rounding (Video assessment)    VICU Night Rounds Checklist  24H Vital Sign Range:  Temp:  [97 °F (36.1 °C)-98 °F (36.7 °C)]   Pulse:  []   Resp:  [10-26]   BP: ()/(58-87)   SpO2:  [97 %-100 %]

## 2025-07-22 NOTE — PROGRESS NOTES
[facility] Neuro Critical Care  Neurocritical Care  Progress Note    Admit Date: 6/29/2025  Service Date: 07/22/2025  Length of Stay: 23    Subjective:     Chief Complaint: Witnessed seizure-like activity    History of Present Illness: Mr. Moreno is a 44yoM with PMHx significant for ALS (onset 2006), neuromuscular dysfunction of the bladder, sacral ulcers, and spinal cerebellar ataxia who was admitted recently admitted (5/9/2025) for severe dehydration, electrolyte disturbances, and covid who presented to the ED with EMS and c/o AMS and hypoxia. Nonverbal at baseline but apparently is usually more alert; has been less responsive and interactive x1 day. Upon EMS arrival, was obtunded and sating 74%. They gave him solumedrol and duonebs en route which improved his oxygen status to >90%. Mother at bedside reports increased difficulty with swallowing and decreased PO intake.        While in the ED, labs and imaging significant for Na 149, K 5.4, Cl 111, CO2 21, glucose 149, BUN 38, creat 1.0, albumin 2.2, ALP 80, AST/ALT 34/23, anion gap 17, procal 8.42, BNP <10, WBC 7.11, H/H 12/40.5, plts 315, covid/flu negative, .6, VBG 7.349/47.1/44.5/23.9, istat LA 4.9->7.4->4.2 (7.4 likely error?). Chest xray with patchy opacities bilaterally, predominant within the bibasilar regions but also within the left lung apex and the left mid-lung; compatible with multifocal pneumonia. Started on BSA by the ED; vanc and cefepime. CCM consulted for sepsis. During first examination the patient was stable with MAPs >65 and Lactic acid down trended to 2.6. Around 6 am of 6/30 [place] was called hypotension with MAPs in the 50. Pt was started on levophed and was transferred to MICU 6/29 for septic shock secondary to multifocal pneumonia. Patient initially requiring pressors but since discontinued. Given dysphagia, IR placed a PEG tube 7/5 for nutrition and medication administration. Patient was stepped down to  on 7/7.    Patient was  awaiting discharge home tonight on 7/14 when a rapid response was called as the patient began seizing. Two witnessed tonic-clonic seizures were observed lasting for about 1-2 minutes, given 2mg Ativan. After these seizures, he never returned to baseline. Baseline was described as full movement in neck and all extremities, tracking with head & neck, mumbles per mom, and following commands. Initial assessment, patient was found staring, with no blink to threat or closure of eyes, midline gaze, and not moving extremities with painful stimuli. During seizure, it is reported that patient became hypertensive, hypoxic, and tachycardic, with strong flexor posturing bilaterally. EEG applied showing discharges over the left hemisphere, no history of seizures or brain mass/lesions/infarction. Patient loaded with 40mg/kg of Keppra. Given concern for mentation and need for closer neurological monitoring, patient was transferred to [facility] where he will be admitted.       Hospital Course: 07/15/2025: NAEON. Afebrile, BP 90/50s. MRI completed. EEG in place. Keppra and LR given. Bilateral mitt restraints. Hypotensive this morning, given 500 NS and 1L LR. Temp low 90s, bear hugger placed. UOP 1.6L. LP today   07/16/2025: continued improvement in mental status, alert and interactive today, pleasant and smiling with care, does attempt to remove lines and PIVs.  Did require initiation of norepinephrine overnight to maintain MAP > 65, on and off joseluis hugger for normothermia, remains on antibiotics which have been broadened to vanc/meropenem pending ID evaluation. Obtaining LP under fluoroscopy today.   07/17/2025: NAEON. Off levo. S/p LP with IR yesterday. CSF unremarkable, further CSF studies pending. R PIV removed. Pt maintaining pressure and looking better since removal. Stable for stepdown to  today.  07/18/2025: NAEON. AFVSS. Exam stable. 2 large BM overnight, Electrolytes repleted. Pending stepdown bed. Joseluis hugger applied  intermittently.  07/19/2025: Liquid stools overnight. Lactulose held and psyllium added with mild improvement. CPT started. Remains boarding for HM.   07/20/2025: concern for staring spells overnight, but reportedly in setting of oxycodone administration and was still following commands, was placed on cap-eeg which was uninterpretable but deferring repeat formal study, mother at bedside counseled and in agreement.   7/21/2025 NAEO, still boarding for HM bed. Working on NH placement in the mean time. Patient tolerating diet at this time, will hold bolus feeds while eating. Still pending autoimmune panel  07/22/2025: NAEON. AFVSS. Exam stable. Boarding for HM stepdown bed. No new labs. Tolerating PO intake. Pending nutrition calorie count. PT/OT/OOB. Autoimmune CSF panel pending.      Interval History:  see hospital course      Objective:     Vitals:  Temp: 97.9 °F (36.6 °C)  Pulse: 76  Rhythm: normal sinus rhythm  BP: 109/60  MAP (mmHg): 78  Resp: 10  SpO2: 100 %    Temp  Min: 97.4 °F (36.3 °C)  Max: 98 °F (36.7 °C)  Pulse  Min: 76  Max: 100  BP  Min: 97/56  Max: 148/87  MAP (mmHg)  Min: 69  Max: 109  Resp  Min: 10  Max: 26  SpO2  Min: 97 %  Max: 100 %    07/21 0701 - 07/22 0700  In: 1641   Out: 1885 [Urine:1885]   Unmeasured Output  Unmeasured Urine Occurrence: 1  Unmeasured Stool Occurrence: 1  Pad Count: 2        Physical Exam  Vitals and nursing note reviewed.   Constitutional:       General: He is not in acute distress.     Appearance: He is cachectic. He is not diaphoretic.   HENT:      Head: Normocephalic and atraumatic.   Cardiovascular:      Rate and Rhythm: Normal rate and regular rhythm.   Pulmonary:      Effort: Pulmonary effort is normal. No respiratory distress.   Chest:      Comments: Pectus excavatum  Abdominal:      General: Abdomen is flat.      Comments: PEG site c/d/i    Musculoskeletal:      Right lower leg: No edema.      Left lower leg: No edema.   Skin:     General: Skin is dry.    Neurological:      Mental Status: He is alert.      Comments: [id number]  PERRL  Tracks appropriately  Minimally verbal at baseline  spont movement  Fcx0  BUE mittens              Medications:  Continuous     Scheduled  baclofen, 20 mg, TID  carboxymethylcellulose sodium, 1 drop, TID  dantrolene, 50 mg, TID  donepeziL, 5 mg, QHS  enoxparin, 40 mg, Daily  levetiracetam, 500 mg, BID  mirtazapine, 15 mg, QHS  psyllium husk, 1 packet, Daily    PRN  dextrose 50%, 12.5 g, PRN  dextrose 50%, 25 g, PRN  glucagon (human recombinant), 1 mg, PRN  glucose, 16 g, PRN  glucose, 24 g, PRN  magnesium oxide, 800 mg, PRN  magnesium oxide, 800 mg, PRN  melatonin, 6 mg, Nightly PRN  naloxone, 0.02 mg, PRN  ondansetron, 4 mg, Q6H PRN  oxyCODONE, 5 mg, Q6H PRN  potassium bicarbonate, 35 mEq, PRN  potassium bicarbonate, 50 mEq, PRN  potassium bicarbonate, 60 mEq, PRN  potassium, sodium phosphates, 2 packet, PRN  potassium, sodium phosphates, 2 packet, PRN  potassium, sodium phosphates, 2 packet, PRN  sodium chloride 0.9%, 10 mL, Q12H PRN      Today I personally reviewed pertinent medications, lines/drains/airways, imaging, cardiology results, laboratory results, microbiology results,    Diet  Diet Soft & Bite Sized (IDDSI Level 6) Honey Thick (Moderately Thick); Standard Tray      Assessment/Plan:     Neuro  * Witnessed seizure-like activity  Patient was awaiting discharge home on 7/14 when a rapid response was called as the patient began seizing. Two witnessed tonic-clonic seizures were observed lasting for about 1-2 minutes, given 2mg Ativan. After these seizures, he never returned to baseline. Baseline was described as full movement in neck and all extremities, tracking with head & neck, mumbles per mom, and following commands. Initial assessment, patient was found staring, with no blink to threat or closure of eyes, midline gaze, and not moving extremities with painful stimuli. During seizure, it is reported that patient became  hypertensive, hypoxic, and tachycardic, with strong flexor posturing bilaterally. EEG applied showing discharges over the left hemisphere, no history of seizures or brain mass/lesions/infarction. Patient loaded with 40mg/kg of Keppra. Given concern for mentation and need for closer neurological monitoring, patient was transferred to [facility] where he will be admitted.     Admitted to [facility], stable to step down 7/17, pending bed  Neurochecks every shift and q4h vital signs  SBP <180, MAP >65  Brain MRI w/wo epilepsy protocol - negative for acute process  cEEG - discharges in left hemisphere no seizures  Discontinued 7/16  Continue keppra 500mg BID, continue until outpatient follow up with Epilepsy (referral placed)  Maintain seizure precautions  SCDs & LVX for DVT ppx  PT/OT/SLP to continue treatment - OOB, sitting in bedside chair as tolerated  Q6h bladder scans  LP at bedside unsuccessful 7/15, obtained 7/16 under fluoroscopic guidance  Blood cultures resent, other workup pending and ID consulted. See plan above      Endocrine  Severe protein-calorie malnutrition  ok to resume pleasure feeds, soft and bite sized diet with honey thick liquids ordered  Nutrition consulted. Most recent weight and BMI monitored-     Measurements:  Wt Readings from Last 1 Encounters:   07/16/25 59.9 kg (132 lb)   Body mass index is 16.5 kg/m².    Patient has been screened and assessed by RD.    Malnutrition Type:  Context: chronic illness  Level: severe    Malnutrition Characteristic Summary:  Subcutaneous Fat (Malnutrition): severe depletion  Muscle Mass (Malnutrition): severe depletion    Interventions/Recommendations (treatment strategy):  1. Continue bolus TF recommendation: 5 cans/day of Isosource 1.5 to provide 1250 mL total volume, 1875 kcal, 220 g CHO, 85 protein, 19 fiber, 955 mL free water  - 90 mL flush before and after each (10 flushes total) can to provide additional 900 mL free water (Total 1855 mL)  2. Continue soft and bite  sized textured diet as tolerated    3. Continue boost glucose control TID   4. Recommend arnie BID to aid in wound healing  5. Recommend MVI  6. RD to monitor weight, labs, meds, intake, tolerance            The patient is being Prophylaxed for:  Venous Thromboembolism with: Mechanical or Chemical  Stress Ulcer with: Not Applicable   Ventilator Pneumonia with: not applicable    Activity Orders            Diet Soft & Bite Sized (IDDSI Level 6) Honey Thick (Moderately Thick); Standard Tray: Soft & Bite Sized starting at 07/18 1431    Elevate HOB Elevate HOB 30-45 degrees during feeding unless contraindicated starting at 06/30 1357    Progressive Mobility Protocol (mobilize patient to their highest level of functioning at least twice daily) starting at 06/30 0800    Turn patient every 2 hours starting at 06/30 0000          Full Code    Karthikeyan Wynne MD  Neurocritical Care  [facility] - Neuro Critical Care

## 2025-07-23 LAB — MYCOBACTERIUM SPEC QL CULT: NORMAL

## 2025-07-23 NOTE — DISCHARGE SUMMARY
[facility] - Neuro Critical Care  Neurocritical Care  Discharge Summary    Admit Date: 6/29/2025    Service Date: 07/23/2025    Discharge Date: 7/22/2025    Length of Stay: 23    Final Active Diagnoses:    Diagnosis Date Noted POA    PRINCIPAL PROBLEM:  Witnessed seizure-like activity [R56.9] 07/15/2025 No    Hypothermia [T68.XXXA] 07/17/2025 No    Functional quadriplegia [R53.2] 07/15/2025 Yes    Body mass index (BMI) less than 19 [Z68.1] 07/10/2025 Not Applicable    Hyponatremia [E87.1] 07/10/2025 Yes    Hyperkalemia [E87.5] 07/10/2025 Yes    Hypoglycemia [E16.2] 07/10/2025 No    Anemia [D64.9] 07/05/2025 Yes    Gastrostomy status [Z93.1] 07/05/2025 Not Applicable    Hypophosphatemia [E83.39] 07/05/2025 Yes    Pressure injury of deep tissue of heel [L89.606] 07/02/2025 Yes    Severe protein-calorie malnutrition [E43] 07/01/2025 Yes    Decubitus ulcer of sacral region, stage 4 [L89.154] 06/30/2025 Yes    Pneumonia due to E. 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 Moreno is a 44yoM with PMHx significant for ALS (onset 2006), neuromuscular dysfunction of the bladder, sacral ulcers, and spinal cerebellar ataxia who was admitted recently admitted (5/9/2025) for severe dehydration, electrolyte disturbances, and covid who presented to the ED with EMS and c/o AMS and hypoxia. Nonverbal at baseline but apparently is usually more alert; has been less responsive and  interactive x1 day. Upon EMS arrival, was obtunded and sating 74%. They gave him solumedrol and duonebs en route which improved his oxygen status to >90%. Mother at bedside reports increased difficulty with swallowing and decreased PO intake.        While in the ED, labs and imaging significant for Na 149, K 5.4, Cl 111, CO2 21, glucose 149, BUN 38, creat 1.0, albumin 2.2, ALP 80, AST/ALT 34/23, anion gap 17, procal 8.42, BNP <10, WBC 7.11, H/H 12/40.5, plts 315, covid/flu negative, .6, VBG 7.349/47.1/44.5/23.9, istat LA 4.9->7.4->4.2 (7.4 likely error?). Chest xray with patchy opacities bilaterally, predominant within the bibasilar regions but also within the left lung apex and the left mid-lung; compatible with multifocal pneumonia. Started on BSA by the ED; vanc and cefepime. [facility] consulted for sepsis. During first examination the patient was stable with MAPs >65 and Lactic acid down trended to 2.6. Around 6 am of 6/30 [place] was called hypotension with MAPs in the 50. Pt was started on levophed and was transferred to MICU 6/29 for septic shock secondary to multifocal pneumonia. Patient initially requiring pressors but since discontinued. Given dysphagia, IR placed a PEG tube 7/5 for nutrition and medication administration. Patient was stepped down to  on 7/7.    Patient was awaiting discharge home tonight on 7/14 when a rapid response was called as the patient began seizing. Two witnessed tonic-clonic seizures were observed lasting for about 1-2 minutes, given 2mg Ativan. After these seizures, he never returned to baseline. Baseline was described as full movement in neck and all extremities, tracking with head & neck, mumbles per mom, and following commands. Initial assessment, patient was found staring, with no blink to threat or closure of eyes, midline gaze, and not moving extremities with painful stimuli. During seizure, it is reported that patient became hypertensive, hypoxic, and tachycardic, with  strong flexor posturing bilaterally. EEG applied showing discharges over the left hemisphere, no history of seizures or brain mass/lesions/infarction. Patient loaded with 40mg/kg of Keppra. Given concern for mentation and need for closer neurological monitoring, patient was transferred to [facility] where he will be admitted.       Hospital Course by Event: 07/15/2025: NAEON. Afebrile, BP 90/50s. MRI completed. EEG in place. Keppra and LR given. Bilateral mitt restraints. Hypotensive this morning, given 500 NS and 1L LR. Temp low 90s, bear hugger placed. UOP 1.6L. LP today   07/16/2025: continued improvement in mental status, alert and interactive today, pleasant and smiling with care, does attempt to remove lines and PIVs.  Did require initiation of norepinephrine overnight to maintain MAP > 65, on and off joseluis hugger for normothermia, remains on antibiotics which have been broadened to vanc/meropenem pending ID evaluation. Obtaining LP under fluoroscopy today.   07/17/2025: NAEON. Off levo. S/p LP with IR yesterday. CSF unremarkable, further CSF studies pending. R PIV removed. Pt maintaining pressure and looking better since removal. Stable for stepdown to HM today.  07/18/2025: GAIL. AFVSS. Exam stable. 2 large BM overnight, Electrolytes repleted. Pending stepdown bed. Joseluis hugger applied intermittently.  07/19/2025: Liquid stools overnight. Lactulose held and psyllium added with mild improvement. CPT started. Remains boarding for HM.   07/20/2025: concern for staring spells overnight, but reportedly in setting of oxycodone administration and was still following commands, was placed on cap-eeg which was uninterpretable but deferring repeat formal study, mother at bedside counseled and in agreement.   7/21/2025 TAEO, still boarding for  bed. Working on NH placement in the mean time. Patient tolerating diet at this time, will hold bolus feeds while eating. Still pending autoimmune panel  07/22/2025: LORY AFVSS. Exam  stable. Boarding for  stepdown bed. No new labs. Tolerating PO intake. Pending nutrition calorie count. PT/OT/OOB. Autoimmune CSF panel pending.      Hospital Course by Problem:   * Witnessed seizure-like activity  Patient was awaiting discharge home on 7/14 when a rapid response was called as the patient began seizing. Two witnessed tonic-clonic seizures were observed lasting for about 1-2 minutes, given 2mg Ativan. After these seizures, he never returned to baseline. Baseline was described as full movement in neck and all extremities, tracking with head & neck, mumbles per mom, and following commands. Initial assessment, patient was found staring, with no blink to threat or closure of eyes, midline gaze, and not moving extremities with painful stimuli. During seizure, it is reported that patient became hypertensive, hypoxic, and tachycardic, with strong flexor posturing bilaterally. EEG applied showing discharges over the left hemisphere, no history of seizures or brain mass/lesions/infarction. Patient loaded with 40mg/kg of Keppra. Given concern for mentation and need for closer neurological monitoring, patient was transferred to [facility] where he will be admitted.     Admitted to [facility], stable to step down 7/17, pending bed  Neurochecks every shift and q4h vital signs  SBP <180, MAP >65  Brain MRI w/wo epilepsy protocol - negative for acute process  cEEG - discharges in left hemisphere no seizures  Discontinued 7/16  Continue keppra 500mg BID, continue until outpatient follow up with Epilepsy (referral placed)  Maintain seizure precautions  SCDs & LVX for DVT ppx  PT/OT/SLP to continue treatment - OOB, sitting in bedside chair as tolerated  Q6h bladder scans  LP at bedside unsuccessful 7/15, obtained 7/16 under fluoroscopic guidance  Blood cultures resent, other workup pending and ID consulted. See plan above      Hypothermia  Baseline intermittently present and seemingly unrelated to other concerns  Sergio jenkins as  needed, not an icu indication    Body mass index (BMI) less than 19  See malnutrition    Gastrostomy status  Placed this hospitalization    Anemia  Baseline, transfuse if <7    Pressure injury of deep tissue of heel  L Heel with black eschar. R Heel pressure ulcer with scant serosanguineous drainage. Photos uploaded into chart.   Wound care following      Severe protein-calorie malnutrition  ok to resume pleasure feeds, soft and bite sized diet with honey thick liquids ordered  Nutrition consulted. Most recent weight and BMI monitored-     Measurements:  Wt Readings from Last 1 Encounters:   07/16/25 59.9 kg (132 lb)   Body mass index is 16.5 kg/m².    Patient has been screened and assessed by RD.    Malnutrition Type:  Context: chronic illness  Level: severe    Malnutrition Characteristic Summary:  Subcutaneous Fat (Malnutrition): severe depletion  Muscle Mass (Malnutrition): severe depletion    Interventions/Recommendations (treatment strategy):  1. Continue bolus TF recommendation: 5 cans/day of Isosource 1.5 to provide 1250 mL total volume, 1875 kcal, 220 g CHO, 85 protein, 19 fiber, 955 mL free water  - 90 mL flush before and after each (10 flushes total) can to provide additional 900 mL free water (Total 1855 mL)  2. Continue soft and bite sized textured diet as tolerated    3. Continue boost glucose control TID   4. Recommend arnie BID to aid in wound healing  5. Recommend MVI  6. RD to monitor weight, labs, meds, intake, tolerance      Acute encephalopathy  Secondary to seizures, improving    Pneumonia due to E. coli  Admitted to MICU for septic shock 2/2 ESBL E. Coli PNA  Upon EMS arrival, the patient was obtunded and hypoxic with an oxygen saturation of 74%.  Weaned off Levophed  On Ceftriaxone through 7/12 per ID recs, now completed  Satting well on room air    This has since resolved     - later presented back to [facility] with new septic appearing picture see separate issue    Decubitus ulcer of sacral  region, stage 4  Wound vac removed on 7/14 prior patient was going to be discharged.  Wound care following. sacral wound vac with dressing changes on MWF  General surgery consulted previously, no indication for inpatient debridement recommended      Septic shock   completed treatment  course of ertapenem for ESBL pneumonia  - other cultures in process  - ID consulted recommendations appreciated    - given LP findings, de-escalate to doxycycline 100 mg BID via PEG, last day 7/19 to complete 5d course for ssti    - isolation precaution per hospital protocol     Insomnia secondary to depression with anxiety  baseline    ALS (amyotrophic lateral sclerosis)  UMN predominance with diffuse limb spasticity. Slowly progressive. Fully-reliant on mother, others for self care and feeding and cleaning and toileting. Respiratory function is poor per FVC .     Continue home donepezil for neuroprotection  Continue home dantrolene and baclofen for spasticity  Turn q2h with aspiration precautions    Oropharyngeal dysphagia  S/p PEG    Spinocerebellar ataxia  See ALS (amyotrophic lateral sclerosis)      Spastic diplegia  See ALS (amyotrophic lateral sclerosis)        Goals of Care Treatment Preferences:  Code Status: Full Code    Health care agent: Mother Giovana Moreno  [facility] care agent number: [phone]          What is most important right now is to focus on remaining as independent as possible.  Accordingly, we have decided that the best plan to meet the patient's goals includes continuing with treatment.      Significant Results:  Imaging:  FL Lumbar Puncture (xpd)   Final Result      Lumbar puncture using fluoroscopic guidance.  16 cc of CSF removed as requested.      Electronically signed by resident: Simon Jose   Date:    07/16/2025   Time:    15:40      Electronically signed by: Lew Melendrez MD   Date:    07/16/2025   Time:    17:06      MRI Brain Epilepsy W W/O Contrast   Final Result      Limited exam, severely degraded  by patient motion.      No compelling evidence of acute intracranial pathology.      Cerebral and cerebellar atrophy, greater than expected for age.      No definite focal epileptogenic lesion identified but repeat imaging to be considered if/when clinically appropriate.      Electronically signed by resident: Mirna Isbell   Date:    07/15/2025   Time:    08:11      Electronically signed by: Benny Castellano MD   Date:    07/15/2025   Time:    10:25      X-Ray Chest 1 View   Final Result      See above         Electronically signed by: Geoffrey Woodruff MD   Date:    07/11/2025   Time:    09:43      IR Gastrostomy Tube   Final Result      Percutaneous placement of an 18 Kinyarwanda push-type gastrostomy tube.      Plan:      - Connect tube to gravity drainage bag. Keep NPO with G-port to gravity drainage for 12 hours. Start with clear liquids, then advance diet as tolerated.      - Can apply antiseptic alginate gel (Flaminal Hydro) to the stoma for 2 weeks.      - Flush tube with 20 mL warm water before & after each feeding or medication.      - Avoid administering whole pills through tube.      - Clean skin & tube daily with mild soap and water.      - For tube clogging, try flushing w 50 cc warm water      _______________________________________________________________      PROCEDURE SUMMARY:   - Gastrostomy tube placement under fluoroscopic guidance      - Additional procedure(s): None      PROCEDURE DETAILS:   Pre-procedure      Consent: Informed consent for the procedure including risks, benefits and alternatives was obtained and time-out was performed prior to the procedure.      Preparation: The site was prepared and draped using maximal sterile barrier technique including cutaneous antisepsis.      Anesthesia/sedation      Level of anesthesia/sedation: Monitored anesthesia care      Anesthesia/sedation administered by: Anesthesiology      Total intra-service sedation time (minutes): As per anesthesia record       Gastrostomy tube placement      The liver margin was localized by ultrasound. An indwelling orogastric/nasogastric tube was already in place. Local anesthesia was administered. The stomach was inflated with air. Two T-fasteners were placed under fluoroscopic guidance. The stomach was accessed and a wire was placed. The tract was dilated, the gastrostomy tube was advanced into the stomach, and intragastric position was confirmed with contrast injection. The tube was placed to gravity drainage.      Gastrostomy tube placed: 18 Guinean      Glucagon administered intravenously: 0.5 mg      Internal catheter securement: Balloon      External catheter securement: Retention disc      Contrast      Contrast agent: Omnipaque 300      Contrast volume (mL): 40      Radiation Dose      Fluoroscopy time (minutes): 2.8      Additional Details      Additional description of procedure: None      Registry event: None      Cause of registry event: Not applicable      Device used: None      Equipment details: None      Unique Device Identifiers: Not available      Specimens removed: None      Estimated blood loss (mL): Less than 10      Standardized report: [email or web address]_v3.1      Attestation      Signer name: Andrew Scott      I attest that I was present for the entire procedure. I reviewed the stored images and agree with the report as written.         Electronically signed by: Andrew Scott   Date:    07/09/2025   Time:    09:31      X-Ray Foot 2 View Bilateral   Final Result      Degenerative changes in both feet with no evidence of acute fracture or bony destructive process.      Soft tissues limited over the right heel with overlying bandage material         Electronically signed by: Bairon Desir   Date:    07/02/2025   Time:    18:25      XR NG/OG tube placement check, non-radiologist performed   Final Result      No acute process seen.         Electronically signed by: Joon Benson MD   Date:    06/30/2025    Time:    14:02      CT Head Without Contrast   Final Result      No acute intracranial pathology.  Stable appearance of the brain when compared to the prior study.      Electronically signed by resident: Jameel Cohen   Date:    06/30/2025   Time:    07:06      Electronically signed by: Hector Azar   Date:    06/30/2025   Time:    08:31      X-Ray Chest AP Portable   Final Result      Multifocal pneumonia.         Electronically signed by: Bertrand Lechuga   Date:    06/29/2025   Time:    19:01            Cardiology:  Results for orders placed during the hospital encounter of 06/29/25    Echo    Interpretation Summary    The only readable images were from the parasternal window  due to budy habitus, feeding tube etc.    Left Ventricle: There is normal systolic function with a visually estimated ejection fraction of 60 - 65%.    Right Ventricle: Systolic function is normal.    Left Atrium: Left atrium was not well visualized.    Microbiology:  Microbiology Results (last 7 days)       Procedure Component Value Units Date/Time    CSF culture [[id number]] Collected: 07/16/25 1453    Order Status: Completed Specimen: CSF (Spinal Fluid) from CSF Tap, Tube 3 Updated: 07/21/25 0720     CULTURE, CSF No Growth     GRAM STAIN Cytospin indicates:      No WBCs      No organisms seen    Blood culture [[id number]]  (Normal) Collected: 07/15/25 1328    Order Status: Completed Specimen: Blood from Peripheral, Hand, Right Updated: 07/20/25 1702     Blood Culture No Growth After 5 Days    Blood culture [[id number]]  (Normal) Collected: 07/15/25 1319    Order Status: Completed Specimen: Blood from Peripheral, Hand, Left Updated: 07/20/25 1702     Blood Culture No Growth After 5 Days    Fungus culture [[id number]]  (Normal) Collected: 07/16/25 1453    Order Status: Completed Specimen: CSF (Spinal Fluid) from CSF Tap, Tube 3 Updated: 07/20/25 0935     Fungal Culture Culture In Progress    Cryptococcal antigen, CSF [[id number]]  (Normal)  Collected: 07/16/25 1453    Order Status: Completed Specimen: CSF (Spinal Fluid) from CSF Tap, Tube 3 Updated: 07/17/25 1411     Cryptococcal Antigen, CSF Negative    Afb Culture Stain [[id number]] Collected: 07/16/25 1453    Order Status: Completed Specimen: CSF (Spinal Fluid) from CSF Tap, Tube 3 Updated: 07/17/25 1409     ACID FAST STAIN  No acid fast bacilli seen    AFB Culture & Smear [[id number]] Collected: 07/16/25 1453    Order Status: Resulted Specimen: CSF (Spinal Fluid) from CSF Tap, Tube 3 Updated: 07/16/25 1911    Gram stain [[id number]] Collected: 07/16/25 1453    Order Status: Canceled Specimen: CSF (Spinal Fluid) from CSF Tap, Tube 3              Laboratory:  Lab Results   Component Value Date    HGBA1C 5.4 09/30/2020    TSH 0.934 09/24/2019     Labs Reviewed   COMPREHENSIVE METABOLIC PANEL - Abnormal       Result Value    Sodium 149 (*)     Potassium 5.4 (*)     Chloride 111 (*)     CO2 21 (*)     Glucose 149 (*)     BUN 38 (*)     Creatinine 1.0      Calcium 9.7      Protein Total 8.7 (*)     Albumin 2.2 (*)     Bilirubin Total 0.2      ALP 80      AST 34      ALT 23      Anion Gap 17 (*)     eGFR >60     URINALYSIS, REFLEX TO URINE CULTURE - Abnormal    Color, UA Yellow      Appearance, UA Clear      pH, UA 8.0      Spec Grav UA 1.030      Protein, UA 1+ (*)     Glucose, UA Negative      Ketones, UA Negative      Bilirubin, UA Negative      Blood, UA Negative      Nitrites, UA Negative      Urobilinogen, UA Negative      Leukocyte Esterase, UA Negative     PROCALCITONIN - Abnormal    Procalcitonin 8.42 (*)    CBC WITH DIFFERENTIAL - Abnormal    WBC 7.11      RBC 4.67      HGB 12.0 (*)     HCT 40.5      MCV 87      MCH 25.7 (*)     MCHC 29.6 (*)     RDW 16.7 (*)     Platelet Count 315      MPV 11.2      Nucleated RBC 0      Neut % 84.5 (*)     Lymph % 8.6 (*)     Mono % 5.2      Eos % 0.0      Basophil % 1.0      Imm Grans % 0.7 (*)     Neut # 6.01      Lymph # 0.61 (*)     Mono # 0.37      Eos #  0.00      Baso # 0.07      Imm Grans # 0.05 (*)     Narrative:     This is an appended report.  These results have been appended to a previously verified report.   C-REACTIVE PROTEIN - Abnormal    .6 (*)    LACTIC ACID, PLASMA - Abnormal    Lactic Acid Level 2.6 (*)     Narrative:     Falsely low lactic acid results can be found in samples containing >=13.0 mg/dL total bilirubin and/or >=3.5 mg/dL direct bilirubin.    URINALYSIS MICROSCOPIC - Abnormal    RBC, UA 2      WBC, UA 1      Bacteria, UA Few (*)     Squamous Epithelial Cells, UA <1      Hyaline Casts, UA 4 (*)     Microscopic Comment       COMPREHENSIVE METABOLIC PANEL - Abnormal    Sodium 144      Potassium 5.1      Chloride 115 (*)     CO2 21 (*)     Glucose 145 (*)     BUN 33 (*)     Creatinine 0.7      Calcium 8.3 (*)     Protein Total 6.5      Albumin 1.7 (*)     Bilirubin Total 0.1      ALP 62      AST 26      ALT 15      Anion Gap 8      eGFR >60     PHOSPHORUS - Abnormal    Phosphorus Level 4.9 (*)    CBC WITH DIFFERENTIAL - Abnormal    WBC 8.29      RBC 3.13 (*)     HGB 8.1 (*)     HCT 26.2 (*)     MCV 84      MCH 25.9 (*)     MCHC 30.9 (*)     RDW 16.2 (*)     Platelet Count 245      MPV 11.4      Nucleated RBC 0      Neut % 92.0 (*)     Lymph % 4.5 (*)     Mono % 2.1 (*)     Eos % 0.0      Basophil % 0.7      Imm Grans % 0.7 (*)     Neut # 7.63      Lymph # 0.37 (*)     Mono # 0.17 (*)     Eos # 0.00      Baso # 0.06      Imm Grans # 0.06 (*)    COMPREHENSIVE METABOLIC PANEL - Abnormal    Sodium 146 (*)     Potassium 4.3      Chloride 116 (*)     CO2 23      Glucose 108      BUN 33 (*)     Creatinine 0.7      Calcium 8.2 (*)     Protein Total 6.1      Albumin 1.6 (*)     Bilirubin Total 0.1      ALP 58      AST 18      ALT 14      Anion Gap 7 (*)     eGFR >60     CBC WITH DIFFERENTIAL - Abnormal    WBC 7.44      RBC 2.97 (*)     HGB 7.8 (*)     HCT 24.6 (*)     MCV 83      MCH 26.3 (*)     MCHC 31.7 (*)     RDW 15.9 (*)     Platelet Count  234      MPV 10.9      Nucleated RBC 0      Neut % 90.5 (*)     Lymph % 6.3 (*)     Mono % 2.8 (*)     Eos % 0.0      Basophil % 0.1      Imm Grans % 0.3      Neut # 6.73      Lymph # 0.47 (*)     Mono # 0.21 (*)     Eos # 0.00      Baso # 0.01      Imm Grans # 0.02     POCT GLUCOSE - Abnormal    POCT Glucose 117 (*)    B-TYPE NATRIURETIC PEPTIDE - Normal    BNP <10     TROPONIN I HIGH SENSITIVITY - Normal    Troponin High Sensitive <3     SARS-COV2 (COVID) WITH FLU/RSV BY PCR - Normal    INFLUENZA A BY PCR Negative      INFLUENZA B BY PCR Negative      Respiratory Syncytial Virus PCR Negative      SARS-CoV-2 PCR Negative     MAGNESIUM - Normal    Magnesium  1.9     LACTIC ACID, PLASMA - Normal    Lactic Acid Level 1.7      Narrative:     Falsely low lactic acid results can be found in samples containing >=13.0 mg/dL total bilirubin and/or >=3.5 mg/dL direct bilirubin.    MAGNESIUM - Normal    Magnesium  1.9     LACTIC ACID, PLASMA - Normal    Lactic Acid Level 1.3      Narrative:     Falsely low lactic acid results can be found in samples containing >=13.0 mg/dL total bilirubin and/or >=3.5 mg/dL direct bilirubin.    PHOSPHORUS - Normal    Phosphorus Level 4.5     CBC W/ AUTO DIFFERENTIAL    Narrative:     The following orders were created for panel order CBC auto differential.  Procedure                               Abnormality         Status                     ---------                               -----------         ------                     CBC with Differential[[id number]]       Abnormal            Final result                 Please view results for these tests on the individual orders.   CBC W/ AUTO DIFFERENTIAL    Narrative:     The following orders were created for panel order CBC with Automated Differential.  Procedure                               Abnormality         Status                     ---------                               -----------         ------                     CBC with  Differential[[id number]]       Abnormal            Final result                 Please view results for these tests on the individual orders.   CBC W/ AUTO DIFFERENTIAL    Narrative:     The following orders were created for panel order CBC auto differential.  Procedure                               Abnormality         Status                     ---------                               -----------         ------                     CBC with Differential[[id number]]       Abnormal            Final result                 Please view results for these tests on the individual orders.       Pending Results: Encephalopathy Autoimmune Panel    Consultations:  WOUND CARE CONSULT  IP CONSULT TO CRITICAL CARE MEDICINE  IP CONSULT TO REGISTERED DIETITIAN/NUTRITIONIST  WOUND CARE CONSULT  WOUND CARE CONSULT  WOUND CARE CONSULT  IP CONSULT TO GENERAL SURGERY  IP CONSULT TO INTERVENTIONAL RADIOLOGY  IP CONSULT TO PODIATRY  IP CONSULT TO REGISTERED DIETITIAN/NUTRITIONIST  IP CONSULT TO INFECTIOUS DISEASES  IP CONSULT TO INTERVENTIONAL RADIOLOGY  IP CONSULT TO INFECTIOUS DISEASES  IP CONSULT TO REGISTERED DIETITIAN/NUTRITIONIST      Procedures:   * No surgery found * by * Surgery not found *.  Lumbar Puncture    Medications:      Medication List        START taking these medications      ammonium lactate 12 % lotion  Commonly known as: LAC-HYDRIN  Apply topically 2 (two) times daily as needed for Dry Skin. Apply to BLE BID and PRN     erythromycin ophthalmic ointment  Commonly known as: ROMYCIN  Place into both eyes every evening.     lactulose 20 gram/30 mL Soln  Commonly known as: CHRONULAC  15 mLs (10 g total) by Per G Tube route 2 (two) times daily.     polyethylene glycol 17 gram Pwpk  Commonly known as: GLYCOLAX  17 g by Per G Tube route 2 (two) times daily.     senna-docusate 8.6-50 mg per tablet  Commonly known as: PERICOLACE  1 tablet by Per G Tube route 2 (two) times daily.            CHANGE how you take these medications       baclofen 20 MG tablet  Commonly known as: LIORESAL  1 tablet (20 mg total) by Per G Tube route 3 (three) times daily.  What changed: how to take this     cetirizine 10 MG tablet  Commonly known as: ZYRTEC  1 tablet (10 mg total) by Per G Tube route once daily.  What changed:   how to take this  when to take this     dantrolene 50 MG Cap  Commonly known as: DANTRIUM  1 capsule (50 mg total) by Per G Tube route 3 (three) times daily. TAKE 1 CAPSULE(50 MG) BY MOUTH THREE TIMES DAILY  What changed:   how much to take  how to take this  when to take this     donepeziL 5 MG tablet  Commonly known as: ARICEPT  1 tablet (5 mg total) by Per G Tube route every evening.  What changed: how to take this     mirtazapine 15 MG tablet  Commonly known as: REMERON  1 tablet (15 mg total) by Per G Tube route every evening.  What changed: how to take this     QUEtiapine 50 MG tablet  Commonly known as: SEROQUEL  1 tablet (50 mg total) by Per G Tube route every evening.  What changed: how to take this            STOP taking these medications      dimethicone-zinc oxide 1-40 % Oint               Where to Get Your Medications        Information about where to get these medications is not yet available    Ask your nurse or doctor about these medications  ammonium lactate 12 % lotion  baclofen 20 MG tablet  cetirizine 10 MG tablet  dantrolene 50 MG Cap  donepeziL 5 MG tablet  erythromycin ophthalmic ointment  lactulose 20 gram/30 mL Soln  mirtazapine 15 MG tablet  polyethylene glycol 17 gram Pwpk  QUEtiapine 50 MG tablet  senna-docusate 8.6-50 mg per tablet       Diet: Diet Soft & Bite Sized (IDDSI Level 6) Honey Thick (Moderately Thick)      Activity: As tolerated.     Disposition: Discharged to [facility] in stable condition.      This discharge took 30 minutes to complete.    Karthikeyan Wynne MD  Neurocritical Care  [facility]

## 2025-07-25 PROBLEM — E43 SEVERE MALNUTRITION: Status: ACTIVE | Noted: 2025-07-25

## 2025-08-10 ENCOUNTER — HOSPITAL ENCOUNTER (INPATIENT)
Facility: HOSPITAL | Age: 45
LOS: 1 days | Discharge: NURSING, REHAB OR LONG-TERM CARE | DRG: 640 | End: 2025-08-11
Attending: EMERGENCY MEDICINE | Admitting: INTERNAL MEDICINE
Payer: MEDICARE

## 2025-08-10 DIAGNOSIS — R56.9 WITNESSED SEIZURE-LIKE ACTIVITY: ICD-10-CM

## 2025-08-10 DIAGNOSIS — Z13.6 SCREENING FOR CARDIOVASCULAR CONDITION: ICD-10-CM

## 2025-08-10 DIAGNOSIS — R00.0 TACHYCARDIA: ICD-10-CM

## 2025-08-10 DIAGNOSIS — G80.1 SPASTIC DIPLEGIA: ICD-10-CM

## 2025-08-10 DIAGNOSIS — I47.10 SVT (SUPRAVENTRICULAR TACHYCARDIA): Primary | ICD-10-CM

## 2025-08-10 DIAGNOSIS — F41.8 INSOMNIA SECONDARY TO DEPRESSION WITH ANXIETY: ICD-10-CM

## 2025-08-10 DIAGNOSIS — N30.00 ACUTE CYSTITIS WITHOUT HEMATURIA: ICD-10-CM

## 2025-08-10 DIAGNOSIS — F51.05 INSOMNIA SECONDARY TO DEPRESSION WITH ANXIETY: ICD-10-CM

## 2025-08-10 PROBLEM — L89.154 DECUBITUS ULCER OF SACRAL REGION, STAGE 4: Chronic | Status: ACTIVE | Noted: 2025-06-30

## 2025-08-10 PROBLEM — Z66 DNR (DO NOT RESUSCITATE): Chronic | Status: ACTIVE | Noted: 2023-09-14

## 2025-08-10 PROBLEM — G12.21 ALS (AMYOTROPHIC LATERAL SCLEROSIS): Chronic | Status: ACTIVE | Noted: 2023-09-14

## 2025-08-10 LAB
ABSOLUTE EOSINOPHIL (OHS): 0.11 K/UL
ABSOLUTE MONOCYTE (OHS): 0.56 K/UL (ref 0.3–1)
ABSOLUTE NEUTROPHIL COUNT (OHS): 4.87 K/UL (ref 1.8–7.7)
ALBUMIN SERPL BCP-MCNC: 2.5 G/DL (ref 3.5–5.2)
ALP SERPL-CCNC: 73 UNIT/L (ref 40–150)
ALT SERPL W/O P-5'-P-CCNC: 59 UNIT/L (ref 0–55)
ANION GAP (OHS): 10 MMOL/L (ref 8–16)
AST SERPL-CCNC: 40 UNIT/L (ref 0–50)
BACTERIA #/AREA URNS AUTO: NORMAL /HPF
BASOPHILS # BLD AUTO: 0.02 K/UL
BASOPHILS NFR BLD AUTO: 0.3 %
BILIRUB SERPL-MCNC: 0.2 MG/DL (ref 0.1–1)
BILIRUB UR QL STRIP.AUTO: NEGATIVE
BIPAP: 0
BUN SERPL-MCNC: 29 MG/DL (ref 6–20)
CALCIUM SERPL-MCNC: 9.1 MG/DL (ref 8.7–10.5)
CHLORIDE SERPL-SCNC: 101 MMOL/L (ref 95–110)
CLARITY UR: CLEAR
CO2 SERPL-SCNC: 22 MMOL/L (ref 23–29)
COLOR UR AUTO: YELLOW
CREAT SERPL-MCNC: 0.6 MG/DL (ref 0.5–1.4)
ERYTHROCYTE [DISTWIDTH] IN BLOOD BY AUTOMATED COUNT: 18 % (ref 11.5–14.5)
GFR SERPLBLD CREATININE-BSD FMLA CKD-EPI: >60 ML/MIN/1.73/M2
GLUCOSE SERPL-MCNC: 98 MG/DL (ref 70–110)
GLUCOSE UR QL STRIP: NEGATIVE
HCT VFR BLD AUTO: 35.6 % (ref 40–54)
HCT VFR BLD CALC: 36.7 % (ref 36–54)
HGB BLD-MCNC: 11.1 GM/DL (ref 14–18)
HGB UR QL STRIP: NEGATIVE
IMM GRANULOCYTES # BLD AUTO: 0.02 K/UL (ref 0–0.04)
IMM GRANULOCYTES NFR BLD AUTO: 0.3 % (ref 0–0.5)
INFLUENZA A MOLECULAR (OHS): NEGATIVE
INFLUENZA B MOLECULAR (OHS): NEGATIVE
KETONES UR QL STRIP: NEGATIVE
LDH SERPL L TO P-CCNC: 1.2 MMOL/L (ref 0.5–2.2)
LEUKOCYTE ESTERASE UR QL STRIP: NEGATIVE
LYMPHOCYTES # BLD AUTO: 1.3 K/UL (ref 1–4.8)
MAGNESIUM SERPL-MCNC: 1.9 MG/DL (ref 1.6–2.6)
MCH RBC QN AUTO: 27.1 PG (ref 27–31)
MCHC RBC AUTO-ENTMCNC: 31.2 G/DL (ref 32–36)
MCV RBC AUTO: 87 FL (ref 82–98)
MICROSCOPIC COMMENT: NORMAL
NITRITE UR QL STRIP: POSITIVE
NUCLEATED RBC (/100WBC) (OHS): 0 /100 WBC
PCO2 BLDA: 39.4 MMHG (ref 35–45)
PH SMN: 7.49 [PH] (ref 7.35–7.45)
PH UR STRIP: 7 [PH]
PHOSPHATE SERPL-MCNC: 4.2 MG/DL (ref 2.7–4.5)
PLATELET # BLD AUTO: 366 K/UL (ref 150–450)
PMV BLD AUTO: 10.2 FL (ref 9.2–12.9)
PO2 BLDA: 35.4 MMHG (ref 40–60)
POC BASE DEFICIT: 6.5 MMOL/L
POC HCO3: 29.8 MMOL/L (ref 24–28)
POC PERFORMED BY: ABNORMAL
POTASSIUM SERPL-SCNC: 4.7 MMOL/L (ref 3.5–5.1)
PROCALCITONIN SERPL-MCNC: 0.08 NG/ML
PROT SERPL-MCNC: 8.2 GM/DL (ref 6–8.4)
PROT UR QL STRIP: NEGATIVE
RBC # BLD AUTO: 4.1 M/UL (ref 4.6–6.2)
RBC #/AREA URNS AUTO: <1 /HPF (ref 0–4)
RELATIVE EOSINOPHIL (OHS): 1.6 %
RELATIVE LYMPHOCYTE (OHS): 18.9 % (ref 18–48)
RELATIVE MONOCYTE (OHS): 8.1 % (ref 4–15)
RELATIVE NEUTROPHIL (OHS): 70.8 % (ref 38–73)
SODIUM SERPL-SCNC: 133 MMOL/L (ref 136–145)
SP GR UR STRIP: 1.01
SPECIMEN SOURCE: ABNORMAL
SQUAMOUS #/AREA URNS AUTO: <1 /HPF
TROPONIN I SERPL HS-MCNC: <3 NG/L
TSH SERPL-ACNC: 1.79 UIU/ML (ref 0.4–4)
UROBILINOGEN UR STRIP-ACNC: NEGATIVE EU/DL
WBC # BLD AUTO: 6.88 K/UL (ref 3.9–12.7)
WBC #/AREA URNS AUTO: 2 /HPF (ref 0–5)

## 2025-08-10 PROCEDURE — 63600175 PHARM REV CODE 636 W HCPCS

## 2025-08-10 PROCEDURE — 84100 ASSAY OF PHOSPHORUS: CPT

## 2025-08-10 PROCEDURE — 93010 ELECTROCARDIOGRAM REPORT: CPT | Mod: ,,, | Performed by: INTERNAL MEDICINE

## 2025-08-10 PROCEDURE — 25000003 PHARM REV CODE 250

## 2025-08-10 PROCEDURE — 99900035 HC TECH TIME PER 15 MIN (STAT)

## 2025-08-10 PROCEDURE — 87502 INFLUENZA DNA AMP PROBE: CPT | Performed by: EMERGENCY MEDICINE

## 2025-08-10 PROCEDURE — 12000002 HC ACUTE/MED SURGE SEMI-PRIVATE ROOM

## 2025-08-10 PROCEDURE — 84484 ASSAY OF TROPONIN QUANT: CPT

## 2025-08-10 PROCEDURE — 82803 BLOOD GASES ANY COMBINATION: CPT

## 2025-08-10 PROCEDURE — 94761 N-INVAS EAR/PLS OXIMETRY MLT: CPT | Mod: XB

## 2025-08-10 PROCEDURE — 83735 ASSAY OF MAGNESIUM: CPT | Performed by: EMERGENCY MEDICINE

## 2025-08-10 PROCEDURE — 85014 HEMATOCRIT: CPT

## 2025-08-10 PROCEDURE — 99285 EMERGENCY DEPT VISIT HI MDM: CPT | Mod: 25

## 2025-08-10 PROCEDURE — 81001 URINALYSIS AUTO W/SCOPE: CPT

## 2025-08-10 PROCEDURE — 96361 HYDRATE IV INFUSION ADD-ON: CPT

## 2025-08-10 PROCEDURE — 93005 ELECTROCARDIOGRAM TRACING: CPT

## 2025-08-10 PROCEDURE — 83605 ASSAY OF LACTIC ACID: CPT

## 2025-08-10 PROCEDURE — 85025 COMPLETE CBC W/AUTO DIFF WBC: CPT

## 2025-08-10 PROCEDURE — 87040 BLOOD CULTURE FOR BACTERIA: CPT

## 2025-08-10 PROCEDURE — 84145 PROCALCITONIN (PCT): CPT

## 2025-08-10 PROCEDURE — 96374 THER/PROPH/DIAG INJ IV PUSH: CPT

## 2025-08-10 PROCEDURE — 84443 ASSAY THYROID STIM HORMONE: CPT | Performed by: EMERGENCY MEDICINE

## 2025-08-10 RX ORDER — CEFTRIAXONE 2 G/1
2 INJECTION, POWDER, FOR SOLUTION INTRAMUSCULAR; INTRAVENOUS ONCE
Status: COMPLETED | OUTPATIENT
Start: 2025-08-10 | End: 2025-08-10

## 2025-08-10 RX ADMIN — CEFTRIAXONE SODIUM 2 G: 2 INJECTION, POWDER, FOR SOLUTION INTRAMUSCULAR; INTRAVENOUS at 11:08

## 2025-08-10 RX ADMIN — SODIUM CHLORIDE 1551 ML: 9 INJECTION, SOLUTION INTRAVENOUS at 10:08

## 2025-08-10 RX ADMIN — SODIUM CHLORIDE, POTASSIUM CHLORIDE, SODIUM LACTATE AND CALCIUM CHLORIDE 1500 ML: 600; 310; 30; 20 INJECTION, SOLUTION INTRAVENOUS at 11:08

## 2025-08-11 VITALS
TEMPERATURE: 98 F | WEIGHT: 114 LBS | SYSTOLIC BLOOD PRESSURE: 110 MMHG | DIASTOLIC BLOOD PRESSURE: 75 MMHG | RESPIRATION RATE: 17 BRPM | HEIGHT: 75 IN | BODY MASS INDEX: 14.17 KG/M2 | HEART RATE: 95 BPM | OXYGEN SATURATION: 100 %

## 2025-08-11 PROBLEM — I47.10 SVT (SUPRAVENTRICULAR TACHYCARDIA): Status: ACTIVE | Noted: 2025-08-11

## 2025-08-11 LAB
ABSOLUTE EOSINOPHIL (OHS): 0.08 K/UL
ABSOLUTE MONOCYTE (OHS): 0.86 K/UL (ref 0.3–1)
ABSOLUTE NEUTROPHIL COUNT (OHS): 5.95 K/UL (ref 1.8–7.7)
ALBUMIN SERPL BCP-MCNC: 2.1 G/DL (ref 3.5–5.2)
ALP SERPL-CCNC: 61 UNIT/L (ref 40–150)
ALT SERPL W/O P-5'-P-CCNC: 46 UNIT/L (ref 0–55)
ANION GAP (OHS): 4 MMOL/L (ref 8–16)
AST SERPL-CCNC: 28 UNIT/L (ref 0–50)
BASOPHILS # BLD AUTO: 0.03 K/UL
BASOPHILS NFR BLD AUTO: 0.4 %
BILIRUB SERPL-MCNC: 0.1 MG/DL (ref 0.1–1)
BUN SERPL-MCNC: 22 MG/DL (ref 6–20)
CALCIUM SERPL-MCNC: 8.2 MG/DL (ref 8.7–10.5)
CHLORIDE SERPL-SCNC: 104 MMOL/L (ref 95–110)
CO2 SERPL-SCNC: 26 MMOL/L (ref 23–29)
CREAT SERPL-MCNC: 0.4 MG/DL (ref 0.5–1.4)
ERYTHROCYTE [DISTWIDTH] IN BLOOD BY AUTOMATED COUNT: 17.8 % (ref 11.5–14.5)
GFR SERPLBLD CREATININE-BSD FMLA CKD-EPI: >60 ML/MIN/1.73/M2
GLUCOSE SERPL-MCNC: 85 MG/DL (ref 70–110)
HCT VFR BLD AUTO: 27.7 % (ref 40–54)
HGB BLD-MCNC: 8.6 GM/DL (ref 14–18)
HOLD SPECIMEN: NORMAL
IMM GRANULOCYTES # BLD AUTO: 0.03 K/UL (ref 0–0.04)
IMM GRANULOCYTES NFR BLD AUTO: 0.4 % (ref 0–0.5)
LYMPHOCYTES # BLD AUTO: 0.87 K/UL (ref 1–4.8)
MAGNESIUM SERPL-MCNC: 1.8 MG/DL (ref 1.6–2.6)
MCH RBC QN AUTO: 26.9 PG (ref 27–31)
MCHC RBC AUTO-ENTMCNC: 31 G/DL (ref 32–36)
MCV RBC AUTO: 87 FL (ref 82–98)
NUCLEATED RBC (/100WBC) (OHS): 0 /100 WBC
OHS QRS DURATION: 60 MS
OHS QRS DURATION: 64 MS
OHS QTC CALCULATION: 419 MS
OHS QTC CALCULATION: 428 MS
PHOSPHATE SERPL-MCNC: 3.7 MG/DL (ref 2.7–4.5)
PLATELET # BLD AUTO: 277 K/UL (ref 150–450)
PMV BLD AUTO: 9.8 FL (ref 9.2–12.9)
POTASSIUM SERPL-SCNC: 4.4 MMOL/L (ref 3.5–5.1)
PROT SERPL-MCNC: 7 GM/DL (ref 6–8.4)
RBC # BLD AUTO: 3.2 M/UL (ref 4.6–6.2)
RELATIVE EOSINOPHIL (OHS): 1 %
RELATIVE LYMPHOCYTE (OHS): 11.1 % (ref 18–48)
RELATIVE MONOCYTE (OHS): 11 % (ref 4–15)
RELATIVE NEUTROPHIL (OHS): 76.1 % (ref 38–73)
SODIUM SERPL-SCNC: 134 MMOL/L (ref 136–145)
WBC # BLD AUTO: 7.82 K/UL (ref 3.9–12.7)

## 2025-08-11 PROCEDURE — 25000003 PHARM REV CODE 250

## 2025-08-11 PROCEDURE — 93005 ELECTROCARDIOGRAM TRACING: CPT

## 2025-08-11 PROCEDURE — 36415 COLL VENOUS BLD VENIPUNCTURE: CPT

## 2025-08-11 PROCEDURE — 83735 ASSAY OF MAGNESIUM: CPT

## 2025-08-11 PROCEDURE — 80053 COMPREHEN METABOLIC PANEL: CPT

## 2025-08-11 PROCEDURE — 93010 ELECTROCARDIOGRAM REPORT: CPT | Mod: ,,, | Performed by: INTERNAL MEDICINE

## 2025-08-11 PROCEDURE — 63600175 PHARM REV CODE 636 W HCPCS

## 2025-08-11 PROCEDURE — 84100 ASSAY OF PHOSPHORUS: CPT

## 2025-08-11 PROCEDURE — 85025 COMPLETE CBC W/AUTO DIFF WBC: CPT

## 2025-08-11 PROCEDURE — 87086 URINE CULTURE/COLONY COUNT: CPT

## 2025-08-11 RX ORDER — ACETAMINOPHEN 325 MG/1
650 TABLET ORAL EVERY 6 HOURS PRN
Status: DISCONTINUED | OUTPATIENT
Start: 2025-08-11 | End: 2025-08-11 | Stop reason: HOSPADM

## 2025-08-11 RX ORDER — SODIUM CHLORIDE 0.9 % (FLUSH) 0.9 %
10 SYRINGE (ML) INJECTION EVERY 12 HOURS PRN
Status: DISCONTINUED | OUTPATIENT
Start: 2025-08-11 | End: 2025-08-11 | Stop reason: HOSPADM

## 2025-08-11 RX ORDER — NALOXONE HCL 0.4 MG/ML
0.02 VIAL (ML) INJECTION
Status: DISCONTINUED | OUTPATIENT
Start: 2025-08-11 | End: 2025-08-11 | Stop reason: HOSPADM

## 2025-08-11 RX ORDER — ACETAMINOPHEN 325 MG/1
650 TABLET ORAL EVERY 6 HOURS PRN
Start: 2025-08-11

## 2025-08-11 RX ORDER — AMMONIUM LACTATE 12 G/100G
LOTION TOPICAL 2 TIMES DAILY
Status: DISCONTINUED | OUTPATIENT
Start: 2025-08-11 | End: 2025-08-11 | Stop reason: HOSPADM

## 2025-08-11 RX ORDER — DONEPEZIL HYDROCHLORIDE 5 MG/1
5 TABLET, FILM COATED ORAL NIGHTLY
Status: DISCONTINUED | OUTPATIENT
Start: 2025-08-11 | End: 2025-08-11 | Stop reason: HOSPADM

## 2025-08-11 RX ORDER — ASCORBIC ACID 500 MG
500 TABLET ORAL 2 TIMES DAILY
Start: 2025-08-11

## 2025-08-11 RX ORDER — ONDANSETRON HYDROCHLORIDE 2 MG/ML
4 INJECTION, SOLUTION INTRAVENOUS EVERY 6 HOURS PRN
Status: DISCONTINUED | OUTPATIENT
Start: 2025-08-11 | End: 2025-08-11 | Stop reason: HOSPADM

## 2025-08-11 RX ORDER — GUAIFENESIN 100 MG/5ML
200 LIQUID ORAL EVERY 4 HOURS PRN
Start: 2025-08-11 | End: 2025-08-21

## 2025-08-11 RX ORDER — MIRTAZAPINE 15 MG/1
15 TABLET, FILM COATED ORAL NIGHTLY
Status: DISCONTINUED | OUTPATIENT
Start: 2025-08-11 | End: 2025-08-11 | Stop reason: HOSPADM

## 2025-08-11 RX ORDER — GLUCAGON 1 MG
1 KIT INJECTION
Status: DISCONTINUED | OUTPATIENT
Start: 2025-08-11 | End: 2025-08-11 | Stop reason: HOSPADM

## 2025-08-11 RX ORDER — TALC
6 POWDER (GRAM) TOPICAL NIGHTLY PRN
Start: 2025-08-11

## 2025-08-11 RX ORDER — IBUPROFEN 200 MG
24 TABLET ORAL
Status: DISCONTINUED | OUTPATIENT
Start: 2025-08-11 | End: 2025-08-11 | Stop reason: HOSPADM

## 2025-08-11 RX ORDER — IBUPROFEN 200 MG
16 TABLET ORAL
Status: DISCONTINUED | OUTPATIENT
Start: 2025-08-11 | End: 2025-08-11 | Stop reason: HOSPADM

## 2025-08-11 RX ORDER — TALC
6 POWDER (GRAM) TOPICAL NIGHTLY PRN
Status: DISCONTINUED | OUTPATIENT
Start: 2025-08-11 | End: 2025-08-11 | Stop reason: HOSPADM

## 2025-08-11 RX ORDER — BACLOFEN 10 MG/1
20 TABLET ORAL 3 TIMES DAILY
Status: DISCONTINUED | OUTPATIENT
Start: 2025-08-11 | End: 2025-08-11 | Stop reason: HOSPADM

## 2025-08-11 RX ORDER — ZINC SULFATE 50(220)MG
220 CAPSULE ORAL DAILY
Start: 2025-08-11

## 2025-08-11 RX ORDER — ASCORBIC ACID 500 MG
500 TABLET ORAL 2 TIMES DAILY
Status: DISCONTINUED | OUTPATIENT
Start: 2025-08-11 | End: 2025-08-11 | Stop reason: HOSPADM

## 2025-08-11 RX ORDER — GUAIFENESIN 100 MG/5ML
200 LIQUID ORAL EVERY 4 HOURS PRN
Status: DISCONTINUED | OUTPATIENT
Start: 2025-08-11 | End: 2025-08-11 | Stop reason: HOSPADM

## 2025-08-11 RX ORDER — ENOXAPARIN SODIUM 100 MG/ML
40 INJECTION SUBCUTANEOUS EVERY 24 HOURS
Status: DISCONTINUED | OUTPATIENT
Start: 2025-08-11 | End: 2025-08-11 | Stop reason: HOSPADM

## 2025-08-11 RX ORDER — LEVETIRACETAM 100 MG/ML
500 SOLUTION ORAL 2 TIMES DAILY
Start: 2025-08-11 | End: 2026-08-11

## 2025-08-11 RX ORDER — DIAZEPAM 10 MG/2ML
2.5 INJECTION INTRAMUSCULAR DAILY PRN
Status: DISCONTINUED | OUTPATIENT
Start: 2025-08-11 | End: 2025-08-11 | Stop reason: HOSPADM

## 2025-08-11 RX ORDER — QUETIAPINE FUMARATE 25 MG/1
50 TABLET, FILM COATED ORAL NIGHTLY
Status: DISCONTINUED | OUTPATIENT
Start: 2025-08-11 | End: 2025-08-11 | Stop reason: HOSPADM

## 2025-08-11 RX ORDER — SIMETHICONE 80 MG
2 TABLET,CHEWABLE ORAL 2 TIMES DAILY
Status: DISCONTINUED | OUTPATIENT
Start: 2025-08-11 | End: 2025-08-11 | Stop reason: HOSPADM

## 2025-08-11 RX ORDER — DIAZEPAM 10 MG/2ML
2.5 INJECTION INTRAMUSCULAR DAILY PRN
Start: 2025-08-11 | End: 2025-09-10

## 2025-08-11 RX ORDER — SIMETHICONE 80 MG
160 TABLET,CHEWABLE ORAL 2 TIMES DAILY
Start: 2025-08-11

## 2025-08-11 RX ORDER — ZINC SULFATE 50(220)MG
220 CAPSULE ORAL DAILY
Status: DISCONTINUED | OUTPATIENT
Start: 2025-08-11 | End: 2025-08-11 | Stop reason: HOSPADM

## 2025-08-11 RX ORDER — GABAPENTIN 100 MG/1
100 CAPSULE ORAL NIGHTLY
Start: 2025-08-11 | End: 2026-08-11

## 2025-08-11 RX ORDER — LEVETIRACETAM 100 MG/ML
500 SOLUTION ORAL 2 TIMES DAILY
Status: DISCONTINUED | OUTPATIENT
Start: 2025-08-11 | End: 2025-08-11 | Stop reason: HOSPADM

## 2025-08-11 RX ORDER — CETIRIZINE HYDROCHLORIDE 10 MG/1
10 TABLET ORAL DAILY
Status: DISCONTINUED | OUTPATIENT
Start: 2025-08-11 | End: 2025-08-11 | Stop reason: HOSPADM

## 2025-08-11 RX ORDER — GABAPENTIN 100 MG/1
100 CAPSULE ORAL NIGHTLY
Status: DISCONTINUED | OUTPATIENT
Start: 2025-08-11 | End: 2025-08-11 | Stop reason: HOSPADM

## 2025-08-11 RX ORDER — ENOXAPARIN SODIUM 100 MG/ML
40 INJECTION SUBCUTANEOUS DAILY
Start: 2025-08-11

## 2025-08-11 RX ORDER — AMOXICILLIN 250 MG
2 CAPSULE ORAL 2 TIMES DAILY
Status: DISCONTINUED | OUTPATIENT
Start: 2025-08-11 | End: 2025-08-11 | Stop reason: HOSPADM

## 2025-08-11 RX ORDER — POLYETHYLENE GLYCOL 3350 17 G/17G
17 POWDER, FOR SOLUTION ORAL DAILY
Status: DISCONTINUED | OUTPATIENT
Start: 2025-08-11 | End: 2025-08-11 | Stop reason: HOSPADM

## 2025-08-11 RX ORDER — ONDANSETRON HYDROCHLORIDE 2 MG/ML
4 INJECTION, SOLUTION INTRAVENOUS EVERY 6 HOURS PRN
Start: 2025-08-11

## 2025-08-11 RX ADMIN — BACLOFEN 20 MG: 10 TABLET ORAL at 03:08

## 2025-08-11 RX ADMIN — DONEPEZIL HYDROCHLORIDE 5 MG: 5 TABLET, FILM COATED ORAL at 03:08

## 2025-08-11 RX ADMIN — ZINC SULFATE 220 MG (50 MG) CAPSULE 220 MG: CAPSULE at 09:08

## 2025-08-11 RX ADMIN — OXYCODONE HYDROCHLORIDE AND ACETAMINOPHEN 500 MG: 500 TABLET ORAL at 09:08

## 2025-08-11 RX ADMIN — SIMETHICONE 160 MG: 80 TABLET, CHEWABLE ORAL at 09:08

## 2025-08-11 RX ADMIN — AMMONIUM LACTATE: 12 LOTION TOPICAL at 09:08

## 2025-08-11 RX ADMIN — ENOXAPARIN SODIUM 40 MG: 40 INJECTION SUBCUTANEOUS at 05:08

## 2025-08-11 RX ADMIN — POLYETHYLENE GLYCOL 3350 17 G: 17 POWDER, FOR SOLUTION ORAL at 09:08

## 2025-08-11 RX ADMIN — SENNOSIDES AND DOCUSATE SODIUM 2 TABLET: 50; 8.6 TABLET ORAL at 09:08

## 2025-08-11 RX ADMIN — LEVETIRACETAM 500 MG: 500 SOLUTION ORAL at 09:08

## 2025-08-11 RX ADMIN — HYPROMELLOSE 2910 1 DROP: 5 SOLUTION/ DROPS OPHTHALMIC at 03:08

## 2025-08-11 RX ADMIN — HYPROMELLOSE 2910 1 DROP: 5 SOLUTION/ DROPS OPHTHALMIC at 09:08

## 2025-08-11 RX ADMIN — BACLOFEN 20 MG: 10 TABLET ORAL at 09:08

## 2025-08-11 RX ADMIN — CETIRIZINE HYDROCHLORIDE 10 MG: 10 TABLET, FILM COATED ORAL at 09:08

## 2025-08-11 RX ADMIN — THERA TABS 1 TABLET: TAB at 09:08

## 2025-08-12 LAB — BACTERIA UR CULT: NO GROWTH

## 2025-08-16 LAB
BACTERIA BLD CULT: NORMAL
BACTERIA BLD CULT: NORMAL

## 2025-08-19 ENCOUNTER — TELEPHONE (OUTPATIENT)
Dept: NEUROLOGY | Facility: CLINIC | Age: 45
End: 2025-08-19
Payer: MEDICARE

## (undated) DEVICE — SOL BETADINE 5%

## (undated) DEVICE — SKINMARKER & RULER REGULAR X-F

## (undated) DEVICE — CUP MEDICINE GRADUATED 1OZ

## (undated) DEVICE — DRAPE STERI INSTRUMENT 1018

## (undated) DEVICE — SHEET EENT SPLIT

## (undated) DEVICE — SOL WATER STRL IRR 1000ML

## (undated) DEVICE — CLEANER TIP ELECSURG 2X2IN

## (undated) DEVICE — NDL HYPO A BEVEL 30X1/2

## (undated) DEVICE — TRAY MUSCLE LID EYE

## (undated) DEVICE — DRAPE STERI-DRAPE 1000 17X11IN

## (undated) DEVICE — GAUZE SPONGE 4X4 12PLY

## (undated) DEVICE — BLADE SURG #15 CARBON STEEL

## (undated) DEVICE — DROPPER MEDICINE

## (undated) DEVICE — CAUTERY HIGH TEMP 2IN FLEXIBLE

## (undated) DEVICE — GOWN SURGICAL X-LARGE

## (undated) DEVICE — NDL STRAIGHT 4CM LEIBINGER

## (undated) DEVICE — NDL 22GA X1 1/2 REG BEVEL

## (undated) DEVICE — SOL BSS BALANCED SALT

## (undated) DEVICE — PENCIL ROCKER SWITCH 10FT CORD

## (undated) DEVICE — TOWEL OR DISP STRL BLUE 4/PK

## (undated) DEVICE — ELECTRODE REM PLYHSV RETURN 9

## (undated) DEVICE — PROTECTOR CORNEAL CROUCH ST

## (undated) DEVICE — APPLICATOR STERILE 3IN

## (undated) DEVICE — INSTRUMENT SURG SUCT FRZR W/C

## (undated) DEVICE — SYR 10CC LUER LOCK